# Patient Record
Sex: MALE | Race: WHITE | Employment: UNEMPLOYED | URBAN - METROPOLITAN AREA
[De-identification: names, ages, dates, MRNs, and addresses within clinical notes are randomized per-mention and may not be internally consistent; named-entity substitution may affect disease eponyms.]

---

## 2019-12-24 ENCOUNTER — TELEPHONE (OUTPATIENT)
Dept: FAMILY MEDICINE | Facility: CLINIC | Age: 64
End: 2019-12-24

## 2019-12-24 ENCOUNTER — OFFICE VISIT (OUTPATIENT)
Dept: FAMILY MEDICINE | Facility: CLINIC | Age: 64
End: 2019-12-24
Payer: MEDICAID

## 2019-12-24 VITALS
OXYGEN SATURATION: 94 % | TEMPERATURE: 98.4 F | HEART RATE: 90 BPM | DIASTOLIC BLOOD PRESSURE: 90 MMHG | WEIGHT: 179 LBS | SYSTOLIC BLOOD PRESSURE: 141 MMHG | HEIGHT: 65 IN | BODY MASS INDEX: 29.82 KG/M2

## 2019-12-24 DIAGNOSIS — F60.2 ANTISOCIAL PERSONALITY DISORDER (H): ICD-10-CM

## 2019-12-24 DIAGNOSIS — G89.29 CHRONIC BILATERAL LOW BACK PAIN WITHOUT SCIATICA: ICD-10-CM

## 2019-12-24 DIAGNOSIS — I10 HYPERTENSION GOAL BP (BLOOD PRESSURE) < 140/90: ICD-10-CM

## 2019-12-24 DIAGNOSIS — F19.11 HISTORY OF SUBSTANCE ABUSE (H): ICD-10-CM

## 2019-12-24 DIAGNOSIS — M54.2 CHRONIC NECK PAIN: ICD-10-CM

## 2019-12-24 DIAGNOSIS — F41.1 GAD (GENERALIZED ANXIETY DISORDER): ICD-10-CM

## 2019-12-24 DIAGNOSIS — Z86.19 HISTORY OF HEPATITIS C: ICD-10-CM

## 2019-12-24 DIAGNOSIS — G89.29 CHRONIC NECK PAIN: ICD-10-CM

## 2019-12-24 DIAGNOSIS — F60.3 BORDERLINE PERSONALITY DISORDER (H): ICD-10-CM

## 2019-12-24 DIAGNOSIS — N40.1 BENIGN PROSTATIC HYPERPLASIA WITH WEAK URINARY STREAM: Primary | ICD-10-CM

## 2019-12-24 DIAGNOSIS — R39.12 BENIGN PROSTATIC HYPERPLASIA WITH WEAK URINARY STREAM: Primary | ICD-10-CM

## 2019-12-24 DIAGNOSIS — M54.50 CHRONIC BILATERAL LOW BACK PAIN WITHOUT SCIATICA: ICD-10-CM

## 2019-12-24 DIAGNOSIS — F32.1 MODERATE MAJOR DEPRESSION (H): ICD-10-CM

## 2019-12-24 PROCEDURE — 99203 OFFICE O/P NEW LOW 30 MIN: CPT | Performed by: FAMILY MEDICINE

## 2019-12-24 RX ORDER — TAMSULOSIN HYDROCHLORIDE 0.4 MG/1
0.4 CAPSULE ORAL DAILY
Status: ON HOLD | COMMUNITY
End: 2021-12-21

## 2019-12-24 RX ORDER — MIRTAZAPINE 30 MG/1
30 TABLET, FILM COATED ORAL AT BEDTIME
Status: ON HOLD | COMMUNITY
End: 2021-12-21

## 2019-12-24 RX ORDER — LISINOPRIL 40 MG/1
40 TABLET ORAL DAILY
Status: ON HOLD | COMMUNITY
Start: 2019-12-24 | End: 2021-12-21

## 2019-12-24 RX ORDER — LEVETIRACETAM 1000 MG/1
TABLET ORAL
COMMUNITY
Start: 2019-12-24 | End: 2020-01-31

## 2019-12-24 RX ORDER — LEVETIRACETAM 1000 MG/1
1000 TABLET ORAL DAILY
COMMUNITY
End: 2019-12-24

## 2019-12-24 RX ORDER — CLONIDINE HYDROCHLORIDE 0.2 MG/1
0.2 TABLET ORAL 2 TIMES DAILY
COMMUNITY
End: 2020-01-10

## 2019-12-24 RX ORDER — FINASTERIDE 5 MG/1
5 TABLET, FILM COATED ORAL DAILY
Status: ON HOLD | COMMUNITY
Start: 2019-12-24 | End: 2021-11-30

## 2019-12-24 ASSESSMENT — MIFFLIN-ST. JEOR: SCORE: 1526.94

## 2019-12-24 NOTE — PROGRESS NOTES
"Subjective     Gurdeep Dia is a 64 year old male who presents to clinic today for the following health issues:    HPI   New Patient/Transfer of Care  Referral to urology.    He states he was just released from correctional facility about 10 days ago for doing \"dumb shit\".  He readily admits he has a history of alcohol and substance abuse.  He was incarcerated for 5 years.  He is in some type of follow-up after care program now for his mental health and history of substance abuse.  He goes to those meetings in the afternoons.  He tells me he was scheduled to have a TURP done previously for BPH, but never got around to doing that.  He is on maximal doses of tamsulosin and finasteride, but still has significant urinary obstructive symptoms.  He would like me to refer him to a urologist to have a TURP done.    He is on mental health medications as below.  He does have a history of hypertension.  He has chronic neck and low back pain.  He has anxiety and depression.  He has had his spleen and appendix removed.  He states he has been clean and sober for 5 years now since being incarcerated.    He thinks he had a colonoscopy done a couple of years ago or so.  He does have a history of hepatitis C that was treated and cured.    Patient Active Problem List   Diagnosis     Hypertension goal BP (blood pressure) < 140/90     Benign prostatic hyperplasia with weak urinary stream     RAVI (generalized anxiety disorder)     Moderate major depression (H)     History of substance abuse (H)     History of hepatitis C     Chronic neck pain     Chronic bilateral low back pain without sciatica     History reviewed. No pertinent surgical history.    Social History     Tobacco Use     Smoking status: Current Every Day Smoker     Smokeless tobacco: Never Used   Substance Use Topics     Alcohol use: Not Currently     Comment: Clean for 5 years     History reviewed. No pertinent family history.      Current Outpatient Medications " "  Medication Sig Dispense Refill     cloNIDine (CATAPRES) 0.2 MG tablet Take 0.2 mg by mouth 2 times daily       finasteride (PROSCAR) 5 MG tablet Take 1 tablet (5 mg) by mouth daily       levETIRAcetam (KEPPRA) 1000 MG tablet Take 1,000 mg by mouth daily 1000 mg at noon and 2000 mg at bedtime       mirtazapine (REMERON) 15 MG tablet Take 15 mg by mouth At Bedtime       tamsulosin (FLOMAX) 0.4 MG capsule Take 0.8 mg by mouth daily       Allergies   Allergen Reactions     Penicillins Hives         Reviewed and updated as needed this visit by Provider         Review of Systems   ROS COMP: Constitutional, HEENT, cardiovascular, pulmonary, gi and gu systems are negative, except as otherwise noted.      Objective    Ht 1.656 m (5' 5.2\")   Wt 81.2 kg (179 lb)   BMI 29.61 kg/m    Body mass index is 29.61 kg/m .  BP (!) 141/90 (BP Location: Left arm, Patient Position: Sitting, Cuff Size: Adult Regular)   Pulse 90   Temp 98.4  F (36.9  C) (Oral)   Ht 1.656 m (5' 5.2\")   Wt 81.2 kg (179 lb)   SpO2 94%   BMI 29.61 kg/m      Physical Exam   GENERAL: alert, no distress and over weight  HENT: Teeth are in poor repair.  He only has 3 teeth remaining.  ABDOMEN: Numerous abdominal scars.    Diagnostic Test Results:  none         Assessment & Plan       ICD-10-CM    1. Benign prostatic hyperplasia with weak urinary stream N40.1 finasteride (PROSCAR) 5 MG tablet    R39.12 UROLOGY ADULT REFERRAL   2. Moderate major depression (H) F32.1    3. History of substance abuse (H) F19.11    4. Borderline personality disorder (H) F60.3    5. Antisocial personality disorder (H) F60.2    6. RAVI (generalized anxiety disorder) F41.1    7. Hypertension goal BP (blood pressure) < 140/90 I10    8. History of hepatitis C Z86.19    9. Chronic neck pain M54.2     G89.29    10. Chronic bilateral low back pain without sciatica M54.5     G89.29         Tobacco Cessation:   reports that he has been smoking. He has never used smokeless " "tobacco.      BMI:   Estimated body mass index is 29.61 kg/m  as calculated from the following:    Height as of this encounter: 1.656 m (5' 5.2\").    Weight as of this encounter: 81.2 kg (179 lb).     His blood pressure is a bit on the high side today  He is apparently on clonidine for both mental health and blood pressure reasons  We may want to add something specifically for blood pressure or increase his clonidine dose in the future  He will continue his same medications for now  He was referred to urology for consideration of a TURP given his significant persistent symptoms despite maximal medical therapy  We will ask him to obtain old health records for us  Plan a tentative recheck sometime next month, likely for a preoperative history and physical    Return in about 1 month (around 1/24/2020).    Addendum:  After the patient left, he did later come back and bring in a medication list showing that he is also taking lisinopril 30 mg daily.  It also lists his diagnoses of borderline personality disorder and antisocial personality disorder.    Rom Vasquez MD  [facility]      "

## 2019-12-24 NOTE — NURSING NOTE
Patient states that he had a colonoscopy done about 2 years ago at [facility].  Carmen Castro CMA

## 2019-12-26 NOTE — TELEPHONE ENCOUNTER
Message left for patient to clarify if he would like paperwork back. Patient instructed to contact TC line to clarify.

## 2020-01-10 DIAGNOSIS — I10 HYPERTENSION GOAL BP (BLOOD PRESSURE) < 140/90: Primary | ICD-10-CM

## 2020-01-10 DIAGNOSIS — F41.1 GAD (GENERALIZED ANXIETY DISORDER): ICD-10-CM

## 2020-01-10 DIAGNOSIS — F19.11 HISTORY OF SUBSTANCE ABUSE (H): ICD-10-CM

## 2020-01-10 NOTE — TELEPHONE ENCOUNTER
Reason for Call:  Medication or medication refill:    Do you use a [facility]?  Name of the pharmacy and phone number for the current request:  [facility]    Name of the medication requested: cloNIDine (CATAPRES) 0.2 MG tablet    Other request:     Can we leave a detailed message on this number? YES    Phone number patient can be reached at: Home number on file [phone] (home)    Best Time: any    Call taken on 1/10/2020 at 9:18 AM by Noelle Akhtar

## 2020-01-13 RX ORDER — CLONIDINE HYDROCHLORIDE 0.2 MG/1
0.2 TABLET ORAL 2 TIMES DAILY
Qty: 60 TABLET | Refills: 5 | Status: ON HOLD | OUTPATIENT
Start: 2020-01-13 | End: 2021-11-30

## 2020-01-14 ENCOUNTER — OFFICE VISIT (OUTPATIENT)
Dept: UROLOGY | Facility: CLINIC | Age: 65
End: 2020-01-14
Payer: MEDICAID

## 2020-01-14 ENCOUNTER — TELEPHONE (OUTPATIENT)
Dept: UROLOGY | Facility: CLINIC | Age: 65
End: 2020-01-14

## 2020-01-14 VITALS — DIASTOLIC BLOOD PRESSURE: 109 MMHG | OXYGEN SATURATION: 95 % | HEART RATE: 107 BPM | SYSTOLIC BLOOD PRESSURE: 148 MMHG

## 2020-01-14 DIAGNOSIS — N40.1 BENIGN PROSTATIC HYPERPLASIA WITH LOWER URINARY TRACT SYMPTOMS, SYMPTOM DETAILS UNSPECIFIED: Primary | ICD-10-CM

## 2020-01-14 DIAGNOSIS — Z87.448 HISTORY OF URETHRAL STRICTURE: ICD-10-CM

## 2020-01-14 DIAGNOSIS — I10 HYPERTENSION GOAL BP (BLOOD PRESSURE) < 140/90: ICD-10-CM

## 2020-01-14 LAB — PSA SERPL-MCNC: 0.62 UG/L (ref 0–4)

## 2020-01-14 PROCEDURE — 52000 CYSTOURETHROSCOPY: CPT | Performed by: UROLOGY

## 2020-01-14 PROCEDURE — 51741 ELECTRO-UROFLOWMETRY FIRST: CPT | Mod: 51 | Performed by: UROLOGY

## 2020-01-14 PROCEDURE — 99244 OFF/OP CNSLTJ NEW/EST MOD 40: CPT | Mod: 25 | Performed by: UROLOGY

## 2020-01-14 PROCEDURE — 36415 COLL VENOUS BLD VENIPUNCTURE: CPT | Performed by: UROLOGY

## 2020-01-14 PROCEDURE — 51798 US URINE CAPACITY MEASURE: CPT | Performed by: UROLOGY

## 2020-01-14 PROCEDURE — 84153 ASSAY OF PSA TOTAL: CPT | Performed by: PATHOLOGY

## 2020-01-14 NOTE — PROGRESS NOTES
S: Gurdeep Dia is a pleasant  64 year old male who was requested to be seen by  Dr. Rom Vasquez for a consult with regard to patient's urinary complaints.  Patient complains of Hesitancy in starting urinary stream, Sensation of incomplete emptying, Strain to Urinate, Nocturia x 5, Urgency, Frequency, Intermittency and slow urinary stream.  He has no history of elevated PSA.  Symptoms have been on going for   many years(s).  Seems to be worsened over time.  His AUA Symptom Score:  31.  His QOL score:  6.  He is currently on Flomax and finasteride without any improvements.  He has history of urethral stricture in the past status post dilation.  He had a cystoscopy done recently by an outside urologist and was recommended to have a TURP done.    Current Outpatient Medications   Medication Sig Dispense Refill     cloNIDine (CATAPRES) 0.2 MG tablet Take 1 tablet (0.2 mg) by mouth 2 times daily 60 tablet 5     finasteride (PROSCAR) 5 MG tablet Take 1 tablet (5 mg) by mouth daily       levETIRAcetam (KEPPRA) 1000 MG tablet Take 1000 mg at noon and 2000 mg at bedtime.       lisinopril (PRINIVIL/ZESTRIL) 30 MG tablet Take 1 tablet (30 mg) by mouth daily       mirtazapine (REMERON) 15 MG tablet Take 15 mg by mouth At Bedtime       tamsulosin (FLOMAX) 0.4 MG capsule Take 0.8 mg by mouth daily       Allergies   Allergen Reactions     Penicillins Hives     Past Medical History:   Diagnosis Date     Benign prostatic hyperplasia with weak urinary stream      Chronic bilateral low back pain without sciatica      Chronic neck pain      RAVI (generalized anxiety disorder)      History of hepatitis C     treated and cured     History of substance abuse (H)      Hypertension goal BP (blood pressure) < 140/90      Moderate major depression (H)      No past surgical history on file.   No family history on file.  He does not have a family history of prostate cancer.  Social History     Socioeconomic History     Marital status: Single      Spouse name: None     Number of children: None     Years of education: None     Highest education level: None   Occupational History     None   Social Needs     Financial resource strain: None     Food insecurity:     Worry: None     Inability: None     Transportation needs:     Medical: None     Non-medical: None   Tobacco Use     Smoking status: Current Every Day Smoker     Smokeless tobacco: Never Used   Substance and Sexual Activity     Alcohol use: Not Currently     Comment: Clean for 5 years     Drug use: Not Currently     Sexual activity: Yes     Partners: Female   Lifestyle     Physical activity:     Days per week: None     Minutes per session: None     Stress: None   Relationships     Social connections:     Talks on phone: None     Gets together: None     Attends [employer] service: None     Active member of club or organization: None     Attends meetings of clubs or organizations: None     Relationship status: None     Intimate partner violence:     Fear of current or ex partner: None     Emotionally abused: None     Physically abused: None     Forced sexual activity: None   Other Topics Concern     None   Social History Narrative     None        REVIEW OF SYSTEMS  =================  C: NEGATIVE for fever, chills, change in weight  I: NEGATIVE for worrisome rashes, moles or lesions  E/M: NEGATIVE for ear, mouth and throat problems  R: NEGATIVE for significant cough or SHORTNESS OF BREATH  CV:  NEGATIVE for chest pain, palpitations or peripheral edema  GI: NEGATIVE for nausea, abdominal pain, heartburn, or change in bowel habits  NEURO: NEGATIVE numbness/weakness  : see HPI  PSYCH: NEGATIVE depression/anxiety  LYmph: no new enlarged lymph nodes  Ortho: no new trauma/movements           O: Exam:BP (!) 148/109 (BP Location: Right arm, Patient Position: Chair, Cuff Size: Adult Large)   Pulse 107   SpO2 95%    Constitutional: healthy, alert and no distress  Cardiovascular: negative, PMI normal.    Respiratory: negative, no evidence of respiratory distress  Gastrointestinal: Abdomen soft, non-tender. BS normal. No masses, organomegaly  : Penis no discharge.  Testes without any masses but no scrotal skin lesion.  Prostate about 30 g in size smooth no nodule nontender.  Bladder scan so minimal residual urine.  Musculoskeletal: extremities normal- no gross deformities noted, gait normal and normal muscle tone  Skin: no suspicious lesions or rashes  Neurologic: Alert and oriented  Psychiatric: mentation appears normal. and affect normal/bright  Hematologic/Lymphatic/Immunologic: normal ant/post cervical, axillary, supraclavicular and inguinal nodes    Cysto done today    Patient is draped and prepped.  Flexible cystoscopy placed under direct vision.      The anterior urethra is normal   The prostatic urethra showed bilateral lobe enlargement.     The length is 1cm,  the coaptation is 1 cm with high median ridge.     In the bladder there is trabeculation grade 1.    Uroflow: voided vol 260 ml 9.4 ml/sec    Assessment/Plan:  (N40.1) Benign prostatic hyperplasia with lower urinary tract symptoms, symptom details unspecified  (primary encounter diagnosis)  Comment: xps laser turp discussed.  Video reviewed.  Plan: Risks of bleeding, infection, retrograde ejaculation, incontinence, erectile dysfunction, stricture discussed.  I will schedule for this elective procedure near future.  Will get PSA today.    (Z87.448) History of urethral stricture  Comment: Treated  Plan: As needed    (I10) Hypertension goal BP (blood pressure) < 140/90  Comment:    Plan: Patient to follow-up with primary care MD.

## 2020-01-14 NOTE — PATIENT INSTRUCTIONS
Patient Education     Laser Prostatectomy  You have an enlarged prostate gland. This is also called benign prostatic hyperplasia (BPH). BPH is not cancer, but it can cause problems with urination. To relieve your symptoms, your healthcare provider may recommend laser prostatectomy. This procedure uses a laser (concentrated light energy). The laser removes prostate tissue from around the urethra. The urethra is the tube that carries urine from the bladder out of the body. The surgery lets urine flow more freely. The laser destroys the prostate tissue. This means it can t be looked at for signs of cancer.     Types of prostate laser surgery  The type of laser surgery your healthcare provider will do may depend on your health, the size of your prostate, and the type of tools available. The different types of prostate laser surgery are:    Photoselective vaporization of the prostate (PVP). The laser is used to vaporize or melt away the extra prostate tissue.    Holmium laser ablation of the prostate (HoLAP). This type of surgery is similar to PVP, but uses a different kind of laser.    Holmium laser enucleation of the prostate (HoLEP). In this procedure the laser cuts and removes extra tissue. A tool cuts the tissue into small pieces. This makes them easier to remove.   Possible risks and side effects of laser prostatectomy    Bleeding    Infection    Pneumonia    Blood clots    Scarring or narrowing of the urethra. This can cause trouble urinating.    Only some relief of symptoms    Erectile dysfunction (rare)    Loss of bladder control (very rare)    Loss of ejaculation  Getting ready for the procedure  Your healthcare team will give you detailed instructions on how to get ready for the procedure. Be sure to follow them.    Tell your healthcare team about all medicines you take. This includes prescription and over-the-counter medicines, vitamins, herbs, and other supplements. It also includes any blood thinners such  as warfarin, clopidogrel, or daily aspirin. You may need to stop taking some or all of them before surgery.    You will probably be told not to eat or drink anything after midnight on the night before your procedure.    When you arrive for the procedure, you will have an IV (intravenous) line placed. This gives you fluids and medicines during the procedure.    You will be given medicine to keep you from feeling pain (anesthesia) before the procedure. You may have 1 or more of the following:  ? Local anesthesia. Your bladder and urethra are numbed.  ? Regional anesthesia. Your body below the waist is numbed.  ? General anesthesia. You are in a state like deep sleep.  During the procedure  Your healthcare provider can help explain the details of your surgery. These details depend on the type of laser surgery that will be done. In general, you can expect the following:    The procedure itself usually takes less than an hour.    A thin, tube-like telescope (cystoscope) is put through the opening in your penis and into your urethra. This tool lets your provider see your urethra and your prostate, either through the cystoscope or on a video screen.    The laser is put through the cystoscope. The laser is then used to destroy the extra prostate tissue.    You may get a urinary catheter. This helps your bladder drain for a short time after the procedure. It s removed when it s no longer needed.  After the procedure  You may go home the same day after your surgery. Or you may stay a night in the hospital. An adult friend or family member should drive you home. To get the best results from your laser prostatectomy, follow your healthcare provider's instructions. Keep your follow-up appointments.    Your prostate will likely be sore at first. This will get better as you heal. Here are some things you can expect:    You may be sent home with a catheter to drain urine from your bladder. If so, you may wear a leg bag until it's  no longer needed. The catheter will allow the area to heal and help you avoid painful urination.    Your provider may also prescribe antibiotics to prevent infection and pain medicine to ease any discomfort.    In about a week, you ll visit your provider to have your catheter removed. If swelling still makes urination difficult, the catheter may be left in longer. After the catheter is removed, you may need to urinate more often. This is normal and should get better with time.    For the first few weeks after your procedure, you may notice that your urine is cloudy. Or you may have blood or blood clots in your urine. This is normal while your body rids itself of the treated tissue. These symptoms may begin to get better during the first few weeks. But it may take a few months before they go away. Your provider can tell you when you can have sex again and when you can go back to work.    You also may be told to avoid lifting anything over 10 pounds or bending over to lift things from the ground.    You should drink plenty of fluids to help flush out your bladder.  Getting back to sex  You may be glad to know that BPH and its treatments rarely cause problems with sex. You may find that you have retrograde ejaculation. This happens when semen goes into the bladder instead of the urethra during ejaculation. Retrograde ejaculation is common after procedures for BPH. But even if this does occur, orgasm shouldn t feel any different than it did before the procedure. And it should not cause any health problems or affect your sexual function. If you notice any problems with sex, talk with your healthcare provider. Help may be available.  When to call your healthcare provider  Contact your healthcare provider right away if:    You have a fever of 100.4 F (38 C) or higher, or as directed by your provider    You have excessive bleeding    You have pain not relieved by medicines    You notice that no urine is draining from the  catheter or the catheter falls out    You have a frequent or very strong urge to urinate    You re not able to urinate, or notice a decrease in urine flow   Date Last Reviewed: 2/1/2017 2000-2019 [employer]. [address] [place] [address], [place] [address]. All rights reserved. This information is not intended as a substitute for professional medical care. Always follow your healthcare professional's instructions.    Surgery Preparation    You will receive a phone call from the [facility] within 1-2 days. If you do not receive a call within 2 days, contact [phone] to schedule the procedure. You can write the location/date/time of surgery in the space provided below for your records.    Location of Surgery:                                          Date of Surgery:                                                 Time of Arrival:                                                   Time of Surgery:                                                   Please arrange an appointment with a primary care provider for a pre-op physical. This is a mandatory appointment that needs to be completed within the two weeks prior to your surgery date. This gives clearance for you to receive anesthesia during your procedure. If you have had a pre-op physical within 30 days of your surgery date, you may not need another one. Check with your provider.    If you are having a Same Day Surgery, you must have a  to and from the procedure. Someone needs to stay with you post-operatively for 12 hours.     Do not eat or drink any solids, milk products, or pulpy juices after midnight the night before your surgery. You may have clear liquids up to 8 hours prior to the surgery. This would include water, soda, coffee (without cream), tea, broth, and jello.    Please stop all over the counter blood thinners such as Aspirin, Advil, Aleve, Ibuprofen, Motrin, and Excedrin one week prior to surgery. Please discontinue  prescription blood thinners 5-7 days prior to surgery, per your primary physician's orders.     Please check with your insurance company to confirm that your procedure is covered, and that the facility is in-network.     Call the Urology Department @ [phone] if you have questions about your surgery, or if you need to reschedule your procedure.

## 2020-01-15 NOTE — TELEPHONE ENCOUNTER
Type of surgery: XPS laser turp CPT code 41626  Benign prostatic hyperplasia with lower urinary tract symptoms, symptom details unspecified [N40.1]  - Primary   Location of surgery: [facility]  Date and time of surgery: 1-20-20   3:45pm  Surgeon: Dr Garcia  Pre-Op Appt Date: 1-22-20  Post-Op Appt Date: 2-25-20   Packet sent out: Yes  Pre-cert/Authorization completed:  No prior auth per [employer] fee schedule.   Date: 01/15/2020    Sent to  and was received. 01/15/2020    Insurance valid. 01/15/2020

## 2020-01-22 ENCOUNTER — OFFICE VISIT (OUTPATIENT)
Dept: FAMILY MEDICINE | Facility: CLINIC | Age: 65
End: 2020-01-22
Payer: MEDICAID

## 2020-01-22 VITALS
HEIGHT: 65 IN | TEMPERATURE: 97.9 F | HEART RATE: 88 BPM | BODY MASS INDEX: 29.16 KG/M2 | SYSTOLIC BLOOD PRESSURE: 123 MMHG | WEIGHT: 175 LBS | DIASTOLIC BLOOD PRESSURE: 88 MMHG | OXYGEN SATURATION: 95 %

## 2020-01-22 DIAGNOSIS — Z12.11 SCREEN FOR COLON CANCER: ICD-10-CM

## 2020-01-22 DIAGNOSIS — I10 HYPERTENSION GOAL BP (BLOOD PRESSURE) < 140/90: ICD-10-CM

## 2020-01-22 DIAGNOSIS — Z01.818 PREOP GENERAL PHYSICAL EXAM: Primary | ICD-10-CM

## 2020-01-22 DIAGNOSIS — F19.11 HISTORY OF SUBSTANCE ABUSE (H): ICD-10-CM

## 2020-01-22 DIAGNOSIS — N40.1 BENIGN PROSTATIC HYPERPLASIA WITH WEAK URINARY STREAM: ICD-10-CM

## 2020-01-22 DIAGNOSIS — L03.116 CELLULITIS OF LEFT FOOT: ICD-10-CM

## 2020-01-22 DIAGNOSIS — R39.12 BENIGN PROSTATIC HYPERPLASIA WITH WEAK URINARY STREAM: ICD-10-CM

## 2020-01-22 DIAGNOSIS — F32.1 MODERATE MAJOR DEPRESSION (H): ICD-10-CM

## 2020-01-22 DIAGNOSIS — G89.29 CHRONIC NECK PAIN: ICD-10-CM

## 2020-01-22 DIAGNOSIS — M54.2 CHRONIC NECK PAIN: ICD-10-CM

## 2020-01-22 LAB
ANION GAP SERPL CALCULATED.3IONS-SCNC: 4 MMOL/L (ref 3–14)
BUN SERPL-MCNC: 14 MG/DL (ref 7–30)
CALCIUM SERPL-MCNC: 9.5 MG/DL (ref 8.5–10.1)
CHLORIDE SERPL-SCNC: 108 MMOL/L (ref 94–109)
CHOLEST SERPL-MCNC: 116 MG/DL
CO2 SERPL-SCNC: 28 MMOL/L (ref 20–32)
CREAT SERPL-MCNC: 0.94 MG/DL (ref 0.66–1.25)
ERYTHROCYTE [DISTWIDTH] IN BLOOD BY AUTOMATED COUNT: 14.3 % (ref 10–15)
GFR SERPL CREATININE-BSD FRML MDRD: 84 ML/MIN/{1.73_M2}
GLUCOSE SERPL-MCNC: 100 MG/DL (ref 70–99)
HCT VFR BLD AUTO: 44.6 % (ref 40–53)
HDLC SERPL-MCNC: 43 MG/DL
HGB BLD-MCNC: 14.3 G/DL (ref 13.3–17.7)
LDLC SERPL CALC-MCNC: 58 MG/DL
MCH RBC QN AUTO: 29.7 PG (ref 26.5–33)
MCHC RBC AUTO-ENTMCNC: 32.1 G/DL (ref 31.5–36.5)
MCV RBC AUTO: 93 FL (ref 78–100)
NONHDLC SERPL-MCNC: 73 MG/DL
PLATELET # BLD AUTO: 324 10E9/L (ref 150–450)
POTASSIUM SERPL-SCNC: 3.8 MMOL/L (ref 3.4–5.3)
RBC # BLD AUTO: 4.81 10E12/L (ref 4.4–5.9)
SODIUM SERPL-SCNC: 140 MMOL/L (ref 133–144)
TRIGL SERPL-MCNC: 73 MG/DL
WBC # BLD AUTO: 7.1 10E9/L (ref 4–11)

## 2020-01-22 PROCEDURE — 99215 OFFICE O/P EST HI 40 MIN: CPT | Performed by: FAMILY MEDICINE

## 2020-01-22 PROCEDURE — 80048 BASIC METABOLIC PNL TOTAL CA: CPT | Performed by: FAMILY MEDICINE

## 2020-01-22 PROCEDURE — 87389 HIV-1 AG W/HIV-1&-2 AB AG IA: CPT | Performed by: FAMILY MEDICINE

## 2020-01-22 PROCEDURE — 36415 COLL VENOUS BLD VENIPUNCTURE: CPT | Performed by: FAMILY MEDICINE

## 2020-01-22 PROCEDURE — 80061 LIPID PANEL: CPT | Performed by: FAMILY MEDICINE

## 2020-01-22 PROCEDURE — 85027 COMPLETE CBC AUTOMATED: CPT | Performed by: FAMILY MEDICINE

## 2020-01-22 RX ORDER — DOXYCYCLINE HYCLATE 100 MG
100 TABLET ORAL 2 TIMES DAILY
COMMUNITY
Start: 2020-01-20 | End: 2020-01-25

## 2020-01-22 RX ORDER — CEPHALEXIN 500 MG/1
500 CAPSULE ORAL 4 TIMES DAILY
COMMUNITY
Start: 2020-01-20 | End: 2020-01-25

## 2020-01-22 ASSESSMENT — MIFFLIN-ST. JEOR: SCORE: 1508.79

## 2020-01-22 NOTE — PROGRESS NOTES
[address] [address]  [phone]  Dept: [phone]    PRE-OP EVALUATION:  Today's date: 2020    Gurdeep Dia (: 1955) presents for pre-operative evaluation assessment as requested by Dr. Garcia.  He requires evaluation and anesthesia risk assessment prior to undergoing surgery/procedure for treatment of BPH .    Proposed Surgery/ Procedure: XPS laser turp  Date of Surgery/ Procedure: 20  Time of Surgery/ Procedure: 2:30 PM  Hospital/Surgical Facility: [facility]  Fax number for surgical facility: [phone]  Primary Physician: García [facility]  Type of Anesthesia Anticipated: to be determined    Patient has a Health Care Directive or Living Will:  NO    1. NO - Do you have a history of heart attack, stroke, stent, bypass or surgery on an artery in the head, neck, heart or legs?  2. NO - Do you ever have any pain or discomfort in your chest?  3. NO - Do you have a history of  Heart Failure?  4. NO - Are you troubled by shortness of breath when: walking on the level, up a slight hill or at night?  5. NO - Do you currently have a cold, bronchitis or other respiratory infection?  6. NO - Do you have a cough, shortness of breath or wheezing?  7. NO - Do you sometimes get pains in the calves of your legs when you walk?  8. NO - Do you or anyone in your family have previous history of blood clots?  9. NO - Do you or does anyone in your family have a serious bleeding problem such as prolonged bleeding following surgeries or cuts?  10. NO - Have you ever had problems with anemia or been told to take iron pills?  11. NO - Have you had any abnormal blood loss such as black, tarry or bloody stools, or abnormal vaginal bleeding?  12. NO - Have you ever had a blood transfusion?  13. NO - Have you or any of your relatives ever had problems with anesthesia?  14. NO - Do you have sleep apnea, excessive  snoring or daytime drowsiness?  15. NO - Do you have any prosthetic heart valves?  16. NO - Do you have prosthetic joints?  17. NO - Is there any chance that you may be pregnant?      HPI:     HPI related to upcoming procedure: 64-year-old white male with a history of substance abuse, mental illness, and chronic pain has significant urinary obstructive symptoms and is coming in now for laser TURP to treat his BPH.      He was seen at [facility] 2 days ago on January 20 with neck pain.  He has cervical spondylolysis and has been referred to neurosurgery for that.  He was also felt to have left foot cellulitis and was started on cephalexin and doxycycline.  See details on this via care everywhere in his electronic health record.    See problem list for active medical problems.  Problems all longstanding and stable, except as noted/documented.  See ROS for pertinent symptoms related to these conditions.      MEDICAL HISTORY:     Patient Active Problem List    Diagnosis Date Noted     Hypertension goal BP (blood pressure) < 140/90 12/24/2019     Priority: Medium     Benign prostatic hyperplasia with weak urinary stream 12/24/2019     Priority: Medium     RAVI (generalized anxiety disorder) 12/24/2019     Priority: Medium     Moderate major depression (H) 12/24/2019     Priority: Medium     History of substance abuse (H) 12/24/2019     Priority: Medium     History of hepatitis C 12/24/2019     Priority: Medium     Chronic neck pain 12/24/2019     Priority: Medium     Chronic bilateral low back pain without sciatica 12/24/2019     Priority: Medium      Past Medical History:   Diagnosis Date     Benign prostatic hyperplasia with weak urinary stream      Chronic bilateral low back pain without sciatica      Chronic neck pain      RAVI (generalized anxiety disorder)      History of hepatitis C     treated and cured     History of substance abuse (H)      Hypertension goal BP (blood pressure) < 140/90      Moderate major depression  (H)      Past Surgical History:   Procedure Laterality Date     C APPENDECTOMY  ~1996     SPLENECTOMY  ~1995     Current Outpatient Medications   Medication Sig Dispense Refill     cloNIDine (CATAPRES) 0.2 MG tablet Take 1 tablet (0.2 mg) by mouth 2 times daily 60 tablet 5     finasteride (PROSCAR) 5 MG tablet Take 1 tablet (5 mg) by mouth daily       levETIRAcetam (KEPPRA) 1000 MG tablet Take 1000 mg at noon and 2000 mg at bedtime.       lisinopril (PRINIVIL/ZESTRIL) 30 MG tablet Take 1 tablet (30 mg) by mouth daily       mirtazapine (REMERON) 15 MG tablet Take 15 mg by mouth At Bedtime       tamsulosin (FLOMAX) 0.4 MG capsule Take 0.8 mg by mouth daily       OTC products: None, except as noted above    Allergies   Allergen Reactions     Penicillins Hives      Latex Allergy: NO    Social History     Tobacco Use     Smoking status: Current Every Day Smoker     Smokeless tobacco: Never Used   Substance Use Topics     Alcohol use: Not Currently     Comment: Clean for 5 years     History   Drug Use Unknown       REVIEW OF SYSTEMS:   CONSTITUTIONAL: NEGATIVE for fever, chills, change in weight  INTEGUMENTARY/SKIN: Was diagnosed with left foot cellulitis the couple of days ago  EYES: NEGATIVE for vision changes or irritation  ENT/MOUTH: NEGATIVE for ear, mouth and throat problems  RESP: NEGATIVE for significant cough or SOB  BREAST: NEGATIVE for masses, tenderness or discharge  CV: NEGATIVE for chest pain, palpitations or peripheral edema  GI: NEGATIVE for nausea, abdominal pain, heartburn, or change in bowel habits   male : Significant urinary obstructive symptoms  MUSCULOSKELETAL: Neck pain with radiation down the right arm  NEURO: Some numbness and tingling down the right arm  ENDOCRINE: NEGATIVE for temperature intolerance, skin/hair changes  HEME: NEGATIVE for bleeding problems  PSYCHIATRIC: Significant history of mental illness and substance abuse and recent incarceration    EXAM:   /88 (BP Location: Right  "arm, Patient Position: Sitting, Cuff Size: Adult Regular)   Pulse 88   Temp 97.9  F (36.6  C) (Oral)   Ht 1.656 m (5' 5.2\")   Wt 79.4 kg (175 lb)   SpO2 95%   BMI 28.95 kg/m      GENERAL APPEARANCE: alert and no distress     EYES: EOMI,  PERRL     HENT: Grossly normal     NECK: Limited range of motion.  Pain with range of motion     RESP: lungs clear to auscultation - no rales, rhonchi or wheezes     CV: regular rates and rhythm, normal S1 S2, no S3 or S4 and no murmur, click or rub     ABDOMEN:  soft, nontender, no HSM or masses      MS: Neck pain with range of motion     SKIN: He has very mild erythema over the dorsum of his left foot today, but presumably less now than a couple of days ago when he was seen in the ER     NEURO: Some mild weakness and paresthesias of the right upper extremity     PSYCH: mentation appears normal. and affect normal/bright     LYMPHATICS: No cervical adenopathy    DIAGNOSTICS:   He had an EKG and labs done 2 days ago at [facility].  See details in chart.    Repeat lab testing from today is as below:   Office Visit on 01/22/2020   Component Date Value Ref Range Status     HIV Antigen Antibody Combo 01/22/2020 Nonreactive  NR^Nonreactive     Final    HIV-1 p24 Ag & HIV-1/HIV-2 Ab Not Detected     Cholesterol 01/22/2020 116  <200 mg/dL Final     Triglycerides 01/22/2020 73  <150 mg/dL Final     HDL Cholesterol 01/22/2020 43  >39 mg/dL Final     LDL Cholesterol Calculated 01/22/2020 58  <100 mg/dL Final    Desirable:       <100 mg/dl     Non HDL Cholesterol 01/22/2020 73  <130 mg/dL Final     WBC 01/22/2020 7.1  4.0 - 11.0 10e9/L Final     RBC Count 01/22/2020 4.81  4.4 - 5.9 10e12/L Final     Hemoglobin 01/22/2020 14.3  13.3 - 17.7 g/dL Final     Hematocrit 01/22/2020 44.6  40.0 - 53.0 % Final     MCV 01/22/2020 93  78 - 100 fl Final     MCH 01/22/2020 29.7  26.5 - 33.0 pg Final     MCHC 01/22/2020 32.1  31.5 - 36.5 g/dL Final     RDW 01/22/2020 14.3  10.0 - 15.0 % Final     Platelet " Count 01/22/2020 324  150 - 450 10e9/L Final     Sodium 01/22/2020 140  133 - 144 mmol/L Final     Potassium 01/22/2020 3.8  3.4 - 5.3 mmol/L Final     Chloride 01/22/2020 108  94 - 109 mmol/L Final     Carbon Dioxide 01/22/2020 28  20 - 32 mmol/L Final     Anion Gap 01/22/2020 4  3 - 14 mmol/L Final     Glucose 01/22/2020 100* 70 - 99 mg/dL Final     Urea Nitrogen 01/22/2020 14  7 - 30 mg/dL Final     Creatinine 01/22/2020 0.94  0.66 - 1.25 mg/dL Final     GFR Estimate 01/22/2020 84  >60 mL/min/[1.73_m2] Final    Comment: Non  GFR Calc  Starting 12/18/2018, serum creatinine based estimated GFR (eGFR) will be   calculated using the Chronic Kidney Disease Epidemiology Collaboration   (CKD-EPI) equation.       GFR Estimate If Black 01/22/2020 >90  >60 mL/min/[1.73_m2] Final    Comment:  GFR Calc  Starting 12/18/2018, serum creatinine based estimated GFR (eGFR) will be   calculated using the Chronic Kidney Disease Epidemiology Collaboration   (CKD-EPI) equation.       Calcium 01/22/2020 9.5  8.5 - 10.1 mg/dL Final         No results for input(s): HGB, PLT, INR, NA, POTASSIUM, CR, A1C in the last 04891 hours.     IMPRESSION:   Reason for surgery/procedure: BPH  Diagnosis/reason for consult: Preoperative history and physical    The proposed surgical procedure is considered INTERMEDIATE risk.    REVISED CARDIAC RISK INDEX  The patient has the following serious cardiovascular risks for perioperative complications such as (MI, PE, VFib and 3  AV Block):  No serious cardiac risks  INTERPRETATION: 0 risks: Class I (very low risk - 0.4% complication rate)    The patient has the following additional risks for perioperative complications:  History of substance abuse  Mental illness  Hypertension  Chronic neck pain  Recent left foot cellulitis      ICD-10-CM    1. Preop general physical exam Z01.818 HIV Screening     Lipid panel reflex to direct LDL Fasting     CBC with platelets     Basic  metabolic panel   2. Benign prostatic hyperplasia with weak urinary stream N40.1     R39.12    3. History of substance abuse (H) F19.11    4. Moderate major depression (H) F32.1    5. Chronic neck pain M54.2     G89.29    6. Cellulitis of left foot L03.116    7. Hypertension goal BP (blood pressure) < 140/90 I10    8. Screen for colon cancer Z12.11 Fecal colorectal cancer screen FIT - Future (S+30)       RECOMMENDATIONS:     His BPH symptoms and treatment plan seem relatively straightforward, but this is superimposed on a very challenging psychosocial situation.  He was recently on a crack cocaine binge last week and has chronic neck pain with cervical spondylolysis.  He was referred to neurosurgery for that, but has not followed through with that recommendation.  Since his chronic health problems are likely not going to be solved anytime soon, and he has had significant BPH symptoms for years despite maximal medical therapy, it would seem reasonable for him to proceed with the planned surgery next week.  He will plan to finish off the antibiotics for his left foot cellulitis.    APPROVAL GIVEN to proceed with proposed procedure, without further diagnostic evaluation       Signed Electronically by: Rom Vasquez MD    Copy of this evaluation report is provided to requesting physician.    [place] Preop Guidelines    Revised Cardiac Risk Index

## 2020-01-23 LAB — HIV 1+2 AB+HIV1 P24 AG SERPL QL IA: NONREACTIVE

## 2020-01-24 ENCOUNTER — TELEPHONE (OUTPATIENT)
Dept: FAMILY MEDICINE | Facility: CLINIC | Age: 65
End: 2020-01-24

## 2020-01-24 DIAGNOSIS — Z12.11 SCREEN FOR COLON CANCER: ICD-10-CM

## 2020-01-24 PROCEDURE — 82274 ASSAY TEST FOR BLOOD FECAL: CPT | Performed by: FAMILY MEDICINE

## 2020-01-24 NOTE — TELEPHONE ENCOUNTER
Forms received from: [facility]   Phone number listed: NA   Fax listed: [phone]  Date received: 01/24/2020  Form description: Level of Need Assessment Form  Once forms are completed, please return to [facility] via fax.  Is patient requesting to be contacted when forms are completed: NA    Form placed: In provider's basket  Noelle Akhtar

## 2020-01-24 NOTE — LETTER
[facility]  [address] [place]  [address]  [phone]        January 27, 2020    Gurdeep Dia  [address] [facility] [address]        Chema,   Thanks for doing the FIT stool test. Your test result was negative (normal). We would recommend doing this colon cancer screening test again in one year.     Results for orders placed or performed in visit on 01/24/20   Fecal colorectal cancer screen FIT - Future (S+30)     Status: None   Result Value Ref Range    Occult Blood Scn FIT Negative NEG^Negative       If you have any questions please call the clinic at [phone].    Sincerely,    Rom LERMA

## 2020-01-26 LAB — HEMOCCULT STL QL IA: NEGATIVE

## 2020-01-26 NOTE — RESULT ENCOUNTER NOTE
Chema,  Thanks for doing the FIT stool test. Your test result was negative (normal). We would recommend doing this colon cancer screening test again in one year.    Rom Vasquez MD

## 2020-01-27 ENCOUNTER — TELEPHONE (OUTPATIENT)
Dept: FAMILY MEDICINE | Facility: CLINIC | Age: 65
End: 2020-01-27

## 2020-01-27 DIAGNOSIS — G89.29 CHRONIC NECK PAIN: Primary | ICD-10-CM

## 2020-01-27 DIAGNOSIS — M54.2 CHRONIC NECK PAIN: Primary | ICD-10-CM

## 2020-01-27 RX ORDER — LEVETIRACETAM 1000 MG/1
TABLET ORAL
OUTPATIENT
Start: 2020-01-27

## 2020-01-27 RX ORDER — MIRTAZAPINE 15 MG/1
15 TABLET, FILM COATED ORAL AT BEDTIME
OUTPATIENT
Start: 2020-01-27

## 2020-01-27 NOTE — TELEPHONE ENCOUNTER
The patient should be seeing a mental health care provider and/or neurologist for these meds, so I will defer any refills to the original/specialty provider who prescribed them.

## 2020-01-27 NOTE — TELEPHONE ENCOUNTER
Reason for Call:  Medication or medication refill:    Do you use a [facility]?  Name of the pharmacy and phone number for the current request:  [id number] - [facility] [address] AT [address]    Name of the medication requested: mirtazapine (REMERON) 15 MG tablet and levETIRAcetam (KEPPRA) 1000 MG tablet    Other request: none    Can we leave a detailed message on this number? YES    Phone number patient can be reached at: Cell number on file:    Telephone Information:   Mobile [phone]       Best Time: Anytime    Call taken on 1/27/2020 at 12:54 PM by Airam Peter

## 2020-01-27 NOTE — TELEPHONE ENCOUNTER
Routing refill request to provider for review/approval because:  Medication is reported/historical    OV for pre-op 1/22/20    Routed to PCP to review and advise.     Lola Christiansen, RN, BSN, PHN  [facility]: [place]

## 2020-01-28 NOTE — TELEPHONE ENCOUNTER
Patient needs office visit to complete forms. Message left for patient to return call to TC line to communicate message and assist with scheduling.

## 2020-01-30 ENCOUNTER — TELEPHONE (OUTPATIENT)
Dept: UROLOGY | Facility: CLINIC | Age: 65
End: 2020-01-30

## 2020-01-30 NOTE — TELEPHONE ENCOUNTER
Reason for call:  Other     Patient called regarding (reason for call): question     Additional comments: Patient's calling regarding some questions from yesterdays procedure. Please call to advise.     Phone number to reach patient:  Home number on file [phone] (home)    Best Time:  Any     Can we leave a detailed message on this number?  YES

## 2020-01-31 RX ORDER — LEVETIRACETAM 1000 MG/1
TABLET ORAL
Qty: 90 TABLET | Refills: 1 | Status: ON HOLD | OUTPATIENT
Start: 2020-01-31 | End: 2021-11-30

## 2020-01-31 NOTE — TELEPHONE ENCOUNTER
RN spoke with patient. He is agreeable to following up with mental health to address remeron medication.     He states he has an appointment with neurology in March with [facility].      He is asking Dr. Vasquez will reconsider refilling the Keppra medication until he is able to be seen.     He states he has been taking x 3 years for neck pain.     Routed to PCP to review and advise.     Lola Christiansen RN, BSN, PHN  [facility]: [place]

## 2020-01-31 NOTE — TELEPHONE ENCOUNTER
Patient states he has been receiving Keppra for chronic neck pain x 3 years.     Routed to PCP to review and sign.     Lola Christiansen RN, BSN, PHN  [facility]: [place]

## 2020-01-31 NOTE — TELEPHONE ENCOUNTER
Patient would like to clarify why provider will not fill the Keppra. He understands why the mirtazapine was not filled but states that the Keppra is not mental health stuff. He only has 3 tablets left. He would like a call back to discuss. Patient uses [employer].

## 2020-01-31 NOTE — TELEPHONE ENCOUNTER
I would be willing to prescribe it for him until he sees a neurologist in March.  Please clarify what diagnosis he has for which he is taking the Keppra.

## 2020-01-31 NOTE — TELEPHONE ENCOUNTER
TC spoke with patient. He has new insurance that starts tomorrow. We are unsure yet if it is a plan that is accepted here at our clinic. He is going to contact his insurance and call back TC line with more information before we schedule an appointment to complete forms.     Forms have been placed in TC pending files folder.

## 2020-02-03 NOTE — TELEPHONE ENCOUNTER
Patient left a message on TC line voicemail that his insurance has taken care of the transportation issue so we do not need to worry about completing the forms.

## 2021-11-11 ENCOUNTER — TRANSCRIBE ORDERS (OUTPATIENT)
Dept: OTHER | Age: 66
End: 2021-11-11
Payer: COMMERCIAL

## 2021-11-11 DIAGNOSIS — R13.10 DYSPHAGIA, UNSPECIFIED TYPE: Primary | ICD-10-CM

## 2021-11-24 ENCOUNTER — HOSPITAL ENCOUNTER (OUTPATIENT)
Dept: RADIOLOGY | Facility: HOSPITAL | Age: 66
End: 2021-11-24
Attending: FAMILY MEDICINE
Payer: COMMERCIAL

## 2021-11-24 ENCOUNTER — HOSPITAL ENCOUNTER (OUTPATIENT)
Dept: SPEECH THERAPY | Facility: HOSPITAL | Age: 66
Setting detail: THERAPIES SERIES
End: 2021-11-24
Attending: PHYSICIAN ASSISTANT
Payer: COMMERCIAL

## 2021-11-24 DIAGNOSIS — K21.9 LPRD (LARYNGOPHARYNGEAL REFLUX DISEASE): ICD-10-CM

## 2021-11-24 DIAGNOSIS — R13.10 DYSPHAGIA, UNSPECIFIED TYPE: ICD-10-CM

## 2021-11-24 DIAGNOSIS — R13.10 DYSPHAGIA: ICD-10-CM

## 2021-11-24 DIAGNOSIS — R06.00 DYSPNEA: ICD-10-CM

## 2021-11-24 DIAGNOSIS — J31.0 OTHER RHINITIS: ICD-10-CM

## 2021-11-24 PROCEDURE — 74230 X-RAY XM SWLNG FUNCJ C+: CPT

## 2021-11-24 PROCEDURE — 92611 MOTION FLUOROSCOPY/SWALLOW: CPT | Mod: GN

## 2021-11-24 PROCEDURE — 74220 X-RAY XM ESOPHAGUS 1CNTRST: CPT

## 2021-11-24 PROCEDURE — 255N000001 HC RX 255: Performed by: FAMILY MEDICINE

## 2021-11-24 RX ORDER — BARIUM SULFATE 400 MG/ML
SUSPENSION ORAL ONCE
Status: COMPLETED | OUTPATIENT
Start: 2021-11-24 | End: 2021-11-24

## 2021-11-24 RX ADMIN — BARIUM SULFATE: 400 SUSPENSION ORAL at 09:56

## 2021-11-24 RX ADMIN — ANTACID/ANTIFLATULENT 4 G: 380; 550; 10; 10 GRANULE, EFFERVESCENT ORAL at 09:58

## 2021-11-24 NOTE — PROGRESS NOTES
Outpatient Video Fluoroscopic Swallow Study(VFSS) by SLP:     11/24/21 1300   General Information   Type Of Visit Initial   Start Of Care Date 11/24/21   Pertinent History of Current Problem/OT: Additional Occupational Profile Info Globus sensation with solids in throat. No hx choking or pneumonia.   Respiratory Status Room air   VFSS Eval: Radiology   Radiologist Alexandre   VFSS Eval: Thin Liquid Texture Trial   Mode of Presentation, Thin Liquid self-fed;straw   Preparatory Phase WFL   Oral Phase, Thin Liquid WFL   Pharyngeal Phase, Thin Liquid WFL   Rosenbek's Penetration Aspiration Scale: Thin Liquid Trial Results 1 - no aspiration, contrast does not enter airway   VFSS Eval: Regular Texture Trial (Solid)   Mode of Presentation self-fed   Order of Presentation barium coated cracker   Preparatory Phase WFL   Oral Phase WFL   Pharyngeal Phase WFL   Rosenbek's Penetration Aspiration Scale 1 - no aspiration, contrast does not enter airway   Esophageal Phase of Swallow   Esophageal comments see results of esophagram that followed this assessment   Swallow Eval: Clinical Impressions   Diet texture recommendations Thin liquids (level 0);Regular diet   Clinical Impression Comments No penetration or aspiraiton with solid or thin liquids. Recommend regular diet with thin liquids and to refer to esophagram results.   Total Session Time   SLP Eval: VideoFluoroscopic Swallow function Minutes (28247) 12   Total Evaluation Time 12

## 2021-11-30 ENCOUNTER — ANESTHESIA (OUTPATIENT)
Dept: SURGERY | Facility: HOSPITAL | Age: 66
DRG: 329 | End: 2021-11-30
Payer: MEDICARE

## 2021-11-30 ENCOUNTER — APPOINTMENT (OUTPATIENT)
Dept: CT IMAGING | Facility: HOSPITAL | Age: 66
DRG: 329 | End: 2021-11-30
Attending: EMERGENCY MEDICINE
Payer: MEDICARE

## 2021-11-30 ENCOUNTER — ANESTHESIA EVENT (OUTPATIENT)
Dept: SURGERY | Facility: HOSPITAL | Age: 66
DRG: 329 | End: 2021-11-30
Payer: MEDICARE

## 2021-11-30 ENCOUNTER — HOSPITAL ENCOUNTER (INPATIENT)
Facility: HOSPITAL | Age: 66
Setting detail: SURGERY ADMIT
End: 2021-11-30
Attending: SPECIALIST | Admitting: SPECIALIST
Payer: MEDICAID

## 2021-11-30 ENCOUNTER — HOSPITAL ENCOUNTER (INPATIENT)
Facility: HOSPITAL | Age: 66
LOS: 21 days | Discharge: HOME HEALTH | DRG: 329 | End: 2021-12-21
Attending: EMERGENCY MEDICINE | Admitting: FAMILY MEDICINE
Payer: MEDICARE

## 2021-11-30 DIAGNOSIS — F41.1 GAD (GENERALIZED ANXIETY DISORDER): ICD-10-CM

## 2021-11-30 DIAGNOSIS — G89.29 CHRONIC BILATERAL LOW BACK PAIN WITHOUT SCIATICA: ICD-10-CM

## 2021-11-30 DIAGNOSIS — K56.609 SBO (SMALL BOWEL OBSTRUCTION) (H): ICD-10-CM

## 2021-11-30 DIAGNOSIS — K63.1 PERFORATION OF INTESTINE (H): Primary | ICD-10-CM

## 2021-11-30 DIAGNOSIS — F33.1 MODERATE EPISODE OF RECURRENT MAJOR DEPRESSIVE DISORDER (H): ICD-10-CM

## 2021-11-30 DIAGNOSIS — R09.81 CONGESTION OF PARANASAL SINUS: ICD-10-CM

## 2021-11-30 DIAGNOSIS — R39.12 BENIGN PROSTATIC HYPERPLASIA WITH WEAK URINARY STREAM: ICD-10-CM

## 2021-11-30 DIAGNOSIS — K65.1 PELVIC ABSCESS IN MALE (H): ICD-10-CM

## 2021-11-30 DIAGNOSIS — M54.50 CHRONIC BILATERAL LOW BACK PAIN WITHOUT SCIATICA: ICD-10-CM

## 2021-11-30 DIAGNOSIS — E03.9 HYPOTHYROIDISM, UNSPECIFIED TYPE: ICD-10-CM

## 2021-11-30 DIAGNOSIS — K63.2 ENTEROCUTANEOUS FISTULA: ICD-10-CM

## 2021-11-30 DIAGNOSIS — N40.1 BENIGN PROSTATIC HYPERPLASIA WITH WEAK URINARY STREAM: ICD-10-CM

## 2021-11-30 DIAGNOSIS — Z78.9 ON TOTAL PARENTERAL NUTRITION (TPN): ICD-10-CM

## 2021-11-30 PROBLEM — M54.2 CHRONIC NECK PAIN: Status: ACTIVE | Noted: 2019-12-24

## 2021-11-30 PROBLEM — F19.11 HISTORY OF SUBSTANCE ABUSE (H): Status: ACTIVE | Noted: 2019-12-24

## 2021-11-30 PROBLEM — Z86.19 HISTORY OF HEPATITIS C: Status: ACTIVE | Noted: 2019-12-24

## 2021-11-30 LAB
ALBUMIN SERPL-MCNC: 4.5 G/DL (ref 3.5–5)
ALP SERPL-CCNC: 85 U/L (ref 45–120)
ALT SERPL W P-5'-P-CCNC: 24 U/L (ref 0–45)
ANION GAP SERPL CALCULATED.3IONS-SCNC: 10 MMOL/L (ref 5–18)
ANION GAP SERPL CALCULATED.3IONS-SCNC: 9 MMOL/L (ref 5–18)
AST SERPL W P-5'-P-CCNC: 27 U/L (ref 0–40)
BILIRUB SERPL-MCNC: 0.4 MG/DL (ref 0–1)
BUN SERPL-MCNC: 22 MG/DL (ref 8–22)
BUN SERPL-MCNC: 22 MG/DL (ref 8–22)
CALCIUM SERPL-MCNC: 10.6 MG/DL (ref 8.5–10.5)
CALCIUM SERPL-MCNC: 8.5 MG/DL (ref 8.5–10.5)
CHLORIDE BLD-SCNC: 109 MMOL/L (ref 98–107)
CHLORIDE BLD-SCNC: 114 MMOL/L (ref 98–107)
CO2 SERPL-SCNC: 22 MMOL/L (ref 22–31)
CO2 SERPL-SCNC: 25 MMOL/L (ref 22–31)
CREAT SERPL-MCNC: 0.99 MG/DL (ref 0.7–1.3)
CREAT SERPL-MCNC: 1.06 MG/DL (ref 0.7–1.3)
ERYTHROCYTE [DISTWIDTH] IN BLOOD BY AUTOMATED COUNT: 14.1 % (ref 10–15)
GFR SERPL CREATININE-BSD FRML MDRD: 72 ML/MIN/1.73M2
GFR SERPL CREATININE-BSD FRML MDRD: 78 ML/MIN/1.73M2
GLUCOSE BLD-MCNC: 120 MG/DL (ref 70–125)
GLUCOSE BLD-MCNC: 159 MG/DL (ref 70–125)
HCT VFR BLD AUTO: 50.6 % (ref 40–53)
HGB BLD-MCNC: 15.2 G/DL (ref 13.3–17.7)
HGB BLD-MCNC: 16.7 G/DL (ref 13.3–17.7)
HOLD SPECIMEN: NORMAL
LACTATE SERPL-SCNC: 0.7 MMOL/L (ref 0.7–2)
LACTATE SERPL-SCNC: 2.1 MMOL/L (ref 0.7–2)
LIPASE SERPL-CCNC: 21 U/L (ref 0–52)
MCH RBC QN AUTO: 30.3 PG (ref 26.5–33)
MCHC RBC AUTO-ENTMCNC: 33 G/DL (ref 31.5–36.5)
MCV RBC AUTO: 92 FL (ref 78–100)
PLATELET # BLD AUTO: 418 10E3/UL (ref 150–450)
POTASSIUM BLD-SCNC: 4.3 MMOL/L (ref 3.5–5)
POTASSIUM BLD-SCNC: 4.3 MMOL/L (ref 3.5–5)
PROT SERPL-MCNC: 8.3 G/DL (ref 6–8)
RBC # BLD AUTO: 5.51 10E6/UL (ref 4.4–5.9)
SARS-COV-2 RNA RESP QL NAA+PROBE: NEGATIVE
SODIUM SERPL-SCNC: 144 MMOL/L (ref 136–145)
SODIUM SERPL-SCNC: 145 MMOL/L (ref 136–145)
WBC # BLD AUTO: 11.8 10E3/UL (ref 4–11)

## 2021-11-30 PROCEDURE — 85027 COMPLETE CBC AUTOMATED: CPT | Performed by: EMERGENCY MEDICINE

## 2021-11-30 PROCEDURE — 250N000009 HC RX 250: Performed by: EMERGENCY MEDICINE

## 2021-11-30 PROCEDURE — 250N000009 HC RX 250: Performed by: NURSE ANESTHETIST, CERTIFIED REGISTERED

## 2021-11-30 PROCEDURE — 250N000011 HC RX IP 250 OP 636: Performed by: NURSE ANESTHETIST, CERTIFIED REGISTERED

## 2021-11-30 PROCEDURE — 83690 ASSAY OF LIPASE: CPT | Performed by: EMERGENCY MEDICINE

## 2021-11-30 PROCEDURE — 250N000025 HC SEVOFLURANE, PER MIN: Performed by: SPECIALIST

## 2021-11-30 PROCEDURE — 0DT80ZZ RESECTION OF SMALL INTESTINE, OPEN APPROACH: ICD-10-PCS | Performed by: SPECIALIST

## 2021-11-30 PROCEDURE — 258N000003 HC RX IP 258 OP 636: Performed by: EMERGENCY MEDICINE

## 2021-11-30 PROCEDURE — 83605 ASSAY OF LACTIC ACID: CPT | Performed by: EMERGENCY MEDICINE

## 2021-11-30 PROCEDURE — 250N000011 HC RX IP 250 OP 636

## 2021-11-30 PROCEDURE — 250N000009 HC RX 250: Performed by: SPECIALIST

## 2021-11-30 PROCEDURE — 87635 SARS-COV-2 COVID-19 AMP PRB: CPT | Performed by: EMERGENCY MEDICINE

## 2021-11-30 PROCEDURE — 250N000011 HC RX IP 250 OP 636: Performed by: SPECIALIST

## 2021-11-30 PROCEDURE — 250N000011 HC RX IP 250 OP 636: Performed by: ANESTHESIOLOGY

## 2021-11-30 PROCEDURE — C9803 HOPD COVID-19 SPEC COLLECT: HCPCS

## 2021-11-30 PROCEDURE — 250N000011 HC RX IP 250 OP 636: Performed by: EMERGENCY MEDICINE

## 2021-11-30 PROCEDURE — 44120 REMOVAL OF SMALL INTESTINE: CPT | Mod: AS | Performed by: SPECIALIST

## 2021-11-30 PROCEDURE — 258N000003 HC RX IP 258 OP 636: Performed by: SPECIALIST

## 2021-11-30 PROCEDURE — 360N000077 HC SURGERY LEVEL 4, PER MIN: Performed by: SPECIALIST

## 2021-11-30 PROCEDURE — 36415 COLL VENOUS BLD VENIPUNCTURE: CPT | Performed by: EMERGENCY MEDICINE

## 2021-11-30 PROCEDURE — 74177 CT ABD & PELVIS W/CONTRAST: CPT

## 2021-11-30 PROCEDURE — 258N000003 HC RX IP 258 OP 636: Performed by: NURSE ANESTHETIST, CERTIFIED REGISTERED

## 2021-11-30 PROCEDURE — 999N000141 HC STATISTIC PRE-PROCEDURE NURSING ASSESSMENT: Performed by: SPECIALIST

## 2021-11-30 PROCEDURE — 96376 TX/PRO/DX INJ SAME DRUG ADON: CPT

## 2021-11-30 PROCEDURE — 80053 COMPREHEN METABOLIC PANEL: CPT | Performed by: EMERGENCY MEDICINE

## 2021-11-30 PROCEDURE — 258N000003 HC RX IP 258 OP 636: Performed by: INTERNAL MEDICINE

## 2021-11-30 PROCEDURE — 99285 EMERGENCY DEPT VISIT HI MDM: CPT | Mod: 25

## 2021-11-30 PROCEDURE — 710N000010 HC RECOVERY PHASE 1, LEVEL 2, PER MIN: Performed by: SPECIALIST

## 2021-11-30 PROCEDURE — 99221 1ST HOSP IP/OBS SF/LOW 40: CPT | Mod: 57 | Performed by: SPECIALIST

## 2021-11-30 PROCEDURE — 96374 THER/PROPH/DIAG INJ IV PUSH: CPT | Mod: 59

## 2021-11-30 PROCEDURE — 250N000013 HC RX MED GY IP 250 OP 250 PS 637: Performed by: INTERNAL MEDICINE

## 2021-11-30 PROCEDURE — 120N000001 HC R&B MED SURG/OB

## 2021-11-30 PROCEDURE — 36415 COLL VENOUS BLD VENIPUNCTURE: CPT | Performed by: INTERNAL MEDICINE

## 2021-11-30 PROCEDURE — 88307 TISSUE EXAM BY PATHOLOGIST: CPT | Mod: TC | Performed by: SPECIALIST

## 2021-11-30 PROCEDURE — 83605 ASSAY OF LACTIC ACID: CPT | Performed by: INTERNAL MEDICINE

## 2021-11-30 PROCEDURE — 0DN84ZZ RELEASE SMALL INTESTINE, PERCUTANEOUS ENDOSCOPIC APPROACH: ICD-10-PCS | Performed by: SPECIALIST

## 2021-11-30 PROCEDURE — 250N000013 HC RX MED GY IP 250 OP 250 PS 637: Performed by: SPECIALIST

## 2021-11-30 PROCEDURE — 370N000017 HC ANESTHESIA TECHNICAL FEE, PER MIN: Performed by: SPECIALIST

## 2021-11-30 PROCEDURE — 272N000001 HC OR GENERAL SUPPLY STERILE: Performed by: SPECIALIST

## 2021-11-30 PROCEDURE — P9041 ALBUMIN (HUMAN),5%, 50ML: HCPCS | Performed by: NURSE ANESTHETIST, CERTIFIED REGISTERED

## 2021-11-30 PROCEDURE — 85018 HEMOGLOBIN: CPT | Performed by: INTERNAL MEDICINE

## 2021-11-30 PROCEDURE — 82040 ASSAY OF SERUM ALBUMIN: CPT | Performed by: EMERGENCY MEDICINE

## 2021-11-30 RX ORDER — OXYCODONE HYDROCHLORIDE 5 MG/1
5 TABLET ORAL EVERY 4 HOURS PRN
Status: DISCONTINUED | OUTPATIENT
Start: 2021-11-30 | End: 2021-11-30 | Stop reason: HOSPADM

## 2021-11-30 RX ORDER — ACETAMINOPHEN 325 MG/1
975 TABLET ORAL EVERY 8 HOURS
Status: COMPLETED | OUTPATIENT
Start: 2021-11-30 | End: 2021-12-03

## 2021-11-30 RX ORDER — PANTOPRAZOLE SODIUM 20 MG/1
40 TABLET, DELAYED RELEASE ORAL DAILY
Status: DISCONTINUED | OUTPATIENT
Start: 2021-11-30 | End: 2021-12-04

## 2021-11-30 RX ORDER — IOPAMIDOL 755 MG/ML
100 INJECTION, SOLUTION INTRAVASCULAR ONCE
Status: COMPLETED | OUTPATIENT
Start: 2021-11-30 | End: 2021-11-30

## 2021-11-30 RX ORDER — MAGNESIUM HYDROXIDE 1200 MG/15ML
LIQUID ORAL PRN
Status: DISCONTINUED | OUTPATIENT
Start: 2021-11-30 | End: 2021-11-30 | Stop reason: HOSPADM

## 2021-11-30 RX ORDER — FENTANYL CITRATE 50 UG/ML
INJECTION, SOLUTION INTRAMUSCULAR; INTRAVENOUS PRN
Status: DISCONTINUED | OUTPATIENT
Start: 2021-11-30 | End: 2021-11-30

## 2021-11-30 RX ORDER — DEXTROSE MONOHYDRATE, SODIUM CHLORIDE, AND POTASSIUM CHLORIDE 50; 1.49; 4.5 G/1000ML; G/1000ML; G/1000ML
INJECTION, SOLUTION INTRAVENOUS CONTINUOUS
Status: DISCONTINUED | OUTPATIENT
Start: 2021-11-30 | End: 2021-11-30

## 2021-11-30 RX ORDER — SODIUM CHLORIDE 9 MG/ML
INJECTION, SOLUTION INTRAVENOUS CONTINUOUS
Status: DISCONTINUED | OUTPATIENT
Start: 2021-11-30 | End: 2021-12-01

## 2021-11-30 RX ORDER — AMOXICILLIN 250 MG
1 CAPSULE ORAL 2 TIMES DAILY
Status: DISCONTINUED | OUTPATIENT
Start: 2021-11-30 | End: 2021-12-01

## 2021-11-30 RX ORDER — LEVOTHYROXINE SODIUM 25 UG/1
25 TABLET ORAL DAILY
Status: DISCONTINUED | OUTPATIENT
Start: 2021-11-30 | End: 2021-12-05

## 2021-11-30 RX ORDER — LEVOTHYROXINE SODIUM 25 UG/1
25 TABLET ORAL DAILY
Status: ON HOLD | COMMUNITY
End: 2021-12-21

## 2021-11-30 RX ORDER — FAMOTIDINE 20 MG/1
20 TABLET, FILM COATED ORAL 2 TIMES DAILY
Status: DISCONTINUED | OUTPATIENT
Start: 2021-11-30 | End: 2021-12-05

## 2021-11-30 RX ORDER — SODIUM CHLORIDE, SODIUM LACTATE, POTASSIUM CHLORIDE, CALCIUM CHLORIDE 600; 310; 30; 20 MG/100ML; MG/100ML; MG/100ML; MG/100ML
INJECTION, SOLUTION INTRAVENOUS CONTINUOUS
Status: DISCONTINUED | OUTPATIENT
Start: 2021-11-30 | End: 2021-11-30 | Stop reason: HOSPADM

## 2021-11-30 RX ORDER — ALBUMIN, HUMAN INJ 5% 5 %
SOLUTION INTRAVENOUS CONTINUOUS PRN
Status: DISCONTINUED | OUTPATIENT
Start: 2021-11-30 | End: 2021-11-30

## 2021-11-30 RX ORDER — FUROSEMIDE 20 MG
20 TABLET ORAL DAILY
Status: ON HOLD | COMMUNITY
End: 2021-12-21

## 2021-11-30 RX ORDER — OXYCODONE HYDROCHLORIDE 5 MG/1
10 TABLET ORAL EVERY 4 HOURS PRN
Status: DISCONTINUED | OUTPATIENT
Start: 2021-11-30 | End: 2021-12-13

## 2021-11-30 RX ORDER — LIDOCAINE 40 MG/G
CREAM TOPICAL
Status: DISCONTINUED | OUTPATIENT
Start: 2021-11-30 | End: 2021-11-30 | Stop reason: HOSPADM

## 2021-11-30 RX ORDER — NALOXONE HYDROCHLORIDE 1 MG/ML
0.2 INJECTION INTRAMUSCULAR; INTRAVENOUS; SUBCUTANEOUS
Status: DISCONTINUED | OUTPATIENT
Start: 2021-11-30 | End: 2021-12-13

## 2021-11-30 RX ORDER — SODIUM CHLORIDE, SODIUM LACTATE, POTASSIUM CHLORIDE, CALCIUM CHLORIDE 600; 310; 30; 20 MG/100ML; MG/100ML; MG/100ML; MG/100ML
INJECTION, SOLUTION INTRAVENOUS CONTINUOUS PRN
Status: DISCONTINUED | OUTPATIENT
Start: 2021-11-30 | End: 2021-11-30

## 2021-11-30 RX ORDER — VASOPRESSIN 20 U/ML
INJECTION PARENTERAL PRN
Status: DISCONTINUED | OUTPATIENT
Start: 2021-11-30 | End: 2021-11-30

## 2021-11-30 RX ORDER — DIPHENHYDRAMINE HCL 12.5MG/5ML
12.5 LIQUID (ML) ORAL EVERY 6 HOURS PRN
Status: DISCONTINUED | OUTPATIENT
Start: 2021-11-30 | End: 2021-12-01

## 2021-11-30 RX ORDER — LIDOCAINE 40 MG/G
CREAM TOPICAL
Status: DISCONTINUED | OUTPATIENT
Start: 2021-11-30 | End: 2021-12-13

## 2021-11-30 RX ORDER — GABAPENTIN 100 MG/1
100 CAPSULE ORAL AT BEDTIME
Status: DISCONTINUED | OUTPATIENT
Start: 2021-11-30 | End: 2021-11-30

## 2021-11-30 RX ORDER — TAMSULOSIN HYDROCHLORIDE 0.4 MG/1
0.4 CAPSULE ORAL DAILY
Status: DISCONTINUED | OUTPATIENT
Start: 2021-11-30 | End: 2021-12-21 | Stop reason: HOSPADM

## 2021-11-30 RX ORDER — ONDANSETRON 2 MG/ML
4 INJECTION INTRAMUSCULAR; INTRAVENOUS EVERY 6 HOURS PRN
Status: DISCONTINUED | OUTPATIENT
Start: 2021-11-30 | End: 2021-12-01

## 2021-11-30 RX ORDER — DEXAMETHASONE SODIUM PHOSPHATE 10 MG/ML
INJECTION, SOLUTION INTRAMUSCULAR; INTRAVENOUS PRN
Status: DISCONTINUED | OUTPATIENT
Start: 2021-11-30 | End: 2021-11-30

## 2021-11-30 RX ORDER — MIRTAZAPINE 30 MG/1
30 TABLET, FILM COATED ORAL AT BEDTIME
Status: DISCONTINUED | OUTPATIENT
Start: 2021-11-30 | End: 2021-12-21 | Stop reason: HOSPADM

## 2021-11-30 RX ORDER — ONDANSETRON 4 MG/1
4 TABLET, ORALLY DISINTEGRATING ORAL EVERY 6 HOURS PRN
Status: DISCONTINUED | OUTPATIENT
Start: 2021-11-30 | End: 2021-12-01

## 2021-11-30 RX ORDER — BUSPIRONE HYDROCHLORIDE 15 MG/1
15 TABLET ORAL 2 TIMES DAILY
Status: ON HOLD | COMMUNITY
End: 2021-12-21

## 2021-11-30 RX ORDER — GABAPENTIN 300 MG/1
600 CAPSULE ORAL AT BEDTIME
Status: DISCONTINUED | OUTPATIENT
Start: 2021-11-30 | End: 2021-12-08

## 2021-11-30 RX ORDER — MELOXICAM 7.5 MG/1
7.5 TABLET ORAL DAILY PRN
Status: ON HOLD | COMMUNITY
End: 2021-12-21

## 2021-11-30 RX ORDER — HYDROMORPHONE HCL IN WATER/PF 6 MG/30 ML
0.2 PATIENT CONTROLLED ANALGESIA SYRINGE INTRAVENOUS EVERY 5 MIN PRN
Status: DISCONTINUED | OUTPATIENT
Start: 2021-11-30 | End: 2021-11-30 | Stop reason: HOSPADM

## 2021-11-30 RX ORDER — CLINDAMYCIN PHOSPHATE 900 MG/50ML
900 INJECTION, SOLUTION INTRAVENOUS
Status: COMPLETED | OUTPATIENT
Start: 2021-11-30 | End: 2021-11-30

## 2021-11-30 RX ORDER — ONDANSETRON 4 MG/1
4 TABLET, ORALLY DISINTEGRATING ORAL EVERY 30 MIN PRN
Status: DISCONTINUED | OUTPATIENT
Start: 2021-11-30 | End: 2021-11-30 | Stop reason: HOSPADM

## 2021-11-30 RX ORDER — GABAPENTIN 300 MG/1
600 CAPSULE ORAL AT BEDTIME
Status: ON HOLD | COMMUNITY
End: 2021-12-21

## 2021-11-30 RX ORDER — ONDANSETRON 2 MG/ML
INJECTION INTRAMUSCULAR; INTRAVENOUS PRN
Status: DISCONTINUED | OUTPATIENT
Start: 2021-11-30 | End: 2021-11-30

## 2021-11-30 RX ORDER — POLYETHYLENE GLYCOL 3350 17 G/17G
17 POWDER, FOR SOLUTION ORAL DAILY
Status: DISCONTINUED | OUTPATIENT
Start: 2021-12-01 | End: 2021-12-01

## 2021-11-30 RX ORDER — NALOXONE HYDROCHLORIDE 1 MG/ML
0.4 INJECTION INTRAMUSCULAR; INTRAVENOUS; SUBCUTANEOUS
Status: DISCONTINUED | OUTPATIENT
Start: 2021-11-30 | End: 2021-12-13

## 2021-11-30 RX ORDER — ONDANSETRON 2 MG/ML
4 INJECTION INTRAMUSCULAR; INTRAVENOUS EVERY 30 MIN PRN
Status: DISCONTINUED | OUTPATIENT
Start: 2021-11-30 | End: 2021-11-30 | Stop reason: HOSPADM

## 2021-11-30 RX ORDER — DIPHENHYDRAMINE HYDROCHLORIDE 50 MG/ML
INJECTION INTRAMUSCULAR; INTRAVENOUS
Status: COMPLETED
Start: 2021-11-30 | End: 2021-11-30

## 2021-11-30 RX ORDER — LIDOCAINE HYDROCHLORIDE 20 MG/ML
10 JELLY TOPICAL ONCE
Status: COMPLETED | OUTPATIENT
Start: 2021-11-30 | End: 2021-11-30

## 2021-11-30 RX ORDER — HYDROMORPHONE HCL IN WATER/PF 6 MG/30 ML
0.4 PATIENT CONTROLLED ANALGESIA SYRINGE INTRAVENOUS
Status: DISCONTINUED | OUTPATIENT
Start: 2021-11-30 | End: 2021-12-08

## 2021-11-30 RX ORDER — BUPIVACAINE HYDROCHLORIDE 2.5 MG/ML
INJECTION, SOLUTION INFILTRATION; PERINEURAL PRN
Status: DISCONTINUED | OUTPATIENT
Start: 2021-11-30 | End: 2021-11-30 | Stop reason: HOSPADM

## 2021-11-30 RX ORDER — CLINDAMYCIN PHOSPHATE 900 MG/50ML
900 INJECTION, SOLUTION INTRAVENOUS SEE ADMIN INSTRUCTIONS
Status: DISCONTINUED | OUTPATIENT
Start: 2021-11-30 | End: 2021-11-30 | Stop reason: HOSPADM

## 2021-11-30 RX ORDER — LEVOFLOXACIN 5 MG/ML
500 INJECTION, SOLUTION INTRAVENOUS EVERY 24 HOURS
Status: DISCONTINUED | OUTPATIENT
Start: 2021-11-30 | End: 2021-12-04

## 2021-11-30 RX ORDER — VENLAFAXINE HYDROCHLORIDE 37.5 MG/1
37.5 CAPSULE, EXTENDED RELEASE ORAL DAILY
Status: ON HOLD | COMMUNITY
End: 2021-12-21

## 2021-11-30 RX ORDER — GABAPENTIN 300 MG/1
300 CAPSULE ORAL 2 TIMES DAILY WITH MEALS
Status: DISCONTINUED | OUTPATIENT
Start: 2021-12-01 | End: 2021-12-15

## 2021-11-30 RX ORDER — HYDROMORPHONE HCL IN WATER/PF 6 MG/30 ML
0.2 PATIENT CONTROLLED ANALGESIA SYRINGE INTRAVENOUS
Status: DISCONTINUED | OUTPATIENT
Start: 2021-11-30 | End: 2021-12-01

## 2021-11-30 RX ORDER — DIPHENHYDRAMINE HYDROCHLORIDE 50 MG/ML
25 INJECTION INTRAMUSCULAR; INTRAVENOUS EVERY 6 HOURS PRN
Status: DISCONTINUED | OUTPATIENT
Start: 2021-11-30 | End: 2021-11-30 | Stop reason: HOSPADM

## 2021-11-30 RX ORDER — FENTANYL CITRATE 50 UG/ML
50 INJECTION, SOLUTION INTRAMUSCULAR; INTRAVENOUS EVERY 5 MIN PRN
Status: DISCONTINUED | OUTPATIENT
Start: 2021-11-30 | End: 2021-11-30 | Stop reason: HOSPADM

## 2021-11-30 RX ORDER — SODIUM CHLORIDE 9 MG/ML
1000 INJECTION, SOLUTION INTRAVENOUS CONTINUOUS
Status: DISCONTINUED | OUTPATIENT
Start: 2021-11-30 | End: 2021-11-30

## 2021-11-30 RX ORDER — BISACODYL 10 MG
10 SUPPOSITORY, RECTAL RECTAL DAILY PRN
Status: DISCONTINUED | OUTPATIENT
Start: 2021-11-30 | End: 2021-12-16

## 2021-11-30 RX ORDER — PROPOFOL 10 MG/ML
INJECTION, EMULSION INTRAVENOUS PRN
Status: DISCONTINUED | OUTPATIENT
Start: 2021-11-30 | End: 2021-11-30

## 2021-11-30 RX ORDER — OXYCODONE HYDROCHLORIDE 5 MG/1
5 TABLET ORAL EVERY 4 HOURS PRN
Status: DISCONTINUED | OUTPATIENT
Start: 2021-11-30 | End: 2021-12-13

## 2021-11-30 RX ORDER — VENLAFAXINE HYDROCHLORIDE 37.5 MG/1
37.5 CAPSULE, EXTENDED RELEASE ORAL DAILY
Status: DISCONTINUED | OUTPATIENT
Start: 2021-11-30 | End: 2021-12-21 | Stop reason: HOSPADM

## 2021-11-30 RX ORDER — ACETAMINOPHEN 325 MG/1
650 TABLET ORAL EVERY 4 HOURS PRN
Status: DISCONTINUED | OUTPATIENT
Start: 2021-12-03 | End: 2021-12-17

## 2021-11-30 RX ORDER — LIDOCAINE HYDROCHLORIDE 20 MG/ML
INJECTION, SOLUTION INFILTRATION; PERINEURAL PRN
Status: DISCONTINUED | OUTPATIENT
Start: 2021-11-30 | End: 2021-11-30

## 2021-11-30 RX ORDER — BUSPIRONE HYDROCHLORIDE 15 MG/1
15 TABLET ORAL 2 TIMES DAILY
Status: DISCONTINUED | OUTPATIENT
Start: 2021-11-30 | End: 2021-12-21 | Stop reason: HOSPADM

## 2021-11-30 RX ORDER — TRIAMCINOLONE ACETONIDE 55 UG/1
2 SPRAY, METERED NASAL DAILY
Status: ON HOLD | COMMUNITY
End: 2021-12-21

## 2021-11-30 RX ORDER — GABAPENTIN 300 MG/1
300 CAPSULE ORAL 2 TIMES DAILY
Status: ON HOLD | COMMUNITY
End: 2021-12-21

## 2021-11-30 RX ORDER — PROCHLORPERAZINE MALEATE 5 MG
5 TABLET ORAL EVERY 6 HOURS PRN
Status: DISCONTINUED | OUTPATIENT
Start: 2021-11-30 | End: 2021-12-21 | Stop reason: HOSPADM

## 2021-11-30 RX ORDER — HALOPERIDOL 5 MG/ML
1 INJECTION INTRAMUSCULAR
Status: COMPLETED | OUTPATIENT
Start: 2021-11-30 | End: 2021-11-30

## 2021-11-30 RX ORDER — DIPHENHYDRAMINE HYDROCHLORIDE 50 MG/ML
12.5 INJECTION INTRAMUSCULAR; INTRAVENOUS EVERY 6 HOURS PRN
Status: DISCONTINUED | OUTPATIENT
Start: 2021-11-30 | End: 2021-12-01

## 2021-11-30 RX ADMIN — DEXAMETHASONE SODIUM PHOSPHATE 10 MG: 10 INJECTION, SOLUTION INTRAMUSCULAR; INTRAVENOUS at 13:36

## 2021-11-30 RX ADMIN — PROPOFOL 200 MG: 10 INJECTION, EMULSION INTRAVENOUS at 13:15

## 2021-11-30 RX ADMIN — FENTANYL CITRATE 50 MCG: 50 INJECTION INTRAMUSCULAR; INTRAVENOUS at 18:03

## 2021-11-30 RX ADMIN — HYDROMORPHONE HYDROCHLORIDE 0.2 MG: 0.2 INJECTION, SOLUTION INTRAMUSCULAR; INTRAVENOUS; SUBCUTANEOUS at 20:11

## 2021-11-30 RX ADMIN — FAMOTIDINE 20 MG: 20 TABLET, FILM COATED ORAL at 20:10

## 2021-11-30 RX ADMIN — VENLAFAXINE HYDROCHLORIDE 37.5 MG: 37.5 CAPSULE, EXTENDED RELEASE ORAL at 23:30

## 2021-11-30 RX ADMIN — SODIUM CHLORIDE, POTASSIUM CHLORIDE, SODIUM LACTATE AND CALCIUM CHLORIDE: 600; 310; 30; 20 INJECTION, SOLUTION INTRAVENOUS at 14:23

## 2021-11-30 RX ADMIN — PHENYLEPHRINE HYDROCHLORIDE 300 MCG: 10 INJECTION INTRAVENOUS at 13:18

## 2021-11-30 RX ADMIN — HALOPERIDOL LACTATE 1 MG: 5 INJECTION, SOLUTION INTRAMUSCULAR at 18:31

## 2021-11-30 RX ADMIN — PHENYLEPHRINE HYDROCHLORIDE 200 MCG: 10 INJECTION INTRAVENOUS at 13:23

## 2021-11-30 RX ADMIN — HYDROMORPHONE HYDROCHLORIDE 0.5 MG: 1 INJECTION, SOLUTION INTRAMUSCULAR; INTRAVENOUS; SUBCUTANEOUS at 11:51

## 2021-11-30 RX ADMIN — FENTANYL CITRATE 50 MCG: 50 INJECTION INTRAMUSCULAR; INTRAVENOUS at 18:14

## 2021-11-30 RX ADMIN — DIPHENHYDRAMINE HYDROCHLORIDE 25 MG: 50 INJECTION INTRAMUSCULAR; INTRAVENOUS at 18:00

## 2021-11-30 RX ADMIN — FENTANYL CITRATE 50 MCG: 50 INJECTION INTRAMUSCULAR; INTRAVENOUS at 18:31

## 2021-11-30 RX ADMIN — BUSPIRONE HYDROCHLORIDE 15 MG: 15 TABLET ORAL at 23:30

## 2021-11-30 RX ADMIN — ALBUMIN HUMAN: 0.05 INJECTION, SOLUTION INTRAVENOUS at 15:35

## 2021-11-30 RX ADMIN — SODIUM CHLORIDE: 9 INJECTION, SOLUTION INTRAVENOUS at 23:16

## 2021-11-30 RX ADMIN — METRONIDAZOLE 500 MG: 500 INJECTION, SOLUTION INTRAVENOUS at 22:09

## 2021-11-30 RX ADMIN — SODIUM CHLORIDE 1000 ML: 9 INJECTION, SOLUTION INTRAVENOUS at 11:52

## 2021-11-30 RX ADMIN — ROCURONIUM BROMIDE 50 MG: 50 INJECTION, SOLUTION INTRAVENOUS at 14:44

## 2021-11-30 RX ADMIN — SENNOSIDES AND DOCUSATE SODIUM 1 TABLET: 50; 8.6 TABLET ORAL at 20:10

## 2021-11-30 RX ADMIN — HYDROMORPHONE HYDROCHLORIDE 1 MG: 1 INJECTION, SOLUTION INTRAMUSCULAR; INTRAVENOUS; SUBCUTANEOUS at 17:15

## 2021-11-30 RX ADMIN — CLINDAMYCIN PHOSPHATE 900 MG: 900 INJECTION, SOLUTION INTRAVENOUS at 12:47

## 2021-11-30 RX ADMIN — LIDOCAINE HYDROCHLORIDE 10 ML: 20 JELLY TOPICAL at 11:59

## 2021-11-30 RX ADMIN — SUGAMMADEX 200 MG: 100 INJECTION, SOLUTION INTRAVENOUS at 17:06

## 2021-11-30 RX ADMIN — LEVOFLOXACIN 500 MG: 5 INJECTION, SOLUTION INTRAVENOUS at 18:40

## 2021-11-30 RX ADMIN — ROCURONIUM BROMIDE 50 MG: 50 INJECTION, SOLUTION INTRAVENOUS at 13:31

## 2021-11-30 RX ADMIN — LEVOTHYROXINE SODIUM 25 MCG: 0.03 TABLET ORAL at 21:57

## 2021-11-30 RX ADMIN — HYDROMORPHONE HYDROCHLORIDE 0.5 MG: 1 INJECTION, SOLUTION INTRAMUSCULAR; INTRAVENOUS; SUBCUTANEOUS at 09:14

## 2021-11-30 RX ADMIN — POTASSIUM CHLORIDE, DEXTROSE MONOHYDRATE AND SODIUM CHLORIDE: 150; 5; 450 INJECTION, SOLUTION INTRAVENOUS at 20:11

## 2021-11-30 RX ADMIN — HYDROMORPHONE HYDROCHLORIDE 0.2 MG: 0.2 INJECTION, SOLUTION INTRAMUSCULAR; INTRAVENOUS; SUBCUTANEOUS at 21:57

## 2021-11-30 RX ADMIN — VASOPRESSIN 1 UNITS: 20 INJECTION INTRAVENOUS at 15:21

## 2021-11-30 RX ADMIN — PHENYLEPHRINE HYDROCHLORIDE 200 MCG: 10 INJECTION INTRAVENOUS at 13:27

## 2021-11-30 RX ADMIN — MIRTAZAPINE 30 MG: 30 TABLET, FILM COATED ORAL at 23:30

## 2021-11-30 RX ADMIN — FENTANYL CITRATE 100 MCG: 50 INJECTION, SOLUTION INTRAMUSCULAR; INTRAVENOUS at 13:14

## 2021-11-30 RX ADMIN — TAMSULOSIN HYDROCHLORIDE 0.4 MG: 0.4 CAPSULE ORAL at 21:57

## 2021-11-30 RX ADMIN — FENTANYL CITRATE 50 MCG: 50 INJECTION INTRAMUSCULAR; INTRAVENOUS at 18:55

## 2021-11-30 RX ADMIN — GABAPENTIN 100 MG: 100 CAPSULE ORAL at 20:10

## 2021-11-30 RX ADMIN — SODIUM CHLORIDE, POTASSIUM CHLORIDE, SODIUM LACTATE AND CALCIUM CHLORIDE: 600; 310; 30; 20 INJECTION, SOLUTION INTRAVENOUS at 13:19

## 2021-11-30 RX ADMIN — DIPHENHYDRAMINE HYDROCHLORIDE 25 MG: 50 INJECTION, SOLUTION INTRAMUSCULAR; INTRAVENOUS at 18:00

## 2021-11-30 RX ADMIN — SODIUM CHLORIDE, POTASSIUM CHLORIDE, SODIUM LACTATE AND CALCIUM CHLORIDE: 600; 310; 30; 20 INJECTION, SOLUTION INTRAVENOUS at 12:51

## 2021-11-30 RX ADMIN — LIDOCAINE HYDROCHLORIDE 60 MG: 20 INJECTION, SOLUTION INFILTRATION; PERINEURAL at 13:14

## 2021-11-30 RX ADMIN — ONDANSETRON 4 MG: 2 INJECTION INTRAMUSCULAR; INTRAVENOUS at 15:17

## 2021-11-30 RX ADMIN — HYDROMORPHONE HYDROCHLORIDE 1 MG: 1 INJECTION, SOLUTION INTRAMUSCULAR; INTRAVENOUS; SUBCUTANEOUS at 17:32

## 2021-11-30 RX ADMIN — PHENYLEPHRINE HYDROCHLORIDE 100 MCG: 10 INJECTION INTRAVENOUS at 16:08

## 2021-11-30 RX ADMIN — IOPAMIDOL 100 ML: 755 INJECTION, SOLUTION INTRAVENOUS at 10:51

## 2021-11-30 RX ADMIN — VASOPRESSIN 2 UNITS: 20 INJECTION INTRAVENOUS at 13:28

## 2021-11-30 RX ADMIN — SUCCINYLCHOLINE CHLORIDE 120 MG: 20 INJECTION, SOLUTION INTRAMUSCULAR; INTRAVENOUS at 13:15

## 2021-11-30 RX ADMIN — PANTOPRAZOLE SODIUM 40 MG: 20 TABLET, DELAYED RELEASE ORAL at 21:57

## 2021-11-30 RX ADMIN — HYDROMORPHONE HYDROCHLORIDE 1 MG: 1 INJECTION, SOLUTION INTRAMUSCULAR; INTRAVENOUS; SUBCUTANEOUS at 13:51

## 2021-11-30 RX ADMIN — ACETAMINOPHEN 975 MG: 325 TABLET ORAL at 20:10

## 2021-11-30 RX ADMIN — PHENYLEPHRINE HYDROCHLORIDE 0.2 MCG/KG/MIN: 10 INJECTION INTRAVENOUS at 16:00

## 2021-11-30 RX ADMIN — FENTANYL CITRATE 100 MCG: 50 INJECTION, SOLUTION INTRAMUSCULAR; INTRAVENOUS at 17:24

## 2021-11-30 ASSESSMENT — ENCOUNTER SYMPTOMS
VOMITING: 1
ABDOMINAL DISTENTION: 1
ABDOMINAL PAIN: 1
CONSTIPATION: 0

## 2021-11-30 ASSESSMENT — ACTIVITIES OF DAILY LIVING (ADL)
ADLS_ACUITY_SCORE: 9

## 2021-11-30 ASSESSMENT — LIFESTYLE VARIABLES: TOBACCO_USE: 1

## 2021-11-30 ASSESSMENT — MIFFLIN-ST. JEOR: SCORE: 1498.84

## 2021-11-30 NOTE — PHARMACY-ADMISSION MEDICATION HISTORY
Pharmacy Note - Admission Medication History   ______________________________________________________________________    Prior To Admission (PTA) med list completed and updated in EMR.       PTA Med List   Medication Sig Last Dose     busPIRone (BUSPAR) 15 MG tablet Take 15 mg by mouth 2 times daily 11/29/2021 at Unknown time     furosemide (LASIX) 20 MG tablet Take 20 mg by mouth daily 11/29/2021 at Unknown time     gabapentin (NEURONTIN) 300 MG capsule Take 300 mg by mouth 2 times daily Morning, dinnertime 11/29/2021 at Unknown time     gabapentin (NEURONTIN) 300 MG capsule Take 600 mg by mouth At Bedtime 11/29/2021 at Unknown time     levothyroxine (SYNTHROID/LEVOTHROID) 25 MCG tablet Take 25 mcg by mouth daily 11/29/2021 at Unknown time     lisinopril (ZESTRIL) 40 MG tablet Take 40 mg by mouth daily  11/29/2021 at Unknown time     meloxicam (MOBIC) 7.5 MG tablet Take 7.5 mg by mouth daily as needed Unknown at Unknown time     mirtazapine (REMERON) 30 MG tablet Take 30 mg by mouth At Bedtime  11/29/2021 at Unknown time     omeprazole (PRILOSEC) 20 MG DR capsule Take 20 mg by mouth daily 11/29/2021 at Unknown time     tamsulosin (FLOMAX) 0.4 MG capsule Take 0.4 mg by mouth daily  11/29/2021 at Unknown time     triamcinolone (NASACORT) 55 MCG/ACT nasal aerosol Spray 2 sprays into both nostrils daily 11/29/2021 at Unknown time     venlafaxine (EFFEXOR-XR) 37.5 MG 24 hr capsule Take 37.5 mg by mouth daily 11/29/2021 at Unknown time       Information source(s): Patient and med list from DOC    Method of interview communication: in-person    Patient was asked about OTC/herbal products specifically.  PTA med list reflects this.    Based on the pharmacist's assessment, the PTA med list information appears reliable    Allergies were reviewed, assessed, and updated with the patient.      Patient does not use any multi-dose medications prior to admission.     Thank you for the opportunity to participate in the care of this  patient.      Tova Martinez [facility]     11/30/2021     1:05 PM

## 2021-11-30 NOTE — H&P
[facility]    History and Physical - [place] [facility]        Date of Admission:  11/30/2021    Assessment & Plan      Gurdeep Dia is a 66 year old male with history of splenectomy, appendectomy, HTN, and substance abuse who presented with 2 days of right sided abdominal pain, found to have a bowel obstruction with closed loop. He is admitted for surgical repair of obstruction.     Right sided abdominal pain  Mechanical SBO with closed loop s/p exploratory laparotomy, small bowel resection  Pt's presenting symptoms are severe right sided abdominal pain and N/V of 2 days duration. Pt does have history of abdominal surgeries, including appendectomy in 1996, splenectomy in 1995, prior exploratory surgery for obstruction, and per surgery procedure note, right hemicolectomy in the past. CT abdomen/pelvis significant for mechanical small bowel obstruction. Labs significant for WBC count 11.8, otherwise CMP largely unremarkable. Lactic acid 0.7, redraw due to hypotension post-op was 2.1, likely due to recent procedure. Pt vitally stable, and afebrile.  -Surgery consulted, appreciate recs  -NPO, adv diet per surgery  -IV fluids NS 125ml/hr  - IV levaquin, flagyl  -IV dilaudid for pain  -Incentive spirometry  -BMP, CBC in am    Chronic Conditions:  HTN- resume PTA lisinopril, furosemide when tolerating PO  Hypothyroidism- resume PTA levothyroxine when tolerating PO  Depression/anxiety- resume PTA venlafaxine, mirtazepine, Buspar when tolerating PO  Chronic pain- Hold mexolicam 7.5mg daily, resume PTA gabapentin when tolerating PO.  BPH- resume PTA flomax when tolerating PO         Diet: NPO for Medical/Clinical Reasons Except for: Meds, Ice ChipsNPO  DVT Prophylaxis: Pneumatic Compression Devices  Roblero Catheter: PRESENT, indication:    Fluids: NS 125ml/hr  Central Lines: None  Code Status: Full Code    Clinically Significant Risk Factors Present on Admission          # Hypercalcemia:  Ca = 10.6 mg/dL (Ref range: 8.5 - 10.5 mg/dL) and/or iCa = N/A on admission, will monitor as appropriate          Disposition Plan   Expected discharge:  several days pending recovery, recommended to prior living arrangement once adequate pain management/ tolerating PO medications.     The patient's care was discussed with the faculty in morning report.    Dez Sow DO [facility] [facility]  Securely message with the Vocera Web Console (learn more here)  Text page via [employer]Go[employer] Paging/Directory      ______________________________________________________________________    Chief Complaint   Abdominal Pain    History is obtained from the patient and electronic health record    History of Present Illness   Gurdeep Dia is a 66 year old male who has a history of SBO, HTN, depression, hypothyroidism and is admitted for SBO.   Pt drowsy post-procedure. Unable to give clear history.   Per chart review and ED note:    Patient reports that yesterday he developed sharp right-sided abdominal pain while at work.  This pain went away, and he was fine the rest of the day, but then woke up with the pain around midnight last night.  Patient notes he had 1 episode of emesis last night.  At time of ED visit his pain is rated 10 out of 10 and located in the epigastric region and in the right side of his abdomen.  Pain improves with lying on his back, pain does not worsen with eating.  No changes to bowel or bladder habits, concerned with constipation.  He notes he has had problems swallowing in the past and had his esophagus examined few days ago, he follows with GI and ENT.  No fever, chills.    Review of Systems    The 10 point Review of Systems is negative other than noted in the HPI or here.     Past Medical History    I have reviewed this patient's medical history and updated it with pertinent information if needed.   Past Medical History:   Diagnosis Date     Benign prostatic  hyperplasia with weak urinary stream      Chronic bilateral low back pain without sciatica      Chronic neck pain      RAVI (generalized anxiety disorder)      History of hepatitis C     treated and cured     History of substance abuse (H)      Hypertension goal BP (blood pressure) < 140/90      Moderate major depression (H)         Past Surgical History   I have reviewed this patient's surgical history and updated it with pertinent information if needed.  Past Surgical History:   Procedure Laterality Date     C APPENDECTOMY  ~1996     SPLENECTOMY  ~1995    R Hemicolectomy    Social History   I have reviewed this patient's social history and updated it with pertinent information if needed. Gurdeep [place]  reports that he has been smoking. He has never used smokeless tobacco. He reports previous alcohol use. He reports previous drug use.   Currently incarcerated.     Family History     No significant family history, including no history of: bowel obstruction, colorectal cancer.    Prior to Admission Medications   Prior to Admission Medications   Prescriptions Last Dose Informant Patient Reported? Taking?   busPIRone (BUSPAR) 15 MG tablet 11/29/2021 at Unknown time  Yes Yes   Sig: Take 15 mg by mouth 2 times daily   furosemide (LASIX) 20 MG tablet 11/29/2021 at Unknown time  Yes Yes   Sig: Take 20 mg by mouth daily   gabapentin (NEURONTIN) 300 MG capsule 11/29/2021 at Unknown time  Yes Yes   Sig: Take 300 mg by mouth 2 times daily Morning, dinnertime   gabapentin (NEURONTIN) 300 MG capsule 11/29/2021 at Unknown time  Yes Yes   Sig: Take 600 mg by mouth At Bedtime   levothyroxine (SYNTHROID/LEVOTHROID) 25 MCG tablet 11/29/2021 at Unknown time  Yes Yes   Sig: Take 25 mcg by mouth daily   lisinopril (ZESTRIL) 40 MG tablet 11/29/2021 at Unknown time  Yes Yes   Sig: Take 40 mg by mouth daily    meloxicam (MOBIC) 7.5 MG tablet Unknown at Unknown time  Yes Yes   Sig: Take 7.5 mg by mouth daily as needed   mirtazapine  (REMERON) 30 MG tablet 11/29/2021 at Unknown time  Yes Yes   Sig: Take 30 mg by mouth At Bedtime    omeprazole (PRILOSEC) 20 MG DR capsule 11/29/2021 at Unknown time  Yes Yes   Sig: Take 20 mg by mouth daily   tamsulosin (FLOMAX) 0.4 MG capsule 11/29/2021 at Unknown time  Yes Yes   Sig: Take 0.4 mg by mouth daily    triamcinolone (NASACORT) 55 MCG/ACT nasal aerosol 11/29/2021 at Unknown time  Yes Yes   Sig: Spray 2 sprays into both nostrils daily   venlafaxine (EFFEXOR-XR) 37.5 MG 24 hr capsule 11/29/2021 at Unknown time  Yes Yes   Sig: Take 37.5 mg by mouth daily      Facility-Administered Medications: None     Allergies   Allergies   Allergen Reactions     Penicillins Hives       Physical Exam   Vital Signs: Temp: 98.2  F (36.8  C) Temp src: Oral BP: 110/84 Pulse: 101   Resp: 17 SpO2: 96 % O2 Device: Oxymask Oxygen Delivery: 5 LPM  Weight: 175 lbs 0 oz    General Appearance: Drowsy, no acute distress. oxymask in place.   Eyes: PERRLA  HEENT: Moist mucus membranes  Respiratory: Lungs CTAB, no wheezes or crackles.   Cardiovascular: RRR, no murmur, no extra heart sounds, no JVD,  GI: Dressing over midline incision intact and clean. Abdomen diffusely tender. Drain in place, with clear pink fluid draining.   Lymph/Hematologic: No lymphadenopathy.  Genitourinary: giron draining  Skin: No rashes, no bruising, no jaundice  Musculoskeletal: Normal tone, moves all extremities.   Neurologic: Moves all extremities, No focal neurologic deficits.   Psychiatric: Drowsy, wanting to sleep.     Data   Data reviewed today: I reviewed all medications, new labs and imaging results over the last 24 hours. I personally reviewed the chest CT image(s) showing SBO.    Recent Labs   Lab 11/30/21  0906   WBC 11.8*   HGB 16.7   MCV 92         POTASSIUM 4.3   CHLORIDE 109*   CO2 25   BUN 22   CR 0.99   ANIONGAP 10   VIJAYA 10.6*      ALBUMIN 4.5   PROTTOTAL 8.3*   BILITOTAL 0.4   ALKPHOS 85   ALT 24   AST 27   LIPASE 21      Recent Results (from the past 24 hour(s))   CT Abdomen Pelvis w Contrast    Narrative    EXAM: CT ABDOMEN PELVIS W CONTRAST  LOCATION: [facility]  DATE/TIME: 11/30/2021 10:48 AM    INDICATION: Abdominal distension  COMPARISON: None.  TECHNIQUE: CT scan of the abdomen and pelvis was performed following injection of IV contrast. Multiplanar reformats were obtained. Dose reduction techniques were used.  CONTRAST: IsoVue 370 100mL    FINDINGS:   LOWER CHEST: Bibasilar atelectasis.    HEPATOBILIARY: Scattered small water density foci in the liver consistent with cysts. No radiopaque gallstone. No suspicious hepatic lesions.    PANCREAS: Coarse calcification in the pancreatic tail and body may represent sequelae of chronic pancreatitis no acute pancreatitis or abnormal pancreatic mass.    SPLEEN: Not visualized consistent with prior splenectomy.    ADRENAL GLANDS: Normal.    KIDNEYS/BLADDER: No hydronephrosis or masses. No bladder stone or mass.    BOWEL: Anastomotic staples in the colon in the right mid abdomen. There is normal caliber duodenum and proximal jejunum with dilated loops of distal jejunum and ileum with some fecal i-STAT ileal contents and a transition in the right mid to lower   abdomen. Findings are consistent with mechanical small bowel obstruction and this may represent a closed loop obstruction such as internal hernia or multiple adhesions given the lack of dilatation of the proximal small bowel. No pneumatosis intestinalis,   free intraperitoneal air or abscess.    LYMPH NODES: No lymphadenopathy.    VASCULATURE: Mild aortoiliac calcification without aneurysm.    PELVIC ORGANS: Mild prostatic enlargement.    MUSCULOSKELETAL: Disc space narrowing at L4-L5 with disc degeneration and bilateral spondylolysis at L4 with grade 2 spondylolisthesis of L4 on L5. No acute fracture.      Impression    IMPRESSION:   1.  Mechanical small bowel obstruction with dilated distal jejunum and  "ileum with caliber changes in the right lower quadrant and in the left midabdomen this may be secondary to adhesions or internal hernia and has the appearance of a closed loop   obstruction. No pneumatosis intestinalis, free intraperitoneal air or abscess.    2.  Results were called to Dr. Sanford the time of dictation.    NOTE: ABNORMAL REPORT    THE DICTATION ABOVE DESCRIBES AN ABNORMALITY FOR WHICH FOLLOW-UP IS NEEDED.    POC US Guidance Needle Placement    Narrative    Ultrasound was performed as guidance to an anesthesia procedure.  Click   \"PACS images\" hyperlink below to view any stored images.  For specific   procedure details, view procedure note authored by anesthesia.     "

## 2021-11-30 NOTE — CONSULTS
[facility] Surgery Consultation    Gurdeep Dia MRN# [id number]   Age: 66 year old YOB: 1955     Date of Admission:  11/30/2021    Reason for consult: Abdominal pain       Requesting physician: Dr. Sanford       Level of consult: Consult, follow and place orders           Assessment and Plan:   Assessment:   Patient Active Problem List   Diagnosis     Hypertension goal BP (blood pressure) < 140/90     Benign prostatic hyperplasia with weak urinary stream     RAVI (generalized anxiety disorder)     Moderate major depression (H)     History of substance abuse (H)     History of hepatitis C     Chronic neck pain     Chronic bilateral low back pain without sciatica     SBO (small bowel obstruction) (H)             Suspected closed loop obstruction secondary to proximal and distal decompressed bowel with large dilated bowel   Plan:   NPO   IV antibiotics  NG to low intermittent suction  To the OR for exploratory laparotomy with 20 laparoscopy first there is multiple incisions he may need a laparotomy understands this.  Also discussed possible bowel resection.            Chief Complaint:   Abdominal pain     History is obtained from the patient         History of Present Illness:   This patient is a 66 year old  male  who presents with 2 days of abdominal pain and distention to the point now he feels like he says is going to pop no gas no stool he can remember the last 24 to 48 hours he has a significant history of prior bowel resection prior exploration for obstruction and an appendectomy.  His abdomen with a large midline incision and then also right lower transverse incision.  He is incarcerated presently in longterm and says he is otherwise normal state of health up until couple days ago.             Past Medical History:   I have reviewed this patient's past medical history  Past Medical History:   Diagnosis Date     Benign prostatic hyperplasia with weak urinary stream      Chronic  bilateral low back pain without sciatica      Chronic neck pain      RAVI (generalized anxiety disorder)      History of hepatitis C     treated and cured     History of substance abuse (H)      Hypertension goal BP (blood pressure) < 140/90      Moderate major depression (H)              Past Surgical History:     Past Surgical History:   Procedure Laterality Date     C APPENDECTOMY  ~1996     SPLENECTOMY  ~1995             Social History:     Social History     Tobacco Use     Smoking status: Current Every Day Smoker     Smokeless tobacco: Never Used   Substance Use Topics     Alcohol use: Not Currently     Comment: Clean for 5 years             Family History:   No family history on file.  Family history reviewed and updated in [facility]          Immunizations:   Immunization status is unknown          Allergies:     All allergies reviewed and addressed  Allergies   Allergen Reactions     Penicillins Hives             Medications:     Current Facility-Administered Medications   Medication     0.9% sodium chloride BOLUS     0.9% sodium chloride BOLUS     clindamycin (CLEOCIN) infusion 900 mg     clindamycin (CLEOCIN) infusion 900 mg     sodium chloride 0.9% infusion     Current Outpatient Medications   Medication Sig     cloNIDine (CATAPRES) 0.2 MG tablet Take 1 tablet (0.2 mg) by mouth 2 times daily     finasteride (PROSCAR) 5 MG tablet Take 1 tablet (5 mg) by mouth daily     levETIRAcetam (KEPPRA) 1000 MG tablet Take 1000 mg at noon and 2000 mg at bedtime.     lisinopril (PRINIVIL/ZESTRIL) 30 MG tablet Take 1 tablet (30 mg) by mouth daily     mirtazapine (REMERON) 15 MG tablet Take 15 mg by mouth At Bedtime     tamsulosin (FLOMAX) 0.4 MG capsule Take 0.8 mg by mouth daily             Review of Systems:   The Review of Systems is negative other than noted in the HPI          Physical Exam:     Vitals were reviewed  Temp: 98.9  F (37.2  C) Temp src: Oral BP: (!) 141/107 Pulse: 88   Resp: 20 SpO2: 93 % O2 Device: None  (Room air)    Wt Readings from Last 4 Encounters:   01/22/20 79.4 kg (175 lb)   12/24/19 81.2 kg (179 lb)     No intake/output data recorded.  Constitutional:   awake, alert, cooperative, no apparent distress, and appears stated age     Eyes:   Lids and lashes normal, pupils equal, round and reactive to light, extra ocular muscles intact, sclera clear, conjunctiva normal     ENT:   Normocephalic, without obvious abnormality, atraumatic, sinuses nontender on palpation, external ears without lesions, oral pharynx with moist mucous membranes, tonsils without erythema or exudates, gums normal and good dentition.     Neck:   Supple, symmetrical, trachea midline, no adenopathy, thyroid symmetric, not enlarged and no tenderness, skin normal     Hematologic / Lymphatic:   no cervical lymphadenopathy     Back:   Symmetric, no curvature, spinous processes are non-tender on palpation, paraspinous muscles are non-tender on palpation, no costal vertebral tenderness     Lungs:   No increased work of breathing, good air exchange, clear to auscultation bilaterally, no crackles or wheezing     Cardiovascular:   Normal apical impulse, regular rate and rhythm, normal S1 and S2, no S3 or S4, and no murmur noted     Abdomen:   Soft distended pretty tense has some pain but no real guarding is a midline incision and a low transverse on the right.     Musculoskeletal:   There is no redness, warmth, or swelling of the joints.  Full range of motion noted.  Motor strength is 5 out of 5 all extremities bilaterally.  Tone is normal.     Neurologic:   Awake, alert, oriented to name, place and time.  Cranial nerves II-XII are grossly intact.  Motor is 5 out of 5 bilaterally.  Cerebellar finger to nose, heel to shin intact.  Sensory is intact.  Babinski down going, Romberg negative, and gait is normal.     Skin:   no bruising or bleeding             Data:     Results for orders placed or performed during the hospital encounter of 11/30/21 (from  the past 24 hour(s))   [place] [facility]    Narrative    The following orders were created for panel order [place] [facility].  Procedure                               Abnormality         Status                     ---------                               -----------         ------                     Extra Red Top Tube[[id number]]                               Final result               Extra Green Top (Lithium...[[id number]]                      Final result               Extra Purple Top Tube[[id number]]                            Final result                 Please view results for these tests on the individual orders.   Extra Red Top Tube   Result Value Ref Range    Hold Specimen JIC    Extra Green Top (Lithium Heparin) Tube   Result Value Ref Range    Hold Specimen JIC    Extra Purple Top Tube   Result Value Ref Range    Hold Specimen JIC    [place] [facility]    Narrative    The following orders were created for panel order [place] [facility].  Procedure                               Abnormality         Status                     ---------                               -----------         ------                     Extra Blue Top Tube[[id number]]                              Final result                 Please view results for these tests on the individual orders.   Extra Blue Top Tube   Result Value Ref Range    Hold Specimen JIC    CBC (+ platelets, no diff)   Result Value Ref Range    WBC Count 11.8 (H) 4.0 - 11.0 10e3/uL    RBC Count 5.51 4.40 - 5.90 10e6/uL    Hemoglobin 16.7 13.3 - 17.7 g/dL    Hematocrit 50.6 40.0 - 53.0 %    MCV 92 78 - 100 fL    MCH 30.3 26.5 - 33.0 pg    MCHC 33.0 31.5 - 36.5 g/dL    RDW 14.1 10.0 - 15.0 %    Platelet Count 418 150 - 450 10e3/uL   Comprehensive metabolic panel   Result Value Ref Range    Sodium 144 136 - 145 mmol/L    Potassium 4.3 3.5 - 5.0 mmol/L    Chloride 109 (H) 98 - 107 mmol/L    Carbon Dioxide (CO2) 25 22 - 31 mmol/L    Anion Gap 10 5 - 18 mmol/L    Urea Nitrogen 22 8 - 22 mg/dL     Creatinine 0.99 0.70 - 1.30 mg/dL    Calcium 10.6 (H) 8.5 - 10.5 mg/dL    Glucose 120 70 - 125 mg/dL    Alkaline Phosphatase 85 45 - 120 U/L    AST 27 0 - 40 U/L    ALT 24 0 - 45 U/L    Protein Total 8.3 (H) 6.0 - 8.0 g/dL    Albumin 4.5 3.5 - 5.0 g/dL    Bilirubin Total 0.4 0.0 - 1.0 mg/dL    GFR Estimate 78 >60 mL/min/1.73m2   Lipase   Result Value Ref Range    Lipase 21 0 - 52 U/L   CT Abdomen Pelvis w Contrast    Narrative    EXAM: CT ABDOMEN PELVIS W CONTRAST  LOCATION: [facility]  DATE/TIME: 11/30/2021 10:48 AM    INDICATION: Abdominal distension  COMPARISON: None.  TECHNIQUE: CT scan of the abdomen and pelvis was performed following injection of IV contrast. Multiplanar reformats were obtained. Dose reduction techniques were used.  CONTRAST: IsoVue 370 100mL    FINDINGS:   LOWER CHEST: Bibasilar atelectasis.    HEPATOBILIARY: Scattered small water density foci in the liver consistent with cysts. No radiopaque gallstone. No suspicious hepatic lesions.    PANCREAS: Coarse calcification in the pancreatic tail and body may represent sequelae of chronic pancreatitis no acute pancreatitis or abnormal pancreatic mass.    SPLEEN: Not visualized consistent with prior splenectomy.    ADRENAL GLANDS: Normal.    KIDNEYS/BLADDER: No hydronephrosis or masses. No bladder stone or mass.    BOWEL: Anastomotic staples in the colon in the right mid abdomen. There is normal caliber duodenum and proximal jejunum with dilated loops of distal jejunum and ileum with some fecal i-STAT ileal contents and a transition in the right mid to lower   abdomen. Findings are consistent with mechanical small bowel obstruction and this may represent a closed loop obstruction such as internal hernia or multiple adhesions given the lack of dilatation of the proximal small bowel. No pneumatosis intestinalis,   free intraperitoneal air or abscess.    LYMPH NODES: No lymphadenopathy.    VASCULATURE: Mild aortoiliac  calcification without aneurysm.    PELVIC ORGANS: Mild prostatic enlargement.    MUSCULOSKELETAL: Disc space narrowing at L4-L5 with disc degeneration and bilateral spondylolysis at L4 with grade 2 spondylolisthesis of L4 on L5. No acute fracture.      Impression    IMPRESSION:   1.  Mechanical small bowel obstruction with dilated distal jejunum and ileum with caliber changes in the right lower quadrant and in the left midabdomen this may be secondary to adhesions or internal hernia and has the appearance of a closed loop   obstruction. No pneumatosis intestinalis, free intraperitoneal air or abscess.    2.  Results were called to Dr. Sanford the time of dictation.    NOTE: ABNORMAL REPORT    THE DICTATION ABOVE DESCRIBES AN ABNORMALITY FOR WHICH FOLLOW-UP IS NEEDED.      All imaging studies reviewed by me.     Attestation:  I have reviewed today's vital signs, notes, medications, labs and imaging.    Mekhi Richardson MD

## 2021-11-30 NOTE — ANESTHESIA CARE TRANSFER NOTE
Patient: Gurdeep Dia    Procedure: Procedure(s):  LAPAROSCOPY COVERTED TO OPEN, EXPLORATORY LAPAROTOMY, SMALL BOWEL RESECTION, REPAIR ENTEROTOMY, EXTENSIVE LYSIS OF ADHESION       Diagnosis: SBO (small bowel obstruction) (H) [K56.609]  Diagnosis Additional Information: No value filed.    Anesthesia Type:   General     Note:    Oropharynx: oropharynx clear of all foreign objects, nasal gatric tube in place and spontaneously breathing  Level of Consciousness: awake  Oxygen Supplementation: face mask  Level of Supplemental Oxygen (L/min / FiO2): 6  Independent Airway: airway patency satisfactory and stable  Dentition: dentition unchanged  Vital Signs Stable: post-procedure vital signs reviewed and stable  Report to RN Given: handoff report given  Patient transferred to: PACU    Handoff Report: Identifed the Patient, Identified the Reponsible Provider, Reviewed the pertinent medical history, Discussed the surgical course, Reviewed Intra-OP anesthesia mangement and issues during anesthesia, Set expectations for post-procedure period and Allowed opportunity for questions and acknowledgement of understanding      Vitals:  Vitals Value Taken Time   /81 11/30/21 1730   Temp     Pulse 96 11/30/21 1734   Resp 20 11/30/21 1734   SpO2 97 % 11/30/21 1734   Vitals shown include unvalidated device data.    Electronically Signed By: THOMAS Domingo CRNA  November 30, 2021  5:35 PM

## 2021-11-30 NOTE — ED TRIAGE NOTES
Patient here for acute abdominal pain over the last 3 hours, history of splenectomy and appendectomy. Tender to right upper and lower abdominal quadrants. Vomited x 1. Patient denies nausea or shortness of breath at this time

## 2021-11-30 NOTE — ANESTHESIA PREPROCEDURE EVALUATION
Anesthesia Pre-Procedure Evaluation    Patient: Gurdeep Dia   MRN: [id number] : 1955        Preoperative Diagnosis: SBO (small bowel obstruction) (H) [K56.609]    Procedure : Procedure(s):  LAPAROSCOPY          Past Medical History:   Diagnosis Date     Benign prostatic hyperplasia with weak urinary stream      Chronic bilateral low back pain without sciatica      Chronic neck pain      RAVI (generalized anxiety disorder)      History of hepatitis C     treated and cured     History of substance abuse (H)      Hypertension goal BP (blood pressure) < 140/90      Moderate major depression (H)       Past Surgical History:   Procedure Laterality Date     C APPENDECTOMY  ~     SPLENECTOMY  ~      Allergies   Allergen Reactions     Penicillins Hives      Social History     Tobacco Use     Smoking status: Current Every Day Smoker     Smokeless tobacco: Never Used   Substance Use Topics     Alcohol use: Not Currently     Comment: Clean for 5 years      Wt Readings from Last 1 Encounters:   20 79.4 kg (175 lb)        Anesthesia Evaluation   Pt has had prior anesthetic.         ROS/MED HX  ENT/Pulmonary:     (+) tobacco use, Current use,     Neurologic:  - neg neurologic ROS     Cardiovascular:     (+) hypertension----- (-) CAD   METS/Exercise Tolerance:     Hematologic:       Musculoskeletal: Comment: Hx of ACDF      GI/Hepatic: Comment: Acute SBO s/f laparoscopy    (+) hepatitis type C, liver disease,     Renal/Genitourinary:  - neg Renal ROS     Endo:       Psychiatric/Substance Use: Comment: Currently incarcerated    (+) alcohol abuse : denies recent use due to incarceration.    Infectious Disease:       Malignancy:  - neg malignancy ROS     Other:      (+) , H/O Chronic Pain,        Physical Exam    Airway        Mallampati: III   TM distance: > 3 FB   Neck ROM: limited   Mouth opening: > 3 cm    Respiratory Devices and Support         Dental       (+) upper dentures and lower  dentures      Cardiovascular   cardiovascular exam normal          Pulmonary   pulmonary exam normal                OUTSIDE LABS:  CBC:   Lab Results   Component Value Date    WBC 11.8 (H) 11/30/2021    WBC 7.1 01/22/2020    HGB 16.7 11/30/2021    HGB 14.3 01/22/2020    HCT 50.6 11/30/2021    HCT 44.6 01/22/2020     11/30/2021     01/22/2020     BMP:   Lab Results   Component Value Date     11/30/2021     01/22/2020    POTASSIUM 4.3 11/30/2021    POTASSIUM 3.8 01/22/2020    CHLORIDE 109 (H) 11/30/2021    CHLORIDE 108 01/22/2020    CO2 25 11/30/2021    CO2 28 01/22/2020    BUN 22 11/30/2021    BUN 14 01/22/2020    CR 0.99 11/30/2021    CR 0.94 01/22/2020     11/30/2021     (H) 01/22/2020     COAGS: No results found for: PTT, INR, FIBR  POC: No results found for: BGM, HCG, HCGS  HEPATIC:   Lab Results   Component Value Date    ALBUMIN 4.5 11/30/2021    PROTTOTAL 8.3 (H) 11/30/2021    ALT 24 11/30/2021    AST 27 11/30/2021    ALKPHOS 85 11/30/2021    BILITOTAL 0.4 11/30/2021     OTHER:   Lab Results   Component Value Date    VIJAYA 10.6 (H) 11/30/2021    LIPASE 21 11/30/2021       Anesthesia Plan    ASA Status:  3, emergent    NPO Status:  ELEVATED Aspiration Risk/Unknown    Anesthesia Type: General.     - Airway: ETT   Induction: Intravenous, RSI, Propofol.   Maintenance: Inhalation.   Techniques and Equipment:     - Airway: Video-Laryngoscope         Consents    Anesthesia Plan(s) and associated risks, benefits, and realistic alternatives discussed. Questions answered and patient/representative(s) expressed understanding.    - Discussed:     - Discussed with:  Patient         Postoperative Care    Pain management: IV analgesics, Multi-modal analgesia.   PONV prophylaxis: Ondansetron (or other 5HT-3), Dexamethasone or Solumedrol     Comments:    Other Comments: GETA - RSI w/ succinylcholine, cricoid pressure, NGT to suction + backup suction. Glidescope given ACDF. Pt advised on  increased aspiration risk 2/2 his underlying pathology.   Decadron 10, Zofran 4  Ketamine 50mg post-induction            Lyric Casas MD

## 2021-11-30 NOTE — OP NOTE
[facility]  Operative Note    Pre-operative diagnosis: SBO (small bowel obstruction) (H) [K56.609]   Post-operative diagnosis multiple adhesions throughour bowel, enterotomies,    Procedure: Procedure(s):  LAPAROSCOPY COVERTED TO OPEN, EXPLORATORY LAPAROTOMY, SMALL BOWEL RESECTION, REPAIR ENTEROTOMY, EXTENSIVE LYSIS OF ADHESION   Surgeon: Mekhi Richardson MD   Assistants(s): KINJAL Kay   Anesthesia: General    Estimated blood loss: 200 ml    Total IV fluids: (See anesthesia record)   Blood transfusion: No transfusion was given during surgery   Total urine output: (See anesthesia record)   Drains: Parvez-Dubois   Specimens: Small bowel with perforations   Implants: None   Findings: complete cemented in abdomen, adhesions taken down. 4 enterotomies in process this sement of bowel was resected. .   Complications: None.   Condition: Stable       Description of procedure:  Consent obtained  Taken to the operating placed in supine position general endotracheal anesthesia was administered by anesthesia staff.  A Roblero was placed and the abdomen is prepped and draped in sterile manner.  A briefing and timeout was performed anesthesia and or staff.  I made an incision to the left of the abdomen 5 mm scope was placed under direct visualization a 5 mm scope through a 5 mm Visiport.  Once again the abdominal cavity insufflated but reallyis a cemented in abdomen I immediately went to an open procedure.  Went to the prior old midline incision was basically from stem to paul went to the same incision took electrocautery through subcutaneous tissue down to the fascia we then opened this sharply down to the internal fascia and open the entire wound.  Into the abdominal cavity and just found small bowel and omentum just scarred completely to each other I spent the next 2 hours just taking down adhesions and these adhesions were just from bowel the bowel followed abdominal wall bowel to the omentum and  did this continuously secondary to the large amount of adhesions there were areas of count of almost like a groundglass appearance of in the knees areas remain in a pretty tension enterotomy created there were 4-5 enterotomies created on The mid abdomen oversew these with silk sutures when I encountered them are over the happened.  Eventually was able to free up the small bowel from the ligament of Treitz all the way to the colonic anastomosis which was in the stuck anterior and posterior and inferior to the duodenum.  He had a prior right hemicolectomy at some time point.  Once this is all freed up I then eventually looked at the whole small bowel and it again the areas and enterotomies were at the distal dusky area so we decided just resect this area which is about 2 feet of bowel I came across the bowel proximal to all enterotomy issues with the SUZAN stapler connect this distal in similar fashion a SUZAN stapler 55 or 55 was placed across and stapled and then I took the mild out mesenteric base with a Jericho device.  The bowel was removed.  We made sure to good hemostasis we put a posterior line of suture of the bowel medially [id number] enterotomies were made next to the staple anastomosis used a TA 90 stapler to staple across the defect and oversewed all the staple lines with 3-0 silk sutures both distally and anteriorly.  Then closed the mesenteric defect with a open 3-0 Vicryl suture and then put the bowel back in his position was happy with this repair the anastomosis and we at this time point irrigated the abdomen with 3 L normal saline to remove and irrigate and space out.  Upon completion of this I closed the abdominal wall with a loop Maxon suture but I did this after placing a 19 Kyrgyz drain into the abdominal cavity through the right side and went through down to the pelvis and up into the left gutter.  This is sutured the skin with silk suture the abdominal fascia was closed in oh loop Maxon suture  from top and below.  We irrigate out the incision and then closed the incision with staples over a blue vessel loop for drainage.  Secondary to bowel spillage with succus with a enterotomies.  Making is a high risk wound we injected 50 Marcaine in the incision patient was extubated transferred to PACU in stable condition.  All sponge needle counts are correct    Tableonora Vogel was present throughout procedure assisting with retraction exposure closing and interval to the procedure.          Mekhi Richardson MD  General Surgery [phone]  Vascular Surgery [phone]

## 2021-11-30 NOTE — ED PROVIDER NOTES
EMERGENCY DEPARTMENT ENCOUNTER      NAME: Gurdeep Dia  AGE: 66 year old male  YOB: 1955  MRN: [id number]  EVALUATION DATE & TIME: 11/30/2021  8:49 AM    PCP: Kirsten Mariano [place]        Chief Complaint   Patient presents with     Abdominal Pain         FINAL IMPRESSION:  1. SBO (small bowel obstruction) (H)          ED COURSE & MEDICAL DECISION MAKING:    Pertinent Labs & Imaging studies reviewed. (See chart for details)    9:04 AM Met with patient for initial interview and exam. Plan for care in the ED was discussed. PPE: N95, face shield, and gloves.   11:18 AM Spoke to radiology.   11:26 AM Spoke to Dr. Richardson with surgery regarding the patient's case.   11:36 AM Spoke to [facility] resident service who agrees to admit the patient.     66 year old male presents to the Emergency Department for evaluation of right-sided abdominal pain and vomiting    ED Course as of 11/30/21 1545   Tue Nov 30, 2021   0913 66-year-old male presents with right-sided abdominal pain.  History of splenectomy and appendectomy.  Currently incarcerated.  Differential includes bowel obstruction, biliary colic, hernia, constipation, diverticulitis, AAA, renal colic   0914 Plan for IV Dilaudid, labs, CT imaging   1120 WBC(!): 11.8   1120 Calcium(!): 10.6   1121 CT shows bowel obstruction with closed-loop obstruction   1122 IV fluids ordered.  Surgery paged.  Covid swab obtained     CT shows wall obstruction with closed-loop.  Discussed with general surgery.  General surgery evaluated patient and transfer patient to the OR. Resident team aware.    At the conclusion of the encounter I discussed the results of all of the tests and the disposition. The questions were answered. The patient or family acknowledged understanding and was agreeable with the care plan.         MEDICATIONS GIVEN IN THE EMERGENCY:  Medications   0.9% sodium chloride BOLUS ( Intravenous Auto Hold 11/30/21 1231)   sodium chloride 0.9%  "infusion (has no administration in time range)   clindamycin (CLEOCIN) infusion 900 mg (has no administration in time range)   oxyCODONE (ROXICODONE) tablet 5 mg (has no administration in time range)   naloxone (NARCAN) injection 0.2 mg (has no administration in time range)     Or   naloxone (NARCAN) injection 0.4 mg (has no administration in time range)     Or   naloxone (NARCAN) injection 0.2 mg (has no administration in time range)     Or   naloxone (NARCAN) injection 0.4 mg (has no administration in time range)   vasopressin 1 unit/mL 1 unit/mL syringe (has no administration in time range)   bupivacaine liposome (EXPAREL) 1.3 % LA inj susp 20 mL (has no administration in time range)   bupivacaine liposome (EXPAREL) LA inj was given in the infiltration site to produce post-op analgesia. Duration of action is up to 72 hours. Other \"caden\" meds should not be given for 96 hours except for lidocaine 4% patch. This is for INFORMATION ONLY. (has no administration in time range)   bupivacaine liposome (EXPAREL) 1.3 % LA inj susp (has no administration in time range)   HYDROmorphone (DILAUDID) injection 0.5 mg (0.5 mg Intravenous Given 11/30/21 0914)   iopamidol (ISOVUE-370) solution 100 mL (100 mLs Intravenous Given 11/30/21 1051)   0.9% sodium chloride BOLUS (1,000 mLs Intravenous New Bag 11/30/21 1152)   lidocaine (XYLOCAINE) 2 % external gel 10 mL (10 mLs Urethral Given 11/30/21 1159)   HYDROmorphone (DILAUDID) injection 0.5 mg (0.5 mg Intravenous Given 11/30/21 1151)   clindamycin (CLEOCIN) infusion 900 mg (900 mg Intravenous New Bag 11/30/21 1247)       NEW PRESCRIPTIONS STARTED AT TODAY'S ER VISIT  Current Discharge Medication List               =================================================================    HPI    Patient information was obtained from: Patient    Use of Intrepreter: N/A         Gurdeep Dia is a 66 year old male with a pertinent history of splenectomy, appendectomy, HTN, and substance " abuse who presents to this ED via private vehicle for evaluation of abdominal pain.     Patient reports that yesterday he developed a sharp right sided abdominal pain while at work. He states that he was fine the rest of the day, then was woken up by the pain around midnight last night. Patient states he had one episode of emesis last night. At present his pain is rated a 10/10 and is located in the epigastric region and right side of his abdomen. Pain is improved when he is laying on his back. No change in pain with eating. Additionally, patient notes some abdominal distention.    He denies any changes to bowel or bladder habits and does not believe he is constipated. Patient notes that he had he esophagus examined a couple of days ago as he has had problems swallowing in the past and has been following with gastroenterology and ENT. Patient also notes an allergy to penicillin resulting in hives.        REVIEW OF SYSTEMS   Review of Systems   Gastrointestinal: Positive for abdominal distention, abdominal pain (right sided and epigastric region) and vomiting (x1). Negative for constipation.          PAST MEDICAL HISTORY:  Past Medical History:   Diagnosis Date     Benign prostatic hyperplasia with weak urinary stream      Chronic bilateral low back pain without sciatica      Chronic neck pain      RAVI (generalized anxiety disorder)      History of hepatitis C     treated and cured     History of substance abuse (H)      Hypertension goal BP (blood pressure) < 140/90      Moderate major depression (H)        PAST SURGICAL HISTORY:  Past Surgical History:   Procedure Laterality Date     C APPENDECTOMY  ~1996     SPLENECTOMY  ~1995           CURRENT MEDICATIONS:    Current Discharge Medication List      CONTINUE these medications which have NOT CHANGED    Details   busPIRone (BUSPAR) 15 MG tablet Take 15 mg by mouth 2 times daily      furosemide (LASIX) 20 MG tablet Take 20 mg by mouth daily      !! gabapentin  (NEURONTIN) 300 MG capsule Take 300 mg by mouth 2 times daily Morning, dinnertime      !! gabapentin (NEURONTIN) 300 MG capsule Take 600 mg by mouth At Bedtime      levothyroxine (SYNTHROID/LEVOTHROID) 25 MCG tablet Take 25 mcg by mouth daily      lisinopril (ZESTRIL) 40 MG tablet Take 40 mg by mouth daily       meloxicam (MOBIC) 7.5 MG tablet Take 7.5 mg by mouth daily as needed      mirtazapine (REMERON) 30 MG tablet Take 30 mg by mouth At Bedtime       omeprazole (PRILOSEC) 20 MG DR capsule Take 20 mg by mouth daily      tamsulosin (FLOMAX) 0.4 MG capsule Take 0.4 mg by mouth daily       triamcinolone (NASACORT) 55 MCG/ACT nasal aerosol Spray 2 sprays into both nostrils daily      venlafaxine (EFFEXOR-XR) 37.5 MG 24 hr capsule Take 37.5 mg by mouth daily       !! - Potential duplicate medications found. Please discuss with provider.          ALLERGIES:  Allergies   Allergen Reactions     Penicillins Hives       FAMILY HISTORY:  History reviewed. No pertinent family history.    SOCIAL HISTORY:   Social History     Socioeconomic History     Marital status: Single     Spouse name: Not on file     Number of children: Not on file     Years of education: Not on file     Highest education level: Not on file   Occupational History     Not on file   Tobacco Use     Smoking status: Current Every Day Smoker     Smokeless tobacco: Never Used   Substance and Sexual Activity     Alcohol use: Not Currently     Comment: Clean for 5 years     Drug use: Not Currently     Sexual activity: Yes     Partners: Female   Other Topics Concern     Not on file   Social History Narrative     Not on file     Social Determinants of Health     Financial Resource Strain: Not on file   Food Insecurity: Not on file   Transportation Needs: Not on file   Physical Activity: Not on file   Stress: Not on file   Social Connections: Not on file   Intimate Partner Violence: Not on file   Housing Stability: Not on file       VITALS:  Patient Vitals for the  "past 24 hrs:   BP Temp Temp src Pulse Resp SpO2 Height Weight   11/30/21 1304 -- -- -- -- -- -- 1.656 m (5' 5.2\") 79.4 kg (175 lb)   11/30/21 1250 126/76 98.8  F (37.1  C) Oral 95 18 93 % -- --   11/30/21 1030 (!) 141/107 -- -- 88 -- 93 % -- --   11/30/21 0945 (!) 125/92 -- -- 91 -- 93 % -- --   11/30/21 0915 (!) 149/107 -- -- 84 -- 94 % -- --   11/30/21 0849 (!) 136/98 98.9  F (37.2  C) Oral 89 20 95 % -- --       PHYSICAL EXAM      Vitals: /76   Pulse 95   Temp 98.8  F (37.1  C) (Oral)   Resp 18   Ht 1.656 m (5' 5.2\")   Wt 79.4 kg (175 lb)   SpO2 93%   BMI 28.94 kg/m    General: Appears in no acute distress, awake, alert, interactive.  Eyes: Conjunctivae non-injected. Sclera anicteric.  HENT: Atraumatic.  Neck: Supple.  Respiratory/Chest: Respiration unlabored.  Heart: RRR  Abdomen: Bowel sounds present. Previous laparotomy incision. Tenderness to right side of abdomen. Right side of abdomen is a little more prominent and distended.   Musculoskeletal: Normal extremities. No edema or erythema.  Skin: Normal color. No rash or diaphoresis.  Neurologic: Face symmetric, moves all extremities spontaneously. Speech clear.  Psychiatric: Oriented to person, place, and time. Affect appropriate.    LAB:  All pertinent labs reviewed and interpreted.  Results for orders placed or performed during the hospital encounter of 11/30/21   CT Abdomen Pelvis w Contrast    Impression    IMPRESSION:   1.  Mechanical small bowel obstruction with dilated distal jejunum and ileum with caliber changes in the right lower quadrant and in the left midabdomen this may be secondary to adhesions or internal hernia and has the appearance of a closed loop   obstruction. No pneumatosis intestinalis, free intraperitoneal air or abscess.    2.  Results were called to Dr. Sanford the time of dictation.    NOTE: ABNORMAL REPORT    THE DICTATION ABOVE DESCRIBES AN ABNORMALITY FOR WHICH FOLLOW-UP IS NEEDED.    Extra Red Top Tube   Result Value " Ref Range    Hold Specimen JIC    Extra Green Top (Lithium Heparin) Tube   Result Value Ref Range    Hold Specimen JIC    Extra Purple Top Tube   Result Value Ref Range    Hold Specimen JIC    Extra Blue Top Tube   Result Value Ref Range    Hold Specimen JIC    CBC (+ platelets, no diff)   Result Value Ref Range    WBC Count 11.8 (H) 4.0 - 11.0 10e3/uL    RBC Count 5.51 4.40 - 5.90 10e6/uL    Hemoglobin 16.7 13.3 - 17.7 g/dL    Hematocrit 50.6 40.0 - 53.0 %    MCV 92 78 - 100 fL    MCH 30.3 26.5 - 33.0 pg    MCHC 33.0 31.5 - 36.5 g/dL    RDW 14.1 10.0 - 15.0 %    Platelet Count 418 150 - 450 10e3/uL   Comprehensive metabolic panel   Result Value Ref Range    Sodium 144 136 - 145 mmol/L    Potassium 4.3 3.5 - 5.0 mmol/L    Chloride 109 (H) 98 - 107 mmol/L    Carbon Dioxide (CO2) 25 22 - 31 mmol/L    Anion Gap 10 5 - 18 mmol/L    Urea Nitrogen 22 8 - 22 mg/dL    Creatinine 0.99 0.70 - 1.30 mg/dL    Calcium 10.6 (H) 8.5 - 10.5 mg/dL    Glucose 120 70 - 125 mg/dL    Alkaline Phosphatase 85 45 - 120 U/L    AST 27 0 - 40 U/L    ALT 24 0 - 45 U/L    Protein Total 8.3 (H) 6.0 - 8.0 g/dL    Albumin 4.5 3.5 - 5.0 g/dL    Bilirubin Total 0.4 0.0 - 1.0 mg/dL    GFR Estimate 78 >60 mL/min/1.73m2   Result Value Ref Range    Lipase 21 0 - 52 U/L   Asymptomatic COVID-19 Virus (Coronavirus) by PCR Nasopharyngeal    Specimen: Nasopharyngeal; Swab   Result Value Ref Range    SARS CoV2 PCR Negative Negative   Lactic acid whole blood   Result Value Ref Range    Lactic Acid 0.7 0.7 - 2.0 mmol/L       RADIOLOGY:  Reviewed all pertinent imaging. Please see official radiology report.  CT Abdomen Pelvis w Contrast   Final Result   IMPRESSION:    1.  Mechanical small bowel obstruction with dilated distal jejunum and ileum with caliber changes in the right lower quadrant and in the left midabdomen this may be secondary to adhesions or internal hernia and has the appearance of a closed loop    obstruction. No pneumatosis intestinalis, free  intraperitoneal air or abscess.      2.  Results were called to Dr. Sanford the time of dictation.      NOTE: ABNORMAL REPORT      THE DICTATION ABOVE DESCRIBES AN ABNORMALITY FOR WHICH FOLLOW-UP IS NEEDED.       US Guided Needle Placement    (Results Pending)       EKG:    None    PROCEDURES:   none      I, Kristina Edward, am serving as a scribe to document services personally performed by Dr. Sanford based on my observation and the provider's statements to me. I, Quinn Sanford MD attest that Kristina Edward is acting in a scribe capacity, has observed my performance of the services and has documented them in accordance with my direction.    Quinn Sanford M.D.  Emergency Medicine  [facility] EMERGENCY DEPARTMENT  [address] [place] [id number] [phone]  Dept: [phone]       Landry Sanford MD  11/30/21 3739

## 2021-11-30 NOTE — ANESTHESIA PROCEDURE NOTES
Airway       Patient location during procedure: OR       Procedure Start/Stop Times: 11/30/2021 1:16 PM  Staff -        CRNA: Jose Galan APRN CRNA       Performed By: CRNA  Consent for Airway        Urgency: elective  Indications and Patient Condition       Indications for airway management: arlin-procedural       Induction type:RSI       Mask difficulty assessment: 0 - not attempted    Final Airway Details       Final airway type: endotracheal airway       Successful airway: ETT - single  Endotracheal Airway Details        ETT size (mm): 7.5       Cuffed: yes       Successful intubation technique: video laryngoscopy       VL Blade Size: Glidescope 4       Grade View of Cords: 1       Adjucts: stylet       Position: Right       Measured from: lips       Secured at (cm): 21       Bite block used: None    Post intubation assessment        Placement verified by: capnometry, equal breath sounds and chest rise        Number of attempts at approach: 1       Number of other approaches attempted: 0       Secured with: silk tape       Ease of procedure: easy       Dentition: Intact and Unchanged

## 2021-12-01 ENCOUNTER — APPOINTMENT (OUTPATIENT)
Dept: PHYSICAL THERAPY | Facility: HOSPITAL | Age: 66
DRG: 329 | End: 2021-12-01
Attending: SPECIALIST
Payer: MEDICARE

## 2021-12-01 ENCOUNTER — APPOINTMENT (OUTPATIENT)
Dept: OCCUPATIONAL THERAPY | Facility: HOSPITAL | Age: 66
DRG: 329 | End: 2021-12-01
Attending: SPECIALIST
Payer: MEDICARE

## 2021-12-01 LAB
ALBUMIN SERPL-MCNC: 2.8 G/DL (ref 3.5–5)
ALBUMIN SERPL-MCNC: 3.3 G/DL (ref 3.5–5)
ALP SERPL-CCNC: 43 U/L (ref 45–120)
ALP SERPL-CCNC: 43 U/L (ref 45–120)
ALT SERPL W P-5'-P-CCNC: 19 U/L (ref 0–45)
ALT SERPL W P-5'-P-CCNC: 25 U/L (ref 0–45)
ANION GAP SERPL CALCULATED.3IONS-SCNC: 5 MMOL/L (ref 5–18)
ANION GAP SERPL CALCULATED.3IONS-SCNC: 9 MMOL/L (ref 5–18)
AST SERPL W P-5'-P-CCNC: 26 U/L (ref 0–40)
AST SERPL W P-5'-P-CCNC: 27 U/L (ref 0–40)
BASOPHILS # BLD AUTO: 0.1 10E3/UL (ref 0–0.2)
BASOPHILS NFR BLD AUTO: 1 %
BILIRUB SERPL-MCNC: 0.5 MG/DL (ref 0–1)
BILIRUB SERPL-MCNC: 0.5 MG/DL (ref 0–1)
BUN SERPL-MCNC: 20 MG/DL (ref 8–22)
BUN SERPL-MCNC: 26 MG/DL (ref 8–22)
CALCIUM SERPL-MCNC: 8 MG/DL (ref 8.5–10.5)
CALCIUM SERPL-MCNC: 8.3 MG/DL (ref 8.5–10.5)
CHLORIDE BLD-SCNC: 113 MMOL/L (ref 98–107)
CHLORIDE BLD-SCNC: 115 MMOL/L (ref 98–107)
CO2 SERPL-SCNC: 21 MMOL/L (ref 22–31)
CO2 SERPL-SCNC: 25 MMOL/L (ref 22–31)
CREAT SERPL-MCNC: 0.87 MG/DL (ref 0.7–1.3)
CREAT SERPL-MCNC: 1.09 MG/DL (ref 0.7–1.3)
EOSINOPHIL # BLD AUTO: 0.1 10E3/UL (ref 0–0.7)
EOSINOPHIL NFR BLD AUTO: 1 %
ERYTHROCYTE [DISTWIDTH] IN BLOOD BY AUTOMATED COUNT: 14.6 % (ref 10–15)
ERYTHROCYTE [DISTWIDTH] IN BLOOD BY AUTOMATED COUNT: 15 % (ref 10–15)
GFR SERPL CREATININE-BSD FRML MDRD: 70 ML/MIN/1.73M2
GFR SERPL CREATININE-BSD FRML MDRD: 89 ML/MIN/1.73M2
GLUCOSE BLD-MCNC: 107 MG/DL (ref 70–125)
GLUCOSE BLD-MCNC: 149 MG/DL (ref 70–125)
HCT VFR BLD AUTO: 40.4 % (ref 40–53)
HCT VFR BLD AUTO: 46.8 % (ref 40–53)
HGB BLD-MCNC: 12.8 G/DL (ref 13.3–17.7)
HGB BLD-MCNC: 14.9 G/DL (ref 13.3–17.7)
IMM GRANULOCYTES # BLD: 0.1 10E3/UL
IMM GRANULOCYTES NFR BLD: 1 %
LACTATE SERPL-SCNC: 1.5 MMOL/L (ref 0.7–2)
LACTATE SERPL-SCNC: 2.4 MMOL/L (ref 0.7–2)
LACTATE SERPL-SCNC: 2.6 MMOL/L (ref 0.7–2)
LYMPHOCYTES # BLD AUTO: 0.9 10E3/UL (ref 0.8–5.3)
LYMPHOCYTES NFR BLD AUTO: 9 %
MCH RBC QN AUTO: 29.8 PG (ref 26.5–33)
MCH RBC QN AUTO: 30 PG (ref 26.5–33)
MCHC RBC AUTO-ENTMCNC: 31.7 G/DL (ref 31.5–36.5)
MCHC RBC AUTO-ENTMCNC: 31.8 G/DL (ref 31.5–36.5)
MCV RBC AUTO: 94 FL (ref 78–100)
MCV RBC AUTO: 94 FL (ref 78–100)
MONOCYTES # BLD AUTO: 0.9 10E3/UL (ref 0–1.3)
MONOCYTES NFR BLD AUTO: 9 %
NEUTROPHILS # BLD AUTO: 7.8 10E3/UL (ref 1.6–8.3)
NEUTROPHILS NFR BLD AUTO: 79 %
NRBC # BLD AUTO: 0 10E3/UL
NRBC BLD AUTO-RTO: 0 /100
PLATELET # BLD AUTO: 275 10E3/UL (ref 150–450)
PLATELET # BLD AUTO: 323 10E3/UL (ref 150–450)
POTASSIUM BLD-SCNC: 4.3 MMOL/L (ref 3.5–5)
POTASSIUM BLD-SCNC: 4.6 MMOL/L (ref 3.5–5)
PROT SERPL-MCNC: 5.5 G/DL (ref 6–8)
PROT SERPL-MCNC: 6 G/DL (ref 6–8)
RBC # BLD AUTO: 4.29 10E6/UL (ref 4.4–5.9)
RBC # BLD AUTO: 4.97 10E6/UL (ref 4.4–5.9)
SODIUM SERPL-SCNC: 143 MMOL/L (ref 136–145)
SODIUM SERPL-SCNC: 145 MMOL/L (ref 136–145)
WBC # BLD AUTO: 9.1 10E3/UL (ref 4–11)
WBC # BLD AUTO: 9.8 10E3/UL (ref 4–11)

## 2021-12-01 PROCEDURE — 99024 POSTOP FOLLOW-UP VISIT: CPT | Performed by: SPECIALIST

## 2021-12-01 PROCEDURE — 84155 ASSAY OF PROTEIN SERUM: CPT | Performed by: SPECIALIST

## 2021-12-01 PROCEDURE — 250N000013 HC RX MED GY IP 250 OP 250 PS 637: Performed by: SPECIALIST

## 2021-12-01 PROCEDURE — 85027 COMPLETE CBC AUTOMATED: CPT | Performed by: SPECIALIST

## 2021-12-01 PROCEDURE — 97165 OT EVAL LOW COMPLEX 30 MIN: CPT | Mod: GO

## 2021-12-01 PROCEDURE — 97530 THERAPEUTIC ACTIVITIES: CPT | Mod: GP

## 2021-12-01 PROCEDURE — 99223 1ST HOSP IP/OBS HIGH 75: CPT | Mod: AI | Performed by: STUDENT IN AN ORGANIZED HEALTH CARE EDUCATION/TRAINING PROGRAM

## 2021-12-01 PROCEDURE — 83605 ASSAY OF LACTIC ACID: CPT | Performed by: INTERNAL MEDICINE

## 2021-12-01 PROCEDURE — 258N000003 HC RX IP 258 OP 636: Performed by: SPECIALIST

## 2021-12-01 PROCEDURE — 120N000001 HC R&B MED SURG/OB

## 2021-12-01 PROCEDURE — 97162 PT EVAL MOD COMPLEX 30 MIN: CPT | Mod: GP

## 2021-12-01 PROCEDURE — 258N000001 HC RX 258: Performed by: SPECIALIST

## 2021-12-01 PROCEDURE — 258N000003 HC RX IP 258 OP 636: Performed by: FAMILY MEDICINE

## 2021-12-01 PROCEDURE — 258N000003 HC RX IP 258 OP 636: Performed by: INTERNAL MEDICINE

## 2021-12-01 PROCEDURE — 36415 COLL VENOUS BLD VENIPUNCTURE: CPT | Performed by: SPECIALIST

## 2021-12-01 PROCEDURE — 85014 HEMATOCRIT: CPT | Performed by: SPECIALIST

## 2021-12-01 PROCEDURE — 250N000011 HC RX IP 250 OP 636: Performed by: SPECIALIST

## 2021-12-01 PROCEDURE — 97535 SELF CARE MNGMENT TRAINING: CPT | Mod: GO

## 2021-12-01 PROCEDURE — 83605 ASSAY OF LACTIC ACID: CPT | Performed by: SPECIALIST

## 2021-12-01 PROCEDURE — 250N000013 HC RX MED GY IP 250 OP 250 PS 637: Performed by: INTERNAL MEDICINE

## 2021-12-01 RX ORDER — AMOXICILLIN 250 MG
2 CAPSULE ORAL 2 TIMES DAILY
Status: DISCONTINUED | OUTPATIENT
Start: 2021-12-01 | End: 2021-12-08

## 2021-12-01 RX ORDER — ONDANSETRON 4 MG/1
4 TABLET, ORALLY DISINTEGRATING ORAL EVERY 6 HOURS PRN
Status: DISCONTINUED | OUTPATIENT
Start: 2021-12-01 | End: 2021-12-21 | Stop reason: HOSPADM

## 2021-12-01 RX ORDER — DIPHENHYDRAMINE HCL 12.5MG/5ML
12.5 LIQUID (ML) ORAL EVERY 6 HOURS PRN
Status: DISCONTINUED | OUTPATIENT
Start: 2021-12-01 | End: 2021-12-03

## 2021-12-01 RX ORDER — AMOXICILLIN 250 MG
1 CAPSULE ORAL 2 TIMES DAILY
Status: DISCONTINUED | OUTPATIENT
Start: 2021-12-01 | End: 2021-12-08

## 2021-12-01 RX ORDER — ONDANSETRON 2 MG/ML
4 INJECTION INTRAMUSCULAR; INTRAVENOUS EVERY 6 HOURS PRN
Status: DISCONTINUED | OUTPATIENT
Start: 2021-12-01 | End: 2021-12-21 | Stop reason: HOSPADM

## 2021-12-01 RX ORDER — DIPHENHYDRAMINE HYDROCHLORIDE 50 MG/ML
12.5 INJECTION INTRAMUSCULAR; INTRAVENOUS EVERY 6 HOURS PRN
Status: DISCONTINUED | OUTPATIENT
Start: 2021-12-01 | End: 2021-12-03

## 2021-12-01 RX ORDER — POLYETHYLENE GLYCOL 3350 17 G/17G
17 POWDER, FOR SOLUTION ORAL DAILY
Status: DISCONTINUED | OUTPATIENT
Start: 2021-12-01 | End: 2021-12-08

## 2021-12-01 RX ADMIN — SODIUM CHLORIDE, POTASSIUM CHLORIDE, SODIUM LACTATE AND CALCIUM CHLORIDE 1000 ML: 600; 310; 30; 20 INJECTION, SOLUTION INTRAVENOUS at 14:22

## 2021-12-01 RX ADMIN — SODIUM CHLORIDE: 9 INJECTION, SOLUTION INTRAVENOUS at 08:44

## 2021-12-01 RX ADMIN — ACETAMINOPHEN 975 MG: 325 TABLET ORAL at 20:18

## 2021-12-01 RX ADMIN — HYDROMORPHONE HYDROCHLORIDE 0.4 MG: 0.2 INJECTION, SOLUTION INTRAMUSCULAR; INTRAVENOUS; SUBCUTANEOUS at 00:06

## 2021-12-01 RX ADMIN — HYDROMORPHONE HYDROCHLORIDE 0.4 MG: 0.2 INJECTION, SOLUTION INTRAMUSCULAR; INTRAVENOUS; SUBCUTANEOUS at 05:01

## 2021-12-01 RX ADMIN — PANTOPRAZOLE SODIUM 40 MG: 20 TABLET, DELAYED RELEASE ORAL at 10:20

## 2021-12-01 RX ADMIN — GABAPENTIN 300 MG: 300 CAPSULE ORAL at 16:50

## 2021-12-01 RX ADMIN — HYDROMORPHONE HYDROCHLORIDE 0.4 MG: 0.2 INJECTION, SOLUTION INTRAMUSCULAR; INTRAVENOUS; SUBCUTANEOUS at 02:53

## 2021-12-01 RX ADMIN — VENLAFAXINE HYDROCHLORIDE 37.5 MG: 37.5 CAPSULE, EXTENDED RELEASE ORAL at 11:26

## 2021-12-01 RX ADMIN — MIRTAZAPINE 30 MG: 30 TABLET, FILM COATED ORAL at 20:19

## 2021-12-01 RX ADMIN — Medication: at 08:47

## 2021-12-01 RX ADMIN — ACETAMINOPHEN 975 MG: 325 TABLET ORAL at 05:14

## 2021-12-01 RX ADMIN — FAMOTIDINE 20 MG: 20 TABLET, FILM COATED ORAL at 20:19

## 2021-12-01 RX ADMIN — BUSPIRONE HYDROCHLORIDE 15 MG: 15 TABLET ORAL at 20:19

## 2021-12-01 RX ADMIN — LEVOTHYROXINE SODIUM 25 MCG: 0.03 TABLET ORAL at 10:21

## 2021-12-01 RX ADMIN — DOCUSATE SODIUM 50 MG AND SENNOSIDES 8.6 MG 2 TABLET: 8.6; 5 TABLET, FILM COATED ORAL at 20:18

## 2021-12-01 RX ADMIN — FAMOTIDINE 20 MG: 20 TABLET, FILM COATED ORAL at 10:20

## 2021-12-01 RX ADMIN — METRONIDAZOLE 500 MG: 500 INJECTION, SOLUTION INTRAVENOUS at 17:02

## 2021-12-01 RX ADMIN — GABAPENTIN 600 MG: 300 CAPSULE ORAL at 20:18

## 2021-12-01 RX ADMIN — HYDROMORPHONE HYDROCHLORIDE 0.4 MG: 0.2 INJECTION, SOLUTION INTRAMUSCULAR; INTRAVENOUS; SUBCUTANEOUS at 07:11

## 2021-12-01 RX ADMIN — GABAPENTIN 300 MG: 300 CAPSULE ORAL at 10:19

## 2021-12-01 RX ADMIN — TAMSULOSIN HYDROCHLORIDE 0.4 MG: 0.4 CAPSULE ORAL at 10:21

## 2021-12-01 RX ADMIN — ACETAMINOPHEN 975 MG: 325 TABLET ORAL at 11:32

## 2021-12-01 RX ADMIN — DOCUSATE SODIUM 50 MG AND SENNOSIDES 8.6 MG 1 TABLET: 8.6; 5 TABLET, FILM COATED ORAL at 10:21

## 2021-12-01 RX ADMIN — BUSPIRONE HYDROCHLORIDE 15 MG: 15 TABLET ORAL at 11:26

## 2021-12-01 RX ADMIN — POLYETHYLENE GLYCOL 3350 17 G: 17 POWDER, FOR SOLUTION ORAL at 10:20

## 2021-12-01 RX ADMIN — POTASSIUM CHLORIDE: 2 INJECTION, SOLUTION, CONCENTRATE INTRAVENOUS at 20:17

## 2021-12-01 RX ADMIN — SODIUM CHLORIDE, POTASSIUM CHLORIDE, SODIUM LACTATE AND CALCIUM CHLORIDE 1000 ML: 600; 310; 30; 20 INJECTION, SOLUTION INTRAVENOUS at 10:11

## 2021-12-01 RX ADMIN — LEVOFLOXACIN 500 MG: 5 INJECTION, SOLUTION INTRAVENOUS at 18:36

## 2021-12-01 RX ADMIN — METRONIDAZOLE 500 MG: 500 INJECTION, SOLUTION INTRAVENOUS at 05:22

## 2021-12-01 ASSESSMENT — ACTIVITIES OF DAILY LIVING (ADL)
ADLS_ACUITY_SCORE: 11
ADLS_ACUITY_SCORE: 14
ADLS_ACUITY_SCORE: 11
ADLS_ACUITY_SCORE: 11
ADLS_ACUITY_SCORE: 14
ADLS_ACUITY_SCORE: 11
ADLS_ACUITY_SCORE: 9
ADLS_ACUITY_SCORE: 11
ADLS_ACUITY_SCORE: 14
ADLS_ACUITY_SCORE: 11
ADLS_ACUITY_SCORE: 14
ADLS_ACUITY_SCORE: 14
ADLS_ACUITY_SCORE: 11
ADLS_ACUITY_SCORE: 14
ADLS_ACUITY_SCORE: 10
ADLS_ACUITY_SCORE: 14
ADLS_ACUITY_SCORE: 14
PREVIOUS_RESPONSIBILITIES: MEAL PREP;HOUSEKEEPING;LAUNDRY;SHOPPING;MEDICATION MANAGEMENT;FINANCES;DRIVING
ADLS_ACUITY_SCORE: 14
ADLS_ACUITY_SCORE: 11

## 2021-12-01 NOTE — PROGRESS NOTES
ASSESSMENT:  1. SBO (small bowel obstruction) (H)    Extensive lysis of adhesions with eventual actually resection of 2 feet of bowel.  This is secondary to multiple enterotomies with a cemented in abdomen.  Patient has pain control issues but also is on chronic gabapentin for pain issues outpatient.  We will switch him over      PLAN:  1. 1 L of lactated Ringer's secondary to his lactate being up he still little bit tacky but not terrible hypotension is or other can probably problem but at this time point time he is doing okay he is not hypotensive for sure.    2.  Follow-up and check a lactate at noon and see where he is at    3.  To PCA hopefully this will get him better pain control    4.0 increase activity  5.  Pulmonary toilet    SUBJECTIVE:   He is postop day 1 explore lap resection of about 2 feet of small bowel extensive lysis of adhesions secondary to socked and cemented in abdomen.  He had multiple prior surgeries before and has had similar dissection or expiration before.      Patient Vitals for the past 24 hrs:   BP Temp Temp src Pulse Resp SpO2 Height Weight   12/01/21 0720 (!) 125/90 98  F (36.7  C) Oral 99 18 95 % -- --   12/01/21 0530 124/86 99  F (37.2  C) Axillary 104 20 92 % -- --   12/01/21 0130 (!) 148/107 -- -- 103 18 93 % -- --   11/30/21 2353 (!) 140/100 98.6  F (37  C) Axillary 97 20 94 % -- --   11/30/21 2227 (!) 127/95 98.7  F (37.1  C) Oral 98 19 96 % -- --   11/30/21 2045 123/82 98  F (36.7  C) Oral 98 16 -- -- --   11/30/21 1946 110/84 98.2  F (36.8  C) Oral 101 17 -- -- --   11/30/21 1855 -- -- -- -- -- 96 % -- --   11/30/21 1840 112/88 -- -- 101 15 95 % -- --   11/30/21 1830 129/89 -- -- 107 28 96 % -- --   11/30/21 1820 118/84 -- -- 106 25 95 % -- --   11/30/21 1810 114/82 -- -- 104 14 93 % -- --   11/30/21 1800 (!) 132/90 -- -- 103 28 93 % -- --   11/30/21 1745 107/80 -- -- 100 21 96 % -- --   11/30/21 1730 118/81 -- -- 93 10 97 % -- --   11/30/21 1727 -- -- -- 88 14 97 % -- --  "  11/30/21 1304 -- -- -- -- -- -- 1.656 m (5' 5.2\") 79.4 kg (175 lb)   11/30/21 1250 126/76 98.8  F (37.1  C) Oral 95 18 93 % -- --         PHYSICAL EXAM:  GEN: No acute distress, comfortable  LUNGS: CTA bilaterally  CV:RRR  ABD: [place] some drainage from the midline incision MARISA has some bloody drainage but minimal  Drains: Bloody drainage  Output by Drain (mL) 11/29/21 0700 - 11/29/21 1459 11/29/21 1500 - 11/29/21 2259 11/29/21 2300 - 11/30/21 0659 11/30/21 0700 - 11/30/21 1459 11/30/21 1500 - 11/30/21 2259 11/30/21 2300 - 12/01/21 0659 12/01/21 0700 - 12/01/21 1035   Closed/Suction Drain 1 Right Abdomen Bulb 19 [place]     120 20       EXT:No cyanosis, edema or obvious abnormalities    11/30 0700 - 12/01 0659  In: 3150 [I.V.:2900]  Out: 2290 [Urine:1050; Drains:140]    Admission on 11/30/2021   Component Date Value     Hold Specimen 11/30/2021 JIC      Hold Specimen 11/30/2021 JIC      Hold Specimen 11/30/2021 J[facility]      Hold Specimen 11/30/2021 JIC      WBC Count 11/30/2021 11.8*     RBC Count 11/30/2021 5.51      Hemoglobin 11/30/2021 16.7      Hematocrit 11/30/2021 50.6      MCV 11/30/2021 92      MCH 11/30/2021 30.3      MCHC 11/30/2021 33.0      RDW 11/30/2021 14.1      Platelet Count 11/30/2021 418      Sodium 11/30/2021 144      Potassium 11/30/2021 4.3      Chloride 11/30/2021 109*     Carbon Dioxide (CO2) 11/30/2021 25      Anion Gap 11/30/2021 10      Urea Nitrogen 11/30/2021 22      Creatinine 11/30/2021 0.99      Calcium 11/30/2021 10.6*     Glucose 11/30/2021 120      Alkaline Phosphatase 11/30/2021 85      AST 11/30/2021 27      ALT 11/30/2021 24      Protein Total 11/30/2021 8.3*     Albumin 11/30/2021 4.5      Bilirubin Total 11/30/2021 0.4      GFR Estimate 11/30/2021 78      Lipase 11/30/2021 21      SARS CoV2 PCR 11/30/2021 Negative      Lactic Acid 11/30/2021 0.7      Lactic Acid 11/30/2021 2.1*     Sodium 11/30/2021 145      Potassium 11/30/2021 4.3      Chloride 11/30/2021 114*     Carbon " Dioxide (CO2) 11/30/2021 22      Anion Gap 11/30/2021 9      Urea Nitrogen 11/30/2021 22      Creatinine 11/30/2021 1.06      Calcium 11/30/2021 8.5      Glucose 11/30/2021 159*     GFR Estimate 11/30/2021 72      Hemoglobin 11/30/2021 15.2      Sodium 12/01/2021 143      Potassium 12/01/2021 4.3      Chloride 12/01/2021 113*     Carbon Dioxide (CO2) 12/01/2021 21*     Anion Gap 12/01/2021 9      Urea Nitrogen 12/01/2021 20      Creatinine 12/01/2021 0.87      Calcium 12/01/2021 8.3*     Glucose 12/01/2021 149*     Alkaline Phosphatase 12/01/2021 43*     AST 12/01/2021 27      ALT 12/01/2021 25      Protein Total 12/01/2021 6.0      Albumin 12/01/2021 3.3*     Bilirubin Total 12/01/2021 0.5      GFR Estimate 12/01/2021 89      Lactic Acid 12/01/2021 2.4*     WBC Count 12/01/2021 9.8      RBC Count 12/01/2021 4.97      Hemoglobin 12/01/2021 14.9      Hematocrit 12/01/2021 46.8      MCV 12/01/2021 94      MCH 12/01/2021 30.0      MCHC 12/01/2021 31.8      RDW 12/01/2021 14.6      Platelet Count 12/01/2021 323      % Neutrophils 12/01/2021 79      % Lymphocytes 12/01/2021 9      % Monocytes 12/01/2021 9      % Eosinophils 12/01/2021 1      % Basophils 12/01/2021 1      % Immature Granulocytes 12/01/2021 1      NRBCs per 100 WBC 12/01/2021 0      Absolute Neutrophils 12/01/2021 7.8      Absolute Lymphocytes 12/01/2021 0.9      Absolute Monocytes 12/01/2021 0.9      Absolute Eosinophils 12/01/2021 0.1      Absolute Basophils 12/01/2021 0.1      Absolute Immature Granul* 12/01/2021 0.1      Absolute NRBCs 12/01/2021 0.0           Mekhi Richardson MD

## 2021-12-01 NOTE — OR NURSING
Dr. Lara  Called about pt's resp status/rate, pt easily arousable. Appears comfortable for  The first time.

## 2021-12-01 NOTE — PROGRESS NOTES
12/01/21 1130   Quick Adds   Type of Visit Initial PT Evaluation   Living Environment   People in home other (see comments)  (DOC,pt is suppose to be released in 13 days)   Current Living Arrangements other (see comments)  (DOC )   Transportation Anticipated health plan transportation   Self-Care   Usual Activity Tolerance good   Current Activity Tolerance poor   Equipment Currently Used at Home none   Disability/Function   Walking or Climbing Stairs Difficulty no   Dressing/Bathing Difficulty no   Toileting issues no   Doing Errands Independently Difficulty (such as shopping) other (see comments)  (DOC)   Fall history within last six months no   General Information   Onset of Illness/Injury or Date of Surgery 12/30/21   Referring Physician Mekhi Richardson   Patient/Family Therapy Goals Statement (PT) not sure   Pertinent History of Current Problem (include personal factors and/or comorbidities that impact the POC) SBO resection   Existing Precautions/Restrictions abdominal;NPO;other (see comments)  (drains,PCA,Roblero)   Weight-Bearing Status - LLE full weight-bearing   Weight-Bearing Status - RLE full weight-bearing   Cognition   Orientation Status (Cognition) oriented x 4   Pain Assessment   Patient Currently in Pain Yes, see Vital Sign flowsheet   Range of Motion (ROM)   ROM Quick Adds ROM WFL   Strength   Manual Muscle Testing Quick Adds Strength WFL   Bed Mobility   Rolling Left [place] (Bed Mobility) verbal cues;moderate assist (50% patient effort)   Supine-Sit [place] (Bed Mobility) verbal cues;moderate assist (50% patient effort)   Bed Mobility Limitations other (see comments)  (pain)   Impairments Contributing to Impaired Bed Mobility pain   Assistive Device (Bed Mobility) bed rails   Comment (Bed Mobility) cues for log roll   Bed-Chair Transfer   Bed-Chair [place] (Transfers) moderate assist (50% patient effort)   Assistive Device (Bed-Chair Transfers) other (see comments)  (none)    Bed/Chair Transfer Comments flexed forward posture   Sit-Stand Transfer   Sit-Stand [place] (Transfers) minimum assist (75% patient effort)   Assistive Device (Sit-Stand Transfers) other (see comments)  (none)   Clinical Impression   Criteria for Skilled Therapeutic Intervention yes, treatment indicated   PT Diagnosis (PT) impaired functional mobility   Influenced by the following impairments pain ,surgery   Functional limitations due to impairments bed mobility,transfers   Clinical Presentation Stable/Uncomplicated   Clinical Presentation Rationale pt presents as med diagnosed   Clinical Decision Making (Complexity) moderate complexity   Therapy Frequency (PT) Daily   Predicted Duration of Therapy Intervention (days/wks) 7 days   Planned Therapy Interventions (PT) bed mobility training;gait training;home exercise program;patient/family education;strengthening;transfer training   Anticipated Equipment Needs at Discharge (PT) walker, rolling   Risk & Benefits of therapy have been explained evaluation/treatment results reviewed;patient   PT Discharge Planning    PT Discharge Recommendation (DC Rec) Transitional Care Facility   PT Rationale for DC Rec pt limited by pain,needs mod assist for mobility   Total Evaluation Time   Total Evaluation Time (Minutes) 10

## 2021-12-01 NOTE — PROGRESS NOTES
12/01/21 1340   Quick Adds   Type of Visit Initial Occupational Therapy Evaluation   Living Environment   Living Environment Comments see PT note.  Pt plans to likely return to his brothers after incarseration in 13 days.  only 3 steps to enter with rail.  doesn't need to use basement steps.   Self-Care   Usual Activity Tolerance good   Current Activity Tolerance poor   Equipment Currently Used at Home none   Activity/Exercise/Self-Care Comment was active prior   Instrumental Activities of Daily Living (IADL)   Previous Responsibilities meal prep;housekeeping;laundry;shopping;medication management;finances;driving   General Information   Onset of Illness/Injury or Date of Surgery 11/30/21   Referring Physician holley   Patient/Family Therapy Goal Statement (OT) pt agrees to go to tcu at [place]    Additional Occupational Profile Info/Pertinent History of Current Problem was independent living with his brother prior to incarseration   Performance Patterns (Routines, Roles, Habits) independent   Existing Precautions/Restrictions abdominal   Bed Mobility   Bed Mobility scooting/bridging;sit-supine   Scooting/Bridging [place] (Bed Mobility) modified independence;verbal cues   Sit-Supine [place] (Bed Mobility) minimum assist (75% patient effort)   Assistive Device (Bed Mobility) bed rails   Transfers   Transfers bed-chair transfer   Transfer Skill: Bed to Chair/Chair to Bed   Bed-Chair [place] (Transfers) minimum assist (75% patient effort)   Assistive Device (Bed-Chair Transfers) rolling walker   Transfer Comments having abdominal discomfort   Clinical Impression   Criteria for Skilled Therapeutic Interventions Met (OT) yes;meets criteria;skilled treatment is necessary   OT Diagnosis SBO repair   OT Problem List-Impairments impacting ADL problems related to;activity tolerance impaired;fear & anxiety;flexibility;mobility;pain;post-surgical precautions   Assessment of Occupational Performance 1-3  Performance Deficits   Identified Performance Deficits reduced ADL independence due to pain and incision   Planned Therapy Interventions (OT) ADL retraining;bed mobility training   Clinical Decision Making Complexity (OT) low complexity   Therapy Frequency (OT) Daily   Predicted Duration of Therapy 4 days   Risk & Benefits of therapy have been explained evaluation/treatment results reviewed;care plan/treatment goals reviewed;risks/benefits reviewed;current/potential barriers reviewed;participants voiced agreement with care plan;participants included;patient   OT Discharge Planning    OT Discharge Recommendation (DC Rec) Transitional Care Facility   OT Rationale for DC Rec at [facility] facility   Total Evaluation Time (Minutes)   Total Evaluation Time (Minutes) 12

## 2021-12-01 NOTE — ANESTHESIA POSTPROCEDURE EVALUATION
Patient: Gurdeep Dia    Procedure: Procedure(s):  LAPAROSCOPY COVERTED TO OPEN, EXPLORATORY LAPAROTOMY, SMALL BOWEL RESECTION, REPAIR ENTEROTOMY, EXTENSIVE LYSIS OF ADHESION       Diagnosis:SBO (small bowel obstruction) (H) [K56.609]  Diagnosis Additional Information: No value filed.    Anesthesia Type:  General    Note:  Disposition: Inpatient   Postop Pain Control: Uneventful            Sign Out: Well controlled pain   PONV: No   Neuro/Psych: Uneventful            Sign Out: Acceptable/Baseline neuro status   Airway/Respiratory: Uneventful            Sign Out: Acceptable/Baseline resp. status   CV/Hemodynamics: Uneventful            Sign Out: Acceptable CV status; No obvious hypovolemia; No obvious fluid overload   Other NRE: NONE   DID A NON-ROUTINE EVENT OCCUR? No           Last vitals:  Vitals Value Taken Time   /80 11/30/21 1920   Temp     Pulse 109 11/30/21 1920   Resp 7 11/30/21 1918   SpO2 96 % 11/30/21 1920   Vitals shown include unvalidated device data.    Electronically Signed By: Landry Lara MD  November 30, 2021  8:49 PM

## 2021-12-01 NOTE — PLAN OF CARE
Pt reports abdominal pain 10/10 this shift. He states the lowest his pain has gotten is 7/10. Pt was started on a PCA pump. He was encouraged to use this to help manage his pain. Pt also utilizing an ice pack.  PT/OT following. Pt was able to sit up in chair for 2hr. Pt was painful when attempting to transfer back into bed. His O2 sats dropped at this time to 81% because pt was not taking in deep breaths. Pt was instructed to do this and his O2 level rebounded to 91%.  Lactic acid levels slightly elevated with highest being 2.6. Pt received a 1000ml LR bolus this morning and he is currently receiving a second LR bolus of 1000ml. Lactic acid level will be rechecked at 1800 this evening.   NG has minimal output.

## 2021-12-01 NOTE — PLAN OF CARE
Problem: Pain (Surgery Nonspecified)  Goal: Acceptable Pain Control  Outcome: Improving  Intervention: Prevent or Manage Pain  Recent Flowsheet Documentation  Taken 11/30/2021 0253 by Ayana Brandt, RN  Pain Management Interventions: medication (see MAR)    Patient rating surgical pain 10/10 while awake. Prn iv dilaudid 0.4 mg q 2 h aprox, patient able to sleep after pain medication.     Problem: Hypertension Comorbidity  Goal: Blood Pressure in Desired Range  Outcome: Improving  Intervention: Maintain Blood Pressure Management  Recent Flowsheet Documentation  Taken 12/1/2021 0332 by Ayana Brandt, RN  Medication Review/Management: medications reviewed    BP elevated 140/100 prior pain meds (when patient rating pain 10/10).   Bp148/107 after pain meds (when patient sleeping).   Dr. Baig notified. No new orders. Continue to monitor and call again if SBP>185 and DBP>100 again.   Asymptomatic.   BP later on shift 124/86.    Problem: Bleeding (Surgery Nonspecified)  Goal: Absence of Bleeding  Outcome: Improving    Midline incision covered, old dried drainage marked, unchanged overnight.   MARISA leaking at site bloody output at beginning of shift. Dressing changed and reinforce. No new drainage noted. Blood output.     Problem: Bowel Motility Impaired (Surgery Nonspecified)  Goal: Effective Bowel Elimination  Outcome: Improving     NG to LIS with tan output. Denies nausea. Tolerating ok NG clamped with meds.   NPO except for meds and ice chips.  Hypoactive bowel sounds. No gas yet.     SCDs on.   Incentive spirometer explained. Patient refused to do it overnight.

## 2021-12-01 NOTE — PLAN OF CARE
Problem: Pain Acute  Goal: Acceptable Pain Control and Functional Ability  Intervention: Develop Pain Management Plan  Recent Flowsheet Documentation  Taken 11/30/2021 2157 by Kanwal Butts, RN  Pain Management Interventions: medication (see MAR)  Taken 11/30/2021 2011 by Kanwal Butts, RN  Pain Management Interventions: medication (see MAR)   Patient c/o surgical abdominal pain 10/10, had dilaudid plus scheduled pain meds, was med helpful, had another 0.2mg, was still not helpful, patient wanted a PCA, pump, was restless agitated, seemed comfortable sleeping on and off but still complaining of pain.

## 2021-12-01 NOTE — PROGRESS NOTES
"Surgery    Pain through the day  Improved now with pcs but last blood pressure a little lower  States he feels better   Lactate was still 2.6 at noon  Exam still distended abdomen. lorraine minimal output  BP 91/68 (BP Location: Right arm)   Pulse 92   Temp 98.5  F (36.9  C) (Oral)   Resp 20   Ht 1.656 m (5' 5.2\")   Wt 79.4 kg (175 lb)   SpO2 91%   BMI 28.94 kg/m      Labs pending    Will check labs panel, cbc and lactate  May need a second look surgery but at his age and medical sate may need ICU bed after that but none are available.         Mekhi Richardson MD  General Surgery [phone]  Vascular Surgery [phone]          "

## 2021-12-01 NOTE — PROVIDER NOTIFICATION
BP elevated: 140/100 prior pain meds (rating surgical pain 10/10) and 148/107 after pain medications (patient sleeping).  Dr. Baig updated, no new orders. Continue to monitor and call again if SBP>185 or DBP>100 again.

## 2021-12-02 ENCOUNTER — APPOINTMENT (OUTPATIENT)
Dept: PHYSICAL THERAPY | Facility: HOSPITAL | Age: 66
DRG: 329 | End: 2021-12-02
Payer: MEDICARE

## 2021-12-02 ENCOUNTER — APPOINTMENT (OUTPATIENT)
Dept: OCCUPATIONAL THERAPY | Facility: HOSPITAL | Age: 66
DRG: 329 | End: 2021-12-02
Payer: MEDICARE

## 2021-12-02 LAB
ALBUMIN SERPL-MCNC: 2.6 G/DL (ref 3.5–5)
ALBUMIN SERPL-MCNC: 2.7 G/DL (ref 3.5–5)
ALP SERPL-CCNC: 43 U/L (ref 45–120)
ALP SERPL-CCNC: 53 U/L (ref 45–120)
ALT SERPL W P-5'-P-CCNC: 21 U/L (ref 0–45)
ALT SERPL W P-5'-P-CCNC: 22 U/L (ref 0–45)
AMMONIA PLAS-SCNC: 27 UMOL/L (ref 11–35)
ANION GAP SERPL CALCULATED.3IONS-SCNC: 8 MMOL/L (ref 5–18)
ANION GAP SERPL CALCULATED.3IONS-SCNC: 8 MMOL/L (ref 5–18)
AST SERPL W P-5'-P-CCNC: 27 U/L (ref 0–40)
AST SERPL W P-5'-P-CCNC: 33 U/L (ref 0–40)
ATRIAL RATE - MUSE: 137 BPM
BILIRUB SERPL-MCNC: 0.4 MG/DL (ref 0–1)
BILIRUB SERPL-MCNC: 0.4 MG/DL (ref 0–1)
BUN SERPL-MCNC: 25 MG/DL (ref 8–22)
BUN SERPL-MCNC: 27 MG/DL (ref 8–22)
CALCIUM SERPL-MCNC: 8.1 MG/DL (ref 8.5–10.5)
CALCIUM SERPL-MCNC: 8.2 MG/DL (ref 8.5–10.5)
CHLORIDE BLD-SCNC: 112 MMOL/L (ref 98–107)
CHLORIDE BLD-SCNC: 113 MMOL/L (ref 98–107)
CO2 SERPL-SCNC: 20 MMOL/L (ref 22–31)
CO2 SERPL-SCNC: 25 MMOL/L (ref 22–31)
CREAT SERPL-MCNC: 1.04 MG/DL (ref 0.7–1.3)
CREAT SERPL-MCNC: 1.08 MG/DL (ref 0.7–1.3)
DIASTOLIC BLOOD PRESSURE - MUSE: NORMAL MMHG
ERYTHROCYTE [DISTWIDTH] IN BLOOD BY AUTOMATED COUNT: 14.8 % (ref 10–15)
GFR SERPL CREATININE-BSD FRML MDRD: 71 ML/MIN/1.73M2
GFR SERPL CREATININE-BSD FRML MDRD: 74 ML/MIN/1.73M2
GLUCOSE BLD-MCNC: 116 MG/DL (ref 70–125)
GLUCOSE BLD-MCNC: 119 MG/DL (ref 70–125)
HCT VFR BLD AUTO: 39.3 % (ref 40–53)
HGB BLD-MCNC: 12.4 G/DL (ref 13.3–17.7)
INTERPRETATION ECG - MUSE: NORMAL
LACTATE SERPL-SCNC: 1.8 MMOL/L (ref 0.7–2)
MCH RBC QN AUTO: 29.9 PG (ref 26.5–33)
MCHC RBC AUTO-ENTMCNC: 31.6 G/DL (ref 31.5–36.5)
MCV RBC AUTO: 95 FL (ref 78–100)
P AXIS - MUSE: -14 DEGREES
PATH REPORT.COMMENTS IMP SPEC: NORMAL
PATH REPORT.COMMENTS IMP SPEC: NORMAL
PATH REPORT.FINAL DX SPEC: NORMAL
PATH REPORT.GROSS SPEC: NORMAL
PATH REPORT.MICROSCOPIC SPEC OTHER STN: NORMAL
PATH REPORT.RELEVANT HX SPEC: NORMAL
PHOTO IMAGE: NORMAL
PLATELET # BLD AUTO: 273 10E3/UL (ref 150–450)
POTASSIUM BLD-SCNC: 4.2 MMOL/L (ref 3.5–5)
POTASSIUM BLD-SCNC: 4.6 MMOL/L (ref 3.5–5)
PR INTERVAL - MUSE: 176 MS
PROT SERPL-MCNC: 5.5 G/DL (ref 6–8)
PROT SERPL-MCNC: 6.2 G/DL (ref 6–8)
QRS DURATION - MUSE: 84 MS
QT - MUSE: 266 MS
QTC - MUSE: 401 MS
R AXIS - MUSE: -26 DEGREES
RBC # BLD AUTO: 4.15 10E6/UL (ref 4.4–5.9)
SODIUM SERPL-SCNC: 141 MMOL/L (ref 136–145)
SODIUM SERPL-SCNC: 145 MMOL/L (ref 136–145)
SYSTOLIC BLOOD PRESSURE - MUSE: NORMAL MMHG
T AXIS - MUSE: -37 DEGREES
VENTRICULAR RATE- MUSE: 137 BPM
WBC # BLD AUTO: 5.2 10E3/UL (ref 4–11)

## 2021-12-02 PROCEDURE — 250N000013 HC RX MED GY IP 250 OP 250 PS 637: Performed by: SPECIALIST

## 2021-12-02 PROCEDURE — 97535 SELF CARE MNGMENT TRAINING: CPT | Mod: GO

## 2021-12-02 PROCEDURE — 36415 COLL VENOUS BLD VENIPUNCTURE: CPT | Performed by: SPECIALIST

## 2021-12-02 PROCEDURE — 250N000013 HC RX MED GY IP 250 OP 250 PS 637: Performed by: INTERNAL MEDICINE

## 2021-12-02 PROCEDURE — 36415 COLL VENOUS BLD VENIPUNCTURE: CPT | Performed by: STUDENT IN AN ORGANIZED HEALTH CARE EDUCATION/TRAINING PROGRAM

## 2021-12-02 PROCEDURE — 99024 POSTOP FOLLOW-UP VISIT: CPT | Performed by: SPECIALIST

## 2021-12-02 PROCEDURE — 250N000013 HC RX MED GY IP 250 OP 250 PS 637: Performed by: STUDENT IN AN ORGANIZED HEALTH CARE EDUCATION/TRAINING PROGRAM

## 2021-12-02 PROCEDURE — 80053 COMPREHEN METABOLIC PANEL: CPT | Performed by: STUDENT IN AN ORGANIZED HEALTH CARE EDUCATION/TRAINING PROGRAM

## 2021-12-02 PROCEDURE — 97110 THERAPEUTIC EXERCISES: CPT | Mod: GO

## 2021-12-02 PROCEDURE — 97116 GAIT TRAINING THERAPY: CPT | Mod: GP

## 2021-12-02 PROCEDURE — 93005 ELECTROCARDIOGRAM TRACING: CPT

## 2021-12-02 PROCEDURE — 93010 ELECTROCARDIOGRAM REPORT: CPT | Performed by: INTERNAL MEDICINE

## 2021-12-02 PROCEDURE — 250N000011 HC RX IP 250 OP 636: Performed by: STUDENT IN AN ORGANIZED HEALTH CARE EDUCATION/TRAINING PROGRAM

## 2021-12-02 PROCEDURE — 83605 ASSAY OF LACTIC ACID: CPT | Performed by: SPECIALIST

## 2021-12-02 PROCEDURE — 88307 TISSUE EXAM BY PATHOLOGIST: CPT | Mod: 26 | Performed by: PATHOLOGY

## 2021-12-02 PROCEDURE — 84155 ASSAY OF PROTEIN SERUM: CPT | Performed by: SPECIALIST

## 2021-12-02 PROCEDURE — 120N000001 HC R&B MED SURG/OB

## 2021-12-02 PROCEDURE — 85027 COMPLETE CBC AUTOMATED: CPT | Performed by: STUDENT IN AN ORGANIZED HEALTH CARE EDUCATION/TRAINING PROGRAM

## 2021-12-02 PROCEDURE — 250N000011 HC RX IP 250 OP 636: Performed by: SPECIALIST

## 2021-12-02 PROCEDURE — 82140 ASSAY OF AMMONIA: CPT | Performed by: STUDENT IN AN ORGANIZED HEALTH CARE EDUCATION/TRAINING PROGRAM

## 2021-12-02 PROCEDURE — 97110 THERAPEUTIC EXERCISES: CPT | Mod: GP

## 2021-12-02 PROCEDURE — 258N000001 HC RX 258: Performed by: SPECIALIST

## 2021-12-02 PROCEDURE — 99232 SBSQ HOSP IP/OBS MODERATE 35: CPT | Mod: GC | Performed by: STUDENT IN AN ORGANIZED HEALTH CARE EDUCATION/TRAINING PROGRAM

## 2021-12-02 RX ORDER — METRONIDAZOLE 500 MG/1
500 TABLET ORAL 2 TIMES DAILY
Status: DISCONTINUED | OUTPATIENT
Start: 2021-12-02 | End: 2021-12-04

## 2021-12-02 RX ADMIN — ACETAMINOPHEN 975 MG: 325 TABLET ORAL at 14:16

## 2021-12-02 RX ADMIN — METRONIDAZOLE 500 MG: 500 TABLET ORAL at 21:32

## 2021-12-02 RX ADMIN — FAMOTIDINE 20 MG: 20 TABLET, FILM COATED ORAL at 08:32

## 2021-12-02 RX ADMIN — METRONIDAZOLE 500 MG: 500 INJECTION, SOLUTION INTRAVENOUS at 05:29

## 2021-12-02 RX ADMIN — DOCUSATE SODIUM 50 MG AND SENNOSIDES 8.6 MG 2 TABLET: 8.6; 5 TABLET, FILM COATED ORAL at 08:32

## 2021-12-02 RX ADMIN — FAMOTIDINE 20 MG: 20 TABLET, FILM COATED ORAL at 21:31

## 2021-12-02 RX ADMIN — ACETAMINOPHEN 975 MG: 325 TABLET ORAL at 05:25

## 2021-12-02 RX ADMIN — ACETAMINOPHEN 975 MG: 325 TABLET ORAL at 21:32

## 2021-12-02 RX ADMIN — GABAPENTIN 300 MG: 300 CAPSULE ORAL at 08:32

## 2021-12-02 RX ADMIN — GABAPENTIN 300 MG: 300 CAPSULE ORAL at 17:58

## 2021-12-02 RX ADMIN — DOCUSATE SODIUM 50 MG AND SENNOSIDES 8.6 MG 2 TABLET: 8.6; 5 TABLET, FILM COATED ORAL at 21:31

## 2021-12-02 RX ADMIN — Medication: at 14:20

## 2021-12-02 RX ADMIN — TAMSULOSIN HYDROCHLORIDE 0.4 MG: 0.4 CAPSULE ORAL at 08:32

## 2021-12-02 RX ADMIN — VENLAFAXINE HYDROCHLORIDE 37.5 MG: 37.5 CAPSULE, EXTENDED RELEASE ORAL at 08:32

## 2021-12-02 RX ADMIN — BUSPIRONE HYDROCHLORIDE 15 MG: 15 TABLET ORAL at 21:32

## 2021-12-02 RX ADMIN — LEVOFLOXACIN 500 MG: 5 INJECTION, SOLUTION INTRAVENOUS at 19:45

## 2021-12-02 RX ADMIN — GABAPENTIN 600 MG: 300 CAPSULE ORAL at 21:31

## 2021-12-02 RX ADMIN — MIRTAZAPINE 30 MG: 30 TABLET, FILM COATED ORAL at 21:31

## 2021-12-02 RX ADMIN — LEVOTHYROXINE SODIUM 25 MCG: 0.03 TABLET ORAL at 08:31

## 2021-12-02 RX ADMIN — POTASSIUM CHLORIDE: 2 INJECTION, SOLUTION, CONCENTRATE INTRAVENOUS at 05:14

## 2021-12-02 RX ADMIN — POTASSIUM CHLORIDE: 2 INJECTION, SOLUTION, CONCENTRATE INTRAVENOUS at 21:34

## 2021-12-02 RX ADMIN — POTASSIUM CHLORIDE: 2 INJECTION, SOLUTION, CONCENTRATE INTRAVENOUS at 13:42

## 2021-12-02 RX ADMIN — PANTOPRAZOLE SODIUM 40 MG: 20 TABLET, DELAYED RELEASE ORAL at 08:31

## 2021-12-02 RX ADMIN — BUSPIRONE HYDROCHLORIDE 15 MG: 15 TABLET ORAL at 08:33

## 2021-12-02 ASSESSMENT — ACTIVITIES OF DAILY LIVING (ADL)
ADLS_ACUITY_SCORE: 14

## 2021-12-02 NOTE — PLAN OF CARE
Afebrile. Vital signs stable and wdl.  O2 SATs wdl with Oxymask 4L.   Using IS up to 750.  On continuous pulse oximeter.     Problem: Bowel Motility Impaired (Surgery Nonspecified)  Goal: Effective Bowel Elimination  Outcome: Improving     Not audible bowel sounds overnight. Unable to pass gas yet.   NG to LIS with tan output. Patient has been taking ice chips, otherwise NPO.   MARISA leaking at the site, dressing changed. MARISA with 35 ml of bloody output.   Midline incision with old drainage, marked, unchanged.    Abdominal binder in place for comfort.     Problem: Pain (Surgery Nonspecified)  Goal: Acceptable Pain Control  Outcome: Improving  Intervention: Prevent or Manage Pain  Recent Flowsheet Documentation  Taken 12/2/2021 0525 by Ayana Brandt, RN  Pain Management Interventions:    medication (see MAR)    pain pump in use  Taken 12/1/2021 2340 by Ayana Brandt, RN  Pain Management Interventions: (PCA Dilaudid) pain pump in use    Pain better controlled with PCA Dilaudid on demand tonight.   Rating pain 6-8/10 overnight. Pain in midline incision.   Woke up with increased pain after sleeping for a few hours, encouraged to keep pressing PCA.     Roblero patent and draining good amount of urine output.     SCDs on.

## 2021-12-02 NOTE — PROGRESS NOTES
[facility] Progress Note    Assessment/Plan  Principal Problem:    SBO (small bowel obstruction) (H)  Active Problems:    Hypertension goal BP (blood pressure) < 140/90    Benign prostatic hyperplasia with weak urinary stream    RAVI (generalized anxiety disorder)    Moderate major depression (H)    History of substance abuse (H)    History of hepatitis C    Chronic neck pain    Chronic bilateral low back pain without sciatica    Gurdeep Dia is a 66 year old male with history of splenectomy, appendectomy, HTN, and substance abuse who presented with 2 days of right sided abdominal pain, found to have a bowel obstruction with closed loop. He is admitted for surgical repair of obstruction.      Right sided abdominal pain  Mechanical SBO with closed loop s/p exploratory laparotomy, small bowel resection, repair of enterotomy, and extensive lysis of adhesions POD 2 (11/30/21)  CT abdomen/pelvis on admission 11/30 significant for closed loop mechanical small bowel obstruction. Does have history of multiple abdominal surgeries previously.  Labs significant for WBC count 11.8, otherwise CMP largely unremarkable. Lactic acid 0.7, redraw due to hypotension post-op was 2.1, likely due to recent procedure. Pt vitally stable, and afebrile.  -Surgery consulted performed exploratory laparotomy started as laparoscopic but transitioned to open due to extensive adhesions)  -Surgery guiding diet and pain management  -NPO, adv diet per surgery  -IV fluids NS 125ml/hr  -IV levaquin, flagyl (day 2, started 11/30)  -Dilaudid PCA for pain - changed to no basal rate, increased bolus rate to 0.2mg per opioid stewardship team recs  -Incentive spirometry to help prevent atelectasis   -Daily CBC and CMP  -Changed metronidazole to oral per pharmacy recs given severe shortage of IV form. Continues on IV levaquin    HTN  PTA lisinopril, furosemide being held in the setting of acute surgical care.  Will monitor, even with  pain BP not significantly elevated.  Will resume when hemodynamically stable.  Kidney function is stable at this time.      Irregular heart rate  Noticed on exam. No hx of arrhythmia per chart, though does have documented left anterior fasicular block.  -Screening EKG    Tremor  New on exam 12/2. Bilateral. Doesn't fit well with either essential tremor or parkinsonian. Almost resembles asterixis. LFTs and kidney function normal though. Will get ammonia level in case.  -Ammonia  -Monitor      Chronic Conditions:  Hypothyroidism- PTA levothyroxine  Depression/anxiety- PTA venlafaxine, mirtazepine, Buspar   Chronic pain- Hold mexolicam 7.5mg daily, resume PTA gabapentin when tolerating PO.  BPH- PTA flomax      Diet: NPO for Medical/Clinical Reasons Except for: Meds, Ice ChipsNPO  DVT Prophylaxis: Pneumatic Compression Devices  Roblero Catheter: PRESENT, indication: post surgial  Fluids: NS 125ml/hr  Central Lines: None  Code Status: Full Code    Subjective  Feeling better this morning though still in a significant amount of pain. Not bothered by NG.    Objective    Vital signs in last 24 hours Temp:  [97.7  F (36.5  C)-99.3  F (37.4  C)] 98.4  F (36.9  C)  Pulse:  [] 66  Resp:  [18-20] 18  BP: ()/(67-86) 106/67  SpO2:  [81 %-93 %] 93 %       Intake/Output last 3 shift I/O last 3 completed shifts:  In: 4242.42 [P.O.:120; I.V.:2122.42; IV Piggyback:2000]  Out: 1605 [Urine:1100; Emesis/NG output:450; Drains:55]    Intake/Output this shift:I/O this shift:  In: 67 [P.O.:60; I.V.:7]  Out: 325 [Urine:325]    Physical Exam  General appearance: uncomfortable appearing male, laying in bed, two officers at bedside   Head: Normocephalic, without obvious abnormality, atraumatic.  NG tube in place.    Throat: Slightly ry mucous membranes.  Lungs: clear to auscultation bilaterally  Heart: regular rate and rhythm, S1, S2 normal, no murmur, click, rub or gallop  Abdomen: Abdominal binding in place. Bandage over surgical site  C//D/I, there was a little blood on the pad but it has not progressed beyond marked area, wound drains with appropriate output.  Bowel sounds very quiet. Moderate TTP throughout.  Extremities: extremities normal, atraumatic, no cyanosis or edema  Skin: Slightly pale, dry skin.  Neurologic: Alert and oriented X 3, normal strength and tone  Psychiatric: Mildly anxious regarding status.      Pertinent Labs and Pertinent Radiology   Lab Results: personally reviewed.     Radiology Results: Personally reviewed image/s and impression/s    Precepted patient with Dr. Rosenstein Sam Renier, MD - PGY2  [facility]  P: [phone]

## 2021-12-02 NOTE — PROGRESS NOTES
ASSESSMENT:  1. SBO (small bowel obstruction) (H)    S/p resection small and extensive lysis of adhesions     Gurdeep Dia is a 66 year old male who is s/p explore lap and small bowel resection on POD 2    PLAN:  Better today  No flatus yet.   Increase activity and pulm toliet. Pt and ot ordered      SUBJECTIVE:   He is doing ok. Better       Patient Vitals for the past 24 hrs:   BP Temp Temp src Pulse Resp SpO2   12/02/21 1112 106/67 98.4  F (36.9  C) Oral 66 18 93 %   12/02/21 0729 110/69 98.1  F (36.7  C) Oral (!) 128 20 (!) 89 %   12/02/21 0306 130/86 97.7  F (36.5  C) Oral 104 20 92 %   12/01/21 2347 130/74 98.1  F (36.7  C) Oral 103 20 91 %   12/01/21 1925 109/69 99.3  F (37.4  C) Oral 97 20 (!) 88 %   12/01/21 1537 91/68 98.5  F (36.9  C) Oral 92 20 91 %   12/01/21 1400 -- -- -- -- -- 91 %   12/01/21 1355 -- -- -- -- -- (!) 81 %         PHYSICAL EXAM:  GEN: No acute distress, comfortable  LUNGS: CTA bilaterally  CV:RRR  ABD:distended, lorraine serous bloody, incision ok    Drains:   Output by Drain (mL) 11/30/21 0700 - 11/30/21 1459 11/30/21 1500 - 11/30/21 2259 11/30/21 2300 - 12/01/21 0659 12/01/21 0700 - 12/01/21 1459 12/01/21 1500 - 12/01/21 2259 12/01/21 2300 - 12/02/21 0659 12/02/21 0700 - 12/02/21 1224   Closed/Suction Drain 1 Right Abdomen Bulb 19 Kinyarwanda  120 20 20  35       EXT:No cyanosis, edema or obvious abnormalities    12/01 0700 - 12/02 0659  In: 4242.42 [P.O.:120; I.V.:2122.42]  Out: 1605 [Urine:1100; Drains:55]    Admission on 11/30/2021   Component Date Value     Hold Specimen 11/30/2021 JIC      Hold Specimen 11/30/2021 JIC      Hold Specimen 11/30/2021 JIC      Hold Specimen 11/30/2021 JIC      WBC Count 11/30/2021 11.8*     RBC Count 11/30/2021 5.51      Hemoglobin 11/30/2021 16.7      Hematocrit 11/30/2021 50.6      MCV 11/30/2021 92      MCH 11/30/2021 30.3      MCHC 11/30/2021 33.0      RDW 11/30/2021 14.1      Platelet Count 11/30/2021 418      Sodium 11/30/2021 144      Potassium  11/30/2021 4.3      Chloride 11/30/2021 109*     Carbon Dioxide (CO2) 11/30/2021 25      Anion Gap 11/30/2021 10      Urea Nitrogen 11/30/2021 22      Creatinine 11/30/2021 0.99      Calcium 11/30/2021 10.6*     Glucose 11/30/2021 120      Alkaline Phosphatase 11/30/2021 85      AST 11/30/2021 27      ALT 11/30/2021 24      Protein Total 11/30/2021 8.3*     Albumin 11/30/2021 4.5      Bilirubin Total 11/30/2021 0.4      GFR Estimate 11/30/2021 78      Lipase 11/30/2021 21      SARS CoV2 PCR 11/30/2021 Negative      Lactic Acid 11/30/2021 0.7      Lactic Acid 11/30/2021 2.1*     Sodium 11/30/2021 145      Potassium 11/30/2021 4.3      Chloride 11/30/2021 114*     Carbon Dioxide (CO2) 11/30/2021 22      Anion Gap 11/30/2021 9      Urea Nitrogen 11/30/2021 22      Creatinine 11/30/2021 1.06      Calcium 11/30/2021 8.5      Glucose 11/30/2021 159*     GFR Estimate 11/30/2021 72      Hemoglobin 11/30/2021 15.2      Sodium 12/01/2021 143      Potassium 12/01/2021 4.3      Chloride 12/01/2021 113*     Carbon Dioxide (CO2) 12/01/2021 21*     Anion Gap 12/01/2021 9      Urea Nitrogen 12/01/2021 20      Creatinine 12/01/2021 0.87      Calcium 12/01/2021 8.3*     Glucose 12/01/2021 149*     Alkaline Phosphatase 12/01/2021 43*     AST 12/01/2021 27      ALT 12/01/2021 25      Protein Total 12/01/2021 6.0      Albumin 12/01/2021 3.3*     Bilirubin Total 12/01/2021 0.5      GFR Estimate 12/01/2021 89      Lactic Acid 12/01/2021 2.4*     WBC Count 12/01/2021 9.8      RBC Count 12/01/2021 4.97      Hemoglobin 12/01/2021 14.9      Hematocrit 12/01/2021 46.8      MCV 12/01/2021 94      MCH 12/01/2021 30.0      MCHC 12/01/2021 31.8      RDW 12/01/2021 14.6      Platelet Count 12/01/2021 323      % Neutrophils 12/01/2021 79      % Lymphocytes 12/01/2021 9      % Monocytes 12/01/2021 9      % Eosinophils 12/01/2021 1      % Basophils 12/01/2021 1      % Immature Granulocytes 12/01/2021 1      NRBCs per 100 WBC 12/01/2021 0      Absolute  Neutrophils 12/01/2021 7.8      Absolute Lymphocytes 12/01/2021 0.9      Absolute Monocytes 12/01/2021 0.9      Absolute Eosinophils 12/01/2021 0.1      Absolute Basophils 12/01/2021 0.1      Absolute Immature Granul* 12/01/2021 0.1      Absolute NRBCs 12/01/2021 0.0      Lactic Acid 12/01/2021 2.6*     WBC Count 12/01/2021 9.1      RBC Count 12/01/2021 4.29*     Hemoglobin 12/01/2021 12.8*     Hematocrit 12/01/2021 40.4      MCV 12/01/2021 94      MCH 12/01/2021 29.8      MCHC 12/01/2021 31.7      RDW 12/01/2021 15.0      Platelet Count 12/01/2021 275      Sodium 12/01/2021 145      Potassium 12/01/2021 4.6      Chloride 12/01/2021 115*     Carbon Dioxide (CO2) 12/01/2021 25      Anion Gap 12/01/2021 5      Urea Nitrogen 12/01/2021 26*     Creatinine 12/01/2021 1.09      Calcium 12/01/2021 8.0*     Glucose 12/01/2021 107      Alkaline Phosphatase 12/01/2021 43*     AST 12/01/2021 26      ALT 12/01/2021 19      Protein Total 12/01/2021 5.5*     Albumin 12/01/2021 2.8*     Bilirubin Total 12/01/2021 0.5      GFR Estimate 12/01/2021 70      Lactic Acid 12/01/2021 1.5      Sodium 12/02/2021 145      Potassium 12/02/2021 4.2      Chloride 12/02/2021 112*     Carbon Dioxide (CO2) 12/02/2021 25      Anion Gap 12/02/2021 8      Urea Nitrogen 12/02/2021 27*     Creatinine 12/02/2021 1.04      Calcium 12/02/2021 8.2*     Glucose 12/02/2021 119      Alkaline Phosphatase 12/02/2021 43*     AST 12/02/2021 27      ALT 12/02/2021 21      Protein Total 12/02/2021 5.5*     Albumin 12/02/2021 2.6*     Bilirubin Total 12/02/2021 0.4      GFR Estimate 12/02/2021 74      WBC Count 12/02/2021 5.2      RBC Count 12/02/2021 4.15*     Hemoglobin 12/02/2021 12.4*     Hematocrit 12/02/2021 39.3*     MCV 12/02/2021 95      MCH 12/02/2021 29.9      MCHC 12/02/2021 31.6      RDW 12/02/2021 14.8      Platelet Count 12/02/2021 273      Sodium 12/02/2021 141      Potassium 12/02/2021 4.6      Chloride 12/02/2021 113*     Carbon Dioxide (CO2)  12/02/2021 20*     Anion Gap 12/02/2021 8      Urea Nitrogen 12/02/2021 25*     Creatinine 12/02/2021 1.08      Calcium 12/02/2021 8.1*     Glucose 12/02/2021 116      Alkaline Phosphatase 12/02/2021 53      AST 12/02/2021 33      ALT 12/02/2021 22      Protein Total 12/02/2021 6.2      Albumin 12/02/2021 2.7*     Bilirubin Total 12/02/2021 0.4      GFR Estimate 12/02/2021 71      Lactic Acid 12/02/2021 1.8           Mekhi Richardson MD

## 2021-12-02 NOTE — PLAN OF CARE
"Pt's bowel sounds not audible. He is not passing gas either. Activity strongly encouraged. Pt was able to sit up in the chair this morning and will be working with PT this afternoon.  PCA continuous dose was discontinued and the bolus dose was increased this afternoon. Pt states he feels like his pain has overall improved today compared to yesterday.  Pt having increased pain with movement/activity and when this occurs he does not take in deep breaths. Pt's O2 sats drop into the low 80's at this time. After taking in deep breaths, pt's O2 sats rebound above 90%. Pt also having intermittent tachycardia. Dr. Mcmahon updated on this as well as the intermittent low O2 sats. An EKG was ordered for this afternoon. Preliminary results sow \"sinus tachycardia with premature ventricular complexes,\" and non-specific T wave abnormality.  IS use encouraged.  Lactic acid level was 1.8.  Roblero catheter removed at noon. No void as of yet.        "

## 2021-12-02 NOTE — PLAN OF CARE
Problem: Pain Acute  Goal: Acceptable Pain Control and Functional Ability  Outcome: Improving  Intervention: Develop Pain Management Plan  Recent Flowsheet Documentation  Taken 12/1/2021 1700 by Kanwal Butts RN  Pain Management Interventions: pain pump in use     Problem: Hypertension Comorbidity  Goal: Blood Pressure in Desired Range  Outcome: Improving   Patient rated pain 7/10, had scheduled pain meds, was also encouraged to use the PCA pump, states pain is better, remains on 02 @4l, sats 92-97%, sats dips down to 88% when not taking deep breaths, patient encouraged to do so, MARISA drain, NG tube and giron intact and patent, loop drain dressing has no new drainage, lactic acid 1.5, Bp 109/69, other vitals stable, alert and oriented.

## 2021-12-03 ENCOUNTER — ANESTHESIA EVENT (OUTPATIENT)
Dept: SURGERY | Facility: HOSPITAL | Age: 66
End: 2021-12-03

## 2021-12-03 ENCOUNTER — APPOINTMENT (OUTPATIENT)
Dept: RADIOLOGY | Facility: HOSPITAL | Age: 66
DRG: 329 | End: 2021-12-03
Attending: SPECIALIST
Payer: MEDICARE

## 2021-12-03 ENCOUNTER — ANESTHESIA (OUTPATIENT)
Dept: SURGERY | Facility: HOSPITAL | Age: 66
End: 2021-12-03
Payer: COMMERCIAL

## 2021-12-03 ENCOUNTER — APPOINTMENT (OUTPATIENT)
Dept: PHYSICAL THERAPY | Facility: HOSPITAL | Age: 66
DRG: 329 | End: 2021-12-03
Payer: MEDICARE

## 2021-12-03 LAB
ALBUMIN SERPL-MCNC: 2.4 G/DL (ref 3.5–5)
ALP SERPL-CCNC: 46 U/L (ref 45–120)
ALT SERPL W P-5'-P-CCNC: 21 U/L (ref 0–45)
ANION GAP SERPL CALCULATED.3IONS-SCNC: 7 MMOL/L (ref 5–18)
ANION GAP SERPL CALCULATED.3IONS-SCNC: 8 MMOL/L (ref 5–18)
AST SERPL W P-5'-P-CCNC: 23 U/L (ref 0–40)
BILIRUB SERPL-MCNC: 0.5 MG/DL (ref 0–1)
BUN SERPL-MCNC: 14 MG/DL (ref 8–22)
BUN SERPL-MCNC: 17 MG/DL (ref 8–22)
CALCIUM SERPL-MCNC: 8.3 MG/DL (ref 8.5–10.5)
CALCIUM SERPL-MCNC: 8.6 MG/DL (ref 8.5–10.5)
CALCIUM, IONIZED MEASURED: 1.11 MMOL/L (ref 1.11–1.3)
CHLORIDE BLD-SCNC: 108 MMOL/L (ref 98–107)
CHLORIDE BLD-SCNC: 110 MMOL/L (ref 98–107)
CO2 SERPL-SCNC: 24 MMOL/L (ref 22–31)
CO2 SERPL-SCNC: 26 MMOL/L (ref 22–31)
CREAT SERPL-MCNC: 0.79 MG/DL (ref 0.7–1.3)
CREAT SERPL-MCNC: 0.88 MG/DL (ref 0.7–1.3)
ERYTHROCYTE [DISTWIDTH] IN BLOOD BY AUTOMATED COUNT: 14.6 % (ref 10–15)
ERYTHROCYTE [DISTWIDTH] IN BLOOD BY AUTOMATED COUNT: 14.7 % (ref 10–15)
FIBRINOGEN PPP-MCNC: 209 MG/DL (ref 170–490)
GFR SERPL CREATININE-BSD FRML MDRD: 89 ML/MIN/1.73M2
GFR SERPL CREATININE-BSD FRML MDRD: >90 ML/MIN/1.73M2
GLUCOSE BLD-MCNC: 113 MG/DL (ref 70–125)
GLUCOSE BLD-MCNC: 124 MG/DL (ref 70–125)
HCT VFR BLD AUTO: 33.9 % (ref 40–53)
HCT VFR BLD AUTO: 37.7 % (ref 40–53)
HGB BLD-MCNC: 10.7 G/DL (ref 13.3–17.7)
HGB BLD-MCNC: 12.2 G/DL (ref 13.3–17.7)
ION CA PH 7.4: 1.06 MMOL/L (ref 1.11–1.3)
MAGNESIUM SERPL-MCNC: 1.8 MG/DL (ref 1.8–2.6)
MAGNESIUM SERPL-MCNC: 1.9 MG/DL (ref 1.8–2.6)
MCH RBC QN AUTO: 29.4 PG (ref 26.5–33)
MCH RBC QN AUTO: 30.5 PG (ref 26.5–33)
MCHC RBC AUTO-ENTMCNC: 31.6 G/DL (ref 31.5–36.5)
MCHC RBC AUTO-ENTMCNC: 32.4 G/DL (ref 31.5–36.5)
MCV RBC AUTO: 93 FL (ref 78–100)
MCV RBC AUTO: 94 FL (ref 78–100)
PH: 7.32 (ref 7.35–7.45)
PHOSPHATE SERPL-MCNC: 1.3 MG/DL (ref 2.5–4.5)
PLATELET # BLD AUTO: 251 10E3/UL (ref 150–450)
PLATELET # BLD AUTO: 266 10E3/UL (ref 150–450)
POTASSIUM BLD-SCNC: 3.7 MMOL/L (ref 3.5–5)
POTASSIUM BLD-SCNC: 3.7 MMOL/L (ref 3.5–5)
POTASSIUM BLD-SCNC: 3.9 MMOL/L (ref 3.5–5)
PROT SERPL-MCNC: 5.7 G/DL (ref 6–8)
RBC # BLD AUTO: 3.64 10E6/UL (ref 4.4–5.9)
RBC # BLD AUTO: 4 10E6/UL (ref 4.4–5.9)
SODIUM SERPL-SCNC: 141 MMOL/L (ref 136–145)
SODIUM SERPL-SCNC: 142 MMOL/L (ref 136–145)
WBC # BLD AUTO: 4.7 10E3/UL (ref 4–11)
WBC # BLD AUTO: 5.7 10E3/UL (ref 4–11)

## 2021-12-03 PROCEDURE — 250N000011 HC RX IP 250 OP 636: Performed by: SPECIALIST

## 2021-12-03 PROCEDURE — 258N000003 HC RX IP 258 OP 636: Performed by: INTERNAL MEDICINE

## 2021-12-03 PROCEDURE — 360N000076 HC SURGERY LEVEL 3, PER MIN: Performed by: SPECIALIST

## 2021-12-03 PROCEDURE — 250N000009 HC RX 250: Performed by: NURSE ANESTHETIST, CERTIFIED REGISTERED

## 2021-12-03 PROCEDURE — 36415 COLL VENOUS BLD VENIPUNCTURE: CPT | Performed by: STUDENT IN AN ORGANIZED HEALTH CARE EDUCATION/TRAINING PROGRAM

## 2021-12-03 PROCEDURE — 99232 SBSQ HOSP IP/OBS MODERATE 35: CPT | Mod: GC | Performed by: STUDENT IN AN ORGANIZED HEALTH CARE EDUCATION/TRAINING PROGRAM

## 2021-12-03 PROCEDURE — 250N000011 HC RX IP 250 OP 636: Performed by: STUDENT IN AN ORGANIZED HEALTH CARE EDUCATION/TRAINING PROGRAM

## 2021-12-03 PROCEDURE — 250N000011 HC RX IP 250 OP 636: Performed by: NURSE ANESTHETIST, CERTIFIED REGISTERED

## 2021-12-03 PROCEDURE — 83735 ASSAY OF MAGNESIUM: CPT | Performed by: INTERNAL MEDICINE

## 2021-12-03 PROCEDURE — 250N000013 HC RX MED GY IP 250 OP 250 PS 637: Performed by: INTERNAL MEDICINE

## 2021-12-03 PROCEDURE — 36569 INSJ PICC 5 YR+ W/O IMAGING: CPT

## 2021-12-03 PROCEDURE — 84100 ASSAY OF PHOSPHORUS: CPT | Performed by: STUDENT IN AN ORGANIZED HEALTH CARE EDUCATION/TRAINING PROGRAM

## 2021-12-03 PROCEDURE — 0DQ80ZZ REPAIR SMALL INTESTINE, OPEN APPROACH: ICD-10-PCS | Performed by: SPECIALIST

## 2021-12-03 PROCEDURE — 93005 ELECTROCARDIOGRAM TRACING: CPT

## 2021-12-03 PROCEDURE — 272N000451 HC KIT SHRLOCK 5FR POWER PICC DOUBLE LUMEN

## 2021-12-03 PROCEDURE — 258N000001 HC RX 258: Performed by: INTERNAL MEDICINE

## 2021-12-03 PROCEDURE — P9047 ALBUMIN (HUMAN), 25%, 50ML: HCPCS | Performed by: INTERNAL MEDICINE

## 2021-12-03 PROCEDURE — 258N000001 HC RX 258: Performed by: SPECIALIST

## 2021-12-03 PROCEDURE — 999N000141 HC STATISTIC PRE-PROCEDURE NURSING ASSESSMENT: Performed by: SPECIALIST

## 2021-12-03 PROCEDURE — 250N000011 HC RX IP 250 OP 636: Performed by: INTERNAL MEDICINE

## 2021-12-03 PROCEDURE — P9041 ALBUMIN (HUMAN),5%, 50ML: HCPCS | Performed by: NURSE ANESTHETIST, CERTIFIED REGISTERED

## 2021-12-03 PROCEDURE — 82310 ASSAY OF CALCIUM: CPT | Performed by: INTERNAL MEDICINE

## 2021-12-03 PROCEDURE — 250N000013 HC RX MED GY IP 250 OP 250 PS 637: Performed by: SPECIALIST

## 2021-12-03 PROCEDURE — 85027 COMPLETE CBC AUTOMATED: CPT | Performed by: STUDENT IN AN ORGANIZED HEALTH CARE EDUCATION/TRAINING PROGRAM

## 2021-12-03 PROCEDURE — 83735 ASSAY OF MAGNESIUM: CPT | Performed by: STUDENT IN AN ORGANIZED HEALTH CARE EDUCATION/TRAINING PROGRAM

## 2021-12-03 PROCEDURE — 97110 THERAPEUTIC EXERCISES: CPT | Mod: GP

## 2021-12-03 PROCEDURE — 258N000003 HC RX IP 258 OP 636: Performed by: NURSE ANESTHETIST, CERTIFIED REGISTERED

## 2021-12-03 PROCEDURE — 85027 COMPLETE CBC AUTOMATED: CPT | Performed by: INTERNAL MEDICINE

## 2021-12-03 PROCEDURE — 250N000009 HC RX 250: Performed by: ANESTHESIOLOGY

## 2021-12-03 PROCEDURE — 999N000065 XR CHEST PORT 1 VIEW

## 2021-12-03 PROCEDURE — 85384 FIBRINOGEN ACTIVITY: CPT | Performed by: INTERNAL MEDICINE

## 2021-12-03 PROCEDURE — 36415 COLL VENOUS BLD VENIPUNCTURE: CPT | Performed by: INTERNAL MEDICINE

## 2021-12-03 PROCEDURE — 710N000009 HC RECOVERY PHASE 1, LEVEL 1, PER MIN: Performed by: SPECIALIST

## 2021-12-03 PROCEDURE — 250N000013 HC RX MED GY IP 250 OP 250 PS 637: Performed by: STUDENT IN AN ORGANIZED HEALTH CARE EDUCATION/TRAINING PROGRAM

## 2021-12-03 PROCEDURE — 97530 THERAPEUTIC ACTIVITIES: CPT | Mod: GP

## 2021-12-03 PROCEDURE — 250N000025 HC SEVOFLURANE, PER MIN: Performed by: SPECIALIST

## 2021-12-03 PROCEDURE — P9041 ALBUMIN (HUMAN),5%, 50ML: HCPCS

## 2021-12-03 PROCEDURE — 82330 ASSAY OF CALCIUM: CPT | Performed by: INTERNAL MEDICINE

## 2021-12-03 PROCEDURE — 99291 CRITICAL CARE FIRST HOUR: CPT | Performed by: INTERNAL MEDICINE

## 2021-12-03 PROCEDURE — 250N000009 HC RX 250: Performed by: INTERNAL MEDICINE

## 2021-12-03 PROCEDURE — 250N000011 HC RX IP 250 OP 636: Performed by: ANESTHESIOLOGY

## 2021-12-03 PROCEDURE — 370N000017 HC ANESTHESIA TECHNICAL FEE, PER MIN: Performed by: SPECIALIST

## 2021-12-03 PROCEDURE — 250N000011 HC RX IP 250 OP 636

## 2021-12-03 PROCEDURE — 44602 SUTURE SMALL INTESTINE: CPT | Mod: 58 | Performed by: SPECIALIST

## 2021-12-03 PROCEDURE — 84132 ASSAY OF SERUM POTASSIUM: CPT | Performed by: INTERNAL MEDICINE

## 2021-12-03 PROCEDURE — 272N000001 HC OR GENERAL SUPPLY STERILE: Performed by: SPECIALIST

## 2021-12-03 PROCEDURE — 250N000009 HC RX 250: Performed by: SPECIALIST

## 2021-12-03 PROCEDURE — 80053 COMPREHEN METABOLIC PANEL: CPT | Performed by: STUDENT IN AN ORGANIZED HEALTH CARE EDUCATION/TRAINING PROGRAM

## 2021-12-03 PROCEDURE — 93010 ELECTROCARDIOGRAM REPORT: CPT | Performed by: INTERNAL MEDICINE

## 2021-12-03 PROCEDURE — 200N000001 HC R&B ICU

## 2021-12-03 PROCEDURE — 258N000003 HC RX IP 258 OP 636: Performed by: ANESTHESIOLOGY

## 2021-12-03 RX ORDER — OXYCODONE HYDROCHLORIDE 5 MG/1
5 TABLET ORAL EVERY 4 HOURS PRN
Status: DISCONTINUED | OUTPATIENT
Start: 2021-12-03 | End: 2021-12-03 | Stop reason: HOSPADM

## 2021-12-03 RX ORDER — FENTANYL CITRATE 50 UG/ML
50 INJECTION, SOLUTION INTRAMUSCULAR; INTRAVENOUS
Status: DISCONTINUED | OUTPATIENT
Start: 2021-12-03 | End: 2021-12-03

## 2021-12-03 RX ORDER — LIDOCAINE HYDROCHLORIDE 20 MG/ML
INJECTION, SOLUTION INFILTRATION; PERINEURAL PRN
Status: DISCONTINUED | OUTPATIENT
Start: 2021-12-03 | End: 2021-12-03

## 2021-12-03 RX ORDER — MEROPENEM 1 G/1
1 INJECTION, POWDER, FOR SOLUTION INTRAVENOUS EVERY 8 HOURS
Status: DISCONTINUED | OUTPATIENT
Start: 2021-12-03 | End: 2021-12-12

## 2021-12-03 RX ORDER — KETAMINE HYDROCHLORIDE 50 MG/ML
INJECTION, SOLUTION INTRAMUSCULAR; INTRAVENOUS PRN
Status: DISCONTINUED | OUTPATIENT
Start: 2021-12-03 | End: 2021-12-03

## 2021-12-03 RX ORDER — CEFAZOLIN SODIUM 2 G/100ML
2 INJECTION, SOLUTION INTRAVENOUS
Status: DISCONTINUED | OUTPATIENT
Start: 2021-12-03 | End: 2021-12-03 | Stop reason: HOSPADM

## 2021-12-03 RX ORDER — CEFAZOLIN SODIUM 2 G/100ML
2 INJECTION, SOLUTION INTRAVENOUS SEE ADMIN INSTRUCTIONS
Status: DISCONTINUED | OUTPATIENT
Start: 2021-12-03 | End: 2021-12-03 | Stop reason: HOSPADM

## 2021-12-03 RX ORDER — PROPOFOL 10 MG/ML
INJECTION, EMULSION INTRAVENOUS PRN
Status: DISCONTINUED | OUTPATIENT
Start: 2021-12-03 | End: 2021-12-03

## 2021-12-03 RX ORDER — SODIUM CHLORIDE, SODIUM LACTATE, POTASSIUM CHLORIDE, CALCIUM CHLORIDE 600; 310; 30; 20 MG/100ML; MG/100ML; MG/100ML; MG/100ML
INJECTION, SOLUTION INTRAVENOUS CONTINUOUS
Status: DISCONTINUED | OUTPATIENT
Start: 2021-12-03 | End: 2021-12-03 | Stop reason: HOSPADM

## 2021-12-03 RX ORDER — METOPROLOL TARTRATE 1 MG/ML
5 INJECTION, SOLUTION INTRAVENOUS ONCE
Status: COMPLETED | OUTPATIENT
Start: 2021-12-03 | End: 2021-12-03

## 2021-12-03 RX ORDER — OLANZAPINE 5 MG/1
5 TABLET, ORALLY DISINTEGRATING ORAL 4 TIMES DAILY PRN
Status: DISCONTINUED | OUTPATIENT
Start: 2021-12-03 | End: 2021-12-21 | Stop reason: HOSPADM

## 2021-12-03 RX ORDER — FENTANYL CITRATE 50 UG/ML
25 INJECTION, SOLUTION INTRAMUSCULAR; INTRAVENOUS EVERY 5 MIN PRN
Status: DISCONTINUED | OUTPATIENT
Start: 2021-12-03 | End: 2021-12-03 | Stop reason: HOSPADM

## 2021-12-03 RX ORDER — METOPROLOL TARTRATE 1 MG/ML
2.5 INJECTION, SOLUTION INTRAVENOUS EVERY 5 MIN PRN
Status: COMPLETED | OUTPATIENT
Start: 2021-12-03 | End: 2021-12-03

## 2021-12-03 RX ORDER — FENTANYL CITRATE 50 UG/ML
INJECTION, SOLUTION INTRAMUSCULAR; INTRAVENOUS PRN
Status: DISCONTINUED | OUTPATIENT
Start: 2021-12-03 | End: 2021-12-03

## 2021-12-03 RX ORDER — CALCIUM CHLORIDE 100 MG/ML
1 INJECTION INTRAVENOUS; INTRAVENTRICULAR ONCE
Status: COMPLETED | OUTPATIENT
Start: 2021-12-03 | End: 2021-12-03

## 2021-12-03 RX ORDER — POTASSIUM CHLORIDE 29.8 MG/ML
20 INJECTION INTRAVENOUS ONCE
Status: COMPLETED | OUTPATIENT
Start: 2021-12-03 | End: 2021-12-03

## 2021-12-03 RX ORDER — HYDROMORPHONE HCL IN WATER/PF 6 MG/30 ML
0.2 PATIENT CONTROLLED ANALGESIA SYRINGE INTRAVENOUS EVERY 5 MIN PRN
Status: DISCONTINUED | OUTPATIENT
Start: 2021-12-03 | End: 2021-12-03 | Stop reason: HOSPADM

## 2021-12-03 RX ORDER — ONDANSETRON 4 MG/1
4 TABLET, ORALLY DISINTEGRATING ORAL EVERY 30 MIN PRN
Status: DISCONTINUED | OUTPATIENT
Start: 2021-12-03 | End: 2021-12-03 | Stop reason: HOSPADM

## 2021-12-03 RX ORDER — DEXMEDETOMIDINE HYDROCHLORIDE 4 UG/ML
.2-1.5 INJECTION, SOLUTION INTRAVENOUS CONTINUOUS
Status: DISCONTINUED | OUTPATIENT
Start: 2021-12-03 | End: 2021-12-07

## 2021-12-03 RX ORDER — ALBUMIN, HUMAN INJ 5% 5 %
SOLUTION INTRAVENOUS CONTINUOUS PRN
Status: DISCONTINUED | OUTPATIENT
Start: 2021-12-03 | End: 2021-12-03

## 2021-12-03 RX ORDER — MAGNESIUM HYDROXIDE 1200 MG/15ML
LIQUID ORAL PRN
Status: DISCONTINUED | OUTPATIENT
Start: 2021-12-03 | End: 2021-12-03 | Stop reason: HOSPADM

## 2021-12-03 RX ORDER — POTASSIUM CHLORIDE 7.45 MG/ML
10 INJECTION INTRAVENOUS ONCE
Status: COMPLETED | OUTPATIENT
Start: 2021-12-03 | End: 2021-12-04

## 2021-12-03 RX ORDER — ALBUMIN (HUMAN) 12.5 G/50ML
25 SOLUTION INTRAVENOUS
Status: COMPLETED | OUTPATIENT
Start: 2021-12-03 | End: 2021-12-03

## 2021-12-03 RX ORDER — HALOPERIDOL 5 MG/ML
1 INJECTION INTRAMUSCULAR
Status: DISCONTINUED | OUTPATIENT
Start: 2021-12-03 | End: 2021-12-03 | Stop reason: HOSPADM

## 2021-12-03 RX ORDER — MAGNESIUM SULFATE HEPTAHYDRATE 40 MG/ML
2 INJECTION, SOLUTION INTRAVENOUS ONCE
Status: COMPLETED | OUTPATIENT
Start: 2021-12-03 | End: 2021-12-03

## 2021-12-03 RX ORDER — FENTANYL CITRATE 50 UG/ML
INJECTION, SOLUTION INTRAMUSCULAR; INTRAVENOUS
Status: COMPLETED
Start: 2021-12-03 | End: 2021-12-03

## 2021-12-03 RX ORDER — SODIUM CHLORIDE, SODIUM LACTATE, POTASSIUM CHLORIDE, CALCIUM CHLORIDE 600; 310; 30; 20 MG/100ML; MG/100ML; MG/100ML; MG/100ML
INJECTION, SOLUTION INTRAVENOUS CONTINUOUS PRN
Status: DISCONTINUED | OUTPATIENT
Start: 2021-12-03 | End: 2021-12-03

## 2021-12-03 RX ORDER — ESMOLOL HYDROCHLORIDE 10 MG/ML
INJECTION INTRAVENOUS PRN
Status: DISCONTINUED | OUTPATIENT
Start: 2021-12-03 | End: 2021-12-03

## 2021-12-03 RX ORDER — OLANZAPINE 5 MG/1
5 TABLET, ORALLY DISINTEGRATING ORAL AT BEDTIME
Status: DISCONTINUED | OUTPATIENT
Start: 2021-12-03 | End: 2021-12-05

## 2021-12-03 RX ORDER — ALBUMIN, HUMAN INJ 5% 5 %
SOLUTION INTRAVENOUS
Status: DISCONTINUED
Start: 2021-12-03 | End: 2021-12-03 | Stop reason: HOSPADM

## 2021-12-03 RX ORDER — ONDANSETRON 2 MG/ML
4 INJECTION INTRAMUSCULAR; INTRAVENOUS EVERY 30 MIN PRN
Status: DISCONTINUED | OUTPATIENT
Start: 2021-12-03 | End: 2021-12-03 | Stop reason: HOSPADM

## 2021-12-03 RX ORDER — DEXTROSE MONOHYDRATE 100 MG/ML
INJECTION, SOLUTION INTRAVENOUS CONTINUOUS
Status: DISCONTINUED | OUTPATIENT
Start: 2021-12-03 | End: 2021-12-07

## 2021-12-03 RX ADMIN — VENLAFAXINE HYDROCHLORIDE 37.5 MG: 37.5 CAPSULE, EXTENDED RELEASE ORAL at 08:22

## 2021-12-03 RX ADMIN — POTASSIUM CHLORIDE 20 MEQ: 29.8 INJECTION, SOLUTION INTRAVENOUS at 22:57

## 2021-12-03 RX ADMIN — PHENYLEPHRINE HYDROCHLORIDE 100 MCG: 10 INJECTION INTRAVENOUS at 17:22

## 2021-12-03 RX ADMIN — PHENYLEPHRINE HYDROCHLORIDE 100 MCG: 10 INJECTION INTRAVENOUS at 17:23

## 2021-12-03 RX ADMIN — HYDROMORPHONE HYDROCHLORIDE 0.2 MG: 0.2 INJECTION, SOLUTION INTRAMUSCULAR; INTRAVENOUS; SUBCUTANEOUS at 20:58

## 2021-12-03 RX ADMIN — ONDANSETRON 4 MG: 2 INJECTION INTRAMUSCULAR; INTRAVENOUS at 18:03

## 2021-12-03 RX ADMIN — PANTOPRAZOLE SODIUM 40 MG: 20 TABLET, DELAYED RELEASE ORAL at 08:22

## 2021-12-03 RX ADMIN — ACETAMINOPHEN 975 MG: 325 TABLET ORAL at 04:33

## 2021-12-03 RX ADMIN — AMIODARONE HYDROCHLORIDE 1 MG/MIN: 50 INJECTION, SOLUTION INTRAVENOUS at 22:04

## 2021-12-03 RX ADMIN — MEROPENEM 1 G: 1 INJECTION, POWDER, FOR SOLUTION INTRAVENOUS at 22:42

## 2021-12-03 RX ADMIN — HYDROMORPHONE HYDROCHLORIDE 0.2 MG: 0.2 INJECTION, SOLUTION INTRAMUSCULAR; INTRAVENOUS; SUBCUTANEOUS at 20:06

## 2021-12-03 RX ADMIN — SUGAMMADEX 200 MG: 100 INJECTION, SOLUTION INTRAVENOUS at 18:51

## 2021-12-03 RX ADMIN — DOCUSATE SODIUM 50 MG AND SENNOSIDES 8.6 MG 2 TABLET: 8.6; 5 TABLET, FILM COATED ORAL at 08:22

## 2021-12-03 RX ADMIN — Medication 100 MG: at 17:13

## 2021-12-03 RX ADMIN — GABAPENTIN 300 MG: 300 CAPSULE ORAL at 08:22

## 2021-12-03 RX ADMIN — LIDOCAINE HYDROCHLORIDE 1 ML: 10 INJECTION, SOLUTION INFILTRATION; PERINEURAL at 20:10

## 2021-12-03 RX ADMIN — HYDROMORPHONE HYDROCHLORIDE 0.5 MG: 1 INJECTION, SOLUTION INTRAMUSCULAR; INTRAVENOUS; SUBCUTANEOUS at 17:42

## 2021-12-03 RX ADMIN — LIDOCAINE HYDROCHLORIDE 60 MG: 20 INJECTION, SOLUTION INFILTRATION; PERINEURAL at 17:13

## 2021-12-03 RX ADMIN — FENTANYL CITRATE 100 MCG: 50 INJECTION, SOLUTION INTRAMUSCULAR; INTRAVENOUS at 17:13

## 2021-12-03 RX ADMIN — OLANZAPINE 5 MG: 5 TABLET, ORALLY DISINTEGRATING ORAL at 21:42

## 2021-12-03 RX ADMIN — LEVOTHYROXINE SODIUM 25 MCG: 0.03 TABLET ORAL at 08:22

## 2021-12-03 RX ADMIN — POTASSIUM CHLORIDE 10 MEQ: 7.46 INJECTION, SOLUTION INTRAVENOUS at 23:47

## 2021-12-03 RX ADMIN — POTASSIUM CHLORIDE: 2 INJECTION, SOLUTION, CONCENTRATE INTRAVENOUS at 05:47

## 2021-12-03 RX ADMIN — KETAMINE HYDROCHLORIDE 25 MG: 50 INJECTION, SOLUTION INTRAMUSCULAR; INTRAVENOUS at 17:30

## 2021-12-03 RX ADMIN — ALBUMIN HUMAN: 0.05 INJECTION, SOLUTION INTRAVENOUS at 17:31

## 2021-12-03 RX ADMIN — AMIODARONE HYDROCHLORIDE 150 MG: 1.5 INJECTION, SOLUTION INTRAVENOUS at 21:47

## 2021-12-03 RX ADMIN — METOPROLOL TARTRATE 2.5 MG: 5 INJECTION INTRAVENOUS at 19:20

## 2021-12-03 RX ADMIN — METRONIDAZOLE 500 MG: 500 TABLET ORAL at 08:22

## 2021-12-03 RX ADMIN — TAMSULOSIN HYDROCHLORIDE 0.4 MG: 0.4 CAPSULE ORAL at 08:22

## 2021-12-03 RX ADMIN — FENTANYL CITRATE 50 MCG: 50 INJECTION, SOLUTION INTRAMUSCULAR; INTRAVENOUS at 19:35

## 2021-12-03 RX ADMIN — METOPROLOL TARTRATE 5 MG: 5 INJECTION INTRAVENOUS at 20:33

## 2021-12-03 RX ADMIN — METOPROLOL TARTRATE 2.5 MG: 5 INJECTION INTRAVENOUS at 19:30

## 2021-12-03 RX ADMIN — FENTANYL CITRATE 50 MCG: 50 INJECTION INTRAMUSCULAR; INTRAVENOUS at 19:30

## 2021-12-03 RX ADMIN — MIDAZOLAM HYDROCHLORIDE 2 MG: 1 INJECTION, SOLUTION INTRAMUSCULAR; INTRAVENOUS at 17:06

## 2021-12-03 RX ADMIN — DILTIAZEM HYDROCHLORIDE 5 MG/HR: 5 INJECTION INTRAVENOUS at 20:31

## 2021-12-03 RX ADMIN — Medication 1 UNITS: at 17:31

## 2021-12-03 RX ADMIN — KETAMINE HYDROCHLORIDE 25 MG: 50 INJECTION, SOLUTION INTRAMUSCULAR; INTRAVENOUS at 17:45

## 2021-12-03 RX ADMIN — ESMOLOL HYDROCHLORIDE 100 MG: 100 INJECTION, SOLUTION INTRAVENOUS at 19:22

## 2021-12-03 RX ADMIN — ROCURONIUM BROMIDE 50 MG: 50 INJECTION, SOLUTION INTRAVENOUS at 17:25

## 2021-12-03 RX ADMIN — FAMOTIDINE 20 MG: 20 TABLET, FILM COATED ORAL at 08:22

## 2021-12-03 RX ADMIN — PHENYLEPHRINE HYDROCHLORIDE 200 MCG: 10 INJECTION INTRAVENOUS at 17:26

## 2021-12-03 RX ADMIN — FAMOTIDINE 20 MG: 10 INJECTION, SOLUTION INTRAVENOUS at 22:14

## 2021-12-03 RX ADMIN — MAGNESIUM SULFATE HEPTAHYDRATE 2 G: 40 INJECTION, SOLUTION INTRAVENOUS at 20:48

## 2021-12-03 RX ADMIN — Medication 1 UNITS: at 17:30

## 2021-12-03 RX ADMIN — BUSPIRONE HYDROCHLORIDE 15 MG: 15 TABLET ORAL at 08:22

## 2021-12-03 RX ADMIN — HYDROMORPHONE HYDROCHLORIDE 0.5 MG: 1 INJECTION, SOLUTION INTRAMUSCULAR; INTRAVENOUS; SUBCUTANEOUS at 18:20

## 2021-12-03 RX ADMIN — Medication: at 13:42

## 2021-12-03 RX ADMIN — HYDROMORPHONE HYDROCHLORIDE 0.2 MG: 0.2 INJECTION, SOLUTION INTRAMUSCULAR; INTRAVENOUS; SUBCUTANEOUS at 20:05

## 2021-12-03 RX ADMIN — SODIUM CHLORIDE, POTASSIUM CHLORIDE, SODIUM LACTATE AND CALCIUM CHLORIDE: 600; 310; 30; 20 INJECTION, SOLUTION INTRAVENOUS at 17:06

## 2021-12-03 RX ADMIN — CALCIUM CHLORIDE 1 G: 100 INJECTION INTRAVENOUS; INTRAVENTRICULAR at 22:14

## 2021-12-03 RX ADMIN — PROPOFOL 120 MG: 10 INJECTION, EMULSION INTRAVENOUS at 17:13

## 2021-12-03 RX ADMIN — ACETAMINOPHEN 975 MG: 325 TABLET ORAL at 14:08

## 2021-12-03 RX ADMIN — ALBUMIN HUMAN 25 G: 0.25 SOLUTION INTRAVENOUS at 20:36

## 2021-12-03 RX ADMIN — DEXTROSE MONOHYDRATE: 100 INJECTION, SOLUTION INTRAVENOUS at 22:57

## 2021-12-03 ASSESSMENT — ACTIVITIES OF DAILY LIVING (ADL)
ADLS_ACUITY_SCORE: 18
ADLS_ACUITY_SCORE: 20
ADLS_ACUITY_SCORE: 14
ADLS_ACUITY_SCORE: 20
ADLS_ACUITY_SCORE: 14
ADLS_ACUITY_SCORE: 20
ADLS_ACUITY_SCORE: 20
ADLS_ACUITY_SCORE: 14
ADLS_ACUITY_SCORE: 20
ADLS_ACUITY_SCORE: 14
ADLS_ACUITY_SCORE: 14
ADLS_ACUITY_SCORE: 20
ADLS_ACUITY_SCORE: 20
ADLS_ACUITY_SCORE: 18
ADLS_ACUITY_SCORE: 14
ADLS_ACUITY_SCORE: 14
ADLS_ACUITY_SCORE: 20
ADLS_ACUITY_SCORE: 14
ADLS_ACUITY_SCORE: 18
ADLS_ACUITY_SCORE: 14
ADLS_ACUITY_SCORE: 14
ADLS_ACUITY_SCORE: 20

## 2021-12-03 ASSESSMENT — LIFESTYLE VARIABLES: TOBACCO_USE: 1

## 2021-12-03 NOTE — PLAN OF CARE
Patient continues to report severe levels of pain; keeps PCA remote close to himself; scheduled tylenol given. Patient did not sleep very much during the night. NG to int low suction; bowel sounds not audible. Patient continues to ask for soda despite education about diet and progression of bowels. Requires 6L oxygen via oxymask. Inconsistently using a urinal mixed with urine incontinence post giron removal 12/2.     Problem: Adult Inpatient Plan of Care  Goal: Optimal Comfort and Wellbeing  Outcome: No Change     Problem: Pain Acute  Goal: Acceptable Pain Control and Functional Ability  Outcome: No Change  Intervention: Develop Pain Management Plan  Recent Flowsheet Documentation  Taken 12/3/2021 0433 by Aye Carey RN  Pain Management Interventions: (scheduled tylenol; PCA remote within reach)   medication (see MAR)   distraction  Taken 12/3/2021 0000 by Aye Carey RN  Pain Management Interventions: (PCA pump)   distraction   emotional support   rest  Intervention: Prevent or Manage Pain  Recent Flowsheet Documentation  Taken 12/3/2021 0000 by Aye Carey RN  Medication Review/Management: medications reviewed

## 2021-12-03 NOTE — ANESTHESIA PROCEDURE NOTES
Airway       Patient location during procedure: OR       Procedure Start/Stop Times: 12/3/2021 5:14 PM  Staff -        CRNA: Terrie Marshall APRN CRNA       Performed By: CRNAIndications and Patient Condition       Indications for airway management: arlin-procedural        Final Airway Details       Final airway type: endotracheal airway       Successful airway: ETT - single  Endotracheal Airway Details        ETT size (mm): 7.5       Cuffed: yes       Successful intubation technique: video laryngoscopy       VL Blade Size: Glidescope 4       Grade View of Cords: 1       Adjucts: stylet       Position: Right       Measured from: gums/teeth       Secured at (cm): 22       Bite block used: Soft    Post intubation assessment        Placement verified by: capnometry, equal breath sounds, chest rise and x-ray        Number of attempts at approach: 1       Secured with: silk tape       Ease of procedure: easy       Dentition: Intact

## 2021-12-03 NOTE — PROGRESS NOTES
ASSESSMENT:  1. SBO (small bowel obstruction) (H)    explore lap resection of at least 2 feet of small bowel  awaiting bowel function return    Gurdeep Dia is a 66 year old male who is s/p explore lap lysis of adhesions on 11/30/21.      PLAN:  Continue cares  When stooling ng out and start clears  If not opened up by tomorrow would start tpn    SUBJECTIVE:   He is improving daily . Maybe some gas, no stool, still distended.      Patient Vitals for the past 24 hrs:   BP Temp Temp src Pulse Resp SpO2   12/03/21 1122 134/87 99.2  F (37.3  C) Oral 91 16 94 %   12/03/21 0729 122/82 98  F (36.7  C) Oral 97 16 92 %   12/03/21 0433 121/72 98.1  F (36.7  C) Oral 106 17 92 %   12/02/21 2301 128/61 98  F (36.7  C) Oral 115 16 92 %   12/02/21 2035 115/76 97  F (36.1  C) Oral 107 16 91 %   12/02/21 1623 101/70 97.3  F (36.3  C) Oral 108 16 92 %         PHYSICAL EXAM:  GEN: No acute distress, comfortable  LUNGS: CTA bilaterally  CV:RRR  ABD:soft, some erythema lower incision. Blue vessel loop in place. Drain, old blood lorraine  Drains:    Output by Drain (mL) 12/01/21 0700 - 12/01/21 1459 12/01/21 1500 - 12/01/21 2259 12/01/21 2300 - 12/02/21 0659 12/02/21 0700 - 12/02/21 1459 12/02/21 1500 - 12/02/21 2259 12/02/21 2300 - 12/03/21 0659 12/03/21 0700 - 12/03/21 1226   Closed/Suction Drain 1 Right Abdomen Bulb 19 Romanian 20  35   25       EXT:No cyanosis, edema or obvious abnormalities    12/02 0700 - 12/03 0659  In: 3638.08 [P.O.:300; I.V.:3338.08]  Out: 1525 [Urine:775; Drains:25]    Admission on 11/30/2021   Component Date Value     Hold Specimen 11/30/2021 JIC      Hold Specimen 11/30/2021 JIC      Hold Specimen 11/30/2021 JIC      Hold Specimen 11/30/2021 JIC      WBC Count 11/30/2021 11.8*     RBC Count 11/30/2021 5.51      Hemoglobin 11/30/2021 16.7      Hematocrit 11/30/2021 50.6      MCV 11/30/2021 92      MCH 11/30/2021 30.3      MCHC 11/30/2021 33.0      RDW 11/30/2021 14.1      Platelet Count 11/30/2021 418      Sodium  11/30/2021 144      Potassium 11/30/2021 4.3      Chloride 11/30/2021 109*     Carbon Dioxide (CO2) 11/30/2021 25      Anion Gap 11/30/2021 10      Urea Nitrogen 11/30/2021 22      Creatinine 11/30/2021 0.99      Calcium 11/30/2021 10.6*     Glucose 11/30/2021 120      Alkaline Phosphatase 11/30/2021 85      AST 11/30/2021 27      ALT 11/30/2021 24      Protein Total 11/30/2021 8.3*     Albumin 11/30/2021 4.5      Bilirubin Total 11/30/2021 0.4      GFR Estimate 11/30/2021 78      Lipase 11/30/2021 21      SARS CoV2 PCR 11/30/2021 Negative      Lactic Acid 11/30/2021 0.7      Case Report 11/30/2021                      Value:Surgical Pathology Report                         Case: [id number]                                  Authorizing Provider:  Mekhi Richardson MD      Collected:           11/30/2021 04:16 PM          Ordering Location:     [facility]      Received:            12/01/2021 07:57 AM                                 [facility]                                                               Pathologist:           Lucio Levine MD                                                      Specimen:    Small Intestine, SMALL BOWEL                                                                Final Diagnosis 11/30/2021                      Value:This result contains rich text formatting which cannot be displayed here.     Clinical Information 11/30/2021                      Value:This result contains rich text formatting which cannot be displayed here.     Gross Description 11/30/2021                      Value:This result contains rich text formatting which cannot be displayed here.     Microscopic Description 11/30/2021                      Value:This result contains rich text formatting which cannot be displayed here.     Performing Labs 11/30/2021                      Value:This result contains rich text formatting which cannot be displayed here.     Lactic Acid 11/30/2021 2.1*      Sodium 11/30/2021 145      Potassium 11/30/2021 4.3      Chloride 11/30/2021 114*     Carbon Dioxide (CO2) 11/30/2021 22      Anion Gap 11/30/2021 9      Urea Nitrogen 11/30/2021 22      Creatinine 11/30/2021 1.06      Calcium 11/30/2021 8.5      Glucose 11/30/2021 159*     GFR Estimate 11/30/2021 72      Hemoglobin 11/30/2021 15.2      Sodium 12/01/2021 143      Potassium 12/01/2021 4.3      Chloride 12/01/2021 113*     Carbon Dioxide (CO2) 12/01/2021 21*     Anion Gap 12/01/2021 9      Urea Nitrogen 12/01/2021 20      Creatinine 12/01/2021 0.87      Calcium 12/01/2021 8.3*     Glucose 12/01/2021 149*     Alkaline Phosphatase 12/01/2021 43*     AST 12/01/2021 27      ALT 12/01/2021 25      Protein Total 12/01/2021 6.0      Albumin 12/01/2021 3.3*     Bilirubin Total 12/01/2021 0.5      GFR Estimate 12/01/2021 89      Lactic Acid 12/01/2021 2.4*     WBC Count 12/01/2021 9.8      RBC Count 12/01/2021 4.97      Hemoglobin 12/01/2021 14.9      Hematocrit 12/01/2021 46.8      MCV 12/01/2021 94      MCH 12/01/2021 30.0      MCHC 12/01/2021 31.8      RDW 12/01/2021 14.6      Platelet Count 12/01/2021 323      % Neutrophils 12/01/2021 79      % Lymphocytes 12/01/2021 9      % Monocytes 12/01/2021 9      % Eosinophils 12/01/2021 1      % Basophils 12/01/2021 1      % Immature Granulocytes 12/01/2021 1      NRBCs per 100 WBC 12/01/2021 0      Absolute Neutrophils 12/01/2021 7.8      Absolute Lymphocytes 12/01/2021 0.9      Absolute Monocytes 12/01/2021 0.9      Absolute Eosinophils 12/01/2021 0.1      Absolute Basophils 12/01/2021 0.1      Absolute Immature Granul* 12/01/2021 0.1      Absolute NRBCs 12/01/2021 0.0      Lactic Acid 12/01/2021 2.6*     WBC Count 12/01/2021 9.1      RBC Count 12/01/2021 4.29*     Hemoglobin 12/01/2021 12.8*     Hematocrit 12/01/2021 40.4      MCV 12/01/2021 94      MCH 12/01/2021 29.8      MCHC 12/01/2021 31.7      RDW 12/01/2021 15.0      Platelet Count 12/01/2021 275      Sodium 12/01/2021  145      Potassium 12/01/2021 4.6      Chloride 12/01/2021 115*     Carbon Dioxide (CO2) 12/01/2021 25      Anion Gap 12/01/2021 5      Urea Nitrogen 12/01/2021 26*     Creatinine 12/01/2021 1.09      Calcium 12/01/2021 8.0*     Glucose 12/01/2021 107      Alkaline Phosphatase 12/01/2021 43*     AST 12/01/2021 26      ALT 12/01/2021 19      Protein Total 12/01/2021 5.5*     Albumin 12/01/2021 2.8*     Bilirubin Total 12/01/2021 0.5      GFR Estimate 12/01/2021 70      Lactic Acid 12/01/2021 1.5      Sodium 12/02/2021 145      Potassium 12/02/2021 4.2      Chloride 12/02/2021 112*     Carbon Dioxide (CO2) 12/02/2021 25      Anion Gap 12/02/2021 8      Urea Nitrogen 12/02/2021 27*     Creatinine 12/02/2021 1.04      Calcium 12/02/2021 8.2*     Glucose 12/02/2021 119      Alkaline Phosphatase 12/02/2021 43*     AST 12/02/2021 27      ALT 12/02/2021 21      Protein Total 12/02/2021 5.5*     Albumin 12/02/2021 2.6*     Bilirubin Total 12/02/2021 0.4      GFR Estimate 12/02/2021 74      WBC Count 12/02/2021 5.2      RBC Count 12/02/2021 4.15*     Hemoglobin 12/02/2021 12.4*     Hematocrit 12/02/2021 39.3*     MCV 12/02/2021 95      MCH 12/02/2021 29.9      MCHC 12/02/2021 31.6      RDW 12/02/2021 14.8      Platelet Count 12/02/2021 273      Sodium 12/02/2021 141      Potassium 12/02/2021 4.6      Chloride 12/02/2021 113*     Carbon Dioxide (CO2) 12/02/2021 20*     Anion Gap 12/02/2021 8      Urea Nitrogen 12/02/2021 25*     Creatinine 12/02/2021 1.08      Calcium 12/02/2021 8.1*     Glucose 12/02/2021 116      Alkaline Phosphatase 12/02/2021 53      AST 12/02/2021 33      ALT 12/02/2021 22      Protein Total 12/02/2021 6.2      Albumin 12/02/2021 2.7*     Bilirubin Total 12/02/2021 0.4      GFR Estimate 12/02/2021 71      Lactic Acid 12/02/2021 1.8      Ventricular Rate 12/02/2021 137      Atrial Rate 12/02/2021 137      TX Interval 12/02/2021 176      QRS Duration 12/02/2021 84      QT 12/02/2021 266      QTc 12/02/2021  401      P Axis 12/02/2021 -14      R AXIS 12/02/2021 -26      T Axis 12/02/2021 -37      Interpretation ECG 12/02/2021                      Value:Sinus tachycardia with Premature supraventricular complexes  Nonspecific T wave abnormality  Abnormal ECG  No previous ECGs available  Confirmed by DANDY YOST MD LOC: ([id number]) on 12/2/2021 2:55:51 PM       Ammonia 12/02/2021 27      Sodium 12/03/2021 142      Potassium 12/03/2021 3.9      Chloride 12/03/2021 110*     Carbon Dioxide (CO2) 12/03/2021 24      Anion Gap 12/03/2021 8      Urea Nitrogen 12/03/2021 17      Creatinine 12/03/2021 0.88      Calcium 12/03/2021 8.6      Glucose 12/03/2021 124      Alkaline Phosphatase 12/03/2021 46      AST 12/03/2021 23      ALT 12/03/2021 21      Protein Total 12/03/2021 5.7*     Albumin 12/03/2021 2.4*     Bilirubin Total 12/03/2021 0.5      GFR Estimate 12/03/2021 89      WBC Count 12/03/2021 4.7      RBC Count 12/03/2021 4.00*     Hemoglobin 12/03/2021 12.2*     Hematocrit 12/03/2021 37.7*     MCV 12/03/2021 94      MCH 12/03/2021 30.5      MCHC 12/03/2021 32.4      RDW 12/03/2021 14.6      Platelet Count 12/03/2021 266      Magnesium 12/03/2021 1.8      Phosphorus 12/03/2021 1.3*          Mekhi Richardson MD

## 2021-12-03 NOTE — PLAN OF CARE
O2 sat=92% on 6 L Oxymask. But, Patient's O2 sat drop to 86% on room air. Patient uses IS to 750 ml, walked in halls this shift and sat in chair.    BO=528. Patient denies chest pain, SOB and lightheadedness. HR increases when O2 sats drop. HR decreases quickly when O2 sats are wnl. H.O. notified and said to let him know if HR is sustained above 130.    Patient wants to drink pop or milk. He says he passed gas. But, BS are absent. I told him the MD in am will decide if they want to advance him to clear liquids.

## 2021-12-03 NOTE — PROGRESS NOTES
[facility] Progress Note    Assessment/Plan  Principal Problem:    SBO (small bowel obstruction) (H)  Active Problems:    Hypertension goal BP (blood pressure) < 140/90    Benign prostatic hyperplasia with weak urinary stream    RAVI (generalized anxiety disorder)    Moderate major depression (H)    History of substance abuse (H)    History of hepatitis C    Chronic neck pain    Chronic bilateral low back pain without sciatica    Gurdeep Dia is a 66 year old male with history of splenectomy, appendectomy, HTN, and substance abuse who presented with 2 days of right sided abdominal pain, found to have a bowel obstruction with closed loop. He is admitted for surgical repair of obstruction.      Right sided abdominal pain  Mechanical SBO with closed loop s/p exploratory laparotomy, small bowel resection, repair of enterotomy, and extensive lysis of adhesions POD 2 (11/30/21)  CT abdomen/pelvis on admission 11/30 significant for closed loop mechanical small bowel obstruction. Does have history of multiple abdominal surgeries previously.  Labs significant for WBC count 11.8, otherwise CMP largely unremarkable. Lactic acid 0.7, redraw due to hypotension post-op was 2.1, likely due to recent procedure. Pt vitally stable, and afebrile.  -Surgery consulted performed exploratory laparotomy started as laparoscopic but transitioned to open due to extensive adhesions)  -Surgery guiding diet and pain management  -NPO, adv diet per surgery  -IV fluids NS 125ml/hr  -IV levaquin, flagyl (day 2, started 11/30)  -Dilaudid PCA for pain - changed to no basal rate, increased bolus rate to 0.2mg per opioid stewardship team recs  -Incentive spirometry to help prevent atelectasis   -Daily CBC and CMP  -Changed metronidazole to oral per pharmacy recs given severe shortage of IV form. Continues on IV levaquin    HTN  PTA lisinopril, furosemide being held in the setting of acute surgical care.  Will monitor, even with  pain BP not significantly elevated.  Will resume when hemodynamically stable.  Kidney function is stable at this time.      Irregular heart rate  Noticed on exam. No hx of arrhythmia per chart, though does have documented left anterior fasicular block. EKG showing sinus tach with frequent PVCs. Electrolytes normal.  -Monitor  -Add Mag/Phos    Tremor - improved  New on exam 12/2. Bilateral. Doesn't fit well with either essential tremor or parkinsonian. Almost resembles asterixis. LFTs and kidney function normal though. Ammonia normal. Improved 12/3.  -Monitor      Chronic Conditions:  Hypothyroidism- PTA levothyroxine  Depression/anxiety- PTA venlafaxine, mirtazepine, Buspar   Chronic pain- Hold mexolicam 7.5mg daily, resume PTA gabapentin when tolerating PO.  BPH- PTA flomax      Diet: NPO for Medical/Clinical Reasons Except for: Meds, Ice ChipsNPO  DVT Prophylaxis: Pneumatic Compression Devices  Roblero Catheter: removed  Fluids: NS 125ml/hr  Central Lines: None  Code Status: Full Code    Subjective  Feelign better this morning. Still in pain though states it's improved. Wondering when he can drink water. Stated he should ask surgery.    Objective    Vital signs in last 24 hours Temp:  [97  F (36.1  C)-98.4  F (36.9  C)] 98  F (36.7  C)  Pulse:  [] 97  Resp:  [16-18] 16  BP: (101-128)/(61-82) 122/82  SpO2:  [91 %-93 %] 92 %       Intake/Output last 3 shift I/O last 3 completed shifts:  In: 3638.08 [P.O.:300; I.V.:3338.08]  Out: 1525 [Urine:775; Emesis/NG output:725; Drains:25]    Intake/Output this shift:No intake/output data recorded.    Physical Exam  General appearance: uncomfortable appearing male, laying in bed, two officers at bedside   Head: Normocephalic, without obvious abnormality, atraumatic.  NG tube in place.    Throat: Slightly ry mucous membranes.  Lungs: clear to auscultation bilaterally  Heart: regular rate and rhythm, S1, S2 normal, no murmur, click, rub or gallop  Abdomen: Abdominal binding  in place. Bandage over surgical site C//D/I, there was a little blood on the pad but it has not progressed beyond marked area, wound drains with appropriate output.  Bowel sounds very quiet. Moderate TTP throughout.  Extremities: extremities normal, atraumatic, no cyanosis or edema  Skin: Slightly pale, dry skin.  Neurologic: Alert and oriented X 3, normal strength and tone  Psychiatric: Mildly anxious regarding status.      Pertinent Labs and Pertinent Radiology   Lab Results: personally reviewed.     Radiology Results: Personally reviewed image/s and impression/s    Precepted patient with Dr. Piero Mcmahon MD - PGY2  [facility]  P: [phone]

## 2021-12-03 NOTE — ANESTHESIA PREPROCEDURE EVALUATION
Anesthesia Pre-Procedure Evaluation    Patient: Gurdeep Dia   MRN: [id number] : 1955        Preoperative Diagnosis: SBO (small bowel obstruction) (H) [K56.609]    Procedure : Procedure(s):  LAPAROSCOPY          Past Medical History:   Diagnosis Date     Benign prostatic hyperplasia with weak urinary stream      Chronic bilateral low back pain without sciatica      Chronic neck pain      RAVI (generalized anxiety disorder)      History of hepatitis C     treated and cured     History of substance abuse (H)      Hypertension goal BP (blood pressure) < 140/90      Moderate major depression (H)       Past Surgical History:   Procedure Laterality Date     C APPENDECTOMY  ~     LAPAROSCOPY DIAGNOSTIC (GYN) N/A 2021    Procedure: LAPAROSCOPY;  Surgeon: Mekhi Richardson MD;  Location: [facility] OR     LAPAROTOMY, LYSIS ADHESIONS, COMBINED N/A 2021    Procedure: REPAIR ENTEROTOMY, EXTENSIVE LYSIS OF ADHESION;  Surgeon: Mekhi Richardson MD;  Location: [facility] OR     RESECT SMALL BOWEL WITHOUT OSTOMY N/A 2021    Procedure: COVERTED TO OPEN, EXPLORATORY LAPAROTOMY, SMALL BOWEL RESECTION,;  Surgeon: Mekhi Richardson MD;  Location: [facility] OR     SPLENECTOMY  ~      Allergies   Allergen Reactions     Penicillins Hives      Social History     Tobacco Use     Smoking status: Current Every Day Smoker     Smokeless tobacco: Never Used   Substance Use Topics     Alcohol use: Not Currently     Comment: Clean for 5 years      Wt Readings from Last 1 Encounters:   21 79.4 kg (175 lb)        Anesthesia Evaluation   Pt has had prior anesthetic. Type: General.    No history of anesthetic complications       ROS/MED HX  ENT/Pulmonary:     (+) tobacco use, Current use,     Neurologic:  - neg neurologic ROS     Cardiovascular:     (+) hypertension----- (-) CAD   METS/Exercise Tolerance:     Hematologic:       Musculoskeletal: Comment: Hx of ACDF      GI/Hepatic: Comment: Acute  SBO s/p ex lap 11/30, now with peritonitis    (+) hepatitis type C, liver disease,     Renal/Genitourinary:  - neg Renal ROS     Endo:       Psychiatric/Substance Use: Comment: Currently incarcerated    (+) alcohol abuse : denies recent use due to incarceration.    Infectious Disease:       Malignancy:  - neg malignancy ROS     Other:      (+) , H/O Chronic Pain,        Physical Exam    Airway        Mallampati: III   TM distance: > 3 FB   Neck ROM: limited   Mouth opening: > 3 cm    Respiratory Devices and Support         Dental       (+) upper dentures and lower dentures      Cardiovascular   cardiovascular exam normal          Pulmonary   pulmonary exam normal                OUTSIDE LABS:  CBC:   Lab Results   Component Value Date    WBC 4.7 12/03/2021    WBC 5.2 12/02/2021    HGB 12.2 (L) 12/03/2021    HGB 12.4 (L) 12/02/2021    HCT 37.7 (L) 12/03/2021    HCT 39.3 (L) 12/02/2021     12/03/2021     12/02/2021     BMP:   Lab Results   Component Value Date     12/03/2021     12/02/2021    POTASSIUM 3.9 12/03/2021    POTASSIUM 4.6 12/02/2021    CHLORIDE 110 (H) 12/03/2021    CHLORIDE 113 (H) 12/02/2021    CO2 24 12/03/2021    CO2 20 (L) 12/02/2021    BUN 17 12/03/2021    BUN 25 (H) 12/02/2021    CR 0.88 12/03/2021    CR 1.08 12/02/2021     12/03/2021     12/02/2021     COAGS: No results found for: PTT, INR, FIBR  POC: No results found for: BGM, HCG, HCGS  HEPATIC:   Lab Results   Component Value Date    ALBUMIN 2.4 (L) 12/03/2021    PROTTOTAL 5.7 (L) 12/03/2021    ALT 21 12/03/2021    AST 23 12/03/2021    ALKPHOS 46 12/03/2021    BILITOTAL 0.5 12/03/2021    ANDRES 27 12/02/2021     OTHER:   Lab Results   Component Value Date    LACT 1.8 12/02/2021    VIJAYA 8.6 12/03/2021    PHOS 1.3 (L) 12/03/2021    MAG 1.8 12/03/2021    LIPASE 21 11/30/2021       Anesthesia Plan    ASA Status:  4, emergent    NPO Status:  ELEVATED Aspiration Risk/Unknown    Anesthesia Type: General.     -  Airway: ETT   Induction: Intravenous, RSI, Propofol.   Maintenance: Inhalation.   Techniques and Equipment:     - Airway: Video-Laryngoscope         Consents    Anesthesia Plan(s) and associated risks, benefits, and realistic alternatives discussed. Questions answered and patient/representative(s) expressed understanding.    - Discussed:     - Discussed with:  Patient         Postoperative Care    Pain management: IV analgesics, Multi-modal analgesia.   PONV prophylaxis: Ondansetron (or other 5HT-3), Dexamethasone or Solumedrol     Comments:    Other Comments: GETA - RSI w/ succinylcholine, cricoid pressure, NGT to suction + backup suction. Glidescope given ACDF. Pt advised on increased aspiration risk 2/2 his underlying pathology.   Decadron 10, Zofran 4  Ketamine 50mg post-induction                Mo Alcala MD

## 2021-12-04 ENCOUNTER — APPOINTMENT (OUTPATIENT)
Dept: PHYSICAL THERAPY | Facility: HOSPITAL | Age: 66
DRG: 329 | End: 2021-12-04
Payer: MEDICARE

## 2021-12-04 LAB
ANION GAP SERPL CALCULATED.3IONS-SCNC: 6 MMOL/L (ref 5–18)
BUN SERPL-MCNC: 13 MG/DL (ref 8–22)
CALCIUM SERPL-MCNC: 9 MG/DL (ref 8.5–10.5)
CALCIUM, IONIZED MEASURED: 1.13 MMOL/L (ref 1.11–1.3)
CALCIUM, IONIZED MEASURED: 1.17 MMOL/L (ref 1.11–1.3)
CHLORIDE BLD-SCNC: 109 MMOL/L (ref 98–107)
CO2 SERPL-SCNC: 28 MMOL/L (ref 22–31)
CREAT SERPL-MCNC: 0.85 MG/DL (ref 0.7–1.3)
ERYTHROCYTE [DISTWIDTH] IN BLOOD BY AUTOMATED COUNT: 14.9 % (ref 10–15)
GFR SERPL CREATININE-BSD FRML MDRD: 90 ML/MIN/1.73M2
GLUCOSE BLD-MCNC: 110 MG/DL (ref 70–125)
GLUCOSE BLDC GLUCOMTR-MCNC: 103 MG/DL (ref 70–99)
GLUCOSE BLDC GLUCOMTR-MCNC: 106 MG/DL (ref 70–99)
GLUCOSE BLDC GLUCOMTR-MCNC: 109 MG/DL (ref 70–99)
GLUCOSE BLDC GLUCOMTR-MCNC: 113 MG/DL (ref 70–99)
GLUCOSE BLDC GLUCOMTR-MCNC: 119 MG/DL (ref 70–99)
GLUCOSE BLDC GLUCOMTR-MCNC: 95 MG/DL (ref 70–99)
GLUCOSE BLDC GLUCOMTR-MCNC: 95 MG/DL (ref 70–99)
HCT VFR BLD AUTO: 34 % (ref 40–53)
HGB BLD-MCNC: 11 G/DL (ref 13.3–17.7)
ION CA PH 7.4: 1.12 MMOL/L (ref 1.11–1.3)
ION CA PH 7.4: 1.15 MMOL/L (ref 1.11–1.3)
MAGNESIUM SERPL-MCNC: 2 MG/DL (ref 1.8–2.6)
MCH RBC QN AUTO: 29.7 PG (ref 26.5–33)
MCHC RBC AUTO-ENTMCNC: 32.4 G/DL (ref 31.5–36.5)
MCV RBC AUTO: 92 FL (ref 78–100)
PH: 7.36 (ref 7.35–7.45)
PH: 7.37 (ref 7.35–7.45)
PHOSPHATE SERPL-MCNC: 1.4 MG/DL (ref 2.5–4.5)
PLATELET # BLD AUTO: 246 10E3/UL (ref 150–450)
POTASSIUM BLD-SCNC: 3.7 MMOL/L (ref 3.5–5)
RBC # BLD AUTO: 3.7 10E6/UL (ref 4.4–5.9)
SODIUM SERPL-SCNC: 143 MMOL/L (ref 136–145)
WBC # BLD AUTO: 5.6 10E3/UL (ref 4–11)

## 2021-12-04 PROCEDURE — 250N000011 HC RX IP 250 OP 636: Performed by: INTERNAL MEDICINE

## 2021-12-04 PROCEDURE — 99291 CRITICAL CARE FIRST HOUR: CPT | Performed by: INTERNAL MEDICINE

## 2021-12-04 PROCEDURE — 99231 SBSQ HOSP IP/OBS SF/LOW 25: CPT | Mod: GC | Performed by: STUDENT IN AN ORGANIZED HEALTH CARE EDUCATION/TRAINING PROGRAM

## 2021-12-04 PROCEDURE — C9113 INJ PANTOPRAZOLE SODIUM, VIA: HCPCS | Performed by: INTERNAL MEDICINE

## 2021-12-04 PROCEDURE — 83735 ASSAY OF MAGNESIUM: CPT | Performed by: INTERNAL MEDICINE

## 2021-12-04 PROCEDURE — 84100 ASSAY OF PHOSPHORUS: CPT | Performed by: INTERNAL MEDICINE

## 2021-12-04 PROCEDURE — 250N000011 HC RX IP 250 OP 636: Performed by: SPECIALIST

## 2021-12-04 PROCEDURE — 82330 ASSAY OF CALCIUM: CPT | Performed by: INTERNAL MEDICINE

## 2021-12-04 PROCEDURE — 85027 COMPLETE CBC AUTOMATED: CPT | Performed by: STUDENT IN AN ORGANIZED HEALTH CARE EDUCATION/TRAINING PROGRAM

## 2021-12-04 PROCEDURE — 200N000001 HC R&B ICU

## 2021-12-04 PROCEDURE — 250N000009 HC RX 250: Performed by: INTERNAL MEDICINE

## 2021-12-04 PROCEDURE — 99024 POSTOP FOLLOW-UP VISIT: CPT | Performed by: NURSE PRACTITIONER

## 2021-12-04 PROCEDURE — 97530 THERAPEUTIC ACTIVITIES: CPT | Mod: GP

## 2021-12-04 PROCEDURE — 999N000215 HC STATISTIC HFNC ADULT NON-CPAP

## 2021-12-04 PROCEDURE — 999N000157 HC STATISTIC RCP TIME EA 10 MIN

## 2021-12-04 PROCEDURE — 82310 ASSAY OF CALCIUM: CPT | Performed by: INTERNAL MEDICINE

## 2021-12-04 PROCEDURE — 250N000013 HC RX MED GY IP 250 OP 250 PS 637: Performed by: INTERNAL MEDICINE

## 2021-12-04 RX ORDER — FUROSEMIDE 10 MG/ML
40 INJECTION INTRAMUSCULAR; INTRAVENOUS EVERY 12 HOURS
Status: DISCONTINUED | OUTPATIENT
Start: 2021-12-04 | End: 2021-12-07

## 2021-12-04 RX ORDER — FUROSEMIDE 10 MG/ML
20 INJECTION INTRAMUSCULAR; INTRAVENOUS ONCE
Status: COMPLETED | OUTPATIENT
Start: 2021-12-04 | End: 2021-12-04

## 2021-12-04 RX ORDER — DILTIAZEM HYDROCHLORIDE 5 MG/ML
15 INJECTION INTRAVENOUS ONCE
Status: COMPLETED | OUTPATIENT
Start: 2021-12-04 | End: 2021-12-04

## 2021-12-04 RX ORDER — POTASSIUM CHLORIDE 29.8 MG/ML
20 INJECTION INTRAVENOUS ONCE
Status: COMPLETED | OUTPATIENT
Start: 2021-12-04 | End: 2021-12-04

## 2021-12-04 RX ORDER — MAGNESIUM SULFATE HEPTAHYDRATE 40 MG/ML
2 INJECTION, SOLUTION INTRAVENOUS ONCE
Status: COMPLETED | OUTPATIENT
Start: 2021-12-04 | End: 2021-12-04

## 2021-12-04 RX ORDER — POTASSIUM CHLORIDE 7.45 MG/ML
10 INJECTION INTRAVENOUS
Status: DISCONTINUED | OUTPATIENT
Start: 2021-12-04 | End: 2021-12-04

## 2021-12-04 RX ORDER — HALOPERIDOL 5 MG/ML
2 INJECTION INTRAMUSCULAR EVERY 6 HOURS PRN
Status: DISCONTINUED | OUTPATIENT
Start: 2021-12-04 | End: 2021-12-21 | Stop reason: HOSPADM

## 2021-12-04 RX ADMIN — DEXMEDETOMIDINE HYDROCHLORIDE 0.8 MCG/KG/HR: 400 INJECTION INTRAVENOUS at 19:49

## 2021-12-04 RX ADMIN — OLANZAPINE 5 MG: 5 TABLET, ORALLY DISINTEGRATING ORAL at 03:23

## 2021-12-04 RX ADMIN — MAGNESIUM SULFATE HEPTAHYDRATE 2 G: 40 INJECTION, SOLUTION INTRAVENOUS at 05:16

## 2021-12-04 RX ADMIN — PANTOPRAZOLE SODIUM 40 MG: 40 INJECTION, POWDER, FOR SOLUTION INTRAVENOUS at 10:29

## 2021-12-04 RX ADMIN — ENOXAPARIN SODIUM 40 MG: 40 INJECTION SUBCUTANEOUS at 10:30

## 2021-12-04 RX ADMIN — DEXMEDETOMIDINE HYDROCHLORIDE 0.2 MCG/KG/HR: 400 INJECTION INTRAVENOUS at 04:11

## 2021-12-04 RX ADMIN — MEROPENEM 1 G: 1 INJECTION, POWDER, FOR SOLUTION INTRAVENOUS at 21:06

## 2021-12-04 RX ADMIN — OLANZAPINE 5 MG: 5 TABLET, ORALLY DISINTEGRATING ORAL at 19:55

## 2021-12-04 RX ADMIN — DEXMEDETOMIDINE HYDROCHLORIDE 0.5 MCG/KG/HR: 400 INJECTION INTRAVENOUS at 12:44

## 2021-12-04 RX ADMIN — FUROSEMIDE 20 MG: 10 INJECTION, SOLUTION INTRAMUSCULAR; INTRAVENOUS at 00:36

## 2021-12-04 RX ADMIN — FUROSEMIDE 40 MG: 10 INJECTION, SOLUTION INTRAVENOUS at 10:29

## 2021-12-04 RX ADMIN — MEROPENEM 1 G: 1 INJECTION, POWDER, FOR SOLUTION INTRAVENOUS at 14:48

## 2021-12-04 RX ADMIN — POTASSIUM CHLORIDE 20 MEQ: 29.8 INJECTION, SOLUTION INTRAVENOUS at 06:20

## 2021-12-04 RX ADMIN — FUROSEMIDE 40 MG: 10 INJECTION, SOLUTION INTRAVENOUS at 21:08

## 2021-12-04 RX ADMIN — DILTIAZEM HYDROCHLORIDE 15 MG: 5 INJECTION INTRAVENOUS at 02:06

## 2021-12-04 RX ADMIN — HALOPERIDOL LACTATE 2 MG: 5 INJECTION, SOLUTION INTRAMUSCULAR at 10:29

## 2021-12-04 RX ADMIN — MEROPENEM 1 G: 1 INJECTION, POWDER, FOR SOLUTION INTRAVENOUS at 05:15

## 2021-12-04 RX ADMIN — FAMOTIDINE 20 MG: 10 INJECTION, SOLUTION INTRAVENOUS at 09:10

## 2021-12-04 RX ADMIN — OLANZAPINE 5 MG: 5 TABLET, ORALLY DISINTEGRATING ORAL at 22:27

## 2021-12-04 ASSESSMENT — ACTIVITIES OF DAILY LIVING (ADL)
ADLS_ACUITY_SCORE: 16

## 2021-12-04 NOTE — PROGRESS NOTES
CRITICAL CARE PROGRESS NOTE:    Assessment/Plan:  66 year old smoker, fully COVID vaccinated, with history of multiple prior abdominal surgeries, HTN, BPH, polysubstance abuse, chronic pain, admitted on 11/30/2021 w/ SBO, for which he underwent an ex lap with small bowel resection on 11/30.  He developed a small bowel perforation 12/02 and underwent an emergent ex lap with abdominal washout on 12/03.    RESP:  Acute post-op respiratory failure with hypoxemia. Possible aspiration, atelectasis from distended abdomen post-op. Volume overloaded net+ 6L so likely some pulmonary edema as well.    Cont HFNC and titrate FiO2 as tolerated for goal SpO2 94-96%, avoid hyperoxia    High risk for intubation if continues to be agitated and can't protect airway    Encourage OOB, PT/OT, push IS when able    CV:  Afib/RVR post-op, resolved after amio. No echo on file. +6L likely volume overloaded    Cont amio drip    MAP >65, not requiring pressors    Lasix 40mg IV bid to keep net negative 1-2L daily    NEURO:  Encephalopathy, delirium likely toxic/metabolic, due to acute illness.     Cont precedex    Haldol IV prn (QTc OK)    Tylenol prn pain    Dilaudid pump per surgery    Cautious use of narcotics    Avoid benzo's if able    GI:  SBO, bowel perf s/p ex-lap and washout with general surgery on 12/3    Strict NPO until cleared by surgery    Bowel regimen prn    Cont IV PPI (home med)    RENAL:  No issues, normal renal function    Maintain giron    Avoid nephrotoxins    ID:  possible intra-abdominal infection w/ sepsis    Stop levaquin + flagyl    Cont IV meropenem    Follow up culture data    HEMATOLOGIC:  Stable anemia    hgb >7    Follow counts    ENDOCRINE:  No issues    FSBG checks, insulin sliding scale/drip per ICU protocol    ICU PROPHYLAXIS:    Add LMWH daily today    PPI (home med)    CODE STATUS, DISPOSITION, FAMILY COMMUNICATION: full code  No emergency contacts listed    Lines/Drains/Tubes:  PIV x2  PCC 12/3  NG  "tube  Several Abdominal drains per surgery  giron    Restraints  Progress Note  Restraint Application    I recognize that restraints are physical and/or chemical interventions intended to restrict a person's movements. Restraints are currently needed to ensure the safety of this patient and/or others. My clinical rationale appears below.    Category/Type of Restraint     Non Violent:  Soft limb restraint x2  --  Behavior  Pulling at tubes/lines  --  Root Cause of the Behavior  Sedation/intubation  --  Less-Restrictive Measures that Failed  Non Violent Measures:  Close Observation  --  Response to the Restraint  Patient unable to pull at tubes/lines  --  Criteria for Release from the Restraint  Patient calm and off sedation    Terell (Kwame) MD Oz  [facility]  Pager [phone]  Clinic [phone]  Fax [phone]     Overnight events:  See admission H&P/initial consult note    Subjective:  Agitated this morning, reports abdominal pain.  Required precedex, restrains.  HFNC 0.55, 40Lpm SpO2 low to mid 90s      Objective:  Physical Exam:  Vent settings for last 24 hours:  FiO2 (%): 55 %  Resp: (!) 46      /87 (BP Location: Left arm)   Pulse 91   Temp 99.5  F (37.5  C) (Oral)   Resp (!) 46   Ht 1.656 m (5' 5.2\")   Wt 79.4 kg (175 lb)   SpO2 91%   BMI 28.94 kg/m      Intake/Output last 3 shifts:  I/O last 3 completed shifts:  In: 3940.94 [I.V.:3690.94]  Out: 3335 [Urine:2775; Emesis/NG output:450; Drains:110]  Intake/Output this shift:  I/O this shift:  In: 57.3 [I.V.:57.3]  Out: -     Physical Exam  Gen: awake, agitated, delirious, oriented, no distress  HEENT: no OP lesions, no CLAY  CV: RRR, no m/g/r  Resp: diminished ant no w/r/r   Abd: soft, nontender, BS+  Neuro: PERRL, nonfocal  Ext: no edema    LAB:  Recent Labs   Lab 12/04/21  0345   WBC 5.6   HGB 11.0*   HCT 34.0*        Recent Labs   Lab 12/04/21  0345 12/03/21 2021 12/03/21  0727 " 12/02/21  1047 12/02/21  0750    141 142 141 145   CO2 28 26 24 20* 25   BUN 13 14 17 25* 27*   ALKPHOS  --   --  46 53 43*   ALT  --   --  21 22 21   AST  --   --  23 33 27       No current outpatient medications on file.       Critical care attestation: 45 minutes spent managing the following issues: acute respiratory failure requiring HHFNC, delirium, toxic/metabolic encephalopathy requiring continuous precedex. High risk for organ deterioration and death requiring ICU level care.

## 2021-12-04 NOTE — PROCEDURES
"PICC Line Insertion Procedure Note    Pt. Name: Gurdeep Dia  MRN:        [id number]    Procedure: Insertion of a  DUAL Lumen  5 fr  Bard SOLO (valved) Power PICC, Lot number [id number]    Indications: Parenteral Nutrition; Medication Drips    Contraindications : None    Procedure Details   Patient identified with 2 identifiers and \"Time Out\" conducted.  .     Central line insertion bundle followed: hand hygiene performed prior to procedure, site cleansed with cholraprep, hat, mask, sterile gloves,sterile gown worn, patient draped with maximum barrier head to toe drape, sterile field maintained.    The vein was assessed and found to be compressible and of adequate size. 1 ml 1% Lidocaine administered sq to the insertion site. A 5 Fr PICC was inserted into the BASILIC vein of the right arm with ultrasound guidance. ONE attempt(s) required to access vein.   Catheter threaded without difficulty. Good blood return noted.    Modified Seldinger Technique used for insertion.    The 8 sharps that are included in the PICC insertion kit were accounted for and disposed of in the sharps container prior to breakdown of the sterile field.    Catheter secured with Statlock, biopatch and Tegaderm dressing applied.    Findings:  Total catheter length  41 cm, with 0 cm exposed. Mid upper arm circumference is 31 cm. Catheter was flushed with 30 cc NS. Patient  tolerated procedure well.    Tip placement verified by xray. Xray read by Dr. Villalba. Tip placement: \"Right PICC line present with its tip likely just into the right atrium. Suggest withdrawing the PICC line 2 cm to place the tip in the low SVC.    CLABSI prevention brochure left at bedside.    Patient's primary RN notified PICC is ready for use.    Comments:      Pulled out 2 cm per Radiologist recommendation.       Nolberto Lewis, RN, MSN, VA-BC  Vascular Access - [facility]        "

## 2021-12-04 NOTE — PROGRESS NOTES
0838 Remains on HFNC 40 lpm/ 50 %. BS diminished in bases. Clear, RR 18, SpO2 94 %. I/O up 7.0 L per MD, plan is to diurese patient. RT to monitor, titrate O2 as tolerated.

## 2021-12-04 NOTE — PROGRESS NOTES
[facility] Progress Note    Assessment/Plan  Principal Problem:    SBO (small bowel obstruction) (H)  Active Problems:    Hypertension goal BP (blood pressure) < 140/90    Benign prostatic hyperplasia with weak urinary stream    RAVI (generalized anxiety disorder)    Moderate major depression (H)    History of substance abuse (H)    History of hepatitis C    Chronic neck pain    Chronic bilateral low back pain without sciatica    Gurdeep Dia is a 66 year old male with history of splenectomy, appendectomy, HTN, and substance abuse who presented with 2 days of right sided abdominal pain, found to have a bowel obstruction with closed loop. He is admitted for surgical repair of obstruction.      Requires ongoing hospitalization for post-operative monitoring and care   Requires ICU-level care currently, followed by [facility] while in unit.     Mechanical SBO s/p exploratory laparotomy / small bowel resection / repair of enterotomy / extensive lysis of adhesions (11/30/21)  Subsequent small bowel perforation requiring repeat ex lap for washout 12/3    CT abdomen/pelvis on admission 11/30 significant for closed loop mechanical small bowel obstruction. Does have history of multiple abdominal surgeries previously.  Labs significant for WBC count 11.8, otherwise CMP largely unremarkable. Lactic acid 0.7, redraw due to hypotension post-op was 2.1, likely due to recent procedure. Acute change on 12/2-12/3 and found to have intraabdominal drainage from 12/3/2021; underwent repeat exploratory laparotomy with washout and repair of small bowel perforation.    -Surgery consulted and following   -ICU primary currently   -NPO for now until cleared by gen surg   -Pain control as per surgery/ICU (PCA, tyl) -- need to balance with encephalopathy below   -IV meropenem -- follow cultures   -IV Protonix     Acute encephalopathy, likely multifactorial - toxic/metabolic/hospital-induced   Acutely agitated post-op 12/3 to  12/4. Pulling at lines and threatening to leave.  Nurse feels unsafe without restraints.  - Soft limb restraint x 2 - adjust as needed  - Precedex gtt throughout today per ICU, revisit tomorrow   - PRN Haldol   - Zyprexa HS   - Conservative measures as able   - Avoid opioid/benzo if able     Acute hypoxic respiratory failure, post-op   Requiring high-flow oxygen in ICU following procedure.  Question post-op atelectasis vs aspiration versus volume overload.  High risk for intubation per ICU given his agitation and potential to not protect airway.  Currently full code.  Does have a degree of respiratory distress when he gets agitated.  -Continue high flow nasal cannula per ICU  -Intubate if indicated    Afib, RVR post-op, currently rate controlled   Volume up  Noted.  Rates had been in the 120 to 150 range.  Rate controlled with amiodarone drip. Both CHADS2-Vasc and HAS-BLED of at least 2.  Would likely warrant anticoagulation on ongoing basis.  Of note, is 6 pounds up -question of volume overload.  Does not overtly seem to have JVD or other signs of acute volume overload.  -Amiodarone drip per ICU.  -Lovenox for now per ICU   -Consider ongoing anticoagulation once encephalopathy is improved  -Lasix per ICU.  Consider whether ongoing need once out of the unit.  -Likely needs echocardiogram.  -TSH added to morning labs.    HTN  PTA lisinopril, furosemide being held in the setting of acute surgical care.  Will monitor, even with pain BP not significantly elevated.  Will resume when hemodynamically stable.  Kidney function is stable at this time.  Blood pressure does climb when he is agitated.  -Monitor     Tremor   New on exam 12/2. Bilateral. Doesn't fit well with either essential tremor or parkinsonian. Almost resembles asterixis. LFTs and kidney function normal though. Ammonia normal. Improved 12/3.  -Monitor      Chronic Conditions:  Hypothyroidism- PTA levothyroxine  Depression/anxiety- PTA venlafaxine,  "mirtazepine, Buspar   Chronic pain- Hold mexolicam 7.5mg daily, resume PTA gabapentin when tolerating PO.  BPH- PTA flomax      Diet: NPO for Medical/Clinical Reasons Except for: Meds, Ice ChipsNPO   DVT Prophylaxis: Lovenox q day    Roblero Catheter: placed   Fluids:  None  Central Lines: None  Code Status: Full Code    Subjective  Patient and procedure overnight.  Transferred to unit.  See intensivist and surgery notes for further details.  Was intermittently hypoxic overnight -placed on high flow with subsequent improvement in saturations to 90s.   This morning, patient is quite agitated on my interview.   States he \" just needs to go outside and stand up to pee\".  Is very frustrated that we are keeping him in his ICU bed.  Tugging at lines.  RN feels it is not safe without restraints.  Has pain in his abdomen.  Again feels need to void, does have Roblero in.    Does not answer remainder of review of systems.    Objective    Vital signs in last 24 hours Temp:  [98.1  F (36.7  C)-100.3  F (37.9  C)] 98.4  F (36.9  C)  Pulse:  [] 78  Resp:  [12-50] 15  BP: ()/() 95/59  FiO2 (%):  [55 %-80 %] 55 %  SpO2:  [83 %-98 %] 94 %       Intake/Output last 3 shift I/O last 3 completed shifts:  In: 3940.94 [I.V.:3690.94]  Out: 3335 [Urine:2775; Emesis/NG output:450; Drains:110]    Intake/Output this shift:No intake/output data recorded.    Physical Exam  General appearance: uncomfortable appearing male, laying in bed, two officers at bedside.  Acutely agitated.  Head: Normocephalic, without obvious abnormality, atraumatic.  NG tube in place.    Throat: moist mucous membranes.  Lungs: Tachypneic.  High flow oxygen in place.  Coarse lung sounds in the bases.  Heart: regular rate and rhythm, S1, S2 normal, no murmur, click, rub or gallop  Abdomen: Abdominal binding in place.  Multiple MARISA drains in place with bloody drainage. bowel sounds very quiet. Moderate TTP throughout.   Extremities: extremities normal, " atraumatic, no cyanosis or edema  Skin: Slightly pale, dry skin.  Neurologic: Alert and oriented X 2.  Able to move extremities.  Sensation intact.   Psychiatric: Exceedingly anxious and agitated.     Pertinent Labs and Pertinent Radiology   Lab Results: personally reviewed.     Radiology Results: Personally reviewed image/s and impression/s    Precepted patient with Dr. Piero Cornejo DO   [facility] Resident   Pager#[phone]

## 2021-12-04 NOTE — CONSULTS
Critical Care Medicine Consult  12/3/2021     Name: Gurdeep Dia  : 1955  MRN: [id number]  PCP: Hardy Hardwick         ASSESSMENT/PLAN:  66 year old smoker, fully COVID vaccinated, with history of multiple prior abdominal surgeries, HTN, BPH, polysubstance abuse, chronic pain, admitted on 2021 w/ SBO, for which he underwent an ex lap with small bowel resection on .  He developed a small bowel perforation  and underwent an emergent ex lap with abdominal washout on .    Neuro/Psych:   #. Acute toxic metabolic encephalopathy, likely multifactorial in setting of severe illness, new A. fib with RVR, hypoxia, pain, and side effect of general anesthesia.  High risk, of IP delirium. Continue to monitor.    Zyprexa ODT 5 mg QID PRN    Zyprexa ODT 5 mg qHS  #. Chronic pain, on Dilaudid PCA during hospitalization, continued.  PTA gabapentin on hold while NPO.  #. RAVI, PTA buspirone, mirtazapine, venlafaxine on hold while NPO.    Pulmonary:   #. Acute hypoxic respiratory failure that has been progressively worsening over the course of his hospitalization.  No chest imaging over the course of his hospitalization.  Suspect the primary etiology to be splinting from his acute intra-abdominal process, significant volume overload, now with new A. fib with RVR.    Goal SpO2 >/= 90%, avoid hyperoxia    Avoid CPAP or BiPAP in setting of recent intra-abdominal surgery    Minimize obligate hypotonic isotonic intake to reduce third spacing    CXR to evaluate for focal versus diffuse process    Cardiovascular:   #. Atrial fibrillation with RVR, new onset phenomenon.  Most likely secondary to acute intra-abdominal process, pain, progressively worsening hypoxia.    Check electrolytes, aggressively replace as needed    Attempt rate control with diltiazem drip in acute setting    If patient becomes more hemodynamically unstable, plan to switch to amiodarone drip    Cardiology consult to evaluate for future  rate/rhythm control and anticoagulation  #. HTN, no current issues.  Patient is borderline hypotensive in setting of acute onset atrial fibrillation with RVR. Holding PTA furosemide, lisinopril at this time.    Infectious:  #. Secondary abdominal peritonitis in setting of SBO, small bowel perforation, and ex lap x2.  High risk of intestinal translocation with GNR sepsis.    Empiric levofloxacin started 11/30, ongoing on the day of his transfer    Empiric metronidazole started 11/30, ongoing on the day of his transfer    Transition to meropenem while in the ICU 12/03, further adjustments per surgery and primary team    Check blood cultures, sputum culture, procalcitonin    Renal: baseline Cr ~0.9.   #. BPH, holding PTA tamsulosin, giron catheter in place.     GI:   #. SBO in setting of extensive intra-abdominal adhesions, complicated by small bowel perforation.  Ex lap with small bowel resection 11/30, ex lap with abdominal washout 12/03.    Diet, anticoagulation, further procedures per Surgery    Heme:   #. Mild anemia, monitor.  No acute indications for transfusion.    Endo:   #. Glucose monitoring with POCT BG q4H in acute setting. D10 gtt to maintain BG in 140-180 range.  #. Hypothyroidism, PTA levothyroxine on hold while NPO.    Access/Lines/Tubes: required and necessary for continued patient cares  PICC line (12/03)  PIV x2  NGT  Close suction abdominal drain x2  Giron catheter    ICU prophylaxis:   #. Stress ulcer: H2B IV  #. Diet: Strict NPO  #. VTE: SCD at this time, future anticoagulation per Surgery  #. Restraints: not currently required and necessary for continued patient cares    CODE: FULL    Billing: This patient is critically ill: Yes --hypertension and hemodynamically unstable atrial fibrillation with RVR with acute onset hypoxic respiratory failure with high likelihood of precipitous decompensation. Total critical care time today 73 min, excluding procedures.     Rylie Schulte MD  Pulmonary and  Critical Care          HISTORY OF PRESENTING ILLNESS:  Gurdeep Dia is a 66 year old smoker with history of splenectomy, appendectomy, HTN, polysubstance abuse, BPH, & chronic pain, admitted on 11/30/2021 with acute onset abdominal pain in setting of an SBO. He underwent an open exploratory laparotomy with small bowel resection, repair of enterotomy, and extensive lysis of adhesions on 11/30.  He worsened clinically on postop day 2, and subsequently underwent a repeat exploratory laparotomy with a washout and repair of small bowel perforation on 12/03.  His postop course was notable for new onset atrial fibrillation with RVR and hypoxia.  He was transferred to the ICU for close observation in setting of his tenuous clinical status.    REVIEW OF SYSTEMS: unobtainable from patient due to mental status.     MEDICAL HISTORY:  has a past medical history of Benign prostatic hyperplasia with weak urinary stream, Chronic bilateral low back pain without sciatica, Chronic neck pain, RAVI (generalized anxiety disorder), History of hepatitis C, History of substance abuse (H), Hypertension goal BP (blood pressure) < 140/90, and Moderate major depression (H).  SURGICAL HISTORY:  has a past surgical history that includes Splenectomy (~1995); APPENDECTOMY (~1996); Laparoscopy diagnostic (gyn) (N/A, 11/30/2021); Resect small bowel without ostomy (N/A, 11/30/2021); and Laparotomy, lysis adhesions, combined (N/A, 11/30/2021).  SOCIAL HISTORY:  reports that he has been smoking. He has never used smokeless tobacco. He reports previous alcohol use. He reports previous drug use.  FAMILY HISTORY: family history is not on file.  MEDICATIONS: personally reviewed, including EMR/Care Everywhere. Pertinent information noted & updated.     ALLERGIES:  Allergies   Allergen Reactions     Penicillins Hives       PHYSICAL EXAM:  Temp:  [97  F (36.1  C)-99.4  F (37.4  C)] 99.4  F (37.4  C)  Pulse:  [] 135  Resp:  [12-30] 30  BP:  (110-178)/() 114/95  SpO2:  [83 %-95 %] 90 %  Resp: 30    Physical Exam:   General: elderly  man, lying in bed, NAD when napping, becomes anxious & flails when he wakes up  HEENT: EOMI, MMM  CV: tachy, irregularly/irregular, no M/R/G; extremities adequately perfused  Pulm: equal b/l coarse breath sounds, no wheezing, no rhonchi, coarse bibasilar inspiratory crackles, no cough  Abd: abd binder in place w/ a couple of MARISA bulbs filled w/ small amount of BRB  Msk: warm to touch, no peripheral edema, moves w/o gross deficit  Derm: no acute lesions/rashes on limited exam  Neuro: awake, alert once orients to surroundings; moves extremities w/o focal deficit  Psych: mildly anxious when first awakens, calms down OK    LABS: I personally reviewed all the pertinent laboratory studies w/in the EMR.    IMAGING/STUDIES: I personally reviewed all available imaging studies.    This report was prepared using speech recognition software.  Any typographical errors are unintentional.  Please, contact me directly for any clarifications of my report.

## 2021-12-04 NOTE — OP NOTE
[facility]  Operative Note    Pre-operative diagnosis: SBO (small bowel obstruction) (H) [K56.609]   Post-operative diagnosis small bowel perforation   Procedure: Procedure(s):  EXPLORATORY LAPAROTOMY, WITH WASHOUT, REPAIR OF SMALL BOWEL PERFORATION.   Surgeon: Mekhi Richardson MD   Assistants(s): none   Anesthesia: General    Estimated blood loss: 15 ml    Total IV fluids: (See anesthesia record)   Blood transfusion: No transfusion was given during surgery   Total urine output: (See anesthesia record)   Drains: Prior 19 fr johan in lower abdomen  New 19 fr johan across incision below closure  15 fr drain in incision   Specimens: None   Implants: None   Findings:  There was a succus coming from the wound  Patient had a small bowel leak no prior surgery in this area but a perforation was found which was draining succus and reasons into the wound.  This was repaired primarily after washout as was an area which was distal to this which had some thinned out serosal appearance     Complications: None.   Condition: Stable       Description of procedure:    Consent was obtained.  Taken the operative placed in supine position general endotracheal anesthesia was administered by anesthesia staff.  Roblero was placed and the abdomen prepped and draped in sterile manner.  Began by just checking out the wound removing all the staples and the drain.  We then cut out the sutures which is the closure from before.  This had torn out partially in the lower area.  At this time point the leak was easily seen is right there and below the incision was small bowel looked normal and viable just had a ulcer a perforation right at that spot.  I irrigated this area out, freed up from the surrounding tissue resection of the bowel both proximal distal and then closed this primarily with the 3-0 silk sutures in a Lembert fashion.  Also explore the area did want take down all adhesions because a count had adhesed itself to  localize and keep this localized just to the incision found 1 area of bowel which had, thinned out cirrhosis similarly and this was oversewn with Lembert sutures intermittent fashion with 3 oh silks.  Irrigated with 3 L of sterile normal saline and I placed a drain which is a 19 Larry drain from the from the left upper abdomen down into across the incision.  I then closed the incision with a oh loop Maxon suture in a continuous fashion from both sides bring this together.  I closed the lower area also with figure-of-eight 0 Vicryls.  A drain was placed across the incision after irrigating out again this is a 15 which is brought to the lower incision and this is sutured the skin with silk sutures I closed the skin with staples.  Patient was extubated transferred to PACU in stable condition.  All sponge needle counts are correct.          Mekhi Richardson MD  General Surgery [phone]  Vascular Surgery [phone]

## 2021-12-04 NOTE — PROGRESS NOTES
"ASSESSMENT:  1. SBO (small bowel obstruction) (H)        Gurdeep Dia is a 66 year old male who is s/p exploratory laparotomy with small bowel resection, enterotomy repair, and extensive lysis of adhesions on 11/30/2021. Unfortunately he developed succus drainage from his abdominal wound on 12/3/2021 and underwent repeat exploratory laparotomy with washout and repair of small bowel perforation. He was subsequently to the ICU with new A. fib with RVR and acute hypoxic respiratory failure requiring high flow nasal cannula. Vital signs stable currently, no leukocytosis and hemoglobin stable.     PLAN:  - N.p.o. with NG to LIS; will try to avoid CPAP/BiPAP if possible given recent abdominal surgery x2 to avoid increased abdominal pressure; with NG in place he will be at high risk for aspiration as well  - Await bowel function  - Appreciate ICU management     SUBJECTIVE:   He is doing okay.  He reports being overwhelmed with everything that is going on.  His pain is currently under control.  He is not passing flatus but \"burping a lot\".       Patient Vitals for the past 24 hrs:   BP Temp Temp src Pulse Resp SpO2   12/04/21 0615 95/59 -- -- 78 15 94 %   12/04/21 0600 98/63 -- -- 80 16 94 %   12/04/21 0545 127/80 -- -- 95 (!) 33 92 %   12/04/21 0530 111/77 -- -- 89 29 96 %   12/04/21 0515 111/73 -- -- 92 26 94 %   12/04/21 0500 110/68 -- -- 89 15 98 %   12/04/21 0445 (!) 135/98 -- -- 99 (!) 40 (!) 85 %   12/04/21 0430 (!) 146/110 -- -- 101 (!) 34 94 %   12/04/21 0415 124/73 -- -- 100 (!) 47 93 %   12/04/21 0400 (!) 139/90 98.4  F (36.9  C) Oral 101 29 (!) 84 %   12/04/21 0345 (!) 144/92 -- -- 96 26 95 %   12/04/21 0330 (!) 138/98 -- -- 98 25 92 %   12/04/21 0315 (!) 122/92 -- -- (!) 141 (!) 45 (!) 84 %   12/04/21 0314 -- -- -- (!) 121 20 92 %   12/04/21 0300 (!) 122/90 -- -- (!) 125 19 92 %   12/04/21 0255 -- -- -- 118 16 92 %   12/04/21 0254 -- -- -- (!) 133 19 93 %   12/04/21 0253 -- -- -- (!) 146 17 94 %   12/04/21 " 0245 (!) 147/108 -- -- (!) 146 (!) 31 90 %   12/04/21 0230 (!) 122/91 -- -- (!) 149 17 91 %   12/04/21 0215 (!) 138/102 -- -- (!) 145 24 91 %   12/04/21 0210 -- -- -- (!) 147 20 95 %   12/04/21 0205 -- -- -- (!) 149 15 95 %   12/04/21 0200 (!) 128/105 100.3  F (37.9  C) Oral (!) 146 23 90 %   12/04/21 0155 (!) 147/104 -- -- (!) 148 29 92 %   12/04/21 0153 -- -- -- (!) 150 15 91 %   12/04/21 0152 -- -- -- 105 19 (!) 88 %   12/04/21 0151 -- -- -- 94 20 95 %   12/04/21 0150 -- -- -- 93 16 95 %   12/04/21 0145 -- -- -- 96 20 93 %   12/04/21 0141 -- -- -- 97 23 94 %   12/04/21 0140 -- -- -- 100 18 (!) 89 %   12/04/21 0139 -- -- -- (!) 135 24 (!) 89 %   12/04/21 0138 -- -- -- (!) 141 (!) 31 91 %   12/04/21 0135 -- -- -- (!) 146 (!) 33 (!) 86 %   12/04/21 0133 -- -- -- (!) 136 (!) 35 (!) 88 %   12/04/21 0130 121/86 -- -- (!) 135 30 90 %   12/04/21 0115 (!) 147/90 -- -- (!) 136 24 93 %   12/04/21 0100 (!) 133/90 -- -- (!) 146 22 94 %   12/04/21 0045 (!) 135/91 -- -- (!) 141 18 92 %   12/04/21 0034 -- -- -- (!) 139 16 91 %   12/04/21 0032 -- -- -- (!) 142 26 (!) 85 %   12/04/21 0030 -- -- -- (!) 137 (!) 41 (!) 87 %   12/04/21 0015 (!) 150/83 -- -- (!) 132 14 96 %   12/04/21 0003 (!) 170/100 -- -- (!) 138 16 93 %   12/04/21 0000 (!) 174/110 98.9  F (37.2  C) Oral (!) 134 23 94 %   12/03/21 2345 115/79 -- -- (!) 123 13 95 %   12/03/21 2330 112/76 -- -- (!) 129 13 94 %   12/03/21 2315 105/76 -- -- (!) 127 13 95 %   12/03/21 2300 99/77 -- -- (!) 130 13 95 %   12/03/21 2245 109/74 -- -- (!) 124 17 95 %   12/03/21 2230 112/81 -- -- (!) 131 18 95 %   12/03/21 2215 (!) 125/91 -- -- 113 27 (!) 83 %   12/03/21 2200 (!) 118/90 98.7  F (37.1  C) Oral (!) 122 (!) 50 (!) 83 %   12/03/21 2145 112/81 -- -- (!) 128 27 92 %   12/03/21 2130 111/86 -- -- (!) 131 (!) 32 91 %   12/03/21 2100 103/73 99.8  F (37.7  C) Temporal -- 28 (!) 88 %   12/03/21 2030 111/77 -- -- (!) 141 21 94 %   12/03/21 2015 111/75 -- -- (!) 149 19 94 %   12/03/21 2000  106/78 -- -- (!) 139 (!) 34 (!) 88 %   12/03/21 1950 (!) 114/95 -- -- (!) 135 30 90 %   12/03/21 1940 112/89 -- -- (!) 129 12 90 %   12/03/21 1930 (!) 118/91 -- -- (!) 131 17 (!) 87 %   12/03/21 1915 (!) 178/113 -- -- (!) 158 20 (!) 87 %   12/03/21 1910 (!) 159/95 99.4  F (37.4  C) Temporal 103 29 (!) 83 %   12/03/21 1630 110/73 -- -- 93 -- 94 %   12/03/21 1604 110/75 -- -- 85 -- 93 %   12/03/21 1548 119/79 98.1  F (36.7  C) Oral 92 16 95 %   12/03/21 1535 110/76 98.3  F (36.8  C) Oral 99 18 94 %   12/03/21 1122 134/87 99.2  F (37.3  C) Oral 91 16 94 %         PHYSICAL EXAM:  GEN: No acute distress, comfortable  LUNGS: Regular respiratory effort, high flow nasal cannula  CV: RRR   ABD: Incision covered not visualized this morning but dressing is CDI; expected TTP without guarding or rebound  Drains: MARISA x3 with serosanguineous fluid  Output by Drain (mL) 12/02/21 0700 - 12/02/21 1459 12/02/21 1500 - 12/02/21 2259 12/02/21 2300 - 12/03/21 0659 12/03/21 0700 - 12/03/21 1459 12/03/21 1500 - 12/03/21 2259 12/03/21 2300 - 12/04/21 0659 12/04/21 0700 - 12/04/21 0905   Closed/Suction Drain 1 Right Abdomen Bulb 19 [place]   25  20 40    Closed/Suction Drain 1 Left LLQ Bulb 19 [place]     0 10    Closed/Suction Drain 2 Inferior;Midline Abdomen Bulb 15 [place]     20 20       EXT:No cyanosis, edema or obvious abnormalities    12/03 0700 - 12/04 0659  In: 3940.94 [I.V.:3690.94]  Out: 3335 [Urine:2775; Drains:110]    No results displayed because visit has over 200 results.             THOMAS Givens CNP

## 2021-12-04 NOTE — ANESTHESIA POSTPROCEDURE EVALUATION
Patient: Gurdeep Dia    Procedure: Procedure(s):  EXPLORATORY LAPAROTOMY, WITH WASHOUT, REPAIR OF SMALL BOWEL PERFORATION.       Diagnosis:SBO (small bowel obstruction) (H) [K56.609]  Diagnosis Additional Information: No value filed.    Anesthesia Type:  General    Note:  Disposition: ICU            ICU Sign Out: Anesthesiologist/ICU physician sign out WAS performed (Spoke with Dr. Schulte via telephone)   Postop Pain Control:            Sign Out: Pain at Preoperative BASELINE   PONV: No   Neuro/Psych:             Sign Out: Acceptable/Baseline neuro status   Airway/Respiratory:             Sign Out: ABNORMAL respiratory status; O2 supplementation               Respiratory Exam: Tachypnea; Decreased BS               Oxygen: Other (high flow)   CV/Hemodynamics:             Events: Arrhythmia   Other NRE: NONE   DID A NON-ROUTINE EVENT OCCUR? YES    Event details/Postop Comments:  Critically ill man s/p ex lap for perforated bowel. Extubated at end of procedure, requiring significant oxygen via FM, attempted CPAP/BiPAP but didn't tolerate, now in HFNC. Atrial fibrillation in PACU. HR up to 170s. Treated with 100 mg esmolol, 5mg metoprolol, which improved HR to 120s. Diltiazem infusion started.   Spoke with surgeon and intensivist, will send patient to ICU given his high risk of decompensation.   PICC line placed. Labs drawn.              Last vitals:  Vitals Value Taken Time   /77 12/03/21 2025   Temp 37.4  C (99.4  F) 12/03/21 1910   Pulse 141 12/03/21 2030   Resp 21 12/03/21 2030   SpO2 94 % 12/03/21 2030   Vitals shown include unvalidated device data.    Electronically Signed By: Mo Alcala MD  December 3, 2021  8:31 PM

## 2021-12-04 NOTE — PLAN OF CARE
Problem: Pain Acute  Goal: Acceptable Pain Control and Functional Ability  Outcome: No Change  Intervention: Develop Pain Management Plan  Recent Flowsheet Documentation  Taken 12/4/2021 0400 by Lobo Armas RN  Pain Management Interventions: (PCA dilaudid) medication (see MAR)  Taken 12/4/2021 0000 by Lobo Armas RN  Pain Management Interventions: (PCA dilaudid) medication (see MAR)  Taken 12/3/2021 2200 by Lobo Armas RN  Pain Management Interventions: (PCA dilaudid) medication (see MAR)  Intervention: Prevent or Manage Pain  Recent Flowsheet Documentation  Taken 12/4/2021 0400 by Lobo Armas RN  Medication Review/Management: medications reviewed  Taken 12/4/2021 0000 by Lobo Armas RN  Medication Review/Management: medications reviewed  Taken 12/3/2021 2200 by Lobo Armas RN  Medication Review/Management: medications reviewed  Intervention: Optimize Psychosocial Wellbeing  Recent Flowsheet Documentation  Taken 12/4/2021 0400 by Lobo Armas RN  Supportive Measures:   active listening utilized   positive reinforcement provided   relaxation techniques promoted   verbalization of feelings encouraged  Taken 12/4/2021 0000 by Lobo Armas RN  Supportive Measures:   active listening utilized   positive reinforcement provided   relaxation techniques promoted   verbalization of feelings encouraged  Taken 12/3/2021 2200 by Lobo Armas RN  Supportive Measures:   active listening utilized   positive reinforcement provided   relaxation techniques promoted   verbalization of feelings encouraged    Severe abdominal and back pain, yells out occasionally.  PCA dilaudid available, patient needs to be reminded to use.  Seems to settle down after getting 2-3 bumps.      Problem: Violence Risk or Actual  Goal: Anger and Impulse Control  Outcome: No Change  Intervention: Promote Self-Control  Recent Flowsheet Documentation  Taken 12/4/2021 0400 by Lobo Armas RN  Supportive  Measures:   active listening utilized   positive reinforcement provided   relaxation techniques promoted   verbalization of feelings encouraged  Taken 12/4/2021 0000 by Lobo Armas, RN  Supportive Measures:   active listening utilized   positive reinforcement provided   relaxation techniques promoted   verbalization of feelings encouraged  Taken 12/3/2021 2200 by Lobo Armas, RN  Supportive Measures:   active listening utilized   positive reinforcement provided   relaxation techniques promoted   verbalization of feelings encouraged    Increased agitation after arriving from PACU.  Zyprexa given and PCA dilaudid continued.  Seems to settle down after getting 3 bumps from the dilaudid.  Provider ordered Precedex around 2200.  Precedex was not started due to patient was in a calmer state at 2215.  Patient was in a relatively calm mood until 0400.  At that time patient was having increased agitation and non-compliance with cares.  Patient asking to leave.  Requesting staff to kill him, patient states he felt as if he is dying.  Needed lot of encouragement, limits set.  Continues to be agitated and non-complainant after Zyprexa and dilaudid bumps given.  Precedex started at 0405 and dose increased once.  After increasing dose patient became calmer and more compliant with cares.     Lobo Armas, RN

## 2021-12-04 NOTE — PLAN OF CARE
Problem: Adult Inpatient Plan of Care  Goal: Plan of Care Review  Outcome: Improving  Flowsheets (Taken 12/4/2021 7063)  Plan of Care Reviewed With: patient  Outcome Summary: Continues on high flow NC 50L and 50% FIO2. Sats 94%. RR 20. Patient often removes nasal cannula needs frequent reminders and readjustments. HR controlled on diltiazam drip NSR HR 70. Abdominal incison dressing CDI, 3 MARISA's to bulb suction. MARISA dressings changed per NP. Precedex drip for behaviors, became very agitated and agressive when stopped this morning.  Progress: improving

## 2021-12-04 NOTE — ANESTHESIA CARE TRANSFER NOTE
Patient: Gurdeep Dia    Procedure: Procedure(s):  EXPLORATORY LAPAROTOMY, WITH WASHOUT, REPAIR OF SMALL BOWEL PERFORATION.       Diagnosis: SBO (small bowel obstruction) (H) [K56.609]  Diagnosis Additional Information: No value filed.    Anesthesia Type:   General     Note:    Oropharynx: spontaneously breathing  Level of Consciousness: drowsy  Oxygen Supplementation: face mask  Level of Supplemental Oxygen (L/min / FiO2): 10  Independent Airway: airway patency satisfactory and stable  Dentition: dentition unchanged    Report to RN Given: handoff report given  Patient transferred to: PACU  Comments: Pt. breathing spontaneously.  Vitals stable but sat ready in the 80's - was in the 80's also in the OR -  Difficult to get an accurate Spo2.  Report given to oncoming nurse.  Dr. Alcala called to evaluate.  Transfer of care occurred.   Handoff Report: Identifed the Patient, Identified the Reponsible Provider, Reviewed the pertinent medical history, Discussed the surgical course, Reviewed Intra-OP anesthesia mangement and issues during anesthesia, Set expectations for post-procedure period and Allowed opportunity for questions and acknowledgement of understanding      Vitals:  Vitals Value Taken Time   /95 12/03/21 1902   Temp     Pulse 103 12/03/21 1910   Resp 24 12/03/21 1910   SpO2 85 % 12/03/21 1910   Vitals shown include unvalidated device data.    Electronically Signed By: THOMAS Rincon CRNA  December 3, 2021  7:11 PM

## 2021-12-04 NOTE — SIGNIFICANT EVENT
"At approximately 1545, writer was in pt's room. Pt coughed and said \"I think something popped\". Large amount of drainage then leaked from abdominal incision staple line, green/brown in color, foul. Gauze applied, surgical team paged stat. Spoke with Dr. Cazares at 1600. Dr. Cazares saw pt, orders for surgical exploratory laparotomy with washout and repair. Vitals stable. Surgical team picked up pt at 1630.  "

## 2021-12-04 NOTE — PLAN OF CARE
[id number]    Problem: Adult Inpatient Plan of Care  Goal: Optimal Comfort and Wellbeing  Outcome: Improving     Problem: Pain Acute  Goal: Acceptable Pain Control and Functional Ability  Outcome: Improving  Intervention: Develop Pain Management Plan    Pt forgetful at times, pleasant and cooperative with cares. Up in chair for 1 hour. Up walking in saleem once with assist of 2, FWW, and gait belt. Pt reports pain to abdomen, 4-7/10. Remains on PCA pump, pt reports helpful. On 6 liters O2 via oxy-mask. NG tube to low-intermittent suction, 300 mL green output. Pt remains NPO, sips with meds. Bowel sounds absent, but reports passing gas. Small amount of serosanguinous drainage from inferior portion of abdominal incision, dry gauze/abd pad changed. Staples and MARISA drain intact. Incontinent of urine x 1.

## 2021-12-05 ENCOUNTER — APPOINTMENT (OUTPATIENT)
Dept: PHYSICAL THERAPY | Facility: HOSPITAL | Age: 66
DRG: 329 | End: 2021-12-05
Payer: MEDICARE

## 2021-12-05 LAB
ALBUMIN SERPL-MCNC: 2.3 G/DL (ref 3.5–5)
ALBUMIN UR-MCNC: 50 MG/DL
ALP SERPL-CCNC: 46 U/L (ref 45–120)
ANION GAP SERPL CALCULATED.3IONS-SCNC: 10 MMOL/L (ref 5–18)
APPEARANCE UR: CLEAR
AST SERPL W P-5'-P-CCNC: 20 U/L (ref 0–40)
BILIRUB SERPL-MCNC: 0.5 MG/DL (ref 0–1)
BILIRUB UR QL STRIP: NEGATIVE
BUN SERPL-MCNC: 16 MG/DL (ref 8–22)
BUN SERPL-MCNC: 17 MG/DL (ref 8–22)
CALCIUM SERPL-MCNC: 7.6 MG/DL (ref 8.5–10.5)
CALCIUM SERPL-MCNC: 8.3 MG/DL (ref 8.5–10.5)
CHLORIDE BLD-SCNC: 106 MMOL/L (ref 98–107)
CHLORIDE BLD-SCNC: 108 MMOL/L (ref 98–107)
CO2 SERPL-SCNC: 28 MMOL/L (ref 22–31)
CO2 SERPL-SCNC: 29 MMOL/L (ref 22–31)
COLOR UR AUTO: YELLOW
CREAT SERPL-MCNC: 0.83 MG/DL (ref 0.7–1.3)
CREAT SERPL-MCNC: 0.85 MG/DL (ref 0.7–1.3)
ERYTHROCYTE [DISTWIDTH] IN BLOOD BY AUTOMATED COUNT: 14.5 % (ref 10–15)
GFR SERPL CREATININE-BSD FRML MDRD: 90 ML/MIN/1.73M2
GFR SERPL CREATININE-BSD FRML MDRD: >90 ML/MIN/1.73M2
GLUCOSE BLD-MCNC: 105 MG/DL (ref 70–125)
GLUCOSE BLD-MCNC: 117 MG/DL (ref 70–125)
GLUCOSE BLDC GLUCOMTR-MCNC: 116 MG/DL (ref 70–99)
GLUCOSE BLDC GLUCOMTR-MCNC: 116 MG/DL (ref 70–99)
GLUCOSE BLDC GLUCOMTR-MCNC: 119 MG/DL (ref 70–99)
GLUCOSE BLDC GLUCOMTR-MCNC: 124 MG/DL (ref 70–99)
GLUCOSE UR STRIP-MCNC: NEGATIVE MG/DL
HBA1C MFR BLD: 5.8 %
HCT VFR BLD AUTO: 31.3 % (ref 40–53)
HGB BLD-MCNC: 10.4 G/DL (ref 13.3–17.7)
HGB UR QL STRIP: ABNORMAL
KETONES UR STRIP-MCNC: 10 MG/DL
LEUKOCYTE ESTERASE UR QL STRIP: NEGATIVE
MAGNESIUM SERPL-MCNC: 2 MG/DL (ref 1.8–2.6)
MAGNESIUM SERPL-MCNC: 2.3 MG/DL (ref 1.8–2.6)
MCH RBC QN AUTO: 29.9 PG (ref 26.5–33)
MCHC RBC AUTO-ENTMCNC: 33.2 G/DL (ref 31.5–36.5)
MCV RBC AUTO: 90 FL (ref 78–100)
NITRATE UR QL: NEGATIVE
PH UR STRIP: 6 [PH] (ref 5–7)
PHOSPHATE SERPL-MCNC: 0.9 MG/DL (ref 2.5–4.5)
PHOSPHATE SERPL-MCNC: 1.1 MG/DL (ref 2.5–4.5)
PLATELET # BLD AUTO: 258 10E3/UL (ref 150–450)
POTASSIUM BLD-SCNC: 3 MMOL/L (ref 3.5–5)
POTASSIUM BLD-SCNC: 4.1 MMOL/L (ref 3.5–5)
PREALB SERPL IA-MCNC: 5 MG/DL (ref 19–38)
PROT SERPL-MCNC: 4.8 G/DL (ref 6–8)
RBC # BLD AUTO: 3.48 10E6/UL (ref 4.4–5.9)
SODIUM SERPL-SCNC: 145 MMOL/L (ref 136–145)
SODIUM SERPL-SCNC: 145 MMOL/L (ref 136–145)
SP GR UR STRIP: 1.02 (ref 1–1.03)
TRIGL SERPL-MCNC: 160 MG/DL
TSH SERPL DL<=0.005 MIU/L-ACNC: 2.76 UIU/ML (ref 0.3–5)
UROBILINOGEN UR STRIP-MCNC: <2 MG/DL
WBC # BLD AUTO: 9 10E3/UL (ref 4–11)

## 2021-12-05 PROCEDURE — 87040 BLOOD CULTURE FOR BACTERIA: CPT | Performed by: INTERNAL MEDICINE

## 2021-12-05 PROCEDURE — 250N000013 HC RX MED GY IP 250 OP 250 PS 637: Performed by: INTERNAL MEDICINE

## 2021-12-05 PROCEDURE — 84100 ASSAY OF PHOSPHORUS: CPT | Performed by: INTERNAL MEDICINE

## 2021-12-05 PROCEDURE — 87086 URINE CULTURE/COLONY COUNT: CPT | Performed by: INTERNAL MEDICINE

## 2021-12-05 PROCEDURE — 84443 ASSAY THYROID STIM HORMONE: CPT | Performed by: STUDENT IN AN ORGANIZED HEALTH CARE EDUCATION/TRAINING PROGRAM

## 2021-12-05 PROCEDURE — 36415 COLL VENOUS BLD VENIPUNCTURE: CPT | Performed by: INTERNAL MEDICINE

## 2021-12-05 PROCEDURE — 258N000003 HC RX IP 258 OP 636: Performed by: INTERNAL MEDICINE

## 2021-12-05 PROCEDURE — 97530 THERAPEUTIC ACTIVITIES: CPT | Mod: GP

## 2021-12-05 PROCEDURE — 200N000001 HC R&B ICU

## 2021-12-05 PROCEDURE — 99231 SBSQ HOSP IP/OBS SF/LOW 25: CPT | Mod: GC | Performed by: STUDENT IN AN ORGANIZED HEALTH CARE EDUCATION/TRAINING PROGRAM

## 2021-12-05 PROCEDURE — 97110 THERAPEUTIC EXERCISES: CPT | Mod: GP

## 2021-12-05 PROCEDURE — 85027 COMPLETE CBC AUTOMATED: CPT | Performed by: STUDENT IN AN ORGANIZED HEALTH CARE EDUCATION/TRAINING PROGRAM

## 2021-12-05 PROCEDURE — 999N000215 HC STATISTIC HFNC ADULT NON-CPAP

## 2021-12-05 PROCEDURE — 250N000011 HC RX IP 250 OP 636: Performed by: INTERNAL MEDICINE

## 2021-12-05 PROCEDURE — 82310 ASSAY OF CALCIUM: CPT | Performed by: INTERNAL MEDICINE

## 2021-12-05 PROCEDURE — 36592 COLLECT BLOOD FROM PICC: CPT | Performed by: STUDENT IN AN ORGANIZED HEALTH CARE EDUCATION/TRAINING PROGRAM

## 2021-12-05 PROCEDURE — 84134 ASSAY OF PREALBUMIN: CPT | Performed by: INTERNAL MEDICINE

## 2021-12-05 PROCEDURE — 99024 POSTOP FOLLOW-UP VISIT: CPT | Performed by: SURGERY

## 2021-12-05 PROCEDURE — 3E0436Z INTRODUCTION OF NUTRITIONAL SUBSTANCE INTO CENTRAL VEIN, PERCUTANEOUS APPROACH: ICD-10-PCS | Performed by: SURGERY

## 2021-12-05 PROCEDURE — 83735 ASSAY OF MAGNESIUM: CPT | Performed by: INTERNAL MEDICINE

## 2021-12-05 PROCEDURE — 250N000009 HC RX 250: Performed by: INTERNAL MEDICINE

## 2021-12-05 PROCEDURE — 84155 ASSAY OF PROTEIN SERUM: CPT | Performed by: INTERNAL MEDICINE

## 2021-12-05 PROCEDURE — 81003 URINALYSIS AUTO W/O SCOPE: CPT | Performed by: INTERNAL MEDICINE

## 2021-12-05 PROCEDURE — 258N000001 HC RX 258: Performed by: INTERNAL MEDICINE

## 2021-12-05 PROCEDURE — 999N000157 HC STATISTIC RCP TIME EA 10 MIN

## 2021-12-05 PROCEDURE — 83036 HEMOGLOBIN GLYCOSYLATED A1C: CPT | Performed by: INTERNAL MEDICINE

## 2021-12-05 PROCEDURE — C9113 INJ PANTOPRAZOLE SODIUM, VIA: HCPCS | Performed by: INTERNAL MEDICINE

## 2021-12-05 PROCEDURE — 99291 CRITICAL CARE FIRST HOUR: CPT | Performed by: INTERNAL MEDICINE

## 2021-12-05 RX ORDER — OLANZAPINE 5 MG/1
5 TABLET, ORALLY DISINTEGRATING ORAL EVERY 6 HOURS
Status: DISCONTINUED | OUTPATIENT
Start: 2021-12-05 | End: 2021-12-05

## 2021-12-05 RX ORDER — MAGNESIUM SULFATE HEPTAHYDRATE 40 MG/ML
2 INJECTION, SOLUTION INTRAVENOUS ONCE
Status: COMPLETED | OUTPATIENT
Start: 2021-12-05 | End: 2021-12-05

## 2021-12-05 RX ORDER — LEVOTHYROXINE SODIUM ANHYDROUS 100 UG/5ML
19 INJECTION, POWDER, LYOPHILIZED, FOR SOLUTION INTRAVENOUS DAILY
Status: DISCONTINUED | OUTPATIENT
Start: 2021-12-05 | End: 2021-12-08

## 2021-12-05 RX ORDER — OLANZAPINE 10 MG/2ML
5 INJECTION, POWDER, FOR SOLUTION INTRAMUSCULAR EVERY 6 HOURS
Status: DISCONTINUED | OUTPATIENT
Start: 2021-12-05 | End: 2021-12-06

## 2021-12-05 RX ORDER — POTASSIUM CHLORIDE 29.8 MG/ML
20 INJECTION INTRAVENOUS
Status: COMPLETED | OUTPATIENT
Start: 2021-12-05 | End: 2021-12-05

## 2021-12-05 RX ORDER — MAGNESIUM SULFATE HEPTAHYDRATE 40 MG/ML
2 INJECTION, SOLUTION INTRAVENOUS ONCE
Status: DISCONTINUED | OUTPATIENT
Start: 2021-12-05 | End: 2021-12-05

## 2021-12-05 RX ORDER — DEXTROSE MONOHYDRATE 100 MG/ML
INJECTION, SOLUTION INTRAVENOUS CONTINUOUS PRN
Status: DISCONTINUED | OUTPATIENT
Start: 2021-12-05 | End: 2021-12-21 | Stop reason: HOSPADM

## 2021-12-05 RX ORDER — DEXTROSE MONOHYDRATE 25 G/50ML
25-50 INJECTION, SOLUTION INTRAVENOUS
Status: DISCONTINUED | OUTPATIENT
Start: 2021-12-05 | End: 2021-12-21 | Stop reason: HOSPADM

## 2021-12-05 RX ORDER — NICOTINE POLACRILEX 4 MG
15-30 LOZENGE BUCCAL
Status: DISCONTINUED | OUTPATIENT
Start: 2021-12-05 | End: 2021-12-21 | Stop reason: HOSPADM

## 2021-12-05 RX ORDER — ACETAMINOPHEN 650 MG/1
650 SUPPOSITORY RECTAL EVERY 6 HOURS PRN
Status: DISCONTINUED | OUTPATIENT
Start: 2021-12-05 | End: 2021-12-14

## 2021-12-05 RX ADMIN — POTASSIUM CHLORIDE 20 MEQ: 29.8 INJECTION, SOLUTION INTRAVENOUS at 10:53

## 2021-12-05 RX ADMIN — POTASSIUM CHLORIDE 20 MEQ: 29.8 INJECTION, SOLUTION INTRAVENOUS at 09:20

## 2021-12-05 RX ADMIN — PANTOPRAZOLE SODIUM 40 MG: 40 INJECTION, POWDER, FOR SOLUTION INTRAVENOUS at 08:07

## 2021-12-05 RX ADMIN — MEROPENEM 1 G: 1 INJECTION, POWDER, FOR SOLUTION INTRAVENOUS at 05:13

## 2021-12-05 RX ADMIN — DEXMEDETOMIDINE HYDROCHLORIDE 0.6 MCG/KG/HR: 400 INJECTION INTRAVENOUS at 14:58

## 2021-12-05 RX ADMIN — HALOPERIDOL LACTATE 2 MG: 5 INJECTION, SOLUTION INTRAMUSCULAR at 16:50

## 2021-12-05 RX ADMIN — FUROSEMIDE 40 MG: 10 INJECTION, SOLUTION INTRAVENOUS at 21:19

## 2021-12-05 RX ADMIN — ACETAMINOPHEN 650 MG: 650 SUPPOSITORY RECTAL at 16:32

## 2021-12-05 RX ADMIN — DEXMEDETOMIDINE HYDROCHLORIDE 1.4 MCG/KG/HR: 400 INJECTION INTRAVENOUS at 00:37

## 2021-12-05 RX ADMIN — POTASSIUM CHLORIDE 20 MEQ: 29.8 INJECTION, SOLUTION INTRAVENOUS at 08:13

## 2021-12-05 RX ADMIN — LEVOTHYROXINE SODIUM ANHYDROUS 19 MCG: 100 INJECTION, POWDER, LYOPHILIZED, FOR SOLUTION INTRAVENOUS at 10:59

## 2021-12-05 RX ADMIN — ASCORBIC ACID, VITAMIN A PALMITATE, CHOLECALCIFEROL, THIAMINE HYDROCHLORIDE, RIBOFLAVIN-5 PHOSPHATE SODIUM, PYRIDOXINE HYDROCHLORIDE, NIACINAMIDE, DEXPANTHENOL, ALPHA-TOCOPHEROL ACETATE, VITAMIN K1, FOLIC ACID, BIOTIN, CYANOCOBALAMIN: 200; 3300; 200; 6; 3.6; 6; 40; 15; 10; 150; 600; 60; 5 INJECTION, SOLUTION INTRAVENOUS at 18:15

## 2021-12-05 RX ADMIN — DEXMEDETOMIDINE HYDROCHLORIDE 1.2 MCG/KG/HR: 400 INJECTION INTRAVENOUS at 04:26

## 2021-12-05 RX ADMIN — OLANZAPINE 5 MG: 10 INJECTION, POWDER, FOR SOLUTION INTRAMUSCULAR at 16:50

## 2021-12-05 RX ADMIN — OLANZAPINE 5 MG: 10 INJECTION, POWDER, FOR SOLUTION INTRAMUSCULAR at 22:20

## 2021-12-05 RX ADMIN — FUROSEMIDE 40 MG: 10 INJECTION, SOLUTION INTRAVENOUS at 10:53

## 2021-12-05 RX ADMIN — MEROPENEM 1 G: 1 INJECTION, POWDER, FOR SOLUTION INTRAVENOUS at 14:56

## 2021-12-05 RX ADMIN — MEROPENEM 1 G: 1 INJECTION, POWDER, FOR SOLUTION INTRAVENOUS at 21:18

## 2021-12-05 RX ADMIN — DEXMEDETOMIDINE HYDROCHLORIDE 1.2 MCG/KG/HR: 400 INJECTION INTRAVENOUS at 08:35

## 2021-12-05 RX ADMIN — POTASSIUM PHOSPHATE, MONOBASIC AND POTASSIUM PHOSPHATE, DIBASIC 30 MMOL: 224; 236 INJECTION, SOLUTION, CONCENTRATE INTRAVENOUS at 10:53

## 2021-12-05 RX ADMIN — OLANZAPINE 5 MG: 10 INJECTION, POWDER, FOR SOLUTION INTRAMUSCULAR at 11:00

## 2021-12-05 RX ADMIN — ENOXAPARIN SODIUM 40 MG: 40 INJECTION SUBCUTANEOUS at 10:53

## 2021-12-05 RX ADMIN — MAGNESIUM SULFATE HEPTAHYDRATE 2 G: 40 INJECTION, SOLUTION INTRAVENOUS at 07:09

## 2021-12-05 RX ADMIN — DEXTROSE MONOHYDRATE 25 ML/HR: 100 INJECTION, SOLUTION INTRAVENOUS at 17:02

## 2021-12-05 RX ADMIN — I.V. FAT EMULSION 250 ML: 20 EMULSION INTRAVENOUS at 20:03

## 2021-12-05 RX ADMIN — HALOPERIDOL LACTATE 2 MG: 5 INJECTION, SOLUTION INTRAMUSCULAR at 23:34

## 2021-12-05 ASSESSMENT — ACTIVITIES OF DAILY LIVING (ADL)
ADLS_ACUITY_SCORE: 17
ADLS_ACUITY_SCORE: 16
ADLS_ACUITY_SCORE: 17
ADLS_ACUITY_SCORE: 16
ADLS_ACUITY_SCORE: 17
ADLS_ACUITY_SCORE: 16
ADLS_ACUITY_SCORE: 17
ADLS_ACUITY_SCORE: 16
ADLS_ACUITY_SCORE: 16
ADLS_ACUITY_SCORE: 17
ADLS_ACUITY_SCORE: 16

## 2021-12-05 NOTE — PROGRESS NOTES
Gurdeep continues on the HFNC 40 LPM and 50% FIO2. Tolerating well with oxygen saturation 93%, HR 72-74, and RR 20. BS coarse and diminished. Continue to monitor and wean from oxygen as able.    Svetlana Fuentes, RRT

## 2021-12-05 NOTE — PROGRESS NOTES
1502 remains on HFNC 40/48 %, SpO2 94 %, decreased FiO2 to 45 %. RT to monitor, titrate FiO2 as tolerated.

## 2021-12-05 NOTE — PHARMACY-CONSULT NOTE
"Pharmacy Note: Parenteral Nutrition (PN) Management    Pharmacist consulted to dose PN for Gurdeep Dia, a 66 year old male by Dr. Clinton (on behalf of Dr. Cazares).    Subjective:    The patient is a new PN start.    The patient was started on PN in the hospital on 12/5/21.    Indication for PN therapy: bowel resection    Inadequate nutrition anticipated for > 7 days.     Enteral nutrition contraindicated due to: bowel integrity is tenuous.    Pertinent diseases and other special considerations respiratory failure    Social History     Tobacco Use     Smoking status: Current Every Day Smoker     Smokeless tobacco: Never Used   Substance Use Topics     Alcohol use: Not Currently     Comment: Clean for 5 years     Drug use: Not Currently     Objective:    Ht Readings from Last 1 Encounters:   11/30/21 1.656 m (5' 5.2\")     Wt Readings from Last 1 Encounters:   11/30/21 79.4 kg (175 lb)       Body mass index is 28.94 kg/m .    No data found.    Labs:  Last 3 days:  Recent Labs     12/03/21  0727 12/03/21  2021 12/03/21  2216 12/04/21  0201 12/04/21  0345 12/04/21  0514 12/05/21  0517 12/05/21  1048    141  --   --  143  --  145 145   POTASSIUM 3.9 3.7 3.7  --  3.7  --  3.0* 4.1   CHLORIDE 110* 108*  --   --  109*  --  106 108*   CO2 24 26  --   --  28  --  29 28   BUN 17 14  --   --  13  --  16 17   CR 0.88 0.79  --   --  0.85  --  0.85 0.83   VIJAYA 8.6 8.3*  --   --  9.0  --  8.3* 7.6*   ORMLFCV80  --  1.06*  --  1.15  --  1.12  --   --    MAG 1.8 1.9  --   --  2.0  --  2.0 2.3   PHOS 1.3*  --   --   --  1.4*  --   --  1.1*  0.9*   PROTTOTAL 5.7*  --   --   --   --   --   --  4.8*   ALBUMIN 2.4*  --   --   --   --   --   --  2.3*   PREALB  --   --   --   --   --   --   --  5*   TRIG  --   --   --   --   --   --   --  160*   HGB 12.2* 10.7*  --   --  11.0*  --  10.4*  --    HCT 37.7* 33.9*  --   --  34.0*  --  31.3*  --     251  --   --  246  --  258  --    BILITOTAL 0.5  --   --   --   --   --   --  0.5 "   AST 23  --   --   --   --   --   --  20   ALT 21  --   --   --   --   --   --   --    ALKPHOS 46  --   --   --   --   --   --  46       Glucose (past 48 hours):   Recent Labs     12/04/21  0447 12/04/21  0803 12/04/21  1414 12/04/21  1811 12/04/21  1937 12/04/21  2349 12/05/21  0414 12/05/21  0517 12/05/21  1048 12/05/21  1220   * 95 106* 95 113* 119* 119* 117 105 116*       Intake/Output (last 24 hours): I/O last 3 completed shifts:  In: 1527.95 [I.V.:1527.95]  Out: 2836 [Urine:2475; Emesis/NG output:236; Drains:125]    Estimated CrCl: Estimated Creatinine Clearance: 84.3 mL/min (based on SCr of 0.83 mg/dL).    Assessment:    Initiate patient on PN therapy as a continuous central therapy.     Given the patient's current condition/oral intake, PN is still indicated.    Lab results reviewed:   Phosphorus level has been low x3 days, and pt was not on replacement protocol. Ordered 30 mmol bolus of KPhos and protocol for future dosing per Dr. Clinton.     Patient also has Potassium and Magnesium replacement protocols.     Blood glucoses have not been elevated (pt also NPO). Will check q4h with initiation of TPN.    Plan:  1. Rate of PN: 35 mL/hr   2. Formula: ClinimixE 5/15    Amino Acids 42 grams/24h    Dextrose 126 grams/24h    Sodium 29.4 mEq/24h    Potassium 25.2 mEq/24h    Calcium 3.78 mEq/24h    Magnesium 4.2 mEq/24h    Phosphorus 12.6 mMol/24h    Chloride: Acetate Ratio 1:2    Standard Multivitamins w/Vitamin K  3. Fat Emulsion: 20%, 250 mL IV Daily  4. Check TPN panel labs tomorrow.  5. Pharmacist will continue to follow the patient's lab results, clinical status and blood glucose results and make adjustments as appropriate.    Thank you for the consult.  Katrin Resendiz, [facility]  12/5/2021 2:06 PM

## 2021-12-05 NOTE — PROGRESS NOTES
"CLINICAL NUTRITION SERVICES - ASSESSMENT NOTE     Nutrition Prescription    RECOMMENDATIONS FOR MDs/PROVIDERS TO ORDER:  Pt at risk for refeeding syndrome, NPO x 5 days.  Continue to monitor electrolytes and replace if low.    Malnutrition Status:    Moderate, 12/5    Recommendations already ordered by Registered Dietitian (RD):  After phos replacement in place plan to start Clinimix today:  5/15 (with MVI added) at 35 ml/hr continuous with 250 ml 20% lipid daily.  TPN for first 24 hrs to provide     Future/Additional Recommendations:  Plan to increase TPN in next few days.     REASON FOR ASSESSMENT  Gurdeep Dia is a/an 66 year old male assessed by the dietitian for Provider Order - pharmacy/Nutrition to start and manage TPN.  Pt has a PICC line for central access.    TPN indicated s/p explore laparotomy with small bowel resection, enterotomy repair and lysis of adhesions 11/30.  Repair of small bowel perforation 12/3.    NUTRITION HISTORY  Pt has been NPO x 5 days.   Pt reports his appetite is normal, does not remember when he got ill.  Pt requesting water, is NPO.       CURRENT NUTRITION ORDERS  Diet: NPO day 5  IVs-D10 infusing at 25 ml/hr.  Intake/Tolerance: IV provides 60 g carb, 204 calories.    LABS  Na 145  K 3.0-replacing  Creat 0.85  Mg 2  Hgb 10.4  hgb A1c 5.8  12/4 Phos 1.4  Labs reviewed    MEDICATIONS  Lasix, levothyroxine, Medications reviewed    ANTHROPOMETRICS  Height: 165.6 cm (5' 5.2\")  Most Recent Weight: 79.4 kg (175 lb)    IBW: 137 lb +-10%  BMI: Overweight BMI 25-29.9  Weight History:   Wt Readings from Last 6 Encounters:   11/30/21 79.4 kg (175 lb)   01/22/20 79.4 kg (175 lb)   12/24/19 81.2 kg (179 lb)     Dosing Weight: 66.25 kg, adjusted wt    ASSESSED NUTRITION NEEDS  Estimated Energy Needs: 7802-8072 kcals/day (25 - 30 kcals/kg)  Justification: Post-op  Estimated Protein Needs: 80-99 grams protein/day (1.2 - 1.5 grams of pro/kg)  Justification: Post-op  Estimated Fluid Needs: 1985 " mL/day (30 ml/kg)   Justification: Maintenance    PHYSICAL FINDINGS  See malnutrition section below.  LBM 11/29  Surgical incisions.    out/24 hrs  MARISA drains      MALNUTRITION:  % Weight Loss:  None noted  % Intake:  </= 50% for >/= 5 days (severe malnutrition)  Subcutaneous Fat Loss:  Orbital region moderate depletion (observed)  Muscle Loss:  Not assessed in DOC pt. Pt agitated  Fluid Retention:  None noted per chart    Malnutrition Diagnosis: Moderate malnutrition  In Context of:  Acute illness or injury    NUTRITION DIAGNOSIS  Malnutrition related to acute illness as evidenced by NPO x 5 days, moderate fat loss.      INTERVENTIONS  Implementation  Nutrition Education: Unable to complete due to pt agitated.   Parenteral Nutrition/IV Fluids - Initiate     Goals  Tolerate parenteral nutrition  Advance to po diet when appropriate  BG      Monitoring/Evaluation  Progress toward goals will be monitored and evaluated per protocol.

## 2021-12-05 NOTE — PLAN OF CARE
Problem: Adult Inpatient Plan of Care  Goal: Plan of Care Review  Outcome: No Change    Some agitation as start of shift (1930).  Precedex increased, delivered dilaudid via PCA, and  HS Zyprexa was given.  By 2230 patient became combative, yelling out.  Administered PRN Zyprexa and starting increasing Precedex aggressively.  By 0015 Precedex was at 1.4 and patient was becoming much calmer.  I have been giving dilaudid bumps about once an hour.  Since 0100 I have been slowly decreasing Precedex.  Will try PRN Haldol with continues with agitation.    Lobo Armas RN

## 2021-12-05 NOTE — PROGRESS NOTES
Gurdeep Dia is doing relatively well, off pressors, remains somewhat confused but denies any significant abdominal pain.        Vitals:    12/05/21 0500 12/05/21 0600 12/05/21 0700 12/05/21 0800   BP: 109/73 107/73 117/78 108/70   Pulse: 72 72 72 70   Resp: 25 20 21 23   Temp:    (!) 100.9  F (38.3  C)   TempSrc:    Axillary   SpO2: 92% 91%  91%   Weight:       Height:           PHYSICAL EXAM:  GEN: No acute distress, comfortable  ABD: Dressings intact, MARISA drains with serosanguineous output no evidence of succus, mild distention  EXT:No cyanosis, edema or obvious abnormalities        Recent Labs   Lab 12/05/21  0517   WBC 9.0   HGB 10.4*   HCT 31.3*          Recent Labs   Lab 12/05/21  0517 12/03/21 2021 12/03/21  0727      < > 142   CO2 29   < > 24   BUN 16   < > 17   ALBUMIN  --   --  2.4*   ALKPHOS  --   --  46   ALT  --   --  21   AST  --   --  23    < > = values in this interval not displayed.         A/P:  Gurdeep Dia is s/p exploratory laparotomy with takeback for washout and closure of bowel perforation  -At this point would continue with n.p.o.  Recommend TPN if not already started given the fact that his bowel integrity is tenuous  -Continue IV antibiotics  -MARISA drains remain in place for now  -Appreciate ICU care    Akbar Cazares DO  FACS  [phone]  [facility] Department of Surgery

## 2021-12-05 NOTE — PROGRESS NOTES
[facility] Progress Note    Assessment/Plan  Principal Problem:    SBO (small bowel obstruction) (H)  Active Problems:    Hypertension goal BP (blood pressure) < 140/90    Benign prostatic hyperplasia with weak urinary stream    RAVI (generalized anxiety disorder)    Moderate major depression (H)    History of substance abuse (H)    History of hepatitis C    Chronic neck pain    Chronic bilateral low back pain without sciatica    Gurdeep Dia is a 66 year old male with history of splenectomy, appendectomy, HTN, and substance abuse who presented with 2 days of right sided abdominal pain, found to have a bowel obstruction with closed loop. He is admitted for surgical repair of obstruction.      Requires ongoing hospitalization for post-operative monitoring and care   Requires ICU-level care currently, followed by [facility] while in unit.     Mechanical SBO s/p exploratory laparotomy / small bowel resection / repair of enterotomy / extensive lysis of adhesions (11/30/21)  Subsequent small bowel perforation requiring repeat ex lap for washout 12/3    CT abdomen/pelvis on admission 11/30 significant for closed loop mechanical small bowel obstruction. Does have history of multiple abdominal surgeries previously.  Labs significant for WBC count 11.8, otherwise CMP largely unremarkable. Lactic acid 0.7, redraw due to hypotension post-op was 2.1, likely due to recent procedure. Acute change on 12/2-12/3 and found to have intraabdominal drainage from 12/3/2021; underwent repeat exploratory laparotomy with washout and repair of small bowel perforation.    -Surgery consulted and following   -ICU primary currently   -NPO for now until cleared by gen surg   -Pain control as per surgery/ICU (PCA, tyl) -- need to balance with encephalopathy below   -IV meropenem -- follow cultures   -IV Protonix     Acute encephalopathy, likely multifactorial - toxic/metabolic/hospital-induced   Acutely agitated post-op 12/3 to  12/4. Pulling at lines and threatening to leave.  Nurse feels unsafe without restraints.  - Soft limb restraint x 2 - adjust as needed  - Precedex gtt throughout today per ICU, revisit tomorrow   - PRN Haldol   - Zyprexa HS   - Conservative measures as able   - Avoid opioid/benzo if able     Acute hypoxic respiratory failure, post-op   Requiring high-flow oxygen in ICU following procedure.  Question post-op atelectasis vs aspiration versus volume overload.  High risk for intubation per ICU given his agitation and potential to not protect airway.  Currently full code.  Does have a degree of respiratory distress when he gets agitated.  -Continue high flow nasal cannula per ICU  -Intubate if indicated    Afib, RVR post-op, currently rate controlled   Volume up  Noted.  Rates had been in the 120 to 150 range.  Rate controlled with amiodarone drip. Both CHADS2-Vasc and HAS-BLED of at least 2.  Would likely warrant anticoagulation on ongoing basis.  Of note, is 6 pounds up -question of volume overload.  Does not overtly seem to have JVD or other signs of acute volume overload.  -Amiodarone drip per ICU.  -Lovenox for now per ICU   -Consider ongoing anticoagulation once encephalopathy is improved  -Lasix per ICU.  Consider whether ongoing need once out of the unit.  -Likely needs echocardiogram.  -TSH added to morning labs.    HTN  PTA lisinopril, furosemide being held in the setting of acute surgical care.  Will monitor, even with pain BP not significantly elevated.  Will resume when hemodynamically stable.  Kidney function is stable at this time.  Blood pressure does climb when he is agitated.  -Monitor     Tremor   New on exam 12/2. Bilateral. Doesn't fit well with either essential tremor or parkinsonian. Almost resembles asterixis. LFTs and kidney function normal though. Ammonia normal. Improved 12/3.  -Monitor      Chronic Conditions:  Hypothyroidism- PTA levothyroxine  Depression/anxiety- PTA venlafaxine,  mirtazepine, Buspar   Chronic pain- Hold mexolicam 7.5mg daily, resume PTA gabapentin when tolerating PO.  BPH- PTA flomax      Diet: NPO for Medical/Clinical Reasons Except for: Meds, Ice ChipsNPO   DVT Prophylaxis: Lovenox q day    Roblero Catheter: placed   Fluids:  None  Central Lines: None  Code Status: Full Code    Subjective    Patient complaining of NPO status, would like a drink of H2O.      Objective    Vital signs in last 24 hours Temp:  [98.4  F (36.9  C)-100.9  F (38.3  C)] 100.9  F (38.3  C)  Pulse:  [69-81] 69  Resp:  [19-53] 22  BP: ()/(61-78) 108/75  FiO2 (%):  [50 %] 50 %  SpO2:  [79 %-97 %] 94 %       Intake/Output last 3 shift I/O last 3 completed shifts:  In: 1527.95 [I.V.:1527.95]  Out: 2836 [Urine:2475; Emesis/NG output:236; Drains:125]    Intake/Output this shift:I/O this shift:  In: 2 [I.V.:2]  Out: 350 [Urine:350]    Physical Exam  General appearance: uncomfortable appearing male, laying in bed, two officers at bedside.  Acutely agitated.  Head: Normocephalic, without obvious abnormality, atraumatic.  NG tube in place.    Throat: moist mucous membranes.  Lungs: Tachypneic.  High flow oxygen in place.  Coarse lung sounds in the bases.  Heart: regular rate and rhythm, S1, S2 normal, no murmur, click, rub or gallop  Abdomen: Abdominal binding in place.  Multiple MARISA drains in place with bloody drainage. bowel sounds very quiet. Moderate TTP throughout.   Extremities: extremities normal, atraumatic, no cyanosis or edema  Skin: Slightly pale, dry skin.  Neurologic: Alert and oriented X 2.  Able to move extremities.  Sensation intact.   Psychiatric: Exceedingly anxious and agitated.     Pertinent Labs and Pertinent Radiology   Lab Results: personally reviewed.     Radiology Results: Personally reviewed image/s and impression/s    Precepted patient with Dr. Piero Zelaya MD  [facility] Resident   Pager#[phone]

## 2021-12-05 NOTE — PROGRESS NOTES
CRITICAL CARE PROGRESS NOTE:    Assessment/Plan:  66 year old smoker, fully COVID vaccinated, with history of multiple prior abdominal surgeries, HTN, BPH, polysubstance abuse, chronic pain, admitted on 11/30/2021 w/ SBO, for which he underwent an ex lap with small bowel resection on 11/30.  He developed a small bowel perforation 12/02 and underwent an emergent ex lap with abdominal washout on 12/03.    RESP:  Acute post-op respiratory failure with hypoxemia. Possible aspiration, atelectasis from distended abdomen post-op. Volume overloaded net+ 6L so likely some pulmonary edema as well.. some improvement with diuresis overnight.     Cont HFNC and titrate FiO2 as tolerated for goal SpO2 94-96%, avoid hyperoxia    High risk for intubation if continues to be agitated and can't protect airway    Encourage OOB, PT/OT, push IS when able    Lasix per CV section below    CV:  Afib/RVR post-op, resolved after amio. No echo on file. +6L likely volume overloaded, improved vol status with Lasix.     Cont amio drip    MAP >65, not requiring pressors    Cont Lasix 40mg IV bid to keep net negative 1-2L daily    Hold ACEi due to NPO status    NEURO:  Encephalopathy, delirium likely toxic/metabolic, due to acute illness. Not getting several psych meds due to NPO status which may be contributing    Cont precedex    Haldol IV prn (QTc OK)    Start Zyprexa 5mg IV q6h    Tylenol prn pain    Cautious use of narcotics    Avoid benzo's if able    Holding home buspar, gabapentin, remeron, effexor due to NPO status    GI:  SBO, bowel perf s/p ex-lap and washout with general surgery on 12/3    Strict NPO until cleared by surgery    Start TPN today per surgery    Bowel regimen prn    Cont IV PPI (home med)    RENAL:  No issues, normal renal function    Maintain giron    Avoid nephrotoxins    Lasix as above    Holding flomax due to NPO status; giron in place    ID:  possible intra-abdominal infection w/ sepsis    Cont IV meropenem, plan for 10  "days empiric course    Follow up culture data    HEMATOLOGIC:  Stable mild anemia    hgb >7    Follow counts    ENDOCRINE:  No issues    FSBG checks, insulin sliding scale/drip per ICU protocol    ICU PROPHYLAXIS:    Daily LMWH    PPI (home med)    CODE STATUS, DISPOSITION, FAMILY COMMUNICATION: full code  No emergency contacts listed    Lines/Drains/Tubes:  PIV x2  PCC 12/3  NG tube  Several Abdominal drains per surgery  giron    Restraints  Progress Note  Restraint Application    I recognize that restraints are physical and/or chemical interventions intended to restrict a person's movements. Restraints are currently needed to ensure the safety of this patient and/or others. My clinical rationale appears below.    Category/Type of Restraint     Non Violent:  Soft limb restraint x2  --  Behavior  Pulling at tubes/lines  --  Root Cause of the Behavior  Sedation/intubation  --  Less-Restrictive Measures that Failed  Non Violent Measures:  Close Observation  --  Response to the Restraint  Patient unable to pull at tubes/lines  --  Criteria for Release from the Restraint  Patient calm and off sedation    Terell (Kwame) MD Oz  [facility]monary [facility]  Pager [phone]  Clinic [phone]  Fax [phone]     Overnight events:  Agitated overnight, got some haldol, unclear if helpful.  Remains NPO  Oxygenation stable/improving on HFNC  Still requiring precedex, restraints    Subjective:  Denies pain. Wants to drink something, thirsty.       Objective:  Physical Exam:  Vent settings for last 24 hours:  FiO2 (%): 50 %  Resp: 23      /70   Pulse 70   Temp (!) 100.9  F (38.3  C) (Axillary)   Resp 23   Ht 1.656 m (5' 5.2\")   Wt 79.4 kg (175 lb)   SpO2 91%   BMI 28.94 kg/m      Intake/Output last 3 shifts:  I/O last 3 completed shifts:  In: 1527.95 [I.V.:1527.95]  Out: 2836 [Urine:2475; Emesis/NG output:236; Drains:125]  Intake/Output this shift:  I/O this shift:  In: 2 " [I.V.:2]  Out: -     Physical Exam  Gen: awake, agitated, delirious, oriented, no distress  HEENT: no OP lesions, no CLAY  CV: RRR, no m/g/r  Resp: diminished ant no w/r/r   Abd: distended, multiple dressings/drains in place  Neuro: PERRL, nonfocal  Ext: no edema    LAB:  Recent Labs   Lab 12/05/21  0517   WBC 9.0   HGB 10.4*   HCT 31.3*        Recent Labs   Lab 12/05/21  0517 12/04/21  0345 12/03/21  2021 12/03/21  0727 12/02/21  1047 12/02/21  0750    143 141 142 141 145   CO2 29 28 26 24 20* 25   BUN 16 13 14 17 25* 27*   ALKPHOS  --   --   --  46 53 43*   ALT  --   --   --  21 22 21   AST  --   --   --  23 33 27       No current outpatient medications on file.       Critical care attestation: 45 minutes spent managing the following issues: acute respiratory failure requiring HHFNC, delirium, toxic/metabolic encephalopathy requiring continuous precedex. High risk for organ deterioration and death requiring ICU level care.

## 2021-12-06 ENCOUNTER — APPOINTMENT (OUTPATIENT)
Dept: PHYSICAL THERAPY | Facility: HOSPITAL | Age: 66
DRG: 329 | End: 2021-12-06
Payer: MEDICARE

## 2021-12-06 ENCOUNTER — APPOINTMENT (OUTPATIENT)
Dept: RADIOLOGY | Facility: HOSPITAL | Age: 66
DRG: 329 | End: 2021-12-06
Attending: INTERNAL MEDICINE
Payer: MEDICARE

## 2021-12-06 LAB
ALBUMIN SERPL-MCNC: 2.3 G/DL (ref 3.5–5)
ALP SERPL-CCNC: 44 U/L (ref 45–120)
ALT SERPL W P-5'-P-CCNC: 20 U/L (ref 0–45)
ANION GAP SERPL CALCULATED.3IONS-SCNC: 9 MMOL/L (ref 5–18)
AST SERPL W P-5'-P-CCNC: 25 U/L (ref 0–40)
ATRIAL RATE - MUSE: 227 BPM
BACTERIA UR CULT: NO GROWTH
BILIRUB SERPL-MCNC: 0.4 MG/DL (ref 0–1)
BUN SERPL-MCNC: 17 MG/DL (ref 8–22)
CALCIUM SERPL-MCNC: 8.4 MG/DL (ref 8.5–10.5)
CALCIUM, IONIZED MEASURED: 1.09 MMOL/L (ref 1.11–1.3)
CHLORIDE BLD-SCNC: 106 MMOL/L (ref 98–107)
CO2 SERPL-SCNC: 31 MMOL/L (ref 22–31)
CREAT SERPL-MCNC: 0.82 MG/DL (ref 0.7–1.3)
DIASTOLIC BLOOD PRESSURE - MUSE: NORMAL MMHG
ERYTHROCYTE [DISTWIDTH] IN BLOOD BY AUTOMATED COUNT: 14.7 % (ref 10–15)
GFR SERPL CREATININE-BSD FRML MDRD: >90 ML/MIN/1.73M2
GLUCOSE BLD-MCNC: 112 MG/DL (ref 70–125)
GLUCOSE BLDC GLUCOMTR-MCNC: 114 MG/DL (ref 70–99)
GLUCOSE BLDC GLUCOMTR-MCNC: 121 MG/DL (ref 70–99)
GLUCOSE BLDC GLUCOMTR-MCNC: 122 MG/DL (ref 70–99)
GLUCOSE BLDC GLUCOMTR-MCNC: 132 MG/DL (ref 70–99)
HCT VFR BLD AUTO: 34.2 % (ref 40–53)
HGB BLD-MCNC: 11.2 G/DL (ref 13.3–17.7)
INR PPP: 1.11 (ref 0.85–1.15)
INTERPRETATION ECG - MUSE: NORMAL
ION CA PH 7.4: 1.12 MMOL/L (ref 1.11–1.3)
MAGNESIUM SERPL-MCNC: 2.1 MG/DL (ref 1.8–2.6)
MCH RBC QN AUTO: 29.6 PG (ref 26.5–33)
MCHC RBC AUTO-ENTMCNC: 32.7 G/DL (ref 31.5–36.5)
MCV RBC AUTO: 90 FL (ref 78–100)
P AXIS - MUSE: NORMAL DEGREES
PH: 7.45 (ref 7.35–7.45)
PHOSPHATE SERPL-MCNC: 2.9 MG/DL (ref 2.5–4.5)
PLATELET # BLD AUTO: 318 10E3/UL (ref 150–450)
POTASSIUM BLD-SCNC: 3 MMOL/L (ref 3.5–5)
POTASSIUM BLD-SCNC: 3.6 MMOL/L (ref 3.5–5)
POTASSIUM BLD-SCNC: 5.8 MMOL/L (ref 3.5–5)
PR INTERVAL - MUSE: NORMAL MS
PREALB SERPL IA-MCNC: 6 MG/DL (ref 19–38)
PROT SERPL-MCNC: 5.9 G/DL (ref 6–8)
QRS DURATION - MUSE: 78 MS
QT - MUSE: 304 MS
QTC - MUSE: 427 MS
R AXIS - MUSE: -27 DEGREES
RBC # BLD AUTO: 3.79 10E6/UL (ref 4.4–5.9)
SODIUM SERPL-SCNC: 146 MMOL/L (ref 136–145)
SYSTOLIC BLOOD PRESSURE - MUSE: NORMAL MMHG
T AXIS - MUSE: -27 DEGREES
TRIGL SERPL-MCNC: 217 MG/DL
VENTRICULAR RATE- MUSE: 119 BPM
WBC # BLD AUTO: 14.8 10E3/UL (ref 4–11)

## 2021-12-06 PROCEDURE — 250N000011 HC RX IP 250 OP 636: Performed by: SPECIALIST

## 2021-12-06 PROCEDURE — 83735 ASSAY OF MAGNESIUM: CPT | Performed by: INTERNAL MEDICINE

## 2021-12-06 PROCEDURE — 250N000011 HC RX IP 250 OP 636: Performed by: STUDENT IN AN ORGANIZED HEALTH CARE EDUCATION/TRAINING PROGRAM

## 2021-12-06 PROCEDURE — 84100 ASSAY OF PHOSPHORUS: CPT | Performed by: INTERNAL MEDICINE

## 2021-12-06 PROCEDURE — 250N000009 HC RX 250: Performed by: INTERNAL MEDICINE

## 2021-12-06 PROCEDURE — 84478 ASSAY OF TRIGLYCERIDES: CPT | Performed by: INTERNAL MEDICINE

## 2021-12-06 PROCEDURE — 84132 ASSAY OF SERUM POTASSIUM: CPT | Performed by: INTERNAL MEDICINE

## 2021-12-06 PROCEDURE — 99231 SBSQ HOSP IP/OBS SF/LOW 25: CPT | Mod: GC | Performed by: STUDENT IN AN ORGANIZED HEALTH CARE EDUCATION/TRAINING PROGRAM

## 2021-12-06 PROCEDURE — 250N000011 HC RX IP 250 OP 636: Performed by: INTERNAL MEDICINE

## 2021-12-06 PROCEDURE — 97530 THERAPEUTIC ACTIVITIES: CPT | Mod: GP

## 2021-12-06 PROCEDURE — 85027 COMPLETE CBC AUTOMATED: CPT | Performed by: NURSE PRACTITIONER

## 2021-12-06 PROCEDURE — 85610 PROTHROMBIN TIME: CPT | Performed by: INTERNAL MEDICINE

## 2021-12-06 PROCEDURE — 200N000001 HC R&B ICU

## 2021-12-06 PROCEDURE — 258N000003 HC RX IP 258 OP 636: Performed by: INTERNAL MEDICINE

## 2021-12-06 PROCEDURE — 999N000157 HC STATISTIC RCP TIME EA 10 MIN

## 2021-12-06 PROCEDURE — 97110 THERAPEUTIC EXERCISES: CPT | Mod: GP

## 2021-12-06 PROCEDURE — C9113 INJ PANTOPRAZOLE SODIUM, VIA: HCPCS | Performed by: INTERNAL MEDICINE

## 2021-12-06 PROCEDURE — 84134 ASSAY OF PREALBUMIN: CPT | Performed by: INTERNAL MEDICINE

## 2021-12-06 PROCEDURE — 80053 COMPREHEN METABOLIC PANEL: CPT | Performed by: INTERNAL MEDICINE

## 2021-12-06 PROCEDURE — 99291 CRITICAL CARE FIRST HOUR: CPT | Performed by: INTERNAL MEDICINE

## 2021-12-06 PROCEDURE — 87040 BLOOD CULTURE FOR BACTERIA: CPT | Performed by: INTERNAL MEDICINE

## 2021-12-06 PROCEDURE — 250N000013 HC RX MED GY IP 250 OP 250 PS 637: Performed by: INTERNAL MEDICINE

## 2021-12-06 PROCEDURE — 71045 X-RAY EXAM CHEST 1 VIEW: CPT

## 2021-12-06 PROCEDURE — 82330 ASSAY OF CALCIUM: CPT | Performed by: INTERNAL MEDICINE

## 2021-12-06 PROCEDURE — 999N000215 HC STATISTIC HFNC ADULT NON-CPAP

## 2021-12-06 RX ORDER — POTASSIUM CHLORIDE 29.8 MG/ML
20 INJECTION INTRAVENOUS
Status: COMPLETED | OUTPATIENT
Start: 2021-12-06 | End: 2021-12-06

## 2021-12-06 RX ORDER — POTASSIUM CHLORIDE 29.8 MG/ML
20 INJECTION INTRAVENOUS ONCE
Status: COMPLETED | OUTPATIENT
Start: 2021-12-06 | End: 2021-12-06

## 2021-12-06 RX ORDER — OLANZAPINE 10 MG/2ML
5 INJECTION, POWDER, FOR SOLUTION INTRAMUSCULAR EVERY 8 HOURS
Status: DISCONTINUED | OUTPATIENT
Start: 2021-12-06 | End: 2021-12-07

## 2021-12-06 RX ADMIN — ENOXAPARIN SODIUM 40 MG: 40 INJECTION SUBCUTANEOUS at 09:38

## 2021-12-06 RX ADMIN — MEROPENEM 1 G: 1 INJECTION, POWDER, FOR SOLUTION INTRAVENOUS at 20:33

## 2021-12-06 RX ADMIN — FUROSEMIDE 40 MG: 10 INJECTION, SOLUTION INTRAVENOUS at 20:32

## 2021-12-06 RX ADMIN — OLANZAPINE 5 MG: 10 INJECTION, POWDER, FOR SOLUTION INTRAMUSCULAR at 05:18

## 2021-12-06 RX ADMIN — POTASSIUM CHLORIDE 20 MEQ: 29.8 INJECTION, SOLUTION INTRAVENOUS at 16:57

## 2021-12-06 RX ADMIN — PANTOPRAZOLE SODIUM 40 MG: 40 INJECTION, POWDER, FOR SOLUTION INTRAVENOUS at 09:00

## 2021-12-06 RX ADMIN — Medication: at 22:53

## 2021-12-06 RX ADMIN — DEXMEDETOMIDINE HYDROCHLORIDE 0.3 MCG/KG/HR: 400 INJECTION INTRAVENOUS at 05:24

## 2021-12-06 RX ADMIN — VANCOMYCIN HYDROCHLORIDE 1500 MG: 5 INJECTION, POWDER, LYOPHILIZED, FOR SOLUTION INTRAVENOUS at 21:42

## 2021-12-06 RX ADMIN — OLANZAPINE 5 MG: 10 INJECTION, POWDER, FOR SOLUTION INTRAMUSCULAR at 21:47

## 2021-12-06 RX ADMIN — HYDROMORPHONE HYDROCHLORIDE 0.4 MG: 0.2 INJECTION, SOLUTION INTRAMUSCULAR; INTRAVENOUS; SUBCUTANEOUS at 20:15

## 2021-12-06 RX ADMIN — MEROPENEM 1 G: 1 INJECTION, POWDER, FOR SOLUTION INTRAVENOUS at 13:59

## 2021-12-06 RX ADMIN — LEVOTHYROXINE SODIUM ANHYDROUS 19 MCG: 100 INJECTION, POWDER, LYOPHILIZED, FOR SOLUTION INTRAVENOUS at 09:01

## 2021-12-06 RX ADMIN — POTASSIUM CHLORIDE 20 MEQ: 29.8 INJECTION, SOLUTION INTRAVENOUS at 11:37

## 2021-12-06 RX ADMIN — POTASSIUM CHLORIDE: 2 INJECTION, SOLUTION, CONCENTRATE INTRAVENOUS at 19:33

## 2021-12-06 RX ADMIN — POTASSIUM CHLORIDE 20 MEQ: 29.8 INJECTION, SOLUTION INTRAVENOUS at 09:02

## 2021-12-06 RX ADMIN — ACETAMINOPHEN 650 MG: 650 SUPPOSITORY RECTAL at 20:41

## 2021-12-06 RX ADMIN — HYDROMORPHONE HYDROCHLORIDE 0.4 MG: 0.2 INJECTION, SOLUTION INTRAMUSCULAR; INTRAVENOUS; SUBCUTANEOUS at 23:52

## 2021-12-06 RX ADMIN — MEROPENEM 1 G: 1 INJECTION, POWDER, FOR SOLUTION INTRAVENOUS at 05:19

## 2021-12-06 RX ADMIN — FUROSEMIDE 40 MG: 10 INJECTION, SOLUTION INTRAVENOUS at 09:00

## 2021-12-06 RX ADMIN — POTASSIUM CHLORIDE 20 MEQ: 29.8 INJECTION, SOLUTION INTRAVENOUS at 10:29

## 2021-12-06 RX ADMIN — HYDROMORPHONE HYDROCHLORIDE 0.4 MG: 0.2 INJECTION, SOLUTION INTRAMUSCULAR; INTRAVENOUS; SUBCUTANEOUS at 15:43

## 2021-12-06 RX ADMIN — OLANZAPINE 5 MG: 10 INJECTION, POWDER, FOR SOLUTION INTRAMUSCULAR at 13:59

## 2021-12-06 ASSESSMENT — ACTIVITIES OF DAILY LIVING (ADL)
ADLS_ACUITY_SCORE: 16
ADLS_ACUITY_SCORE: 14
ADLS_ACUITY_SCORE: 16
ADLS_ACUITY_SCORE: 14
ADLS_ACUITY_SCORE: 16
ADLS_ACUITY_SCORE: 14
ADLS_ACUITY_SCORE: 16
ADLS_ACUITY_SCORE: 14
ADLS_ACUITY_SCORE: 16

## 2021-12-06 ASSESSMENT — MIFFLIN-ST. JEOR: SCORE: 1408.13

## 2021-12-06 NOTE — PROGRESS NOTES
ASSESSMENT:  1. SBO (small bowel obstruction) (H)    bowel perforation  resp failure compromise  RVR afib    Pod 3 take back and repair of perforation  Pod 6 explore lap lysis of cemented in abdomen and closed loop obstruction    PLAN:  NPO  Drains  IV antibiotics  TPN  RESP per icu/medicine  CV controlled   Appreciate icu care  Too floor when they feel appropriate      SUBJECTIVE:   He is a little confused still but improved      Patient Vitals for the past 24 hrs:   BP Temp Temp src Pulse Resp SpO2 Weight   12/06/21 0600 121/83 -- -- 86 (!) 45 90 % --   12/06/21 0500 138/75 -- -- 82 18 94 % --   12/06/21 0400 -- 100  F (37.8  C) Oral 83 18 94 % 70.3 kg (155 lb)   12/06/21 0300 131/81 -- -- 86 28 93 % --   12/06/21 0200 118/84 -- -- 90 20 93 % --   12/06/21 0129 -- -- -- 87 20 94 % --   12/06/21 0100 125/80 -- -- 91 24 93 % --   12/06/21 0000 117/78 98.9  F (37.2  C) Oral 85 19 91 % --   12/05/21 2347 -- -- -- 87 23 (!) 78 % --   12/05/21 2345 125/82 -- -- 86 26 (!) 89 % --   12/05/21 2330 121/83 -- -- 89 19 (!) 89 % --   12/05/21 2315 125/82 -- -- 86 26 92 % --   12/05/21 2300 127/88 -- -- 86 (!) 32 92 % --   12/05/21 2255 -- -- -- 82 17 90 % --   12/05/21 2245 117/72 -- -- 83 22 (!) 87 % --   12/05/21 2230 125/76 -- -- 84 29 92 % --   12/05/21 2215 110/78 -- -- 80 26 92 % --   12/05/21 2209 -- -- -- 80 20 90 % --   12/05/21 2200 108/75 -- -- 77 19 92 % --   12/05/21 2145 108/77 -- -- 77 22 91 % --   12/05/21 2130 108/76 -- -- 76 (!) 38 92 % --   12/05/21 2115 104/74 -- -- 75 30 90 % --   12/05/21 2100 105/72 -- -- 74 19 91 % --   12/05/21 2045 98/66 -- -- 73 25 92 % --   12/05/21 2030 102/71 -- -- 77 19 91 % --   12/05/21 2015 97/68 -- -- 73 19 92 % --   12/05/21 2000 98/65 (!) 100.9  F (38.3  C) Oral 72 21 92 % --   12/05/21 1945 101/70 -- -- 76 27 90 % --   12/05/21 1930 107/68 -- -- 72 18 92 % --   12/05/21 1900 91/63 -- -- 71 19 91 % --   12/05/21 1845 92/63 -- -- 70 21 91 % --   12/05/21 1830 95/66 -- -- 74  26 (!) 85 % --   12/05/21 1800 92/60 (!) 101.5  F (38.6  C) Oral 69 18 93 % --   12/05/21 1700 103/67 -- -- 72 25 92 % --   12/05/21 1658 -- -- -- 72 30 93 % --   12/05/21 1600 94/66 (!) 101.2  F (38.4  C) Oral 73 20 93 % --   12/05/21 1500 99/62 -- -- 72 21 94 % --   12/05/21 1445 101/64 -- -- 73 22 94 % --   12/05/21 1430 97/62 -- -- 73 23 93 % --   12/05/21 1415 106/68 -- -- 72 25 95 % --   12/05/21 1400 103/71 -- -- 72 24 94 % --   12/05/21 1345 93/70 -- -- 71 (!) 39 93 % --   12/05/21 1230 95/65 99.1  F (37.3  C) Oral 70 24 94 % --   12/05/21 1200 (!) 87/58 -- -- 69 22 94 % --   12/05/21 1100 108/75 -- -- 69 22 94 % --   12/05/21 1000 111/75 -- -- 71 21 94 % --   12/05/21 0900 112/77 -- -- 71 22 93 % --   12/05/21 0850 -- -- -- 69 20 93 % --         PHYSICAL EXAM:  GEN: No acute distress, comfortable  LUNGS: CTA bilaterally  CV:RRR  ABD:some distension  Drains: all serous output   Output by Drain (mL) 12/04/21 0700 - 12/04/21 1459 12/04/21 1500 - 12/04/21 2259 12/04/21 2300 - 12/05/21 0659 12/05/21 0700 - 12/05/21 1459 12/05/21 1500 - 12/05/21 2259 12/05/21 2300 - 12/06/21 0659 12/06/21 0700 - 12/06/21 0822   Closed/Suction Drain 1 Right Abdomen Bulb 19 Bulgarian  25 7 10 10 0    Closed/Suction Drain 1 Left LLQ Bulb 19 Bulgarian  40 8 10 20 5    Closed/Suction Drain 2 Inferior;Midline Abdomen Bulb 15 Bulgarian  25 20 10 10 20       EXT:No cyanosis, edema or obvious abnormalities    12/05 0700 - 12/06 0659  In: 1819.93 [I.V.:1201.72]  Out: 3920 [Urine:3575; Drains:95]    No results displayed because visit has over 200 results.             Mekhi Ricahrdson MD

## 2021-12-06 NOTE — PHARMACY-CONSULT NOTE
"Pharmacy Note: Parenteral Nutrition (PN) Management    Pharmacist consulted to dose PN for Gurdeep Dia, a 66 year old male by Dr. Clinton.    Subjective:    The patient is a new PN start.     The patient was started on PN in the hospital on 12/5/21.     Indication for PN therapy: bowel resection     Inadequate nutrition anticipated for > 7 days.      Enteral nutrition contraindicated due to: bowel integrity is tenuous.     Pertinent diseases and other special considerations respiratory failure    Social History     Tobacco Use     Smoking status: Current Every Day Smoker     Smokeless tobacco: Never Used   Substance Use Topics     Alcohol use: Not Currently     Comment: Clean for 5 years     Drug use: Not Currently     Objective:    Ht Readings from Last 1 Encounters:   11/30/21 1.656 m (5' 5.2\")     Wt Readings from Last 1 Encounters:   12/06/21 70.3 kg (155 lb)       Body mass index is 25.64 kg/m .    Patient Vitals for the past 96 hrs:   Weight   12/06/21 0400 70.3 kg (155 lb)       Labs:  Last 3 days:  Recent Labs     12/03/21  2021 12/03/21  2216 12/04/21  0201 12/04/21  0345 12/04/21  0514 12/05/21  0517 12/05/21  1048 12/05/21  1048 12/06/21  0619 12/06/21  0620 12/06/21  0947     --   --  143  --  145 145  --   --  146*  --    POTASSIUM 3.7 3.7  --  3.7  --  3.0* 4.1  --   --  3.0*  --    CHLORIDE 108*  --   --  109*  --  106 108*  --   --  106  --    CO2 26  --   --  28  --  29 28  --   --  31  --    BUN 14  --   --  13  --  16 17  --   --  17  --    CR 0.79  --   --  0.85  --  0.85 0.83  --   --  0.82  --    VIJAYA 8.3*  --   --  9.0  --  8.3* 7.6*  --   --  8.4*  --    LDAROUI56 1.06*  --  1.15  --  1.12  --   --   --   --  1.12  --    MAG 1.9  --   --  2.0  --  2.0 2.3  --   --  2.1  --    PHOS  --   --   --  1.4*  --   --  1.1*  0.9*  --   --  2.9  --    PROTTOTAL  --   --   --   --   --   --  4.8*  --   --  5.9*  --    ALBUMIN  --   --   --   --   --   --  2.3*  --   --  2.3*  --    PREALB  --   " --   --   --   --   --  5*  --   --  6*  --    TRIG  --   --   --   --   --   --  160*  --   --  217*  --    HGB 10.7*  --   --  11.0*  --  10.4*  --   --   --   --  11.2*   HCT 33.9*  --   --  34.0*  --  31.3*  --   --   --   --  34.2*     --   --  246  --  258  --   --   --   --  318   BILITOTAL  --   --   --   --   --   --  0.5  --   --  0.4  --    AST  --   --   --   --   --   --  20  --   --  25  --    ALT  --   --   --   --   --   --   --   --   --  20  --    ALKPHOS  --   --   --   --   --   --  46   < >  --  44*  --    INR  --   --   --   --   --   --   --   --  1.11  --   --     < > = values in this interval not displayed.       Glucose (past 48 hours):   Recent Labs     12/05/21  0414 12/05/21  0517 12/05/21  1048 12/05/21  1220 12/05/21  1855 12/05/21  2338 12/06/21  0342 12/06/21  0620 12/06/21  0653 12/06/21  0757   * 117 105 116* 116* 124* 132* 112 114* 121*       Intake/Output (last 24 hours): I/O last 3 completed shifts:  In: 1819.93 [I.V.:1201.72]  Out: 3920 [Urine:3575; Emesis/NG output:250; Drains:95]    Estimated CrCl: Estimated Creatinine Clearance: 86.9 mL/min (based on SCr of 0.82 mg/dL).    Assessment:    Initiate patient on PN therapy as a continuous central therapy.     Given the patient's current condition/oral intake, PN is still indicated.    Lab results reviewed:     Changing to a custom formulation TPN for easier electrolyte management.    Plan:  1. Rate of PN: 70 mL/hr   2. Formula:     Amino Acids 90 grams    Dextrose 210 grams    Sodium 80 mEq/day    Potassium 60 mEq/day    Calcium 8 mEq/day    Magnesium 14 mEq/day    Phosphorus 28 mMol/day    Chloride: Acetate Ratio 2:1    Standard Multivitamins w/Vitamin K    Trace Elements  3. Fat Emulsion: 20%, 250 mL IV three times weekly on Tues, Thur, and Sat  4. Check CMP tomorrow.  5. Pharmacist will continue to follow the patient's lab results, clinical status and blood glucose results and make adjustments as  appropriate.    Thank you for the consult.  Gerard Nash, PharmD  12/6/2021 12:21 PM

## 2021-12-06 NOTE — PLAN OF CARE
Patient remains on HFNC 40l, 60% Sats 92-94%. No resp distress noted. I.S patient obtained 1200 ml. Will continue to wean O2 and encourage patient to use I.S as tolerated

## 2021-12-06 NOTE — PLAN OF CARE
Problem: Adult Inpatient Plan of Care  Goal: Plan of Care Review  Outcome: No Change    High flow NC was at 40L/45% at the start of my shift.  Patient is calmer and compliant with direction, started IS, deep breathing, and coughing.  Weak dry cough present.  O2 needs increased during shift.  At this time patient is at 40L/60%.    Patient much calmer then previous night shift.  Attempted to wean off Precedex but patient became more fidgety.  Precedex turn up to 0.3 and administered prn Haldol.  Patient has been compliant with cares.    Patient restraints removed at 0400.  Even with restraints patient has been able to move self round in bed but has not been pulling at tubing.  Even with restraint, HFNC has been getting displaced.      Lobo Armas RN

## 2021-12-06 NOTE — PROGRESS NOTES
Nutrition Therapy  Parenteral Nutrition follow-up       Dietitian to Pharmacy    Plan to adjust to custom formula for start this evening.  Rate of TPN: 70  Grams Dextrose: 210  Grams Protein:90  Spoke with pharmacist about starting custom formula today.    Lipids: 250 ml 20% 3x/week.  This to provide:  1680 ml formula, avg 1288 calories, avg 21 g lipid/day.  GIR 2mg cho/kg/min.    Calorie needs not yet met, plan to adjust TPN formula tomorrow to meet needs.         Current Nutrition Intake:  Diet: NPO  Nutrition Support: central line TPN, Clinimix formula 5/15 at 35 ml/hr with daily lipid      Provides:  840 mls, 1096 calories, 42 g protein, 126 g carb, 50 g fat.    Weight Trends  Admission wt: 175 lb 11/30/21  Current wt: 155 lb 12/6/21  Wt Readings from Last 3 Encounters:   12/06/21 70.3 kg (155 lb)   01/22/20 79.4 kg (175 lb)   12/24/19 81.2 kg (179 lb)     Suspect admit wt was stated wt.  No wts between 11/30 and 12/6.  Doubt 20 lb wt loss in a few days.  I/Os +2.8L    GI:   ml  MARISA drains  LBM 11/29    Labs:   Na 146  K 3-replacing  Phos 2.9  Mg 2.1  prealb 6    Bili total 0.4  Labs reviewed by dietitian    Medications:   Lasix, levothyroxine  Reviewed by dietitian     ASSESSED NUTRITION NEEDS  Estimated Energy Needs: 8731-3690 kcals/day (25 - 30 kcals/kg)  Justification: Post-op  Estimated Protein Needs: 80-99 grams protein/day (1.2 - 1.5 grams of pro/kg)  Justification: Post-op  Estimated Fluid Needs: 1985 mL/day (30 ml/kg)   Justification: Maintenance    MALNUTRITION  % Weight Loss: 20 lb loss noted unsure time frame, may be greater than 1 yr.  % Intake:  </= 50% for >/= 5 days (severe malnutrition)  Subcutaneous Fat Loss:  Orbital region moderate depletion (observed)  Muscle Loss:  Not assessed in DOC pt. Pt agitated  Fluid Retention:  None noted per chart     Malnutrition Diagnosis: Moderate malnutrition  In Context of:  Acute illness or injury    Nutrition Diagnosis  Malnutrition related to  acute illness as evidenced by NPO x 5 days, moderate fat loss.   Evaluation: No change    Goals   Tolerate parenteral nutrition-progressing  Advance to po diet when appropriate-not met  BG -met      INTERVENTIONS  Implementation  Parenteral Nutrition/IV Fluids - Modify composition and rate  Gradual increase in dextrose as electrolytes needing replacement.  Decrease lipid d/t elevated TG.  Per surgeon, pt remains NPO.      Monitoring/Evaluation  Progress toward goals will be monitored and evaluated per protocol.

## 2021-12-06 NOTE — PROGRESS NOTES
Care Management Follow Up    Length of Stay (days): 6    Expected Discharge Date: 12/10/2021     Concerns to be Addressed:  Requires ICU level of care due to Precedex drip. IV Dilaudid PCA. High flow oxygen needs. Surgical clearance. Strict NPO at this time, getting TPN.   Patient plan of care discussed at interdisciplinary rounds: Yes    Additional Information:  Patient from DOC. Care manager to follow as needed.       Aye Schmitz RN

## 2021-12-06 NOTE — PROGRESS NOTES
Patient continues on HFNC 40LPM and 60% FIO2 which was increased from after patient with one episode of decreased saturation. BS clear and diminished, HR 87, RR 17, and oxygen saturation  90%. Continue to monitor and wean oxygen as able.    Svetlana Fuentes, RRT    No

## 2021-12-06 NOTE — PROGRESS NOTES
[facility]    Assessment/Plan  Principal Problem:    SBO (small bowel obstruction) (H)  Active Problems:    Hypertension goal BP (blood pressure) < 140/90    Benign prostatic hyperplasia with weak urinary stream    RAVI (generalized anxiety disorder)    Moderate major depression (H)    History of substance abuse (H)    History of hepatitis C    Chronic neck pain    Chronic bilateral low back pain without sciatica    Gurdeep Dia is a 66 year old male with history of splenectomy, appendectomy, HTN, and substance abuse who presented with 2 days of right sided abdominal pain, found to have a bowel obstruction with closed loop. He is admitted for surgical repair of obstruction.      Requires ongoing hospitalization for post-operative monitoring and care     Requires ICU-level care currently, followed by [facility] while in unit. Potentially step down 12/6.    Mechanical SBO s/p exploratory laparotomy / small bowel resection / repair of enterotomy / extensive lysis of adhesions (11/30/21)  Subsequent small bowel perforation requiring repeat ex lap for washout 12/3    CT abdomen/pelvis on admission 11/30 significant for closed loop mechanical small bowel obstruction. Does have history of multiple abdominal surgeries previously. First procedure 11/30 for small bowel resection. Acute change on 12/2-12/3 and found to have intraabdominal drainage from 12/3/2021; underwent repeat exploratory laparotomy with washout and repair of small bowel perforation.    -Surgery consulted and following   -ICU primary currently   -NPO for now until cleared by gen surg   -Pain control as per surgery/ICU (PCA, tyl) -- need to balance with encephalopathy below   -IV meropenem -- follow cultures   -IV Protonix     Acute encephalopathy, likely multifactorial - toxic/metabolic/hospital-induced   Acutely agitated post-op 12/3 to 12/4. Pulling at lines and threatening to leave.  Nurse feels unsafe without  restraints.  - Soft limb restraint x 2 - adjust as needed  - Precedex gtt throughout today per ICU, revisit tomorrow (calm this morning, 12/6)  - PRN Haldol   - Zyprexa HS   - Conservative measures as able   - Avoid opioid/benzo if able     Acute hypoxic respiratory failure, post-op   Requiring high-flow oxygen in ICU following procedure.  Question post-op atelectasis vs aspiration versus volume overload.  High risk for intubation per ICU given his agitation and potential to not protect airway.  Currently full code.  Does have a degree of respiratory distress when he gets agitated.  -Continue high flow nasal cannula per ICU  -Intubate if indicated    Afib, RVR post-op, currently rate controlled   Volume up  Noted.  Rates had been in the 120 to 150 range.  Rate controlled with amiodarone drip. Both CHADS2-Vasc and HAS-BLED of at least 2.  Would likely warrant anticoagulation on ongoing basis.  Of note, is 6 pounds up -question of volume overload.  Does not overtly seem to have JVD or other signs of acute volume overload. TSH normal.  -Amiodarone drip per ICU.  -Lovenox for now per ICU   -Consider ongoing anticoagulation once encephalopathy is improved  -Lasix per ICU.  Consider whether ongoing need once out of the unit.  -Likely needs echocardiogram.    HTN  PTA lisinopril, furosemide being held in the setting of acute surgical care.  Will monitor, even with pain BP not significantly elevated.  Will resume when hemodynamically stable.  Kidney function is stable at this time.  Blood pressure does climb when he is agitated.  -Monitor     Tremor - resolved  New on exam 12/2. Bilateral. Doesn't fit well with either essential tremor or parkinsonian. Almost resembles asterixis. LFTs and kidney function normal though. Ammonia normal. Improved 12/3.  -Monitor      Chronic Conditions:  Hypothyroidism- PTA levothyroxine  Depression/anxiety- PTA venlafaxine, mirtazepine, Buspar   Chronic pain- Hold mexolicam 7.5mg daily, resume  PTA gabapentin when tolerating PO.  BPH- PTA flomax      Diet: NPO for Medical/Clinical Reasons Except for: Meds, Ice ChipsNPO   DVT Prophylaxis: Lovenox q day    Roblero Catheter: placed   Fluids:  None  Central Lines: None  Code Status: Full Code    Subjective    States he feels better today - pain is a bit better. Wanting some coke. States he doesn't remember being agitated and needing restraints overnight. States he didn't mean it, whatever happened.      Objective    Vital signs in last 24 hours Temp:  [98.9  F (37.2  C)-101.5  F (38.6  C)] 100  F (37.8  C)  Pulse:  [69-91] 90  Resp:  [17-45] 20  BP: ()/(58-97) 114/97  FiO2 (%):  [40 %-60 %] 60 %  SpO2:  [78 %-95 %] 91 %       Intake/Output last 3 shift I/O last 3 completed shifts:  In: 1819.93 [I.V.:1201.72]  Out: 3920 [Urine:3575; Emesis/NG output:250; Drains:95]    Intake/Output this shift:I/O this shift:  In: 7 [I.V.:7]  Out: -     Physical Exam  General appearance: uncomfortable appearing male, laying in bed, two officers at bedside.  Acutely agitated.  Head: Normocephalic, without obvious abnormality, atraumatic.  NG tube in place.    Throat: moist mucous membranes.  Lungs: Tachypneic.  High flow oxygen in place.  Coarse lung sounds in the bases.  Heart: regular rate and rhythm, S1, S2 normal, no murmur, click, rub or gallop  Abdomen: Abdominal binding in place.  Multiple MARISA drains in place with bloody drainage. bowel sounds very quiet. Moderate TTP throughout.   Extremities: extremities normal, atraumatic, no cyanosis or edema  Skin: Slightly pale, dry skin.  Neurologic: Alert and oriented X 2.  Able to move extremities.  Sensation intact.   Psychiatric: Exceedingly anxious and agitated.     Pertinent Labs and Pertinent Radiology   Lab Results: personally reviewed.     Radiology Results: Personally reviewed image/s and impression/s    Precepted patient with Dr. Willy Mcmahon MD - PGY2  [facility]  P:  [phone]

## 2021-12-06 NOTE — PROGRESS NOTES
[facility]:  CRITICAL CARE PROGRESS NOTE     Date / Time of Admission:  11/30/2021  8:49 AM    ID: Gurdeep Dia is a 66 year old male, incarcerated, vaccinated against COVID19, with tobacco use disorder (active smoker), essential hypertension, BPH, chronic pain with underlying polysubstance abuse, and history of multiple prior abdominal surgeries who was admitted to [facility] on 11/30/2021 with a small bowel obstruction status post exploratory laparotomy and SBO resection on 11/30/2021.  Course complicated by small bowel perforation 12/3 for which he underwent an emergent ex lap with abdominal washout on 12/3, admitted to the ICU post-operatively; post operative care complicated by acute respiratory failure with hypoxia, atrial fibrillation with RVR, agitation/delirium on Precedex gtt.         Changes for today: 1.   Check CXR given rising FiO2.  2. Encourage IS use for atelectasis.  3. Goal net negative I/Os.  4. Keep amiodarone gtt for today - once FiO2 improve, can work on transition off.        Assessment/Plan:   Neurologic: Encephalopathy with agitation/delirium.  Likely toxic/metabolic, due to acute illness.  Also not getting multiple psych meds due to NPO status.  Required Precedex gtt post-operatively.  Still with some agitation, initiate Zyprexa IM 12/5.  Chronic pain with underlying polysubstance abuse.    Precedex gtt as needed for agitation, RASS goal 0.     Adjust Zyprexa from 5 mg IM Q6H to 5 mg IV Q8H, hold parameters placed    Holding gabapentin, buspirone, venlafaxine while NPO    Dilaudid PCA management per surgical team    Pulmonary: Acute post-op respiratory failure with hypoxemia. Possible aspiration, atelectasis from distended abdomen post-op.  Concern also for volume overload contributing; diuresis started 12/4.  Possible aspiration pneumonia.      Wean supplemental O2 as tolerated; goal O2 sat > 92%.  HOB > 30 degrees to limit aspiration risk.      HFNC, 20 -  60 L/min, titrate as tolerates    Encourage OOB to chair, PT/OT, incentive spirometry     Worked with patient on IS at bedside today    Diuresis and Abx as below.     Cardiovascular: Paroxysmal atrial fibrillation with RVR, improved.  Went into Afib with RVR on 12/3 post-operatively, controlled with diuresis + amiodarone gtt.      Cardiac monitoring.  SBP >= 90 mmHg, MAP >= 65 mmHg.  EKG PRN.    Continue amiodarone 0.5 mg/hr continuous infusion (start 12/4).     Consider holding amio gtt once O2 needs improve.      Diuresis as below.    Hold ACEi due to NPO status     GI/: Small bowel obstruction s/p ex lap/resection 11/30 with complications of perforation 12/2 s/p ex-lap/abd washout 12/3.  Came to ED on 11/30, SBO dx, went to OR with Dr. Richardson, small bowel with perforations and multiple adhesions throughout the bowel noted,  mL.  On 12/3, large amt of drainage leaked from abd incision staple line, went back to OR for emergent ex-lap and washout/repair.      Surgical site/drain management per General Surgery team.    Defer to Surgery team as to when patient can start taking in oral ice chips, etc.    Monitor for flatus daily.  Recurrent opiate use will decrease intestinal motility.    Nutrition: NPO.  TPN started 12/5.     Last BM:  None.  Meds: None.    GI prophylaxis: PPI daily.     Renal: Hypokalemia.  In setting of diuresis.  Hypernatremia, mild.    Monitor I/O's.  Electrolyte repletion PRN.  Avoid/limit nephrotoxic agents.    IVFs: Saline lock.   On TPN.    Lasix 40 mg IV Q12H (start 12/4).  Goal I/O net negative.    Holding tamsulosin while NPO     Heme/Onc: Leukocytosis, unspecified.  Normocytic anemia.     Followup CBC daily with rising WC.    DVT prophylaxis: SCDs, Lovenox 40 mg q24h.     Endocrine: Hyperglycemia.  Likely related to stress, TPN.  No history of diabetes, HgbA1c 5.8% on 12/5.      FSBG PRN, insulin not indicated.  Hypoglycemia protocol.    Levothyroxine 19 mcg IV daily      Infectious diseases: Abdominal peritonitis, perforated SBO.  Possible aspiration pneumonia.    Follow-up blood cultures.  Reculture if spikes temp, escalate Abx.    Check CBC, CRP, PCT, with morning labs.    Continue empiric Abx with meropenem (start 12/3).  Plan for 10 day empiric course.      Rheum/Musculoskeletal:     Activity:  Bedrest - once able to get off Precedex gtt safely, can work on PT/OT with OOB to chair    Lines/Drains/Tubes:  RUE PICC central line, placed 12/3  Right NG 16 Fr enteral tube, placed 12/2  19 Fr R abd bulb, placed 11/30  19 Fr LLL abd bulb, placed 12/3  15 Fr mid-abd bulb, placed 12/3  Roblero catheter, placed 12/3     Code Status:  Full Code     The patient and/or the family was educated about the above plan of care and indicated understanding.       This patient is considered critically ill and requires ICU level of care due to acute respiratory failure with hypoxia requiring HFNC, escalating FiO2 needs.     Total Critical Care time, not including separate billable procedure time: 50 minutes.    Yumiko Browne MD, MPH  Pulmonary/Critical Care Medicine  12/06/2021   12:54 PM         ICU DAILY CHECKLIST:   ICU DAILY CHECKLIST           Can patient transfer out of MICU? no    FAST HUG:    Feeding:  no.  Patient is receiving NPO    Roblero: yes  Analgesia/Sedation: yes; Dexmedetomidine   Thromboembolic prophylaxis: yes; Mode:  SCDs  HOB>30:  yes  Stress Ulcer Protocol Active: yes; Mode: PPI  Glycemic Control: Any glucose > 180 no; Mode of Insulin Therapy: Sliding Scale Insulin    INTUBATED:  Can patient have daily waking:  N/A  Can patient have spontaneous breathing trial:  N/A    Restraints? no    PHYSICAL THERAPY AND MOBILITY:  Can patient have PT and mobility trial: no  Activity: Bedrest    RISK FACTORS PRESENT ON ADMISSION:  Clinically Significant Risk Factors Present on Admission                      Subjective/Interval History:   12/3:  Perforated SBO - went back to OR for washout and  ex-lap.  Post-op with Afib with RVR, worsening FiO2 needs.  Placed on HFNC, Amiodarone gtt, started Meropenem.  12/4:  Started Precedex gtt early AM due to agitation.  Lasix 2x/day.  12/5:  Started Zyprexa 5 mg IM Q6H    Overnight, FiO2 needs increased.  WBC slightly up today.   Patient trying to joke, more clear today but still a little confused.   Denies any current cough or wheezing.  Notes that abd hurts with deep breathing or when laughing.    Review of Systems:  The Review of Systems is negative other than noted in the HPI        Allergies/Medications:   Allergies:     Allergies   Allergen Reactions     Penicillins Hives       Continuous Infusions:    amiodarone Stopped (12/04/21 2246)     dexmedetomidine 0.3 mcg/kg/hr (12/06/21 0524)     dextrose Stopped (12/05/21 1824)     dextrose 10% 25 mL/hr at 12/04/21 0800     HYDROmorphone       parenteral nutrition - ADULT compounded formula       parenteral nutrition - Clinimix E 35 mL/hr at 12/05/21 1815     Scheduled Medications:    [Held by provider] busPIRone  15 mg Oral BID     enoxaparin ANTICOAGULANT  40 mg Subcutaneous Q24H     furosemide  40 mg Intravenous Q12H     [Held by provider] gabapentin  300 mg Oral BID w/meals     [Held by provider] gabapentin  600 mg Oral At Bedtime     levothyroxine  19 mcg Intravenous Daily     [START ON 12/7/2021] lipids  250 mL Intravenous Once per day on Tue Thu Sat     meropenem  1 g Intravenous Q8H     [Held by provider] mirtazapine  30 mg Oral At Bedtime     OLANZapine  5 mg Intramuscular Q8H     pantoprazole (PROTONIX) IV  40 mg Intravenous Daily with breakfast     [Held by provider] polyethylene glycol  17 g Oral Daily     [Held by provider] senna-docusate  1 tablet Oral BID    Or     [Held by provider] senna-docusate  2 tablet Oral BID     sodium chloride (PF)  10-40 mL Intracatheter Q7 Days     sodium chloride (PF)  3 mL Intracatheter Q8H     [Held by provider] tamsulosin  0.4 mg Oral Daily     [Held by provider]  "venlafaxine  37.5 mg Oral Daily           Objective:   Vitals:  /84   Pulse 98   Temp 99.8  F (37.7  C)   Resp 17   Ht 1.656 m (5' 5.2\")   Wt 70.3 kg (155 lb)   SpO2 94%   BMI 25.64 kg/m    Vent: FiO2 (%): 60 %  Resp: 17    GEN: Awake, slightly confused, interactive.    HEENT: Normocephalic, atraumatic.  Extraoccular eye movements intact, anicteric sclera. Slightly dry mucous membranes.  NG tube.  NECK: Supple.    PULM: Non-labored breathing.  No use of accessory muscles.  Diminished breath sounds at bases.  No wheezing.  CVS: Regular rate and rhythm.  Normal S1, S2.  No rubs, murmurs, or gallops.    ABDOMEN: Hypoactive bowel sounds.  Abdominal binder in place.  3 abdominal drains in place, serosanguineous fluid.  EXTREMITES:  No clubbing, cyanosis, or edema.    NEURO:  Awake.  Oriented to person, place.  Cranial nerves 2-12 grossly intact.  Moving all extremities.      Intake/Output: I/O last 3 completed shifts:  In: 1819.93 [I.V.:1201.72]  Out: 3920 [Urine:3575; Emesis/NG output:250; Drains:95]        Pertinent Studies:   All laboratory data reviewed  Serum Glucose range:   Recent Labs   Lab 12/06/21  0757 12/06/21  0653 12/06/21  0620 12/06/21  0342   * 114* 112 132*     ABG:   Recent Labs   Lab 12/06/21  0620 12/04/21  0514 12/04/21  0201   PH 7.45 7.37 7.36     CBC:   Recent Labs   Lab 12/06/21  0947 12/05/21  0517 12/04/21  0345 12/01/21  1756 12/01/21  0709   WBC 14.8* 9.0 5.6   < > 9.8   HGB 11.2* 10.4* 11.0*   < > 14.9   HCT 34.2* 31.3* 34.0*   < > 46.8   MCV 90 90 92   < > 94    258 246   < > 323   NEUTROPHIL  --   --   --   --  79   LYMPH  --   --   --   --  9   MONOCYTE  --   --   --   --  9   EOSINOPHIL  --   --   --   --  1    < > = values in this interval not displayed.     Chemistry:   Recent Labs   Lab 12/06/21  0620 12/05/21  1048 12/05/21  0517 12/03/21  2021 12/03/21  0727 12/02/21  1305 12/02/21  1047   * 145 145   < > 142  --  141   POTASSIUM 3.0* 4.1 3.0*   < > " 3.9  --  4.6   CHLORIDE 106 108* 106   < > 110*  --  113*   CO2 31 28 29   < > 24  --  20*   BUN 17 17 16   < > 17  --  25*   CR 0.82 0.83 0.85   < > 0.88  --  1.08   GFRESTIMATED >90 >90 90   < > 89  --  71   VIJAYA 8.4* 7.6* 8.3*   < > 8.6  --  8.1*   MAG 2.1 2.3 2.0   < > 1.8  --   --    PROTTOTAL 5.9* 4.8*  --   --  5.7*  --  6.2   ALBUMIN 2.3* 2.3*  --   --  2.4*  --  2.7*   AST 25 20  --   --  23  --  33   ALT 20  --   --   --  21  --  22   ALKPHOS 44* 46  --   --  46  --  53   BILITOTAL 0.4 0.5  --   --  0.5  --  0.4   ANDRES  --   --   --   --   --  27  --     < > = values in this interval not displayed.     Coags:  Recent Labs   Lab 12/06/21  0619   INR 1.11     Cardiac Markers:  No results for input(s): CKTOTAL, TROPONINI in the last 168 hours.     Microbiology:  Blood cultures x2, 12/5: No growth to date.  Urine culture, 12/5: No growth        Cardiology/Radiology:   Cardiac: All cardiac studies reviewed by me.    EKG: Reviewed.    TTE,: None.    Radiology:  All imaging studies reviewed by me.    Chest X-Ray, 12/3:   Right PICC line present with its tip likely just into the right atrium. Suggest withdrawing the PICC line 2 cm to place the tip in the low SVC.  NG tube in good position in the stomach.   Mild cardiomegaly. Low lung volumes with mild patchy mixed interstitial and airspace infiltrates    CT abd/pelvis with contrast, 11/30:   FINDINGS:  LOWER CHEST: Bibasilar atelectasis.  HEPATOBILIARY: Scattered small water density foci in the liver consistent with cysts. No radiopaque gallstone. No suspicious hepatic lesions.  PANCREAS: Coarse calcification in the pancreatic tail and body may represent sequelae of chronic pancreatitis no acute pancreatitis or abnormal pancreatic mass.  SPLEEN: Not visualized consistent with prior splenectomy.  ADRENAL GLANDS: Normal.  KIDNEYS/BLADDER: No hydronephrosis or masses. No bladder stone or mass.  BOWEL: Anastomotic staples in the colon in the right mid abdomen. There  is normal caliber duodenum and proximal jejunum with dilated loops of distal jejunum and ileum with some fecal i-STAT ileal contents and a transition in the right mid to lower  abdomen. Findings are consistent with mechanical small bowel obstruction and this may represent a closed loop obstruction such as internal hernia or multiple adhesions given the lack of dilatation of the proximal small bowel. No pneumatosis intestinalis,  free intraperitoneal air or abscess. LYMPH NODES: No lymphadenopathy.  VASCULATURE: Mild aortoiliac calcification without aneurysm.  PELVIC ORGANS: Mild prostatic enlargement.  MUSCULOSKELETAL: Disc space narrowing at L4-L5 with disc degeneration and bilateral spondylolysis at L4 with grade 2 spondylolisthesis of L4 on L5. No acute fracture.  IMPRESSION: 1.  Mechanical small bowel obstruction with dilated distal jejunum and ileum with caliber changes in the right lower quadrant and in the left midabdomen this may be secondary to  adhesions or internal hernia and has the appearance of a closed loop  obstruction. No pneumatosis intestinalis, free intraperitoneal air or abscess.

## 2021-12-07 ENCOUNTER — APPOINTMENT (OUTPATIENT)
Dept: PHYSICAL THERAPY | Facility: HOSPITAL | Age: 66
DRG: 329 | End: 2021-12-07
Payer: MEDICARE

## 2021-12-07 ENCOUNTER — APPOINTMENT (OUTPATIENT)
Dept: OCCUPATIONAL THERAPY | Facility: HOSPITAL | Age: 66
DRG: 329 | End: 2021-12-07
Payer: MEDICARE

## 2021-12-07 LAB
ALBUMIN SERPL-MCNC: 2.3 G/DL (ref 3.5–5)
ALP SERPL-CCNC: 54 U/L (ref 45–120)
ALT SERPL W P-5'-P-CCNC: 34 U/L (ref 0–45)
ANION GAP SERPL CALCULATED.3IONS-SCNC: 11 MMOL/L (ref 5–18)
AST SERPL W P-5'-P-CCNC: 42 U/L (ref 0–40)
BILIRUB SERPL-MCNC: 0.6 MG/DL (ref 0–1)
BUN SERPL-MCNC: 20 MG/DL (ref 8–22)
C REACTIVE PROTEIN LHE: 26.6 MG/DL (ref 0–0.8)
CALCIUM SERPL-MCNC: 8.4 MG/DL (ref 8.5–10.5)
CHLORIDE BLD-SCNC: 106 MMOL/L (ref 98–107)
CO2 SERPL-SCNC: 31 MMOL/L (ref 22–31)
CREAT SERPL-MCNC: 0.79 MG/DL (ref 0.7–1.3)
ERYTHROCYTE [DISTWIDTH] IN BLOOD BY AUTOMATED COUNT: 14.7 % (ref 10–15)
GFR SERPL CREATININE-BSD FRML MDRD: >90 ML/MIN/1.73M2
GLUCOSE BLD-MCNC: 134 MG/DL (ref 70–125)
GLUCOSE BLDC GLUCOMTR-MCNC: 121 MG/DL (ref 70–99)
GLUCOSE BLDC GLUCOMTR-MCNC: 133 MG/DL (ref 70–99)
GLUCOSE BLDC GLUCOMTR-MCNC: 143 MG/DL (ref 70–99)
HCT VFR BLD AUTO: 33.8 % (ref 40–53)
HGB BLD-MCNC: 10.9 G/DL (ref 13.3–17.7)
MAGNESIUM SERPL-MCNC: 2.2 MG/DL (ref 1.8–2.6)
MCH RBC QN AUTO: 29.2 PG (ref 26.5–33)
MCHC RBC AUTO-ENTMCNC: 32.2 G/DL (ref 31.5–36.5)
MCV RBC AUTO: 91 FL (ref 78–100)
PHOSPHATE SERPL-MCNC: 3.2 MG/DL (ref 2.5–4.5)
PLATELET # BLD AUTO: 332 10E3/UL (ref 150–450)
POTASSIUM BLD-SCNC: 3.2 MMOL/L (ref 3.5–5)
POTASSIUM BLD-SCNC: 3.8 MMOL/L (ref 3.5–5)
POTASSIUM BLD-SCNC: 5.7 MMOL/L (ref 3.5–5)
PROCALCITONIN SERPL-MCNC: 0.78 NG/ML (ref 0–0.49)
PROT SERPL-MCNC: 5.9 G/DL (ref 6–8)
RBC # BLD AUTO: 3.73 10E6/UL (ref 4.4–5.9)
SODIUM SERPL-SCNC: 148 MMOL/L (ref 136–145)
WBC # BLD AUTO: 13.9 10E3/UL (ref 4–11)

## 2021-12-07 PROCEDURE — 99223 1ST HOSP IP/OBS HIGH 75: CPT | Performed by: CLINICAL NURSE SPECIALIST

## 2021-12-07 PROCEDURE — 99024 POSTOP FOLLOW-UP VISIT: CPT | Performed by: SPECIALIST

## 2021-12-07 PROCEDURE — 83735 ASSAY OF MAGNESIUM: CPT | Performed by: INTERNAL MEDICINE

## 2021-12-07 PROCEDURE — 250N000011 HC RX IP 250 OP 636: Performed by: INTERNAL MEDICINE

## 2021-12-07 PROCEDURE — 85027 COMPLETE CBC AUTOMATED: CPT | Performed by: INTERNAL MEDICINE

## 2021-12-07 PROCEDURE — 80053 COMPREHEN METABOLIC PANEL: CPT | Performed by: INTERNAL MEDICINE

## 2021-12-07 PROCEDURE — 97530 THERAPEUTIC ACTIVITIES: CPT | Mod: GP

## 2021-12-07 PROCEDURE — 84100 ASSAY OF PHOSPHORUS: CPT | Performed by: INTERNAL MEDICINE

## 2021-12-07 PROCEDURE — 84132 ASSAY OF SERUM POTASSIUM: CPT | Performed by: INTERNAL MEDICINE

## 2021-12-07 PROCEDURE — 97535 SELF CARE MNGMENT TRAINING: CPT | Mod: GO

## 2021-12-07 PROCEDURE — 99233 SBSQ HOSP IP/OBS HIGH 50: CPT | Performed by: INTERNAL MEDICINE

## 2021-12-07 PROCEDURE — 250N000013 HC RX MED GY IP 250 OP 250 PS 637: Performed by: INTERNAL MEDICINE

## 2021-12-07 PROCEDURE — 999N000215 HC STATISTIC HFNC ADULT NON-CPAP

## 2021-12-07 PROCEDURE — 250N000011 HC RX IP 250 OP 636: Performed by: SPECIALIST

## 2021-12-07 PROCEDURE — 86141 C-REACTIVE PROTEIN HS: CPT | Performed by: INTERNAL MEDICINE

## 2021-12-07 PROCEDURE — 258N000003 HC RX IP 258 OP 636: Performed by: INTERNAL MEDICINE

## 2021-12-07 PROCEDURE — 83605 ASSAY OF LACTIC ACID: CPT | Performed by: FAMILY MEDICINE

## 2021-12-07 PROCEDURE — C9113 INJ PANTOPRAZOLE SODIUM, VIA: HCPCS | Performed by: INTERNAL MEDICINE

## 2021-12-07 PROCEDURE — 84145 PROCALCITONIN (PCT): CPT | Performed by: INTERNAL MEDICINE

## 2021-12-07 PROCEDURE — 250N000009 HC RX 250: Performed by: INTERNAL MEDICINE

## 2021-12-07 PROCEDURE — 99207 PR CDG-CUT & PASTE-POTENTIAL IMPACT ON LEVEL: CPT | Performed by: CLINICAL NURSE SPECIALIST

## 2021-12-07 PROCEDURE — 999N000157 HC STATISTIC RCP TIME EA 10 MIN

## 2021-12-07 PROCEDURE — 99231 SBSQ HOSP IP/OBS SF/LOW 25: CPT | Mod: GC | Performed by: STUDENT IN AN ORGANIZED HEALTH CARE EDUCATION/TRAINING PROGRAM

## 2021-12-07 PROCEDURE — 120N000001 HC R&B MED SURG/OB

## 2021-12-07 RX ORDER — FUROSEMIDE 10 MG/ML
40 INJECTION INTRAMUSCULAR; INTRAVENOUS DAILY
Status: DISCONTINUED | OUTPATIENT
Start: 2021-12-08 | End: 2021-12-21 | Stop reason: HOSPADM

## 2021-12-07 RX ORDER — POTASSIUM CHLORIDE 29.8 MG/ML
20 INJECTION INTRAVENOUS ONCE
Status: COMPLETED | OUTPATIENT
Start: 2021-12-07 | End: 2021-12-07

## 2021-12-07 RX ORDER — POTASSIUM CHLORIDE 29.8 MG/ML
20 INJECTION INTRAVENOUS
Status: COMPLETED | OUTPATIENT
Start: 2021-12-07 | End: 2021-12-07

## 2021-12-07 RX ORDER — DEXMEDETOMIDINE HYDROCHLORIDE 4 UG/ML
.2-.4 INJECTION, SOLUTION INTRAVENOUS CONTINUOUS
Status: DISCONTINUED | OUTPATIENT
Start: 2021-12-07 | End: 2021-12-07

## 2021-12-07 RX ADMIN — OLANZAPINE 5 MG: 10 INJECTION, POWDER, FOR SOLUTION INTRAMUSCULAR at 05:41

## 2021-12-07 RX ADMIN — POTASSIUM CHLORIDE 20 MEQ: 29.8 INJECTION, SOLUTION INTRAVENOUS at 20:38

## 2021-12-07 RX ADMIN — I.V. FAT EMULSION 250 ML: 20 EMULSION INTRAVENOUS at 20:12

## 2021-12-07 RX ADMIN — HYDROMORPHONE HYDROCHLORIDE 0.4 MG: 0.2 INJECTION, SOLUTION INTRAMUSCULAR; INTRAVENOUS; SUBCUTANEOUS at 04:07

## 2021-12-07 RX ADMIN — POTASSIUM CHLORIDE 20 MEQ: 29.8 INJECTION, SOLUTION INTRAVENOUS at 09:49

## 2021-12-07 RX ADMIN — HYDROMORPHONE HYDROCHLORIDE 0.4 MG: 0.2 INJECTION, SOLUTION INTRAMUSCULAR; INTRAVENOUS; SUBCUTANEOUS at 08:48

## 2021-12-07 RX ADMIN — FUROSEMIDE 40 MG: 10 INJECTION, SOLUTION INTRAVENOUS at 08:45

## 2021-12-07 RX ADMIN — VANCOMYCIN HYDROCHLORIDE 750 MG: 1 INJECTION, POWDER, LYOPHILIZED, FOR SOLUTION INTRAVENOUS at 20:29

## 2021-12-07 RX ADMIN — HYDROMORPHONE HYDROCHLORIDE 0.4 MG: 0.2 INJECTION, SOLUTION INTRAMUSCULAR; INTRAVENOUS; SUBCUTANEOUS at 01:57

## 2021-12-07 RX ADMIN — VANCOMYCIN HYDROCHLORIDE 750 MG: 1 INJECTION, POWDER, LYOPHILIZED, FOR SOLUTION INTRAVENOUS at 09:49

## 2021-12-07 RX ADMIN — MEROPENEM 1 G: 1 INJECTION, POWDER, FOR SOLUTION INTRAVENOUS at 12:58

## 2021-12-07 RX ADMIN — PANTOPRAZOLE SODIUM 40 MG: 40 INJECTION, POWDER, FOR SOLUTION INTRAVENOUS at 08:43

## 2021-12-07 RX ADMIN — POTASSIUM CHLORIDE: 2 INJECTION, SOLUTION, CONCENTRATE INTRAVENOUS at 20:07

## 2021-12-07 RX ADMIN — ACETAMINOPHEN 650 MG: 650 SUPPOSITORY RECTAL at 04:06

## 2021-12-07 RX ADMIN — HALOPERIDOL LACTATE 2 MG: 5 INJECTION, SOLUTION INTRAMUSCULAR at 08:43

## 2021-12-07 RX ADMIN — LEVOTHYROXINE SODIUM ANHYDROUS 19 MCG: 100 INJECTION, POWDER, LYOPHILIZED, FOR SOLUTION INTRAVENOUS at 08:43

## 2021-12-07 RX ADMIN — ENOXAPARIN SODIUM 40 MG: 40 INJECTION SUBCUTANEOUS at 09:48

## 2021-12-07 RX ADMIN — POTASSIUM CHLORIDE 20 MEQ: 29.8 INJECTION, SOLUTION INTRAVENOUS at 08:42

## 2021-12-07 RX ADMIN — MEROPENEM 1 G: 1 INJECTION, POWDER, FOR SOLUTION INTRAVENOUS at 05:41

## 2021-12-07 ASSESSMENT — ACTIVITIES OF DAILY LIVING (ADL)
ADLS_ACUITY_SCORE: 14
ADLS_ACUITY_SCORE: 14
ADLS_ACUITY_SCORE: 12
ADLS_ACUITY_SCORE: 14
ADLS_ACUITY_SCORE: 12
ADLS_ACUITY_SCORE: 14
ADLS_ACUITY_SCORE: 12
ADLS_ACUITY_SCORE: 14
ADLS_ACUITY_SCORE: 12
ADLS_ACUITY_SCORE: 14
ADLS_ACUITY_SCORE: 12
ADLS_ACUITY_SCORE: 14
ADLS_ACUITY_SCORE: 12
ADLS_ACUITY_SCORE: 12
ADLS_ACUITY_SCORE: 14
ADLS_ACUITY_SCORE: 12

## 2021-12-07 NOTE — PROGRESS NOTES
Nutrition Therapy  Parenteral Nutrition follow-up       Dietitian to Pharmacy    For this evenings bag:  Rate of TPN: 85    Grams Dextrose: 310    Grams Protein:100  Spoke with pharmacist about discontinuing D10 (was for hypoglycemia) with increase in dextrose     Lipids: 250 ml 20% 3x/week.  This to provide: 2040 ml formula, avg 1665 calories, avg 21 g lipid/day.  GIR 3.06 mg cho/kg/min.    Calorie and protein needs met.  Will adjust formula as needed.         Current Nutrition Intake:  Diet: NPO  Nutrition Support:   Custom TPN: 90 gm AA, 210 gm Dextrose at 70 mL/hr, lipid 50 gm 3 x weekly.    D10 at 25 mL/hr, 600 mL, 60 gm dextrose = 204 kcal    Provides:  1680 mls,  1284 calories, 90 g protein, 210 g carb, 21 g fat on average daily    Weight Trends  Admission wt: 175 lb 11/30/21  Current wt: 155 lb 12/6/21  Wt Readings from Last 3 Encounters:   12/06/21 70.3 kg (155 lb)   01/22/20 79.4 kg (175 lb)   12/24/19 81.2 kg (179 lb)     Suspect admit wt was stated wt.  No wts between 11/30 and 12/6.  Doubt 20 lb wt loss in a few days.  I/Os + 1.55 L    GI:  Faint BS, hypoactive, abdominal discomfort/pain   mL (last 24 hrs)  MARISA drains 94 mL (last 24 hrs)  LBM 11/29    Labs:   Na 148 H  K 3.2 -replacing  Phos 3.2 wdl  Mg 2.2 wdl    12/06/21:      Labs reviewed.    Medications:   Lasix, levothyroxine, merrem, pantoprazole, kcl, vancomycin, bowel meds- held  Reviewed.     ASSESSED NUTRITION NEEDS  Estimated Energy Needs: 2545-2880 kcals/day (25 - 30 kcals/kg)  Justification: Post-op  Estimated Protein Needs: 80-99 grams protein/day (1.2 - 1.5 grams of pro/kg)  Justification: Post-op  Estimated Fluid Needs: 1985 mL/day (30 ml/kg)   Justification: Maintenance    MALNUTRITION (12/6/21)  % Weight Loss: 20 lb loss noted unsure time frame, may be greater than 1 yr.  % Intake:  </= 50% for >/= 5 days (severe malnutrition)  Subcutaneous Fat Loss:  Orbital region moderate depletion (observed)  Muscle Loss:  Not assessed  in DOC pt. Pt agitated  Fluid Retention:  None noted per chart     Malnutrition Diagnosis: Moderate malnutrition  In Context of:  Acute illness or injury    Nutrition Diagnosis  Malnutrition related to acute illness as evidenced by NPO x 5 days, moderate fat loss.   Evaluation: No change    Goals   Tolerate parenteral nutrition-progressing  Advance to po diet when appropriate-not met  BG -met      INTERVENTIONS  Implementation  Parenteral Nutrition/IV Fluids - Modify composition and rate  Increase in rate, dextrose amino acid to meet nutrition goals  Suggest discontinue D10.  Per surgeon, pt remains NPO. Waiting on return of bowel function. No gas or stool yet.      Monitoring/Evaluation  Progress toward goals will be monitored and evaluated per protocol.

## 2021-12-07 NOTE — PROGRESS NOTES
[facility]:  CRITICAL CARE PROGRESS NOTE     Date / Time of Admission:  11/30/2021  8:49 AM    ID: Gurdeep Dia is a 66 year old male, incarcerated, vaccinated against COVID19, with tobacco use disorder (active smoker), essential hypertension, BPH, chronic pain with underlying polysubstance abuse, and history of multiple prior abdominal surgeries who was admitted to [facility] on 11/30/2021 with a small bowel obstruction status post exploratory laparotomy and SBO resection on 11/30/2021.  Course complicated by small bowel perforation 12/3 for which he underwent an emergent ex lap with abdominal washout on 12/3, admitted to the ICU post-operatively; post operative care complicated by acute respiratory failure with hypoxia, atrial fibrillation with RVR, agitation/delirium on Precedex gtt.         Changes for today: 1.   Stopped Precedex gtt this morning.   2. Patient appropriate - we held the Zyprexa IV today.  Have zydis SL oral PRN available, can take even if NPO as is a sublingual tablet.  3. Pain consultation placed overnight - appreciated assistance.  4. Reduce Lasix 40 mg IV from q12h to daily.  Goal I/O even to slightly negative today  5. Repeat potassium ordered this afternoon.   6. Encourage IS use for atelectasis.  7. PT/OT evaluation  8. Remove giron catheter today        Assessment/Plan:   Neurologic: Encephalopathy with agitation/delirium.  Likely toxic/metabolic, due to acute illness.  Also not getting multiple psych meds due to NPO status.  Required Precedex gtt post-operatively.  Still with some agitation, initiate Zyprexa IM 12/5.  Chronic pain with underlying polysubstance abuse.    Discontinued Precedex gtt this morning.     Stop Zyprexa IV Q8H scheduled - discontinued.      Zyprexa SL PRN still available as needed.  This is okay to get even if patient NPO as it is a sublingual absorption drug.    Holding gabapentin, buspirone, venlafaxine while NPO    Dilaudid PCA  management per surgical team    Pain consultation placed - appreciated assistance.    Pulmonary: Acute post-op respiratory failure with hypoxemia. Possible aspiration, atelectasis from distended abdomen post-op.  Concern also for volume overload contributing; diuresis started 12/4.  Possible aspiration pneumonia.  CXR with some improvement on 12/6.    Wean supplemental O2 as tolerated; goal O2 sat > 92%.  HOB > 30 degrees to limit aspiration risk.      Transitioned from HFNC to nasal cannula today.    Encourage OOB to chair, PT/OT, incentive spirometry     Diuresis and Abx as below.     Cardiovascular: Paroxysmal atrial fibrillation with RVR, improved.  Went into Afib with RVR on 12/3 post-operatively, controlled with diuresis + amiodarone gtt.   Stopped amio gtt on 12/5.  Remains NSR since then.      Cardiac monitoring.  SBP >= 90 mmHg, MAP >= 65 mmHg.  EKG PRN.    Diuresis as below.    Hold ACEi due to NPO status     GI/: Small bowel obstruction s/p ex lap/resection 11/30 with complications of perforation 12/2 s/p ex-lap/abd washout 12/3.  Came to ED on 11/30, SBO dx, went to OR with Dr. Richardson, small bowel with perforations and multiple adhesions throughout the bowel noted,  mL.  On 12/3, large amt of drainage leaked from abd incision staple line, went back to OR for emergent ex-lap and washout/repair.    Moderate protein-calorie malnutrition.  Limited intake due to SBO for many days.  Started TPN on 12/5.    Surgical site/drain management per General Surgery team.    Defer to Surgery team as to when patient can start taking in oral ice chips, etc.    Monitor for flatus daily.  Recurrent opiate use will decrease intestinal motility.    Nutrition: NPO.  TPN started 12/5.     Last BM:  None.  Meds: None.    GI prophylaxis: PPI daily.     Renal: Hyperkalemia - after IV replacement given.  In setting of diuresis.   Lasix IV Q12H started 12/4.  Hypernatremia.    Monitor I/O's.  Electrolyte repletion PRN.   Avoid/limit nephrotoxic agents.    IVFs: Saline lock.   On TPN.    Reduce Lasix 40 mg IV from Q12H to QDAY.      Repeat K check again @ 3 pm.    Goal I/O net even to slightly negative today.    Holding tamsulosin while NPO     Heme/Onc: Leukocytosis, unspecified.  Normocytic anemia.     Followup CBC daily with rising WC.    DVT prophylaxis: SCDs, Lovenox 40 mg q24h.     Endocrine: Hyperglycemia.  Likely related to stress, TPN.  No history of diabetes, HgbA1c 5.8% on 12/5.  Hypothyroidism, acquired.  TSH 2.76 on 12/5.    FSBG PRN, insulin not indicated.  Hypoglycemia protocol.    Levothyroxine 19 mcg IV daily     Infectious diseases: Abdominal sepsis, perforated SBO.  Possible aspiration pneumonia.    Follow-up blood cultures.  Reculture if    Continue empiric Abx with meropenem, vancomycin (start 12/3).      Recommend at least 10 days of empiric Abx therapy for abdominal sepsis with underlying surgical intervention.    Rheum/Musculoskeletal: Physical deconditioning.    Activity: OOB to chair,    PT/OT evaluation requested    Lines/Drains/Tubes:  RUE PICC central line, placed 12/3  Right NG 16 Fr enteral tube, placed 12/2  19 Fr R abd bulb, placed 11/30  19 Fr LLL abd bulb, placed 12/3  15 Fr mid-abd bulb, placed 12/3  Giron catheter, placed 12/3 - remove giron catheter     Code Status:  Full Code     The patient and/or the family was educated about the above plan of care and indicated understanding.       This patient is no longer considered critically ill and may step out of the ICU today.  Downgrade orders placed to Med/Surg Telemetry status.  If stable in 24 hours, maybe can d/c the Telemetry component.  [place] team paged for continued care.  Critical care team will sign off.  Downgrade orders placed.     Total Critical Care time, not including separate billable procedure time: 35 minutes.    Yumiko Browne MD, MPH  Pulmonary/Critical Care Medicine  12/07/2021   1:50 PM         ICU DAILY CHECKLIST:   ICU  DAILY CHECKLIST           Can patient transfer out of MICU? yes    FAST HUG:    Feeding:  no.  Patient is receiving NPO    [place]o: yes  Analgesia/Sedation: yes; dilaudid PCA   Thromboembolic prophylaxis: yes; Mode:  SCDs  HOB>30:  yes  Stress Ulcer Protocol Active: yes; Mode: PPI  Glycemic Control: Any glucose > 180 no; Mode of Insulin Therapy: Sliding Scale Insulin    INTUBATED:  Can patient have daily waking:  N/A  Can patient have spontaneous breathing trial:  N/A    Restraints? no    PHYSICAL THERAPY AND MOBILITY:  Can patient have PT and mobility trial: yes  Activity: OOB to Chair and PT/OT    RISK FACTORS PRESENT ON ADMISSION:  Clinically Significant Risk Factors Present on Admission                      Subjective/Interval History:   12/3:  Perforated SBO - went back to OR for washout and ex-lap.  Post-op with Afib with RVR, worsening FiO2 needs.  Placed on HFNC, Amiodarone gtt, started Meropenem.  12/4:  Started Precedex gtt early AM due to agitation.  Lasix 2x/day.  Stopped Amiodarone gtt.  12/5:  Started Zyprexa 5 mg IM Q6H  12/6:  HFNC worse, encouraged IS.  Reduced Zyprexa to 5 mg IV Q8H.    Stopped Precedex gtt this morning.  Off HFNC, switched to nasal cannula.  Appropriate this afternoon.  D/c'ed the Zyprexa IV scheduled.     Patient concerned about lack or oral intake still.   Denies any current cough or wheezing.  Abdomen still hurts with deep breathing or when laughing.    Review of Systems:  The Review of Systems is negative other than noted in the HPI        Allergies/Medications:   Allergies:     Allergies   Allergen Reactions     Penicillins Hives       Continuous Infusions:    amiodarone Stopped (12/04/21 2246)     dextrose Stopped (12/05/21 1824)     HYDROmorphone       parenteral nutrition - ADULT compounded formula       parenteral nutrition - ADULT compounded formula 70 mL/hr at 12/06/21 1933     Scheduled Medications:    [Held by provider] busPIRone  15 mg Oral BID     enoxaparin  "ANTICOAGULANT  40 mg Subcutaneous Q24H     furosemide  40 mg Intravenous Q12H     [Held by provider] gabapentin  300 mg Oral BID w/meals     [Held by provider] gabapentin  600 mg Oral At Bedtime     levothyroxine  19 mcg Intravenous Daily     lipids  250 mL Intravenous Once per day on Tue Thu Sat     meropenem  1 g Intravenous Q8H     [Held by provider] mirtazapine  30 mg Oral At Bedtime     pantoprazole (PROTONIX) IV  40 mg Intravenous Daily with breakfast     [Held by provider] polyethylene glycol  17 g Oral Daily     [Held by provider] senna-docusate  1 tablet Oral BID    Or     [Held by provider] senna-docusate  2 tablet Oral BID     sodium chloride (PF)  10-40 mL Intracatheter Q7 Days     sodium chloride (PF)  3 mL Intracatheter Q8H     [Held by provider] tamsulosin  0.4 mg Oral Daily     vancomycin  750 mg Intravenous Q12H     [Held by provider] venlafaxine  37.5 mg Oral Daily           Objective:   Vitals:  /84   Pulse 98   Temp 99.7  F (37.6  C) (Oral)   Resp 18   Ht 1.656 m (5' 5.2\")   Wt 70.3 kg (155 lb)   SpO2 91%   BMI 25.64 kg/m    Vent: FiO2 (%): 40 %  Resp: 18    GEN: Awake, interactive, talkative, lying in the bed.  HEENT: Normocephalic, atraumatic.  Extraoccular eye movements intact, anicteric sclera. Slightly dry mucous membranes.  NG tube.  NECK: Supple.    PULM: Non-labored breathing.  No use of accessory muscles.  Diminished breath sounds at bases.  No wheezing.  CVS: Regular rate and rhythm.  Normal S1, S2.  No rubs, murmurs, or gallops.    ABDOMEN: Hypoactive bowel sounds.  Abdominal binder in place.  3 abdominal drains in place, serosanguineous fluid.  EXTREMITES:  No clubbing, cyanosis, or edema.    NEURO:  Awake.  Oriented to person, place.  Cranial nerves 2-12 grossly intact.  Moving all extremities.      Intake/Output: I/O last 3 completed shifts:  In: 1387.09 [I.V.:183]  Out: 2644 [Urine:2050; Emesis/NG output:500; Drains:94]        Pertinent Studies:   All laboratory data " reviewed  Serum Glucose range:   Recent Labs   Lab 12/07/21  1220 12/07/21  0816 12/07/21  0358 12/07/21  0157   * 143* 134* 133*     ABG:   Recent Labs   Lab 12/06/21  0620 12/04/21  0514 12/04/21  0201   PH 7.45 7.37 7.36     CBC:   Recent Labs   Lab 12/07/21  0358 12/06/21  0947 12/05/21  0517 12/01/21  1756 12/01/21  0709   WBC 13.9* 14.8* 9.0   < > 9.8   HGB 10.9* 11.2* 10.4*   < > 14.9   HCT 33.8* 34.2* 31.3*   < > 46.8   MCV 91 90 90   < > 94    318 258   < > 323   NEUTROPHIL  --   --   --   --  79   LYMPH  --   --   --   --  9   MONOCYTE  --   --   --   --  9   EOSINOPHIL  --   --   --   --  1    < > = values in this interval not displayed.     Chemistry:   Recent Labs   Lab 12/07/21  1128 12/07/21  0358 12/06/21  1921 12/06/21  1428 12/06/21  0620 12/05/21  1048 12/03/21 2021 12/03/21  0727 12/02/21  1305   NA  --  148*  --   --  146* 145   < > 142  --    POTASSIUM 5.7* 3.2* 5.8*   < > 3.0* 4.1   < > 3.9  --    CHLORIDE  --  106  --   --  106 108*   < > 110*  --    CO2  --  31  --   --  31 28   < > 24  --    BUN  --  20  --   --  17 17   < > 17  --    CR  --  0.79  --   --  0.82 0.83   < > 0.88  --    GFRESTIMATED  --  >90  --   --  >90 >90   < > 89  --    VIJAYA  --  8.4*  --   --  8.4* 7.6*   < > 8.6  --    MAG  --  2.2  --   --  2.1 2.3   < > 1.8  --    PROTTOTAL  --  5.9*  --   --  5.9* 4.8*  --  5.7*  --    ALBUMIN  --  2.3*  --   --  2.3* 2.3*  --  2.4*  --    AST  --  42*  --   --  25 20  --  23  --    ALT  --  34  --   --  20  --   --  21  --    ALKPHOS  --  54  --   --  44* 46  --  46  --    BILITOTAL  --  0.6  --   --  0.4 0.5  --  0.5  --    ANDRES  --   --   --   --   --   --   --   --  27    < > = values in this interval not displayed.     Coags:  Recent Labs   Lab 12/06/21  0619   INR 1.11     Cardiac Markers:  No results for input(s): CKTOTAL, TROPONINI in the last 168 hours.     Microbiology:  Blood cultures x2, 12/5: No growth to date.  Urine culture, 12/5: No growth         Cardiology/Radiology:   Cardiac: All cardiac studies reviewed by me.    EKG: Reviewed.    TTE,: None.    Radiology:  All imaging studies reviewed by me.    Chest X-Ray, 12/6:  Enteric suction tube tip and side-port within the upper gastric lumen. Satisfactory right PICC line position with the tip near the cavoatrial junction. Persistent low lung volumes. No definite pneumothorax. Mildly improved bibasilar pulmonary opacities favored to reflect atelectasis. Persistent interstitial coarsening. Suspected trace right-sided pleural fluid. Stable heart size and central vascular prominence    CT abd/pelvis with contrast, 11/30:   FINDINGS:  LOWER CHEST: Bibasilar atelectasis.  HEPATOBILIARY: Scattered small water density foci in the liver consistent with cysts. No radiopaque gallstone. No suspicious hepatic lesions.  PANCREAS: Coarse calcification in the pancreatic tail and body may represent sequelae of chronic pancreatitis no acute pancreatitis or abnormal pancreatic mass.  SPLEEN: Not visualized consistent with prior splenectomy.  ADRENAL GLANDS: Normal.  KIDNEYS/BLADDER: No hydronephrosis or masses. No bladder stone or mass.  BOWEL: Anastomotic staples in the colon in the right mid abdomen. There is normal caliber duodenum and proximal jejunum with dilated loops of distal jejunum and ileum with some fecal i-STAT ileal contents and a transition in the right mid to lower  abdomen. Findings are consistent with mechanical small bowel obstruction and this may represent a closed loop obstruction such as internal hernia or multiple adhesions given the lack of dilatation of the proximal small bowel. No pneumatosis intestinalis,  free intraperitoneal air or abscess. LYMPH NODES: No lymphadenopathy.  VASCULATURE: Mild aortoiliac calcification without aneurysm.  PELVIC ORGANS: Mild prostatic enlargement.  MUSCULOSKELETAL: Disc space narrowing at L4-L5 with disc degeneration and bilateral spondylolysis at L4 with grade 2  spondylolisthesis of L4 on L5. No acute fracture.  IMPRESSION: 1.  Mechanical small bowel obstruction with dilated distal jejunum and ileum with caliber changes in the right lower quadrant and in the left midabdomen this may be secondary to  adhesions or internal hernia and has the appearance of a closed loop  obstruction. No pneumatosis intestinalis, free intraperitoneal air or abscess.

## 2021-12-07 NOTE — PHARMACY-CONSULT NOTE
"Pharmacy Note: Parenteral Nutrition (PN) Management    Pharmacist consulted to dose PN for Gurdeep Dia, a 66 year old male by Dr. Clinton.    Subjective:    The patient is a new PN start.     The patient was started on PN in the hospital on 12/5/21.     Indication for PN therapy: bowel resection     Inadequate nutrition anticipated for > 7 days.      Enteral nutrition contraindicated due to: bowel integrity is tenuous.     Pertinent diseases and other special considerations respiratory failure    Social History     Tobacco Use     Smoking status: Current Every Day Smoker     Smokeless tobacco: Never Used   Substance Use Topics     Alcohol use: Not Currently     Comment: Clean for 5 years     Drug use: Not Currently     Objective:    Ht Readings from Last 1 Encounters:   11/30/21 1.656 m (5' 5.2\")     Wt Readings from Last 1 Encounters:   12/06/21 70.3 kg (155 lb)       Body mass index is 25.64 kg/m .    Patient Vitals for the past 96 hrs:   Weight   12/06/21 0400 70.3 kg (155 lb)       Labs:  Last 3 days:  Recent Labs     12/05/21  0517 12/05/21  1048 12/05/21  1048 12/06/21  0619 12/06/21  0620 12/06/21  0947 12/06/21  1428 12/06/21  1921 12/07/21  0358    145  --   --  146*  --   --   --  148*   POTASSIUM 3.0* 4.1  --   --  3.0*  --  3.6 5.8* 3.2*   CHLORIDE 106 108*  --   --  106  --   --   --  106   CO2 29 28  --   --  31  --   --   --  31   BUN 16 17  --   --  17  --   --   --  20   CR 0.85 0.83  --   --  0.82  --   --   --  0.79   VIJAYA 8.3* 7.6*  --   --  8.4*  --   --   --  8.4*   [id number]  --   --   --   --  1.12  --   --   --   --    MAG 2.0 2.3  --   --  2.1  --   --   --  2.2   PHOS  --  1.1*  0.9*  --   --  2.9  --   --   --  3.2   PROTTOTAL  --  4.8*  --   --  5.9*  --   --   --  5.9*   ALBUMIN  --  2.3*  --   --  2.3*  --   --   --  2.3*   PREALB  --  5*  --   --  6*  --   --   --   --    TRIG  --  160*  --   --  217*  --   --   --   --    HGB 10.4*  --   --   --   --  11.2*  --   --  10.9* "   HCT 31.3*  --   --   --   --  34.2*  --   --  33.8*     --   --   --   --  318  --   --  332   BILITOTAL  --  0.5  --   --  0.4  --   --   --  0.6   AST  --  20  --   --  25  --   --   --  42*   ALT  --   --   --   --  20  --   --   --  34   ALKPHOS  --  46   < >  --  44*  --   --   --  54   INR  --   --   --  1.11  --   --   --   --   --     < > = values in this interval not displayed.       Glucose (past 48 hours):   Recent Labs     12/05/21  1855 12/05/21  2338 12/06/21  0342 12/06/21  0620 12/06/21  0653 12/06/21  0757 12/06/21  1951 12/07/21  0157 12/07/21  0358 12/07/21  0816   * 124* 132* 112 114* 121* 122* 133* 134* 143*       Intake/Output (last 24 hours): I/O last 3 completed shifts:  In: 1387.09 [I.V.:183]  Out: 2644 [Urine:2050; Emesis/NG output:500; Drains:94]    Estimated CrCl: Estimated Creatinine Clearance: 90.2 mL/min (based on SCr of 0.79 mg/dL).    Assessment:    Continue patient on PN therapy as a continuous central therapy.     Given the patient's current condition/oral intake, PN is still indicated.    Lab results reviewed:     Per RD recommendations, macronutrients and rate will increase with next bag change.  Moreover, increase K and decrease Na with next bag change.    Plan:  1. Rate of PN: 85 mL/hr.  2. Formula:     Amino Acids 100 grams    Dextrose 310 grams    Sodium 60 mEq/day    Potassium 90 mEq/day    Calcium 8 mEq/day    Magnesium 14 mEq/day    Phosphorus 28 mMol/day    Chloride: Acetate Ratio 2:1    Standard Multivitamins w/Vitamin K    Trace Elements  3. Fat Emulsion: 20%, 250 mL IV three times weekly.  4. Check BMP tomorrow.  5. Pharmacist will continue to follow the patient's lab results, clinical status and blood glucose results and make adjustments as appropriate.    Thank you for the consult.  Gerard Nash [facility]  12/7/2021 12:09 PM

## 2021-12-07 NOTE — CONSULTS
[facility] ACUTE PAIN SERVICE    ([facility], [facility], [facility])   Consult Note    Date of Admission:  11/30/2021  Date of Consult: 12/07/21    Physician requesting consult: Rylie Schulte MD  Reason for consult: chronic pain & polysubstance abuse, currently on a PCA pump, issues w/ pain control     Assessment/Plan:     Gurdeep Dia is a 66 year old male who was admitted on 11/30/2021.  7 Days Post-Op status post exploratory lap/small bowel resection/lysis of adhesions for mechanical small bowel obstruction, 4 days post operative repair of bowel perforation.   Pain Service is asked to see the patient for chronic pain & polysubstance abuse, currently on a PCA pump, issues w/ pain control.   Admitted for abdominal pain, found to have small bowel obstruction.   Medical history significant for BPH, chronic bilateral lowe back pain, neck pain, generalized anxiety disorder, hepatitis C (undergone treatment, now without disease), Hypertension, Moderate major depression, history of substance abuse, multiple previous abdominal surgeries.    Post surgical course has been complicated with acute respiratory failure, atrial fibrillation, agitation, delirium.  Patient currently in ICU on Precedex drip, hypoxic requiring high flow oxygen.    Surgery is following:  TPN started, PCA for pain control, awaiting return of bowel function, MARISA drains in place.    Over the past 24 hours patient has received 6(0.4mg IV hydromorphone) 48 MME and PCA started around 1400 yesterday (Hydromorphone 0.2 mg continuous and 0.2 mg every 10 min) 8.43 mg over the past 24 hours about 216 MME total       The patient does smoke and has chemical dependency history.     PLAN:   1) Pain is consistent with acute post operative pain with recent bowel obstruction/bowel perforation, status post repair.  Patient with multiple previous bowel surgeries with scar tissue.  Patient currently NPO, has NG tube in place.    Agree with managing  "pain with PCA while NPO.  Agree with current dosing.    Anticipate transfer out of ICU today to general medical care.  We will follow along.   and   2)Multimodal Medication Therapy  Topical: consider  NSAID'S: avoid  Adjuvants: gabapentin 300 mg bid and 600 mg every hs on hold NPO  Antidepressants/anxiolytics:Remeron on hold, IM zyprexa prn   Opioids: none  IV Pain medication:  Hydromorphone PCA 0.2 mg every hour with additional 0.2 mg every 10 min prn   3)Non-medication interventions  Acupuncture consult- as available Mon and Thursday    Integrative consult - consider  4)Constipation Prophylaxis   per surgery  5) Follow up   -Opioid prescriber has been none  -Discharge Recommendations - We recommend prescribing the following at the time of discharge: TBD          History of Present Illness (HPI):       Gurdeep Dia is a 66 year old old male with acute abdominal pain associated with bowel obstruction, bowel surgery complicated by bowel perforation.  The pain is reported to be acute pain entire abdomen.  States doesn't get below an \"8\"    Patient focused on dry mouth and abdominal pain.  Exam and history limited.      Will continue to follow along and assist with pain management and planning for pain control as hospital course progresses.        MN  pulled from system on 12/07/21. No opioid prescriptions over the past year, he has been receiving monthly prescriptions for gabapentin.  Last prescription filled 10/21/21 gabapentin 60 capsules 300 mg for 30 days.      Medical History  Patient Active Problem List    Diagnosis Date Noted     SBO (small bowel obstruction) (H) 11/30/2021     Priority: Medium     Hypertension goal BP (blood pressure) < 140/90 12/24/2019     Priority: Medium     Benign prostatic hyperplasia with weak urinary stream 12/24/2019     Priority: Medium     RAVI (generalized anxiety disorder) 12/24/2019     Priority: Medium     Moderate major depression (H) 12/24/2019     Priority: Medium     " History of substance abuse (H) 12/24/2019     Priority: Medium     History of hepatitis C 12/24/2019     Priority: Medium     Chronic neck pain 12/24/2019     Priority: Medium     Chronic bilateral low back pain without sciatica 12/24/2019     Priority: Medium        Surgical History  He  has a past surgical history that includes Splenectomy (~1995); APPENDECTOMY (~1996); Laparoscopy diagnostic (gyn) (N/A, 11/30/2021); Resect small bowel without ostomy (N/A, 11/30/2021); Laparotomy, lysis adhesions, combined (N/A, 11/30/2021); PICC/Midline Placement (12/3/2021); and Laparotomy exploratory (N/A, 12/3/2021).     Past Surgical History:   Procedure Laterality Date     C APPENDECTOMY  ~1996     LAPAROSCOPY DIAGNOSTIC (GYN) N/A 11/30/2021    Procedure: LAPAROSCOPY;  Surgeon: Mekhi Richardson MD;  [place] [facility] [place]     LAPAROTOMY EXPLORATORY N/A 12/3/2021    Procedure: EXPLORATORY LAPAROTOMY, WITH WASHOUT, REPAIR OF SMALL BOWEL PERFORATION.;  Surgeon: Mekhi Richardson MD;  [place] [facility] [place]     LAPAROTOMY, LYSIS ADHESIONS, COMBINED N/A 11/30/2021    Procedure: REPAIR ENTEROTOMY, EXTENSIVE LYSIS OF ADHESION;  Surgeon: Mekhi Richardson MD;  [place] [facility] [place]     PICC DOUBLE LUMEN PLACEMENT  12/3/2021          RESECT SMALL BOWEL WITHOUT OSTOMY N/A 11/30/2021    Procedure: COVERTED TO OPEN, EXPLORATORY LAPAROTOMY, SMALL BOWEL RESECTION,;  Surgeon: Mekhi Richardson MD;  [place] [facility] [place]     SPLENECTOMY  ~1995       Allergies  Allergies   Allergen Reactions     Penicillins Hives       Prior to Admission Medications   Medications Prior to Admission   Medication Sig Dispense Refill Last Dose     busPIRone (BUSPAR) 15 MG tablet Take 15 mg by mouth 2 times daily   11/29/2021 at Unknown time     furosemide (LASIX) 20 MG tablet Take 20 mg by mouth daily   11/29/2021 at Unknown time     gabapentin (NEURONTIN) 300 MG capsule Take 300 mg by mouth 2 times daily Morning, dinnertime   11/29/2021  "at Unknown time     gabapentin (NEURONTIN) 300 MG capsule Take 600 mg by mouth At Bedtime   11/29/2021 at Unknown time     levothyroxine (SYNTHROID/LEVOTHROID) 25 MCG tablet Take 25 mcg by mouth daily   11/29/2021 at Unknown time     lisinopril (ZESTRIL) 40 MG tablet Take 40 mg by mouth daily    11/29/2021 at Unknown time     meloxicam (MOBIC) 7.5 MG tablet Take 7.5 mg by mouth daily as needed   Unknown at Unknown time     mirtazapine (REMERON) 30 MG tablet Take 30 mg by mouth At Bedtime    11/29/2021 at Unknown time     omeprazole (PRILOSEC) 20 MG DR capsule Take 20 mg by mouth daily   11/29/2021 at Unknown time     tamsulosin (FLOMAX) 0.4 MG capsule Take 0.4 mg by mouth daily    11/29/2021 at Unknown time     triamcinolone (NASACORT) 55 MCG/ACT nasal aerosol Spray 2 sprays into both nostrils daily   11/29/2021 at Unknown time     venlafaxine (EFFEXOR-XR) 37.5 MG 24 hr capsule Take 37.5 mg by mouth daily   11/29/2021 at Unknown time       Social History  Reviewed, and he  reports that he has been smoking. He has never used smokeless tobacco. He reports previous alcohol use. He reports previous drug use.  Social History     Tobacco Use     Smoking status: Current Every Day Smoker     Smokeless tobacco: Never Used   Substance Use Topics     Alcohol use: Not Currently     Comment: Clean for 5 years       Family History  Reviewed, and family history is not on file.    Review of Systems  Complete ROS reviewed, unless noted, all other systems reviewed and found to be negative.        Objective:     Physical Exam:  /73   Pulse 87   Temp 98.4  F (36.9  C) (Axillary)   Resp 14   Ht 1.656 m (5' 5.2\")   Wt 70.3 kg (155 lb)   SpO2 94%   BMI 25.64 kg/m    Weight:   Weight change:   Body mass index is 25.64 kg/m .      General Appearance:  Alert, cooperative, complaint of dry mouth   Head:  Normocephalic, without obvious abnormality, atraumatic   Eyes:  PERRL, conjunctiva/corneas clear, EOM's intact   ENT/Throat: NG " tube in place Lips, mucosa, and tongue dry, white phlegm on tongue   Lymph/Neck: Supple, symmetrical, trachea midline,   Lungs:   High flow oxygen per NC, respirations unlabored   Chest Wall:  No tenderness or deformity   Cardiovascular/Heart:  Regular rate and rhythm, on tele Edema: none   Abdomen:   Abdomen with dressing covering, distended appearance, hypoactive bowel sounds    Musculoskeletal: Spine and extremities normal, atraumatic, range of motion,    Skin: Skin color, texture, turgor normal, no rashes or lesions   Neurologic: Reflexes intact, cooridanated movement  strength   Alert and oriented X 3, pain focused       Psych: Affect is limited range            Imaging Reviewed   XR Chest Port 1 View    Result Date: 12/6/2021  EXAM: XR CHEST PORT 1 VIEW LOCATION: [facility] DATE/TIME: 12/6/2021 1:39 PM INDICATION: Worsening hypoxia, evaluate for pneumonia, pulm edema COMPARISON: Chest x-ray 12/03/2021     IMPRESSION: Enteric suction tube tip and side-port within the upper gastric lumen. Satisfactory right PICC line position with the tip near the cavoatrial junction. Persistent low lung volumes. No definite pneumothorax. Mildly improved bibasilar pulmonary  opacities favored to reflect atelectasis. Persistent interstitial coarsening. Suspected trace right-sided pleural fluid. Stable heart size and central vascular prominence.    XR Chest Port 1 View    Result Date: 12/3/2021  EXAM: XR CHEST PORT 1 VIEW LOCATION: [facility] DATE/TIME: 12/3/2021 8:31 PM INDICATION: RN placed PICC - verify tip placement COMPARISON: None.     IMPRESSION: Right PICC line present with its tip likely just into the right atrium. Suggest withdrawing the PICC line 2 cm to place the tip in the low SVC. NG tube in good position in the stomach. Mild cardiomegaly. Low lung volumes with mild patchy mixed interstitial and airspace infiltrates    CT Abdomen Pelvis w Contrast    Result Date:  11/30/2021  EXAM: CT ABDOMEN PELVIS W CONTRAST LOCATION: [facility] DATE/TIME: 11/30/2021 10:48 AM INDICATION: Abdominal distension COMPARISON: None. TECHNIQUE: CT scan of the abdomen and pelvis was performed following injection of IV contrast. Multiplanar reformats were obtained. Dose reduction techniques were used. CONTRAST: IsoVue 370 100mL FINDINGS: LOWER CHEST: Bibasilar atelectasis. HEPATOBILIARY: Scattered small water density foci in the liver consistent with cysts. No radiopaque gallstone. No suspicious hepatic lesions. PANCREAS: Coarse calcification in the pancreatic tail and body may represent sequelae of chronic pancreatitis no acute pancreatitis or abnormal pancreatic mass. SPLEEN: Not visualized consistent with prior splenectomy. ADRENAL GLANDS: Normal. KIDNEYS/BLADDER: No hydronephrosis or masses. No bladder stone or mass. BOWEL: Anastomotic staples in the colon in the right mid abdomen. There is normal caliber duodenum and proximal jejunum with dilated loops of distal jejunum and ileum with some fecal i-STAT ileal contents and a transition in the right mid to lower abdomen. Findings are consistent with mechanical small bowel obstruction and this may represent a closed loop obstruction such as internal hernia or multiple adhesions given the lack of dilatation of the proximal small bowel. No pneumatosis intestinalis,  free intraperitoneal air or abscess. LYMPH NODES: No lymphadenopathy. VASCULATURE: Mild aortoiliac calcification without aneurysm. PELVIC ORGANS: Mild prostatic enlargement. MUSCULOSKELETAL: Disc space narrowing at L4-L5 with disc degeneration and bilateral spondylolysis at L4 with grade 2 spondylolisthesis of L4 on L5. No acute fracture.     IMPRESSION: 1.  Mechanical small bowel obstruction with dilated distal jejunum and ileum with caliber changes in the right lower quadrant and in the left midabdomen this may be secondary to adhesions or internal hernia and has  "the appearance of a closed loop obstruction. No pneumatosis intestinalis, free intraperitoneal air or abscess. 2.  Results were called to Dr. Sanford the time of dictation. NOTE: ABNORMAL REPORT THE DICTATION ABOVE DESCRIBES AN ABNORMALITY FOR WHICH FOLLOW-UP IS NEEDED.     POC US Guidance Needle Placement    Result Date: 11/30/2021  Ultrasound was performed as guidance to an anesthesia procedure.  Click \"PACS images\" hyperlink below to view any stored images.  For specific procedure details, view procedure note authored by anesthesia.    XR Video Swallow with SLP or OT    Result Date: 11/24/2021  EXAM: XR VIDEO SWALLOW WITH SLP OR OT LOCATION: [facility] DATE/TIME: 11/24/2021 9:18 AM INDICATION: Difficulty swallowing. COMPARISON: Same day esophagram.; CT for lung cancer screening 03/09/2020 TECHNIQUE: Routine swallow study with speech pathology using multiple barium thicknesses. FINDINGS: FLUOROSCOPIC TIME: 0.2 minutes NUMBER OF IMAGES: 2 videofluoroscopic imaging series Swallow study with Speech Pathology using multiple barium thicknesses. Patient is edentulous. Trials of thin consistency and barium coated cracker were performed. No delay in initiation of the oral phase. Normal epiglottic inversion. No penetration or aspiration observed. Moderate to severe cervical spondylosis with anterior wedging, disc space narrowing and osteophytosis of C4, C5, and C6. Straightening and reversal of normal thoracic lordosis.     IMPRESSION: No penetration or aspiration. Please see separately dictated report from speech pathology for additional description, analysis, and management recommendations.     XR Esophagram    Result Date: 11/24/2021  EXAM: XR ESOPHAGRAM LOCATION: [facility] DATE/TIME: 11/24/2021 9:18 AM INDICATION: 67 yo M with dysphagia/dyspnea - cough. Feels food stuck in throat with swallow. Occ. chocking. Needs to swallow lot of liquids. Constant runny nose & nasal " "congestion. COMPARISON: Same day swallow study; CT of the chest without contrast 03/09/2020 TECHNIQUE: Routine. FINDINGS: FLUOROSCOPIC TIME: 1.2 minutes NUMBER OF IMAGES: 4 videofluoroscopic imaging series and additional 41 images. ESOPHAGUS: Patulous esophagus with diffusely abnormal peristalsis. Large number of tertiary contractions present with delayed esophageal emptying. No esophageal stricture, diverticula or mass. No hiatal hernia. There is a large amount of retained contrast within the esophagus after consumption of contrast in the RPO position in transitioning to the supine position. This retained contrast moves to and fro within the esophagus. During the transition from RPO to supine position the patient felt a \"tickle\" in the back of his throat prompting coughing, while not observed likely relates to reflux of contrast from the upper esophagus into the pharynx. No gastroesophageal reflux elicited in the recumbent position with Valsalva maneuver.     IMPRESSION: Moderately severe esophageal dysmotility (presbyesophagus).      Labs Reviewed Personally By Myself         Total time spent 81 minutes with greater than 50% in consultation, education and coordination of care.     Also discussed with RN    Thank you for this consultation.      Melanie GUZMAN, CNS-BC, DNP  Acute Care Pain Management Program  [facility] ([place], [place], [place])   With questions call [phone]  Preference if for [facility] Florian Porter  Click HERE to page Denia              "

## 2021-12-07 NOTE — PHARMACY-VANCOMYCIN DOSING SERVICE
"Pharmacy Vancomycin Initial Note  Date of Service 2021  Patient's  1955  66 year old, male    Indication: Intra-abdominal infection and Sepsis    Current estimated CrCl = Estimated Creatinine Clearance: 86.9 mL/min (based on SCr of 0.82 mg/dL).    Creatinine for last 3 days  2021:  3:45 AM Creatinine 0.85 mg/dL  2021:  5:17 AM Creatinine 0.85 mg/dL; 10:48 AM Creatinine 0.83 mg/dL  2021:  6:20 AM Creatinine 0.82 mg/dL    Recent Vancomycin Level(s) for last 3 days  No results found for requested labs within last 72 hours.      Vancomycin IV Administrations (past 72 hours)      No vancomycin orders with administrations in past 72 hours.                Nephrotoxins and other renal medications (From now, onward)    Start     Dose/Rate Route Frequency Ordered Stop    21 0900  vancomycin (VANCOCIN) 750 mg in sodium chloride 0.9 % 250 mL intermittent infusion        \"Followed by\" Linked Group Details    750 mg  over 60-90 Minutes Intravenous EVERY 12 HOURS 21 2130  vancomycin (VANCOCIN) 1,500 mg in sodium chloride 0.9 % 250 mL intermittent infusion        \"Followed by\" Linked Group Details    1,500 mg  over 90 Minutes Intravenous ONCE 21 0930  furosemide (LASIX) injection 40 mg         40 mg  over 1-3 Minutes Intravenous EVERY 12 HOURS 21 0920            Contrast Orders - past 72 hours (72h ago, onward)            None          [employer] Prediction of Planned Initial Vancomycin Regimen  Loading dose: 1500 mg at 21:30 2021.  Regimen: 750 mg IV every 12 hours.  Start time: 21:16 on 2021  Exposure target: AUC24 (range)400-600 mg/L.hr   AUC24,ss: 428 mg/L.hr  Probability of AUC24 > 400: 57 %  Ctrough,ss: 13.6 mg/L  Probability of Ctrough,ss > 20: 19 %  Probability of nephrotoxicity (Lodise ALAN ): 9 %          Plan:  1. Start vancomycin  750 mg IV q12h after one time load of 1500 mg IV  2. Vancomycin monitoring " method: AUC  3. Vancomycin therapeutic monitoring goal: 400-600 mg*h/L  4. Pharmacy will check vancomycin levels as appropriate in 1-3 Days.    5. Serum creatinine levels will be ordered daily for the first week of therapy and at least twice weekly for subsequent weeks.      Andree Jansen, PharmD, BCPS   12/06/21 9:19 PM

## 2021-12-07 NOTE — PROGRESS NOTES
Patient remains on high flow 40 lpm/40% FIO2 and sats high 90s. O2 titrated. RT following    Elmer Sanches RT

## 2021-12-07 NOTE — PROGRESS NOTES
[facility] General Surgery Post-Op / Progress Note         Assessment and Plan:    Assessment:   POD 4 and 7  Laparotomy extensive lysis of adhesions and small bowel resection  Laparotomy oversew of perforation and washout  RVR rate control and pulmonary issues in the unit for these  Hypoxia      Plan:   TPN  PCA for pain control  Awaiting bowel function return no gas or stool  MARISA drains of been clear    Quite tenuous recovery so far   Appreciate intensive care help and assistance with his care           Interval History:   Still hypoxic rate is controlled but still on a drip pain is been an issue abdomen is been softer no gas or stool yet          Significant Problems:      Patient Active Problem List   Diagnosis     Hypertension goal BP (blood pressure) < 140/90     Benign prostatic hyperplasia with weak urinary stream     RAVI (generalized anxiety disorder)     Moderate major depression (H)     History of substance abuse (H)     History of hepatitis C     Chronic neck pain     Chronic bilateral low back pain without sciatica     SBO (small bowel obstruction) (H)             Review of Systems:    The patient denies any chest pain, shortness of breath, excessive pain, fever, chills, purulent drainage from the wound, nausea or vomiting.          Medications:     All medications related to the patient's surgery have been reviewed  Current Facility-Administered Medications   Medication     acetaminophen (TYLENOL) Suppository 650 mg     acetaminophen (TYLENOL) tablet 650 mg     amiodarone (NEXTERONE) 900 mg in sodium chloride 0.9 % 500 mL infusion     benzocaine-menthol (CEPACOL) 15-3.6 MG lozenge 1 lozenge     bisacodyl (DULCOLAX) Suppository 10 mg     [Held by provider] busPIRone (BUSPAR) tablet 15 mg     [Held by provider] dexmedetomidine (PRECEDEX) 400 mcg in 0.9% sodium chloride 100 mL     dextrose 10% infusion     dextrose 10% infusion     glucose gel 15-30 g    Or     dextrose 50 % injection 25-50  mL    Or     glucagon injection 1 mg     enoxaparin ANTICOAGULANT (LOVENOX) injection 40 mg     furosemide (LASIX) injection 40 mg     [Held by provider] gabapentin (NEURONTIN) capsule 300 mg     [Held by provider] gabapentin (NEURONTIN) capsule 600 mg     haloperidol lactate (HALDOL) injection 2 mg     HYDROmorphone (DILAUDID) injection 0.4 mg     HYDROmorphone (DILAUDID) PCA 0.2 mg/mL OPIOID TOLERANT     levothyroxine (SYNTHROID) injection 19 mcg     lidocaine (LMX4) cream     lidocaine 1 % 0.1-1 mL     lipids (INTRALIPID) 20 % infusion 250 mL     magnesium hydroxide (MILK OF MAGNESIA) suspension 30 mL     meropenem (MERREM) 1 g vial to attach to  mL bag     [Held by provider] mirtazapine (REMERON) tablet 30 mg     naloxone (NARCAN) injection 0.2 mg    Or     naloxone (NARCAN) injection 0.4 mg    Or     naloxone (NARCAN) injection 0.2 mg    Or     naloxone (NARCAN) injection 0.4 mg     OLANZapine (zyPREXA) injection 5 mg     OLANZapine zydis (zyPREXA) ODT tab 5 mg     ondansetron (ZOFRAN-ODT) ODT tab 4 mg    Or     ondansetron (ZOFRAN) injection 4 mg     oxyCODONE (ROXICODONE) tablet 5 mg    Or     oxyCODONE (ROXICODONE) tablet 10 mg     pantoprazole (PROTONIX) IV push injection 40 mg     parenteral nutrition - ADULT compounded formula     [Held by provider] polyethylene glycol (MIRALAX) Packet 17 g     potassium chloride 20 mEq in 50 mL intermittent infusion     prochlorperazine (COMPAZINE) injection 5 mg    Or     prochlorperazine (COMPAZINE) tablet 5 mg     [Held by provider] senna-docusate (SENOKOT-S/PERICOLACE) 8.6-50 MG per tablet 1 tablet    Or     [Held by provider] senna-docusate (SENOKOT-S/PERICOLACE) 8.6-50 MG per tablet 2 tablet     sodium chloride (PF) 0.9% PF flush 10-20 mL     sodium chloride (PF) 0.9% PF flush 10-40 mL     sodium chloride (PF) 0.9% PF flush 10-40 mL     sodium chloride (PF) 0.9% PF flush 3 mL     sodium chloride (PF) 0.9% PF flush 3 mL     [Held by provider] tamsulosin  (FLOMAX) capsule 0.4 mg     vancomycin (VANCOCIN) 750 mg in sodium chloride 0.9 % 250 mL intermittent infusion     [Held by provider] venlafaxine (EFFEXOR-XR) 24 hr capsule 37.5 mg             Physical Exam:     Vitals were reviewed  Patient Vitals for the past 8 hrs:   BP Temp Temp src Pulse Resp SpO2   12/07/21 0736 -- -- -- -- -- 94 %   12/07/21 0600 115/73 -- -- 87 14 92 %   12/07/21 0557 -- 98.4  F (36.9  C) Axillary -- -- --   12/07/21 0500 -- -- -- 91 (!) 31 (!) 89 %   12/07/21 0400 106/73 99  F (37.2  C) Axillary 85 17 93 %   12/07/21 0300 105/72 98.5  F (36.9  C) Axillary 86 17 93 %   12/07/21 0200 105/70 99.5  F (37.5  C) Axillary 86 15 92 %   12/07/21 0100 112/74 -- -- 89 17 93 %     Wt Readings from Last 4 Encounters:   12/06/21 70.3 kg (155 lb)   01/22/20 79.4 kg (175 lb)   12/24/19 81.2 kg (179 lb)     I/O last 3 completed shifts:  In: 1387.09 [I.V.:183]  Out: 2644 [Urine:2050; Emesis/NG output:500; Drains:94]  Wound clean and dry with minimal or no drainage.  Surrounding skin with minimal erythema.  Dressing dry and intact.  Abdomen is soft stirrer is drains are just serous output          Data:   All laboratory data related to this surgery reviewed  Results for orders placed or performed during the hospital encounter of 11/30/21 (from the past 24 hour(s))   CBC with platelets   Result Value Ref Range    WBC Count 14.8 (H) 4.0 - 11.0 10e3/uL    RBC Count 3.79 (L) 4.40 - 5.90 10e6/uL    Hemoglobin 11.2 (L) 13.3 - 17.7 g/dL    Hematocrit 34.2 (L) 40.0 - 53.0 %    MCV 90 78 - 100 fL    MCH 29.6 26.5 - 33.0 pg    MCHC 32.7 31.5 - 36.5 g/dL    RDW 14.7 10.0 - 15.0 %    Platelet Count 318 150 - 450 10e3/uL   XR Chest Port 1 View    Narrative    EXAM: XR CHEST PORT 1 VIEW  LOCATION: [facility]  DATE/TIME: 12/6/2021 1:39 PM    INDICATION: Worsening hypoxia, evaluate for pneumonia, pulm edema  COMPARISON: Chest x-ray 12/03/2021      Impression    IMPRESSION: Enteric suction tube tip  and side-port within the upper gastric lumen. Satisfactory right PICC line position with the tip near the cavoatrial junction. Persistent low lung volumes. No definite pneumothorax. Mildly improved bibasilar pulmonary   opacities favored to reflect atelectasis. Persistent interstitial coarsening. Suspected trace right-sided pleural fluid. Stable heart size and central vascular prominence.   Potassium   Result Value Ref Range    Potassium 3.6 3.5 - 5.0 mmol/L   Potassium   Result Value Ref Range    Potassium 5.8 (H) 3.5 - 5.0 mmol/L   Glucose by meter   Result Value Ref Range    GLUCOSE BY METER POCT 122 (H) 70 - 99 mg/dL   Glucose by meter   Result Value Ref Range    GLUCOSE BY METER POCT 133 (H) 70 - 99 mg/dL   Magnesium   Result Value Ref Range    Magnesium 2.2 1.8 - 2.6 mg/dL   Phosphorus   Result Value Ref Range    Phosphorus 3.2 2.5 - 4.5 mg/dL   Comprehensive metabolic panel   Result Value Ref Range    Sodium 148 (H) 136 - 145 mmol/L    Potassium 3.2 (L) 3.5 - 5.0 mmol/L    Chloride 106 98 - 107 mmol/L    Carbon Dioxide (CO2) 31 22 - 31 mmol/L    Anion Gap 11 5 - 18 mmol/L    Urea Nitrogen 20 8 - 22 mg/dL    Creatinine 0.79 0.70 - 1.30 mg/dL    Calcium 8.4 (L) 8.5 - 10.5 mg/dL    Glucose 134 (H) 70 - 125 mg/dL    Alkaline Phosphatase 54 45 - 120 U/L    AST 42 (H) 0 - 40 U/L    ALT 34 0 - 45 U/L    Protein Total 5.9 (L) 6.0 - 8.0 g/dL    Albumin 2.3 (L) 3.5 - 5.0 g/dL    Bilirubin Total 0.6 0.0 - 1.0 mg/dL    GFR Estimate >90 >60 mL/min/1.73m2   Procalcitonin   Result Value Ref Range    Procalcitonin 0.78 (H) 0.00 - 0.49 ng/mL   CRP inflammation   Result Value Ref Range    CRP 26.6 (H) 0.0-<0.8 mg/dL   CBC with platelets   Result Value Ref Range    WBC Count 13.9 (H) 4.0 - 11.0 10e3/uL    RBC Count 3.73 (L) 4.40 - 5.90 10e6/uL    Hemoglobin 10.9 (L) 13.3 - 17.7 g/dL    Hematocrit 33.8 (L) 40.0 - 53.0 %    MCV 91 78 - 100 fL    MCH 29.2 26.5 - 33.0 pg    MCHC 32.2 31.5 - 36.5 g/dL    RDW 14.7 10.0 - 15.0 %     Platelet Count 332 150 - 450 10e3/uL   Glucose by meter   Result Value Ref Range    GLUCOSE BY METER POCT 143 (H) 70 - 99 mg/dL     All imaging studies related to this surgery reviewed    Mekhi Richardson MD

## 2021-12-07 NOTE — PROGRESS NOTES
Care Management Follow Up    Length of Stay (days): 7    Expected Discharge Date: 12/10/2021     Concerns to be Addressed: pulm and GI status, mentation    Patient plan of care discussed at interdisciplinary rounds: Yes    Anticipated Discharge Disposition:  TCU     Anticipated Discharge Services:  TCU  Anticipated Discharge DME:         Additional Information:  Patient weaned down to 6L oxygen. Continued monitoring of GI status, waiting for return of bowel function, able to tolerate diet.     Recommendation for discharge is TCU.     Assessment History:  Patient is from DOC. Release date from DOC 12/14/21. Patient has mental health release planner. Plan prior to hospitalization, patient was to release to [facility]. Patient would need to be medically stable and independent.  DOC is Juno. She will be CM main contact.    Juno WELLS  [phone]      Final discharge plan pending when patient is medically ready for discharge. Spoke with Juno today 12/7 regarding discharge plan. She is working on obtaining MA. Needed signatures obtained from patient and emailed back to Juno.  If discharge before 12/14, he can discharge to [facility]. Contact there is Maira Villanueva -[phone]. If after 12/14 and needing further care, plan is not determined.        Shahnaz Page RN

## 2021-12-07 NOTE — PROGRESS NOTES
[facility] Family Medicine Progress Note    Assessment/Plan  Principal Problem:    SBO (small bowel obstruction) (H)  Active Problems:    Hypertension goal BP (blood pressure) < 140/90    Benign prostatic hyperplasia with weak urinary stream    RAVI (generalized anxiety disorder)    Moderate major depression (H)    History of substance abuse (H)    History of hepatitis C    Chronic neck pain    Chronic bilateral low back pain without sciatica    Gurdeep Dia is a 66 year old male with history of splenectomy, appendectomy, HTN, and substance abuse who presented with 2 days of right sided abdominal pain, found to have a bowel obstruction with closed loop. He is admitted for surgical repair of obstruction.      Requires ongoing hospitalization for post-operative monitoring and care     Requires ICU-level care currently, followed by [facility] while in unit.     Mechanical SBO s/p exploratory laparotomy / small bowel resection / repair of enterotomy / extensive lysis of adhesions (11/30/21)  Subsequent small bowel perforation requiring repeat ex lap for washout 12/3    CT abdomen/pelvis on admission 11/30 significant for closed loop mechanical small bowel obstruction. Does have history of multiple abdominal surgeries previously. First procedure 11/30 for small bowel resection. Acute change on 12/2-12/3 and found to have intraabdominal drainage from 12/3/2021; underwent repeat exploratory laparotomy with washout and repair of small bowel perforation.    -Surgery consulted and following   -ICU primary currently   -NPO for now until cleared by gen surg   -Pain control as per surgery/ICU (PCA, tyl) -- need to balance with encephalopathy below   -IV meropenem + vanc -- follow cultures   -IV Protonix     Acute encephalopathy, likely multifactorial - toxic/metabolic/hospital-induced   Acutely agitated post-op 12/3 to 12/4. Pulling at lines and threatening to leave.  Nurse felt unsafe without restraints. Precedex drip  started, now off.  - Soft limb restraint x 2 - adjust as needed  - Precedex gtt stop 12/7 morning  - PRN Haldol   - Zyprexa HS   - Conservative measures as able   - Avoid opioid/benzo if able     Acute hypoxic respiratory failure, post-op   Initially requiring high-flow oxygen in ICU following procedure.  Question post-op atelectasis vs aspiration versus volume overload.  High risk for intubation per ICU given his agitation and potential to not protect airway.  Currently full code.  Does have a degree of respiratory distress when he gets agitated. Breathing improved 12/7 after some diuresis and maybe ongoing resolution of his atelectasis.  -Continue nasal cannula per ICU  -Intubate if indicated    Afib, RVR post-op, currently rate controlled   Volume up  Noted.  Rates had been in the 120 to 150 range.  Rate controlled with amiodarone drip. Both CHADS2-Vasc and HAS-BLED of at least 2.  Would likely warrant anticoagulation on ongoing basis.  Of note, is 6 pounds up -question of volume overload.  Does not overtly seem to have JVD or other signs of acute volume overload. TSH normal.  -Amiodarone drip per ICU.  -Lovenox for now per ICU   -Consider ongoing anticoagulation once encephalopathy is improved  -Lasix per ICU.  Consider whether ongoing need once out of the unit.  -Likely needs echocardiogram.    HTN  PTA lisinopril, furosemide being held in the setting of acute surgical care.  Will monitor, even with pain BP not significantly elevated.  Will resume when hemodynamically stable.  Kidney function is stable at this time.  Blood pressure does climb when he is agitated.  -Monitor     Tremor - resolved  New on exam 12/2. Bilateral. Doesn't fit well with either essential tremor or parkinsonian. Almost resembles asterixis. LFTs and kidney function normal though. Ammonia normal. Improved 12/3.  -Monitor      Chronic Conditions:  Hypothyroidism- PTA levothyroxine  Depression/anxiety- PTA venlafaxine, mirtazepine, Buspar    Chronic pain- Hold mexolicam 7.5mg daily, resume PTA gabapentin when tolerating PO.  BPH- PTA flomax      Diet: NPO for Medical/Clinical Reasons Except for: Meds, Ice ChipsNPO   DVT Prophylaxis: Lovenox q day    Roblero Catheter: placed   Fluids:  None  Central Lines: None  Code Status: Full Code    Subjective    Frustrated today with inability to eat or drink anything. Also with ongoing pain. States we don't care about him, as evidenced by us never asking about his family. States he has passed some gas.     Objective    Vital signs in last 24 hours Temp:  [98.4  F (36.9  C)-102.8  F (39.3  C)] 98.6  F (37  C)  Pulse:  [] 93  Resp:  [14-43] 38  BP: (105-143)/(64-93) 109/76  FiO2 (%):  [40 %-60 %] 40 %  SpO2:  [89 %-96 %] 93 %       Intake/Output last 3 shift I/O last 3 completed shifts:  In: 1387.09 [I.V.:183]  Out: 2644 [Urine:2050; Emesis/NG output:500; Drains:94]    Intake/Output this shift:I/O this shift:  In: 6 [I.V.:6]  Out: 500 [Urine:450; Emesis/NG output:50]    Physical Exam  General appearance: uncomfortable appearing male, laying in bed, two officers at bedside.  Acutely agitated.  Head: Normocephalic, without obvious abnormality, atraumatic.  NG tube in place.    Throat: moist mucous membranes.  Lungs: Tachypneic.  High flow oxygen in place.  Coarse lung sounds in the bases.  Heart: regular rate and rhythm, S1, S2 normal, no murmur, click, rub or gallop  Abdomen: Abdominal binding in place.  Multiple MARISA drains in place with bloody drainage. bowel sounds very present. Moderate TTP throughout.   Extremities: extremities normal, atraumatic, no cyanosis or edema  Skin: Slightly pale, dry skin.  Neurologic: Alert and oriented X 2.  Able to move extremities.  Sensation intact.   Psychiatric: Exceedingly anxious and agitated.     Pertinent Labs and Pertinent Radiology   Lab Results: personally reviewed.     Radiology Results: Personally reviewed image/s and impression/s    Precepted patient with   Willy Mcmahon MD - PGY2  [facility]  P: [phone]

## 2021-12-07 NOTE — PLAN OF CARE
Problem: Pain Acute  Goal: Acceptable Pain Control and Functional Ability  Intervention: Develop Pain Management Plan  Recent Flowsheet Documentation  Taken 12/7/2021 1200 by Carolyn Short RN  Pain Management Interventions: medication (see MAR)  Taken 12/7/2021 0800 by Carolyn Short RN  Pain Management Interventions: medication (see MAR)  Intervention: Prevent or Manage Pain  Recent Flowsheet Documentation  Taken 12/7/2021 1200 by Carolyn Short RN  Sleep/Rest Enhancement: noise level reduced  Taken 12/7/2021 0800 by Carolyn Short RN  Sleep/Rest Enhancement: noise level reduced  Intervention: Optimize Psychosocial Wellbeing  Recent Flowsheet Documentation  Taken 12/7/2021 1200 by Carolyn Short RN  Supportive Measures: active listening utilized  Taken 12/7/2021 0800 by Carolyn Short RN  Supportive Measures:   active listening utilized   counseling provided   decision-making supported   positive reinforcement provided   problem-solving facilitated     Problem: Pain Acute  Goal: Acceptable Pain Control and Functional Ability  Intervention: Develop Pain Management Plan  Recent Flowsheet Documentation  Taken 12/7/2021 1200 by Carolyn Short RN  Pain Management Interventions: medication (see MAR)  Taken 12/7/2021 0800 by Carolyn Short RN  Pain Management Interventions: medication (see MAR)     Problem: Pain Acute  Goal: Acceptable Pain Control and Functional Ability  Intervention: Optimize Psychosocial Wellbeing  Recent Flowsheet Documentation  Taken 12/7/2021 1200 by Carolyn Short RN  Supportive Measures: active listening utilized  Taken 12/7/2021 0800 by Carolyn Short RN  Supportive Measures:   active listening utilized   counseling provided   decision-making supported   positive reinforcement provided   problem-solving facilitated

## 2021-12-07 NOTE — PLAN OF CARE
Problem: Adult Inpatient Plan of Care  Goal: Plan of Care Review  Outcome: No Change   T 102.8 during the shift, Dr Schulte notified, blood culture and Vancomycin ordered. PRN Tylenol suppository given. Pt has been complained of severe abdomen pain,  PRN Dilaudid given twice beside PCA pain control. All daily needs met. Varinder Feliz RN

## 2021-12-08 ENCOUNTER — APPOINTMENT (OUTPATIENT)
Dept: OCCUPATIONAL THERAPY | Facility: HOSPITAL | Age: 66
DRG: 329 | End: 2021-12-08
Payer: MEDICARE

## 2021-12-08 ENCOUNTER — APPOINTMENT (OUTPATIENT)
Dept: PHYSICAL THERAPY | Facility: HOSPITAL | Age: 66
DRG: 329 | End: 2021-12-08
Payer: MEDICARE

## 2021-12-08 ENCOUNTER — APPOINTMENT (OUTPATIENT)
Dept: CT IMAGING | Facility: HOSPITAL | Age: 66
DRG: 329 | End: 2021-12-08
Attending: SPECIALIST
Payer: MEDICARE

## 2021-12-08 LAB
ANION GAP SERPL CALCULATED.3IONS-SCNC: 7 MMOL/L (ref 5–18)
BUN SERPL-MCNC: 22 MG/DL (ref 8–22)
CALCIUM SERPL-MCNC: 8.3 MG/DL (ref 8.5–10.5)
CHLORIDE BLD-SCNC: 113 MMOL/L (ref 98–107)
CO2 SERPL-SCNC: 29 MMOL/L (ref 22–31)
CREAT SERPL-MCNC: 0.75 MG/DL (ref 0.7–1.3)
ERYTHROCYTE [DISTWIDTH] IN BLOOD BY AUTOMATED COUNT: 15.2 % (ref 10–15)
GFR SERPL CREATININE-BSD FRML MDRD: >90 ML/MIN/1.73M2
GLUCOSE BLD-MCNC: 121 MG/DL (ref 70–125)
GLUCOSE BLDC GLUCOMTR-MCNC: 116 MG/DL (ref 70–99)
GLUCOSE BLDC GLUCOMTR-MCNC: 129 MG/DL (ref 70–99)
GLUCOSE BLDC GLUCOMTR-MCNC: 141 MG/DL (ref 70–99)
HCT VFR BLD AUTO: 32.8 % (ref 40–53)
HGB BLD-MCNC: 10.6 G/DL (ref 13.3–17.7)
LACTATE SERPL-SCNC: 0.7 MMOL/L (ref 0.7–2)
MAGNESIUM SERPL-MCNC: 2.1 MG/DL (ref 1.8–2.6)
MCH RBC QN AUTO: 29.8 PG (ref 26.5–33)
MCHC RBC AUTO-ENTMCNC: 32.3 G/DL (ref 31.5–36.5)
MCV RBC AUTO: 92 FL (ref 78–100)
PHOSPHATE SERPL-MCNC: 2.7 MG/DL (ref 2.5–4.5)
PLATELET # BLD AUTO: 406 10E3/UL (ref 150–450)
POTASSIUM BLD-SCNC: 3.7 MMOL/L (ref 3.5–5)
RADIOLOGIST FLAGS: ABNORMAL
RBC # BLD AUTO: 3.56 10E6/UL (ref 4.4–5.9)
SARS-COV-2 RNA RESP QL NAA+PROBE: NEGATIVE
SODIUM SERPL-SCNC: 149 MMOL/L (ref 136–145)
VANCOMYCIN SERPL-MCNC: 8.4 MG/L
WBC # BLD AUTO: 16.2 10E3/UL (ref 4–11)

## 2021-12-08 PROCEDURE — 99233 SBSQ HOSP IP/OBS HIGH 50: CPT | Mod: GC | Performed by: STUDENT IN AN ORGANIZED HEALTH CARE EDUCATION/TRAINING PROGRAM

## 2021-12-08 PROCEDURE — 250N000011 HC RX IP 250 OP 636: Performed by: SPECIALIST

## 2021-12-08 PROCEDURE — 250N000011 HC RX IP 250 OP 636: Performed by: INTERNAL MEDICINE

## 2021-12-08 PROCEDURE — 99024 POSTOP FOLLOW-UP VISIT: CPT | Performed by: SPECIALIST

## 2021-12-08 PROCEDURE — C9113 INJ PANTOPRAZOLE SODIUM, VIA: HCPCS | Performed by: INTERNAL MEDICINE

## 2021-12-08 PROCEDURE — 84100 ASSAY OF PHOSPHORUS: CPT | Performed by: INTERNAL MEDICINE

## 2021-12-08 PROCEDURE — 97530 THERAPEUTIC ACTIVITIES: CPT | Mod: GP

## 2021-12-08 PROCEDURE — 99232 SBSQ HOSP IP/OBS MODERATE 35: CPT | Performed by: CLINICAL NURSE SPECIALIST

## 2021-12-08 PROCEDURE — 250N000013 HC RX MED GY IP 250 OP 250 PS 637: Performed by: INTERNAL MEDICINE

## 2021-12-08 PROCEDURE — 87635 SARS-COV-2 COVID-19 AMP PRB: CPT | Performed by: STUDENT IN AN ORGANIZED HEALTH CARE EDUCATION/TRAINING PROGRAM

## 2021-12-08 PROCEDURE — 250N000013 HC RX MED GY IP 250 OP 250 PS 637: Performed by: SPECIALIST

## 2021-12-08 PROCEDURE — 258N000003 HC RX IP 258 OP 636: Performed by: FAMILY MEDICINE

## 2021-12-08 PROCEDURE — 120N000001 HC R&B MED SURG/OB

## 2021-12-08 PROCEDURE — 250N000009 HC RX 250: Performed by: FAMILY MEDICINE

## 2021-12-08 PROCEDURE — 74177 CT ABD & PELVIS W/CONTRAST: CPT

## 2021-12-08 PROCEDURE — 80202 ASSAY OF VANCOMYCIN: CPT | Performed by: INTERNAL MEDICINE

## 2021-12-08 PROCEDURE — 97535 SELF CARE MNGMENT TRAINING: CPT | Mod: GO

## 2021-12-08 PROCEDURE — 250N000011 HC RX IP 250 OP 636: Performed by: FAMILY MEDICINE

## 2021-12-08 PROCEDURE — 250N000009 HC RX 250: Performed by: INTERNAL MEDICINE

## 2021-12-08 PROCEDURE — 85027 COMPLETE CBC AUTOMATED: CPT | Performed by: INTERNAL MEDICINE

## 2021-12-08 PROCEDURE — 80048 BASIC METABOLIC PNL TOTAL CA: CPT | Performed by: INTERNAL MEDICINE

## 2021-12-08 PROCEDURE — 83735 ASSAY OF MAGNESIUM: CPT | Performed by: INTERNAL MEDICINE

## 2021-12-08 PROCEDURE — 258N000001 HC RX 258: Performed by: STUDENT IN AN ORGANIZED HEALTH CARE EDUCATION/TRAINING PROGRAM

## 2021-12-08 RX ORDER — CEFAZOLIN SODIUM 1 G/50ML
1250 SOLUTION INTRAVENOUS EVERY 12 HOURS
Status: DISCONTINUED | OUTPATIENT
Start: 2021-12-08 | End: 2021-12-12

## 2021-12-08 RX ORDER — LEVOTHYROXINE SODIUM ANHYDROUS 100 UG/5ML
100 INJECTION, POWDER, LYOPHILIZED, FOR SOLUTION INTRAVENOUS WEEKLY
Status: DISCONTINUED | OUTPATIENT
Start: 2021-12-09 | End: 2021-12-13

## 2021-12-08 RX ORDER — ACETAMINOPHEN 325 MG/1
650 TABLET ORAL EVERY 4 HOURS PRN
Status: DISCONTINUED | OUTPATIENT
Start: 2021-12-08 | End: 2021-12-08

## 2021-12-08 RX ORDER — IOPAMIDOL 755 MG/ML
100 INJECTION, SOLUTION INTRAVASCULAR ONCE
Status: COMPLETED | OUTPATIENT
Start: 2021-12-08 | End: 2021-12-08

## 2021-12-08 RX ORDER — AMOXICILLIN 250 MG
1 CAPSULE ORAL 2 TIMES DAILY
Status: DISCONTINUED | OUTPATIENT
Start: 2021-12-08 | End: 2021-12-21 | Stop reason: HOSPADM

## 2021-12-08 RX ORDER — LIDOCAINE 40 MG/G
CREAM TOPICAL
Status: DISCONTINUED | OUTPATIENT
Start: 2021-12-08 | End: 2021-12-21 | Stop reason: HOSPADM

## 2021-12-08 RX ORDER — PROCHLORPERAZINE MALEATE 5 MG
5 TABLET ORAL EVERY 6 HOURS PRN
Status: DISCONTINUED | OUTPATIENT
Start: 2021-12-08 | End: 2021-12-08

## 2021-12-08 RX ORDER — ACETAMINOPHEN 325 MG/1
975 TABLET ORAL EVERY 8 HOURS
Status: DISCONTINUED | OUTPATIENT
Start: 2021-12-08 | End: 2021-12-08

## 2021-12-08 RX ORDER — POTASSIUM CHLORIDE 7.45 MG/ML
10 INJECTION INTRAVENOUS
Status: COMPLETED | OUTPATIENT
Start: 2021-12-08 | End: 2021-12-08

## 2021-12-08 RX ORDER — ONDANSETRON 2 MG/ML
4 INJECTION INTRAMUSCULAR; INTRAVENOUS EVERY 6 HOURS PRN
Status: DISCONTINUED | OUTPATIENT
Start: 2021-12-08 | End: 2021-12-08

## 2021-12-08 RX ORDER — ONDANSETRON 4 MG/1
4 TABLET, ORALLY DISINTEGRATING ORAL EVERY 6 HOURS PRN
Status: DISCONTINUED | OUTPATIENT
Start: 2021-12-08 | End: 2021-12-08

## 2021-12-08 RX ORDER — OXYCODONE HYDROCHLORIDE 5 MG/1
10 TABLET ORAL EVERY 4 HOURS PRN
Status: DISCONTINUED | OUTPATIENT
Start: 2021-12-08 | End: 2021-12-08

## 2021-12-08 RX ORDER — BISACODYL 10 MG
10 SUPPOSITORY, RECTAL RECTAL DAILY PRN
Status: DISCONTINUED | OUTPATIENT
Start: 2021-12-08 | End: 2021-12-08

## 2021-12-08 RX ORDER — LIDOCAINE 40 MG/G
CREAM TOPICAL
Status: ACTIVE | OUTPATIENT
Start: 2021-12-08 | End: 2021-12-11

## 2021-12-08 RX ORDER — POLYETHYLENE GLYCOL 3350 17 G/17G
17 POWDER, FOR SOLUTION ORAL DAILY
Status: DISCONTINUED | OUTPATIENT
Start: 2021-12-08 | End: 2021-12-08

## 2021-12-08 RX ADMIN — POTASSIUM CHLORIDE 10 MEQ: 10 INJECTION, SOLUTION INTRAVENOUS at 09:11

## 2021-12-08 RX ADMIN — ENOXAPARIN SODIUM 40 MG: 40 INJECTION SUBCUTANEOUS at 13:13

## 2021-12-08 RX ADMIN — OLANZAPINE 5 MG: 5 TABLET, ORALLY DISINTEGRATING ORAL at 19:26

## 2021-12-08 RX ADMIN — OLANZAPINE 5 MG: 5 TABLET, ORALLY DISINTEGRATING ORAL at 19:16

## 2021-12-08 RX ADMIN — OLANZAPINE 5 MG: 5 TABLET, ORALLY DISINTEGRATING ORAL at 05:51

## 2021-12-08 RX ADMIN — VANCOMYCIN HYDROCHLORIDE 1250 MG: 5 INJECTION, POWDER, LYOPHILIZED, FOR SOLUTION INTRAVENOUS at 11:02

## 2021-12-08 RX ADMIN — VANCOMYCIN HYDROCHLORIDE 1250 MG: 5 INJECTION, POWDER, LYOPHILIZED, FOR SOLUTION INTRAVENOUS at 20:16

## 2021-12-08 RX ADMIN — MEROPENEM 1 G: 1 INJECTION, POWDER, FOR SOLUTION INTRAVENOUS at 08:12

## 2021-12-08 RX ADMIN — POTASSIUM CHLORIDE: 2 INJECTION, SOLUTION, CONCENTRATE INTRAVENOUS at 20:18

## 2021-12-08 RX ADMIN — Medication: at 10:03

## 2021-12-08 RX ADMIN — MEROPENEM 1 G: 1 INJECTION, POWDER, FOR SOLUTION INTRAVENOUS at 00:31

## 2021-12-08 RX ADMIN — PANTOPRAZOLE SODIUM 40 MG: 40 INJECTION, POWDER, FOR SOLUTION INTRAVENOUS at 08:11

## 2021-12-08 RX ADMIN — SENNOSIDES AND DOCUSATE SODIUM 1 TABLET: 50; 8.6 TABLET ORAL at 20:18

## 2021-12-08 RX ADMIN — OLANZAPINE 5 MG: 5 TABLET, ORALLY DISINTEGRATING ORAL at 13:13

## 2021-12-08 RX ADMIN — LEVOTHYROXINE SODIUM ANHYDROUS 19 MCG: 100 INJECTION, POWDER, LYOPHILIZED, FOR SOLUTION INTRAVENOUS at 09:08

## 2021-12-08 RX ADMIN — ACETAMINOPHEN 650 MG: 325 TABLET ORAL at 19:26

## 2021-12-08 RX ADMIN — HALOPERIDOL LACTATE 2 MG: 5 INJECTION, SOLUTION INTRAMUSCULAR at 16:59

## 2021-12-08 RX ADMIN — SODIUM CHLORIDE 1000 ML: 4.5 INJECTION, SOLUTION INTRAVENOUS at 15:01

## 2021-12-08 RX ADMIN — MEROPENEM 1 G: 1 INJECTION, POWDER, FOR SOLUTION INTRAVENOUS at 16:59

## 2021-12-08 RX ADMIN — POTASSIUM CHLORIDE 10 MEQ: 10 INJECTION, SOLUTION INTRAVENOUS at 16:59

## 2021-12-08 RX ADMIN — IOPAMIDOL 100 ML: 755 INJECTION, SOLUTION INTRAVENOUS at 14:00

## 2021-12-08 RX ADMIN — ACETAMINOPHEN 650 MG: 650 SUPPOSITORY RECTAL at 00:25

## 2021-12-08 ASSESSMENT — ACTIVITIES OF DAILY LIVING (ADL)
ADLS_ACUITY_SCORE: 14

## 2021-12-08 NOTE — PHARMACY-VANCOMYCIN DOSING SERVICE
"Pharmacy Vancomycin Note  Date of Service 2021  Patient's  1955   66 year old, male    Indication: Intra-abdominal infection and Sepsis  Day of Therapy: 3  Current vancomycin regimen:  750 mg IV q12h  Current vancomycin monitoring method: AUC  Current vancomycin therapeutic monitoring goal: 400-600 mg*h/L    [employer] Prediction of Current Vancomycin Regimen    Regimen: 750 mg IV every 12 hours.  Start time: 09:00 on 2021  Exposure target: AUC24 (range)400-600 mg/L.hr   AUC24,ss: 322 mg/L.hr  Probability of AUC24 > 400: 16 %  Ctrough,ss: 9.1 mg/L  Probability of Ctrough,ss > 20: 1 %  Probability of nephrotoxicity (Lodise ALAN ): 5 %    Current estimated CrCl = Estimated Creatinine Clearance: 95 mL/min (based on SCr of 0.75 mg/dL).    Creatinine for last 3 days  2021: 10:48 AM Creatinine 0.83 mg/dL  2021:  6:20 AM Creatinine 0.82 mg/dL  2021:  3:58 AM Creatinine 0.79 mg/dL  2021:  5:36 AM Creatinine 0.75 mg/dL    Recent Vancomycin Levels (past 3 days)  2021:  5:36 AM Vancomycin 8.4 mg/L    Vancomycin IV Administrations (past 72 hours)                   vancomycin (VANCOCIN) 750 mg in sodium chloride 0.9 % 250 mL intermittent infusion (mg) 750 mg New Bag 21     750 mg New Bag  0949    vancomycin (VANCOCIN) 1,500 mg in sodium chloride 0.9 % 250 mL intermittent infusion (mg) 1,500 mg New Bag 21 214                Nephrotoxins and other renal medications (From now, onward)    Start     Dose/Rate Route Frequency Ordered Stop    21 0900  furosemide (LASIX) injection 40 mg         40 mg  over 1-3 Minutes Intravenous DAILY 21 1354      21 0900  vancomycin (VANCOCIN) 1,250 mg in sodium chloride 0.9 % 250 mL intermittent infusion        \"Followed by\" Linked Group Details    1,250 mg  over 90 Minutes Intravenous EVERY 12 HOURS 21 0729               Contrast Orders - past 72 hours (72h ago, onward)            None    "       Interpretation of levels and current regimen:  Vancomycin level is reflective of AUC less than 400    Has serum creatinine changed greater than 50% in last 72 hours: No    Renal Function: Stable    [employer] Prediction of Planned New Vancomycin Regimen  Regimen: 1250 mg IV every 12 hours.  Start time: 09:00 on 12/08/2021  Exposure target: AUC24 (range)400-600 mg/L.hr   AUC24,ss: 533 mg/L.hr  Probability of AUC24 > 400: 92 %  Ctrough,ss: 15.3 mg/L  Probability of Ctrough,ss > 20: 21 %  Probability of nephrotoxicity (Lodise ALAN 2009): 11 %      Plan:  1. Increase Dose to 1250 mg q12h  2. Vancomycin monitoring method: AUC  3. Vancomycin therapeutic monitoring goal: 400-600 mg*h/L  4. Pharmacy will check vancomycin levels as appropriate in 1-3 Days.  5. Serum creatinine levels will be ordered a minimum of twice weekly.    Aye Domínguez [facility]

## 2021-12-08 NOTE — PROGRESS NOTES
[facility] ACUTE PAIN SERVICE    ([facility], [facility], [facility])   Daily PAIN Progress Note    Assessment/Plan:  Gurdeep Dia is a 66 year old male who was admitted on 11/30/2021.  5 Days Post-Op repair of bowel perforation washout POD #8 Laparotomy extensive lysis of adhesions and small bowel resection.  Patient is NPO, has NG tube in place, TPN, PCA for pain control.    Complicated medical history significant for BPH, chronic bilateral lowe back pain, neck pain, generalized anxiety disorder, hepatitis C (undergone treatment, now without disease), Hypertension, Moderate major depressionchronic pain multiple abdominal surgeries, polysubstance abuse, currently incarcerated with release date 12/14/21 with plan to dismiss to TCU once medically stable.    Patient transferred to general medical floor yesterday.  He has had difficulty with some post operative encephalopathy, low grade fevers.      Patient continues with hydromorphone per PCA over past 24 hours he has received 9.3 mg about 186 oral morphine equivalents (MME)    Abdominal pain managed with PCA.  Feeling anxious, wants to be able to sleep without being disturbed.  Discussed trying zyprexa patient agreeable.    1) Pain is consistent with acute post operative pain with recent bowel obstruction/bowel perforation, status post repair.  Patient with multiple previous bowel surgeries with scar tissue.  Patient currently NPO, has NG tube in place.    Agree with managing pain with PCA while NPO.  Agree with current dosing.    Anticipate transfer out of ICU today to general medical care.  We will follow along.   and   2)Multimodal Medication Therapy  Topical: consider  NSAID'S: avoid  Adjuvants: gabapentin 300 mg bid and 600 mg every hs on hold NPO  Antidepressants/anxiolytics:Remeron on hold, zyprexa prn   Opioids: none  IV Pain medication:  Hydromorphone PCA 0.2 mg every hour with additional 0.2 mg every 10 min prn   3)Non-medication  interventions  Acupuncture consult- as available Mon and Thursday    Integrative consult - consider  4)Constipation Prophylaxis   per surgery  5) Follow up   -Opioid prescriber has been none  -Discharge Recommendations - We recommend prescribing the following at the time of discharge: TBD         Principal Problem:    SBO (small bowel obstruction) (H)  Active Problems:    Hypertension goal BP (blood pressure) < 140/90    Benign prostatic hyperplasia with weak urinary stream    RAVI (generalized anxiety disorder)    Moderate major depression (H)    History of substance abuse (H)    History of hepatitis C    Chronic neck pain    Chronic bilateral low back pain without sciatica     LOS: 8 days       Subjective:  Patient reports pain is mid abdominal pain around MARISA drains.      [Held by provider] busPIRone  15 mg Oral BID     enoxaparin ANTICOAGULANT  40 mg Subcutaneous Q24H     [Held by provider] furosemide  40 mg Intravenous Daily     [Held by provider] gabapentin  300 mg Oral BID w/meals     [Held by provider] gabapentin  600 mg Oral At Bedtime     levothyroxine  19 mcg Intravenous Daily     lipids  250 mL Intravenous Once per day on Tue Thu Sat     meropenem  1 g Intravenous Q8H     [Held by provider] mirtazapine  30 mg Oral At Bedtime     pantoprazole (PROTONIX) IV  40 mg Intravenous Daily with breakfast     [Held by provider] polyethylene glycol  17 g Oral Daily     [Held by provider] senna-docusate  1 tablet Oral BID    Or     [Held by provider] senna-docusate  2 tablet Oral BID     sodium chloride (PF)  10-40 mL Intracatheter Q7 Days     sodium chloride (PF)  3 mL Intracatheter Q8H     sodium chloride 0.45%  1,000 mL Intravenous Once     [Held by provider] tamsulosin  0.4 mg Oral Daily     vancomycin  1,250 mg Intravenous Q12H     [Held by provider] venlafaxine  37.5 mg Oral Daily       Objective:  Vital signs in last 24 hours:  Temp:  [98.7  F (37.1  C)-101.7  F (38.7  C)] 100.2  F (37.9  C)  Pulse:  []  99  Resp:  [18-46] 18  BP: (109-152)/(69-90) 109/80  SpO2:  [89 %-94 %] 90 %  Weight:   Weight change:   Body mass index is 25.64 kg/m .    Intake/Output last 3 shifts:  I/O last 3 completed shifts:  In: 2054 [I.V.:158]  Out: 2542 [Urine:2100; Emesis/NG output:295; Drains:147]  Intake/Output this shift:  I/O this shift:  In: 11 [I.V.:11]  Out: 300 [Urine:250; Drains:50]    Review of Systems:   As per subjective, all others negative.    Physical Exam:    General Appearance:  Alert, anxious, impatient,mildly confused   Head:  Normocephalic, without obvious abnormality, atraumatic   Eyes:  PERRL, conjunctiva/corneas clear, EOM's intact   Nose: NG tube in place, Nares normal, septum midline, mucosa normal, no drainage   Throat: Lips, mucosa, dry, tongue with white coating, normal; teeth and gums normal   Neck: Supple, symmetrical, trachea midline   Back:   Symmetric, no curvature, ROM normal, no CVA tenderness   Lungs:   respirations unlabored,oxygen per nasal canula   Chest Wall:  No tenderness or deformity   Heart:  Regular rate and rhythm   Abdomen:   tender, abdominal binder in place, 3 MARISA drains intact   Extremities: Extremities normal, atraumatic, no cyanosis or edema   Skin: Skin color, texture, turgor normal, no rashes or lesions   Neurologic: Alert and oriented X 3, Moves all 4 extremities          Imaging:  Personally Reviewed.  XR Chest Port 1 View    Result Date: 12/6/2021  EXAM: XR CHEST PORT 1 VIEW LOCATION: [facility] DATE/TIME: 12/6/2021 1:39 PM INDICATION: Worsening hypoxia, evaluate for pneumonia, pulm edema COMPARISON: Chest x-ray 12/03/2021     IMPRESSION: Enteric suction tube tip and side-port within the upper gastric lumen. Satisfactory right PICC line position with the tip near the cavoatrial junction. Persistent low lung volumes. No definite pneumothorax. Mildly improved bibasilar pulmonary  opacities favored to reflect atelectasis. Persistent interstitial coarsening.  "Suspected trace right-sided pleural fluid. Stable heart size and central vascular prominence.    XR Chest Port 1 View    Result Date: 12/3/2021  EXAM: XR CHEST PORT 1 VIEW LOCATION: [facility] DATE/TIME: 12/3/2021 8:31 PM INDICATION: RN placed PICC - verify tip placement COMPARISON: None.     IMPRESSION: Right PICC line present with its tip likely just into the right atrium. Suggest withdrawing the PICC line 2 cm to place the tip in the low SVC. NG tube in good position in the stomach. Mild cardiomegaly. Low lung volumes with mild patchy mixed interstitial and airspace infiltrates    CT Abdomen Pelvis w Contrast    Result Date: 11/30/2021  EXAM: CT ABDOMEN PELVIS W CONTRAST LOCATION: [facility] DATE/TIME: 11/30/2021 10:48 AM INDICATION: Abdominal distension COMPARISON: None. TECHNIQUE: CT scan of the abdomen and pelvis was performed following injection of IV contrast. Multiplanar reformats were obtained. Dose reduction techniques were used. CONTRAST: IsoVue 370 100mL    IMPRESSION: 1.  Mechanical small bowel obstruction with dilated distal jejunum and ileum with caliber changes in the right lower quadrant and in the left midabdomen this may be secondary to adhesions or internal hernia and has the appearance of a closed loop obstruction. No pneumatosis intestinalis, free intraperitoneal air or abscess. 2.  Results were called to Dr. Sanford the time of dictation. NOTE: ABNORMAL REPORT THE DICTATION ABOVE DESCRIBES AN ABNORMALITY FOR WHICH FOLLOW-UP IS NEEDED.     POC US Guidance Needle Placement    Result Date: 11/30/2021  Ultrasound was performed as guidance to an anesthesia procedure.  Click \"PACS images\" hyperlink below to view any stored images.  For specific procedure details, view procedure note authored by anesthesia.    XR Video Swallow with SLP or OT    Result Date: 11/24/2021  EXAM: XR VIDEO SWALLOW WITH SLP OR OT LOCATION: [facility] " DATE/TIME: 11/24/2021 9:18 AM INDICATION: Difficulty swallowing. COMPARISON: Same day esophagram.; CT for lung cancer screening 03/09/2020 TECHNIQUE: Routine swallow study with speech pathology using multiple barium thicknesses. FINDINGS: FLUOROSCOPIC TIME: 0.2 minutes NUMBER OF IMAGES: 2 videofluoroscopic imaging series Swallow study with Speech Pathology using multiple barium thicknesses. Patient is edentulous. Trials of thin consistency and barium coated cracker were performed. No delay in initiation of the oral phase. Normal epiglottic inversion. No penetration or aspiration observed. Moderate to severe cervical spondylosis with anterior wedging, disc space narrowing and osteophytosis of C4, C5, and C6. Straightening and reversal of normal thoracic lordosis.     IMPRESSION: No penetration or aspiration. Please see separately dictated report from speech pathology for additional description, analysis, and management recommendations.     XR Esophagram    Result Date: 11/24/2021  EXAM: XR ESOPHAGRAM LOCATION: [facility] DATE/TIME: 11/24/2021 9:18 AM INDICATION: 67 yo M with dysphagia/dyspnea - cough. Feels food stuck in throat with swallow. Occ. chocking. Needs to swallow lot of liquids. Constant runny nose & nasal congestion. COMPARISON: Same day swallow study; CT of the chest without contrast 03/09/2020 TECHNIQUE: Routine.     IMPRESSION: Moderately severe esophageal dysmotility (presbyesophagus).      Lab Results:  Personally Reviewed.   Recent Labs   Lab 12/08/21  0535 12/07/21  0358 12/06/21  0947   WBC 16.2* 13.9* 14.8*   HGB 10.6* 10.9* 11.2*   HCT 32.8* 33.8* 34.2*    332 318     Recent Labs   Lab 12/08/21  0536 12/07/21  0358 12/06/21  0620 12/05/21  1048 12/03/21 2021 12/03/21  0727   * 148* 146* 145   < > 142   CO2 29 31 31 28   < > 24   BUN 22 20 17 17   < > 17   ALBUMIN  --  2.3* 2.3* 2.3*  --  2.4*   ALKPHOS  --  54 44* 46  --  46   ALT  --  34 20  --   --  21    AST  --  42* 25 20  --  23    < > = values in this interval not displayed.     Recent Labs   Lab 12/06/21  0619   INR 1.11       Total time spent 25 minutes with greater than 50% in consultation, education and coordination of care.     Also discussed with RN      Melanie GUZMAN, CNS-BC, DNP  Acute Care Pain Management Program  [facility] ([place], [place], [place])   With questions call [phone]  Preference if for Angel Porter  Click HERE to page [facility]

## 2021-12-08 NOTE — PROGRESS NOTES
[facility] General Surgery Post-Op / Progress Note         Assessment and Plan:    Assessment:   Post-operative day #5 and 8    Laparotomy extensive lysis of adhesions and small bowel resection  Laparotomy oversew of perforation and washout  RVR rate control and pulmonary issues in the unit for these  Hypoxia  Bowel not opened up yet  Sub incisional drain not sucus but changed  Wbc up   Low grade temp         Plan:   TPN  PCA for pain control  Awaiting bowel function return no gas or stool  White count is up a little bit today and is inferior to incision drain looks a little bit different than yesterday no gross succus or anything but just does not look totally normal and me it may be just the skin immobilizing some is fibrinous material which is there from before but I also worried because his white count is up and he is got a low-grade temp.    Therefore I am to get a CT scan of his abdomen to check to make sure that everything is okay if he were to develop another leak again I think I would try just leave it controlled with the drain and try not to reoperate on him.               Interval History:   Stable.  Doing well.  Improving slowly.  Pain is reasonably controlled.  Low-grade fever and is still worrisome MARISA output          Significant Problems:      Patient Active Problem List   Diagnosis     Hypertension goal BP (blood pressure) < 140/90     Benign prostatic hyperplasia with weak urinary stream     RAVI (generalized anxiety disorder)     Moderate major depression (H)     History of substance abuse (H)     History of hepatitis C     Chronic neck pain     Chronic bilateral low back pain without sciatica     SBO (small bowel obstruction) (H)             Review of Systems:    The Review of Systems is negative other than noted in the HPI          Medications:     All medications related to the patient's surgery have been reviewed  Current Facility-Administered Medications   Medication      acetaminophen (TYLENOL) Suppository 650 mg     acetaminophen (TYLENOL) tablet 650 mg     benzocaine-menthol (CEPACOL) 15-3.6 MG lozenge 1 lozenge     bisacodyl (DULCOLAX) Suppository 10 mg     [Held by provider] busPIRone (BUSPAR) tablet 15 mg     dextrose 10% infusion     glucose gel 15-30 g    Or     dextrose 50 % injection 25-50 mL    Or     glucagon injection 1 mg     enoxaparin ANTICOAGULANT (LOVENOX) injection 40 mg     [Held by provider] furosemide (LASIX) injection 40 mg     [Held by provider] gabapentin (NEURONTIN) capsule 300 mg     [Held by provider] gabapentin (NEURONTIN) capsule 600 mg     haloperidol lactate (HALDOL) injection 2 mg     HYDROmorphone (DILAUDID) injection 0.4 mg     HYDROmorphone (DILAUDID) PCA 0.2 mg/mL OPIOID TOLERANT     levothyroxine (SYNTHROID) injection 19 mcg     lidocaine (LMX4) cream     lidocaine 1 % 0.1-1 mL     lipids (INTRALIPID) 20 % infusion 250 mL     magnesium hydroxide (MILK OF MAGNESIA) suspension 30 mL     meropenem (MERREM) 1 g vial to attach to  mL bag     [Held by provider] mirtazapine (REMERON) tablet 30 mg     naloxone (NARCAN) injection 0.2 mg    Or     naloxone (NARCAN) injection 0.4 mg    Or     naloxone (NARCAN) injection 0.2 mg    Or     naloxone (NARCAN) injection 0.4 mg     OLANZapine zydis (zyPREXA) ODT tab 5 mg     ondansetron (ZOFRAN-ODT) ODT tab 4 mg    Or     ondansetron (ZOFRAN) injection 4 mg     oxyCODONE (ROXICODONE) tablet 5 mg    Or     oxyCODONE (ROXICODONE) tablet 10 mg     pantoprazole (PROTONIX) IV push injection 40 mg     parenteral nutrition - ADULT compounded formula     [Held by provider] polyethylene glycol (MIRALAX) Packet 17 g     potassium chloride 10 mEq in 100 mL sterile water intermittent infusion (premix)     prochlorperazine (COMPAZINE) injection 5 mg    Or     prochlorperazine (COMPAZINE) tablet 5 mg     [Held by provider] senna-docusate (SENOKOT-S/PERICOLACE) 8.6-50 MG per tablet 1 tablet    Or     [Held by provider]  senna-docusate (SENOKOT-S/PERICOLACE) 8.6-50 MG per tablet 2 tablet     sodium chloride (PF) 0.9% PF flush 10-20 mL     sodium chloride (PF) 0.9% PF flush 10-40 mL     sodium chloride (PF) 0.9% PF flush 10-40 mL     sodium chloride (PF) 0.9% PF flush 3 mL     sodium chloride (PF) 0.9% PF flush 3 mL     [Held by provider] tamsulosin (FLOMAX) capsule 0.4 mg     vancomycin (VANCOCIN) 1,250 mg in sodium chloride 0.9 % 250 mL intermittent infusion     [Held by provider] venlafaxine (EFFEXOR-XR) 24 hr capsule 37.5 mg             Physical Exam:     All vitals stable  Patient Vitals for the past 8 hrs:   BP Temp Temp src Pulse Resp SpO2   12/08/21 0804 136/83 100.2  F (37.9  C) Oral 100 22 90 %   12/08/21 0405 121/84 98.7  F (37.1  C) Oral 98 22 92 %   12/08/21 0220 -- 99.6  F (37.6  C) Oral -- -- --     Wt Readings from Last 4 Encounters:   12/06/21 70.3 kg (155 lb)   01/22/20 79.4 kg (175 lb)   12/24/19 81.2 kg (179 lb)     I/O last 3 completed shifts:  In: 2054 [I.V.:158]  Out: 2542 [Urine:2100; Emesis/NG output:295; Drains:147]  Wound clean and dry with minimal or no drainage.  Surrounding skin with minimal erythema.  Dressing dry and intact.  lorraine anterior in incision, minimal   lorraine deep minimal   lorraine subincision bloody slightly thicker, not succus            Data:   All laboratory data related to this surgery reviewed  Results for orders placed or performed during the hospital encounter of 11/30/21 (from the past 24 hour(s))   Potassium   Result Value Ref Range    Potassium 5.7 (H) 3.5 - 5.0 mmol/L   Glucose by meter   Result Value Ref Range    GLUCOSE BY METER POCT 121 (H) 70 - 99 mg/dL   Potassium   Result Value Ref Range    Potassium 3.8 3.5 - 5.0 mmol/L   Lactic Acid STAT   Result Value Ref Range    Lactic Acid 0.7 0.7 - 2.0 mmol/L   Glucose by meter   Result Value Ref Range    GLUCOSE BY METER POCT 141 (H) 70 - 99 mg/dL   CBC with platelets   Result Value Ref Range    WBC Count 16.2 (H) 4.0 - 11.0 10e3/uL    RBC  Count 3.56 (L) 4.40 - 5.90 10e6/uL    Hemoglobin 10.6 (L) 13.3 - 17.7 g/dL    Hematocrit 32.8 (L) 40.0 - 53.0 %    MCV 92 78 - 100 fL    MCH 29.8 26.5 - 33.0 pg    MCHC 32.3 31.5 - 36.5 g/dL    RDW 15.2 (H) 10.0 - 15.0 %    Platelet Count 406 150 - 450 10e3/uL   Basic metabolic panel   Result Value Ref Range    Sodium 149 (H) 136 - 145 mmol/L    Potassium 3.7 3.5 - 5.0 mmol/L    Chloride 113 (H) 98 - 107 mmol/L    Carbon Dioxide (CO2) 29 22 - 31 mmol/L    Anion Gap 7 5 - 18 mmol/L    Urea Nitrogen 22 8 - 22 mg/dL    Creatinine 0.75 0.70 - 1.30 mg/dL    Calcium 8.3 (L) 8.5 - 10.5 mg/dL    Glucose 121 70 - 125 mg/dL    GFR Estimate >90 >60 mL/min/1.73m2   Magnesium   Result Value Ref Range    Magnesium 2.1 1.8 - 2.6 mg/dL   Phosphorus   Result Value Ref Range    Phosphorus 2.7 2.5 - 4.5 mg/dL   Vancomycin level   Result Value Ref Range    Vancomycin 8.4   mg/L     All imaging studies related to this surgery reviewed    Mekhi Richardson MD

## 2021-12-08 NOTE — PROGRESS NOTES
[facility] Progress Note    Assessment/Plan  Principal Problem:    SBO (small bowel obstruction) (H)  Active Problems:    Hypertension goal BP (blood pressure) < 140/90    Benign prostatic hyperplasia with weak urinary stream    RVAI (generalized anxiety disorder)    Moderate major depression (H)    History of substance abuse (H)    History of hepatitis C    Chronic neck pain    Chronic bilateral low back pain without sciatica    Gurdeep Dia is a 66 year old male with history of splenectomy, appendectomy, HTN, and substance abuse who presented with 2 days of right sided abdominal pain, found to have a bowel obstruction with closed loop. He is admitted for surgical repair of obstruction.      Requires ongoing hospitalization for post-operative monitoring and care       Mechanical SBO s/p exploratory laparotomy / small bowel resection / repair of enterotomy / extensive lysis of adhesions (11/30/21)  Subsequent small bowel perforation requiring repeat ex lap for washout 12/3    CT abdomen/pelvis on admission 11/30 significant for closed loop mechanical small bowel obstruction. Does have history of multiple abdominal surgeries previously. First procedure 11/30 for small bowel resection. Acute change on 12/2-12/3 and found to have intraabdominal drainage from 12/3/2021; underwent repeat exploratory laparotomy with washout and repair of small bowel perforation. 12/8 still without stool charted, still spiking low grade fevers, having atypical output from MARISA drain. Dr. Richardson ordering CT abdomen to eval further.  -Surgery consulted and following   -NPO for now until cleared by gen surg   -Pain control as per surgery/ICU (PCA, tyl) -- need to balance with encephalopathy below   -IV meropenem + vanc -- follow cultures   -IV Protonix   -CT abdomen 12/8    Acute encephalopathy, likely multifactorial - toxic/metabolic/hospital-induced   Acutely agitated post-op 12/3 to 12/4. Pulling at lines and  threatening to leave.  Nurse felt unsafe without restraints. Precedex drip started, now off. Transferred out of the unit.  - Soft limb restraint x 2 - adjust as needed  - Precedex gtt stop 12/7 morning  - PRN Haldol injection  - Zyprexa ODT 4x daily prn  - Conservative measures as able   - Avoid opioid/benzo if able     Acute hypoxic respiratory failure, post-op   Atelectasis vs aspiration  Initially requiring high-flow oxygen in ICU following procedure.  Question post-op atelectasis vs aspiration versus volume overload.  Does have a degree of respiratory distress when he gets agitated. CXR 12/6 showing bibasilar opacitied favored to reflect atelectasis, though still with some interstitial coarsening. Breathing improved 12/7 after some antibiotics and maybe ongoing resolution of his atelectasis. No signs of fluid overload and sodium climbing so IV lasix discontinued 12/8.  -Continue nasal cannula  -Wean as able  -Continue abx as above, don't think we need to broaden from a lung perspective  -Aggressive IS use  -PT/OT to get moving  -Discontinue IV lasix; hold home dose for now given sodium    Hypernatremia  In setting of excessive diuresis. Asymptomatic. Will give some fluid back.  -1L 1/2 NS 12/8 AM  -Discontinue IV lasix; hold home dose for now given sodium    Afib, RVR post-op, currently rate controlled   Noted.  Rates had been in the 120 to 150 range.  Converted to NSR with amiodarone drip. No longer on any rate control. Both CHADS2-Vasc and HAS-BLED of at least 2.  Would likely warrant anticoagulation on ongoing basis. TSH normal.  -Lovenox for now   -Consider ongoing anticoagulation once encephalopathy is improved  -Likely needs echocardiogram at some point    HTN  PTA lisinopril, furosemide being held in the setting of acute surgical care.  Will monitor, even with pain BP not significantly elevated.  Will resume when hemodynamically stable.  Kidney function is stable at this time.  Blood pressure does climb  when he is agitated.  -Monitor     Moderate malnutrition  Per RD eval.  -TPN until cleared to [facility] per surgery    Tremor - resolved  New on exam 12/2. Bilateral. Doesn't fit well with either essential tremor or parkinsonian. Almost resembles asterixis. LFTs and kidney function normal though. Ammonia normal. Improved 12/3.  -Monitor      Chronic Conditions:  Hypothyroidism- PTA levothyroxine  Depression/anxiety- PTA venlafaxine, mirtazepine, Buspar   Chronic pain- Hold mexolicam 7.5mg daily, resume PTA gabapentin when tolerating PO.  BPH- PTA flomax      Diet: NPO for Medical/Clinical Reasons Except for: Meds, Ice ChipsNPO   DVT Prophylaxis: Lovenox q day    Roblero Catheter: placed   Fluids:  None  Central Lines: None  Code Status: Full Code    Subjective    Still frustrated this morning with not being able to eat. States he would be willing to die for some jello or even some bland challah bread. Reports he had some small stool and gas this AM though nothing charted.     Objective    Vital signs in last 24 hours Temp:  [98.7  F (37.1  C)-101.7  F (38.7  C)] 100.2  F (37.9  C)  Pulse:  [] 100  Resp:  [18-46] 22  BP: (117-152)/(69-90) 136/83  SpO2:  [89 %-94 %] 90 %       Intake/Output last 3 shift I/O last 3 completed shifts:  In: 2054 [I.V.:158]  Out: 2542 [Urine:2100; Emesis/NG output:295; Drains:147]    Intake/Output this shift:I/O this shift:  In: 11 [I.V.:11]  Out: 50 [Drains:50]    Physical Exam  General appearance: uncomfortable appearing male, laying in bed, two officers at bedside.  Acutely agitated.  Head: Normocephalic, without obvious abnormality, atraumatic.  NG tube in place.    Throat: moist mucous membranes.  Lungs: Tachypneic.  High flow oxygen in place.  Coarse lung sounds in the bases.  Heart: regular rate and rhythm, S1, S2 normal, no murmur, click, rub or gallop  Abdomen: Abdominal binding in place.  Multiple MARISA drains in place with bloody drainage. bowel sounds very present. Moderate TTP  throughout.   Extremities: extremities normal, atraumatic, no cyanosis or edema  Skin: Slightly pale, dry skin.  Neurologic: Alert and oriented X 2.  Able to move extremities.  Sensation intact.   Psychiatric: Exceedingly anxious and agitated.     Pertinent Labs and Pertinent Radiology   Lab Results: personally reviewed.     Radiology Results: Personally reviewed image/s and impression/s    Precepted patient with Dr. Willy Mcmahon MD - PGY2  [facility] [place] [place] [facility]  P: [phone]

## 2021-12-08 NOTE — PHARMACY-CONSULT NOTE
"Pharmacy Note: Parenteral Nutrition (PN) Management    Pharmacist consulted to dose PN for Gurdeep Dia, a 66 year old male by Dr. Clinton.    Subjective:    The patient is a new PN start.     The patient was started on PN in the hospital on 12/5/21.     Indication for PN therapy: bowel resection     Inadequate nutrition anticipated for > 7 days.      Enteral nutrition contraindicated due to: bowel integrity is tenuous.     Pertinent diseases and other special considerations respiratory failure    Social History     Tobacco Use     Smoking status: Current Every Day Smoker     Smokeless tobacco: Never Used   Substance Use Topics     Alcohol use: Not Currently     Comment: Clean for 5 years     Drug use: Not Currently     Objective:    Ht Readings from Last 1 Encounters:   11/30/21 1.656 m (5' 5.2\")     Wt Readings from Last 1 Encounters:   12/06/21 70.3 kg (155 lb)       Body mass index is 25.64 kg/m .    Patient Vitals for the past 96 hrs:   Weight   12/06/21 0400 70.3 kg (155 lb)       Labs:  Last 3 days:  Recent Labs     12/06/21  0619 12/06/21  0620 12/06/21  0620 12/06/21  0947 12/06/21  1428 12/06/21  1921 12/07/21  0358 12/07/21  1128 12/07/21  1443 12/08/21  0535 12/08/21  0536   NA  --  146*  --   --   --   --  148*  --   --   --  149*   POTASSIUM  --  3.0*  --   --  3.6 5.8* 3.2* 5.7* 3.8  --  3.7   CHLORIDE  --  106  --   --   --   --  106  --   --   --  113*   CO2  --  31  --   --   --   --  31  --   --   --  29   BUN  --  17  --   --   --   --  20  --   --   --  22   CR  --  0.82  --   --   --   --  0.79  --   --   --  0.75   VIJAYA  --  8.4*  --   --   --   --  8.4*  --   --   --  8.3*   TKEWEYE67  --  1.12  --   --   --   --   --   --   --   --   --    MAG  --  2.1  --   --   --   --  2.2  --   --   --  2.1   PHOS  --  2.9  --   --   --   --  3.2  --   --   --  2.7   PROTTOTAL  --  5.9*  --   --   --   --  5.9*  --   --   --   --    ALBUMIN  --  2.3*  --   --   --   --  2.3*  --   --   --   --    PREALB "  --  6*  --   --   --   --   --   --   --   --   --    TRIG  --  217*  --   --   --   --   --   --   --   --   --    HGB  --   --   --  11.2*  --   --  10.9*  --   --  10.6*  --    HCT  --   --   --  34.2*  --   --  33.8*  --   --  32.8*  --    PLT  --   --   --  318  --   --  332  --   --  406  --    BILITOTAL  --  0.4  --   --   --   --  0.6  --   --   --   --    AST  --  25  --   --   --   --  42*  --   --   --   --    ALT  --  20  --   --   --   --  34  --   --   --   --    ALKPHOS  --  44*   < >  --   --   --  54  --   --   --   --    INR 1.11  --   --   --   --   --   --   --   --   --   --     < > = values in this interval not displayed.       Glucose (past 48 hours):   Recent Labs     12/06/21  1951 12/07/21  0157 12/07/21  0358 12/07/21  0816 12/07/21  1220 12/08/21  0216 12/08/21  0536   * 133* 134* 143* 121* 141* 121       Intake/Output (last 24 hours): I/O last 3 completed shifts:  In: 2054 [I.V.:158]  Out: 2542 [Urine:2100; Emesis/NG output:295; Drains:147]    Estimated CrCl: Estimated Creatinine Clearance: 95 mL/min (based on SCr of 0.75 mg/dL).    Assessment:    Continue patient on PN therapy as a continuous central therapy.     Given the patient's current condition/oral intake, PN is still indicated.    Lab results reviewed:   12/8 Sodium continues to rise, decrease TPN content    Plan:  1. Rate of PN: 85 mL/hr.  2. Formula:     Amino Acids 100 grams    Dextrose 310 grams    Sodium 40 mEq/day    Potassium 90 mEq/day    Calcium 8 mEq/day    Magnesium 14 mEq/day    Phosphorus 28 mMol/day    Chloride: Acetate Ratio 2:1    Standard Multivitamins w/Vitamin K    Trace Elements  3. Fat Emulsion: 20%, 250 mL IV three times weekly.  4. Check ionized calcium tomorrow.  5. Pharmacist will continue to follow the patient's lab results, clinical status and blood glucose results and make adjustments as appropriate.    Thank you for the consult.  Gerard Nash RPH  12/7/2021 10:52 AM

## 2021-12-08 NOTE — PROGRESS NOTES
[facility] Progress Note    Assessment/Plan  Principal Problem:    SBO (small bowel obstruction) (H)  Active Problems:    Hypertension goal BP (blood pressure) < 140/90    Benign prostatic hyperplasia with weak urinary stream    RAVI (generalized anxiety disorder)    Moderate major depression (H)    History of substance abuse (H)    History of hepatitis C    Chronic neck pain    Chronic bilateral low back pain without sciatica    Gurdeep Dia is a 66 year old male with history of splenectomy, appendectomy, HTN, and substance abuse who presented with 2 days of right sided abdominal pain, found to have a bowel obstruction with closed loop. He is admitted for surgical repair of obstruction.      Requires ongoing hospitalization for post-operative monitoring and care       Mechanical SBO s/p exploratory laparotomy / small bowel resection / repair of enterotomy / extensive lysis of adhesions (11/30/21)  Subsequent small bowel perforation requiring repeat ex lap for washout 12/3    CT abdomen/pelvis on admission 11/30 significant for closed loop mechanical small bowel obstruction. Does have history of multiple abdominal surgeries previously. First procedure 11/30 for small bowel resection. Acute change on 12/2-12/3 and found to have intraabdominal drainage from 12/3/2021; underwent repeat exploratory laparotomy with washout and repair of small bowel perforation. 12/8 still without stool charted, still spiking low grade fevers, having atypical output from MARISA drain. Dr. Richardson ordering CT abdomen to eval further.  -Surgery consulted and following   -NPO for now until cleared by gen surg   -Pain control as per surgery/ICU (PCA, tyl) -- need to balance with encephalopathy below   -Pain team consult placed by ICU 12/7  -IV meropenem + vanc -- follow cultures   -IV Protonix   -CT abdomen 12/8    Acute encephalopathy, likely multifactorial - toxic/metabolic/hospital-induced   Acutely agitated post-op  12/3 to 12/4. Pulling at lines and threatening to leave.  Nurse felt unsafe without restraints. Precedex drip started, now off. Transferred out of the unit.  - Soft limb restraint x 2 - adjust as needed  - Precedex gtt stop 12/7 morning  - PRN Haldol injection  - Zyprexa ODT 4x daily prn  - Conservative measures as able   - Avoid opioid/benzo if able     Acute hypoxic respiratory failure, post-op   Atelectasis vs aspiration  Initially requiring high-flow oxygen in ICU following procedure.  Question post-op atelectasis vs aspiration versus volume overload.  Does have a degree of respiratory distress when he gets agitated. CXR 12/6 showing bibasilar opacitied favored to reflect atelectasis, though still with some interstitial coarsening. Breathing improved 12/7 after some antibiotics and maybe ongoing resolution of his atelectasis. No signs of fluid overload and sodium climbing so IV lasix discontinued 12/8.  -Continue nasal cannula  -Wean as able  -Continue abx as above, don't think we need to broaden from a lung perspective  -Aggressive IS use  -PT/OT to get moving  -Discontinue IV lasix; hold home dose for now given sodium    Hypernatremia  In setting of excessive diuresis. Asymptomatic. Will give some fluid back.  -1L 1/2 NS 12/8 AM  -Discontinue IV lasix; hold home dose for now given sodium    Afib, RVR post-op, currently rate controlled   Noted.  Rates had been in the 120 to 150 range.  Converted to NSR with amiodarone drip. No longer on any rate control. Both CHADS2-Vasc and HAS-BLED of at least 2.  Would likely warrant anticoagulation on ongoing basis. TSH normal.  -Lovenox for now   -Consider ongoing anticoagulation once encephalopathy is improved  -Likely needs echocardiogram at some point    HTN  PTA lisinopril, furosemide being held in the setting of acute surgical care.  Will monitor, even with pain BP not significantly elevated.  Will resume when hemodynamically stable.  Kidney function is stable at this  time.  Blood pressure does climb when he is agitated.  -Monitor     Moderate malnutrition  Per RD eval.  -TPN until cleared to [facility] per surgery    Tremor - resolved  New on exam 12/2. Bilateral. Doesn't fit well with either essential tremor or parkinsonian. Almost resembles asterixis. LFTs and kidney function normal though. Ammonia normal. Improved 12/3.  -Monitor      Chronic Conditions:  Hypothyroidism- PTA levothyroxine  Depression/anxiety- PTA venlafaxine, mirtazepine, Buspar   Chronic pain- Hold mexolicam 7.5mg daily, resume PTA gabapentin when tolerating PO.  BPH- PTA flomax      Diet: NPO for Medical/Clinical Reasons Except for: Meds, Ice ChipsNPO   DVT Prophylaxis: Lovenox q day    Roblero Catheter: placed   Fluids:  None  Central Lines: None  Code Status: Full Code    Subjective    Still frustrated this morning with not being able to eat. States he would be willing to die for some jello or even some bland challah bread. Reports he had some small stool and gas this AM though nothing charted.     Objective    Vital signs in last 24 hours Temp:  [98.7  F (37.1  C)-101.7  F (38.7  C)] 100.2  F (37.9  C)  Pulse:  [] 99  Resp:  [18-46] 18  BP: (109-152)/(69-90) 109/80  SpO2:  [89 %-94 %] 90 %       Intake/Output last 3 shift I/O last 3 completed shifts:  In: 2054 [I.V.:158]  Out: 2542 [Urine:2100; Emesis/NG output:295; Drains:147]    Intake/Output this shift:I/O this shift:  In: 11 [I.V.:11]  Out: 300 [Urine:250; Drains:50]    Physical Exam  General appearance: uncomfortable appearing male, laying in bed, two officers at bedside.  Acutely agitated.  Head: Normocephalic, without obvious abnormality, atraumatic.  NG tube in place.    Throat: moist mucous membranes.  Lungs: Tachypneic.  High flow oxygen in place.  Coarse lung sounds in the bases.  Heart: regular rate and rhythm, S1, S2 normal, no murmur, click, rub or gallop  Abdomen: Abdominal binding in place.  Multiple MARISA drains in place with bloody drainage.  bowel sounds very present. Moderate TTP throughout.   Extremities: extremities normal, atraumatic, no cyanosis or edema  Skin: Slightly pale, dry skin.  Neurologic: Alert and oriented X 2.  Able to move extremities.  Sensation intact.   Psychiatric: Exceedingly anxious and agitated.     Pertinent Labs and Pertinent Radiology   Lab Results: personally reviewed.     Radiology Results: Personally reviewed image/s and impression/s    Precepted patient with Dr. Willy Mcmahon MD - PGY2  [facility]  P: [phone]

## 2021-12-08 NOTE — PROGRESS NOTES
Patient transferred to [facility], report given to the receiving RN.

## 2021-12-08 NOTE — PLAN OF CARE
Problem: Pain Acute  Goal: Acceptable Pain Control and Functional Ability  Outcome: Improving     Problem: Bowel Motility Impaired (Surgery Nonspecified)  Goal: Effective Bowel Elimination  Outcome: No Change     Problem: Infection (Surgery Nonspecified)  Goal: Absence of Infection Signs and Symptoms  Outcome: Improving   Surgical pain managed with PCA pump. More pain with movement. Abdomen is distended with fait bowel sounds. NGT to LIS to decompress. Oral swabs at bedside. TPN/Lipids. Able to change position in bed independently. On 6L nasal canula. Pt does not keep nasal canula in place and pulse oximeter keeps beeping and getting frustrated. Frequent room visits to ensure in place Prn zyprexa Sublingual given for restlessness. Pt keeps asking for various kinds of food, reminded pt that he is NPO. Voiding in urinal. Incontinent x1, full bed change done.

## 2021-12-08 NOTE — PLAN OF CARE
Problem: Pain Acute  Goal: Acceptable Pain Control and Functional Ability  Outcome: No Change   Patient reporting pain 5-8/10, utilizing PCA pump with some relief. Abdominal binder in place, incisions covered with old drainage observed on dressings.       Problem: Adult Inpatient Plan of Care  Goal: Optimal Comfort and Wellbeing  Outcome: No Change   Patient alert and oriented x3, has some fluctuating confusion. Continues on telemetry monitoring, sinus tachy this shift, provider updated. Patient is NPO, ice chips ok. NG tube patent and draining to LIS. TPN running per orders. Mag, K+ and phos protocol.   Patient went for abdominal CT this shift. Uses urinal and commode at bedside. Reports passing gas, no BM this shift. Covid swab collected and sent to lab.

## 2021-12-08 NOTE — PROGRESS NOTES
"CLINICAL NUTRITION SERVICES - REASSESSMENT NOTE     Nutrition Prescription    RECOMMENDATIONS FOR MDs/PROVIDERS TO ORDER:  Continue same TPN/lipid regimen    Malnutrition Status:    Moderate    Recommendations already ordered by Registered Dietitian (RD):  D 310  @ 85 ml/hr plus 250 ml of 20% lipids 3 times per week   Over 12 hrs    Future/Additional Recommendations:  Follow for diet advance and TPN needs     EVALUATION OF THE PROGRESS TOWARD GOALS   Diet: NPO  Nutrition Support: D 310  @ 85 ml/hr plus 250 ml of 20% lipids 3 times per week over 12 hrs: providing 1665 Calories, 310 g CHO, 100 g protein, avg 21 g lipid/day and 2040 ml formula (CHO load=3.06 mgCHO/Kg/min)     NEW FINDINGS   Abdomen distended with faint BS  NGT to LIS to decompress  Pt keeps asking for various types of food per nurse    ANTHROPOMETRICS  Height: 165.6 cm (5' 5.2\")  Most Recent Weight: 70.3 kg (155 lb)    Admission weight: 175 lb (11/302021)  Weight History:   Wt Readings from Last 10 Encounters:   12/06/21 70.3 kg (155 lb)   01/22/20 79.4 kg (175 lb)   12/24/19 81.2 kg (179 lb)     PHYSICAL FINDINGS  See malnutrition section below.    GI CONCERNS  Abdomen distended  Abdominal discomfort  Unable to pass flatus  Last BM: 11/29/2021  Nasogastric tube to suction    LABS  Reviewed: Na 149, K 3.7, Mg 2.1, phos 2.7, Glu 121    Estimated needs:  Assessment weight is 66.2 Kg    Estimated energy needs: 9684-8934 Matt/day (25-30 Matt/Kg)  Justification: post op  Estimated protein needs: 80-99 g/day (1.2-1.5 g/Kg)  Justification: post op  Estimated fluid needs 1985 ml/day (30 ml/Kg)  Justification: maintenance    MEDICATIONS  Reviewed: lasix, levothyroxine, merrem, pantoprazole, potassium chloride, vancocin    MALNUTRITION:  % Weight Loss: 20 lb loss noted-unsure of time frame-may be greater that 1 year  % Intake:  </= 50% for >/= 5 days (severe malnutrition)  Subcutaneous Fat Loss:  Orbital region moderate depletion  Muscle Loss:  Not " assessed as pt agitated  Fluid Retention:  None noted    Malnutrition Diagnosis: Moderate malnutrition  In Context of:  Acute illness or injury    CURRENT NUTRITION DIAGNOSIS  Malnutrition related to acute illness as evidenced by NPO x 5+ days, moderate fat loss      INTERVENTIONS  Implementation  Continue current TPN     Goals  Tolerate TPN-progressing (Na elevated, however)  Advance to po diet when appropriate-not met  BG -met    Monitoring/Evaluation  Progress toward goals will be monitored and evaluated per protocol.

## 2021-12-08 NOTE — PLAN OF CARE
Patient's giron was removed at 1900. Patient pee 200 for the first time since removal. Continue to monitor.Elena Martinez RN

## 2021-12-09 ENCOUNTER — APPOINTMENT (OUTPATIENT)
Dept: CT IMAGING | Facility: HOSPITAL | Age: 66
End: 2021-12-09
Attending: SPECIALIST
Payer: COMMERCIAL

## 2021-12-09 ENCOUNTER — APPOINTMENT (OUTPATIENT)
Dept: PHYSICAL THERAPY | Facility: HOSPITAL | Age: 66
DRG: 329 | End: 2021-12-09
Payer: MEDICARE

## 2021-12-09 LAB
ANION GAP SERPL CALCULATED.3IONS-SCNC: 8 MMOL/L (ref 5–18)
BASOPHILS # BLD AUTO: 0.1 10E3/UL (ref 0–0.2)
BASOPHILS NFR BLD AUTO: 1 %
BUN SERPL-MCNC: 19 MG/DL (ref 8–22)
CALCIUM SERPL-MCNC: 8.4 MG/DL (ref 8.5–10.5)
CALCIUM, IONIZED MEASURED: 1.13 MMOL/L (ref 1.11–1.3)
CHLORIDE BLD-SCNC: 109 MMOL/L (ref 98–107)
CO2 SERPL-SCNC: 25 MMOL/L (ref 22–31)
CREAT SERPL-MCNC: 0.7 MG/DL (ref 0.7–1.3)
EOSINOPHIL # BLD AUTO: 0.5 10E3/UL (ref 0–0.7)
EOSINOPHIL NFR BLD AUTO: 3 %
ERYTHROCYTE [DISTWIDTH] IN BLOOD BY AUTOMATED COUNT: 15.3 % (ref 10–15)
GFR SERPL CREATININE-BSD FRML MDRD: >90 ML/MIN/1.73M2
GLUCOSE BLD-MCNC: 98 MG/DL (ref 70–125)
GLUCOSE BLDC GLUCOMTR-MCNC: 112 MG/DL (ref 70–99)
GLUCOSE BLDC GLUCOMTR-MCNC: 113 MG/DL (ref 70–99)
GLUCOSE BLDC GLUCOMTR-MCNC: 115 MG/DL (ref 70–99)
GLUCOSE BLDC GLUCOMTR-MCNC: 120 MG/DL (ref 70–99)
GLUCOSE BLDC GLUCOMTR-MCNC: 125 MG/DL (ref 70–99)
GLUCOSE BLDC GLUCOMTR-MCNC: 86 MG/DL (ref 70–99)
HCT VFR BLD AUTO: 32.3 % (ref 40–53)
HGB BLD-MCNC: 10.4 G/DL (ref 13.3–17.7)
IMM GRANULOCYTES # BLD: 0.6 10E3/UL
IMM GRANULOCYTES NFR BLD: 4 %
INR PPP: 1.1 (ref 0.9–1.15)
ION CA PH 7.4: 1.14 MMOL/L (ref 1.11–1.3)
LYMPHOCYTES # BLD AUTO: 1.9 10E3/UL (ref 0.8–5.3)
LYMPHOCYTES NFR BLD AUTO: 12 %
MAGNESIUM SERPL-MCNC: 2.1 MG/DL (ref 1.8–2.6)
MCH RBC QN AUTO: 29.8 PG (ref 26.5–33)
MCHC RBC AUTO-ENTMCNC: 32.2 G/DL (ref 31.5–36.5)
MCV RBC AUTO: 93 FL (ref 78–100)
MONOCYTES # BLD AUTO: 1.2 10E3/UL (ref 0–1.3)
MONOCYTES NFR BLD AUTO: 7 %
NEUTROPHILS # BLD AUTO: 12.6 10E3/UL (ref 1.6–8.3)
NEUTROPHILS NFR BLD AUTO: 73 %
NRBC # BLD AUTO: 0 10E3/UL
NRBC BLD AUTO-RTO: 0 /100
PH: 7.42 (ref 7.35–7.45)
PHOSPHATE SERPL-MCNC: 2.7 MG/DL (ref 2.5–4.5)
PLATELET # BLD AUTO: 464 10E3/UL (ref 150–450)
POTASSIUM BLD-SCNC: 4.3 MMOL/L (ref 3.5–5)
RBC # BLD AUTO: 3.49 10E6/UL (ref 4.4–5.9)
SODIUM SERPL-SCNC: 142 MMOL/L (ref 136–145)
WBC # BLD AUTO: 16.3 10E3/UL (ref 4–11)

## 2021-12-09 PROCEDURE — 250N000011 HC RX IP 250 OP 636: Performed by: INTERNAL MEDICINE

## 2021-12-09 PROCEDURE — 250N000009 HC RX 250: Performed by: STUDENT IN AN ORGANIZED HEALTH CARE EDUCATION/TRAINING PROGRAM

## 2021-12-09 PROCEDURE — 250N000011 HC RX IP 250 OP 636: Performed by: RADIOLOGY

## 2021-12-09 PROCEDURE — 83735 ASSAY OF MAGNESIUM: CPT | Performed by: INTERNAL MEDICINE

## 2021-12-09 PROCEDURE — 99024 POSTOP FOLLOW-UP VISIT: CPT | Performed by: PHYSICIAN ASSISTANT

## 2021-12-09 PROCEDURE — 97110 THERAPEUTIC EXERCISES: CPT | Mod: GP

## 2021-12-09 PROCEDURE — 258N000003 HC RX IP 258 OP 636: Performed by: FAMILY MEDICINE

## 2021-12-09 PROCEDURE — 99233 SBSQ HOSP IP/OBS HIGH 50: CPT | Mod: GC | Performed by: STUDENT IN AN ORGANIZED HEALTH CARE EDUCATION/TRAINING PROGRAM

## 2021-12-09 PROCEDURE — 0W9J30Z DRAINAGE OF PELVIC CAVITY WITH DRAINAGE DEVICE, PERCUTANEOUS APPROACH: ICD-10-PCS | Performed by: RADIOLOGY

## 2021-12-09 PROCEDURE — 80048 BASIC METABOLIC PNL TOTAL CA: CPT | Performed by: INTERNAL MEDICINE

## 2021-12-09 PROCEDURE — 250N000009 HC RX 250: Performed by: INTERNAL MEDICINE

## 2021-12-09 PROCEDURE — 82330 ASSAY OF CALCIUM: CPT | Performed by: FAMILY MEDICINE

## 2021-12-09 PROCEDURE — 87070 CULTURE OTHR SPECIMN AEROBIC: CPT | Performed by: RADIOLOGY

## 2021-12-09 PROCEDURE — 49406 IMAGE CATH FLUID PERI/RETRO: CPT

## 2021-12-09 PROCEDURE — 85025 COMPLETE CBC W/AUTO DIFF WBC: CPT | Performed by: SPECIALIST

## 2021-12-09 PROCEDURE — 85610 PROTHROMBIN TIME: CPT | Performed by: STUDENT IN AN ORGANIZED HEALTH CARE EDUCATION/TRAINING PROGRAM

## 2021-12-09 PROCEDURE — 87075 CULTR BACTERIA EXCEPT BLOOD: CPT | Performed by: RADIOLOGY

## 2021-12-09 PROCEDURE — 250N000009 HC RX 250: Performed by: SPECIALIST

## 2021-12-09 PROCEDURE — C9113 INJ PANTOPRAZOLE SODIUM, VIA: HCPCS | Performed by: INTERNAL MEDICINE

## 2021-12-09 PROCEDURE — 99232 SBSQ HOSP IP/OBS MODERATE 35: CPT | Performed by: CLINICAL NURSE SPECIALIST

## 2021-12-09 PROCEDURE — 250N000013 HC RX MED GY IP 250 OP 250 PS 637: Performed by: SPECIALIST

## 2021-12-09 PROCEDURE — 84100 ASSAY OF PHOSPHORUS: CPT | Performed by: INTERNAL MEDICINE

## 2021-12-09 PROCEDURE — 120N000001 HC R&B MED SURG/OB

## 2021-12-09 PROCEDURE — 250N000011 HC RX IP 250 OP 636: Performed by: FAMILY MEDICINE

## 2021-12-09 RX ORDER — FENTANYL CITRATE 50 UG/ML
INJECTION, SOLUTION INTRAMUSCULAR; INTRAVENOUS PRN
Status: COMPLETED | OUTPATIENT
Start: 2021-12-09 | End: 2021-12-09

## 2021-12-09 RX ORDER — FENTANYL CITRATE 50 UG/ML
25-50 INJECTION, SOLUTION INTRAMUSCULAR; INTRAVENOUS EVERY 5 MIN PRN
Status: DISCONTINUED | OUTPATIENT
Start: 2021-12-09 | End: 2021-12-14

## 2021-12-09 RX ORDER — NALOXONE HYDROCHLORIDE 0.4 MG/ML
0.4 INJECTION, SOLUTION INTRAMUSCULAR; INTRAVENOUS; SUBCUTANEOUS
Status: DISCONTINUED | OUTPATIENT
Start: 2021-12-09 | End: 2021-12-21 | Stop reason: HOSPADM

## 2021-12-09 RX ORDER — NALOXONE HYDROCHLORIDE 0.4 MG/ML
0.2 INJECTION, SOLUTION INTRAMUSCULAR; INTRAVENOUS; SUBCUTANEOUS
Status: DISCONTINUED | OUTPATIENT
Start: 2021-12-09 | End: 2021-12-21 | Stop reason: HOSPADM

## 2021-12-09 RX ORDER — FLUMAZENIL 0.1 MG/ML
0.2 INJECTION, SOLUTION INTRAVENOUS
Status: DISCONTINUED | OUTPATIENT
Start: 2021-12-09 | End: 2021-12-21 | Stop reason: HOSPADM

## 2021-12-09 RX ADMIN — FENTANYL CITRATE 50 MCG: 50 INJECTION, SOLUTION INTRAMUSCULAR; INTRAVENOUS at 10:10

## 2021-12-09 RX ADMIN — LEVOTHYROXINE SODIUM ANHYDROUS 100 MCG: 100 INJECTION, POWDER, LYOPHILIZED, FOR SOLUTION INTRAVENOUS at 17:28

## 2021-12-09 RX ADMIN — I.V. FAT EMULSION 250 ML: 20 EMULSION INTRAVENOUS at 19:08

## 2021-12-09 RX ADMIN — ENOXAPARIN SODIUM 40 MG: 40 INJECTION SUBCUTANEOUS at 12:35

## 2021-12-09 RX ADMIN — MIDAZOLAM HYDROCHLORIDE 1 MG: 1 INJECTION, SOLUTION INTRAMUSCULAR; INTRAVENOUS at 09:58

## 2021-12-09 RX ADMIN — PANTOPRAZOLE SODIUM 40 MG: 40 INJECTION, POWDER, FOR SOLUTION INTRAVENOUS at 08:47

## 2021-12-09 RX ADMIN — FENTANYL CITRATE 50 MCG: 50 INJECTION, SOLUTION INTRAMUSCULAR; INTRAVENOUS at 10:00

## 2021-12-09 RX ADMIN — HALOPERIDOL LACTATE 2 MG: 5 INJECTION, SOLUTION INTRAMUSCULAR at 16:58

## 2021-12-09 RX ADMIN — VANCOMYCIN HYDROCHLORIDE 1250 MG: 5 INJECTION, POWDER, LYOPHILIZED, FOR SOLUTION INTRAVENOUS at 10:48

## 2021-12-09 RX ADMIN — MEROPENEM 1 G: 1 INJECTION, POWDER, FOR SOLUTION INTRAVENOUS at 00:02

## 2021-12-09 RX ADMIN — VANCOMYCIN HYDROCHLORIDE 1250 MG: 5 INJECTION, POWDER, LYOPHILIZED, FOR SOLUTION INTRAVENOUS at 22:06

## 2021-12-09 RX ADMIN — ACETAMINOPHEN 650 MG: 325 TABLET ORAL at 18:56

## 2021-12-09 RX ADMIN — MEROPENEM 1 G: 1 INJECTION, POWDER, FOR SOLUTION INTRAVENOUS at 16:58

## 2021-12-09 RX ADMIN — MIDAZOLAM HYDROCHLORIDE 1 MG: 1 INJECTION, SOLUTION INTRAMUSCULAR; INTRAVENOUS at 10:07

## 2021-12-09 RX ADMIN — POTASSIUM CHLORIDE: 2 INJECTION, SOLUTION, CONCENTRATE INTRAVENOUS at 19:08

## 2021-12-09 RX ADMIN — MEROPENEM 1 G: 1 INJECTION, POWDER, FOR SOLUTION INTRAVENOUS at 08:47

## 2021-12-09 ASSESSMENT — ACTIVITIES OF DAILY LIVING (ADL)
ADLS_ACUITY_SCORE: 14

## 2021-12-09 NOTE — PROGRESS NOTES
[facility] Progress Note    Assessment/Plan  Principal Problem:    SBO (small bowel obstruction) (H)  Active Problems:    Hypertension goal BP (blood pressure) < 140/90    Benign prostatic hyperplasia with weak urinary stream    RAVI (generalized anxiety disorder)    Moderate major depression (H)    History of substance abuse (H)    History of hepatitis C    Chronic neck pain    Chronic bilateral low back pain without sciatica    Gurdeep Dia is a 66 year old male with history of splenectomy, appendectomy, HTN, and substance abuse who presented with 2 days of right sided abdominal pain, found to have a bowel obstruction with closed loop. He is admitted for surgical repair of obstruction.      Requires ongoing hospitalization for post-operative monitoring and care       Mechanical SBO s/p exploratory laparotomy / small bowel resection / repair of enterotomy / extensive lysis of adhesions (11/30/21)  Subsequent small bowel perforation requiring repeat ex lap for washout 12/3    CT abdomen/pelvis on admission 11/30 significant for closed loop mechanical small bowel obstruction. Does have history of multiple abdominal surgeries previously. First procedure 11/30 for small bowel resection. Acute change on 12/2-12/3 and found to have intraabdominal drainage from 12/3/2021; underwent repeat exploratory laparotomy with washout and repair of small bowel perforation. 12/8 still without stool charted, still spiking low grade fevers, having atypical output from MARISA drain, white count climbing. CT 12/8 showing ring enhancing lesion in abdomen likely representing abscess. Fourth drain placed 12/9 AM by IR for abscess drainage.  -Surgery consulted and following   -NPO for now until cleared by gen surg   -Pain control as per surgery/ICU (PCA, tyl) -- need to balance with encephalopathy below   -IV meropenem + vanc -- follow cultures   -IV Protonix     Acute encephalopathy, likely multifactorial -  toxic/metabolic/hospital-induced   Acutely agitated post-op 12/3 to 12/4. Pulling at lines and threatening to leave.  Nurse felt unsafe without restraints. Precedex drip started, now off. Transferred out of the unit.  - Soft limb restraint x 2 - adjust as needed  - Precedex gtt stop 12/7 morning  - PRN Haldol injection  - Zyprexa ODT 4x daily prn  - Conservative measures as able   - Avoid opioid/benzo if able     Acute hypoxic respiratory failure, post-op   Atelectasis vs aspiration  Initially requiring high-flow oxygen in ICU following procedure.  Question post-op atelectasis vs aspiration versus volume overload.  Does have a degree of respiratory distress when he gets agitated. CXR 12/6 showing bibasilar opacitied favored to reflect atelectasis, though still with some interstitial coarsening. Breathing improved 12/7 after some antibiotics and maybe ongoing resolution of his atelectasis. No signs of fluid overload and sodium climbing so IV lasix discontinued 12/8.  -Continue nasal cannula  -Wean as able  -Continue abx as above, don't think we need to broaden from a lung perspective  -Aggressive IS use  -PT/OT to get moving  -Discontinue IV lasix; hold home dose for now given sodium    Hypernatremia - resolved  In setting of excessive diuresis. Asymptomatic. Gave some fluid back 12/8.  -Discontinue IV lasix; hold home dose for now    Afib, RVR post-op, currently rate controlled   NSVT episode 12/8   Noted.  Rates had been in the 120 to 150 range.  Converted to NSR with amiodarone drip. No longer on any rate control. Both CHADS2-Vasc and HAS-BLED of at least 2.  Would likely warrant anticoagulation on ongoing basis. TSH normal. Lytes normal.  -Lovenox for now   -Consider ongoing anticoagulation once encephalopathy is improved  -Will order echo now    HTN  PTA lisinopril, furosemide being held in the setting of acute surgical care.  Will monitor, even with pain BP not significantly elevated.  Will resume when  hemodynamically stable.  Kidney function is stable at this time.  Blood pressure does climb when he is agitated.  -Monitor     Moderate malnutrition  Per RD eval.  -TPN until cleared to [facility] per surgery    Tremor - resolved  New on exam 12/2. Bilateral. Doesn't fit well with either essential tremor or parkinsonian. Almost resembles asterixis. LFTs and kidney function normal though. Ammonia normal. Improved 12/3.  -Monitor      Chronic Conditions:  Hypothyroidism- PTA levothyroxine  Depression/anxiety- PTA venlafaxine, mirtazepine, Buspar   Chronic pain- Hold mexolicam 7.5mg daily, resume PTA gabapentin when tolerating PO.  BPH- PTA flomax      Diet: NPO for Medical/Clinical Reasons Except for: Meds, Ice ChipsNPO   DVT Prophylaxis: Lovenox q day    Roblero Catheter: placed   Fluids:  None  Central Lines: None  Code Status: Full Code    Subjective  Wants to eat today of course. Had drain placed with IR this morning for abdominal abscess.     Objective    Vital signs in last 24 hours Temp:  [97.1  F (36.2  C)-100.1  F (37.8  C)] 97.1  F (36.2  C)  Pulse:  [] 84  Resp:  [15-22] 16  BP: ()/(54-88) 110/77  SpO2:  [92 %-95 %] 93 %       Intake/Output last 3 shift I/O last 3 completed shifts:  In: 2172 [I.V.:1351]  Out: 1715 [Urine:1150; Emesis/NG output:300; Drains:265]    Intake/Output this shift:I/O this shift:  In: -   Out: 430 [Urine:400; Drains:30]    Physical Exam  General appearance: uncomfortable appearing male, laying in bed, two officers at bedside.  Acutely agitated.  Head: Normocephalic, without obvious abnormality, atraumatic.  NG tube in place.    Throat: moist mucous membranes.  Lungs: Tachypneic.  High flow oxygen in place.  Coarse lung sounds in the bases.  Heart: regular rate and rhythm, S1, S2 normal, no murmur, click, rub or gallop  Abdomen: Abdominal binding in place.  Multiple MARISA drains in place with bloody drainage. bowel sounds very present. Moderate TTP throughout.   Extremities:  extremities normal, atraumatic, no cyanosis or edema  Skin: Slightly pale, dry skin.  Neurologic: Alert and oriented X 2.  Able to move extremities.  Sensation intact.   Psychiatric: Exceedingly anxious and agitated.     Pertinent Labs and Pertinent Radiology   Lab Results: personally reviewed.     Radiology Results: Personally reviewed image/s and impression/s    Precepted patient with Dr. Willy Mcmahon MD - PGY2  [facility]  P: [phone]

## 2021-12-09 NOTE — PLAN OF CARE
Problem: Pain Acute  Goal: Acceptable Pain Control and Functional Ability  Outcome: No Change  Intervention: Develop Pain Management Plan  Recent Flowsheet Documentation  Pain Management Interventions: (cpa pump) medication (see MAR)  Intervention: Prevent or Manage Pain  Recent Flowsheet Documentation  Sleep/Rest Enhancement:   noise level reduced   regular sleep/rest pattern promoted   relaxation techniques promoted   room darkened  Medication Review/Management:   medications reviewed

## 2021-12-09 NOTE — PLAN OF CARE
Problem: Pain (Surgery Nonspecified)  Goal: Acceptable Pain Control  Outcome: No Change   Patient abdominal pain fluctuates from 5/10 to 9/10 utilizes PCA pump as needed.  Patient is A&Ox2, increased drowsiness post-procedure.       Problem: Postoperative Urinary Retention (Surgery Nonspecified)  Goal: Effective Urinary Elimination  Outcome: No Change   Patient uses urinal at bedside has adequate output      Problem: Adult Inpatient Plan of Care  Goal: Plan of Care Review  Outcome: No Change   Patient went down for fourth drain placement this morning, procedure went well. Dressing on drain 2 had bile drainage, provider aware. Dressings changed on 3 drains. Abdominal binder in place.       Problem: Risk for Delirium  Goal: Optimal Coping  Outcome: No Change     Patient continues NPO diet, ok for ice chips. Multiple requests for food and ice cream, able to redirect. TPN running at 85 ml/hr. IV antibiotics given per orders. NG in right nostril, patent and draining, LIS.

## 2021-12-09 NOTE — PROGRESS NOTES
[facility] General Surgery Post-Op / Progress Note         Assessment and Plan:    Assessment:   Post-operative day #6 and 9    Laparotomy extensive lysis of adhesions and small bowel resection  Laparotomy oversew of perforation and washout  Most recent CT with pelvic abscess, getting IR drain placed in this today         Plan:   -IR drain placed today  -Continue other MARISA drains, 1 drain looks more like succus today  -NPO  -TPN  -Hemodynamically stable, will continue with drain management, bowel rest, and TPN. Would likely not do well with further surgery at this point.                  Interval History:   Stable, IR drain placed this AM, pain about the same, wants to eat and drink          Significant Problems:      Patient Active Problem List   Diagnosis     Hypertension goal BP (blood pressure) < 140/90     Benign prostatic hyperplasia with weak urinary stream     RAVI (generalized anxiety disorder)     Moderate major depression (H)     History of substance abuse (H)     History of hepatitis C     Chronic neck pain     Chronic bilateral low back pain without sciatica     SBO (small bowel obstruction) (H)             Review of Systems:    The Review of Systems is negative other than noted in the HPI          Medications:     All medications related to the patient's surgery have been reviewed  Current Facility-Administered Medications   Medication     acetaminophen (TYLENOL) Suppository 650 mg     acetaminophen (TYLENOL) tablet 650 mg     benzocaine-menthol (CEPACOL) 15-3.6 MG lozenge 1 lozenge     bisacodyl (DULCOLAX) Suppository 10 mg     [Held by provider] busPIRone (BUSPAR) tablet 15 mg     dextrose 10% infusion     glucose gel 15-30 g    Or     dextrose 50 % injection 25-50 mL    Or     glucagon injection 1 mg     enoxaparin ANTICOAGULANT (LOVENOX) injection 40 mg     fentaNYL (PF) (SUBLIMAZE) injection 25-50 mcg     flumazenil (ROMAZICON) injection 0.2 mg     [Held by provider] furosemide  (LASIX) injection 40 mg     [Held by provider] gabapentin (NEURONTIN) capsule 300 mg     haloperidol lactate (HALDOL) injection 2 mg     HYDROmorphone (DILAUDID) PCA 0.2 mg/mL OPIOID TOLERANT     levothyroxine (SYNTHROID) injection 100 mcg     lidocaine (LMX4) cream     lidocaine (LMX4) cream     lidocaine (LMX4) cream     lidocaine 1 % 0.1-1 mL     lidocaine 1 % 0.1-1 mL     lidocaine 1 % 0.1-5 mL     lidocaine 1 % 1-30 mL     lipids (INTRALIPID) 20 % infusion 250 mL     magnesium hydroxide (MILK OF MAGNESIA) suspension 30 mL     meropenem (MERREM) 1 g vial to attach to  mL bag     midazolam (VERSED) injection 0.5-2 mg     [Held by provider] mirtazapine (REMERON) tablet 30 mg     naloxone (NARCAN) injection 0.2 mg    Or     naloxone (NARCAN) injection 0.4 mg    Or     naloxone (NARCAN) injection 0.2 mg    Or     naloxone (NARCAN) injection 0.4 mg     naloxone (NARCAN) injection 0.2 mg    Or     naloxone (NARCAN) injection 0.4 mg    Or     naloxone (NARCAN) injection 0.2 mg    Or     naloxone (NARCAN) injection 0.4 mg     OLANZapine zydis (zyPREXA) ODT tab 5 mg     ondansetron (ZOFRAN-ODT) ODT tab 4 mg    Or     ondansetron (ZOFRAN) injection 4 mg     oxyCODONE (ROXICODONE) tablet 5 mg    Or     oxyCODONE (ROXICODONE) tablet 10 mg     pantoprazole (PROTONIX) IV push injection 40 mg     parenteral nutrition - ADULT compounded formula     parenteral nutrition - ADULT compounded formula     prochlorperazine (COMPAZINE) injection 5 mg    Or     prochlorperazine (COMPAZINE) tablet 5 mg     senna-docusate (SENOKOT-S/PERICOLACE) 8.6-50 MG per tablet 1 tablet     sodium chloride (PF) 0.9% PF flush 10-20 mL     sodium chloride (PF) 0.9% PF flush 10-40 mL     sodium chloride (PF) 0.9% PF flush 10-40 mL     sodium chloride (PF) 0.9% PF flush 10-40 mL     sodium chloride (PF) 0.9% PF flush 3 mL     sodium chloride (PF) 0.9% PF flush 3 mL     sodium chloride (PF) 0.9% PF flush 3 mL     sodium chloride (PF) 0.9% PF flush 3  mL     sodium chloride 0.9% 1000 mL TABLE SOLN     [Held by provider] tamsulosin (FLOMAX) capsule 0.4 mg     vancomycin (VANCOCIN) 1,250 mg in sodium chloride 0.9 % 250 mL intermittent infusion     [Held by provider] venlafaxine (EFFEXOR-XR) 24 hr capsule 37.5 mg             Physical Exam:     All vitals stable  Patient Vitals for the past 8 hrs:   BP Temp Temp src Pulse Resp SpO2   12/09/21 1120 126/78 -- Oral 83 16 91 %   12/09/21 1105 110/77 97.1  F (36.2  C) Temporal 84 16 93 %   12/09/21 1050 109/80 98.1  F (36.7  C) Oral 86 16 92 %   12/09/21 1035 100/69 98.5  F (36.9  C) Oral 85 16 92 %   12/09/21 1015 96/60 -- -- 84 15 95 %   12/09/21 1010 95/54 -- -- 82 17 93 %   12/09/21 1005 94/57 -- -- 85 17 94 %   12/09/21 1000 102/64 -- -- 85 21 92 %   12/09/21 0959 -- -- -- 85 18 93 %   12/09/21 0958 -- -- -- 86 21 93 %   12/09/21 0957 -- -- -- 86 20 93 %   12/09/21 0956 -- -- -- 86 19 94 %   12/09/21 0955 98/63 -- -- 86 20 94 %   12/09/21 0954 -- -- -- 86 20 93 %   12/09/21 0953 -- -- -- 85 21 94 %   12/09/21 0952 -- -- -- 86 21 95 %   12/09/21 0951 94/62 -- -- 85 20 95 %   12/09/21 0950 -- -- -- 85 18 94 %   12/09/21 0949 -- -- -- 85 19 94 %   12/09/21 0948 -- -- -- 85 20 95 %   12/09/21 0947 -- -- -- 85 22 95 %   12/09/21 0752 131/80 98  F (36.7  C) Oral 93 20 92 %     Wt Readings from Last 4 Encounters:   12/06/21 70.3 kg (155 lb)   01/22/20 79.4 kg (175 lb)   12/24/19 81.2 kg (179 lb)     I/O last 3 completed shifts:  In: 2172 [I.V.:1351]  Out: 1715 [Urine:1150; Emesis/NG output:300; Drains:265]  Wound clean and dry with minimal or no drainage.  Surrounding skin with minimal erythema.  Dressing dry and intact.  Drains: Left - superior with minimal serosanguinous output, inferior with brown/succus appearing output with succus appearing drainage on drain dressing,   Right - serosanguinous with some brownish tinge            Data:   All laboratory data related to this surgery reviewed  Results for orders placed or  performed during the hospital encounter of 11/30/21 (from the past 24 hour(s))   Glucose by meter   Result Value Ref Range    GLUCOSE BY METER POCT 129 (H) 70 - 99 mg/dL   CT Abdomen Pelvis w Contrast   Result Value Ref Range    Radiologist flags (AA)      Pulmonary fluid collection and extraluminal loculated air-fluid within the mid abdomen as detailed above.    Narrative    EXAM: CT ABDOMEN PELVIS W CONTRAST  LOCATION: [facility]  DATE/TIME: 12/8/2021 1:40 PM    INDICATION: Abdominal abscess/infection suspected, status post laparotomy with repair of small bowel perforation, recent small bowel resection  COMPARISON: CT 11/30/2021  TECHNIQUE: CT scan of the abdomen and pelvis was performed following injection of IV contrast. Multiplanar reformats were obtained. Dose reduction techniques were used.  CONTRAST: 100 mL Isovue-370    FINDINGS:   LOWER CHEST: Mild bilateral lower lobar dependent consolidation and trace right-sided pleural effusion.    HEPATOBILIARY: Prominent gallbladder likely reflecting recent fasting. Stable mild biliary ductal dilatation. No obstructing mass lesion or radiodense stone. Stable small low-density hepatic lesions, likely cysts.    PANCREAS: Normal.    SPLEEN: Surgically absent.    ADRENAL GLANDS: Normal.    KIDNEYS/BLADDER: Stable nonobstructing lower left renal stones with a dominant stone measuring 4 mm. No hydronephrosis. Unremarkable urinary bladder.    BOWEL: Enteric suction tube with the tip located within the mid gastric lumen. Mild to moderately distended segments of mid small bowel with associated air-fluid levels. No discrete transition is identified. Right lower quadrant anastomotic changes.   Small amount of nonloculated fluid and extraluminal air within the mid abdomen for example image 50 series 400.2 and image 97 series 3. Questionable adjacent small bowel defect image 50 series 102. Small amount of nonloculated fluid is also noted within   the  right lower quadrant. Newly visualized rim-enhancing pelvic fluid collection measuring 4.4 x 3.6 x 4.4 cm. Trace ascites. Midline laparotomy incision with a small amount of fluid and air within the wound. Surgical drain tip is located at the upper   aspect of this wound. Additional surgical drain with the tip located within the left lower quadrant. Third surgical drain with the tip located in the lower anterior abdomen. Diffuse mesenteric edema.    LYMPH NODES: Normal.    VASCULATURE: Mild atherosclerosis.    PELVIC ORGANS: Normal.    MUSCULOSKELETAL: Bilateral L4 pars defects with grade 1 anterolisthesis at L4-L5. Associated severe discogenic degenerative changes.      Impression    IMPRESSION:   1.  Newly visualized 4.6 cm rim-enhancing fluid collection within the pelvis.  2.  Small ill-defined irregular pocket of fluid and extraluminal air within the mid abdomen as described above. It is uncertain whether this is related to sequelae of recent surgery or bowel leakage. Recommend short interval follow-up CT with positive   enteric contrast.  3.  Mild to moderate mid small bowel distention. This is favored to reflect an ileus rather than obstruction.   4.  Trace ascites and diffuse mesenteric edema.  5.  Mild bilateral lower lobar consolidation and trace right-sided pleural effusion.        [Critical Result: Pulmonary fluid collection and extraluminal loculated air-fluid within the mid abdomen as detailed above.]    Finding was identified on 12/8/2021 2:10 PM.     Dr. Richardson was contacted by me on 12/8/2021 2:50 PM and verbalized understanding of the critical result.    Asymptomatic COVID-19 Virus (Coronavirus) by PCR Nasopharyngeal    Specimen: Nasopharyngeal; Swab   Result Value Ref Range    SARS CoV2 PCR Negative Negative    Narrative    Testing was performed using the aaron  SARS-CoV-2 & Influenza A/B Assay on the aaron  Melinda  System.  This test should be ordered for the detection of SARS-COV-2 in individuals  who meet SARS-CoV-2 clinical and/or epidemiological criteria. Test performance is unknown in asymptomatic patients.  This test is for in vitro diagnostic use under the FDA EUA for laboratories certified under CLIA to perform moderate and/or high complexity testing. This test has not been FDA cleared or approved.  A negative test does not rule out the presence of PCR inhibitors in the specimen or target RNA in concentration below the limit of detection for the assay. The possibility of a false negative should be considered if the patient's recent exposure or clinical presentation suggests COVID-19.  [facility] are certified under the Clinical Laboratory Improvement Amendments of 1988 (CLIA-88) as qualified to perform moderate and/or high complexity laboratory testing.   Glucose by meter   Result Value Ref Range    GLUCOSE BY METER POCT 116 (H) 70 - 99 mg/dL   Glucose by meter   Result Value Ref Range    GLUCOSE BY METER POCT 115 (H) 70 - 99 mg/dL   Glucose by meter   Result Value Ref Range    GLUCOSE BY METER POCT 86 70 - 99 mg/dL   CBC with platelets differential    Narrative    The following orders were created for panel order CBC with platelets differential.  Procedure                               Abnormality         Status                     ---------                               -----------         ------                     CBC with platelets and d...[[id number]]  Abnormal            Final result                 Please view results for these tests on the individual orders.   Basic metabolic panel   Result Value Ref Range    Sodium 142 136 - 145 mmol/L    Potassium 4.3 3.5 - 5.0 mmol/L    Chloride 109 (H) 98 - 107 mmol/L    Carbon Dioxide (CO2) 25 22 - 31 mmol/L    Anion Gap 8 5 - 18 mmol/L    Urea Nitrogen 19 8 - 22 mg/dL    Creatinine 0.70 0.70 - 1.30 mg/dL    Calcium 8.4 (L) 8.5 - 10.5 mg/dL    Glucose 98 70 - 125 mg/dL    GFR Estimate >90 >60 mL/min/1.73m2   Magnesium   Result Value Ref  Range    Magnesium 2.1 1.8 - 2.6 mg/dL   Phosphorus   Result Value Ref Range    Phosphorus 2.7 2.5 - 4.5 mg/dL   Ionized Calcium   Result Value Ref Range    Calcium, Ionized pH 7.4 1.14 1.11 - 1.30 mmol/L    pH 7.42 7.35 - 7.45    Calcium, Ionized Measured 1.13 1.11 - 1.30 mmol/L   CBC with platelets and differential   Result Value Ref Range    WBC Count 16.3 (H) 4.0 - 11.0 10e3/uL    RBC Count 3.49 (L) 4.40 - 5.90 10e6/uL    Hemoglobin 10.4 (L) 13.3 - 17.7 g/dL    Hematocrit 32.3 (L) 40.0 - 53.0 %    MCV 93 78 - 100 fL    MCH 29.8 26.5 - 33.0 pg    MCHC 32.2 31.5 - 36.5 g/dL    RDW 15.3 (H) 10.0 - 15.0 %    Platelet Count 464 (H) 150 - 450 10e3/uL    % Neutrophils 73 %    % Lymphocytes 12 %    % Monocytes 7 %    % Eosinophils 3 %    % Basophils 1 %    % Immature Granulocytes 4 %    NRBCs per 100 WBC 0 <1 /100    Absolute Neutrophils 12.6 (H) 1.6 - 8.3 10e3/uL    Absolute Lymphocytes 1.9 0.8 - 5.3 10e3/uL    Absolute Monocytes 1.2 0.0 - 1.3 10e3/uL    Absolute Eosinophils 0.5 0.0 - 0.7 10e3/uL    Absolute Basophils 0.1 0.0 - 0.2 10e3/uL    Absolute Immature Granulocytes 0.6 (H) <=0.4 10e3/uL    Absolute NRBCs 0.0 10e3/uL   Glucose by meter   Result Value Ref Range    GLUCOSE BY METER POCT 113 (H) 70 - 99 mg/dL   INR   Result Value Ref Range    INR 1.10 0.90 - 1.15   Imaging Procedure Note Pelvic abscess drain    Narrative    Giovanni Evans MD     12/9/2021 10:20 AM  [facility]    Procedure: Imaging Procedure Note Pelvic abscess drain    Date/Time: 12/9/2021 10:19 AM  Performed by: Giovanni Evans MD  Authorized by: Giovanni Evans MD       UNIVERSAL PROTOCOL   Site Marked: Yes  Prior Images Obtained and Reviewed:  Yes  Required items: Required blood products, implants, devices and special   equipment available    Patient identity confirmed:  Verbally with patient  Patient was reevaluated immediately before administering moderate or deep   sedation or anesthesia  Confirmation Checklist:   Patient's identity using two indicators, relevant   allergies, procedure was appropriate and matched the consent or emergent   situation and correct equipment/implants were available  Time out: Immediately prior to the procedure a time out was called    Universal Protocol: the Joint Commission Universal Protocol was followed    Preparation: Patient was prepped and draped in usual sterile fashion      SEDATION  Patient Sedated: Yes    Vital signs: Vital signs monitored during sedation    See dictated procedure note for full details.    PROCEDURE  Describe Procedure: 10F drain into pelvic abscess.  30cc brownish thin   fluid removed.  Patient Tolerance:  Patient tolerated the procedure well with no immediate   complications  Length of time physician/provider present for 1:1 monitoring during   sedation: 30   CT Abd Peritoneum Abscess Drain w Cath Place    Narrative    EXAM:  1. PERCUTANEOUS DRAIN PLACEMENT PELVIC ABSCESS  2. CT GUIDANCE  3. CONSCIOUS SEDATION  LOCATION: [facility]  DATE/TIME: 12/9/2021 9:50 AM    INDICATION: s/p two surgeries with elevating wbc, ct with pelvic abscess.  TECHNIQUE: Dose reduction techniques were used.    PROCEDURE: Informed consent obtained. Site marked. Prior images reviewed. Required items made available. Patient identity confirmed verbally and with arm band. Patient reevaluated immediately before administering sedation. Universal protocol was   followed. Time out performed. The site was prepped and draped in sterile fashion. 10 mL of 1% lidocaine was infused into the local soft tissues. Using standard technique and under direct CT guidance, a 10 Icelandic drainage catheter was inserted into the   fluid collection.      SPECIMEN: 30 mL of brownish thin fluid was aspirated and sent to lab for cultures and Gram stain.    BLOOD LOSS: Minimal.    The patient tolerated the procedure well. No immediate complications.    SEDATION: Versed 2.0 mg. Fentanyl 100 mcg. The  procedure was performed with administration intravenous conscious sedation with appropriate preoperative, intraoperative, and postoperative evaluation.    30 minutes of supervised face to face conscious sedation time was provided by a radiology nurse under my direct supervision.      Impression    IMPRESSION:  1.  Successful CT-guided drain placement into pelvic abscess. 30 mL brownish thin fluid removed and sent for cultures.    Reference CPT Codes: 50913, 38361, 21399   Glucose by meter   Result Value Ref Range    GLUCOSE BY METER POCT 125 (H) 70 - 99 mg/dL     All imaging studies related to this surgery reviewed    Shaggy Cao PA-C

## 2021-12-09 NOTE — PROCEDURES
[facility]    Procedure: Imaging Procedure Note Pelvic abscess drain    Date/Time: 12/9/2021 10:19 AM  Performed by: Giovanni Evans MD  Authorized by: Giovanni Evans MD       UNIVERSAL PROTOCOL   Site Marked: Yes  Prior Images Obtained and Reviewed:  Yes  Required items: Required blood products, implants, devices and special equipment available    Patient identity confirmed:  Verbally with patient  Patient was reevaluated immediately before administering moderate or deep sedation or anesthesia  Confirmation Checklist:  Patient's identity using two indicators, relevant allergies, procedure was appropriate and matched the consent or emergent situation and correct equipment/implants were available  Time out: Immediately prior to the procedure a time out was called    Universal Protocol: the Joint Commission Universal Protocol was followed    Preparation: Patient was prepped and draped in usual sterile fashion      SEDATION  Patient Sedated: Yes    Vital signs: Vital signs monitored during sedation    See dictated procedure note for full details.    PROCEDURE  Describe Procedure: 10F drain into pelvic abscess.  30cc brownish thin fluid removed.  Patient Tolerance:  Patient tolerated the procedure well with no immediate complications  Length of time physician/provider present for 1:1 monitoring during sedation: 30

## 2021-12-09 NOTE — PROGRESS NOTES
Spiritual Health Note    Spiritual Assessment:     visited patient due to consult. Chema shared about medical condition and history.    Chema shared about family/life history. He shared about being in [facility] past 1.5 years and that he is being released in the coming days. His brother in law will help support him when he is released. He shared about his previous work at [employer].     Chema comes from [place] background and derives meaning, purpose, and comfort from jesenia. [facility]. He appreciated a prayer and expressed appreciation for the visit.     Care Provided:    Introduced self and role of Spiritual Care     Empathic listening and presence     Helped patient in processing of emotions    Facilitated storytelling and life review     Offered prayer      Plan of Care: A  will continue to visit as able or per request by patient/family/staff.        Chaplain David Lopez MDiv, [facility]

## 2021-12-09 NOTE — PHARMACY-CONSULT NOTE
"Pharmacy Note: Parenteral Nutrition (PN) Management    Pharmacist consulted to dose PN for Gurdeep Dia, a 66 year old male by Dr. Clinton.    Subjective:    The patient is a new PN start.     The patient was started on PN in the hospital on 12/5/21.     Indication for PN therapy: bowel resection     Inadequate nutrition anticipated for > 7 days.      Enteral nutrition contraindicated due to: bowel integrity is tenuous.     Pertinent diseases and other special considerations respiratory failure    Social History     Tobacco Use     Smoking status: Current Every Day Smoker     Smokeless tobacco: Never Used   Substance Use Topics     Alcohol use: Not Currently     Comment: Clean for 5 years     Drug use: Not Currently     Objective:    Ht Readings from Last 1 Encounters:   11/30/21 1.656 m (5' 5.2\")     Wt Readings from Last 1 Encounters:   12/06/21 70.3 kg (155 lb)       Body mass index is 25.64 kg/m .    Patient Vitals for the past 96 hrs:   Weight   12/06/21 0400 70.3 kg (155 lb)       Labs:  Last 3 days:  Recent Labs     12/06/21  0619 12/06/21  0620 12/06/21  0620 12/06/21  0947 12/06/21  1428 12/06/21  1921 12/07/21  0358 12/07/21  1128 12/07/21  1443 12/08/21  0535 12/08/21  0536   NA  --  146*  --   --   --   --  148*  --   --   --  149*   POTASSIUM  --  3.0*  --   --  3.6 5.8* 3.2* 5.7* 3.8  --  3.7   CHLORIDE  --  106  --   --   --   --  106  --   --   --  113*   CO2  --  31  --   --   --   --  31  --   --   --  29   BUN  --  17  --   --   --   --  20  --   --   --  22   CR  --  0.82  --   --   --   --  0.79  --   --   --  0.75   VIJAYA  --  8.4*  --   --   --   --  8.4*  --   --   --  8.3*   [id number]  --  1.12  --   --   --   --   --   --   --   --   --    MAG  --  2.1  --   --   --   --  2.2  --   --   --  2.1   PHOS  --  2.9  --   --   --   --  3.2  --   --   --  2.7   PROTTOTAL  --  5.9*  --   --   --   --  5.9*  --   --   --   --    ALBUMIN  --  2.3*  --   --   --   --  2.3*  --   --   --   --    PREALB "  --  6*  --   --   --   --   --   --   --   --   --    TRIG  --  217*  --   --   --   --   --   --   --   --   --    HGB  --   --   --  11.2*  --   --  10.9*  --   --  10.6*  --    HCT  --   --   --  34.2*  --   --  33.8*  --   --  32.8*  --    PLT  --   --   --  318  --   --  332  --   --  406  --    BILITOTAL  --  0.4  --   --   --   --  0.6  --   --   --   --    AST  --  25  --   --   --   --  42*  --   --   --   --    ALT  --  20  --   --   --   --  34  --   --   --   --    ALKPHOS  --  44*   < >  --   --   --  54  --   --   --   --    INR 1.11  --   --   --   --   --   --   --   --   --   --     < > = values in this interval not displayed.       Glucose (past 48 hours):   Recent Labs     12/06/21  1951 12/07/21  0157 12/07/21  0358 12/07/21  0816 12/07/21  1220 12/08/21  0216 12/08/21  0536   * 133* 134* 143* 121* 141* 121       Intake/Output (last 24 hours): I/O last 3 completed shifts:  In: 2172 [I.V.:1351]  Out: 1715 [Urine:1150; Emesis/NG output:300; Drains:265]    Estimated CrCl: Estimated Creatinine Clearance: 101.8 mL/min (based on SCr of 0.7 mg/dL).    Assessment:    Continue patient on PN therapy as a continuous central therapy.     Given the patient's current condition/oral intake, PN is still indicated.    Lab results reviewed:   12/9: all electrolytes in goal range, adjust cl:ace from max chloride to 1:1    Plan:  1. Rate of PN: 85 mL/hr.  2. Formula:     Amino Acids 100 grams    Dextrose 310 grams    Sodium 40 mEq/day    Potassium 90 mEq/day    Calcium 8 mEq/day    Magnesium 14 mEq/day    Phosphorus 28 mMol/day    Chloride: Acetate Ratio 1:1    Standard Multivitamins w/Vitamin K    Trace Elements  3. Fat Emulsion: 20%, 250 mL IV three times weekly.  4. Check BMP, Magnesium, Phosphorus tomorrow.  5. Pharmacist will continue to follow the patient's lab results, clinical status and blood glucose results and make adjustments as appropriate.    Thank you for the consult.  Taryn Pinzon, PharmD    12/9/2021 10:51 AM

## 2021-12-09 NOTE — PLAN OF CARE
"./81 (BP Location: Left arm)   Pulse 92   Temp 100.1  F (37.8  C) (Oral)   Resp 20   Ht 1.656 m (5' 5.2\")   Wt 70.3 kg (155 lb)   SpO2 92%   BMI 25.64 kg/m    Patient very anxious took of his pulse oxymetry times 2, he is also was very restless, RN gave Haldol 2 MG, results mildly effective. Patient sleep about an 2 hours in intervals of 15  Minutes. Patient received Tylenol for Low grade fever. Temp after 99.0.  Patient also received olanzapine. Results helpful. Continue to monitor.Elena Martinez RN    "

## 2021-12-09 NOTE — PROGRESS NOTES
Pt transported back to room 233 post drain placement.  Report given to receiving RN without questions or concerns.

## 2021-12-09 NOTE — PROGRESS NOTES
"CLINICAL NUTRITION SERVICES - REASSESSMENT NOTE     Nutrition Prescription    RECOMMENDATIONS FOR MDs/PROVIDERS TO ORDER:  Continue same TPN regimen    Malnutrition Status:    moderate    Recommendations already ordered by Registered Dietitian (RD):  D 310  @ 85 ml/hr plus 250 ml of 20% lipids 3 times per week    Future/Additional Recommendations:  Follow for diet advance and TPN needs     EVALUATION OF THE PROGRESS TOWARD GOALS   Diet: NPO  Nutrition Support: TPN: D 310  @ 85 ml/hr plus 250 ml of 20% lipids 3 times per week to provide: 1665 Calories, 100 g protein, 310 g CHO, avg 21 g fat per day and 2040 ml fluid (CHO load=3.06 mg CHO/Kg/min)     Pt with mechanical SBO s/p exploratory laparotomy/ small bowel resection/ repair of enterotomy, extensive lysis of adhesions (11/20/21)    ANTHROPOMETRICS  Height: 165.6 cm (5' 5.2\")  Most Recent Weight: 70.3 kg (155 lb)    Admission weight: 175 lb (11/30/21)  Weight History:   Wt Readings from Last 10 Encounters:   12/06/21 70.3 kg (155 lb)   01/22/20 79.4 kg (175 lb)   12/24/19 81.2 kg (179 lb)     PHYSICAL FINDINGS  See malnutrition section below.    GI CONCERNS  Abdomen distended/round   Last BM 11/29/2021 per nurse  NG draining to LIS  I/O: 1073/2790 (12/7)  1624/1305 (12/8)    LABS  Reviewed: Na 142, K 4.3, Mg 2.1, phos 2.7, Glu 98    Estimated nutrition needs:  Assessment weight is 66.2 Kg    Estimated energy needs: 3900-9263 Matt/day (25-30 Matt/Kg)  Justification: Post op  Estimated protein needs: 80-99 g/day (1.2-1.5 g/Kg)  Justification: post op  Estimated fluid needs: 1985 ml/day (30 ml/Kg)  Justification: maintenance    MEDICATIONS  Reviewed: levothyroxine, merrem, pantoprazole, senna-docusate, vancocin    MALNUTRITION:  % Weight Loss:  20 lb weight loss noted-unsure of time frame-may be greater than 1 year  % Intake:  </= 50% for >/= 5 days (severe malnutrition)  Subcutaneous Fat Loss:  Orbital region moderate depletion  Muscle Loss:  Not assessed " yet (pt agitated)  Fluid Retention:  None noted    Malnutrition Diagnosis: Moderate malnutrition  In Context of:  Acute illness or injury    CURRENT NUTRITION DIAGNOSIS  Malnutrition related to acute illness as evidenced by NPO x 5 days and moderate fat loss      INTERVENTIONS  Implementation  Continue same TPN today    Goals  Tolerate TPN-met  Advance to po diet when appropriate-not met  BG -met    Monitoring/Evaluation  Progress toward goals will be monitored and evaluated per protocol.

## 2021-12-09 NOTE — PROGRESS NOTES
Care Management Follow Up    Length of Stay (days): 9    Expected Discharge Date: 12/10/2021     Concerns to be Addressed:  Medical mgmt; Placement  Patient plan of care discussed at interdisciplinary rounds: Yes    Additional Information:  Patient from [facility] until 12/14. Will go to [facility] through [facility] if discharged before 12/14. DOC to arrange transportation. Otherwise plan is TBD.     Care manager to follow as needed.     CINDA Morales

## 2021-12-09 NOTE — PROGRESS NOTES
[facility] ACUTE PAIN SERVICE    ([facility], [facility], [facility])   Daily PAIN Progress Note    Assessment/Plan:    Gurdeep Dia is a 66 year old male who was admitted on 11/30/2021.  6 Days Post-Op exploratory lap, washout from bowel perforation, previous 11/30/21 exploratory lap small halima resection due to SBO.   Pain Service is asked to see patient for post operative pain management, currently on Hydromorphone PCA.    Patient with history of BPH, hypertension, moderate major depression, chronic pain multiple previous abdominal surgeries including splenectomy, chronic neck and back pain, polysubstance abuse. Patient is currently incarcerated with release date 12/14/21.  Plan for dismissal from hospital will be to TCU.      Patient continues with hydromorphone PCA over the past 24 hours he has received 204 mg MME.       PLAN:   1) Pain is consistent with acute post operative pain with recent bowel obstruction/bowel perforation, status post repair.  Patient with multiple previous bowel surgeries with scar tissue.  Patient currently NPO, has NG tube in place.    Agree with managing pain with PCA while NPO.  Agree with current dosing.    Anticipate transfer out of ICU today to general medical care.  We will follow along.   and   2)Multimodal Medication Therapy  Topical: consider  NSAID'S: avoid  Adjuvants: gabapentin 300 mg bid and 600 mg every hs on hold NPO  Antidepressants/anxiolytics:Remeron on hold, zyprexa prn   Opioids: none  IV Pain medication:  Hydromorphone PCA 0.2 mg every hour with additional 0.2 mg every 10 min prn   3)Non-medication interventions  Acupuncture consult- as available Mon and Thursday    Integrative consult - consider  4)Constipation Prophylaxis   per surgery  5) Follow up   -Opioid prescriber has been none  -Discharge Recommendations - We recommend prescribing the following at the time of discharge: TBD          Principal Problem:    SBO (small bowel obstruction)  (H)  Active Problems:    Hypertension goal BP (blood pressure) < 140/90    Benign prostatic hyperplasia with weak urinary stream    RAVI (generalized anxiety disorder)    Moderate major depression (H)    History of substance abuse (H)    History of hepatitis C    Chronic neck pain    Chronic bilateral low back pain without sciatica     LOS: 9 days       Subjective:  Patient reports pain is in abdomen.      [Held by provider] busPIRone  15 mg Oral BID     enoxaparin ANTICOAGULANT  40 mg Subcutaneous Q24H     [Held by provider] furosemide  40 mg Intravenous Daily     [Held by provider] gabapentin  300 mg Oral BID w/meals     levothyroxine  100 mcg Intravenous Weekly     lipids  250 mL Intravenous Once per day on Tue Thu Sat     meropenem  1 g Intravenous Q8H     [Held by provider] mirtazapine  30 mg Oral At Bedtime     pantoprazole (PROTONIX) IV  40 mg Intravenous Daily with breakfast     senna-docusate  1 tablet Oral BID     sodium chloride (PF)  10-40 mL Intracatheter Q7 Days     sodium chloride (PF)  3 mL Intracatheter Q8H     sodium chloride (PF)  3 mL Intracatheter Q8H     [Held by provider] tamsulosin  0.4 mg Oral Daily     vancomycin  1,250 mg Intravenous Q12H     [Held by provider] venlafaxine  37.5 mg Oral Daily       Objective:  Vital signs in last 24 hours:  Temp:  [97.1  F (36.2  C)-100.2  F (37.9  C)] 97.1  F (36.2  C)  Pulse:  [] 96  Resp:  [18-22] 20  BP: (109-136)/(78-88) 122/88  SpO2:  [90 %-95 %] 95 %  Weight:   Weight change:   Body mass index is 25.64 kg/m .    Intake/Output last 3 shifts:  I/O last 3 completed shifts:  In: 2172 [I.V.:1351]  Out: 1715 [Urine:1150; Emesis/NG output:300; Drains:265]  Intake/Output this shift:  No intake/output data recorded.    Review of Systems:   As per subjective, all others negative.    Physical Exam:    General Appearance:  Alert, resting comfortably, tangential difficult to follow conversation   Head:  Normocephalic, without obvious abnormality, atraumatic    Eyes:  PERRL, conjunctiva/corneas clear, EOM's intact   Nose: NG tube in place, no drainage   Throat: Lips, mucosa, dry    Neck: Supple, symmetrical, trachea midline   Back:   Symmetric, no curvature, ROM normal, no CVA tenderness   Lungs:   Oxygen per nasal canula respirations unlabored   Chest Wall:  No tenderness or deformity   Heart:  Regular rate and rhythm   Abdomen:   Soft, tender,dressing covering abdomen/binder in place, drains intact    Extremities: Extremities normal, atraumatic, no cyanosis or edema   Skin: Skin color, texture, turgor normal, no rashes or lesions   Neurologic: Alert and oriented X 3, Moves all 4 extremities          Imaging:  Personally Reviewed.  XR Chest Port 1 View    Result Date: 12/6/2021  EXAM: XR CHEST PORT 1 VIEW LOCATION: [facility] DATE/TIME: 12/6/2021 1:39 PM INDICATION: Worsening hypoxia, evaluate for pneumonia, pulm edema COMPARISON: Chest x-ray 12/03/2021     IMPRESSION: Enteric suction tube tip and side-port within the upper gastric lumen. Satisfactory right PICC line position with the tip near the cavoatrial junction. Persistent low lung volumes. No definite pneumothorax. Mildly improved bibasilar pulmonary  opacities favored to reflect atelectasis. Persistent interstitial coarsening. Suspected trace right-sided pleural fluid. Stable heart size and central vascular prominence.    XR Chest Port 1 View    Result Date: 12/3/2021  EXAM: XR CHEST PORT 1 VIEW LOCATION: [facility] DATE/TIME: 12/3/2021 8:31 PM INDICATION: RN placed PICC - verify tip placement COMPARISON: None.     IMPRESSION: Right PICC line present with its tip likely just into the right atrium. Suggest withdrawing the PICC line 2 cm to place the tip in the low SVC. NG tube in good position in the stomach. Mild cardiomegaly. Low lung volumes with mild patchy mixed interstitial and airspace infiltrates    CT Abdomen Pelvis w Contrast    Result Date:  12/8/2021  EXAM: CT ABDOMEN PELVIS W CONTRAST LOCATION: [facility] DATE/TIME: 12/8/2021 1:40 PM INDICATION: Abdominal abscess/infection suspected, status post laparotomy with repair of small bowel perforation, recent small bowel resection COMPARISON: CT 11/30/2021 TECHNIQUE: CT scan of the abdomen and pelvis was performed following injection of IV contrast. Multiplanar reformats were obtained. Dose reduction techniques were used. CONTRAST: 100 mL Isovue-370     IMPRESSION: 1.  Newly visualized 4.6 cm rim-enhancing fluid collection within the pelvis. 2.  Small ill-defined irregular pocket of fluid and extraluminal air within the mid abdomen as described above. It is uncertain whether this is related to sequelae of recent surgery or bowel leakage. Recommend short interval follow-up CT with positive enteric contrast. 3.  Mild to moderate mid small bowel distention. This is favored to reflect an ileus rather than obstruction. 4.  Trace ascites and diffuse mesenteric edema. 5.  Mild bilateral lower lobar consolidation and trace right-sided pleural effusion. [Critical Result: Pulmonary fluid collection and extraluminal loculated air-fluid within the mid abdomen as detailed above.] Finding was identified on 12/8/2021 2:10 PM. Dr. Richardson was contacted by me on 12/8/2021 2:50 PM and verbalized understanding of the critical result.     CT Abdomen Pelvis w Contrast    Result Date: 11/30/2021  EXAM: CT ABDOMEN PELVIS W CONTRAST LOCATION: [facility] DATE/TIME: 11/30/2021 10:48 AM INDICATION: Abdominal distension COMPARISON: None. TECHNIQUE: CT scan of the abdomen and pelvis was performed following injection of IV contrast. Multiplanar reformats were obtained. Dose reduction techniques were used. CONTRAST: IsoVue 370 100mL     IMPRESSION: 1.  Mechanical small bowel obstruction with dilated distal jejunum and ileum with caliber changes in the right lower quadrant and in the left  "midabdomen this may be secondary to adhesions or internal hernia and has the appearance of a closed loop obstruction. No pneumatosis intestinalis, free intraperitoneal air or abscess. 2.  Results were called to Dr. Sanford the time of dictation. NOTE: ABNORMAL REPORT THE DICTATION ABOVE DESCRIBES AN ABNORMALITY FOR WHICH FOLLOW-UP IS NEEDED.     POC US Guidance Needle Placement    Result Date: 11/30/2021  Ultrasound was performed as guidance to an anesthesia procedure.  Click \"PACS images\" hyperlink below to view any stored images.  For specific procedure details, view procedure note authored by anesthesia.    XR Video Swallow with SLP or OT    Result Date: 11/24/2021  EXAM: XR VIDEO SWALLOW WITH SLP OR OT LOCATION: [facility] DATE/TIME: 11/24/2021 9:18 AM INDICATION: Difficulty swallowing. COMPARISON: Same day esophagram.; CT for lung cancer screening 03/09/2020 TECHNIQUE: Routine swallow study with speech pathology using multiple barium thicknesses. FINDINGS: FLUOROSCOPIC TIME: 0.2 minutes NUMBER OF IMAGES: 2 videofluoroscopic imaging series Swallow study with Speech Pathology using multiple barium thicknesses. Patient is edentulous. Trials of thin consistency and barium coated cracker were performed. No delay in initiation of the oral phase. Normal epiglottic inversion. No penetration or aspiration observed. Moderate to severe cervical spondylosis with anterior wedging, disc space narrowing and osteophytosis of C4, C5, and C6. Straightening and reversal of normal thoracic lordosis.     IMPRESSION: No penetration or aspiration. Please see separately dictated report from speech pathology for additional description, analysis, and management recommendations.     XR Esophagram    Result Date: 11/24/2021  EXAM: XR ESOPHAGRAM LOCATION: [facility] DATE/TIME: 11/24/2021 9:18 AM INDICATION: 65 yo M with dysphagia/dyspnea - cough. Feels food stuck in throat with swallow. Occ. " "chocking. Needs to swallow lot of liquids. Constant runny nose & nasal congestion. COMPARISON: Same day swallow study; CT of the chest without contrast 03/09/2020 TECHNIQUE: Routine. FINDINGS: FLUOROSCOPIC TIME: 1.2 minutes NUMBER OF IMAGES: 4 videofluoroscopic imaging series and additional 41 images. ESOPHAGUS: Patulous esophagus with diffusely abnormal peristalsis. Large number of tertiary contractions present with delayed esophageal emptying. No esophageal stricture, diverticula or mass. No hiatal hernia. There is a large amount of retained contrast within the esophagus after consumption of contrast in the RPO position in transitioning to the supine position. This retained contrast moves to and fro within the esophagus. During the transition from RPO to supine position the patient felt a \"tickle\" in the back of his throat prompting coughing, while not observed likely relates to reflux of contrast from the upper esophagus into the pharynx. No gastroesophageal reflux elicited in the recumbent position with Valsalva maneuver.     IMPRESSION: Moderately severe esophageal dysmotility (presbyesophagus).      Lab Results:  Personally Reviewed.   Recent Labs   Lab 12/09/21  0654 12/08/21  0535 12/07/21  0358   WBC 16.3* 16.2* 13.9*   HGB 10.4* 10.6* 10.9*   HCT 32.3* 32.8* 33.8*   * 406 332     Recent Labs   Lab 12/09/21  0654 12/08/21  0536 12/07/21  0358 12/06/21  0620 12/05/21  1048 12/03/21  2021 12/03/21  0727    149* 148* 146* 145   < > 142   CO2 25 29 31 31 28   < > 24   BUN 19 22 20 17 17   < > 17   ALBUMIN  --   --  2.3* 2.3* 2.3*  --  2.4*   ALKPHOS  --   --  54 44* 46  --  46   ALT  --   --  34 20  --   --  21   AST  --   --  42* 25 20  --  23    < > = values in this interval not displayed.     Recent Labs   Lab 12/06/21  0619   INR 1.11         Total time spent 25 minutes with greater than 50% in consultation, education and coordination of care.     Also discussed with BALDOMERO GUZMAN, " CNS-BC, DNP  Acute Care Pain Management Program  [facility] (RUB[place], Matt, Christina)   With questions call [phone]  Preference if for [facility] Porter  Click HERE to page Denia

## 2021-12-10 ENCOUNTER — APPOINTMENT (OUTPATIENT)
Dept: CARDIOLOGY | Facility: HOSPITAL | Age: 66
DRG: 329 | End: 2021-12-10
Payer: MEDICARE

## 2021-12-10 ENCOUNTER — APPOINTMENT (OUTPATIENT)
Dept: OCCUPATIONAL THERAPY | Facility: HOSPITAL | Age: 66
DRG: 329 | End: 2021-12-10
Payer: MEDICARE

## 2021-12-10 LAB
ANION GAP SERPL CALCULATED.3IONS-SCNC: 7 MMOL/L (ref 5–18)
BACTERIA BLD CULT: NO GROWTH
BACTERIA BLD CULT: NO GROWTH
BUN SERPL-MCNC: 18 MG/DL (ref 8–22)
CALCIUM SERPL-MCNC: 8.1 MG/DL (ref 8.5–10.5)
CHLORIDE BLD-SCNC: 109 MMOL/L (ref 98–107)
CO2 SERPL-SCNC: 25 MMOL/L (ref 22–31)
CREAT SERPL-MCNC: 0.7 MG/DL (ref 0.7–1.3)
ERYTHROCYTE [DISTWIDTH] IN BLOOD BY AUTOMATED COUNT: 15.1 % (ref 10–15)
GFR SERPL CREATININE-BSD FRML MDRD: >90 ML/MIN/1.73M2
GLUCOSE BLD-MCNC: 108 MG/DL (ref 70–125)
GLUCOSE BLDC GLUCOMTR-MCNC: 104 MG/DL (ref 70–99)
GLUCOSE BLDC GLUCOMTR-MCNC: 106 MG/DL (ref 70–99)
HCT VFR BLD AUTO: 30 % (ref 40–53)
HGB BLD-MCNC: 9.5 G/DL (ref 13.3–17.7)
LVEF ECHO: NORMAL
MAGNESIUM SERPL-MCNC: 2.2 MG/DL (ref 1.8–2.6)
MCH RBC QN AUTO: 29.3 PG (ref 26.5–33)
MCHC RBC AUTO-ENTMCNC: 31.7 G/DL (ref 31.5–36.5)
MCV RBC AUTO: 93 FL (ref 78–100)
PHOSPHATE SERPL-MCNC: 2.9 MG/DL (ref 2.5–4.5)
PLATELET # BLD AUTO: 492 10E3/UL (ref 150–450)
POTASSIUM BLD-SCNC: 4.4 MMOL/L (ref 3.5–5)
RBC # BLD AUTO: 3.24 10E6/UL (ref 4.4–5.9)
SODIUM SERPL-SCNC: 141 MMOL/L (ref 136–145)
WBC # BLD AUTO: 13.1 10E3/UL (ref 4–11)

## 2021-12-10 PROCEDURE — 83735 ASSAY OF MAGNESIUM: CPT | Performed by: INTERNAL MEDICINE

## 2021-12-10 PROCEDURE — 97535 SELF CARE MNGMENT TRAINING: CPT | Mod: GO

## 2021-12-10 PROCEDURE — 99232 SBSQ HOSP IP/OBS MODERATE 35: CPT | Performed by: CLINICAL NURSE SPECIALIST

## 2021-12-10 PROCEDURE — 85027 COMPLETE CBC AUTOMATED: CPT | Performed by: INTERNAL MEDICINE

## 2021-12-10 PROCEDURE — 250N000009 HC RX 250: Performed by: SPECIALIST

## 2021-12-10 PROCEDURE — 250N000011 HC RX IP 250 OP 636: Performed by: FAMILY MEDICINE

## 2021-12-10 PROCEDURE — 82310 ASSAY OF CALCIUM: CPT | Performed by: INTERNAL MEDICINE

## 2021-12-10 PROCEDURE — 999N000208 ECHOCARDIOGRAM COMPLETE

## 2021-12-10 PROCEDURE — 250N000011 HC RX IP 250 OP 636: Performed by: INTERNAL MEDICINE

## 2021-12-10 PROCEDURE — G0463 HOSPITAL OUTPT CLINIC VISIT: HCPCS

## 2021-12-10 PROCEDURE — 258N000003 HC RX IP 258 OP 636: Performed by: FAMILY MEDICINE

## 2021-12-10 PROCEDURE — 255N000002 HC RX 255 OP 636: Performed by: SPECIALIST

## 2021-12-10 PROCEDURE — 99024 POSTOP FOLLOW-UP VISIT: CPT | Performed by: PHYSICIAN ASSISTANT

## 2021-12-10 PROCEDURE — 93306 TTE W/DOPPLER COMPLETE: CPT | Mod: 26 | Performed by: INTERNAL MEDICINE

## 2021-12-10 PROCEDURE — 99233 SBSQ HOSP IP/OBS HIGH 50: CPT | Mod: GC | Performed by: FAMILY MEDICINE

## 2021-12-10 PROCEDURE — C9113 INJ PANTOPRAZOLE SODIUM, VIA: HCPCS | Performed by: INTERNAL MEDICINE

## 2021-12-10 PROCEDURE — 84100 ASSAY OF PHOSPHORUS: CPT | Performed by: INTERNAL MEDICINE

## 2021-12-10 PROCEDURE — 120N000001 HC R&B MED SURG/OB

## 2021-12-10 RX ORDER — FLUORIDE TOOTHPASTE
30 TOOTHPASTE DENTAL 4 TIMES DAILY PRN
Status: DISCONTINUED | OUTPATIENT
Start: 2021-12-10 | End: 2021-12-21 | Stop reason: HOSPADM

## 2021-12-10 RX ADMIN — Medication: at 06:18

## 2021-12-10 RX ADMIN — VANCOMYCIN HYDROCHLORIDE 1250 MG: 5 INJECTION, POWDER, LYOPHILIZED, FOR SOLUTION INTRAVENOUS at 10:07

## 2021-12-10 RX ADMIN — PANTOPRAZOLE SODIUM 40 MG: 40 INJECTION, POWDER, FOR SOLUTION INTRAVENOUS at 08:03

## 2021-12-10 RX ADMIN — MEROPENEM 1 G: 1 INJECTION, POWDER, FOR SOLUTION INTRAVENOUS at 17:23

## 2021-12-10 RX ADMIN — MEROPENEM 1 G: 1 INJECTION, POWDER, FOR SOLUTION INTRAVENOUS at 00:28

## 2021-12-10 RX ADMIN — VANCOMYCIN HYDROCHLORIDE 1250 MG: 5 INJECTION, POWDER, LYOPHILIZED, FOR SOLUTION INTRAVENOUS at 21:49

## 2021-12-10 RX ADMIN — POTASSIUM CHLORIDE: 2 INJECTION, SOLUTION, CONCENTRATE INTRAVENOUS at 19:51

## 2021-12-10 RX ADMIN — HALOPERIDOL LACTATE 2 MG: 5 INJECTION, SOLUTION INTRAMUSCULAR at 04:56

## 2021-12-10 RX ADMIN — PERFLUTREN 3 ML: 6.52 INJECTION, SUSPENSION INTRAVENOUS at 13:35

## 2021-12-10 RX ADMIN — ENOXAPARIN SODIUM 40 MG: 40 INJECTION SUBCUTANEOUS at 10:07

## 2021-12-10 RX ADMIN — MEROPENEM 1 G: 1 INJECTION, POWDER, FOR SOLUTION INTRAVENOUS at 07:55

## 2021-12-10 ASSESSMENT — ACTIVITIES OF DAILY LIVING (ADL)
ADLS_ACUITY_SCORE: 16
ADLS_ACUITY_SCORE: 16
ADLS_ACUITY_SCORE: 14
ADLS_ACUITY_SCORE: 14
ADLS_ACUITY_SCORE: 16
ADLS_ACUITY_SCORE: 14
ADLS_ACUITY_SCORE: 16
ADLS_ACUITY_SCORE: 16

## 2021-12-10 NOTE — PROGRESS NOTES
[facility] Progress Note    Assessment/Plan  Principal Problem:    SBO (small bowel obstruction) (H)  Active Problems:    Hypertension goal BP (blood pressure) < 140/90    Benign prostatic hyperplasia with weak urinary stream    RAVI (generalized anxiety disorder)    Moderate major depression (H)    History of substance abuse (H)    History of hepatitis C    Chronic neck pain    Chronic bilateral low back pain without sciatica    Gurdeep Dia is a 66 year old male with history of splenectomy, appendectomy, HTN, and substance abuse who presented with 2 days of right sided abdominal pain, found to have a bowel obstruction with closed loop. He is admitted for surgical repair of obstruction.      Requires ongoing hospitalization for post-operative monitoring and care       Mechanical SBO s/p exploratory laparotomy / small bowel resection / repair of enterotomy / extensive lysis of adhesions (11/30/21)  Subsequent small bowel perforation requiring repeat ex lap for washout 12/3    CT abdomen/pelvis on admission 11/30 significant for closed loop mechanical small bowel obstruction. Does have history of multiple abdominal surgeries previously. First procedure 11/30 for small bowel resection. Acute change on 12/2-12/3 and found to have intraabdominal drainage from 12/3/2021; underwent repeat exploratory laparotomy with washout and repair of small bowel perforation. 12/8 still without stool charted, still spiking low grade fevers, having atypical output from MARISA drain, white count climbing. CT 12/8 showing ring enhancing lesion in abdomen likely representing abscess. Fourth drain placed 12/9 AM by IR for abscess drainage.  -Surgery consulted and following   -NPO for now until cleared by gen surg   -Pain control as per surgery/Pain team (PCA, tyl) -- need to balance with encephalopathy below   -IV meropenem + vanc -- follow cultures   -IV Protonix     Acute encephalopathy, likely multifactorial -  toxic/metabolic/hospital-induced   Acutely agitated post-op 12/3 to 12/4. Pulling at lines and threatening to leave.  Nurse felt unsafe without restraints. Precedex drip started, now off. Transferred out of the unit.  - Soft limb restraint x 2 - adjust as needed  - Precedex gtt stop 12/7 morning  - PRN Haldol injection  - Zyprexa ODT 4x daily prn  - Conservative measures as able   - Avoid opioid/benzo if able     Acute hypoxic respiratory failure, post-op   Atelectasis vs aspiration  Initially requiring high-flow oxygen in ICU following procedure.  Question post-op atelectasis vs aspiration versus volume overload.  Does have a degree of respiratory distress when he gets agitated. CXR 12/6 showing bibasilar opacitied favored to reflect atelectasis, though still with some interstitial coarsening. Breathing improved 12/7 after some antibiotics and maybe ongoing resolution of his atelectasis. No signs of fluid overload and sodium climbing so IV lasix discontinued 12/8.  -Continue nasal cannula  -Wean as able  -Continue abx as above, don't think we need to broaden from a lung perspective  -Aggressive IS use  -PT/OT to get moving  -Discontinue IV lasix; hold home dose for now given sodium    Hypernatremia - resolved  In setting of excessive diuresis. Asymptomatic. Gave some fluid back 12/8.  -Discontinue IV lasix; hold home dose for now    Afib, RVR post-op, currently rate controlled   NSVT episode 12/8   Noted.  Rates had been in the 120 to 150 range.  Converted to NSR with amiodarone drip. No longer on any rate control. Both CHADS2-Vasc and HAS-BLED of at least 2.  Would likely warrant anticoagulation on ongoing basis. TSH normal. Lytes normal.  -Lovenox for now   -Consider ongoing anticoagulation once encephalopathy is improved  -Will order echo now    HTN  PTA lisinopril, furosemide being held in the setting of acute surgical care.  Will monitor, even with pain BP not significantly elevated.  Will resume when  hemodynamically stable.  Kidney function is stable at this time.  Blood pressure does climb when he is agitated.  -Monitor     Moderate malnutrition  Per RD eval.  -TPN until cleared to ADAT per surgery    Tremor - resolved  New on exam 12/2. Bilateral. Doesn't fit well with either essential tremor or parkinsonian. Almost resembles asterixis. LFTs and kidney function normal though. Ammonia normal. Improved 12/3.  -Monitor      Chronic Conditions:  Hypothyroidism- PTA levothyroxine  Depression/anxiety- PTA venlafaxine, mirtazepine, Buspar   Chronic pain- Hold mexolicam 7.5mg daily, resume PTA gabapentin when tolerating PO.  BPH- PTA flomax      Diet: NPO for Medical/Clinical Reasons Except for: Meds, Ice ChipsNPO   DVT Prophylaxis: Lovenox q day    Roblero Catheter: placed   Fluids:  None  Central Lines: None  Code Status: Full Code    Subjective  Wants to eat today of course. Tolerating drain from IR yesterday. CLAUDE overnight     Objective    Vital signs in last 24 hours Temp:  [97.7  F (36.5  C)-99.9  F (37.7  C)] 98.7  F (37.1  C)  Pulse:  [84-91] 85  Resp:  [16-20] 18  BP: (100-121)/(63-84) 100/63  SpO2:  [91 %-95 %] 93 %       Intake/Output last 3 shift I/O last 3 completed shifts:  In: 2241.32 [P.O.:60; I.V.:105]  Out: 2532 [Urine:1800; Emesis/NG output:400; Drains:332]    Intake/Output this shift:I/O this shift:  In: -   Out: 300 [Urine:300]    Physical Exam  General appearance: uncomfortable appearing male, laying in bed, two officers at bedside.  Acutely agitated.  Head: Normocephalic, without obvious abnormality, atraumatic.  NG tube in place.    Throat: moist mucous membranes.  Lungs: Tachypneic.  High flow oxygen in place.  Coarse lung sounds in the bases.  Heart: regular rate and rhythm, S1, S2 normal, no murmur, click, rub or gallop  Abdomen: Abdominal binding in place.  Multiple MARISA drains in place with bloody drainage. bowel sounds very present. Moderate TTP throughout.   Extremities: extremities normal,  atraumatic, no cyanosis or edema  Skin: Slightly pale, dry skin.  Neurologic: Alert and oriented X 2.  Able to move extremities.  Sensation intact.   Psychiatric: Exceedingly anxious and agitated.     Pertinent Labs and Pertinent Radiology   Lab Results: personally reviewed.     Radiology Results: Personally reviewed image/s and impression/s    Precepted patient with Dr. Gem Mcmahon MD - PGY2  [facility]  P: [phone]

## 2021-12-10 NOTE — PROGRESS NOTES
"  Interventional Radiology - Progress Note  Inpatient - [facility]: Interventional Radiology   [phone]  12/10/2021    S:  Remains hospitalized.  Now S/P 10F drain placement into pelvic abscess.    Having pain.  Did not sleep well.      Culture:  In process  Antibiotic: Meropenem 1g IV Q8  Flushing: None per order    O:  /65 (BP Location: Left arm, Patient Position: Supine)   Pulse 87   Temp 98.8  F (37.1  C) (Oral)   Resp 20   Ht 1.656 m (5' 5.2\")   Wt 70.3 kg (155 lb)   SpO2 91%   BMI 25.64 kg/m    General:  Stable.  In no acute distress.    Neuro:  A&O x 3. Answers questions appropriately  Resp:  Breathing easily on RA, unlabored  Cardio:  WWP     Drain in place.  Insertion site c/d/i.  Stay suture in place.  Dressing c/d/i.  Drainage bag/bulb in place, draining moderate amount of sanguinous drainage.  Flushed by this writer for 10mL NS, easily, no pain or leaking.    IMAGING:    EXAM:  1. PERCUTANEOUS DRAIN PLACEMENT PELVIC ABSCESS  2. CT GUIDANCE  3. CONSCIOUS SEDATION  LOCATION: [facility]  DATE/TIME: 12/9/2021 9:50 AM     INDICATION: s/p two surgeries with elevating wbc, ct with pelvic abscess.  TECHNIQUE: Dose reduction techniques were used.     PROCEDURE: Informed consent obtained. Site marked. Prior images reviewed. Required items made available. Patient identity confirmed verbally and with arm band. Patient reevaluated immediately before administering sedation. Universal protocol was   followed. Time out performed. The site was prepped and draped in sterile fashion. 10 mL of 1% lidocaine was infused into the local soft tissues. Using standard technique and under direct CT guidance, a 10 Telugu drainage catheter was inserted into the   fluid collection.       SPECIMEN: 30 mL of brownish thin fluid was aspirated and sent to lab for cultures and Gram stain.     BLOOD LOSS: Minimal.     The patient tolerated the procedure well. No immediate " complications.     SEDATION: Versed 2.0 mg. Fentanyl 100 mcg. The procedure was performed with administration intravenous conscious sedation with appropriate preoperative, intraoperative, and postoperative evaluation.     30 minutes of supervised face to face conscious sedation time was provided by a radiology nurse under my direct supervision.                                                                      IMPRESSION:  1.  Successful CT-guided drain placement into pelvic abscess. 30 mL brownish thin fluid removed and sent for cultures.       LABS:  CBC RESULTS: Recent Labs   Lab Test 12/10/21  0456   WBC 13.1*   RBC 3.24*   HGB 9.5*   HCT 30.0*   MCV 93   MCH 29.3   MCHC 31.7   RDW 15.1*   *     Drain Outputs (in mL):        A:  65 yo M    -S/P exploratory laparotomy, small bowel resection, lysis of adhesions and repair of enterotomy with significant inflamed small bowel with areas of thinned mucosa on 11/30/2021 2/2 obstruction  - S/p exploratory laparotomy 12/3/2021 2/2 small bowel perforation  - CT 12/8 showing abscess  - 4th drain placed 12/9/2021- CT guided, per above  - Cultures, abx per above    P:    - Continue present drain cares. Continue drain to bulb drainage.  - Start flushing with 10mL NS Q8 hours   - Drain care education provided to patient. All questions answered.   - Drain discharge instructions entered into D/C navigator.  Done  - Patient to flush drain with 10mL NS daily after discharge. NS flushes ordered in D/C navigator.  Ordered  - Recommend follow up CT and abscessogram approximately 7-10 days from drain placement date.  This can be done inpt or outpt.  Perhaps sooner if there are changes in drain function or in patient's clinical course. Discussed plan for follow up with patient who expressed their understanding.   - IR will continue to follow loosely. Please contact IR with drain related questions or concerns.    Total time spent on the date of the encounter is 25 minutes,  including time spent counseling the patient, performing a medically appropriate evaluation, reviewing prior medical history, ordering medications and tests, documenting clinical information in the medical record, and communication of results.    YOBANY WOODARD NP  Interventional Radiology  [phone]    E/M codes for reference only:  22596

## 2021-12-10 NOTE — PHARMACY-CONSULT NOTE
"Pharmacy Note: Parenteral Nutrition (PN) Management    Pharmacist consulted to dose PN for Gurdeep Dia, a 66 year old male by Dr. Clinton.    Subjective:    The patient is a new PN start.     The patient was started on PN in the hospital on 12/5/21.     Indication for PN therapy: bowel resection     Inadequate nutrition anticipated for > 7 days.      Enteral nutrition contraindicated due to: bowel integrity is tenuous.     Pertinent diseases and other special considerations respiratory failure    Social History     Tobacco Use     Smoking status: Current Every Day Smoker     Smokeless tobacco: Never Used   Substance Use Topics     Alcohol use: Not Currently     Comment: Clean for 5 years     Drug use: Not Currently     Objective:    Ht Readings from Last 1 Encounters:   11/30/21 1.656 m (5' 5.2\")     Wt Readings from Last 1 Encounters:   12/06/21 70.3 kg (155 lb)       Body mass index is 25.64 kg/m .    No data found.    Labs:  Last 3 days:  Recent Labs     12/06/21  0619 12/06/21  0620 12/06/21  0620 12/06/21  0947 12/06/21  1428 12/06/21  1921 12/07/21  0358 12/07/21  1128 12/07/21  1443 12/08/21  0535 12/08/21  0536   NA  --  146*  --   --   --   --  148*  --   --   --  149*   POTASSIUM  --  3.0*  --   --  3.6 5.8* 3.2* 5.7* 3.8  --  3.7   CHLORIDE  --  106  --   --   --   --  106  --   --   --  113*   CO2  --  31  --   --   --   --  31  --   --   --  29   BUN  --  17  --   --   --   --  20  --   --   --  22   CR  --  0.82  --   --   --   --  0.79  --   --   --  0.75   VIJAYA  --  8.4*  --   --   --   --  8.4*  --   --   --  8.3*   [id number]  --  1.12  --   --   --   --   --   --   --   --   --    MAG  --  2.1  --   --   --   --  2.2  --   --   --  2.1   PHOS  --  2.9  --   --   --   --  3.2  --   --   --  2.7   PROTTOTAL  --  5.9*  --   --   --   --  5.9*  --   --   --   --    ALBUMIN  --  2.3*  --   --   --   --  2.3*  --   --   --   --    PREALB  --  6*  --   --   --   --   --   --   --   --   --    TRIG  --  " 217*  --   --   --   --   --   --   --   --   --    HGB  --   --   --  11.2*  --   --  10.9*  --   --  10.6*  --    HCT  --   --   --  34.2*  --   --  33.8*  --   --  32.8*  --    PLT  --   --   --  318  --   --  332  --   --  406  --    BILITOTAL  --  0.4  --   --   --   --  0.6  --   --   --   --    AST  --  25  --   --   --   --  42*  --   --   --   --    ALT  --  20  --   --   --   --  34  --   --   --   --    ALKPHOS  --  44*   < >  --   --   --  54  --   --   --   --    INR 1.11  --   --   --   --   --   --   --   --   --   --     < > = values in this interval not displayed.       Glucose (past 48 hours):   Recent Labs     12/06/21  1951 12/07/21  0157 12/07/21  0358 12/07/21  0816 12/07/21  1220 12/08/21  0216 12/08/21  0536   * 133* 134* 143* 121* 141* 121       Intake/Output (last 24 hours): I/O last 3 completed shifts:  In: 2241.32 [P.O.:60; I.V.:105]  Out: 2532 [Urine:1800; Emesis/NG output:400; Drains:332]    Estimated CrCl: Estimated Creatinine Clearance: 101.8 mL/min (based on SCr of 0.7 mg/dL).    Assessment:    Continue patient on PN therapy as a continuous central therapy.     Given the patient's current condition/oral intake, PN is still indicated.    Lab results reviewed:   12/9: all electrolytes in goal range, no changes today    Plan:  1. Rate of PN: 85 mL/hr.  2. Formula:     Amino Acids 100 grams    Dextrose 310 grams    Sodium 40 mEq/day    Potassium 90 mEq/day    Calcium 8 mEq/day    Magnesium 14 mEq/day    Phosphorus 28 mMol/day    Chloride: Acetate Ratio 1:1    Standard Multivitamins w/Vitamin K    Trace Elements  3. Fat Emulsion: 20%, 250 mL IV three times weekly.  4. Check BMP tomorrow  5. Pharmacist will continue to follow the patient's lab results, clinical status and blood glucose results and make adjustments as appropriate.    Thank you for the consult.  Taryn Pinzon, PharmD   12/9/2021 10:51 AM

## 2021-12-10 NOTE — PLAN OF CARE
"./69 (BP Location: Left arm, Patient Position: Semi-Beavers's)   Pulse 90   Temp 99.9  F (37.7  C) (Oral)   Resp 18   Ht 1.656 m (5' 5.2\")   Wt 70.3 kg (155 lb)   SpO2 91%   BMI 25.64 kg/m    Patient complained of pain he was reminded to use the Pump to self medicated for the pain. Results effective, Patient was sleeping after PCA dose received. Patient was very restless this evening patient disconnected the gastric tubes and stomach content was all over his back. Patient received a bed bath and all Linens were changed, also received Haldol 2 MG for restlessness and it was helpful. Patient voiding well, uses the urinal.   Problem: Pain (Surgery Nonspecified)  Goal: Acceptable Pain Control  Outcome: Improving     Problem: Postoperative Urinary Retention (Surgery Nonspecified)  Goal: Effective Urinary Elimination  Outcome: Improving     Problem: Adult Inpatient Plan of Care  Goal: Absence of Hospital-Acquired Illness or Injury  Intervention: Identify and Manage Fall Risk  Recent Flowsheet Documentation  Taken 12/9/2021 2100 by Elena Thomason RN  Safety Promotion/Fall Prevention:    activity supervised    assistive device/personal items within reach  Taken 12/9/2021 1550 by Elena Thomason RN  Safety Promotion/Fall Prevention:    activity supervised    assistive device/personal items within reach  Intervention: Prevent Skin Injury  Recent Flowsheet Documentation  Taken 12/9/2021 2100 by Elena Thomason RN  Body Position: position changed independently  Taken 12/9/2021 1550 by Elena Thomason RN  Body Position: position changed independently  Taken 12/9/2021 1500 by Elena Thomason RN  Body Position: position changed independently  Intervention: Prevent Infection  Recent Flowsheet Documentation  Taken 12/9/2021 2100 by Elena Thomason RN  Infection Prevention: hand hygiene promoted  Taken 12/9/2021 1550 by Rich Martinez" Elena BROWN RN  Infection Prevention: hand hygiene promoted     Problem: Ongoing Anesthesia Effects (Surgery Nonspecified)  Goal: Anesthesia/Sedation Recovery  Intervention: Optimize Anesthesia Recovery  Recent Flowsheet Documentation  Taken 12/9/2021 2100 by Elena Thomason, RN  Safety Promotion/Fall Prevention:    activity supervised    assistive device/personal items within reach  Taken 12/9/2021 1550 by Elena Thomason, RN  Safety Promotion/Fall Prevention:    activity supervised    assistive device/personal items within reach

## 2021-12-10 NOTE — CONSULTS
Wound Ostomy  WOC Assessment       Allergies:  Penicillins    Diagnosis:   Patient Active Problem List    Diagnosis Date Noted     SBO (small bowel obstruction) (H) 11/30/2021     Priority: Medium     Hypertension goal BP (blood pressure) < 140/90 12/24/2019     Priority: Medium     Benign prostatic hyperplasia with weak urinary stream 12/24/2019     Priority: Medium     RAVI (generalized anxiety disorder) 12/24/2019     Priority: Medium     Moderate major depression (H) 12/24/2019     Priority: Medium     History of substance abuse (H) 12/24/2019     Priority: Medium     History of hepatitis C 12/24/2019     Priority: Medium     Chronic neck pain 12/24/2019     Priority: Medium     Chronic bilateral low back pain without sciatica 12/24/2019     Priority: Medium       Height:  [unfilled]    Weight:  [unfilled]    Labs:  Recent Labs   Lab Test 12/10/21  0456 12/08/21  0535 12/07/21  0358   CRP  --   --  26.6*   HGB 9.5*   < > 10.9*   ALBUMIN  --   --  2.3*    < > = values in this interval not displayed.       Mars:  Mars Score: 17    Specialty Bed:       Wound culture obtained: No    Edema:  No    Anatomic Site/Laterality: midline incision    Reason for ongoing care:   Plan of care    Encounter Type:  Initial Patient is s/p EXPLORATORY LAPAROTOMY, WITH WASHOUT, REPAIR OF SMALL BOWEL PERFORATION on 12/3/21.     Patient has midline incision intact with staples. There is large amounts of enteric drainage from the incision. Was asked to pouch the incision.     Removed old dressings that were soaked with enteric drainage. No specific open area within the incision and there was absolutely no output from the wound throughout the assessment. Skin was prepped with Cavilon no-sting skin barrier, barrier ring applied around majority of distal end of incision and pouch 8531 was applied.     Discussed with RN - template in the room, however the pouch may not even be in the correct location along the incision as there was no  identifiable fistula location. RNs can change the pouch over the weekend if needed for leaking and can re-position at that time if needed.          Nursing care provided was coordinated care with RN.    Discussed plan of care with nurse and patient.    Outcomes and treatment recommendations are to promote skin integrity and contain exudate.    Actions taken by WOC RN: 5 minutes of education and orders entered.    Planned Follow Up: Early next week.    Plan for next visit: Reassess wound(s) and Reassess skin integrity

## 2021-12-10 NOTE — PROGRESS NOTES
ASSESSMENT:  1. SBO (small bowel obstruction) (H)    2. Pelvic abscess in male (H)        Gurdeep Dia is a 66 year old male who is s/p exploratory laparotomy with small bowel resection, lysis of adhesions and repair of enterotomy with significant inflamed small bowel with areas of thinned mucosa on 11/30/2021  S/p exploratory laparotomy 12/3/2021 for small bowel perforation from ulcer  S/p IR placement of drain into pelvic abscess 12/9/2021  Leukocytosis improving  Enterocutaneous fistula     PLAN:  -We will have wound nurse see patient to see if we can collect succus output coming from the inferior mid incision.  Keep staples.  -Continue IV antibiotics.  -Continue n.p.o. and okay ice chips/meds only  -PT and OT  -Encourage incentive spirometry  -Drains to remain  -Appreciate IR placement of pelvic abscess drain  -Continue TPN    SUBJECTIVE:   He is having pain and not sleeping well due to the pain.  No significant events overnight.  Is afebrile.      Patient Vitals for the past 24 hrs:   BP Temp Temp src Pulse Resp SpO2   12/10/21 0503 110/65 98.8  F (37.1  C) Oral 87 20 --   12/10/21 0333 110/70 98.3  F (36.8  C) Oral 84 18 91 %   12/10/21 0106 115/76 99.1  F (37.3  C) Oral 87 18 94 %   12/09/21 2018 112/69 99.9  F (37.7  C) Oral 90 18 91 %   12/09/21 1548 120/76 98.1  F (36.7  C) Oral 91 16 94 %   12/09/21 1500 -- -- -- -- -- 95 %   12/09/21 1327 118/76 97.7  F (36.5  C) Oral 87 16 92 %   12/09/21 1221 121/84 97.8  F (36.6  C) Oral 84 18 91 %   12/09/21 1120 126/78 -- Oral 83 16 91 %   12/09/21 1105 110/77 97.1  F (36.2  C) Temporal 84 16 93 %   12/09/21 1050 109/80 98.1  F (36.7  C) Oral 86 16 92 %   12/09/21 1035 100/69 98.5  F (36.9  C) Oral 85 16 92 %   12/09/21 1015 96/60 -- -- 84 15 95 %   12/09/21 1010 95/54 -- -- 82 17 93 %   12/09/21 1005 94/57 -- -- 85 17 94 %   12/09/21 1000 102/64 -- -- 85 21 92 %   12/09/21 0959 -- -- -- 85 18 93 %   12/09/21 0958 -- -- -- 86 21 93 %   12/09/21 0957 -- -- -- 86  20 93 %   12/09/21 0956 -- -- -- 86 19 94 %   12/09/21 0955 98/63 -- -- 86 20 94 %   12/09/21 0954 -- -- -- 86 20 93 %   12/09/21 0953 -- -- -- 85 21 94 %   12/09/21 0952 -- -- -- 86 21 95 %   12/09/21 0951 94/62 -- -- 85 20 95 %   12/09/21 0950 -- -- -- 85 18 94 %   12/09/21 0949 -- -- -- 85 19 94 %   12/09/21 0948 -- -- -- 85 20 95 %   12/09/21 0947 -- -- -- 85 22 95 %         PHYSICAL EXAM:  GEN: No acute distress, comfortable    ABD: Distended and wound checked as most of his dressings and down into his groin and onto his binder and diapers are soiled with green succus.  Obvious drainage is coming from inferior aspect of the wound about midline between the most inferior spot in the umbilicus.  I cleaned skin is best as I CAD and replaced dressings with ABDs  Drains: IR drain serosanguineous at the left hip  Left superior drain serosanguineous scant output  Left inferior drain with 255 mL / 24-hour green succus  Left buttock/hip from IR serosanguineous  Right lower MARISA light tan  Output-minimal output    Output by Drain (mL) 12/08/21 0700 - 12/08/21 1459 12/08/21 1500 - 12/08/21 2259 12/08/21 2300 - 12/09/21 0659 12/09/21 0700 - 12/09/21 1459 12/09/21 1500 - 12/09/21 2259 12/09/21 2300 - 12/10/21 0659 12/10/21 0700 - 12/10/21 0857   Closed/Suction Drain 1 Right Abdomen Bulb 19 Mauritian 0 20 10   10    Closed/Suction Drain 1 Left LLQ Bulb 19 Mauritian 30 10 5 10  10    Closed/Suction Drain 2 Inferior;Midline Abdomen Bulb 15 Mauritian 110  80 115 110 30    Closed/Suction Drain 4 Left;Midline Buttock Bulb 10 Mauritian    15 10 7    Open Drain Medial;Superior Abdomen vessel loop drain    15 0        EXT:No cyanosis, edema or obvious abnormalities    12/09 0700 - 12/10 0659  In: 2241.32 [P.O.:60; I.V.:105]  Out: 2532 [Urine:1800; Drains:332]    No results displayed because visit has over 200 results.   Recent Results (from the past 24 hour(s))   Glucose by meter    Collection Time: 12/09/21 11:52 AM   Result Value Ref Range     GLUCOSE BY METER POCT 125 (H) 70 - 99 mg/dL   Glucose by meter    Collection Time: 12/09/21  7:25 PM   Result Value Ref Range    GLUCOSE BY METER POCT 112 (H) 70 - 99 mg/dL   Glucose by meter    Collection Time: 12/09/21  9:32 PM   Result Value Ref Range    GLUCOSE BY METER POCT 120 (H) 70 - 99 mg/dL   Glucose by meter    Collection Time: 12/10/21 12:33 AM   Result Value Ref Range    GLUCOSE BY METER POCT 104 (H) 70 - 99 mg/dL   Glucose by meter    Collection Time: 12/10/21  4:54 AM   Result Value Ref Range    GLUCOSE BY METER POCT 106 (H) 70 - 99 mg/dL   Basic metabolic panel    Collection Time: 12/10/21  4:56 AM   Result Value Ref Range    Sodium 141 136 - 145 mmol/L    Potassium 4.4 3.5 - 5.0 mmol/L    Chloride 109 (H) 98 - 107 mmol/L    Carbon Dioxide (CO2) 25 22 - 31 mmol/L    Anion Gap 7 5 - 18 mmol/L    Urea Nitrogen 18 8 - 22 mg/dL    Creatinine 0.70 0.70 - 1.30 mg/dL    Calcium 8.1 (L) 8.5 - 10.5 mg/dL    Glucose 108 70 - 125 mg/dL    GFR Estimate >90 >60 mL/min/1.73m2   Magnesium    Collection Time: 12/10/21  4:56 AM   Result Value Ref Range    Magnesium 2.2 1.8 - 2.6 mg/dL   Phosphorus    Collection Time: 12/10/21  4:56 AM   Result Value Ref Range    Phosphorus 2.9 2.5 - 4.5 mg/dL   CBC with platelets    Collection Time: 12/10/21  4:56 AM   Result Value Ref Range    WBC Count 13.1 (H) 4.0 - 11.0 10e3/uL    RBC Count 3.24 (L) 4.40 - 5.90 10e6/uL    Hemoglobin 9.5 (L) 13.3 - 17.7 g/dL    Hematocrit 30.0 (L) 40.0 - 53.0 %    MCV 93 78 - 100 fL    MCH 29.3 26.5 - 33.0 pg    MCHC 31.7 31.5 - 36.5 g/dL    RDW 15.1 (H) 10.0 - 15.0 %    Platelet Count 492 (H) 150 - 450 10e3/uL               Demetrius Vogel PA-C

## 2021-12-10 NOTE — PLAN OF CARE
Problem: Pain Acute  Goal: Acceptable Pain Control and Functional Ability  Outcome: Improving  Intervention: Develop Pain Management Plan  Recent Flowsheet Documentation  Taken 12/10/2021 1002 by Augusto Hines RN  Pain Management Interventions: (PCA) medication (see MAR)  Intervention: Prevent or Manage Pain  Recent Flowsheet Documentation  Taken 12/10/2021 1002 by Augusto Hines RN  Medication Review/Management: medications reviewed     Problem: Bowel Motility Impaired (Surgery Nonspecified)  Goal: Effective Bowel Elimination  Outcome: Improving     Problem: Pain (Surgery Nonspecified)  Goal: Acceptable Pain Control  Outcome: Improving  Intervention: Prevent or Manage Pain  Recent Flowsheet Documentation  Taken 12/10/2021 1002 by Augusto Hines RN  Pain Management Interventions: (PCA) medication (see MAR)     Problem: Postoperative Urinary Retention (Surgery Nonspecified)  Goal: Effective Urinary Elimination  Outcome: Improving   Pain is managed with PCA dilaudid and tolerated well. Ng tube in place on intermittent low suction, J/P in place and intact. Patient is voiding freely via urinal with adequate output. Mentation better this morning. Seen today by WOC, pouch placed to abdominal incision site and draining. Abdominal incision intact with staples and abdominal binder. Will continue to monitor.    Augusto Hines RN

## 2021-12-10 NOTE — PROGRESS NOTES
Spiritual Health Note    Spiritual Assessment:    Follow up visit due to patient request. He stated that he just wanted to say hi and was open to more of a social visit.     He shared more about his family/life history. His mother was single mother with six children. His dad  from cirrhosis at age 39.     He was raised Nondenominational and has various Rastafarian connections throughout his life.     Patient expressed appreciation for the visit.       Care Provided:    Empathic listening and presence     Helped patient in processing of emotions     Plan of Care: A  will continue to visit as able or per request by patient/family/staff.        zulema Lopez MDiv, [facility]

## 2021-12-10 NOTE — PROGRESS NOTES
[facility] ACUTE PAIN SERVICE    ([facility], [facility], [facility])   Daily PAIN Progress Note    Assessment/Plan:  Gurdeep Dia is a 66 year old male who was admitted on 11/30/2021.  7 Days Post-Op exploratory lap, washout from bowel perforation, previous 11/30/21 exploratory lap small halima resection due to SBO.   Pain Service is asked to see patient for post operative pain management, currently on Hydromorphone PCA.    Patient with history of BPH, hypertension, moderate major depression, chronic pain multiple previous abdominal surgeries including splenectomy, chronic neck and back pain, polysubstance abuse. Patient is currently incarcerated with release date 12/14/21.  Plan for dismissal from hospital will be to TCU.       Patient continues with hydromorphone PCA over the past 24 hours due to bowel rest.  Patient is NPO only ice chips and on TPN.    Over past 24 hours he has received 16.8 mg IV hydromorphone for MME of around 336 mg.        CT demonstrating pelvic abscess had additional drain placed yesterday.      He has ongoing complaint of abdominal pain, he continues with mild encephalopathy and becomes agitated at times.    He is much more comfortable at time of visit, calm continues with complaint of dry mouth.                PLAN:   1) Pain is consistent with acute post operative pain with recent bowel obstruction/bowel perforation, status post repair.  Patient with multiple previous bowel surgeries with scar tissue.  Patient currently NPO, has NG tube in place.    Agree with managing pain with PCA while NPO.  Agree with current dosing.    Anticipate transfer out of ICU today to general medical care.  We will follow along.   and   2)Multimodal Medication Therapy  Topical: consider  NSAID'S: avoid  Adjuvants: gabapentin 300 mg bid and 600 mg every hs on hold NPO  Antidepressants/anxiolytics:Remeron on hold, zyprexa prn   Opioids: none  IV Pain medication:  Hydromorphone PCA 0.2 mg  every hour with additional 0.2 mg every 10 min prn   3)Non-medication interventions  Acupuncture consult- as available Mon and Thursday    Integrative consult - consider  4)Constipation Prophylaxis   per surgery  5) Follow up   -Opioid prescriber has been none  -Discharge Recommendations - We recommend prescribing the following at the time of discharge: TBD            Principal Problem:    SBO (small bowel obstruction) (H)  Active Problems:    Hypertension goal BP (blood pressure) < 140/90    Benign prostatic hyperplasia with weak urinary stream    RAVI (generalized anxiety disorder)    Moderate major depression (H)    History of substance abuse (H)    History of hepatitis C    Chronic neck pain    Chronic bilateral low back pain without sciatica     LOS: 10 days       Subjective:  Patient reports pain is minimal pain complaints at time of visit.  Ongoing PCA use continuous and demand.      [Held by provider] busPIRone  15 mg Oral BID     enoxaparin ANTICOAGULANT  40 mg Subcutaneous Q24H     [Held by provider] furosemide  40 mg Intravenous Daily     [Held by provider] gabapentin  300 mg Oral BID w/meals     levothyroxine  100 mcg Intravenous Weekly     lipids  250 mL Intravenous Once per day on Tue Thu Sat     meropenem  1 g Intravenous Q8H     [Held by provider] mirtazapine  30 mg Oral At Bedtime     pantoprazole (PROTONIX) IV  40 mg Intravenous Daily with breakfast     senna-docusate  1 tablet Oral BID     sodium chloride (PF)  10-40 mL Intracatheter Q7 Days     sodium chloride (PF)  3 mL Intracatheter Q8H     sodium chloride (PF)  3 mL Intracatheter Q8H     [Held by provider] tamsulosin  0.4 mg Oral Daily     vancomycin  1,250 mg Intravenous Q12H     [Held by provider] venlafaxine  37.5 mg Oral Daily       Objective:  Vital signs in last 24 hours:  Temp:  [97.1  F (36.2  C)-99.9  F (37.7  C)] 98.8  F (37.1  C)  Pulse:  [82-93] 87  Resp:  [15-22] 20  BP: ()/(54-84) 110/65  SpO2:  [91 %-95 %] 91 %  Weight:    Weight change:   Body mass index is 25.64 kg/m .    Intake/Output last 3 shifts:  I/O last 3 completed shifts:  In: 2241.32 [P.O.:60; I.V.:105]  Out: 2532 [Urine:1800; Emesis/NG output:400; Drains:332]  Intake/Output this shift:  No intake/output data recorded.    Review of Systems:   As per subjective, all others negative.    Physical Exam:    General Appearance:  Alert, cooperative, rests in bed, OT and Nursing assistance assisting with ADLs   Head:  Normocephalic, without obvious abnormality, atraumatic   Eyes:  PERRL, conjunctiva/corneas clear, EOM's intact   Nose: Nares normal, septum midline, mucosa normal, no drainage   Throat: Lips, mucosa, and tongue normal; teeth and gums normal   Neck: Supple, symmetrical, trachea midline   Back:   Symmetric, no curvature, ROM normal, no CVA tenderness   Lungs:   respirations unlabored   Chest Wall:  No tenderness or deformity   Heart:  Regular rate and rhythm   Abdomen:   Soft, tender, passing gas, abdominal binder and dressings in place with MARISA drains intact   Extremities: Extremities normal, atraumatic, no cyanosis or edema   Skin: Skin color, texture, turgor normal, no rashes or lesions   Neurologic: Alert and oriented X 3, Moves all 4 extremities          Imaging:  Reviewed        Lab Results:  Personally Reviewed.   Recent Labs   Lab 12/10/21  0456 12/09/21  0654 12/08/21  0535   WBC 13.1* 16.3* 16.2*   HGB 9.5* 10.4* 10.6*   HCT 30.0* 32.3* 32.8*   * 464* 406     Recent Labs   Lab 12/10/21  0456 12/09/21  0654 12/08/21  0536 12/07/21  0358 12/06/21  0620 12/05/21  1048 12/03/21  2021 12/03/21  0727    142 149* 148* 146* 145   < > 142   CO2 25 25 29 31 31 28   < > 24   BUN 18 19 22 20 17 17   < > 17   ALBUMIN  --   --   --  2.3* 2.3* 2.3*  --  2.4*   ALKPHOS  --   --   --  54 44* 46  --  46   ALT  --   --   --  34 20  --   --  21   AST  --   --   --  42* 25 20  --  23    < > = values in this interval not displayed.     Recent Labs   Lab 12/09/21  7737  12/06/21  0619   INR 1.10 1.11         Total time spent 25 minutes with greater than 50% in consultation, education and coordination of care.     Also discussed with RN    Melanie GUZMAN, CNS-BC, DNP  Acute Care Pain Management Program  [facility] ([place] C[place], [place])   With questions call [phone]  Preference if for [facility]  Click HERE to page Denia

## 2021-12-10 NOTE — PLAN OF CARE
Problem: Pain Acute  Goal: Acceptable Pain Control and Functional Ability  Intervention: Develop Pain Management Plan  Recent Flowsheet Documentation  Taken 12/10/2021 0455 by Alea Moss, RN  Pain Management Interventions: (used PCA) --  Taken 12/10/2021 0035 by Alea Moss, RN  Pain Management Interventions: (PCA)   medication (see MAR)   cold applied   emotional support   repositioned   PCA encouraged when pain is reported.    Pt reports that discomfort is in is left abdominal.  Abdominal binder in place.  Ice pack applied and pt reported helpful.    At 0400 pt became irritable and confused stating that he wants to get ice cream and he needs food, redirected to surgery and TPN nutrition.  Pt upset about not walking with PT.  PRN haldol given as pt only redirectable for short period of time.    Alea Moss, RN

## 2021-12-10 NOTE — PROGRESS NOTES
"CLINICAL NUTRITION SERVICES - REASSESSMENT NOTE     Nutrition Prescription    RECOMMENDATIONS FOR MDs/PROVIDERS TO ORDER:  Continue same TPN/lipids    Malnutrition Status:    Moderate    Recommendations already ordered by Registered Dietitian (RD):  D 310  @ 85 ml/hr plus 250 ml of 20% lipids 3 times per week    Future/Additional Recommendations:  Follow for diet advance and TPN needs     EVALUATION OF THE PROGRESS TOWARD GOALS   Diet: NPO  Nutrition Support: TPN: D 310  @ 85 ml/hr plus 250 ml of 20% lipids 3 times per week over 12 hrs providin Calories, 100 g protein, 310 g CHO, average 21 g fat and 2040 ml fluid (CHO load=3.06 mg CHO/Kg/min)     ANTHROPOMETRICS  Height: 165.6 cm (5' 5.2\")  Most Recent Weight: 70.3 kg (155 lb)    Admission weight: 175 lb (21)  Weight History:   Wt Readings from Last 10 Encounters:   21 70.3 kg (155 lb)   20 79.4 kg (175 lb)   19 81.2 kg (179 lb)     PHYSICAL FINDINGS  See malnutrition section below.     GI CONCERNS  Abdomen rounded/distended  Faint, hypoactive BS  Abdominal discomfort  Last BM 2021  NG to LIS    LABS  Reviewed: Na 141, K 4.4, Mg 2.2, phos 2.9, Glu 108    Estimated nutrition needs:  Assessment weight is 66.2 Kg    Estimated energy needs: 8704-0577 Matt/day (25-30 Matt/Kg)  Justification: post op  Estimated protein needs: 80-99 g/day (1.2-1.5 g/Kg)  Justification: post op  Estimated fluid needs 1985 ml/day (30 ml/Kg)  Justification: maintenance    MEDICATIONS  Reviewed: levothyroxine, merrem, pantoprazole, senna-docusate, vancocin    MALNUTRITION:  % Weight Loss:  20 lb weight loss noted-unsure of time frame-may be greater than 1 year  % Intake:  </= 50% for >/= 5 days (severe malnutrition)  Subcutaneous Fat Loss:  Orbital region moderate depletion  Fluid Retention:  None noted    Malnutrition Diagnosis: Moderate malnutrition  In Context of:  Acute illness or injury    CURRENT NUTRITION DIAGNOSIS  Malnutrition related to " acute illness as evidenced by NPO x 5 days and moderate fat loss      INTERVENTIONS  Implementation    Continue same TPN/lipids today    Goals  Tolerate TPN-met  Advance to po diet when appropriate-not met  BG -met    Monitoring/Evaluation  Progress toward goals will be monitored and evaluated per protocol.

## 2021-12-11 ENCOUNTER — APPOINTMENT (OUTPATIENT)
Dept: PHYSICAL THERAPY | Facility: HOSPITAL | Age: 66
DRG: 329 | End: 2021-12-11
Payer: MEDICARE

## 2021-12-11 LAB
ANION GAP SERPL CALCULATED.3IONS-SCNC: 6 MMOL/L (ref 5–18)
BUN SERPL-MCNC: 18 MG/DL (ref 8–22)
CALCIUM SERPL-MCNC: 8.5 MG/DL (ref 8.5–10.5)
CHLORIDE BLD-SCNC: 107 MMOL/L (ref 98–107)
CO2 SERPL-SCNC: 27 MMOL/L (ref 22–31)
CREAT SERPL-MCNC: 0.7 MG/DL (ref 0.7–1.3)
ERYTHROCYTE [DISTWIDTH] IN BLOOD BY AUTOMATED COUNT: 14.8 % (ref 10–15)
GFR SERPL CREATININE-BSD FRML MDRD: >90 ML/MIN/1.73M2
GLUCOSE BLD-MCNC: 106 MG/DL (ref 70–125)
GLUCOSE BLDC GLUCOMTR-MCNC: 118 MG/DL (ref 70–99)
HCT VFR BLD AUTO: 30.9 % (ref 40–53)
HGB BLD-MCNC: 9.8 G/DL (ref 13.3–17.7)
MAGNESIUM SERPL-MCNC: 2.3 MG/DL (ref 1.8–2.6)
MCH RBC QN AUTO: 29.3 PG (ref 26.5–33)
MCHC RBC AUTO-ENTMCNC: 31.7 G/DL (ref 31.5–36.5)
MCV RBC AUTO: 92 FL (ref 78–100)
PHOSPHATE SERPL-MCNC: 3 MG/DL (ref 2.5–4.5)
PLATELET # BLD AUTO: 605 10E3/UL (ref 150–450)
POTASSIUM BLD-SCNC: 4.6 MMOL/L (ref 3.5–5)
RBC # BLD AUTO: 3.35 10E6/UL (ref 4.4–5.9)
SODIUM SERPL-SCNC: 140 MMOL/L (ref 136–145)
WBC # BLD AUTO: 12.5 10E3/UL (ref 4–11)

## 2021-12-11 PROCEDURE — 250N000013 HC RX MED GY IP 250 OP 250 PS 637: Performed by: INTERNAL MEDICINE

## 2021-12-11 PROCEDURE — 250N000011 HC RX IP 250 OP 636: Performed by: INTERNAL MEDICINE

## 2021-12-11 PROCEDURE — 258N000003 HC RX IP 258 OP 636: Performed by: FAMILY MEDICINE

## 2021-12-11 PROCEDURE — 85014 HEMATOCRIT: CPT | Performed by: INTERNAL MEDICINE

## 2021-12-11 PROCEDURE — 99024 POSTOP FOLLOW-UP VISIT: CPT | Performed by: SURGERY

## 2021-12-11 PROCEDURE — 83735 ASSAY OF MAGNESIUM: CPT | Performed by: SPECIALIST

## 2021-12-11 PROCEDURE — 97110 THERAPEUTIC EXERCISES: CPT | Mod: GP

## 2021-12-11 PROCEDURE — 97530 THERAPEUTIC ACTIVITIES: CPT | Mod: GP

## 2021-12-11 PROCEDURE — 250N000009 HC RX 250: Performed by: SPECIALIST

## 2021-12-11 PROCEDURE — 250N000009 HC RX 250: Performed by: INTERNAL MEDICINE

## 2021-12-11 PROCEDURE — 250N000011 HC RX IP 250 OP 636: Performed by: FAMILY MEDICINE

## 2021-12-11 PROCEDURE — 82310 ASSAY OF CALCIUM: CPT | Performed by: INTERNAL MEDICINE

## 2021-12-11 PROCEDURE — C9113 INJ PANTOPRAZOLE SODIUM, VIA: HCPCS | Performed by: INTERNAL MEDICINE

## 2021-12-11 PROCEDURE — 84100 ASSAY OF PHOSPHORUS: CPT | Performed by: SPECIALIST

## 2021-12-11 PROCEDURE — 120N000001 HC R&B MED SURG/OB

## 2021-12-11 PROCEDURE — 99232 SBSQ HOSP IP/OBS MODERATE 35: CPT | Mod: GC | Performed by: STUDENT IN AN ORGANIZED HEALTH CARE EDUCATION/TRAINING PROGRAM

## 2021-12-11 RX ADMIN — MEROPENEM 1 G: 1 INJECTION, POWDER, FOR SOLUTION INTRAVENOUS at 00:36

## 2021-12-11 RX ADMIN — I.V. FAT EMULSION 250 ML: 20 EMULSION INTRAVENOUS at 19:35

## 2021-12-11 RX ADMIN — PANTOPRAZOLE SODIUM 40 MG: 40 INJECTION, POWDER, FOR SOLUTION INTRAVENOUS at 08:06

## 2021-12-11 RX ADMIN — VANCOMYCIN HYDROCHLORIDE 1250 MG: 5 INJECTION, POWDER, LYOPHILIZED, FOR SOLUTION INTRAVENOUS at 22:16

## 2021-12-11 RX ADMIN — POTASSIUM CHLORIDE: 2 INJECTION, SOLUTION, CONCENTRATE INTRAVENOUS at 19:35

## 2021-12-11 RX ADMIN — OLANZAPINE 5 MG: 5 TABLET, ORALLY DISINTEGRATING ORAL at 18:47

## 2021-12-11 RX ADMIN — MEROPENEM 1 G: 1 INJECTION, POWDER, FOR SOLUTION INTRAVENOUS at 16:29

## 2021-12-11 RX ADMIN — MEROPENEM 1 G: 1 INJECTION, POWDER, FOR SOLUTION INTRAVENOUS at 08:06

## 2021-12-11 RX ADMIN — Medication: at 16:07

## 2021-12-11 RX ADMIN — ENOXAPARIN SODIUM 40 MG: 40 INJECTION SUBCUTANEOUS at 09:48

## 2021-12-11 RX ADMIN — VANCOMYCIN HYDROCHLORIDE 1250 MG: 5 INJECTION, POWDER, LYOPHILIZED, FOR SOLUTION INTRAVENOUS at 09:48

## 2021-12-11 ASSESSMENT — ACTIVITIES OF DAILY LIVING (ADL)
ADLS_ACUITY_SCORE: 16

## 2021-12-11 NOTE — PHARMACY-CONSULT NOTE
"Pharmacy Note: Parenteral Nutrition (PN) Management    Pharmacist consulted to dose PN for Gurdeep Dia, a 66 year old male by Dr. Clinton.    Subjective:    The patient is a new PN start.     The patient was started on PN in the hospital on 12/5/21.     Indication for PN therapy: bowel resection     Inadequate nutrition anticipated for > 7 days.      Enteral nutrition contraindicated due to: bowel integrity is tenuous.     Pertinent diseases and other special considerations respiratory failure    Social History     Tobacco Use     Smoking status: Current Every Day Smoker     Smokeless tobacco: Never Used   Substance Use Topics     Alcohol use: Not Currently     Comment: Clean for 5 years     Drug use: Not Currently     Objective:    Ht Readings from Last 1 Encounters:   11/30/21 1.656 m (5' 5.2\")     Wt Readings from Last 1 Encounters:   12/06/21 70.3 kg (155 lb)       Body mass index is 25.64 kg/m .    No data found.    Labs:  Last 3 days:  Recent Labs     12/06/21  0619 12/06/21  0620 12/06/21  0620 12/06/21  0947 12/06/21  1428 12/06/21  1921 12/07/21  0358 12/07/21  1128 12/07/21  1443 12/08/21  0535 12/08/21  0536   NA  --  146*  --   --   --   --  148*  --   --   --  149*   POTASSIUM  --  3.0*  --   --  3.6 5.8* 3.2* 5.7* 3.8  --  3.7   CHLORIDE  --  106  --   --   --   --  106  --   --   --  113*   CO2  --  31  --   --   --   --  31  --   --   --  29   BUN  --  17  --   --   --   --  20  --   --   --  22   CR  --  0.82  --   --   --   --  0.79  --   --   --  0.75   VIJAYA  --  8.4*  --   --   --   --  8.4*  --   --   --  8.3*   [id number]  --  1.12  --   --   --   --   --   --   --   --   --    MAG  --  2.1  --   --   --   --  2.2  --   --   --  2.1   PHOS  --  2.9  --   --   --   --  3.2  --   --   --  2.7   PROTTOTAL  --  5.9*  --   --   --   --  5.9*  --   --   --   --    ALBUMIN  --  2.3*  --   --   --   --  2.3*  --   --   --   --    PREALB  --  6*  --   --   --   --   --   --   --   --   --    TRIG  --  " 2 RN Skin Check    Red, bleeding blister to right neck circa trach suture. Suture dc'd per MD Megan's order. Light red bruising to RUE and massive bruising to LUE. Right posterior forearm excoriated. Mepilex continued. Right hand very swollen. Elevated on pillows. Inflamed, but not bleeding hemorrhoid to perianal. Open wound with yellow-red wound bed. Dressing continued. Right ankle discolored.   217*  --   --   --   --   --   --   --   --   --    HGB  --   --   --  11.2*  --   --  10.9*  --   --  10.6*  --    HCT  --   --   --  34.2*  --   --  33.8*  --   --  32.8*  --    PLT  --   --   --  318  --   --  332  --   --  406  --    BILITOTAL  --  0.4  --   --   --   --  0.6  --   --   --   --    AST  --  25  --   --   --   --  42*  --   --   --   --    ALT  --  20  --   --   --   --  34  --   --   --   --    ALKPHOS  --  44*   < >  --   --   --  54  --   --   --   --    INR 1.11  --   --   --   --   --   --   --   --   --   --     < > = values in this interval not displayed.       Glucose (past 48 hours):   Recent Labs     12/06/21  1951 12/07/21  0157 12/07/21  0358 12/07/21  0816 12/07/21  1220 12/08/21  0216 12/08/21  0536   * 133* 134* 143* 121* 141* 121       Intake/Output (last 24 hours): I/O last 3 completed shifts:  In: 46 [I.V.:46]  Out: 2825 [Urine:2125; Emesis/NG output:325; Drains:375]    Estimated CrCl: Estimated Creatinine Clearance: 101.8 mL/min (based on SCr of 0.7 mg/dL).    Assessment:    Continue patient on PN therapy as a continuous central therapy.     Given the patient's current condition/oral intake, PN is still indicated.    Lab results reviewed:   12/9: all electrolytes in goal range, no changes today  12/10: all electrolytes in goal range, no changes today  12/11: all electrolytes in goal range, no changes today    Plan:  1. Rate of PN: 85 mL/hr.  2. Formula:     Amino Acids 100 grams    Dextrose 310 grams    Sodium 40 mEq/day    Potassium 90 mEq/day    Calcium 8 mEq/day    Magnesium 14 mEq/day    Phosphorus 28 mMol/day    Chloride: Acetate Ratio 1:1    Standard Multivitamins w/Vitamin K    Trace Elements  3. Fat Emulsion: 20%, 250 mL IV three times weekly.  4. Check BMP tomorrow  5. Pharmacist will continue to follow the patient's lab results, clinical status and blood glucose results and make adjustments as appropriate.    Thank you for the consult.  Taryn Pinzon, PharmD    12/9/2021 10:51 AM

## 2021-12-11 NOTE — PROGRESS NOTES
"CLINICAL NUTRITION SERVICES - REASSESSMENT NOTE     Nutrition Prescription    RECOMMENDATIONS FOR MDs/PROVIDERS TO ORDER:  Continue same TPN/lipids    Malnutrition Status:    Moderate    Recommendations already ordered by Registered Dietitian (RD):  D 310  @ 85 ml/hr plus 250 ml of 20% lipids 3 times per week    Future/Additional Recommendations:  Follow for diet advance and TPN needs     EVALUATION OF THE PROGRESS TOWARD GOALS   Diet: NPO  Nutrition Support: TPN: D 310  @ 85 ml/hr plus 250 ml of 20% lipids 3 times per week over 12 hrs providin Calories, 100 g protein, 310 g CHO, average 21 g fat and 2040 ml fluid (CHO load=3.06 mg CHO/Kg/min)     ANTHROPOMETRICS  Height: 165.6 cm (5' 5.2\")  Most Recent Weight: 70.3 kg (155 lb)   no new wt - pt has daily weights ordered several times  Admission weight: 175 lb (21)  Weight History:   Wt Readings from Last 10 Encounters:   21 70.3 kg (155 lb)   20 79.4 kg (175 lb)   19 81.2 kg (179 lb)     PHYSICAL FINDINGS  See malnutrition section below.     GI CONCERNS  Abdomen rounded/distended  Faint, hypoactive BS  Abdominal discomfort  Last BM 2021  NG to  mL out last 24 hours  Drains 375 mL out last 24 hours    LABS  Reviewed: Na 140, K 4.6, Mg 2.3, phos 3.0, Glu 108 - all wdl  FSBG 118    Estimated nutrition needs:  Assessment weight is 66.2 Kg    Estimated energy needs: 2845-0998 Matt/day (25-30 Matt/Kg)  Justification: post op  Estimated protein needs: 80-99 g/day (1.2-1.5 g/Kg)  Justification: post op  Estimated fluid needs 1985 ml/day (30 ml/Kg)  Justification: maintenance    MEDICATIONS  Reviewed: levothyroxine, merrem, pantoprazole, senna-docusate, vancocin    MALNUTRITION:  % Weight Loss:  20 lb weight loss noted-unsure of time frame-may be greater than 1 year  % Intake:  </= 50% for >/= 5 days (severe malnutrition)  Subcutaneous Fat Loss:  Orbital region moderate depletion  Fluid Retention:  None " noted    Malnutrition Diagnosis: Moderate malnutrition  In Context of:  Acute illness or injury    CURRENT NUTRITION DIAGNOSIS  Malnutrition related to acute illness as evidenced by NPO x 5 days and moderate fat loss      INTERVENTIONS  Implementation    Continue same TPN/lipids today    Will leave sticky note reminder to nursing to please weight pt    Goals  Tolerate TPN-met  Advance to po diet when appropriate-not met  BG -met    Monitoring/Evaluation  Progress toward goals will be monitored and evaluated per protocol.

## 2021-12-11 NOTE — PLAN OF CARE
Problem: Pain (Surgery Nonspecified)  Goal: Acceptable Pain Control  Outcome: No Change  Intervention: Prevent or Manage Pain  Recent Flowsheet Documentation  Taken 12/10/2021 1732 by Cindy Brizuela, RN  Pain Management Interventions: (PCA pump) medication (see MAR)     Problem: Bleeding (Surgery Nonspecified)  Goal: Absence of Bleeding  Outcome: No Change     A/Ox4. VSS on 4L O2 NC. C/o pain, has PCA pump. NPO ex ice chips. PICC infusing TPN and int abx. PICC dressing changed this shift. JPx4. Midline incision with drainage, WOC placed pouch, CDI with green output. Abdominal binder on. Flushed IR drain per IR recommendations. Using urinal at bedside. Nursing to continue to monitor.

## 2021-12-11 NOTE — PLAN OF CARE
Problem: Adult Inpatient Plan of Care  Goal: Optimal Comfort and Wellbeing  Outcome: Improving     Problem: Pain Acute  Goal: Acceptable Pain Control and Functional Ability  Outcome: Improving   Pain managed by PCA pump. Abdo binder on, midline incision intact with little drainage. NG tube and NPO with ice chips. Urinal at bedside, call light within reach.

## 2021-12-11 NOTE — PROGRESS NOTES
[facility] Progress Note    Assessment/Plan  Principal Problem:    SBO (small bowel obstruction) (H)  Active Problems:    Hypertension goal BP (blood pressure) < 140/90    Benign prostatic hyperplasia with weak urinary stream    RAVI (generalized anxiety disorder)    Moderate major depression (H)    History of substance abuse (H)    History of hepatitis C    Chronic neck pain    Chronic bilateral low back pain without sciatica    Gurdeep Dia is a 66 year old male with history of splenectomy, appendectomy, HTN, and substance abuse who presented with 2 days of right sided abdominal pain, found to have a bowel obstruction with closed loop. He is admitted for surgical repair of obstruction.      Requires ongoing hospitalization for post-operative monitoring and care       Mechanical SBO s/p exploratory laparotomy / small bowel resection / repair of enterotomy / extensive lysis of adhesions (11/30/21)  Subsequent small bowel perforation requiring repeat ex lap for washout 12/3    CT abdomen/pelvis on admission 11/30 significant for closed loop mechanical small bowel obstruction. Does have history of multiple abdominal surgeries previously. First procedure 11/30 for small bowel resection. Acute change on 12/2-12/3 and found to have intraabdominal drainage from 12/3/2021; underwent repeat exploratory laparotomy with washout and repair of small bowel perforation. 12/8 still without stool charted, still spiking low grade fevers, having atypical output from MARISA drain, white count climbing. CT 12/8 showing ring enhancing lesion in abdomen likely representing abscess. Fourth drain placed 12/9 AM by IR for abscess drainage.  -Surgery consulted and following   -NPO for now until cleared by gen surg   -Pain control as per surgery/Pain team (PCA, tyl) -- need to balance with encephalopathy below   -IV meropenem + vanc -- follow cultures   -IV Protonix     Acute encephalopathy, likely multifactorial -  toxic/metabolic/hospital-induced   Acutely agitated post-op 12/3 to 12/4. Pulling at lines and threatening to leave.  Nurse felt unsafe without restraints. Precedex drip started, now off. Transferred out of the unit. Has been calm since back on the floor.  - Soft limb restraint x 2 - adjust as needed  - Precedex gtt stop 12/7 morning  - PRN Haldol injection  - Zyprexa ODT 4x daily prn  - Conservative measures as able   - Avoid opioid/benzo if able     Acute hypoxic respiratory failure, post-op   Atelectasis vs aspiration  Initially requiring high-flow oxygen in ICU following procedure.  Question post-op atelectasis vs aspiration versus volume overload.  Does have a degree of respiratory distress when he gets agitated. CXR 12/6 showing bibasilar opacitied favored to reflect atelectasis, though still with some interstitial coarsening. Breathing improved 12/7 after some antibiotics and maybe ongoing resolution of his atelectasis. No signs of fluid overload and sodium climbing so IV lasix discontinued 12/8.  -Continue nasal cannula  -Wean as able  -Continue abx as above, don't think we need to broaden from a lung perspective  -Aggressive IS use  -PT/OT to get moving  -Discontinue IV lasix; hold home dose for now given sodium    Hypernatremia - resolved  In setting of excessive diuresis. Asymptomatic. Gave some fluid back 12/8.  -Discontinue IV lasix; hold home dose for now    Afib, RVR post-op, currently rate controlled   NSVT episode 12/8   Noted.  Rates had been in the 120 to 150 range.  Converted to NSR with amiodarone drip. No longer on any rate control. Both CHADS2-Vasc and HAS-BLED of at least 2.  Would likely warrant anticoagulation on ongoing basis. TSH normal. Lytes normal. Echo 12/10 showing slight concentric thickening in LV and EF 60-65%.  -Lovenox for now   -Consider ongoing anticoagulation once encephalopathy is improved    HTN  PTA lisinopril, furosemide being held in the setting of acute surgical care.   Will monitor, even with pain BP not significantly elevated.  Will resume when hemodynamically stable.  Kidney function is stable at this time.  Blood pressure does climb when he is agitated.  -Monitor     Moderate malnutrition  Per RD eval.  -TPN until cleared to [facility] per surgery    Tremor - resolved  New on exam 12/2. Bilateral. Doesn't fit well with either essential tremor or parkinsonian. Almost resembles asterixis. LFTs and kidney function normal though. Ammonia normal. Improved 12/3.  -Monitor      Chronic Conditions:  Hypothyroidism- PTA levothyroxine  Depression/anxiety- PTA venlafaxine, mirtazepine, Buspar   Chronic pain- Hold mexolicam 7.5mg daily, resume PTA gabapentin when tolerating PO.  BPH- PTA flomax      Diet: NPO for Medical/Clinical Reasons Except for: Meds, Ice ChipsNPO   DVT Prophylaxis: Lovenox q day    Roblero Catheter: placed   Fluids:  None  Central Lines: None  Code Status: Full Code    Subjective  Wants to eat today of course. Just wants cyndy aid. Pain much improved. States he's passing gas and could poop if he wanted to but just doesn't have anything in his stomach.     Objective    Vital signs in last 24 hours Temp:  [98  F (36.7  C)-98.8  F (37.1  C)] 98  F (36.7  C)  Pulse:  [82-88] 83  Resp:  [16-20] 18  BP: (111-126)/(68-82) 126/82  SpO2:  [90 %-95 %] 91 %       Intake/Output last 3 shift I/O last 3 completed shifts:  In: 46 [I.V.:46]  Out: 2825 [Urine:2125; Emesis/NG output:325; Drains:375]    Intake/Output this shift:No intake/output data recorded.    Physical Exam  General appearance: uncomfortable appearing male, laying in bed, two officers at bedside.  Acutely agitated.  Head: Normocephalic, without obvious abnormality, atraumatic.  NG tube in place.    Throat: moist mucous membranes.  Lungs: Tachypneic.  High flow oxygen in place.  Coarse lung sounds in the bases.  Heart: regular rate and rhythm, S1, S2 normal, no murmur, click, rub or gallop  Abdomen: Abdominal binding in place.   Multiple MARISA drains in place with bloody drainage. bowel sounds very present. Moderate TTP throughout.   Extremities: extremities normal, atraumatic, no cyanosis or edema  Skin: Slightly pale, dry skin.  Neurologic: Alert and oriented X 2.  Able to move extremities.  Sensation intact.   Psychiatric: Exceedingly anxious and agitated.     Pertinent Labs and Pertinent Radiology   Lab Results: personally reviewed.     Radiology Results: Personally reviewed image/s and impression/s    Precepted patient with Dr. Case Mcmahon MD - PGY2  [facility] [place] [facility]  P: [phone]

## 2021-12-11 NOTE — PROGRESS NOTES
[facility] ACUTE PAIN SERVICE    ([facility], [facility], [facility])   Daily PAIN Progress Note    Assessment/Plan:  Gurdeep Dia is a 66 year old male who was admitted on 11/30/2021.  7 Days Post-Op exploratory lap, washout from bowel perforation, previous 11/30/21 exploratory lap small halima resection due to SBO.   Pain Service is asked to see patient for post operative pain management, currently on Hydromorphone PCA.    Patient with history of BPH, hypertension, moderate major depression, chronic pain multiple previous abdominal surgeries including splenectomy, chronic neck and back pain, polysubstance abuse. Patient is currently incarcerated with release date 12/14/21.  Plan for dismissal from hospital will be to TCU.      From 6am - 10pm, patient utilized 4.4+4.8mg of IV Dilaudid = 184MME in 16 hours which would be about 276MME / 24 hours. This is reduced from previous 24 hour MME yesterday = 336 mg.  (no documentation since 10pm last elliott).    Reviewed with RN.  Patient with considerable pain with dressing changes especially.  Getting good pain control with the IV PCA as currently available.    Will continue for now and continue to assess when we could add oral pain medication and then perhaps stopping the continuous infusion with PCA dose adjustments.          PLAN:   1) Pain is consistent with acute post operative pain with recent bowel obstruction/bowel perforation, status post repair.  Patient with multiple previous bowel surgeries with scar tissue.  Patient currently NPO, has NG tube in place.    Agree with managing pain with PCA while NPO.  Agree with current dosing.    Anticipate transfer out of ICU today to general medical care.  We will follow along.   and   2)Multimodal Medication Therapy  Topical: consider  NSAID'S: avoid  Adjuvants: gabapentin 300 mg bid and 600 mg every hs on hold NPO  Antidepressants/anxiolytics:Remeron on hold, zyprexa prn   Opioids: none  IV Pain  medication:  Hydromorphone PCA 0.2 mg every hour with additional 0.2 mg every 10 min prn   3)Non-medication interventions  Acupuncture consult- as available Mon and Thursday    Integrative consult - consider  4)Constipation Prophylaxis   per surgery  5) Follow up   -Opioid prescriber has been none  -Discharge Recommendations - We recommend prescribing the following at the time of discharge: TBD      Principal Problem:    SBO (small bowel obstruction) (H)  Active Problems:    Hypertension goal BP (blood pressure) < 140/90    Benign prostatic hyperplasia with weak urinary stream    RAVI (generalized anxiety disorder)    Moderate major depression (H)    History of substance abuse (H)    History of hepatitis C    Chronic neck pain    Chronic bilateral low back pain without sciatica     LOS: 11 days             [Held by provider] busPIRone  15 mg Oral BID     enoxaparin ANTICOAGULANT  40 mg Subcutaneous Q24H     [Held by provider] furosemide  40 mg Intravenous Daily     [Held by provider] gabapentin  300 mg Oral BID w/meals     levothyroxine  100 mcg Intravenous Weekly     lipids  250 mL Intravenous Once per day on Tue Thu Sat     meropenem  1 g Intravenous Q8H     [Held by provider] mirtazapine  30 mg Oral At Bedtime     pantoprazole (PROTONIX) IV  40 mg Intravenous Daily with breakfast     senna-docusate  1 tablet Oral BID     sodium chloride (PF)  10-40 mL Intracatheter Q7 Days     sodium chloride (PF)  3 mL Intracatheter Q8H     sodium chloride (PF)  3 mL Intracatheter Q8H     [Held by provider] tamsulosin  0.4 mg Oral Daily     vancomycin  1,250 mg Intravenous Q12H     [Held by provider] venlafaxine  37.5 mg Oral Daily       Objective:  Vital signs in last 24 hours:  Temp:  [98.2  F (36.8  C)-98.8  F (37.1  C)] 98.2  F (36.8  C)  Pulse:  [82-88] 82  Resp:  [16-20] 20  BP: (100-126)/(63-82) 117/68  SpO2:  [90 %-95 %] 95 %  Weight:   Weight change:   Body mass index is 25.64 kg/m .    Intake/Output last 3 shifts:  I/O  last 3 completed shifts:  In: 46 [I.V.:46]  Out: 2825 [Urine:2125; Emesis/NG output:325; Drains:375]  Intake/Output this shift:  No intake/output data recorded.    Review of Systems:   As per subjective, all others negative.    Physical Exam:    General Appearance:  Rests in bed, appears comfortable, limited exam           Imaging:  Reviewed      Lab Results:  Personally Reviewed.   Recent Labs   Lab 12/11/21  0610 12/10/21  0456 12/09/21  0654   WBC 12.5* 13.1* 16.3*   HGB 9.8* 9.5* 10.4*   HCT 30.9* 30.0* 32.3*   * 492* 464*     Recent Labs   Lab 12/11/21  0610 12/10/21  0456 12/09/21  0654 12/08/21  0536 12/07/21  0358 12/06/21  0620 12/05/21  1048    141 142   < > 148* 146* 145   CO2 27 25 25   < > 31 31 28   BUN 18 18 19   < > 20 17 17   ALBUMIN  --   --   --   --  2.3* 2.3* 2.3*   ALKPHOS  --   --   --   --  54 44* 46   ALT  --   --   --   --  34 20  --    AST  --   --   --   --  42* 25 20    < > = values in this interval not displayed.     Recent Labs   Lab 12/09/21  0841 12/06/21  0619   INR 1.10 1.11         Pain APRN review of chart and Prolonged evaluation and management service before and/or after direct patient care    Medical necessity due to complex pain history, chart review, and pain treatment planning with RN.   Content of the non-face-to-face  time spent included: chart review, discussion with Acute Inpatient Pain Pharmacist, ,medication and lab review  Face to face on 12/12/21    Total time: 17 minutes spent in review of patient chart.       Melanie Porter APRN, CNS-BC, DNP  Acute Care Pain Management Program  [facility] ([place], [place], [place])   With questions call [phone]  Preference if for [employer] Paging - [place]  Click HERE to page [place]

## 2021-12-11 NOTE — PLAN OF CARE
Problem: Bowel Motility Impaired (Surgery Nonspecified)  Goal: Effective Bowel Elimination  Outcome: Improving     Problem: Pain (Surgery Nonspecified)  Goal: Acceptable Pain Control  Outcome: Improving     Problem: Postoperative Urinary Retention (Surgery Nonspecified)  Goal: Effective Urinary Elimination  Outcome: Improving   Pain is well managed with PCA dilaudid, NG tube in place and tolerated, turns and repositions self in bed, makes needs known appropriately, abdominal incision intact with staples and use of abdominal binder. Vital signs stable, voiding freely using urinal with adequate out put. Will continue to monitor.    Augusto Hines RN

## 2021-12-11 NOTE — PROGRESS NOTES
ASSESSMENT:  1. SBO (small bowel obstruction) (H)    2. Pelvic abscess in male (H)        Gurdeep Dia is a 66 year old male who is s/p exploratory laparotomy with small bowel resection, lysis of adhesions and repair of enterotomy with significant inflamed small bowel with areas of thinned mucosa on 11/30/2021  S/p exploratory laparotomy 12/3/2021 for small bowel perforation from ulcer  S/p IR placement of drain into pelvic abscess 12/9/2021  Enterocutaneous fistula   No big changes anticipated, this will take some time to seal down      PLAN:  -Continue IV antibiotics.  -Continue n.p.o. and okay ice chips/meds only  -PT and OT  -Encourage incentive spirometry  -Drains to remain for quite some time  -Appreciate IR placement of pelvic abscess drain  -Continue TPN    SUBJECTIVE:   No significant changes overnight.  Wound manager working better to control and contain the midline succus drainage.       Patient Vitals for the past 24 hrs:   BP Temp Temp src Pulse Resp SpO2   12/11/21 0909 126/82 98  F (36.7  C) Oral 83 18 91 %   12/11/21 0500 117/68 -- -- 82 20 95 %   12/11/21 0027 122/69 98.2  F (36.8  C) Oral 88 16 94 %   12/10/21 1732 -- -- -- -- 16 90 %   12/10/21 1538 111/68 98.8  F (37.1  C) Oral 85 16 93 %   12/10/21 1401 126/82 98.6  F (37  C) Oral 82 16 93 %         PHYSICAL EXAM:  GEN: No acute distress, comfortable    ABD: Mildly distended, proximal midline incision is intact and clean appearing with the colostomy bag containing the drainage from the inferior portion quite well.  Primarily serosanguineous output from his drains.     Output by Drain (mL) 12/09/21 0700 - 12/09/21 1459 12/09/21 1500 - 12/09/21 2259 12/09/21 2300 - 12/10/21 0659 12/10/21 0700 - 12/10/21 1459 12/10/21 1500 - 12/10/21 2259 12/10/21 2300 - 12/11/21 0659 12/11/21 0700 - 12/11/21 1011   Closed/Suction Drain 1 Right Abdomen Bulb 19 [place]   10 0 0 0    Closed/Suction Drain 1 Left LLQ Bulb 19 [place] 10  10 10 0 5    Closed/Suction  Drain 2 Inferior;Midline Abdomen Bulb 15 [place] 115 110 30 50 65 80    Closed/Suction Drain 4 Left;Midline Buttock Bulb 10 [place] 15 10 7 20 0 5    Open Drain Medial;Superior Abdomen vessel loop drain 15 0  100 40        EXT:No cyanosis, edema or obvious abnormalities    12/10 0700 - 12/11 0659  In: 46 [I.V.:46]  Out: 2825 [Urine:2125; Drains:375]    No results displayed because visit has over 200 results.   Recent Results (from the past 24 hour(s))   Echocardiogram Complete    Collection Time: 12/10/21  2:05 PM   Result Value Ref Range    LVEF  60-65%    Glucose by meter    Collection Time: 12/11/21  1:59 AM   Result Value Ref Range    GLUCOSE BY METER POCT 118 (H) 70 - 99 mg/dL   Basic metabolic panel    Collection Time: 12/11/21  6:10 AM   Result Value Ref Range    Sodium 140 136 - 145 mmol/L    Potassium 4.6 3.5 - 5.0 mmol/L    Chloride 107 98 - 107 mmol/L    Carbon Dioxide (CO2) 27 22 - 31 mmol/L    Anion Gap 6 5 - 18 mmol/L    Urea Nitrogen 18 8 - 22 mg/dL    Creatinine 0.70 0.70 - 1.30 mg/dL    Calcium 8.5 8.5 - 10.5 mg/dL    Glucose 106 70 - 125 mg/dL    GFR Estimate >90 >60 mL/min/1.73m2   CBC with platelets    Collection Time: 12/11/21  6:10 AM   Result Value Ref Range    WBC Count 12.5 (H) 4.0 - 11.0 10e3/uL    RBC Count 3.35 (L) 4.40 - 5.90 10e6/uL    Hemoglobin 9.8 (L) 13.3 - 17.7 g/dL    Hematocrit 30.9 (L) 40.0 - 53.0 %    MCV 92 78 - 100 fL    MCH 29.3 26.5 - 33.0 pg    MCHC 31.7 31.5 - 36.5 g/dL    RDW 14.8 10.0 - 15.0 %    Platelet Count 605 (H) 150 - 450 10e3/uL   Magnesium    Collection Time: 12/11/21  6:10 AM   Result Value Ref Range    Magnesium 2.3 1.8 - 2.6 mg/dL   Phosphorus    Collection Time: 12/11/21  6:10 AM   Result Value Ref Range    Phosphorus 3.0 2.5 - 4.5 mg/dL               Barron Marie MD

## 2021-12-12 ENCOUNTER — APPOINTMENT (OUTPATIENT)
Dept: PHYSICAL THERAPY | Facility: HOSPITAL | Age: 66
DRG: 329 | End: 2021-12-12
Payer: MEDICARE

## 2021-12-12 LAB
ANION GAP SERPL CALCULATED.3IONS-SCNC: 5 MMOL/L (ref 5–18)
BACTERIA BLD CULT: NO GROWTH
BACTERIA FLD CULT: ABNORMAL
BUN SERPL-MCNC: 16 MG/DL (ref 8–22)
CALCIUM SERPL-MCNC: 8.2 MG/DL (ref 8.5–10.5)
CHLORIDE BLD-SCNC: 106 MMOL/L (ref 98–107)
CO2 SERPL-SCNC: 26 MMOL/L (ref 22–31)
CREAT SERPL-MCNC: 0.69 MG/DL (ref 0.7–1.3)
ERYTHROCYTE [DISTWIDTH] IN BLOOD BY AUTOMATED COUNT: 14.6 % (ref 10–15)
GFR SERPL CREATININE-BSD FRML MDRD: >90 ML/MIN/1.73M2
GLUCOSE BLD-MCNC: 123 MG/DL (ref 70–125)
GLUCOSE BLDC GLUCOMTR-MCNC: 108 MG/DL (ref 70–99)
GLUCOSE BLDC GLUCOMTR-MCNC: 115 MG/DL (ref 70–99)
GLUCOSE BLDC GLUCOMTR-MCNC: 119 MG/DL (ref 70–99)
GLUCOSE BLDC GLUCOMTR-MCNC: 122 MG/DL (ref 70–99)
HCT VFR BLD AUTO: 29.8 % (ref 40–53)
HGB BLD-MCNC: 9.6 G/DL (ref 13.3–17.7)
MAGNESIUM SERPL-MCNC: 2.2 MG/DL (ref 1.8–2.6)
MCH RBC QN AUTO: 29.6 PG (ref 26.5–33)
MCHC RBC AUTO-ENTMCNC: 32.2 G/DL (ref 31.5–36.5)
MCV RBC AUTO: 92 FL (ref 78–100)
PHOSPHATE SERPL-MCNC: 2.8 MG/DL (ref 2.5–4.5)
PLATELET # BLD AUTO: 659 10E3/UL (ref 150–450)
POTASSIUM BLD-SCNC: 4.4 MMOL/L (ref 3.5–5)
RBC # BLD AUTO: 3.24 10E6/UL (ref 4.4–5.9)
SODIUM SERPL-SCNC: 137 MMOL/L (ref 136–145)
WBC # BLD AUTO: 10.4 10E3/UL (ref 4–11)

## 2021-12-12 PROCEDURE — 83735 ASSAY OF MAGNESIUM: CPT | Performed by: FAMILY MEDICINE

## 2021-12-12 PROCEDURE — 97110 THERAPEUTIC EXERCISES: CPT | Mod: GP

## 2021-12-12 PROCEDURE — 250N000011 HC RX IP 250 OP 636: Performed by: FAMILY MEDICINE

## 2021-12-12 PROCEDURE — 99232 SBSQ HOSP IP/OBS MODERATE 35: CPT | Mod: GC | Performed by: STUDENT IN AN ORGANIZED HEALTH CARE EDUCATION/TRAINING PROGRAM

## 2021-12-12 PROCEDURE — 250N000013 HC RX MED GY IP 250 OP 250 PS 637: Performed by: STUDENT IN AN ORGANIZED HEALTH CARE EDUCATION/TRAINING PROGRAM

## 2021-12-12 PROCEDURE — 99232 SBSQ HOSP IP/OBS MODERATE 35: CPT | Performed by: CLINICAL NURSE SPECIALIST

## 2021-12-12 PROCEDURE — 99024 POSTOP FOLLOW-UP VISIT: CPT | Performed by: SURGERY

## 2021-12-12 PROCEDURE — 120N000001 HC R&B MED SURG/OB

## 2021-12-12 PROCEDURE — 258N000003 HC RX IP 258 OP 636: Performed by: FAMILY MEDICINE

## 2021-12-12 PROCEDURE — 80048 BASIC METABOLIC PNL TOTAL CA: CPT | Performed by: INTERNAL MEDICINE

## 2021-12-12 PROCEDURE — 84100 ASSAY OF PHOSPHORUS: CPT | Performed by: FAMILY MEDICINE

## 2021-12-12 PROCEDURE — C9113 INJ PANTOPRAZOLE SODIUM, VIA: HCPCS | Performed by: INTERNAL MEDICINE

## 2021-12-12 PROCEDURE — 250N000011 HC RX IP 250 OP 636: Performed by: INTERNAL MEDICINE

## 2021-12-12 PROCEDURE — 97530 THERAPEUTIC ACTIVITIES: CPT | Mod: GP

## 2021-12-12 PROCEDURE — 250N000009 HC RX 250: Performed by: SPECIALIST

## 2021-12-12 PROCEDURE — 85014 HEMATOCRIT: CPT | Performed by: INTERNAL MEDICINE

## 2021-12-12 PROCEDURE — 250N000013 HC RX MED GY IP 250 OP 250 PS 637: Performed by: SPECIALIST

## 2021-12-12 PROCEDURE — 97116 GAIT TRAINING THERAPY: CPT | Mod: GP

## 2021-12-12 PROCEDURE — 250N000011 HC RX IP 250 OP 636: Performed by: STUDENT IN AN ORGANIZED HEALTH CARE EDUCATION/TRAINING PROGRAM

## 2021-12-12 RX ORDER — METRONIDAZOLE 500 MG/1
500 TABLET ORAL 3 TIMES DAILY
Status: DISCONTINUED | OUTPATIENT
Start: 2021-12-12 | End: 2021-12-20

## 2021-12-12 RX ADMIN — METRONIDAZOLE 500 MG: 500 TABLET ORAL at 13:59

## 2021-12-12 RX ADMIN — CEFEPIME HYDROCHLORIDE 2 G: 2 INJECTION, POWDER, FOR SOLUTION INTRAVENOUS at 12:10

## 2021-12-12 RX ADMIN — MEROPENEM 1 G: 1 INJECTION, POWDER, FOR SOLUTION INTRAVENOUS at 01:01

## 2021-12-12 RX ADMIN — CEFEPIME HYDROCHLORIDE 2 G: 2 INJECTION, POWDER, FOR SOLUTION INTRAVENOUS at 21:42

## 2021-12-12 RX ADMIN — PANTOPRAZOLE SODIUM 40 MG: 40 INJECTION, POWDER, FOR SOLUTION INTRAVENOUS at 07:54

## 2021-12-12 RX ADMIN — VANCOMYCIN HYDROCHLORIDE 1250 MG: 5 INJECTION, POWDER, LYOPHILIZED, FOR SOLUTION INTRAVENOUS at 09:00

## 2021-12-12 RX ADMIN — POTASSIUM CHLORIDE: 2 INJECTION, SOLUTION, CONCENTRATE INTRAVENOUS at 19:29

## 2021-12-12 RX ADMIN — SENNOSIDES AND DOCUSATE SODIUM 1 TABLET: 50; 8.6 TABLET ORAL at 08:05

## 2021-12-12 RX ADMIN — METRONIDAZOLE 500 MG: 500 TABLET ORAL at 21:39

## 2021-12-12 RX ADMIN — MEROPENEM 1 G: 1 INJECTION, POWDER, FOR SOLUTION INTRAVENOUS at 07:53

## 2021-12-12 RX ADMIN — ENOXAPARIN SODIUM 40 MG: 40 INJECTION SUBCUTANEOUS at 09:36

## 2021-12-12 ASSESSMENT — ACTIVITIES OF DAILY LIVING (ADL)
ADLS_ACUITY_SCORE: 16

## 2021-12-12 NOTE — PROGRESS NOTES
[facility] ACUTE PAIN SERVICE    ([facility], [facility], [facility])   Daily PAIN Progress Note    Assessment/Plan:  Gurdeep Dia is a 66 year old male who was admitted on 11/30/2021.  7 Days Post-Op exploratory lap, washout from bowel perforation, previous 11/30/21 exploratory lap small halima resection due to SBO.   Pain Service is asked to see patient for post operative pain management, currently on Hydromorphone PCA.    Patient with history of BPH, hypertension, moderate major depression, chronic pain multiple previous abdominal surgeries including splenectomy, chronic neck and back pain, polysubstance abuse. Patient is currently incarcerated with release date 12/14/21.  Plan for dismissal from hospital will be to TCU.       In 8 hours overnight, patient has utilized 4.7mg of IV Dilaudid for an approximate 24 hour MME of 282.  Per discussion with RN, review of chart, patient seems to have quite a bit of pain with dressing changes, maneuvering of drains and continues to be NPO will leave the IV hydromorphone PCA as currently available.       PLAN:   1) Pain is consistent with acute post operative pain with recent bowel obstruction/bowel perforation, status post repair, intraabdominal abscess.   Patient with multiple previous bowel surgeries with scar tissue.  Patient currently NPO, has NG tube in place.    Agree with managing pain with PCA while NPO.  Agree with current dosing.    Anticipate transfer out of ICU today to general medical care.  We will follow along.   and   2)Multimodal Medication Therapy  Topical: consider  NSAID'S: avoid  Adjuvants: gabapentin 300 mg bid and 600 mg every hs on hold NPO  Antidepressants/anxiolytics:Remeron on hold, zyprexa prn   Opioids: none  IV Pain medication:  Hydromorphone PCA 0.2 mg every hour with additional 0.2 mg every 10 min prn   3)Non-medication interventions  Acupuncture consult- as available Mon and Thursday    Integrative consult -  "consider  4)Constipation Prophylaxis   per surgery  5) Follow up   -Opioid prescriber has been none  -Discharge Recommendations - We recommend prescribing the following at the time of discharge: TBD     Principal Problem:    SBO (small bowel obstruction) (H)  Active Problems:    Hypertension goal BP (blood pressure) < 140/90    Benign prostatic hyperplasia with weak urinary stream    RAVI (generalized anxiety disorder)    Moderate major depression (H)    History of substance abuse (H)    History of hepatitis C    Chronic neck pain    Chronic bilateral low back pain without sciatica     LOS: 12 days       Subjective:  Patient reports pain is around drains, seems more anxious than painful, talking about wanting to go to a different hospital once he has his release date which will be \"midnight tomorrow\".  Seems to benefit from talking things out.  He is able to recognize that he worries about things needlessly.        [Held by provider] busPIRone  15 mg Oral BID     enoxaparin ANTICOAGULANT  40 mg Subcutaneous Q24H     [Held by provider] furosemide  40 mg Intravenous Daily     [Held by provider] gabapentin  300 mg Oral BID w/meals     levothyroxine  100 mcg Intravenous Weekly     lipids  250 mL Intravenous Once per day on Tue Thu Sat     meropenem  1 g Intravenous Q8H     [Held by provider] mirtazapine  30 mg Oral At Bedtime     pantoprazole (PROTONIX) IV  40 mg Intravenous Daily with breakfast     senna-docusate  1 tablet Oral BID     sodium chloride (PF)  10-40 mL Intracatheter Q7 Days     sodium chloride (PF)  3 mL Intracatheter Q8H     sodium chloride (PF)  3 mL Intracatheter Q8H     [Held by provider] tamsulosin  0.4 mg Oral Daily     vancomycin  1,250 mg Intravenous Q12H     [Held by provider] venlafaxine  37.5 mg Oral Daily       Objective:  Vital signs in last 24 hours:  Temp:  [97.8  F (36.6  C)-98.4  F (36.9  C)] 98.3  F (36.8  C)  Pulse:  [83-91] 87  Resp:  [16-18] 18  BP: (126-141)/(78-84) 131/78  SpO2:  [91 " %-95 %] 94 %  Weight:   Weight change:   Body mass index is 25.64 kg/m .    Intake/Output last 3 shifts:  I/O last 3 completed shifts:  In: 1501 [I.V.:413]  Out: 2528 [Urine:1425; Emesis/NG output:850; Drains:253]  Intake/Output this shift:  No intake/output data recorded.    Review of Systems:   As per subjective, all others negative.    Physical Exam:    General Appearance:  Alert, cooperative, talkative, difficult to follow   Head:  Normocephalic, without obvious abnormality, atraumatic   Eyes:  PERRL, conjunctiva/corneas clear, EOM's intact   Nose: Nares normal, septum midline, mucosa normal, no drainage   Throat: Lips, mucosa, and tongue normal; teeth and gums normal   Neck: Supple, symmetrical, trachea midline   Back:   Symmetric, no curvature, ROM normal, no CVA tenderness   Lungs:   respirations unlabored   Chest Wall:  No tenderness or deformity   Heart:  Regular rate and rhythm   Abdomen:   Soft, tender,drains in place, binder intact unable to view incision   Extremities: Extremities normal, atraumatic, no cyanosis or edema   Skin: Skin color, texture, turgor normal, no rashes or lesions   Neurologic: Alert and oriented X 3, Moves all 4 extremities          Imaging:  Personally Reviewed.  CT Abd Peritoneum Abscess Drain w Cath Place    Result Date: 12/9/2021  EXAM: 1. PERCUTANEOUS DRAIN PLACEMENT PELVIC ABSCESS 2. CT GUIDANCE 3. CONSCIOUS SEDATION LOCATION: [facility] DATE/TIME: 12/9/2021 9:50 AM INDICATION: s/p two surgeries with elevating wbc, ct with pelvic abscess. TECHNIQUE: Dose reduction techniques were used. PROCEDURE: Informed consent obtained. Site marked. Prior images reviewed. Required items made available. Patient identity confirmed verbally and with arm band. Patient reevaluated immediately before administering sedation. Universal protocol was followed. Time out performed. The site was prepped and draped in sterile fashion. 10 mL of 1% lidocaine was infused into the  local soft tissues. Using standard technique and under direct CT guidance, a 10 Turkish drainage catheter was inserted into the fluid collection.  SPECIMEN: 30 mL of brownish thin fluid was aspirated and sent to lab for cultures and Gram stain. BLOOD LOSS: Minimal. The patient tolerated the procedure well. No immediate complications. SEDATION: Versed 2.0 mg. Fentanyl 100 mcg. The procedure was performed with administration intravenous conscious sedation with appropriate preoperative, intraoperative, and postoperative evaluation. 30 minutes of supervised face to face conscious sedation time was provided by a radiology nurse under my direct supervision.     IMPRESSION: 1.  Successful CT-guided drain placement into pelvic abscess. 30 mL brownish thin fluid removed and sent for cultures. Reference CPT Codes: 71184, 66920, 56705    Echocardiogram Complete    Result Date: 12/10/2021  [id number]^CHRISTOPHER^FLOWER^[place] [address]  Name: ISABELA DUNN MRN: [id number] : 1955 Study Date: 12/10/2021 01:14 PM Age: 66 yrs Gender: Male Patient Location: [facility] Reason For Study: [place] Ordering Physician: FLOWER WHITE Performed By: ALEX  BSA: 1.8 m2 Height: 65 in Weight: 155 lb HR: 84 ______________________________________________________________________________ Procedure Complete Echo Adult. Adequate quality two-dimensional was performed and interpreted. ______________________________________________________________________________ Interpretation Summary  The left ventricle is normal in size with borderline concentric left ventricular hypertrophy. Left ventricular function is normal.The ejection fraction is 60-65%. Normal right ventricle size and systolic function. No significant valve disease is identified.  There is no comparison study available. ______________________________________________________________________________ I      WMSI = 1.00     %  Normal = 100  X - Cannot   0 -                      (2) - Mildly 2 -          Segments  Size Interpret    Hyperkinetic 1 - Normal  Hypokinetic  Hypokinetic  1-2     small                                                    7 -          3-5    moderate 3 - Akinetic 4 -          5 -         6 - Akinetic Dyskinetic   6-14    large              Dyskinetic   Aneurysmal  w/scar       w/scar       15-16   diffuse  Left Ventricle The left ventricle is normal in size. Left ventricular function is normal.The ejection fraction is 60-65%. There is borderline concentric left ventricular hypertrophy. Left ventricular diastolic function is normal. No regional wall motion abnormalities noted.  Right Ventricle Normal right ventricle size and systolic function.  Atria Normal left atrial size. Right atrial size is normal. There is no color Doppler evidence of an atrial shunt.  Mitral Valve Mitral valve leaflets appear normal. There is no evidence of mitral stenosis or clinically significant mitral regurgitation.  Tricuspid Valve Tricuspid valve leaflets appear normal. There is no evidence of tricuspid stenosis or clinically significant tricuspid regurgitation. Right ventricle systolic pressure estimate normal. The right ventricular systolic pressure is approximated at 18.7 mmHg plus the right atrial pressure.  Aortic Valve The aortic valve is trileaflet. Aortic valve leaflets appear normal. There is no evidence of aortic stenosis or clinically significant aortic regurgitation. There is trace aortic regurgitation.  Pulmonic Valve The pulmonic valve is not well seen, but is grossly normal. This degree of valvular regurgitation is within normal limits. There is trace pulmonic valvular regurgitation.  Vessels The aorta root is normal. The ascending aorta is Mildly dilated. IVC diameter <2.1 cm collapsing >50% with sniff suggests a normal RA pressure of 3 mmHg.  Pericardium There is no pericardial effusion.  Rhythm Sinus rhythm was noted.   ______________________________________________________________________________ MMode/2D Measurements & Calculations IVSd: 1.3 cm LVIDd: 5.0 cm LVIDs: 3.2 cm LVPWd: 0.86 cm  FS: 36.6 % LV mass(C)d: 201.1 grams LV mass(C)dI: 113.3 grams/m2 Ao root diam: 3.9 cm LA dimension: 4.1 cm asc Aorta Diam: 3.8 cm LA/Ao: 1.0 LVOT diam: 2.4 cm LVOT area: 4.6 cm2 LA Volume Indexed (AL/bp): 29.6 ml/m2 RWT: 0.34  Doppler Measurements & Calculations MV E max gary: 80.4 cm/sec MV A max gary: 62.9 cm/sec MV E/A: 1.3  MV dec slope: 292.8 cm/sec2 MV dec time: 0.27 sec Ao V2 max: 178.1 cm/sec Ao max P.0 mmHg Ao V2 mean: 130.5 cm/sec Ao mean P.9 mmHg Ao V2 VTI: 29.4 cm SUMAN(I,D): 3.2 cm2 SUMAN(V,D): 3.1 cm2 LV V1 max P.5 mmHg LV V1 max: 117.6 cm/sec LV V1 VTI: 20.2 cm SV(LVOT): 93.4 ml SI(LVOT): 52.6 ml/m2 PA acc time: 0.12 sec TR max gary: 216.1 cm/sec TR max P.7 mmHg AV Gary Ratio (DI): 0.66 SUMAN Index (cm2/m2): 1.8 E/E' av.3 Lateral E/e': 7.9 Medial E/e': 16.6  ______________________________________________________________________________ Report approved by: Hany Currie 12/10/2021 02:23 PM       XR Chest Port 1 View    Result Date: 2021  EXAM: XR CHEST PORT 1 VIEW LOCATION: [facility] DATE/TIME: 2021 1:39 PM INDICATION: Worsening hypoxia, evaluate for pneumonia, pulm edema COMPARISON: Chest x-ray 2021     IMPRESSION: Enteric suction tube tip and side-port within the upper gastric lumen. Satisfactory right PICC line position with the tip near the cavoatrial junction. Persistent low lung volumes. No definite pneumothorax. Mildly improved bibasilar pulmonary  opacities favored to reflect atelectasis. Persistent interstitial coarsening. Suspected trace right-sided pleural fluid. Stable heart size and central vascular prominence.    XR Chest Port 1 View    Result Date: 12/3/2021  EXAM: XR CHEST PORT 1 VIEW LOCATION: [facility] DATE/TIME: 12/3/2021 8:31  PM INDICATION: RN placed PICC - verify tip placement COMPARISON: None.     IMPRESSION: Right PICC line present with its tip likely just into the right atrium. Suggest withdrawing the PICC line 2 cm to place the tip in the low SVC. NG tube in good position in the stomach. Mild cardiomegaly. Low lung volumes with mild patchy mixed interstitial and airspace infiltrates    CT Abdomen Pelvis w Contrast    Result Date: 12/8/2021  EXAM: CT ABDOMEN PELVIS W CONTRAST LOCATION: [facility] DATE/TIME: 12/8/2021 1:40 PM INDICATION: Abdominal abscess/infection suspected, status post laparotomy with repair of small bowel perforation, recent small bowel resection COMPARISON: CT 11/30/2021 TECHNIQUE: CT scan of the abdomen and pelvis was performed following injection of IV contrast. Multiplanar reformats were obtained. Dose reduction techniques were used. CONTRAST: 100 mL Isovue-370 FINDINGS: LOWER CHEST: Mild bilateral lower lobar dependent consolidation and trace right-sided pleural effusion. HEPATOBILIARY: Prominent gallbladder likely reflecting recent fasting. Stable mild biliary ductal dilatation. No obstructing mass lesion or radiodense stone. Stable small low-density hepatic lesions, likely cysts. PANCREAS: Normal. SPLEEN: Surgically absent. ADRENAL GLANDS: Normal. KIDNEYS/BLADDER: Stable nonobstructing lower left renal stones with a dominant stone measuring 4 mm. No hydronephrosis. Unremarkable urinary bladder. BOWEL: Enteric suction tube with the tip located within the mid gastric lumen. Mild to moderately distended segments of mid small bowel with associated air-fluid levels. No discrete transition is identified. Right lower quadrant anastomotic changes. Small amount of nonloculated fluid and extraluminal air within the mid abdomen for example image 50 series 400.2 and image 97 series 3. Questionable adjacent small bowel defect image 50 series 102. Small amount of nonloculated fluid is also noted  within the right lower quadrant. Newly visualized rim-enhancing pelvic fluid collection measuring 4.4 x 3.6 x 4.4 cm. Trace ascites. Midline laparotomy incision with a small amount of fluid and air within the wound. Surgical drain tip is located at the upper aspect of this wound. Additional surgical drain with the tip located within the left lower quadrant. Third surgical drain with the tip located in the lower anterior abdomen. Diffuse mesenteric edema. LYMPH NODES: Normal. VASCULATURE: Mild atherosclerosis. PELVIC ORGANS: Normal. MUSCULOSKELETAL: Bilateral L4 pars defects with grade 1 anterolisthesis at L4-L5. Associated severe discogenic degenerative changes.     IMPRESSION: 1.  Newly visualized 4.6 cm rim-enhancing fluid collection within the pelvis. 2.  Small ill-defined irregular pocket of fluid and extraluminal air within the mid abdomen as described above. It is uncertain whether this is related to sequelae of recent surgery or bowel leakage. Recommend short interval follow-up CT with positive enteric contrast. 3.  Mild to moderate mid small bowel distention. This is favored to reflect an ileus rather than obstruction. 4.  Trace ascites and diffuse mesenteric edema. 5.  Mild bilateral lower lobar consolidation and trace right-sided pleural effusion. [Critical Result: Pulmonary fluid collection and extraluminal loculated air-fluid within the mid abdomen as detailed above.] Finding was identified on 12/8/2021 2:10 PM. Dr. Richardson was contacted by me on 12/8/2021 2:50 PM and verbalized understanding of the critical result.     CT Abdomen Pelvis w Contrast    Result Date: 11/30/2021  EXAM: CT ABDOMEN PELVIS W CONTRAST LOCATION: [facility] DATE/TIME: 11/30/2021 10:48 AM INDICATION: Abdominal distension COMPARISON: None. TECHNIQUE: CT scan of the abdomen and pelvis was performed following injection of IV contrast. Multiplanar reformats were obtained. Dose reduction techniques were used.  CONTRAST: IsoVue 370 100mL FINDINGS: LOWER CHEST: Bibasilar atelectasis. HEPATOBILIARY: Scattered small water density foci in the liver consistent with cysts. No radiopaque gallstone. No suspicious hepatic lesions. PANCREAS: Coarse calcification in the pancreatic tail and body may represent sequelae of chronic pancreatitis no acute pancreatitis or abnormal pancreatic mass. SPLEEN: Not visualized consistent with prior splenectomy. ADRENAL GLANDS: Normal. KIDNEYS/BLADDER: No hydronephrosis or masses. No bladder stone or mass. BOWEL: Anastomotic staples in the colon in the right mid abdomen. There is normal caliber duodenum and proximal jejunum with dilated loops of distal jejunum and ileum with some fecal i-STAT ileal contents and a transition in the right mid to lower abdomen. Findings are consistent with mechanical small bowel obstruction and this may represent a closed loop obstruction such as internal hernia or multiple adhesions given the lack of dilatation of the proximal small bowel. No pneumatosis intestinalis,  free intraperitoneal air or abscess. LYMPH NODES: No lymphadenopathy. VASCULATURE: Mild aortoiliac calcification without aneurysm. PELVIC ORGANS: Mild prostatic enlargement. MUSCULOSKELETAL: Disc space narrowing at L4-L5 with disc degeneration and bilateral spondylolysis at L4 with grade 2 spondylolisthesis of L4 on L5. No acute fracture.     IMPRESSION: 1.  Mechanical small bowel obstruction with dilated distal jejunum and ileum with caliber changes in the right lower quadrant and in the left midabdomen this may be secondary to adhesions or internal hernia and has the appearance of a closed loop obstruction. No pneumatosis intestinalis, free intraperitoneal air or abscess. 2.  Results were called to Dr. Sanford the time of dictation. NOTE: ABNORMAL REPORT THE DICTATION ABOVE DESCRIBES AN ABNORMALITY FOR WHICH FOLLOW-UP IS NEEDED.     POC US Guidance Needle Placement    Result Date: 11/30/2021  Ultrasound  "was performed as guidance to an anesthesia procedure.  Click \"PACS images\" hyperlink below to view any stored images.  For specific procedure details, view procedure note authored by anesthesia.    XR Video Swallow with SLP or OT    Result Date: 11/24/2021  EXAM: XR VIDEO SWALLOW WITH SLP OR OT LOCATION: [facility] DATE/TIME: 11/24/2021 9:18 AM INDICATION: Difficulty swallowing. COMPARISON: Same day esophagram.; CT for lung cancer screening 03/09/2020 TECHNIQUE: Routine swallow study with speech pathology using multiple barium thicknesses. FINDINGS: FLUOROSCOPIC TIME: 0.2 minutes NUMBER OF IMAGES: 2 videofluoroscopic imaging series Swallow study with Speech Pathology using multiple barium thicknesses. Patient is edentulous. Trials of thin consistency and barium coated cracker were performed. No delay in initiation of the oral phase. Normal epiglottic inversion. No penetration or aspiration observed. Moderate to severe cervical spondylosis with anterior wedging, disc space narrowing and osteophytosis of C4, C5, and C6. Straightening and reversal of normal thoracic lordosis.     IMPRESSION: No penetration or aspiration. Please see separately dictated report from speech pathology for additional description, analysis, and management recommendations.     XR Esophagram    Result Date: 11/24/2021  EXAM: XR ESOPHAGRAM LOCATION: [facility] DATE/TIME: 11/24/2021 9:18 AM INDICATION: 67 yo M with dysphagia/dyspnea - cough. Feels food stuck in throat with swallow. Occ. chocking. Needs to swallow lot of liquids. Constant runny nose & nasal congestion. COMPARISON: Same day swallow study; CT of the chest without contrast 03/09/2020 TECHNIQUE: Routine. FINDINGS: FLUOROSCOPIC TIME: 1.2 minutes NUMBER OF IMAGES: 4 videofluoroscopic imaging series and additional 41 images. ESOPHAGUS: Patulous esophagus with diffusely abnormal peristalsis. Large number of tertiary contractions present " "with delayed esophageal emptying. No esophageal stricture, diverticula or mass. No hiatal hernia. There is a large amount of retained contrast within the esophagus after consumption of contrast in the RPO position in transitioning to the supine position. This retained contrast moves to and fro within the esophagus. During the transition from RPO to supine position the patient felt a \"tickle\" in the back of his throat prompting coughing, while not observed likely relates to reflux of contrast from the upper esophagus into the pharynx. No gastroesophageal reflux elicited in the recumbent position with Valsalva maneuver.     IMPRESSION: Moderately severe esophageal dysmotility (presbyesophagus).      Lab Results:  Personally Reviewed.   Recent Labs   Lab 12/12/21 0623 12/11/21  0610 12/10/21  0456   WBC 10.4 12.5* 13.1*   HGB 9.6* 9.8* 9.5*   HCT 29.8* 30.9* 30.0*   * 605* 492*     Recent Labs   Lab 12/12/21 0623 12/11/21  0610 12/10/21  0456 12/08/21  0536 12/07/21  0358 12/06/21  0620 12/05/21  1048    140 141   < > 148* 146* 145   CO2 26 27 25   < > 31 31 28   BUN 16 18 18   < > 20 17 17   ALBUMIN  --   --   --   --  2.3* 2.3* 2.3*   ALKPHOS  --   --   --   --  54 44* 46   ALT  --   --   --   --  34 20  --    AST  --   --   --   --  42* 25 20    < > = values in this interval not displayed.     Recent Labs   Lab 12/09/21  0841 12/06/21  0619   INR 1.10 1.11         Total time spent 25 minutes with greater than 50% in consultation, education and coordination of care.     Also discussed with RN and  time spent in discussion with Acute Inpatient Pain Pharmacist.       Melanie GUZMAN, CNS-BC, DNP  Acute Care Pain Management Program  [facility] ([place], [place], [place])   With questions call [phone]  Preference if for [employer] Paging - [place]  Click HERE to page [place]                 "

## 2021-12-12 NOTE — PLAN OF CARE
Problem: Adult Inpatient Plan of Care  Goal: Optimal Comfort and Wellbeing  Outcome: Improving   Pt. DOC pt. 2 guards in room and pt. Restraint to bed managed by guards, remains on PCA pump for pain, pt. Pleasant but very talkative and uses call light multiple times for miscellaneous requests, remains NPO but ok for ice chips, one MARISA drain discontinue today per MD, remains on TPN/Lipids, BG this noon 119, mg,phos,K+ protocols for recheck in am, will cont to monitor.

## 2021-12-12 NOTE — PROGRESS NOTES
"  Interventional Radiology - Progress Note  Inpatient - [facility]: Interventional Radiology   [phone]  12/12/2021    S:  Remains hospitalized.  Now S/P 10F drain placement into pelvic abscess. WBC is trending down 16.3>10.4. Security guards are present in the room. One of three surgical drains fell out. Minimal OP to IR drain.     Culture: E coli  Antibiotic:  Meropenem + vancomycin.   Flushing:  10mL NS Q8 hours     O:  /77 (BP Location: Left arm)   Pulse 87   Temp 98.1  F (36.7  C) (Oral)   Resp 16   Ht 1.656 m (5' 5.2\")   Wt 70.3 kg (155 lb)   SpO2 94%   BMI 25.64 kg/m    General:  Stable.  In no acute distress.    Neuro:  A&O x 3. Answers questions appropriately  Resp:  Breathing easily on RA, unlabored  Cardio:  WWP     Drain in place.  Insertion site c/d/i.  Stay suture in place.  Dressing c/d/i.  Drainage bag/bulb in place, draining minimal amount of sanguinous drainage.      IMAGING:    EXAM:  1. PERCUTANEOUS DRAIN PLACEMENT PELVIC ABSCESS  2. CT GUIDANCE  3. CONSCIOUS SEDATION  LOCATION: [facility]  DATE/TIME: 12/9/2021 9:50 AM     INDICATION: s/p two surgeries with elevating wbc, ct with pelvic abscess.  TECHNIQUE: Dose reduction techniques were used.     PROCEDURE: Informed consent obtained. Site marked. Prior images reviewed. Required items made available. Patient identity confirmed verbally and with arm band. Patient reevaluated immediately before administering sedation. Universal protocol was   followed. Time out performed. The site was prepped and draped in sterile fashion. 10 mL of 1% lidocaine was infused into the local soft tissues. Using standard technique and under direct CT guidance, a 10 Lithuanian drainage catheter was inserted into the   fluid collection.       SPECIMEN: 30 mL of brownish thin fluid was aspirated and sent to lab for cultures and Gram stain.     BLOOD LOSS: Minimal.     The patient tolerated the procedure well. No immediate " complications.     SEDATION: Versed 2.0 mg. Fentanyl 100 mcg. The procedure was performed with administration intravenous conscious sedation with appropriate preoperative, intraoperative, and postoperative evaluation.     30 minutes of supervised face to face conscious sedation time was provided by a radiology nurse under my direct supervision.                                                                      IMPRESSION:  1.  Successful CT-guided drain placement into pelvic abscess. 30 mL brownish thin fluid removed and sent for cultures.       LABS:  CBC RESULTS: Recent Labs   Lab Test 12/12/21  0623   WBC 10.4   RBC 3.24*   HGB 9.6*   HCT 29.8*   MCV 92   MCH 29.6   MCHC 32.2   RDW 14.6   *       Drain Outputs (in mL):        A:  67 yo M    -S/P exploratory laparotomy, small bowel resection, lysis of adhesions and repair of enterotomy with significant inflamed small bowel with areas of thinned mucosa on 11/30/2021 2/2 obstruction  - S/p exploratory laparotomy 12/3/2021 2/2 small bowel perforation  - CT 12/8 showing abscess  - 4th drain placed 12/9/2021- CT guided, per above  - Cultures, abx per above  - Surgical drain #3 fell out.   - Appreciate recommendations from surgery.   - Minimal OP from IR drain. Consider imaging to evaluate drain reposition/removal.       P:    - Continue present drain cares. Continue drain to bulb drainage.  - Start flushing with 10mL NS Q8 hours   - Drain care education provided to patient. All questions answered.   - Drain discharge instructions entered into D/C navigator.  Done  - Patient to flush drain with 10mL NS daily after discharge. NS flushes ordered in D/C navigator.  Ordered  - Recommend follow up CT and abscessogram approximately 7-10 days from drain placement date.  This can be done inpt or outpt.  Perhaps sooner if there are changes in drain function or in patient's clinical course. Discussed plan for follow up with patient who expressed their understanding.   -  IR will continue to follow loosely. Please contact IR with drain related questions or concerns.  Total time spent on the date of the encounter is 15 minutes, including time spent counseling the patient, performing a medically appropriate evaluation, reviewing prior medical history, ordering medications and tests, documenting clinical information in the medical record, and communication of results.    Aba Freeman  Interventional Radiology  [phone]    E/M codes for reference only:  09840

## 2021-12-12 NOTE — PROGRESS NOTES
"CLINICAL NUTRITION SERVICES - REASSESSMENT NOTE     Nutrition Prescription    RECOMMENDATIONS FOR MDs/PROVIDERS TO ORDER:  Continue same TPN/lipids    Malnutrition Status:    Moderate    Recommendations already ordered by Registered Dietitian (RD):  D 310  @ 85 ml/hr plus 250 ml of 20% lipids 3 times per week    Future/Additional Recommendations:  Follow for diet advance and TPN needs     EVALUATION OF THE PROGRESS TOWARD GOALS   Diet: NPO  Nutrition Support: TPN: D 310  @ 85 ml/hr plus 250 ml of 20% lipids 3 times per week over 12 hrs providin Calories, 100 g protein, 310 g CHO, average 21 g fat and 2040 ml fluid (CHO load=3.06 mg CHO/Kg/min)     ANTHROPOMETRICS  Height: 165.6 cm (5' 5.2\")  Most Recent Weight: 70.3 kg (155 lb)   no new wt - pt has daily weights ordered several times  Admission weight: 175 lb (21)  Weight History:   Wt Readings from Last 10 Encounters:   21 70.3 kg (155 lb)   20 79.4 kg (175 lb)   19 81.2 kg (179 lb)     GI CONCERNS  Faint, hypoactive BS  Abdominal discomfort  Last BM 2021  NG to  mL out last 24 hours  Drains 253 mL out last 24 hours    LABS  Reviewed:  FSBG 122, 118    Estimated nutrition needs:  Assessment weight is 66.2 Kg    Estimated energy needs: 6563-4233 Matt/day (25-30 Matt/Kg)  Justification: post op  Estimated protein needs: 80-99 g/day (1.2-1.5 g/Kg)  Justification: post op  Estimated fluid needs 1985 ml/day (30 ml/Kg)  Justification: maintenance    MEDICATIONS  Reviewed: levothyroxine, merrem, pantoprazole, senna-docusate, vancocin    MALNUTRITION:  % Weight Loss:  20 lb weight loss noted-unsure of time frame-may be greater than 1 year  % Intake:  </= 50% for >/= 5 days (severe malnutrition)  Subcutaneous Fat Loss:  Orbital region moderate depletion  Fluid Retention:  None noted    Malnutrition Diagnosis: Moderate malnutrition  In Context of:  Acute illness or injury    CURRENT NUTRITION DIAGNOSIS  Malnutrition " related to acute illness as evidenced by NPO x 5 days and moderate fat loss      INTERVENTIONS  Implementation    Continue same TPN/lipids today    Goals  Tolerate TPN-met  Advance to po diet when appropriate-not met  BG -met    Monitoring/Evaluation  Progress toward goals will be monitored and evaluated per protocol.

## 2021-12-12 NOTE — PROGRESS NOTES
ASSESSMENT:  1. SBO (small bowel obstruction) (H)    2. Pelvic abscess in male (H)        Gurdeep Dia is a 66 year old male who is s/p exploratory laparotomy with small bowel resection, lysis of adhesions and repair of enterotomy with significant inflamed small bowel with areas of thinned mucosa on 11/30/2021  S/p exploratory laparotomy 12/3/2021 for small bowel perforation from ulcer  S/p IR placement of drain into pelvic abscess 12/9/2021  Enterocutaneous fistula   No big changes anticipated, this will take some time to seal down      PLAN:  -Continue IV antibiotics.  -Continue n.p.o. and okay ice chips/meds only  -PT and OT  -Encourage incentive spirometry  -Drains to remain for quite some time  -Appreciate IR placement of pelvic abscess drain  -Continue TPN    SUBJECTIVE:   Subcutaneous drain fell out overnight.  Wound manager working well to control and contain the midline succus drainage.       Patient Vitals for the past 24 hrs:   BP Temp Temp src Pulse Resp SpO2   12/12/21 0848 133/77 98.1  F (36.7  C) Oral 87 16 94 %   12/12/21 0400 131/78 98.3  F (36.8  C) Oral 87 18 94 %   12/11/21 2300 (!) 141/84 98.4  F (36.9  C) Oral 91 16 94 %   12/11/21 2236 -- -- -- -- -- 92 %   12/11/21 2113 -- 97.8  F (36.6  C) Oral -- 18 --   12/11/21 1910 127/81 98.2  F (36.8  C) Oral 88 16 95 %   12/11/21 1632 126/84 97.9  F (36.6  C) Oral 91 18 92 %   12/11/21 1629 -- -- -- -- 18 --         PHYSICAL EXAM:  GEN: No acute distress, comfortable    ABD: Mildly distended, proximal midline incision is intact and clean appearing with the colostomy bag containing the drainage from the inferior portion quite well.  Subcutaneous drain site with scant output and drainage.  Remaining drains are primarily serosanguineous character.      Output by Drain (mL) 12/10/21 0700 - 12/10/21 1459 12/10/21 1500 - 12/10/21 2259 12/10/21 2300 - 12/11/21 0659 12/11/21 0700 - 12/11/21 1459 12/11/21 1500 - 12/11/21 2259 12/11/21 2300 - 12/12/21 0659  12/12/21 0700 - 12/12/21 0952   Closed/Suction Drain 1 Right Abdomen Bulb 19 Latvian 0 0 0 3 0 0    Closed/Suction Drain 1 Left LLQ Bulb 19 Latvian 10 0 5 6 10 0    Closed/Suction Drain 2 Inferior;Midline Abdomen Bulb 15 Latvian 50 65 80 105 30 60    Closed/Suction Drain 4 Left;Midline Buttock Bulb 10 Latvian 20 0 5 3 0 1    Open Drain Medial;Superior Abdomen vessel loop drain 100 40  10 25        EXT:No cyanosis, edema or obvious abnormalities    12/11 0700 - 12/12 0659  In: 1501 [I.V.:413]  Out: 2528 [Urine:1425; Drains:253]    No results displayed because visit has over 200 results.   Recent Results (from the past 24 hour(s))   Glucose by meter    Collection Time: 12/12/21  2:13 AM   Result Value Ref Range    GLUCOSE BY METER POCT 122 (H) 70 - 99 mg/dL   Glucose by meter    Collection Time: 12/12/21  6:14 AM   Result Value Ref Range    GLUCOSE BY METER POCT 115 (H) 70 - 99 mg/dL   Basic metabolic panel    Collection Time: 12/12/21  6:23 AM   Result Value Ref Range    Sodium 137 136 - 145 mmol/L    Potassium 4.4 3.5 - 5.0 mmol/L    Chloride 106 98 - 107 mmol/L    Carbon Dioxide (CO2) 26 22 - 31 mmol/L    Anion Gap 5 5 - 18 mmol/L    Urea Nitrogen 16 8 - 22 mg/dL    Creatinine 0.69 (L) 0.70 - 1.30 mg/dL    Calcium 8.2 (L) 8.5 - 10.5 mg/dL    Glucose 123 70 - 125 mg/dL    GFR Estimate >90 >60 mL/min/1.73m2   CBC with platelets    Collection Time: 12/12/21  6:23 AM   Result Value Ref Range    WBC Count 10.4 4.0 - 11.0 10e3/uL    RBC Count 3.24 (L) 4.40 - 5.90 10e6/uL    Hemoglobin 9.6 (L) 13.3 - 17.7 g/dL    Hematocrit 29.8 (L) 40.0 - 53.0 %    MCV 92 78 - 100 fL    MCH 29.6 26.5 - 33.0 pg    MCHC 32.2 31.5 - 36.5 g/dL    RDW 14.6 10.0 - 15.0 %    Platelet Count 659 (H) 150 - 450 10e3/uL   Magnesium    Collection Time: 12/12/21  6:23 AM   Result Value Ref Range    Magnesium 2.2 1.8 - 2.6 mg/dL   Phosphorus    Collection Time: 12/12/21  6:23 AM   Result Value Ref Range    Phosphorus 2.8 2.5 - 4.5 mg/dL                Barron Marie MD

## 2021-12-12 NOTE — PROGRESS NOTES
[facility] Progress Note    Assessment/Plan  Principal Problem:    SBO (small bowel obstruction) (H)  Active Problems:    Hypertension goal BP (blood pressure) < 140/90    Benign prostatic hyperplasia with weak urinary stream    RAVI (generalized anxiety disorder)    Moderate major depression (H)    History of substance abuse (H)    History of hepatitis C    Chronic neck pain    Chronic bilateral low back pain without sciatica    Gurdeep Dia is a 66 year old male with history of splenectomy, appendectomy, HTN, and substance abuse who presented with 2 days of right sided abdominal pain, found to have a bowel obstruction with closed loop. He is admitted for surgical repair of obstruction.      Requires ongoing hospitalization for post-operative monitoring and care       Mechanical SBO s/p exploratory laparotomy / small bowel resection / repair of enterotomy / extensive lysis of adhesions (11/30/21)  Subsequent small bowel perforation requiring repeat ex lap for washout 12/3    CT abdomen/pelvis on admission 11/30 significant for closed loop mechanical small bowel obstruction. Does have history of multiple abdominal surgeries previously. First procedure 11/30 for small bowel resection. Acute change on 12/2-12/3 and found to have intraabdominal drainage from 12/3/2021; underwent repeat exploratory laparotomy with washout and repair of small bowel perforation. 12/8 still without stool charted, still spiking low grade fevers, having atypical output from MARISA drain, white count climbing. CT 12/8 showing ring enhancing lesion in abdomen likely representing abscess. Fourth drain placed 12/9 AM by IR for abscess drainage.  -Surgery consulted and following   -NPO for now until cleared by gen surg   -Pain control as per surgery/Pain team (PCA, tyl) -- need to balance with encephalopathy below   -Abscess culture growing e. Coli, see sensitivities. Will discontinue vancomycin and meropenem in favor of IV  cefepime + crushed oral flagyl (shortage of IV form)  -IV Protonix     Acute encephalopathy, likely multifactorial - toxic/metabolic/hospital-induced   Acutely agitated post-op 12/3 to 12/4. Pulling at lines and threatening to leave.  Nurse felt unsafe without restraints. Precedex drip started, now off. Transferred out of the unit. Has been calm since back on the floor.  - Soft limb restraint x 2 - adjust as needed  - Precedex gtt stop 12/7 morning  - PRN Haldol injection  - Zyprexa ODT 4x daily prn  - Conservative measures as able   - Avoid opioid/benzo if able     Acute hypoxic respiratory failure, post-op   Atelectasis vs aspiration  Initially requiring high-flow oxygen in ICU following procedure.  Question post-op atelectasis vs aspiration versus volume overload.  Does have a degree of respiratory distress when he gets agitated. CXR 12/6 showing bibasilar opacitied favored to reflect atelectasis, though still with some interstitial coarsening. Breathing improved 12/7 after some antibiotics and maybe ongoing resolution of his atelectasis. No signs of fluid overload and sodium climbing so IV lasix discontinued 12/8.  -Continue nasal cannula  -Wean as able  -Continue abx as above, don't think we need to broaden from a lung perspective  -Aggressive IS use  -PT/OT to get moving  -Discontinue IV lasix; hold home dose for now given sodium    Hypernatremia - resolved  In setting of excessive diuresis. Asymptomatic. Gave some fluid back 12/8.  -Discontinue IV lasix; hold home dose for now    Afib, RVR post-op, currently rate controlled   NSVT episode 12/8   Noted.  Rates had been in the 120 to 150 range.  Converted to NSR with amiodarone drip. No longer on any rate control. Both CHADS2-Vasc and HAS-BLED of at least 2.  Would likely warrant anticoagulation on ongoing basis. TSH normal. Lytes normal. Echo 12/10 showing slight concentric thickening in LV and EF 60-65%.  -Lovenox for now   -Consider ongoing anticoagulation  once encephalopathy is improved    HTN  PTA lisinopril, furosemide being held in the setting of acute surgical care.  Will monitor, even with pain BP not significantly elevated.  Will resume when hemodynamically stable.  Kidney function is stable at this time.  Blood pressure does climb when he is agitated.  -Monitor     Moderate malnutrition  Per RD eval.  -TPN until cleared to [facility] per surgery    Tremor - resolved  New on exam 12/2. Bilateral. Doesn't fit well with either essential tremor or parkinsonian. Almost resembles asterixis. LFTs and kidney function normal though. Ammonia normal. Improved 12/3.  -Monitor      Chronic Conditions:  Hypothyroidism- PTA levothyroxine  Depression/anxiety- PTA venlafaxine, mirtazepine, Buspar   Chronic pain- Hold mexolicam 7.5mg daily, resume PTA gabapentin when tolerating PO.  BPH- PTA flomax      Diet: NPO for Medical/Clinical Reasons Except for: Meds, Ice ChipsNPO   DVT Prophylaxis: Lovenox q day    Roblero Catheter: placed   Fluids:  None  Central Lines: None  Code Status: Full Code    Subjective  Discussing length of stay with surgeon.  Understands that he has stool spewing into his abdomen causing an infection there.  Subcutaneous drain fell out last night.     Objective    Vital signs in last 24 hours Temp:  [97.8  F (36.6  C)-98.4  F (36.9  C)] 98.1  F (36.7  C)  Pulse:  [87-91] 87  Resp:  [16-18] 16  BP: (126-141)/(77-84) 133/77  SpO2:  [92 %-95 %] 94 %       Intake/Output last 3 shift I/O last 3 completed shifts:  In: 1501 [I.V.:413]  Out: 2528 [Urine:1425; Emesis/NG output:850; Drains:253]    Intake/Output this shift:I/O this shift:  In: -   Out: 280 [Urine:280]    Physical Exam  General appearance: uncomfortable appearing male, laying in bed, two officers at bedside.  Acutely agitated.  Head: Normocephalic, without obvious abnormality, atraumatic.  NG tube in place.    Throat: moist mucous membranes.  Lungs: Tachypneic.  High flow oxygen in place.  Coarse lung sounds  in the bases.  Heart: regular rate and rhythm, S1, S2 normal, no murmur, click, rub or gallop  Abdomen: Abdominal binding in place.  Multiple MARISA drains in place with bloody drainage. bowel sounds very present. Moderate TTP throughout.   Extremities: extremities normal, atraumatic, no cyanosis or edema  Skin: Slightly pale, dry skin.  Neurologic: Alert and oriented X 2.  Able to move extremities.  Sensation intact.   Psychiatric: Exceedingly anxious and agitated.     Pertinent Labs and Pertinent Radiology   Lab Results: personally reviewed.     Radiology Results: Personally reviewed image/s and impression/s    Precepted patient with Dr. Case Mcmahon MD - PGY2  [facility]  P: [phone]

## 2021-12-12 NOTE — PLAN OF CARE
Problem: Pain Acute  Goal: Acceptable Pain Control and Functional Ability  Outcome: No Change  Intervention: Develop Pain Management Plan  Recent Flowsheet Documentation  Taken 12/12/2021 0400 by Irene Gerard RN  Pain Management Interventions: (PCA pump)    medication (see MAR)    distraction    emotional support    rest  Taken 12/12/2021 0100 by Irene Gerard RN  Pain Management Interventions: (PCA pump in use)    medication (see MAR)    distraction    emotional support    repositioned    rest     Problem: Pain (Surgery Nonspecified)  Goal: Acceptable Pain Control  Outcome: No Change  Intervention: Prevent or Manage Pain  Recent Flowsheet Documentation  Taken 12/12/2021 0400 by Irene Gerard RN  Pain Management Interventions: (PCA pump)    medication (see MAR)    distraction    emotional support    rest  Taken 12/12/2021 0100 by Irene Gerard RN  Pain Management Interventions: (PCA pump in use)    medication (see MAR)    distraction    emotional support    repositioned    rest     Problem: Risk for Delirium  Goal: Improved Sleep  Outcome: No Change     Problem: Sleep Disturbance (Anxiety Signs/Symptoms)  Goal: Improved Sleep (Anxiety Signs/Symptoms)  Outcome: No Change     Problem: Hypertension Comorbidity  Goal: Blood Pressure in Desired Range  Outcome: Improving     Pt has been awake this overnight shift. He is veery talkative, has many questions, rambles on while staff is doing cares/assessments. Bowel sounds are hypoactive and faint. NG tube and 4 MARISA drains are intact, 1 to be checked by Dr. Marie this AM because it was coming out last evening. Pt is NPO except for ice chips.

## 2021-12-12 NOTE — PROVIDER NOTIFICATION
MD Notification    Person notified:Dr. Marie    Person Name:    Date/Time:12/11/21@10:06PM    Interaction:phone call    Purpose of Notification:Midline abdomen MARISA drain stitch no longer stitch on skin.     Orders Received:    Comments:Spoke to Dr. Cynthia MD recommended to try to push tube in to where is should be and then tape down, try to obtain suction again but if not able to keep suction then is ok to leave as is. Surgery to see him in the morning and address this.

## 2021-12-12 NOTE — PLAN OF CARE
Problem: Pain Acute  Goal: Acceptable Pain Control and Functional Ability  Outcome: No Change  Intervention: Develop Pain Management Plan  Recent Flowsheet Documentation  Taken 12/11/2021 2113 by Cindy Brizuela, RN  Pain Management Interventions: (PCA pump) medication (see MAR)  Taken 12/11/2021 1629 by Cindy Brizuela, RN  Pain Management Interventions: (PCA pump) medication (see MAR)  Intervention: Prevent or Manage Pain  Recent Flowsheet Documentation  Taken 12/11/2021 1629 by Cindy Brizuela RN  Medication Review/Management: medications reviewed     Problem: Infection (Surgery Nonspecified)  Goal: Absence of Infection Signs and Symptoms  Outcome: No Change     A/Ox3, disoriented to place. Patient rambles and is very conversational about everything so is hard to assess orientation at times. VSS on 3L O2 NC. Tele NSR. C/o abdominal pain, PCA pump in use. Zyprexa given x1 for agitation and anxiety, not very effective, still restless after. Assist x1, repositions ind in bed. NPO ex ice chips. NG LIS. MARISA drains x4. Midline abdominal drain stitch is not attached to skin anymore, MD-surgery Dr Marie notified, was instructed to push back in and reinforced with tape and try to obtain suction again, surgery will address in morning. Midline incision with staples and colostomy bag at inferior portion to collect drainage, CDI. R PICC infusing TPN and int abx. Nursing to continue to monitor.

## 2021-12-12 NOTE — PHARMACY-CONSULT NOTE
"Pharmacy Note: Parenteral Nutrition (PN) Management    Pharmacist consulted to dose PN for Gurdeep Dia, a 66 year old male by Dr. Clinton.    Subjective:    The patient is a new PN start.     The patient was started on PN in the hospital on 12/5/21.     Indication for PN therapy: bowel resection     Inadequate nutrition anticipated for > 7 days.      Enteral nutrition contraindicated due to: bowel integrity is tenuous.     Pertinent diseases and other special considerations respiratory failure    Social History     Tobacco Use     Smoking status: Current Every Day Smoker     Smokeless tobacco: Never Used   Substance Use Topics     Alcohol use: Not Currently     Comment: Clean for 5 years     Drug use: Not Currently     Objective:    Ht Readings from Last 1 Encounters:   11/30/21 1.656 m (5' 5.2\")     Wt Readings from Last 1 Encounters:   12/06/21 70.3 kg (155 lb)       Body mass index is 25.64 kg/m .    No data found.    Labs:  Last 3 days:  Recent Labs     12/06/21  0619 12/06/21  0620 12/06/21  0620 12/06/21  0947 12/06/21  1428 12/06/21  1921 12/07/21  0358 12/07/21  1128 12/07/21  1443 12/08/21  0535 12/08/21  0536   NA  --  146*  --   --   --   --  148*  --   --   --  149*   POTASSIUM  --  3.0*  --   --  3.6 5.8* 3.2* 5.7* 3.8  --  3.7   CHLORIDE  --  106  --   --   --   --  106  --   --   --  113*   CO2  --  31  --   --   --   --  31  --   --   --  29   BUN  --  17  --   --   --   --  20  --   --   --  22   CR  --  0.82  --   --   --   --  0.79  --   --   --  0.75   VIJAYA  --  8.4*  --   --   --   --  8.4*  --   --   --  8.3*   [id number]  --  1.12  --   --   --   --   --   --   --   --   --    MAG  --  2.1  --   --   --   --  2.2  --   --   --  2.1   PHOS  --  2.9  --   --   --   --  3.2  --   --   --  2.7   PROTTOTAL  --  5.9*  --   --   --   --  5.9*  --   --   --   --    ALBUMIN  --  2.3*  --   --   --   --  2.3*  --   --   --   --    PREALB  --  6*  --   --   --   --   --   --   --   --   --    TRIG  --  " 217*  --   --   --   --   --   --   --   --   --    HGB  --   --   --  11.2*  --   --  10.9*  --   --  10.6*  --    HCT  --   --   --  34.2*  --   --  33.8*  --   --  32.8*  --    PLT  --   --   --  318  --   --  332  --   --  406  --    BILITOTAL  --  0.4  --   --   --   --  0.6  --   --   --   --    AST  --  25  --   --   --   --  42*  --   --   --   --    ALT  --  20  --   --   --   --  34  --   --   --   --    ALKPHOS  --  44*   < >  --   --   --  54  --   --   --   --    INR 1.11  --   --   --   --   --   --   --   --   --   --     < > = values in this interval not displayed.       Glucose (past 48 hours):   Recent Labs     12/06/21  1951 12/07/21  0157 12/07/21  0358 12/07/21  0816 12/07/21  1220 12/08/21  0216 12/08/21  0536   * 133* 134* 143* 121* 141* 121       Intake/Output (last 24 hours): I/O last 3 completed shifts:  In: 1501 [I.V.:413]  Out: 2528 [Urine:1425; Emesis/NG output:850; Drains:253]    Estimated CrCl: Estimated Creatinine Clearance: 103.3 mL/min (A) (based on SCr of 0.69 mg/dL (L)).    Assessment:    Continue patient on PN therapy as a continuous central therapy.     Given the patient's current condition/oral intake, PN is still indicated.    Lab results reviewed:   12/9: all electrolytes in goal range, no changes today  12/10: all electrolytes in goal range, no changes today  12/11: all electrolytes in goal range, no changes today  12/12: all electrolytes in goal range, no changes today      Plan:  1. Rate of PN: 85 mL/hr.  2. Formula:     Amino Acids 100 grams    Dextrose 310 grams    Sodium 40 mEq/day    Potassium 90 mEq/day    Calcium 8 mEq/day    Magnesium 14 mEq/day    Phosphorus 28 mMol/day    Chloride: Acetate Ratio 1:1    Standard Multivitamins w/Vitamin K    Trace Elements  3. Fat Emulsion: 20%, 250 mL IV three times weekly.  4. Check BMP tomorrow  5. Pharmacist will continue to follow the patient's lab results, clinical status and blood glucose results and make adjustments  as appropriate.    Thank you for the consult.  Taryn Pinzon, PharmD   12/9/2021 10:51 AM

## 2021-12-13 ENCOUNTER — APPOINTMENT (OUTPATIENT)
Dept: OCCUPATIONAL THERAPY | Facility: HOSPITAL | Age: 66
DRG: 329 | End: 2021-12-13
Payer: MEDICARE

## 2021-12-13 LAB
ALBUMIN SERPL-MCNC: 2.1 G/DL (ref 3.5–5)
ALP SERPL-CCNC: 90 U/L (ref 45–120)
ALT SERPL W P-5'-P-CCNC: 50 U/L (ref 0–45)
ANION GAP SERPL CALCULATED.3IONS-SCNC: 5 MMOL/L (ref 5–18)
AST SERPL W P-5'-P-CCNC: 33 U/L (ref 0–40)
BILIRUB SERPL-MCNC: 1.4 MG/DL (ref 0–1)
BUN SERPL-MCNC: 17 MG/DL (ref 8–22)
CALCIUM SERPL-MCNC: 8.3 MG/DL (ref 8.5–10.5)
CHLORIDE BLD-SCNC: 103 MMOL/L (ref 98–107)
CO2 SERPL-SCNC: 28 MMOL/L (ref 22–31)
CREAT SERPL-MCNC: 0.71 MG/DL (ref 0.7–1.3)
ERYTHROCYTE [DISTWIDTH] IN BLOOD BY AUTOMATED COUNT: 14.5 % (ref 10–15)
GFR SERPL CREATININE-BSD FRML MDRD: >90 ML/MIN/1.73M2
GLUCOSE BLD-MCNC: 118 MG/DL (ref 70–125)
GLUCOSE BLDC GLUCOMTR-MCNC: 120 MG/DL (ref 70–99)
GLUCOSE BLDC GLUCOMTR-MCNC: 126 MG/DL (ref 70–99)
GLUCOSE BLDC GLUCOMTR-MCNC: 129 MG/DL (ref 70–99)
GLUCOSE BLDC GLUCOMTR-MCNC: 131 MG/DL (ref 70–99)
GLUCOSE BLDC GLUCOMTR-MCNC: 140 MG/DL (ref 70–99)
HCT VFR BLD AUTO: 29.3 % (ref 40–53)
HGB BLD-MCNC: 9.5 G/DL (ref 13.3–17.7)
INR PPP: 1.07 (ref 0.9–1.15)
MAGNESIUM SERPL-MCNC: 2.1 MG/DL (ref 1.8–2.6)
MCH RBC QN AUTO: 29.4 PG (ref 26.5–33)
MCHC RBC AUTO-ENTMCNC: 32.4 G/DL (ref 31.5–36.5)
MCV RBC AUTO: 91 FL (ref 78–100)
PHOSPHATE SERPL-MCNC: 2.4 MG/DL (ref 2.5–4.5)
PLATELET # BLD AUTO: 740 10E3/UL (ref 150–450)
POTASSIUM BLD-SCNC: 4.4 MMOL/L (ref 3.5–5)
PREALB SERPL IA-MCNC: 10 MG/DL (ref 19–38)
PROT SERPL-MCNC: 6.3 G/DL (ref 6–8)
RBC # BLD AUTO: 3.23 10E6/UL (ref 4.4–5.9)
SODIUM SERPL-SCNC: 136 MMOL/L (ref 136–145)
TRIGL SERPL-MCNC: 125 MG/DL
WBC # BLD AUTO: 10.2 10E3/UL (ref 4–11)

## 2021-12-13 PROCEDURE — 85610 PROTHROMBIN TIME: CPT | Performed by: INTERNAL MEDICINE

## 2021-12-13 PROCEDURE — 250N000011 HC RX IP 250 OP 636: Performed by: INTERNAL MEDICINE

## 2021-12-13 PROCEDURE — 250N000013 HC RX MED GY IP 250 OP 250 PS 637: Performed by: SPECIALIST

## 2021-12-13 PROCEDURE — 84478 ASSAY OF TRIGLYCERIDES: CPT | Performed by: INTERNAL MEDICINE

## 2021-12-13 PROCEDURE — 250N000013 HC RX MED GY IP 250 OP 250 PS 637: Performed by: STUDENT IN AN ORGANIZED HEALTH CARE EDUCATION/TRAINING PROGRAM

## 2021-12-13 PROCEDURE — 84134 ASSAY OF PREALBUMIN: CPT | Performed by: INTERNAL MEDICINE

## 2021-12-13 PROCEDURE — 85027 COMPLETE CBC AUTOMATED: CPT | Performed by: INTERNAL MEDICINE

## 2021-12-13 PROCEDURE — 97535 SELF CARE MNGMENT TRAINING: CPT | Mod: GO

## 2021-12-13 PROCEDURE — 258N000003 HC RX IP 258 OP 636: Performed by: STUDENT IN AN ORGANIZED HEALTH CARE EDUCATION/TRAINING PROGRAM

## 2021-12-13 PROCEDURE — 84100 ASSAY OF PHOSPHORUS: CPT | Performed by: INTERNAL MEDICINE

## 2021-12-13 PROCEDURE — 250N000011 HC RX IP 250 OP 636: Performed by: SPECIALIST

## 2021-12-13 PROCEDURE — 99024 POSTOP FOLLOW-UP VISIT: CPT | Performed by: SPECIALIST

## 2021-12-13 PROCEDURE — 250N000011 HC RX IP 250 OP 636: Performed by: STUDENT IN AN ORGANIZED HEALTH CARE EDUCATION/TRAINING PROGRAM

## 2021-12-13 PROCEDURE — 250N000009 HC RX 250: Performed by: STUDENT IN AN ORGANIZED HEALTH CARE EDUCATION/TRAINING PROGRAM

## 2021-12-13 PROCEDURE — 99232 SBSQ HOSP IP/OBS MODERATE 35: CPT | Performed by: CLINICAL NURSE SPECIALIST

## 2021-12-13 PROCEDURE — 250N000013 HC RX MED GY IP 250 OP 250 PS 637: Performed by: CLINICAL NURSE SPECIALIST

## 2021-12-13 PROCEDURE — 250N000013 HC RX MED GY IP 250 OP 250 PS 637: Performed by: INTERNAL MEDICINE

## 2021-12-13 PROCEDURE — 120N000001 HC R&B MED SURG/OB

## 2021-12-13 PROCEDURE — C9113 INJ PANTOPRAZOLE SODIUM, VIA: HCPCS | Performed by: INTERNAL MEDICINE

## 2021-12-13 PROCEDURE — 250N000009 HC RX 250: Performed by: SPECIALIST

## 2021-12-13 PROCEDURE — 99232 SBSQ HOSP IP/OBS MODERATE 35: CPT | Mod: GC | Performed by: STUDENT IN AN ORGANIZED HEALTH CARE EDUCATION/TRAINING PROGRAM

## 2021-12-13 PROCEDURE — 80053 COMPREHEN METABOLIC PANEL: CPT | Performed by: INTERNAL MEDICINE

## 2021-12-13 PROCEDURE — 83735 ASSAY OF MAGNESIUM: CPT | Performed by: INTERNAL MEDICINE

## 2021-12-13 PROCEDURE — 999N000197 HC STATISTIC WOC PT EDUCATION, 0-15 MIN

## 2021-12-13 RX ORDER — OXYCODONE HYDROCHLORIDE 5 MG/1
5 TABLET ORAL
Status: DISCONTINUED | OUTPATIENT
Start: 2021-12-13 | End: 2021-12-21 | Stop reason: HOSPADM

## 2021-12-13 RX ORDER — HYDROMORPHONE HCL IN WATER/PF 6 MG/30 ML
0.2 PATIENT CONTROLLED ANALGESIA SYRINGE INTRAVENOUS
Status: DISCONTINUED | OUTPATIENT
Start: 2021-12-13 | End: 2021-12-15

## 2021-12-13 RX ORDER — OXYCODONE HYDROCHLORIDE 5 MG/1
5 TABLET ORAL EVERY 6 HOURS
Status: DISCONTINUED | OUTPATIENT
Start: 2021-12-13 | End: 2021-12-21 | Stop reason: HOSPADM

## 2021-12-13 RX ORDER — BISACODYL 10 MG
10 SUPPOSITORY, RECTAL RECTAL DAILY PRN
Status: DISCONTINUED | OUTPATIENT
Start: 2021-12-13 | End: 2021-12-21 | Stop reason: HOSPADM

## 2021-12-13 RX ORDER — LEVOTHYROXINE SODIUM 25 UG/1
25 TABLET ORAL DAILY
Status: DISCONTINUED | OUTPATIENT
Start: 2021-12-14 | End: 2021-12-21 | Stop reason: HOSPADM

## 2021-12-13 RX ADMIN — HALOPERIDOL LACTATE 2 MG: 5 INJECTION, SOLUTION INTRAMUSCULAR at 18:46

## 2021-12-13 RX ADMIN — CEFEPIME HYDROCHLORIDE 2 G: 2 INJECTION, POWDER, FOR SOLUTION INTRAVENOUS at 05:28

## 2021-12-13 RX ADMIN — CEFEPIME HYDROCHLORIDE 2 G: 2 INJECTION, POWDER, FOR SOLUTION INTRAVENOUS at 13:41

## 2021-12-13 RX ADMIN — GABAPENTIN 300 MG: 300 CAPSULE ORAL at 18:00

## 2021-12-13 RX ADMIN — MIRTAZAPINE 30 MG: 30 TABLET, FILM COATED ORAL at 21:55

## 2021-12-13 RX ADMIN — OXYCODONE HYDROCHLORIDE 5 MG: 5 TABLET ORAL at 15:59

## 2021-12-13 RX ADMIN — BUSPIRONE HYDROCHLORIDE 15 MG: 15 TABLET ORAL at 21:56

## 2021-12-13 RX ADMIN — SENNOSIDES AND DOCUSATE SODIUM 1 TABLET: 50; 8.6 TABLET ORAL at 09:16

## 2021-12-13 RX ADMIN — BISACODYL 10 MG: 10 SUPPOSITORY RECTAL at 13:30

## 2021-12-13 RX ADMIN — METRONIDAZOLE 500 MG: 500 TABLET ORAL at 21:55

## 2021-12-13 RX ADMIN — Medication: at 02:41

## 2021-12-13 RX ADMIN — HALOPERIDOL LACTATE 2 MG: 5 INJECTION, SOLUTION INTRAMUSCULAR at 01:10

## 2021-12-13 RX ADMIN — BUSPIRONE HYDROCHLORIDE 15 MG: 15 TABLET ORAL at 10:52

## 2021-12-13 RX ADMIN — ENOXAPARIN SODIUM 40 MG: 40 INJECTION SUBCUTANEOUS at 10:53

## 2021-12-13 RX ADMIN — METRONIDAZOLE 500 MG: 500 TABLET ORAL at 15:59

## 2021-12-13 RX ADMIN — OLANZAPINE 5 MG: 5 TABLET, ORALLY DISINTEGRATING ORAL at 05:28

## 2021-12-13 RX ADMIN — PANTOPRAZOLE SODIUM 40 MG: 40 INJECTION, POWDER, FOR SOLUTION INTRAVENOUS at 09:15

## 2021-12-13 RX ADMIN — SODIUM PHOSPHATE, MONOBASIC, MONOHYDRATE 15 MMOL: 276; 142 INJECTION, SOLUTION INTRAVENOUS at 13:30

## 2021-12-13 RX ADMIN — POTASSIUM CHLORIDE: 2 INJECTION, SOLUTION, CONCENTRATE INTRAVENOUS at 20:23

## 2021-12-13 RX ADMIN — GABAPENTIN 300 MG: 300 CAPSULE ORAL at 09:16

## 2021-12-13 RX ADMIN — METRONIDAZOLE 500 MG: 500 TABLET ORAL at 09:16

## 2021-12-13 RX ADMIN — OXYCODONE HYDROCHLORIDE 5 MG: 5 TABLET ORAL at 21:52

## 2021-12-13 RX ADMIN — HYDROMORPHONE HYDROCHLORIDE 0.2 MG: 0.2 INJECTION, SOLUTION INTRAMUSCULAR; INTRAVENOUS; SUBCUTANEOUS at 13:31

## 2021-12-13 RX ADMIN — TAMSULOSIN HYDROCHLORIDE 0.4 MG: 0.4 CAPSULE ORAL at 09:16

## 2021-12-13 RX ADMIN — OLANZAPINE 5 MG: 5 TABLET, ORALLY DISINTEGRATING ORAL at 18:00

## 2021-12-13 RX ADMIN — CEFEPIME HYDROCHLORIDE 2 G: 2 INJECTION, POWDER, FOR SOLUTION INTRAVENOUS at 20:40

## 2021-12-13 RX ADMIN — VENLAFAXINE HYDROCHLORIDE 37.5 MG: 37.5 CAPSULE, EXTENDED RELEASE ORAL at 10:51

## 2021-12-13 ASSESSMENT — ACTIVITIES OF DAILY LIVING (ADL)
ADLS_ACUITY_SCORE: 16
ADLS_ACUITY_SCORE: 14
ADLS_ACUITY_SCORE: 16
ADLS_ACUITY_SCORE: 14
ADLS_ACUITY_SCORE: 14
ADLS_ACUITY_SCORE: 16
ADLS_ACUITY_SCORE: 14
ADLS_ACUITY_SCORE: 16
ADLS_ACUITY_SCORE: 14

## 2021-12-13 ASSESSMENT — MIFFLIN-ST. JEOR: SCORE: 1550.56

## 2021-12-13 NOTE — PROGRESS NOTES
[facility] General Surgery Post-Op / Progress Note         Assessment and Plan:    Assessment:   SBO  Cemented in abdomen  Closed loop obstruction    Lysis of adhesions  Bowel perforation pod 5  Re operation  Leak again pod   Sepsis  respiratory compromise.  RVR  afib  Ileus  Pelvic abscess        Plan:   Continue TPN  Continue drains  Wound drainage controlled  Ng decreased output but no stool  suppository today, when stooling will get ng out  IV abx, wbc normalized           Interval History:   Doing well.  Continues to improve.  Pain is well-controlled.  No fevers.            Significant Problems:      Patient Active Problem List   Diagnosis     Hypertension goal BP (blood pressure) < 140/90     Benign prostatic hyperplasia with weak urinary stream     RAVI (generalized anxiety disorder)     Moderate major depression (H)     History of substance abuse (H)     History of hepatitis C     Chronic neck pain     Chronic bilateral low back pain without sciatica     SBO (small bowel obstruction) (H)             Review of Systems:    CONSTITUTIONAL: NEGATIVE for fever, chills, change in weight  ENT/MOUTH: NEGATIVE for ear, mouth and throat problems  RESP: NEGATIVE for significant cough or SOB  CV: NEGATIVE for chest pain, palpitations or peripheral edema          Medications:     All medications related to the patient's surgery have been reviewed  Current Facility-Administered Medications   Medication     acetaminophen (TYLENOL) Suppository 650 mg     acetaminophen (TYLENOL) tablet 650 mg     artificial saliva (BIOTENE DRY MOUTHWASH) liquid 30 mL     benzocaine-menthol (CEPACOL) 15-3.6 MG lozenge 1 lozenge     bisacodyl (DULCOLAX) Suppository 10 mg     busPIRone (BUSPAR) tablet 15 mg     ceFEPIme (MAXIPIME) 2 g vial to attach to  ml bag for ADULTS or 50 ml bag for PEDS     dextrose 10% infusion     glucose gel 15-30 g    Or     dextrose 50 % injection 25-50 mL    Or     glucagon injection 1 mg      enoxaparin ANTICOAGULANT (LOVENOX) injection 40 mg     fentaNYL (PF) (SUBLIMAZE) injection 25-50 mcg     flumazenil (ROMAZICON) injection 0.2 mg     [Held by provider] furosemide (LASIX) injection 40 mg     gabapentin (NEURONTIN) capsule 300 mg     haloperidol lactate (HALDOL) injection 2 mg     HYDROmorphone (DILAUDID) injection 0.2 mg     levothyroxine (SYNTHROID) injection 100 mcg     lidocaine (LMX4) cream     lidocaine 1 % 0.1-1 mL     lidocaine 1 % 1-30 mL     lipids (INTRALIPID) 20 % infusion 250 mL     magnesium hydroxide (MILK OF MAGNESIA) suspension 30 mL     metroNIDAZOLE (FLAGYL) tablet 500 mg     midazolam (VERSED) injection 0.5-2 mg     mirtazapine (REMERON) tablet 30 mg     naloxone (NARCAN) injection 0.2 mg    Or     naloxone (NARCAN) injection 0.4 mg    Or     naloxone (NARCAN) injection 0.2 mg    Or     naloxone (NARCAN) injection 0.4 mg     OLANZapine zydis (zyPREXA) ODT tab 5 mg     ondansetron (ZOFRAN-ODT) ODT tab 4 mg    Or     ondansetron (ZOFRAN) injection 4 mg     oxyCODONE (ROXICODONE) tablet 5 mg    Or     oxyCODONE (ROXICODONE) tablet 10 mg     pantoprazole (PROTONIX) IV push injection 40 mg     parenteral nutrition - ADULT compounded formula     prochlorperazine (COMPAZINE) injection 5 mg    Or     prochlorperazine (COMPAZINE) tablet 5 mg     senna-docusate (SENOKOT-S/PERICOLACE) 8.6-50 MG per tablet 1 tablet     sodium chloride (PF) 0.9% PF flush 10-20 mL     sodium chloride (PF) 0.9% PF flush 10-40 mL     sodium chloride (PF) 0.9% PF flush 10-40 mL     sodium chloride (PF) 0.9% PF flush 3 mL     sodium chloride (PF) 0.9% PF flush 3 mL     sodium chloride 0.9% 1000 mL TABLE SOLN     tamsulosin (FLOMAX) capsule 0.4 mg     venlafaxine (EFFEXOR-XR) 24 hr capsule 37.5 mg             Physical Exam:     All vitals stable  Patient Vitals for the past 8 hrs:   Pulse Resp SpO2   12/13/21 0116 70 14 94 %     Wt Readings from Last 4 Encounters:   12/06/21 70.3 kg (155 lb)   01/22/20 79.4 kg (175  lb)   12/24/19 81.2 kg (179 lb)     I/O last 3 completed shifts:  In: 1371 [P.O.:30; I.V.:469]  Out: 1170 [Urine:605; Emesis/NG output:300; Drains:265]  Drains minimal output  Wound drainage controlled.  Abdomen distension, not tender  Ext warm          Data:   All laboratory data related to this surgery reviewed  Results for orders placed or performed during the hospital encounter of 11/30/21 (from the past 24 hour(s))   Glucose by meter   Result Value Ref Range    GLUCOSE BY METER POCT 119 (H) 70 - 99 mg/dL   Glucose by meter   Result Value Ref Range    GLUCOSE BY METER POCT 108 (H) 70 - 99 mg/dL   Glucose by meter   Result Value Ref Range    GLUCOSE BY METER POCT 131 (H) 70 - 99 mg/dL   Glucose by meter   Result Value Ref Range    GLUCOSE BY METER POCT 120 (H) 70 - 99 mg/dL     All imaging studies related to this surgery reviewed    Mekhi Richardson MD

## 2021-12-13 NOTE — PLAN OF CARE
[id number]    Dilaudid PCA discontinued.  Seen by pain team  New orders ordered .    Problem: Bowel Motility Impaired (Surgery Nonspecified)  Goal: Effective Bowel Elimination  Outcome: Improving     Problem: Hypertension Comorbidity  Goal: Blood Pressure in Desired Range  Outcome: Improving  Intervention: Maintain Blood Pressure Management  Recent Flowsheet Documentation  Taken 12/13/2021 0917 by Lissette Gomez RN  Medication Review/Management: high-risk medications identified     Problem: Bowel Motility Impaired (Surgery Nonspecified)  Goal: Effective Bowel Elimination  Outcome: Improving  Suppository given.    Patient had 4 bowel movements loose brown .  NG to low intermittent.  Cont pulse oximetry  Telemetry Normal sinus rhythm.  1800: Zyprexa given agitation. No relief.  1846: Haldol 2mg IV given for increased agitation.Helpful Patient resting comfortable.  Blood glucose within the normal limits.

## 2021-12-13 NOTE — PROGRESS NOTES
"CLINICAL NUTRITION SERVICES - REASSESSMENT NOTE     Nutrition Prescription    RECOMMENDATIONS FOR MDs/PROVIDERS TO ORDER:  Continue same TPN/lipids    Malnutrition Status:    Moderate    Recommendations already ordered by Registered Dietitian (RD):  D 310  @ 85 ml/hr plus 250 ml of 20% lipids 3 times per week    Future/Additional Recommendations:  Follow for diet advance and TPN needs     EVALUATION OF THE PROGRESS TOWARD GOALS   Diet: NPO  Nutrition Support: TPN: D 310  @ 85 ml/hr plus 250 ml of 20% lipids 3 times per week providin Calories, 200 g protein, 310 g CHO, average of 21 g fat and 2040 ml fluid (CHO load=3.06 mg CHO/Kg/min)     ANTHROPOMETRICS  Height: 165.6 cm (5' 5.2\")  Most Recent Weight: 70.3 kg (155 lb)    Weight History:   Wt Readings from Last 10 Encounters:   21 70.3 kg (155 lb)   20 79.4 kg (175 lb)   19 81.2 kg (179 lb)     GI CONCERNS  Abdomen rounded  Hypoactive BS  Abdominal discomfort  Last BM 2021 per nurse  I/O: 1760/1881 ()  NG to LIS (output 300 ml)  Drains output 265 ml    LABS  Reviewed: Na 137, K 4.4, Cr 0.69, Mg 2.2, Phos 2.8, Glu 123    MEDICATIONS  Reviewed: maxipime, levothyroxine, flagyl, pantoprazole, senna-docusate    MALNUTRITION:  % Weight Loss:  20 lb weight loss noted-unsure of time frame-may be greater than 1 year  % Intake:  </= 50% for >/= 5 days (severe malnutrition)  Subcutaneous Fat Loss:  Orbital region moderate depletion  Fluid Retention:  None noted    Malnutrition Diagnosis: Moderate malnutrition  In Context of:  Acute illness or injury    CURRENT NUTRITION DIAGNOSIS  Malnutrition related to acute illness as evidenced by NPO x 5 days and moderate fat loss      INTERVENTIONS  Implementation  Continue same TPN regimen today    Goals  Tolerate TPN-met  Advance to po diet when appropriate-not met  BG -met    Monitoring/Evaluation  Progress toward goals will be monitored and evaluated per protocol.  "

## 2021-12-13 NOTE — PLAN OF CARE
Problem: Adult Inpatient Plan of Care  Goal: Plan of Care Review  Outcome: Improving   Patient on PCA continuous dilaudid. TPN running at 85 ml/ hr. Temp 100.3 house officer notified.  MARISA and abdominal bag patent. NPO. NG to low intermittent suction.

## 2021-12-13 NOTE — PHARMACY-CONSULT NOTE
"Pharmacy Note: Parenteral Nutrition (PN) Management    Pharmacist consulted to dose PN for Gurdeep Dia, a 66 year old male by Dr. Clinton.    Subjective:    The patient is a new PN start.     The patient was started on PN in the hospital on 12/5/21.     Indication for PN therapy: bowel resection     Inadequate nutrition anticipated for > 7 days.      Enteral nutrition contraindicated due to: bowel integrity is tenuous.     Pertinent diseases and other special considerations respiratory failure    Social History     Tobacco Use     Smoking status: Current Every Day Smoker     Smokeless tobacco: Never Used   Substance Use Topics     Alcohol use: Not Currently     Comment: Clean for 5 years     Drug use: Not Currently     Objective:    Ht Readings from Last 1 Encounters:   11/30/21 1.656 m (5' 5.2\")     Wt Readings from Last 1 Encounters:   12/06/21 70.3 kg (155 lb)       Body mass index is 25.64 kg/m .    No data found.    Labs:  Last 3 days:  Recent Labs     12/06/21  0619 12/06/21  0620 12/06/21  0620 12/06/21  0947 12/06/21  1428 12/06/21  1921 12/07/21  0358 12/07/21  1128 12/07/21  1443 12/08/21  0535 12/08/21  0536   NA  --  146*  --   --   --   --  148*  --   --   --  149*   POTASSIUM  --  3.0*  --   --  3.6 5.8* 3.2* 5.7* 3.8  --  3.7   CHLORIDE  --  106  --   --   --   --  106  --   --   --  113*   CO2  --  31  --   --   --   --  31  --   --   --  29   BUN  --  17  --   --   --   --  20  --   --   --  22   CR  --  0.82  --   --   --   --  0.79  --   --   --  0.75   VIJAYA  --  8.4*  --   --   --   --  8.4*  --   --   --  8.3*   [id number]  --  1.12  --   --   --   --   --   --   --   --   --    MAG  --  2.1  --   --   --   --  2.2  --   --   --  2.1   PHOS  --  2.9  --   --   --   --  3.2  --   --   --  2.7   PROTTOTAL  --  5.9*  --   --   --   --  5.9*  --   --   --   --    ALBUMIN  --  2.3*  --   --   --   --  2.3*  --   --   --   --    PREALB  --  6*  --   --   --   --   --   --   --   --   --    TRIG  --  " 217*  --   --   --   --   --   --   --   --   --    HGB  --   --   --  11.2*  --   --  10.9*  --   --  10.6*  --    HCT  --   --   --  34.2*  --   --  33.8*  --   --  32.8*  --    PLT  --   --   --  318  --   --  332  --   --  406  --    BILITOTAL  --  0.4  --   --   --   --  0.6  --   --   --   --    AST  --  25  --   --   --   --  42*  --   --   --   --    ALT  --  20  --   --   --   --  34  --   --   --   --    ALKPHOS  --  44*   < >  --   --   --  54  --   --   --   --    INR 1.11  --   --   --   --   --   --   --   --   --   --     < > = values in this interval not displayed.       Glucose (past 48 hours):   Recent Labs     12/06/21  1951 12/07/21  0157 12/07/21  0358 12/07/21  0816 12/07/21  1220 12/08/21  0216 12/08/21  0536   * 133* 134* 143* 121* 141* 121       Intake/Output (last 24 hours): I/O last 3 completed shifts:  In: 1371 [P.O.:30; I.V.:469]  Out: 1170 [Urine:605; Emesis/NG output:300; Drains:265]    Estimated CrCl: Estimated Creatinine Clearance: 103.3 mL/min (A) (based on SCr of 0.69 mg/dL (L)).    Assessment:    Continue patient on PN therapy as a continuous central therapy.     Given the patient's current condition/oral intake, PN is still indicated.    Lab results reviewed:   12/9: all electrolytes in goal range, no changes today  12/10: all electrolytes in goal range, no changes today  12/11: all electrolytes in goal range, no changes today  12/12: all electrolytes in goal range, no changes today  12/13: Na trending down, increase slightly in TPN today due to recent hypernatremia. Phos 2.4, monitor to see if need to increase in TPN tomorrow. All other labs wnl      Plan:  1. Rate of PN: 85 mL/hr.  2. Formula:     Amino Acids 100 grams    Dextrose 310 grams    Sodium 50 mEq/day    Potassium 90 mEq/day    Calcium 8 mEq/day    Magnesium 14 mEq/day    Phosphorus 28 mMol/day    Chloride: Acetate Ratio 1:1    Standard Multivitamins w/Vitamin K    Trace Elements  3. Fat Emulsion: 20%, 250 mL  IV three times weekly.  4. Check BMP tomorrow  5. Pharmacist will continue to follow the patient's lab results, clinical status and blood glucose results and make adjustments as appropriate.    Thank you for the consult.  Nolvia Kumar, PharmD   12/13/2021 08:00 AM

## 2021-12-13 NOTE — PROGRESS NOTES
"[facility] ACUTE PAIN SERVICE    ([facility], [facility], [facility])   Daily PAIN Progress Note    Assessment/Plan:  Gurdeep Dia is a 66 year old male who was admitted on 11/30/2021.   Patient with history of BPH, hypertension, moderate major depression, chronic pain multiple previous abdominal surgeries including splenectomy, chronic neck and back pain, polysubstance abuse. Patient is currently incarcerated with release date 12/14/21. Patient with previous surgery for small bowell resection, repair of enterotomy on 11/30/21, subsequent small bowel perforation requiring repeat ex lap for washout 12/3, 4th drain place 12/9 by IR for abscess drainage.    Per surgery, patient currently on TPN,  continue drains, try supp today, can remove NG when stooling. Dilaudid PCA discontinued, trying orals.   Patient had been receiving about  280 oral morphine equivalents with the hydromorphone PCA.    Would consider scheduling the oxycodone if pain persists     Per discussion with RN, review of chart, patient seems to have quite a bit of pain with dressing changes, maneuvering of drains..      Patient states pain is worse than day \"1\", he is sedated and lethargic appearing.  He had 0.2 mg IV hydromorphone about one hour previously.  Discussed with RN when patient more awake could provide the 5 mg oxycodone dose.       PLAN:   1) Pain is consistent with acute post operative pain with recent bowel obstruction/bowel perforation, status post repair, intraabdominal abscess.   Patient with multiple previous bowel surgeries with scar tissue.  Patient currently NPO, has NG tube in place.    Agree with trial of oral pain medication.  2)Multimodal Medication Therapy  Topical: consider  NSAID'S: avoid  Adjuvants: Tylenol 650 mg po q 4 h prn,  gabapentin 300 mg bid.  Antidepressants/anxiolytics: Buspar 15 mg po bid, Remeron 30 mg po q hs, venlafaxine XR 37.5 mg daily, olanzapine, Haldol PRN ordered for " agitation.   Opioids: oxycodone 5 every 6 hours with additional 5 mg every three hours prn Hold for RASS<-2  Hold for SBP <90  Hold for sedation  Hold for RR<12  Hold for SpO2<90%      IV Pain medication:  Dilaudid PCA discontinued, Dilaudid IV 0.2 mg q 2 h prn    3)Non-medication interventions  Acupuncture consult- as available Mon and Thursday    Integrative consult - consider  4)Constipation Prophylaxis-senna/docusate 1 po bid   per surgery  5) Follow up   -Opioid prescriber has been none  -Discharge Recommendations - We recommend prescribing the following at the time of discharge-TBD    Principal Problem:    SBO (small bowel obstruction) (H)  Active Problems:    Hypertension goal BP (blood pressure) < 140/90    Benign prostatic hyperplasia with weak urinary stream    RAVI (generalized anxiety disorder)    Moderate major depression (H)    History of substance abuse (H)    History of hepatitis C    Chronic neck pain    Chronic bilateral low back pain without sciatica     LOS: 13 days       Subjective:  Patient reports pain is worse since being in hospital.  Doses off easily.      busPIRone  15 mg Oral BID     ceFEPIme (MAXIPIME) IV  2 g Intravenous Q8H     enoxaparin ANTICOAGULANT  40 mg Subcutaneous Q24H     [Held by provider] furosemide  40 mg Intravenous Daily     gabapentin  300 mg Oral BID w/meals     levothyroxine  100 mcg Intravenous Weekly     lipids  250 mL Intravenous Once per day on Tue Thu Sat     metroNIDAZOLE  500 mg Oral TID     mirtazapine  30 mg Oral At Bedtime     pantoprazole (PROTONIX) IV  40 mg Intravenous Daily with breakfast     senna-docusate  1 tablet Oral BID     sodium chloride (PF)  10-40 mL Intracatheter Q7 Days     sodium chloride (PF)  3 mL Intracatheter Q8H     tamsulosin  0.4 mg Oral Daily     venlafaxine  37.5 mg Oral Daily       Objective:  Vital signs in last 24 hours:  Temp:  [98.1  F (36.7  C)-101.3  F (38.5  C)] 99.1  F (37.3  C)  Pulse:  [70-99] 81  Resp:  [14-18] 16  BP:  (112-123)/(70-84) 115/70  SpO2:  [92 %-96 %] 96 %  Weight:   Weight change:   Body mass index is 28.21 kg/m .    Intake/Output last 3 shifts:  I/O last 3 completed shifts:  In: 1371 [P.O.:30; I.V.:469]  Out: 1170 [Urine:605; Emesis/NG output:300; Drains:265]  Intake/Output this shift:  I/O this shift:  In: 85 [I.V.:85]  Out: 50 [Drains:50]    Review of Systems:   As per subjective, all others negative.    Physical Exam:    General Appearance:  Dosing, complaint of pain, and being cold, shivering   Head:  Normocephalic, without obvious abnormality, atraumatic   Eyes:  PERRL, conjunctiva/corneas clear, EOM's intact   Nose: Nares normal, septum midline, mucosa normal, no drainage   Throat: Lips, mucosa, and tongue normal; teeth and gums normal   Neck: Supple, symmetrical, trachea midline   Back:   Symmetric, no curvature, ROM normal, no CVA tenderness   Lungs:   respirations unlabored   Chest Wall:  No tenderness or deformity   Heart:  Regular rate and rhythm   Abdomen:   Soft, abdominal binder in place, bowel sounds   Extremities: Extremities normal, atraumatic, no cyanosis or edema   Skin: Skin color, texture, turgor normal, no rashes or lesions   Neurologic: Alert and oriented X 3, Moves all 4 extremities          Imaging:  Reviewed      Lab Results:  Reviewed.   Recent Labs   Lab 12/13/21  0734 12/12/21  0623 12/11/21  0610   WBC 10.2 10.4 12.5*   HGB 9.5* 9.6* 9.8*   HCT 29.3* 29.8* 30.9*   * 659* 605*     Recent Labs   Lab 12/13/21  0734 12/12/21  0623 12/11/21  0610 12/08/21  0536 12/07/21  0358    137 140   < > 148*   CO2 28 26 27   < > 31   BUN 17 16 18   < > 20   ALBUMIN 2.1*  --   --   --  2.3*   ALKPHOS 90  --   --   --  54   ALT 50*  --   --   --  34   AST 33  --   --   --  42*    < > = values in this interval not displayed.     Recent Labs   Lab 12/09/21  0841   INR 1.10       Total time spent 25 minutes with greater than 50% in consultation, education and coordination of care.     Also  discussed with RN, MD and  time spent in discussion with Acute Inpatient Pain Pharmacist.       Melanie GUZMAN, CNS-BC, DNP  Acute Care Pain Management Program  [facility] ([place], [place], [place])   With questions call [phone]  Preference if for Angel Porter  Click HERE to page [facility]

## 2021-12-13 NOTE — PROGRESS NOTES
AZALIA sent referral to financial  today as pt will need insurance after tomorrow. Additionally received name of worker at [facility]: [phone]. Giovanni is currently out of the office. Awaiting call back from [facility] with discharge plan. Cm to follow.    12:56 PM  [place] with [place] [facility] [place] states that [facility] will be released from [facility]'s care at some point after midnight and that security staff will update charge nurse when this will happen sometime after midnight. Security staff will remove his restraints and then head out. Writer asked about insurance information for Pt, Lieutenant Mathur does not have any information but transferred Writer to Mary [facility] in health services. Voicemail left for Mary.    1:30 PM  Received phone call back from [place] - Pt has community MA coverage that is active with  MA. [facility] of financial responsibility is [place].       SHELBY Timmons  11:11 AM

## 2021-12-13 NOTE — PROGRESS NOTES
[facility] Progress Note    Assessment/Plan  Principal Problem:    SBO (small bowel obstruction) (H)  Active Problems:    Hypertension goal BP (blood pressure) < 140/90    Benign prostatic hyperplasia with weak urinary stream    RAVI (generalized anxiety disorder)    Moderate major depression (H)    History of substance abuse (H)    History of hepatitis C    Chronic neck pain    Chronic bilateral low back pain without sciatica    Gurdeep Dia is a 66 year old male with history of splenectomy, appendectomy, HTN, and substance abuse who presented with 2 days of right sided abdominal pain, found to have a bowel obstruction with closed loop. He is admitted for surgical repair of obstruction.      Requires ongoing hospitalization for post-operative monitoring and care       Mechanical SBO s/p exploratory laparotomy / small bowel resection / repair of enterotomy / extensive lysis of adhesions (11/30/21)  Subsequent small bowel perforation requiring repeat ex lap for washout 12/3    CT abdomen/pelvis on admission 11/30 significant for closed loop mechanical small bowel obstruction. Does have history of multiple abdominal surgeries previously. First procedure 11/30 for small bowel resection. Acute change on 12/2-12/3 and found to have intraabdominal drainage from 12/3/2021; underwent repeat exploratory laparotomy with washout and repair of small bowel perforation. 12/8 still without stool charted, still spiking low grade fevers, having atypical output from MARISA drain, white count climbing. CT 12/8 showing ring enhancing lesion in abdomen likely representing abscess. Fourth drain placed 12/9 AM by IR for abscess drainage.  -Surgery consulted and following   -NPO for now until cleared by gen surg   -Pain control as per surgery/Pain team (PCA, tyl) -- need to balance with encephalopathy below   -Abscess culture growing e. Coli, see sensitivities. Discontinued vancomycin and meropenem 12/12 in favor of IV  cefepime + crushed oral flagyl (shortage of IV form)  -IV Protonix     Acute encephalopathy, likely multifactorial - toxic/metabolic/hospital-induced   Acutely agitated post-op 12/3 to 12/4. Pulling at lines and threatening to leave.  Nurse felt unsafe without restraints. Precedex drip started, now off. Transferred out of the unit. Has been calm since back on the floor.  - Soft limb restraint x 2 - adjust as needed  - Precedex gtt stop 12/7 morning  - PRN Haldol injection  - Zyprexa ODT 4x daily prn  - Conservative measures as able   - Avoid opioid/benzo if able     Acute hypoxic respiratory failure, post-op   Atelectasis vs aspiration  Initially requiring high-flow oxygen in ICU following procedure.  Question post-op atelectasis vs aspiration versus volume overload.  Does have a degree of respiratory distress when he gets agitated. CXR 12/6 showing bibasilar opacitied favored to reflect atelectasis, though still with some interstitial coarsening. Breathing improved 12/7 after some antibiotics and maybe ongoing resolution of his atelectasis. No signs of fluid overload and sodium climbing so IV lasix discontinued 12/8.  -Continue nasal cannula  -Wean as able  -Continue abx as above, don't think we need to broaden from a lung perspective  -Aggressive IS use  -PT/OT to get moving  -Discontinued IV lasix    Afib, RVR post-op, currently rate controlled   NSVT episode 12/8   Noted.  Rates had been in the 120 to 150 range.  Converted to NSR with amiodarone drip. No longer on any rate control. Both CHADS2-Vasc and HAS-BLED of at least 2.  Would likely warrant anticoagulation on ongoing basis. TSH normal. Lytes normal. Echo 12/10 showing slight concentric thickening in LV and EF 60-65%.  -Lovenox for now   -Consider ongoing anticoagulation once encephalopathy is improved    HTN  PTA lisinopril, furosemide being held in the setting of acute surgical care.  Will monitor, even with pain BP not significantly elevated.  Will  resume when hemodynamically stable.  Kidney function is stable at this time.  Blood pressure does climb when he is agitated.  -Monitor     Moderate malnutrition  Per RD eval.  -TPN until cleared to ADAT per surgery        Chronic Conditions:  Hypothyroidism- PTA levothyroxine  Depression/anxiety- PTA venlafaxine, mirtazepine, Buspar   Chronic pain- Hold mexolicam 7.5mg daily, resume PTA gabapentin when tolerating PO.  BPH- PTA flomax      Diet: NPO for Medical/Clinical Reasons Except for: Meds, Ice ChipsNPO   DVT Prophylaxis: Lovenox q day    Roblero Catheter: placed   Fluids:  None  Central Lines: None  Code Status: Full Code    Subjective  Will be released from long-term tomorrow. Wants us to reach out to his  there, Giovanni Lea with any questions. Touched base with social work who will get to work on getting him new insurance not thru [employer].     Objective    Vital signs in last 24 hours Temp:  [98.1  F (36.7  C)-101.3  F (38.5  C)] 99.1  F (37.3  C)  Pulse:  [70-99] 81  Resp:  [14-18] 16  BP: (112-123)/(70-84) 115/70  SpO2:  [92 %-96 %] 96 %       Intake/Output last 3 shift I/O last 3 completed shifts:  In: 1371 [P.O.:30; I.V.:469]  Out: 1170 [Urine:605; Emesis/NG output:300; Drains:265]    Intake/Output this shift:I/O this shift:  In: 95 [I.V.:95]  Out: 50 [Drains:50]    Physical Exam  General appearance: uncomfortable appearing male, laying in bed, two officers at bedside.  Acutely agitated.  Head: Normocephalic, without obvious abnormality, atraumatic.  NG tube in place.    Throat: moist mucous membranes.  Lungs: Tachypneic.  High flow oxygen in place.  Coarse lung sounds in the bases.  Heart: regular rate and rhythm, S1, S2 normal, no murmur, click, rub or gallop  Abdomen: Abdominal binding in place.  Multiple MARISA drains in place with bloody drainage. bowel sounds very present. Moderate TTP throughout.   Extremities: extremities normal, atraumatic, no cyanosis or edema  Skin: Slightly pale, dry  skin.  Neurologic: Alert and oriented X 2.  Able to move extremities.  Sensation intact.   Psychiatric: Exceedingly anxious and agitated.     Pertinent Labs and Pertinent Radiology   Lab Results: personally reviewed.     Radiology Results: Personally reviewed image/s and impression/s    Precepted patient with Dr. Willy Mcmahon MD - PGY2  [facility]  P: [phone]

## 2021-12-13 NOTE — PROGRESS NOTES
Wound Ostomy  WOC Assessment       Allergies:  Penicillins    Diagnosis:   Patient Active Problem List    Diagnosis Date Noted     SBO (small bowel obstruction) (H) 11/30/2021     Priority: Medium     Hypertension goal BP (blood pressure) < 140/90 12/24/2019     Priority: Medium     Benign prostatic hyperplasia with weak urinary stream 12/24/2019     Priority: Medium     RAVI (generalized anxiety disorder) 12/24/2019     Priority: Medium     Moderate major depression (H) 12/24/2019     Priority: Medium     History of substance abuse (H) 12/24/2019     Priority: Medium     History of hepatitis C 12/24/2019     Priority: Medium     Chronic neck pain 12/24/2019     Priority: Medium     Chronic bilateral low back pain without sciatica 12/24/2019     Priority: Medium       Height:  [unfilled]    Weight:  [unfilled]    Labs:  Recent Labs   Lab Test 12/10/21  0456 12/08/21  0535 12/07/21  0358   CRP  --   --  26.6*   HGB 9.5*   < > 10.9*   ALBUMIN  --   --  2.3*    < > = values in this interval not displayed.       Mars:  Mars Score: 17    Specialty Bed:       Wound culture obtained: No    Edema:  No     Pouch held all weekend, no signs of leaking today. Continues with green output from midline. Will leave current pouch in place plan to see Friday for change. Discharge orders entered. See below for previous assessment.    Anatomic Site/Laterality: midline incision    Reason for ongoing care:   Plan of care    Encounter Type:  Subsequent Encounter Patient is s/p EXPLORATORY LAPAROTOMY, WITH WASHOUT, REPAIR OF SMALL BOWEL PERFORATION on 12/3/21.     Patient has midline incision intact with staples. There is large amounts of enteric drainage from the incision. Was asked to pouch the incision.     Removed old dressings that were soaked with enteric drainage. No specific open area within the incision and there was absolutely no output from the wound throughout the assessment. Skin was prepped with Cavilon no-sting skin  barrier, barrier ring applied around majority of distal end of incision and pouch [id number] was applied.     Discussed with RN - template in the room, however the pouch may not even be in the correct location along the incision as there was no identifiable fistula location. RNs can change the pouch over the weekend if needed for leaking and can re-position at that time if needed.          Nursing care provided was coordinated care with RN.    Discussed plan of care with nurse and patient.    Outcomes and treatment recommendations are to promote skin integrity and contain exudate.    Actions taken by WOC RN: 1 minutes of education and WOC Discharge recommendations entered.    Planned Follow Up: Later this week.    Plan for next visit: Reassess wound(s) and Reassess skin integrity

## 2021-12-14 ENCOUNTER — APPOINTMENT (OUTPATIENT)
Dept: PHYSICAL THERAPY | Facility: HOSPITAL | Age: 66
DRG: 329 | End: 2021-12-14
Payer: MEDICARE

## 2021-12-14 LAB
ANION GAP SERPL CALCULATED.3IONS-SCNC: 8 MMOL/L (ref 5–18)
BUN SERPL-MCNC: 18 MG/DL (ref 8–22)
CALCIUM SERPL-MCNC: 8.5 MG/DL (ref 8.5–10.5)
CHLORIDE BLD-SCNC: 102 MMOL/L (ref 98–107)
CO2 SERPL-SCNC: 24 MMOL/L (ref 22–31)
CREAT SERPL-MCNC: 0.75 MG/DL (ref 0.7–1.3)
ERYTHROCYTE [DISTWIDTH] IN BLOOD BY AUTOMATED COUNT: 14.2 % (ref 10–15)
GFR SERPL CREATININE-BSD FRML MDRD: >90 ML/MIN/1.73M2
GLUCOSE BLD-MCNC: 123 MG/DL (ref 70–125)
GLUCOSE BLDC GLUCOMTR-MCNC: 129 MG/DL (ref 70–99)
GLUCOSE BLDC GLUCOMTR-MCNC: 137 MG/DL (ref 70–99)
GLUCOSE BLDC GLUCOMTR-MCNC: 138 MG/DL (ref 70–99)
HCT VFR BLD AUTO: 31.3 % (ref 40–53)
HGB BLD-MCNC: 10.2 G/DL (ref 13.3–17.7)
MAGNESIUM SERPL-MCNC: 2.3 MG/DL (ref 1.8–2.6)
MCH RBC QN AUTO: 29.3 PG (ref 26.5–33)
MCHC RBC AUTO-ENTMCNC: 32.6 G/DL (ref 31.5–36.5)
MCV RBC AUTO: 90 FL (ref 78–100)
PHOSPHATE SERPL-MCNC: 2.7 MG/DL (ref 2.5–4.5)
PLATELET # BLD AUTO: 814 10E3/UL (ref 150–450)
POTASSIUM BLD-SCNC: 4.3 MMOL/L (ref 3.5–5)
RBC # BLD AUTO: 3.48 10E6/UL (ref 4.4–5.9)
SODIUM SERPL-SCNC: 134 MMOL/L (ref 136–145)
WBC # BLD AUTO: 12.5 10E3/UL (ref 4–11)

## 2021-12-14 PROCEDURE — 99024 POSTOP FOLLOW-UP VISIT: CPT | Performed by: NURSE PRACTITIONER

## 2021-12-14 PROCEDURE — 99232 SBSQ HOSP IP/OBS MODERATE 35: CPT | Mod: GC | Performed by: STUDENT IN AN ORGANIZED HEALTH CARE EDUCATION/TRAINING PROGRAM

## 2021-12-14 PROCEDURE — 250N000013 HC RX MED GY IP 250 OP 250 PS 637: Performed by: CLINICAL NURSE SPECIALIST

## 2021-12-14 PROCEDURE — 99232 SBSQ HOSP IP/OBS MODERATE 35: CPT | Performed by: PAIN MEDICINE

## 2021-12-14 PROCEDURE — 120N000001 HC R&B MED SURG/OB

## 2021-12-14 PROCEDURE — 82310 ASSAY OF CALCIUM: CPT | Performed by: INTERNAL MEDICINE

## 2021-12-14 PROCEDURE — 250N000013 HC RX MED GY IP 250 OP 250 PS 637: Performed by: STUDENT IN AN ORGANIZED HEALTH CARE EDUCATION/TRAINING PROGRAM

## 2021-12-14 PROCEDURE — 85027 COMPLETE CBC AUTOMATED: CPT | Performed by: INTERNAL MEDICINE

## 2021-12-14 PROCEDURE — 83735 ASSAY OF MAGNESIUM: CPT | Performed by: STUDENT IN AN ORGANIZED HEALTH CARE EDUCATION/TRAINING PROGRAM

## 2021-12-14 PROCEDURE — 250N000009 HC RX 250: Performed by: SPECIALIST

## 2021-12-14 PROCEDURE — 250N000009 HC RX 250: Performed by: INTERNAL MEDICINE

## 2021-12-14 PROCEDURE — 250N000013 HC RX MED GY IP 250 OP 250 PS 637: Performed by: SPECIALIST

## 2021-12-14 PROCEDURE — C9113 INJ PANTOPRAZOLE SODIUM, VIA: HCPCS | Performed by: INTERNAL MEDICINE

## 2021-12-14 PROCEDURE — 84100 ASSAY OF PHOSPHORUS: CPT | Performed by: STUDENT IN AN ORGANIZED HEALTH CARE EDUCATION/TRAINING PROGRAM

## 2021-12-14 PROCEDURE — 250N000011 HC RX IP 250 OP 636: Performed by: INTERNAL MEDICINE

## 2021-12-14 PROCEDURE — 250N000011 HC RX IP 250 OP 636: Performed by: STUDENT IN AN ORGANIZED HEALTH CARE EDUCATION/TRAINING PROGRAM

## 2021-12-14 RX ADMIN — LEVOTHYROXINE SODIUM 25 MCG: 0.03 TABLET ORAL at 06:00

## 2021-12-14 RX ADMIN — CEFEPIME HYDROCHLORIDE 2 G: 2 INJECTION, POWDER, FOR SOLUTION INTRAVENOUS at 05:59

## 2021-12-14 RX ADMIN — OXYCODONE HYDROCHLORIDE 5 MG: 5 TABLET ORAL at 09:05

## 2021-12-14 RX ADMIN — OXYCODONE HYDROCHLORIDE 5 MG: 5 TABLET ORAL at 02:12

## 2021-12-14 RX ADMIN — GABAPENTIN 300 MG: 300 CAPSULE ORAL at 16:09

## 2021-12-14 RX ADMIN — METRONIDAZOLE 500 MG: 500 TABLET ORAL at 09:05

## 2021-12-14 RX ADMIN — OXYCODONE HYDROCHLORIDE 5 MG: 5 TABLET ORAL at 19:08

## 2021-12-14 RX ADMIN — OXYCODONE HYDROCHLORIDE 5 MG: 5 TABLET ORAL at 14:51

## 2021-12-14 RX ADMIN — PANTOPRAZOLE SODIUM 40 MG: 40 INJECTION, POWDER, FOR SOLUTION INTRAVENOUS at 09:06

## 2021-12-14 RX ADMIN — BUSPIRONE HYDROCHLORIDE 15 MG: 15 TABLET ORAL at 21:58

## 2021-12-14 RX ADMIN — VENLAFAXINE HYDROCHLORIDE 37.5 MG: 37.5 CAPSULE, EXTENDED RELEASE ORAL at 09:06

## 2021-12-14 RX ADMIN — OXYCODONE HYDROCHLORIDE 5 MG: 5 TABLET ORAL at 16:09

## 2021-12-14 RX ADMIN — CEFEPIME HYDROCHLORIDE 2 G: 2 INJECTION, POWDER, FOR SOLUTION INTRAVENOUS at 21:57

## 2021-12-14 RX ADMIN — MIRTAZAPINE 30 MG: 30 TABLET, FILM COATED ORAL at 21:58

## 2021-12-14 RX ADMIN — BUSPIRONE HYDROCHLORIDE 15 MG: 15 TABLET ORAL at 09:06

## 2021-12-14 RX ADMIN — GABAPENTIN 300 MG: 300 CAPSULE ORAL at 09:05

## 2021-12-14 RX ADMIN — METRONIDAZOLE 500 MG: 500 TABLET ORAL at 21:58

## 2021-12-14 RX ADMIN — OXYCODONE HYDROCHLORIDE 5 MG: 5 TABLET ORAL at 11:54

## 2021-12-14 RX ADMIN — TAMSULOSIN HYDROCHLORIDE 0.4 MG: 0.4 CAPSULE ORAL at 09:05

## 2021-12-14 RX ADMIN — METRONIDAZOLE 500 MG: 500 TABLET ORAL at 14:51

## 2021-12-14 RX ADMIN — ENOXAPARIN SODIUM 40 MG: 40 INJECTION SUBCUTANEOUS at 11:55

## 2021-12-14 RX ADMIN — CEFEPIME HYDROCHLORIDE 2 G: 2 INJECTION, POWDER, FOR SOLUTION INTRAVENOUS at 12:40

## 2021-12-14 RX ADMIN — I.V. FAT EMULSION 250 ML: 20 EMULSION INTRAVENOUS at 19:11

## 2021-12-14 RX ADMIN — OXYCODONE HYDROCHLORIDE 5 MG: 5 TABLET ORAL at 22:03

## 2021-12-14 RX ADMIN — POTASSIUM CHLORIDE: 2 INJECTION, SOLUTION, CONCENTRATE INTRAVENOUS at 19:12

## 2021-12-14 ASSESSMENT — ACTIVITIES OF DAILY LIVING (ADL)
ADLS_ACUITY_SCORE: 14

## 2021-12-14 NOTE — PLAN OF CARE
Problem: Pain Acute  Goal: Acceptable Pain Control and Functional Ability  Outcome: Improving  Intervention: Develop Pain Management Plan  Recent Flowsheet Documentation  Taken 12/14/2021 0212 by Ayana Brandt, RN  Pain Management Interventions: medication (see MAR)  Taken 12/13/2021 2044 by Ayana Brandt, RN  Pain Management Interventions: medication (see MAR)    Patient rating his surgical pain 10/10 every time he is asked. Scheduled oxycodone given, patient sleeping after and in between cares.     Problem: Risk for Delirium  Goal: Improved Behavioral Control  Outcome: Improving    Oriented x4. Fluctuates. Rambles at times and sometimes gets more agitated but was easily redirectable overnight.  No agitation overnight. No need for prn meds at this time. He had zyprexa and haldol on evening.   Guards from [employer] left as patient was released from the facility overnight.   Patient placed on 1:1 after when guards left.     Problem: Bowel Motility Impaired (Surgery Nonspecified)  Goal: Effective Bowel Elimination  Outcome: Improving    Had two watery BM overnight. Senna held.   Brief in place.     NG to LIS with 150 ml output overnight.   MARISA x 3 with minimal output.   Midline incision with pouch in lower incision, good amount of bole/green output in pouch.     PICC line with good blood return.   TPN running at 85 ml/h.   Acu checks q 6 h wdl.     Mg, K and Ph protocol to recheck in the morning.     Telemetry with NSR.

## 2021-12-14 NOTE — PLAN OF CARE
"  Problem: Postoperative Urinary Retention (Surgery Nonspecified)  Goal: Effective Urinary Elimination  Outcome: Improving   Voiding without problems    Problem: Violence Risk or Actual  Goal: Anger and Impulse Control  Outcome: Improving   Pt no longer requiring a sitter in his room. Pt is in exceptionally good spirits. Especially after removal of NG tube. No behavioral concerns.     Problem: Pain (Surgery Nonspecified)  Goal: Acceptable Pain Control  Outcome: Improving   Pt states \"my pain is always a 10\" even after narcotics.     Problem: Adult Inpatient Plan of Care  Goal: Readiness for Transition of Care  Outcome: No Change   No discharge yet. Pt remains on TPN and just started PO clear liquids.  "

## 2021-12-14 NOTE — PROGRESS NOTES
Writer was unable to remove the white cap on the abscess drain to irrigate said drain. Spoke to RN in interventional radiology and informed her. Oncoming RN will attempt to remove with a Jane clamp. If unable she will contact  or [facility] whose phone number is [phone]

## 2021-12-14 NOTE — PROGRESS NOTES
[facility] ACUTE PAIN SERVICE    ([facility], [facility], [facility])  Daily PAIN Progress Note    Assessment/Plan:  Gurdeep Dia is a 66 year old male who was admitted on 11/30/2021.  I was asked to see the patient for postoperative pain in the setting of SBO. Admitted for abdominal pain and found to have bowel obstruction, admitted for surgical repair. History of substance abuse (cocaine/etoh), hypertension, hypothyroid, depression/anxiety, chronic pain and takes meloxicam and gabapentin at baseline.  Early on in his hospitalization he was under incarceration although no police/gaurds at the bedside today.  She was transitioned off of PCA yesterday.  Seems to be tolerating oxycodone.    PLAN:   1) Postoperative pain in the setting of bowel obstruction/perforation, POD#6 continues to be n.p.o. and NG in place.  A trial of oral pain medic patient was started yesterday.  Although he continues to rate his pain 10/10 he does appear comfortable and does tell me that the oxycodone is helpful.Agree with trial of oral pain medication.  2)Multimodal Medication Therapy  Topical: add lidocaine   NSAID'S: avoid  Adjuvants: Tylenol 650 mg po q 4 h prn,  gabapentin 300 mg bid.  Antidepressants/anxiolytics: Buspar 15 mg po bid, Remeron 30 mg po q hs, venlafaxine XR 37.5 mg daily, olanzapine, Haldol PRN ordered for agitation.   Opioids: oxycodone 5 every 6 hours with additional 5 mg every three hours prn    IV PCA was stopped yesterday   3)Non-medication interventions- acupuncture on Thursday   4)Constipation Prophylaxis- per surgery  5) Follow up   -Opioid prescriber has been none  -Discharge Recommendations - We recommend prescribing the following at the time of discharge-small supply of oxycodone              Subjective:  Patient tells me his pain is 10/10.  He does feel that the oxycodone is helpful.  He is asking to go back on his PCA.  He does acknowledge lack of sleep is contributing to his overall  "discomfort.  He is also very thirsty and hungry..  He states he has had several bowel movements.     [place]  prescription for gabapentin 360 tablets.       SBO (small bowel obstruction) (H)   Patient Active Problem List   Diagnosis     Hypertension goal BP (blood pressure) < 140/90     Benign prostatic hyperplasia with weak urinary stream     RAVI (generalized anxiety disorder)     Moderate major depression (H)     History of substance abuse (H)     History of hepatitis C     Chronic neck pain     Chronic bilateral low back pain without sciatica     SBO (small bowel obstruction) (H)        History   Drug Use Unknown         Tobacco Use      Smoking status: Current Every Day Smoker      Smokeless tobacco: Never Used          busPIRone  15 mg Oral BID     ceFEPIme (MAXIPIME) IV  2 g Intravenous Q8H     enoxaparin ANTICOAGULANT  40 mg Subcutaneous Q24H     [Held by provider] furosemide  40 mg Intravenous Daily     gabapentin  300 mg Oral BID w/meals     levothyroxine  25 mcg Oral Daily     lipids  250 mL Intravenous Once per day on Tue Thu Sat     metroNIDAZOLE  500 mg Oral TID     mirtazapine  30 mg Oral At Bedtime     oxyCODONE  5 mg Oral Q6H     pantoprazole (PROTONIX) IV  40 mg Intravenous Daily with breakfast     senna-docusate  1 tablet Oral BID     sodium chloride (PF)  10-40 mL Intracatheter Q7 Days     sodium chloride (PF)  3 mL Intracatheter Q8H     tamsulosin  0.4 mg Oral Daily     venlafaxine  37.5 mg Oral Daily       Objective:  Vital signs in last 24 hours:  B/P: 116/66, T: 97, P: 85, R: 16   Blood pressure 116/66, pulse 85, temperature 97  F (36.1  C), temperature source Temporal, resp. rate 16, height 1.702 m (5' 7\"), weight 81.7 kg (180 lb 1.6 oz), SpO2 92 %.      Weight:   Wt Readings from Last 2 Encounters:   12/13/21 81.7 kg (180 lb 1.6 oz)   01/22/20 79.4 kg (175 lb)           Intake/Output:    Intake/Output Summary (Last 24 hours) at 12/14/2021 0913  Last data filed at 12/14/2021 0700  Gross per 24 " hour   Intake 1376 ml   Output 1695 ml   Net -319 ml        Review of Systems:   As per subjective, all others negative.    Physical Exam:  Constitutional: alert and uncooperative  Head: Normocephalic. No masses, lesions, tenderness or abnormalities  Neck: Neck supple. No adenopathy. Thyroid symmetric, normal size,  ENT: ENT exam normal, no neck nodes or sinus tenderness  Cardiovascular: No edema or JVD., negative findings: S1 normal, S2 normal  Respiratory: negative, negative findings: no chest deformities noted, diminished   Gastrointestinal: positive findings: distended, hyperactive bowel sounds, incision, ostomy, MARISA drains and binder on  : Deferred  Musculoskeletal: extremities normal- no gross deformities noted and normal muscle tone  Skin: no suspicious lesions or rashes  Neurologic: Gait normal. Reflexes normal and symmetric. Sensation grossly WNL.  Psychiatric: anxious  Hematologic/Lymphatic/Immunologic: Negative       Lab Results:  Personally Reviewed.   Last Comprehensive Metabolic Panel:  Sodium   Date Value Ref Range Status   12/14/2021 134 (L) 136 - 145 mmol/L Final   01/22/2020 140 133 - 144 mmol/L Final     Potassium   Date Value Ref Range Status   12/14/2021 4.3 3.5 - 5.0 mmol/L Final   01/22/2020 3.8 3.4 - 5.3 mmol/L Final     Chloride   Date Value Ref Range Status   12/14/2021 102 98 - 107 mmol/L Final   01/22/2020 108 94 - 109 mmol/L Final     Carbon Dioxide   Date Value Ref Range Status   01/22/2020 28 20 - 32 mmol/L Final     Carbon Dioxide (CO2)   Date Value Ref Range Status   12/14/2021 24 22 - 31 mmol/L Final     Anion Gap   Date Value Ref Range Status   12/14/2021 8 5 - 18 mmol/L Final   01/22/2020 4 3 - 14 mmol/L Final     Glucose   Date Value Ref Range Status   12/14/2021 123 70 - 125 mg/dL Final   01/22/2020 100 (H) 70 - 99 mg/dL Final     GLUCOSE BY METER POCT   Date Value Ref Range Status   12/14/2021 137 (H) 70 - 99 mg/dL Final     Urea Nitrogen   Date Value Ref Range Status    12/14/2021 18 8 - 22 mg/dL Final   01/22/2020 14 7 - 30 mg/dL Final     Creatinine   Date Value Ref Range Status   12/14/2021 0.75 0.70 - 1.30 mg/dL Final   01/22/2020 0.94 0.66 - 1.25 mg/dL Final     GFR Estimate   Date Value Ref Range Status   12/14/2021 >90 >60 mL/min/1.73m2 Final     Comment:     As of July 11, 2021, eGFR is calculated by the CKD-EPI creatinine equation, without race adjustment. eGFR can be influenced by muscle mass, exercise, and diet. The reported eGFR is an estimation only and is only applicable if the renal function is stable.   01/22/2020 84 >60 mL/min/[1.73_m2] Final     Comment:     Non  GFR Calc  Starting 12/18/2018, serum creatinine based estimated GFR (eGFR) will be   calculated using the Chronic Kidney Disease Epidemiology Collaboration   (CKD-EPI) equation.       Calcium   Date Value Ref Range Status   12/14/2021 8.5 8.5 - 10.5 mg/dL Final   01/22/2020 9.5 8.5 - 10.1 mg/dL Final        UA: No results found for: UAMP, UBARB, BENZODIAZEUR, UCANN, UCOC, OPIT, UPCP          Total unit/floor time 25  minutes, time consisted of the following, examination of patient, reviewing the record and lab results, and completing documentation.        Diya Solano APRN, CNS-BC, CNP, ACHPN  Acute Care Pain Management Program Hour 7a-1700  [facility] ([place], [place], [place])   Page via [employer]- Click HERE to page Diya or call [phone]

## 2021-12-14 NOTE — PROGRESS NOTES
"CLINICAL NUTRITION SERVICES - REASSESSMENT NOTE     Nutrition Prescription    RECOMMENDATIONS FOR MDs/PROVIDERS TO ORDER:  Continue same TPN/lipid regimen    Malnutrition Status:    Moderate    Recommendations already ordered by Registered Dietitian (RD):  D 310  @ 85 ml/hr plus 250 ml of 20% lipids 3 x per week    Future/Additional Recommendations:  Follow for diet advance and TPN needs     EVALUATION OF THE PROGRESS TOWARD GOALS   Diet: NPO  Nutrition Support: TPN: D 310  @ 85 ml/hr plus 250 ml of 20% lipids 3 times per week over 12 hrs providin Calories, 100 g protein, 310 g CHO, average of 21 g fat, 2040 ml fluid (CHO load=3.06 mg CHO/Kg/min)     ANTHROPOMETRICS  Height: 170.2 cm (5' 7\")  Most Recent Weight: 81.7 kg (180 lb 1.6 oz)    Weight History:   Wt Readings from Last 10 Encounters:   21 81.7 kg (180 lb 1.6 oz)   20 79.4 kg (175 lb)   19 81.2 kg (179 lb)     GI CONCERNS  Midline and MARISA x 3  Hypoactive BS all quadrants  Abdominal discomfort  Last BM 2021 per nurse  NG tube to suction  NG output 200 ml  Drains output: 1,195 ml    LABS  Reviewed: prealbumin 10 (up from 5 on 2021), K 4.3, Mg 2.3, Phos 2.7, Glu 123    MEDICATIONS  Reviewed: maxipime, levothyroxine, flagyl, remeron, pantoprazole, senna-docusate    MALNUTRITION:  % Weight Loss:  20 lb weight loss noted-unsure of time frame-may be greater than 1 year  % Intake:  </= 50% for >/= 5 days (severe malnutrition)  Subcutaneous Fat Loss:  Orbital region moderate depletion  Muscle Loss:  unable  Fluid Retention:  None noted    Malnutrition Diagnosis: Moderate malnutrition  In Context of:  Acute illness or injury    CURRENT NUTRITION DIAGNOSIS  Malnutrition related to acute illness as evidenced by NPO x 5 days and moderate fat loss      INTERVENTIONS  Implementation  Continue same TPN/lipid regimen today    Goals  Tolerate TPN-met  Advance to po diet when appropriate-not met  BG " -met    Monitoring/Evaluation  Progress toward goals will be monitored and evaluated per protocol.

## 2021-12-14 NOTE — PROGRESS NOTES
Attempted to see Pt a few times today. Writer would like to discuss discharge plans and where he will go now that he does not have to discharge back to care home. He has either had other treatment team members in his room or has been asleep and does not wake up to knocking or sound of his name. Will attempt again tomorrow. Cm to follow.    SHELBY Timmons  1:47 PM

## 2021-12-14 NOTE — PROGRESS NOTES
ASSESSMENT:  1. SBO (small bowel obstruction) (H)    2. Pelvic abscess in male (H)        Gurdeep Dia is a 66 year old male who is s/p exploratory laparotomy with small bowel resection, lysis of adhesions and repair of enterotomy with significant inflamed small bowel with areas of thinned mucosa on 11/30/2021  S/p exploratory laparotomy 12/3/2021 for small bowel perforation from ulcer  S/p IR placement of drain into pelvic abscess 12/9/2021. Enterocutaneous fistula formation. Bowel function returning. Noted leukocytosis this morning but patient clinically looks good.    PLAN:  Pulled NG tube  Start clear liquid diet; go slow    SUBJECTIVE:   He is doing well today. He has been tolerating ice chips all night with a clamped NG. No pain today and he is having bowel movements.      Patient Vitals for the past 24 hrs:   BP Temp Temp src Pulse Resp SpO2   12/14/21 1137 119/72 97.8  F (36.6  C) Temporal 85 16 93 %   12/14/21 0718 116/66 97  F (36.1  C) Temporal 85 16 92 %   12/14/21 0527 121/72 97.6  F (36.4  C) Oral 85 16 91 %   12/14/21 0214 117/74 -- -- 85 18 95 %   12/13/21 2328 121/68 98.6  F (37  C) Oral 88 16 93 %   12/13/21 2152 130/78 -- -- -- -- --   12/13/21 1559 124/74 -- -- 105 18 94 %   12/13/21 1515 123/75 98.2  F (36.8  C) Oral 105 18 92 %         PHYSICAL EXAM:  GEN: No acute distress, comfortable  LUNGS: CTA bilaterally  CV:RRR  ABD: soft, not distended and nontender; drains with scant output; midline wound/EC fistula with noted gas and succus in the ostomy bag and 1150 ml/24 hours output  Drains: scant serosanguineous drainage  Output by Drain (mL) 12/12/21 0700 - 12/12/21 1459 12/12/21 1500 - 12/12/21 2259 12/12/21 2300 - 12/13/21 0659 12/13/21 0700 - 12/13/21 1459 12/13/21 1500 - 12/13/21 2259 12/13/21 2300 - 12/14/21 0659 12/14/21 0700 - 12/14/21 1233   Closed/Suction Drain 1 Right Abdomen Bulb 19 Cypriot 5 0   10 5    Closed/Suction Drain 1 Left LLQ Bulb 19 Cypriot 10 5  5 10 0    Closed/Suction  Drain 4 Left;Midline Buttock Bulb 10 Ecuadorean 5 5  5 5 5    Open Drain Medial;Superior Abdomen vessel loop drain 85 150  50 550 550 200      EXT:No cyanosis, edema or obvious abnormalities    12/13 0700 - 12/14 0659  In: 1376 [I.V.:220]  Out: 1595 [Urine:200; Drains:1195]    No results displayed because visit has over 200 results.             THOMAS Givens CNP

## 2021-12-14 NOTE — PROGRESS NOTES
[facility] Progress Note    Assessment/Plan  Principal Problem:    SBO (small bowel obstruction) (H)  Active Problems:    Hypertension goal BP (blood pressure) < 140/90    Benign prostatic hyperplasia with weak urinary stream    RAVI (generalized anxiety disorder)    Moderate major depression (H)    History of substance abuse (H)    History of hepatitis C    Chronic neck pain    Chronic bilateral low back pain without sciatica    Gurdeep Dia is a 66 year old male with history of splenectomy, appendectomy, HTN, and substance abuse who presented with 2 days of right sided abdominal pain, found to have a bowel obstruction with closed loop. He is admitted for surgical repair of obstruction.      Requires ongoing hospitalization for post-operative monitoring and care       Mechanical SBO s/p exploratory laparotomy / small bowel resection / repair of enterotomy / extensive lysis of adhesions (11/30/21)  Subsequent small bowel perforation requiring repeat ex lap for washout 12/3    CT abdomen/pelvis on admission 11/30 significant for closed loop mechanical small bowel obstruction. Does have history of multiple abdominal surgeries previously. First procedure 11/30 for small bowel resection. Acute change on 12/2-12/3 and found to have intraabdominal drainage from 12/3/2021; underwent repeat exploratory laparotomy with washout and repair of small bowel perforation. 12/8 still without stool charted, still spiking low grade fevers, having atypical output from MARISA drain, white count climbing. CT 12/8 showing ring enhancing lesion in abdomen likely representing abscess. Fourth drain placed 12/9 AM by IR for abscess drainage.  -Surgery consulted and following   -NPO for now until cleared by gen surg   -NG out 12/14 now that he's stooling and w/o nausea/vomiting  -Pain control as per surgery/Pain team - PCA stopped 12/13, transitioning to orals with decent pain control  -Abscess culture growing e. Coli, see  sensitivities. Discontinued vancomycin and meropenem 12/12 in favor of IV cefepime + crushed oral flagyl (shortage of IV form)  -IV Protonix     Acute encephalopathy, likely multifactorial - toxic/metabolic/hospital-induced   Acutely agitated post-op 12/3 to 12/4. Pulling at lines and threatening to leave.  Nurse felt unsafe without restraints. Precedex drip started, now off. Transferred out of the unit. Has been calm since back on the floor.  - Soft limb restraint x 2 - adjust as needed  - Precedex gtt stop 12/7 morning  - PRN Haldol injection  - Zyprexa ODT 4x daily prn  - Conservative measures as able   - Avoid opioid/benzo if able     Acute hypoxic respiratory failure, post-op   Atelectasis vs aspiration  Initially requiring high-flow oxygen in ICU following procedure.  Question post-op atelectasis vs aspiration versus volume overload.  Does have a degree of respiratory distress when he gets agitated. CXR 12/6 showing bibasilar opacitied favored to reflect atelectasis, though still with some interstitial coarsening. Breathing improved 12/7 after some antibiotics and maybe ongoing resolution of his atelectasis. No signs of fluid overload and sodium climbing so IV lasix discontinued 12/8.  -Continue nasal cannula  -Wean as able  -Continue abx as above, don't think we need to broaden from a lung perspective  -Aggressive IS use  -PT/OT to get moving  -Discontinued IV lasix    Afib, RVR post-op, currently rate controlled   NSVT episode 12/8   Noted.  Rates had been in the 120 to 150 range.  Converted to NSR with amiodarone drip. No longer on any rate control. Both CHADS2-Vasc and HAS-BLED of at least 2.  Would likely warrant anticoagulation on ongoing basis. TSH normal. Lytes normal. Echo 12/10 showing slight concentric thickening in LV and EF 60-65%.  -Lovenox for now   -Consider ongoing anticoagulation once encephalopathy is improved    HTN  PTA lisinopril, furosemide being held in the setting of acute surgical  care.  Will monitor, even with pain BP not significantly elevated.  Will resume when hemodynamically stable.  Kidney function is stable at this time.  Blood pressure does climb when he is agitated.  -Monitor     Moderate malnutrition  Per RD eval.  -TPN until cleared to ADAT per surgery        Chronic Conditions:  Hypothyroidism- PTA levothyroxine  Depression/anxiety- PTA venlafaxine, mirtazepine, Buspar   Chronic pain- Hold mexolicam 7.5mg daily, resume PTA gabapentin when tolerating PO.  BPH- PTA flomax      Diet: NPO for Medical/Clinical Reasons Except for: Meds, Ice ChipsNPO   DVT Prophylaxis: Lovenox q day    Roblero Catheter: placed   Fluids:  None  Central Lines: None  Code Status: Full Code    Subjective  No guards in the room for the first time today. Had some agitation overnight and was given haldol which helped, per nursing notes. Had four stools overnight. No nausea or vomiting.     Objective    Vital signs in last 24 hours Temp:  [97  F (36.1  C)-98.6  F (37  C)] 97  F (36.1  C)  Pulse:  [] 85  Resp:  [16-18] 16  BP: (116-130)/(66-78) 116/66  SpO2:  [91 %-96 %] 92 %       Intake/Output last 3 shift I/O last 3 completed shifts:  In: 1376 [I.V.:220]  Out: 1595 [Urine:200; Emesis/NG output:200; Drains:1195]    Intake/Output this shift:I/O this shift:  In: -   Out: 350 [Emesis/NG output:150; Drains:200]    Physical Exam  General appearance: uncomfortable appearing male, laying in bed, two officers at bedside.  Acutely agitated.  Head: Normocephalic, without obvious abnormality, atraumatic.  NG tube in place.    Throat: moist mucous membranes.  Lungs: Tachypneic.  High flow oxygen in place.  Coarse lung sounds in the bases.  Heart: regular rate and rhythm, S1, S2 normal, no murmur, click, rub or gallop  Abdomen: Abdominal binding in place.  Multiple MARISA drains in place with bloody drainage. bowel sounds very present. Moderate TTP throughout.   Extremities: extremities normal, atraumatic, no cyanosis or  edema  Skin: Slightly pale, dry skin.  Neurologic: Alert and oriented X 2.  Able to move extremities.  Sensation intact.   Psychiatric: Exceedingly anxious and agitated.     Pertinent Labs and Pertinent Radiology   Lab Results: personally reviewed.     Radiology Results: Personally reviewed image/s and impression/s    Precepted patient with Dr. Willy Mcmahon MD - PGY2  [facility]  P: [phone]

## 2021-12-15 LAB
ANION GAP SERPL CALCULATED.3IONS-SCNC: 7 MMOL/L (ref 5–18)
BUN SERPL-MCNC: 19 MG/DL (ref 8–22)
CALCIUM SERPL-MCNC: 8.3 MG/DL (ref 8.5–10.5)
CHLORIDE BLD-SCNC: 103 MMOL/L (ref 98–107)
CO2 SERPL-SCNC: 24 MMOL/L (ref 22–31)
CREAT SERPL-MCNC: 0.79 MG/DL (ref 0.7–1.3)
ERYTHROCYTE [DISTWIDTH] IN BLOOD BY AUTOMATED COUNT: 14.4 % (ref 10–15)
GFR SERPL CREATININE-BSD FRML MDRD: >90 ML/MIN/1.73M2
GLUCOSE BLD-MCNC: 121 MG/DL (ref 70–125)
GLUCOSE BLDC GLUCOMTR-MCNC: 119 MG/DL (ref 70–99)
GLUCOSE BLDC GLUCOMTR-MCNC: 122 MG/DL (ref 70–99)
GLUCOSE BLDC GLUCOMTR-MCNC: 130 MG/DL (ref 70–99)
GLUCOSE BLDC GLUCOMTR-MCNC: 132 MG/DL (ref 70–99)
HCT VFR BLD AUTO: 30.3 % (ref 40–53)
HGB BLD-MCNC: 10 G/DL (ref 13.3–17.7)
MAGNESIUM SERPL-MCNC: 2.2 MG/DL (ref 1.8–2.6)
MCH RBC QN AUTO: 29.9 PG (ref 26.5–33)
MCHC RBC AUTO-ENTMCNC: 33 G/DL (ref 31.5–36.5)
MCV RBC AUTO: 90 FL (ref 78–100)
PHOSPHATE SERPL-MCNC: 2.6 MG/DL (ref 2.5–4.5)
PLATELET # BLD AUTO: 787 10E3/UL (ref 150–450)
POTASSIUM BLD-SCNC: 4.6 MMOL/L (ref 3.5–5)
RBC # BLD AUTO: 3.35 10E6/UL (ref 4.4–5.9)
SODIUM SERPL-SCNC: 134 MMOL/L (ref 136–145)
WBC # BLD AUTO: 11.7 10E3/UL (ref 4–11)

## 2021-12-15 PROCEDURE — C9113 INJ PANTOPRAZOLE SODIUM, VIA: HCPCS | Performed by: INTERNAL MEDICINE

## 2021-12-15 PROCEDURE — 84100 ASSAY OF PHOSPHORUS: CPT | Performed by: INTERNAL MEDICINE

## 2021-12-15 PROCEDURE — 120N000001 HC R&B MED SURG/OB

## 2021-12-15 PROCEDURE — 99232 SBSQ HOSP IP/OBS MODERATE 35: CPT | Performed by: PAIN MEDICINE

## 2021-12-15 PROCEDURE — 83735 ASSAY OF MAGNESIUM: CPT | Performed by: INTERNAL MEDICINE

## 2021-12-15 PROCEDURE — 250N000013 HC RX MED GY IP 250 OP 250 PS 637: Performed by: SPECIALIST

## 2021-12-15 PROCEDURE — 99024 POSTOP FOLLOW-UP VISIT: CPT | Performed by: PHYSICIAN ASSISTANT

## 2021-12-15 PROCEDURE — 250N000013 HC RX MED GY IP 250 OP 250 PS 637: Performed by: PAIN MEDICINE

## 2021-12-15 PROCEDURE — 250N000009 HC RX 250: Performed by: SPECIALIST

## 2021-12-15 PROCEDURE — 99232 SBSQ HOSP IP/OBS MODERATE 35: CPT | Mod: GC | Performed by: STUDENT IN AN ORGANIZED HEALTH CARE EDUCATION/TRAINING PROGRAM

## 2021-12-15 PROCEDURE — 250N000011 HC RX IP 250 OP 636: Performed by: INTERNAL MEDICINE

## 2021-12-15 PROCEDURE — 80048 BASIC METABOLIC PNL TOTAL CA: CPT | Performed by: INTERNAL MEDICINE

## 2021-12-15 PROCEDURE — 250N000013 HC RX MED GY IP 250 OP 250 PS 637: Performed by: STUDENT IN AN ORGANIZED HEALTH CARE EDUCATION/TRAINING PROGRAM

## 2021-12-15 PROCEDURE — 250N000011 HC RX IP 250 OP 636: Performed by: SPECIALIST

## 2021-12-15 PROCEDURE — 250N000011 HC RX IP 250 OP 636: Performed by: STUDENT IN AN ORGANIZED HEALTH CARE EDUCATION/TRAINING PROGRAM

## 2021-12-15 PROCEDURE — 999N000033 HC STATISTIC CHRONIC PULMONARY DISEASE SPECIALIST

## 2021-12-15 PROCEDURE — 250N000013 HC RX MED GY IP 250 OP 250 PS 637: Performed by: CLINICAL NURSE SPECIALIST

## 2021-12-15 PROCEDURE — 85027 COMPLETE CBC AUTOMATED: CPT | Performed by: INTERNAL MEDICINE

## 2021-12-15 RX ORDER — CEFDINIR 300 MG/1
300 CAPSULE ORAL EVERY 12 HOURS SCHEDULED
Status: DISCONTINUED | OUTPATIENT
Start: 2021-12-15 | End: 2021-12-20

## 2021-12-15 RX ORDER — HYDROMORPHONE HCL IN WATER/PF 6 MG/30 ML
0.2 PATIENT CONTROLLED ANALGESIA SYRINGE INTRAVENOUS EVERY 6 HOURS PRN
Status: DISCONTINUED | OUTPATIENT
Start: 2021-12-15 | End: 2021-12-17

## 2021-12-15 RX ORDER — GABAPENTIN 300 MG/1
300 CAPSULE ORAL 2 TIMES DAILY WITH MEALS
Status: DISCONTINUED | OUTPATIENT
Start: 2021-12-15 | End: 2021-12-17

## 2021-12-15 RX ORDER — GABAPENTIN 300 MG/1
900 CAPSULE ORAL AT BEDTIME
Status: DISCONTINUED | OUTPATIENT
Start: 2021-12-15 | End: 2021-12-17

## 2021-12-15 RX ORDER — CALCIUM CARBONATE 500 MG/1
500 TABLET, CHEWABLE ORAL 3 TIMES DAILY PRN
Status: DISCONTINUED | OUTPATIENT
Start: 2021-12-15 | End: 2021-12-21 | Stop reason: HOSPADM

## 2021-12-15 RX ADMIN — MIRTAZAPINE 30 MG: 30 TABLET, FILM COATED ORAL at 21:18

## 2021-12-15 RX ADMIN — HYDROMORPHONE HYDROCHLORIDE 0.2 MG: 0.2 INJECTION, SOLUTION INTRAMUSCULAR; INTRAVENOUS; SUBCUTANEOUS at 06:38

## 2021-12-15 RX ADMIN — GABAPENTIN 900 MG: 300 CAPSULE ORAL at 21:18

## 2021-12-15 RX ADMIN — GABAPENTIN 300 MG: 300 CAPSULE ORAL at 14:26

## 2021-12-15 RX ADMIN — ACETAMINOPHEN 650 MG: 325 TABLET ORAL at 09:17

## 2021-12-15 RX ADMIN — ENOXAPARIN SODIUM 40 MG: 40 INJECTION SUBCUTANEOUS at 11:49

## 2021-12-15 RX ADMIN — CEFDINIR 300 MG: 300 CAPSULE ORAL at 21:18

## 2021-12-15 RX ADMIN — OXYCODONE HYDROCHLORIDE 5 MG: 5 TABLET ORAL at 02:45

## 2021-12-15 RX ADMIN — METRONIDAZOLE 500 MG: 500 TABLET ORAL at 21:18

## 2021-12-15 RX ADMIN — LEVOTHYROXINE SODIUM 25 MCG: 0.03 TABLET ORAL at 06:32

## 2021-12-15 RX ADMIN — METRONIDAZOLE 500 MG: 500 TABLET ORAL at 14:26

## 2021-12-15 RX ADMIN — OXYCODONE HYDROCHLORIDE 5 MG: 5 TABLET ORAL at 18:03

## 2021-12-15 RX ADMIN — METRONIDAZOLE 500 MG: 500 TABLET ORAL at 09:02

## 2021-12-15 RX ADMIN — VENLAFAXINE HYDROCHLORIDE 37.5 MG: 37.5 CAPSULE, EXTENDED RELEASE ORAL at 09:03

## 2021-12-15 RX ADMIN — POTASSIUM CHLORIDE: 2 INJECTION, SOLUTION, CONCENTRATE INTRAVENOUS at 21:32

## 2021-12-15 RX ADMIN — HYDROMORPHONE HYDROCHLORIDE 0.2 MG: 0.2 INJECTION, SOLUTION INTRAMUSCULAR; INTRAVENOUS; SUBCUTANEOUS at 14:14

## 2021-12-15 RX ADMIN — OXYCODONE HYDROCHLORIDE 5 MG: 5 TABLET ORAL at 04:45

## 2021-12-15 RX ADMIN — GABAPENTIN 300 MG: 300 CAPSULE ORAL at 09:01

## 2021-12-15 RX ADMIN — PANTOPRAZOLE SODIUM 40 MG: 40 INJECTION, POWDER, FOR SOLUTION INTRAVENOUS at 09:02

## 2021-12-15 RX ADMIN — OXYCODONE HYDROCHLORIDE 5 MG: 5 TABLET ORAL at 21:17

## 2021-12-15 RX ADMIN — BUSPIRONE HYDROCHLORIDE 15 MG: 15 TABLET ORAL at 09:01

## 2021-12-15 RX ADMIN — OXYCODONE HYDROCHLORIDE 5 MG: 5 TABLET ORAL at 09:02

## 2021-12-15 RX ADMIN — CEFEPIME HYDROCHLORIDE 2 G: 2 INJECTION, POWDER, FOR SOLUTION INTRAVENOUS at 13:59

## 2021-12-15 RX ADMIN — BUSPIRONE HYDROCHLORIDE 15 MG: 15 TABLET ORAL at 21:18

## 2021-12-15 RX ADMIN — OXYCODONE HYDROCHLORIDE 5 MG: 5 TABLET ORAL at 00:06

## 2021-12-15 RX ADMIN — OXYCODONE HYDROCHLORIDE 5 MG: 5 TABLET ORAL at 11:50

## 2021-12-15 RX ADMIN — OXYCODONE HYDROCHLORIDE 5 MG: 5 TABLET ORAL at 15:33

## 2021-12-15 RX ADMIN — TAMSULOSIN HYDROCHLORIDE 0.4 MG: 0.4 CAPSULE ORAL at 09:03

## 2021-12-15 RX ADMIN — CEFEPIME HYDROCHLORIDE 2 G: 2 INJECTION, POWDER, FOR SOLUTION INTRAVENOUS at 04:41

## 2021-12-15 ASSESSMENT — ACTIVITIES OF DAILY LIVING (ADL)
ADLS_ACUITY_SCORE: 14

## 2021-12-15 NOTE — PROGRESS NOTES
"Care Management Follow Up    Length of Stay (days): 15    Expected Discharge Date: 12/18/2021       Concerns to be Addressed:   Postoperative care and recovery (laparotomy with small bowel resection, lysis of adhesions and repair of enterotomy with significant inflamed small bowel and areas of thinned mucosa on 11/30/2021 and exploratory laparotomy 12/3/2021 with small bowel perforation from ulcer): Starting on TPN and lipids. Surgery following. Currently receiving IV Cefepime, IV Lasix.   Patient plan of care discussed at interdisciplinary rounds: Yes  Follow up from rounds/notes:   Reviewed with MD who anticipates discharge once patient is tolerating an oral diet and off IV support (possibly 3-5 days).     Anticipated Discharge Disposition:  Discharge goal is pending response to treatment, medical needs and mobility closer to discharge. TCU is recommended, patient hoping to go to [facility].      Anticipated Discharge Services:  To be determined by destination, patient/family preferences, medical needs and mobility status closer to the time of discharge.  Anticipated Discharge DME:  Per therapy (if indicated).    Patient/family educated on Medicare website which has current facility and service quality ratings:  Yes  Education Provided on the Discharge Plan:  Per team  Patient/Family in Agreement with the Plan:  Yes    Referrals Placed by CM/AZALIA:  None  Private pay costs discussed: Not applicable at this time.    Additional Information:  Per previous CM notes, [facility] yesterday, 12/14/2021. Currently, transitional care is recommended but the insurance listed on the chart states \"Inpatient (hospital) care only\". Referral made to [facility]). Prior to hospitalization, patient was to release to [facility] in [place]. Patient would need to be medically stable and independent. AZALIA TOWNSEND attempted to meet with patient on 12/14/21 but he was " "sleeping.  1:38 PM:  Writer met with patient to discuss goals of care and assess need for possible services at discharge. Patient alert, oriented and engaged in the conversation. Discussed barrier of current insurance coverage to which patient stated, \"I know. I'm going to get that straightened out.\" Writer provided a list of local transitional care units (which includes the medicare.gov website) for patient to review. However, he states that he plans to \"move in to that rehab place in [place] (or maybe it's in [place])\".  Spoke with staff at [facility] ([phone]) who notes that they do have people who move in to their facility and then train people for work. She transferred writer to their intake person. Writer left a message to confirm the plan (call back number [phone]).  Care management will follow.   2:08 PM:  [facility] has been added to patient's record.     Celine Presley RN      "

## 2021-12-15 NOTE — PROGRESS NOTES
[facility] Progress Note    Assessment/Plan  Principal Problem:    SBO (small bowel obstruction) (H)  Active Problems:    Hypertension goal BP (blood pressure) < 140/90    Benign prostatic hyperplasia with weak urinary stream    RAVI (generalized anxiety disorder)    Moderate major depression (H)    History of substance abuse (H)    History of hepatitis C    Chronic neck pain    Chronic bilateral low back pain without sciatica    Gurdeep Dia is a 66 year old male with history of splenectomy, appendectomy, HTN, and substance abuse who presented with 2 days of right sided abdominal pain, found to have a bowel obstruction with closed loop. He is admitted for surgical repair of obstruction.      Requires ongoing hospitalization for post-operative monitoring and care       Mechanical SBO s/p exploratory laparotomy / small bowel resection / repair of enterotomy / extensive lysis of adhesions (11/30/21)  Subsequent small bowel perforation requiring repeat ex lap for washout 12/3    CT abdomen/pelvis on admission 11/30 significant for closed loop mechanical small bowel obstruction. Does have history of multiple abdominal surgeries previously. First procedure 11/30 for small bowel resection. Acute change on 12/2-12/3 and found to have intraabdominal drainage from 12/3/2021; underwent repeat exploratory laparotomy with washout and repair of small bowel perforation. 12/8 still without stool charted, still spiking low grade fevers, having atypical output from MARISA drain, white count climbing. CT 12/8 showing ring enhancing lesion in abdomen likely representing abscess. Fourth drain placed 12/9 AM by IR for abscess drainage. Is now stooling and advancing diet.  -Surgery consulted and following   -NG out 12/14 now that he's stooling and w/o nausea/vomiting  -ADAT per surgery - currently on clears  -Pain control as per surgery/Pain team - PCA stopped 12/13, transitioning to orals with decent pain  control  -Abscess culture growing e. Coli, see sensitivities. Abscess drain placed 12/9. Discontinued vancomycin and meropenem 12/12 in favor of IV cefepime + crushed oral flagyl (shortage of IV form). Curbsided ID 12/15 to discuss abx moving forward - Dr. Hanna recommended transitioning to oral cefdinir and flagyl. We will re-address duration of therapy after abscessogram done by IR (recommended sometime between 12/16-19 by IR)  -IV Protonix     Acute encephalopathy, likely multifactorial - toxic/metabolic/hospital-induced   Acutely agitated post-op 12/3 to 12/4. Pulling at lines and threatening to leave.  Nurse felt unsafe without restraints. Precedex drip started, now off. Transferred out of the unit. Has been calm since back on the floor.  - Soft limb restraint x 2 - adjust as needed  - Precedex gtt stop 12/7 morning  - PRN Haldol injection  - Zyprexa ODT 4x daily prn  - Conservative measures as able   - Avoid opioid/benzo if able     Acute hypoxic respiratory failure, post-op   Atelectasis vs aspiration  Initially requiring high-flow oxygen in ICU following procedure.  Question post-op atelectasis vs aspiration versus volume overload.  Does have a degree of respiratory distress when he gets agitated. CXR 12/6 showing bibasilar opacitied favored to reflect atelectasis, though still with some interstitial coarsening. Breathing improved 12/7 after some antibiotics and maybe ongoing resolution of his atelectasis. No signs of fluid overload and sodium climbing so IV lasix discontinued 12/8.  -Continue nasal cannula  -Wean as able  -Continue abx as above, don't think we need to broaden from a lung perspective  -Aggressive IS use  -PT/OT to get moving  -Discontinued IV lasix    Afib, RVR post-op, currently rate controlled   NSVT episode 12/8 and 12/15  Noted.  Rates had been in the 120 to 150 range.  Converted to NSR with amiodarone drip. No longer on any rate control. No repeat episodes. Both CHADS2-Vasc and HAS-BLED  of at least 2.  TSH normal. Lytes normal. Echo 12/10 showing slight concentric thickening in LV and EF 60-65%. Patient wants to avoid anticoagulation given bleeding risks and his single episode likely being related to his operation, sedation, and infection.  -Lovenox for now   -No AC after discharge after shared decision making discussion    HTN  PTA lisinopril, furosemide being held in the setting of acute surgical care.  Will monitor, even with pain BP not significantly elevated.  Will resume when hemodynamically stable.  Kidney function is stable at this time.  Blood pressure does climb when he is agitated.  -Monitor     Moderate malnutrition  Per RD eval.  -TPN until cleared to ADAT per surgery        Chronic Conditions:  Hypothyroidism- PTA levothyroxine  Depression/anxiety- PTA venlafaxine, mirtazepine, Buspar   Chronic pain- Hold mexolicam 7.5mg daily, resume PTA gabapentin when tolerating PO.  BPH- PTA flomax      Diet: NPO for Medical/Clinical Reasons Except for: Meds, Ice ChipsNPO   DVT Prophylaxis: Lovenox q day    Roblero Catheter: placed   Fluids:  None  Central Lines: None  Code Status: Full Code    Subjective  States he was in quite a bit of pain this morning but improved with oral oxycodone. Still stooling. Currently on clears per surgery.     Objective    Vital signs in last 24 hours Temp:  [97.7  F (36.5  C)-98.5  F (36.9  C)] 98.5  F (36.9  C)  Pulse:  [84-95] 84  Resp:  [16-20] 20  BP: (109-127)/(62-81) 121/77  SpO2:  [91 %-94 %] 94 %       Intake/Output last 3 shift I/O last 3 completed shifts:  In: 3613.92 [P.O.:360; I.V.:118]  Out: 1950 [Urine:900; Emesis/NG output:150; Drains:900]    Intake/Output this shift:I/O this shift:  In: 22   Out: 375 [Urine:375]    Physical Exam  General appearance: uncomfortable appearing male, laying in bed, two officers at bedside.  Acutely agitated.  Head: Normocephalic, without obvious abnormality, atraumatic.  NG tube in place.    Throat: moist mucous  membranes.  Lungs: Tachypneic.  High flow oxygen in place.  Coarse lung sounds in the bases.  Heart: regular rate and rhythm, S1, S2 normal, no murmur, click, rub or gallop  Abdomen: Abdominal binding in place.  Multiple MARISA drains in place with bloody drainage. bowel sounds very present. Moderate TTP throughout.   Extremities: extremities normal, atraumatic, no cyanosis or edema  Skin: Slightly pale, dry skin.  Neurologic: Alert and oriented X 2.  Able to move extremities.  Sensation intact.   Psychiatric: Exceedingly anxious and agitated.     Pertinent Labs and Pertinent Radiology   Lab Results: personally reviewed.     Radiology Results: Personally reviewed image/s and impression/s    Precepted patient with Dr. Willy Mcmahon MD - PGY2  [facility]  P: [phone]

## 2021-12-15 NOTE — PHARMACY-CONSULT NOTE
"Pharmacy Note: Parenteral Nutrition (PN) Management    Pharmacist consulted to dose PN for Gurdeep Dia, a 66 year old male by Dr. Clinton.    Subjective:    The patient is a new PN start.     The patient was started on PN in the hospital on 12/5/21.     Indication for PN therapy: bowel resection     Inadequate nutrition anticipated for > 7 days.      Enteral nutrition contraindicated due to: bowel integrity is tenuous.     Pertinent diseases and other special considerations respiratory failure    Social History     Tobacco Use     Smoking status: Current Every Day Smoker     Smokeless tobacco: Never Used   Substance Use Topics     Alcohol use: Not Currently     Comment: Clean for 5 years     Drug use: Not Currently     Objective:    Ht Readings from Last 1 Encounters:   12/13/21 1.702 m (5' 7\")     Wt Readings from Last 1 Encounters:   12/13/21 81.7 kg (180 lb 1.6 oz)       Body mass index is 28.21 kg/m .    Patient Vitals for the past 96 hrs:   Weight   12/13/21 1006 81.7 kg (180 lb 1.6 oz)       Labs:  Last 3 days:  Recent Labs     12/06/21  0619 12/06/21  0620 12/06/21  0620 12/06/21  0947 12/06/21  1428 12/06/21  1921 12/07/21  0358 12/07/21  1128 12/07/21  1443 12/08/21  0535 12/08/21  0536   NA  --  146*  --   --   --   --  148*  --   --   --  149*   POTASSIUM  --  3.0*  --   --  3.6 5.8* 3.2* 5.7* 3.8  --  3.7   CHLORIDE  --  106  --   --   --   --  106  --   --   --  113*   CO2  --  31  --   --   --   --  31  --   --   --  29   BUN  --  17  --   --   --   --  20  --   --   --  22   CR  --  0.82  --   --   --   --  0.79  --   --   --  0.75   VIJAYA  --  8.4*  --   --   --   --  8.4*  --   --   --  8.3*   [id number]  --  1.12  --   --   --   --   --   --   --   --   --    MAG  --  2.1  --   --   --   --  2.2  --   --   --  2.1   PHOS  --  2.9  --   --   --   --  3.2  --   --   --  2.7   PROTTOTAL  --  5.9*  --   --   --   --  5.9*  --   --   --   --    ALBUMIN  --  2.3*  --   --   --   --  2.3*  --   --   --   " --    PREALB  --  6*  --   --   --   --   --   --   --   --   --    TRIG  --  217*  --   --   --   --   --   --   --   --   --    HGB  --   --   --  11.2*  --   --  10.9*  --   --  10.6*  --    HCT  --   --   --  34.2*  --   --  33.8*  --   --  32.8*  --    PLT  --   --   --  318  --   --  332  --   --  406  --    BILITOTAL  --  0.4  --   --   --   --  0.6  --   --   --   --    AST  --  25  --   --   --   --  42*  --   --   --   --    ALT  --  20  --   --   --   --  34  --   --   --   --    ALKPHOS  --  44*   < >  --   --   --  54  --   --   --   --    INR 1.11  --   --   --   --   --   --   --   --   --   --     < > = values in this interval not displayed.       Glucose (past 48 hours):   Recent Labs     12/06/21  1951 12/07/21  0157 12/07/21  0358 12/07/21  0816 12/07/21  1220 12/08/21  0216 12/08/21  0536   * 133* 134* 143* 121* 141* 121       Intake/Output (last 24 hours): I/O last 3 completed shifts:  In: 3613.92 [P.O.:360; I.V.:118]  Out: 1950 [Urine:900; Emesis/NG output:150; Drains:900]    Estimated CrCl: Estimated Creatinine Clearance: 92.8 mL/min (based on SCr of 0.79 mg/dL).    Assessment:    Continue patient on PN therapy as a continuous central therapy.     Given the patient's current condition/oral intake, PN is still indicated.    Lab results reviewed:   12/9: all electrolytes in goal range, no changes today  12/10: all electrolytes in goal range, no changes today  12/11: all electrolytes in goal range, no changes today  12/12: all electrolytes in goal range, no changes today  12/13: Na trending down, increase slightly in TPN today due to recent hypernatremia. Phos 2.4, monitor to see if need to increase in TPN tomorrow. All other labs wnl  12/14: Na 134, increased in TPN yesterday, may need to increase again tomorrow. Phos 2.7 - replaced outside of TPN yesterday  12/15: Na still low at 134, increase in TPN today. Phos on low end, will increase in TPN today.      Plan:  1. Rate of PN: 85  mL/hr.  2. Formula:     Amino Acids 100 grams    Dextrose 310 grams    Sodium 60 mEq/day    Potassium 90 mEq/day    Calcium 8 mEq/day    Magnesium 14 mEq/day    Phosphorus 32 mMol/day    Chloride: Acetate Ratio 1:1    Standard Multivitamins w/Vitamin K    Trace Elements  3. Fat Emulsion: 20%, 250 mL IV three times weekly.  4. Check BMP tomorrow  5. Pharmacist will continue to follow the patient's lab results, clinical status and blood glucose results and make adjustments as appropriate.    Thank you for the consult.  Nolvia Kumar, PharmD   12/15/2021 10:12 AM

## 2021-12-15 NOTE — PROGRESS NOTES
"[facility] ACUTE PAIN SERVICE    ([facility], [facility], [facility])  Daily PAIN Progress Note    Assessment/Plan:  Gurdeep Dia is a 66 year old male who was admitted on 11/30/2021.  I was asked to see the patient for postoperative pain in the setting of SBO, now S/P exploratory laparotomy, small bowel resection, lysis of adhesions and repair of enterotomy with significant inflamed small bowel with areas of thinned mucosa on 11/30/2021 2/2 obstruction- S/p exploratory laparotomy 12/3/2021 2/2 small bowel perforation.  History of substance abuse (cocaine/etoh), hypertension, hypothyroid, depression/anxiety, chronic pain and takes meloxicam and gabapentin at baseline. Pain ratings up to 10/10, appears comfortable/talkative/smiling.     PLAN:   1) Postoperative pain in the setting of bowel obstruction/perforation, POD#7 now on clears and tolerating oxycodone. Can add HS dose of gabapentin.    2)Multimodal Medication Therapy  Topical: add lidocaine   NSAID'S: avoid  Adjuvants: Tylenol 650 mg po q 4 h prn,  gabapentin 300 mg bid and add 900mg at HS  Antidepressants/anxiolytics: Buspar 15 mg po bid, Remeron 30 mg po q hs, venlafaxine XR 37.5 mg daily, olanzapine, Haldol PRN ordered for agitation.   Opioids: oxycodone 5 every 6 hours with additional 5 mg every three hours prn    3)Non-medication interventions- acupuncture on Thursday   4)Constipation Prophylaxis- per surgery  5) Follow up   -Opioid prescriber has been none  -Discharge Recommendations - We recommend prescribing the following at the time of discharge-small supply of oxycodone              Subjective:    The patient tells me he still having some trouble sleeping.  He tells me that in the past he has taken 900 mg of gabapentin at bedtime and he wonders why he is not receiving this.  He wonders how long he will stay in the hospital.  He states, \"while I stay here for 5 months\".  He is pleased with how he tolerated clears.  He asks, \"can " "I have eggs?\"  I encouraged him to discuss this directly with his surgeon.  Tells me that he is continuing to have several bowel movements.    SBO (small bowel obstruction) (H)   Patient Active Problem List   Diagnosis     Hypertension goal BP (blood pressure) < 140/90     Benign prostatic hyperplasia with weak urinary stream     RAVI (generalized anxiety disorder)     Moderate major depression (H)     History of substance abuse (H)     History of hepatitis C     Chronic neck pain     Chronic bilateral low back pain without sciatica     SBO (small bowel obstruction) (H)        History   Drug Use Unknown         Tobacco Use      Smoking status: Current Every Day Smoker      Smokeless tobacco: Never Used          busPIRone  15 mg Oral BID     ceFEPIme (MAXIPIME) IV  2 g Intravenous Q8H     enoxaparin ANTICOAGULANT  40 mg Subcutaneous Q24H     [Held by provider] furosemide  40 mg Intravenous Daily     gabapentin  300 mg Oral BID w/meals     gabapentin  900 mg Oral At Bedtime     levothyroxine  25 mcg Oral Daily     lipids  250 mL Intravenous Once per day on Tue Thu Sat     metroNIDAZOLE  500 mg Oral TID     mirtazapine  30 mg Oral At Bedtime     oxyCODONE  5 mg Oral Q6H     pantoprazole (PROTONIX) IV  40 mg Intravenous Daily with breakfast     senna-docusate  1 tablet Oral BID     sodium chloride (PF)  10-40 mL Intracatheter Q7 Days     sodium chloride (PF)  3 mL Intracatheter Q8H     tamsulosin  0.4 mg Oral Daily     venlafaxine  37.5 mg Oral Daily       Objective:  Vital signs in last 24 hours:  B/P: 116/66, T: 97, P: 85, R: 16   Blood pressure 121/77, pulse 84, temperature 98.5  F (36.9  C), temperature source Oral, resp. rate 20, height 1.702 m (5' 7\"), weight 81.7 kg (180 lb 1.6 oz), SpO2 94 %.      Weight:   Wt Readings from Last 2 Encounters:   12/13/21 81.7 kg (180 lb 1.6 oz)   01/22/20 79.4 kg (175 lb)           Intake/Output:    Intake/Output Summary (Last 24 hours) at 12/14/2021 0913  Last data filed at " 12/14/2021 0700  Gross per 24 hour   Intake 1376 ml   Output 1695 ml   Net -319 ml        Review of Systems:   As per subjective, all others negative.    Physical Exam:  Constitutional: alert and uncooperative  Head: Normocephalic. No masses, lesions, tenderness or abnormalities  Neck: Neck supple. No adenopathy. Thyroid symmetric, normal size,  ENT: ENT exam normal, no neck nodes or sinus tenderness  Cardiovascular: No edema or JVD., negative findings: S1 normal, S2 normal  Respiratory: negative, negative findings: no chest deformities noted, diminished   Gastrointestinal: positive findings: distended, hyperactive bowel sounds, incision, ostomy, MARISA drains and binder on  : Deferred  Musculoskeletal: extremities normal- no gross deformities noted and normal muscle tone  Skin: no suspicious lesions or rashes  Neurologic: Gait normal. Reflexes normal and symmetric. Sensation grossly WNL.  Psychiatric: anxious  Hematologic/Lymphatic/Immunologic: Negative       Lab Results:  Personally Reviewed.   Last Comprehensive Metabolic Panel:  Sodium   Date Value Ref Range Status   12/15/2021 134 (L) 136 - 145 mmol/L Final   01/22/2020 140 133 - 144 mmol/L Final     Potassium   Date Value Ref Range Status   12/15/2021 4.6 3.5 - 5.0 mmol/L Final   01/22/2020 3.8 3.4 - 5.3 mmol/L Final     Chloride   Date Value Ref Range Status   12/15/2021 103 98 - 107 mmol/L Final   01/22/2020 108 94 - 109 mmol/L Final     Carbon Dioxide   Date Value Ref Range Status   01/22/2020 28 20 - 32 mmol/L Final     Carbon Dioxide (CO2)   Date Value Ref Range Status   12/15/2021 24 22 - 31 mmol/L Final     Anion Gap   Date Value Ref Range Status   12/15/2021 7 5 - 18 mmol/L Final   01/22/2020 4 3 - 14 mmol/L Final     Glucose   Date Value Ref Range Status   12/15/2021 121 70 - 125 mg/dL Final   01/22/2020 100 (H) 70 - 99 mg/dL Final     GLUCOSE BY METER POCT   Date Value Ref Range Status   12/15/2021 132 (H) 70 - 99 mg/dL Final     Urea Nitrogen   Date  Value Ref Range Status   12/15/2021 19 8 - 22 mg/dL Final   01/22/2020 14 7 - 30 mg/dL Final     Creatinine   Date Value Ref Range Status   12/15/2021 0.79 0.70 - 1.30 mg/dL Final   01/22/2020 0.94 0.66 - 1.25 mg/dL Final     GFR Estimate   Date Value Ref Range Status   12/15/2021 >90 >60 mL/min/1.73m2 Final     Comment:     As of July 11, 2021, eGFR is calculated by the CKD-EPI creatinine equation, without race adjustment. eGFR can be influenced by muscle mass, exercise, and diet. The reported eGFR is an estimation only and is only applicable if the renal function is stable.   01/22/2020 84 >60 mL/min/[1.73_m2] Final     Comment:     Non  GFR Calc  Starting 12/18/2018, serum creatinine based estimated GFR (eGFR) will be   calculated using the Chronic Kidney Disease Epidemiology Collaboration   (CKD-EPI) equation.       Calcium   Date Value Ref Range Status   12/15/2021 8.3 (L) 8.5 - 10.5 mg/dL Final   01/22/2020 9.5 8.5 - 10.1 mg/dL Final        UA: No results found for: UAMP, UBARB, BENZODIAZEUR, UCANN, UCOC, OPIT, UPCP          Total unit/floor time 25  minutes, time consisted of the following, examination of patient, reviewing the record and lab results, and completing documentation.        Diya Solano APRN, CNS-BC, CNP, ACHPN  Acute Care Pain Management Program Hour 7a-1700  [facility] ([place], [place])   Page via [facility]- Click HERE to page Diya or call [phone]

## 2021-12-15 NOTE — PLAN OF CARE
Problem: Pain Acute  Goal: Acceptable Pain Control and Functional Ability  Outcome: Improving  Intervention: Develop Pain Management Plan  Recent Flowsheet Documentation  Taken 12/15/2021 1150 by Nicanor Shepard, RN  Pain Management Interventions:   medication (see MAR)   care clustered   emotional support   splinting   rest   repositioned   quiet environment facilitated  Taken 12/15/2021 0902 by Nicanor Shepard, RN  Pain Management Interventions:   medication (see MAR)   care clustered   emotional support   premedicated for activity   rest   quiet environment facilitated     Pain initially 10/10, improved with scheduled and PRN oxycodone. Accepted activities out of bed: tolerated 10 min up in the chair to shave and wash hair in sink, ambulating with 1 extensive assist. Premedicated prior to activities. PRN IV Dilaudid used for breakthrough pain >90min after PRN oxycodone administered. Repositioning accepted.

## 2021-12-15 NOTE — PLAN OF CARE
Problem: Pain Acute  Goal: Acceptable Pain Control and Functional Ability  Outcome: No Change  Intervention: Develop Pain Management Plan  Recent Flowsheet Documentation  Taken 12/15/2021 0006 by Brenda Terry RN  Pain Management Interventions: medication (see MAR)  Intervention: Prevent or Manage Pain  Recent Flowsheet Documentation  Taken 12/15/2021 0006 by Brenda Terry RN  Medication Review/Management: high-risk medications identified     Problem: Hypertension Comorbidity  Goal: Blood Pressure in Desired Range  Intervention: Maintain Blood Pressure Management  Recent Flowsheet Documentation  Taken 12/15/2021 0006 by Brenda Terry RN  Medication Review/Management: high-risk medications identified     Problem: Infection (Surgery Nonspecified)  Goal: Absence of Infection Signs and Symptoms  Outcome: Improving    Pt rates pain constantly at 10/10. Prn oxycodone dose given x2 and scheduled dose. Vital signs stable. IR drain flushed. Pt unable to tolerate full 10cc flush due to pain. Pt using urinal at bedside. Tolerating clears. Continues with TPN/Lipids.

## 2021-12-15 NOTE — PROGRESS NOTES
ASSESSMENT:  1. SBO (small bowel obstruction) (H)    2. Pelvic abscess in male (H)        Gurdeep Dia is a 66 year old male who is s/p oratory laparotomy with small bowel resection, lysis of adhesions and repair of enterotomy with significant inflamed small bowel and areas of thinned mucosa on 11/30/2021  SP exploratory laparotomy 12/3/2021 with small bowel perforation from ulcer  SP IR placement of drain and pelvic abscess 5/9/2021 with new enterocutaneous fistula formation and bowel function returning.  Patient is progressing well  Leukocytosis continues to trend down  Malnutrition  PLAN:  -Needs to stay with clear liquid diet until fistula closes.  -Need to continue TPN until diet is able to be advanced  -Okay to start looking at placement of TCU and discharge when medically ready and TCU bed found.    SUBJECTIVE:   He is sore all over.  He states that he is cramping his upper arms and having pain in his right knee and bilateral elbows.  Denies any significant abdominal pain but he states that he is tolerating the drains okay.  Has been afebrile.  Bowel movement yesterday.  Tolerating clear liquids without any any issues.      Patient Vitals for the past 24 hrs:   BP Temp Temp src Pulse Resp SpO2   12/15/21 0753 121/77 98.5  F (36.9  C) Oral 84 20 94 %   12/15/21 0419 127/81 98.1  F (36.7  C) Oral 91 18 92 %   12/15/21 0045 120/72 98.4  F (36.9  C) Oral 95 16 92 %   12/14/21 1906 113/76 98  F (36.7  C) Oral 88 16 92 %   12/14/21 1623 109/62 97.7  F (36.5  C) Oral 89 18 91 %   12/14/21 1137 119/72 97.8  F (36.6  C) Temporal 85 16 93 %         PHYSICAL EXAM:  GEN: No acute distress, comfortable  LUNGS: CTA bilaterally  CV:RRR  ABD soft and wound is clean and dry with some irritation from the staples.  Ostomy bag over wound where fistula is and there is bilious output into the ostomy bag.  Currently all 4 drains are having scant output.  All 4 drains appear to be serosanguineous currently    Output by Drain  (mL) 12/13/21 0700 - 12/13/21 1459 12/13/21 1500 - 12/13/21 2259 12/13/21 2300 - 12/14/21 0659 12/14/21 0700 - 12/14/21 1459 12/14/21 1500 - 12/14/21 2259 12/14/21 2300 - 12/15/21 0659 12/15/21 0700 - 12/15/21 1108   Closed/Suction Drain 1 Right Abdomen Bulb 19 Panamanian  10 5       Closed/Suction Drain 1 Left LLQ Bulb 19 Panamanian 5 10 0       Closed/Suction Drain 4 Left;Midline Buttock Bulb 10 Panamanian 5 5 5       Open Drain Medial;Superior Abdomen vessel loop drain 50 550 550 200 500 200       EXT:No cyanosis, edema or obvious abnormalities    12/14 0700 - 12/15 0659  In: 3613.92 [P.O.:360; I.V.:118]  Out: 1950 [Urine:900; Drains:900]    No results displayed because visit has over 200 results.   Recent Results (from the past 24 hour(s))   Glucose by meter    Collection Time: 12/14/21 12:01 PM   Result Value Ref Range    GLUCOSE BY METER POCT 138 (H) 70 - 99 mg/dL   Glucose by meter    Collection Time: 12/14/21  4:43 PM   Result Value Ref Range    GLUCOSE BY METER POCT 129 (H) 70 - 99 mg/dL   Glucose by meter    Collection Time: 12/15/21  2:29 AM   Result Value Ref Range    GLUCOSE BY METER POCT 132 (H) 70 - 99 mg/dL   Basic metabolic panel    Collection Time: 12/15/21  6:37 AM   Result Value Ref Range    Sodium 134 (L) 136 - 145 mmol/L    Potassium 4.6 3.5 - 5.0 mmol/L    Chloride 103 98 - 107 mmol/L    Carbon Dioxide (CO2) 24 22 - 31 mmol/L    Anion Gap 7 5 - 18 mmol/L    Urea Nitrogen 19 8 - 22 mg/dL    Creatinine 0.79 0.70 - 1.30 mg/dL    Calcium 8.3 (L) 8.5 - 10.5 mg/dL    Glucose 121 70 - 125 mg/dL    GFR Estimate >90 >60 mL/min/1.73m2   Magnesium    Collection Time: 12/15/21  6:37 AM   Result Value Ref Range    Magnesium 2.2 1.8 - 2.6 mg/dL   Phosphorus    Collection Time: 12/15/21  6:37 AM   Result Value Ref Range    Phosphorus 2.6 2.5 - 4.5 mg/dL   CBC with platelets    Collection Time: 12/15/21  6:37 AM   Result Value Ref Range    WBC Count 11.7 (H) 4.0 - 11.0 10e3/uL    RBC Count 3.35 (L) 4.40 - 5.90 10e6/uL     Hemoglobin 10.0 (L) 13.3 - 17.7 g/dL    Hematocrit 30.3 (L) 40.0 - 53.0 %    MCV 90 78 - 100 fL    MCH 29.9 26.5 - 33.0 pg    MCHC 33.0 31.5 - 36.5 g/dL    RDW 14.4 10.0 - 15.0 %    Platelet Count 787 (H) 150 - 450 10e3/uL   Glucose by meter    Collection Time: 12/15/21  8:05 AM   Result Value Ref Range    GLUCOSE BY METER POCT 119 (H) 70 - 99 mg/dL               Demetrius Vogel PA-C

## 2021-12-15 NOTE — PROGRESS NOTES
"CLINICAL NUTRITION SERVICES - REASSESSMENT NOTE     Nutrition Prescription    RECOMMENDATIONS FOR MDs/PROVIDERS TO ORDER:  Continue same TPN regimen     Malnutrition Status:    moderate    Recommendations already ordered by Registered Dietitian (RD):  Continue D 310  @ 85 ml/hr plus 250 ml of 20% lipids  3 times per week over 12 hrs    Future/Additional Recommendations:  Follow for diet advance and ability to wean TPN     EVALUATION OF THE PROGRESS TOWARD GOALS   Diet: Clear Liquid  Nutrition Support: TPN: D 310  @ 85 ml/hr plus 250 ml of 20% lipids 3 times per week over 12 hrs providin Calories, 100 g protein, 310 g CHO, average of 21 g fat, 2040 ml fluid (CHO load: 3.06 mg CHO/Kg/min)  Intake: < 10% clear liquids       NEW FINDINGS   NG out    ANTHROPOMETRICS  Height: 170.2 cm (5' 7\")  Most Recent Weight: 81.7 kg (180 lb 1.6 oz)    Weight History:   Wt Readings from Last 10 Encounters:   21 81.7 kg (180 lb 1.6 oz)   20 79.4 kg (175 lb)   19 81.2 kg (179 lb)     PHYSICAL FINDINGS  See malnutrition section below.    GI CONCERNS  Abdominal discomfort  Hypoactive BS  Fistula  Last BM: 2021 per nurse    LABS  Reviewed: Glu 132, K 4.3, Na 134, Mg 2.3, phos 2,7    MEDICATIONS  Reviewed: maxipime, levothyroxine, flagyl, remeron, pantoprazole, senna-docusate    MALNUTRITION:  % Weight Loss: 20 lb weight loss noted-unsure of time frame-may be greater than 1 year  % Intake:  </= 50% for >/= 5 days (severe malnutrition)  Subcutaneous Fat Loss:  Orbital region moderate depletion  Muscle Loss:  unable  Fluid Retention:  None noted    Malnutrition Diagnosis: Moderate malnutrition  In Context of:  Acute illness or injury    CURRENT NUTRITION DIAGNOSIS  Malnutrition related to acute illness as evidenced by NPO x 5 days and moderate fat loss      INTERVENTIONS  Implementation  Continue same TPN/lipid regimen today-follow for diet advance and ability to wean TPN    Goals  Tolerate " TPN-met  Advance to po diet when appropriate-progressing (clear liquids)  BG -met    Monitoring/Evaluation  Progress toward goals will be monitored and evaluated per protocol.

## 2021-12-16 ENCOUNTER — APPOINTMENT (OUTPATIENT)
Dept: OCCUPATIONAL THERAPY | Facility: HOSPITAL | Age: 66
DRG: 329 | End: 2021-12-16
Payer: MEDICARE

## 2021-12-16 ENCOUNTER — APPOINTMENT (OUTPATIENT)
Dept: CT IMAGING | Facility: HOSPITAL | Age: 66
DRG: 329 | End: 2021-12-16
Attending: NURSE PRACTITIONER
Payer: MEDICARE

## 2021-12-16 ENCOUNTER — APPOINTMENT (OUTPATIENT)
Dept: INTERVENTIONAL RADIOLOGY/VASCULAR | Facility: HOSPITAL | Age: 66
DRG: 329 | End: 2021-12-16
Attending: NURSE PRACTITIONER
Payer: MEDICARE

## 2021-12-16 LAB
ANION GAP SERPL CALCULATED.3IONS-SCNC: 7 MMOL/L (ref 5–18)
BACTERIA FLD CULT: NORMAL
BUN SERPL-MCNC: 18 MG/DL (ref 8–22)
CALCIUM SERPL-MCNC: 8.4 MG/DL (ref 8.5–10.5)
CHLORIDE BLD-SCNC: 102 MMOL/L (ref 98–107)
CO2 SERPL-SCNC: 25 MMOL/L (ref 22–31)
CREAT SERPL-MCNC: 0.83 MG/DL (ref 0.7–1.3)
ERYTHROCYTE [DISTWIDTH] IN BLOOD BY AUTOMATED COUNT: 14.4 % (ref 10–15)
GFR SERPL CREATININE-BSD FRML MDRD: >90 ML/MIN/1.73M2
GLUCOSE BLD-MCNC: 125 MG/DL (ref 70–125)
GLUCOSE BLDC GLUCOMTR-MCNC: 110 MG/DL (ref 70–99)
GLUCOSE BLDC GLUCOMTR-MCNC: 125 MG/DL (ref 70–99)
GLUCOSE BLDC GLUCOMTR-MCNC: 140 MG/DL (ref 70–99)
HCT VFR BLD AUTO: 31.2 % (ref 40–53)
HGB BLD-MCNC: 10.1 G/DL (ref 13.3–17.7)
MAGNESIUM SERPL-MCNC: 2.2 MG/DL (ref 1.8–2.6)
MCH RBC QN AUTO: 29.2 PG (ref 26.5–33)
MCHC RBC AUTO-ENTMCNC: 32.4 G/DL (ref 31.5–36.5)
MCV RBC AUTO: 90 FL (ref 78–100)
PHOSPHATE SERPL-MCNC: 3.2 MG/DL (ref 2.5–4.5)
PLATELET # BLD AUTO: 875 10E3/UL (ref 150–450)
POTASSIUM BLD-SCNC: 4.4 MMOL/L (ref 3.5–5)
RBC # BLD AUTO: 3.46 10E6/UL (ref 4.4–5.9)
SODIUM SERPL-SCNC: 134 MMOL/L (ref 136–145)
WBC # BLD AUTO: 12.2 10E3/UL (ref 4–11)

## 2021-12-16 PROCEDURE — 99232 SBSQ HOSP IP/OBS MODERATE 35: CPT | Mod: GC | Performed by: STUDENT IN AN ORGANIZED HEALTH CARE EDUCATION/TRAINING PROGRAM

## 2021-12-16 PROCEDURE — 250N000011 HC RX IP 250 OP 636: Performed by: SPECIALIST

## 2021-12-16 PROCEDURE — 99232 SBSQ HOSP IP/OBS MODERATE 35: CPT | Performed by: PAIN MEDICINE

## 2021-12-16 PROCEDURE — 250N000013 HC RX MED GY IP 250 OP 250 PS 637: Performed by: STUDENT IN AN ORGANIZED HEALTH CARE EDUCATION/TRAINING PROGRAM

## 2021-12-16 PROCEDURE — 84100 ASSAY OF PHOSPHORUS: CPT | Performed by: STUDENT IN AN ORGANIZED HEALTH CARE EDUCATION/TRAINING PROGRAM

## 2021-12-16 PROCEDURE — 120N000001 HC R&B MED SURG/OB

## 2021-12-16 PROCEDURE — 255N000002 HC RX 255 OP 636: Performed by: SPECIALIST

## 2021-12-16 PROCEDURE — 250N000013 HC RX MED GY IP 250 OP 250 PS 637: Performed by: SPECIALIST

## 2021-12-16 PROCEDURE — 97110 THERAPEUTIC EXERCISES: CPT | Mod: GO

## 2021-12-16 PROCEDURE — 250N000009 HC RX 250: Performed by: SPECIALIST

## 2021-12-16 PROCEDURE — 250N000011 HC RX IP 250 OP 636: Performed by: INTERNAL MEDICINE

## 2021-12-16 PROCEDURE — 74177 CT ABD & PELVIS W/CONTRAST: CPT

## 2021-12-16 PROCEDURE — 250N000013 HC RX MED GY IP 250 OP 250 PS 637: Performed by: CLINICAL NURSE SPECIALIST

## 2021-12-16 PROCEDURE — 250N000009 HC RX 250: Performed by: INTERNAL MEDICINE

## 2021-12-16 PROCEDURE — 83735 ASSAY OF MAGNESIUM: CPT | Performed by: STUDENT IN AN ORGANIZED HEALTH CARE EDUCATION/TRAINING PROGRAM

## 2021-12-16 PROCEDURE — 250N000011 HC RX IP 250 OP 636: Performed by: PAIN MEDICINE

## 2021-12-16 PROCEDURE — 49424 ASSESS CYST CONTRAST INJECT: CPT

## 2021-12-16 PROCEDURE — 85027 COMPLETE CBC AUTOMATED: CPT | Performed by: INTERNAL MEDICINE

## 2021-12-16 PROCEDURE — 80048 BASIC METABOLIC PNL TOTAL CA: CPT | Performed by: INTERNAL MEDICINE

## 2021-12-16 PROCEDURE — C9113 INJ PANTOPRAZOLE SODIUM, VIA: HCPCS | Performed by: INTERNAL MEDICINE

## 2021-12-16 PROCEDURE — 250N000013 HC RX MED GY IP 250 OP 250 PS 637: Performed by: PAIN MEDICINE

## 2021-12-16 RX ORDER — QUETIAPINE FUMARATE 25 MG/1
50 TABLET, FILM COATED ORAL
Status: DISCONTINUED | OUTPATIENT
Start: 2021-12-16 | End: 2021-12-17

## 2021-12-16 RX ORDER — IOPAMIDOL 755 MG/ML
100 INJECTION, SOLUTION INTRAVASCULAR ONCE
Status: COMPLETED | OUTPATIENT
Start: 2021-12-16 | End: 2021-12-16

## 2021-12-16 RX ADMIN — METRONIDAZOLE 500 MG: 500 TABLET ORAL at 14:13

## 2021-12-16 RX ADMIN — I.V. FAT EMULSION 250 ML: 20 EMULSION INTRAVENOUS at 20:36

## 2021-12-16 RX ADMIN — LEVOTHYROXINE SODIUM 25 MCG: 0.03 TABLET ORAL at 05:24

## 2021-12-16 RX ADMIN — OXYCODONE HYDROCHLORIDE 5 MG: 5 TABLET ORAL at 14:13

## 2021-12-16 RX ADMIN — OXYCODONE HYDROCHLORIDE 5 MG: 5 TABLET ORAL at 22:41

## 2021-12-16 RX ADMIN — BUSPIRONE HYDROCHLORIDE 15 MG: 15 TABLET ORAL at 08:33

## 2021-12-16 RX ADMIN — MIRTAZAPINE 30 MG: 30 TABLET, FILM COATED ORAL at 22:41

## 2021-12-16 RX ADMIN — CEFDINIR 300 MG: 300 CAPSULE ORAL at 08:32

## 2021-12-16 RX ADMIN — GABAPENTIN 900 MG: 300 CAPSULE ORAL at 22:41

## 2021-12-16 RX ADMIN — ENOXAPARIN SODIUM 40 MG: 40 INJECTION SUBCUTANEOUS at 10:51

## 2021-12-16 RX ADMIN — METRONIDAZOLE 500 MG: 500 TABLET ORAL at 20:44

## 2021-12-16 RX ADMIN — ACETAMINOPHEN 650 MG: 325 TABLET ORAL at 08:40

## 2021-12-16 RX ADMIN — QUETIAPINE 50 MG: 25 TABLET ORAL at 22:41

## 2021-12-16 RX ADMIN — OXYCODONE HYDROCHLORIDE 5 MG: 5 TABLET ORAL at 11:10

## 2021-12-16 RX ADMIN — PANTOPRAZOLE SODIUM 40 MG: 40 INJECTION, POWDER, FOR SOLUTION INTRAVENOUS at 08:34

## 2021-12-16 RX ADMIN — POTASSIUM CHLORIDE: 2 INJECTION, SOLUTION, CONCENTRATE INTRAVENOUS at 20:31

## 2021-12-16 RX ADMIN — ONDANSETRON 4 MG: 2 INJECTION INTRAMUSCULAR; INTRAVENOUS at 10:49

## 2021-12-16 RX ADMIN — OXYCODONE HYDROCHLORIDE 5 MG: 5 TABLET ORAL at 16:46

## 2021-12-16 RX ADMIN — CEFDINIR 300 MG: 300 CAPSULE ORAL at 20:44

## 2021-12-16 RX ADMIN — ACETAMINOPHEN 650 MG: 325 TABLET ORAL at 04:22

## 2021-12-16 RX ADMIN — IOHEXOL 2 ML: 350 INJECTION, SOLUTION INTRAVENOUS at 12:38

## 2021-12-16 RX ADMIN — OXYCODONE HYDROCHLORIDE 5 MG: 5 TABLET ORAL at 02:22

## 2021-12-16 RX ADMIN — HYDROMORPHONE HYDROCHLORIDE 0.2 MG: 0.2 INJECTION, SOLUTION INTRAMUSCULAR; INTRAVENOUS; SUBCUTANEOUS at 04:22

## 2021-12-16 RX ADMIN — GABAPENTIN 300 MG: 300 CAPSULE ORAL at 08:33

## 2021-12-16 RX ADMIN — METRONIDAZOLE 500 MG: 500 TABLET ORAL at 08:32

## 2021-12-16 RX ADMIN — IOPAMIDOL 100 ML: 755 INJECTION, SOLUTION INTRAVENOUS at 10:07

## 2021-12-16 RX ADMIN — BUSPIRONE HYDROCHLORIDE 15 MG: 15 TABLET ORAL at 20:44

## 2021-12-16 RX ADMIN — TAMSULOSIN HYDROCHLORIDE 0.4 MG: 0.4 CAPSULE ORAL at 08:31

## 2021-12-16 RX ADMIN — OXYCODONE HYDROCHLORIDE 5 MG: 5 TABLET ORAL at 08:31

## 2021-12-16 RX ADMIN — VENLAFAXINE HYDROCHLORIDE 37.5 MG: 37.5 CAPSULE, EXTENDED RELEASE ORAL at 08:30

## 2021-12-16 RX ADMIN — GABAPENTIN 300 MG: 300 CAPSULE ORAL at 14:13

## 2021-12-16 ASSESSMENT — ACTIVITIES OF DAILY LIVING (ADL)
ADLS_ACUITY_SCORE: 14
ADLS_ACUITY_SCORE: 12
ADLS_ACUITY_SCORE: 14

## 2021-12-16 NOTE — PLAN OF CARE
Problem: Adult Inpatient Plan of Care  Goal: Absence of Hospital-Acquired Illness or Injury  Intervention: Identify and Manage Fall Risk  Recent Flowsheet Documentation  Taken 12/16/2021 0100 by Shorty Lovelace RN  Safety Promotion/Fall Prevention:   assistive device/personal items within reach   bed alarm on   fall prevention program maintained   clutter free environment maintained   lighting adjusted   mobility aid in reach   nonskid shoes/slippers when out of bed   patient and family education     Problem: Adult Inpatient Plan of Care  Goal: Absence of Hospital-Acquired Illness or Injury  Intervention: Prevent Skin Injury  Recent Flowsheet Documentation  Taken 12/16/2021 0100 by Shorty Lovelace RN  Body Position: position changed independently     Problem: Adult Inpatient Plan of Care  Goal: Absence of Hospital-Acquired Illness or Injury  Intervention: Prevent and Manage VTE (Venous Thromboembolism) Risk  Recent Flowsheet Documentation  Taken 12/16/2021 0100 by Shorty Lovelace RN  VTE Prevention/Management: anticoagulant therapy maintained     Problem: Pain Acute  Goal: Acceptable Pain Control and Functional Ability  Outcome: Improving  Intervention: Develop Pain Management Plan  Recent Flowsheet Documentation  Taken 12/16/2021 0100 by Shorty Lovelace RN  Pain Management Interventions:   repositioned   care clustered  Intervention: Prevent or Manage Pain  Recent Flowsheet Documentation  Taken 12/16/2021 0100 by Shorty Lovelace RN  Medication Review/Management: medications reviewed     Pt continues on scheduled Oxycodone for pain which is moderately effective. Pt asleep/ resting comfortably upon recheck. All MARISA drains w/ minimal to no output. Midline fistula w/ watery green drainage, approximately 100cc output. Dressing to MARISA drains remained CDI. BS: active x4Q. Continues on TPN at ordered rate. PICC double lumen in R basilic remained patent. Continues on clear liquids. Midline incision staples are CDI,  left LUIS.     Pt used bedside urinal this shift w/noted makenna urine which was absent of foul smell or sediment.

## 2021-12-16 NOTE — PROGRESS NOTES
Writer received call from [employer] for Pt. She will be emailing writer a release of information to sign before she will speak with care management. Will get it signed and back to her. CM to follow.    SHELBY Timmons  1:12 PM

## 2021-12-16 NOTE — PROGRESS NOTES
"[facility] ACUTE PAIN SERVICE    ([facility], [facility], [facility])  Daily PAIN Progress Note    Assessment/Plan:  Gurdeep Dia is a 66 year old male who was admitted on 11/30/2021.  I was asked to see the patient for postoperative pain in the setting of SBO, now S/P exploratory laparotomy, small bowel resection, lysis of adhesions and repair of enterotomy with significant inflamed small bowel with areas of thinned mucosa on 11/30/2021 2/2 obstruction- S/p exploratory laparotomy 12/3/2021 2/2 small bowel perforation.  History of substance abuse (cocaine/etoh), hypertension, hypothyroid, depression/anxiety, chronic pain and takes meloxicam and gabapentin at baseline. Pain ratings remain high at times, nausea present.  In 24 hours he has utilized milligrams of oxycodone as well as 0.4 mg of IV Dilaudid.    PLAN:   1) Postoperative pain in the setting of bowel obstruction/perforation, POD#8 on clears and tolerating oxycodone. Continue gabapentin.  Attempting to minimize opioids in order to expedite recovery.   2)Multimodal Medication Therapy  Topical: add lidocaine   NSAID'S: avoid  Adjuvants: Tylenol 650 mg po q 4 h prn,  gabapentin 300 mg bid and continue 900mg at HS  Antidepressants/anxiolytics: Buspar 15 mg po bid, Remeron 30 mg po q hs, venlafaxine XR 37.5 mg daily, olanzapine, Haldol PRN ordered for agitation.   Opioids: oxycodone 5 every 6 hours with additional 5 mg every three hours prn   Minimize IV dilaudid    3)Non-medication interventions- acupuncture on Thursday   4)Constipation Prophylaxis- per surgery  5) Follow up   -Opioid prescriber has been none  -Discharge Recommendations - We recommend prescribing the following at the time of discharge-small supply of oxycodone              Subjective:    Gurdeep feels that the pain has improved since starting the at bedtime dose of gabapentin.  He does feel that the pain is getting him down and he states, \"I need to have surgery again in 6 " "months\".  He is processing this information and it seems to impact how he perceives his pain.  He does feel some nausea and he is also reporting some diaphoresis.  He tells me about his chronic neck pain for which she sometimes wears a brace at night.    Discussed with nursing staff.  They state he has been very weak and yesterday he was only able to shower and that was the extent of his activity.  He was reporting some knee pain to nursing yesterday although denied this for me today.    SBO (small bowel obstruction) (H)   Patient Active Problem List   Diagnosis     Hypertension goal BP (blood pressure) < 140/90     Benign prostatic hyperplasia with weak urinary stream     RAVI (generalized anxiety disorder)     Moderate major depression (H)     History of substance abuse (H)     History of hepatitis C     Chronic neck pain     Chronic bilateral low back pain without sciatica     SBO (small bowel obstruction) (H)        History   Drug Use Unknown         Tobacco Use      Smoking status: Current Every Day Smoker      Smokeless tobacco: Never Used          busPIRone  15 mg Oral BID     cefdinir  300 mg Oral Q12H KATEY     enoxaparin ANTICOAGULANT  40 mg Subcutaneous Q24H     [Held by provider] furosemide  40 mg Intravenous Daily     gabapentin  300 mg Oral BID w/meals     gabapentin  900 mg Oral At Bedtime     levothyroxine  25 mcg Oral Daily     lipids  250 mL Intravenous Once per day on Tue Thu Sat     metroNIDAZOLE  500 mg Oral TID     mirtazapine  30 mg Oral At Bedtime     oxyCODONE  5 mg Oral Q6H     pantoprazole (PROTONIX) IV  40 mg Intravenous Daily with breakfast     senna-docusate  1 tablet Oral BID     sodium chloride (PF)  10-40 mL Intracatheter Q7 Days     sodium chloride (PF)  3 mL Intracatheter Q8H     tamsulosin  0.4 mg Oral Daily     venlafaxine  37.5 mg Oral Daily       Objective:  Vital signs in last 24 hours:  B/P: 116/66, T: 97, P: 85, R: 16   Blood pressure 122/81, pulse 90, temperature 98.5  F (36.9 " " C), temperature source Oral, resp. rate 18, height 1.702 m (5' 7\"), weight 81.7 kg (180 lb 1.6 oz), SpO2 92 %.      Weight:   Wt Readings from Last 2 Encounters:   12/13/21 81.7 kg (180 lb 1.6 oz)   01/22/20 79.4 kg (175 lb)           Intake/Output:    Intake/Output Summary (Last 24 hours) at 12/14/2021 0913  Last data filed at 12/14/2021 0700  Gross per 24 hour   Intake 1376 ml   Output 1695 ml   Net -319 ml        Review of Systems:   As per subjective, all others negative.    Physical Exam:  Constitutional: alert and cooperative   Head: Normocephalic. No masses, lesions, tenderness or abnormalities  Neck: Neck supple. No adenopathy. Thyroid symmetric, normal size,  ENT: ENT exam normal, no neck nodes or sinus tenderness  Cardiovascular: No edema or JVD., negative findings: S1 normal, S2 normal  Respiratory: negative, negative findings: no chest deformities noted, diminished   Gastrointestinal: positive findings: mildly distended, hyperactive bowel sounds, incision, ostomy, MARISA drains and binder on  : Deferred  Musculoskeletal: extremities normal- no gross deformities noted and normal muscle tone  Skin: no suspicious lesions or rashes  Neurologic:    Sensation grossly WNL.  Psychiatric: anxious  Hematologic/Lymphatic/Immunologic: Negative       Lab Results:  Personally Reviewed.   Last Comprehensive Metabolic Panel:  Sodium   Date Value Ref Range Status   12/16/2021 134 (L) 136 - 145 mmol/L Final   01/22/2020 140 133 - 144 mmol/L Final     Potassium   Date Value Ref Range Status   12/16/2021 4.4 3.5 - 5.0 mmol/L Final   01/22/2020 3.8 3.4 - 5.3 mmol/L Final     Chloride   Date Value Ref Range Status   12/16/2021 102 98 - 107 mmol/L Final   01/22/2020 108 94 - 109 mmol/L Final     Carbon Dioxide   Date Value Ref Range Status   01/22/2020 28 20 - 32 mmol/L Final     Carbon Dioxide (CO2)   Date Value Ref Range Status   12/16/2021 25 22 - 31 mmol/L Final     Anion Gap   Date Value Ref Range Status   12/16/2021 7 5 - " 18 mmol/L Final   01/22/2020 4 3 - 14 mmol/L Final     Glucose   Date Value Ref Range Status   12/16/2021 125 70 - 125 mg/dL Final   01/22/2020 100 (H) 70 - 99 mg/dL Final     GLUCOSE BY METER POCT   Date Value Ref Range Status   12/16/2021 125 (H) 70 - 99 mg/dL Final     Urea Nitrogen   Date Value Ref Range Status   12/16/2021 18 8 - 22 mg/dL Final   01/22/2020 14 7 - 30 mg/dL Final     Creatinine   Date Value Ref Range Status   12/16/2021 0.83 0.70 - 1.30 mg/dL Final   01/22/2020 0.94 0.66 - 1.25 mg/dL Final     GFR Estimate   Date Value Ref Range Status   12/16/2021 >90 >60 mL/min/1.73m2 Final     Comment:     As of July 11, 2021, eGFR is calculated by the CKD-EPI creatinine equation, without race adjustment. eGFR can be influenced by muscle mass, exercise, and diet. The reported eGFR is an estimation only and is only applicable if the renal function is stable.   01/22/2020 84 >60 mL/min/[1.73_m2] Final     Comment:     Non  GFR Calc  Starting 12/18/2018, serum creatinine based estimated GFR (eGFR) will be   calculated using the Chronic Kidney Disease Epidemiology Collaboration   (CKD-EPI) equation.       Calcium   Date Value Ref Range Status   12/16/2021 8.4 (L) 8.5 - 10.5 mg/dL Final   01/22/2020 9.5 8.5 - 10.1 mg/dL Final        UA: No results found for: UAMP, UBARB, BENZODIAZEUR, UCANN, UCOC, OPIT, UPCP          Total unit/floor time 25  minutes, time consisted of the following, examination of patient, reviewing the record and lab results, and completing documentation.  Discussed with day and night nursing team.       Diya Solano APRN, CNS-BC, CNP, ACHPN  Acute Care Pain Management Program Hour 7a-1700  [facility] ([place], [place], [place])   Page via [employer]- Click HERE to page Diya or call [phone]

## 2021-12-16 NOTE — PROGRESS NOTES
"ASSESSMENT:  1. SBO (small bowel obstruction) (H)    2. Pelvic abscess in male (H)        Gurdeep Dia is a 66 year old male who is s/p exploratory laparotomy with small bowel resection, lysis of adhesions and repair of enterotomy with significant inflamed small bowel with areas of thinned mucosa on 11/30/2021  S/p exploratory laparotomy 12/3/2021 for small bowel perforation from ulcer  S/p IR placement of drain into pelvic abscess 12/9/2021. Enterocutaneous fistula formation.  He has unfortunately had another area of his incision start to have leakage of enteric contents.  Most likely this is just an extension of his fistula up along the underside of his incision.  We will touch base with Dr. Richardson, but this is likely just need to be contained and should shift on its own as well    PLAN:  Ostomy bags over open areas of the incision to collect succus  Continue TPN  Will back off to ice chips and just sips of water until seen by Dr. Richardson    SUBJECTIVE:   He is feeling okay, but has had an eruption of fluid from the proximal portion of his incision which is concerning him.  He continues to tolerate clear liquids denies nausea or vomiting pain is controlled but he states that his incision is a little more painful since it started \"leaking\".      Patient Vitals for the past 24 hrs:   BP Temp Temp src Pulse Resp SpO2   12/16/21 1539 112/77 97.3  F (36.3  C) Oral 88 18 90 %   12/16/21 0758 112/78 98.2  F (36.8  C) Oral 82 18 93 %   12/16/21 0403 122/81 98.5  F (36.9  C) Oral 90 18 92 %   12/16/21 0100 -- 98.9  F (37.2  C) -- -- -- 93 %   12/15/21 2314 110/72 99.5  F (37.5  C) Axillary 95 18 91 %   12/15/21 2133 121/85 99.7  F (37.6  C) Oral 104 20 91 %   12/15/21 1703 121/89 98.4  F (36.9  C) Oral 93 16 94 %         PHYSICAL EXAM:  GEN: No acute distress, comfortable  LUNGS: CTA bilaterally  CV:RRR  ABD: soft, not distended and nontender; drains with scant output; midline wound/EC fistula with noted gas and " succus in the ostomy bag; there is succus noted from the proximal third of the incision when palpated air bubbles escape between staples  Drains: scant serosanguineous drainage  Output by Drain (mL) 12/14/21 0700 - 12/14/21 1459 12/14/21 1500 - 12/14/21 2259 12/14/21 2300 - 12/15/21 0659 12/15/21 0700 - 12/15/21 1459 12/15/21 1500 - 12/15/21 2259 12/15/21 2300 - 12/16/21 0659 12/16/21 0700 - 12/16/21 1459 12/16/21 1500 - 12/16/21 1627   Closed/Suction Drain 1 Right Abdomen Bulb 19 Taj[place]    0 0      Closed/Suction Drain 1 Left LLQ Bulb 19 Taj[place]    0 0      Open Drain Medial;Superior Abdomen vessel loop drain 200 500 200 400 450 225        EXT:No cyanosis, edema or obvious abnormalities    12/15 1500 - 12/16 1459  In: 1924.42 [P.O.:240]  Out: 1650 [Urine:975; Drains:675]    No results displayed because visit has over 200 results.             Jane Agosto, THOMAS CNP

## 2021-12-16 NOTE — PROGRESS NOTES
[facility] Progress Note    Assessment/Plan  Principal Problem:    SBO (small bowel obstruction) (H)  Active Problems:    Hypertension goal BP (blood pressure) < 140/90    Benign prostatic hyperplasia with weak urinary stream    RAVI (generalized anxiety disorder)    Moderate major depression (H)    History of substance abuse (H)    History of hepatitis C    Chronic neck pain    Chronic bilateral low back pain without sciatica    Gurdeep Dia is a 66 year old male with history of splenectomy, appendectomy, HTN, and substance abuse who presented with 2 days of right sided abdominal pain, found to have a bowel obstruction with closed loop. He is admitted for surgical repair of obstruction.      Requires ongoing hospitalization for post-operative monitoring and care       Mechanical SBO s/p exploratory laparotomy / small bowel resection / repair of enterotomy / extensive lysis of adhesions (11/30/21)  Subsequent small bowel perforation requiring repeat ex lap for washout 12/3    CT abdomen/pelvis on admission 11/30 significant for closed loop mechanical small bowel obstruction. Does have history of multiple abdominal surgeries previously. First procedure 11/30 for small bowel resection. Acute change on 12/2-12/3 and found to have intraabdominal drainage from 12/3/2021; underwent repeat exploratory laparotomy with washout and repair of small bowel perforation. 12/8 still without stool charted, still spiking low grade fevers, having atypical output from MARISA drain, white count climbing. CT 12/8 showing ring enhancing lesion in abdomen likely representing abscess. Fourth drain placed 12/9 AM by IR for abscess drainage. Is now stooling and advancing diet.  -Surgery consulted and following   -NG out 12/14 now that he's stooling and w/o nausea/vomiting  -ADAT per surgery - currently on clears  -Pain control as per surgery/Pain team - PCA stopped 12/13, transitioning to orals with decent pain  control  -Abscess culture growing e. Coli, see sensitivities. Abscess drain placed 12/9. Discontinued vancomycin and meropenem 12/12 in favor of IV cefepime + crushed oral flagyl (shortage of IV form). Curbsided ID 12/15 to discuss abx moving forward - Dr. Hanna recommended transitioning to oral cefdinir and flagyl. We will re-address duration of therapy after abscessogram done by IR   -CT abscessogram planned today  -IV Protonix     Acute encephalopathy, likely multifactorial - toxic/metabolic/hospital-induced   Acutely agitated post-op 12/3 to 12/4. Pulling at lines and threatening to leave.  Nurse felt unsafe without restraints. Precedex drip started, now off. Transferred out of the unit. Has been calm since back on the floor.  Requested Seroquel for insomnia.  States he historically has taken 200 mg before bed.  Will start small dose as needed.  - Soft limb restraint x 2 - adjust as needed  - Precedex gtt stop 12/7 morning  - PRN Haldol injection  - Zyprexa ODT 4x daily prn  - Conservative measures as able   - Avoid opioid/benzo if able   - Seroquel 50 mg at bedtime as needed    Acute hypoxic respiratory failure, post-op   Atelectasis vs aspiration  Initially requiring high-flow oxygen in ICU following procedure.  Question post-op atelectasis vs aspiration versus volume overload.  Does have a degree of respiratory distress when he gets agitated. CXR 12/6 showing bibasilar opacitied favored to reflect atelectasis, though still with some interstitial coarsening. Breathing improved 12/7 after some antibiotics and maybe ongoing resolution of his atelectasis. No signs of fluid overload and sodium climbing so IV lasix discontinued 12/8.  -Continue nasal cannula  -Wean as able  -Continue abx as above, don't think we need to broaden from a lung perspective  -Aggressive IS use  -PT/OT to get moving  -Discontinued IV lasix    Afib, RVR post-op, currently rate controlled   NSVT episode 12/8 and 12/15  Noted.  Rates had  been in the 120 to 150 range.  Converted to NSR with amiodarone drip. No longer on any rate control. No repeat episodes. Both CHADS2-Vasc and HAS-BLED of at least 2.  TSH normal. Lytes normal. Echo 12/10 showing slight concentric thickening in LV and EF 60-65%. Patient wants to avoid anticoagulation given bleeding risks and his single episode likely being related to his operation, sedation, and infection.  -Lovenox for now   -No AC after discharge after shared decision making discussion    HTN  PTA lisinopril, furosemide being held in the setting of acute surgical care.  Will monitor, even with pain BP not significantly elevated.  Will resume when hemodynamically stable.  Kidney function is stable at this time.  Blood pressure does climb when he is agitated.  -Monitor     Moderate malnutrition  Per RD eval.  -TPN until cleared to ADAT per surgery        Chronic Conditions:  Hypothyroidism- PTA levothyroxine  Depression/anxiety- PTA venlafaxine, mirtazepine, Buspar   Chronic pain- Hold mexolicam 7.5mg daily, resume PTA gabapentin when tolerating PO.  BPH- PTA flomax      Diet: NPO for Medical/Clinical Reasons Except for: Meds, Ice ChipsNPO   DVT Prophylaxis: Lovenox q day    Roblero Catheter: placed   Fluids:  None  Central Lines: None  Code Status: Full Code    Subjective  States he had a rough night last night.  Only slept about 2 hours and was in quite a bit of pain.  Pain improved now.  Is requesting some Seroquel or something to help him sleep.     Objective    Vital signs in last 24 hours Temp:  [98.2  F (36.8  C)-99.7  F (37.6  C)] (P) 98.2  F (36.8  C)  Pulse:  [] (P) 82  Resp:  [16-20] (P) 18  BP: (110-122)/(72-89) (P) 112/78  SpO2:  [91 %-95 %] (P) 93 %       Intake/Output last 3 shift I/O last 3 completed shifts:  In: 2490.42 [P.O.:780; I.V.:4]  Out: 2700 [Urine:1625; Drains:1075]    Intake/Output this shift:No intake/output data recorded.    Physical Exam  General appearance: uncomfortable appearing  male, laying in bed, two officers at bedside.  Acutely agitated.  Head: Normocephalic, without obvious abnormality, atraumatic.  NG tube in place.    Throat: moist mucous membranes.  Lungs: Tachypneic.  High flow oxygen in place.  Coarse lung sounds in the bases.  Heart: regular rate and rhythm, S1, S2 normal, no murmur, click, rub or gallop  Abdomen: Abdominal binding in place.  Multiple MARISA drains in place with bloody drainage. bowel sounds present. Moderate TTP throughout.   Extremities: extremities normal, atraumatic, no cyanosis or edema  Skin: Slightly pale, dry skin.  Neurologic: Alert and oriented X 2.  Able to move extremities.  Sensation intact.   Psychiatric: Exceedingly anxious and agitated.     Pertinent Labs and Pertinent Radiology   Lab Results: personally reviewed.     Radiology Results: Personally reviewed image/s and impression/s    Precepted patient with Dr. Willy Mcmahon MD - PGY2  [facility] [place], [place] Res[facility]  P: [phone]

## 2021-12-16 NOTE — PROGRESS NOTES
Interventional Radiology - Pre-Procedure Note:  12/16/2021    Procedure Requested: Abscessogram  Requested by: ISAÍAS Alonso CNP    Brief HPI: Gurdeep Dia is a 66 year old male with history of splenectomy, appendectomy, HTN, and substance abuse who presented with 2 days of right sided abdominal pain, found to have a bowel obstruction with closed loop s/p exploratory laparotomy, small bowel resection, lysis of adhesions and repair of enterotomy with significant inflamed small bowel and areas of thinned mucosa on 11/30/2021; subsequent small bowel perforation s/p exp lap and washout 12/3/21 with pelvic abscess s/p CT guided 10 Fr drain placement 12/9/21. Patient with surgical drains x3 and IR drain x1.  Due for follow up CT/abscessogram. Drain outputs minimal.     Drain cultures: + E Coli  Drain flushing: 10 mL NS every shift   Drain outputs:       IMAGING:  EXAM:  1. PERCUTANEOUS DRAIN PLACEMENT PELVIC ABSCESS  2. CT GUIDANCE  3. CONSCIOUS SEDATION  LOCATION: [facility]  DATE/TIME: 12/9/2021 9:50 AM     INDICATION: s/p two surgeries with elevating wbc, ct with pelvic abscess.  TECHNIQUE: Dose reduction techniques were used.     PROCEDURE: Informed consent obtained. Site marked. Prior images reviewed. Required items made available. Patient identity confirmed verbally and with arm band. Patient reevaluated immediately before administering sedation. Universal protocol was   followed. Time out performed. The site was prepped and draped in sterile fashion. 10 mL of 1% lidocaine was infused into the local soft tissues. Using standard technique and under direct CT guidance, a 10 Mongolian drainage catheter was inserted into the   fluid collection.       SPECIMEN: 30 mL of brownish thin fluid was aspirated and sent to lab for cultures and Gram stain.     BLOOD LOSS: Minimal.     The patient tolerated the procedure well. No immediate complications.     SEDATION: Versed 2.0 mg. Fentanyl 100 mcg. The  "procedure was performed with administration intravenous conscious sedation with appropriate preoperative, intraoperative, and postoperative evaluation.     30 minutes of supervised face to face conscious sedation time was provided by a radiology nurse under my direct supervision.                                                                      IMPRESSION:  1.  Successful CT-guided drain placement into pelvic abscess. 30 mL brownish thin fluid removed and sent for cultures.    NPO: 0800 clear liquids   ANTICOAGULANTS: Lovenox daily   ANTIBIOTICS: Omnicef, Flagyl    ALLERGIES  Allergies   Allergen Reactions     Penicillins Hives         LABS:  INR   Date Value Ref Range Status   12/13/2021 1.07 0.90 - 1.15 Final      Hemoglobin   Date Value Ref Range Status   12/16/2021 10.1 (L) 13.3 - 17.7 g/dL Final   01/22/2020 14.3 13.3 - 17.7 g/dL Final     Platelet Count   Date Value Ref Range Status   12/16/2021 875 (H) 150 - 450 10e3/uL Final   01/22/2020 324 150 - 450 10e9/L Final     Creatinine   Date Value Ref Range Status   12/16/2021 0.83 0.70 - 1.30 mg/dL Final   01/22/2020 0.94 0.66 - 1.25 mg/dL Final     Potassium   Date Value Ref Range Status   12/16/2021 4.4 3.5 - 5.0 mmol/L Final   01/22/2020 3.8 3.4 - 5.3 mmol/L Final       EXAM:  BP (P) 112/78 (BP Location: Left arm)   Pulse (P) 82   Temp (P) 98.2  F (36.8  C) (Oral)   Resp (P) 18   Ht 1.702 m (5' 7\")   Wt 81.7 kg (180 lb 1.6 oz)   SpO2 (P) 93%   BMI 28.21 kg/m    General:  Stable.  In no acute distress.    Neuro:  A&O x 3.  Resp:  Breathing unlabored  Abdomen:  ML incision with staples, actively oozing bile upon assessment    ASSESSMENT/PLAN:   66 year old male with pelvic abscess s/p CT guided 10 Fr drain placement 12/9/21. Minimal outputs. Due for routine drain follow up. CT planned for 1000 today. However upon assessment, found to be  Oozing large amount of bile from midline incision. Alerted surgery, Dr. Marie who happened to be on the floor at the " time. RN to discuss with surgery as well. Will plan follow up CT/abscessogram pending surgery next course of action. IR will continue to follow along.      Crystal Alonso CNP  Interventional Radiology        ADDENDUM 12/16/21  CT reviewed with Dr. Porter, surgery aware. Pelvic abscess much improved. Plan for abscessogram with likely drain removal.   Crystal Alonso CNP on 12/16/2021 at 11:41 AM  Interventional Radiology

## 2021-12-16 NOTE — PLAN OF CARE
IR was rounding on patient and noted midline incision was leaking high on the midline incision  staple line.  Surgery rounded within the hour and removed a few staples at the distal area of the midline incision and writer applied a new pouch applied to collect fistula fluid. WOC RN updated that the fistula fluid was tunnelling under the midline incision.  IR removed drain in left buttock this afternoon. Dressing on left buttock is C/D/I.  Pt tolerated staple removal and drain procedure well.  Pt is tired and is willing to do PT tomorrow.  Patient only tolerated 10 minutes of activity up in the chair this am when completing a bed change.Laurel Cloud RN

## 2021-12-16 NOTE — PHARMACY-CONSULT NOTE
"Pharmacy Note: Parenteral Nutrition (PN) Management    Pharmacist consulted to dose PN for Gurdeep Dia, a 66 year old male by Dr. Clinton.    Subjective:    The patient is a new PN start.     The patient was started on PN in the hospital on 12/5/21.     Indication for PN therapy: bowel resection     Inadequate nutrition anticipated for > 7 days.      Enteral nutrition contraindicated due to: bowel integrity is tenuous.     Pertinent diseases and other special considerations respiratory failure    Social History     Tobacco Use     Smoking status: Current Every Day Smoker     Smokeless tobacco: Never Used   Substance Use Topics     Alcohol use: Not Currently     Comment: Clean for 5 years     Drug use: Not Currently     Objective:    Ht Readings from Last 1 Encounters:   12/13/21 1.702 m (5' 7\")     Wt Readings from Last 1 Encounters:   12/13/21 81.7 kg (180 lb 1.6 oz)       Body mass index is 28.21 kg/m .    Patient Vitals for the past 96 hrs:   Weight   12/13/21 1006 81.7 kg (180 lb 1.6 oz)       Labs:  Last 3 days:  Recent Labs     12/14/21  0622 12/15/21  0637 12/16/21  0525   * 134* 134*   POTASSIUM 4.3 4.6 4.4   CHLORIDE 102 103 102   CO2 24 24 25   BUN 18 19 18   CR 0.75 0.79 0.83   VIJAYA 8.5 8.3* 8.4*   MAG 2.3 2.2 2.2   PHOS 2.7 2.6 3.2   HGB 10.2* 10.0* 10.1*   HCT 31.3* 30.3* 31.2*   * 787* 875*       Glucose (past 48 hours):   Recent Labs     12/14/21  1201 12/14/21  1643 12/15/21  0229 12/15/21  0637 12/15/21  0805 12/15/21  1147 12/15/21  2220 12/16/21  0233 12/16/21  0525 12/16/21  0827   * 129* 132* 121 119* 130* 122* 125* 125 110*       Intake/Output (last 24 hours): I/O last 3 completed shifts:  In: 2490.42 [P.O.:780; I.V.:4]  Out: 2700 [Urine:1625; Drains:1075]    Estimated CrCl: Estimated Creatinine Clearance: 88.3 mL/min (based on SCr of 0.83 mg/dL).    Assessment:    Continue patient on PN therapy as a continuous central therapy.     Given the patient's current " condition/oral intake, PN is still indicated.    Lab results reviewed:   12/13: Na trending down, increase slightly in TPN today due to recent hypernatremia.  12/14: Na 134, increased in TPN yesterday. Phos 2.7 - replaced outside of TPN yesterday  12/15: Na still low at 134, increase in TPN today. Phos on low end, will increase in TPN today.    Plan:  1. Rate of PN: 85 mL/hr.  2. Formula:     Amino Acids 100 grams    Dextrose 310 grams    Sodium 60 mEq/day    Potassium 90 mEq/day    Calcium 8 mEq/day    Magnesium 14 mEq/day    Phosphorus 32 mMol/day    Chloride: Acetate Ratio 1:1    Standard Multivitamins w/Vitamin K    Trace Elements  3. Fat Emulsion: 20%, 250 mL IV three times weekly.  4. Check BMP tomorrow  5. Pharmacist will continue to follow the patient's lab results, clinical status and blood glucose results and make adjustments as appropriate.

## 2021-12-16 NOTE — PROGRESS NOTES
Surgery    Doing well  Opened lower wound three staples to let it drain more easily.     Reviewed CT    Study Result    Narrative & Impression   EXAM: CT ABDOMEN PELVIS W CONTRAST  LOCATION: [facility]  DATE/TIME: 12/16/2021 3:00 AM     INDICATION: Intrabdominal abscess  COMPARISON: CT 12/8/2021 and 12/9/2021  TECHNIQUE: CT scan of the abdomen and pelvis was performed following injection of IV contrast. Multiplanar reformats were obtained. Dose reduction techniques were used.  CONTRAST: IsoVue 370 100mL     FINDINGS:   LOWER CHEST: Normal.     HEPATOBILIARY: Normal.     PANCREAS: Normal.     SPLEEN: Splenules are present.      ADRENAL GLANDS: A 10 mm area of fullness in the left adrenal gland has not changed.      KIDNEYS/BLADDER: Again seen are nonobstructing left renal calculi. There are simple cysts in the right kidney which do not require follow-up. Smaller renal lesions are too small to characterize.      BOWEL: There is surgical change in the right lower quadrant. There is distal small bowel wall thickening in this region. The small bowel wall is not fully visualized, and there is a small amount of extraluminal fluid and air in this region (series 3   images 93 and 96). There is new free air in the right lower quadrant mesentery (series 3 image 105).      LYMPH NODES: Normal.     VASCULATURE: Atherosclerotic disease.      PELVIC ORGANS: Normal.     OTHER: A fluid collection adjacent to the rectum is 1.9 x 1.5 x 1.5 cm (approximately 2.2 mL). Previously the collection was 4.4 x 3.6 x 4.4 cm. A pigtail catheter terminates within this collection.     MUSCULOSKELETAL: Surgical change along the ventral abdominal and pelvic wall. There is a 20.8 x 3.2 x 1.2 cm fluid and air collection in the midline ventral abdominal and pelvic wall just deep to the staple line. Right lower quadrant and left midabdomen   approach Larry drains are present.                                                                        IMPRESSION:   1.  The pelvic fluid collection is significantly smaller. A pigtail catheter is in place.  2.  Increased free air in the mesentery in the right lower quadrant, suggesting suture line dehiscence. There is also a suspected distal small bowel defect with adjacent free air and fluid.  3.  New fluid collection in the subcutaneous tissues of the ventral abdominal and pelvic wall, just deep to the staple line. Recommend drainage.      Fistula to skin.  Dressing replaces      A/P:   Small bowel perforation and leak  tpn  Clear liquids          Mekhi Richardson MD  General Surgery [phone]  Vascular Surgery [phone]

## 2021-12-16 NOTE — PROGRESS NOTES
NUTRITION BRIEF NOTE + CONSULT:    Pharmacy/Nutrition to start and manage TPN  See RD note 21 for full reassessment       RECOMMENDATIONS FOR MDs/PROVIDERS TO ORDER:    Recommendations already ordered by Registered Dietitian (RD):      TPN orders sent to pharmacy    Continue TPN regimen as ordered     New findings in last 24 hours:    Diet order: DIET EFFECTIVE NOW, Starting on Thu 21 at 0845, Until Specified  Except for: Meds  DISCONTINUE any Snacks and Supplements ordered for this patient. They will not be automatically discontinued.  CENTRAL LINE IV, at 85 mL/hr, Administer over 24 Hours, TPN CONTINUOUS, Starting on Wed 12/15/21 at 2000, For 24 hours  Infuse using a 0.22 micron filter. Nurse Instructions: When TPN is discontinued, decrease rate to   of current rate for 1 hour. May continue at   rate until bag runs out or for 24h   @ 85 ml/hr: D 310  . Lipids: 250 ml of 20% 3 times per week over 12 hrs providin Calories, 100 g protein, 310 g CHO(CHO load: 3.06 mg CHO/Kg/min), average of 21 g fat, 2040 ml fluid (~25 ml/kg actual weight)  TPN Meeting estimated needs     Factors affecting oral intake: SBO    Last BM per nursin21 abdominal distention per nursing    I/O last 3 completed shifts:    In: 2490.42 [P.O.:780; I.V.:4]    Out: 2700 [Urine:1625; Drains:1075]    Labs: reviewed including:    Electrolytes  Potassium (mmol/L)   Date Value   2021 4.4   12/15/2021 4.6   2021 4.3   2020 3.8     Phosphorus (mg/dL)   Date Value   2021 3.2   12/15/2021 2.6   2021 2.7   2021 2.4 (L)   2021 2.8        Blood Glucose  Glucose (mg/dL)   Date Value   2021 125   12/15/2021 121   2021 123   2021 118   2021 123   2020 100 (H)     GLUCOSE BY METER POCT (mg/dL)   Date Value   2021 110 (H)   2021 125 (H)   12/15/2021 122 (H)   12/15/2021 130 (H)   12/15/2021 119 (H)     Hemoglobin A1C (%)   Date Value   2021 5.8  "(H)        Inflammatory Markers  CRP (mg/dL)   Date Value   12/07/2021 26.6 (H)     WBC (10e9/L)   Date Value   01/22/2020 7.1     WBC Count (10e3/uL)   Date Value   12/16/2021 12.2 (H)   12/15/2021 11.7 (H)   12/14/2021 12.5 (H)     Albumin (g/dL)   Date Value   12/13/2021 2.1 (L)   12/07/2021 2.3 (L)   12/06/2021 2.3 (L)        Magnesium (mg/dL)   Date Value   12/16/2021 2.2   12/15/2021 2.2   12/14/2021 2.3     Sodium (mmol/L)   Date Value   12/16/2021 134 (L)   12/15/2021 134 (L)   12/14/2021 134 (L)   01/22/2020 140        Renal  Urea Nitrogen (mg/dL)   Date Value   12/16/2021 18   12/15/2021 19   12/14/2021 18   01/22/2020 14     Creatinine (mg/dL)   Date Value   12/16/2021 0.83   12/15/2021 0.79   12/14/2021 0.75   01/22/2020 0.94         Additional  Triglycerides (mg/dL)   Date Value   12/13/2021 125   12/06/2021 217 (H)   12/05/2021 160 (H)   01/22/2020 73     Ketones Urine (mg/dL)   Date Value   12/05/2021 10  (A)            busPIRone  15 mg Oral BID     cefdinir  300 mg Oral Q12H KATEY     enoxaparin ANTICOAGULANT  40 mg Subcutaneous Q24H     [Held by provider] furosemide  40 mg Intravenous Daily     gabapentin  300 mg Oral BID w/meals     gabapentin  900 mg Oral At Bedtime     levothyroxine  25 mcg Oral Daily     lipids  250 mL Intravenous Once per day on Tue Thu Sat     metroNIDAZOLE  500 mg Oral TID     mirtazapine  30 mg Oral At Bedtime     oxyCODONE  5 mg Oral Q6H     pantoprazole (PROTONIX) IV  40 mg Intravenous Daily with breakfast     senna-docusate  1 tablet Oral BID     sodium chloride (PF)  10-40 mL Intracatheter Q7 Days     sodium chloride (PF)  3 mL Intracatheter Q8H     tamsulosin  0.4 mg Oral Daily     venlafaxine  37.5 mg Oral Daily          dextrose Stopped (12/05/21 1824)     parenteral nutrition - ADULT compounded formula 85 mL/hr at 12/15/21 2132     sodium chloride 0.9%              ANTHROPOMETRICS  Height: 5' 7\"  Weight: 81 kg   Body mass index is 28.21 kg/m .  Weight Status:  " Overweight BMI 25-29.9    Weight History:   Wt Readings from Last 10 Encounters:   12/13/21 81.7 kg (180 lb 1.6 oz)   01/22/20 79.4 kg (175 lb)   12/24/19 81.2 kg (179 lb)          ASSESSED NUTRITION NEEDS: 12/12/21  Dosing Weight: 66.2 kg  Estimated energy needs: 3072-4861 Matt/day (25-30 Matt/Kg)  Justification: post op  Estimated protein needs: 80-99 g/day (1.2-1.5 g/Kg)  Justification: post op  Estimated fluid needs 1985 ml/day (30 ml/Kg)  Justification: maintenance      Interventions:    Continue TPN orders for regimen outlined above    Collaboration and Referral of care: TPN orders sent to pharmacy

## 2021-12-17 ENCOUNTER — APPOINTMENT (OUTPATIENT)
Dept: OCCUPATIONAL THERAPY | Facility: HOSPITAL | Age: 66
DRG: 329 | End: 2021-12-17
Payer: MEDICARE

## 2021-12-17 ENCOUNTER — APPOINTMENT (OUTPATIENT)
Dept: PHYSICAL THERAPY | Facility: HOSPITAL | Age: 66
DRG: 329 | End: 2021-12-17
Payer: MEDICARE

## 2021-12-17 LAB
ANION GAP SERPL CALCULATED.3IONS-SCNC: 9 MMOL/L (ref 5–18)
BUN SERPL-MCNC: 19 MG/DL (ref 8–22)
CALCIUM SERPL-MCNC: 8.8 MG/DL (ref 8.5–10.5)
CHLORIDE BLD-SCNC: 101 MMOL/L (ref 98–107)
CO2 SERPL-SCNC: 24 MMOL/L (ref 22–31)
CREAT SERPL-MCNC: 0.84 MG/DL (ref 0.7–1.3)
ERYTHROCYTE [DISTWIDTH] IN BLOOD BY AUTOMATED COUNT: 14.3 % (ref 10–15)
GFR SERPL CREATININE-BSD FRML MDRD: >90 ML/MIN/1.73M2
GLUCOSE BLD-MCNC: 119 MG/DL (ref 70–125)
GLUCOSE BLDC GLUCOMTR-MCNC: 122 MG/DL (ref 70–99)
HCT VFR BLD AUTO: 32.7 % (ref 40–53)
HGB BLD-MCNC: 10.7 G/DL (ref 13.3–17.7)
MAGNESIUM SERPL-MCNC: 2.1 MG/DL (ref 1.8–2.6)
MCH RBC QN AUTO: 29.7 PG (ref 26.5–33)
MCHC RBC AUTO-ENTMCNC: 32.7 G/DL (ref 31.5–36.5)
MCV RBC AUTO: 91 FL (ref 78–100)
PHOSPHATE SERPL-MCNC: 3.6 MG/DL (ref 2.5–4.5)
PLATELET # BLD AUTO: 848 10E3/UL (ref 150–450)
POTASSIUM BLD-SCNC: 4.9 MMOL/L (ref 3.5–5)
RBC # BLD AUTO: 3.6 10E6/UL (ref 4.4–5.9)
SARS-COV-2 RNA RESP QL NAA+PROBE: NEGATIVE
SODIUM SERPL-SCNC: 134 MMOL/L (ref 136–145)
WBC # BLD AUTO: 14.3 10E3/UL (ref 4–11)

## 2021-12-17 PROCEDURE — 250N000013 HC RX MED GY IP 250 OP 250 PS 637: Performed by: STUDENT IN AN ORGANIZED HEALTH CARE EDUCATION/TRAINING PROGRAM

## 2021-12-17 PROCEDURE — 99232 SBSQ HOSP IP/OBS MODERATE 35: CPT | Performed by: PAIN MEDICINE

## 2021-12-17 PROCEDURE — 87635 SARS-COV-2 COVID-19 AMP PRB: CPT | Performed by: SPECIALIST

## 2021-12-17 PROCEDURE — 83735 ASSAY OF MAGNESIUM: CPT | Performed by: STUDENT IN AN ORGANIZED HEALTH CARE EDUCATION/TRAINING PROGRAM

## 2021-12-17 PROCEDURE — 97535 SELF CARE MNGMENT TRAINING: CPT | Mod: GO

## 2021-12-17 PROCEDURE — 250N000013 HC RX MED GY IP 250 OP 250 PS 637: Performed by: SPECIALIST

## 2021-12-17 PROCEDURE — 250N000009 HC RX 250: Performed by: PAIN MEDICINE

## 2021-12-17 PROCEDURE — G0463 HOSPITAL OUTPT CLINIC VISIT: HCPCS

## 2021-12-17 PROCEDURE — 120N000001 HC R&B MED SURG/OB

## 2021-12-17 PROCEDURE — 250N000011 HC RX IP 250 OP 636: Performed by: INTERNAL MEDICINE

## 2021-12-17 PROCEDURE — 97530 THERAPEUTIC ACTIVITIES: CPT | Mod: GP

## 2021-12-17 PROCEDURE — 84100 ASSAY OF PHOSPHORUS: CPT | Performed by: STUDENT IN AN ORGANIZED HEALTH CARE EDUCATION/TRAINING PROGRAM

## 2021-12-17 PROCEDURE — 250N000013 HC RX MED GY IP 250 OP 250 PS 637: Performed by: CLINICAL NURSE SPECIALIST

## 2021-12-17 PROCEDURE — 250N000009 HC RX 250: Performed by: SPECIALIST

## 2021-12-17 PROCEDURE — C9113 INJ PANTOPRAZOLE SODIUM, VIA: HCPCS | Performed by: INTERNAL MEDICINE

## 2021-12-17 PROCEDURE — 85027 COMPLETE CBC AUTOMATED: CPT | Performed by: INTERNAL MEDICINE

## 2021-12-17 PROCEDURE — 97110 THERAPEUTIC EXERCISES: CPT | Mod: GP

## 2021-12-17 PROCEDURE — 36592 COLLECT BLOOD FROM PICC: CPT | Performed by: INTERNAL MEDICINE

## 2021-12-17 PROCEDURE — 97110 THERAPEUTIC EXERCISES: CPT | Mod: GO

## 2021-12-17 PROCEDURE — 80048 BASIC METABOLIC PNL TOTAL CA: CPT | Performed by: INTERNAL MEDICINE

## 2021-12-17 PROCEDURE — 250N000013 HC RX MED GY IP 250 OP 250 PS 637: Performed by: PAIN MEDICINE

## 2021-12-17 PROCEDURE — 99232 SBSQ HOSP IP/OBS MODERATE 35: CPT | Mod: GC | Performed by: STUDENT IN AN ORGANIZED HEALTH CARE EDUCATION/TRAINING PROGRAM

## 2021-12-17 PROCEDURE — 99024 POSTOP FOLLOW-UP VISIT: CPT | Performed by: NURSE PRACTITIONER

## 2021-12-17 RX ORDER — ACETAMINOPHEN 325 MG/1
650 TABLET ORAL 3 TIMES DAILY
Status: DISCONTINUED | OUTPATIENT
Start: 2021-12-17 | End: 2021-12-21 | Stop reason: HOSPADM

## 2021-12-17 RX ORDER — LIDOCAINE 50 MG/G
OINTMENT TOPICAL 2 TIMES DAILY
Status: DISCONTINUED | OUTPATIENT
Start: 2021-12-17 | End: 2021-12-21 | Stop reason: HOSPADM

## 2021-12-17 RX ORDER — ACETAMINOPHEN 325 MG/1
650 TABLET ORAL DAILY PRN
Status: DISCONTINUED | OUTPATIENT
Start: 2021-12-17 | End: 2021-12-21 | Stop reason: HOSPADM

## 2021-12-17 RX ORDER — GABAPENTIN 300 MG/1
300 CAPSULE ORAL 3 TIMES DAILY
Status: DISCONTINUED | OUTPATIENT
Start: 2021-12-17 | End: 2021-12-21 | Stop reason: HOSPADM

## 2021-12-17 RX ORDER — QUETIAPINE FUMARATE 25 MG/1
100 TABLET, FILM COATED ORAL
Status: DISCONTINUED | OUTPATIENT
Start: 2021-12-17 | End: 2021-12-21 | Stop reason: HOSPADM

## 2021-12-17 RX ADMIN — LIDOCAINE: 50 OINTMENT TOPICAL at 11:57

## 2021-12-17 RX ADMIN — CALCIUM CARBONATE (ANTACID) CHEW TAB 500 MG 500 MG: 500 CHEW TAB at 04:38

## 2021-12-17 RX ADMIN — OXYCODONE HYDROCHLORIDE 5 MG: 5 TABLET ORAL at 22:12

## 2021-12-17 RX ADMIN — ACETAMINOPHEN 650 MG: 325 TABLET ORAL at 13:55

## 2021-12-17 RX ADMIN — CEFDINIR 300 MG: 300 CAPSULE ORAL at 09:00

## 2021-12-17 RX ADMIN — GABAPENTIN 300 MG: 300 CAPSULE ORAL at 22:13

## 2021-12-17 RX ADMIN — TAMSULOSIN HYDROCHLORIDE 0.4 MG: 0.4 CAPSULE ORAL at 09:01

## 2021-12-17 RX ADMIN — OXYCODONE HYDROCHLORIDE 5 MG: 5 TABLET ORAL at 03:47

## 2021-12-17 RX ADMIN — MIRTAZAPINE 30 MG: 30 TABLET, FILM COATED ORAL at 22:13

## 2021-12-17 RX ADMIN — BUSPIRONE HYDROCHLORIDE 15 MG: 15 TABLET ORAL at 22:12

## 2021-12-17 RX ADMIN — ENOXAPARIN SODIUM 40 MG: 40 INJECTION SUBCUTANEOUS at 10:18

## 2021-12-17 RX ADMIN — BUSPIRONE HYDROCHLORIDE 15 MG: 15 TABLET ORAL at 09:02

## 2021-12-17 RX ADMIN — OXYCODONE HYDROCHLORIDE 5 MG: 5 TABLET ORAL at 12:22

## 2021-12-17 RX ADMIN — GABAPENTIN 300 MG: 300 CAPSULE ORAL at 09:02

## 2021-12-17 RX ADMIN — OXYCODONE HYDROCHLORIDE 5 MG: 5 TABLET ORAL at 08:59

## 2021-12-17 RX ADMIN — ACETAMINOPHEN 650 MG: 325 TABLET ORAL at 03:54

## 2021-12-17 RX ADMIN — LIDOCAINE: 50 OINTMENT TOPICAL at 22:18

## 2021-12-17 RX ADMIN — ACETAMINOPHEN 650 MG: 325 TABLET ORAL at 22:10

## 2021-12-17 RX ADMIN — METRONIDAZOLE 500 MG: 500 TABLET ORAL at 09:03

## 2021-12-17 RX ADMIN — VENLAFAXINE HYDROCHLORIDE 37.5 MG: 37.5 CAPSULE, EXTENDED RELEASE ORAL at 09:03

## 2021-12-17 RX ADMIN — OXYCODONE HYDROCHLORIDE 5 MG: 5 TABLET ORAL at 14:52

## 2021-12-17 RX ADMIN — OXYCODONE HYDROCHLORIDE 5 MG: 5 TABLET ORAL at 16:26

## 2021-12-17 RX ADMIN — CEFDINIR 300 MG: 300 CAPSULE ORAL at 19:56

## 2021-12-17 RX ADMIN — OXYCODONE HYDROCHLORIDE 5 MG: 5 TABLET ORAL at 19:55

## 2021-12-17 RX ADMIN — GABAPENTIN 300 MG: 300 CAPSULE ORAL at 13:55

## 2021-12-17 RX ADMIN — QUETIAPINE 100 MG: 25 TABLET ORAL at 22:11

## 2021-12-17 RX ADMIN — METRONIDAZOLE 500 MG: 500 TABLET ORAL at 22:12

## 2021-12-17 RX ADMIN — LEVOTHYROXINE SODIUM 25 MCG: 0.03 TABLET ORAL at 06:13

## 2021-12-17 RX ADMIN — POTASSIUM CHLORIDE: 2 INJECTION, SOLUTION, CONCENTRATE INTRAVENOUS at 19:59

## 2021-12-17 RX ADMIN — METRONIDAZOLE 500 MG: 500 TABLET ORAL at 13:54

## 2021-12-17 RX ADMIN — PANTOPRAZOLE SODIUM 40 MG: 40 INJECTION, POWDER, FOR SOLUTION INTRAVENOUS at 09:04

## 2021-12-17 ASSESSMENT — ACTIVITIES OF DAILY LIVING (ADL)
ADLS_ACUITY_SCORE: 12
ADLS_ACUITY_SCORE: 14
ADLS_ACUITY_SCORE: 12
ADLS_ACUITY_SCORE: 14
ADLS_ACUITY_SCORE: 12
ADLS_ACUITY_SCORE: 12
ADLS_ACUITY_SCORE: 14
ADLS_ACUITY_SCORE: 12
ADLS_ACUITY_SCORE: 14
ADLS_ACUITY_SCORE: 14

## 2021-12-17 ASSESSMENT — MIFFLIN-ST. JEOR: SCORE: 1502.02

## 2021-12-17 NOTE — PROGRESS NOTES
Wound Ostomy  WOC Assessment with photograph      Allergies:  Penicillins    Diagnosis:   Patient Active Problem List    Diagnosis Date Noted     SBO (small bowel obstruction) (H) 11/30/2021     Priority: Medium     Hypertension goal BP (blood pressure) < 140/90 12/24/2019     Priority: Medium     Benign prostatic hyperplasia with weak urinary stream 12/24/2019     Priority: Medium     RAVI (generalized anxiety disorder) 12/24/2019     Priority: Medium     Moderate major depression (H) 12/24/2019     Priority: Medium     History of substance abuse (H) 12/24/2019     Priority: Medium     History of hepatitis C 12/24/2019     Priority: Medium     Chronic neck pain 12/24/2019     Priority: Medium     Chronic bilateral low back pain without sciatica 12/24/2019     Priority: Medium       Height:  [unfilled]    Weight:  [unfilled]    Labs:  Recent Labs   Lab Test 12/10/21  0456 12/08/21  0535 12/07/21  0358   CRP  --   --  26.6*   HGB 9.5*   < > 10.9*   ALBUMIN  --   --  2.3*    < > = values in this interval not displayed.       Amrs:  Mars Score: 17    Specialty Bed:       Wound culture obtained: No    Edema:  No       Anatomic Site/Laterality: midline incision    Reason for ongoing care:   Plan of care    Encounter Type:  Subsequent Encounter Patient is s/p EXPLORATORY LAPAROTOMY, WITH WASHOUT, REPAIR OF SMALL BOWEL PERFORATION on 12/3/21.     Patient has midline incision intact with staples. Staples from distal end were removed by general surgery yesterday.     Open wound/fistula area measures 1.8x1cm    Enteric contents draining into pouch.    Minor erythema from staple removal. Enteric staining around upper staples. tissue along mid part of incision is pulled/stretched with staples.    Continue with Cavilon no-sting skin barrier, barrier ring, pouch 8560                     Nursing care provided was coordinated care with RN.    Discussed plan of care with nurse and patient.    Outcomes and treatment  recommendations are to promote skin integrity and contain exudate.    Actions taken by WOC RN: 2 minutes of education.    Planned Follow Up: Weekly.    Plan for next visit: Reassess wound(s) and Reassess skin integrity

## 2021-12-17 NOTE — PLAN OF CARE
Problem: Infection (Surgery Nonspecified)  Goal: Absence of Infection Signs and Symptoms  Outcome: No Change   Patient on oral antibiotics. CHG bath performed.     Problem: Pain Acute  Goal: Acceptable Pain Control and Functional Ability  Outcome: Improving  Intervention: Develop Pain Management Plan  Recent Flowsheet Documentation  Taken 12/16/2021 1646 by Nicanor Shepard, RN  Pain Management Interventions:   medication (see MAR)   emotional support   care clustered   rest   therapeutic presence  Taken 12/16/2021 0831 by Nicanor Shepard, RN  Pain Management Interventions:   medication (see MAR)   care clustered   emotional support   rest   Patient requested pain medication and tolerated pain management with scheduled meds, PRN Tylenol, and PRN oxycodone 5mg. Pain relieved enough for patient to take a nap this evening.    Problem: Risk for Delirium  Goal: Optimal Coping  Outcome: Improving  Patient alert and oriented x4. No confusion noted. Coping well with cares provided throughout the day. Therapeutic presence and conversations help maintain positive mindset and calm mood.

## 2021-12-17 NOTE — PROGRESS NOTES
[facility] Progress Note    Assessment/Plan  Principal Problem:    SBO (small bowel obstruction) (H)  Active Problems:    Hypertension goal BP (blood pressure) < 140/90    Benign prostatic hyperplasia with weak urinary stream    RAVI (generalized anxiety disorder)    Moderate major depression (H)    History of substance abuse (H)    History of hepatitis C    Chronic neck pain    Chronic bilateral low back pain without sciatica    Gurdeep Dia is a 66 year old male with history of splenectomy, appendectomy, HTN, and substance abuse who presented with 2 days of right sided abdominal pain, found to have a bowel obstruction with closed loop. He is admitted for surgical repair of obstruction.      Requires ongoing hospitalization for post-operative monitoring and care       Mechanical SBO s/p exploratory laparotomy / small bowel resection / repair of enterotomy / extensive lysis of adhesions (11/30/21)  Subsequent small bowel perforation requiring repeat ex lap for washout 12/3    CT abdomen/pelvis on admission 11/30 significant for closed loop mechanical small bowel obstruction. Does have history of multiple abdominal surgeries previously. First procedure 11/30 for small bowel resection. Acute change on 12/2-12/3 and found to have intraabdominal drainage from 12/3/2021; underwent repeat exploratory laparotomy with washout and repair of small bowel perforation. 12/8 still without stool charted, still spiking low grade fevers, having atypical output from MARISA drain, white count climbing. CT 12/8 showing ring enhancing lesion in abdomen likely representing abscess. Fourth drain placed 12/9 AM by IR for abscess drainage. Is now stooling and advancing diet.  -Surgery consulted and following - cleared for discharge to TCU, though not medically ready at this time given tenuous abdomen, difficult social situation, and still on TPN  -NG out 12/14 now that he's stooling and w/o nausea/vomiting  -ADAT per  surgery - currently on clears  -Pain control as per surgery/Pain team - PCA stopped 12/13, transitioning to orals with decent pain control  -Abscess culture growing e. Coli, see sensitivities. Abscess drain placed 12/9. Removed 12/16 after abscessogram demonstrated resolution of pelvic fluid collection. Discontinued vancomycin and meropenem 12/12 in favor of IV cefepime + crushed oral flagyl (shortage of IV form). Curbsided ID 12/15 to discuss abx moving forward - Dr. Hanna recommended transitioning to oral cefdinir and flagyl. WBC climbing slightly today (12/17), so will continue abx for now. If WBC normalizes tomorrow, can discontinue all abx. If continuing to rise, would have low threshold to consult ID  -IV Protonix     Acute encephalopathy, likely multifactorial - toxic/metabolic/hospital-induced   Acutely agitated post-op 12/3 to 12/4. Pulling at lines and threatening to leave.  Nurse felt unsafe without restraints. Precedex drip started, now off. Transferred out of the unit. Has been calm since back on the floor.  Requested Seroquel for insomnia.  States he historically has taken 200 mg before bed.  Will start small dose as needed.  - Soft limb restraint x 2 - adjust as needed  - Precedex gtt stop 12/7 morning  - PRN Haldol injection  - Zyprexa ODT 4x daily prn  - Conservative measures as able   - Avoid opioid/benzo if able   - Seroquel 100 mg at bedtime as needed    Acute hypoxic respiratory failure, post-op   Atelectasis vs aspiration  Initially requiring high-flow oxygen in ICU following procedure.  Question post-op atelectasis vs aspiration versus volume overload.  Does have a degree of respiratory distress when he gets agitated. CXR 12/6 showing bibasilar opacitied favored to reflect atelectasis, though still with some interstitial coarsening. Breathing improved 12/7 after some antibiotics and maybe ongoing resolution of his atelectasis. No signs of fluid overload and sodium climbing so IV lasix  discontinued 12/8.  -Continue nasal cannula  -Wean as able  -Continue abx as above, don't think we need to broaden from a lung perspective  -Aggressive IS use  -PT/OT to get moving  -Discontinued IV lasix    Afib, RVR post-op, currently rate controlled   NSVT episode 12/8 and 12/15  Noted.  Rates had been in the 120 to 150 range.  Converted to NSR with amiodarone drip. No longer on any rate control. No repeat episodes. Both CHADS2-Vasc and HAS-BLED of at least 2.  TSH normal. Lytes normal. Echo 12/10 showing slight concentric thickening in LV and EF 60-65%. Patient wants to avoid anticoagulation given bleeding risks and his single episode likely being related to his operation, sedation, and infection.  -Lovenox for now   -No AC after discharge after shared decision making discussion    HTN  PTA lisinopril, furosemide being held in the setting of acute surgical care.  Will monitor, even with pain BP not significantly elevated.  Will resume when hemodynamically stable.  Kidney function is stable at this time.  Blood pressure does climb when he is agitated.  -Monitor     Moderate malnutrition  Per RD eval.  -TPN until cleared to ADAT per surgery        Chronic Conditions:  Hypothyroidism- PTA levothyroxine  Depression/anxiety- PTA venlafaxine, mirtazepine, Buspar   Chronic pain- Hold mexolicam 7.5mg daily, resume PTA gabapentin when tolerating PO.  BPH- PTA flomax      Diet: NPO for Medical/Clinical Reasons Except for: Meds, Ice ChipsNPO   DVT Prophylaxis: Lovenox q day    Roblero Catheter: placed   Fluids:  None  Central Lines: None  Code Status: Full Code    Subjective  States he is in some pain but manageable. Slept a little more last night with seroquel on board. Wants it increased to 100mg. States he was on dose as high as 400mg outpatient.     Objective    Vital signs in last 24 hours Temp:  [97.3  F (36.3  C)-99.3  F (37.4  C)] 98.4  F (36.9  C)  Pulse:  [88-95] 90  Resp:  [16-18] 16  BP: (110-129)/(72-86)  110/72  SpO2:  [90 %-97 %] 93 %       Intake/Output last 3 shift I/O last 3 completed shifts:  In: 2338.6 [P.O.:360]  Out: 925 [Urine:600; Drains:325]    Intake/Output this shift:I/O this shift:  In: -   Out: 200 [Urine:200]    Physical Exam  General appearance: uncomfortable appearing male, laying in bed, two officers at bedside.  Acutely agitated.  Head: Normocephalic, without obvious abnormality, atraumatic.  NG tube in place.    Throat: moist mucous membranes.  Lungs: Tachypneic.  High flow oxygen in place.  Coarse lung sounds in the bases.  Heart: regular rate and rhythm, S1, S2 normal, no murmur, click, rub or gallop  Abdomen: Abdominal binding in place.  Two MARISA drains in place with bloody drainage. bowel sounds present. Moderate TTP throughout.   Extremities: extremities normal, atraumatic, no cyanosis or edema  Skin: Slightly pale, dry skin.  Neurologic: Alert and oriented X 3.  Able to move extremities.  Sensation intact.   Psychiatric: Calm, cooperative    Pertinent Labs and Pertinent Radiology   Lab Results: personally reviewed.     Radiology Results: Personally reviewed image/s and impression/s    Precepted patient with Dr. Gem Mcmahon MD - PGY2  [facility]  P: [phone]

## 2021-12-17 NOTE — PHARMACY-CONSULT NOTE
"Pharmacy Note: Parenteral Nutrition (PN) Management    Pharmacist consulted to dose PN for Gurdeep Dia, a 66 year old male by Dr. Clinton.    Subjective:    The patient is a new PN start.     The patient was started on PN in the hospital on 12/5/21.     Indication for PN therapy: bowel resection     Inadequate nutrition anticipated for > 7 days.      Enteral nutrition contraindicated due to: bowel integrity is tenuous.     Pertinent diseases and other special considerations respiratory failure    Social History     Tobacco Use     Smoking status: Current Every Day Smoker     Smokeless tobacco: Never Used   Substance Use Topics     Alcohol use: Not Currently     Comment: Clean for 5 years     Drug use: Not Currently     Objective:    Ht Readings from Last 1 Encounters:   12/13/21 1.702 m (5' 7\")     Wt Readings from Last 1 Encounters:   12/13/21 81.7 kg (180 lb 1.6 oz)       Body mass index is 28.21 kg/m .    Patient Vitals for the past 96 hrs:   Weight   12/13/21 1006 81.7 kg (180 lb 1.6 oz)       Labs:  Last 3 days:  Recent Labs     12/15/21  0637 12/16/21  0525 12/17/21  0615   * 134* 134*   POTASSIUM 4.6 4.4 4.9   CHLORIDE 103 102 101   CO2 24 25 24   BUN 19 18 19   CR 0.79 0.83 0.84   VIJAYA 8.3* 8.4* 8.8   MAG 2.2 2.2  --    PHOS 2.6 3.2 3.6   HGB 10.0* 10.1* 10.7*   HCT 30.3* 31.2* 32.7*   * 875* 848*       Glucose (past 48 hours):   Recent Labs     12/15/21  1147 12/15/21  2220 12/16/21  0233 12/16/21  0525 12/16/21  0827 12/16/21  1149 12/17/21  0615   * 122* 125* 125 110* 140* 119       Intake/Output (last 24 hours): I/O last 3 completed shifts:  In: 2338.6 [P.O.:360]  Out: 925 [Urine:600; Drains:325]    Estimated CrCl: Estimated Creatinine Clearance: 87.3 mL/min (based on SCr of 0.84 mg/dL).    Assessment:    Continue patient on PN therapy as a continuous central therapy.     Given the patient's current condition/oral intake, PN is still indicated.    Lab results reviewed:   12/13: Na " trending down, increase slightly in TPN today due to recent hypernatremia.  12/14: Na 134, increased in TPN yesterday. Phos 2.7 - replaced outside of TPN yesterday  12/15: Na still low at 134, increase in TPN today. Phos on low end, will increase in TPN today.  12/17: no change in sodium, will increase sodium in TPN tonight.  Potassium is trending up, will decrease in TPN tonight.    Plan:  1. Rate of PN: 85 mL/hr.  2. Formula:     Amino Acids 100 grams    Dextrose 310 grams    Sodium 70 mEq/day    Potassium 80 mEq/day    Calcium 8 mEq/day    Magnesium 14 mEq/day    Phosphorus 32 mMol/day    Chloride: Acetate Ratio 1:1    Standard Multivitamins w/Vitamin K    Trace Elements  3. Fat Emulsion: 20%, 250 mL IV three times weekly.  4. Check BMP tomorrow  5. Pharmacist will continue to follow the patient's lab results, clinical status and blood glucose results and make adjustments as appropriate.

## 2021-12-17 NOTE — PLAN OF CARE
"  Problem: Pain Acute  Goal: Acceptable Pain Control and Functional Ability  Outcome: Improving     Patient was appropriate, A/O 4x. Pain reported 10/10 last night. Oxycodone and Tylenol given, reduced pain to 5/10. No bowel movement this shift, though he is adamant he will have one \"when he wants to\". Stomach drain is clean and draining.   Neither MARISA had any output last night.       "

## 2021-12-17 NOTE — PROGRESS NOTES
[facility] ACUTE PAIN SERVICE    ([facility], [facility], [facility])  Daily PAIN Progress Note    Assessment/Plan:  Gurdeep Dia is a 66 year old male who was admitted on 11/30/2021.  I was asked to see the patient for postoperative pain in the setting of SBO, now S/P exploratory laparotomy, small bowel resection, lysis of adhesions and repair of enterotomy with significant inflamed small bowel with areas of thinned mucosa on 11/30/2021 2/2 obstruction- S/p exploratory laparotomy 12/3/2021 2/2 small bowel perforation.  History of substance abuse (cocaine/etoh), hypertension, hypothyroid, depression/anxiety, chronic pain and takes meloxicam and gabapentin at baseline. Pain severe at times, disoriented to time, location today.  In 24 hours he has utilized 30 mg of oxycodone.    PLAN:   1) Postoperative pain in the setting of bowel obstruction/perforation, POD#9 on clears and tolerating oxycodone. Continue gabapentin.  Attempting to minimize opioids in order to expedite recovery.   2)Multimodal Medication Therapy  Topical: BID lidocaine   NSAID'S: avoid  Adjuvants: Tylenol TID,  gabapentin 300 mg- decrease bedtime dose to 300mg  Antidepressants/anxiolytics: Buspar 15 mg po bid, Remeron 30 mg po q hs, venlafaxine XR 37.5 mg daily, olanzapine, Haldol PRN ordered for agitation.   Opioids: oxycodone 5 every 6 hours with additional 5 mg every three hours prn   Minimize discontinue iv   3)Non-medication interventions- acupuncture on Monday/Thursday   4)Constipation Prophylaxis- per surgery  5) Follow up   -Opioid prescriber has been none  -Discharge Recommendations - We recommend prescribing the following at the time of discharge-small supply of oxycodone              Subjective:    Gurdeep reports that he is pleased with the amount of sleep that he is getting.  He states he slept for 3 hours straight.  He slurred during conversation and appears to be somewhat sleepy.  I asked him where he is and he  "stated, \"[place]\".  When I asked him the date he did not know although he did say, \"[place]\".  Discussed with nursing.'s informs me that the patient has described the abdominal pain as burning in its characteristics.    SBO (small bowel obstruction) (H)   Patient Active Problem List   Diagnosis     Hypertension goal BP (blood pressure) < 140/90     Benign prostatic hyperplasia with weak urinary stream     RAVI (generalized anxiety disorder)     Moderate major depression (H)     History of substance abuse (H)     History of hepatitis C     Chronic neck pain     Chronic bilateral low back pain without sciatica     SBO (small bowel obstruction) (H)        History   Drug Use Unknown         Tobacco Use      Smoking status: Current Every Day Smoker      Smokeless tobacco: Never Used          busPIRone  15 mg Oral BID     cefdinir  300 mg Oral Q12H KATEY     enoxaparin ANTICOAGULANT  40 mg Subcutaneous Q24H     [Held by provider] furosemide  40 mg Intravenous Daily     gabapentin  300 mg Oral BID w/meals     gabapentin  900 mg Oral At Bedtime     levothyroxine  25 mcg Oral Daily     lipids  250 mL Intravenous Once per day on Tue Thu Sat     metroNIDAZOLE  500 mg Oral TID     mirtazapine  30 mg Oral At Bedtime     oxyCODONE  5 mg Oral Q6H     pantoprazole (PROTONIX) IV  40 mg Intravenous Daily with breakfast     senna-docusate  1 tablet Oral BID     sodium chloride (PF)  10-40 mL Intracatheter Q7 Days     sodium chloride (PF)  3 mL Intracatheter Q8H     tamsulosin  0.4 mg Oral Daily     venlafaxine  37.5 mg Oral Daily       Objective:  Vital signs in last 24 hours:  B/P: 116/66, T: 97, P: 85, R: 16   Blood pressure 123/86, pulse 91, temperature 98.6  F (37  C), temperature source Oral, resp. rate 16, height 1.702 m (5' 7\"), weight 81.7 kg (180 lb 1.6 oz), SpO2 94 %.      Weight:   Wt Readings from Last 2 Encounters:   12/13/21 81.7 kg (180 lb 1.6 oz)   01/22/20 79.4 kg (175 lb)           Intake/Output:    Intake/Output " Summary (Last 24 hours) at 12/14/2021 0913  Last data filed at 12/14/2021 0700  Gross per 24 hour   Intake 1376 ml   Output 1695 ml   Net -319 ml        Review of Systems:   As per subjective, all others negative.    Physical Exam:  Constitutional: sleepy and cooperative   Head: Normocephalic. No masses, lesions, tenderness or abnormalities  Neck: Neck supple. No adenopathy. Thyroid symmetric, normal size,  ENT: ENT exam normal, no neck nodes or sinus tenderness  Cardiovascular: No edema or JVD., negative findings: S1 normal, S2 normal  Respiratory: negative, negative findings: no chest deformities noted, diminished   Gastrointestinal: positive findings: mildly distended, hyperactive bowel sounds, incision, ostomy, MARISA drains and binder on  : Deferred  Musculoskeletal: extremities normal- no gross deformities noted and normal muscle tone  Skin: no suspicious lesions or rashes  Neurologic:    Sensation grossly WNL.  Psychiatric: slurs and confused   Hematologic/Lymphatic/Immunologic: Negative       Lab Results:  Personally Reviewed.   Last Comprehensive Metabolic Panel:  Sodium   Date Value Ref Range Status   12/17/2021 134 (L) 136 - 145 mmol/L Final   01/22/2020 140 133 - 144 mmol/L Final     Potassium   Date Value Ref Range Status   12/17/2021 4.9 3.5 - 5.0 mmol/L Final   01/22/2020 3.8 3.4 - 5.3 mmol/L Final     Chloride   Date Value Ref Range Status   12/17/2021 101 98 - 107 mmol/L Final   01/22/2020 108 94 - 109 mmol/L Final     Carbon Dioxide   Date Value Ref Range Status   01/22/2020 28 20 - 32 mmol/L Final     Carbon Dioxide (CO2)   Date Value Ref Range Status   12/17/2021 24 22 - 31 mmol/L Final     Anion Gap   Date Value Ref Range Status   12/17/2021 9 5 - 18 mmol/L Final   01/22/2020 4 3 - 14 mmol/L Final     Glucose   Date Value Ref Range Status   12/17/2021 119 70 - 125 mg/dL Final   01/22/2020 100 (H) 70 - 99 mg/dL Final     Urea Nitrogen   Date Value Ref Range Status   12/17/2021 19 8 - 22 mg/dL Final    01/22/2020 14 7 - 30 mg/dL Final     Creatinine   Date Value Ref Range Status   12/17/2021 0.84 0.70 - 1.30 mg/dL Final   01/22/2020 0.94 0.66 - 1.25 mg/dL Final     GFR Estimate   Date Value Ref Range Status   12/17/2021 >90 >60 mL/min/1.73m2 Final     Comment:     As of July 11, 2021, eGFR is calculated by the CKD-EPI creatinine equation, without race adjustment. eGFR can be influenced by muscle mass, exercise, and diet. The reported eGFR is an estimation only and is only applicable if the renal function is stable.   01/22/2020 84 >60 mL/min/[1.73_m2] Final     Comment:     Non  GFR Calc  Starting 12/18/2018, serum creatinine based estimated GFR (eGFR) will be   calculated using the Chronic Kidney Disease Epidemiology Collaboration   (CKD-EPI) equation.       Calcium   Date Value Ref Range Status   12/17/2021 8.8 8.5 - 10.5 mg/dL Final   01/22/2020 9.5 8.5 - 10.1 mg/dL Final        UA: No results found for: UAMP, UBARB, BENZODIAZEUR, UCANN, UCOC, OPIT, UPCP          Total unit/floor time 25  minutes, time consisted of the following, examination of patient, reviewing the record and lab results, and completing documentation.  Discussed with day nurse.       Diya GUZMAN, CNS-BC, CNP, ACHPN  Acute Care Pain Management Program Hour 7a-1700  [facility] ([place], [place], [place])   Page via [facility]- Click HERE to vy [place] or call [phone]

## 2021-12-17 NOTE — PROGRESS NOTES
"CLINICAL NUTRITION SERVICES - REASSESSMENT NOTE     Nutrition Prescription    RECOMMENDATIONS FOR MDs/PROVIDERS TO ORDER:  Continue same TPN regimen    Malnutrition Status:    Moderate    Recommendations already ordered by Registered Dietitian (RD):  Continue D 310  @ 85 ml/hr plus 250 ml of 20% lipids  3 times per week over 12 hrs    Future/Additional Recommendations:  Follow for diet advance and ability to wean TPN     EVALUATION OF THE PROGRESS TOWARD GOALS   Diet: NPO  Nutrition Support: D 310  @ 85 ml/hr plus 250 ml of 20% lipids 3 times per week over 12 hrs providin Calories, 100 g Protein, 310 g CHO, average of 21 g fat and 2040 ml fluid     NEW FINDINGS   Has midline abdominal fistula pouch    ANTHROPOMETRICS  Height: 170.2 cm (5' 7\")  Most Recent Weight: 81.7 kg (180 lb 1.6 oz)    Weight History:   Wt Readings from Last 10 Encounters:   21 81.7 kg (180 lb 1.6 oz)   20 79.4 kg (175 lb)   19 81.2 kg (179 lb)     PHYSICAL FINDINGS  See malnutrition section below.    GI CONCERNS  Abdominal discomfort  Hypoactive BS all quadrants  Last BM 2021 per nurse    LABS  Reviewed: Glu 110, 140 (), Na 134, K 4.9, phos 3.6, Glu 119    MEDICATIONS  Reviewed: omnicef, levothyroxine,flagyl, remeron, pantoprazole,senna-docusate    MALNUTRITION:  % Weight Loss:  20 lb weight loss noted-unsure of time frame-may be greater than 1 year  % Intake:  </= 50% for >/= 5 days (severe malnutrition)  Subcutaneous Fat Loss:  Orbital region moderate depletion  Fluid Retention:  None noted    Malnutrition Diagnosis: Moderate malnutrition  In Context of:  Acute illness or injury    CURRENT NUTRITION DIAGNOSIS  Malnutrition related to acute illness as evidenced by NPO x 5 days and moderate fat loss      INTERVENTIONS  Implementation  Continue same TPN regimen at this time    Goals  Tolerate TPN-met  Advance to po diet when appropriate-not met  BG -met    Monitoring/Evaluation  Progress " toward goals will be monitored and evaluated per protocol.

## 2021-12-17 NOTE — PROGRESS NOTES
ASSESSMENT:  1. SBO (small bowel obstruction) (H)    2. Pelvic abscess in male (H)        Gurdeep Dia is a 66 year old male who is s/p exploratory laparotomy with small bowel resection, lysis of adhesions and repair of enterotomy with significant inflamed small bowel with areas of thinned mucosa on 11/30/2021  S/p exploratory laparotomy 12/3/2021 for small bowel perforation from ulcer  S/p IR placement of drain into pelvic abscess 12/9/2021. Enterocutaneous fistula formation. Increased leukocytosis today. Nothing out of drains    PLAN:  Ostomy bag over EC fistula  OK to go to TCU from a surgical standpoint; appreciate this being coordinated by medicine and care management  Remove drains as they are not putting anything out  Monitor WBC    SUBJECTIVE:   He is tired and cold this morning. Pain is intermittent in his abdomen but primarily controlled. Looking forward to going to a TCU with a piano. Has not noted any leakage from upper portion of the incision since more staples were removed from the lower portion yesterday. Increased leukocytosis today. Nothing out of drains      Patient Vitals for the past 24 hrs:   BP Temp Temp src Pulse Resp SpO2   12/17/21 0739 110/72 98.4  F (36.9  C) Oral 90 16 93 %   12/17/21 0345 123/86 98.6  F (37  C) Oral 91 16 94 %   12/16/21 2305 129/78 98.1  F (36.7  C) Oral 94 16 97 %   12/16/21 2214 119/72 99.3  F (37.4  C) Oral 95 16 92 %   12/16/21 1539 112/77 97.3  F (36.3  C) Oral 88 18 90 %         PHYSICAL EXAM:  GEN: No acute distress, comfortable  LUNGS: CTA bilaterally  CV:RRR  ABD: soft, not distended with tenderness near midline incision; midline wound/EC fistula with noted gas and succus in the ostomy bag;   Drains: serosanguineous with scant output; holding suction  Output by Drain (mL) 12/15/21 0700 - 12/15/21 1459 12/15/21 1500 - 12/15/21 2259 12/15/21 2300 - 12/16/21 0659 12/16/21 0700 - 12/16/21 1459 12/16/21 1500 - 12/16/21 2259 12/16/21 2300 - 12/17/21 0659 12/17/21  0700 - 12/17/21 0916   Closed/Suction Drain 1 Right Abdomen Bulb 19 [place] 0 0    0 0   Closed/Suction Drain 1 Left LLQ Bulb 19 [place] 0 0    0 0   Open Drain Medial;Superior Abdomen vessel loop drain 400 450 225  325        EXT:No cyanosis, edema or obvious abnormalities    12/16 0700 - 12/17 0659  In: 2338.6 [P.O.:360]  Out: 925 [Urine:600; Drains:325]    No results displayed because visit has over 200 results.             THOMAS Givens CNP

## 2021-12-17 NOTE — PROGRESS NOTES
Writer spoke with pt today regarding TCU. He states that he has called and spoke with Saritha at [facility] in [place]. He states that Saritha told him that they would have an opening next week. He states this facility is close to his brother's home and that is where he would like to be, close to family. Referral sent.    He has declined to sign the [employer]. He states his  is working with them.     SHELBY Timmons  2:08 PM

## 2021-12-17 NOTE — PLAN OF CARE
Problem: Adult Inpatient Plan of Care  Goal: Plan of Care Review  Outcome: Improving  Flowsheets (Taken 12/17/2021 1511)  Plan of Care Reviewed With: patient  Outcome Summary: remaining 2 JPs removed, inc was pouched for fistula drainage. tolerating small amounts clear liquids. pain team following for managment  Progress: improving  Goal: Patient-Specific Goal (Individualized)  Outcome: Improving  Flowsheets (Taken 11/30/2021 1830 by Yamilet Lofton, RN)  Anxieties, Fears or Concerns: none  Goal: Absence of Hospital-Acquired Illness or Injury  Outcome: Improving  Intervention: Identify and Manage Fall Risk  Recent Flowsheet Documentation  Taken 12/17/2021 0800 by Nicanor Shepard, RN  Safety Promotion/Fall Prevention:   activity supervised   assistive device/personal items within reach   nonskid shoes/slippers when out of bed   safety round/check completed   room near nurse's station  Intervention: Prevent Skin Injury  Flowsheets (Taken 12/17/2021 0800)  Body Position:   position changed independently   supine, head elevated  Intervention: Prevent Infection  Flowsheets (Taken 12/17/2021 1511)  Infection Prevention:   rest/sleep promoted   hand hygiene promoted  Goal: Optimal Comfort and Wellbeing  Outcome: Improving     Problem: Pain Acute  Goal: Acceptable Pain Control and Functional Ability  Outcome: Improving  Intervention: Develop Pain Management Plan  Flowsheets  Taken 12/17/2021 1511  Pain Management Interventions:   emotional support   distraction   ambulation/increased activity   medication (see MAR)  Taken 12/17/2021 0800  Pain Management Interventions:   emotional support   quiet environment facilitated   rest  Intervention: Optimize Psychosocial Wellbeing  Flowsheets (Taken 12/17/2021 1511)  Supportive Measures:   active listening utilized   relaxation techniques promoted   self-care encouraged   self-responsibility promoted   verbalization of feelings encouraged  Diversional Activities:  television     Problem: Hypertension Comorbidity  Goal: Blood Pressure in Desired Range  Outcome: Improving     Problem: Bleeding (Surgery Nonspecified)  Goal: Absence of Bleeding  Outcome: Improving     Problem: Bowel Motility Impaired (Surgery Nonspecified)  Goal: Effective Bowel Elimination  Outcome: Improving  Intervention: Enhance Bowel Motility and Elimination  Flowsheets (Taken 12/17/2021 1511)  Bowel Motility Enhancement:   ambulation promoted   fluid intake encouraged  Bowel Elimination Management:   toileting offered   sitting position facilitated     Problem: Infection (Surgery Nonspecified)  Goal: Absence of Infection Signs and Symptoms  Outcome: Improving     Problem: Pain (Surgery Nonspecified)  Goal: Acceptable Pain Control  Outcome: Improving  Intervention: Prevent or Manage Pain  Recent Flowsheet Documentation  Taken 12/17/2021 1511 by Nicanor Shepard, RN  Pain Management Interventions:   emotional support   distraction   ambulation/increased activity   medication (see MAR)  Diversional Activities: television  Taken 12/17/2021 0800 by Nicanor Shepard, RN  Pain Management Interventions:   emotional support   quiet environment facilitated   rest     Problem: Postoperative Urinary Retention (Surgery Nonspecified)  Goal: Effective Urinary Elimination  Outcome: Improving

## 2021-12-17 NOTE — PLAN OF CARE
Problem: Adult Inpatient Plan of Care  Goal: Optimal Comfort and Wellbeing  Outcome: Improving     Problem: Pain Acute  Goal: Acceptable Pain Control and Functional Ability  Outcome: Improving  Intervention: Develop Pain Management Plan  Recent Flowsheet Documentation  Taken 12/16/2021 2240 by Melanie Root RN  Pain Management Interventions:   medication (see MAR)   distraction   emotional support  Pt pleasant and cooperative with cares. TPN and lipids running per orders. Midline abdominal fistula pouch intact with green/brown drainage. Small amount of green drainage coming from incision, covered with dry gauze. Pt reports 9/10 abdominal pain at bedtime, scheduled oxycodone given, pending re-check. On telemetry, normal sinus rhythm.

## 2021-12-18 ENCOUNTER — APPOINTMENT (OUTPATIENT)
Dept: OCCUPATIONAL THERAPY | Facility: HOSPITAL | Age: 66
DRG: 329 | End: 2021-12-18
Payer: MEDICARE

## 2021-12-18 ENCOUNTER — APPOINTMENT (OUTPATIENT)
Dept: PHYSICAL THERAPY | Facility: HOSPITAL | Age: 66
DRG: 329 | End: 2021-12-18
Payer: MEDICARE

## 2021-12-18 LAB
ANION GAP SERPL CALCULATED.3IONS-SCNC: 8 MMOL/L (ref 5–18)
BUN SERPL-MCNC: 18 MG/DL (ref 8–22)
CALCIUM SERPL-MCNC: 9.2 MG/DL (ref 8.5–10.5)
CHLORIDE BLD-SCNC: 99 MMOL/L (ref 98–107)
CO2 SERPL-SCNC: 27 MMOL/L (ref 22–31)
CREAT SERPL-MCNC: 0.89 MG/DL (ref 0.7–1.3)
ERYTHROCYTE [DISTWIDTH] IN BLOOD BY AUTOMATED COUNT: 14.6 % (ref 10–15)
GFR SERPL CREATININE-BSD FRML MDRD: 88 ML/MIN/1.73M2
GLUCOSE BLD-MCNC: 122 MG/DL (ref 70–125)
GLUCOSE BLDC GLUCOMTR-MCNC: 130 MG/DL (ref 70–99)
GLUCOSE BLDC GLUCOMTR-MCNC: 132 MG/DL (ref 70–99)
HCT VFR BLD AUTO: 36.6 % (ref 40–53)
HGB BLD-MCNC: 11.6 G/DL (ref 13.3–17.7)
MAGNESIUM SERPL-MCNC: 2.2 MG/DL (ref 1.8–2.6)
MCH RBC QN AUTO: 29.1 PG (ref 26.5–33)
MCHC RBC AUTO-ENTMCNC: 31.7 G/DL (ref 31.5–36.5)
MCV RBC AUTO: 92 FL (ref 78–100)
PHOSPHATE SERPL-MCNC: 3.6 MG/DL (ref 2.5–4.5)
PLATELET # BLD AUTO: 876 10E3/UL (ref 150–450)
POTASSIUM BLD-SCNC: 4.6 MMOL/L (ref 3.5–5)
RBC # BLD AUTO: 3.98 10E6/UL (ref 4.4–5.9)
SODIUM SERPL-SCNC: 134 MMOL/L (ref 136–145)
WBC # BLD AUTO: 14.2 10E3/UL (ref 4–11)

## 2021-12-18 PROCEDURE — 97530 THERAPEUTIC ACTIVITIES: CPT | Mod: GP

## 2021-12-18 PROCEDURE — 250N000013 HC RX MED GY IP 250 OP 250 PS 637: Performed by: STUDENT IN AN ORGANIZED HEALTH CARE EDUCATION/TRAINING PROGRAM

## 2021-12-18 PROCEDURE — 250N000013 HC RX MED GY IP 250 OP 250 PS 637: Performed by: CLINICAL NURSE SPECIALIST

## 2021-12-18 PROCEDURE — C9113 INJ PANTOPRAZOLE SODIUM, VIA: HCPCS | Performed by: INTERNAL MEDICINE

## 2021-12-18 PROCEDURE — 250N000013 HC RX MED GY IP 250 OP 250 PS 637: Performed by: SPECIALIST

## 2021-12-18 PROCEDURE — 99231 SBSQ HOSP IP/OBS SF/LOW 25: CPT | Performed by: PAIN MEDICINE

## 2021-12-18 PROCEDURE — 250N000009 HC RX 250: Performed by: SPECIALIST

## 2021-12-18 PROCEDURE — 97110 THERAPEUTIC EXERCISES: CPT | Mod: GO

## 2021-12-18 PROCEDURE — 97116 GAIT TRAINING THERAPY: CPT | Mod: GP

## 2021-12-18 PROCEDURE — 250N000009 HC RX 250: Performed by: INTERNAL MEDICINE

## 2021-12-18 PROCEDURE — 82310 ASSAY OF CALCIUM: CPT | Performed by: INTERNAL MEDICINE

## 2021-12-18 PROCEDURE — 99232 SBSQ HOSP IP/OBS MODERATE 35: CPT | Mod: GC | Performed by: STUDENT IN AN ORGANIZED HEALTH CARE EDUCATION/TRAINING PROGRAM

## 2021-12-18 PROCEDURE — 120N000001 HC R&B MED SURG/OB

## 2021-12-18 PROCEDURE — 83735 ASSAY OF MAGNESIUM: CPT | Performed by: STUDENT IN AN ORGANIZED HEALTH CARE EDUCATION/TRAINING PROGRAM

## 2021-12-18 PROCEDURE — 84100 ASSAY OF PHOSPHORUS: CPT | Performed by: STUDENT IN AN ORGANIZED HEALTH CARE EDUCATION/TRAINING PROGRAM

## 2021-12-18 PROCEDURE — 99024 POSTOP FOLLOW-UP VISIT: CPT | Performed by: NURSE PRACTITIONER

## 2021-12-18 PROCEDURE — 250N000011 HC RX IP 250 OP 636: Performed by: INTERNAL MEDICINE

## 2021-12-18 PROCEDURE — 85014 HEMATOCRIT: CPT | Performed by: INTERNAL MEDICINE

## 2021-12-18 PROCEDURE — 250N000013 HC RX MED GY IP 250 OP 250 PS 637: Performed by: PAIN MEDICINE

## 2021-12-18 RX ADMIN — PANTOPRAZOLE SODIUM 40 MG: 40 INJECTION, POWDER, FOR SOLUTION INTRAVENOUS at 08:36

## 2021-12-18 RX ADMIN — VENLAFAXINE HYDROCHLORIDE 37.5 MG: 37.5 CAPSULE, EXTENDED RELEASE ORAL at 08:37

## 2021-12-18 RX ADMIN — GABAPENTIN 300 MG: 300 CAPSULE ORAL at 14:23

## 2021-12-18 RX ADMIN — BUSPIRONE HYDROCHLORIDE 15 MG: 15 TABLET ORAL at 08:37

## 2021-12-18 RX ADMIN — ACETAMINOPHEN 650 MG: 325 TABLET ORAL at 22:05

## 2021-12-18 RX ADMIN — GABAPENTIN 300 MG: 300 CAPSULE ORAL at 22:04

## 2021-12-18 RX ADMIN — ACETAMINOPHEN 650 MG: 325 TABLET ORAL at 05:02

## 2021-12-18 RX ADMIN — OXYCODONE HYDROCHLORIDE 5 MG: 5 TABLET ORAL at 19:14

## 2021-12-18 RX ADMIN — OXYCODONE HYDROCHLORIDE 5 MG: 5 TABLET ORAL at 22:04

## 2021-12-18 RX ADMIN — LEVOTHYROXINE SODIUM 25 MCG: 0.03 TABLET ORAL at 06:29

## 2021-12-18 RX ADMIN — CALCIUM CARBONATE (ANTACID) CHEW TAB 500 MG 500 MG: 500 CHEW TAB at 17:18

## 2021-12-18 RX ADMIN — MIRTAZAPINE 30 MG: 30 TABLET, FILM COATED ORAL at 22:04

## 2021-12-18 RX ADMIN — BUSPIRONE HYDROCHLORIDE 15 MG: 15 TABLET ORAL at 22:04

## 2021-12-18 RX ADMIN — ACETAMINOPHEN 650 MG: 325 TABLET ORAL at 08:39

## 2021-12-18 RX ADMIN — OXYCODONE HYDROCHLORIDE 5 MG: 5 TABLET ORAL at 08:38

## 2021-12-18 RX ADMIN — OXYCODONE HYDROCHLORIDE 5 MG: 5 TABLET ORAL at 03:11

## 2021-12-18 RX ADMIN — POTASSIUM CHLORIDE: 2 INJECTION, SOLUTION, CONCENTRATE INTRAVENOUS at 20:03

## 2021-12-18 RX ADMIN — I.V. FAT EMULSION 250 ML: 20 EMULSION INTRAVENOUS at 20:03

## 2021-12-18 RX ADMIN — TAMSULOSIN HYDROCHLORIDE 0.4 MG: 0.4 CAPSULE ORAL at 08:40

## 2021-12-18 RX ADMIN — METRONIDAZOLE 500 MG: 500 TABLET ORAL at 14:23

## 2021-12-18 RX ADMIN — GABAPENTIN 300 MG: 300 CAPSULE ORAL at 08:40

## 2021-12-18 RX ADMIN — OXYCODONE HYDROCHLORIDE 5 MG: 5 TABLET ORAL at 14:23

## 2021-12-18 RX ADMIN — CEFDINIR 300 MG: 300 CAPSULE ORAL at 20:02

## 2021-12-18 RX ADMIN — SENNOSIDES AND DOCUSATE SODIUM 1 TABLET: 50; 8.6 TABLET ORAL at 22:04

## 2021-12-18 RX ADMIN — METRONIDAZOLE 500 MG: 500 TABLET ORAL at 22:04

## 2021-12-18 RX ADMIN — METRONIDAZOLE 500 MG: 500 TABLET ORAL at 08:38

## 2021-12-18 RX ADMIN — QUETIAPINE 100 MG: 25 TABLET ORAL at 22:04

## 2021-12-18 RX ADMIN — ACETAMINOPHEN 650 MG: 325 TABLET ORAL at 14:23

## 2021-12-18 RX ADMIN — OXYCODONE HYDROCHLORIDE 5 MG: 5 TABLET ORAL at 06:29

## 2021-12-18 RX ADMIN — ENOXAPARIN SODIUM 40 MG: 40 INJECTION SUBCUTANEOUS at 11:23

## 2021-12-18 RX ADMIN — SENNOSIDES AND DOCUSATE SODIUM 1 TABLET: 50; 8.6 TABLET ORAL at 08:38

## 2021-12-18 RX ADMIN — LIDOCAINE: 50 OINTMENT TOPICAL at 20:47

## 2021-12-18 RX ADMIN — CEFDINIR 300 MG: 300 CAPSULE ORAL at 08:36

## 2021-12-18 ASSESSMENT — ACTIVITIES OF DAILY LIVING (ADL)
ADLS_ACUITY_SCORE: 14
ADLS_ACUITY_SCORE: 20
ADLS_ACUITY_SCORE: 14
ADLS_ACUITY_SCORE: 20
ADLS_ACUITY_SCORE: 14
ADLS_ACUITY_SCORE: 12
ADLS_ACUITY_SCORE: 20
ADLS_ACUITY_SCORE: 20
ADLS_ACUITY_SCORE: 14
ADLS_ACUITY_SCORE: 20
ADLS_ACUITY_SCORE: 14
ADLS_ACUITY_SCORE: 20
ADLS_ACUITY_SCORE: 20
ADLS_ACUITY_SCORE: 14
ADLS_ACUITY_SCORE: 14
ADLS_ACUITY_SCORE: 16
ADLS_ACUITY_SCORE: 14
ADLS_ACUITY_SCORE: 20
ADLS_ACUITY_SCORE: 14
ADLS_ACUITY_SCORE: 14

## 2021-12-18 NOTE — PLAN OF CARE
Problem: Pain Acute  Goal: Acceptable Pain Control and Functional Ability  Outcome: Improving  Intervention: Develop Pain Management Plan  Recent Flowsheet Documentation  Taken 12/17/2021 7500 by Dania Canales, RN  Pain Management Interventions:   repositioned   declines   Changed ostomy appliance over fistula at 3am due to leaking. Copious output

## 2021-12-18 NOTE — PROGRESS NOTES
CLINICAL NUTRITION SERVICES - BRIEF NOTE      RECOMMENDATIONS FOR MD/PROVIDER TO ORDER:   None     Recommendations Ordered by Registered Dietitian (RD):   Continue TPN as ordered     Future/Additional Recommendations:   When patient able to tolerate increased intake, recommend starting supplements and decreasing TPN     Malnutrition:   Moderate malnutrition in the context of acute illness (please carry forward if malnutrition diagnosed)         EVALUATION OF PROGRESS TOWARD GOALS   Diet:  Clear Liquid    Nutrition Support:  TPN D310/ @ 85 ml/hr plus 250 ml of 20% lipids  3 times per week over 12 hrs    Intake/Tolerance:  Patient is eating/driniking 100% of meals per nursing records and received 636kcals and 1g protein yeterday. Surgery is waiting to advance diet until fistula output drastically decreases.     TPN providing 1668kcals, 100g protein, 310g CHO, 21g fat, and 2040ml fluid daily.       ASSESSED NUTRITION NEEDS:  Dosing Weight:  76.8 kg (169 lb 6.4 oz)    Estimated energy needs: 7210-0793 Matt/day ([place] St. Jeor w/ 1.4-2.0 stress factor)  Justification: post op  Estimated protein needs:  g/day (1.2-1.5 g/Kg)  Justification: post op  Estimated fluid needs 2304 ml/day (30 ml/Kg)  Justification: maintenance      NEW FINDINGS:   12/17/21 2128 76.8 kg (169 lb 6.4 oz)   12/13/21 1006 81.7 kg (180 lb 1.6 oz)   12/06/21 0400 70.3 kg (155 lb)     11/30/21 79.4 kg (175 lb)   01/22/20 79.4 kg (175 lb)   12/24/19 81.2 kg (179 lb)      Net fluid down 5.3L (12lbs) since 12/4/21 per I/Os     Labs: Na 134, hgb 11.6, hematocrit 36.6, rbc 3.98    Meds: omnicef, synthroid/levothroid, flagyl, remeron, protonix, senokot-s/pericolace     GI: abdominal discomfort

## 2021-12-18 NOTE — PLAN OF CARE
Problem: Adult Inpatient Plan of Care  Goal: Optimal Comfort and Wellbeing  Outcome: Improving  Goal: Readiness for Transition of Care  Outcome: Improving     Pt in bed. TPN running. Has had pain given PRN oxy. Staples in place. Ostomy bag collecting green output. Picc line dressing changed. Using urinal at bedside.

## 2021-12-18 NOTE — PLAN OF CARE
Problem: Adult Inpatient Plan of Care  Goal: Plan of Care Review  Outcome: Improving  Flowsheets (Taken 12/18/2021 0012)  Plan of Care Reviewed With: patient  Scheduled and PRN oxycodone administered for abdominal pain this shift; in addition to lidocaine cream.   Seroquel given at HS per request.  Copious drainage from fistula. Ostomy bag at site was changed due to leaking at bottom of pouch.   Passing flatus and reports having a BM 12/17. Refused Senna this evening.     Reviewed plan of care.   Nithya Hahn RN  [phone]

## 2021-12-18 NOTE — PHARMACY-CONSULT NOTE
"Pharmacy Note: Parenteral Nutrition (PN) Management    Pharmacist consulted to dose PN for Gurdeep Dia, a 66 year old male by Dr. Clinton.    Subjective:    The patient is a new PN start.     The patient was started on PN in the hospital on 12/5/21.     Indication for PN therapy: bowel resection     Inadequate nutrition anticipated for > 7 days.      Enteral nutrition contraindicated due to: bowel integrity is tenuous.     Pertinent diseases and other special considerations respiratory failure    Social History     Tobacco Use     Smoking status: Current Every Day Smoker     Smokeless tobacco: Never Used   Substance Use Topics     Alcohol use: Not Currently     Comment: Clean for 5 years     Drug use: Not Currently     Objective:    Ht Readings from Last 1 Encounters:   12/13/21 1.702 m (5' 7\")     Wt Readings from Last 1 Encounters:   12/17/21 76.8 kg (169 lb 6.4 oz)       Body mass index is 26.53 kg/m .    Patient Vitals for the past 96 hrs:   Weight   12/17/21 2128 76.8 kg (169 lb 6.4 oz)       Labs:  Last 3 days:  Recent Labs     12/15/21  0637 12/16/21  0525 12/17/21  0615   * 134* 134*   POTASSIUM 4.6 4.4 4.9   CHLORIDE 103 102 101   CO2 24 25 24   BUN 19 18 19   CR 0.79 0.83 0.84   VIJAYA 8.3* 8.4* 8.8   MAG 2.2 2.2  --    PHOS 2.6 3.2 3.6   HGB 10.0* 10.1* 10.7*   HCT 30.3* 31.2* 32.7*   * 875* 848*       Glucose (past 48 hours):   Recent Labs     12/15/21  1147 12/15/21  2220 12/16/21  0233 12/16/21  0525 12/16/21  0827 12/16/21  1149 12/17/21  0615   * 122* 125* 125 110* 140* 119       Intake/Output (last 24 hours): I/O last 3 completed shifts:  In: 3640 [P.O.:960]  Out: 2955 [Urine:800; Drains:2155]    Estimated CrCl: Estimated Creatinine Clearance: 87.5 mL/min (based on SCr of 0.89 mg/dL).    Assessment:    Continue patient on PN therapy as a continuous central therapy.     Given the patient's current condition/oral intake, PN is still indicated.    Lab results reviewed:   12/13: Na " trending down, increase slightly in TPN today due to recent hypernatremia.  12/14: Na 134, increased in TPN yesterday. Phos 2.7 - replaced outside of TPN yesterday  12/15: Na still low at 134, increase in TPN today. Phos on low end, will increase in TPN today.  12/17: no change in sodium, will increase sodium in TPN tonight.  Potassium is trending up, will decrease in TPN tonight.  12/18:  Labs look good, continue same formula today    Plan:  1. Rate of PN: 85 mL/hr.  2. Formula:     Amino Acids 100 grams    Dextrose 310 grams    Sodium 70 mEq/day    Potassium 80 mEq/day    Calcium 8 mEq/day    Magnesium 14 mEq/day    Phosphorus 32 mMol/day    Chloride: Acetate Ratio 1:1    Standard Multivitamins w/Vitamin K    Trace Elements  3. Fat Emulsion: 20%, 250 mL IV three times weekly.  4. Check BMP tomorrow  5. Pharmacist will continue to follow the patient's lab results, clinical status and blood glucose results and make adjustments as appropriate.

## 2021-12-18 NOTE — PROGRESS NOTES
ASSESSMENT:  1. SBO (small bowel obstruction) (H)    2. Pelvic abscess in male (H)        Gurdeep Dia is a 66 year old male who is s/p exploratory laparotomy with small bowel resection, lysis of adhesions and repair of enterotomy with significant inflamed small bowel with areas of thinned mucosa on 11/30/2021  S/p exploratory laparotomy 12/3/2021 for small bowel perforation from ulcer  S/p IR placement of drain into pelvic abscess 12/9/2021. Enterocutaneous fistula formation. WBC remains elevated which is concerning for a possible new fluid collection, but patient remains afebrile and feeling very well.     PLAN:  Clear liquids; will not being able to advance diet until EC fistula has drastically decreased output.  Repeat WBC tomorrow  Monitor fistula  Ok to shower with ostomy bag in place    SUBJECTIVE:   He is feeling good. Out of bed, in chair and doing squatting exercises. He reports no pain and his only complaint is that he wants to eat. Had bowel movement yesterday      Patient Vitals for the past 24 hrs:   BP Temp Temp src Pulse Resp SpO2 Weight   12/18/21 0725 120/68 98.6  F (37  C) Oral 84 18 94 % --   12/18/21 0302 119/79 -- -- 89 20 93 % --   12/18/21 0002 109/78 99  F (37.2  C) Axillary 100 20 93 % --   12/17/21 2358 -- 99.3  F (37.4  C) Oral -- -- -- --   12/17/21 2207 113/78 -- -- -- 20 93 % --   12/17/21 2128 -- -- -- -- -- -- 76.8 kg (169 lb 6.4 oz)   12/17/21 1957 -- -- -- -- 20 92 % --   12/17/21 1922 111/76 98.6  F (37  C) Oral 92 16 91 % --   12/17/21 1529 131/76 97.7  F (36.5  C) Axillary 93 16 93 % --         PHYSICAL EXAM:  GEN: No acute distress, comfortable and in good spirits  ABD: soft, not distended, nontender; Staples intact and erythema around upper portion of incision has resolved; midline wound/EC fistula with noted gas and feculent material in the ostomy bag;   EXT:No cyanosis, edema or obvious abnormalities    12/17 0700 - 12/18 0659  In: 3640 [P.O.:960]  Out: 2955 [Urine:800;  Drains:2155]    No results displayed because visit has over 200 results.             THOMAS Givens CNP

## 2021-12-18 NOTE — PROGRESS NOTES
[facility] Progress Note    Assessment/Plan  Principal Problem:    SBO (small bowel obstruction) (H)  Active Problems:    Hypertension goal BP (blood pressure) < 140/90    Benign prostatic hyperplasia with weak urinary stream    RAVI (generalized anxiety disorder)    Moderate major depression (H)    History of substance abuse (H)    History of hepatitis C    Chronic neck pain    Chronic bilateral low back pain without sciatica    Gurdeep Dia is a 66 year old male with history of splenectomy, appendectomy, HTN, and substance abuse who presented with 2 days of right sided abdominal pain, found to have a bowel obstruction with closed loop. He is admitted for surgical repair of obstruction.      Requires ongoing hospitalization for post-operative monitoring and care       Mechanical SBO s/p exploratory laparotomy / small bowel resection / repair of enterotomy / extensive lysis of adhesions (11/30/21)  Subsequent small bowel perforation requiring repeat ex lap for washout 12/3    CT abdomen/pelvis on admission 11/30 significant for closed loop mechanical small bowel obstruction. Does have history of multiple abdominal surgeries previously. First procedure 11/30 for small bowel resection. Acute change on 12/2-12/3 and found to have intraabdominal drainage from 12/3/2021; underwent repeat exploratory laparotomy with washout and repair of small bowel perforation. 12/8 still without stool charted, still spiking low grade fevers, having atypical output from MARISA drain, white count climbing. CT 12/8 showing ring enhancing lesion in abdomen likely representing abscess. Fourth drain placed 12/9 AM by IR for abscess drainage. Is now stooling and advancing diet.  -Surgery consulted and following - cleared for discharge to TCU, though not medically ready at this time given tenuous abdomen, difficult social situation, and still on TPN  -ADAT per surgery - currently on clears  -Pain control as per surgery/Pain  team - PCA stopped 12/13, transitioning to orals with decent pain control  -Abscess culture growing e. Coli, see sensitivities. Abscess drain placed 12/9. Removed 12/16 after abscessogram demonstrated resolution of pelvic fluid collection. Discontinued vancomycin and meropenem 12/12 in favor of IV cefepime + crushed oral flagyl (shortage of IV form). Curbsided ID 12/15 to discuss abx moving forward - Dr. Hanna recommended transitioning to oral cefdinir and flagyl. WBC stable. Continue abx. If continuing to rise, would have low threshold to consult ID  -IV Protonix     Acute encephalopathy, likely multifactorial - toxic/metabolic/hospital-induced   Acutely agitated post-op 12/3 to 12/4. Pulling at lines and threatening to leave.  Nurse felt unsafe without restraints. Precedex drip started, now off. Transferred out of the unit. Has been calm since back on the floor.   - PRN Haldol injection  - Zyprexa ODT 4x daily prn  - Conservative measures as able   - Avoid opioid/benzo if able   - Seroquel 100 mg at bedtime as needed    Acute hypoxic respiratory failure, post-op, improved  Atelectasis vs aspiration  Initially requiring high-flow oxygen in ICU following procedure.  Question post-op atelectasis vs aspiration versus volume overload.  Does have a degree of respiratory distress when he gets agitated. CXR 12/6 showing bibasilar opacitied favored to reflect atelectasis, though still with some interstitial coarsening. Breathing improved 12/7 after some antibiotics and maybe ongoing resolution of his atelectasis. No signs of fluid overload and sodium climbing so IV lasix discontinued 12/8. Off supp. O2  -Continue abx as above, don't think we need to broaden from a lung perspective  -Aggressive IS use  -PT/OT to get moving       Afib, RVR post-op, currently rate controlled   NSVT episode 12/8 and 12/15  Noted.  Rates had been in the 120 to 150 range.  Converted to NSR with amiodarone drip. No longer on any rate control. No  repeat episodes. Both CHADS2-Vasc and HAS-BLED of at least 2.  TSH normal. Lytes normal. Echo 12/10 showing slight concentric thickening in LV and EF 60-65%. Patient wants to avoid anticoagulation given bleeding risks and his single episode likely being related to his operation, sedation, and infection.  -Lovenox for now   -No AC after discharge after shared decision making discussion    HTN  PTA lisinopril, furosemide being held in the setting of acute surgical care.  Will monitor, even with pain BP not significantly elevated.  Will resume when hemodynamically stable.  Kidney function is stable at this time.  Blood pressure does climb when he is agitated.  -Monitor     Moderate malnutrition  Per RD eval.  -TPN until cleared to ADAT per surgery        Chronic Conditions:  Hypothyroidism- PTA levothyroxine  Depression/anxiety- PTA venlafaxine, mirtazepine, Buspar   Chronic pain- Hold mexolicam 7.5mg daily, resume PTA gabapentin when tolerating PO.  BPH- PTA flomax      Diet: NPO for Medical/Clinical Reasons Except for: Meds, Ice ChipsNPO   DVT Prophylaxis: Lovenox q day    Fluids:  None  Central Lines: None  Code Status: Full Code    Subjective  No pain today  Eager to ambulate 3x today. Already walked in hallway once.  Tolerating CLD without N/V     Objective    Vital signs in last 24 hours Temp:  [97.7  F (36.5  C)-99.3  F (37.4  C)] 98.6  F (37  C)  Pulse:  [] 84  Resp:  [16-20] 18  BP: (109-131)/(68-79) 120/68  SpO2:  [91 %-94 %] 94 %       Intake/Output last 3 shift I/O last 3 completed shifts:  In: 3640 [P.O.:960]  Out: 2955 [Urine:800; Drains:2155]    Intake/Output this shift:I/O this shift:  In: -   Out: 200 [Drains:200]    Physical Exam  General appearance: alert, no acute distress  Head: Normocephalic, without obvious abnormality, atraumatic.   Throat: moist mucous membranes.  Lungs: CTABL  Heart: regular rate and rhythm, S1, S2 normal, no murmur, click, rub or gallop  Abdomen: soft, ND, NT. Staples  "intact. Ostomy bag with dark brown fluid  Extremities: extremities normal, atraumatic, no cyanosis or edema  Skin: Slightly pale, dry skin.  Neurologic: Alert and oriented X 3.  Able to move extremities.  Sensation intact.   Psychiatric: Calm, cooperative    Pertinent Labs and Pertinent Radiology   Lab Results: personally reviewed.     Dhara \"Pratibha\" MD Rom  [facility] Resident  P: [phone]                "

## 2021-12-18 NOTE — PROGRESS NOTES
[facility] ACUTE PAIN SERVICE    ([facility], [facility], [facility])  Daily PAIN Progress Note    Assessment/Plan:  Gurdeep Dia is a 66 year old male who was admitted on 11/30/2021.  I was asked to see the patient for postoperative pain in the setting of SBO, now S/P exploratory laparotomy, small bowel resection, lysis of adhesions and repair of enterotomy with significant inflamed small bowel with areas of thinned mucosa on 11/30/2021 2/2 obstruction- S/p exploratory laparotomy 12/3/2021 2/2 small bowel perforation.  History of substance abuse (cocaine/etoh), hypertension, hypothyroid, depression/anxiety, chronic pain and takes meloxicam and gabapentin at baseline. Nurse reports that pain has been managed. Very sleepy this am. He was requesting increase in HS gabapentin.     PLAN:   1) Postoperative pain in the setting of bowel obstruction/perforation, POD#10 on clears and tolerating oxycodone. Continue gabapentin. Given confusion, gabapentin was decreased at HS from 900mg to 300mg. If confusion improves could increase gabapentin again.   2)Multimodal Medication Therapy  Topical: BID lidocaine   NSAID'S: avoid  Adjuvants: Tylenol TID,  gabapentin 300 mg- TID  Antidepressants/anxiolytics: Buspar 15 mg po bid, Remeron 30 mg po q hs, venlafaxine XR 37.5 mg daily, olanzapine, Haldol PRN ordered for agitation.   Opioids: oxycodone 5 every 6 hours with additional 5 mg every three hours prn   Minimize discontinue iv   3)Non-medication interventions- acupuncture on Monday/Thursday   4)Constipation Prophylaxis- per surgery  5) Follow up   -Opioid prescriber has been none  -Discharge Recommendations - We recommend prescribing the following at the time of discharge-small supply of oxycodone              Subjective:    Pain is not reported. Very sleepy this am.   Discussed with nursing. Pt was asking for higher dose of HS gabapentin.   Reviewed that if confusion and sedation improve today can go back up  "on gabapentin at HS.   He was confused to time and location yesterday.           SBO (small bowel obstruction) (H)   Patient Active Problem List   Diagnosis     Hypertension goal BP (blood pressure) < 140/90     Benign prostatic hyperplasia with weak urinary stream     RAVI (generalized anxiety disorder)     Moderate major depression (H)     History of substance abuse (H)     History of hepatitis C     Chronic neck pain     Chronic bilateral low back pain without sciatica     SBO (small bowel obstruction) (H)        History   Drug Use Unknown         Tobacco Use      Smoking status: Current Every Day Smoker      Smokeless tobacco: Never Used          acetaminophen  650 mg Oral TID     busPIRone  15 mg Oral BID     cefdinir  300 mg Oral Q12H KATEY     enoxaparin ANTICOAGULANT  40 mg Subcutaneous Q24H     [Held by provider] furosemide  40 mg Intravenous Daily     gabapentin  300 mg Oral TID     levothyroxine  25 mcg Oral Daily     lidocaine   Topical BID     lipids  250 mL Intravenous Once per day on Tue Thu Sat     metroNIDAZOLE  500 mg Oral TID     mirtazapine  30 mg Oral At Bedtime     oxyCODONE  5 mg Oral Q6H     pantoprazole (PROTONIX) IV  40 mg Intravenous Daily with breakfast     senna-docusate  1 tablet Oral BID     sodium chloride (PF)  10-40 mL Intracatheter Q7 Days     sodium chloride (PF)  3 mL Intracatheter Q8H     tamsulosin  0.4 mg Oral Daily     venlafaxine  37.5 mg Oral Daily       Objective:  Vital signs in last 24 hours:  B/P: 116/66, T: 97, P: 85, R: 16   Blood pressure 119/79, pulse 89, temperature 99  F (37.2  C), temperature source Axillary, resp. rate 20, height 1.702 m (5' 7\"), weight 76.8 kg (169 lb 6.4 oz), SpO2 93 %.      Weight:   Wt Readings from Last 2 Encounters:   12/17/21 76.8 kg (169 lb 6.4 oz)   01/22/20 79.4 kg (175 lb)           Intake/Output:    Intake/Output Summary (Last 24 hours) at 12/14/2021 0913  Last data filed at 12/14/2021 0700  Gross per 24 hour   Intake 1376 ml   Output " 1695 ml   Net -319 ml        Review of Systems:   As per subjective, all others negative.    Physical Exam:  Constitutional: sleepy and cooperative   Head: Normocephalic. No masses, lesions, tenderness or abnormalities  Neck: Neck supple. No adenopathy. Thyroid symmetric, normal size,  ENT: ENT exam normal, no neck nodes or sinus tenderness  Cardiovascular:   S1 normal, S2 normal  Respiratory: negative, negative findings: no chest deformities noted, diminished   Gastrointestinal: positive findings: mildly distended, hyperactive bowel sounds, incision, ostomy, MARISA drains, fistula with collection back   : Deferred  Musculoskeletal: extremities normal- no gross deformities noted and normal muscle tone  Skin: no suspicious lesions or rashes  Neurologic:    sleepy  Psychiatric: slurs and confused   Hematologic/Lymphatic/Immunologic: Negative       Lab Results:  Personally Reviewed.   Last Comprehensive Metabolic Panel:  Sodium   Date Value Ref Range Status   12/17/2021 134 (L) 136 - 145 mmol/L Final   01/22/2020 140 133 - 144 mmol/L Final     Potassium   Date Value Ref Range Status   12/17/2021 4.9 3.5 - 5.0 mmol/L Final   01/22/2020 3.8 3.4 - 5.3 mmol/L Final     Chloride   Date Value Ref Range Status   12/17/2021 101 98 - 107 mmol/L Final   01/22/2020 108 94 - 109 mmol/L Final     Carbon Dioxide   Date Value Ref Range Status   01/22/2020 28 20 - 32 mmol/L Final     Carbon Dioxide (CO2)   Date Value Ref Range Status   12/17/2021 24 22 - 31 mmol/L Final     Anion Gap   Date Value Ref Range Status   12/17/2021 9 5 - 18 mmol/L Final   01/22/2020 4 3 - 14 mmol/L Final     Glucose   Date Value Ref Range Status   12/17/2021 119 70 - 125 mg/dL Final   01/22/2020 100 (H) 70 - 99 mg/dL Final     GLUCOSE BY METER POCT   Date Value Ref Range Status   12/17/2021 122 (H) 70 - 99 mg/dL Final     Urea Nitrogen   Date Value Ref Range Status   12/17/2021 19 8 - 22 mg/dL Final   01/22/2020 14 7 - 30 mg/dL Final     Creatinine   Date  Value Ref Range Status   12/17/2021 0.84 0.70 - 1.30 mg/dL Final   01/22/2020 0.94 0.66 - 1.25 mg/dL Final     GFR Estimate   Date Value Ref Range Status   12/17/2021 >90 >60 mL/min/1.73m2 Final     Comment:     As of July 11, 2021, eGFR is calculated by the CKD-EPI creatinine equation, without race adjustment. eGFR can be influenced by muscle mass, exercise, and diet. The reported eGFR is an estimation only and is only applicable if the renal function is stable.   01/22/2020 84 >60 mL/min/[1.73_m2] Final     Comment:     Non  GFR Calc  Starting 12/18/2018, serum creatinine based estimated GFR (eGFR) will be   calculated using the Chronic Kidney Disease Epidemiology Collaboration   (CKD-EPI) equation.       Calcium   Date Value Ref Range Status   12/17/2021 8.8 8.5 - 10.5 mg/dL Final   01/22/2020 9.5 8.5 - 10.1 mg/dL Final        UA: No results found for: UAMP, UBARB, BENZODIAZEUR, UCANN, UCOC, OPIT, UPCP          Total unit/floor time 15  minutes, time consisted of the following, examination of patient, reviewing the record and lab results, and completing documentation.  Discussed with day nurse.       Diya GUZMAN, CNS-BC, CNP, ACHPN  Acute Care Pain Management Program Hour 7a-1700  [facility] ([place], [place], [place])   Page via [facility]- Click HERE to page Diya or call [phone]

## 2021-12-19 ENCOUNTER — APPOINTMENT (OUTPATIENT)
Dept: PHYSICAL THERAPY | Facility: HOSPITAL | Age: 66
DRG: 329 | End: 2021-12-19
Payer: MEDICARE

## 2021-12-19 LAB
ANION GAP SERPL CALCULATED.3IONS-SCNC: 6 MMOL/L (ref 5–18)
BUN SERPL-MCNC: 18 MG/DL (ref 8–22)
CALCIUM SERPL-MCNC: 8.8 MG/DL (ref 8.5–10.5)
CHLORIDE BLD-SCNC: 102 MMOL/L (ref 98–107)
CO2 SERPL-SCNC: 23 MMOL/L (ref 22–31)
CREAT SERPL-MCNC: 0.8 MG/DL (ref 0.7–1.3)
ERYTHROCYTE [DISTWIDTH] IN BLOOD BY AUTOMATED COUNT: 14.6 % (ref 10–15)
GFR SERPL CREATININE-BSD FRML MDRD: >90 ML/MIN/1.73M2
GLUCOSE BLD-MCNC: 119 MG/DL (ref 70–125)
GLUCOSE BLDC GLUCOMTR-MCNC: 149 MG/DL (ref 70–99)
HCT VFR BLD AUTO: 35.1 % (ref 40–53)
HGB BLD-MCNC: 11.5 G/DL (ref 13.3–17.7)
MAGNESIUM SERPL-MCNC: 2.1 MG/DL (ref 1.8–2.6)
MCH RBC QN AUTO: 29.4 PG (ref 26.5–33)
MCHC RBC AUTO-ENTMCNC: 32.8 G/DL (ref 31.5–36.5)
MCV RBC AUTO: 90 FL (ref 78–100)
PHOSPHATE SERPL-MCNC: 3.2 MG/DL (ref 2.5–4.5)
PLATELET # BLD AUTO: 817 10E3/UL (ref 150–450)
POTASSIUM BLD-SCNC: 4.4 MMOL/L (ref 3.5–5)
RBC # BLD AUTO: 3.91 10E6/UL (ref 4.4–5.9)
SODIUM SERPL-SCNC: 131 MMOL/L (ref 136–145)
WBC # BLD AUTO: 12 10E3/UL (ref 4–11)

## 2021-12-19 PROCEDURE — 83735 ASSAY OF MAGNESIUM: CPT | Performed by: SPECIALIST

## 2021-12-19 PROCEDURE — 97116 GAIT TRAINING THERAPY: CPT | Mod: GP

## 2021-12-19 PROCEDURE — 250N000009 HC RX 250: Performed by: SPECIALIST

## 2021-12-19 PROCEDURE — 84100 ASSAY OF PHOSPHORUS: CPT | Performed by: STUDENT IN AN ORGANIZED HEALTH CARE EDUCATION/TRAINING PROGRAM

## 2021-12-19 PROCEDURE — 250N000013 HC RX MED GY IP 250 OP 250 PS 637: Performed by: SPECIALIST

## 2021-12-19 PROCEDURE — 120N000001 HC R&B MED SURG/OB

## 2021-12-19 PROCEDURE — C9113 INJ PANTOPRAZOLE SODIUM, VIA: HCPCS | Performed by: INTERNAL MEDICINE

## 2021-12-19 PROCEDURE — 250N000013 HC RX MED GY IP 250 OP 250 PS 637: Performed by: CLINICAL NURSE SPECIALIST

## 2021-12-19 PROCEDURE — 250N000013 HC RX MED GY IP 250 OP 250 PS 637: Performed by: PAIN MEDICINE

## 2021-12-19 PROCEDURE — 80048 BASIC METABOLIC PNL TOTAL CA: CPT | Performed by: INTERNAL MEDICINE

## 2021-12-19 PROCEDURE — 99232 SBSQ HOSP IP/OBS MODERATE 35: CPT | Mod: GC | Performed by: STUDENT IN AN ORGANIZED HEALTH CARE EDUCATION/TRAINING PROGRAM

## 2021-12-19 PROCEDURE — 250N000011 HC RX IP 250 OP 636: Performed by: INTERNAL MEDICINE

## 2021-12-19 PROCEDURE — 36415 COLL VENOUS BLD VENIPUNCTURE: CPT | Performed by: INTERNAL MEDICINE

## 2021-12-19 PROCEDURE — 85027 COMPLETE CBC AUTOMATED: CPT | Performed by: INTERNAL MEDICINE

## 2021-12-19 PROCEDURE — 250N000013 HC RX MED GY IP 250 OP 250 PS 637: Performed by: STUDENT IN AN ORGANIZED HEALTH CARE EDUCATION/TRAINING PROGRAM

## 2021-12-19 PROCEDURE — 99231 SBSQ HOSP IP/OBS SF/LOW 25: CPT | Performed by: PAIN MEDICINE

## 2021-12-19 PROCEDURE — 99024 POSTOP FOLLOW-UP VISIT: CPT | Performed by: NURSE PRACTITIONER

## 2021-12-19 RX ADMIN — OXYCODONE HYDROCHLORIDE 5 MG: 5 TABLET ORAL at 23:00

## 2021-12-19 RX ADMIN — QUETIAPINE 100 MG: 25 TABLET ORAL at 23:01

## 2021-12-19 RX ADMIN — ACETAMINOPHEN 650 MG: 325 TABLET ORAL at 23:00

## 2021-12-19 RX ADMIN — OXYCODONE HYDROCHLORIDE 5 MG: 5 TABLET ORAL at 19:23

## 2021-12-19 RX ADMIN — ACETAMINOPHEN 650 MG: 325 TABLET ORAL at 08:38

## 2021-12-19 RX ADMIN — METRONIDAZOLE 500 MG: 500 TABLET ORAL at 23:00

## 2021-12-19 RX ADMIN — BUSPIRONE HYDROCHLORIDE 15 MG: 15 TABLET ORAL at 23:01

## 2021-12-19 RX ADMIN — POTASSIUM CHLORIDE: 2 INJECTION, SOLUTION, CONCENTRATE INTRAVENOUS at 20:12

## 2021-12-19 RX ADMIN — OXYCODONE HYDROCHLORIDE 5 MG: 5 TABLET ORAL at 03:00

## 2021-12-19 RX ADMIN — GABAPENTIN 300 MG: 300 CAPSULE ORAL at 08:39

## 2021-12-19 RX ADMIN — METRONIDAZOLE 500 MG: 500 TABLET ORAL at 08:38

## 2021-12-19 RX ADMIN — OXYCODONE HYDROCHLORIDE 5 MG: 5 TABLET ORAL at 06:21

## 2021-12-19 RX ADMIN — TAMSULOSIN HYDROCHLORIDE 0.4 MG: 0.4 CAPSULE ORAL at 08:38

## 2021-12-19 RX ADMIN — OXYCODONE HYDROCHLORIDE 5 MG: 5 TABLET ORAL at 08:38

## 2021-12-19 RX ADMIN — CEFDINIR 300 MG: 300 CAPSULE ORAL at 20:22

## 2021-12-19 RX ADMIN — I.V. FAT EMULSION 250 ML: 20 EMULSION INTRAVENOUS at 20:15

## 2021-12-19 RX ADMIN — PANTOPRAZOLE SODIUM 40 MG: 40 INJECTION, POWDER, FOR SOLUTION INTRAVENOUS at 08:38

## 2021-12-19 RX ADMIN — LIDOCAINE: 50 OINTMENT TOPICAL at 20:22

## 2021-12-19 RX ADMIN — GABAPENTIN 300 MG: 300 CAPSULE ORAL at 13:56

## 2021-12-19 RX ADMIN — OXYCODONE HYDROCHLORIDE 5 MG: 5 TABLET ORAL at 12:14

## 2021-12-19 RX ADMIN — LEVOTHYROXINE SODIUM 25 MCG: 0.03 TABLET ORAL at 06:21

## 2021-12-19 RX ADMIN — LIDOCAINE: 50 OINTMENT TOPICAL at 08:38

## 2021-12-19 RX ADMIN — GABAPENTIN 300 MG: 300 CAPSULE ORAL at 23:01

## 2021-12-19 RX ADMIN — BUSPIRONE HYDROCHLORIDE 15 MG: 15 TABLET ORAL at 08:39

## 2021-12-19 RX ADMIN — METRONIDAZOLE 500 MG: 500 TABLET ORAL at 13:56

## 2021-12-19 RX ADMIN — VENLAFAXINE HYDROCHLORIDE 37.5 MG: 37.5 CAPSULE, EXTENDED RELEASE ORAL at 08:38

## 2021-12-19 RX ADMIN — ENOXAPARIN SODIUM 40 MG: 40 INJECTION SUBCUTANEOUS at 10:22

## 2021-12-19 RX ADMIN — MIRTAZAPINE 30 MG: 30 TABLET, FILM COATED ORAL at 23:01

## 2021-12-19 RX ADMIN — SENNOSIDES AND DOCUSATE SODIUM 1 TABLET: 50; 8.6 TABLET ORAL at 08:38

## 2021-12-19 RX ADMIN — ACETAMINOPHEN 650 MG: 325 TABLET ORAL at 13:56

## 2021-12-19 RX ADMIN — CEFDINIR 300 MG: 300 CAPSULE ORAL at 08:38

## 2021-12-19 RX ADMIN — OXYCODONE HYDROCHLORIDE 5 MG: 5 TABLET ORAL at 13:57

## 2021-12-19 ASSESSMENT — ACTIVITIES OF DAILY LIVING (ADL)
ADLS_ACUITY_SCORE: 12
ADLS_ACUITY_SCORE: 16
ADLS_ACUITY_SCORE: 16
ADLS_ACUITY_SCORE: 14
ADLS_ACUITY_SCORE: 16
ADLS_ACUITY_SCORE: 12
ADLS_ACUITY_SCORE: 14
ADLS_ACUITY_SCORE: 16
ADLS_ACUITY_SCORE: 14
ADLS_ACUITY_SCORE: 14
ADLS_ACUITY_SCORE: 16
ADLS_ACUITY_SCORE: 14
ADLS_ACUITY_SCORE: 12
ADLS_ACUITY_SCORE: 16
ADLS_ACUITY_SCORE: 14
ADLS_ACUITY_SCORE: 14
ADLS_ACUITY_SCORE: 16
ADLS_ACUITY_SCORE: 16
ADLS_ACUITY_SCORE: 14
ADLS_ACUITY_SCORE: 12
ADLS_ACUITY_SCORE: 14

## 2021-12-19 NOTE — PROGRESS NOTES
[facility] ACUTE PAIN SERVICE    ([facility], [facility], [facility])  Daily PAIN Progress Note    Assessment/Plan:  Gurdeep Dia is a 66 year old male who was admitted on 11/30/2021.  I was asked to see the patient for postoperative pain in the setting of SBO, now S/P exploratory laparotomy, small bowel resection, lysis of adhesions and repair of enterotomy with significant inflamed small bowel with areas of thinned mucosa on 11/30/2021 2/2 obstruction- S/p exploratory laparotomy 12/3/2021 2/2 small bowel perforation.  History of substance abuse (cocaine/etoh), hypertension, hypothyroid, depression/anxiety, chronic pain and takes meloxicam and gabapentin at baseline.  Pain controlled with oxycodone and gabapentin.  Pain team will sign off.    PLAN:   1) Postoperative pain in the setting of bowel obstruction/perforation, POD#11.  Was to be tolerating oxycodone and gabapentin.  Less confusion.  2)Multimodal Medication Therapy  Topical: BID lidocaine   NSAID'S: avoid  Adjuvants: Tylenol TID,  gabapentin 300 mg- TID  Antidepressants/anxiolytics: Buspar 15 mg po bid, Remeron 30 mg po q hs, venlafaxine XR 37.5 mg daily, olanzapine, Haldol PRN ordered for agitation.   Opioids: oxycodone 5 every 6 hours with additional 5 mg every three hours prn   3)Non-medication interventions- acupuncture on Monday/Thursday   4)Constipation Prophylaxis- per surgery  5) Follow up   -Opioid prescriber has been none  -Discharge Recommendations - We recommend prescribing the following at the time of discharge-small supply of oxycodone              Subjective:      Gurdeep feels that his pain is managed.  He states he is very eager to have an egg.  He states he also like to go home.  He is very grateful for the excellent nursing care here.      SBO (small bowel obstruction) (H)   Patient Active Problem List   Diagnosis     Hypertension goal BP (blood pressure) < 140/90     Benign prostatic hyperplasia with weak urinary  "stream     RAVI (generalized anxiety disorder)     Moderate major depression (H)     History of substance abuse (H)     History of hepatitis C     Chronic neck pain     Chronic bilateral low back pain without sciatica     SBO (small bowel obstruction) (H)        History   Drug Use Unknown         Tobacco Use      Smoking status: Current Every Day Smoker      Smokeless tobacco: Never Used          acetaminophen  650 mg Oral TID     busPIRone  15 mg Oral BID     cefdinir  300 mg Oral Q12H KATEY     enoxaparin ANTICOAGULANT  40 mg Subcutaneous Q24H     [Held by provider] furosemide  40 mg Intravenous Daily     gabapentin  300 mg Oral TID     levothyroxine  25 mcg Oral Daily     lidocaine   Topical BID     lipids  250 mL Intravenous Once per day on Tue Thu Sat     metroNIDAZOLE  500 mg Oral TID     mirtazapine  30 mg Oral At Bedtime     oxyCODONE  5 mg Oral Q6H     pantoprazole (PROTONIX) IV  40 mg Intravenous Daily with breakfast     senna-docusate  1 tablet Oral BID     sodium chloride (PF)  10-40 mL Intracatheter Q7 Days     sodium chloride (PF)  3 mL Intracatheter Q8H     tamsulosin  0.4 mg Oral Daily     venlafaxine  37.5 mg Oral Daily       Objective:  Vital signs in last 24 hours:  B/P: 116/66, T: 97, P: 85, R: 16   Blood pressure 104/69, pulse 95, temperature 98.5  F (36.9  C), temperature source Oral, resp. rate 18, height 1.702 m (5' 7\"), weight 76.8 kg (169 lb 6.4 oz), SpO2 94 %.      Weight:   Wt Readings from Last 2 Encounters:   12/17/21 76.8 kg (169 lb 6.4 oz)   01/22/20 79.4 kg (175 lb)           Intake/Output:    Intake/Output Summary (Last 24 hours) at 12/14/2021 0913  Last data filed at 12/14/2021 0700  Gross per 24 hour   Intake 1376 ml   Output 1695 ml   Net -319 ml        Review of Systems:   As per subjective, all others negative.    Physical Exam:  Constitutional: cooperative   Head: Normocephalic. No masses, lesions, tenderness or abnormalities  Neck: Neck supple. No adenopathy. Thyroid symmetric, " normal size,  ENT: ENT exam normal, no neck nodes or sinus tenderness  Cardiovascular:   No sob   Respiratory: negative, negative findings: no chest deformities noted, diminished   Gastrointestinal: positive findings: mildly distended, hyperactive bowel sounds, incision, ostomy, MARISA drains, fistula with collection device   : Deferred  Musculoskeletal: extremities normal- no gross deformities noted and normal muscle tone  Skin: no suspicious lesions or rashes  Neurologic:    sleepy  Psychiatric: alert and calm  Hematologic/Lymphatic/Immunologic: Negative       Lab Results:  Personally Reviewed.   Last Comprehensive Metabolic Panel:  Sodium   Date Value Ref Range Status   12/19/2021 131 (L) 136 - 145 mmol/L Final   01/22/2020 140 133 - 144 mmol/L Final     Potassium   Date Value Ref Range Status   12/19/2021 4.4 3.5 - 5.0 mmol/L Final   01/22/2020 3.8 3.4 - 5.3 mmol/L Final     Chloride   Date Value Ref Range Status   12/19/2021 102 98 - 107 mmol/L Final   01/22/2020 108 94 - 109 mmol/L Final     Carbon Dioxide   Date Value Ref Range Status   01/22/2020 28 20 - 32 mmol/L Final     Carbon Dioxide (CO2)   Date Value Ref Range Status   12/19/2021 23 22 - 31 mmol/L Final     Anion Gap   Date Value Ref Range Status   12/19/2021 6 5 - 18 mmol/L Final   01/22/2020 4 3 - 14 mmol/L Final     Glucose   Date Value Ref Range Status   12/19/2021 119 70 - 125 mg/dL Final   01/22/2020 100 (H) 70 - 99 mg/dL Final     GLUCOSE BY METER POCT   Date Value Ref Range Status   12/19/2021 149 (H) 70 - 99 mg/dL Final     Urea Nitrogen   Date Value Ref Range Status   12/19/2021 18 8 - 22 mg/dL Final   01/22/2020 14 7 - 30 mg/dL Final     Creatinine   Date Value Ref Range Status   12/19/2021 0.80 0.70 - 1.30 mg/dL Final   01/22/2020 0.94 0.66 - 1.25 mg/dL Final     GFR Estimate   Date Value Ref Range Status   12/19/2021 >90 >60 mL/min/1.73m2 Final     Comment:     As of July 11, 2021, eGFR is calculated by the CKD-EPI creatinine equation,  without race adjustment. eGFR can be influenced by muscle mass, exercise, and diet. The reported eGFR is an estimation only and is only applicable if the renal function is stable.   01/22/2020 84 >60 mL/min/[1.73_m2] Final     Comment:     Non  GFR Calc  Starting 12/18/2018, serum creatinine based estimated GFR (eGFR) will be   calculated using the Chronic Kidney Disease Epidemiology Collaboration   (CKD-EPI) equation.       Calcium   Date Value Ref Range Status   12/19/2021 8.8 8.5 - 10.5 mg/dL Final   01/22/2020 9.5 8.5 - 10.1 mg/dL Final        UA: No results found for: UAMP, UBARB, BENZODIAZEUR, UCANN, UCOC, OPIT, UPCP          Total unit/floor time 15  minutes, time consisted of the following, examination of patient, reviewing the record and lab results, and completing documentation.          Diya Solano APRN, CNS-BC, CNP, ACHPN  Acute Care Pain Management Program Hour 7a-1700  [facility] ([facility], [place], [place])   Page via [facility]- Click HERE to page Diya or call [phone]

## 2021-12-19 NOTE — PROGRESS NOTES
ASSESSMENT:  1. SBO (small bowel obstruction) (H)    2. Pelvic abscess in male (H)        Gurdeep Dia is a 66 year old male who is s/p exploratory laparotomy with small bowel resection, lysis of adhesions and repair of enterotomy with significant inflamed small bowel with areas of thinned mucosa on 11/30/2021  S/p exploratory laparotomy 12/3/2021 for small bowel perforation from ulcer  S/p IR placement of drain into pelvic abscess 12/9/2021. Enterocutaneous fistula formation. WBC is trending downward again.    PLAN:  Clear liquids but would decrease intake as tolerated and stay away from high calorie liquids; will not be able to advance diet until EC fistula has drastically decreased output.  Monitor fistula  Ok to shower with ostomy bag in place  Ok to go to a TCU from a surgical perspective     SUBJECTIVE:   He is doing well. He reports that he slept good last night and has no pain currently. He denies nausea, vomiting, fever and chills.      Patient Vitals for the past 24 hrs:   BP Temp Temp src Pulse Resp SpO2   12/19/21 0709 104/69 98.5  F (36.9  C) Oral 95 18 94 %   12/18/21 2334 108/71 98.4  F (36.9  C) Oral 93 18 93 %   12/18/21 1525 107/60 98.3  F (36.8  C) Oral 82 16 97 %         PHYSICAL EXAM:  GEN: No acute distress, comfortable and in good spirits  ABD: soft, not distended, nontender; Staples intact and erythema around upper portion of incision has resolved; midline wound/EC fistula with noted gas and feculent material in the ostomy bag;   EXT:No cyanosis, edema or obvious abnormalities    12/18 0700 - 12/19 0659  In: 2353 [P.O.:600]  Out: 2050 [Urine:1350; Drains:700]    No results displayed because visit has over 200 results.             Jane Agosto, THOMAS CNP

## 2021-12-19 NOTE — PROGRESS NOTES
[facility] Progress Note    Assessment/Plan  Principal Problem:    SBO (small bowel obstruction) (H)  Active Problems:    Hypertension goal BP (blood pressure) < 140/90    Benign prostatic hyperplasia with weak urinary stream    RAVI (generalized anxiety disorder)    Moderate major depression (H)    History of substance abuse (H)    History of hepatitis C    Chronic neck pain    Chronic bilateral low back pain without sciatica    Gurdeep Dia is a 66 year old male with history of splenectomy, appendectomy, HTN, and substance abuse who presented with 2 days of right sided abdominal pain, found to have a bowel obstruction with closed loop. He is admitted for surgical repair of obstruction.      Requires ongoing hospitalization for post-operative monitoring and care       Mechanical SBO s/p exploratory laparotomy / small bowel resection / repair of enterotomy / extensive lysis of adhesions (11/30/21)  Subsequent small bowel perforation requiring repeat ex lap for washout 12/3    CT abdomen/pelvis 11/30 significant for closed loop mechanical small bowel obstruction. Does have history of multiple abdominal surgeries previously. First procedure 11/30 for small bowel resection. Acute change on 12/2-12/3 and found to have intraabdominal drainage from 12/3/2021; underwent repeat exploratory laparotomy with washout and repair of small bowel perforation. 12/8 still without stool charted, still spiking low grade fevers, having atypical output from MARISA drain, white count climbing. CT 12/8 showing ring enhancing lesion in abdomen likely representing abscess. Fourth drain placed 12/9 AM by IR for abscess drainage. Is now stooling and advancing diet.  -Surgery consulted and following - cleared for discharge to TCU, though not medically ready at this time given tenuous abdomen, difficult social situation, and still on TPN  -At this time will remain on low calorie clears until drainage has started to decrease  and is more manageable.  Will advance diet further once drain has been removed.  -Pain control as per surgery/Pain team - PCA stopped 12/13, transitioning to orals with decent pain control  -Abscess culture growing e. Coli, see sensitivities. Abscess drain placed 12/9. Removed 12/16 after abscessogram demonstrated resolution of pelvic fluid collection. Discontinued vancomycin and meropenem 12/12 in favor of IV cefepime + crushed oral flagyl (shortage of IV form). Curbsided ID 12/15 to discuss abx moving forward - Dr. Hanna recommended transitioning to oral cefdinir and flagyl. WBC stable. Continue abx. If continuing to rise, would have low threshold to consult ID  -IV Protonix      Acute encephalopathy, likely multifactorial - toxic/metabolic/hospital-induced   Acutely agitated post-op 12/3 to 12/4. Pulling at lines and threatening to leave.  Nurse felt unsafe without restraints. Precedex drip started, now off. Transferred out of the unit. Has been calm since back on the floor.   - PRN Haldol injection  - Zyprexa ODT 4x daily prn  - Conservative measures as able   - Avoid opioid/benzo if able   - Seroquel 100 mg at bedtime as needed     Acute hypoxic respiratory failure, post-op, improved  Atelectasis vs aspiration  Initially requiring high-flow oxygen in ICU following procedure.  Question post-op atelectasis vs aspiration versus volume overload.  Does have a degree of respiratory distress when he gets agitated. CXR 12/6 showing bibasilar opacitied favored to reflect atelectasis, though still with some interstitial coarsening. Breathing improved 12/7 after some antibiotics and maybe ongoing resolution of his atelectasis. No signs of fluid overload and sodium climbing so IV lasix discontinued 12/8. Off supp. O2  -Continue abx as above, don't think we need to broaden from a lung perspective  -Aggressive IS use  -PT/OT to get moving  -Patient is more mobile and is getting around quite well     Afib, RVR post-op,  currently rate controlled   NSVT episode 12/8 and 12/15  Noted.  Rates had been in the 120 to 150 range.  Converted to NSR with amiodarone drip. No longer on any rate control. No repeat episodes. Both CHADS2-Vasc and HAS-BLED of at least 2.  TSH normal. Lytes normal. Echo 12/10 showing slight concentric thickening in LV and EF 60-65%. Patient wants to avoid anticoagulation given bleeding risks and his single episode likely being related to his operation, sedation, and infection.  -Lovenox for now   -No AC after discharge after shared decision making discussion     HTN  PTA lisinopril, furosemide being held in the setting of acute surgical care.  Will monitor, even with pain BP not significantly elevated.  Will resume when hemodynamically stable.  Kidney function is stable at this time.  Blood pressure does climb when he is agitated.  -Monitor      Moderate malnutrition  Per RD eval.  -TPN until cleared to [facility] per surgery     Chronic Conditions:  Hypothyroidism- PTA levothyroxine  Depression/anxiety- PTA venlafaxine, mirtazepine, Buspar   Chronic pain- Hold mexolicam 7.5mg daily, resume PTA gabapentin when tolerating PO.  BPH- PTA flomax      Diet: NPO for Medical/Clinical Reasons Except for: Meds, Ice ChipsNPO   DVT Prophylaxis: Lovenox q day    Fluids:  None  Central Lines: None  Code Status: Full Code    Subjective  Patient pleasant but understandably irritated by his predicament and continued admission to the hospital.  Would love to have decreased output so he could work on getting home.  Denies any acute pain, states he otherwise feels great and is able to move around suprisingly well all things considered.      Objective    Vital signs in last 24 hours Temp:  [98.3  F (36.8  C)-98.5  F (36.9  C)] 98.5  F (36.9  C)  Pulse:  [82-95] 95  Resp:  [16-18] 18  BP: (104-108)/(60-71) 104/69  SpO2:  [93 %-97 %] 94 %       Intake/Output last 3 shift I/O last 3 completed shifts:  In: 2353 [P.O.:600]  Out: 2050  [Urine:1350; Drains:700]    Intake/Output this shift:I/O this shift:  In: 480 [P.O.:480]  Out: 525 [Urine:175; Drains:350]    Physical Exam  General appearance: alert, no acute distress  Head: Normocephalic, without obvious abnormality, atraumatic.   Throat: moist mucous membranes.  Lungs: CTABL  Heart: regular rate and rhythm, S1, S2 normal, no murmur, click, rub or gallop  Abdomen: soft, ND, NT. Staples intact. Ostomy bag with dark brown fluid  Extremities: extremities normal, atraumatic, no cyanosis or edema  Skin: Slightly pale, dry skin.  Neurologic: Alert and oriented X 3.  Able to move extremities.  Sensation intact.   Psychiatric: Calm, cooperative    Pertinent Labs and Pertinent Radiology   Lab Results: personally reviewed.     Radiology Results: Personally reviewed image/s and impression/s    Precepted patient with Dr. Dania Nickerson.    Roge Moses MD  [facility], PGY-3  Pager # [phone]

## 2021-12-19 NOTE — PLAN OF CARE
Problem: Pain Acute  Goal: Acceptable Pain Control and Functional Ability  Outcome: No Change  Intervention: Prevent or Manage Pain  Recent Flowsheet Documentation  Medication Review/Management: medications reviewed  -Sched pain meds given    Problem: Infection (Surgery Nonspecified)  Goal: Absence of Infection Signs and Symptoms  Outcome: Improving  - hand hygiene promoted

## 2021-12-19 NOTE — PROGRESS NOTES
CLINICAL NUTRITION SERVICES - REASSESSMENT NOTE      RECOMMENDATIONS FOR MD/PROVIDER TO ORDER:   None     Recommendations Ordered by Registered Dietitian (RD):   Decrease TPN volume and increase Dextrose, AA, and lipids to 70ml/hr w/ 336D/115AA and 250ml 20% lipids 5x/week    TPN to provide 1959kcals, 115g protein, 336g CHO, 36g fat, 1680ml fluid daily.     Future/Additional Recommendations:        Malnutrition:   Moderate malnutrition in the context of acute illness (please carry forward if malnutrition diagnosed)         EVALUATION OF PROGRESS TOWARD GOALS   Diet:  Clear Liquid    Nutrition Support:  TPN D310/ @ 85 ml/hr plus 250 ml of 20% lipids  3 times per week over 12 hrs    Intake/Tolerance:  Patient is eating/driniking 25% of meals per nursing records. Patient stated he is going to go back to ice chips and sips of water. Everything he his eating and drinking is coming right out abdominal drain.    TPN providing 1668kcals, 100g protein, 310g CHO, 21g fat, and 2040ml fluid daily.       ASSESSED NUTRITION NEEDS:  Dosing Weight:  76.8 kg (169 lb 6.4 oz)    Estimated energy needs: 1380-4937 Matt/day ([place] St. Jeor w/ 1.4-2.0 stress factor)  Justification: post op  Estimated protein needs:  g/day (1.2-1.5 g/Kg)  Justification: post op  Estimated fluid needs 2304 ml/day (30 ml/Kg)  Justification: maintenance      NEW FINDINGS:   12/17/21 2128 76.8 kg (169 lb 6.4 oz)   12/13/21 1006 81.7 kg (180 lb 1.6 oz)   12/06/21 0400 70.3 kg (155 lb)     11/30/21 79.4 kg (175 lb)                   3.3% wt loss in 1 month   01/22/20 79.4 kg (175 lb)   12/24/19 81.2 kg (179 lb)      Net fluid down 3.8L (8.4lbs) since 12/5/21 per I/Os     Labs: Na 131, hgb 11.5, hematocrit 35.1, rbc 3.91    Meds: omnicef, synthroid/levothroid, flagyl, remeron, protonix, senokot-s/pericolace, tums     GI: abdominal pain, SBO, LBM 12/18/21    Previous Goals:   Tolerate TPN-met  Advance to po diet when appropriate-not met  BG  -met  Evaluation: Met    Previous Nutrition Diagnosis:   Malnutrition related to acute illness as evidenced by NPO x 5 days and moderate fat loss    Evaluation: Improving      MALNUTRITION  % Weight Loss:  Weight loss does not meet criteria for malnutrition dt fluid losses, may meet criteria w/ new weight  % Intake:  <75% for > 7 days (moderate malnutrition)  Subcutaneous Fat Loss:  Orbital region severe depletion and Upper arm region mild to moderate depletion  Muscle Loss:  Temporal region modearate depletion, Clavicle bone region severe depletion, Dorsal hand region mild to moderate depletion and Patellar region moderate depletion  Fluid Retention:  None noted    Malnutrition Diagnosis: Moderate malnutrition  In Context of:  Acute illness or injury    CURRENT NUTRITION DIAGNOSIS  Malnutrition related to SBO as evidenced by prolonged poor intake of < 75% estimated energy needs for 19 days, moderate to severe subcutaneous fat loss and moderate to severe muscle loss    INTERVENTIONS  Recommendations / Nutrition Prescription  Decreased TPN volume and increased protein/carb/fat to meet minimum estimated nutrition needs    Implementation  PN Composition and Collaboration and Referral of Nutrition care    Goals  Meet estimated nutrition needs  Maintain weight    MONITORING AND EVALUATION:  Progress towards goals will be monitored and evaluated per protocol and Practice Guidelines, Fluid/beverage intake, Parenteral Nutrition intake, Weight, Biochemical data and Nutrition-focused physical findings

## 2021-12-19 NOTE — PHARMACY-CONSULT NOTE
"Pharmacy Note: Parenteral Nutrition (PN) Management    Pharmacist consulted to dose PN for Gurdeep Dia, a 66 year old male by Dr. Clinton.    Subjective:    The patient is a new PN start.     The patient was started on PN in the hospital on 12/5/21.     Indication for PN therapy: bowel resection     Inadequate nutrition anticipated for > 7 days.      Enteral nutrition contraindicated due to: bowel integrity is tenuous.     Pertinent diseases and other special considerations respiratory failure    Social History     Tobacco Use     Smoking status: Current Every Day Smoker     Smokeless tobacco: Never Used   Substance Use Topics     Alcohol use: Not Currently     Comment: Clean for 5 years     Drug use: Not Currently     Objective:    Ht Readings from Last 1 Encounters:   12/13/21 1.702 m (5' 7\")     Wt Readings from Last 1 Encounters:   12/17/21 76.8 kg (169 lb 6.4 oz)       Body mass index is 26.53 kg/m .    Patient Vitals for the past 96 hrs:   Weight   12/17/21 2128 76.8 kg (169 lb 6.4 oz)       Labs:  Last 3 days:  Recent Labs     12/15/21  0637 12/16/21  0525 12/17/21  0615   * 134* 134*   POTASSIUM 4.6 4.4 4.9   CHLORIDE 103 102 101   CO2 24 25 24   BUN 19 18 19   CR 0.79 0.83 0.84   VIJAYA 8.3* 8.4* 8.8   MAG 2.2 2.2  --    PHOS 2.6 3.2 3.6   HGB 10.0* 10.1* 10.7*   HCT 30.3* 31.2* 32.7*   * 875* 848*       Glucose (past 48 hours):   Recent Labs     12/15/21  1147 12/15/21  2220 12/16/21  0233 12/16/21  0525 12/16/21  0827 12/16/21  1149 12/17/21  0615   * 122* 125* 125 110* 140* 119       Intake/Output (last 24 hours): I/O last 3 completed shifts:  In: 2353 [P.O.:600]  Out: 2050 [Urine:1350; Drains:700]    Estimated CrCl: Estimated Creatinine Clearance: 97.3 mL/min (based on SCr of 0.8 mg/dL).    Assessment:    Continue patient on PN therapy as a continuous central therapy.     Given the patient's current condition/oral intake, PN is still indicated.    Lab results reviewed:   12/13: Na " trending down, increase slightly in TPN today due to recent hypernatremia.  12/14: Na 134, increased in TPN yesterday. Phos 2.7 - replaced outside of TPN yesterday  12/15: Na still low at 134, increase in TPN today. Phos on low end, will increase in TPN today.  12/17: no change in sodium, will increase sodium in TPN tonight.  Potassium is trending up, will decrease in TPN tonight.  12/18:  Labs look good, continue same formula today  12/19:  Decrease rate and increase oral intake, will leave electrolytes same with rate decrease but increase Na due to still low.  Pt is still having whatever he eats or drinks come out his drain.  Monitor electrolytes 12/20    Plan:  1. Rate of PN: 70 mL/hr.  2. Formula:     Amino Acids 115 grams    Dextrose 336 grams    Sodium 90 mEq/day    Potassium 80 mEq/day    Calcium 8 mEq/day    Magnesium 14 mEq/day    Phosphorus 32 mMol/day    Chloride: Acetate Ratio 1:1    Standard Multivitamins w/Vitamin K    Trace Elements  3. Fat Emulsion: 20%, 250 mL IV five times weekly.  4. Check BMP tomorrow  5. Pharmacist will continue to follow the patient's lab results, clinical status and blood glucose results and make adjustments as appropriate.

## 2021-12-19 NOTE — PROGRESS NOTES
Fistula pouch (ostomy pouch) leaking at 12 o'clock. Pt was able to contain drainage until all supplies obtained. Leaking pouch was placed at an almost 90 degree angle.    - New pouch applied using ostomy ring, stoma powder, and no-sting skin prep. Ostomy ring pieces applied to right side crease and upper portion of fistula and along left side as well. Utilized stoma powder and skin prep to crust arlin-fistula skin d/t redness and weeping.  Warm hand was used to hold pouch in place for about 3-5 minutes. Placed at a slight angle to promote emptying and to hopefully prevent backflow up the incision.   - Supplies were ordered and placed in patient room d/t struggling to get supplies when needed.   Sara White RN-BC

## 2021-12-20 ENCOUNTER — APPOINTMENT (OUTPATIENT)
Dept: OCCUPATIONAL THERAPY | Facility: HOSPITAL | Age: 66
DRG: 329 | End: 2021-12-20
Payer: MEDICARE

## 2021-12-20 LAB
ALBUMIN SERPL-MCNC: 2.4 G/DL (ref 3.5–5)
ALP SERPL-CCNC: 124 U/L (ref 45–120)
ALT SERPL W P-5'-P-CCNC: 19 U/L (ref 0–45)
ANION GAP SERPL CALCULATED.3IONS-SCNC: 6 MMOL/L (ref 5–18)
AST SERPL W P-5'-P-CCNC: 18 U/L (ref 0–40)
BILIRUB SERPL-MCNC: 0.6 MG/DL (ref 0–1)
BUN SERPL-MCNC: 19 MG/DL (ref 8–22)
CALCIUM SERPL-MCNC: 9.1 MG/DL (ref 8.5–10.5)
CHLORIDE BLD-SCNC: 103 MMOL/L (ref 98–107)
CO2 SERPL-SCNC: 23 MMOL/L (ref 22–31)
CREAT SERPL-MCNC: 0.86 MG/DL (ref 0.7–1.3)
ERYTHROCYTE [DISTWIDTH] IN BLOOD BY AUTOMATED COUNT: 14.7 % (ref 10–15)
GFR SERPL CREATININE-BSD FRML MDRD: 90 ML/MIN/1.73M2
GLUCOSE BLD-MCNC: 137 MG/DL (ref 70–125)
GLUCOSE BLDC GLUCOMTR-MCNC: 132 MG/DL (ref 70–99)
HCT VFR BLD AUTO: 37.1 % (ref 40–53)
HGB BLD-MCNC: 12 G/DL (ref 13.3–17.7)
INR PPP: 1.11 (ref 0.85–1.15)
MAGNESIUM SERPL-MCNC: 2.1 MG/DL (ref 1.8–2.6)
MCH RBC QN AUTO: 29.3 PG (ref 26.5–33)
MCHC RBC AUTO-ENTMCNC: 32.3 G/DL (ref 31.5–36.5)
MCV RBC AUTO: 91 FL (ref 78–100)
PHOSPHATE SERPL-MCNC: 3.5 MG/DL (ref 2.5–4.5)
PLATELET # BLD AUTO: 741 10E3/UL (ref 150–450)
POTASSIUM BLD-SCNC: 4.5 MMOL/L (ref 3.5–5)
PREALB SERPL IA-MCNC: 27 MG/DL (ref 19–38)
PROT SERPL-MCNC: 7.8 G/DL (ref 6–8)
RBC # BLD AUTO: 4.1 10E6/UL (ref 4.4–5.9)
SODIUM SERPL-SCNC: 132 MMOL/L (ref 136–145)
TRIGL SERPL-MCNC: 243 MG/DL
WBC # BLD AUTO: 11.5 10E3/UL (ref 4–11)

## 2021-12-20 PROCEDURE — 250N000013 HC RX MED GY IP 250 OP 250 PS 637: Performed by: SPECIALIST

## 2021-12-20 PROCEDURE — 84100 ASSAY OF PHOSPHORUS: CPT | Performed by: STUDENT IN AN ORGANIZED HEALTH CARE EDUCATION/TRAINING PROGRAM

## 2021-12-20 PROCEDURE — 85610 PROTHROMBIN TIME: CPT | Performed by: INTERNAL MEDICINE

## 2021-12-20 PROCEDURE — 85027 COMPLETE CBC AUTOMATED: CPT | Performed by: INTERNAL MEDICINE

## 2021-12-20 PROCEDURE — 250N000013 HC RX MED GY IP 250 OP 250 PS 637: Performed by: CLINICAL NURSE SPECIALIST

## 2021-12-20 PROCEDURE — C9113 INJ PANTOPRAZOLE SODIUM, VIA: HCPCS | Performed by: INTERNAL MEDICINE

## 2021-12-20 PROCEDURE — 83735 ASSAY OF MAGNESIUM: CPT | Performed by: STUDENT IN AN ORGANIZED HEALTH CARE EDUCATION/TRAINING PROGRAM

## 2021-12-20 PROCEDURE — 250N000013 HC RX MED GY IP 250 OP 250 PS 637: Performed by: STUDENT IN AN ORGANIZED HEALTH CARE EDUCATION/TRAINING PROGRAM

## 2021-12-20 PROCEDURE — 97110 THERAPEUTIC EXERCISES: CPT | Mod: GO

## 2021-12-20 PROCEDURE — 250N000009 HC RX 250: Performed by: SPECIALIST

## 2021-12-20 PROCEDURE — 97535 SELF CARE MNGMENT TRAINING: CPT | Mod: GO

## 2021-12-20 PROCEDURE — 80053 COMPREHEN METABOLIC PANEL: CPT | Performed by: INTERNAL MEDICINE

## 2021-12-20 PROCEDURE — 84478 ASSAY OF TRIGLYCERIDES: CPT | Performed by: INTERNAL MEDICINE

## 2021-12-20 PROCEDURE — 36415 COLL VENOUS BLD VENIPUNCTURE: CPT | Performed by: INTERNAL MEDICINE

## 2021-12-20 PROCEDURE — 99232 SBSQ HOSP IP/OBS MODERATE 35: CPT | Mod: GC | Performed by: STUDENT IN AN ORGANIZED HEALTH CARE EDUCATION/TRAINING PROGRAM

## 2021-12-20 PROCEDURE — 250N000011 HC RX IP 250 OP 636: Performed by: INTERNAL MEDICINE

## 2021-12-20 PROCEDURE — 250N000013 HC RX MED GY IP 250 OP 250 PS 637: Performed by: PAIN MEDICINE

## 2021-12-20 PROCEDURE — 120N000001 HC R&B MED SURG/OB

## 2021-12-20 PROCEDURE — 99024 POSTOP FOLLOW-UP VISIT: CPT | Performed by: PHYSICIAN ASSISTANT

## 2021-12-20 PROCEDURE — 84134 ASSAY OF PREALBUMIN: CPT | Performed by: INTERNAL MEDICINE

## 2021-12-20 RX ADMIN — MIRTAZAPINE 30 MG: 30 TABLET, FILM COATED ORAL at 20:07

## 2021-12-20 RX ADMIN — LIDOCAINE: 50 OINTMENT TOPICAL at 20:08

## 2021-12-20 RX ADMIN — BUSPIRONE HYDROCHLORIDE 15 MG: 15 TABLET ORAL at 20:07

## 2021-12-20 RX ADMIN — METRONIDAZOLE 500 MG: 500 TABLET ORAL at 08:36

## 2021-12-20 RX ADMIN — ACETAMINOPHEN 650 MG: 325 TABLET ORAL at 13:37

## 2021-12-20 RX ADMIN — OXYCODONE HYDROCHLORIDE 5 MG: 5 TABLET ORAL at 06:34

## 2021-12-20 RX ADMIN — SENNOSIDES AND DOCUSATE SODIUM 1 TABLET: 50; 8.6 TABLET ORAL at 20:07

## 2021-12-20 RX ADMIN — VENLAFAXINE HYDROCHLORIDE 37.5 MG: 37.5 CAPSULE, EXTENDED RELEASE ORAL at 08:36

## 2021-12-20 RX ADMIN — OXYCODONE HYDROCHLORIDE 5 MG: 5 TABLET ORAL at 03:04

## 2021-12-20 RX ADMIN — LEVOTHYROXINE SODIUM 25 MCG: 0.03 TABLET ORAL at 06:10

## 2021-12-20 RX ADMIN — BUSPIRONE HYDROCHLORIDE 15 MG: 15 TABLET ORAL at 08:36

## 2021-12-20 RX ADMIN — CEFDINIR 300 MG: 300 CAPSULE ORAL at 08:36

## 2021-12-20 RX ADMIN — GABAPENTIN 300 MG: 300 CAPSULE ORAL at 08:37

## 2021-12-20 RX ADMIN — ACETAMINOPHEN 650 MG: 325 TABLET ORAL at 20:07

## 2021-12-20 RX ADMIN — GABAPENTIN 300 MG: 300 CAPSULE ORAL at 20:07

## 2021-12-20 RX ADMIN — OXYCODONE HYDROCHLORIDE 5 MG: 5 TABLET ORAL at 22:15

## 2021-12-20 RX ADMIN — ENOXAPARIN SODIUM 40 MG: 40 INJECTION SUBCUTANEOUS at 11:28

## 2021-12-20 RX ADMIN — OXYCODONE HYDROCHLORIDE 5 MG: 5 TABLET ORAL at 13:38

## 2021-12-20 RX ADMIN — PANTOPRAZOLE SODIUM 40 MG: 40 INJECTION, POWDER, FOR SOLUTION INTRAVENOUS at 08:36

## 2021-12-20 RX ADMIN — SENNOSIDES AND DOCUSATE SODIUM 1 TABLET: 50; 8.6 TABLET ORAL at 08:37

## 2021-12-20 RX ADMIN — TAMSULOSIN HYDROCHLORIDE 0.4 MG: 0.4 CAPSULE ORAL at 08:37

## 2021-12-20 RX ADMIN — ACETAMINOPHEN 650 MG: 325 TABLET ORAL at 08:36

## 2021-12-20 RX ADMIN — OXYCODONE HYDROCHLORIDE 5 MG: 5 TABLET ORAL at 18:16

## 2021-12-20 RX ADMIN — POTASSIUM CHLORIDE: 2 INJECTION, SOLUTION, CONCENTRATE INTRAVENOUS at 20:08

## 2021-12-20 RX ADMIN — GABAPENTIN 300 MG: 300 CAPSULE ORAL at 13:38

## 2021-12-20 RX ADMIN — OXYCODONE HYDROCHLORIDE 5 MG: 5 TABLET ORAL at 08:36

## 2021-12-20 ASSESSMENT — ACTIVITIES OF DAILY LIVING (ADL)
ADLS_ACUITY_SCORE: 12

## 2021-12-20 NOTE — PROGRESS NOTES
CLINICAL NUTRITION SERVICES - BRIEF PN NOTE      RECOMMENDATIONS FOR MD/PROVIDER TO ORDER:   None     Recommendations Ordered by Registered Dietitian (RD):      Future/Additional Recommendations:   May need to decrease dextrose or lipid if triglycerides continue to elevate  May need to increase volume in TPN if not adequate not that pt is limiting his fluids to help with output   Malnutrition:   Moderate malnutrition in the context of acute illness (please carry forward if malnutrition diagnosed)       EVALUATION OF PROGRESS TOWARD GOALS   Diet:  Clear Liquid    Nutrition Support:  TPN D336/ @ 70 ml/hr plus 250 ml of 20% lipids  5 times per week over 12 hrs    1959 kcal, 115 gm AA, 336 gm cho, 36 gm lipid, 1680 mL fluid (+ average of 178.5 mL daily lipid volume)    Intake/Tolerance:      Today 120 mL recorded.   Pt ordered hot tea with one sugar for breakfast    Yesterday - Patient is eating/driniking 25% of meals per nursing records. Patient stated he is going to go back to ice chips and sips of water. Everything he his eating and drinking is coming right out abdominal drain.    ASSESSED NUTRITION NEEDS:  Dosing Weight:  76.8 kg (169 lb 6.4 oz)    Estimated energy needs: 0606-6985 Matt/day ([place] St. Jeor w/ 1.4-2.0 stress factor)  Justification: post op  Estimated protein needs:  g/day (1.2-1.5 g/Kg)  Justification: post op  Estimated fluid needs 2304 ml/day (30 ml/Kg)  Justification: maintenance      NEW FINDINGS:   12/17/21 2128 76.8 kg (169 lb 6.4 oz)   12/13/21 1006 81.7 kg (180 lb 1.6 oz)   12/06/21 0400 70.3 kg (155 lb)     11/30/21 79.4 kg (175 lb)                   3.3% wt loss in 1 month   01/22/20 79.4 kg (175 lb)   12/24/19 81.2 kg (179 lb)      Net fluid down 3.35 since 12/5/21 per I/Os     Labs: Na 132 L, slightly improved, Alk phos 137 sl elevated, triglycerides 243 H, glucose 137    Meds: omnicef, levothyroxine, flagyl, remeron, oxycodone, protonix, senokot-s/pericolace     GI: abdominal  pain/discomfort LBM 12/20/21 - large liquid  Enterocutaneous fistula    I/O 2.9/1.5 L last 24 hrs  - (stool not included)  Urine: 625 mL  Drains: 825 ml    Per chart notes pt is understanding to slow down on intake.  Fistula output is less.  (diet will not advance until fistula output minimized    Previous Goals:   Tolerate TPN-met  Advance to po diet when appropriate-not met  BG -met  Evaluation: Met    Previous Nutrition Diagnosis:   Malnutrition related to acute illness as evidenced by NPO x 5 days and moderate fat loss    Evaluation: Improving      MALNUTRITION  % Weight Loss:  Weight loss does not meet criteria for malnutrition dt fluid losses, may meet criteria w/ new weight  % Intake:  <75% for > 7 days (moderate malnutrition)  Subcutaneous Fat Loss:  Orbital region severe depletion and Upper arm region mild to moderate depletion  Muscle Loss:  Temporal region modearate depletion, Clavicle bone region severe depletion, Dorsal hand region mild to moderate depletion and Patellar region moderate depletion  Fluid Retention:  None noted    Malnutrition Diagnosis: Moderate malnutrition  In Context of:  Acute illness or injury    CURRENT NUTRITION DIAGNOSIS  Malnutrition related to SBO as evidenced by prolonged poor intake of < 75% estimated energy needs for 19 days, moderate to severe subcutaneous fat loss and moderate to severe muscle loss    INTERVENTIONS  Recommendations / Nutrition Prescription  Continue TPN    Implementation  PN Composition and Collaboration and Referral of Nutrition care    Goals  Meet estimated nutrition needs  Maintain weight    MONITORING AND EVALUATION:  Progress towards goals will be monitored and evaluated per protocol and Practice Guidelines, Fluid/beverage intake, Parenteral Nutrition intake, Weight, Biochemical data and Nutrition-focused physical findings

## 2021-12-20 NOTE — PROGRESS NOTES
[facility] Progress Note    Assessment/Plan  Principal Problem:    SBO (small bowel obstruction) (H)  Active Problems:    Hypertension goal BP (blood pressure) < 140/90    Benign prostatic hyperplasia with weak urinary stream    RAVI (generalized anxiety disorder)    Moderate major depression (H)    History of substance abuse (H)    History of hepatitis C    Chronic neck pain    Chronic bilateral low back pain without sciatica    Gurdeep Dia is a 66 year old male with history of splenectomy, appendectomy, HTN, and substance abuse who presented with 2 days of right sided abdominal pain, found to have a bowel obstruction with closed loop. He is admitted for surgical repair of obstruction.      Requires ongoing hospitalization for post-operative monitoring and care       Mechanical SBO s/p exploratory laparotomy / small bowel resection / repair of enterotomy / extensive lysis of adhesions (11/30/21)  Subsequent small bowel perforation requiring repeat ex lap for washout 12/3    Enterocutaneous fistula  CT abdomen/pelvis 11/30 significant for closed loop mechanical small bowel obstruction. Does have history of multiple abdominal surgeries previously. First procedure 11/30 for small bowel resection. Acute change on 12/2-12/3 and found to have intraabdominal drainage from 12/3/2021; underwent repeat exploratory laparotomy with washout and repair of small bowel perforation. 12/8 still without stool charted, still spiking low grade fevers, having atypical output from MARISA drain, white count climbing. CT 12/8 showing ring enhancing lesion in abdomen likely representing abscess. Fourth drain placed 12/9 AM by IR for abscess drainage. Is now stooling and advancing diet.  -Surgery consulted and following - cleared for discharge to TCU, though not medically ready at this time given tenuous abdomen, difficult social situation, and still on TPN  -At this time will remain on low calorie clears until drainage  has started to decrease and is more manageable  -Pain control as per surgery/Pain team - PCA stopped 12/13, transitioning to orals with decent pain control  -Abscess culture growing e. Coli, see sensitivities. Abscess drain placed 12/9. Removed 12/16 after abscessogram demonstrated resolution of pelvic fluid collection. Discontinued vancomycin and meropenem 12/12 in favor of IV cefepime + crushed oral flagyl (shortage of IV form). Transitioned to oral cefdinir and flagyl.   -Stop abx 12/20 given normalizing of WBC   -IV Protonix   -Per surgery, will need TPN until fistula scars down. Could be several months. Will look for dispo options - LTAC? Home with home cares?     Acute encephalopathy, likely multifactorial - toxic/metabolic/hospital-induced   Acutely agitated post-op 12/3 to 12/4. Pulling at lines and threatening to leave.  Nurse felt unsafe without restraints. Precedex drip started, now off. Transferred out of the unit. Has been calm since back on the floor.   - PRN Haldol injection  - Zyprexa ODT 4x daily prn  - Conservative measures as able   - Avoid opioid/benzo if able   - Seroquel 100 mg at bedtime as needed     Acute hypoxic respiratory failure, post-op, improved  Atelectasis vs aspiration  Initially requiring high-flow oxygen in ICU following procedure.  Question post-op atelectasis vs aspiration versus volume overload.  Does have a degree of respiratory distress when he gets agitated. CXR 12/6 showing bibasilar opacitied favored to reflect atelectasis, though still with some interstitial coarsening. Breathing improved 12/7 after some antibiotics and maybe ongoing resolution of his atelectasis. No signs of fluid overload and sodium climbing so IV lasix discontinued 12/8. Off supp. O2  -Continue abx as above, don't think we need to broaden from a lung perspective  -Aggressive IS use  -PT/OT to get moving  -Patient is more mobile and is getting around quite well     Afib, RVR post-op, currently rate  controlled   NSVT episode 12/8 and 12/15  Noted.  Rates had been in the 120 to 150 range.  Converted to NSR with amiodarone drip. No longer on any rate control. No repeat episodes. Both CHADS2-Vasc and HAS-BLED of at least 2.  TSH normal. Lytes normal. Echo 12/10 showing slight concentric thickening in LV and EF 60-65%. Patient wants to avoid anticoagulation given bleeding risks and his single episode likely being related to his operation, sedation, and infection.  -Lovenox for now   -No AC after discharge after shared decision making discussion     HTN  PTA lisinopril, furosemide being held in the setting of acute surgical care.  Will monitor, even with pain BP not significantly elevated.  Will resume when hemodynamically stable.  Kidney function is stable at this time.  Blood pressure does climb when he is agitated.  -Monitor     Depression  Hx of this. Reports symptoms for past few days - low motivation, little interest in doing things, poor sleep, no appetite. Is on buspar, mirtazapine, and effexor, though doesn't really think medicines are as helpful as talk therapy is. Wants to speak to social work and . Will consider psych consult.  -Consult spiritual care  -No change in meds for now  -Consider psych consult     Moderate malnutrition  Per RD eval.  -TPN until cleared to [facility] per surgery     Chronic Conditions:  Hypothyroidism- PTA levothyroxine  Depression/anxiety- PTA venlafaxine, mirtazepine, Buspar   Chronic pain- Hold mexolicam 7.5mg daily, resume PTA gabapentin when tolerating PO.  BPH- PTA flomax      Diet: NPO for Medical/Clinical Reasons Except for: Meds, Ice ChipsNPO   DVT Prophylaxis: Lovenox q day    Fluids:  None  Central Lines: None  Code Status: Full Code    Subjective  Patient pleasant but understandably irritated by his predicament and continued admission to the hospital.  Would love to have decreased output so he could work on getting home.  Denies any acute pain, states he otherwise  feels great and is able to move around suprisingly well all things considered.      Objective    Vital signs in last 24 hours Temp:  [97.9  F (36.6  C)-99  F (37.2  C)] 97.9  F (36.6  C)  Pulse:  [] 97  Resp:  [16-18] 16  BP: (104-108)/(78-85) 108/85  SpO2:  [93 %-95 %] 94 %       Intake/Output last 3 shift I/O last 3 completed shifts:  In: 2993.3 [P.O.:960; I.V.:20]  Out: 1450 [Urine:625; Drains:825]    Intake/Output this shift:I/O this shift:  In: 240 [P.O.:240]  Out: 700 [Urine:450; Drains:250]    Physical Exam  General appearance: alert, no acute distress  Head: Normocephalic, without obvious abnormality, atraumatic.   Throat: moist mucous membranes.  Lungs: CTABL  Heart: regular rate and rhythm, S1, S2 normal, no murmur, click, rub or gallop  Abdomen: soft, ND, NT. Staples intact. Ostomy bag with dark brown fluid  Extremities: extremities normal, atraumatic, no cyanosis or edema  Skin: Slightly pale, dry skin.  Neurologic: Alert and oriented X 3.  Able to move extremities.  Sensation intact.   Psychiatric: Calm, cooperative    Pertinent Labs and Pertinent Radiology   Lab Results: personally reviewed.     Radiology Results: Personally reviewed image/s and impression/s    Precepted patient with Dr. Gem Mcmahon MD - PGY2  [facility]  P: [phone]

## 2021-12-20 NOTE — PROGRESS NOTES
"Care Management Follow Up    Length of Stay (days): 20    Expected Discharge Date: 12/20/2021     Concerns to be Addressed: discharge planning, TPN   Patient plan of care discussed at interdisciplinary rounds: Yes    Anticipated Discharge Disposition:  TCU     Anticipated Discharge Services: NA  Anticipated Discharge DME:  NA    Patient/family educated on Medicare website which has current facility and service quality ratings: Yes    Education Provided on the Discharge Plan:  Yes  Patient/Family in Agreement with the Plan:  Yes    Referrals Placed by CM/SW:  TCU referrals   Private pay costs discussed: Not applicable    Additional Information:  SW met with Pt today to discuss TCU placements. He has called several over the weekend per his nurse with instruction to call back on Monday. Writer discussed with him about another TCU placement and he would like a referral sent to [facility] as he has been there in the past. Pt reports that he has been feeling more depressed, lacks concentration and feels \"blah\". Writer asked Dr. Mcmahon if a psych consult could be put in. Honest discussion had with pt regarding TPN and placement because they are hard to find. He states understanding and asks if maybe going home to his brother's with home care would be an option. He would need someone to be teachable with home infusion. Will attempt TCU first. CM to follow.     Referral sent to [place]. Writer will continue to look for additional placements that take TPN.     10:25 AM  Writer called irma Hernández at [facility], they are not currently accepting any TPN patients. Writer called Michelle at [facility], she states that beds are limited and is a case by case basis but states that [facility] and [facility] can take TPN. Referrals sent to both.    2:01 PM  Writer spoke with Pt to obtain his brother in law's name, phone number and consent to speak with him. All given. Pt is not happy with Writer and " "thinks that Writer is trying to \"pull a fast one\" about placement for him. Writer explained what she knew and stated that she wasn't trying lying to him.     Writer called and spoke with Al ([phone]) who states that Pt can come to his home as a last resort. He is familiar with having to assist with tube feedings as his wife who had cancer had them in the past. He is agreeable to being the teachable party. Writer updated Dr. Mcmahon.       Alea Presley, BSW        "

## 2021-12-20 NOTE — PLAN OF CARE
Problem: Adult Inpatient Plan of Care  Goal: Readiness for Transition of Care  Outcome: No Change  Flowsheets (Taken 12/20/2021 1345)  Anticipated Changes Related to Illness: inability to care for self  Concerns to be Addressed: discharge planning  Barriers to Discharge: TPN, fistula. placement issues  Intervention: Mutually Develop Transition Plan  Recent Flowsheet Documentation  Taken 12/20/2021 1345 by Celine Desir RN  Anticipated Changes Related to Illness: inability to care for self  Concerns to be Addressed: discharge planning     Problem: Adult Inpatient Plan of Care  Goal: Plan of Care Review  Outcome: Improving  Flowsheets (Taken 12/20/2021 1345)  Plan of Care Reviewed With: patient  Outcome Summary: fistula maintainence, TPN  Progress: improving

## 2021-12-20 NOTE — PLAN OF CARE
Problem: Pain Acute  Goal: Acceptable Pain Control and Functional Ability  Outcome: No Change. Pt has been taking both prn and sched Oxy for Abd pain  Intervention: Prevent or Manage Pain  Recent Flowsheet Documentation  Sensory Stimulation Regulation:   care clustered   music on   television on  Sleep/Rest Enhancement:   awakenings minimized   music provided   noise level reduced   regular sleep/rest pattern promoted   room darkened   relaxation techniques promoted  Medication Review/Management: medications reviewed  Intervention: Optimize Psychosocial Wellbeing  Recent Flowsheet Documentation  Supportive Measures:   active listening utilized   positive reinforcement provided   relaxation techniques promoted  Diversional Activities:   television   music   movies     Problem: Infection (Surgery Nonspecified)  Goal: Absence of Infection Signs and Symptoms  Outcome: No Change, abd incision with drainage pouch collecting gastric drainage

## 2021-12-20 NOTE — PLAN OF CARE
"  Problem: Pain Acute  Goal: Acceptable Pain Control and Functional Ability  Outcome: Improving   Pt managing pain with scheduled and PRN oxycodone. Pt saying pain is \"ramping up\" but when asked if he wanted pain meds, he declined saying, \"Naw, I'm ok for now.\"   - PRN oxycodone was requested and given at 19:23 after he walked and settled back in bed.    Problem: Bowel Motility Impaired (Surgery Nonspecified)  Goal: Effective Bowel Elimination  Outcome: Improving   Pt with liquid rectal bowel movement (large).     Problem: Mood Impairment (Anxiety Signs/Symptoms)  Goal: Improved Mood Symptoms (Anxiety Signs/Symptoms)  Outcome: Declining  Pt is more frequently expressing his frustration and irritation about his extended hospital stay. \"The days are long.\" Pt has some PRN medications for anxiety, but states \"those meds make me worse.\" Pt also talking about going out for a cigarette. Nicotine patch and gum were offered, but patient again declined.   - Ambulated in saleem  - provided supplies so he could shave  - Encouraged shower (since he's been asking for a few days), but pt declined. \"I'll do it tomorrow.\"  - Emotional support, therapeutic presence provided throughout the evening.     Problem: Infection (Surgery Nonspecified)  Goal: Absence of Infection Signs and Symptoms  Outcome: Improving  WBC improving. Today was 12.0 from 14.2 yesterday.  Pt afebrile. VSS. There is a sticky note from pharmacy to physician to eval and determine ABX stop date.    EC Fistula Status:    -  EC fistula at distal end of midline abdominal incision.   - Pouch has remained intact since last changed yesterday 12/18 at 20:30.  - Notable decrease in fistula output. Pt is beginning to understand \"what goes in, comes right out.\" Pt self limiting his oral fluid intake to minimize fluid in pouch.   - Continues on clear liquids. Pt does try to push a little to try and get solid foods. Pt re-educated (reminded) why he is on clear liquids.    - Of " note - There is a bit more drainage from incision at superior end. Incision dressed with dry gauze. Continue to monitor. Sticky note left for surgical team.

## 2021-12-20 NOTE — PROGRESS NOTES
ASSESSMENT:  1. SBO (small bowel obstruction) (H)    2. Pelvic abscess in male (H)      Gurdeep Dia is a 66 year old male who is s/p exploratory laparotomy with small bowel resection, lysis of adhesions and repair of enterotomy with significant inflamed small bowel with areas of thinned mucosa on 11/30/2021  S/p exploratory laparotomy 12/3/2021 for small bowel perforation from ulcer  S/p IR placement of drain into pelvic abscess 12/9/2021. Enterocutaneous fistula formation  Leukocytosis improving  PLAN:  -Continue sips of clears and keep amount low.  -Continue TPN  -Try to give encouragement and is very common to be depressed as this has been a very prolonged stay and complicated stay for the patient.  -TCU when medically ready and a bed available.  -ABD over superior portion of wound instead of gauze recommended.    SUBJECTIVE:   He is feeling depressed.  Patient states that he cannot tolerate laying around anymore.  Feeling frustrated as well.  No increase in pain and has remained afebrile.  Has noticed that anytime he drinks that there is no increase output in his ostomy bag and is only taking in sips of clears at this time.      Patient Vitals for the past 24 hrs:   BP Temp Temp src Pulse Resp SpO2   12/20/21 0747 108/85 97.9  F (36.6  C) Oral 97 16 94 %   12/19/21 2353 108/78 99  F (37.2  C) Oral 100 16 93 %   12/19/21 1529 104/82 98.5  F (36.9  C) Oral 93 18 95 %         PHYSICAL EXAM:  GEN: No acute distress, comfortable    ABD: Soft no specific tenderness.  Wound started to get irritated most superior portion of the wound where there is some mild leakage and gauze is covered this area.  Ostomy bag on the inferior portion of the wound and collecting bilious output.     Output by Drain (mL) 12/18/21 0700 - 12/18/21 1459 12/18/21 1500 - 12/18/21 2259 12/18/21 2300 - 12/19/21 0659 12/19/21 0700 - 12/19/21 1459 12/19/21 1500 - 12/19/21 2259 12/19/21 2300 - 12/20/21 0659 12/20/21 0700 - 12/20/21 0900   Open  Drain Medial;Superior Abdomen vessel loop drain 200 300 200 450 225 150       EXT:No cyanosis, edema or obvious abnormalities    12/19 0700 - 12/20 0659  In: 2993.3 [P.O.:960; I.V.:20]  Out: 1450 [Urine:625; Drains:825]    Recent Results (from the past 24 hour(s))   Glucose by meter    Collection Time: 12/20/21  6:29 AM   Result Value Ref Range    GLUCOSE BY METER POCT 132 (H) 70 - 99 mg/dL   Triglycerides    Collection Time: 12/20/21  6:44 AM   Result Value Ref Range    Triglycerides 243 (H) <=149 mg/dL   Comprehensive metabolic panel    Collection Time: 12/20/21  6:44 AM   Result Value Ref Range    Sodium 132 (L) 136 - 145 mmol/L    Potassium 4.5 3.5 - 5.0 mmol/L    Chloride 103 98 - 107 mmol/L    Carbon Dioxide (CO2) 23 22 - 31 mmol/L    Anion Gap 6 5 - 18 mmol/L    Urea Nitrogen 19 8 - 22 mg/dL    Creatinine 0.86 0.70 - 1.30 mg/dL    Calcium 9.1 8.5 - 10.5 mg/dL    Glucose 137 (H) 70 - 125 mg/dL    Alkaline Phosphatase 124 (H) 45 - 120 U/L    AST 18 0 - 40 U/L    ALT 19 0 - 45 U/L    Protein Total 7.8 6.0 - 8.0 g/dL    Albumin 2.4 (L) 3.5 - 5.0 g/dL    Bilirubin Total 0.6 0.0 - 1.0 mg/dL    GFR Estimate 90 >60 mL/min/1.73m2   INR    Collection Time: 12/20/21  6:44 AM   Result Value Ref Range    INR 1.11 0.85 - 1.15   Prealbumin    Collection Time: 12/20/21  6:44 AM   Result Value Ref Range    Prealbumin 27 19 - 38 mg/dL   CBC with platelets    Collection Time: 12/20/21  6:44 AM   Result Value Ref Range    WBC Count 11.5 (H) 4.0 - 11.0 10e3/uL    RBC Count 4.10 (L) 4.40 - 5.90 10e6/uL    Hemoglobin 12.0 (L) 13.3 - 17.7 g/dL    Hematocrit 37.1 (L) 40.0 - 53.0 %    MCV 91 78 - 100 fL    MCH 29.3 26.5 - 33.0 pg    MCHC 32.3 31.5 - 36.5 g/dL    RDW 14.7 10.0 - 15.0 %    Platelet Count 741 (H) 150 - 450 10e3/uL   Magnesium    Collection Time: 12/20/21  6:44 AM   Result Value Ref Range    Magnesium 2.1 1.8 - 2.6 mg/dL   Phosphorus    Collection Time: 12/20/21  6:44 AM   Result Value Ref Range    Phosphorus 3.5 2.5 -  4.5 mg/dL                  Demetrius Vogel PA-C

## 2021-12-20 NOTE — PHARMACY-CONSULT NOTE
"Pharmacy Note: Parenteral Nutrition (PN) Management    Pharmacist consulted to dose PN for Gurdeep Dia, a 66 year old male by Dr. Clinton.    Subjective:    The patient is a new PN start.     The patient was started on PN in the hospital on 12/5/21.     Indication for PN therapy: bowel resection     Inadequate nutrition anticipated for > 7 days.      Enteral nutrition contraindicated due to: bowel integrity is tenuous.     Pertinent diseases and other special considerations respiratory failure    Social History     Tobacco Use     Smoking status: Current Every Day Smoker     Smokeless tobacco: Never Used   Substance Use Topics     Alcohol use: Not Currently     Comment: Clean for 5 years     Drug use: Not Currently     Objective:    Ht Readings from Last 1 Encounters:   12/13/21 1.702 m (5' 7\")     Wt Readings from Last 1 Encounters:   12/17/21 76.8 kg (169 lb 6.4 oz)       Body mass index is 26.53 kg/m .    Patient Vitals for the past 96 hrs:   Weight   12/17/21 2128 76.8 kg (169 lb 6.4 oz)       Labs:  Last 3 days:  Recent Labs     12/15/21  0637 12/16/21  0525 12/17/21  0615   * 134* 134*   POTASSIUM 4.6 4.4 4.9   CHLORIDE 103 102 101   CO2 24 25 24   BUN 19 18 19   CR 0.79 0.83 0.84   VIJAYA 8.3* 8.4* 8.8   MAG 2.2 2.2  --    PHOS 2.6 3.2 3.6   HGB 10.0* 10.1* 10.7*   HCT 30.3* 31.2* 32.7*   * 875* 848*       Glucose (past 48 hours):   Recent Labs     12/15/21  1147 12/15/21  2220 12/16/21  0233 12/16/21  0525 12/16/21  0827 12/16/21  1149 12/17/21  0615   * 122* 125* 125 110* 140* 119       Intake/Output (last 24 hours): I/O last 3 completed shifts:  In: 2993.3 [P.O.:960; I.V.:20]  Out: 1450 [Urine:625; Drains:825]    Estimated CrCl: Estimated Creatinine Clearance: 90.5 mL/min (based on SCr of 0.86 mg/dL).    Assessment:    Continue patient on PN therapy as a continuous central therapy.     Given the patient's current condition/oral intake, PN is still indicated.    Lab results reviewed: "   12/13: Na trending down, increase slightly in TPN today due to recent hypernatremia.  12/14: Na 134, increased in TPN yesterday. Phos 2.7 - replaced outside of TPN yesterday  12/15: Na still low at 134, increase in TPN today. Phos on low end, will increase in TPN today.  12/17: no change in sodium, will increase sodium in TPN tonight.  Potassium is trending up, will decrease in TPN tonight.  12/18:  Labs look good, continue same formula today  12/19:  Decrease rate and increase oral intake, will leave electrolytes same with rate decrease but increase Na due to still low.  Pt is still having whatever he eats or drinks come out his drain.  Monitor electrolytes 12/20 12/20: Na still low, increased in TPN yesterday. All other labs wnl    Plan:  1. Rate of PN: 70 mL/hr.  2. Formula:     Amino Acids 115 grams    Dextrose 336 grams    Sodium 90 mEq/day    Potassium 80 mEq/day    Calcium 8 mEq/day    Magnesium 14 mEq/day    Phosphorus 32 mMol/day    Chloride: Acetate Ratio 1:1    Standard Multivitamins w/Vitamin K    Trace Elements  3. Fat Emulsion: 20%, 250 mL IV five times weekly.  4. Check BMP tomorrow  5. Pharmacist will continue to follow the patient's lab results, clinical status and blood glucose results and make adjustments as appropriate.    Nolvia Kumar, PharmD  12/20/21

## 2021-12-20 NOTE — CONSULTS
"ACUPUNCTURIST TREATMENT NOTE    Name: Gurdeep Dia  :  1955  MRN:  [id number]    Acupuncture Treatment  Patient Type: Surgical  Intervention Reason: Pain,Other,Insomnia (depression)  Pain Location: abdomen  Pre-session Pain Ratin  Pre-session Other rating: 10  Patient complaint:: Patient complains of abdominal pain, depression and insomnia, stating he has hardly slept while admitted.  Initial insertions: Baihui, Du24, Yintang, St36, Sp6, Liv3, Shenmen  Number of needles inserted: 11  Number of needles removed: 11  Treatment Observations: Patient stated he fell asleep during treatment, and while he was asleep he moved his legs. This caused him to wake up due to some aching from the acupuncture needles after crossing legs in his sleep. Acupuncture needles were removed early due to this. Patient did not provide follow up scores, but did manage to fall asleep during treatment.         \"Risks and benefits of acupuncture were discussed with patient. Consent for treatment was given. We thank you for the referral.\"     Helen Ely L.Ac.    Date:  2021  Time:  1:06 PM    "

## 2021-12-21 ENCOUNTER — HOSPITAL (OUTPATIENT)
Dept: PHARMACY | Facility: CLINIC | Age: 66
End: 2021-12-21

## 2021-12-21 ENCOUNTER — HOME INFUSION (PRE-WILLOW HOME INFUSION) (OUTPATIENT)
Dept: PHARMACY | Facility: CLINIC | Age: 66
End: 2021-12-21

## 2021-12-21 ENCOUNTER — HOSPITAL ENCOUNTER (INPATIENT)
Facility: HOSPITAL | Age: 66
LOS: 54 days | Discharge: HOME HEALTH | DRG: 393 | End: 2022-02-14
Attending: EMERGENCY MEDICINE | Admitting: FAMILY MEDICINE
Payer: MEDICARE

## 2021-12-21 ENCOUNTER — HOME INFUSION (PRE-WILLOW HOME INFUSION) (OUTPATIENT)
Dept: PHARMACY | Facility: CLINIC | Age: 66
End: 2021-12-21
Payer: MEDICAID

## 2021-12-21 ENCOUNTER — APPOINTMENT (OUTPATIENT)
Dept: PHYSICAL THERAPY | Facility: HOSPITAL | Age: 66
DRG: 329 | End: 2021-12-21
Payer: MEDICARE

## 2021-12-21 VITALS
HEIGHT: 67 IN | HEART RATE: 95 BPM | BODY MASS INDEX: 26.59 KG/M2 | SYSTOLIC BLOOD PRESSURE: 122 MMHG | WEIGHT: 169.4 LBS | DIASTOLIC BLOOD PRESSURE: 78 MMHG | RESPIRATION RATE: 16 BRPM | TEMPERATURE: 98.1 F | OXYGEN SATURATION: 92 %

## 2021-12-21 DIAGNOSIS — E03.9 HYPOTHYROIDISM, UNSPECIFIED TYPE: Primary | ICD-10-CM

## 2021-12-21 DIAGNOSIS — N32.2 VESICOCUTANEOUS FISTULA: ICD-10-CM

## 2021-12-21 DIAGNOSIS — K63.1 PERFORATION OF INTESTINE (H): ICD-10-CM

## 2021-12-21 DIAGNOSIS — F33.1 MODERATE EPISODE OF RECURRENT MAJOR DEPRESSIVE DISORDER (H): ICD-10-CM

## 2021-12-21 DIAGNOSIS — F41.1 GAD (GENERALIZED ANXIETY DISORDER): ICD-10-CM

## 2021-12-21 DIAGNOSIS — R10.84 ABDOMINAL PAIN, GENERALIZED: ICD-10-CM

## 2021-12-21 DIAGNOSIS — G89.29 CHRONIC BILATERAL LOW BACK PAIN WITHOUT SCIATICA: ICD-10-CM

## 2021-12-21 DIAGNOSIS — I26.99 PULMONARY EMBOLISM WITHOUT ACUTE COR PULMONALE, UNSPECIFIED CHRONICITY, UNSPECIFIED PULMONARY EMBOLISM TYPE (H): ICD-10-CM

## 2021-12-21 DIAGNOSIS — M54.50 CHRONIC BILATERAL LOW BACK PAIN WITHOUT SCIATICA: ICD-10-CM

## 2021-12-21 LAB
ANION GAP SERPL CALCULATED.3IONS-SCNC: 7 MMOL/L (ref 5–18)
BUN SERPL-MCNC: 21 MG/DL (ref 8–22)
CALCIUM SERPL-MCNC: 8.7 MG/DL (ref 8.5–10.5)
CHLORIDE BLD-SCNC: 101 MMOL/L (ref 98–107)
CO2 SERPL-SCNC: 22 MMOL/L (ref 22–31)
CREAT SERPL-MCNC: 0.83 MG/DL (ref 0.7–1.3)
ERYTHROCYTE [DISTWIDTH] IN BLOOD BY AUTOMATED COUNT: 14.9 % (ref 10–15)
GFR SERPL CREATININE-BSD FRML MDRD: >90 ML/MIN/1.73M2
GLUCOSE BLD-MCNC: 129 MG/DL (ref 70–125)
GLUCOSE BLDC GLUCOMTR-MCNC: 119 MG/DL (ref 70–99)
GLUCOSE BLDC GLUCOMTR-MCNC: 121 MG/DL (ref 70–99)
GLUCOSE BLDC GLUCOMTR-MCNC: 136 MG/DL (ref 70–99)
HCT VFR BLD AUTO: 35.1 % (ref 40–53)
HGB BLD-MCNC: 11.5 G/DL (ref 13.3–17.7)
MAGNESIUM SERPL-MCNC: 2 MG/DL (ref 1.8–2.6)
MCH RBC QN AUTO: 29.4 PG (ref 26.5–33)
MCHC RBC AUTO-ENTMCNC: 32.8 G/DL (ref 31.5–36.5)
MCV RBC AUTO: 90 FL (ref 78–100)
PHOSPHATE SERPL-MCNC: 3.2 MG/DL (ref 2.5–4.5)
PLATELET # BLD AUTO: 640 10E3/UL (ref 150–450)
POTASSIUM BLD-SCNC: 4.6 MMOL/L (ref 3.5–5)
RBC # BLD AUTO: 3.91 10E6/UL (ref 4.4–5.9)
SODIUM SERPL-SCNC: 130 MMOL/L (ref 136–145)
WBC # BLD AUTO: 11.7 10E3/UL (ref 4–11)

## 2021-12-21 PROCEDURE — 99238 HOSP IP/OBS DSCHRG MGMT 30/<: CPT | Mod: GC | Performed by: STUDENT IN AN ORGANIZED HEALTH CARE EDUCATION/TRAINING PROGRAM

## 2021-12-21 PROCEDURE — 85027 COMPLETE CBC AUTOMATED: CPT | Performed by: INTERNAL MEDICINE

## 2021-12-21 PROCEDURE — 250N000013 HC RX MED GY IP 250 OP 250 PS 637: Performed by: SPECIALIST

## 2021-12-21 PROCEDURE — 99024 POSTOP FOLLOW-UP VISIT: CPT | Performed by: SPECIALIST

## 2021-12-21 PROCEDURE — C9113 INJ PANTOPRAZOLE SODIUM, VIA: HCPCS | Performed by: INTERNAL MEDICINE

## 2021-12-21 PROCEDURE — 83735 ASSAY OF MAGNESIUM: CPT | Performed by: STUDENT IN AN ORGANIZED HEALTH CARE EDUCATION/TRAINING PROGRAM

## 2021-12-21 PROCEDURE — 99285 EMERGENCY DEPT VISIT HI MDM: CPT | Mod: 25

## 2021-12-21 PROCEDURE — 250N000013 HC RX MED GY IP 250 OP 250 PS 637: Performed by: CLINICAL NURSE SPECIALIST

## 2021-12-21 PROCEDURE — 80048 BASIC METABOLIC PNL TOTAL CA: CPT | Performed by: INTERNAL MEDICINE

## 2021-12-21 PROCEDURE — 250N000013 HC RX MED GY IP 250 OP 250 PS 637: Performed by: STUDENT IN AN ORGANIZED HEALTH CARE EDUCATION/TRAINING PROGRAM

## 2021-12-21 PROCEDURE — 250N000013 HC RX MED GY IP 250 OP 250 PS 637: Performed by: PAIN MEDICINE

## 2021-12-21 PROCEDURE — 250N000011 HC RX IP 250 OP 636: Performed by: INTERNAL MEDICINE

## 2021-12-21 PROCEDURE — 3E0436Z INTRODUCTION OF NUTRITIONAL SUBSTANCE INTO CENTRAL VEIN, PERCUTANEOUS APPROACH: ICD-10-PCS | Performed by: FAMILY MEDICINE

## 2021-12-21 PROCEDURE — 36415 COLL VENOUS BLD VENIPUNCTURE: CPT | Performed by: INTERNAL MEDICINE

## 2021-12-21 PROCEDURE — C9803 HOPD COVID-19 SPEC COLLECT: HCPCS

## 2021-12-21 PROCEDURE — 84100 ASSAY OF PHOSPHORUS: CPT | Performed by: STUDENT IN AN ORGANIZED HEALTH CARE EDUCATION/TRAINING PROGRAM

## 2021-12-21 PROCEDURE — 97116 GAIT TRAINING THERAPY: CPT | Mod: GP

## 2021-12-21 RX ORDER — MAGNESIUM OXIDE 400 MG/1
400 TABLET ORAL 2 TIMES DAILY
Status: DISCONTINUED | OUTPATIENT
Start: 2021-12-21 | End: 2021-12-21 | Stop reason: HOSPADM

## 2021-12-21 RX ORDER — OXYCODONE AND ACETAMINOPHEN 5; 325 MG/1; MG/1
1 TABLET ORAL EVERY 6 HOURS PRN
Qty: 12 TABLET | Refills: 0 | Status: ON HOLD | OUTPATIENT
Start: 2021-12-21 | End: 2022-02-14

## 2021-12-21 RX ORDER — MELOXICAM 7.5 MG/1
7.5 TABLET ORAL DAILY PRN
Qty: 7.5 TABLET | Refills: 3 | Status: ON HOLD | OUTPATIENT
Start: 2021-12-21 | End: 2022-01-11

## 2021-12-21 RX ORDER — GABAPENTIN 300 MG/1
300 CAPSULE ORAL 2 TIMES DAILY
Qty: 60 CAPSULE | Refills: 0 | Status: ON HOLD | OUTPATIENT
Start: 2021-12-21 | End: 2022-02-14

## 2021-12-21 RX ORDER — TAMSULOSIN HYDROCHLORIDE 0.4 MG/1
0.4 CAPSULE ORAL DAILY
Qty: 30 CAPSULE | Refills: 0 | Status: ON HOLD | OUTPATIENT
Start: 2021-12-21 | End: 2022-02-14

## 2021-12-21 RX ORDER — BUSPIRONE HYDROCHLORIDE 15 MG/1
15 TABLET ORAL 2 TIMES DAILY
Qty: 60 TABLET | Refills: 0 | Status: ON HOLD | OUTPATIENT
Start: 2021-12-21 | End: 2022-02-14

## 2021-12-21 RX ORDER — OXYCODONE AND ACETAMINOPHEN 5; 325 MG/1; MG/1
1 TABLET ORAL ONCE
Status: COMPLETED | OUTPATIENT
Start: 2021-12-22 | End: 2021-12-22

## 2021-12-21 RX ORDER — LIDOCAINE 40 MG/G
CREAM TOPICAL
Qty: 45 G | Refills: 0 | Status: ON HOLD | OUTPATIENT
Start: 2021-12-21 | End: 2022-02-14

## 2021-12-21 RX ORDER — TRIAMCINOLONE ACETONIDE 55 UG/1
2 SPRAY, METERED NASAL DAILY
Qty: 16.9 ML | Refills: 0 | Status: ON HOLD | OUTPATIENT
Start: 2021-12-21 | End: 2022-02-14

## 2021-12-21 RX ORDER — MIRTAZAPINE 30 MG/1
30 TABLET, FILM COATED ORAL AT BEDTIME
Qty: 30 TABLET | Refills: 0 | Status: ON HOLD | OUTPATIENT
Start: 2021-12-21 | End: 2022-02-14

## 2021-12-21 RX ORDER — GABAPENTIN 300 MG/1
600 CAPSULE ORAL AT BEDTIME
Qty: 60 CAPSULE | Refills: 0 | Status: ON HOLD | OUTPATIENT
Start: 2021-12-21 | End: 2022-02-14

## 2021-12-21 RX ORDER — VENLAFAXINE HYDROCHLORIDE 37.5 MG/1
37.5 CAPSULE, EXTENDED RELEASE ORAL DAILY
Qty: 30 CAPSULE | Refills: 0 | Status: ON HOLD | OUTPATIENT
Start: 2021-12-21 | End: 2022-02-14

## 2021-12-21 RX ORDER — LEVOTHYROXINE SODIUM 25 UG/1
25 TABLET ORAL DAILY
Qty: 30 TABLET | Refills: 0 | Status: ON HOLD | OUTPATIENT
Start: 2021-12-21 | End: 2022-02-14

## 2021-12-21 RX ADMIN — BUSPIRONE HYDROCHLORIDE 15 MG: 15 TABLET ORAL at 09:11

## 2021-12-21 RX ADMIN — GABAPENTIN 300 MG: 300 CAPSULE ORAL at 14:01

## 2021-12-21 RX ADMIN — ENOXAPARIN SODIUM 40 MG: 40 INJECTION SUBCUTANEOUS at 09:12

## 2021-12-21 RX ADMIN — SENNOSIDES AND DOCUSATE SODIUM 1 TABLET: 50; 8.6 TABLET ORAL at 09:11

## 2021-12-21 RX ADMIN — VENLAFAXINE HYDROCHLORIDE 37.5 MG: 37.5 CAPSULE, EXTENDED RELEASE ORAL at 09:11

## 2021-12-21 RX ADMIN — LIDOCAINE: 50 OINTMENT TOPICAL at 09:45

## 2021-12-21 RX ADMIN — OXYCODONE HYDROCHLORIDE 5 MG: 5 TABLET ORAL at 06:10

## 2021-12-21 RX ADMIN — CALCIUM CARBONATE (ANTACID) CHEW TAB 500 MG 500 MG: 500 CHEW TAB at 17:12

## 2021-12-21 RX ADMIN — LEVOTHYROXINE SODIUM 25 MCG: 0.03 TABLET ORAL at 06:10

## 2021-12-21 RX ADMIN — GABAPENTIN 300 MG: 300 CAPSULE ORAL at 09:10

## 2021-12-21 RX ADMIN — TAMSULOSIN HYDROCHLORIDE 0.4 MG: 0.4 CAPSULE ORAL at 09:12

## 2021-12-21 RX ADMIN — OXYCODONE HYDROCHLORIDE 5 MG: 5 TABLET ORAL at 02:41

## 2021-12-21 RX ADMIN — MAGNESIUM OXIDE TAB 400 MG (241.3 MG ELEMENTAL MG) 400 MG: 400 (241.3 MG) TAB at 09:11

## 2021-12-21 RX ADMIN — OXYCODONE HYDROCHLORIDE 5 MG: 5 TABLET ORAL at 00:16

## 2021-12-21 RX ADMIN — ACETAMINOPHEN 650 MG: 325 TABLET ORAL at 09:11

## 2021-12-21 RX ADMIN — ACETAMINOPHEN 650 MG: 325 TABLET ORAL at 14:01

## 2021-12-21 RX ADMIN — OXYCODONE HYDROCHLORIDE 5 MG: 5 TABLET ORAL at 17:11

## 2021-12-21 RX ADMIN — CALCIUM CARBONATE (ANTACID) CHEW TAB 500 MG 500 MG: 500 CHEW TAB at 14:01

## 2021-12-21 RX ADMIN — OXYCODONE HYDROCHLORIDE 5 MG: 5 TABLET ORAL at 09:11

## 2021-12-21 RX ADMIN — PANTOPRAZOLE SODIUM 40 MG: 40 INJECTION, POWDER, FOR SOLUTION INTRAVENOUS at 09:10

## 2021-12-21 RX ADMIN — OXYCODONE HYDROCHLORIDE 5 MG: 5 TABLET ORAL at 14:01

## 2021-12-21 RX ADMIN — ACETAMINOPHEN 650 MG: 325 TABLET ORAL at 00:16

## 2021-12-21 ASSESSMENT — ACTIVITIES OF DAILY LIVING (ADL)
ADLS_ACUITY_SCORE: 14
ADLS_ACUITY_SCORE: 12
ADLS_ACUITY_SCORE: 14
ADLS_ACUITY_SCORE: 12
ADLS_ACUITY_SCORE: 14
ADLS_ACUITY_SCORE: 14
ADLS_ACUITY_SCORE: 12
ADLS_ACUITY_SCORE: 14
ADLS_ACUITY_SCORE: 12
ADLS_ACUITY_SCORE: 14

## 2021-12-21 NOTE — PROGRESS NOTES
Care Management Discharge Note    Discharge Date: 12/22/2021       Discharge Disposition: Home    Discharge Services:  (home infusion)    Discharge DME:  (home infusion)    Discharge Transportation: agency    Private pay costs discussed: Transportation-potentially pending insurance coverage       Education Provided on the Discharge Plan:  yes  Persons Notified of Discharge Plans: yes      Patient/Family in Agreement with the Plan: yes    Handoff Referral Completed: yes    Additional Information:  Discharge plan has been confirmed with patient. He will discharge to his brother in law's house in [place]. [facility] has been working with patient for home TPN. Patient will have coverage for TPN and will have nurse for fistula wound monitoring. Patient states ability to manage wound and TPN. He declines home PT/OT. Follow up with new primary appointments have been made. Instructed patient he will need to follow up and establish with primary MD. He states understanding. Patient has MA rides through his health insurance. He states he has taken these rides before and knows how to arrange. Phone number provided.  Medication will be filled here before he discharges.  Instructed him to continue to work with [facility] [employer] on a more structured and permanent home. He states understanding. Patient gave verbal permission for CM to call his PO Shelia Tsai [phone]. CM updated Shelia on patient discharging to brother in law. PO states agreement and will continue to follow.  [facility] scheduled for 1700.     3:55 PM Requested nurse to send patient home with extra wound supplies which will allow home care some time to order supplies.           Shahnaz Page RN

## 2021-12-21 NOTE — CONSULTS
"Spiritual Assessment:     Feeling restless and overwhelmed    Anxious to leave the hospital    Concerned about long-term recovery    Denies any meaningful spiritual beliefs/practices    Identifies his brother-in-law as a source of support    Care Provided:   Empathic listening and presence  Normalized/validated his feelings of frustration, irritation, and concern  Explored/identified sources of support    Full Spiritual Care Note:  Saw Chema due to Spiritual Care Consult. He states that he isn't sure talking to me will help, but says that he has been in the hospital for several weeks and doesn't think he can stay much longer. Chema was recently released from longterm and explains that finding a TCU bed is difficult because of his felony history. In addition to his, Chema also has an ostomy bag and will be receiving TPN for many more weeks. He asks if I have any connections that could help him find a bed. Chema's only other option is going to stay with his brother-in-law Al, which he is willing to do, but states that he doesn't think he can live there for his whole recovery period. Chema shares that he is depressed and that he will leave AMA by Friday if he does not get out of here before then as he cannot stand being here anymore. He found the beeping of the IV monitor to be overwhelming, saying \"This is torture!\" Chema is not able to identify anything that has been helpful in dealing with his depression in the past, but says that he doesn't like medications. When asked about any spiritual beliefs, Chema says \"I try, but I can't stick with it\" and talked about tragic deaths of children and innocent people and how that has turned him off to Islam. He asked me to contact the [facility] to get ahold of someone there. I left when Chema took a phone call. He welcomed my offer of lavender oil and DVDs to help him pass the time.     Visit Length: 30 minutes    Plan of Care: Will remain available for further support as " patient/family needs/desires.    Yoon Engel M.Div.  Staff   [phone]

## 2021-12-21 NOTE — PLAN OF CARE
Problem: Adult Inpatient Plan of Care  Goal: Optimal Comfort and Wellbeing  Outcome: Improving     Patient up ambulating in hallway. Started new IV PTN bag. Frequent abdominal gauze dressing changes due to drainage. Frequent colostomy draining, due to patient request and continues gas buildup in bag. Calm, pleasant, able to make needs known.

## 2021-12-21 NOTE — PLAN OF CARE
Occupational Therapy Discharge Summary    Reason for therapy discharge:    Discharged to home.    Progress towards therapy goal(s). See goals on Care Plan in [facility] electronic health record for goal details.  Goals partially met.  Barriers to achieving goals:   discharge from facility.    Therapy recommendation(s):    Home with homecare

## 2021-12-21 NOTE — PROGRESS NOTES
[facility]    Met with patient at bedside for initial evaluation of home TPN.  Pt appears appropriate and willing to learn the home TPN.  Visit was difficult today due to constant flow of traffic through the room and pt had difficulty focusing.  Plan is for pt to discharge today to his brother-in-law's home. Pt will receive initial home TPN visit tonight at his home and then daily for TPN teaching.  Pt will be transitioning tonight to cyclical TPN, per [facility] pharmacy.     Delivery of med and supplies will be delivered to patient's home between 6-7pm.  Pt's address and contact numbers were confirmed.  Requested extra supplies be sent with pt as it will take several days before supplies can be ordered/delivered for fistula.  Pt has a draining fistula which has an ostomy appliance/bag attached to for drainage.    Provided 24/7 phone number and answered all questions.  Patient verbalized understanding of discharge plans.    Thank you for the opportunity to provide infusion services to this patient. Updates throughout the day to and from Camelia Page CM.    Luz Maria Steiner RN, BSN  [facility]  [phone] (Monday through Friday, 8am-5pm)  [phone] (office)

## 2021-12-21 NOTE — PLAN OF CARE
Problem: Pain Acute  Goal: Acceptable Pain Control and Functional Ability  Outcome: Adequate for Discharge  PRN oxycodone given for ABD incision pain;   PRN tums given per pt's request.     Discharge instructions given to pt. Pt verbalized understanding. Pt sent with discharge medications. Pt had no questions.  Colostomy bag emptied; output 150 ml watery/brown.   ABD dressing changed. Small amount of serosanguinous drainage.  No further complaint during this shift.    Pt discharged at 1718 with EMS and assist with wheelchair.

## 2021-12-21 NOTE — PROGRESS NOTES
Therapy: TPN  Insurance[place] [employer] [place]    Patient has coverage at 100% between [employer] and [employer] [place] plans however will have a $3.50 monthly deductible.    [facility] in reference to admission date 11/30/2021 to check TPN coverage.      Please contact Intake with any questions, [phone] or In Basket pool, [facility] ([phone]).

## 2021-12-21 NOTE — PROGRESS NOTES
ASSESSMENT:  1. SBO (small bowel obstruction) (H)    2. 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   Output by Drain (mL) 12/19/21 0700 - 12/19/21 1459 12/19/21 1500 - 12/19/21 2259 12/19/21 2300 - 12/20/21 0659 12/20/21 0700 - 12/20/21 1459 12/20/21 1500 - 12/20/21 2259 12/20/21 2300 - 12/21/21 0659 12/21/21 0700 - 12/21/21 0729   Open Drain Medial;Superior Abdomen vessel loop drain 450 225 150 350 625 325       EXT:No cyanosis, edema or obvious abnormalities    12/20 0700 - 12/21 0659  In: 1992 [P.O.:480; I.V.:20]  Out: 2700 [Urine:1400; Drains:1300]    No results displayed because visit has over 200 results.             Mekhi Richardson MD

## 2021-12-21 NOTE — DISCHARGE INSTRUCTIONS
For midline fistula:  Cleanse with tap water, pat dry  Cut pouch [place] #[id number] to fit, apply barrier ring [place] #[id number] around fistula, apply new pouch  Change weekly and as needed for leaking  Empty pouch throughout the day as it fills    If you do not attend your appointment on 12/22 another appt has been scheduled with:  Dr Morse   [facility] on 12/27/21 at 1:30pm.  [address] [address]

## 2021-12-21 NOTE — DISCHARGE SUMMARY
[facility]  Discharge Summary - Medicine & Pediatrics       Date of Admission:  11/30/2021  Date of Discharge:  12/21/2021  Discharging Provider: Dr. Mcmahon  Discharge Service: Residents    Discharge Diagnoses   SBO  Bowel perforation  Enterocutaneous fistula  Depression  Moderate malnutrition on TPN    Follow-ups Needed After Discharge   Follow-up Appointments     Follow-up and recommended labs and tests       Follow up with primary care provider, Hardy Hardwick, within 3-5 days,   for hospital follow- up. The following labs/tests are recommended: CMP,   CBC, triglycerides..               Discharge Disposition   Discharged to home  Condition at discharge: Stable      Hospital Course   Gurdeep Dia is a 66 year old male with history of splenectomy, appendectomy, HTN, and substance abuse who presented with 2 days of right sided abdominal pain, found to have a bowel obstruction with closed loop. He was admitted for surgical repair of obstruction. Subsequently perforated his intestine and developed an abdominal abscess and enterocutaneous fistula.      Mechanical SBO s/p exploratory laparotomy / small bowel resection / repair of enterotomy / extensive lysis of adhesions (11/30/21)  Subsequent small bowel perforation requiring repeat ex lap for washout 12/3  Abdominal abscess - resolved    Enterocutaneous fistula  CT abdomen/pelvis 11/30 significant for closed loop mechanical small bowel obstruction. Does have history of multiple abdominal surgeries previously. First procedure 11/30 for small bowel resection. Acute change on 12/2-12/3 and found to have intraabdominal drainage from 12/3/2021; underwent repeat exploratory laparotomy with washout and repair of small bowel perforation. Started on vanc and meropenem at that time. 12/8 still without stool charted, still spiking low grade fevers, having atypical output from MARISA drain, white count climbing. CT 12/8 showing ring enhancing lesion in  abdomen likely representing abscess. Fourth drain placed 12/9 AM by IR for abscess drainage. Removed 12/16 after abscessogram demonstrated resolution of pelvic fluid collection. Abscess culture growing e. Coli, see sensitivities.  Discontinued vancomycin and meropenem 12/12 in favor of IV cefepime + crushed oral flagyl (shortage of IV form). Transitioned to oral cefdinir and flagyl on 12/15. Abx stopped 12/20 given normalizing of WBC.   Developed an enterocutaneous fistula in the midst of this process, which remains draining into an ostomy bag on discharge.  -Follow up with surgery arranged by them  -At this time will remain on low calorie clears until drainage has started to decrease and is more manageable  -Sent with small amount of percocet per surgery (12 tabs, 5-325mg)  -Omeprazole 40mg daily  -Per surgery, will need TPN until fistula scars down. Could be several months. Is discharging to brother-in-law's house, he has agreed to help with his care and his TPN  -Home infusion referral for TPN supplies and teaching; TPN basics and troubleshooting videos provided  -Home care nurse to continuously assess abdomen exam across time and provide skin/ostomy care   -Weekly labs including CMP, CBC, triglycerides  -ABD over superior portion of wound instead of gauze recommended by surgery    Depression  Hx of this. Reports symptoms for past few days - low motivation, little interest in doing things, poor sleep, no appetite. Is on buspar, mirtazapine, and effexor, though doesn't really think medicines are as helpful as talk therapy is. Wanted to speak to social work and . SW requested psych consult 12/21 AM though patient declined. States no plan to harm himself. Reports he just needs to leave the hospital.  -No change in meds for now  -F/u with PCP for talk therapy referral and med management     Moderate malnutrition  Per RD eval.  -TPN until cleared to ADAT per surgery  -Increase in sodium in TPN given  hyponatremia 12/21     Elevated alk phos  Elevated triglycerides  Alk phos 124 on 12/20. Was high normal at 90 on 12/13. Triglycerides 243 on 12/20. Both likely in the setting of TPN. No RUQ pain to suggest gallbladder pathology from this. Instructed home nurse to continue to monitor physical exam.   -Weekly labs  -Monitor belly exam for signs of RUQ pathology     Afib, RVR post-op, currently rate controlled   NSVT episodes 12/8 and 12/15  Noted.  Rates had been in the 120 to 150 range.  Converted to NSR with amiodarone drip. No longer on any rate control. No repeat episodes. Both CHADS2-Vasc and HAS-BLED of at least 2.  TSH normal. Lytes normal. Echo 12/10 showing slight concentric thickening in LV and EF 60-65%. Patient wants to avoid anticoagulation given bleeding risks and his single A fib episode likely being related to his operation, sedation, and infection.   Had two short (6-8 beat) runs of Vtach. Asymptomatic. Electrolytes normal. Suspect d/t infection given no longer occurring.  -No AC after discharge after shared decision making discussion     HTN  PTA lisinopril, furosemide were held in the setting of acute surgical care.  Even with pain BP not significantly elevated.  Kidney function normal and stable.     -Discontinued lisinopril given normotensive all hospital stay without it  -Discontinued lasix given no signs of fluid overload and normal echo as above  -PCP may consider restarting if hypertensive at follow up       Chronic Conditions:  Hypothyroidism- PTA levothyroxine  Depression/anxiety- PTA venlafaxine, mirtazepine, Buspar   Chronic pain- PTA mexolicam 7.5mg daily, gabapentin  BPH- PTA flomax          Consultations This Hospital Stay   OCCUPATIONAL THERAPY ADULT IP CONSULT  PHYSICAL THERAPY ADULT IP CONSULT  HOSPITALIST IP CONSULT  VASCULAR ACCESS ADULT IP CONSULT  NUTRITION SERVICES ADULT IP CONSULT  PHARMACY/NUTRITION TO START AND MANAGE TPN  PHARMACY IP CONSULT  PHARMACY IP CONSULT  PHARMACY  TO DOSE VANCO  PAIN MANAGEMENT ADULT IP CONSULT  PHARMACY IP CONSULT  PHYSICAL THERAPY ADULT IP CONSULT  OCCUPATIONAL THERAPY ADULT IP CONSULT  PHARMACY IP CONSULT  SPIRITUAL HEALTH SERVICES IP CONSULT  PHARMACY/NUTRITION TO START AND MANAGE TPN  PHARMACY IP CONSULT  PHARMACY IP CONSULT  WOUND OSTOMY CONTINENCE NURSE  IP CONSULT  PHARMACY IP CONSULT  PHARMACY IP CONSULT  PHARMACY IP CONSULT  PHARMACY IP CONSULT  PHARMACY IP CONSULT  ACUPUNCTURE IP CONSULT  PHARMACY IP CONSULT  PHARMACY IP CONSULT  PHARMACY IP CONSULT  PHARMACY IP CONSULT  SOCIAL WORK IP CONSULT  PHARMACY IP CONSULT  SPIRITUAL HEALTH SERVICES IP CONSULT  PHARMACY IP CONSULT  PSYCHOLOGY ADULT IP CONSULT    Code Status   Full Code       The patient was discussed with Dr. Gem Mcmahon MD  Resident Service  [address] [address]  Phone: [phone]  Fax: [phone]  ______________________________________________________________________    Physical Exam   Vital Signs: Temp: 98.2  F (36.8  C) Temp src: Oral BP: 110/76 Pulse: 96   Resp: 16 SpO2: 93 % O2 Device: None (Room air)    Weight: 169 lbs 6.4 oz  General appearance: alert, no acute distress  Head: Normocephalic, without obvious abnormality, atraumatic.   Throat: moist mucous membranes.  Lungs: CTABL  Heart: regular rate and rhythm, S1, S2 normal, no murmur, click, rub or gallop  Abdomen: soft, ND, mildly tender diffusely. Staples intact. Ostomy bag with dark green fluid  Extremities: extremities normal, atraumatic, no cyanosis or edema  Skin: Slightly pale, dry skin.  Neurologic: Alert and oriented X 3.  Able to move extremities.  Sensation intact.   Psychiatric: Calm, cooperative      Primary Care Physician   Hardy Hardwick    Discharge Orders      Abdominal dressing rodrigues/binder     WOUND CARE SUPPLIES    Guaze dressing to midline wound change as needed  Ostomy bag to lower abdominal wound     Home infusion referral       Home care nursing referral      Home Care Social Service Referral for Hospital Discharge      PICC Line Video Bundle for GWN - Basic Care and Troubleshooting     Reason for your hospital stay    Bowel obstruction with subsequent perforation of your bowel, causing a connection between the bowel and skin called an enterocutaneous fistula. That continues to drain and will until it scars down. This may take several months. While it is scarring down it is imperative that you don't stress the bowel with food or drink, as that will make it take longer to heal. Thus, you should continue with TPN until cleared by surgery to advance your diet.     Follow-up and recommended labs and tests     Follow up with primary care provider, Hardy Hardwick, within 3-5 days, for hospital follow- up. The following labs/tests are recommended: CMP, CBC, triglycerides..     Activity    Your activity upon discharge: no lifting, driving, or strenuous exercise for weeks     Miscellaneous Order for DME - ONLY FOR DME    I, the undersigned, certify that the above prescribed supplies are medically necessary for this patient and is both reasonable and necessary in reference to accepted standards of medical and necessary in reference to accepted standards of medical practice in the treatment of this patient's condition and is not prescribed as a convenience.      Diet    Follow this diet upon discharge: Nothing by mouth except sips.       Significant Results and Procedures   Results for orders placed or performed during the hospital encounter of 11/30/21   CT Abdomen Pelvis w Contrast    Narrative    EXAM: CT ABDOMEN PELVIS W CONTRAST  LOCATION: [facility]  DATE/TIME: 11/30/2021 10:48 AM    INDICATION: Abdominal distension  COMPARISON: None.  TECHNIQUE: CT scan of the abdomen and pelvis was performed following injection of IV contrast. Multiplanar reformats were obtained. Dose reduction techniques were used.  CONTRAST:  IsoVue 370 100mL    FINDINGS:   LOWER CHEST: Bibasilar atelectasis.    HEPATOBILIARY: Scattered small water density foci in the liver consistent with cysts. No radiopaque gallstone. No suspicious hepatic lesions.    PANCREAS: Coarse calcification in the pancreatic tail and body may represent sequelae of chronic pancreatitis no acute pancreatitis or abnormal pancreatic mass.    SPLEEN: Not visualized consistent with prior splenectomy.    ADRENAL GLANDS: Normal.    KIDNEYS/BLADDER: No hydronephrosis or masses. No bladder stone or mass.    BOWEL: Anastomotic staples in the colon in the right mid abdomen. There is normal caliber duodenum and proximal jejunum with dilated loops of distal jejunum and ileum with some fecal i-STAT ileal contents and a transition in the right mid to lower   abdomen. Findings are consistent with mechanical small bowel obstruction and this may represent a closed loop obstruction such as internal hernia or multiple adhesions given the lack of dilatation of the proximal small bowel. No pneumatosis intestinalis,   free intraperitoneal air or abscess.    LYMPH NODES: No lymphadenopathy.    VASCULATURE: Mild aortoiliac calcification without aneurysm.    PELVIC ORGANS: Mild prostatic enlargement.    MUSCULOSKELETAL: Disc space narrowing at L4-L5 with disc degeneration and bilateral spondylolysis at L4 with grade 2 spondylolisthesis of L4 on L5. No acute fracture.      Impression    IMPRESSION:   1.  Mechanical small bowel obstruction with dilated distal jejunum and ileum with caliber changes in the right lower quadrant and in the left midabdomen this may be secondary to adhesions or internal hernia and has the appearance of a closed loop   obstruction. No pneumatosis intestinalis, free intraperitoneal air or abscess.    2.  Results were called to Dr. Sanford the time of dictation.    NOTE: ABNORMAL REPORT    THE DICTATION ABOVE DESCRIBES AN ABNORMALITY FOR WHICH FOLLOW-UP IS NEEDED.    POC US Guidance  "Needle Placement    Narrative    Ultrasound was performed as guidance to an anesthesia procedure.  Click   \"PACS images\" hyperlink below to view any stored images.  For specific   procedure details, view procedure note authored by anesthesia.   XR Chest Port 1 View    Narrative    EXAM: XR CHEST PORT 1 VIEW  LOCATION: [facility]  DATE/TIME: 12/3/2021 8:31 PM    INDICATION: RN placed PICC - verify tip placement  COMPARISON: None.      Impression    IMPRESSION: Right PICC line present with its tip likely just into the right atrium. Suggest withdrawing the PICC line 2 cm to place the tip in the low SVC.    NG tube in good position in the stomach.    Mild cardiomegaly. Low lung volumes with mild patchy mixed interstitial and airspace infiltrates   XR Chest Port 1 View    Narrative    EXAM: XR CHEST PORT 1 VIEW  LOCATION: [facility]  DATE/TIME: 12/6/2021 1:39 PM    INDICATION: Worsening hypoxia, evaluate for pneumonia, pulm edema  COMPARISON: Chest x-ray 12/03/2021      Impression    IMPRESSION: Enteric suction tube tip and side-port within the upper gastric lumen. Satisfactory right PICC line position with the tip near the cavoatrial junction. Persistent low lung volumes. No definite pneumothorax. Mildly improved bibasilar pulmonary   opacities favored to reflect atelectasis. Persistent interstitial coarsening. Suspected trace right-sided pleural fluid. Stable heart size and central vascular prominence.   CT Abdomen Pelvis w Contrast     Value    Radiologist flags (AA)     Pulmonary fluid collection and extraluminal loculated air-fluid within the mid abdomen as detailed above.    Narrative    EXAM: CT ABDOMEN PELVIS W CONTRAST  LOCATION: [facility]  DATE/TIME: 12/8/2021 1:40 PM    INDICATION: Abdominal abscess/infection suspected, status post laparotomy with repair of small bowel perforation, recent small bowel resection  COMPARISON: CT " 11/30/2021  TECHNIQUE: CT scan of the abdomen and pelvis was performed following injection of IV contrast. Multiplanar reformats were obtained. Dose reduction techniques were used.  CONTRAST: 100 mL Isovue-370    FINDINGS:   LOWER CHEST: Mild bilateral lower lobar dependent consolidation and trace right-sided pleural effusion.    HEPATOBILIARY: Prominent gallbladder likely reflecting recent fasting. Stable mild biliary ductal dilatation. No obstructing mass lesion or radiodense stone. Stable small low-density hepatic lesions, likely cysts.    PANCREAS: Normal.    SPLEEN: Surgically absent.    ADRENAL GLANDS: Normal.    KIDNEYS/BLADDER: Stable nonobstructing lower left renal stones with a dominant stone measuring 4 mm. No hydronephrosis. Unremarkable urinary bladder.    BOWEL: Enteric suction tube with the tip located within the mid gastric lumen. Mild to moderately distended segments of mid small bowel with associated air-fluid levels. No discrete transition is identified. Right lower quadrant anastomotic changes.   Small amount of nonloculated fluid and extraluminal air within the mid abdomen for example image 50 series 400.2 and image 97 series 3. Questionable adjacent small bowel defect image 50 series 102. Small amount of nonloculated fluid is also noted within   the right lower quadrant. Newly visualized rim-enhancing pelvic fluid collection measuring 4.4 x 3.6 x 4.4 cm. Trace ascites. Midline laparotomy incision with a small amount of fluid and air within the wound. Surgical drain tip is located at the upper   aspect of this wound. Additional surgical drain with the tip located within the left lower quadrant. Third surgical drain with the tip located in the lower anterior abdomen. Diffuse mesenteric edema.    LYMPH NODES: Normal.    VASCULATURE: Mild atherosclerosis.    PELVIC ORGANS: Normal.    MUSCULOSKELETAL: Bilateral L4 pars defects with grade 1 anterolisthesis at L4-L5. Associated severe discogenic  degenerative changes.      Impression    IMPRESSION:   1.  Newly visualized 4.6 cm rim-enhancing fluid collection within the pelvis.  2.  Small ill-defined irregular pocket of fluid and extraluminal air within the mid abdomen as described above. It is uncertain whether this is related to sequelae of recent surgery or bowel leakage. Recommend short interval follow-up CT with positive   enteric contrast.  3.  Mild to moderate mid small bowel distention. This is favored to reflect an ileus rather than obstruction.   4.  Trace ascites and diffuse mesenteric edema.  5.  Mild bilateral lower lobar consolidation and trace right-sided pleural effusion.        [Critical Result: Pulmonary fluid collection and extraluminal loculated air-fluid within the mid abdomen as detailed above.]    Finding was identified on 12/8/2021 2:10 PM.     Dr. Richardson was contacted by me on 12/8/2021 2:50 PM and verbalized understanding of the critical result.    CT Abd Peritoneum Abscess Drain w Cath Place    Narrative    EXAM:  1. PERCUTANEOUS DRAIN PLACEMENT PELVIC ABSCESS  2. CT GUIDANCE  3. CONSCIOUS SEDATION  LOCATION: [facility]  DATE/TIME: 12/9/2021 9:50 AM    INDICATION: s/p two surgeries with elevating wbc, ct with pelvic abscess.  TECHNIQUE: Dose reduction techniques were used.    PROCEDURE: Informed consent obtained. Site marked. Prior images reviewed. Required items made available. Patient identity confirmed verbally and with arm band. Patient reevaluated immediately before administering sedation. Universal protocol was   followed. Time out performed. The site was prepped and draped in sterile fashion. 10 mL of 1% lidocaine was infused into the local soft tissues. Using standard technique and under direct CT guidance, a 10 Malay drainage catheter was inserted into the   fluid collection.      SPECIMEN: 30 mL of brownish thin fluid was aspirated and sent to lab for cultures and Gram stain.    BLOOD LOSS:  Minimal.    The patient tolerated the procedure well. No immediate complications.    SEDATION: Versed 2.0 mg. Fentanyl 100 mcg. The procedure was performed with administration intravenous conscious sedation with appropriate preoperative, intraoperative, and postoperative evaluation.    30 minutes of supervised face to face conscious sedation time was provided by a radiology nurse under my direct supervision.      Impression    IMPRESSION:  1.  Successful CT-guided drain placement into pelvic abscess. 30 mL brownish thin fluid removed and sent for cultures.    Reference CPT Codes: 93271, 48526, 59976   CT Abdomen Pelvis w Contrast    Addendum: 12/16/2021    Discussed with Dr. Richardson by Dr. Fahrner over telephone at 11:41 AM.      Narrative    EXAM: CT ABDOMEN PELVIS W CONTRAST  LOCATION: [facility]  DATE/TIME: 12/16/2021 3:00 AM    INDICATION: Intrabdominal abscess  COMPARISON: CT 12/8/2021 and 12/9/2021  TECHNIQUE: CT scan of the abdomen and pelvis was performed following injection of IV contrast. Multiplanar reformats were obtained. Dose reduction techniques were used.  CONTRAST: IsoVue 370 100mL    FINDINGS:   LOWER CHEST: Normal.    HEPATOBILIARY: Normal.    PANCREAS: Normal.    SPLEEN: Splenules are present.     ADRENAL GLANDS: A 10 mm area of fullness in the left adrenal gland has not changed.     KIDNEYS/BLADDER: Again seen are nonobstructing left renal calculi. There are simple cysts in the right kidney which do not require follow-up. Smaller renal lesions are too small to characterize.     BOWEL: There is surgical change in the right lower quadrant. There is distal small bowel wall thickening in this region. The small bowel wall is not fully visualized, and there is a small amount of extraluminal fluid and air in this region (series 3   images 93 and 96). There is new free air in the right lower quadrant mesentery (series 3 image 105).     LYMPH NODES: Normal.    VASCULATURE:  Atherosclerotic disease.     PELVIC ORGANS: Normal.    OTHER: A fluid collection adjacent to the rectum is 1.9 x 1.5 x 1.5 cm (approximately 2.2 mL). Previously the collection was 4.4 x 3.6 x 4.4 cm. A pigtail catheter terminates within this collection.    MUSCULOSKELETAL: Surgical change along the ventral abdominal and pelvic wall. There is a 20.8 x 3.2 x 1.2 cm fluid and air collection in the midline ventral abdominal and pelvic wall just deep to the staple line. Right lower quadrant and left midabdomen   approach Larry drains are present.       Impression    IMPRESSION:   1.  The pelvic fluid collection is significantly smaller. A pigtail catheter is in place.  2.  Increased free air in the mesentery in the right lower quadrant, suggesting suture line dehiscence. There is also a suspected distal small bowel defect with adjacent free air and fluid.  3.  New fluid collection in the subcutaneous tissues of the ventral abdominal and pelvic wall, just deep to the staple line. Recommend drainage.    IR Abscess Tube Check    Narrative    LOCATION: [facility]  DATE: 12/16/2021    PROCEDURE: ABSCESS TUBE CHECK    INTERVENTIONAL RADIOLOGIST: Demario Porter MD    INDICATION: Pelvic abscess. Status post jugular drain placement on 12/9/2021. Markedly decreased outputs. CT from today demonstrating near complete resolution of the abscess cavity..    CONSENT: The procedure, risks and benefits of an abscessogram were discussed with the patient  in detail. All questions were answered. Informed consent was given to proceed with the procedure.    MODERATE SEDATION: None.    CONTRAST: Omnipaque 350: 2 mL.  ANTIBIOTICS: None.  ADDITIONAL MEDICATIONS: None.    FLUOROSCOPIC TIME: 0.1 minutes  RADIATION DOSE: Air Kerma: 26 mGy    COMPLICATIONS: No immediate complications.    PROCEDURE:  images were obtained. The existing drainage catheter was injected with contrast, and an image obtained. After reviewing the  images, the catheter was removed without difficulty.    FINDINGS: Contracted cavity. Almost immediate pericatheter leakage.      Impression    IMPRESSION:  1.  Resolved pelvic fluid collection. Transgluteal drain removed.       Echocardiogram Complete     Value    LVEF  60-65%    [facility]    [id number]  [id number]  [id number]  [id number]^CHRISTOPHER^FLOWER^[place] [address]     Name: ISABELA DUNN  MRN: [id number]  : 1955  Study Date: 12/10/2021 01:14 PM  Age: 66 yrs  Gender: Male  Patient Location: [facility]  Reason For Study: VT  Ordering Physician: FLOWER WHITE  Performed By: ALEX     BSA: 1.8 m2  Height: 65 in  Weight: 155 lb  HR: 84  ______________________________________________________________________________  Procedure  Complete Echo Adult. Adequate quality two-dimensional was performed and  interpreted.  ______________________________________________________________________________  Interpretation Summary     The left ventricle is normal in size with borderline concentric left  ventricular hypertrophy.  Left ventricular function is normal.The ejection fraction is 60-65%.  Normal right ventricle size and systolic function.  No significant valve disease is identified.     There is no comparison study available.  ______________________________________________________________________________  I      WMSI = 1.00     % Normal = 100     X - Cannot   0 -                      (2) - Mildly 2 -          Segments  Size  Interpret    Hyperkinetic 1 - Normal  Hypokinetic  Hypokinetic  1-2     small                                                     7 -          3-5      moderate  3 - Akinetic 4 -          5 -         6 - Akinetic Dyskinetic   6-14    large               Dyskinetic   Aneurysmal  w/scar       w/scar       15-16   diffuse     Left Ventricle  The left ventricle is normal in size. Left ventricular function is normal.The  ejection fraction is 60-65%. There is  borderline concentric left ventricular  hypertrophy. Left ventricular diastolic function is normal. No regional wall  motion abnormalities noted.     Right Ventricle  Normal right ventricle size and systolic function.     Atria  Normal left atrial size. Right atrial size is normal. There is no color  Doppler evidence of an atrial shunt.     Mitral Valve  Mitral valve leaflets appear normal. There is no evidence of mitral stenosis  or clinically significant mitral regurgitation.     Tricuspid Valve  Tricuspid valve leaflets appear normal. There is no evidence of tricuspid  stenosis or clinically significant tricuspid regurgitation. Right ventricle  systolic pressure estimate normal. The right ventricular systolic pressure is  approximated at 18.7 mmHg plus the right atrial pressure.     Aortic Valve  The aortic valve is trileaflet. Aortic valve leaflets appear normal. There is  no evidence of aortic stenosis or clinically significant aortic regurgitation.  There is trace aortic regurgitation.     Pulmonic Valve  The pulmonic valve is not well seen, but is grossly normal. This degree of  valvular regurgitation is within normal limits. There is trace pulmonic  valvular regurgitation.     Vessels  The aorta root is normal. The ascending aorta is Mildly dilated. IVC diameter  <2.1 cm collapsing >50% with sniff suggests a normal RA pressure of 3 mmHg.     Pericardium  There is no pericardial effusion.     Rhythm  Sinus rhythm was noted.     ______________________________________________________________________________  MMode/2D Measurements & Calculations  IVSd: 1.3 cm  LVIDd: 5.0 cm  LVIDs: 3.2 cm  LVPWd: 0.86 cm     FS: 36.6 %  LV mass(C)d: 201.1 grams  LV mass(C)dI: 113.3 grams/m2  Ao root diam: 3.9 cm  LA dimension: 4.1 cm  asc Aorta Diam: 3.8 cm  LA/Ao: 1.0  LVOT diam: 2.4 cm  LVOT area: 4.6 cm2  LA Volume Indexed (AL/bp): 29.6 ml/m2  RWT: 0.34     Doppler Measurements & Calculations  MV E max ambrosio: 80.4 cm/sec  MV A  max gary: 62.9 cm/sec  MV E/A: 1.3     MV dec slope: 292.8 cm/sec2  MV dec time: 0.27 sec  Ao V2 max: 178.1 cm/sec  Ao max P.0 mmHg  Ao V2 mean: 130.5 cm/sec  Ao mean P.9 mmHg  Ao V2 VTI: 29.4 cm  SUMAN(I,D): 3.2 cm2  SUMAN(V,D): 3.1 cm2  LV V1 max P.5 mmHg  LV V1 max: 117.6 cm/sec  LV V1 VTI: 20.2 cm  SV(LVOT): 93.4 ml  SI(LVOT): 52.6 ml/m2  PA acc time: 0.12 sec  TR max gary: 216.1 cm/sec  TR max P.7 mmHg  AV Gary Ratio (DI): 0.66  SUMAN Index (cm2/m2): 1.8  E/E' av.3  Lateral E/e': 7.9  Medial E/e': 16.6     ______________________________________________________________________________  Report approved by: Hany Currie 12/10/2021 02:23 PM               Discharge Medications   Current Discharge Medication List      START taking these medications    Details   lidocaine (LMX4) 4 % external cream Apply topically every hour as needed for pain (with VAD insertion)  Qty: 45 g, Refills: 0    Associated Diagnoses: Enterocutaneous fistula      lipids (INTRALIPID) 20 % infusion Inject 250 mLs into the vein every 24 hours  Qty: 250 mL, Refills: 11    Associated Diagnoses: On total parenteral nutrition (TPN)      oxyCODONE-acetaminophen (PERCOCET) 5-325 MG tablet Take 1 tablet by mouth every 6 hours as needed for pain  Qty: 12 tablet, Refills: 0    Associated Diagnoses: Perforation of intestine (H)      parenteral nutrition - PTA/DISCHARGE ORDER The TPN formula will print on the After Visit Summary Report.  Qty: 1 each, Refills: 0    Associated Diagnoses: On total parenteral nutrition (TPN)      sodium chloride, PF, 0.9% PF flush Irrigate with 10 mLs as directed daily  Qty: 100 mL, Refills: 3    Associated Diagnoses: Pelvic abscess in male (H)         CONTINUE these medications which have CHANGED    Details   busPIRone (BUSPAR) 15 MG tablet Take 1 tablet (15 mg) by mouth 2 times daily  Qty: 60 tablet, Refills: 0    Associated Diagnoses: RAVI (generalized anxiety disorder)      !! gabapentin (NEURONTIN)  300 MG capsule Take 1 capsule (300 mg) by mouth 2 times daily Morning, dinnertime  Qty: 60 capsule, Refills: 0    Associated Diagnoses: Chronic bilateral low back pain without sciatica      !! gabapentin (NEURONTIN) 300 MG capsule Take 2 capsules (600 mg) by mouth At Bedtime  Qty: 60 capsule, Refills: 0    Associated Diagnoses: Chronic bilateral low back pain without sciatica      levothyroxine (SYNTHROID/LEVOTHROID) 25 MCG tablet Take 1 tablet (25 mcg) by mouth daily  Qty: 30 tablet, Refills: 0    Associated Diagnoses: Hypothyroidism, unspecified type      meloxicam (MOBIC) 7.5 MG tablet Take 1 tablet (7.5 mg) by mouth daily as needed for pain  Qty: 7.5 tablet, Refills: 3    Associated Diagnoses: Chronic bilateral low back pain without sciatica      mirtazapine (REMERON) 30 MG tablet Take 1 tablet (30 mg) by mouth At Bedtime  Qty: 30 tablet, Refills: 0    Associated Diagnoses: Moderate episode of recurrent major depressive disorder (H)      omeprazole (PRILOSEC) 20 MG DR capsule Take 2 capsules (40 mg) by mouth daily  Qty: 60 capsule, Refills: 0    Associated Diagnoses: On total parenteral nutrition (TPN)      tamsulosin (FLOMAX) 0.4 MG capsule Take 1 capsule (0.4 mg) by mouth daily  Qty: 30 capsule, Refills: 0    Associated Diagnoses: Benign prostatic hyperplasia with weak urinary stream      triamcinolone (NASACORT) 55 MCG/ACT nasal aerosol Spray 2 sprays into both nostrils daily  Qty: 16.9 mL, Refills: 0    Associated Diagnoses: Congestion of paranasal sinus      venlafaxine (EFFEXOR-XR) 37.5 MG 24 hr capsule Take 1 capsule (37.5 mg) by mouth daily  Qty: 30 capsule, Refills: 0    Associated Diagnoses: RAVI (generalized anxiety disorder); Moderate episode of recurrent major depressive disorder (H)       !! - Potential duplicate medications found. Please discuss with provider.      STOP taking these medications       furosemide (LASIX) 20 MG tablet Comments:   Reason for Stopping:         lisinopril (ZESTRIL) 40 MG  tablet Comments:   Reason for Stopping:             Allergies   Allergies   Allergen Reactions     Penicillins Hives

## 2021-12-21 NOTE — PLAN OF CARE
Problem: Pain Acute  Goal: Acceptable Pain Control and Functional Ability  Outcome: Improving  Intervention: Develop Pain Management Plan  Recent Flowsheet Documentation  Taken 12/20/2021 5211 by Brenda Terry RN  Pain Management Interventions: medication (see MAR)   Abdominal pain managed with oxycodone. Awake most of the shift unable to sleep. Voids in urinal. Vital signs stable.

## 2021-12-21 NOTE — PHARMACY-CONSULT NOTE
"Pharmacy Note: Parenteral Nutrition (PN) Management    Pharmacist consulted to dose PN for Gurdeep Dia, a 66 year old male by Dr. Clinton.    Subjective:    The patient is a new PN start.     The patient was started on PN in the hospital on 12/5/21.     Indication for PN therapy: bowel resection     Inadequate nutrition anticipated for > 7 days.      Enteral nutrition contraindicated due to: bowel integrity is tenuous.     Pertinent diseases and other special considerations respiratory failure    Social History     Tobacco Use     Smoking status: Current Every Day Smoker     Smokeless tobacco: Never Used   Substance Use Topics     Alcohol use: Not Currently     Comment: Clean for 5 years     Drug use: Not Currently     Objective:    Ht Readings from Last 1 Encounters:   12/13/21 1.702 m (5' 7\")     Wt Readings from Last 1 Encounters:   12/17/21 76.8 kg (169 lb 6.4 oz)       Body mass index is 26.53 kg/m .    Patient Vitals for the past 96 hrs:   Weight   12/17/21 2128 76.8 kg (169 lb 6.4 oz)       Labs:  Last 3 days:  Recent Labs     12/15/21  0637 12/16/21  0525 12/17/21  0615   * 134* 134*   POTASSIUM 4.6 4.4 4.9   CHLORIDE 103 102 101   CO2 24 25 24   BUN 19 18 19   CR 0.79 0.83 0.84   VIJAYA 8.3* 8.4* 8.8   MAG 2.2 2.2  --    PHOS 2.6 3.2 3.6   HGB 10.0* 10.1* 10.7*   HCT 30.3* 31.2* 32.7*   * 875* 848*       Glucose (past 48 hours):   Recent Labs     12/15/21  1147 12/15/21  2220 12/16/21  0233 12/16/21  0525 12/16/21  0827 12/16/21  1149 12/17/21  0615   * 122* 125* 125 110* 140* 119       Intake/Output (last 24 hours): I/O last 3 completed shifts:  In: 1992 [P.O.:480; I.V.:20]  Out: 2700 [Urine:1400; Drains:1300]    Estimated CrCl: Estimated Creatinine Clearance: 93.8 mL/min (based on SCr of 0.83 mg/dL).    Assessment:    Continue patient on PN therapy as a continuous central therapy.     Given the patient's current condition/oral intake, PN is still indicated.    Lab results reviewed: "   12/13: Na trending down, increase slightly in TPN today due to recent hypernatremia.  12/14: Na 134, increased in TPN yesterday. Phos 2.7 - replaced outside of TPN yesterday  12/15: Na still low at 134, increase in TPN today. Phos on low end, will increase in TPN today.  12/17: no change in sodium, will increase sodium in TPN tonight.  Potassium is trending up, will decrease in TPN tonight.  12/18:  Labs look good, continue same formula today  12/19:  Decrease rate and increase oral intake, will leave electrolytes same with rate decrease but increase Na due to still low.  Pt is still having whatever he eats or drinks come out his drain.  Monitor electrolytes 12/20 12/20: Na still low, increased in TPN yesterday. All other labs wnl  12/21: Na dropped to 130, likely due to increased OP, increase in TPN today    Plan:  1. Rate of PN: 70 mL/hr.  2. Formula:     Amino Acids 115 grams    Dextrose 336 grams    Sodium 110 mEq/day    Potassium 80 mEq/day    Calcium 8 mEq/day    Magnesium 14 mEq/day    Phosphorus 32 mMol/day    Chloride: Acetate Ratio 1:1    Standard Multivitamins w/Vitamin K    Trace Elements  3. Fat Emulsion: 20%, 250 mL IV five times weekly.  4. Check BMP tomorrow  5. Pharmacist will continue to follow the patient's lab results, clinical status and blood glucose results and make adjustments as appropriate.    Nolvia Kumar, PharmD  12/21/21

## 2021-12-21 NOTE — PROGRESS NOTES
[facility] Progress Note    Assessment/Plan  Principal Problem:    SBO (small bowel obstruction) (H)  Active Problems:    Hypertension goal BP (blood pressure) < 140/90    Benign prostatic hyperplasia with weak urinary stream    RAVI (generalized anxiety disorder)    Moderate major depression (H)    History of substance abuse (H)    History of hepatitis C    Chronic neck pain    Chronic bilateral low back pain without sciatica    Gurdeep Dia is a 66 year old male with history of splenectomy, appendectomy, HTN, and substance abuse who presented with 2 days of right sided abdominal pain, found to have a bowel obstruction with closed loop. He is admitted for surgical repair of obstruction.      Requires ongoing hospitalization for post-operative monitoring and care       Mechanical SBO s/p exploratory laparotomy / small bowel resection / repair of enterotomy / extensive lysis of adhesions (11/30/21)  Subsequent small bowel perforation requiring repeat ex lap for washout 12/3    Enterocutaneous fistula  CT abdomen/pelvis 11/30 significant for closed loop mechanical small bowel obstruction. Does have history of multiple abdominal surgeries previously. First procedure 11/30 for small bowel resection. Acute change on 12/2-12/3 and found to have intraabdominal drainage from 12/3/2021; underwent repeat exploratory laparotomy with washout and repair of small bowel perforation. 12/8 still without stool charted, still spiking low grade fevers, having atypical output from MARISA drain, white count climbing. CT 12/8 showing ring enhancing lesion in abdomen likely representing abscess. Fourth drain placed 12/9 AM by IR for abscess drainage. Is now stooling and advancing diet.  -Surgery consulted and following - cleared for discharge to TCU  -At this time will remain on low calorie clears until drainage has started to decrease and is more manageable  -Pain control as per surgery/Pain team - PCA stopped 12/13,  transitioning to orals with decent pain control  -Abscess culture growing e. Coli, see sensitivities. Abscess drain placed 12/9. Removed 12/16 after abscessogram demonstrated resolution of pelvic fluid collection. Discontinued vancomycin and meropenem 12/12 in favor of IV cefepime + crushed oral flagyl (shortage of IV form). Transitioned to oral cefdinir and flagyl.   -Stopped abx 12/20 given normalizing of WBC   -IV Protonix   -Per surgery, will need TPN until fistula scars down. Could be several months. Will look for dispo options - No LTACHs or TCUs will accept him. Exploring option of his brother in law taking care of him     Acute hypoxic respiratory failure, post-op, improved  Atelectasis vs aspiration  Initially requiring high-flow oxygen in ICU following procedure.  Question post-op atelectasis vs aspiration versus volume overload.  Does have a degree of respiratory distress when he gets agitated. CXR 12/6 showing bibasilar opacitied favored to reflect atelectasis, though still with some interstitial coarsening. Breathing improved 12/7 after some antibiotics and maybe ongoing resolution of his atelectasis. No signs of fluid overload and sodium climbing so IV lasix discontinued 12/8. Off supp. O2  -Continue abx as above, don't think we need to broaden from a lung perspective  -Aggressive IS use  -PT/OT to get moving  -Patient is more mobile and is getting around quite well     Afib, RVR post-op, currently rate controlled   NSVT episode 12/8 and 12/15  Noted.  Rates had been in the 120 to 150 range.  Converted to NSR with amiodarone drip. No longer on any rate control. No repeat episodes. Both CHADS2-Vasc and HAS-BLED of at least 2.  TSH normal. Lytes normal. Echo 12/10 showing slight concentric thickening in LV and EF 60-65%. Patient wants to avoid anticoagulation given bleeding risks and his single episode likely being related to his operation, sedation, and infection.  -Lovenox for now   -No AC after  discharge after shared decision making discussion     HTN  PTA lisinopril, furosemide being held in the setting of acute surgical care.  Will monitor, even with pain BP not significantly elevated.  Will resume when hemodynamically stable.  Kidney function is stable at this time.  Blood pressure does climb when he is agitated.  -Monitor     Depression  Hx of this. Reports symptoms for past few days - low motivation, little interest in doing things, poor sleep, no appetite. Is on buspar, mirtazapine, and effexor, though doesn't really think medicines are as helpful as talk therapy is. Wants to speak to social work and . SW requested psych consult 12/21 AM though patient declined. States no plan to harm himself.  -Consult spiritual care  -No change in meds for now     Moderate malnutrition  Per RD eval.  -TPN until cleared to ADAT per surgery  -Increase in sodium in TPN given hyponatremia 12/21    Elevated alk phos  Elevated triglycerides  Alk phos 124 on 12/20. Was high normal at 90 on 12/13. Triglycerides 243 on 12/20. Both likely in the setting of TPN. No RUQ pain to suggest gallbladder pathology from this. Will continue to monitor.       Chronic Conditions:  Hypothyroidism- PTA levothyroxine  Depression/anxiety- PTA venlafaxine, mirtazepine, Buspar   Chronic pain- Hold mexolicam 7.5mg daily, resume PTA gabapentin when tolerating PO.  BPH- PTA flomax      Diet: NPO for Medical/Clinical Reasons Except for: Meds, Ice ChipsNPO   DVT Prophylaxis: Lovenox q day    Fluids:  None  Central Lines: None  Code Status: Full Code    Subjective  Patient quite depressed this morning, especially after hearing news that he may need TPN for several months. Wants me to call his brother in law Al to see if he's okay taking care of Chema for that long.    Objective    Vital signs in last 24 hours Temp:  [98.2  F (36.8  C)-98.4  F (36.9  C)] 98.2  F (36.8  C)  Pulse:  [90-96] 96  Resp:  [16-18] 16  BP: (104-110)/(76-86)  110/76  SpO2:  [93 %-95 %] 93 %       Intake/Output last 3 shift I/O last 3 completed shifts:  In: 1992 [P.O.:480; I.V.:20]  Out: 2700 [Urine:1400; Drains:1300]    Intake/Output this shift:I/O this shift:  In: 264   Out: 200 [Drains:200]    Physical Exam  General appearance: alert, no acute distress  Head: Normocephalic, without obvious abnormality, atraumatic.   Throat: moist mucous membranes.  Lungs: CTABL  Heart: regular rate and rhythm, S1, S2 normal, no murmur, click, rub or gallop  Abdomen: soft, ND, NT. Staples intact. Ostomy bag with dark brown fluid  Extremities: extremities normal, atraumatic, no cyanosis or edema  Skin: Slightly pale, dry skin.  Neurologic: Alert and oriented X 3.  Able to move extremities.  Sensation intact.   Psychiatric: Calm, cooperative    Pertinent Labs and Pertinent Radiology   Lab Results: personally reviewed.     Radiology Results: Personally reviewed image/s and impression/s    Precepted patient with Dr. Gem Mcmahon MD - PGY2  [facility]  P: [phone]

## 2021-12-21 NOTE — PLAN OF CARE
Problem: Adult Inpatient Plan of Care  Goal: Plan of Care Review  Outcome: Improving   Assist of 1 with mobility and ambulation. Tums given for indigestion at 1401. Helpful. Pt will discharge today at 1700 via [facility] w/c. Medications will be delivered from [facility]. Dressings changed to right abd and superior midline. Pouch intact to middle of incision.

## 2021-12-21 NOTE — PROGRESS NOTES
CLINICAL NUTRITION SERVICES - BRIEF PN NOTE      RECOMMENDATIONS FOR MD/PROVIDER TO ORDER:   Increasing Na in TPN     Recommendations Ordered by Registered Dietitian (RD):   No new     Future/Additional Recommendations:   May need to decrease dextrose or lipid if triglycerides continue to elevate  May need to increase volume in TPN if not adequate not that pt is limiting his fluids to help with output   Malnutrition:   Moderate malnutrition in the context of acute illness (please carry forward if malnutrition diagnosed)       EVALUATION OF PROGRESS TOWARD GOALS   Diet:  Clear Liquid    Nutrition Support:  TPN D336/ @ 70 ml/hr plus 250 ml of 20% lipids  5 times per week over 12 hrs    1959 kcal, 115 gm AA, 336 gm cho, 36 gm lipid, 1680 mL fluid (+ average of 178.5 mL daily lipid volume)    Intake/Tolerance:      480 mL yesterday- pt is actively limiting his liquid p.o intake to reduce OP    ASSESSED NUTRITION NEEDS:  Dosing Weight:  76.8 kg (169 lb 6.4 oz)    Estimated energy needs: 9744-0045 Matt/day ([place] [address] Jeor w/ 1.4-2.0 stress factor)  Justification: post op  Estimated protein needs:  g/day (1.2-1.5 g/Kg)  Justification: post op  Estimated fluid needs 2304 ml/day (30 ml/Kg)  Justification: maintenance      NEW FINDINGS:   TPN until Fistula scars-could be several months  Plan for LTACH vs home with home care    Labs:   Na 130 L, decreased-likely d/t increased OP yesterday, despite decreased p.o intake.     GI:   abdominal pain/discomfort LBM 12/20/21 - large liquid  Enterocutaneous fistula    I/O 1.9/2.7 L last 24 hrs    Urine: 1400 mL  Drains: 1.3L  Increased urine and drain OP yesterday    Per chart notes pt is understanding to slow down on intake.  Fistula output is less.  (diet will not advance until fistula output minimized    CURRENT NUTRITION DIAGNOSIS  Malnutrition related to SBO as evidenced by prolonged poor intake of < 75% estimated energy needs for 19 days, moderate to severe  subcutaneous fat loss and moderate to severe muscle loss    INTERVENTIONS  Recommendations / Nutrition Prescription  Continue TPN    Implementation  PN Composition and Collaboration and Referral of Nutrition care    Goals  Meet estimated nutrition needs  Maintain weight    MONITORING AND EVALUATION:  Progress towards goals will be monitored and evaluated per protocol and Practice Guidelines, Fluid/beverage intake, Parenteral Nutrition intake, Weight, Biochemical data and Nutrition-focused physical findings

## 2021-12-22 ENCOUNTER — APPOINTMENT (OUTPATIENT)
Dept: PHYSICAL THERAPY | Facility: HOSPITAL | Age: 66
DRG: 393 | End: 2021-12-22
Payer: MEDICARE

## 2021-12-22 ENCOUNTER — APPOINTMENT (OUTPATIENT)
Dept: CT IMAGING | Facility: HOSPITAL | Age: 66
DRG: 393 | End: 2021-12-22
Attending: STUDENT IN AN ORGANIZED HEALTH CARE EDUCATION/TRAINING PROGRAM
Payer: MEDICARE

## 2021-12-22 ENCOUNTER — APPOINTMENT (OUTPATIENT)
Dept: OCCUPATIONAL THERAPY | Facility: HOSPITAL | Age: 66
DRG: 393 | End: 2021-12-22
Payer: MEDICARE

## 2021-12-22 LAB
ALBUMIN SERPL-MCNC: 2.4 G/DL (ref 3.5–5)
ALP SERPL-CCNC: 132 U/L (ref 45–120)
ALT SERPL W P-5'-P-CCNC: 18 U/L (ref 0–45)
ANION GAP SERPL CALCULATED.3IONS-SCNC: 8 MMOL/L (ref 5–18)
AST SERPL W P-5'-P-CCNC: 31 U/L (ref 0–40)
BASO STIPL BLD QL SMEAR: PRESENT
BASOPHILS # BLD MANUAL: 0.1 10E3/UL (ref 0–0.2)
BASOPHILS NFR BLD MANUAL: 1 %
BILIRUB SERPL-MCNC: 0.9 MG/DL (ref 0–1)
BUN SERPL-MCNC: 22 MG/DL (ref 8–22)
CALCIUM SERPL-MCNC: 8.8 MG/DL (ref 8.5–10.5)
CHLORIDE BLD-SCNC: 101 MMOL/L (ref 98–107)
CO2 SERPL-SCNC: 21 MMOL/L (ref 22–31)
CREAT SERPL-MCNC: 0.89 MG/DL (ref 0.7–1.3)
EOSINOPHIL # BLD MANUAL: 0.3 10E3/UL (ref 0–0.7)
EOSINOPHIL NFR BLD MANUAL: 2 %
ERYTHROCYTE [DISTWIDTH] IN BLOOD BY AUTOMATED COUNT: 15.1 % (ref 10–15)
GFR SERPL CREATININE-BSD FRML MDRD: >90 ML/MIN/1.73M2
GLUCOSE BLD-MCNC: 128 MG/DL (ref 70–125)
GLUCOSE BLDC GLUCOMTR-MCNC: 93 MG/DL (ref 70–99)
HCT VFR BLD AUTO: 36.1 % (ref 40–53)
HGB BLD-MCNC: 12 G/DL (ref 13.3–17.7)
LACTATE SERPL-SCNC: 0.8 MMOL/L (ref 0.7–2)
LACTATE SERPL-SCNC: 1.2 MMOL/L (ref 0.7–2)
LIPASE SERPL-CCNC: 64 U/L (ref 0–52)
LYMPHOCYTES # BLD MANUAL: 1.8 10E3/UL (ref 0.8–5.3)
LYMPHOCYTES NFR BLD MANUAL: 13 %
MAGNESIUM SERPL-MCNC: 2 MG/DL (ref 1.8–2.6)
MCH RBC QN AUTO: 29.8 PG (ref 26.5–33)
MCHC RBC AUTO-ENTMCNC: 33.2 G/DL (ref 31.5–36.5)
MCV RBC AUTO: 90 FL (ref 78–100)
METAMYELOCYTES # BLD MANUAL: 0.3 10E3/UL
METAMYELOCYTES NFR BLD MANUAL: 2 %
MONOCYTES # BLD MANUAL: 1.5 10E3/UL (ref 0–1.3)
MONOCYTES NFR BLD MANUAL: 11 %
NEUTROPHILS # BLD MANUAL: 9.8 10E3/UL (ref 1.6–8.3)
NEUTROPHILS NFR BLD MANUAL: 71 %
PLAT MORPH BLD: ABNORMAL
PLATELET # BLD AUTO: 564 10E3/UL (ref 150–450)
POLYCHROMASIA BLD QL SMEAR: SLIGHT
POTASSIUM BLD-SCNC: 4.9 MMOL/L (ref 3.5–5)
PROT SERPL-MCNC: 7.6 G/DL (ref 6–8)
RBC # BLD AUTO: 4.03 10E6/UL (ref 4.4–5.9)
RBC MORPH BLD: ABNORMAL
SARS-COV-2 RNA RESP QL NAA+PROBE: NEGATIVE
SODIUM SERPL-SCNC: 130 MMOL/L (ref 136–145)
TARGETS BLD QL SMEAR: SLIGHT
VARIANT LYMPHS BLD QL SMEAR: PRESENT
WBC # BLD AUTO: 13.8 10E3/UL (ref 4–11)

## 2021-12-22 PROCEDURE — 83605 ASSAY OF LACTIC ACID: CPT | Performed by: FAMILY MEDICINE

## 2021-12-22 PROCEDURE — 85027 COMPLETE CBC AUTOMATED: CPT | Performed by: EMERGENCY MEDICINE

## 2021-12-22 PROCEDURE — 96374 THER/PROPH/DIAG INJ IV PUSH: CPT

## 2021-12-22 PROCEDURE — 97162 PT EVAL MOD COMPLEX 30 MIN: CPT | Mod: GP

## 2021-12-22 PROCEDURE — G0378 HOSPITAL OBSERVATION PER HR: HCPCS

## 2021-12-22 PROCEDURE — 36415 COLL VENOUS BLD VENIPUNCTURE: CPT | Performed by: EMERGENCY MEDICINE

## 2021-12-22 PROCEDURE — 83690 ASSAY OF LIPASE: CPT | Performed by: EMERGENCY MEDICINE

## 2021-12-22 PROCEDURE — 97110 THERAPEUTIC EXERCISES: CPT | Mod: GP

## 2021-12-22 PROCEDURE — 97535 SELF CARE MNGMENT TRAINING: CPT | Mod: GO

## 2021-12-22 PROCEDURE — 250N000013 HC RX MED GY IP 250 OP 250 PS 637: Performed by: EMERGENCY MEDICINE

## 2021-12-22 PROCEDURE — 96375 TX/PRO/DX INJ NEW DRUG ADDON: CPT

## 2021-12-22 PROCEDURE — 80053 COMPREHEN METABOLIC PANEL: CPT | Performed by: EMERGENCY MEDICINE

## 2021-12-22 PROCEDURE — 99024 POSTOP FOLLOW-UP VISIT: CPT | Performed by: SURGERY

## 2021-12-22 PROCEDURE — 250N000011 HC RX IP 250 OP 636: Performed by: STUDENT IN AN ORGANIZED HEALTH CARE EDUCATION/TRAINING PROGRAM

## 2021-12-22 PROCEDURE — 250N000011 HC RX IP 250 OP 636: Performed by: FAMILY MEDICINE

## 2021-12-22 PROCEDURE — 36415 COLL VENOUS BLD VENIPUNCTURE: CPT | Performed by: FAMILY MEDICINE

## 2021-12-22 PROCEDURE — 250N000013 HC RX MED GY IP 250 OP 250 PS 637: Performed by: STUDENT IN AN ORGANIZED HEALTH CARE EDUCATION/TRAINING PROGRAM

## 2021-12-22 PROCEDURE — 83735 ASSAY OF MAGNESIUM: CPT | Performed by: EMERGENCY MEDICINE

## 2021-12-22 PROCEDURE — 87635 SARS-COV-2 COVID-19 AMP PRB: CPT | Performed by: EMERGENCY MEDICINE

## 2021-12-22 PROCEDURE — 36415 COLL VENOUS BLD VENIPUNCTURE: CPT | Performed by: STUDENT IN AN ORGANIZED HEALTH CARE EDUCATION/TRAINING PROGRAM

## 2021-12-22 PROCEDURE — 258N000003 HC RX IP 258 OP 636: Performed by: STUDENT IN AN ORGANIZED HEALTH CARE EDUCATION/TRAINING PROGRAM

## 2021-12-22 PROCEDURE — 120N000001 HC R&B MED SURG/OB

## 2021-12-22 PROCEDURE — 96372 THER/PROPH/DIAG INJ SC/IM: CPT | Performed by: STUDENT IN AN ORGANIZED HEALTH CARE EDUCATION/TRAINING PROGRAM

## 2021-12-22 PROCEDURE — 83605 ASSAY OF LACTIC ACID: CPT | Performed by: EMERGENCY MEDICINE

## 2021-12-22 PROCEDURE — 74177 CT ABD & PELVIS W/CONTRAST: CPT

## 2021-12-22 PROCEDURE — 87106 FUNGI IDENTIFICATION YEAST: CPT | Performed by: STUDENT IN AN ORGANIZED HEALTH CARE EDUCATION/TRAINING PROGRAM

## 2021-12-22 PROCEDURE — 99223 1ST HOSP IP/OBS HIGH 75: CPT | Performed by: STUDENT IN AN ORGANIZED HEALTH CARE EDUCATION/TRAINING PROGRAM

## 2021-12-22 PROCEDURE — 87040 BLOOD CULTURE FOR BACTERIA: CPT | Performed by: STUDENT IN AN ORGANIZED HEALTH CARE EDUCATION/TRAINING PROGRAM

## 2021-12-22 PROCEDURE — 97116 GAIT TRAINING THERAPY: CPT | Mod: GP

## 2021-12-22 PROCEDURE — 999N000197 HC STATISTIC WOC PT EDUCATION, 0-15 MIN

## 2021-12-22 PROCEDURE — 250N000009 HC RX 250: Performed by: STUDENT IN AN ORGANIZED HEALTH CARE EDUCATION/TRAINING PROGRAM

## 2021-12-22 PROCEDURE — 97165 OT EVAL LOW COMPLEX 30 MIN: CPT | Mod: GO

## 2021-12-22 RX ORDER — GABAPENTIN 300 MG/1
600 CAPSULE ORAL AT BEDTIME
Status: DISCONTINUED | OUTPATIENT
Start: 2021-12-22 | End: 2021-12-23

## 2021-12-22 RX ORDER — OXYCODONE AND ACETAMINOPHEN 5; 325 MG/1; MG/1
1-2 TABLET ORAL EVERY 6 HOURS PRN
Status: DISCONTINUED | OUTPATIENT
Start: 2021-12-22 | End: 2022-01-19

## 2021-12-22 RX ORDER — MIRTAZAPINE 30 MG/1
30 TABLET, FILM COATED ORAL AT BEDTIME
Status: DISCONTINUED | OUTPATIENT
Start: 2021-12-22 | End: 2021-12-23

## 2021-12-22 RX ORDER — ONDANSETRON 4 MG/1
4 TABLET, ORALLY DISINTEGRATING ORAL EVERY 6 HOURS PRN
Status: DISCONTINUED | OUTPATIENT
Start: 2021-12-22 | End: 2022-02-14 | Stop reason: HOSPADM

## 2021-12-22 RX ORDER — VANCOMYCIN HYDROCHLORIDE 1 G/200ML
1000 INJECTION, SOLUTION INTRAVENOUS EVERY 12 HOURS
Status: DISCONTINUED | OUTPATIENT
Start: 2021-12-23 | End: 2021-12-25

## 2021-12-22 RX ORDER — QUETIAPINE FUMARATE 100 MG/1
100 TABLET, FILM COATED ORAL
Status: ON HOLD | COMMUNITY
End: 2022-02-14

## 2021-12-22 RX ORDER — ONDANSETRON 2 MG/ML
4 INJECTION INTRAMUSCULAR; INTRAVENOUS EVERY 6 HOURS PRN
Status: DISCONTINUED | OUTPATIENT
Start: 2021-12-22 | End: 2022-02-14 | Stop reason: HOSPADM

## 2021-12-22 RX ORDER — DEXTROSE MONOHYDRATE 100 MG/ML
INJECTION, SOLUTION INTRAVENOUS CONTINUOUS PRN
Status: DISCONTINUED | OUTPATIENT
Start: 2021-12-22 | End: 2021-12-31

## 2021-12-22 RX ORDER — LIDOCAINE 40 MG/G
CREAM TOPICAL
Status: DISCONTINUED | OUTPATIENT
Start: 2021-12-22 | End: 2022-02-14 | Stop reason: HOSPADM

## 2021-12-22 RX ORDER — IOPAMIDOL 755 MG/ML
100 INJECTION, SOLUTION INTRAVASCULAR ONCE
Status: COMPLETED | OUTPATIENT
Start: 2021-12-22 | End: 2021-12-22

## 2021-12-22 RX ORDER — OXYCODONE AND ACETAMINOPHEN 5; 325 MG/1; MG/1
1 TABLET ORAL EVERY 6 HOURS PRN
Status: DISCONTINUED | OUTPATIENT
Start: 2021-12-22 | End: 2021-12-22

## 2021-12-22 RX ORDER — CEFAZOLIN SODIUM 1 G/50ML
1250 SOLUTION INTRAVENOUS EVERY 12 HOURS
Status: DISCONTINUED | OUTPATIENT
Start: 2021-12-22 | End: 2021-12-22

## 2021-12-22 RX ADMIN — OXYCODONE HYDROCHLORIDE AND ACETAMINOPHEN 1 TABLET: 5; 325 TABLET ORAL at 00:02

## 2021-12-22 RX ADMIN — POTASSIUM CHLORIDE: 2 INJECTION, SOLUTION, CONCENTRATE INTRAVENOUS at 20:27

## 2021-12-22 RX ADMIN — MIRTAZAPINE 30 MG: 30 TABLET ORAL at 02:55

## 2021-12-22 RX ADMIN — VANCOMYCIN HYDROCHLORIDE 1500 MG: 5 INJECTION, POWDER, LYOPHILIZED, FOR SOLUTION INTRAVENOUS at 17:20

## 2021-12-22 RX ADMIN — OXYCODONE HYDROCHLORIDE AND ACETAMINOPHEN 2 TABLET: 5; 325 TABLET ORAL at 22:25

## 2021-12-22 RX ADMIN — OXYCODONE HYDROCHLORIDE AND ACETAMINOPHEN 2 TABLET: 5; 325 TABLET ORAL at 16:25

## 2021-12-22 RX ADMIN — GABAPENTIN 600 MG: 300 CAPSULE ORAL at 02:54

## 2021-12-22 RX ADMIN — ENOXAPARIN SODIUM 40 MG: 40 INJECTION SUBCUTANEOUS at 07:44

## 2021-12-22 RX ADMIN — CEFEPIME HYDROCHLORIDE 2 G: 2 INJECTION, POWDER, FOR SOLUTION INTRAVENOUS at 17:21

## 2021-12-22 RX ADMIN — I.V. FAT EMULSION 250 ML: 20 EMULSION INTRAVENOUS at 20:28

## 2021-12-22 RX ADMIN — IOPAMIDOL 100 ML: 755 INJECTION, SOLUTION INTRAVENOUS at 15:00

## 2021-12-22 RX ADMIN — GABAPENTIN 600 MG: 300 CAPSULE ORAL at 22:25

## 2021-12-22 RX ADMIN — OXYCODONE HYDROCHLORIDE AND ACETAMINOPHEN 2 TABLET: 5; 325 TABLET ORAL at 07:43

## 2021-12-22 RX ADMIN — MIRTAZAPINE 30 MG: 30 TABLET ORAL at 22:25

## 2021-12-22 ASSESSMENT — ACTIVITIES OF DAILY LIVING (ADL)
ADLS_ACUITY_SCORE: 7
DEPENDENT_IADLS:: TRANSPORTATION;MEDICATION MANAGEMENT
PREVIOUS_RESPONSIBILITIES: FINANCES;MEDICATION MANAGEMENT
ADLS_ACUITY_SCORE: 7

## 2021-12-22 ASSESSMENT — ENCOUNTER SYMPTOMS
ABDOMINAL PAIN: 1
FATIGUE: 1

## 2021-12-22 ASSESSMENT — MIFFLIN-ST. JEOR: SCORE: 1488.42

## 2021-12-22 NOTE — PROGRESS NOTES
[facility]      OUTPATIENT PHYSICAL THERAPY EVALUATION  PLAN OF TREATMENT FOR OUTPATIENT REHABILITATION  (COMPLETE FOR INITIAL CLAIMS ONLY)  Patient's Last Name, First Name, M.I.  YOB: 1955  South[place]urdeep vasquez                        Provider's Name  [facility] Medical Record No.  [id number]                               Onset Date:  12/21/21   Start of Care Date:  12/22/21      Type:     _X_PT   ___OT   ___SLP Medical Diagnosis:  abdominal pain, SBO/recent surgery                        PT Diagnosis:  decreased activity tolerance   Visits from SOC:  1   _________________________________________________________________________________  Plan of Treatment/Functional Goals    Planned Interventions: bed mobility training,transfer training,gait training,stair training,strengthening     Goals: See Physical Therapy Goals on Care Plan in [facility] electronic health record.    Therapy Frequency:    Predicted Duration of Therapy Intervention: 5  _________________________________________________________________________________    I CERTIFY THE NEED FOR THESE SERVICES FURNISHED UNDER        THIS PLAN OF TREATMENT AND WHILE UNDER MY CARE     (Physician co-signature of this document indicates review and certification of the therapy plan).                Certification date from: 12/22/21, Certification date to: 12/29/21    Referring Physician: KAREY Araujo            Initial Assessment        See Physical Therapy evaluation dated 12/22/21 in Epic electronic health record.

## 2021-12-22 NOTE — ED NOTES
Prior to dressing change 2 tabs percocet given which was effective for pain control and wound care.

## 2021-12-22 NOTE — CONSULTS
Care Management Initial Consult    General Information  Assessment completed with: Gurdeep Bobby  Type of CM/SW Visit: Initial Assessment    Primary Care Provider verified and updated as needed:     Readmission within the last 30 days:        Reason for Consult: discharge planning  Advance Care Planning: Advance Care Planning Reviewed: other (comment)  gave HCD copy       Communication Assessment  Patient's communication style: spoken language (English or Bilingual)    Hearing Difficulty or Deaf: no   Wear Glasses or Blind: yes    Cognitive  Cognitive/Neuro/Behavioral: WDL                      Living Environment:   People in home: other (see comments) (brother-in-law)     Current living Arrangements: house      Able to return to prior arrangements: other (see comments)  Living Arrangement Comments: pt verbalize he does not want to return to current home    Family/Social Support:  Care provided by: self  Provides care for: no one  Marital Status: Single  None          Description of Support System:           Current Resources:   Patient receiving home care services:       Community Resources: [facility]  Equipment currently used at home: other (see comments) (infusion pump)  Supplies currently used at home:      Employment/Financial:  Employment Status: disabled        Financial Concerns:             Lifestyle & Psychosocial Needs:  Social Determinants of Health     Tobacco Use: High Risk     Smoking Tobacco Use: Current Every Day Smoker     Smokeless Tobacco Use: Never Used   Alcohol Use: Not on file   Financial Resource Strain: Not on file   Food Insecurity: Not on file   Transportation Needs: Not on file   Physical Activity: Not on file   Stress: Not on file   Social Connections: Not on file   Intimate Partner Violence: Not on file   Depression: Not on file   Housing Stability: Not on file       Functional Status:  Prior to admission patient needed assistance:   Dependent ADLs:: Independent  Dependent  IADLs:: Transportation,Medication Management  Assesssment of Functional Status: At functional baseline    Mental Health Status:  Mental Health Status: No Current Concerns       Chemical Dependency Status:                Values/Beliefs:  Spiritual, Cultural Beliefs, Jainism Practices, Values that affect care:                 Additional Information:  ELE assessed.  Pt lives in home with brother-in-law, however does not want to return to this home.  Pt is independent, receives TPN from [facility].  Gave HCD paperwork.  May need to re-assess pt's home situation.  Pt will need transport at time of discharge.    Conchis Shakih RN

## 2021-12-22 NOTE — ED TRIAGE NOTES
Patient brought in by [facility] from home after leaving AMA from [place] around 1800 today. Patient reports increasing abdominal pain and weakness. He was in the hospital for a perforated bowel for 4 weeks prior to leaving [place]. He is NPO and currently has TPN running through a PICC line.

## 2021-12-22 NOTE — ED NOTES
[facility] ED Handoff Report    ED Chief Complaint: fistula    ED Diagnosis:  (R10.84) Abdominal pain, generalized  Comment:   Plan:        PMH:    Past Medical History:   Diagnosis Date     Benign prostatic hyperplasia with weak urinary stream      Chronic bilateral low back pain without sciatica      Chronic neck pain      RAVI (generalized anxiety disorder)      History of hepatitis C     treated and cured     History of substance abuse (H)      Hypertension goal BP (blood pressure) < 140/90      Moderate major depression (H)         Code Status:  Full Code     Falls Risk: No Band: Not applicable    Current Living Situation/Residence: [facility] unable to care for     Elimination Status: Continent: No     Activity Level: SBA    Patients Preferred Language:  English     Needed: No    Vital Signs:  /82   Pulse 90   Temp 98.5  F (36.9  C) (Oral)   Resp 22   Wt 78.5 kg (173 lb)   SpO2 93%   BMI 27.10 kg/m       Cardiac Rhythm: NSR    Pain Score: 4/10    Is the Patient Confused:  No    Last Food or Drink: 12/20/21 at NPO    Focused Assessment:    65 y/o M with past medical history of splenectomy, appendectomy, HTN, and substance abuse, with recent admission to hospital 11/30-12/21/2021 for SBO with perforation s/p surgical repair. Discharged home but presents as he could not manage at home and is now requesting to be placed in TCU vs acute rehab facility, has leaking fistula       Events: New admission to floor @ 1710     Neuro: Oriented x4  IV/drain: PIV right arm    VSS: VSS on RA  Resp: Clear    Cardio: WDL.   GI/: Voiding adequately , will spill urinal, NPO, has abd fistula with ostomy bag over it, leaking green bile  Pain/Discomfort: abd pain, managed with meds  Activity: Up with assist  Nutrition: NPO on TPN  Skin: CDI .        General: alert, comfortable, sitting in bed        Tests Performed: Done: Labs and Imaging    Treatments Provided:      Family Dynamics/Concerns:  No    Family Updated On Visitor Policy: Yes    Plan of Care Communicated to Family: Yes    Who Was Updated about Plan of Care: Patient    Belongings Checklist Done and Signed by Patient: Yes    Covid: asymptomatic , negative    Additional Information:     RN: Roni Davila   12/22/2021 12:13 PM

## 2021-12-22 NOTE — PHARMACY-CONSULT NOTE
"Pharmacy Note: Parenteral Nutrition (PN) Management    Pharmacist consulted to dose PN for Gurdeep Dia, a 66 year old male by Dr. Mcmahon.    Subjective:    Patient discharged from [facility] 12/21/21 and returned the same day due to care concerns, TPN on hold since discharge.    The patient was started on PN in the hospital on 12/5/21.    Indication for PN therapy: bowel resection -- plan to continue TPN until fistula heals    Social History     Tobacco Use     Smoking status: Current Every Day Smoker     Smokeless tobacco: Never Used   Substance Use Topics     Alcohol use: Not Currently     Comment: Clean for 5 years     Drug use: Not Currently     Objective:    Ht Readings from Last 1 Encounters:   12/22/21 1.702 m (5' 7\")     Wt Readings from Last 1 Encounters:   12/22/21 75.5 kg (166 lb 6.4 oz)       Body mass index is 26.06 kg/m .    Patient Vitals for the past 96 hrs:   Weight   12/22/21 1242 75.5 kg (166 lb 6.4 oz)   12/21/21 2223 78.5 kg (173 lb)       Labs:  Last 3 days:  Recent Labs     12/20/21  0644 12/21/21  0644 12/22/21  0003   * 130* 130*   POTASSIUM 4.5 4.6 4.9   CHLORIDE 103 101 101   CO2 23 22 21*   BUN 19 21 22   CR 0.86 0.83 0.89   VIJAYA 9.1 8.7 8.8   MAG 2.1 2.0 2.0   PHOS 3.5 3.2  --    PROTTOTAL 7.8  --  7.6   ALBUMIN 2.4*  --  2.4*   PREALB 27  --   --    TRIG 243*  --   --    HGB 12.0* 11.5* 12.0*   HCT 37.1* 35.1* 36.1*   * 640* 564*   BILITOTAL 0.6  --  0.9   AST 18  --  31   ALT 19  --  18   ALKPHOS 124*  --  132*   INR 1.11  --   --        Glucose (past 48 hours):   Recent Labs     12/20/21  1725 12/20/21  2113 12/21/21  0240 12/21/21  0644 12/22/21  0003   * 119* 136* 129* 128*       Intake/Output (last 24 hours): No intake/output data recorded.    Estimated CrCl: Estimated Creatinine Clearance: 86 mL/min (based on SCr of 0.89 mg/dL).    Assessment:    Continue patient on PN therapy as a continuous central therapy.     Given the patient's current condition/oral " intake, PN is still indicated.    Lab results reviewed:   12/22 Labs similar to yesterday prior to discharge -- continue formula planned for discharge.    Plan:  1. Rate of PN: 70 mL/hr  2. Formula:     Amino Acids 115 grams    Dextrose 336 grams    Sodium 110 mEq/day    Potassium 80 mEq/day    Calcium 8 mEq/day    Magnesium 14 mEq/day    Phosphorus 32 mMol/day    Chloride: Acetate Ratio 1:1    Standard Multivitamins w/Vitamin K    Trace Elements  3. Fat Emulsion: 20%, 250 mL IV five times weekly.  4. Check hyperal lytes labs tomorrow.  5. Pharmacist will continue to follow the patient's lab results, clinical status and blood glucose results and make adjustments as appropriate.    Thank you for the consult.  Aye Domínguez [facility]  12/22/2021 12:54 PM

## 2021-12-22 NOTE — ED PROVIDER NOTES
EMERGENCY DEPARTMENT ENCOUNTER      NAME: Gurdeep Dia  AGE: 66 year old male  YOB: 1955  MRN: [id number]  EVALUATION DATE & TIME: 12/21/2021 11:34 PM    PCP: Hardy Hardwick    ED PROVIDER: ANDERSON Ayala      Chief Complaint   Patient presents with     Abdominal Pain     Generalized Weakness     TPN     FINAL IMPRESSION:  1. Abdominal pain, generalized        ED COURSE & MEDICAL DECISION MAKING:    Pertinent Labs & Imaging studies reviewed. (See chart for details)  66 year old male presents to the Emergency Department for evaluation of abdominal pain and inability to manage his medications and constellation of symptoms related to his multiple medical issues.  Patient just was admitted to the hospital with a prolonged hospital stay secondary to bowel obstruction followed by bowel perforation followed by multiple surgeries complicated by abscess and infection currently on TPN n.p.o. status on chronic pain medication.  Patient reports that he left the hospital AGAINST MEDICAL ADVICE at 6:00 PM because he felt that he could manage at home.  In reviewing the charting it appears that the patient was discharged at 6 PM with plan to continue his care as an outpatient basis.  He reports that inpatient services desired placement in a rehab facility but given patient's Felon status he was unable to secure a spot.  After several hours at home he did not feel that he could safely manage and returns to the hospital requesting admission to the hospital.  On examination patient was noted to have dressings mid superior abdomen and right lower quadrant that were saturated with purulent material.  His ostomy bag was draining loose stool.  There was some erythema along the suture lines of his superior incision.  Abdominal examination with tenderness to palpation but patient feeling like his abdominal pain is similar to what he was experiencing in the hospita he is being maintained on oral Percocet for management  of his pain.  No other concerning exam findings.  Going to repeat some laboratory testing given his current complaints to assess for stability from his hospital stay.  All then we discussed patient's case with the admitting services with considerations for readmission and placement in a long-term care facility given patient failure of outpatient approach to care just a few hours after discharge.    1:06 AM  Patient has received his oral dose of scheduled pain medication.  Labs appearing stable compared to discharge.  As above we are proceeding with admission for considerations for placement given patient's struggles in such a short period of time of discharge from the hospital.  Case was discussed with the resident admitting services.  He is on observation status for placement and will be boarding in the emergency department given the lack of bed availability in the hospital.    11:40 PM I met with the patient, obtained history, performed an initial exam, and discussed options and plan for diagnostics and treatment here in the ED.   1:04 AM I spoke with Dr. Araujo with the hospitalist service who agrees to admit the patient.      Diagnosis discussed with and explained to the patient and/or associated parties to their satisfaction.  All questions were answered at this time and they are in agreement with this diagnosis, treatment, and plan. No further emergent ED workup warranted at this time and patient did accept admission to the hospital.      MEDICATIONS GIVEN IN THE EMERGENCY:  New Prescriptions    No medications on file       NEW PRESCRIPTIONS STARTED AT TODAY'S ER VISIT  Patient's Medications   New Prescriptions    No medications on file   Previous Medications    BUSPIRONE (BUSPAR) 15 MG TABLET    Take 1 tablet (15 mg) by mouth 2 times daily    GABAPENTIN (NEURONTIN) 300 MG CAPSULE    Take 1 capsule (300 mg) by mouth 2 times daily Morning, dinnertime    GABAPENTIN (NEURONTIN) 300 MG CAPSULE    Take 2 capsules  (600 mg) by mouth At Bedtime    LEVOTHYROXINE (SYNTHROID/LEVOTHROID) 25 MCG TABLET    Take 1 tablet (25 mcg) by mouth daily    LIDOCAINE (LMX4) 4 % EXTERNAL CREAM    Apply topically every hour as needed for pain (with VAD insertion)    LIPIDS (INTRALIPID) 20 % INFUSION    Inject 250 mLs into the vein every 24 hours    MELOXICAM (MOBIC) 7.5 MG TABLET    Take 1 tablet (7.5 mg) by mouth daily as needed for pain    MIRTAZAPINE (REMERON) 30 MG TABLET    Take 1 tablet (30 mg) by mouth At Bedtime    OMEPRAZOLE (PRILOSEC) 20 MG DR CAPSULE    Take 2 capsules (40 mg) by mouth daily    OXYCODONE-ACETAMINOPHEN (PERCOCET) 5-325 MG TABLET    Take 1 tablet by mouth every 6 hours as needed for pain    PARENTERAL NUTRITION - PTA/DISCHARGE ORDER    The TPN formula will print on the After Visit Summary Report.    SODIUM CHLORIDE, PF, 0.9% PF FLUSH    Irrigate with 10 mLs as directed daily    TAMSULOSIN (FLOMAX) 0.4 MG CAPSULE    Take 1 capsule (0.4 mg) by mouth daily    TRIAMCINOLONE (NASACORT) 55 MCG/ACT NASAL AEROSOL    Spray 2 sprays into both nostrils daily    VENLAFAXINE (EFFEXOR-XR) 37.5 MG 24 HR CAPSULE    Take 1 capsule (37.5 mg) by mouth daily   Modified Medications    No medications on file   Discontinued Medications    No medications on file          =================================================================    HPI    Patient information was obtained from: Patient    Use of Intrepreter: N/A       Gurdeep Dia is a 66 year old male with a history of splenectomy, appendectomy, s/p ostomy, HTN, SBO, substance abuse, who presents, via EMS,  with abdominal pain.    The patient reports continuing abdominal pain after being discharged home about five hours ago.  He states that he thinks he needs a rehab facility as he was having trouble at home this evening.  He notes that he wanted to be discharged earlier today but now he realizes that he needs to be in a rehab facility.  The patient notes that his abdominal incision  starting draining today as he reports that it was previously looking okay.  He does note that he has not taken any of his nighttime medication including Seroquel and Percocet.  He denies any other acute complaints at this time other than feeling tired.       Per chart review, the patient was admitted at this hospital from 11/30-12/21 for an SBO with perforation.  He was admitted for surgical repair of right-sided SBO and then intestine was perforated and he developed abdominal abscess and enterocutaneous fistula.  He underwent multiple drain placements and multiple rounds of both IV and oral antibiotics while admitted.  he underwent a washout on 12/3/21.  The enterocutaneous fistula was draining into ostomy bag on discharge.  On discharge he was needing TPN until fistula scared down.      REVIEW OF SYSTEMS   Review of Systems   Constitutional: Positive for fatigue.   Gastrointestinal: Positive for abdominal pain (chronic ).   Musculoskeletal:        Positive for purulent drainage from abdominal incision site   All other systems reviewed and are negative.     PAST MEDICAL HISTORY:  Past Medical History:   Diagnosis Date     Benign prostatic hyperplasia with weak urinary stream      Chronic bilateral low back pain without sciatica      Chronic neck pain      RAVI (generalized anxiety disorder)      History of hepatitis C     treated and cured     History of substance abuse (H)      Hypertension goal BP (blood pressure) < 140/90      Moderate major depression (H)        PAST SURGICAL HISTORY:  Past Surgical History:   Procedure Laterality Date     C APPENDECTOMY  ~1996     LAPAROSCOPY DIAGNOSTIC (GYN) N/A 11/30/2021    Procedure: LAPAROSCOPY;  Surgeon: Mekhi Richardson MD;  Location: [facility] OR     LAPAROTOMY EXPLORATORY N/A 12/3/2021    Procedure: EXPLORATORY LAPAROTOMY, WITH WASHOUT, REPAIR OF SMALL BOWEL PERFORATION.;  Surgeon: Mekhi Richardson MD;  Location: [facility] OR     LAPAROTOMY, LYSIS  ADHESIONS, COMBINED N/A 11/30/2021    Procedure: REPAIR ENTEROTOMY, EXTENSIVE LYSIS OF ADHESION;  Surgeon: Mekhi Richardson MD;  Location: [facility] OR     PICC DOUBLE LUMEN PLACEMENT  12/3/2021          RESECT SMALL BOWEL WITHOUT OSTOMY N/A 11/30/2021    Procedure: COVERTED TO OPEN, EXPLORATORY LAPAROTOMY, SMALL BOWEL RESECTION,;  Surgeon: Mekhi Richardson MD;  Location: [facility] OR     SPLENECTOMY  ~1995       ALLERGIES:  Allergies   Allergen Reactions     Penicillins Hives       FAMILY HISTORY:  No family history on file.    SOCIAL HISTORY:   Social History     Socioeconomic History     Marital status: Single     Spouse name: Not on file     Number of children: Not on file     Years of education: Not on file     Highest education level: Not on file   Occupational History     Not on file   Tobacco Use     Smoking status: Current Every Day Smoker     Smokeless tobacco: Never Used   Substance and Sexual Activity     Alcohol use: Not Currently     Comment: Clean for 5 years     Drug use: Not Currently     Sexual activity: Not Currently     Partners: Female   Other Topics Concern     Parent/sibling w/ CABG, MI or angioplasty before 65F 55M? Not Asked   Social History Narrative     Not on file     Social Determinants of Health     Financial Resource Strain: Not on file   Food Insecurity: Not on file   Transportation Needs: Not on file   Physical Activity: Not on file   Stress: Not on file   Social Connections: Not on file   Intimate Partner Violence: Not on file   Housing Stability: Not on file       VITALS:  Patient Vitals for the past 24 hrs:   BP Temp Temp src Pulse Resp SpO2 Weight   12/21/21 2223 124/75 100  F (37.8  C) Temporal 116 22 94 % 78.5 kg (173 lb)       PHYSICAL EXAM    Constitutional:   Elderly male, appears older than stated age.  HENT:  Normocephalic, Atraumatic, Bilateral external ears normal, Oropharynx moist Nose normal.   Neck: Normal range of motion   Eyes: Conjunctiva normal, No  discharge.   Respiratory:  Normal breath sounds, No respiratory distress, No wheezing.  No cough.  Cardiovascular:  Normal heart rate, Normal rhythm. No murmur appreciated.  Chest wall non-tender.  GI:   Diffuse abdominal tenderness no guarding or peritoneal signs no appreciable distention or palpable masses there is a midline vertical abdominal incision with erythema at the superior aspect of the incision site staples are present there is purulent drainage from the superior aspect of the incision and then also from what appears to be a previous drain insertion site in the right lower quadrant.  Tenderness increased to palpation in those areas.  Ostomy bag draining brown liquid stool  : No CVA tenderness  Musculoskeletal: Full ROM.  No edema appreciated.  No cyanosis. Good ROM of major joints.  Integument:  Warm, Dry, No erythema, No rash.    Neurologic:  Alert & oriented.  No focal deficits appreciated.  Ambulatory.  Psychiatric:  Affect normal, Judgment normal, Mood normal.     LAB:  All pertinent labs reviewed and interpreted.  Results for orders placed or performed during the hospital encounter of 12/21/21   Comprehensive metabolic panel   Result Value Ref Range    Sodium 130 (L) 136 - 145 mmol/L    Potassium 4.9 3.5 - 5.0 mmol/L    Chloride 101 98 - 107 mmol/L    Carbon Dioxide (CO2) 21 (L) 22 - 31 mmol/L    Anion Gap 8 5 - 18 mmol/L    Urea Nitrogen 22 8 - 22 mg/dL    Creatinine 0.89 0.70 - 1.30 mg/dL    Calcium 8.8 8.5 - 10.5 mg/dL    Glucose 128 (H) 70 - 125 mg/dL    Alkaline Phosphatase 132 (H) 45 - 120 U/L    AST 31 0 - 40 U/L    ALT 18 0 - 45 U/L    Protein Total 7.6 6.0 - 8.0 g/dL    Albumin 2.4 (L) 3.5 - 5.0 g/dL    Bilirubin Total 0.9 0.0 - 1.0 mg/dL    GFR Estimate >90 >60 mL/min/1.73m2   Result Value Ref Range    Lipase 64 (H) 0 - 52 U/L   Lactic acid whole blood   Result Value Ref Range    Lactic Acid 1.2 0.7 - 2.0 mmol/L   Result Value Ref Range    Magnesium 2.0 1.8 - 2.6 mg/dL   Asymptomatic  COVID-19 Virus (Coronavirus) by PCR Nasopharyngeal    Specimen: Nasopharyngeal; Swab   Result Value Ref Range    SARS CoV2 PCR Negative Negative   CBC with platelets and differential   Result Value Ref Range    WBC Count 13.8 (H) 4.0 - 11.0 10e3/uL    RBC Count 4.03 (L) 4.40 - 5.90 10e6/uL    Hemoglobin 12.0 (L) 13.3 - 17.7 g/dL    Hematocrit 36.1 (L) 40.0 - 53.0 %    MCV 90 78 - 100 fL    MCH 29.8 26.5 - 33.0 pg    MCHC 33.2 31.5 - 36.5 g/dL    RDW 15.1 (H) 10.0 - 15.0 %    Platelet Count 564 (H) 150 - 450 10e3/uL       I, Shortychalino Hernandez, am serving as a scribe to document services personally performed by Dr. Ayala based on my observation and the provider's statements to me. I, Roni Ayala MD attest that Shorty Hernandez is acting in a scribe capacity, has observed my performance of the services and has documented them in accordance with my direction.    Roni Ayala M.D.  Emergency Medicine  [facility] EMERGENCY DEPARTMENT  [facility] [facility] [id number] [phone]  Dept: [phone]      Roni Ayala MD  12/22/21 0107

## 2021-12-22 NOTE — PHARMACY-ADMISSION MEDICATION HISTORY
Pharmacy Note - Admission Medication History    Pertinent Provider Information:  Patient discharged 12/21 @ 1721, and returned to ED 12/21 @ 2334.  He only took one pain med (Percocet) while at home. All other last doses were while admitted. ______________________________________________________________________    Prior To Admission (PTA) med list completed and updated in EMR.       PTA Med List   Medication Sig Note Last Dose     busPIRone (BUSPAR) 15 MG tablet Take 1 tablet (15 mg) by mouth 2 times daily  12/21/2021 at am     gabapentin (NEURONTIN) 300 MG capsule Take 1 capsule (300 mg) by mouth 2 times daily Morning, dinnertime  12/21/2021 at 1400     gabapentin (NEURONTIN) 300 MG capsule Take 2 capsules (600 mg) by mouth At Bedtime  12/22/2021 at 0300 in ED     levothyroxine (SYNTHROID/LEVOTHROID) 25 MCG tablet Take 1 tablet (25 mcg) by mouth daily  12/21/2021 at 0600     lipids (INTRALIPID) 20 % infusion Inject 250 mLs into the vein every 24 hours  12/20/2021 at pm     mirtazapine (REMERON) 30 MG tablet Take 1 tablet (30 mg) by mouth At Bedtime  12/22/2021 at 0300 in ED     oxyCODONE-acetaminophen (PERCOCET) 5-325 MG tablet Take 1 tablet by mouth every 6 hours as needed for pain 12/22/2021: + 2 tabs at 0745 12/22 12/22/2021 at Midnight in ED     parenteral nutrition - PTA/DISCHARGE ORDER The TPN formula will print on the After Visit Summary Report.  12/20/2021 at pm     QUEtiapine (SEROQUEL) 100 MG tablet Take 100 mg by mouth nightly as needed 12/22/2021: Pt inquring about this medication to help him sleep. It was ordered during his recent admission, but not upon discharge. 12/19/2021 at hs     tamsulosin (FLOMAX) 0.4 MG capsule Take 1 capsule (0.4 mg) by mouth daily  12/21/2021 at am     venlafaxine (EFFEXOR-XR) 37.5 MG 24 hr capsule Take 1 capsule (37.5 mg) by mouth daily  12/21/2021 at am       Information source(s): Patient and Hospital records  Method of interview communication: in-person    Summary of  Changes to PTA Med List  New: quetiapine  Discontinued: none  Changed: none    Patient was asked about OTC/herbal products specifically.  PTA med list reflects this.    In the past week, patient estimated taking medication this percent of the time:  greater than 90%.    Allergies were reviewed, assessed, and updated with the patient.      Patient does not anticipate needing any multi-use medications during admission.    The information provided in this note is only as accurate as the sources available at the time of the update(s).    Thank you,  Katrin Resendiz, [facility]  12/22/2021 8:26 AM

## 2021-12-22 NOTE — CONSULTS
General Surgery Consultation  Gurdeep Dia MRN# [id number]   Age/Sex: 66 year old male YOB: 1955     Reason for consult: 1. Abdominal pain, generalized            Requesting physician: Edgar Mcmahon MD                   Assessment and Plan:   Assessment:  1.  Abdominal pain  2.  Cellulitis along the midline incision  3.  Enterocutaneous fistula  4.  Electrolyte imbalance    Plan:  -Acute surgical intervention at this time.  Believe that the patient's abdominal pain and leukocytosis are multifactorial from cellulitis along his midline incision as well as the enterocutaneous fistula.  Continue with conservative management at this time for the enteric cutaneous fistula.  Patient already has a right upper extremity PICC line with TPN.  We will continue with TPN.  In regards to the cellulitis of the midline incision, cultures were already obtained by the ID team.  We will await finalization of the cultures.  Continue with antibiotics coverage for now.           -ID recommendations were reviewed.           -Further recommendations to follow once the CT scan of the abdomen and pelvis are completed.  -Continue with local wound care to the iIncision and placement of the ostomy over the EC fistula.  -We will need to optimize patient's electrolytes.  -Continue with medical management per primary team.  -General surgery team will continue to follow with you.         Chief Complaint:     Chief Complaint   Patient presents with     Abdominal Pain     Generalized Weakness     TPN        History is obtained from the patient    HPI:   Gurdeep Dia is a 66 year old male who presents to the hospital today with complaints of abdominal pain.  The patient was admitted for other evaluation of and the findings of the cellulitis along his midline incision and abdominal pain with unclear etiology.  The general surgery team was consulted to evaluate this patient.    Patient is a well-known patient to the general surgery  team.  Patient has a significant surgical history in the past with exploratory laparotomies as well as small bowel obstructions and development of an EC fistula.      On evaluation today, the patient states that abdominal pain has since improved since his admission.  Patient currently has no fevers no chills.  She states that his abdominal pain is along the midline incision approxi-5 out of 10.  Has no nausea no vomiting.  Patient continues to have output from the EC fistula.  States that he has been doing well on the TPN.  Denies any chest pain shortness of breath.  Patient has no further complaints at this time.          Past Medical History:     Past Medical History:   Diagnosis Date     Benign prostatic hyperplasia with weak urinary stream      Chronic bilateral low back pain without sciatica      Chronic neck pain      RAVI (generalized anxiety disorder)      History of hepatitis C     treated and cured     History of substance abuse (H)      Hypertension goal BP (blood pressure) < 140/90      Moderate major depression (H)               Past Surgical History:     Past Surgical History:   Procedure Laterality Date     C APPENDECTOMY  ~1996     LAPAROSCOPY DIAGNOSTIC (GYN) N/A 11/30/2021    Procedure: LAPAROSCOPY;  Surgeon: Mekhi Richardson MD;  Location: [facility] OR     LAPAROTOMY EXPLORATORY N/A 12/3/2021    Procedure: EXPLORATORY LAPAROTOMY, WITH WASHOUT, REPAIR OF SMALL BOWEL PERFORATION.;  Surgeon: Mekhi Richardson MD;  Location: [facility] OR     LAPAROTOMY, LYSIS ADHESIONS, COMBINED N/A 11/30/2021    Procedure: REPAIR ENTEROTOMY, EXTENSIVE LYSIS OF ADHESION;  Surgeon: Mekhi Richardson MD;  Location: [facility] OR     PICC DOUBLE LUMEN PLACEMENT  12/3/2021          RESECT SMALL BOWEL WITHOUT OSTOMY N/A 11/30/2021    Procedure: COVERTED TO OPEN, EXPLORATORY LAPAROTOMY, SMALL BOWEL RESECTION,;  Surgeon: Mekhi Richardson MD;  Location: [facility] OR     SPLENECTOMY  ~1995              Social History:    reports that he has been smoking. He has never used smokeless tobacco. He reports previous alcohol use. He reports previous drug use.           Family History:   No family history on file.           Allergies:     Allergies   Allergen Reactions     Penicillins Hives              Medications:     Prior to Admission medications    Medication Sig Start Date End Date Taking? Authorizing Provider   busPIRone (BUSPAR) 15 MG tablet Take 1 tablet (15 mg) by mouth 2 times daily 12/21/21  Yes Yemi Mcmahon MD   gabapentin (NEURONTIN) 300 MG capsule Take 1 capsule (300 mg) by mouth 2 times daily Morning, dinnertime 12/21/21  Yes Yemi Mcmahon MD   gabapentin (NEURONTIN) 300 MG capsule Take 2 capsules (600 mg) by mouth At Bedtime 12/21/21  Yes Yemi Mcmahon MD   levothyroxine (SYNTHROID/LEVOTHROID) 25 MCG tablet Take 1 tablet (25 mcg) by mouth daily 12/21/21  Yes Yemi Mcmahon MD   lipids (INTRALIPID) 20 % infusion Inject 250 mLs into the vein every 24 hours 12/21/21  Yes Yemi Mcmahon MD   mirtazapine (REMERON) 30 MG tablet Take 1 tablet (30 mg) by mouth At Bedtime 12/21/21  Yes Yemi Mcmahon MD   oxyCODONE-acetaminophen (PERCOCET) 5-325 MG tablet Take 1 tablet by mouth every 6 hours as needed for pain 12/21/21 12/24/21 Yes Mekhi Richardson MD   parenteral nutrition - PTA/DISCHARGE ORDER The TPN formula will print on the After Visit Summary Report. 12/21/21  Yes Yemi Mcmahon MD   QUEtiapine (SEROQUEL) 100 MG tablet Take 100 mg by mouth nightly as needed   Yes Unknown, Entered By History   tamsulosin (FLOMAX) 0.4 MG capsule Take 1 capsule (0.4 mg) by mouth daily 12/21/21  Yes Yemi Mcmahon MD   venlafaxine (EFFEXOR-XR) 37.5 MG 24 hr capsule Take 1 capsule (37.5 mg) by mouth daily 12/21/21  Yes Yemi Mcmahon MD   lidocaine (LMX4) 4 % external cream Apply topically every hour as needed for pain (with VAD insertion) 12/21/21   Yemi Mcmahon,  "MD   meloxicam (MOBIC) 7.5 MG tablet Take 1 tablet (7.5 mg) by mouth daily as needed for pain 12/21/21   Yemi Mcmahon MD   omeprazole (PRILOSEC) 20 MG DR capsule Take 2 capsules (40 mg) by mouth daily 12/21/21   Yemi Mcmahon MD   sodium chloride, PF, 0.9% PF flush Irrigate with 10 mLs as directed daily 12/9/21   Ayana Bauman, NP   triamcinolone (NASACORT) 55 MCG/ACT nasal aerosol Spray 2 sprays into both nostrils daily 12/21/21   Yemi Mcmahon MD              Review of Systems:   The Review of Systems is negative other than noted in the HPI            Physical Exam:     Patient Vitals for the past 24 hrs:   BP Temp Temp src Pulse Resp SpO2 Height Weight   12/22/21 1242 120/88 98  F (36.7  C) Oral 94 18 94 % 1.702 m (5' 7\") 75.5 kg (166 lb 6.4 oz)   12/22/21 1031 -- 98.5  F (36.9  C) Oral -- -- -- -- --   12/22/21 0720 133/82 -- -- -- -- -- -- --   12/22/21 0630 129/75 -- -- 90 -- -- -- --   12/22/21 0600 133/88 -- -- 91 -- -- -- --   12/22/21 0530 106/68 -- -- 90 -- -- -- --   12/22/21 0500 103/58 -- -- 89 -- 93 % -- --   12/22/21 0430 97/60 -- -- 89 -- 94 % -- --   12/22/21 0400 106/62 -- -- 88 -- 93 % -- --   12/22/21 0330 102/57 -- -- 89 -- 92 % -- --   12/22/21 0300 117/76 -- -- 91 -- 93 % -- --   12/22/21 0230 -- -- -- 91 -- 92 % -- --   12/22/21 0200 -- -- -- 95 -- 94 % -- --   12/22/21 0100 -- -- -- 96 -- 93 % -- --   12/22/21 0000 136/78 -- -- 104 -- 92 % -- --   12/21/21 2223 124/75 100  F (37.8  C) Temporal 116 22 94 % -- 78.5 kg (173 lb)          Intake/Output Summary (Last 24 hours) at 12/22/2021 1447  Last data filed at 12/22/2021 1400  Gross per 24 hour   Intake 0 ml   Output 0 ml   Net 0 ml      Constitutional:   awake, alert, cooperative, no apparent distress, and appears stated age       Eyes:   PERRL, conjunctiva/corneas clear, EOM's intact; no scleral edema or icterus noted        ENT:   Normocephalic, without obvious abnormality, atraumatic, Lips, mucosa, and tongue " normal        Hematologic / Lymphatic:   No lymphadenopathy       Lungs:   Normal respiratory effort, no accessory muscle use.  Breath sounds bilaterally on auscultation.       Cardiovascular:   Regular rate and rhythm.        Abdomen:   Patient has a midline incision with staples intact.  The superior portion of the midline incision has a halo of erythema around the incision.  There is some purulent drainage from this area as well.  Below this area the incision is clean dry and intact.  The very inferior border of the midline incision there is an ostomy bag draining with abdominal contents.    Patient has no crepitus over the area of erythema.  It is tender to palpation.  The rest of the patient's abdomen is soft and nondistended.       Musculoskeletal:   No obvious swelling, bruising or deformity       Skin:   Skin color and texture normal for patient, no rashes or lesions              Data:         All imaging studies reviewed by me.    Results for orders placed or performed during the hospital encounter of 12/21/21 (from the past 24 hour(s))   CBC with platelets differential    Narrative    The following orders were created for panel order CBC with platelets differential.  Procedure                               Abnormality         Status                     ---------                               -----------         ------                     CBC with platelets and d...[[id number]]  Abnormal            Final result               Manual Differential[[id number]]          Abnormal            Final result                 Please view results for these tests on the individual orders.   Comprehensive metabolic panel   Result Value Ref Range    Sodium 130 (L) 136 - 145 mmol/L    Potassium 4.9 3.5 - 5.0 mmol/L    Chloride 101 98 - 107 mmol/L    Carbon Dioxide (CO2) 21 (L) 22 - 31 mmol/L    Anion Gap 8 5 - 18 mmol/L    Urea Nitrogen 22 8 - 22 mg/dL    Creatinine 0.89 0.70 - 1.30 mg/dL    Calcium 8.8 8.5 - 10.5 mg/dL     Glucose 128 (H) 70 - 125 mg/dL    Alkaline Phosphatase 132 (H) 45 - 120 U/L    AST 31 0 - 40 U/L    ALT 18 0 - 45 U/L    Protein Total 7.6 6.0 - 8.0 g/dL    Albumin 2.4 (L) 3.5 - 5.0 g/dL    Bilirubin Total 0.9 0.0 - 1.0 mg/dL    GFR Estimate >90 >60 mL/min/1.73m2   Lipase   Result Value Ref Range    Lipase 64 (H) 0 - 52 U/L   Lactic acid whole blood   Result Value Ref Range    Lactic Acid 1.2 0.7 - 2.0 mmol/L   Magnesium   Result Value Ref Range    Magnesium 2.0 1.8 - 2.6 mg/dL   CBC with platelets and differential   Result Value Ref Range    WBC Count 13.8 (H) 4.0 - 11.0 10e3/uL    RBC Count 4.03 (L) 4.40 - 5.90 10e6/uL    Hemoglobin 12.0 (L) 13.3 - 17.7 g/dL    Hematocrit 36.1 (L) 40.0 - 53.0 %    MCV 90 78 - 100 fL    MCH 29.8 26.5 - 33.0 pg    MCHC 33.2 31.5 - 36.5 g/dL    RDW 15.1 (H) 10.0 - 15.0 %    Platelet Count 564 (H) 150 - 450 10e3/uL   Manual Differential   Result Value Ref Range    % Neutrophils 71 %    % Lymphocytes 13 %    % Monocytes 11 %    % Eosinophils 2 %    % Basophils 1 %    % Metamyelocytes 2 %    Absolute Neutrophils 9.8 (H) 1.6 - 8.3 10e3/uL    Absolute Lymphocytes 1.8 0.8 - 5.3 10e3/uL    Absolute Monocytes 1.5 (H) 0.0 - 1.3 10e3/uL    Absolute Eosinophils 0.3 0.0 - 0.7 10e3/uL    Absolute Basophils 0.1 0.0 - 0.2 10e3/uL    Absolute Metamyelocytes 0.3 (H) <=0.0 10e3/uL    RBC Morphology Confirmed RBC Indices     Platelet Assessment Platelets Clumped (A) Automated Count Confirmed. Platelet morphology is normal.    Basophilic Stippling Present (A) None Seen    Polychromasia Slight (A) None Seen    Reactive Lymphocytes Present (A) None Seen    Target Cells Slight (A) None Seen   Asymptomatic COVID-19 Virus (Coronavirus) by PCR Nasopharyngeal    Specimen: Nasopharyngeal; Swab   Result Value Ref Range    SARS CoV2 PCR Negative Negative    Narrative    Testing was performed using the aaron  SARS-CoV-2 & Influenza A/B Assay on the aaron  Melinda  System.  This test should be ordered for the  detection of SARS-COV-2 in individuals who meet SARS-CoV-2 clinical and/or epidemiological criteria. Test performance is unknown in asymptomatic patients.  This test is for in vitro diagnostic use under the FDA EUA for laboratories certified under CLIA to perform moderate and/or high complexity testing. This test has not been FDA cleared or approved.  A negative test does not rule out the presence of PCR inhibitors in the specimen or target RNA in concentration below the limit of detection for the assay. The possibility of a false negative should be considered if the patient's recent exposure or clinical presentation suggests COVID-19.  [facility] are certified under the Clinical Laboratory Improvement Amendments of 1988 (CLIA-88) as qualified to perform moderate and/or high complexity laboratory testing.   Lactic acid whole blood STAT   Result Value Ref Range    Lactic Acid 0.8 0.7 - 2.0 mmol/L   Infectious Diseases IP Consult: Patient to be seen: Routine - within 24 hours; Perforated small bowel with subsequent abdominal abscess and now enterocutaneous fistula. Had been on abx -. WBC now climbing.; Consultant may enter orders: Dottie Cummings MD     2021  1:43 PM  Consultation - INFECTIOUS DISEASE CONSULTATION  Gurdeep Dia,  1955, MRN [id number]      Abdominal pain, generalized [R10.84]    PCP: Hardy Hardwick, [phone]   Code status:  Full Code               Chief Complaint:  Leukocytosis     Assessment:  Gurdeep Dia is a 66 year old old male with   1.  History of splenectomy and substance abuse.  2.  Recent admission with SBO status post exploratory   laparotomy/small bowel resection/repair of enterotomy, extensive   lysis of adhesions on 2021.  3.  Postoperative small bowel perforation status post exploratory   laparotomy on 12/3/2021.    Intra-abdominal abscess status post drain placement on 2021.    Cultures grew  E. coli.  Treated with IV vancomycin and meropenem   and transition to p.o. cefdinir plus Flagyl on 12/15.    Abscessogram on 12/16 with resolved abscess.  Drain removed on   12/16.  Antibiotics discontinued on 12/20.  Discharge from the   hospital on 12/21.  4.  Leukocytosis.  Suspect from abdominal wall incisional   cellulitis.  Need to rule out recurrence of the intra-abdominal   abscess.  Drainage from incision is swabbed for cultures.  IV   vancomycin and cefepime started.  5.  On TPN with right upper extremity PICC line.    Recommendations:   Blood culture x2 sets.  Swab incisional drainage for cultures.  Done.  CT scan of the abdomen and pelvis.  Start IV vancomycin and cefepime.  Pharmacy to dose.  Low threshold to add antifungal given TPN    Discussed with the patient, nursing staff.    Thank you for letting us be part of the patient care team. We   will follow.    Dottie Molina MD,MD  [facility].   [facility]  Office Telephone [phone].  Fax [phone]  [facility] paging    HPI:    Gurdeep Dia is a 66 year old old male. History is provided   by the patient, chart review.    ID is asked to see this patient for leukocytosis.  The patient has a history of splenectomy and substance abuse.  He   had a prolonged hospitalization recently.  He was initially   admitted on 11/30/2021 with small bowel obstruction status post   exploratory laparotomy with lysis of adhesion.  His postop.  Was   complicated with small bowel perforation status post resection.    He had developed intra-abdominal abscess on 12/8/2021.  Drain   placed on 12/9/2021.  Cultures with E. coli.  Was treated with IV   vancomycin and meropenem later changed to cefdinir plus Flagyl.    The drain was removed on 12/16/2021 and he went off antibiotic on   12/20/2021.  He was discharged yesterday but came back about 6   hours later because he could not handle home care.  He was noted    to have mild leukocytosis reason for this consult.  He denies   fever, chills, night sweats.  His abdominal pain is unchanged.    He does report some drainage from his upper abdominal incision.        ===========================================    Medical History  Past Medical History:   Diagnosis Date     Benign prostatic hyperplasia with weak urinary stream      Chronic bilateral low back pain without sciatica      Chronic neck pain      RAVI (generalized anxiety disorder)      History of hepatitis C     treated and cured     History of substance abuse (H)      Hypertension goal BP (blood pressure) < 140/90      Moderate major depression (H)         Surgical History  Past Surgical History:   Procedure Laterality Date     C APPENDECTOMY  ~1996     LAPAROSCOPY DIAGNOSTIC (GYN) N/A 11/30/2021    Procedure: LAPAROSCOPY;  Surgeon: Mekhi Richardson MD;    Location: [facility] OR     LAPAROTOMY EXPLORATORY N/A 12/3/2021    Procedure: EXPLORATORY LAPAROTOMY, WITH WASHOUT, REPAIR OF SMALL   BOWEL PERFORATION.;  Surgeon: Mekhi Richardson MD;  Location:   [facility] OR     LAPAROTOMY, LYSIS ADHESIONS, COMBINED N/A 11/30/2021    Procedure: REPAIR ENTEROTOMY, EXTENSIVE LYSIS OF ADHESION;    Surgeon: Mekhi Richardson MD;  Location: [facility] OR     PICC DOUBLE LUMEN PLACEMENT  12/3/2021          RESECT SMALL BOWEL WITHOUT OSTOMY N/A 11/30/2021    Procedure: COVERTED TO OPEN, EXPLORATORY LAPAROTOMY, SMALL BOWEL   RESECTION,;  Surgeon: Mekhi Richardson MD;  Location: [facility] OR     SPLENECTOMY  ~1995            Social History  Reviewed, and he  reports that he has been smoking. He has never   used smokeless tobacco. He reports previous alcohol use. He   reports previous drug use.        Family History  No family history of recurrent infection.   Psychosocial Needs  Social History     Social History Narrative     Not on file     Additional psychosocial needs reviewed per nursing assessment.        Allergies   Allergen Reactions     Penicillins Hives        (Not in a hospital admission)       Review of Systems:  Negative except for findings in the HPI Physical Exam:  Temp:  [98.1  F (36.7  C)-100  F (37.8  C)] 98.5  F (36.9  C)  Pulse:  [] 90  Resp:  [16-22] 22  BP: ()/(57-88) 133/82  SpO2:  [92 %-94 %] 93 %      Gen: Pleasant in no acute distress.  HEENT: NCAT. EOMI. PERRL.  Neck: No bruit, JVD or thyromegaly.  Lungs: Clear to ascultation bilat with no crackles or wheezes.  Card: RRR. NSR. No RMG. Peripheral pulses present and symmetric.   No edema.  Abd: Midline upper abdomen incision with erythema and some   purulent drainage.  Swabbed for cultures.  Colostomy bag in   place.  Skin: No rash.  Extr: Right upper extremity PICC line in place.  Neuro: Alert and oriented to place time and person. Cranial   nerves II to XII intact. Motor and sensory intact. Normal gait.           ============================    Pertinent Labs  personally reviewed.   Recent Labs   Lab 12/22/21  0003 12/21/21  0644 12/20/21  0644   WBC 13.8* 11.7* 11.5*   HGB 12.0* 11.5* 12.0*   HCT 36.1* 35.1* 37.1*   * 640* 741*       Recent Labs   Lab 12/22/21  0003 12/21/21  0644 12/20/21  0644   * 130* 132*   CO2 21* 22 23   BUN 22 21 19   ALBUMIN 2.4*  --  2.4*   ALKPHOS 132*  --  124*   ALT 18  --  19   AST 31  --  18       MICROBIOLOGY DATA:  Personally reviewed  Body fluid, unsp Aerobic Bacterial Culture Routine  Order: [id number]   Collected 12/9/2021 12:04 PM     Status: Final result     Visible to patient: No (inaccessible in MyChart)    Specimen Information: Pelvis; Body fluid, unsp         0 Result Notes    Culture 2+ Escherichia coli Abnormal             Resulting Agency: [facility]       Susceptibility     Escherichia coli     [facility]     Ampicillin >=32.0 ug/mL Resistant     Ampicillin/ Sulbactam 16.0 ug/mL Intermediate     Cefepime <=1.0 ug/mL Susceptible     Ceftazidime <=1.0 ug/mL Susceptible     Ceftriaxone  <=1.0 ug/mL Susceptible     Ciprofloxacin >=4.0 ug/mL Resistant     Gentamicin <=1.0 ug/mL Susceptible     Levofloxacin >=8.0 ug/mL Resistant     Meropenem <=0.25 ug/mL Susceptible     Piperacillin/Tazobactam <=4.0 ug/mL Susceptible     Tobramycin <=1.0 ug/mL Susceptible     Trimethoprim/Sulfamethoxazole >16/304 ug/mL Resistant                  Specimen Collected: 12/09/21 12:04 PM Last Resulted: 12/12/21    8:31 AM                  Pertinent Radiology  personally reviewed.    Study Result    Narrative & Impression   EXAM: CT ABDOMEN PELVIS W CONTRAST  LOCATION: [facility]  DATE/TIME: 12/8/2021 1:40 PM     INDICATION: Abdominal abscess/infection suspected, status post   laparotomy with repair of small bowel perforation, recent small   bowel resection  COMPARISON: CT 11/30/2021  TECHNIQUE: CT scan of the abdomen and pelvis was performed   following injection of IV contrast. Multiplanar reformats were   obtained. Dose reduction techniques were used.  CONTRAST: 100 mL Isovue-370     FINDINGS:   LOWER CHEST: Mild bilateral lower lobar dependent consolidation   and trace right-sided pleural effusion.     HEPATOBILIARY: Prominent gallbladder likely reflecting recent   fasting. Stable mild biliary ductal dilatation. No obstructing   mass lesion or radiodense stone. Stable small low-density hepatic   lesions, likely cysts.     PANCREAS: Normal.     SPLEEN: Surgically absent.     ADRENAL GLANDS: Normal.     KIDNEYS/BLADDER: Stable nonobstructing lower left renal stones   with a dominant stone measuring 4 mm. No hydronephrosis.   Unremarkable urinary bladder.     BOWEL: Enteric suction tube with the tip located within the mid   gastric lumen. Mild to moderately distended segments of mid small   bowel with associated air-fluid levels. No discrete transition is   identified. Right lower quadrant anastomotic changes.   Small amount of nonloculated fluid and extraluminal air within   the mid abdomen for  example image 50 series 400.2 and image 97   series 3. Questionable adjacent small bowel defect image 50   series 102. Small amount of nonloculated fluid is also noted   within   the right lower quadrant. Newly visualized rim-enhancing pelvic   fluid collection measuring 4.4 x 3.6 x 4.4 cm. Trace ascites.   Midline laparotomy incision with a small amount of fluid and air   within the wound. Surgical drain tip is located at the upper   aspect of this wound. Additional surgical drain with the tip   located within the left lower quadrant. Third surgical drain with   the tip located in the lower anterior abdomen. Diffuse mesenteric   edema.     LYMPH NODES: Normal.     VASCULATURE: Mild atherosclerosis.     PELVIC ORGANS: Normal.     MUSCULOSKELETAL: Bilateral L4 pars defects with grade 1   anterolisthesis at L4-L5. Associated severe discogenic   degenerative changes.                                                                      IMPRESSION:   1.  Newly visualized 4.6 cm rim-enhancing fluid collection within   the pelvis.  2.  Small ill-defined irregular pocket of fluid and extraluminal   air within the mid abdomen as described above. It is uncertain   whether this is related to sequelae of recent surgery or bowel   leakage. Recommend short interval follow-up CT with positive   enteric contrast.  3.  Mild to moderate mid small bowel distention. This is favored   to reflect an ileus rather than obstruction.   4.  Trace ascites and diffuse mesenteric edema.  5.  Mild bilateral lower lobar consolidation and trace   right-sided pleural effusion.     Study Result    Narrative & Impression   EXAM: CT ABDOMEN PELVIS W CONTRAST  LOCATION: [facility]  DATE/TIME: 12/16/2021 3:00 AM     INDICATION: Intrabdominal abscess  COMPARISON: CT 12/8/2021 and 12/9/2021  TECHNIQUE: CT scan of the abdomen and pelvis was performed   following injection of IV contrast. Multiplanar reformats were   obtained. Dose  reduction techniques were used.  CONTRAST: IsoVue 370 100mL     FINDINGS:   LOWER CHEST: Normal.     HEPATOBILIARY: Normal.     PANCREAS: Normal.     SPLEEN: Splenules are present.      ADRENAL GLANDS: A 10 mm area of fullness in the left adrenal   gland has not changed.      KIDNEYS/BLADDER: Again seen are nonobstructing left renal   calculi. There are simple cysts in the right kidney which do not   require follow-up. Smaller renal lesions are too small to   characterize.      BOWEL: There is surgical change in the right lower quadrant.   There is distal small bowel wall thickening in this region. The   small bowel wall is not fully visualized, and there is a small   amount of extraluminal fluid and air in this region (series 3   images 93 and 96). There is new free air in the right lower   quadrant mesentery (series 3 image 105).      LYMPH NODES: Normal.     VASCULATURE: Atherosclerotic disease.      PELVIC ORGANS: Normal.     OTHER: A fluid collection adjacent to the rectum is 1.9 x 1.5 x   1.5 cm (approximately 2.2 mL). Previously the collection was 4.4   x 3.6 x 4.4 cm. A pigtail catheter terminates within this   collection.     MUSCULOSKELETAL: Surgical change along the ventral abdominal and   pelvic wall. There is a 20.8 x 3.2 x 1.2 cm fluid and air   collection in the midline ventral abdominal and pelvic wall just   deep to the staple line. Right lower quadrant and left midabdomen     approach Larry drains are present.                                                                       IMPRESSION:   1.  The pelvic fluid collection is significantly smaller. A   pigtail catheter is in place.  2.  Increased free air in the mesentery in the right lower   quadrant, suggesting suture line dehiscence. There is also a   suspected distal small bowel defect with adjacent free air and   fluid.  3.  New fluid collection in the subcutaneous tissues of the   ventral abdominal and pelvic wall, just deep to the staple  line.   Recommend drainage.      Study Result    Narrative & Impression   LOCATION: [facility]  DATE: 12/16/2021     PROCEDURE: ABSCESS TUBE CHECK     INTERVENTIONAL RADIOLOGIST: Demario Porter MD     INDICATION: Pelvic abscess. Status post jugular drain placement   on 12/9/2021. Markedly decreased outputs. CT from today   demonstrating near complete resolution of the abscess cavity..     CONSENT: The procedure, risks and benefits of an abscessogram   were discussed with the patient  in detail. All questions were   answered. Informed consent was given to proceed with the   procedure.     MODERATE SEDATION: None.     CONTRAST: Omnipaque 350: 2 mL.  ANTIBIOTICS: None.  ADDITIONAL MEDICATIONS: None.     FLUOROSCOPIC TIME: 0.1 minutes  RADIATION DOSE: Air Kerma: 26 mGy     COMPLICATIONS: No immediate complications.     PROCEDURE:  images were obtained. The existing drainage   catheter was injected with contrast, and an image obtained. After   reviewing the images, the catheter was removed without   difficulty.     FINDINGS: Contracted cavity. Almost immediate pericatheter   leakage.                                                                      IMPRESSION:  1.  Resolved pelvic fluid collection. Transgluteal drain removed.          Social Work/ Care Management IP Consult    Narrative    Conchis Shaikh RN     12/22/2021 12:49 PM  Care Management Initial Consult    General Information  Assessment completed with: Diamante Gurdeep  Type of CM/SW Visit: Initial Assessment    Primary Care Provider verified and updated as needed:     Readmission within the last 30 days:        Reason for Consult: discharge planning  Advance Care Planning: Advance Care Planning Reviewed: other   (comment)  gave HCD copy       Communication Assessment  Patient's communication style: spoken language (English or   Bilingual)    Hearing Difficulty or Deaf: no   Wear Glasses or Blind:  yes    Cognitive  Cognitive/Neuro/Behavioral: WDL                      Living Environment:   People in home: other (see comments) (brother-in-law)     Current living Arrangements: house      Able to return to prior arrangements: other (see comments)  Living Arrangement Comments: pt verbalize he does not want to   return to current home    Family/Social Support:  Care provided by: self  Provides care for: no one  Marital Status: Single  None          Description of Support System:           Current Resources:   Patient receiving home care services:       Community Resources: [facility]  Equipment currently used at home: other (see comments) (infusion   pump)  Supplies currently used at home:      Employment/Financial:  Employment Status: disabled        Financial Concerns:             Lifestyle & Psychosocial Needs:  Social Determinants of Health     Tobacco Use: High Risk     Smoking Tobacco Use: Current Every Day Smoker     Smokeless Tobacco Use: Never Used   Alcohol Use: Not on file   Financial Resource Strain: Not on file   Food Insecurity: Not on file   Transportation Needs: Not on file   Physical Activity: Not on file   Stress: Not on file   Social Connections: Not on file   Intimate Partner Violence: Not on file   Depression: Not on file   Housing Stability: Not on file       Functional Status:  Prior to admission patient needed assistance:   Dependent ADLs:: Independent  Dependent IADLs:: Transportation,Medication Management  Assesssment of Functional Status: At functional baseline    Mental Health Status:  Mental Health Status: No Current Concerns       Chemical Dependency Status:                Values/Beliefs:  Spiritual, Cultural Beliefs, Denominational Practices, Values that   affect care:                 Additional Information:  ELE assessed.  Pt lives in home with brother-in-law, however does   not want to return to this home.  Pt is independent, receives TPN   [facility].  Gave HCD  paperwork.  May need to   re-assess pt's home situation.  Pt will need transport at time of   discharge.    BALDOMERO Damon, DO Dany Young, DO  General Surgery     Dany Young,   General Surgery  Pager: [phone]

## 2021-12-22 NOTE — H&P
Admission History and Physical   Gurdeep Dia,  1955, MRN [id number]    [facility]  Small bowel obstruction with perforation s/p surgical intervention  Failure to thrive    PCP: Hardy Hardwick, [unfilled], [phone]   Code status:  Prior       Extended Emergency Contact Information  Primary Emergency Contact: Félix Almonte  Mobile Phone: [phone]  Relation: Brother-in-Law       Assessment and Plan   Chema Dia is a 67 y/o M with past medical history of splenectomy, appendectomy, HTN, and substance abuse, with recent admission to hospital -2021 for SBO with perforation s/p surgical repair. Discharged home but presents as he could not manage at home and is now requesting to be placed in TCU vs acute rehab facility.    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 Required subsequent washout on 12/3 with aggressive IV antibiotic regimen and MARISA drain placement. On  found to have continued enhancing abscess with drain placed  and removed on . Antibiotics stopped on . Plan for close follow up with surgery and TPN at discharge last evening, with plans to have help from his brother in law. He returns as he feels he was not able to care for himself nor could his brother in law adequately take care of him and he would like TCU placement.   -PT/OT ordered, appreciate recs on placement  -Plan to begin search for TCU placement in AM with care management  -Pain management: Percocet 1-2 tablets q6h PRN, gabapentin and mirtazapine ordered as prescribed at discharge for tonight for sleep.    Hyponatremia  Moderate Malnutrition  Stable hyponatremia of 130.   -Planning to treat  with TPN    Leukocytosis  13.8 on admission, up from yesterday 11.7. With relatively reassuring abdominal exam, plan to monitor in AM prior to restarting antibiotics as patient has been off of antibiotic therapy for 2 days. Patient remains afebrile, vitally normal.  -CBC in AM    Normocytic anemia  Stable at 12.0, MCV of 90.    Alk phos elevation  132, mildly increased from 124 on 12/20. Plan to monitor.    Chronic Conditions:  HTN- resume PTA lisinopril, furosemide pending med rec  Hypothyroidism- resume PTA levothyroxine pending med rec  Depression/anxiety- resume PTA venlafaxine, Buspar pending med rec; giving mirtazapine 30 mg tonight for sleep per request  Chronic pain- Hold mexolicam 7.5mg daily, resume PTA gabapentin tonight for sleep, reassess in AM pending med rec  BPH- resume PTA flomax pending med rec    Incidental findings: None.     DVT ppx: Lovenox subcutaneous qday  Diet: NPO, TPN  Code: Full code  Med rec completed: Not Completed    Dispo: Admitting to observation for failure to thrive, placement in TCU. Anticipate discharge to TCU or acute rehab facility.   Barriers to discharge:   Difficult disposition.    Anticipated discharge date:   unknown at this time      Melanie Araujo MD  Pager # [phone]  [facility]    Precepted patient with  faculty at morning report.     Chief Complaint: Abdominal pain, failure to thrive after discharge from hospital     HPI:    Chema Dia is a 65 y/o M with past medical history of splenectomy, appendectomy, HTN, and substance abuse, with recent admission to hospital 11/30-12/21/2021 for SBO with perforation s/p surgical repair. Discharged home but presents as he could not manage at home and is now requesting to be placed in TCU vs acute rehab facility.    Chema states that when he got home to his brother-in-law's house he was able to lie in bed, but was unable to get out of bed.  His pain then got worse, and made him nervous that he was not  going to be able to take care of himself outside of the hospital.  His brother-in-law agreed that this is going to be challenging to take care of outside of the hospital.  She then presented to the emergency department with worsening abdominal pain and concern that he cannot care for himself after his recent surgeries.  He was discharged at 6 PM on 12/21, and is now readmitted on 12/22.  States his pain is now better controlled after Percocet, he was sent home with Percocet but being unable to get up he was not adequately treating his pain.  He is somewhat concerned about insomnia in the hospital, though states he feels safer in the hospital at this time.  His abdomen remains soft, and he is having output in his ostomy.  No chest pain or shortness of breath.  States he overall feels anxious about how ill he is.  Denies any other significant concerns at this time.     Medical History  Past Medical History:   Diagnosis Date     Benign prostatic hyperplasia with weak urinary stream      Chronic bilateral low back pain without sciatica      Chronic neck pain      RAVI (generalized anxiety disorder)      History of hepatitis C     treated and cured     History of substance abuse (H)      Hypertension goal BP (blood pressure) < 140/90      Moderate major depression (H)          Past Medical History has been reviewed and updated.  Surgical History  He  has a past surgical history that includes Splenectomy (~1995); APPENDECTOMY (~1996); Laparoscopy diagnostic (gyn) (N/A, 11/30/2021); Resect small bowel without ostomy (N/A, 11/30/2021); Laparotomy, lysis adhesions, combined (N/A, 11/30/2021); PICC/Midline Placement (12/3/2021); and Laparotomy exploratory (N/A, 12/3/2021).      Surgical History has been reviewed and updated. Social History  Reviewed, and he  reports that he has been smoking. He has never used smokeless tobacco. He reports previous alcohol use. He reports previous drug use.    Was planning to live with brother  in law, but now requesting rehab facility.      Social History     Social History Narrative     Not on file     Social History has been reviewed and updated.   Allergies  Allergies   Allergen Reactions     Penicillins Hives       Allergies list has been reviewed and updated. Family History  Reviewed, and is not contributory.    Family History has been reviewed and updated.        Prior to Admission Medications   (Not in a hospital admission)    Prior to admission medications have been reviewed.      Review of Systems:  A 12 point comprehensive review of systems was negative except as noted in HPI.     Physical Exam:    Temp:  [98.1  F (36.7  C)-100  F (37.8  C)] 100  F (37.8  C)  Pulse:  [] 116  Resp:  [16-22] 22  BP: (110-124)/(75-78) 124/75  SpO2:  [92 %-94 %] 94 %    GENERAL APPEARANCE: Alert, not in distress, cooperative  HEENT: Pupils equal and round.  Sclera clear. mucous membranes dry.  Poor oral dentition  LUNGS: No labored breathing, clear breath sounds bilaterally on auscultation  HEART: RRR, S1 and S2 normal, no murmurs, no gallops  ABDOMEN: Bowel sounds present, non-distended, soft, moderate tenderness diffusely, surgical wounds are draining serosanguineous fluid, staples remain intact, no signs of significant erythema and surrounding skin.  EXTREMITIES: Pulses 2+, no edema  SKIN: No rashes   PSYCH: Normal mood  NEURO: Oriented in person, place, and time. CNII-XII grossly intact . Moves all extremities.  No focal deficits.     Pertinent Labs  Lab Results: Personally reviewed all relevant labs.   Recent Results (from the past 24 hour(s))   Glucose by meter    Collection Time: 12/21/21  2:40 AM   Result Value Ref Range    GLUCOSE BY METER POCT 136 (H) 70 - 99 mg/dL   Basic metabolic panel    Collection Time: 12/21/21  6:44 AM   Result Value Ref Range    Sodium 130 (L) 136 - 145 mmol/L    Potassium 4.6 3.5 - 5.0 mmol/L    Chloride 101 98 - 107 mmol/L    Carbon Dioxide (CO2) 22 22 - 31 mmol/L    Anion  Gap 7 5 - 18 mmol/L    Urea Nitrogen 21 8 - 22 mg/dL    Creatinine 0.83 0.70 - 1.30 mg/dL    Calcium 8.7 8.5 - 10.5 mg/dL    Glucose 129 (H) 70 - 125 mg/dL    GFR Estimate >90 >60 mL/min/1.73m2   CBC with platelets    Collection Time: 12/21/21  6:44 AM   Result Value Ref Range    WBC Count 11.7 (H) 4.0 - 11.0 10e3/uL    RBC Count 3.91 (L) 4.40 - 5.90 10e6/uL    Hemoglobin 11.5 (L) 13.3 - 17.7 g/dL    Hematocrit 35.1 (L) 40.0 - 53.0 %    MCV 90 78 - 100 fL    MCH 29.4 26.5 - 33.0 pg    MCHC 32.8 31.5 - 36.5 g/dL    RDW 14.9 10.0 - 15.0 %    Platelet Count 640 (H) 150 - 450 10e3/uL   Magnesium    Collection Time: 12/21/21  6:44 AM   Result Value Ref Range    Magnesium 2.0 1.8 - 2.6 mg/dL   Phosphorus    Collection Time: 12/21/21  6:44 AM   Result Value Ref Range    Phosphorus 3.2 2.5 - 4.5 mg/dL   Comprehensive metabolic panel    Collection Time: 12/22/21 12:03 AM   Result Value Ref Range    Sodium 130 (L) 136 - 145 mmol/L    Potassium 4.9 3.5 - 5.0 mmol/L    Chloride 101 98 - 107 mmol/L    Carbon Dioxide (CO2) 21 (L) 22 - 31 mmol/L    Anion Gap 8 5 - 18 mmol/L    Urea Nitrogen 22 8 - 22 mg/dL    Creatinine 0.89 0.70 - 1.30 mg/dL    Calcium 8.8 8.5 - 10.5 mg/dL    Glucose 128 (H) 70 - 125 mg/dL    Alkaline Phosphatase 132 (H) 45 - 120 U/L    AST 31 0 - 40 U/L    ALT 18 0 - 45 U/L    Protein Total 7.6 6.0 - 8.0 g/dL    Albumin 2.4 (L) 3.5 - 5.0 g/dL    Bilirubin Total 0.9 0.0 - 1.0 mg/dL    GFR Estimate >90 >60 mL/min/1.73m2   Lipase    Collection Time: 12/22/21 12:03 AM   Result Value Ref Range    Lipase 64 (H) 0 - 52 U/L   Lactic acid whole blood    Collection Time: 12/22/21 12:03 AM   Result Value Ref Range    Lactic Acid 1.2 0.7 - 2.0 mmol/L   Magnesium    Collection Time: 12/22/21 12:03 AM   Result Value Ref Range    Magnesium 2.0 1.8 - 2.6 mg/dL   CBC with platelets and differential    Collection Time: 12/22/21 12:03 AM   Result Value Ref Range    WBC Count 13.8 (H) 4.0 - 11.0 10e3/uL    RBC Count 4.03 (L) 4.40 -  5.90 10e6/uL    Hemoglobin 12.0 (L) 13.3 - 17.7 g/dL    Hematocrit 36.1 (L) 40.0 - 53.0 %    MCV 90 78 - 100 fL    MCH 29.8 26.5 - 33.0 pg    MCHC 33.2 31.5 - 36.5 g/dL    RDW 15.1 (H) 10.0 - 15.0 %    Platelet Count 564 (H) 150 - 450 10e3/uL   Asymptomatic COVID-19 Virus (Coronavirus) by PCR Nasopharyngeal    Collection Time: 12/22/21 12:18 AM    Specimen: Nasopharyngeal; Swab   Result Value Ref Range    SARS CoV2 PCR Negative Negative         Pertinent Radiology  Radiology Results: None.    EKG Results: None.

## 2021-12-22 NOTE — CONSULTS
Consultation - INFECTIOUS DISEASE CONSULTATION  Gurdeep Dia,  1955, MRN [id number]      Abdominal pain, generalized [R10.84]    PCP: Hardy Hardwick, [phone]   Code status:  Full Code               Chief Complaint:  Leukocytosis     Assessment:  Gurdeep Dia is a 66 year old old male with   1.  History of splenectomy and substance abuse.  2.  Recent admission with SBO status post exploratory laparotomy/small bowel resection/repair of enterotomy, extensive lysis of adhesions on 2021.  3.  Postoperative small bowel perforation status post exploratory laparotomy on 12/3/2021.    Intra-abdominal abscess status post drain placement on 2021.  Cultures grew E. coli.  Treated with IV vancomycin and meropenem and transition to p.o. cefdinir plus Flagyl on 12/15.  Abscessogram on  with resolved abscess.  Drain removed on .  Antibiotics discontinued on .  Discharge from the hospital on .  4.  Leukocytosis.  Suspect from abdominal wall incisional cellulitis.  Need to rule out recurrence of the intra-abdominal abscess.  Drainage from incision is swabbed for cultures.  IV vancomycin and cefepime started.  5.  On TPN with right upper extremity PICC line.    Recommendations:   Blood culture x2 sets.  Swab incisional drainage for cultures.  Done.  CT scan of the abdomen and pelvis.  Start IV vancomycin and cefepime.  Pharmacy to dose.  Low threshold to add antifungal given TPN    Discussed with the patient, nursing staff.    Thank you for letting us be part of the patient care team. We will follow.    Dottie Molina MD,MD  [facility].   [facility]  Office Telephone [phone].  Fax [phone]  [facility] paging    HPI:    Gurdeep Dia is a 66 year old old male. History is provided by the patient, chart review.    ID is asked to see this patient for leukocytosis.  The patient has a history of splenectomy and  substance abuse.  He had a prolonged hospitalization recently.  He was initially admitted on 11/30/2021 with small bowel obstruction status post exploratory laparotomy with lysis of adhesion.  His postop.  Was complicated with small bowel perforation status post resection.  He had developed intra-abdominal abscess on 12/8/2021.  Drain placed on 12/9/2021.  Cultures with E. coli.  Was treated with IV vancomycin and meropenem later changed to cefdinir plus Flagyl.  The drain was removed on 12/16/2021 and he went off antibiotic on 12/20/2021.  He was discharged yesterday but came back about 6 hours later because he could not handle home care.  He was noted to have mild leukocytosis reason for this consult.  He denies fever, chills, night sweats.  His abdominal pain is unchanged.  He does report some drainage from his upper abdominal incision.        ===========================================    Medical History  Past Medical History:   Diagnosis Date     Benign prostatic hyperplasia with weak urinary stream      Chronic bilateral low back pain without sciatica      Chronic neck pain      RAVI (generalized anxiety disorder)      History of hepatitis C     treated and cured     History of substance abuse (H)      Hypertension goal BP (blood pressure) < 140/90      Moderate major depression (H)         Surgical History  Past Surgical History:   Procedure Laterality Date     C APPENDECTOMY  ~1996     LAPAROSCOPY DIAGNOSTIC (GYN) N/A 11/30/2021    Procedure: LAPAROSCOPY;  Surgeon: Mekhi Richardson MD;  Location: [facility] OR     LAPAROTOMY EXPLORATORY N/A 12/3/2021    Procedure: EXPLORATORY LAPAROTOMY, WITH WASHOUT, REPAIR OF SMALL BOWEL PERFORATION.;  Surgeon: Mekhi Richardson MD;  Location: [facility] OR     LAPAROTOMY, LYSIS ADHESIONS, COMBINED N/A 11/30/2021    Procedure: REPAIR ENTEROTOMY, EXTENSIVE LYSIS OF ADHESION;  Surgeon: Mekhi Richardson MD;  Location: [facility] OR     PICC DOUBLE LUMEN PLACEMENT   12/3/2021          RESECT SMALL BOWEL WITHOUT OSTOMY N/A 11/30/2021    Procedure: COVERTED TO OPEN, EXPLORATORY LAPAROTOMY, SMALL BOWEL RESECTION,;  Surgeon: Mekhi Richardson MD;  Location: [facility] OR     SPLENECTOMY  ~1995            Social History  Reviewed, and he  reports that he has been smoking. He has never used smokeless tobacco. He reports previous alcohol use. He reports previous drug use.        Family History  No family history of recurrent infection.   Psychosocial Needs  Social History     Social History Narrative     Not on file     Additional psychosocial needs reviewed per nursing assessment.       Allergies   Allergen Reactions     Penicillins Hives        (Not in a hospital admission)       Review of Systems:  Negative except for findings in the HPI Physical Exam:  Temp:  [98.1  F (36.7  C)-100  F (37.8  C)] 98.5  F (36.9  C)  Pulse:  [] 90  Resp:  [16-22] 22  BP: ()/(57-88) 133/82  SpO2:  [92 %-94 %] 93 %      Gen: Pleasant in no acute distress.  HEENT: NCAT. EOMI. PERRL.  Neck: No bruit, JVD or thyromegaly.  Lungs: Clear to ascultation bilat with no crackles or wheezes.  Card: RRR. NSR. No RMG. Peripheral pulses present and symmetric. No edema.  Abd: Midline upper abdomen incision with erythema and some purulent drainage.  Swabbed for cultures.  Colostomy bag in place.  Skin: No rash.  Extr: Right upper extremity PICC line in place.  Neuro: Alert and oriented to place time and person. Cranial nerves II to XII intact. Motor and sensory intact. Normal gait.           ============================    Pertinent Labs  personally reviewed.   Recent Labs   Lab 12/22/21  0003 12/21/21  0644 12/20/21  0644   WBC 13.8* 11.7* 11.5*   HGB 12.0* 11.5* 12.0*   HCT 36.1* 35.1* 37.1*   * 640* 741*       Recent Labs   Lab 12/22/21  0003 12/21/21  0644 12/20/21  0644   * 130* 132*   CO2 21* 22 23   BUN 22 21 19   ALBUMIN 2.4*  --  2.4*   ALKPHOS 132*  --  124*   ALT 18  --  19    AST 31  --  18       MICROBIOLOGY DATA:  Personally reviewed  Body fluid, unsp Aerobic Bacterial Culture Routine  Order: [id number]   Collected 12/9/2021 12:04 PM     Status: Final result     Visible to patient: No (inaccessible in MyChart)    Specimen Information: Pelvis; Body fluid, unsp         0 Result Notes    Culture 2+ Escherichia coli Abnormal             Resulting Agency: [facility]       Susceptibility     Escherichia coli     [facility]     Ampicillin >=32.0 ug/mL Resistant     Ampicillin/ Sulbactam 16.0 ug/mL Intermediate     Cefepime <=1.0 ug/mL Susceptible     Ceftazidime <=1.0 ug/mL Susceptible     Ceftriaxone <=1.0 ug/mL Susceptible     Ciprofloxacin >=4.0 ug/mL Resistant     Gentamicin <=1.0 ug/mL Susceptible     Levofloxacin >=8.0 ug/mL Resistant     Meropenem <=0.25 ug/mL Susceptible     Piperacillin/Tazobactam <=4.0 ug/mL Susceptible     Tobramycin <=1.0 ug/mL Susceptible     Trimethoprim/Sulfamethoxazole >16/304 ug/mL Resistant                  Specimen Collected: 12/09/21 12:04 PM Last Resulted: 12/12/21  8:31 AM                  Pertinent Radiology  personally reviewed.    Study Result    Narrative & Impression   EXAM: CT ABDOMEN PELVIS W CONTRAST  LOCATION: [facility]  DATE/TIME: 12/8/2021 1:40 PM     INDICATION: Abdominal abscess/infection suspected, status post laparotomy with repair of small bowel perforation, recent small bowel resection  COMPARISON: CT 11/30/2021  TECHNIQUE: CT scan of the abdomen and pelvis was performed following injection of IV contrast. Multiplanar reformats were obtained. Dose reduction techniques were used.  CONTRAST: 100 mL Isovue-370     FINDINGS:   LOWER CHEST: Mild bilateral lower lobar dependent consolidation and trace right-sided pleural effusion.     HEPATOBILIARY: Prominent gallbladder likely reflecting recent fasting. Stable mild biliary ductal dilatation. No obstructing mass lesion or radiodense stone. Stable small low-density hepatic  lesions, likely cysts.     PANCREAS: Normal.     SPLEEN: Surgically absent.     ADRENAL GLANDS: Normal.     KIDNEYS/BLADDER: Stable nonobstructing lower left renal stones with a dominant stone measuring 4 mm. No hydronephrosis. Unremarkable urinary bladder.     BOWEL: Enteric suction tube with the tip located within the mid gastric lumen. Mild to moderately distended segments of mid small bowel with associated air-fluid levels. No discrete transition is identified. Right lower quadrant anastomotic changes.   Small amount of nonloculated fluid and extraluminal air within the mid abdomen for example image 50 series 400.2 and image 97 series 3. Questionable adjacent small bowel defect image 50 series 102. Small amount of nonloculated fluid is also noted within   the right lower quadrant. Newly visualized rim-enhancing pelvic fluid collection measuring 4.4 x 3.6 x 4.4 cm. Trace ascites. Midline laparotomy incision with a small amount of fluid and air within the wound. Surgical drain tip is located at the upper   aspect of this wound. Additional surgical drain with the tip located within the left lower quadrant. Third surgical drain with the tip located in the lower anterior abdomen. Diffuse mesenteric edema.     LYMPH NODES: Normal.     VASCULATURE: Mild atherosclerosis.     PELVIC ORGANS: Normal.     MUSCULOSKELETAL: Bilateral L4 pars defects with grade 1 anterolisthesis at L4-L5. Associated severe discogenic degenerative changes.                                                                      IMPRESSION:   1.  Newly visualized 4.6 cm rim-enhancing fluid collection within the pelvis.  2.  Small ill-defined irregular pocket of fluid and extraluminal air within the mid abdomen as described above. It is uncertain whether this is related to sequelae of recent surgery or bowel leakage. Recommend short interval follow-up CT with positive   enteric contrast.  3.  Mild to moderate mid small bowel distention. This is  favored to reflect an ileus rather than obstruction.   4.  Trace ascites and diffuse mesenteric edema.  5.  Mild bilateral lower lobar consolidation and trace right-sided pleural effusion.     Study Result    Narrative & Impression   EXAM: CT ABDOMEN PELVIS W CONTRAST  LOCATION: [facility]  DATE/TIME: 12/16/2021 3:00 AM     INDICATION: Intrabdominal abscess  COMPARISON: CT 12/8/2021 and 12/9/2021  TECHNIQUE: CT scan of the abdomen and pelvis was performed following injection of IV contrast. Multiplanar reformats were obtained. Dose reduction techniques were used.  CONTRAST: IsoVue 370 100mL     FINDINGS:   LOWER CHEST: Normal.     HEPATOBILIARY: Normal.     PANCREAS: Normal.     SPLEEN: Splenules are present.      ADRENAL GLANDS: A 10 mm area of fullness in the left adrenal gland has not changed.      KIDNEYS/BLADDER: Again seen are nonobstructing left renal calculi. There are simple cysts in the right kidney which do not require follow-up. Smaller renal lesions are too small to characterize.      BOWEL: There is surgical change in the right lower quadrant. There is distal small bowel wall thickening in this region. The small bowel wall is not fully visualized, and there is a small amount of extraluminal fluid and air in this region (series 3   images 93 and 96). There is new free air in the right lower quadrant mesentery (series 3 image 105).      LYMPH NODES: Normal.     VASCULATURE: Atherosclerotic disease.      PELVIC ORGANS: Normal.     OTHER: A fluid collection adjacent to the rectum is 1.9 x 1.5 x 1.5 cm (approximately 2.2 mL). Previously the collection was 4.4 x 3.6 x 4.4 cm. A pigtail catheter terminates within this collection.     MUSCULOSKELETAL: Surgical change along the ventral abdominal and pelvic wall. There is a 20.8 x 3.2 x 1.2 cm fluid and air collection in the midline ventral abdominal and pelvic wall just deep to the staple line. Right lower quadrant and left midabdomen    approach Larry drains are present.                                                                       IMPRESSION:   1.  The pelvic fluid collection is significantly smaller. A pigtail catheter is in place.  2.  Increased free air in the mesentery in the right lower quadrant, suggesting suture line dehiscence. There is also a suspected distal small bowel defect with adjacent free air and fluid.  3.  New fluid collection in the subcutaneous tissues of the ventral abdominal and pelvic wall, just deep to the staple line. Recommend drainage.      Study Result    Narrative & Impression   LOCATION: [facility]  DATE: 12/16/2021     PROCEDURE: ABSCESS TUBE CHECK     INTERVENTIONAL RADIOLOGIST: Demario Porter MD     INDICATION: Pelvic abscess. Status post jugular drain placement on 12/9/2021. Markedly decreased outputs. CT from today demonstrating near complete resolution of the abscess cavity..     CONSENT: The procedure, risks and benefits of an abscessogram were discussed with the patient  in detail. All questions were answered. Informed consent was given to proceed with the procedure.     MODERATE SEDATION: None.     CONTRAST: Omnipaque 350: 2 mL.  ANTIBIOTICS: None.  ADDITIONAL MEDICATIONS: None.     FLUOROSCOPIC TIME: 0.1 minutes  RADIATION DOSE: Air Kerma: 26 mGy     COMPLICATIONS: No immediate complications.     PROCEDURE:  images were obtained. The existing drainage catheter was injected with contrast, and an image obtained. After reviewing the images, the catheter was removed without difficulty.     FINDINGS: Contracted cavity. Almost immediate pericatheter leakage.                                                                      IMPRESSION:  1.  Resolved pelvic fluid collection. Transgluteal drain removed.

## 2021-12-22 NOTE — PROGRESS NOTES
[facility]      OUTPATIENT OCCUPATIONAL THERAPY  EVALUATION  PLAN OF TREATMENT FOR OUTPATIENT REHABILITATION  (COMPLETE FOR INITIAL CLAIMS ONLY)  Patient's Last Name, First Name, M.I.  YOB: 1955  Gurdeep Dia                          Provider's Name  [facility] Medical Record No.  [id number]                               Onset Date:  12/21/21   Start of Care Date:  12/21/21     Type:     ___PT   _X_OT   ___SLP Medical Diagnosis:  increased pain following SBO                        OT Diagnosis:  weakness/decreased strength & endurance, increased abdominal pain/discomfort.   Visits from SOC:  1   _________________________________________________________________________________  Plan of Treatment/Functional Goals    Planned Interventions: ADL retraining,bed mobility training,IADL retraining,balance training,strengthening   Goals: See Occupational Therapy Goals on Care Plan in [facility] electronic health record.    Therapy Frequency: Daily  Predicted Duration of Therapy Intervention: 5  _________________________________________________________________________________    I CERTIFY THE NEED FOR THESE SERVICES FURNISHED UNDER        THIS PLAN OF TREATMENT AND WHILE UNDER MY CARE .             Physician Signature               Date    X_____________________________________________________                      Certification date from: 12/21/21, Certification date to: 12/28/21    Referring Physician: Hardy Hardwick MD             Initial Assessment        See Occupational Therapy evaluation dated 12/21/21 in Epic electronic health record.

## 2021-12-22 NOTE — PLAN OF CARE
Problem: Adult Inpatient Plan of Care  Goal: Plan of Care Review  Outcome: No Change  Flowsheets (Taken 12/22/2021 1452)  Plan of Care Reviewed With: patient  Goal: Patient-Specific Goal (Individualized)  Outcome: No Change  Flowsheets (Taken 12/22/2021 1321)  Individualized Care Needs: wants TCU  Goal: Absence of Hospital-Acquired Illness or Injury  Outcome: No Change  Intervention: Identify and Manage Fall Risk  Recent Flowsheet Documentation  Taken 12/22/2021 1300 by Alea Valdes RN  Safety Promotion/Fall Prevention:   activity supervised   bed alarm on   chair alarm on   nonskid shoes/slippers when out of bed   room organization consistent   safety round/check completed   Patient is alert and oriented. Able to make needs known. Abd. Incision red and swollen. ID and surgery saw. Wound cx sent. Blood cx have not been drawn yet. Patient is steady on feet. Does not want bed alarm or chair alarm on. Very small open area noted on L buttock. Does not appear to be pressure sore. Was about to apply mepilex when patient picked up for CT. Will notify next shift of plan.

## 2021-12-22 NOTE — PROGRESS NOTES
"   12/22/21 1300   Quick Adds   Quick Adds Certification   Type of Visit Initial PT Evaluation   Living Environment   People in home other (see comments)  (brother in law)   Current Living Arrangements house   Home Accessibility stairs to enter home   Number of Stairs, Main Entrance 3   Stair Railings, Main Entrance railings safe and in good condition   Living Environment Comments pt states he is not able to return to this location as BENJI is not able to handle his cares and he was not able to do them on his own   Self-Care   Usual Activity Tolerance good   Current Activity Tolerance fair   Equipment Currently Used at Home none   Activity/Exercise/Self-Care Comment states did not have a walker at home   General Information   Onset of Illness/Injury or Date of Surgery 12/21/21   Referring Physician KAREY Araujo   Patient/Family Therapy Goals Statement (PT) go to TCU, \"I wasnt able to do it on my own\"   Pertinent History of Current Problem (include personal factors and/or comorbidities that impact the POC) readmit after being home less than 1 day - hx abdominal surgery with complications   Existing Precautions/Restrictions   (feeding tube)   General Observations needed a little encouragement to participate   Cognition   Orientation Status (Cognition) oriented x 4   Affect/Mental Status (Cognition) WFL   Follows Commands (Cognition) WFL   Pain Assessment   Patient Currently in Pain Yes, see Vital Sign flowsheet   Range of Motion (ROM)   ROM Comment LE ROM WFL   Strength   Strength Comments LE strength grossly WFL but fatigues easily   Bed Mobility   Bed Mobility rolling left;supine-sit   Rolling Left [place] (Bed Mobility) modified independence;verbal cues   Supine-Sit [place] (Bed Mobility) modified independence;verbal cues   Impairments Contributing to Impaired Bed Mobility pain   Assistive Device (Bed Mobility) bed rails   Comment (Bed Mobility) instructed to do as much as possible on his own, initially wanted " assisst   Transfers   Transfers sit-stand transfer   Sit-Stand Transfer   Sit-Stand [place] (Transfers) contact guard;verbal cues   Assistive Device (Sit-Stand Transfers) walker, front-wheeled   Sit/Stand Transfer Comments cuse for hand placement   Gait/Stairs (Locomotion)   [place] Level (Gait) contact guard   Assistive Device (Gait) walker, front-wheeled   Distance in Feet (Required for LE Total Joints) 150'   Pattern (Gait) step-through   Comment (Gait/Stairs) slow pace, 1 short standing rest along the way, fatigues easily   Clinical Impression   Criteria for Skilled Therapeutic Intervention yes, treatment indicated   PT Diagnosis (PT) decreased activity tolerance   Influenced by the following impairments pain, fatigue   Functional limitations due to impairments decreased activity tolerance   Clinical Presentation Stable/Uncomplicated   Clinical Presentation Rationale presents as diagnosed   Clinical Decision Making (Complexity) moderate complexity   Predicted Duration of Therapy Intervention (days/wks) 5   Planned Therapy Interventions (PT) bed mobility training;transfer training;gait training;stair training;strengthening   Anticipated Equipment Needs at Discharge (PT) walker, rolling   Risk & Benefits of therapy have been explained care plan/treatment goals reviewed;patient   PT Discharge Planning    PT Discharge Recommendation (DC Rec) Transitional Care Facility;home with assist;home with home care physical therapy   PT Rationale for DC Rec needs assist of 1 for safety with OOB mobility   PT Brief overview of current status  overall tolerated PT well   Therapy Certification   Start of care date 12/22/21   Certification date from 12/22/21   Certification date to 12/29/21   Medical Diagnosis abdominal pain, SBO/recent surgery   Total Evaluation Time   Total Evaluation Time (Minutes) 10   Skin WDL   Skin WDL X   Skin Color redness blanchable   Skin Integrity/Characteristics abrasion        12/22/21 1300  "  Quick Adds   Quick Adds Certification   Type of Visit Initial PT Evaluation   Living Environment   People in home other (see comments)  (brother in law)   Current Living Arrangements house   Home Accessibility stairs to enter home   Number of Stairs, Main Entrance 3   Stair Railings, Main Entrance railings safe and in good condition   Living Environment Comments pt states he is not able to return to this location as BENJI is not able to handle his cares and he was not able to do them on his own   Self-Care   Usual Activity Tolerance good   Current Activity Tolerance fair   Equipment Currently Used at Home none   Activity/Exercise/Self-Care Comment states did not have a walker at home   General Information   Onset of Illness/Injury or Date of Surgery 12/21/21   Referring Physician KAREY Araujo   Patient/Family Therapy Goals Statement (PT) go to TCU, \"I wasnt able to do it on my own\"   Pertinent History of Current Problem (include personal factors and/or comorbidities that impact the POC) readmit after being home less than 1 day - hx abdominal surgery with complications   Existing Precautions/Restrictions   (feeding tube)   General Observations needed a little encouragement to participate   Cognition   Orientation Status (Cognition) oriented x 4   Affect/Mental Status (Cognition) WFL   Follows Commands (Cognition) WFL   Pain Assessment   Patient Currently in Pain Yes, see Vital Sign flowsheet   Range of Motion (ROM)   ROM Comment LE ROM WFL   Strength   Strength Comments LE strength grossly WFL but fatigues easily   Bed Mobility   Bed Mobility rolling left;supine-sit   Rolling Left [place] (Bed Mobility) modified independence;verbal cues   Supine-Sit [place] (Bed Mobility) modified independence;verbal cues   Impairments Contributing to Impaired Bed Mobility pain   Assistive Device (Bed Mobility) bed rails   Comment (Bed Mobility) instructed to do as much as possible on his own, initially wanted assisst "   Transfers   Transfers sit-stand transfer   Sit-Stand Transfer   Sit-Stand [place] (Transfers) contact guard;verbal cues   Assistive Device (Sit-Stand Transfers) walker, front-wheeled   Sit/Stand Transfer Comments cuse for hand placement   Gait/Stairs (Locomotion)   [place] Level (Gait) contact guard   Assistive Device (Gait) walker, front-wheeled   Distance in Feet (Required for LE Total Joints) 150'   Pattern (Gait) step-through   Comment (Gait/Stairs) slow pace, 1 short standing rest along the way, fatigues easily   Clinical Impression   Criteria for Skilled Therapeutic Intervention yes, treatment indicated   PT Diagnosis (PT) decreased activity tolerance   Influenced by the following impairments pain, fatigue   Functional limitations due to impairments decreased activity tolerance   Clinical Presentation Stable/Uncomplicated   Clinical Presentation Rationale presents as diagnosed   Clinical Decision Making (Complexity) moderate complexity   Predicted Duration of Therapy Intervention (days/wks) 5   Planned Therapy Interventions (PT) bed mobility training;transfer training;gait training;stair training;strengthening   Anticipated Equipment Needs at Discharge (PT) walker, rolling   Risk & Benefits of therapy have been explained care plan/treatment goals reviewed;patient   PT Discharge Planning    PT Discharge Recommendation (DC Rec) Transitional Care Facility;home with assist;home with home care physical therapy   PT Rationale for DC Rec needs assist of 1 for safety with OOB mobility   PT Brief overview of current status  overall tolerated PT well   Therapy Certification   Start of care date 12/22/21   Certification date from 12/22/21   Certification date to 12/29/21   Medical Diagnosis abdominal pain, SBO/recent surgery   Total Evaluation Time   Total Evaluation Time (Minutes) 10   Skin WDL   Skin WDL X   Skin Color redness blanchable   Skin Integrity/Characteristics abrasion

## 2021-12-22 NOTE — PROGRESS NOTES
[facility] stoma assessment EC Fistula    Allergies:  Penicillins    Height:       Weight:   Weight: 78.5 kg (173 lb)    Past Medical History:  Past Medical History:   Diagnosis Date     Benign prostatic hyperplasia with weak urinary stream      Chronic bilateral low back pain without sciatica      Chronic neck pain      RAVI (generalized anxiety disorder)      History of hepatitis C     treated and cured     History of substance abuse (H)      Hypertension goal BP (blood pressure) < 140/90      Moderate major depression (H)        Labs:  Recent Labs   Lab Test 12/22/21  0003 12/10/21  0456 12/09/21  1204 12/08/21  0535 12/07/21  0358   CRP  --   --   --   --  26.6*   HGB 12.0*   < >  --    < > 10.9*   ALBUMIN 2.4*   < >  --   --  2.3*   CULTURE  --   --  2+ Escherichia coli*   < >  --     < > = values in this interval not displayed.       INTAKE - patient known to [facility] from previous admission.  Patient has EC fistula at distal end of midline incision  Using Brielle pouch #[id number] to contain drainage.     Patient sound asleep at time of visit and would not wake up to his name. Discussed with RN - dressings on abdomen and pouch were changed this morning.     Orders entered for pouch management. [facility] will see tomorrow 12/22 for full assessment.

## 2021-12-22 NOTE — ED NOTES
All dressing saturated with bile looking substance. Incision not approximated, staples elevated and red around incision. More redness noted in medial incision. Ostomy area reddened and non blanchable in areas. Cleansed entire incision and fistula with saline and gauze. Applied dry gauze with tegaderm at distal end of incision above fistula then applied pouch. Medial incision dressed moist to dry with 4x4's and ABD. Patient very appreciative, no drainage noted after dressing change.

## 2021-12-22 NOTE — PROGRESS NOTES
Progress Note    Subjective  Quite angry this morning with not getting seroquel last night for sleep - only slept an hour. Is frustrated that no facilities will take him.    Objective    Vital signs in last 24 hours Temp:  [98.1  F (36.7  C)-100  F (37.8  C)] 98.5  F (36.9  C)  Pulse:  [] 90  Resp:  [16-22] 22  BP: ()/(57-88) 133/82  SpO2:  [92 %-94 %] 93 %   Weight: [unfilled]    Intake/Output last 3 shift No intake/output data recorded.    Intake/Output this shift:No intake/output data recorded.    Physical Exam  General: alert, comfortable, sitting in bed  Chest: clear to auscultation  Cor: RRR, no murmurs, rubs, or gallops  Abd: Bowel sounds present, non-distended, soft, moderate tenderness diffusely, lap wounds c/d/i, midline staples remain intact - some surrounding erythema and irritation though doesn't appear infected. Fistula at distal end of midline incision draining dark green succus into ostomy bag.  Ext: no edema    Pertinent Labs and Pertinent Radiology   Lab Results: personally reviewed.   Na 134  WBC 8.8    Recent Labs   Lab 12/22/21  0003   WBC 13.8*   HGB 12.0*   HCT 36.1*   *       Recent Labs   Lab 12/22/21  0003   *   CO2 21*   BUN 22   ALBUMIN 2.4*   ALKPHOS 132*   ALT 18   AST 31         Assessment/Plan  Chema Dia is a 67 y/o M with past medical history of splenectomy, appendectomy, HTN, and substance abuse, with recent admission to hospital 11/30-12/21/2021 for SBO with perforation s/p surgical repair. Discharged home but presents as he could not manage at home and is now requesting to be placed in TCU vs LTACH.        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 Required subsequent washout on 12/3 with aggressive IV  antibiotic regimen and MARISA drain placement. On 12/8 found to have continued enhancing abscess with drain placed 12/9 and removed on 12/16. Antibiotics stopped on 12/20. Plan for close follow up with surgery and TPN at discharge last evening, with plans to have help from his brother in law. He returns as he feels he was not able to care for himself nor could his brother in law adequately take care of him and he would like TCU placement. White count increased to 13.8.  -PT/OT ordered, appreciate recs on placement  -Plan to begin search for TCU/LTACH placement with care management  -Pain management: Percocet 1-2 tablets q6h PRN, gabapentin   -Consult surgery to follow while here  -Consult ID   -Now on vanc/cefepime  -Consult pharmacy and RD for TPN management  -WOC    Depression  Insomnia  Hx of this. Reports symptoms for past few days - low motivation, little interest in doing things, poor sleep, no appetite. Is on buspar, mirtazapine, and effexor, though doesn't really think medicines are as helpful as talk therapy is. Wanted to speak to social work and . States no plan to harm himself. Reports he just needs to leave the hospital.  -No change in meds for now  -Consult psych for med management and plan for talk therapy while hospitalized  -Consider seroquel for sleep pending psych plan     Hyponatremia - improved  Moderate Malnutrition  Stable hyponatremia of 134.   -Planning to treat with increased Na in TPN per pharmacy     Normocytic anemia  Stable at 12.0, MCV of 90.     Alk phos elevation  132, mildly increased from 124 on 12/20. Plan to monitor.     Chronic Conditions:  HTN- resume PTA lisinopril, furosemide pending med rec  Hypothyroidism- resume PTA levothyroxine pending med rec  Depression/anxiety- resume PTA venlafaxine, Buspar pending med rec; giving mirtazapine 30 mg tonight for sleep per request  Chronic pain- Hold mexolicam 7.5mg daily, resume PTA gabapentin tonight for sleep, reassess in AM  pending med rec  BPH- resume PTA flomax pending med rec    Precepted patient with Dr. Gem Mcmahon MD - PGY2  [facility]  P: [phone]

## 2021-12-22 NOTE — PROGRESS NOTES
Occupational Therapy      12/22/21 0409   Quick Adds   Type of Visit Initial Occupational Therapy Evaluation   Living Environment   People in home other (see comments)  (Brother in law )   Current Living Arrangements house   Home Accessibility stairs to enter home   Number of Stairs, Main Entrance 3   Stair Railings, Main Entrance railings safe and in good condition   Transportation Anticipated family or friend will provide   Living Environment Comments Pt stated he is currently living w/ his brother in law, pt will be finding his own place soon. Pt has Tub/shower unit w/ GB no SC, pt has standard toilet no GB. Bed/bath/kitchen on one level no concerns.   Self-Care   Usual Activity Tolerance good   Current Activity Tolerance poor   Regular Exercise No   Equipment Currently Used at Home none   Activity/Exercise/Self-Care Comment Pt stated prior to his surgeries he was Ind. Pt stated he d/c on 12/21 got home and was unable to care for himself resulting in returning to hosptial ED d/t increased abdominal pain.   Instrumental Activities of Daily Living (IADL)   Previous Responsibilities finances;medication management   IADL Comments Pt brother in law will be cooking/cleaning, driving. Pt currently NPO   Disability/Function   Hearing Difficulty or Deaf no   Wear Glasses or Blind yes   Vision Management glasses for reading   Concentrating, Remembering or Making Decisions Difficulty no   Difficulty Communicating no   Difficulty Eating/Swallowing no   Walking or Climbing Stairs Difficulty no   Dressing/Bathing Difficulty no   Toileting issues no   Doing Errands Independently Difficulty (such as shopping) yes   Errands Management brother in law to assist    Fall history within last six months no   General Information   Onset of Illness/Injury or Date of Surgery 12/21/21   Referring Physician Hardy Hardwick MD    Patient/Family Therapy Goal Statement (OT) goal is to go to TCU to get stronger   Additional Occupational  Profile Info/Pertinent History of Current Problem Pt PMHx splendectomy, appendectomy, HTN, substance abuse, hosptialization from 11/30-12/21 for SBO w/ perforation s/p surgical repair, pt d/c home AMA could not manage at home pt requesting TCU   Existing Precautions/Restrictions abdominal   Cognitive Status Examination   Orientation Status orientation to person, place and time   Range of Motion Comprehensive   General Range of Motion no range of motion deficits identified   Bed Mobility   Bed Mobility sit-supine;supine-sit   Scooting/Bridging [place] (Bed Mobility) supervision;verbal cues   Supine-Sit [place] (Bed Mobility) supervision;verbal cues   Sit-Supine [place] (Bed Mobility) supervision;verbal cues   Assistive Device (Bed Mobility) bed rails   Transfers   Transfers sit-stand transfer   Sit-Stand Transfer   Sit-Stand [place] (Transfers) contact guard;verbal cues;supervision   Balance   Balance Assessment standing balance: static;sitting balance: static   Sitting Balance: Static WFL   Standing Balance: Static normal balance   Activities of Daily Living   BADL Assessment lower body dressing;grooming   Lower Body Dressing Assessment   [place] Level (Lower Body Dressing) minimum assist (75% patient effort)   Position (Lower Body Dressing) sitting up in bed   Grooming Assessment   [place] Level (Grooming) supervision;set up   Position (Grooming) edge of bed sitting   Clinical Impression   Criteria for Skilled Therapeutic Interventions Met (OT) yes;meets criteria;skilled treatment is necessary   OT Diagnosis weakness/decreased strength & endurance, increased abdominal pain/discomfort.   OT Problem List-Impairments impacting ADL problems related to;activity tolerance impaired;balance;mobility;strength   Assessment of Occupational Performance 1-3 Performance Deficits   Identified Performance Deficits Abdominal pain/incision integrity impacting ADL independence/safety/endurance   Planned  Therapy Interventions (OT) ADL retraining;bed mobility training;IADL retraining;balance training;strengthening   Clinical Decision Making Complexity (OT) low complexity   Therapy Frequency (OT) Daily   Predicted Duration of Therapy 5   Anticipated Equipment Needs Upon Discharge (OT) shower chair   Risk & Benefits of therapy have been explained evaluation/treatment results reviewed;patient   OT Discharge Planning    OT Discharge Recommendation (DC Rec) Transitional Care Facility;home with home care occupational therapy;Home with assist   OT Rationale for DC Rec CGA STS d/t pain, pt currently having increasesd abdominal pain/discomfort pt requesting TCU for further skilled therapy to improve ADL ind./safety   Therapy Certification   Start of Care Date 12/21/21   Certification date from 12/21/21   Certification date to 12/28/21   Medical Diagnosis increased pain following SBO   Total Evaluation Time (Minutes)   Total Evaluation Time (Minutes) 6

## 2021-12-22 NOTE — PHARMACY-VANCOMYCIN DOSING SERVICE
Pharmacy Vancomycin Initial Note  Date of Service 2021  Patient's  1955  66 year old, male    Indication: Skin and Soft Tissue Infection    Current estimated CrCl = Estimated Creatinine Clearance: 86 mL/min (based on SCr of 0.89 mg/dL).    Creatinine for last 3 days  2021:  6:44 AM Creatinine 0.86 mg/dL  2021:  6:44 AM Creatinine 0.83 mg/dL  2021: 12:03 AM Creatinine 0.89 mg/dL    Recent Vancomycin Level(s) for last 3 days  No results found for requested labs within last 72 hours.      Vancomycin IV Administrations (past 72 hours)      No vancomycin orders with administrations in past 72 hours.                Nephrotoxins and other renal medications (From now, onward)    Start     Dose/Rate Route Frequency Ordered Stop    21 1330  vancomycin (VANCOCIN) 1,250 mg in sodium chloride 0.9 % 250 mL intermittent infusion         1,250 mg  over 90 Minutes Intravenous EVERY 12 HOURS 21 1308            Contrast Orders - past 72 hours (72h ago, onward)            None          [employer] Prediction of Planned Initial Vancomycin Regimen      Loading dose: 1500 mg at 15:00 2021.  Regimen: 1000 mg IV every 12 hours.  Start time: 14:39 on 2021  Exposure target: AUC24 (range)400-600 mg/L.hr   AUC24,ss: 570 mg/L.hr  Probability of AUC24 > 400: 84 %  Ctrough,ss: 18.4 mg/L  Probability of Ctrough,ss > 20: 43 %  Probability of nephrotoxicity (Lodise ALAN ): 15 %      Plan:  1. Start vancomycin  1000 mg IV q12h.   2. Vancomycin monitoring method: AUC  3. Vancomycin therapeutic monitoring goal: 400-600 mg*h/L  4. Pharmacy will check vancomycin levels as appropriate in 1-3 Days.    5. Serum creatinine levels will be ordered a minimum of twice weekly.      Aye Domínguez [facility]

## 2021-12-23 ENCOUNTER — APPOINTMENT (OUTPATIENT)
Dept: OCCUPATIONAL THERAPY | Facility: HOSPITAL | Age: 66
DRG: 393 | End: 2021-12-23
Payer: MEDICARE

## 2021-12-23 LAB
ANION GAP SERPL CALCULATED.3IONS-SCNC: 6 MMOL/L (ref 5–18)
BUN SERPL-MCNC: 23 MG/DL (ref 8–22)
CALCIUM SERPL-MCNC: 7.9 MG/DL (ref 8.5–10.5)
CHLORIDE BLD-SCNC: 105 MMOL/L (ref 98–107)
CO2 SERPL-SCNC: 23 MMOL/L (ref 22–31)
CREAT SERPL-MCNC: 0.81 MG/DL (ref 0.7–1.3)
ERYTHROCYTE [DISTWIDTH] IN BLOOD BY AUTOMATED COUNT: 15.5 % (ref 10–15)
GFR SERPL CREATININE-BSD FRML MDRD: >90 ML/MIN/1.73M2
GLUCOSE BLD-MCNC: 130 MG/DL (ref 70–125)
GLUCOSE BLDC GLUCOMTR-MCNC: 127 MG/DL (ref 70–99)
GLUCOSE BLDC GLUCOMTR-MCNC: 135 MG/DL (ref 70–99)
GLUCOSE BLDC GLUCOMTR-MCNC: 145 MG/DL (ref 70–99)
HCT VFR BLD AUTO: 33.1 % (ref 40–53)
HGB BLD-MCNC: 10.9 G/DL (ref 13.3–17.7)
MCH RBC QN AUTO: 30.2 PG (ref 26.5–33)
MCHC RBC AUTO-ENTMCNC: 32.9 G/DL (ref 31.5–36.5)
MCV RBC AUTO: 92 FL (ref 78–100)
PLATELET # BLD AUTO: 453 10E3/UL (ref 150–450)
POTASSIUM BLD-SCNC: 4.3 MMOL/L (ref 3.5–5)
RBC # BLD AUTO: 3.61 10E6/UL (ref 4.4–5.9)
SODIUM SERPL-SCNC: 134 MMOL/L (ref 136–145)
WBC # BLD AUTO: 8.8 10E3/UL (ref 4–11)

## 2021-12-23 PROCEDURE — 250N000013 HC RX MED GY IP 250 OP 250 PS 637: Performed by: STUDENT IN AN ORGANIZED HEALTH CARE EDUCATION/TRAINING PROGRAM

## 2021-12-23 PROCEDURE — 250N000009 HC RX 250: Performed by: STUDENT IN AN ORGANIZED HEALTH CARE EDUCATION/TRAINING PROGRAM

## 2021-12-23 PROCEDURE — 99222 1ST HOSP IP/OBS MODERATE 55: CPT | Mod: AI | Performed by: STUDENT IN AN ORGANIZED HEALTH CARE EDUCATION/TRAINING PROGRAM

## 2021-12-23 PROCEDURE — 250N000011 HC RX IP 250 OP 636: Performed by: STUDENT IN AN ORGANIZED HEALTH CARE EDUCATION/TRAINING PROGRAM

## 2021-12-23 PROCEDURE — G0463 HOSPITAL OUTPT CLINIC VISIT: HCPCS

## 2021-12-23 PROCEDURE — 82310 ASSAY OF CALCIUM: CPT | Performed by: STUDENT IN AN ORGANIZED HEALTH CARE EDUCATION/TRAINING PROGRAM

## 2021-12-23 PROCEDURE — C9113 INJ PANTOPRAZOLE SODIUM, VIA: HCPCS | Performed by: STUDENT IN AN ORGANIZED HEALTH CARE EDUCATION/TRAINING PROGRAM

## 2021-12-23 PROCEDURE — 250N000013 HC RX MED GY IP 250 OP 250 PS 637

## 2021-12-23 PROCEDURE — 99024 POSTOP FOLLOW-UP VISIT: CPT | Performed by: SPECIALIST

## 2021-12-23 PROCEDURE — 97110 THERAPEUTIC EXERCISES: CPT | Mod: GO

## 2021-12-23 PROCEDURE — 97535 SELF CARE MNGMENT TRAINING: CPT | Mod: GO

## 2021-12-23 PROCEDURE — 99233 SBSQ HOSP IP/OBS HIGH 50: CPT | Performed by: INTERNAL MEDICINE

## 2021-12-23 PROCEDURE — 250N000013 HC RX MED GY IP 250 OP 250 PS 637: Performed by: NURSE PRACTITIONER

## 2021-12-23 PROCEDURE — 99222 1ST HOSP IP/OBS MODERATE 55: CPT | Mod: 25 | Performed by: NURSE PRACTITIONER

## 2021-12-23 PROCEDURE — 85027 COMPLETE CBC AUTOMATED: CPT | Performed by: STUDENT IN AN ORGANIZED HEALTH CARE EDUCATION/TRAINING PROGRAM

## 2021-12-23 PROCEDURE — 250N000011 HC RX IP 250 OP 636: Performed by: INTERNAL MEDICINE

## 2021-12-23 PROCEDURE — 36415 COLL VENOUS BLD VENIPUNCTURE: CPT | Performed by: STUDENT IN AN ORGANIZED HEALTH CARE EDUCATION/TRAINING PROGRAM

## 2021-12-23 PROCEDURE — 120N000001 HC R&B MED SURG/OB

## 2021-12-23 RX ORDER — VENLAFAXINE HYDROCHLORIDE 75 MG/1
75 CAPSULE, EXTENDED RELEASE ORAL DAILY
Status: DISCONTINUED | OUTPATIENT
Start: 2021-12-24 | End: 2022-02-14 | Stop reason: HOSPADM

## 2021-12-23 RX ORDER — TAMSULOSIN HYDROCHLORIDE 0.4 MG/1
0.4 CAPSULE ORAL DAILY
Status: DISCONTINUED | OUTPATIENT
Start: 2021-12-23 | End: 2021-12-23

## 2021-12-23 RX ORDER — BUSPIRONE HYDROCHLORIDE 15 MG/1
15 TABLET ORAL 2 TIMES DAILY
Status: DISCONTINUED | OUTPATIENT
Start: 2021-12-23 | End: 2021-12-23

## 2021-12-23 RX ORDER — NALOXONE HYDROCHLORIDE 0.4 MG/ML
0.4 INJECTION, SOLUTION INTRAMUSCULAR; INTRAVENOUS; SUBCUTANEOUS
Status: DISCONTINUED | OUTPATIENT
Start: 2021-12-23 | End: 2022-02-14 | Stop reason: HOSPADM

## 2021-12-23 RX ORDER — FLUCONAZOLE 2 MG/ML
400 INJECTION, SOLUTION INTRAVENOUS EVERY 24 HOURS
Status: DISCONTINUED | OUTPATIENT
Start: 2021-12-23 | End: 2021-12-27

## 2021-12-23 RX ORDER — OXYCODONE HYDROCHLORIDE 5 MG/1
5 TABLET ORAL ONCE
Status: COMPLETED | OUTPATIENT
Start: 2021-12-23 | End: 2021-12-23

## 2021-12-23 RX ORDER — QUETIAPINE FUMARATE 100 MG/1
100 TABLET, FILM COATED ORAL
Status: DISCONTINUED | OUTPATIENT
Start: 2021-12-23 | End: 2021-12-25

## 2021-12-23 RX ORDER — QUETIAPINE FUMARATE 100 MG/1
200 TABLET, FILM COATED ORAL 2 TIMES DAILY
Status: DISCONTINUED | OUTPATIENT
Start: 2021-12-23 | End: 2021-12-25

## 2021-12-23 RX ORDER — NALOXONE HYDROCHLORIDE 0.4 MG/ML
0.2 INJECTION, SOLUTION INTRAMUSCULAR; INTRAVENOUS; SUBCUTANEOUS
Status: DISCONTINUED | OUTPATIENT
Start: 2021-12-23 | End: 2022-02-14 | Stop reason: HOSPADM

## 2021-12-23 RX ORDER — MIRTAZAPINE 30 MG/1
30 TABLET, FILM COATED ORAL AT BEDTIME
Status: DISCONTINUED | OUTPATIENT
Start: 2021-12-23 | End: 2021-12-23

## 2021-12-23 RX ORDER — GABAPENTIN 300 MG/1
600 CAPSULE ORAL AT BEDTIME
Status: DISCONTINUED | OUTPATIENT
Start: 2021-12-23 | End: 2021-12-23

## 2021-12-23 RX ORDER — GABAPENTIN 100 MG/1
200 CAPSULE ORAL 2 TIMES DAILY
Status: DISCONTINUED | OUTPATIENT
Start: 2021-12-23 | End: 2022-02-14 | Stop reason: HOSPADM

## 2021-12-23 RX ORDER — VENLAFAXINE HYDROCHLORIDE 37.5 MG/1
37.5 CAPSULE, EXTENDED RELEASE ORAL DAILY
Status: DISCONTINUED | OUTPATIENT
Start: 2021-12-23 | End: 2021-12-23

## 2021-12-23 RX ORDER — LEVOTHYROXINE SODIUM 25 UG/1
25 TABLET ORAL DAILY
Status: DISCONTINUED | OUTPATIENT
Start: 2021-12-23 | End: 2022-02-14 | Stop reason: HOSPADM

## 2021-12-23 RX ORDER — GABAPENTIN 300 MG/1
300 CAPSULE ORAL 2 TIMES DAILY
Status: DISCONTINUED | OUTPATIENT
Start: 2021-12-23 | End: 2021-12-23

## 2021-12-23 RX ORDER — GABAPENTIN 300 MG/1
900 CAPSULE ORAL AT BEDTIME
Status: DISCONTINUED | OUTPATIENT
Start: 2021-12-23 | End: 2022-02-14 | Stop reason: HOSPADM

## 2021-12-23 RX ORDER — QUETIAPINE FUMARATE 100 MG/1
100 TABLET, FILM COATED ORAL
Status: DISCONTINUED | OUTPATIENT
Start: 2021-12-23 | End: 2021-12-23

## 2021-12-23 RX ADMIN — OXYCODONE HYDROCHLORIDE AND ACETAMINOPHEN 2 TABLET: 5; 325 TABLET ORAL at 10:58

## 2021-12-23 RX ADMIN — VANCOMYCIN HYDROCHLORIDE 1000 MG: 1 INJECTION, SOLUTION INTRAVENOUS at 16:23

## 2021-12-23 RX ADMIN — GABAPENTIN 900 MG: 300 CAPSULE ORAL at 23:16

## 2021-12-23 RX ADMIN — QUETIAPINE FUMARATE 200 MG: 100 TABLET, FILM COATED ORAL at 23:16

## 2021-12-23 RX ADMIN — PANTOPRAZOLE SODIUM 40 MG: 40 INJECTION, POWDER, FOR SOLUTION INTRAVENOUS at 14:17

## 2021-12-23 RX ADMIN — POTASSIUM CHLORIDE: 2 INJECTION, SOLUTION, CONCENTRATE INTRAVENOUS at 20:39

## 2021-12-23 RX ADMIN — OXYCODONE HYDROCHLORIDE 5 MG: 5 TABLET ORAL at 20:38

## 2021-12-23 RX ADMIN — CEFEPIME HYDROCHLORIDE 2 G: 2 INJECTION, POWDER, FOR SOLUTION INTRAVENOUS at 17:20

## 2021-12-23 RX ADMIN — BUSPIRONE HYDROCHLORIDE 15 MG: 15 TABLET ORAL at 10:15

## 2021-12-23 RX ADMIN — GABAPENTIN 200 MG: 100 CAPSULE ORAL at 17:21

## 2021-12-23 RX ADMIN — LEVOTHYROXINE SODIUM 25 MCG: 0.03 TABLET ORAL at 09:06

## 2021-12-23 RX ADMIN — VENLAFAXINE HYDROCHLORIDE 37.5 MG: 37.5 CAPSULE, EXTENDED RELEASE ORAL at 10:15

## 2021-12-23 RX ADMIN — CEFEPIME HYDROCHLORIDE 2 G: 2 INJECTION, POWDER, FOR SOLUTION INTRAVENOUS at 00:30

## 2021-12-23 RX ADMIN — CEFEPIME HYDROCHLORIDE 2 G: 2 INJECTION, POWDER, FOR SOLUTION INTRAVENOUS at 09:06

## 2021-12-23 RX ADMIN — FLUCONAZOLE IN SODIUM CHLORIDE 400 MG: 2 INJECTION, SOLUTION INTRAVENOUS at 18:44

## 2021-12-23 RX ADMIN — VANCOMYCIN HYDROCHLORIDE 1000 MG: 1 INJECTION, SOLUTION INTRAVENOUS at 04:36

## 2021-12-23 RX ADMIN — OXYCODONE HYDROCHLORIDE AND ACETAMINOPHEN 2 TABLET: 5; 325 TABLET ORAL at 17:21

## 2021-12-23 RX ADMIN — TAMSULOSIN HYDROCHLORIDE 0.4 MG: 0.4 CAPSULE ORAL at 09:05

## 2021-12-23 RX ADMIN — GABAPENTIN 300 MG: 300 CAPSULE ORAL at 09:05

## 2021-12-23 RX ADMIN — OXYCODONE HYDROCHLORIDE AND ACETAMINOPHEN 2 TABLET: 5; 325 TABLET ORAL at 04:36

## 2021-12-23 RX ADMIN — ENOXAPARIN SODIUM 40 MG: 40 INJECTION SUBCUTANEOUS at 09:06

## 2021-12-23 RX ADMIN — OXYCODONE HYDROCHLORIDE AND ACETAMINOPHEN 2 TABLET: 5; 325 TABLET ORAL at 23:15

## 2021-12-23 ASSESSMENT — ACTIVITIES OF DAILY LIVING (ADL)
ADLS_ACUITY_SCORE: 13
ADLS_ACUITY_SCORE: 11
ADLS_ACUITY_SCORE: 13
ADLS_ACUITY_SCORE: 11
ADLS_ACUITY_SCORE: 13
ADLS_ACUITY_SCORE: 11
ADLS_ACUITY_SCORE: 13
ADLS_ACUITY_SCORE: 11
ADLS_ACUITY_SCORE: 13
ADLS_ACUITY_SCORE: 13
ADLS_ACUITY_SCORE: 11
ADLS_ACUITY_SCORE: 13
ADLS_ACUITY_SCORE: 11
ADLS_ACUITY_SCORE: 9
ADLS_ACUITY_SCORE: 13
ADLS_ACUITY_SCORE: 13
ADLS_ACUITY_SCORE: 11
ADLS_ACUITY_SCORE: 13
ADLS_ACUITY_SCORE: 11
ADLS_ACUITY_SCORE: 13

## 2021-12-23 NOTE — PROGRESS NOTES
"Care Management Follow Up    Length of Stay (days): 1    Expected Discharge Date: 12/24/2021     Concerns to be Addressed:       Patient plan of care discussed at interdisciplinary rounds: Yes    Anticipated Discharge Disposition:  LTACH       Referrals Placed by CM/SW:    Private pay costs discussed: Not applicable    Additional Information:  SWCM and hospitalist discussed discharge dispo. Hospitalist stated [facility] or [facility] may be best bet. SW to send referrals to \"difficult to place\" TCU facilities.       CINDA Whitman        "

## 2021-12-23 NOTE — CONSULTS
CLINICAL NUTRITION SERVICES  -  ASSESSMENT NOTE      RECOMMENDATIONS FOR MD/PROVIDER TO ORDER:   Received consult for TF, yet patient does not have a feeding tube. Please place consult for Pharmacy/Nutrition to manage TPN     Recommendations Ordered by Registered Dietitian (RD):   Increase Dextrose in TPN to 400 to help prevent further weight loss.    When receive consult to manage TPN:    Custom TPN D400/ @ 70ml/hr w/ 250ml 20% lipids 5x/week to provide 2177kcals, 115g protein, 400g CHO, 36g fat, 1680ml fluid     Future/Additional Recommendations:   Consider cycling TPN tomorrow if liver enzymes (alk phos) remains elevated.   Decrease lipids if trigs > 400  Consider increasing D further pending continued significant wt loss     Malnutrition:   Moderate in the context of acute illness/injury         REASON FOR ASSESSMENT  Gurdeep PJ Dia is a 66 year old male seen by Registered Dietitian for Provider Order - Registered Dietitian to Assess and Order TF per Medical Nutrition Therapy Protocol    Patient presents with s/p surgical intervention of SBO w/ perforation, moderate malnutrition, leaking enterocutaneous fistula.    NUTRITION HISTORY  - Information obtained from chart as patient left AMA on 12/21/21 and then returned to the ED 5 hours later.  - Patient was previously receiving TPN w/ lipds:    D336/ @ 70 ml/hr plus 250 ml of 20% lipids 5 times per week over 12 hrs providing 1959kcals, 115g protein, 336g CHO, 36g fat, 1680ml fluid daily.      CURRENT NUTRITION ORDERS  Diet Order:     NPO   Parenteral nutrition - ADULT compounded formula    Current Intake/Tolerance:  Custom TPN w/ D336/ @ 70 ml/hr plus 250 ml of 20% lipids 5 times per week over 12 hrs providing 1959kcals, 115g protein, 336g CHO, 36g fat, 1680ml fluid daily.      NUTRITION FOCUSED PHYSICAL ASSESSMENT FOR DIAGNOSING MALNUTRITION)  No:  working remote                Observed:    No nutrition-related physical findings  "observed    Obtained from Chart/Interdisciplinary Team:  Slow-healing wound enterocutaneous fistula/surgical incision    ANTHROPOMETRICS  Height: 5' 7\"  Weight: 166 lbs 6.4 oz  Body mass index is 26.06 kg/m .  Weight Status:  Overweight BMI 25-29.9  IBW: 67.3kg  % IBW: 112%  Weight History:   Wt Readings from Last 30 Encounters:   12/22/21 75.5 kg (166 lb 6.4 oz)   12/17/21 76.8 kg (169 lb 6.4 oz)     1.7% wt loss in 5 days   12/13/21 81.7 kg (180 lb 1.6 oz)   11/30/21 79.4 kg (175 lb)                  4.9% wt loss in 1 month   01/22/20 79.4 kg (175 lb)               4.9% wt loss in 11 months   12/24/19 81.2 kg (179 lb)       LABS  Labs reviewed: Na 134, UN 23, hgb 10.9, hematocrit 33.1, rbc 3.61    MEDICATIONS  Medications reviewed: maxipime, synthroid/levothroid, remeron, vancocin    ASSESSED NUTRITION NEEDS PER APPROVED PRACTICE GUIDELINES:    Dosing Weight 75.5 kg (166 lb 6.4 oz)   Estimated Energy Needs: 5337-0733 kcals (Assumption St Jeor)  Justification: Stress factor 1.4-2.0 for fistula  Estimated Protein Needs: 113 grams protein (1.5 g pro/Kg)  Justification: wound healing  Estimated Fluid Needs: 8502-1129  mL (25-30 mL/kg) or less pending Na levels  Justification: maintenance    MALNUTRITION:  % Weight Loss:  Up to 1-2% in 1 week (moderate malnutrition)  % Intake:  No decreased intake noted  Subcutaneous Fat Loss:  Orbital region severe depletion and Upper arm region mild to moderate depletion  Muscle Loss:  Temporal region modearate depletion, Clavicle bone region severe depletion, Dorsal hand region mild to moderate depletion and Patellar region moderate depletion  Fluid Retention:  None noted    Malnutrition Diagnosis: Moderate malnutrition  In Context of:  Acute illness or injury    NUTRITION DIAGNOSIS:  Malnutrition related to s/p surgical intervention of SBO w/ performation and enterocutaneous fistula as evidenced by 1.7% wt loss in 1 week, moderate subcutaneous fat loss, and moderate muscle " loss.      NUTRITION INTERVENTIONS  Recommendations / Nutrition Prescription  Increase dextrose in TPN to 400g to prevent significant weight loss  .      Implementation  Nutrition education: Per Provider order if indicated   PN Composition, PN Schedule and Collaboration and Referral of Nutrition care  .      Nutrition Goals  Meet estimated nutrition needs  Tolerate TPN  Electrolytes WNL      MONITORING AND EVALUATION:  Progress towards goals will be monitored and evaluated per protocol and Practice Guidelines, Diet Order, Parenteral Nutrition intake, Weight, Biochemical data and Nutrition-focused physical findings

## 2021-12-23 NOTE — PHARMACY-CONSULT NOTE
"Pharmacy Note: Parenteral Nutrition (PN) Management    Pharmacist consulted to dose PN for Gurdeep Dia, a 66 year old male by Dr. Mcmahon.    Subjective:    Patient discharged from [facility] 12/21/21 and returned the same day due to care concerns, TPN on hold since discharge.    The patient was started on PN in the hospital on 12/5/21.    Indication for PN therapy: bowel resection -- plan to continue TPN until fistula heals    Social History     Tobacco Use     Smoking status: Current Every Day Smoker     Smokeless tobacco: Never Used   Substance Use Topics     Alcohol use: Not Currently     Comment: Clean for 5 years     Drug use: Not Currently     Objective:    Ht Readings from Last 1 Encounters:   12/22/21 1.702 m (5' 7\")     Wt Readings from Last 1 Encounters:   12/22/21 75.5 kg (166 lb 6.4 oz)       Body mass index is 26.06 kg/m .    Patient Vitals for the past 96 hrs:   Weight   12/22/21 1242 75.5 kg (166 lb 6.4 oz)   12/21/21 2223 78.5 kg (173 lb)       Labs:  Last 3 days:  Recent Labs     12/21/21  0644 12/22/21  0003 12/23/21  0715   * 130* 134*   POTASSIUM 4.6 4.9 4.3   CHLORIDE 101 101 105   CO2 22 21* 23   BUN 21 22 23*   CR 0.83 0.89 0.81   VIJAYA 8.7 8.8 7.9*   MAG 2.0 2.0  --    PHOS 3.2  --   --    PROTTOTAL  --  7.6  --    ALBUMIN  --  2.4*  --    HGB 11.5* 12.0* 10.9*   HCT 35.1* 36.1* 33.1*   * 564* 453*   BILITOTAL  --  0.9  --    AST  --  31  --    ALT  --  18  --    ALKPHOS  --  132*  --        Glucose (past 48 hours):   Recent Labs     12/22/21  0003 12/22/21  1944 12/23/21  0047 12/23/21  0548 12/23/21  0715   * 93 127* 145* 130*       Intake/Output (last 24 hours): I/O last 3 completed shifts:  In: 839 [P.O.:60; I.V.:138]  Out: 1450 [Urine:1050; Drains:400]    Estimated CrCl: Estimated Creatinine Clearance: 94.5 mL/min (based on SCr of 0.81 mg/dL).    Assessment:    Continue patient on PN therapy as a continuous central therapy.     Given the patient's current " condition/oral intake, PN is still indicated.    Lab results reviewed:   12/22 Labs similar to yesterday prior to discharge -- continue formula planned for discharge.  12/23 Sodium improving, calcium dropped a bit. Increase dextrose in TPN per RD recs -- monitor BG with change.    Plan:  1. Rate of PN: 70 mL/hr  2. Formula:     Amino Acids 115 grams    Dextrose 400 grams    Sodium 110 mEq/day    Potassium 80 mEq/day    Calcium 8 mEq/day    Magnesium 14 mEq/day    Phosphorus 32 mMol/day    Chloride: Acetate Ratio 1:1    Standard Multivitamins w/Vitamin K    Trace Elements  3. Fat Emulsion: 20%, 250 mL IV five times weekly.  4. Check hyperal lytes and ionized calcium labs tomorrow.  5. Pharmacist will continue to follow the patient's lab results, clinical status and blood glucose results and make adjustments as appropriate.    Thank you for the consult.  Aye Domínguez [facility]  12/22/2021 9:53 AM

## 2021-12-23 NOTE — PLAN OF CARE
Problem: Adult Inpatient Plan of Care  Goal: Plan of Care Review  Outcome: Improving   Pt was upset this am about his medication and wanting WO nurse to come and change his bag.  WOC, PSYCH and DR saw pt this am and pt is calm and is pleasant after. WOC changed his bag this morning. Pt has had green bile out of his fistula.  Pt c/o abdominal and back pain-reporting good relief with prn percocet. Pt sat up in the chair this afternoon. BG was checked at noon per orders. Pt abdominal incision is red with staples intact. Abdominal dressing was changed and cleaned with NS-old dressing had green purulent drainage.

## 2021-12-23 NOTE — PROGRESS NOTES
[facility] General Surgery Post-Op / Progress Note         Assessment and Plan:    Assessment:   Extensive lysis of adhesions  Bowel perforation  3 days later  Return to OR and repair  Perforation 5 days later and leak  Drain of pelvic abscess    Open fistula to skin lower incision.      Plan:   TPN   Wound cares  Too high of risk for surgery at this time.   Hope to get fistulas to close down with time.            Interval History:   Stable.  Doing well.  Improving slowly.  Pain is reasonably controlled.  No fevers.          Significant Problems:      Patient Active Problem List   Diagnosis     Hypertension goal BP (blood pressure) < 140/90     Benign prostatic hyperplasia with weak urinary stream     RAVI (generalized anxiety disorder)     Moderate major depression (H)     History of substance abuse (H)     History of hepatitis C     Chronic neck pain     Chronic bilateral low back pain without sciatica     SBO (small bowel obstruction) (H)     Perforation of intestine (H)     Abdominal pain, generalized             Review of Systems:    down emotionally   Tolerating tpn          Medications:     All medications related to the patient's surgery have been reviewed  Current Facility-Administered Medications   Medication     ceFEPIme (MAXIPIME) 2 g vial to attach to  ml bag for ADULTS or 50 ml bag for PEDS     dextrose 10% infusion     enoxaparin ANTICOAGULANT (LOVENOX) injection 40 mg     gabapentin (NEURONTIN) capsule 200 mg     gabapentin (NEURONTIN) capsule 900 mg     levothyroxine (SYNTHROID/LEVOTHROID) tablet 25 mcg     lidocaine (LMX4) cream     lidocaine 1 % 0.1-1 mL     lipids (INTRALIPID) 20 % infusion 250 mL     melatonin tablet 1 mg     naloxone (NARCAN) injection 0.2 mg    Or     naloxone (NARCAN) injection 0.4 mg    Or     naloxone (NARCAN) injection 0.2 mg    Or     naloxone (NARCAN) injection 0.4 mg     ondansetron (ZOFRAN-ODT) ODT tab 4 mg    Or     ondansetron (ZOFRAN) injection  4 mg     oxyCODONE-acetaminophen (PERCOCET) 5-325 MG per tablet 1-2 tablet     pantoprazole (PROTONIX) IV push injection 40 mg     parenteral nutrition - ADULT compounded formula     parenteral nutrition - ADULT compounded formula     QUEtiapine (SEROquel) tablet 100 mg     QUEtiapine (SEROquel) tablet 200 mg     sodium chloride (PF) 0.9% PF flush 3 mL     sodium chloride (PF) 0.9% PF flush 3 mL     vancomycin (VANCOCIN) 1000 mg in dextrose 5% 200 mL PREMIX     [START ON 12/24/2021] venlafaxine (EFFEXOR-XR) 24 hr capsule 75 mg             Physical Exam:     Vitals were reviewed  Patient Vitals for the past 8 hrs:   BP Temp Temp src Pulse Resp SpO2   12/23/21 0720 100/73 98.3  F (36.8  C) Oral 88 18 92 %     Wt Readings from Last 4 Encounters:   12/22/21 75.5 kg (166 lb 6.4 oz)   12/17/21 76.8 kg (169 lb 6.4 oz)   01/22/20 79.4 kg (175 lb)   12/24/19 81.2 kg (179 lb)     I/O last 3 completed shifts:  In: 839 [P.O.:60; I.V.:138]  Out: 1450 [Urine:1050; Drains:400]  Ostomy bags in place. draining a spot from above and below.          Data:   All laboratory data related to this surgery reviewed  Results for orders placed or performed during the hospital encounter of 12/21/21 (from the past 24 hour(s))   Nutrition Services Adult IP Consult    Narrative    Hiren Nguyễn RD     12/23/2021 10:06 AM  CLINICAL NUTRITION SERVICES  -  ASSESSMENT NOTE      RECOMMENDATIONS FOR MD/PROVIDER TO ORDER:   Received consult for TF, yet patient does not have a feeding   tube. Please place consult for Pharmacy/Nutrition to manage TPN     Recommendations Ordered by Registered Dietitian (RD):   Increase Dextrose in TPN to 400 to help prevent further weight   loss.    When receive consult to manage TPN:    Custom TPN D400/ @ 70ml/hr w/ 250ml 20% lipids 5x/week to   provide 2177kcals, 115g protein, 400g CHO, 36g fat, 1680ml fluid     Future/Additional Recommendations:   Consider cycling TPN tomorrow if liver enzymes (alk phos) remains  "  elevated.   Decrease lipids if trigs > 400  Consider increasing D further pending continued significant wt   loss     Malnutrition:   Moderate in the context of acute illness/injury         REASON FOR ASSESSMENT  Gurdeep Dia is a 66 year old male seen by Registered   Dietitian for Provider Order - Registered Dietitian to Assess and   Order TF per Medical Nutrition Therapy Protocol    Patient presents with s/p surgical intervention of SBO w/   perforation, moderate malnutrition, leaking enterocutaneous   fistula.    NUTRITION HISTORY  - Information obtained from chart as patient left AMA on 12/21/21   and then returned to the ED 5 hours later.  - Patient was previously receiving TPN w/ lipds:    D336/ @ 70 ml/hr plus 250 ml of 20% lipids 5 times per week   over 12 hrs providing 1959kcals, 115g protein, 336g CHO, 36g fat,   1680ml fluid daily.      CURRENT NUTRITION ORDERS  Diet Order:     NPO   Parenteral nutrition - ADULT compounded formula    Current Intake/Tolerance:  Custom TPN w/ D336/ @ 70 ml/hr plus 250 ml of 20% lipids 5   times per week over 12 hrs providing 1959kcals, 115g protein,   336g CHO, 36g fat, 1680ml fluid daily.      NUTRITION FOCUSED PHYSICAL ASSESSMENT FOR DIAGNOSING   MALNUTRITION)  No:  working remote                Observed:    No nutrition-related physical findings observed    Obtained from Chart/Interdisciplinary Team:  Slow-healing wound enterocutaneous fistula/surgical incision    ANTHROPOMETRICS  Height: 5' 7\"  Weight: 166 lbs 6.4 oz  Body mass index is 26.06 kg/m .  Weight Status:  Overweight BMI 25-29.9  IBW: 67.3kg  % IBW: 112%  Weight History:   Wt Readings from Last 30 Encounters:   12/22/21 75.5 kg (166 lb 6.4 oz)   12/17/21 76.8 kg (169 lb 6.4 oz)     1.7% wt loss in 5 days   12/13/21 81.7 kg (180 lb 1.6 oz)   11/30/21 79.4 kg (175 lb)                  4.9% wt loss in 1   month   01/22/20 79.4 kg (175 lb)               4.9% wt loss in 11 months     12/24/19 81.2 " kg (179 lb)       LABS  Labs reviewed: Na 134, UN 23, hgb 10.9, hematocrit 33.1, rbc 3.61    MEDICATIONS  Medications reviewed: maxipime, synthroid/levothroid, remeron,   vancocin    ASSESSED NUTRITION NEEDS PER APPROVED PRACTICE GUIDELINES:    Dosing Weight 75.5 kg (166 lb 6.4 oz)   Estimated Energy Needs: 2181-7998 kcals ([place] Jeor)  Justification: Stress factor 1.4-2.0 for fistula  Estimated Protein Needs: 113 grams protein (1.5 g pro/Kg)  Justification: wound healing  Estimated Fluid Needs: 1056-4394  mL (25-30 mL/kg) or less   pending Na levels  Justification: maintenance    MALNUTRITION:  % Weight Loss:  Up to 1-2% in 1 week (moderate malnutrition)  % Intake:  No decreased intake noted  Subcutaneous Fat Loss:  Orbital region severe depletion and Upper   arm region mild to moderate depletion  Muscle Loss:  Temporal region modearate depletion, Clavicle bone   region severe depletion, Dorsal hand region mild to moderate   depletion and Patellar region moderate depletion  Fluid Retention:  None noted    Malnutrition Diagnosis: Moderate malnutrition  In Context of:  Acute illness or injury    NUTRITION DIAGNOSIS:  Malnutrition related to s/p surgical intervention of SBO w/   performation and enterocutaneous fistula as evidenced by 1.7% wt   loss in 1 week, moderate subcutaneous fat loss, and moderate   muscle loss.      NUTRITION INTERVENTIONS  Recommendations / Nutrition Prescription  Increase dextrose in TPN to 400g to prevent significant weight   loss  .      Implementation  Nutrition education: Per Provider order if indicated   PN Composition, PN Schedule and Collaboration and Referral of   Nutrition care  .      Nutrition Goals  Meet estimated nutrition needs  Tolerate TPN  Electrolytes WNL      MONITORING AND EVALUATION:  Progress towards goals will be monitored and evaluated per   protocol and Practice Guidelines, Diet Order, Parenteral   Nutrition intake, Weight, Biochemical data and Nutrition-focused    physical findings                 CT Abdomen Pelvis w Contrast    Narrative    EXAM: CT ABDOMEN PELVIS W CONTRAST  LOCATION: [facility]  DATE/TIME: 12/22/2021 2:58 PM    INDICATION: Exploratory laparotomy with adhesion lysis and repair of small bowel enterotomy 11/30/2021. Abdominal abscess drain placement 12/09/2021.  COMPARISON: CT 12/16/2021, 12/08/2021  TECHNIQUE: CT scan of the abdomen and pelvis was performed following injection of IV contrast. Multiplanar reformats were obtained. Dose reduction techniques were used.  CONTRAST: isovue 370 100ml    FINDINGS:   LOWER CHEST: Right basilar consolidation could represent atelectasis or pneumonia, similar to previous.    HEPATOBILIARY: Stable hepatic cysts. Stable prominent gallbladder without definite wall thickening.    PANCREAS: Normal.    SPLEEN: Splenectomy with several left upper quadrant postsplenectomy implants.    ADRENAL GLANDS: Normal.    KIDNEYS/BLADDER: Several right renal cysts which do not require further imaging. Left renal 9 and 4 mm nonobstructive stones. Several tiny left renal cysts. No further imaging recommended.    BOWEL: A loop of small bowel appears to communicate with the left anterior abdominal wall (series 3, image 105). The abdominal wall contains air. 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    There is a right lateral abdominal staple line suggesting an ilio transverse colostomy. The right lower quadrant mesentery inferior to this contains a small amount of nonlocalized fluid and air. Also, the surgical staple line appears scattered suggesting   anastomotic breakdown.    The prerectal drainage catheter has been removed. Small residual 16 x 12 mm fluid collection (image 143).    LYMPH NODES: Several small mesenteric nodes are likely reactive.    VASCULATURE: Patent mesenteric vessels.    PELVIC ORGANS: Normal.    MUSCULOSKELETAL: Severe degenerative change at the L4-L5 level with grade 2  anterolisthesis.      Impression    IMPRESSION:   1.  Improved prerectal fluid collection.  2.  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  3.  Possible right abdominal anastomotic dehiscence with a small amount free fluid and air in the mesentery.  4.  Report called to floor nurse Susy at 4:25 PM   Blood Culture Peripheral Blood    Specimen: Peripheral Blood   Result Value Ref Range    Culture No growth after 12 hours    Blood Culture Peripheral Blood    Specimen: Peripheral Blood   Result Value Ref Range    Culture No growth after 12 hours    Glucose by meter   Result Value Ref Range    GLUCOSE BY METER POCT 93 70 - 99 mg/dL   Glucose by meter   Result Value Ref Range    GLUCOSE BY METER POCT 127 (H) 70 - 99 mg/dL   Glucose by meter   Result Value Ref Range    GLUCOSE BY METER POCT 145 (H) 70 - 99 mg/dL   Basic metabolic panel   Result Value Ref Range    Sodium 134 (L) 136 - 145 mmol/L    Potassium 4.3 3.5 - 5.0 mmol/L    Chloride 105 98 - 107 mmol/L    Carbon Dioxide (CO2) 23 22 - 31 mmol/L    Anion Gap 6 5 - 18 mmol/L    Urea Nitrogen 23 (H) 8 - 22 mg/dL    Creatinine 0.81 0.70 - 1.30 mg/dL    Calcium 7.9 (L) 8.5 - 10.5 mg/dL    Glucose 130 (H) 70 - 125 mg/dL    GFR Estimate >90 >60 mL/min/1.73m2   CBC with platelets   Result Value Ref Range    WBC Count 8.8 4.0 - 11.0 10e3/uL    RBC Count 3.61 (L) 4.40 - 5.90 10e6/uL    Hemoglobin 10.9 (L) 13.3 - 17.7 g/dL    Hematocrit 33.1 (L) 40.0 - 53.0 %    MCV 92 78 - 100 fL    MCH 30.2 26.5 - 33.0 pg    MCHC 32.9 31.5 - 36.5 g/dL    RDW 15.5 (H) 10.0 - 15.0 %    Platelet Count 453 (H) 150 - 450 10e3/uL   Glucose by meter   Result Value Ref Range    GLUCOSE BY METER POCT 135 (H) 70 - 99 mg/dL     All imaging studies related to this surgery reviewed    Mekhi Richardson MD

## 2021-12-23 NOTE — PLAN OF CARE
Problem: Impaired Wound Healing  Goal: Optimal Wound Healing  Outcome: No Change  Intervention: Promote Wound Healing  Recent Flowsheet Documentation  Taken 12/23/2021 0032 by Brenda Terry RN  Activity Management: activity adjusted per tolerance     Problem: Infection (Surgery Nonspecified)  Goal: Absence of Infection Signs and Symptoms  Outcome: No Change     Problem: Pain (Surgery Nonspecified)  Goal: Acceptable Pain Control  Outcome: Improving  Abdominal incision pain managed with prn percocet.   Receiving iv vancomycin and Cefepime.

## 2021-12-23 NOTE — PROGRESS NOTES
[facility]    Infectious Disease Progress Note    Date of Service (when I saw the patient): 12/23/2021     Assessment & Plan   Gurdeep Dia is a 66 year old male who was admitted on 12/21/2021.  Patient new to me 12/23/2021.  Please see previous ID consult note.    1. History of splenectomy and substance abuse, recent admission with small bowel obstruction  2. Status post exploratory laparotomy, small bowel resection/repair of enterotomy, extensive lysis of adhesion on 11/30/2021  3. Postoperative small bowel perforation, status post exploratory laparotomy on 12/3/2021.  4. Intra-abdominal abscess status post drain placement on 12/9/2021.  E. coli in cultures.  Treated with meropenem, IV vancomycin and transition to p.o. cefdinir plus Flagyl on 12/15/2021  5. Abscessogram on 1216 with resolved abscess, drain removed on 12/16/2021  6. Antibiotics discontinued on 12/20/2021, discharged from the hospital on 12/20/2021 and readmitted due to worsening pain and drainage from incision, cellulitis, EC fistula  7. Leukocytosis improved  8. On TPN    Recommendations:    1. Follow blood cultures x2 sets, in process  2. Gram-negative bacilli from wound culture, follow identification and susceptibility   3. Surgery following, EC fistula draining  4. Continue IV vancomycin and cefepime  5. Low threshold to add antifungal given patient is on TPN, and abdominal infection.  Added fluconazole on 12/23/2021   6. Discussed with patient and questions answered. Patient new to me on 12/23/2021. Please see previous ID note.    Ladi Salazar MD  [facility]  [phone]        Interval History     Doing better, ostomy bag on abdominal wall    Physical Exam   Temp: 98.3  F (36.8  C) Temp src: Oral BP: 124/83 Pulse: 91   Resp: 18 SpO2: 95 % O2 Device: None (Room air)    Vitals:    12/21/21 2223 12/22/21 1242   Weight: 78.5 kg (173 lb) 75.5 kg (166 lb 6.4 oz)     Vital Signs with  Ranges  Temp:  [98.2  F (36.8  C)-100.2  F (37.9  C)] 98.3  F (36.8  C)  Pulse:  [88-97] 91  Resp:  [18-21] 18  BP: (100-131)/(73-83) 124/83  SpO2:  [92 %-95 %] 95 %    Constitutional: Awake, alert, cooperative, no apparent distress  Lungs: normal breathing pattern  Cardiovascular:  No JVD elevation  Abdomen: drainage from EV fistula.   Skin: draining from abdominal wall. Mild tenderness  Other:Neuro AOx3, coherent, deconditioned    Medications     dextrose       parenteral nutrition - ADULT compounded formula       parenteral nutrition - ADULT compounded formula 70 mL/hr at 12/22/21 2027       ceFEPIme (MAXIPIME) IV  2 g Intravenous Q8H     enoxaparin ANTICOAGULANT  40 mg Subcutaneous Daily     gabapentin  200 mg Oral BID     gabapentin  900 mg Oral At Bedtime     levothyroxine  25 mcg Oral Daily     lipids  250 mL Intravenous Once per day on Sun Mon Wed Fri     pantoprazole (PROTONIX) IV  40 mg Intravenous Daily with breakfast     QUEtiapine  200 mg Oral BID     sodium chloride (PF)  3 mL Intracatheter Q8H     vancomycin  1,000 mg Intravenous Q12H     [START ON 12/24/2021] venlafaxine  75 mg Oral Daily       Data   All microbiology laboratory data reviewed.  Recent Labs   Lab Test 12/23/21  0715 12/22/21  0003 12/21/21  0644   WBC 8.8 13.8* 11.7*   HGB 10.9* 12.0* 11.5*   HCT 33.1* 36.1* 35.1*   MCV 92 90 90   * 564* 640*     Recent Labs   Lab Test 12/23/21  0715 12/22/21  0003 12/21/21  0644   CR 0.81 0.89 0.83     MICRO: reviewed    RADIOLOGY:    CT Abd Peritoneum Abscess Drain w Cath Place    Result Date: 12/9/2021  EXAM: 1. PERCUTANEOUS DRAIN PLACEMENT PELVIC ABSCESS 2. CT GUIDANCE 3. CONSCIOUS SEDATION LOCATION: [facility] DATE/TIME: 12/9/2021 9:50 AM INDICATION: s/p two surgeries with elevating wbc, ct with pelvic abscess. TECHNIQUE: Dose reduction techniques were used. PROCEDURE: Informed consent obtained. Site marked. Prior images reviewed. Required items made available.  Patient identity confirmed verbally and with arm band. Patient reevaluated immediately before administering sedation. Universal protocol was followed. Time out performed. The site was prepped and draped in sterile fashion. 10 mL of 1% lidocaine was infused into the local soft tissues. Using standard technique and under direct CT guidance, a 10 [place]e drainage catheter was inserted into the fluid collection.  SPECIMEN: 30 mL of brownish thin fluid was aspirated and sent to lab for cultures and Gram stain. BLOOD LOSS: Minimal. The patient tolerated the procedure well. No immediate complications. SEDATION: Versed 2.0 mg. Fentanyl 100 mcg. The procedure was performed with administration intravenous conscious sedation with appropriate preoperative, intraoperative, and postoperative evaluation. 30 minutes of supervised face to face conscious sedation time was provided by a radiology nurse under my direct supervision.     IMPRESSION: 1.  Successful CT-guided drain placement into pelvic abscess. 30 mL brownish thin fluid removed and sent for cultures. Reference CPT Codes: 95636, 62564, 83601    Echocardiogram Complete    Result Date: 12/10/2021  [id number]^CHRISTOPHER^FLOWER^[place] [address]  Name: ISABELA DUNN MRN: [id number] : 1955 Study Date: 12/10/2021 01:14 PM Age: 66 yrs Gender: Male Patient Location: [facility] Reason For Study: [place] Ordering Physician: FLOWER WHITE Performed By: ALEX  BSA: 1.8 m2 Height: 65 in Weight: 155 lb HR: 84 ______________________________________________________________________________ Procedure Complete Echo Adult. Adequate quality two-dimensional was performed and interpreted. ______________________________________________________________________________ Interpretation Summary  The left ventricle is normal in size with borderline concentric left ventricular hypertrophy. Left ventricular function is normal.The  ejection fraction is 60-65%. Normal right ventricle size and systolic function. No significant valve disease is identified.  There is no comparison study available. ______________________________________________________________________________ I      WMSI = 1.00     % Normal = 100  X - Cannot   0 -                      (2) - Mildly 2 -          Segments  Size Interpret    Hyperkinetic 1 - Normal  Hypokinetic  Hypokinetic  1-2     small                                                    7 -          3-5    moderate 3 - Akinetic 4 -          5 -         6 - Akinetic Dyskinetic   6-14    large              Dyskinetic   Aneurysmal  w/scar       w/scar       15-16   diffuse  Left Ventricle The left ventricle is normal in size. Left ventricular function is normal.The ejection fraction is 60-65%. There is borderline concentric left ventricular hypertrophy. Left ventricular diastolic function is normal. No regional wall motion abnormalities noted.  Right Ventricle Normal right ventricle size and systolic function.  Atria Normal left atrial size. Right atrial size is normal. There is no color Doppler evidence of an atrial shunt.  Mitral Valve Mitral valve leaflets appear normal. There is no evidence of mitral stenosis or clinically significant mitral regurgitation.  Tricuspid Valve Tricuspid valve leaflets appear normal. There is no evidence of tricuspid stenosis or clinically significant tricuspid regurgitation. Right ventricle systolic pressure estimate normal. The right ventricular systolic pressure is approximated at 18.7 mmHg plus the right atrial pressure.  Aortic Valve The aortic valve is trileaflet. Aortic valve leaflets appear normal. There is no evidence of aortic stenosis or clinically significant aortic regurgitation. There is trace aortic regurgitation.  Pulmonic Valve The pulmonic valve is not well seen, but is grossly normal. This degree of valvular regurgitation is within normal limits. There is trace  pulmonic valvular regurgitation.  Vessels The aorta root is normal. The ascending aorta is Mildly dilated. IVC diameter <2.1 cm collapsing >50% with sniff suggests a normal RA pressure of 3 mmHg.  Pericardium There is no pericardial effusion.  Rhythm Sinus rhythm was noted.  ______________________________________________________________________________ MMode/2D Measurements & Calculations IVSd: 1.3 cm LVIDd: 5.0 cm LVIDs: 3.2 cm LVPWd: 0.86 cm  FS: 36.6 % LV mass(C)d: 201.1 grams LV mass(C)dI: 113.3 grams/m2 Ao root diam: 3.9 cm LA dimension: 4.1 cm asc Aorta Diam: 3.8 cm LA/Ao: 1.0 LVOT diam: 2.4 cm LVOT area: 4.6 cm2 LA Volume Indexed (AL/bp): 29.6 ml/m2 RWT: 0.34  Doppler Measurements & Calculations MV E max gary: 80.4 cm/sec MV A max gary: 62.9 cm/sec MV E/A: 1.3  MV dec slope: 292.8 cm/sec2 MV dec time: 0.27 sec Ao V2 max: 178.1 cm/sec Ao max P.0 mmHg Ao V2 mean: 130.5 cm/sec Ao mean P.9 mmHg Ao V2 VTI: 29.4 cm SUMAN(I,D): 3.2 cm2 SUMAN(V,D): 3.1 cm2 LV V1 max P.5 mmHg LV V1 max: 117.6 cm/sec LV V1 VTI: 20.2 cm SV(LVOT): 93.4 ml SI(LVOT): 52.6 ml/m2 PA acc time: 0.12 sec TR max gary: 216.1 cm/sec TR max P.7 mmHg AV Gary Ratio (DI): 0.66 SUMAN Index (cm2/m2): 1.8 E/E' av.3 Lateral E/e': 7.9 Medial E/e': 16.6  ______________________________________________________________________________ Report approved by: Hany Currie 12/10/2021 02:23 PM       XR Chest Port 1 View    Result Date: 2021  EXAM: XR CHEST PORT 1 VIEW LOCATION: [facility] DATE/TIME: 2021 1:39 PM INDICATION: Worsening hypoxia, evaluate for pneumonia, pulm edema COMPARISON: Chest x-ray 2021     IMPRESSION: Enteric suction tube tip and side-port within the upper gastric lumen. Satisfactory right PICC line position with the tip near the cavoatrial junction. Persistent low lung volumes. No definite pneumothorax. Mildly improved bibasilar pulmonary  opacities favored to reflect atelectasis.  Persistent interstitial coarsening. Suspected trace right-sided pleural fluid. Stable heart size and central vascular prominence.    XR Chest Port 1 View    Result Date: 12/3/2021  EXAM: XR CHEST PORT 1 VIEW LOCATION: [facility] DATE/TIME: 12/3/2021 8:31 PM INDICATION: RN placed PICC - verify tip placement COMPARISON: None.     IMPRESSION: Right PICC line present with its tip likely just into the right atrium. Suggest withdrawing the PICC line 2 cm to place the tip in the low SVC. NG tube in good position in the stomach. Mild cardiomegaly. Low lung volumes with mild patchy mixed interstitial and airspace infiltrates    IR Abscess Tube Check    Result Date: 12/16/2021  LOCATION: [facility] DATE: 12/16/2021 PROCEDURE: ABSCESS TUBE CHECK INTERVENTIONAL RADIOLOGIST: Demario Porter MD INDICATION: Pelvic abscess. Status post jugular drain placement on 12/9/2021. Markedly decreased outputs. CT from today demonstrating near complete resolution of the abscess cavity.. CONSENT: The procedure, risks and benefits of an abscessogram were discussed with the patient  in detail. All questions were answered. Informed consent was given to proceed with the procedure. MODERATE SEDATION: None. CONTRAST: Omnipaque 350: 2 mL. ANTIBIOTICS: None. ADDITIONAL MEDICATIONS: None. FLUOROSCOPIC TIME: 0.1 minutes RADIATION DOSE: Air Kerma: 26 mGy COMPLICATIONS: No immediate complications. PROCEDURE:  images were obtained. The existing drainage catheter was injected with contrast, and an image obtained. After reviewing the images, the catheter was removed without difficulty. FINDINGS: Contracted cavity. Almost immediate pericatheter leakage.     IMPRESSION: 1.  Resolved pelvic fluid collection. Transgluteal drain removed.     CT Abdomen Pelvis w Contrast    Result Date: 12/22/2021  EXAM: CT ABDOMEN PELVIS W CONTRAST LOCATION: [facility] DATE/TIME: 12/22/2021 2:58 PM  INDICATION: Exploratory laparotomy with adhesion lysis and repair of small bowel enterotomy 11/30/2021. Abdominal abscess drain placement 12/09/2021. COMPARISON: CT 12/16/2021, 12/08/2021 TECHNIQUE: CT scan of the abdomen and pelvis was performed following injection of IV contrast. Multiplanar reformats were obtained. Dose reduction techniques were used. CONTRAST: isovue 370 100ml FINDINGS: LOWER CHEST: Right basilar consolidation could represent atelectasis or pneumonia, similar to previous. HEPATOBILIARY: Stable hepatic cysts. Stable prominent gallbladder without definite wall thickening. PANCREAS: Normal. SPLEEN: Splenectomy with several left upper quadrant postsplenectomy implants. ADRENAL GLANDS: Normal. KIDNEYS/BLADDER: Several right renal cysts which do not require further imaging. Left renal 9 and 4 mm nonobstructive stones. Several tiny left renal cysts. No further imaging recommended. BOWEL: A loop of small bowel appears to communicate with the left anterior abdominal wall (series 3, image 105). The abdominal wall contains air. 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 There is a right lateral abdominal staple line suggesting an ilio transverse colostomy. The right lower quadrant mesentery inferior to this contains a small amount of nonlocalized fluid and air. Also, the surgical staple line appears scattered suggesting  anastomotic breakdown. The prerectal drainage catheter has been removed. Small residual 16 x 12 mm fluid collection (image 143). LYMPH NODES: Several small mesenteric nodes are likely reactive. VASCULATURE: Patent mesenteric vessels. PELVIC ORGANS: Normal. MUSCULOSKELETAL: Severe degenerative change at the L4-L5 level with grade 2 anterolisthesis.     IMPRESSION: 1.  Improved prerectal fluid collection. 2.  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 3.  Possible right abdominal anastomotic dehiscence with a small amount free fluid and air in the  mesentery. 4.  Report called to floor nurse Susy at 4:25 PM    CT Abdomen Pelvis w Contrast    Addendum Date: 12/16/2021    Discussed with Dr. Richardson by Dr. Fahrner over telephone at 11:41 AM.    Result Date: 12/16/2021  EXAM: CT ABDOMEN PELVIS W CONTRAST LOCATION: [facility] DATE/TIME: 12/16/2021 3:00 AM INDICATION: Intrabdominal abscess COMPARISON: CT 12/8/2021 and 12/9/2021 TECHNIQUE: CT scan of the abdomen and pelvis was performed following injection of IV contrast. Multiplanar reformats were obtained. Dose reduction techniques were used. CONTRAST: IsoVue 370 100mL FINDINGS: LOWER CHEST: Normal. HEPATOBILIARY: Normal. PANCREAS: Normal. SPLEEN: Splenules are present. ADRENAL GLANDS: A 10 mm area of fullness in the left adrenal gland has not changed. KIDNEYS/BLADDER: Again seen are nonobstructing left renal calculi. There are simple cysts in the right kidney which do not require follow-up. Smaller renal lesions are too small to characterize. BOWEL: There is surgical change in the right lower quadrant. There is distal small bowel wall thickening in this region. The small bowel wall is not fully visualized, and there is a small amount of extraluminal fluid and air in this region (series 3 images 93 and 96). There is new free air in the right lower quadrant mesentery (series 3 image 105). LYMPH NODES: Normal. VASCULATURE: Atherosclerotic disease. PELVIC ORGANS: Normal. OTHER: A fluid collection adjacent to the rectum is 1.9 x 1.5 x 1.5 cm (approximately 2.2 mL). Previously the collection was 4.4 x 3.6 x 4.4 cm. A pigtail catheter terminates within this collection. MUSCULOSKELETAL: Surgical change along the ventral abdominal and pelvic wall. There is a 20.8 x 3.2 x 1.2 cm fluid and air collection in the midline ventral abdominal and pelvic wall just deep to the staple line. Right lower quadrant and left midabdomen approach Larry drains are present.     IMPRESSION: 1.  The pelvic fluid  collection is significantly smaller. A pigtail catheter is in place. 2.  Increased free air in the mesentery in the right lower quadrant, suggesting suture line dehiscence. There is also a suspected distal small bowel defect with adjacent free air and fluid. 3.  New fluid collection in the subcutaneous tissues of the ventral abdominal and pelvic wall, just deep to the staple line. Recommend drainage.     CT Abdomen Pelvis w Contrast    Result Date: 12/8/2021  EXAM: CT ABDOMEN PELVIS W CONTRAST LOCATION: [facility] DATE/TIME: 12/8/2021 1:40 PM INDICATION: Abdominal abscess/infection suspected, status post laparotomy with repair of small bowel perforation, recent small bowel resection COMPARISON: CT 11/30/2021 TECHNIQUE: CT scan of the abdomen and pelvis was performed following injection of IV contrast. Multiplanar reformats were obtained. Dose reduction techniques were used. CONTRAST: 100 mL Isovue-370 FINDINGS: LOWER CHEST: Mild bilateral lower lobar dependent consolidation and trace right-sided pleural effusion. HEPATOBILIARY: Prominent gallbladder likely reflecting recent fasting. Stable mild biliary ductal dilatation. No obstructing mass lesion or radiodense stone. Stable small low-density hepatic lesions, likely cysts. PANCREAS: Normal. SPLEEN: Surgically absent. ADRENAL GLANDS: Normal. KIDNEYS/BLADDER: Stable nonobstructing lower left renal stones with a dominant stone measuring 4 mm. No hydronephrosis. Unremarkable urinary bladder. BOWEL: Enteric suction tube with the tip located within the mid gastric lumen. Mild to moderately distended segments of mid small bowel with associated air-fluid levels. No discrete transition is identified. Right lower quadrant anastomotic changes. Small amount of nonloculated fluid and extraluminal air within the mid abdomen for example image 50 series 400.2 and image 97 series 3. Questionable adjacent small bowel defect image 50 series 102. Small amount  of nonloculated fluid is also noted within the right lower quadrant. Newly visualized rim-enhancing pelvic fluid collection measuring 4.4 x 3.6 x 4.4 cm. Trace ascites. Midline laparotomy incision with a small amount of fluid and air within the wound. Surgical drain tip is located at the upper aspect of this wound. Additional surgical drain with the tip located within the left lower quadrant. Third surgical drain with the tip located in the lower anterior abdomen. Diffuse mesenteric edema. LYMPH NODES: Normal. VASCULATURE: Mild atherosclerosis. PELVIC ORGANS: Normal. MUSCULOSKELETAL: Bilateral L4 pars defects with grade 1 anterolisthesis at L4-L5. Associated severe discogenic degenerative changes.     IMPRESSION: 1.  Newly visualized 4.6 cm rim-enhancing fluid collection within the pelvis. 2.  Small ill-defined irregular pocket of fluid and extraluminal air within the mid abdomen as described above. It is uncertain whether this is related to sequelae of recent surgery or bowel leakage. Recommend short interval follow-up CT with positive enteric contrast. 3.  Mild to moderate mid small bowel distention. This is favored to reflect an ileus rather than obstruction. 4.  Trace ascites and diffuse mesenteric edema. 5.  Mild bilateral lower lobar consolidation and trace right-sided pleural effusion. [Critical Result: Pulmonary fluid collection and extraluminal loculated air-fluid within the mid abdomen as detailed above.] Finding was identified on 12/8/2021 2:10 PM. Dr. Richardson was contacted by me on 12/8/2021 2:50 PM and verbalized understanding of the critical result.     CT Abdomen Pelvis w Contrast    Result Date: 11/30/2021  EXAM: CT ABDOMEN PELVIS W CONTRAST LOCATION: [facility] DATE/TIME: 11/30/2021 10:48 AM INDICATION: Abdominal distension COMPARISON: None. TECHNIQUE: CT scan of the abdomen and pelvis was performed following injection of IV contrast. Multiplanar reformats were obtained.  Dose reduction techniques were used. CONTRAST: IsoVue 370 100mL FINDINGS: LOWER CHEST: Bibasilar atelectasis. HEPATOBILIARY: Scattered small water density foci in the liver consistent with cysts. No radiopaque gallstone. No suspicious hepatic lesions. PANCREAS: Coarse calcification in the pancreatic tail and body may represent sequelae of chronic pancreatitis no acute pancreatitis or abnormal pancreatic mass. SPLEEN: Not visualized consistent with prior splenectomy. ADRENAL GLANDS: Normal. KIDNEYS/BLADDER: No hydronephrosis or masses. No bladder stone or mass. BOWEL: Anastomotic staples in the colon in the right mid abdomen. There is normal caliber duodenum and proximal jejunum with dilated loops of distal jejunum and ileum with some fecal i-STAT ileal contents and a transition in the right mid to lower abdomen. Findings are consistent with mechanical small bowel obstruction and this may represent a closed loop obstruction such as internal hernia or multiple adhesions given the lack of dilatation of the proximal small bowel. No pneumatosis intestinalis,  free intraperitoneal air or abscess. LYMPH NODES: No lymphadenopathy. VASCULATURE: Mild aortoiliac calcification without aneurysm. PELVIC ORGANS: Mild prostatic enlargement. MUSCULOSKELETAL: Disc space narrowing at L4-L5 with disc degeneration and bilateral spondylolysis at L4 with grade 2 spondylolisthesis of L4 on L5. No acute fracture.     IMPRESSION: 1.  Mechanical small bowel obstruction with dilated distal jejunum and ileum with caliber changes in the right lower quadrant and in the left midabdomen this may be secondary to adhesions or internal hernia and has the appearance of a closed loop obstruction. No pneumatosis intestinalis, free intraperitoneal air or abscess. 2.  Results were called to Dr. Sanford the time of dictation. NOTE: ABNORMAL REPORT THE DICTATION ABOVE DESCRIBES AN ABNORMALITY FOR WHICH FOLLOW-UP IS NEEDED.     POC US Guidance Needle  "Placement    Result Date: 11/30/2021  Ultrasound was performed as guidance to an anesthesia procedure.  Click \"PACS images\" hyperlink below to view any stored images.  For specific procedure details, view procedure note authored by anesthesia.    XR Video Swallow with SLP or OT    Result Date: 11/24/2021  EXAM: XR VIDEO SWALLOW WITH SLP OR OT LOCATION: [facility] DATE/TIME: 11/24/2021 9:18 AM INDICATION: Difficulty swallowing. COMPARISON: Same day esophagram.; CT for lung cancer screening 03/09/2020 TECHNIQUE: Routine swallow study with speech pathology using multiple barium thicknesses. FINDINGS: FLUOROSCOPIC TIME: 0.2 minutes NUMBER OF IMAGES: 2 videofluoroscopic imaging series Swallow study with Speech Pathology using multiple barium thicknesses. Patient is edentulous. Trials of thin consistency and barium coated cracker were performed. No delay in initiation of the oral phase. Normal epiglottic inversion. No penetration or aspiration observed. Moderate to severe cervical spondylosis with anterior wedging, disc space narrowing and osteophytosis of C4, C5, and C6. Straightening and reversal of normal thoracic lordosis.     IMPRESSION: No penetration or aspiration. Please see separately dictated report from speech pathology for additional description, analysis, and management recommendations.     XR Esophagram    Result Date: 11/24/2021  EXAM: XR ESOPHAGRAM LOCATION: [facility] DATE/TIME: 11/24/2021 9:18 AM INDICATION: 65 yo M with dysphagia/dyspnea - cough. Feels food stuck in throat with swallow. Occ. chocking. Needs to swallow lot of liquids. Constant runny nose & nasal congestion. COMPARISON: Same day swallow study; CT of the chest without contrast 03/09/2020 TECHNIQUE: Routine. FINDINGS: FLUOROSCOPIC TIME: 1.2 minutes NUMBER OF IMAGES: 4 videofluoroscopic imaging series and additional 41 images. ESOPHAGUS: Patulous esophagus with diffusely abnormal peristalsis. " "Large number of tertiary contractions present with delayed esophageal emptying. No esophageal stricture, diverticula or mass. No hiatal hernia. There is a large amount of retained contrast within the esophagus after consumption of contrast in the RPO position in transitioning to the supine position. This retained contrast moves to and fro within the esophagus. During the transition from RPO to supine position the patient felt a \"tickle\" in the back of his throat prompting coughing, while not observed likely relates to reflux of contrast from the upper esophagus into the pharynx. No gastroesophageal reflux elicited in the recumbent position with Valsalva maneuver.     IMPRESSION: Moderately severe esophageal dysmotility (presbyesophagus).    "

## 2021-12-23 NOTE — PROGRESS NOTES
"WO stoma assessment EC fistula with photograph    Allergies:  Penicillins    Height:  Height: 170.2 cm (5' 7\")    Weight:   Weight: 75.5 kg (166 lb 6.4 oz)    Past Medical History:  Past Medical History:   Diagnosis Date     Benign prostatic hyperplasia with weak urinary stream      Chronic bilateral low back pain without sciatica      Chronic neck pain      RAVI (generalized anxiety disorder)      History of hepatitis C     treated and cured     History of substance abuse (H)      Hypertension goal BP (blood pressure) < 140/90      Moderate major depression (H)        Labs:  Recent Labs   Lab Test 12/23/21  0715 12/22/21  1648 12/22/21  0003 12/08/21  0535 12/07/21  0358   CRP  --   --   --   --  26.6*   HGB 10.9*  --  12.0*   < > 10.9*   ALBUMIN  --   --  2.4*   < > 2.3*   CULTURE  --  No growth after 12 hours  No growth after 12 hours  --    < >  --     < > = values in this interval not displayed.       Mars:  Mars Score: 17    INTAKE  EC fistula s/p 2 abdominal surgeries      ASSESSMENT  Abdominal EC fistula    Long abdominal incision that is mostly approximated with staples.     Upper portion of incision possibly has fistulous tract as well given green output from the incision- this area measures 1.5x0.5cm - staples intact here still. Bright erythema surrounding with significant maceration     Distal portion of incision is open wound where staples were removed during previous admission. Wound measures 2x1cm - did not assess depth. Large green/brown output from this wound. periwound skin has bright erythema, superficial denudement, and maceration.     Skin was crusted with stoma powder and Cavilon no-sting skin barrier. Barrier rings placed around each fistula site and each was pouched with Cornish #[id number].              TREATMENT/EQUIPMENT  See above    Supplies: Pouch Jaylin #[id number] -Flat 1 piece cut to fit fecal pouch, Paste Jaylin #[id number] -Adapt Ring and Powder Cornish #[id number] - Adapt    Instructions " Given: WOC Role    Nursing care provided was coordinated care with RN and patient. Assisted patient with re-organizing belongings- patient quite demanding    Discussed plan of care with nurse and patient.    Outcomes and treatment recommendations are to To contain output, To be knowledgable with pouch change/emptying before discharge and To be independant with pouch change/emptying before discharge    Actions taken by WOC RN: 5 minutes of education and WOC Discharge recommendations entered.    Planned Follow Up: Weekly.    Plan for next visit: Reassess stoma/peristomal skin

## 2021-12-23 NOTE — PLAN OF CARE
Patient remains NPO. TPN and lipids infusing per orders. Abdominal incision reddened and swollen, dressing changed twice, purulent drainage. Abdominal drain with watery brown output, emptied twice. Home infusion pump in bag in closet.  Celine Key RN

## 2021-12-23 NOTE — CONSULTS
INITIAL PSYCHIATRIC CONSULTATION  This note was created with the help of Dragon dictation system. Grammatical and typing errors are not intentional.            REASON FOR REQUEST: MDD, med managment      ASSESSMENT/RECOMMENDATIONS/PLAN :  Adjustment disorder with anxiety and depression in the setting of poor functionality, pain, prolonged hospitalization.  Major depression by hx recurrent moderate with anxiety features.  Mood changes due to hospitalization, pain, living situation.  Sleep difficulties, hx of insomnia..    Recommendations:  Discontinue BuSpar 15 mg twice a day. If anxiety does not improve then may consider adding Abilify 2 mg in AM instead of Buspar.  Increase venlafaxine to 75 mg daily. Monitor for mood changes with the increase.  Increase gabapentin to 900 mg at bedtime and decrease daytime dose to 200 mg twice a day.  Discontinue mirtazapine. Not helping with sleep. Liekly due to paradoxical effect.  Seroquel 200 mg at bedtime.  I also told Mr. Dia that should his anxiety worsen early evening then consider taking as needed Seroquel.  I ordered all his medication in the room to make sure he understood the changes and the reasoning.    MENTAL STATUS EXAMINATION:   General Appearance: Not in acute distress, resting comfortably in bed.    Behavior: Good eye contact, no bizarre ideations  Speech: Coherent.  Thought Process: Linear, clear.  Thought content: No evidence of hallucinations, delusions or paranoia.    Thought Formation: Associations are connected  Judgment: Fair  Insight : Intact  Attention : Adequate  Memory: Sufficient  Fund Of Knowledge: Average  Affect: Neutral  Mood: anxious  Alert : Awake  Suicidal ideation: Absent  Homicidal ideation: Absent  Orientation: X 4  Comprehension: Sufficient pertaining to current medical needs  Generative thought content: Adequate.  Spontaneous conversation  Language: Intact  Gait and Ambulation: Gait and ambulation at baseline.    Musculoskeletal:  "No tonal abnormalities      /73 (BP Location: Left arm)   Pulse 88   Temp 98.3  F (36.8  C) (Oral)   Resp 18   Ht 1.702 m (5' 7\")   Wt 75.5 kg (166 lb 6.4 oz)   SpO2 92%   BMI 26.06 kg/m         HISTORY OF PRESENT ILLNESS:   Presenting history to include: Per [facility] [place]/Specialists:   Chema Dia is a 65 y/o M with past medical history of splenectomy, appendectomy, HTN, and substance abuse, with recent admission to hospital 11/30-12/21/2021 for SBO with perforation s/p surgical repair. Discharged home but presents as he could not manage at home and is now requesting to be placed in TCU vs acute rehab facility.Chema states that when he got home to his brother-in-law's house he was able to lie in bed, but was unable to get out of bed.  His pain then got worse, and made him nervous that he was not going to be able to take care of himself outside of the hospital.  His brother-in-law agreed that this is going to be challenging to take care of outside of the hospital.  She then presented to the emergency department with worsening abdominal pain and concern that he cannot care for himself after his recent surgeries. He was discharged at 6 PM on 12/21, and is now readmitted on 12/22.States his pain is now better controlled after Percocet, he was sent home with Percocet but being unable to get up he was not adequately treating his pain.  He is somewhat concerned about insomnia in the hospital, though states he feels safer in the hospital at this time.  His abdomen remains soft, and he is having output in his ostomy.  No chest pain or shortness of breath.  States he overall feels anxious about how ill he is.  Denies any other significant concerns at this time.     Upon assessment, patient was noted to be resting comfortably in bed watching TV.  He greeted me appropriately during my second visit with him and apologized for his behavior during my first encounter.  He was irritable, unwilling to discuss his medications and " psychiatric diagnosis as well as unwilling to have anything suggested to him during my first visit.  On further assessment he endorsed of feeling anxious, restless, fatigued and due to not sleeping, not wanting to take unnecessary medications and due to a prolonged hospitalization.  He was discharged to home however could not care for self thus readmission.  He did not report of any thoughts of self-harm, or suicidality.  He denied any hallucinations, delusions apparently.  He denied any nightmares.  His main concern was that he had not slept in several days.  I discussed each medication with him including but not limited to:  BuSpar 15 mg twice a day  Venlafaxine 37.5 mg  Gabapentin 300 mg twice a day and 600 mg at bedtime  Mirtazapine 30 mg at bedtime.  Of the aforementioned medications patient had no recollection of dosing of BuSpar nor or mirtazapine.  He also did not know how long he had taken venlafaxine.  The only 2 medications that he showed interest and no was gabapentin 900 mg at bedtime and Seroquel 200 mg at bedtime.  He has taken Seroquel for many years and found Seroquel to be helping with sleep regulation.  He expressed his concern for his other medications, repeatedly asking to not take Flomax.  I had to reiterate that Flomax was not a psychiatric medication rather for BPH and redirected him to discuss his Flomax with his medical team.  He apologized to several times during this assessment and clarified that his pain and his discomfort often affected his mood.  I talked to him at length about increasing venlafaxine to 75 mg and discontinuing BuSpar which was very mild anxiolytic likely not doing much for his intense anxiety.  He was amenable to this change.  Seroquel 200 mg is good dose for sleep as well as for mood stabilization along with Neurontin 900 mg.  I further talked to him about mirtazapine the dose and duration which he was not aware of.  He was not sure whether mirtazapine was doing  "anything \"especially not sleep\"..  Review of Systems:As per HPI. Remainders of 12 point review of systems negative.  Psychiatric ROS:  Change of Interest/Anhedonia:  No  Appetite/Weight Changes: No  Concentration Changes:No  Impaired Energy:No  Impaired Sleep:Y  Anxiety/Panic:Y  Tearfulness:No  Depression:No  Psychosis: No  Irritability:No  SI/VI/HI: No,No,No  Virgie: No            PFSH reviewed  and not pertinent to chief complaint/reason for visit  /73 (BP Location: Left arm)   Pulse 88   Temp 98.3  F (36.8  C) (Oral)   Resp 18   Ht 1.702 m (5' 7\")   Wt 75.5 kg (166 lb 6.4 oz)   SpO2 92%   BMI 26.06 kg/m    No results found for: AMPHET, PCP, BARBIT, OXYCODONE, THC, ETOH  @24HOURRESULTS@  Recent Results (from the past 72 hour(s))   Glucose by meter    Collection Time: 12/20/21  5:25 PM   Result Value Ref Range    GLUCOSE BY METER POCT 121 (H) 70 - 99 mg/dL   Glucose by meter    Collection Time: 12/20/21  9:13 PM   Result Value Ref Range    GLUCOSE BY METER POCT 119 (H) 70 - 99 mg/dL   Glucose by meter    Collection Time: 12/21/21  2:40 AM   Result Value Ref Range    GLUCOSE BY METER POCT 136 (H) 70 - 99 mg/dL   Basic metabolic panel    Collection Time: 12/21/21  6:44 AM   Result Value Ref Range    Sodium 130 (L) 136 - 145 mmol/L    Potassium 4.6 3.5 - 5.0 mmol/L    Chloride 101 98 - 107 mmol/L    Carbon Dioxide (CO2) 22 22 - 31 mmol/L    Anion Gap 7 5 - 18 mmol/L    Urea Nitrogen 21 8 - 22 mg/dL    Creatinine 0.83 0.70 - 1.30 mg/dL    Calcium 8.7 8.5 - 10.5 mg/dL    Glucose 129 (H) 70 - 125 mg/dL    GFR Estimate >90 >60 mL/min/1.73m2   CBC with platelets    Collection Time: 12/21/21  6:44 AM   Result Value Ref Range    WBC Count 11.7 (H) 4.0 - 11.0 10e3/uL    RBC Count 3.91 (L) 4.40 - 5.90 10e6/uL    Hemoglobin 11.5 (L) 13.3 - 17.7 g/dL    Hematocrit 35.1 (L) 40.0 - 53.0 %    MCV 90 78 - 100 fL    MCH 29.4 26.5 - 33.0 pg    MCHC 32.8 31.5 - 36.5 g/dL    RDW 14.9 10.0 - 15.0 %    Platelet Count 640 (H) " 150 - 450 10e3/uL   Magnesium    Collection Time: 12/21/21  6:44 AM   Result Value Ref Range    Magnesium 2.0 1.8 - 2.6 mg/dL   Phosphorus    Collection Time: 12/21/21  6:44 AM   Result Value Ref Range    Phosphorus 3.2 2.5 - 4.5 mg/dL   Comprehensive metabolic panel    Collection Time: 12/22/21 12:03 AM   Result Value Ref Range    Sodium 130 (L) 136 - 145 mmol/L    Potassium 4.9 3.5 - 5.0 mmol/L    Chloride 101 98 - 107 mmol/L    Carbon Dioxide (CO2) 21 (L) 22 - 31 mmol/L    Anion Gap 8 5 - 18 mmol/L    Urea Nitrogen 22 8 - 22 mg/dL    Creatinine 0.89 0.70 - 1.30 mg/dL    Calcium 8.8 8.5 - 10.5 mg/dL    Glucose 128 (H) 70 - 125 mg/dL    Alkaline Phosphatase 132 (H) 45 - 120 U/L    AST 31 0 - 40 U/L    ALT 18 0 - 45 U/L    Protein Total 7.6 6.0 - 8.0 g/dL    Albumin 2.4 (L) 3.5 - 5.0 g/dL    Bilirubin Total 0.9 0.0 - 1.0 mg/dL    GFR Estimate >90 >60 mL/min/1.73m2   Lipase    Collection Time: 12/22/21 12:03 AM   Result Value Ref Range    Lipase 64 (H) 0 - 52 U/L   Lactic acid whole blood    Collection Time: 12/22/21 12:03 AM   Result Value Ref Range    Lactic Acid 1.2 0.7 - 2.0 mmol/L   Magnesium    Collection Time: 12/22/21 12:03 AM   Result Value Ref Range    Magnesium 2.0 1.8 - 2.6 mg/dL   CBC with platelets and differential    Collection Time: 12/22/21 12:03 AM   Result Value Ref Range    WBC Count 13.8 (H) 4.0 - 11.0 10e3/uL    RBC Count 4.03 (L) 4.40 - 5.90 10e6/uL    Hemoglobin 12.0 (L) 13.3 - 17.7 g/dL    Hematocrit 36.1 (L) 40.0 - 53.0 %    MCV 90 78 - 100 fL    MCH 29.8 26.5 - 33.0 pg    MCHC 33.2 31.5 - 36.5 g/dL    RDW 15.1 (H) 10.0 - 15.0 %    Platelet Count 564 (H) 150 - 450 10e3/uL   Manual Differential    Collection Time: 12/22/21 12:03 AM   Result Value Ref Range    % Neutrophils 71 %    % Lymphocytes 13 %    % Monocytes 11 %    % Eosinophils 2 %    % Basophils 1 %    % Metamyelocytes 2 %    Absolute Neutrophils 9.8 (H) 1.6 - 8.3 10e3/uL    Absolute Lymphocytes 1.8 0.8 - 5.3 10e3/uL    Absolute  Monocytes 1.5 (H) 0.0 - 1.3 10e3/uL    Absolute Eosinophils 0.3 0.0 - 0.7 10e3/uL    Absolute Basophils 0.1 0.0 - 0.2 10e3/uL    Absolute Metamyelocytes 0.3 (H) <=0.0 10e3/uL    RBC Morphology Confirmed RBC Indices     Platelet Assessment Platelets Clumped (A) Automated Count Confirmed. Platelet morphology is normal.    Basophilic Stippling Present (A) None Seen    Polychromasia Slight (A) None Seen    Reactive Lymphocytes Present (A) None Seen    Target Cells Slight (A) None Seen   Asymptomatic COVID-19 Virus (Coronavirus) by PCR Nasopharyngeal    Collection Time: 12/22/21 12:18 AM    Specimen: Nasopharyngeal; Swab   Result Value Ref Range    SARS CoV2 PCR Negative Negative   Lactic acid whole blood STAT    Collection Time: 12/22/21 10:31 AM   Result Value Ref Range    Lactic Acid 0.8 0.7 - 2.0 mmol/L   Glucose by meter    Collection Time: 12/22/21  7:44 PM   Result Value Ref Range    GLUCOSE BY METER POCT 93 70 - 99 mg/dL   Glucose by meter    Collection Time: 12/23/21 12:47 AM   Result Value Ref Range    GLUCOSE BY METER POCT 127 (H) 70 - 99 mg/dL   Glucose by meter    Collection Time: 12/23/21  5:48 AM   Result Value Ref Range    GLUCOSE BY METER POCT 145 (H) 70 - 99 mg/dL   Basic metabolic panel    Collection Time: 12/23/21  7:15 AM   Result Value Ref Range    Sodium 134 (L) 136 - 145 mmol/L    Potassium 4.3 3.5 - 5.0 mmol/L    Chloride 105 98 - 107 mmol/L    Carbon Dioxide (CO2) 23 22 - 31 mmol/L    Anion Gap 6 5 - 18 mmol/L    Urea Nitrogen 23 (H) 8 - 22 mg/dL    Creatinine 0.81 0.70 - 1.30 mg/dL    Calcium 7.9 (L) 8.5 - 10.5 mg/dL    Glucose 130 (H) 70 - 125 mg/dL    GFR Estimate >90 >60 mL/min/1.73m2   CBC with platelets    Collection Time: 12/23/21  7:15 AM   Result Value Ref Range    WBC Count 8.8 4.0 - 11.0 10e3/uL    RBC Count 3.61 (L) 4.40 - 5.90 10e6/uL    Hemoglobin 10.9 (L) 13.3 - 17.7 g/dL    Hematocrit 33.1 (L) 40.0 - 53.0 %    MCV 92 78 - 100 fL    MCH 30.2 26.5 - 33.0 pg    MCHC 32.9 31.5 - 36.5  g/dL    RDW 15.5 (H) 10.0 - 15.0 %    Platelet Count 453 (H) 150 - 450 10e3/uL       PMH:   Past Medical History:   Diagnosis Date     Benign prostatic hyperplasia with weak urinary stream      Chronic bilateral low back pain without sciatica      Chronic neck pain      RAVI (generalized anxiety disorder)      History of hepatitis C     treated and cured     History of substance abuse (H)      Hypertension goal BP (blood pressure) < 140/90      Moderate major depression (H)            Current Medications:Scheduled Meds:    ceFEPIme (MAXIPIME) IV  2 g Intravenous Q8H     enoxaparin ANTICOAGULANT  40 mg Subcutaneous Daily     gabapentin  600 mg Oral At Bedtime     lipids  250 mL Intravenous Once per day on Sun Mon Wed Fri     mirtazapine  30 mg Oral At Bedtime     sodium chloride (PF)  3 mL Intracatheter Q8H     vancomycin  1,000 mg Intravenous Q12H     Continuous Infusions:    dextrose       parenteral nutrition - ADULT compounded formula 70 mL/hr at 12/22/21 2027     PRN Meds:.dextrose, lidocaine 4%, lidocaine (buffered or not buffered), melatonin, ondansetron **OR** ondansetron, oxyCODONE-acetaminophen, sodium chloride (PF)                Family History: PERSONALLY REVIEWED.No family history on file.  Pertinent Family hx not pertinent to Chief Complaint or reason for visit.     Social History:  PERSONALLY REVIEWED.  Social History     Socioeconomic History     Marital status: Single     Spouse name: Not on file     Number of children: Not on file     Years of education: Not on file     Highest education level: Not on file   Occupational History     Not on file   Tobacco Use     Smoking status: Current Every Day Smoker     Smokeless tobacco: Never Used   Substance and Sexual Activity     Alcohol use: Not Currently     Comment: Clean for 5 years     Drug use: Not Currently     Sexual activity: Not Currently     Partners: Female   Other Topics Concern     Parent/sibling w/ CABG, MI or angioplasty before 65F 55M? Not  Asked   Social History Narrative     Not on file     Social Determinants of Health     Financial Resource Strain: Not on file   Food Insecurity: Not on file   Transportation Needs: Not on file   Physical Activity: Not on file   Stress: Not on file   Social Connections: Not on file   Intimate Partner Violence: Not on file   Housing Stability: Not on file    not pertinent to Chief Complaint or reason for visit.             Allergies as of 06/01/2014 Reviewed     Review of Systems:As per HPI. Remainders of 12 point review of systems negative.    Review of Pertinent Laboratory:      PERSONALLY REVIEWED.    Physical Exam: Temp:  [98  F (36.7  C)-100.2  F (37.9  C)] 98.3  F (36.8  C)  Pulse:  [88-97] 88  Resp:  [18-21] 18  BP: (100-131)/(73-88) 100/73  SpO2:  [92 %-95 %] 92 %   Vitals: reviewed in chart     Physical exam as per medical team: reviewed in chart      diagnoses, risk and benefits of medications discussed with staff. Care coordination with care management team.   Thank you for this consultation.       Lindsay Deleon; NP  [facility]        This note was created with the help of Dragon dictation system. Grammatical and typing errors are not intentional.

## 2021-12-24 ENCOUNTER — APPOINTMENT (OUTPATIENT)
Dept: PHYSICAL THERAPY | Facility: HOSPITAL | Age: 66
DRG: 393 | End: 2021-12-24
Payer: MEDICARE

## 2021-12-24 ENCOUNTER — APPOINTMENT (OUTPATIENT)
Dept: OCCUPATIONAL THERAPY | Facility: HOSPITAL | Age: 66
DRG: 393 | End: 2021-12-24
Payer: MEDICARE

## 2021-12-24 LAB
ALBUMIN SERPL-MCNC: 2 G/DL (ref 3.5–5)
ALP SERPL-CCNC: 113 U/L (ref 45–120)
ALT SERPL W P-5'-P-CCNC: 21 U/L (ref 0–45)
ANION GAP SERPL CALCULATED.3IONS-SCNC: 6 MMOL/L (ref 5–18)
AST SERPL W P-5'-P-CCNC: 21 U/L (ref 0–40)
BILIRUB SERPL-MCNC: 1 MG/DL (ref 0–1)
BUN SERPL-MCNC: 21 MG/DL (ref 8–22)
CALCIUM SERPL-MCNC: 8.4 MG/DL (ref 8.5–10.5)
CALCIUM, IONIZED MEASURED: 1.15 MMOL/L (ref 1.11–1.3)
CHLORIDE BLD-SCNC: 104 MMOL/L (ref 98–107)
CO2 SERPL-SCNC: 24 MMOL/L (ref 22–31)
CREAT SERPL-MCNC: 0.83 MG/DL (ref 0.7–1.3)
ERYTHROCYTE [DISTWIDTH] IN BLOOD BY AUTOMATED COUNT: 15.9 % (ref 10–15)
GFR SERPL CREATININE-BSD FRML MDRD: >90 ML/MIN/1.73M2
GLUCOSE BLD-MCNC: 122 MG/DL (ref 70–125)
GLUCOSE BLDC GLUCOMTR-MCNC: 117 MG/DL (ref 70–99)
GLUCOSE BLDC GLUCOMTR-MCNC: 143 MG/DL (ref 70–99)
HCT VFR BLD AUTO: 33.7 % (ref 40–53)
HGB BLD-MCNC: 10.8 G/DL (ref 13.3–17.7)
ION CA PH 7.4: 1.14 MMOL/L (ref 1.11–1.3)
MAGNESIUM SERPL-MCNC: 2 MG/DL (ref 1.8–2.6)
MCH RBC QN AUTO: 29.7 PG (ref 26.5–33)
MCHC RBC AUTO-ENTMCNC: 32 G/DL (ref 31.5–36.5)
MCV RBC AUTO: 93 FL (ref 78–100)
PH: 7.38 (ref 7.35–7.45)
PHOSPHATE SERPL-MCNC: 3.3 MG/DL (ref 2.5–4.5)
PLATELET # BLD AUTO: 402 10E3/UL (ref 150–450)
POTASSIUM BLD-SCNC: 4.6 MMOL/L (ref 3.5–5)
PROT SERPL-MCNC: 6.6 G/DL (ref 6–8)
RBC # BLD AUTO: 3.64 10E6/UL (ref 4.4–5.9)
SODIUM SERPL-SCNC: 134 MMOL/L (ref 136–145)
VANCOMYCIN SERPL-MCNC: 13.2 MG/L
WBC # BLD AUTO: 8.1 10E3/UL (ref 4–11)

## 2021-12-24 PROCEDURE — 250N000011 HC RX IP 250 OP 636: Performed by: INTERNAL MEDICINE

## 2021-12-24 PROCEDURE — C9113 INJ PANTOPRAZOLE SODIUM, VIA: HCPCS | Performed by: STUDENT IN AN ORGANIZED HEALTH CARE EDUCATION/TRAINING PROGRAM

## 2021-12-24 PROCEDURE — 250N000013 HC RX MED GY IP 250 OP 250 PS 637: Performed by: NURSE PRACTITIONER

## 2021-12-24 PROCEDURE — 250N000013 HC RX MED GY IP 250 OP 250 PS 637: Performed by: STUDENT IN AN ORGANIZED HEALTH CARE EDUCATION/TRAINING PROGRAM

## 2021-12-24 PROCEDURE — 36415 COLL VENOUS BLD VENIPUNCTURE: CPT | Performed by: FAMILY MEDICINE

## 2021-12-24 PROCEDURE — 99232 SBSQ HOSP IP/OBS MODERATE 35: CPT | Performed by: NURSE PRACTITIONER

## 2021-12-24 PROCEDURE — 80202 ASSAY OF VANCOMYCIN: CPT | Performed by: FAMILY MEDICINE

## 2021-12-24 PROCEDURE — 250N000009 HC RX 250: Performed by: FAMILY MEDICINE

## 2021-12-24 PROCEDURE — 250N000009 HC RX 250: Performed by: STUDENT IN AN ORGANIZED HEALTH CARE EDUCATION/TRAINING PROGRAM

## 2021-12-24 PROCEDURE — 97110 THERAPEUTIC EXERCISES: CPT | Mod: GO

## 2021-12-24 PROCEDURE — 97116 GAIT TRAINING THERAPY: CPT | Mod: GP

## 2021-12-24 PROCEDURE — 99231 SBSQ HOSP IP/OBS SF/LOW 25: CPT | Mod: GC | Performed by: STUDENT IN AN ORGANIZED HEALTH CARE EDUCATION/TRAINING PROGRAM

## 2021-12-24 PROCEDURE — 250N000011 HC RX IP 250 OP 636: Performed by: STUDENT IN AN ORGANIZED HEALTH CARE EDUCATION/TRAINING PROGRAM

## 2021-12-24 PROCEDURE — 84100 ASSAY OF PHOSPHORUS: CPT | Performed by: STUDENT IN AN ORGANIZED HEALTH CARE EDUCATION/TRAINING PROGRAM

## 2021-12-24 PROCEDURE — 82040 ASSAY OF SERUM ALBUMIN: CPT | Performed by: STUDENT IN AN ORGANIZED HEALTH CARE EDUCATION/TRAINING PROGRAM

## 2021-12-24 PROCEDURE — 85027 COMPLETE CBC AUTOMATED: CPT | Performed by: STUDENT IN AN ORGANIZED HEALTH CARE EDUCATION/TRAINING PROGRAM

## 2021-12-24 PROCEDURE — 120N000001 HC R&B MED SURG/OB

## 2021-12-24 PROCEDURE — 99231 SBSQ HOSP IP/OBS SF/LOW 25: CPT | Performed by: INTERNAL MEDICINE

## 2021-12-24 PROCEDURE — 99024 POSTOP FOLLOW-UP VISIT: CPT | Performed by: PHYSICIAN ASSISTANT

## 2021-12-24 PROCEDURE — 80053 COMPREHEN METABOLIC PANEL: CPT | Performed by: STUDENT IN AN ORGANIZED HEALTH CARE EDUCATION/TRAINING PROGRAM

## 2021-12-24 PROCEDURE — 82330 ASSAY OF CALCIUM: CPT | Performed by: FAMILY MEDICINE

## 2021-12-24 PROCEDURE — 83735 ASSAY OF MAGNESIUM: CPT | Performed by: STUDENT IN AN ORGANIZED HEALTH CARE EDUCATION/TRAINING PROGRAM

## 2021-12-24 RX ADMIN — POTASSIUM CHLORIDE: 2 INJECTION, SOLUTION, CONCENTRATE INTRAVENOUS at 20:06

## 2021-12-24 RX ADMIN — CEFEPIME HYDROCHLORIDE 2 G: 2 INJECTION, POWDER, FOR SOLUTION INTRAVENOUS at 02:32

## 2021-12-24 RX ADMIN — VANCOMYCIN HYDROCHLORIDE 1000 MG: 1 INJECTION, SOLUTION INTRAVENOUS at 05:01

## 2021-12-24 RX ADMIN — LEVOTHYROXINE SODIUM 25 MCG: 0.03 TABLET ORAL at 05:01

## 2021-12-24 RX ADMIN — GABAPENTIN 200 MG: 100 CAPSULE ORAL at 16:35

## 2021-12-24 RX ADMIN — QUETIAPINE FUMARATE 200 MG: 100 TABLET, FILM COATED ORAL at 22:37

## 2021-12-24 RX ADMIN — VANCOMYCIN HYDROCHLORIDE 1000 MG: 1 INJECTION, SOLUTION INTRAVENOUS at 17:33

## 2021-12-24 RX ADMIN — I.V. FAT EMULSION 250 ML: 20 EMULSION INTRAVENOUS at 20:09

## 2021-12-24 RX ADMIN — OXYCODONE HYDROCHLORIDE AND ACETAMINOPHEN 2 TABLET: 5; 325 TABLET ORAL at 16:44

## 2021-12-24 RX ADMIN — CEFEPIME HYDROCHLORIDE 2 G: 2 INJECTION, POWDER, FOR SOLUTION INTRAVENOUS at 09:50

## 2021-12-24 RX ADMIN — ENOXAPARIN SODIUM 40 MG: 40 INJECTION SUBCUTANEOUS at 09:51

## 2021-12-24 RX ADMIN — CEFEPIME HYDROCHLORIDE 2 G: 2 INJECTION, POWDER, FOR SOLUTION INTRAVENOUS at 16:35

## 2021-12-24 RX ADMIN — GABAPENTIN 200 MG: 100 CAPSULE ORAL at 09:52

## 2021-12-24 RX ADMIN — PANTOPRAZOLE SODIUM 40 MG: 40 INJECTION, POWDER, FOR SOLUTION INTRAVENOUS at 09:50

## 2021-12-24 RX ADMIN — GABAPENTIN 900 MG: 300 CAPSULE ORAL at 22:37

## 2021-12-24 RX ADMIN — OXYCODONE HYDROCHLORIDE AND ACETAMINOPHEN 2 TABLET: 5; 325 TABLET ORAL at 22:38

## 2021-12-24 RX ADMIN — VENLAFAXINE HYDROCHLORIDE 75 MG: 75 CAPSULE, EXTENDED RELEASE ORAL at 09:54

## 2021-12-24 RX ADMIN — FLUCONAZOLE IN SODIUM CHLORIDE 400 MG: 2 INJECTION, SOLUTION INTRAVENOUS at 17:04

## 2021-12-24 ASSESSMENT — ACTIVITIES OF DAILY LIVING (ADL)
ADLS_ACUITY_SCORE: 13
ADLS_ACUITY_SCORE: 14
ADLS_ACUITY_SCORE: 13
ADLS_ACUITY_SCORE: 14
ADLS_ACUITY_SCORE: 13
ADLS_ACUITY_SCORE: 14
ADLS_ACUITY_SCORE: 13

## 2021-12-24 NOTE — PROGRESS NOTES
[facility]    On December 23, 2021 received referral from [facility] Care Manager.  I reviewed the chart of Chema Dia for potential admission to [facility].    He will require prior authorization for LTACH admission from his [place] insurance, which may decline him for coverage due to not needing daily physician.    Currently beds are full but will continue to follow for appropriateness and bed availability.    Aditya Mccarthy RN, BSN, HNB-BC  Utilization , RN -- LT[facility]

## 2021-12-24 NOTE — PLAN OF CARE
Problem: Infection (Surgery Nonspecified)  Goal: Absence of Infection Signs and Symptoms  Outcome: No Change     Problem: Impaired Wound Healing  Goal: Optimal Wound Healing  Outcome: Improving  Intervention: Promote Wound Healing  Recent Flowsheet Documentation  Taken 12/24/2021 1644 by Danny Rubio, RN  Pain Management Interventions:   medication (see MAR)   emotional support     Problem: Pain (Surgery Nonspecified)  Goal: Acceptable Pain Control  Outcome: Improving  Intervention: Prevent or Manage Pain  Recent Flowsheet Documentation  Taken 12/24/2021 1644 by Danny Rubio RN  Pain Management Interventions:   medication (see MAR)   emotional support     Pt alert and oriented times 4. Vitals stable. Pt c/o midline abdomen pain at surgical site. Gave percocet 2 tabs per request. Pain came down 2-3 out of 10 from 7/10.  Fistula covered with pouch. Upper pouch has small amount of greenish color drainage and lower pouch has brown 200 ml out put. Pt received scheduled abx. Pt on continuous TPN running at 70 ml/hr

## 2021-12-24 NOTE — PROGRESS NOTES
Primary Medicine Progress Note    Assessment/Plan  Active Problems:    Abdominal pain, generalized      Chema South is a 67 y/o M with past medical history of splenectomy, appendectomy, HTN, and substance abuse, with recent admission to hospital 11/30-12/21/2021 for SBO with perforation s/p surgical repair. Discharged home but presents as he could not manage at home and is now requesting to be placed in TCU vs LTC. Continues on TPN with surgery and ID following while awaiting LTC placement.       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 Required subsequent washout on 12/3 with aggressive IV antibiotic regimen and MARISA drain placement. On 12/8 found to have continued enhancing abscess with drain placed 12/9 and removed on 12/16. Antibiotics stopped on 12/20. Plan for close follow up with surgery and TPN at discharge, with plans to have help from his brother in law. Then returned on 12/22 given unable to care for himself even with brother in law's assistance; requesting LTC/TCU placement.  -PT/OT ordered, appreciate recs on placement  -Plan to begin search for TCU/LTACH placement with care management  -Pain management: Percocet 1-2 tablets q6h PRN, gabapentin   -Consulted surgery to follow while here  -Consulted ID              -Now on vanc/cefepime/fluconazole  -Consult pharmacy and RD for TPN management  -WOC     Depression  Insomnia  Hx of this. Reports symptoms for past few days= low motivation, little interest in doing things, poor sleep, no appetite. Is on buspar, mirtazapine, and effexor, though doesn't really think medicines are as helpful as talk therapy is. Wanted to speak to social work and . States no plan to harm himself. Reports he just needs to leave the  hospital.  -Consult psych for med management and plan for talk therapy while hospitalized  -Discontinued buspar and mirtazapine per psych  -Increased venlafaxine and gabapentin per psych  -Seroquel for sleep     Hyponatremia, improved  Moderate malnutrition  Stable hyponatremia of 134.   -Planning to treat with increased Na in TPN per pharmacy     Normocytic anemia  Stable at 10.8, MCV of 93.     Alk phos elevation  132, mildly increased from 124 on 12/20. Plan to monitor.     Chronic Conditions:  HTN- resume PTA lisinopril, furosemide pending med rec  Hypothyroidism- resume PTA levothyroxine pending med rec  Depression/anxiety- resume PTA venlafaxine, Buspar pending med rec; giving mirtazapine 30 mg tonight for sleep per request  Chronic pain- Hold mexolicam 7.5mg daily, resume PTA gabapentin tonight for sleep, reassess in AM pending med rec  BPH- resume PTA flomax pending med rec    Subjective:   Patient doing well but wants to get out of hospital and to TCU/LTC. Slept a bit better last night. Surgery following.    Objective    Vital signs in last 24 hours Temp:  [97.8  F (36.6  C)-98.6  F (37  C)] 97.8  F (36.6  C)  Pulse:  [] 107  Resp:  [18-20] 20  BP: ()/(56-76) 111/56  SpO2:  [91 %-93 %] 91 %       Weight:   Vitals:    12/21/21 2223 12/22/21 1242   Weight: 78.5 kg (173 lb) 75.5 kg (166 lb 6.4 oz)       Intake/Output last 3 shift I/O last 3 completed shifts:  In: 857   Out: 1290 [Urine:940; Drains:200; Stool:150]    Intake/Output this shift:I/O this shift:  In: -   Out: 600 [Urine:400; Drains:200]    PHYSICAL EXAM  GENERAL APPEARANCE: Cooperative; appears stated age  LUNGS: No respiratory distress  HEART: Brisk cap refill  ABDOMEN: Ostomy bag draining stool  EXTREMITIES: Grossly normal without deformity, no edema  SKIN: no rashes, skin warm  NEURO: Oriented to time and place    Pertinent Labs and Pertinent Radiology   Lab Results: personally reviewed.     Recent Labs   Lab 12/24/21  0717   WBC 8.1    HGB 10.8*   HCT 33.7*          Recent Labs   Lab 12/24/21  0717   *   CO2 24   BUN 21   ALBUMIN 2.0*   ALKPHOS 113   ALT 21   AST 21       Radiology Results:   Results for orders placed or performed during the hospital encounter of 12/21/21   CT Abdomen Pelvis w Contrast    Impression    IMPRESSION:   1.  Improved prerectal fluid collection.  2.  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  3.  Possible right abdominal anastomotic dehiscence with a small amount free fluid and air in the mesentery.  4.  Report called to floor nurse Susy at 4:25 PM       Precepted patient with Dr Gem Hung MD  [facility] [place] Resident   Pager #: [phone]    Parts of this note were created with the assistance of voice recognition software.  The note was reviewed for accuracy.  However, errors in spelling, etc may have resulted.

## 2021-12-24 NOTE — PROGRESS NOTES
[facility]    Infectious Disease Progress Note    Date of Service (when I saw the patient): 12/24/2021     Assessment & Plan   Gurdeep Dia is a 66 year old male who was admitted on 12/21/2021.  Patient new to me 12/23/2021.  Please see previous ID consult note.    1. History of splenectomy and substance abuse, recent admission with small bowel obstruction  2. Status post exploratory laparotomy, small bowel resection/repair of enterotomy, extensive lysis of adhesion on 11/30/2021  3. Postoperative small bowel perforation, status post exploratory laparotomy on 12/3/2021.  4. Intra-abdominal abscess status post drain placement on 12/9/2021.  E. coli in cultures.  Treated with meropenem, IV vancomycin and transition to p.o. cefdinir plus Flagyl on 12/15/2021  5. Abscessogram on 12/16 with resolved abscess, drain removed on 12/16/2021  6. Antibiotics discontinued on 12/20/2021, discharged from the hospital on 12/20/2021 and readmitted due to worsening pain and drainage from incision, cellulitis, EC fistula  7. Gram-negative bacilli and Candida parapsilosis in wound culture from 12/22/2021  8. Leukocytosis improved  9. On TPN    Recommendations:    1. Follow blood cultures x2 sets, in process  2. Gram-negative bacilli from wound culture, follow identification and susceptibility   3. Surgery following, EC fistula draining  4. Continue IV vancomycin and cefepime  5. Low threshold to add antifungal given patient is on TPN Added fluconazole on 12/23/2021   6. Discussed with patient and questions answered. Patient new to me on 12/23/2021. Please see previous ID note.  7. Appreciate input from nursing assistants staff at bedside.  Will chart check over Christmas weekend.  ID will follow on 12/27/2021.  Please call with questions over the weekend    Ladi Salazar MD  [facility]  [phone]        Interval History     Doing better, ostomy bag on abdominal wall  Pain  improved, nursing assistance at bedside  Patient feels much better.    Physical Exam   Temp: 97.8  F (36.6  C) Temp src: Oral BP: 111/56 Pulse: 107   Resp: 20 SpO2: 91 % O2 Device: None (Room air)    Vitals:    12/21/21 2223 12/22/21 1242   Weight: 78.5 kg (173 lb) 75.5 kg (166 lb 6.4 oz)     Vital Signs with Ranges  Temp:  [97.8  F (36.6  C)-98.6  F (37  C)] 97.8  F (36.6  C)  Pulse:  [] 107  Resp:  [18-20] 20  BP: ()/(56-76) 111/56  SpO2:  [91 %-93 %] 91 %    Constitutional: No apparent distress  Lungs: normal breathing pattern  Cardiovascular:  No JVD elevation  Abdomen: drainage from EV fistula.  Ostomy bags on abdominal wall  Skin: draining from abdominal wall seems to have decreased. Mild tenderness resolving  Other:Neuro AOx3, coherent, deconditioned    Medications     dextrose       parenteral nutrition - ADULT compounded formula       parenteral nutrition - ADULT compounded formula 70 mL/hr at 12/23/21 2039       ceFEPIme (MAXIPIME) IV  2 g Intravenous Q8H     enoxaparin ANTICOAGULANT  40 mg Subcutaneous Daily     fluconazole  400 mg Intravenous Q24H     gabapentin  200 mg Oral BID     gabapentin  900 mg Oral At Bedtime     levothyroxine  25 mcg Oral Daily     lipids  250 mL Intravenous Once per day on Sun Mon Wed Fri     pantoprazole (PROTONIX) IV  40 mg Intravenous Daily with breakfast     QUEtiapine  200 mg Oral BID     sodium chloride (PF)  3 mL Intracatheter Q8H     vancomycin  1,000 mg Intravenous Q12H     venlafaxine  75 mg Oral Daily       Data   All microbiology laboratory data reviewed.  Recent Labs   Lab Test 12/24/21  0717 12/23/21  0715 12/22/21  0003   WBC 8.1 8.8 13.8*   HGB 10.8* 10.9* 12.0*   HCT 33.7* 33.1* 36.1*   MCV 93 92 90    453* 564*     Recent Labs   Lab Test 12/24/21  0717 12/23/21  0715 12/22/21  0003   CR 0.83 0.81 0.89     MICRO: reviewed  12/22/2021 1648 12/23/2021 2017 Blood Culture Peripheral Blood [[id number]]   Peripheral Blood    Preliminary result  Component Value   Culture No growth after 1 day P              12/22/2021 1648 12/23/2021 2017 Blood Culture Peripheral Blood [[id number]]   Peripheral Blood    Preliminary result Component Value   Culture No growth after 1 day P              12/22/2021 1326 12/24/2021 1412 Wound Aerobic Bacterial Culture Routine [[id number]]   (Abnormal)   Wound from Abdomen    Preliminary result Component Value   Culture Culture in progress P    3+ Gram negative bacilli Abnormal  P    3+ Gram negative bacilli Abnormal  P    2+ Candida parapsilosis complex Abnormal  P              12/22/2021 0018 12/22/2021 0048 Asymptomatic COVID-19 Virus (Coronavirus) by PCR Nasopharyngeal [[id number]]    Swab from Nasopharyngeal    Final result Component Value   SARS CoV2 PCR Negative   NEGATIVE: SARS-CoV-2 (COVID-19) RNA not detected, presumed negative.           12/17/2021 1615 12/17/2021 1844 Asymptomatic COVID-19 Virus (Coronavirus) by PCR Nasopharyngeal [[id number]]    Swab from Nasopharyngeal    Final result Component Value   SARS CoV2 PCR Negative   NEGATIVE: SARS-CoV-2 (COVID-19) RNA not detected, presumed negative.           12/09/2021 1204 12/12/2021 0831 Body fluid, unsp Aerobic Bacterial Culture Routine [[id number]]    (Abnormal)   Body fluid, unsp from Pelvis    Final result Component Value   Culture 2+ Escherichia coli Abnormal          Susceptibility     Escherichia coli     DENA     Ampicillin >=32.0 ug/mL Resistant     Ampicillin/ Sulbactam 16.0 ug/mL Intermediate     Cefepime <=1.0 ug/mL Susceptible     Ceftazidime <=1.0 ug/mL Susceptible     Ceftriaxone <=1.0 ug/mL Susceptible     Ciprofloxacin >=4.0 ug/mL Resistant     Gentamicin <=1.0 ug/mL Susceptible     Levofloxacin >=8.0 ug/mL Resistant     Meropenem <=0.25 ug/mL Susceptible     Piperacillin/Tazobactam <=4.0 ug/mL Susceptible     Tobramycin <=1.0 ug/mL Susceptible     Trimethoprim/Sulfamethoxazole >16/304 ug/mL Resistant                   12/09/2021 1203  12/16/2021 0801 Anaerobic culture [[id number]]   Body fluid, unsp from Pelvis    Final result Component Value   Culture No anaerobic organisms isolated              12/08/2021 1507 12/08/2021 1607 Asymptomatic COVID-19 Virus (Coronavirus) by PCR Nasopharyngeal [[id number]]    Swab from Nasopharyngeal    Final result Component Value   SARS CoV2 PCR Negative   NEGATIVE: SARS-CoV-2 (COVID-19) RNA not detected, presumed negative.           12/06/2021 2140 12/12/2021 1031 Blood Culture Line, venous [[id number]]   Blood from Line, venous    Final result Component Value   Culture No Growth              12/05/2021 1645 12/10/2021 2031 Blood Culture Peripheral Blood [[id number]]   Peripheral Blood    Final result Component Value   Culture No Growth              12/05/2021 1645 12/10/2021 2031 Blood Culture Peripheral Blood [[id number]]    Peripheral Blood    Final result Component Value   Culture No Growth              12/05/2021 1638 12/06/2021 2126 Urine Culture [[id number]]   Urine, Roblero Catheter    Final result Component Value   Culture No Growth              11/30/2021 1153 11/30/2021 1241 Asymptomatic COVID-19 Virus (Coronavirus) by PCR Nasopharyngeal [[id number]]    Swab from Nasopharyngeal    Final result Component Value   SARS CoV2 PCR Negative   NEGATIVE: SARS-CoV-2 (COVID-19) RNA not detected, presumed negative.                 RADIOLOGY:    CT Abd Peritoneum Abscess Drain w Cath Place    Result Date: 12/9/2021  EXAM: 1. PERCUTANEOUS DRAIN PLACEMENT PELVIC ABSCESS 2. CT GUIDANCE 3. CONSCIOUS SEDATION LOCATION: [facility] DATE/TIME: 12/9/2021 9:50 AM INDICATION: s/p two surgeries with elevating wbc, ct with pelvic abscess. TECHNIQUE: Dose reduction techniques were used. PROCEDURE: Informed consent obtained. Site marked. Prior images reviewed. Required items made available. Patient identity confirmed verbally and with arm band. Patient reevaluated immediately before  administering sedation. Universal protocol was followed. Time out performed. The site was prepped and draped in sterile fashion. 10 mL of 1% lidocaine was infused into the local soft tissues. Using standard technique and under direct CT guidance, a 10 [place] drainage catheter was inserted into the fluid collection.  SPECIMEN: 30 mL of brownish thin fluid was aspirated and sent to lab for cultures and Gram stain. BLOOD LOSS: Minimal. The patient tolerated the procedure well. No immediate complications. SEDATION: Versed 2.0 mg. Fentanyl 100 mcg. The procedure was performed with administration intravenous conscious sedation with appropriate preoperative, intraoperative, and postoperative evaluation. 30 minutes of supervised face to face conscious sedation time was provided by a radiology nurse under my direct supervision.     IMPRESSION: 1.  Successful CT-guided drain placement into pelvic abscess. 30 mL brownish thin fluid removed and sent for cultures. Reference CPT Codes: 45249, 57995, 57184    Echocardiogram Complete    Result Date: 12/10/2021  [id number]^CHRISTOPHER^FLOWER^[place] [address]  Name: ISABELA DUNN MRN: [id number] : 1955 Study Date: 12/10/2021 01:14 PM Age: 66 yrs Gender: Male Patient Location: [facility] Reason For Study: [place] Ordering Physician: FLOWER WHITE Performed By: ALEX  BSA: 1.8 m2 Height: 65 in Weight: 155 lb HR: 84 ______________________________________________________________________________ Procedure Complete Echo Adult. Adequate quality two-dimensional was performed and interpreted. ______________________________________________________________________________ Interpretation Summary  The left ventricle is normal in size with borderline concentric left ventricular hypertrophy. Left ventricular function is normal.The ejection fraction is 60-65%. Normal right ventricle size and systolic function. No significant  valve disease is identified.  There is no comparison study available. ______________________________________________________________________________ I      WMSI = 1.00     % Normal = 100  X - Cannot   0 -                      (2) - Mildly 2 -          Segments  Size Interpret    Hyperkinetic 1 - Normal  Hypokinetic  Hypokinetic  1-2     small                                                    7 -          3-5    moderate 3 - Akinetic 4 -          5 -         6 - Akinetic Dyskinetic   6-14    large              Dyskinetic   Aneurysmal  w/scar       w/scar       15-16   diffuse  Left Ventricle The left ventricle is normal in size. Left ventricular function is normal.The ejection fraction is 60-65%. There is borderline concentric left ventricular hypertrophy. Left ventricular diastolic function is normal. No regional wall motion abnormalities noted.  Right Ventricle Normal right ventricle size and systolic function.  Atria Normal left atrial size. Right atrial size is normal. There is no color Doppler evidence of an atrial shunt.  Mitral Valve Mitral valve leaflets appear normal. There is no evidence of mitral stenosis or clinically significant mitral regurgitation.  Tricuspid Valve Tricuspid valve leaflets appear normal. There is no evidence of tricuspid stenosis or clinically significant tricuspid regurgitation. Right ventricle systolic pressure estimate normal. The right ventricular systolic pressure is approximated at 18.7 mmHg plus the right atrial pressure.  Aortic Valve The aortic valve is trileaflet. Aortic valve leaflets appear normal. There is no evidence of aortic stenosis or clinically significant aortic regurgitation. There is trace aortic regurgitation.  Pulmonic Valve The pulmonic valve is not well seen, but is grossly normal. This degree of valvular regurgitation is within normal limits. There is trace pulmonic valvular regurgitation.  Vessels The aorta root is normal. The ascending aorta is Mildly  dilated. IVC diameter <2.1 cm collapsing >50% with sniff suggests a normal RA pressure of 3 mmHg.  Pericardium There is no pericardial effusion.  Rhythm Sinus rhythm was noted.  ______________________________________________________________________________ MMode/2D Measurements & Calculations IVSd: 1.3 cm LVIDd: 5.0 cm LVIDs: 3.2 cm LVPWd: 0.86 cm  FS: 36.6 % LV mass(C)d: 201.1 grams LV mass(C)dI: 113.3 grams/m2 Ao root diam: 3.9 cm LA dimension: 4.1 cm asc Aorta Diam: 3.8 cm LA/Ao: 1.0 LVOT diam: 2.4 cm LVOT area: 4.6 cm2 LA Volume Indexed (AL/bp): 29.6 ml/m2 RWT: 0.34  Doppler Measurements & Calculations MV E max gary: 80.4 cm/sec MV A max gary: 62.9 cm/sec MV E/A: 1.3  MV dec slope: 292.8 cm/sec2 MV dec time: 0.27 sec Ao V2 max: 178.1 cm/sec Ao max P.0 mmHg Ao V2 mean: 130.5 cm/sec Ao mean P.9 mmHg Ao V2 VTI: 29.4 cm SUMAN(I,D): 3.2 cm2 SUMAN(V,D): 3.1 cm2 LV V1 max P.5 mmHg LV V1 max: 117.6 cm/sec LV V1 VTI: 20.2 cm SV(LVOT): 93.4 ml SI(LVOT): 52.6 ml/m2 PA acc time: 0.12 sec TR max gary: 216.1 cm/sec TR max P.7 mmHg AV Gary Ratio (DI): 0.66 SUMAN Index (cm2/m2): 1.8 E/E' av.3 Lateral E/e': 7.9 Medial E/e': 16.6  ______________________________________________________________________________ Report approved by: Hany Currie 12/10/2021 02:23 PM       XR Chest Port 1 View    Result Date: 2021  EXAM: XR CHEST PORT 1 VIEW LOCATION: [facility] DATE/TIME: 2021 1:39 PM INDICATION: Worsening hypoxia, evaluate for pneumonia, pulm edema COMPARISON: Chest x-ray 2021     IMPRESSION: Enteric suction tube tip and side-port within the upper gastric lumen. Satisfactory right PICC line position with the tip near the cavoatrial junction. Persistent low lung volumes. No definite pneumothorax. Mildly improved bibasilar pulmonary  opacities favored to reflect atelectasis. Persistent interstitial coarsening. Suspected trace right-sided pleural fluid. Stable heart size and  central vascular prominence.    XR Chest Port 1 View    Result Date: 12/3/2021  EXAM: XR CHEST PORT 1 VIEW LOCATION: [facility] DATE/TIME: 12/3/2021 8:31 PM INDICATION: RN placed PICC - verify tip placement COMPARISON: None.     IMPRESSION: Right PICC line present with its tip likely just into the right atrium. Suggest withdrawing the PICC line 2 cm to place the tip in the low SVC. NG tube in good position in the stomach. Mild cardiomegaly. Low lung volumes with mild patchy mixed interstitial and airspace infiltrates    IR Abscess Tube Check    Result Date: 12/16/2021  LOCATION: [facility] DATE: 12/16/2021 PROCEDURE: ABSCESS TUBE CHECK INTERVENTIONAL RADIOLOGIST: Demario Porter MD INDICATION: Pelvic abscess. Status post jugular drain placement on 12/9/2021. Markedly decreased outputs. CT from today demonstrating near complete resolution of the abscess cavity.. CONSENT: The procedure, risks and benefits of an abscessogram were discussed with the patient  in detail. All questions were answered. Informed consent was given to proceed with the procedure. MODERATE SEDATION: None. CONTRAST: Omnipaque 350: 2 mL. ANTIBIOTICS: None. ADDITIONAL MEDICATIONS: None. FLUOROSCOPIC TIME: 0.1 minutes RADIATION DOSE: Air Kerma: 26 mGy COMPLICATIONS: No immediate complications. PROCEDURE:  images were obtained. The existing drainage catheter was injected with contrast, and an image obtained. After reviewing the images, the catheter was removed without difficulty. FINDINGS: Contracted cavity. Almost immediate pericatheter leakage.     IMPRESSION: 1.  Resolved pelvic fluid collection. Transgluteal drain removed.     CT Abdomen Pelvis w Contrast    Result Date: 12/22/2021  EXAM: CT ABDOMEN PELVIS W CONTRAST LOCATION: [facility] DATE/TIME: 12/22/2021 2:58 PM INDICATION: Exploratory laparotomy with adhesion lysis and repair of small bowel enterotomy 11/30/2021.  Abdominal abscess drain placement 12/09/2021. COMPARISON: CT 12/16/2021, 12/08/2021 TECHNIQUE: CT scan of the abdomen and pelvis was performed following injection of IV contrast. Multiplanar reformats were obtained. Dose reduction techniques were used. CONTRAST: isovue 370 100ml FINDINGS: LOWER CHEST: Right basilar consolidation could represent atelectasis or pneumonia, similar to previous. HEPATOBILIARY: Stable hepatic cysts. Stable prominent gallbladder without definite wall thickening. PANCREAS: Normal. SPLEEN: Splenectomy with several left upper quadrant postsplenectomy implants. ADRENAL GLANDS: Normal. KIDNEYS/BLADDER: Several right renal cysts which do not require further imaging. Left renal 9 and 4 mm nonobstructive stones. Several tiny left renal cysts. No further imaging recommended. BOWEL: A loop of small bowel appears to communicate with the left anterior abdominal wall (series 3, image 105). The abdominal wall contains air. 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 There is a right lateral abdominal staple line suggesting an ilio transverse colostomy. The right lower quadrant mesentery inferior to this contains a small amount of nonlocalized fluid and air. Also, the surgical staple line appears scattered suggesting  anastomotic breakdown. The prerectal drainage catheter has been removed. Small residual 16 x 12 mm fluid collection (image 143). LYMPH NODES: Several small mesenteric nodes are likely reactive. VASCULATURE: Patent mesenteric vessels. PELVIC ORGANS: Normal. MUSCULOSKELETAL: Severe degenerative change at the L4-L5 level with grade 2 anterolisthesis.     IMPRESSION: 1.  Improved prerectal fluid collection. 2.  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 3.  Possible right abdominal anastomotic dehiscence with a small amount free fluid and air in the mesentery. 4.  Report called to floor nurse Susy at 4:25 PM    CT Abdomen Pelvis w Contrast    Addendum Date:  12/16/2021    Discussed with Dr. Richardson by Dr. Fahrner over telephone at 11:41 AM.    Result Date: 12/16/2021  EXAM: CT ABDOMEN PELVIS W CONTRAST LOCATION: [facility] DATE/TIME: 12/16/2021 3:00 AM INDICATION: Intrabdominal abscess COMPARISON: CT 12/8/2021 and 12/9/2021 TECHNIQUE: CT scan of the abdomen and pelvis was performed following injection of IV contrast. Multiplanar reformats were obtained. Dose reduction techniques were used. CONTRAST: IsoVue 370 100mL FINDINGS: LOWER CHEST: Normal. HEPATOBILIARY: Normal. PANCREAS: Normal. SPLEEN: Splenules are present. ADRENAL GLANDS: A 10 mm area of fullness in the left adrenal gland has not changed. KIDNEYS/BLADDER: Again seen are nonobstructing left renal calculi. There are simple cysts in the right kidney which do not require follow-up. Smaller renal lesions are too small to characterize. BOWEL: There is surgical change in the right lower quadrant. There is distal small bowel wall thickening in this region. The small bowel wall is not fully visualized, and there is a small amount of extraluminal fluid and air in this region (series 3 images 93 and 96). There is new free air in the right lower quadrant mesentery (series 3 image 105). LYMPH NODES: Normal. VASCULATURE: Atherosclerotic disease. PELVIC ORGANS: Normal. OTHER: A fluid collection adjacent to the rectum is 1.9 x 1.5 x 1.5 cm (approximately 2.2 mL). Previously the collection was 4.4 x 3.6 x 4.4 cm. A pigtail catheter terminates within this collection. MUSCULOSKELETAL: Surgical change along the ventral abdominal and pelvic wall. There is a 20.8 x 3.2 x 1.2 cm fluid and air collection in the midline ventral abdominal and pelvic wall just deep to the staple line. Right lower quadrant and left midabdomen approach Larry drains are present.     IMPRESSION: 1.  The pelvic fluid collection is significantly smaller. A pigtail catheter is in place. 2.  Increased free air in the mesentery in the  right lower quadrant, suggesting suture line dehiscence. There is also a suspected distal small bowel defect with adjacent free air and fluid. 3.  New fluid collection in the subcutaneous tissues of the ventral abdominal and pelvic wall, just deep to the staple line. Recommend drainage.     CT Abdomen Pelvis w Contrast    Result Date: 12/8/2021  EXAM: CT ABDOMEN PELVIS W CONTRAST LOCATION: [facility] DATE/TIME: 12/8/2021 1:40 PM INDICATION: Abdominal abscess/infection suspected, status post laparotomy with repair of small bowel perforation, recent small bowel resection COMPARISON: CT 11/30/2021 TECHNIQUE: CT scan of the abdomen and pelvis was performed following injection of IV contrast. Multiplanar reformats were obtained. Dose reduction techniques were used. CONTRAST: 100 mL Isovue-370 FINDINGS: LOWER CHEST: Mild bilateral lower lobar dependent consolidation and trace right-sided pleural effusion. HEPATOBILIARY: Prominent gallbladder likely reflecting recent fasting. Stable mild biliary ductal dilatation. No obstructing mass lesion or radiodense stone. Stable small low-density hepatic lesions, likely cysts. PANCREAS: Normal. SPLEEN: Surgically absent. ADRENAL GLANDS: Normal. KIDNEYS/BLADDER: Stable nonobstructing lower left renal stones with a dominant stone measuring 4 mm. No hydronephrosis. Unremarkable urinary bladder. BOWEL: Enteric suction tube with the tip located within the mid gastric lumen. Mild to moderately distended segments of mid small bowel with associated air-fluid levels. No discrete transition is identified. Right lower quadrant anastomotic changes. Small amount of nonloculated fluid and extraluminal air within the mid abdomen for example image 50 series 400.2 and image 97 series 3. Questionable adjacent small bowel defect image 50 series 102. Small amount of nonloculated fluid is also noted within the right lower quadrant. Newly visualized rim-enhancing pelvic fluid  collection measuring 4.4 x 3.6 x 4.4 cm. Trace ascites. Midline laparotomy incision with a small amount of fluid and air within the wound. Surgical drain tip is located at the upper aspect of this wound. Additional surgical drain with the tip located within the left lower quadrant. Third surgical drain with the tip located in the lower anterior abdomen. Diffuse mesenteric edema. LYMPH NODES: Normal. VASCULATURE: Mild atherosclerosis. PELVIC ORGANS: Normal. MUSCULOSKELETAL: Bilateral L4 pars defects with grade 1 anterolisthesis at L4-L5. Associated severe discogenic degenerative changes.     IMPRESSION: 1.  Newly visualized 4.6 cm rim-enhancing fluid collection within the pelvis. 2.  Small ill-defined irregular pocket of fluid and extraluminal air within the mid abdomen as described above. It is uncertain whether this is related to sequelae of recent surgery or bowel leakage. Recommend short interval follow-up CT with positive enteric contrast. 3.  Mild to moderate mid small bowel distention. This is favored to reflect an ileus rather than obstruction. 4.  Trace ascites and diffuse mesenteric edema. 5.  Mild bilateral lower lobar consolidation and trace right-sided pleural effusion. [Critical Result: Pulmonary fluid collection and extraluminal loculated air-fluid within the mid abdomen as detailed above.] Finding was identified on 12/8/2021 2:10 PM. Dr. Richardson was contacted by me on 12/8/2021 2:50 PM and verbalized understanding of the critical result.     CT Abdomen Pelvis w Contrast    Result Date: 11/30/2021  EXAM: CT ABDOMEN PELVIS W CONTRAST LOCATION: [facility] DATE/TIME: 11/30/2021 10:48 AM INDICATION: Abdominal distension COMPARISON: None. TECHNIQUE: CT scan of the abdomen and pelvis was performed following injection of IV contrast. Multiplanar reformats were obtained. Dose reduction techniques were used. CONTRAST: IsoVue 370 100mL FINDINGS: LOWER CHEST: Bibasilar atelectasis.  "HEPATOBILIARY: Scattered small water density foci in the liver consistent with cysts. No radiopaque gallstone. No suspicious hepatic lesions. PANCREAS: Coarse calcification in the pancreatic tail and body may represent sequelae of chronic pancreatitis no acute pancreatitis or abnormal pancreatic mass. SPLEEN: Not visualized consistent with prior splenectomy. ADRENAL GLANDS: Normal. KIDNEYS/BLADDER: No hydronephrosis or masses. No bladder stone or mass. BOWEL: Anastomotic staples in the colon in the right mid abdomen. There is normal caliber duodenum and proximal jejunum with dilated loops of distal jejunum and ileum with some fecal i-STAT ileal contents and a transition in the right mid to lower abdomen. Findings are consistent with mechanical small bowel obstruction and this may represent a closed loop obstruction such as internal hernia or multiple adhesions given the lack of dilatation of the proximal small bowel. No pneumatosis intestinalis,  free intraperitoneal air or abscess. LYMPH NODES: No lymphadenopathy. VASCULATURE: Mild aortoiliac calcification without aneurysm. PELVIC ORGANS: Mild prostatic enlargement. MUSCULOSKELETAL: Disc space narrowing at L4-L5 with disc degeneration and bilateral spondylolysis at L4 with grade 2 spondylolisthesis of L4 on L5. No acute fracture.     IMPRESSION: 1.  Mechanical small bowel obstruction with dilated distal jejunum and ileum with caliber changes in the right lower quadrant and in the left midabdomen this may be secondary to adhesions or internal hernia and has the appearance of a closed loop obstruction. No pneumatosis intestinalis, free intraperitoneal air or abscess. 2.  Results were called to Dr. Sanford the time of dictation. NOTE: ABNORMAL REPORT THE DICTATION ABOVE DESCRIBES AN ABNORMALITY FOR WHICH FOLLOW-UP IS NEEDED.     POC US Guidance Needle Placement    Result Date: 11/30/2021  Ultrasound was performed as guidance to an anesthesia procedure.  Click \"PACS " "images\" hyperlink below to view any stored images.  For specific procedure details, view procedure note authored by anesthesia.    XR Video Swallow with SLP or OT    Result Date: 11/24/2021  EXAM: XR VIDEO SWALLOW WITH SLP OR OT LOCATION: [facility] DATE/TIME: 11/24/2021 9:18 AM INDICATION: Difficulty swallowing. COMPARISON: Same day esophagram.; CT for lung cancer screening 03/09/2020 TECHNIQUE: Routine swallow study with speech pathology using multiple barium thicknesses. FINDINGS: FLUOROSCOPIC TIME: 0.2 minutes NUMBER OF IMAGES: 2 videofluoroscopic imaging series Swallow study with Speech Pathology using multiple barium thicknesses. Patient is edentulous. Trials of thin consistency and barium coated cracker were performed. No delay in initiation of the oral phase. Normal epiglottic inversion. No penetration or aspiration observed. Moderate to severe cervical spondylosis with anterior wedging, disc space narrowing and osteophytosis of C4, C5, and C6. Straightening and reversal of normal thoracic lordosis.     IMPRESSION: No penetration or aspiration. Please see separately dictated report from speech pathology for additional description, analysis, and management recommendations.     XR Esophagram    Result Date: 11/24/2021  EXAM: XR ESOPHAGRAM LOCATION: [facility] DATE/TIME: 11/24/2021 9:18 AM INDICATION: 67 yo M with dysphagia/dyspnea - cough. Feels food stuck in throat with swallow. Occ. chocking. Needs to swallow lot of liquids. Constant runny nose & nasal congestion. COMPARISON: Same day swallow study; CT of the chest without contrast 03/09/2020 TECHNIQUE: Routine. FINDINGS: FLUOROSCOPIC TIME: 1.2 minutes NUMBER OF IMAGES: 4 videofluoroscopic imaging series and additional 41 images. ESOPHAGUS: Patulous esophagus with diffusely abnormal peristalsis. Large number of tertiary contractions present with delayed esophageal emptying. No esophageal stricture, " "diverticula or mass. No hiatal hernia. There is a large amount of retained contrast within the esophagus after consumption of contrast in the RPO position in transitioning to the supine position. This retained contrast moves to and fro within the esophagus. During the transition from RPO to supine position the patient felt a \"tickle\" in the back of his throat prompting coughing, while not observed likely relates to reflux of contrast from the upper esophagus into the pharynx. No gastroesophageal reflux elicited in the recumbent position with Valsalva maneuver.     IMPRESSION: Moderately severe esophageal dysmotility (presbyesophagus).    "

## 2021-12-24 NOTE — PROGRESS NOTES
[facility] General Surgery Post-Op / Progress Note         Assessment and Plan:    Assessment:   Extensive lysis of adhesions  Bowel perforation  3 days later  Return to OR and repair  Perforation 5 days later and leak  Drain of pelvic abscess    Open fistula to skin lower incision.      Plan:   TPN   Wound cares  Too high of risk for surgery at this time.   Hope to get fistulas to close down with time.   Waiting on placement           Interval History:   Slept better last night, pain tolerable          Significant Problems:      Patient Active Problem List   Diagnosis     Hypertension goal BP (blood pressure) < 140/90     Benign prostatic hyperplasia with weak urinary stream     RAVI (generalized anxiety disorder)     Moderate major depression (H)     History of substance abuse (H)     History of hepatitis C     Chronic neck pain     Chronic bilateral low back pain without sciatica     SBO (small bowel obstruction) (H)     Perforation of intestine (H)     Abdominal pain, generalized             Review of Systems:    down emotionally   Tolerating tpn          Medications:     All medications related to the patient's surgery have been reviewed  Current Facility-Administered Medications   Medication     ceFEPIme (MAXIPIME) 2 g vial to attach to  ml bag for ADULTS or 50 ml bag for PEDS     dextrose 10% infusion     enoxaparin ANTICOAGULANT (LOVENOX) injection 40 mg     fluconazole (DIFLUCAN) intermittent infusion 400 mg in NaCl     gabapentin (NEURONTIN) capsule 200 mg     gabapentin (NEURONTIN) capsule 900 mg     levothyroxine (SYNTHROID/LEVOTHROID) tablet 25 mcg     lidocaine (LMX4) cream     lidocaine 1 % 0.1-1 mL     lipids (INTRALIPID) 20 % infusion 250 mL     melatonin tablet 1 mg     naloxone (NARCAN) injection 0.2 mg    Or     naloxone (NARCAN) injection 0.4 mg    Or     naloxone (NARCAN) injection 0.2 mg    Or     naloxone (NARCAN) injection 0.4 mg     ondansetron (ZOFRAN-ODT) ODT tab 4 mg     Or     ondansetron (ZOFRAN) injection 4 mg     oxyCODONE-acetaminophen (PERCOCET) 5-325 MG per tablet 1-2 tablet     pantoprazole (PROTONIX) IV push injection 40 mg     parenteral nutrition - ADULT compounded formula     QUEtiapine (SEROquel) tablet 100 mg     QUEtiapine (SEROquel) tablet 200 mg     sodium chloride (PF) 0.9% PF flush 3 mL     sodium chloride (PF) 0.9% PF flush 3 mL     vancomycin (VANCOCIN) 1000 mg in dextrose 5% 200 mL PREMIX     venlafaxine (EFFEXOR-XR) 24 hr capsule 75 mg             Physical Exam:     Vitals were reviewed  Patient Vitals for the past 8 hrs:   BP Temp Temp src Pulse Resp SpO2   12/24/21 0803 111/56 97.8  F (36.6  C) Oral 107 20 91 %     Wt Readings from Last 4 Encounters:   12/22/21 75.5 kg (166 lb 6.4 oz)   12/17/21 76.8 kg (169 lb 6.4 oz)   01/22/20 79.4 kg (175 lb)   12/24/19 81.2 kg (179 lb)     I/O last 3 completed shifts:  In: 857   Out: 1290 [Urine:940; Drains:200; Stool:150]  Ostomy bags in place superior and inferior, draining appropriately           Data:   All laboratory data related to this surgery reviewed  Results for orders placed or performed during the hospital encounter of 12/21/21 (from the past 24 hour(s))   Glucose by meter   Result Value Ref Range    GLUCOSE BY METER POCT 135 (H) 70 - 99 mg/dL   Magnesium   Result Value Ref Range    Magnesium 2.0 1.8 - 2.6 mg/dL   Phosphorus   Result Value Ref Range    Phosphorus 3.3 2.5 - 4.5 mg/dL   Comprehensive metabolic panel   Result Value Ref Range    Sodium 134 (L) 136 - 145 mmol/L    Potassium 4.6 3.5 - 5.0 mmol/L    Chloride 104 98 - 107 mmol/L    Carbon Dioxide (CO2) 24 22 - 31 mmol/L    Anion Gap 6 5 - 18 mmol/L    Urea Nitrogen 21 8 - 22 mg/dL    Creatinine 0.83 0.70 - 1.30 mg/dL    Calcium 8.4 (L) 8.5 - 10.5 mg/dL    Glucose 122 70 - 125 mg/dL    Alkaline Phosphatase 113 45 - 120 U/L    AST 21 0 - 40 U/L    ALT 21 0 - 45 U/L    Protein Total 6.6 6.0 - 8.0 g/dL    Albumin 2.0 (L) 3.5 - 5.0 g/dL    Bilirubin  Total 1.0 0.0 - 1.0 mg/dL    GFR Estimate >90 >60 mL/min/1.73m2   Ionized Calcium   Result Value Ref Range    Calcium, Ionized pH 7.4 1.14 1.11 - 1.30 mmol/L    pH 7.38 7.35 - 7.45    Calcium, Ionized Measured 1.15 1.11 - 1.30 mmol/L   CBC with platelets   Result Value Ref Range    WBC Count 8.1 4.0 - 11.0 10e3/uL    RBC Count 3.64 (L) 4.40 - 5.90 10e6/uL    Hemoglobin 10.8 (L) 13.3 - 17.7 g/dL    Hematocrit 33.7 (L) 40.0 - 53.0 %    MCV 93 78 - 100 fL    MCH 29.7 26.5 - 33.0 pg    MCHC 32.0 31.5 - 36.5 g/dL    RDW 15.9 (H) 10.0 - 15.0 %    Platelet Count 402 150 - 450 10e3/uL     All imaging studies related to this surgery reviewed    Shaggy Cao PA-C

## 2021-12-24 NOTE — PLAN OF CARE
Problem: Adult Inpatient Plan of Care  Goal: Optimal Comfort and Wellbeing  Outcome: No Change   Patient rating generalized pain #7, declines pain medication.      Problem: Impaired Wound Healing  Goal: Optimal Wound Healing  Outcome: No Change  Intervention: Promote Wound Healing  Recent Flowsheet Documentation  Taken 12/24/2021 1300 by Noemi Rosas RN  Pain Management Interventions:   declines   emotional support  Taken 12/24/2021 0900 by Noemi Rosas RN  Pain Management Interventions:   declines   emotional support  Activity Management: activity adjusted per tolerance   Fistula draining to ostomy bag, upper bag draining small amount of thin green liquid, lower bag draining a moderate amount of thin, brown liquid.      Problem: Adult Inpatient Plan of Care  Goal: Readiness for Transition of Care  Outcome: No Change   Plan is for patient to go to TCU at time of discharge.    Noemi Rosas RN

## 2021-12-24 NOTE — PLAN OF CARE
Problem: Impaired Wound Healing  Goal: Optimal Wound Healing  Outcome: Improving    Problem: Pain (Surgery Nonspecified)  Goal: Acceptable Pain Control  Outcome: Improving    No events overnight.  Pain controlled with PRN percocet.  Did request one time dose of oxycodone for breakthrough pain, effective.  IV abx infused per orders.  Given ordered seroquel, slept for the majority of the shift.

## 2021-12-24 NOTE — PROGRESS NOTES
Psychiatric Progress Note  Adjustment disorder with anxiety and depression in the setting of poor functionality, pain, prolonged hospitalization.  Major depression by hx recurrent moderate with anxiety features.  Mood changes due to hospitalization, pain, living situation.  Sleep difficulties, hx of insomnia..     Recommendations:  Discontinue BuSpar 15 mg twice a day. If anxiety does not improve then may consider adding Abilify 2 mg in AM instead of Buspar.  Increase venlafaxine to 75 mg daily. Monitor for mood changes with the increase.  Increase gabapentin to 900 mg at bedtime and decrease daytime dose to 200 mg twice a day.  Discontinue mirtazapine. Not helping with sleep. Liekly due to paradoxical effect.  Seroquel 200 mg at bedtime.      SUBJECTIVE:  Patient slept better last night. No SI. NO anxiety.     MENTAL STATUS EXAMINATION:   General Appearance: Resting, comfortable  Behavior: Not cantankerous   Speech: Coherent.  Thought Process: Linear, clear.  Thought content: No evidence of hallucinations, delusions or paranoia.    Thought Formation: Associations are connected  Judgment: Fair  Insight : Intact  Attention : Adequate  Memory: Sufficient  Fund Of Knowledge: Average  Affect: Neutral  Mood: anxious  Alert : Awake  Suicidal ideation: Absent  Homicidal ideation: Absent  Orientation: X 4  Comprehension: Sufficient pertaining to current medical needs  Generative thought content: Adequate.  Spontaneous conversation  Language: Intact  Gait and Ambulation: Gait and ambulation at baseline  Vital signs in last 24 hours  Temp:  [97.8  F (36.6  C)-98.6  F (37  C)] 97.8  F (36.6  C)  Pulse:  [] 107  Resp:  [18-20] 20  BP: ()/(56-83) 111/56  SpO2:  [91 %-95 %] 91 %               Discharged

## 2021-12-25 LAB
ALBUMIN SERPL-MCNC: 2 G/DL (ref 3.5–5)
ALP SERPL-CCNC: 136 U/L (ref 45–120)
ALT SERPL W P-5'-P-CCNC: 34 U/L (ref 0–45)
ANION GAP SERPL CALCULATED.3IONS-SCNC: 8 MMOL/L (ref 5–18)
AST SERPL W P-5'-P-CCNC: 30 U/L (ref 0–40)
BACTERIA WND CULT: ABNORMAL
BILIRUB SERPL-MCNC: 1 MG/DL (ref 0–1)
BUN SERPL-MCNC: 19 MG/DL (ref 8–22)
CALCIUM SERPL-MCNC: 8.3 MG/DL (ref 8.5–10.5)
CHLORIDE BLD-SCNC: 106 MMOL/L (ref 98–107)
CO2 SERPL-SCNC: 20 MMOL/L (ref 22–31)
CREAT SERPL-MCNC: 0.8 MG/DL (ref 0.7–1.3)
ERYTHROCYTE [DISTWIDTH] IN BLOOD BY AUTOMATED COUNT: 15.9 % (ref 10–15)
GFR SERPL CREATININE-BSD FRML MDRD: >90 ML/MIN/1.73M2
GLUCOSE BLD-MCNC: 105 MG/DL (ref 70–125)
GLUCOSE BLDC GLUCOMTR-MCNC: 106 MG/DL (ref 70–99)
GLUCOSE BLDC GLUCOMTR-MCNC: 123 MG/DL (ref 70–99)
GLUCOSE BLDC GLUCOMTR-MCNC: 126 MG/DL (ref 70–99)
GLUCOSE BLDC GLUCOMTR-MCNC: 139 MG/DL (ref 70–99)
GLUCOSE BLDC GLUCOMTR-MCNC: 143 MG/DL (ref 70–99)
HCT VFR BLD AUTO: 31.6 % (ref 40–53)
HGB BLD-MCNC: 10.1 G/DL (ref 13.3–17.7)
MCH RBC QN AUTO: 29.5 PG (ref 26.5–33)
MCHC RBC AUTO-ENTMCNC: 32 G/DL (ref 31.5–36.5)
MCV RBC AUTO: 92 FL (ref 78–100)
PLATELET # BLD AUTO: 349 10E3/UL (ref 150–450)
POTASSIUM BLD-SCNC: 4.3 MMOL/L (ref 3.5–5)
PROT SERPL-MCNC: 6.6 G/DL (ref 6–8)
RBC # BLD AUTO: 3.42 10E6/UL (ref 4.4–5.9)
SODIUM SERPL-SCNC: 134 MMOL/L (ref 136–145)
WBC # BLD AUTO: 8.5 10E3/UL (ref 4–11)

## 2021-12-25 PROCEDURE — 250N000011 HC RX IP 250 OP 636: Performed by: INTERNAL MEDICINE

## 2021-12-25 PROCEDURE — 99231 SBSQ HOSP IP/OBS SF/LOW 25: CPT | Performed by: INTERNAL MEDICINE

## 2021-12-25 PROCEDURE — 250N000013 HC RX MED GY IP 250 OP 250 PS 637: Performed by: STUDENT IN AN ORGANIZED HEALTH CARE EDUCATION/TRAINING PROGRAM

## 2021-12-25 PROCEDURE — 250N000011 HC RX IP 250 OP 636: Performed by: STUDENT IN AN ORGANIZED HEALTH CARE EDUCATION/TRAINING PROGRAM

## 2021-12-25 PROCEDURE — C9113 INJ PANTOPRAZOLE SODIUM, VIA: HCPCS | Performed by: STUDENT IN AN ORGANIZED HEALTH CARE EDUCATION/TRAINING PROGRAM

## 2021-12-25 PROCEDURE — 80053 COMPREHEN METABOLIC PANEL: CPT | Performed by: STUDENT IN AN ORGANIZED HEALTH CARE EDUCATION/TRAINING PROGRAM

## 2021-12-25 PROCEDURE — 120N000001 HC R&B MED SURG/OB

## 2021-12-25 PROCEDURE — 250N000013 HC RX MED GY IP 250 OP 250 PS 637: Performed by: NURSE PRACTITIONER

## 2021-12-25 PROCEDURE — 99231 SBSQ HOSP IP/OBS SF/LOW 25: CPT | Mod: GC | Performed by: STUDENT IN AN ORGANIZED HEALTH CARE EDUCATION/TRAINING PROGRAM

## 2021-12-25 PROCEDURE — 85027 COMPLETE CBC AUTOMATED: CPT | Performed by: STUDENT IN AN ORGANIZED HEALTH CARE EDUCATION/TRAINING PROGRAM

## 2021-12-25 PROCEDURE — 99024 POSTOP FOLLOW-UP VISIT: CPT | Performed by: PHYSICIAN ASSISTANT

## 2021-12-25 PROCEDURE — 250N000009 HC RX 250: Performed by: FAMILY MEDICINE

## 2021-12-25 RX ORDER — QUETIAPINE FUMARATE 100 MG/1
200 TABLET, FILM COATED ORAL AT BEDTIME
Status: DISCONTINUED | OUTPATIENT
Start: 2021-12-25 | End: 2022-02-14 | Stop reason: HOSPADM

## 2021-12-25 RX ORDER — QUETIAPINE FUMARATE 25 MG/1
100 TABLET, FILM COATED ORAL 2 TIMES DAILY PRN
Status: DISCONTINUED | OUTPATIENT
Start: 2021-12-25 | End: 2022-02-14 | Stop reason: HOSPADM

## 2021-12-25 RX ORDER — CALCIUM CARBONATE 500 MG/1
1000 TABLET, CHEWABLE ORAL 2 TIMES DAILY PRN
Status: DISCONTINUED | OUTPATIENT
Start: 2021-12-25 | End: 2022-02-14 | Stop reason: HOSPADM

## 2021-12-25 RX ADMIN — CALCIUM CARBONATE (ANTACID) CHEW TAB 500 MG 1000 MG: 500 CHEW TAB at 11:35

## 2021-12-25 RX ADMIN — OXYCODONE HYDROCHLORIDE AND ACETAMINOPHEN 2 TABLET: 5; 325 TABLET ORAL at 22:30

## 2021-12-25 RX ADMIN — CEFEPIME HYDROCHLORIDE 2 G: 2 INJECTION, POWDER, FOR SOLUTION INTRAVENOUS at 08:26

## 2021-12-25 RX ADMIN — GABAPENTIN 200 MG: 100 CAPSULE ORAL at 16:03

## 2021-12-25 RX ADMIN — FLUCONAZOLE IN SODIUM CHLORIDE 400 MG: 2 INJECTION, SOLUTION INTRAVENOUS at 17:06

## 2021-12-25 RX ADMIN — QUETIAPINE FUMARATE 200 MG: 100 TABLET, FILM COATED ORAL at 22:28

## 2021-12-25 RX ADMIN — OXYCODONE HYDROCHLORIDE AND ACETAMINOPHEN 2 TABLET: 5; 325 TABLET ORAL at 15:40

## 2021-12-25 RX ADMIN — LEVOTHYROXINE SODIUM 25 MCG: 0.03 TABLET ORAL at 06:37

## 2021-12-25 RX ADMIN — PANTOPRAZOLE SODIUM 40 MG: 40 INJECTION, POWDER, FOR SOLUTION INTRAVENOUS at 08:34

## 2021-12-25 RX ADMIN — VANCOMYCIN HYDROCHLORIDE 1000 MG: 1 INJECTION, SOLUTION INTRAVENOUS at 05:14

## 2021-12-25 RX ADMIN — OXYCODONE HYDROCHLORIDE AND ACETAMINOPHEN 2 TABLET: 5; 325 TABLET ORAL at 06:38

## 2021-12-25 RX ADMIN — GABAPENTIN 900 MG: 300 CAPSULE ORAL at 22:28

## 2021-12-25 RX ADMIN — ENOXAPARIN SODIUM 40 MG: 40 INJECTION SUBCUTANEOUS at 08:25

## 2021-12-25 RX ADMIN — CEFEPIME HYDROCHLORIDE 2 G: 2 INJECTION, POWDER, FOR SOLUTION INTRAVENOUS at 00:10

## 2021-12-25 RX ADMIN — CEFEPIME HYDROCHLORIDE 2 G: 2 INJECTION, POWDER, FOR SOLUTION INTRAVENOUS at 16:03

## 2021-12-25 RX ADMIN — GABAPENTIN 200 MG: 100 CAPSULE ORAL at 08:37

## 2021-12-25 RX ADMIN — CALCIUM CARBONATE (ANTACID) CHEW TAB 500 MG 1000 MG: 500 CHEW TAB at 17:06

## 2021-12-25 RX ADMIN — VENLAFAXINE HYDROCHLORIDE 75 MG: 75 CAPSULE, EXTENDED RELEASE ORAL at 08:37

## 2021-12-25 RX ADMIN — POTASSIUM CHLORIDE: 2 INJECTION, SOLUTION, CONCENTRATE INTRAVENOUS at 19:57

## 2021-12-25 ASSESSMENT — ACTIVITIES OF DAILY LIVING (ADL)
ADLS_ACUITY_SCORE: 14
ADLS_ACUITY_SCORE: 12
ADLS_ACUITY_SCORE: 14
ADLS_ACUITY_SCORE: 14
ADLS_ACUITY_SCORE: 12
ADLS_ACUITY_SCORE: 14
ADLS_ACUITY_SCORE: 14
ADLS_ACUITY_SCORE: 12
ADLS_ACUITY_SCORE: 14
ADLS_ACUITY_SCORE: 14
ADLS_ACUITY_SCORE: 12
ADLS_ACUITY_SCORE: 12
ADLS_ACUITY_SCORE: 14
ADLS_ACUITY_SCORE: 14
ADLS_ACUITY_SCORE: 12
ADLS_ACUITY_SCORE: 14
ADLS_ACUITY_SCORE: 12
ADLS_ACUITY_SCORE: 14
ADLS_ACUITY_SCORE: 12
ADLS_ACUITY_SCORE: 14
ADLS_ACUITY_SCORE: 12
ADLS_ACUITY_SCORE: 14

## 2021-12-25 NOTE — PROGRESS NOTES
Primary Medicine Progress Note    Assessment/Plan  Active Problems:    Abdominal pain, generalized      Chema South is a 67 y/o M with past medical history of splenectomy, appendectomy, HTN, and substance abuse, with recent admission to hospital 11/30-12/21/2021 for SBO with perforation s/p surgical repair. Discharged home but presents as he could not manage at home and is now requesting to be placed in TCU vs LTC. Continues on TPN with surgery and ID following while awaiting LTC placement.       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 Required subsequent washout on 12/3 with aggressive IV antibiotic regimen and MARISA drain placement. On 12/8 found to have continued enhancing abscess with drain placed 12/9 and removed on 12/16. Antibiotics stopped on 12/20. Plan for close follow up with surgery and TPN at discharge, with plans to have help from his brother in law. Then returned on 12/22 given unable to care for himself even with brother in law's assistance; requesting LTC/TCU placement.  -PT/OT ordered, appreciate recs on placement  -Plan to begin search for TCU/LTACH placement with care management  -Pain management: Percocet 1-2 tablets q6h PRN, gabapentin   -Consulted surgery to follow while here  -Consulted ID              -Now on vanc/cefepime/fluconazole  -Consult pharmacy and RD for TPN management  -WOC     Depression  Insomnia  Hx of this. Reports symptoms for past few days= low motivation, little interest in doing things, poor sleep, no appetite. Is on buspar, mirtazapine, and effexor, though doesn't really think medicines are as helpful as talk therapy is. Wanted to speak to social work and . States no plan to harm himself. Reports he just needs to leave the  hospital.  -Consult psych for med management and plan for talk therapy while hospitalized  -Discontinued buspar and mirtazapine per psych  -Increased venlafaxine and gabapentin per psych  -Seroquel for sleep     Hyponatremia, improved  Moderate malnutrition  Stable hyponatremia of 134.   -Planning to treat with increased Na in TPN per pharmacy     Normocytic anemia  Stable at 10.8, MCV of 93.     Alk phos elevation  132, mildly increased from 124 on 12/20. Plan to monitor.     Chronic Conditions:  HTN- resume PTA lisinopril, furosemide pending med rec  Hypothyroidism- resume PTA levothyroxine pending med rec  Depression/anxiety- resume PTA venlafaxine, Buspar pending med rec; giving mirtazapine 30 mg tonight for sleep per request  Chronic pain- Hold mexolicam 7.5mg daily, resume PTA gabapentin tonight for sleep, reassess in AM pending med rec  BPH- resume PTA flomax pending med rec    Subjective:   Chema is wondering about his Seroquel being changed and wants to make sure that he gets it tonight, as he has been sleeping really well with it the past 2 nights.  Reports getting 5 hours, which is great for him.  Is calm and  this morning.  Discussed that his fistula is draining quite a bit and maybe he should back off the calorie-containing liquids.  He is on board.    Objective    Vital signs in last 24 hours Temp:  [98.4  F (36.9  C)-99  F (37.2  C)] 98.5  F (36.9  C)  Pulse:  [87-98] 97  Resp:  [18] 18  BP: (107-134)/(64-69) 112/64  SpO2:  [91 %-92 %] 91 %       Weight:   Vitals:    12/21/21 2223 12/22/21 1242   Weight: 78.5 kg (173 lb) 75.5 kg (166 lb 6.4 oz)       Intake/Output last 3 shift I/O last 3 completed shifts:  In: 1896 [P.O.:600]  Out: 1900 [Urine:700; Drains:900; Other:300]    Intake/Output this shift:I/O this shift:  In: -   Out: 700 [Urine:450; Drains:250]    PHYSICAL EXAM  GENERAL APPEARANCE: Cooperative; appears stated age  LUNGS: No respiratory distress  HEART: Brisk cap refill  ABDOMEN: Ostomy  bag draining stool  EXTREMITIES: Grossly normal without deformity, no edema  SKIN: no rashes, skin warm  NEURO: Oriented to time and place    Pertinent Labs and Pertinent Radiology   Lab Results: personally reviewed.     Recent Labs   Lab 12/25/21  0642   WBC 8.5   HGB 10.1*   HCT 31.6*          Recent Labs   Lab 12/25/21  0642   *   CO2 20*   BUN 19   ALBUMIN 2.0*   ALKPHOS 136*   ALT 34   AST 30       Radiology Results:   Results for orders placed or performed during the hospital encounter of 12/21/21   CT Abdomen Pelvis w Contrast    Impression    IMPRESSION:   1.  Improved prerectal fluid collection.  2.  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  3.  Possible right abdominal anastomotic dehiscence with a small amount free fluid and air in the mesentery.  4.  Report called to floor nurse Susy at 4:25 PM       Precepted patient with Dr Gem Mcmahon MD - PGY2  [facility]  P: [phone]      Parts of this note were created with the assistance of voice recognition software.  The note was reviewed for accuracy.  However, errors in spelling, etc may have resulted.

## 2021-12-25 NOTE — PROGRESS NOTES
General Surgery Progress Note:    Hospital Day # 3    ASSESSMENT:   1. Abdominal pain, generalized        Gurdeep Dia is a 66 year old male with readmission for abdominal pain after discharge history ofs/p exploratory laparotomy with small bowel resection, lysis of adhesions and repair of enterotomy with significant inflamed small bowel with areas of thinned mucosa on 11/30/2021  S/p exploratory laparotomy 12/3/2021 for small bowel perforation from ulcer  S/p IR placement of drain into pelvic abscess 12/9/2021. Enterocutaneous fistula formation    PLAN:   -Continue containing drainage through both ostomy bags at his wound.  This is working well.  -TPN to continue  -Stay with clear liquid diet  -Waiting for TCU placement      SUBJECTIVE:   Gurdeep Dia is doing well currently.  He states that he is trying to avoid drinking a lot of fluids.  His pain is minimal.  No concerns or questions today.    Patient Vitals for the past 24 hrs:   BP Temp Temp src Pulse Resp SpO2   12/25/21 0433 111/69 99  F (37.2  C) Oral 88 18 92 %   12/25/21 0017 107/69 98.7  F (37.1  C) Oral 87 18 92 %   12/24/21 1557 134/67 98.4  F (36.9  C) Oral 98 18 92 %       Physical Exam:  General: NAD, pleasant  CV:RRR  LUNGS:CTA bilaterally  ABD: Soft nontender wound has 2 ostomy bags over it.  No change in output  EXT:no CCE    Admission on 12/21/2021   Component Date Value     Sodium 12/22/2021 130*     Potassium 12/22/2021 4.9      Chloride 12/22/2021 101      Carbon Dioxide (CO2) 12/22/2021 21*     Anion Gap 12/22/2021 8      Urea Nitrogen 12/22/2021 22      Creatinine 12/22/2021 0.89      Calcium 12/22/2021 8.8      Glucose 12/22/2021 128*     Alkaline Phosphatase 12/22/2021 132*     AST 12/22/2021 31      ALT 12/22/2021 18      Protein Total 12/22/2021 7.6      Albumin 12/22/2021 2.4*     Bilirubin Total 12/22/2021 0.9      GFR Estimate 12/22/2021 >90      Lipase 12/22/2021 64*     Lactic Acid 12/22/2021 1.2      Magnesium 12/22/2021  2.0      SARS CoV2 PCR 12/22/2021 Negative      WBC Count 12/22/2021 13.8*     RBC Count 12/22/2021 4.03*     Hemoglobin 12/22/2021 12.0*     Hematocrit 12/22/2021 36.1*     MCV 12/22/2021 90      MCH 12/22/2021 29.8      MCHC 12/22/2021 33.2      RDW 12/22/2021 15.1*     Platelet Count 12/22/2021 564*     % Neutrophils 12/22/2021 71      % Lymphocytes 12/22/2021 13      % Monocytes 12/22/2021 11      % Eosinophils 12/22/2021 2      % Basophils 12/22/2021 1      % Metamyelocytes 12/22/2021 2      Absolute Neutrophils 12/22/2021 9.8*     Absolute Lymphocytes 12/22/2021 1.8      Absolute Monocytes 12/22/2021 1.5*     Absolute Eosinophils 12/22/2021 0.3      Absolute Basophils 12/22/2021 0.1      Absolute Metamyelocytes 12/22/2021 0.3*     RBC Morphology 12/22/2021 Confirmed RBC Indices      Platelet Assessment 12/22/2021 Platelets Clumped*     Basophilic Stippling 12/22/2021 Present*     Polychromasia 12/22/2021 Slight*     Reactive Lymphocytes 12/22/2021 Present*     Target Cells 12/22/2021 Slight*     Lactic Acid 12/22/2021 0.8      Culture 12/22/2021 Culture in progress      Culture 12/22/2021 3+ Escherichia coli*     Culture 12/22/2021 3+ Escherichia coli*     Culture 12/22/2021 2+ Candida parapsilosis complex*     Culture 12/22/2021 No growth after 2 days      Culture 12/22/2021 No growth after 2 days      GLUCOSE BY METER POCT 12/22/2021 93      Sodium 12/23/2021 134*     Potassium 12/23/2021 4.3      Chloride 12/23/2021 105      Carbon Dioxide (CO2) 12/23/2021 23      Anion Gap 12/23/2021 6      Urea Nitrogen 12/23/2021 23*     Creatinine 12/23/2021 0.81      Calcium 12/23/2021 7.9*     Glucose 12/23/2021 130*     GFR Estimate 12/23/2021 >90      WBC Count 12/23/2021 8.8      RBC Count 12/23/2021 3.61*     Hemoglobin 12/23/2021 10.9*     Hematocrit 12/23/2021 33.1*     MCV 12/23/2021 92      MCH 12/23/2021 30.2      MCHC 12/23/2021 32.9      RDW 12/23/2021 15.5*     Platelet Count 12/23/2021 453*     GLUCOSE BY  METER POCT 12/23/2021 127*     GLUCOSE BY METER POCT 12/23/2021 145*     GLUCOSE BY METER POCT 12/23/2021 135*     Magnesium 12/24/2021 2.0      Phosphorus 12/24/2021 3.3      Sodium 12/24/2021 134*     Potassium 12/24/2021 4.6      Chloride 12/24/2021 104      Carbon Dioxide (CO2) 12/24/2021 24      Anion Gap 12/24/2021 6      Urea Nitrogen 12/24/2021 21      Creatinine 12/24/2021 0.83      Calcium 12/24/2021 8.4*     Glucose 12/24/2021 122      Alkaline Phosphatase 12/24/2021 113      AST 12/24/2021 21      ALT 12/24/2021 21      Protein Total 12/24/2021 6.6      Albumin 12/24/2021 2.0*     Bilirubin Total 12/24/2021 1.0      GFR Estimate 12/24/2021 >90      Calcium, Ionized pH 7.4 12/24/2021 1.14      pH 12/24/2021 7.38      Calcium, Ionized Measured 12/24/2021 1.15      WBC Count 12/24/2021 8.1      RBC Count 12/24/2021 3.64*     Hemoglobin 12/24/2021 10.8*     Hematocrit 12/24/2021 33.7*     MCV 12/24/2021 93      MCH 12/24/2021 29.7      MCHC 12/24/2021 32.0      RDW 12/24/2021 15.9*     Platelet Count 12/24/2021 402      Vancomycin 12/24/2021 13.2      GLUCOSE BY METER POCT 12/24/2021 143*     GLUCOSE BY METER POCT 12/24/2021 117*     Sodium 12/25/2021 134*     Potassium 12/25/2021 4.3      Chloride 12/25/2021 106      Carbon Dioxide (CO2) 12/25/2021 20*     Anion Gap 12/25/2021 8      Urea Nitrogen 12/25/2021 19      Creatinine 12/25/2021 0.80      Calcium 12/25/2021 8.3*     Glucose 12/25/2021 105      Alkaline Phosphatase 12/25/2021 136*     AST 12/25/2021 30      ALT 12/25/2021 34      Protein Total 12/25/2021 6.6      Albumin 12/25/2021 2.0*     Bilirubin Total 12/25/2021 1.0      GFR Estimate 12/25/2021 >90      WBC Count 12/25/2021 8.5      RBC Count 12/25/2021 3.42*     Hemoglobin 12/25/2021 10.1*     Hematocrit 12/25/2021 31.6*     MCV 12/25/2021 92      MCH 12/25/2021 29.5      MCHC 12/25/2021 32.0      RDW 12/25/2021 15.9*     Platelet Count 12/25/2021 349      GLUCOSE BY METER POCT 12/25/2021 139*      GLUCOSE BY METER POCT 12/25/2021 143*        Demetrius Vogel PA-C

## 2021-12-25 NOTE — PLAN OF CARE
Denies nausea. C/o generalized abdominal pain which has been well controlled with PRN medications.     LS clear. BS active.     TPN running at 70mL/hr.     Call light within reach. Up with standby assist. Able to make needs known.

## 2021-12-25 NOTE — PROGRESS NOTES
[facility]    Infectious Disease Progress Note    Date of Service (when I saw the patient): 12/25/2021     Assessment & Plan   Gurdeep Dia is a 66 year old male who was admitted on 12/21/2021.  Patient new to me 12/23/2021.  Please see previous ID consult note.    1. History of splenectomy and substance abuse, recent admission with small bowel obstruction- improving  2. Status post exploratory laparotomy, small bowel resection/repair of enterotomy, extensive lysis of adhesion on 11/30/2021  3. Postoperative small bowel perforation, status post exploratory laparotomy on 12/3/2021.  4. Intra-abdominal abscess status post drain placement on 12/9/2021.  E. coli in cultures.  Treated with meropenem, IV vancomycin and transition to p.o. cefdinir plus Flagyl on 12/15/2021  5. Abscessogram on 12/16 with resolved abscess, drain removed on 12/16/2021  6. Antibiotics discontinued on 12/20/2021, discharged from the hospital on 12/20/2021 and readmitted due to worsening pain and drainage from incision, cellulitis, EC fistula  7.  E coli and Candida parapsilosis in wound culture from 12/22/2021  8. Leukocytosis improved  9. On TPN  10. PCN allergy- hives    Recommendations:    1. Follow blood cultures x2 sets, in process  2. E coli susceptible to Cefepime.   3. Surgery following, EC fistula draining  4. Stop IV vancomycin on 12/25/2021 as no MRSA isolated  5. Low threshold to add antifungal given patient is on TPN-- Added fluconazole on 12/23/2021   6. Discussed with patient and questions answered. Patient new to me on 12/23/2021. Please see previous ID note.  7. Appreciate input from nursing staff at bedside.  Will chart check on 12/26/2021.  ID will follow on 12/27/2021.  Please call with questions over the weekend    Ladi Salazar MD  [facility]  [phone]        Interval History     Tolerating antibiotics. No new complaints  Drainage from ostomy/gravity bags from  EC fistula on anterior abdominal wall.    Previously:   Doing better, ostomy bag on abdominal wall  Pain improved, nursing assistance at bedside  Patient feels much better.    Physical Exam   Temp: 98.5  F (36.9  C) Temp src: Oral BP: 112/64 Pulse: 97   Resp: 18 SpO2: 91 % O2 Device: None (Room air)    Vitals:    12/21/21 2223 12/22/21 1242   Weight: 78.5 kg (173 lb) 75.5 kg (166 lb 6.4 oz)     Vital Signs with Ranges  Temp:  [98.4  F (36.9  C)-99  F (37.2  C)] 98.5  F (36.9  C)  Pulse:  [87-98] 97  Resp:  [18] 18  BP: (107-134)/(64-69) 112/64  SpO2:  [91 %-92 %] 91 %    Exam on 12/25/2021   Constitutional: No apparent distress  Lungs: normal breathing pattern  Cardiovascular:  No JVD elevation  Abdomen: drainage from EV fistula.  Ostomy bags on abdominal wall- bilious drainage  Skin: draining from abdominal wall seems to have decreased. Mild tenderness resolving  Other:Neuro AOx3, coherent, deconditioned    Medications     dextrose       parenteral nutrition - ADULT compounded formula       parenteral nutrition - ADULT compounded formula 70 mL/hr at 12/24/21 2006       ceFEPIme (MAXIPIME) IV  2 g Intravenous Q8H     enoxaparin ANTICOAGULANT  40 mg Subcutaneous Daily     fluconazole  400 mg Intravenous Q24H     gabapentin  200 mg Oral BID     gabapentin  900 mg Oral At Bedtime     levothyroxine  25 mcg Oral Daily     [START ON 12/27/2021] lipids  250 mL Intravenous Once per day on Mon Tue Wed Thu Fri     pantoprazole (PROTONIX) IV  40 mg Intravenous Daily with breakfast     QUEtiapine  200 mg Oral At Bedtime     sodium chloride (PF)  3 mL Intracatheter Q8H     vancomycin  1,000 mg Intravenous Q12H     venlafaxine  75 mg Oral Daily       Data   All microbiology laboratory data reviewed.  Recent Labs   Lab Test 12/25/21  0642 12/24/21  0717 12/23/21  0715   WBC 8.5 8.1 8.8   HGB 10.1* 10.8* 10.9*   HCT 31.6* 33.7* 33.1*   MCV 92 93 92    402 453*     Recent Labs   Lab Test 12/25/21  0642 12/24/21  0717  12/23/21  0715   CR 0.80 0.83 0.81     MICRO: reviewed      12/22/2021 1326 12/25/2021 0703 Wound Aerobic Bacterial Culture Routine [[id number]]    (Abnormal)   Wound from Abdomen    Final result Component Value   Culture Culture in progress    3+ Escherichia coli Abnormal     3+ Escherichia coli Abnormal     2+ Candida parapsilosis complex Abnormal          Susceptibility     Escherichia coli (1) Escherichia coli (2)     DENA DENA     Ampicillin >=32.0 ug/mL Resistant >=32.0 ug/mL Resistant     Ampicillin/ Sulbactam >=32.0 ug/mL Resistant >=32.0 ug/mL Resistant     Cefepime <=1.0 ug/mL Susceptible <=1.0 ug/mL Susceptible     Ceftazidime <=1.0 ug/mL Susceptible 2.0 ug/mL Susceptible     Ceftriaxone <=1.0 ug/mL Susceptible <=1.0 ug/mL Susceptible     Ciprofloxacin >=4.0 ug/mL Resistant >=4.0 ug/mL Resistant     Gentamicin <=1.0 ug/mL Susceptible <=1.0 ug/mL Susceptible     Levofloxacin >=8.0 ug/mL Resistant >=8.0 ug/mL Resistant     Meropenem <=0.25 ug/mL Susceptible <=0.25 ug/mL Susceptible     Piperacillin/Tazobactam >=128.0 ug/mL Resistant >=128.0 ug/mL Resistant     Tobramycin <=1.0 ug/mL Susceptible <=1.0 ug/mL Susceptible     Trimethoprim/Sulfamethoxazole >16/304 ug/mL Resistant >16/304 ug/mL Resistant                           12/22/2021 1648 12/23/2021 2017 Blood Culture Peripheral Blood [[id number]]   Peripheral Blood    Preliminary result Component Value   Culture No growth after 1 day P              12/22/2021 1648 12/23/2021 2017 Blood Culture Peripheral Blood [[id number]]   Peripheral Blood    Preliminary result Component Value   Culture No growth after 1 day P              12/22/2021 1326 12/24/2021 1412 Wound Aerobic Bacterial Culture Routine [[id number]]   (Abnormal)   Wound from Abdomen    Preliminary result Component Value   Culture Culture in progress P    3+ Gram negative bacilli Abnormal  P    3+ Gram negative bacilli Abnormal  P    2+ Candida parapsilosis complex Abnormal  P               12/22/2021 0018 12/22/2021 0048 Asymptomatic COVID-19 Virus (Coronavirus) by PCR Nasopharyngeal [[id number]]    Swab from Nasopharyngeal    Final result Component Value   SARS CoV2 PCR Negative   NEGATIVE: SARS-CoV-2 (COVID-19) RNA not detected, presumed negative.           12/17/2021 1615 12/17/2021 1844 Asymptomatic COVID-19 Virus (Coronavirus) by PCR Nasopharyngeal [[id number]]    Swab from Nasopharyngeal    Final result Component Value   SARS CoV2 PCR Negative   NEGATIVE: SARS-CoV-2 (COVID-19) RNA not detected, presumed negative.           12/09/2021 1204 12/12/2021 0831 Body fluid, unsp Aerobic Bacterial Culture Routine [[id number]]    (Abnormal)   Body fluid, unsp from Pelvis    Final result Component Value   Culture 2+ Escherichia coli Abnormal          Susceptibility     Escherichia coli     DENA     Ampicillin >=32.0 ug/mL Resistant     Ampicillin/ Sulbactam 16.0 ug/mL Intermediate     Cefepime <=1.0 ug/mL Susceptible     Ceftazidime <=1.0 ug/mL Susceptible     Ceftriaxone <=1.0 ug/mL Susceptible     Ciprofloxacin >=4.0 ug/mL Resistant     Gentamicin <=1.0 ug/mL Susceptible     Levofloxacin >=8.0 ug/mL Resistant     Meropenem <=0.25 ug/mL Susceptible     Piperacillin/Tazobactam <=4.0 ug/mL Susceptible     Tobramycin <=1.0 ug/mL Susceptible     Trimethoprim/Sulfamethoxazole >16/304 ug/mL Resistant                   12/09/2021 1203 12/16/2021 0801 Anaerobic culture [[id number]]   Body fluid, unsp from Pelvis    Final result Component Value   Culture No anaerobic organisms isolated              12/08/2021 1507 12/08/2021 1607 Asymptomatic COVID-19 Virus (Coronavirus) by PCR Nasopharyngeal [[id number]]    Swab from Nasopharyngeal    Final result Component Value   SARS CoV2 PCR Negative   NEGATIVE: SARS-CoV-2 (COVID-19) RNA not detected, presumed negative.           12/06/2021 2140 12/12/2021 1031 Blood Culture Line, venous [[id number]]   Blood from Line, venous    Final result Component Value    Culture No Growth              12/05/2021 1645 12/10/2021 2031 Blood Culture Peripheral Blood [[id number]]   Peripheral Blood    Final result Component Value   Culture No Growth              12/05/2021 1645 12/10/2021 2031 Blood Culture Peripheral Blood [[id number]]    Peripheral Blood    Final result Component Value   Culture No Growth              12/05/2021 1638 12/06/2021 2126 Urine Culture [[id number]]   Urine, Roblero Catheter    Final result Component Value   Culture No Growth              11/30/2021 1153 11/30/2021 1241 Asymptomatic COVID-19 Virus (Coronavirus) by PCR Nasopharyngeal [[id number]]    Swab from Nasopharyngeal    Final result Component Value   SARS CoV2 PCR Negative   NEGATIVE: SARS-CoV-2 (COVID-19) RNA not detected, presumed negative.                 RADIOLOGY:    CT Abd Peritoneum Abscess Drain w Cath Place    Result Date: 12/9/2021  EXAM: 1. PERCUTANEOUS DRAIN PLACEMENT PELVIC ABSCESS 2. CT GUIDANCE 3. CONSCIOUS SEDATION LOCATION: [facility] DATE/TIME: 12/9/2021 9:50 AM INDICATION: s/p two surgeries with elevating wbc, ct with pelvic abscess. TECHNIQUE: Dose reduction techniques were used. PROCEDURE: Informed consent obtained. Site marked. Prior images reviewed. Required items made available. Patient identity confirmed verbally and with arm band. Patient reevaluated immediately before administering sedation. Universal protocol was followed. Time out performed. The site was prepped and draped in sterile fashion. 10 mL of 1% lidocaine was infused into the local soft tissues. Using standard technique and under direct CT guidance, a 10 Georgian drainage catheter was inserted into the fluid collection.  SPECIMEN: 30 mL of brownish thin fluid was aspirated and sent to lab for cultures and Gram stain. BLOOD LOSS: Minimal. The patient tolerated the procedure well. No immediate complications. SEDATION: Versed 2.0 mg. Fentanyl 100 mcg. The procedure was performed with  administration intravenous conscious sedation with appropriate preoperative, intraoperative, and postoperative evaluation. 30 minutes of supervised face to face conscious sedation time was provided by a radiology nurse under my direct supervision.     IMPRESSION: 1.  Successful CT-guided drain placement into pelvic abscess. 30 mL brownish thin fluid removed and sent for cultures. Reference CPT Codes: 92231, 98313, 71355    Echocardiogram Complete    Result Date: 12/10/2021  [id number]^CHRISTOPHER^FLOWER^[place] [address]  Name: ISABELA DUNN MRN: [id number] : 1955 Study Date: 12/10/2021 01:14 PM Age: 66 yrs Gender: Male Patient Location: [facility] Reason For Study: [place] Ordering Physician: FLOWER WHITE Performed By: ALEX  BSA: 1.8 m2 Height: 65 in Weight: 155 lb HR: 84 ______________________________________________________________________________ Procedure Complete Echo Adult. Adequate quality two-dimensional was performed and interpreted. ______________________________________________________________________________ Interpretation Summary  The left ventricle is normal in size with borderline concentric left ventricular hypertrophy. Left ventricular function is normal.The ejection fraction is 60-65%. Normal right ventricle size and systolic function. No significant valve disease is identified.  There is no comparison study available. ______________________________________________________________________________ I      WMSI = 1.00     % Normal = 100  X - Cannot   0 -                      (2) - Mildly 2 -          Segments  Size Interpret    Hyperkinetic 1 - Normal  Hypokinetic  Hypokinetic  1-2     small                                                    7 -          3-5    moderate 3 - Akinetic 4 -          5 -         6 - Akinetic Dyskinetic   6-14    large              Dyskinetic   Aneurysmal  w/scar       w/scar       15-16   diffuse   Left Ventricle The left ventricle is normal in size. Left ventricular function is normal.The ejection fraction is 60-65%. There is borderline concentric left ventricular hypertrophy. Left ventricular diastolic function is normal. No regional wall motion abnormalities noted.  Right Ventricle Normal right ventricle size and systolic function.  Atria Normal left atrial size. Right atrial size is normal. There is no color Doppler evidence of an atrial shunt.  Mitral Valve Mitral valve leaflets appear normal. There is no evidence of mitral stenosis or clinically significant mitral regurgitation.  Tricuspid Valve Tricuspid valve leaflets appear normal. There is no evidence of tricuspid stenosis or clinically significant tricuspid regurgitation. Right ventricle systolic pressure estimate normal. The right ventricular systolic pressure is approximated at 18.7 mmHg plus the right atrial pressure.  Aortic Valve The aortic valve is trileaflet. Aortic valve leaflets appear normal. There is no evidence of aortic stenosis or clinically significant aortic regurgitation. There is trace aortic regurgitation.  Pulmonic Valve The pulmonic valve is not well seen, but is grossly normal. This degree of valvular regurgitation is within normal limits. There is trace pulmonic valvular regurgitation.  Vessels The aorta root is normal. The ascending aorta is Mildly dilated. IVC diameter <2.1 cm collapsing >50% with sniff suggests a normal RA pressure of 3 mmHg.  Pericardium There is no pericardial effusion.  Rhythm Sinus rhythm was noted.  ______________________________________________________________________________ MMode/2D Measurements & Calculations IVSd: 1.3 cm LVIDd: 5.0 cm LVIDs: 3.2 cm LVPWd: 0.86 cm  FS: 36.6 % LV mass(C)d: 201.1 grams LV mass(C)dI: 113.3 grams/m2 Ao root diam: 3.9 cm LA dimension: 4.1 cm asc Aorta Diam: 3.8 cm LA/Ao: 1.0 LVOT diam: 2.4 cm LVOT area: 4.6 cm2 LA Volume Indexed (AL/bp): 29.6 ml/m2 RWT: 0.34  Doppler  Measurements & Calculations MV E max gary: 80.4 cm/sec MV A max gary: 62.9 cm/sec MV E/A: 1.3  MV dec slope: 292.8 cm/sec2 MV dec time: 0.27 sec Ao V2 max: 178.1 cm/sec Ao max P.0 mmHg Ao V2 mean: 130.5 cm/sec Ao mean P.9 mmHg Ao V2 VTI: 29.4 cm SUMAN(I,D): 3.2 cm2 SUMAN(V,D): 3.1 cm2 LV V1 max P.5 mmHg LV V1 max: 117.6 cm/sec LV V1 VTI: 20.2 cm SV(LVOT): 93.4 ml SI(LVOT): 52.6 ml/m2 PA acc time: 0.12 sec TR max gary: 216.1 cm/sec TR max P.7 mmHg AV Gary Ratio (DI): 0.66 SUMAN Index (cm2/m2): 1.8 E/E' av.3 Lateral E/e': 7.9 Medial E/e': 16.6  ______________________________________________________________________________ Report approved by: Hany Currie 12/10/2021 02:23 PM       XR Chest Port 1 View    Result Date: 2021  EXAM: XR CHEST PORT 1 VIEW LOCATION: [facility] DATE/TIME: 2021 1:39 PM INDICATION: Worsening hypoxia, evaluate for pneumonia, pulm edema COMPARISON: Chest x-ray 2021     IMPRESSION: Enteric suction tube tip and side-port within the upper gastric lumen. Satisfactory right PICC line position with the tip near the cavoatrial junction. Persistent low lung volumes. No definite pneumothorax. Mildly improved bibasilar pulmonary  opacities favored to reflect atelectasis. Persistent interstitial coarsening. Suspected trace right-sided pleural fluid. Stable heart size and central vascular prominence.    XR Chest Port 1 View    Result Date: 12/3/2021  EXAM: XR CHEST PORT 1 VIEW LOCATION: [facility] DATE/TIME: 12/3/2021 8:31 PM INDICATION: RN placed PICC - verify tip placement COMPARISON: None.     IMPRESSION: Right PICC line present with its tip likely just into the right atrium. Suggest withdrawing the PICC line 2 cm to place the tip in the low SVC. NG tube in good position in the stomach. Mild cardiomegaly. Low lung volumes with mild patchy mixed interstitial and airspace infiltrates    IR Abscess Tube Check    Result  Date: 12/16/2021  LOCATION: [facility] DATE: 12/16/2021 PROCEDURE: ABSCESS TUBE CHECK INTERVENTIONAL RADIOLOGIST: Demario Porter MD INDICATION: Pelvic abscess. Status post jugular drain placement on 12/9/2021. Markedly decreased outputs. CT from today demonstrating near complete resolution of the abscess cavity.. CONSENT: The procedure, risks and benefits of an abscessogram were discussed with the patient  in detail. All questions were answered. Informed consent was given to proceed with the procedure. MODERATE SEDATION: None. CONTRAST: Omnipaque 350: 2 mL. ANTIBIOTICS: None. ADDITIONAL MEDICATIONS: None. FLUOROSCOPIC TIME: 0.1 minutes RADIATION DOSE: Air Kerma: 26 mGy COMPLICATIONS: No immediate complications. PROCEDURE:  images were obtained. The existing drainage catheter was injected with contrast, and an image obtained. After reviewing the images, the catheter was removed without difficulty. FINDINGS: Contracted cavity. Almost immediate pericatheter leakage.     IMPRESSION: 1.  Resolved pelvic fluid collection. Transgluteal drain removed.     CT Abdomen Pelvis w Contrast    Result Date: 12/22/2021  EXAM: CT ABDOMEN PELVIS W CONTRAST LOCATION: [facility] DATE/TIME: 12/22/2021 2:58 PM INDICATION: Exploratory laparotomy with adhesion lysis and repair of small bowel enterotomy 11/30/2021. Abdominal abscess drain placement 12/09/2021. COMPARISON: CT 12/16/2021, 12/08/2021 TECHNIQUE: CT scan of the abdomen and pelvis was performed following injection of IV contrast. Multiplanar reformats were obtained. Dose reduction techniques were used. CONTRAST: isovue 370 100ml FINDINGS: LOWER CHEST: Right basilar consolidation could represent atelectasis or pneumonia, similar to previous. HEPATOBILIARY: Stable hepatic cysts. Stable prominent gallbladder without definite wall thickening. PANCREAS: Normal. SPLEEN: Splenectomy with several left upper quadrant postsplenectomy  implants. ADRENAL GLANDS: Normal. KIDNEYS/BLADDER: Several right renal cysts which do not require further imaging. Left renal 9 and 4 mm nonobstructive stones. Several tiny left renal cysts. No further imaging recommended. BOWEL: A loop of small bowel appears to communicate with the left anterior abdominal wall (series 3, image 105). The abdominal wall contains air. 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 There is a right lateral abdominal staple line suggesting an ilio transverse colostomy. The right lower quadrant mesentery inferior to this contains a small amount of nonlocalized fluid and air. Also, the surgical staple line appears scattered suggesting  anastomotic breakdown. The prerectal drainage catheter has been removed. Small residual 16 x 12 mm fluid collection (image 143). LYMPH NODES: Several small mesenteric nodes are likely reactive. VASCULATURE: Patent mesenteric vessels. PELVIC ORGANS: Normal. MUSCULOSKELETAL: Severe degenerative change at the L4-L5 level with grade 2 anterolisthesis.     IMPRESSION: 1.  Improved prerectal fluid collection. 2.  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 3.  Possible right abdominal anastomotic dehiscence with a small amount free fluid and air in the mesentery. 4.  Report called to floor nurse Susy at 4:25 PM    CT Abdomen Pelvis w Contrast    Addendum Date: 12/16/2021    Discussed with Dr. Richardson by Dr. Fahrner over telephone at 11:41 AM.    Result Date: 12/16/2021  EXAM: CT ABDOMEN PELVIS W CONTRAST LOCATION: [facility] DATE/TIME: 12/16/2021 3:00 AM INDICATION: Intrabdominal abscess COMPARISON: CT 12/8/2021 and 12/9/2021 TECHNIQUE: CT scan of the abdomen and pelvis was performed following injection of IV contrast. Multiplanar reformats were obtained. Dose reduction techniques were used. CONTRAST: IsoVue 370 100mL FINDINGS: LOWER CHEST: Normal. HEPATOBILIARY: Normal. PANCREAS: Normal. SPLEEN:  Splenules are present. ADRENAL GLANDS: A 10 mm area of fullness in the left adrenal gland has not changed. KIDNEYS/BLADDER: Again seen are nonobstructing left renal calculi. There are simple cysts in the right kidney which do not require follow-up. Smaller renal lesions are too small to characterize. BOWEL: There is surgical change in the right lower quadrant. There is distal small bowel wall thickening in this region. The small bowel wall is not fully visualized, and there is a small amount of extraluminal fluid and air in this region (series 3 images 93 and 96). There is new free air in the right lower quadrant mesentery (series 3 image 105). LYMPH NODES: Normal. VASCULATURE: Atherosclerotic disease. PELVIC ORGANS: Normal. OTHER: A fluid collection adjacent to the rectum is 1.9 x 1.5 x 1.5 cm (approximately 2.2 mL). Previously the collection was 4.4 x 3.6 x 4.4 cm. A pigtail catheter terminates within this collection. MUSCULOSKELETAL: Surgical change along the ventral abdominal and pelvic wall. There is a 20.8 x 3.2 x 1.2 cm fluid and air collection in the midline ventral abdominal and pelvic wall just deep to the staple line. Right lower quadrant and left midabdomen approach Larry drains are present.     IMPRESSION: 1.  The pelvic fluid collection is significantly smaller. A pigtail catheter is in place. 2.  Increased free air in the mesentery in the right lower quadrant, suggesting suture line dehiscence. There is also a suspected distal small bowel defect with adjacent free air and fluid. 3.  New fluid collection in the subcutaneous tissues of the ventral abdominal and pelvic wall, just deep to the staple line. Recommend drainage.     CT Abdomen Pelvis w Contrast    Result Date: 12/8/2021  EXAM: CT ABDOMEN PELVIS W CONTRAST LOCATION: [facility] DATE/TIME: 12/8/2021 1:40 PM INDICATION: Abdominal abscess/infection suspected, status post laparotomy with repair of small bowel perforation,  recent small bowel resection COMPARISON: CT 11/30/2021 TECHNIQUE: CT scan of the abdomen and pelvis was performed following injection of IV contrast. Multiplanar reformats were obtained. Dose reduction techniques were used. CONTRAST: 100 mL Isovue-370 FINDINGS: LOWER CHEST: Mild bilateral lower lobar dependent consolidation and trace right-sided pleural effusion. HEPATOBILIARY: Prominent gallbladder likely reflecting recent fasting. Stable mild biliary ductal dilatation. No obstructing mass lesion or radiodense stone. Stable small low-density hepatic lesions, likely cysts. PANCREAS: Normal. SPLEEN: Surgically absent. ADRENAL GLANDS: Normal. KIDNEYS/BLADDER: Stable nonobstructing lower left renal stones with a dominant stone measuring 4 mm. No hydronephrosis. Unremarkable urinary bladder. BOWEL: Enteric suction tube with the tip located within the mid gastric lumen. Mild to moderately distended segments of mid small bowel with associated air-fluid levels. No discrete transition is identified. Right lower quadrant anastomotic changes. Small amount of nonloculated fluid and extraluminal air within the mid abdomen for example image 50 series 400.2 and image 97 series 3. Questionable adjacent small bowel defect image 50 series 102. Small amount of nonloculated fluid is also noted within the right lower quadrant. Newly visualized rim-enhancing pelvic fluid collection measuring 4.4 x 3.6 x 4.4 cm. Trace ascites. Midline laparotomy incision with a small amount of fluid and air within the wound. Surgical drain tip is located at the upper aspect of this wound. Additional surgical drain with the tip located within the left lower quadrant. Third surgical drain with the tip located in the lower anterior abdomen. Diffuse mesenteric edema. LYMPH NODES: Normal. VASCULATURE: Mild atherosclerosis. PELVIC ORGANS: Normal. MUSCULOSKELETAL: Bilateral L4 pars defects with grade 1 anterolisthesis at L4-L5. Associated severe discogenic  degenerative changes.     IMPRESSION: 1.  Newly visualized 4.6 cm rim-enhancing fluid collection within the pelvis. 2.  Small ill-defined irregular pocket of fluid and extraluminal air within the mid abdomen as described above. It is uncertain whether this is related to sequelae of recent surgery or bowel leakage. Recommend short interval follow-up CT with positive enteric contrast. 3.  Mild to moderate mid small bowel distention. This is favored to reflect an ileus rather than obstruction. 4.  Trace ascites and diffuse mesenteric edema. 5.  Mild bilateral lower lobar consolidation and trace right-sided pleural effusion. [Critical Result: Pulmonary fluid collection and extraluminal loculated air-fluid within the mid abdomen as detailed above.] Finding was identified on 12/8/2021 2:10 PM. Dr. Richardson was contacted by me on 12/8/2021 2:50 PM and verbalized understanding of the critical result.     CT Abdomen Pelvis w Contrast    Result Date: 11/30/2021  EXAM: CT ABDOMEN PELVIS W CONTRAST LOCATION: [facility] DATE/TIME: 11/30/2021 10:48 AM INDICATION: Abdominal distension COMPARISON: None. TECHNIQUE: CT scan of the abdomen and pelvis was performed following injection of IV contrast. Multiplanar reformats were obtained. Dose reduction techniques were used. CONTRAST: IsoVue 370 100mL FINDINGS: LOWER CHEST: Bibasilar atelectasis. HEPATOBILIARY: Scattered small water density foci in the liver consistent with cysts. No radiopaque gallstone. No suspicious hepatic lesions. PANCREAS: Coarse calcification in the pancreatic tail and body may represent sequelae of chronic pancreatitis no acute pancreatitis or abnormal pancreatic mass. SPLEEN: Not visualized consistent with prior splenectomy. ADRENAL GLANDS: Normal. KIDNEYS/BLADDER: No hydronephrosis or masses. No bladder stone or mass. BOWEL: Anastomotic staples in the colon in the right mid abdomen. There is normal caliber duodenum and proximal jejunum  "with dilated loops of distal jejunum and ileum with some fecal i-STAT ileal contents and a transition in the right mid to lower abdomen. Findings are consistent with mechanical small bowel obstruction and this may represent a closed loop obstruction such as internal hernia or multiple adhesions given the lack of dilatation of the proximal small bowel. No pneumatosis intestinalis,  free intraperitoneal air or abscess. LYMPH NODES: No lymphadenopathy. VASCULATURE: Mild aortoiliac calcification without aneurysm. PELVIC ORGANS: Mild prostatic enlargement. MUSCULOSKELETAL: Disc space narrowing at L4-L5 with disc degeneration and bilateral spondylolysis at L4 with grade 2 spondylolisthesis of L4 on L5. No acute fracture.     IMPRESSION: 1.  Mechanical small bowel obstruction with dilated distal jejunum and ileum with caliber changes in the right lower quadrant and in the left midabdomen this may be secondary to adhesions or internal hernia and has the appearance of a closed loop obstruction. No pneumatosis intestinalis, free intraperitoneal air or abscess. 2.  Results were called to Dr. Sanford the time of dictation. NOTE: ABNORMAL REPORT THE DICTATION ABOVE DESCRIBES AN ABNORMALITY FOR WHICH FOLLOW-UP IS NEEDED.     POC US Guidance Needle Placement    Result Date: 11/30/2021  Ultrasound was performed as guidance to an anesthesia procedure.  Click \"PACS images\" hyperlink below to view any stored images.  For specific procedure details, view procedure note authored by anesthesia.    XR Video Swallow with SLP or OT    Result Date: 11/24/2021  EXAM: XR VIDEO SWALLOW WITH SLP OR OT LOCATION: [facility] DATE/TIME: 11/24/2021 9:18 AM INDICATION: Difficulty swallowing. COMPARISON: Same day esophagram.; CT for lung cancer screening 03/09/2020 TECHNIQUE: Routine swallow study with speech pathology using multiple barium thicknesses. FINDINGS: FLUOROSCOPIC TIME: 0.2 minutes NUMBER OF IMAGES: 2 videofluoroscopic " "imaging series Swallow study with Speech Pathology using multiple barium thicknesses. Patient is edentulous. Trials of thin consistency and barium coated cracker were performed. No delay in initiation of the oral phase. Normal epiglottic inversion. No penetration or aspiration observed. Moderate to severe cervical spondylosis with anterior wedging, disc space narrowing and osteophytosis of C4, C5, and C6. Straightening and reversal of normal thoracic lordosis.     IMPRESSION: No penetration or aspiration. Please see separately dictated report from speech pathology for additional description, analysis, and management recommendations.     XR Esophagram    Result Date: 11/24/2021  EXAM: XR ESOPHAGRAM LOCATION: [facility] DATE/TIME: 11/24/2021 9:18 AM INDICATION: 67 yo M with dysphagia/dyspnea - cough. Feels food stuck in throat with swallow. Occ. chocking. Needs to swallow lot of liquids. Constant runny nose & nasal congestion. COMPARISON: Same day swallow study; CT of the chest without contrast 03/09/2020 TECHNIQUE: Routine. FINDINGS: FLUOROSCOPIC TIME: 1.2 minutes NUMBER OF IMAGES: 4 videofluoroscopic imaging series and additional 41 images. ESOPHAGUS: Patulous esophagus with diffusely abnormal peristalsis. Large number of tertiary contractions present with delayed esophageal emptying. No esophageal stricture, diverticula or mass. No hiatal hernia. There is a large amount of retained contrast within the esophagus after consumption of contrast in the RPO position in transitioning to the supine position. This retained contrast moves to and fro within the esophagus. During the transition from RPO to supine position the patient felt a \"tickle\" in the back of his throat prompting coughing, while not observed likely relates to reflux of contrast from the upper esophagus into the pharynx. No gastroesophageal reflux elicited in the recumbent position with Valsalva maneuver.     IMPRESSION: Moderately " severe esophageal dysmotility (presbyesophagus).

## 2021-12-25 NOTE — PHARMACY-CONSULT NOTE
"Pharmacy Note: Parenteral Nutrition (PN) Management    Pharmacist consulted to dose PN for Gurdeep Dia, a 66 year old male by Dr. Mcmahon.    Subjective:    The patient was started on PN in the hospital on 12/5/21.    Indication for PN therapy: bowel resection -- plan to continue TPN until fistula heals.    Social History     Tobacco Use     Smoking status: Current Every Day Smoker     Smokeless tobacco: Never Used   Substance Use Topics     Alcohol use: Not Currently     Comment: Clean for 5 years     Drug use: Not Currently     Objective:    Ht Readings from Last 1 Encounters:   12/22/21 1.702 m (5' 7\")     Wt Readings from Last 1 Encounters:   12/22/21 75.5 kg (166 lb 6.4 oz)       Body mass index is 26.06 kg/m .    Patient Vitals for the past 96 hrs:   Weight   12/22/21 1242 75.5 kg (166 lb 6.4 oz)   12/21/21 2223 78.5 kg (173 lb)       Labs:  Last 3 days:  Recent Labs     12/21/21  0644 12/22/21  0003 12/23/21  0715   * 130* 134*   POTASSIUM 4.6 4.9 4.3   CHLORIDE 101 101 105   CO2 22 21* 23   BUN 21 22 23*   CR 0.83 0.89 0.81   VIJAYA 8.7 8.8 7.9*   MAG 2.0 2.0  --    PHOS 3.2  --   --    PROTTOTAL  --  7.6  --    ALBUMIN  --  2.4*  --    HGB 11.5* 12.0* 10.9*   HCT 35.1* 36.1* 33.1*   * 564* 453*   BILITOTAL  --  0.9  --    AST  --  31  --    ALT  --  18  --    ALKPHOS  --  132*  --        Glucose (past 48 hours):   Recent Labs     12/22/21  0003 12/22/21  1944 12/23/21  0047 12/23/21  0548 12/23/21  0715   * 93 127* 145* 130*       Intake/Output (last 24 hours): I/O last 3 completed shifts:  In: 1896 [P.O.:600]  Out: 1900 [Urine:700; Drains:900; Other:300]    Estimated CrCl: Estimated Creatinine Clearance: 95.7 mL/min (based on SCr of 0.8 mg/dL).    Assessment:    Continue patient on PN therapy as a continuous central therapy.     Given the patient's current condition/oral intake, PN is still indicated.    Lab results reviewed: 12/25/21    Plan:  1. Rate of PN: 70 mL/hr  2. Formula: "     Amino Acids 115 grams    Dextrose 400 grams    Sodium 110 mEq/day    Potassium 80 mEq/day    Calcium 8 mEq/day    Magnesium 14 mEq/day    Phosphorus 32 mMol/day    Chloride: Acetate Ratio 1:1    Standard Multivitamins w/Vitamin K    Trace Elements  3. Fat Emulsion: 20%, 250 mL IV five times weekly.  4. Check TPN labs per protocol.  5. Pharmacist will continue to follow the patient's lab results, clinical status and blood glucose results and make adjustments as appropriate.    Thank you for the consult.  Savanna Braun RPH  12/25/2021 8:48 AM

## 2021-12-25 NOTE — PLAN OF CARE
Problem: Pain (Surgery Nonspecified)  Goal: Acceptable Pain Control  Outcome: No Change     Problem: Infection (Surgery Nonspecified)  Goal: Absence of Infection Signs and Symptoms  Outcome: No Change     Pt slept well overnight. VSS. ID and surgery following. Upper fistula ostomy bag with small amount of bile like output. Lower ostomy bag with 300 ml brown, green output. Received prn percocet at hs and has stated pain is minimal overnight. Bloodsugar 137. TPN and lipids infusing. Iv abx infusing. Tolerating clear liquid diet.

## 2021-12-25 NOTE — PROGRESS NOTES
CLINICAL NUTRITION SERVICES  -  NOTE      RECOMMENDATIONS FOR MD/PROVIDER TO ORDER:        Recommendations Ordered by Registered Dietitian (RD):     Continue:  Custom TPN D400/ @ 70ml/hr w/ 250ml 20% lipids 5x/week to provide 2177kcals, 115g protein, 400g CHO, 36g fat, 1680ml fluid     Future/Additional Recommendations:     Continue same formula 12/26/21   Malnutrition:   Moderate in the context of acute illness/injury

## 2021-12-26 LAB
ALBUMIN SERPL-MCNC: 2 G/DL (ref 3.5–5)
ALP SERPL-CCNC: 136 U/L (ref 45–120)
ALT SERPL W P-5'-P-CCNC: 33 U/L (ref 0–45)
ANION GAP SERPL CALCULATED.3IONS-SCNC: 7 MMOL/L (ref 5–18)
AST SERPL W P-5'-P-CCNC: 24 U/L (ref 0–40)
BILIRUB SERPL-MCNC: 1.2 MG/DL (ref 0–1)
BUN SERPL-MCNC: 20 MG/DL (ref 8–22)
CALCIUM SERPL-MCNC: 8.5 MG/DL (ref 8.5–10.5)
CHLORIDE BLD-SCNC: 106 MMOL/L (ref 98–107)
CO2 SERPL-SCNC: 24 MMOL/L (ref 22–31)
CREAT SERPL-MCNC: 0.82 MG/DL (ref 0.7–1.3)
ERYTHROCYTE [DISTWIDTH] IN BLOOD BY AUTOMATED COUNT: 16.2 % (ref 10–15)
GFR SERPL CREATININE-BSD FRML MDRD: >90 ML/MIN/1.73M2
GLUCOSE BLD-MCNC: 88 MG/DL (ref 70–125)
GLUCOSE BLDC GLUCOMTR-MCNC: 123 MG/DL (ref 70–99)
HCT VFR BLD AUTO: 33.8 % (ref 40–53)
HGB BLD-MCNC: 10.5 G/DL (ref 13.3–17.7)
MCH RBC QN AUTO: 29.1 PG (ref 26.5–33)
MCHC RBC AUTO-ENTMCNC: 31.1 G/DL (ref 31.5–36.5)
MCV RBC AUTO: 94 FL (ref 78–100)
PLATELET # BLD AUTO: 352 10E3/UL (ref 150–450)
POTASSIUM BLD-SCNC: 4.8 MMOL/L (ref 3.5–5)
PROT SERPL-MCNC: 6.7 G/DL (ref 6–8)
RBC # BLD AUTO: 3.61 10E6/UL (ref 4.4–5.9)
SODIUM SERPL-SCNC: 137 MMOL/L (ref 136–145)
WBC # BLD AUTO: 7.6 10E3/UL (ref 4–11)

## 2021-12-26 PROCEDURE — 250N000011 HC RX IP 250 OP 636: Performed by: STUDENT IN AN ORGANIZED HEALTH CARE EDUCATION/TRAINING PROGRAM

## 2021-12-26 PROCEDURE — 82040 ASSAY OF SERUM ALBUMIN: CPT | Performed by: STUDENT IN AN ORGANIZED HEALTH CARE EDUCATION/TRAINING PROGRAM

## 2021-12-26 PROCEDURE — 80053 COMPREHEN METABOLIC PANEL: CPT | Performed by: STUDENT IN AN ORGANIZED HEALTH CARE EDUCATION/TRAINING PROGRAM

## 2021-12-26 PROCEDURE — 250N000013 HC RX MED GY IP 250 OP 250 PS 637: Performed by: STUDENT IN AN ORGANIZED HEALTH CARE EDUCATION/TRAINING PROGRAM

## 2021-12-26 PROCEDURE — 250N000009 HC RX 250: Performed by: FAMILY MEDICINE

## 2021-12-26 PROCEDURE — 250N000013 HC RX MED GY IP 250 OP 250 PS 637: Performed by: NURSE PRACTITIONER

## 2021-12-26 PROCEDURE — 250N000011 HC RX IP 250 OP 636: Performed by: INTERNAL MEDICINE

## 2021-12-26 PROCEDURE — 85027 COMPLETE CBC AUTOMATED: CPT | Performed by: STUDENT IN AN ORGANIZED HEALTH CARE EDUCATION/TRAINING PROGRAM

## 2021-12-26 PROCEDURE — 99024 POSTOP FOLLOW-UP VISIT: CPT | Performed by: SURGERY

## 2021-12-26 PROCEDURE — 99231 SBSQ HOSP IP/OBS SF/LOW 25: CPT | Mod: GC | Performed by: STUDENT IN AN ORGANIZED HEALTH CARE EDUCATION/TRAINING PROGRAM

## 2021-12-26 PROCEDURE — C9113 INJ PANTOPRAZOLE SODIUM, VIA: HCPCS | Performed by: STUDENT IN AN ORGANIZED HEALTH CARE EDUCATION/TRAINING PROGRAM

## 2021-12-26 PROCEDURE — 258N000001 HC RX 258: Performed by: STUDENT IN AN ORGANIZED HEALTH CARE EDUCATION/TRAINING PROGRAM

## 2021-12-26 PROCEDURE — 120N000001 HC R&B MED SURG/OB

## 2021-12-26 RX ORDER — ACETAMINOPHEN 325 MG/1
325 TABLET ORAL ONCE
Status: COMPLETED | OUTPATIENT
Start: 2021-12-27 | End: 2021-12-27

## 2021-12-26 RX ADMIN — ALTEPLASE 2 MG: 2.2 INJECTION, POWDER, LYOPHILIZED, FOR SOLUTION INTRAVENOUS at 18:17

## 2021-12-26 RX ADMIN — OXYCODONE HYDROCHLORIDE AND ACETAMINOPHEN 1 TABLET: 5; 325 TABLET ORAL at 20:56

## 2021-12-26 RX ADMIN — CEFEPIME HYDROCHLORIDE 2 G: 2 INJECTION, POWDER, FOR SOLUTION INTRAVENOUS at 00:10

## 2021-12-26 RX ADMIN — VENLAFAXINE HYDROCHLORIDE 75 MG: 75 CAPSULE, EXTENDED RELEASE ORAL at 10:00

## 2021-12-26 RX ADMIN — OXYCODONE HYDROCHLORIDE AND ACETAMINOPHEN 2 TABLET: 5; 325 TABLET ORAL at 14:24

## 2021-12-26 RX ADMIN — POTASSIUM CHLORIDE: 2 INJECTION, SOLUTION, CONCENTRATE INTRAVENOUS at 21:09

## 2021-12-26 RX ADMIN — GABAPENTIN 900 MG: 300 CAPSULE ORAL at 22:57

## 2021-12-26 RX ADMIN — DEXTROSE MONOHYDRATE: 100 INJECTION, SOLUTION INTRAVENOUS at 16:45

## 2021-12-26 RX ADMIN — QUETIAPINE FUMARATE 100 MG: 100 TABLET, FILM COATED ORAL at 22:57

## 2021-12-26 RX ADMIN — GABAPENTIN 200 MG: 100 CAPSULE ORAL at 10:01

## 2021-12-26 RX ADMIN — OXYCODONE HYDROCHLORIDE AND ACETAMINOPHEN 2 TABLET: 5; 325 TABLET ORAL at 06:42

## 2021-12-26 RX ADMIN — GABAPENTIN 200 MG: 100 CAPSULE ORAL at 16:46

## 2021-12-26 RX ADMIN — PANTOPRAZOLE SODIUM 40 MG: 40 INJECTION, POWDER, FOR SOLUTION INTRAVENOUS at 09:55

## 2021-12-26 RX ADMIN — FLUCONAZOLE IN SODIUM CHLORIDE 400 MG: 2 INJECTION, SOLUTION INTRAVENOUS at 19:02

## 2021-12-26 RX ADMIN — ENOXAPARIN SODIUM 40 MG: 40 INJECTION SUBCUTANEOUS at 10:02

## 2021-12-26 RX ADMIN — CEFEPIME HYDROCHLORIDE 2 G: 2 INJECTION, POWDER, FOR SOLUTION INTRAVENOUS at 16:46

## 2021-12-26 RX ADMIN — CEFEPIME HYDROCHLORIDE 2 G: 2 INJECTION, POWDER, FOR SOLUTION INTRAVENOUS at 10:06

## 2021-12-26 RX ADMIN — LEVOTHYROXINE SODIUM 25 MCG: 0.03 TABLET ORAL at 06:42

## 2021-12-26 ASSESSMENT — ACTIVITIES OF DAILY LIVING (ADL)
ADLS_ACUITY_SCORE: 14
ADLS_ACUITY_SCORE: 9
ADLS_ACUITY_SCORE: 9
ADLS_ACUITY_SCORE: 12
ADLS_ACUITY_SCORE: 14
ADLS_ACUITY_SCORE: 9
ADLS_ACUITY_SCORE: 12
ADLS_ACUITY_SCORE: 9
ADLS_ACUITY_SCORE: 12
ADLS_ACUITY_SCORE: 9
ADLS_ACUITY_SCORE: 9
ADLS_ACUITY_SCORE: 12
ADLS_ACUITY_SCORE: 14
ADLS_ACUITY_SCORE: 12
ADLS_ACUITY_SCORE: 12
ADLS_ACUITY_SCORE: 14
ADLS_ACUITY_SCORE: 12
ADLS_ACUITY_SCORE: 14
ADLS_ACUITY_SCORE: 12
ADLS_ACUITY_SCORE: 14
ADLS_ACUITY_SCORE: 12
ADLS_ACUITY_SCORE: 14
ADLS_ACUITY_SCORE: 12
ADLS_ACUITY_SCORE: 9

## 2021-12-26 NOTE — PLAN OF CARE
Problem: Infection (Surgery Nonspecified)  Goal: Absence of Infection Signs and Symptoms  Outcome: Improving   VS have been stable; pt afebrile. Abdominal fistulas are draining bile colored o/p in to the ostomy appliances.  The lower fistula is putting out more than the superior fistula.       Problem: Pain (Surgery Nonspecified)  Goal: Acceptable Pain Control  Outcome: Improving  Intervention: Prevent or Manage Pain  Recent Flowsheet Documentation  Taken 12/26/2021 0012 by Cortney Virgen RN  Pain Management Interventions:   rest   declines   Received percocet for generalized body aches.     PICC line is infusing, but has sluggish blood return.  TPN infusing at 70 hr; pt tolerating a clears diet.

## 2021-12-26 NOTE — PROGRESS NOTES
General Surgery Progress Note  Hospital Day # 4    Subjective:   Pt is feeling much better his abdominal pain is significantly less he is more mobile and he is greatly relieved that he is now able to get a reasonable night sleep.  He has appreciated some increased drainage from his ostomy bags    Vitals:    12/25/21 1518 12/25/21 1919 12/25/21 2348 12/26/21 0813   BP: 106/70 107/75 92/58 109/69   BP Location: Left arm Left arm Left arm Left arm   Pulse: 99 85 94 90   Resp: 16 18 18 18   Temp: 98.3  F (36.8  C) 98.4  F (36.9  C) 98.4  F (36.9  C) 98.4  F (36.9  C)   TempSrc: Oral Oral Oral Oral   SpO2: 92% 94% 90% 94%   Weight:       Height:           Physical Exam:  Lungs:  CTA  CV:       RRR  Ab:       Soft, + BS,     Recent Labs   Lab 12/26/21  0656   WBC 7.6   HGB 10.5*   HCT 33.8*          Recent Labs   Lab 12/26/21  0656      CO2 24   BUN 20   ALBUMIN 2.0*   ALKPHOS 136*   ALT 33   AST 24       Assessment:  Pt is stable at this point with his enterocutaneous fistulas while on TPN.    Plan: Continue treatment    Stanley Jean MD  [facility]  [phone]

## 2021-12-26 NOTE — PROGRESS NOTES
Primary Medicine Progress Note    Assessment/Plan  Active Problems:    Abdominal pain, generalized      Chema South is a 67 y/o M with past medical history of splenectomy, appendectomy, HTN, and substance abuse, with recent admission to hospital 11/30-12/21/2021 for SBO with perforation s/p surgical repair. Discharged home but presents as he could not manage at home and is now requesting to be placed in TCU vs LTC. Continues on TPN with surgery and ID following while awaiting LTC placement.       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 Required subsequent washout on 12/3 with aggressive IV antibiotic regimen and MARISA drain placement. On 12/8 found to have continued enhancing abscess with drain placed 12/9 and removed on 12/16. Antibiotics stopped on 12/20. Plan for close follow up with surgery and TPN at discharge, with plans to have help from his brother in law. Then returned on 12/22 given unable to care for himself even with brother in law's assistance; requesting LTC/TCU placement.  -PT/OT ordered, appreciate recs on placement  -Plan to begin search for TCU/LTACH placement with care management  -Pain management: Percocet 1-2 tablets q6h PRN, gabapentin   -Consulted surgery to follow while here  -Consulted ID              -Now on vanc/cefepime/fluconazole  -Consult pharmacy and RD for TPN management no changes today.  -WOC     Depression  Insomnia  Hx of this. Reports symptoms for past few days= low motivation, little interest in doing things, poor sleep, no appetite. Is on buspar, mirtazapine, and effexor, though doesn't really think medicines are as helpful as talk therapy is. Wanted to speak to social work and . States no plan to harm himself. Reports he just needs to  leave the hospital.  -Consult psych for med management and plan for talk therapy while hospitalized  -Discontinued buspar and mirtazapine per psych  -Increased venlafaxine and gabapentin per psych  -Seroquel for sleep     Moderate malnutrition  Per RD eval  -TPN per RD and pharmacy     Normocytic anemia  Stable at 10.8, MCV of 93.     Alk phos elevation  132, mildly increased from 124 on 12/20. Plan to monitor.     Chronic Conditions:  Hypothyroidism- resume PTA levothyroxine  Chronic pain- Hold mexolicam 7.5mg daily, resume PTA gabapentin  BPH-hold home Flomax given he states it does not help    Subjective:   No complaints this morning.  Slept well last night.  Comfortable with care plan this week.    Objective    Vital signs in last 24 hours Temp:  [97.8  F (36.6  C)-98.4  F (36.9  C)] 98.4  F (36.9  C)  Pulse:  [68-99] 90  Resp:  [16-18] 18  BP: ()/(58-75) 109/69  SpO2:  [90 %-94 %] 94 %       Weight:   Vitals:    12/21/21 2223 12/22/21 1242   Weight: 78.5 kg (173 lb) 75.5 kg (166 lb 6.4 oz)       Intake/Output last 3 shift I/O last 3 completed shifts:  In: 820 [P.O.:820]  Out: 2350 [Urine:1725; Drains:625]    Intake/Output this shift:I/O this shift:  In: -   Out: 50 [Drains:50]    PHYSICAL EXAM  GENERAL APPEARANCE: Cooperative; appears stated age  LUNGS: No respiratory distress  HEART: Brisk cap refill  ABDOMEN: Ostomy bag draining stool  EXTREMITIES: Grossly normal without deformity, no edema  SKIN: no rashes, skin warm  NEURO: Oriented to time and place    Pertinent Labs and Pertinent Radiology   Lab Results: personally reviewed.     Recent Labs   Lab 12/26/21  0656   WBC 7.6   HGB 10.5*   HCT 33.8*          Recent Labs   Lab 12/26/21  0656      CO2 24   BUN 20   ALBUMIN 2.0*   ALKPHOS 136*   ALT 33   AST 24       Radiology Results:   Results for orders placed or performed during the hospital encounter of 12/21/21   CT Abdomen Pelvis w Contrast    Impression    IMPRESSION:   1.  Improved  prerectal fluid collection.  2.  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  3.  Possible right abdominal anastomotic dehiscence with a small amount free fluid and air in the mesentery.  4.  Report called to floor nurse Susy at 4:25 PM       Precepted patient with Dr Gem Mcmahon MD - PGY2  [facility]  P: [phone]      Parts of this note were created with the assistance of voice recognition software.  The note was reviewed for accuracy.  However, errors in spelling, etc may have resulted.

## 2021-12-26 NOTE — PROGRESS NOTES
"CLINICAL NUTRITION SERVICES - REASSESSMENT NOTE     Nutrition Prescription    RECOMMENDATIONS FOR MDs/PROVIDERS TO ORDER:    Malnutrition Status:    Moderate    Recommendations already ordered by Registered Dietitian (RD):  Continue same TPN today: D 400  @ 70 ml/hr plus 250 ml  Of 20% lipids 5 times per week over 12 hrs to provide: 2177 Calories  114 g protein, 400 g CHO, 36 g fat and 1680 ml fluid    Future/Additional Recommendations:  Follow labs/liver enzymes and need for possible cyclic regimen     EVALUATION OF THE PROGRESS TOWARD GOALS   Diet: Clear liquids  Nutrition Support: TPN: formula and provisions as above  Intake: 100% clear liquid tray on 12/24/2021     ANTHROPOMETRICS  Height: 170.2 cm (5' 7\")  Most Recent Weight: 75.5 kg (166 lb 6.4 oz)    IBW: 67.3 kg  BMI: Overweight BMI 25-29.9  Weight History:   Wt Readings from Last 10 Encounters:   12/22/21 75.5 kg (166 lb 6.4 oz)   12/17/21 76.8 kg (169 lb 6.4 oz)   01/22/20 79.4 kg (175 lb)   12/24/19 81.2 kg (179 lb)     PHYSICAL FINDINGS  See malnutrition section below.    GI CONCERNS  Fistulas and old incision sites  Abdomen tender  normoactive BS all quadrants  Abdominal discomfort  Last BM 12/24/2021 per patient    LABS  Reviewed: 12/25 labs Na 134, Alb 2, alk phos 136, Glu 105    MEDICATIONS  Reviewed: cefepime, diflucan, levothyroxine, pantoprazole    MALNUTRITION:  % Weight Loss:  {1-2% in 1 week (moderate malnutrition)  % Intake:  No decreased intake noted  Subcutaneous Fat Loss:  Orbital region severe depletion and Upper arm region mild to moderate depletion  Muscle Loss:  Temporal region moderate depletion, Clavicle bone region severe depletion, Dorsal hand region mild to moderate depletion and Patellar region moderate depletion  Fluid Retention:  None noted    Malnutrition Diagnosis: Moderate malnutrition  In Context of:  Acute illness or injury    CURRENT NUTRITION DIAGNOSIS  Malnutrition related to s/p surgical intervention of SBO with " formation of enterocutaneous fistula as evidenced by 1.7% weight loss in 1 week, moderate subcutaneous fat loss and moderate muscle loss.      INTERVENTIONS  Implementation  Continue same TPN today    Goals  Meet estimated nutrition needs-met  Tolerate TPN-progressing  Electrolytes WNL-met    Monitoring/Evaluation  Progress toward goals will be monitored and evaluated per protocol: diet order, TPN intake, weight, labs, nutrition-focused physical findings

## 2021-12-26 NOTE — PLAN OF CARE
"Problem: Adult Inpatient Plan of Care  Goal: Optimal Comfort and Wellbeing  Outcome: No Change    Pt given Percocet PRN for c/o generalize \"stiffness\"--with good relief. Will continue to monitor.     Ostomy pouches in place around fistula, green/brown output. Pt continues on TPN at 70 ml. PICC line red lumen occluded--message sent to MD. SUDHA, will continue to monitor.          "

## 2021-12-26 NOTE — PHARMACY-CONSULT NOTE
"Pharmacy Note: Parenteral Nutrition (PN) Management    Pharmacist consulted to dose PN for Gurdeep Dia, a 66 year old male by Dr. Mcmahon.    Subjective:    The patient was started on PN in the hospital on 12/5/21.    Indication for PN therapy: bowel resection -- plan to continue TPN until fistula heals.    Social History     Tobacco Use     Smoking status: Current Every Day Smoker     Smokeless tobacco: Never Used   Substance Use Topics     Alcohol use: Not Currently     Comment: Clean for 5 years     Drug use: Not Currently     Objective:    Ht Readings from Last 1 Encounters:   12/22/21 1.702 m (5' 7\")     Wt Readings from Last 1 Encounters:   12/22/21 75.5 kg (166 lb 6.4 oz)       Body mass index is 26.06 kg/m .    No data found.    Labs:  Last 3 days:  Recent Labs     12/21/21  0644 12/22/21  0003 12/23/21  0715   * 130* 134*   POTASSIUM 4.6 4.9 4.3   CHLORIDE 101 101 105   CO2 22 21* 23   BUN 21 22 23*   CR 0.83 0.89 0.81   VIJAYA 8.7 8.8 7.9*   MAG 2.0 2.0  --    PHOS 3.2  --   --    PROTTOTAL  --  7.6  --    ALBUMIN  --  2.4*  --    HGB 11.5* 12.0* 10.9*   HCT 35.1* 36.1* 33.1*   * 564* 453*   BILITOTAL  --  0.9  --    AST  --  31  --    ALT  --  18  --    ALKPHOS  --  132*  --        Glucose (past 48 hours):   Recent Labs     12/22/21  0003 12/22/21  1944 12/23/21  0047 12/23/21  0548 12/23/21  0715   * 93 127* 145* 130*       Intake/Output (last 24 hours): I/O last 3 completed shifts:  In: 820 [P.O.:820]  Out: 2350 [Urine:1725; Drains:625]    Estimated CrCl: Estimated Creatinine Clearance: 93.4 mL/min (based on SCr of 0.82 mg/dL).    Assessment:    Continue patient on PN therapy as a continuous central therapy.     Given the patient's current condition/oral intake, PN is still indicated.    Lab results reviewed: 12/26/21  Sodium trending up, 137 mmol/L today  Potassium has fluctuated since admission, 4.8 mmol/L today. May need to decrease potassium in home TPN formula if increases " further. Continue home TPN formula for today.    Plan:  1. Rate of PN: 70 mL/hr  2. Formula:     Amino Acids 115 grams    Dextrose 400 grams    Sodium 110 mEq/day    Potassium 80 mEq/day    Calcium 8 mEq/day    Magnesium 14 mEq/day    Phosphorus 32 mMol/day    Chloride: Acetate Ratio 1:1    Standard Multivitamins w/Vitamin K    Trace Elements  3. Fat Emulsion: 20%, 250 mL IV five times weekly.  4. Check TPN labs per protocol.  5. Pharmacist will continue to follow the patient's lab results, clinical status and blood glucose results and make adjustments as appropriate.    Thank you for the consult.  Savanna Braun RPH  12/25/2021 2:46 PM

## 2021-12-26 NOTE — PROGRESS NOTES
Care Management Follow Up    Length of Stay (days): 4    Expected Discharge Date:  To be determined.      Concerns to be Addressed:   Alteration in nutritional status requiring TPN. Infection requiring IV Cefepime and Diflucan.   Patient plan of care discussed at interdisciplinary rounds: Yes    Anticipated Discharge Disposition:  Long term acute rehab (LTACH) vs Acute Rehab (ARU) or Transitional Care (TCU).     Anticipated Discharge Services:  Continued therapy and skilled nursing.   Anticipated Discharge DME:  Per therapy (if indicated).    Patient/family educated on Medicare website which has current facility and service quality ratings:  Yes  Education Provided on the Discharge Plan:  Per team  Patient/Family in Agreement with the Plan:  Yes    Referrals Placed by CM/SW:  See below.  Private pay costs discussed: Not applicable at this time.    Additional Information:  Patient lives in home with his brother-in-law, however does not want to return to this home.  He is independent, receives TPN from [facility].    History of illness:  Patient underwent exploratory laparotomy, small bowel resection, repair of enterotomy extensive lysis of adhesions on 11/30 and required subsequent washout on 12/3 with aggressive IV antibiotic regimen and MARISA drain placement. On 12/8, he was found to have continued enhancing abscess with drain placed 12/9 and removed on 12/16. Antibiotics were stopped on 12/20 with a plan for close follow up with surgery and TPN at discharge. He planned to have help from his brotherinlaw. He returned to the hospital on 12/22 stating that he was unable to care for himself even with brother in law's assistance; He is requesting LTC/TCU placement.    Referrals have been made to:    [facility] P[facility] at [facility]    [facility]    [facility]    Celine Presley,  RN

## 2021-12-27 ENCOUNTER — APPOINTMENT (OUTPATIENT)
Dept: ULTRASOUND IMAGING | Facility: HOSPITAL | Age: 66
DRG: 393 | End: 2021-12-27
Payer: MEDICARE

## 2021-12-27 LAB
ALBUMIN SERPL-MCNC: 2.1 G/DL (ref 3.5–5)
ALP SERPL-CCNC: 144 U/L (ref 45–120)
ALT SERPL W P-5'-P-CCNC: 28 U/L (ref 0–45)
ANION GAP SERPL CALCULATED.3IONS-SCNC: 6 MMOL/L (ref 5–18)
AST SERPL W P-5'-P-CCNC: 28 U/L (ref 0–40)
BACTERIA BLD CULT: NO GROWTH
BACTERIA BLD CULT: NO GROWTH
BILIRUB SERPL-MCNC: 1.1 MG/DL (ref 0–1)
BUN SERPL-MCNC: 19 MG/DL (ref 8–22)
CALCIUM SERPL-MCNC: 8.8 MG/DL (ref 8.5–10.5)
CHLORIDE BLD-SCNC: 105 MMOL/L (ref 98–107)
CO2 SERPL-SCNC: 26 MMOL/L (ref 22–31)
CREAT SERPL-MCNC: 0.84 MG/DL (ref 0.7–1.3)
ERYTHROCYTE [DISTWIDTH] IN BLOOD BY AUTOMATED COUNT: 15.9 % (ref 10–15)
GFR SERPL CREATININE-BSD FRML MDRD: >90 ML/MIN/1.73M2
GLUCOSE BLD-MCNC: 115 MG/DL (ref 70–125)
GLUCOSE BLDC GLUCOMTR-MCNC: 114 MG/DL (ref 70–99)
GLUCOSE BLDC GLUCOMTR-MCNC: 142 MG/DL (ref 70–99)
HCT VFR BLD AUTO: 33.6 % (ref 40–53)
HGB BLD-MCNC: 10.6 G/DL (ref 13.3–17.7)
INR PPP: 1.19 (ref 0.85–1.15)
MAGNESIUM SERPL-MCNC: 2.1 MG/DL (ref 1.8–2.6)
MCH RBC QN AUTO: 29.4 PG (ref 26.5–33)
MCHC RBC AUTO-ENTMCNC: 31.5 G/DL (ref 31.5–36.5)
MCV RBC AUTO: 93 FL (ref 78–100)
PHOSPHATE SERPL-MCNC: 3.1 MG/DL (ref 2.5–4.5)
PLATELET # BLD AUTO: 343 10E3/UL (ref 150–450)
POTASSIUM BLD-SCNC: 4.9 MMOL/L (ref 3.5–5)
PREALB SERPL IA-MCNC: 20 MG/DL (ref 19–38)
PROT SERPL-MCNC: 7 G/DL (ref 6–8)
RBC # BLD AUTO: 3.6 10E6/UL (ref 4.4–5.9)
SODIUM SERPL-SCNC: 137 MMOL/L (ref 136–145)
TRIGL SERPL-MCNC: 169 MG/DL
WBC # BLD AUTO: 7.7 10E3/UL (ref 4–11)

## 2021-12-27 PROCEDURE — 76705 ECHO EXAM OF ABDOMEN: CPT

## 2021-12-27 PROCEDURE — 85027 COMPLETE CBC AUTOMATED: CPT | Performed by: STUDENT IN AN ORGANIZED HEALTH CARE EDUCATION/TRAINING PROGRAM

## 2021-12-27 PROCEDURE — 84134 ASSAY OF PREALBUMIN: CPT | Performed by: STUDENT IN AN ORGANIZED HEALTH CARE EDUCATION/TRAINING PROGRAM

## 2021-12-27 PROCEDURE — 250N000013 HC RX MED GY IP 250 OP 250 PS 637

## 2021-12-27 PROCEDURE — 83735 ASSAY OF MAGNESIUM: CPT | Performed by: STUDENT IN AN ORGANIZED HEALTH CARE EDUCATION/TRAINING PROGRAM

## 2021-12-27 PROCEDURE — 120N000001 HC R&B MED SURG/OB

## 2021-12-27 PROCEDURE — 250N000013 HC RX MED GY IP 250 OP 250 PS 637: Performed by: STUDENT IN AN ORGANIZED HEALTH CARE EDUCATION/TRAINING PROGRAM

## 2021-12-27 PROCEDURE — 80053 COMPREHEN METABOLIC PANEL: CPT | Performed by: STUDENT IN AN ORGANIZED HEALTH CARE EDUCATION/TRAINING PROGRAM

## 2021-12-27 PROCEDURE — 99232 SBSQ HOSP IP/OBS MODERATE 35: CPT

## 2021-12-27 PROCEDURE — 250N000011 HC RX IP 250 OP 636

## 2021-12-27 PROCEDURE — C9113 INJ PANTOPRAZOLE SODIUM, VIA: HCPCS | Performed by: STUDENT IN AN ORGANIZED HEALTH CARE EDUCATION/TRAINING PROGRAM

## 2021-12-27 PROCEDURE — 84478 ASSAY OF TRIGLYCERIDES: CPT | Performed by: STUDENT IN AN ORGANIZED HEALTH CARE EDUCATION/TRAINING PROGRAM

## 2021-12-27 PROCEDURE — 85610 PROTHROMBIN TIME: CPT | Performed by: STUDENT IN AN ORGANIZED HEALTH CARE EDUCATION/TRAINING PROGRAM

## 2021-12-27 PROCEDURE — 250N000011 HC RX IP 250 OP 636: Performed by: STUDENT IN AN ORGANIZED HEALTH CARE EDUCATION/TRAINING PROGRAM

## 2021-12-27 PROCEDURE — 99231 SBSQ HOSP IP/OBS SF/LOW 25: CPT | Mod: GC | Performed by: STUDENT IN AN ORGANIZED HEALTH CARE EDUCATION/TRAINING PROGRAM

## 2021-12-27 PROCEDURE — 250N000009 HC RX 250: Performed by: FAMILY MEDICINE

## 2021-12-27 PROCEDURE — 250N000013 HC RX MED GY IP 250 OP 250 PS 637: Performed by: NURSE PRACTITIONER

## 2021-12-27 PROCEDURE — 84100 ASSAY OF PHOSPHORUS: CPT | Performed by: STUDENT IN AN ORGANIZED HEALTH CARE EDUCATION/TRAINING PROGRAM

## 2021-12-27 PROCEDURE — 99024 POSTOP FOLLOW-UP VISIT: CPT | Performed by: SURGERY

## 2021-12-27 RX ORDER — CEFTRIAXONE 2 G/1
2 INJECTION, POWDER, FOR SOLUTION INTRAMUSCULAR; INTRAVENOUS EVERY 24 HOURS
Status: DISCONTINUED | OUTPATIENT
Start: 2021-12-27 | End: 2022-01-01

## 2021-12-27 RX ORDER — FLUCONAZOLE 2 MG/ML
200 INJECTION, SOLUTION INTRAVENOUS EVERY 24 HOURS
Status: DISCONTINUED | OUTPATIENT
Start: 2021-12-27 | End: 2022-01-01

## 2021-12-27 RX ADMIN — VENLAFAXINE HYDROCHLORIDE 75 MG: 75 CAPSULE, EXTENDED RELEASE ORAL at 09:18

## 2021-12-27 RX ADMIN — GABAPENTIN 900 MG: 300 CAPSULE ORAL at 22:56

## 2021-12-27 RX ADMIN — I.V. FAT EMULSION 250 ML: 20 EMULSION INTRAVENOUS at 20:20

## 2021-12-27 RX ADMIN — OXYCODONE HYDROCHLORIDE AND ACETAMINOPHEN 1 TABLET: 5; 325 TABLET ORAL at 11:50

## 2021-12-27 RX ADMIN — PANTOPRAZOLE SODIUM 40 MG: 40 INJECTION, POWDER, FOR SOLUTION INTRAVENOUS at 09:18

## 2021-12-27 RX ADMIN — GABAPENTIN 200 MG: 100 CAPSULE ORAL at 09:18

## 2021-12-27 RX ADMIN — ENOXAPARIN SODIUM 40 MG: 40 INJECTION SUBCUTANEOUS at 09:18

## 2021-12-27 RX ADMIN — ACETAMINOPHEN 325 MG: 325 TABLET ORAL at 00:58

## 2021-12-27 RX ADMIN — GABAPENTIN 200 MG: 100 CAPSULE ORAL at 17:56

## 2021-12-27 RX ADMIN — CEFTRIAXONE SODIUM 2 G: 2 INJECTION, POWDER, FOR SOLUTION INTRAMUSCULAR; INTRAVENOUS at 17:55

## 2021-12-27 RX ADMIN — POTASSIUM CHLORIDE: 2 INJECTION, SOLUTION, CONCENTRATE INTRAVENOUS at 20:20

## 2021-12-27 RX ADMIN — LEVOTHYROXINE SODIUM 25 MCG: 0.03 TABLET ORAL at 06:36

## 2021-12-27 RX ADMIN — FLUCONAZOLE 200 MG: 2 INJECTION, SOLUTION INTRAVENOUS at 18:53

## 2021-12-27 RX ADMIN — OXYCODONE HYDROCHLORIDE AND ACETAMINOPHEN 2 TABLET: 5; 325 TABLET ORAL at 20:28

## 2021-12-27 RX ADMIN — QUETIAPINE FUMARATE 200 MG: 100 TABLET, FILM COATED ORAL at 22:56

## 2021-12-27 RX ADMIN — CEFEPIME HYDROCHLORIDE 2 G: 2 INJECTION, POWDER, FOR SOLUTION INTRAVENOUS at 00:58

## 2021-12-27 RX ADMIN — QUETIAPINE 100 MG: 25 TABLET ORAL at 11:49

## 2021-12-27 RX ADMIN — CEFEPIME HYDROCHLORIDE 2 G: 2 INJECTION, POWDER, FOR SOLUTION INTRAVENOUS at 09:18

## 2021-12-27 ASSESSMENT — ACTIVITIES OF DAILY LIVING (ADL)
ADLS_ACUITY_SCORE: 13
ADLS_ACUITY_SCORE: 13
ADLS_ACUITY_SCORE: 11
ADLS_ACUITY_SCORE: 14
ADLS_ACUITY_SCORE: 13
ADLS_ACUITY_SCORE: 14
ADLS_ACUITY_SCORE: 13
ADLS_ACUITY_SCORE: 14
ADLS_ACUITY_SCORE: 12
ADLS_ACUITY_SCORE: 13
ADLS_ACUITY_SCORE: 13
ADLS_ACUITY_SCORE: 12
ADLS_ACUITY_SCORE: 13
ADLS_ACUITY_SCORE: 13
ADLS_ACUITY_SCORE: 14
ADLS_ACUITY_SCORE: 13

## 2021-12-27 NOTE — PROGRESS NOTES
[facility]    Infectious Disease Progress Note    Date of Service (when I saw the patient): 12/27/2021     Assessment & Plan   Gurdeep Dia is a 66 year old male who was admitted on 12/21/2021.  Patient new to me 12/23/2021.  Please see previous ID consult note.    1. History of splenectomy and substance abuse, recent admission with small bowel obstruction- improving  2. Status post exploratory laparotomy, small bowel resection/repair of enterotomy, extensive lysis of adhesion on 11/30/2021  3. Postoperative small bowel perforation, status post exploratory laparotomy on 12/3/2021.  4. Intra-abdominal abscess status post drain placement on 12/9/2021.  E. coli in cultures.  Treated with meropenem, IV vancomycin and transition to p.o. cefdinir plus Flagyl on 12/15/2021  5. Abscessogram on 12/16 with resolved abscess, drain removed on 12/16/2021  6. Antibiotics discontinued on 12/20/2021, discharged from the hospital on 12/20/2021 and readmitted due to worsening pain and drainage from incision, cellulitis, EC fistula  7.  E coli and Candida parapsilosis in wound culture from 12/22/2021  8. Leukocytosis improved  9. On TPN  10. PCN allergy- hives    Recommendations:    Change antibiotics to ceftriaxone and fluconazole.  Decrease fluconazole to 200 mg.    VU PIERCE MD  [facility]  [phone]        Interval History     Complains of some diaphoresis--had low grade fever.     Physical Exam   Temp: 98  F (36.7  C) Temp src: Oral BP: 106/78 Pulse: 93   Resp: 18 SpO2: 97 % O2 Device: None (Room air)    Vitals:    12/21/21 2223 12/22/21 1242   Weight: 78.5 kg (173 lb) 75.5 kg (166 lb 6.4 oz)     Vital Signs with Ranges  Temp:  [98  F (36.7  C)-100.5  F (38.1  C)] 98  F (36.7  C)  Pulse:  [93] 93  Resp:  [18] 18  BP: (105-106)/(70-78) 106/78  SpO2:  [93 %-97 %] 97 %    Exam on 12/25/2021   Constitutional: No apparent distress  HEENT: EOMI, no icterus  Lungs:  normal breathing pattern  Cardiovascular:  No JVD elevation  Abdomen: drainage from EV fistula.  Ostomy bags on abdominal wall- bilious drainage  Skin: draining from abdominal wall seems to have decreased. Mild tenderness resolving  Other:Neuro AOx3, coherent, deconditioned    Medications     dextrose Stopped (12/26/21 6368)     parenteral nutrition - ADULT compounded formula       parenteral nutrition - ADULT compounded formula 70 mL/hr at 12/27/21 0927       ceFEPIme (MAXIPIME) IV  2 g Intravenous Q8H     enoxaparin ANTICOAGULANT  40 mg Subcutaneous Daily     fluconazole  400 mg Intravenous Q24H     gabapentin  200 mg Oral BID     gabapentin  900 mg Oral At Bedtime     levothyroxine  25 mcg Oral Daily     lipids  250 mL Intravenous Once per day on Mon Tue Wed Thu Fri     pantoprazole (PROTONIX) IV  40 mg Intravenous Daily with breakfast     QUEtiapine  200 mg Oral At Bedtime     sodium chloride (PF)  3 mL Intracatheter Q8H     venlafaxine  75 mg Oral Daily       Data   All microbiology laboratory data reviewed.  Recent Labs   Lab Test 12/27/21  0648 12/26/21  0656 12/25/21  0642   WBC 7.7 7.6 8.5   HGB 10.6* 10.5* 10.1*   HCT 33.6* 33.8* 31.6*   MCV 93 94 92    352 349     Recent Labs   Lab Test 12/27/21  0648 12/26/21  0656 12/25/21  0642   CR 0.84 0.82 0.80     MICRO: reviewed      12/22/2021 1326 12/25/2021 0703 Wound Aerobic Bacterial Culture Routine [[id number]]    (Abnormal)   Wound from Abdomen    Final result Component Value   Culture Culture in progress    3+ Escherichia coli Abnormal     3+ Escherichia coli Abnormal     2+ Candida parapsilosis complex Abnormal          Susceptibility     Escherichia coli (1) Escherichia coli (2)     DENA DENA     Ampicillin >=32.0 ug/mL Resistant >=32.0 ug/mL Resistant     Ampicillin/ Sulbactam >=32.0 ug/mL Resistant >=32.0 ug/mL Resistant     Cefepime <=1.0 ug/mL Susceptible <=1.0 ug/mL Susceptible     Ceftazidime <=1.0 ug/mL Susceptible 2.0 ug/mL Susceptible      Ceftriaxone <=1.0 ug/mL Susceptible <=1.0 ug/mL Susceptible     Ciprofloxacin >=4.0 ug/mL Resistant >=4.0 ug/mL Resistant     Gentamicin <=1.0 ug/mL Susceptible <=1.0 ug/mL Susceptible     Levofloxacin >=8.0 ug/mL Resistant >=8.0 ug/mL Resistant     Meropenem <=0.25 ug/mL Susceptible <=0.25 ug/mL Susceptible     Piperacillin/Tazobactam >=128.0 ug/mL Resistant >=128.0 ug/mL Resistant     Tobramycin <=1.0 ug/mL Susceptible <=1.0 ug/mL Susceptible     Trimethoprim/Sulfamethoxazole >16/304 ug/mL Resistant >16/304 ug/mL Resistant                           12/22/2021 1648 12/23/2021 2017 Blood Culture Peripheral Blood [[id number]]   Peripheral Blood    Preliminary result Component Value   Culture No growth after 1 day P              12/22/2021 1648 12/23/2021 2017 Blood Culture Peripheral Blood [[id number]]   Peripheral Blood    Preliminary result Component Value   Culture No growth after 1 day P              12/22/2021 1326 12/24/2021 1412 Wound Aerobic Bacterial Culture Routine [[id number]]   (Abnormal)   Wound from Abdomen    Preliminary result Component Value   Culture Culture in progress P    3+ Gram negative bacilli Abnormal  P    3+ Gram negative bacilli Abnormal  P    2+ Candida parapsilosis complex Abnormal  P              12/22/2021 0018 12/22/2021 0048 Asymptomatic COVID-19 Virus (Coronavirus) by PCR Nasopharyngeal [[id number]]    Swab from Nasopharyngeal    Final result Component Value   SARS CoV2 PCR Negative   NEGATIVE: SARS-CoV-2 (COVID-19) RNA not detected, presumed negative.           12/17/2021 1615 12/17/2021 1844 Asymptomatic COVID-19 Virus (Coronavirus) by PCR Nasopharyngeal [[id number]]    Swab from Nasopharyngeal    Final result Component Value   SARS CoV2 PCR Negative   NEGATIVE: SARS-CoV-2 (COVID-19) RNA not detected, presumed negative.           12/09/2021 1204 12/12/2021 0831 Body fluid, unsp Aerobic Bacterial Culture Routine [[id number]]    (Abnormal)   Body fluid, unsp from Pelvis     Final result Component Value   Culture 2+ Escherichia coli Abnormal          Susceptibility     Escherichia coli     DENA     Ampicillin >=32.0 ug/mL Resistant     Ampicillin/ Sulbactam 16.0 ug/mL Intermediate     Cefepime <=1.0 ug/mL Susceptible     Ceftazidime <=1.0 ug/mL Susceptible     Ceftriaxone <=1.0 ug/mL Susceptible     Ciprofloxacin >=4.0 ug/mL Resistant     Gentamicin <=1.0 ug/mL Susceptible     Levofloxacin >=8.0 ug/mL Resistant     Meropenem <=0.25 ug/mL Susceptible     Piperacillin/Tazobactam <=4.0 ug/mL Susceptible     Tobramycin <=1.0 ug/mL Susceptible     Trimethoprim/Sulfamethoxazole >16/304 ug/mL Resistant                   12/09/2021 1203 12/16/2021 0801 Anaerobic culture [[id number]]   Body fluid, unsp from Pelvis    Final result Component Value   Culture No anaerobic organisms isolated              12/08/2021 1507 12/08/2021 1607 Asymptomatic COVID-19 Virus (Coronavirus) by PCR Nasopharyngeal [[id number]]    Swab from Nasopharyngeal    Final result Component Value   SARS CoV2 PCR Negative   NEGATIVE: SARS-CoV-2 (COVID-19) RNA not detected, presumed negative.           12/06/2021 2140 12/12/2021 1031 Blood Culture Line, venous [[id number]]   Blood from Line, venous    Final result Component Value   Culture No Growth              12/05/2021 1645 12/10/2021 2031 Blood Culture Peripheral Blood [[id number]]   Peripheral Blood    Final result Component Value   Culture No Growth              12/05/2021 1645 12/10/2021 2031 Blood Culture Peripheral Blood [[id number]]    Peripheral Blood    Final result Component Value   Culture No Growth              12/05/2021 1638 12/06/2021 2126 Urine Culture [[id number]]   Urine, Roblero Catheter    Final result Component Value   Culture No Growth              11/30/2021 1153 11/30/2021 1241 Asymptomatic COVID-19 Virus (Coronavirus) by PCR Nasopharyngeal [[id number]]    Swab from Nasopharyngeal    Final result Component Value   SARS CoV2 PCR Negative    NEGATIVE: SARS-CoV-2 (COVID-19) RNA not detected, presumed negative.                 RADIOLOGY:    CT Abd Peritoneum Abscess Drain w Cath Place    Result Date: 2021  EXAM: 1. PERCUTANEOUS DRAIN PLACEMENT PELVIC ABSCESS 2. CT GUIDANCE 3. CONSCIOUS SEDATION LOCATION: [facility] DATE/TIME: 2021 9:50 AM INDICATION: s/p two surgeries with elevating wbc, ct with pelvic abscess. TECHNIQUE: Dose reduction techniques were used. PROCEDURE: Informed consent obtained. Site marked. Prior images reviewed. Required items made available. Patient identity confirmed verbally and with arm band. Patient reevaluated immediately before administering sedation. Universal protocol was followed. Time out performed. The site was prepped and draped in sterile fashion. 10 mL of 1% lidocaine was infused into the local soft tissues. Using standard technique and under direct CT guidance, a 10 Qatari drainage catheter was inserted into the fluid collection.  SPECIMEN: 30 mL of brownish thin fluid was aspirated and sent to lab for cultures and Gram stain. BLOOD LOSS: Minimal. The patient tolerated the procedure well. No immediate complications. SEDATION: Versed 2.0 mg. Fentanyl 100 mcg. The procedure was performed with administration intravenous conscious sedation with appropriate preoperative, intraoperative, and postoperative evaluation. 30 minutes of supervised face to face conscious sedation time was provided by a radiology nurse under my direct supervision.     IMPRESSION: 1.  Successful CT-guided drain placement into pelvic abscess. 30 mL brownish thin fluid removed and sent for cultures. Reference CPT Codes: 42667, 33259, 19016    Echocardiogram Complete    Result Date: 12/10/2021  [id number]^CHRISTOPHER^[place] [address]  Name: ISABELA DUNN MRN: [id number] : 1955 Study Date: 12/10/2021 01:14 PM Age: 66 yrs Gender: Male  Patient Location: [facility] Reason For Study: [place] Ordering Physician: FLOWER WHITE Performed By: ALEX  BSA: 1.8 m2 Height: 65 in Weight: 155 lb HR: 84 ______________________________________________________________________________ Procedure Complete Echo Adult. Adequate quality two-dimensional was performed and interpreted. ______________________________________________________________________________ Interpretation Summary  The left ventricle is normal in size with borderline concentric left ventricular hypertrophy. Left ventricular function is normal.The ejection fraction is 60-65%. Normal right ventricle size and systolic function. No significant valve disease is identified.  There is no comparison study available. ______________________________________________________________________________ I      WMSI = 1.00     % Normal = 100  X - Cannot   0 -                      (2) - Mildly 2 -          Segments  Size Interpret    Hyperkinetic 1 - Normal  Hypokinetic  Hypokinetic  1-2     small                                                    7 -          3-5    moderate 3 - Akinetic 4 -          5 -         6 - Akinetic Dyskinetic   6-14    large              Dyskinetic   Aneurysmal  w/scar       w/scar       15-16   diffuse  Left Ventricle The left ventricle is normal in size. Left ventricular function is normal.The ejection fraction is 60-65%. There is borderline concentric left ventricular hypertrophy. Left ventricular diastolic function is normal. No regional wall motion abnormalities noted.  Right Ventricle Normal right ventricle size and systolic function.  Atria Normal left atrial size. Right atrial size is normal. There is no color Doppler evidence of an atrial shunt.  Mitral Valve Mitral valve leaflets appear normal. There is no evidence of mitral stenosis or clinically significant mitral regurgitation.  Tricuspid Valve Tricuspid valve leaflets appear normal. There is no evidence of tricuspid stenosis or  clinically significant tricuspid regurgitation. Right ventricle systolic pressure estimate normal. The right ventricular systolic pressure is approximated at 18.7 mmHg plus the right atrial pressure.  Aortic Valve The aortic valve is trileaflet. Aortic valve leaflets appear normal. There is no evidence of aortic stenosis or clinically significant aortic regurgitation. There is trace aortic regurgitation.  Pulmonic Valve The pulmonic valve is not well seen, but is grossly normal. This degree of valvular regurgitation is within normal limits. There is trace pulmonic valvular regurgitation.  Vessels The aorta root is normal. The ascending aorta is Mildly dilated. IVC diameter <2.1 cm collapsing >50% with sniff suggests a normal RA pressure of 3 mmHg.  Pericardium There is no pericardial effusion.  Rhythm Sinus rhythm was noted.  ______________________________________________________________________________ MMode/2D Measurements & Calculations IVSd: 1.3 cm LVIDd: 5.0 cm LVIDs: 3.2 cm LVPWd: 0.86 cm  FS: 36.6 % LV mass(C)d: 201.1 grams LV mass(C)dI: 113.3 grams/m2 Ao root diam: 3.9 cm LA dimension: 4.1 cm asc Aorta Diam: 3.8 cm LA/Ao: 1.0 LVOT diam: 2.4 cm LVOT area: 4.6 cm2 LA Volume Indexed (AL/bp): 29.6 ml/m2 RWT: 0.34  Doppler Measurements & Calculations MV E max gary: 80.4 cm/sec MV A max gary: 62.9 cm/sec MV E/A: 1.3  MV dec slope: 292.8 cm/sec2 MV dec time: 0.27 sec Ao V2 max: 178.1 cm/sec Ao max P.0 mmHg Ao V2 mean: 130.5 cm/sec Ao mean P.9 mmHg Ao V2 VTI: 29.4 cm SUMAN(I,D): 3.2 cm2 SUMAN(V,D): 3.1 cm2 LV V1 max P.5 mmHg LV V1 max: 117.6 cm/sec LV V1 VTI: 20.2 cm SV(LVOT): 93.4 ml SI(LVOT): 52.6 ml/m2 PA acc time: 0.12 sec TR max gary: 216.1 cm/sec TR max P.7 mmHg AV Gary Ratio (DI): 0.66 SUMAN Index (cm2/m2): 1.8 E/E' av.3 Lateral E/e': 7.9 Medial E/e': 16.6  ______________________________________________________________________________ Report approved by: Hany Currie 12/10/2021 02:23 PM        XR Chest Port 1 View    Result Date: 12/6/2021  EXAM: XR CHEST PORT 1 VIEW LOCATION: [facility] DATE/TIME: 12/6/2021 1:39 PM INDICATION: Worsening hypoxia, evaluate for pneumonia, pulm edema COMPARISON: Chest x-ray 12/03/2021     IMPRESSION: Enteric suction tube tip and side-port within the upper gastric lumen. Satisfactory right PICC line position with the tip near the cavoatrial junction. Persistent low lung volumes. No definite pneumothorax. Mildly improved bibasilar pulmonary  opacities favored to reflect atelectasis. Persistent interstitial coarsening. Suspected trace right-sided pleural fluid. Stable heart size and central vascular prominence.    XR Chest Port 1 View    Result Date: 12/3/2021  EXAM: XR CHEST PORT 1 VIEW LOCATION: [facility] DATE/TIME: 12/3/2021 8:31 PM INDICATION: RN placed PICC - verify tip placement COMPARISON: None.     IMPRESSION: Right PICC line present with its tip likely just into the right atrium. Suggest withdrawing the PICC line 2 cm to place the tip in the low SVC. NG tube in good position in the stomach. Mild cardiomegaly. Low lung volumes with mild patchy mixed interstitial and airspace infiltrates    IR Abscess Tube Check    Result Date: 12/16/2021  LOCATION: [facility] DATE: 12/16/2021 PROCEDURE: ABSCESS TUBE CHECK INTERVENTIONAL RADIOLOGIST: Demario Porter MD INDICATION: Pelvic abscess. Status post jugular drain placement on 12/9/2021. Markedly decreased outputs. CT from today demonstrating near complete resolution of the abscess cavity.. CONSENT: The procedure, risks and benefits of an abscessogram were discussed with the patient  in detail. All questions were answered. Informed consent was given to proceed with the procedure. MODERATE SEDATION: None. CONTRAST: Omnipaque 350: 2 mL. ANTIBIOTICS: None. ADDITIONAL MEDICATIONS: None. FLUOROSCOPIC TIME: 0.1 minutes RADIATION DOSE: Air Kerma: 26 mGy  COMPLICATIONS: No immediate complications. PROCEDURE:  images were obtained. The existing drainage catheter was injected with contrast, and an image obtained. After reviewing the images, the catheter was removed without difficulty. FINDINGS: Contracted cavity. Almost immediate pericatheter leakage.     IMPRESSION: 1.  Resolved pelvic fluid collection. Transgluteal drain removed.     CT Abdomen Pelvis w Contrast    Result Date: 12/22/2021  EXAM: CT ABDOMEN PELVIS W CONTRAST LOCATION: [facility] DATE/TIME: 12/22/2021 2:58 PM INDICATION: Exploratory laparotomy with adhesion lysis and repair of small bowel enterotomy 11/30/2021. Abdominal abscess drain placement 12/09/2021. COMPARISON: CT 12/16/2021, 12/08/2021 TECHNIQUE: CT scan of the abdomen and pelvis was performed following injection of IV contrast. Multiplanar reformats were obtained. Dose reduction techniques were used. CONTRAST: isovue 370 100ml FINDINGS: LOWER CHEST: Right basilar consolidation could represent atelectasis or pneumonia, similar to previous. HEPATOBILIARY: Stable hepatic cysts. Stable prominent gallbladder without definite wall thickening. PANCREAS: Normal. SPLEEN: Splenectomy with several left upper quadrant postsplenectomy implants. ADRENAL GLANDS: Normal. KIDNEYS/BLADDER: Several right renal cysts which do not require further imaging. Left renal 9 and 4 mm nonobstructive stones. Several tiny left renal cysts. No further imaging recommended. BOWEL: A loop of small bowel appears to communicate with the left anterior abdominal wall (series 3, image 105). The abdominal wall contains air. 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 There is a right lateral abdominal staple line suggesting an ilio transverse colostomy. The right lower quadrant mesentery inferior to this contains a small amount of nonlocalized fluid and air. Also, the surgical staple line appears scattered suggesting  anastomotic breakdown.  The prerectal drainage catheter has been removed. Small residual 16 x 12 mm fluid collection (image 143). LYMPH NODES: Several small mesenteric nodes are likely reactive. VASCULATURE: Patent mesenteric vessels. PELVIC ORGANS: Normal. MUSCULOSKELETAL: Severe degenerative change at the L4-L5 level with grade 2 anterolisthesis.     IMPRESSION: 1.  Improved prerectal fluid collection. 2.  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 3.  Possible right abdominal anastomotic dehiscence with a small amount free fluid and air in the mesentery. 4.  Report called to floor nurse Susy at 4:25 PM    CT Abdomen Pelvis w Contrast    Addendum Date: 12/16/2021    Discussed with Dr. Richardson by Dr. Fahrner over telephone at 11:41 AM.    Result Date: 12/16/2021  EXAM: CT ABDOMEN PELVIS W CONTRAST LOCATION: [facility] DATE/TIME: 12/16/2021 3:00 AM INDICATION: Intrabdominal abscess COMPARISON: CT 12/8/2021 and 12/9/2021 TECHNIQUE: CT scan of the abdomen and pelvis was performed following injection of IV contrast. Multiplanar reformats were obtained. Dose reduction techniques were used. CONTRAST: IsoVue 370 100mL FINDINGS: LOWER CHEST: Normal. HEPATOBILIARY: Normal. PANCREAS: Normal. SPLEEN: Splenules are present. ADRENAL GLANDS: A 10 mm area of fullness in the left adrenal gland has not changed. KIDNEYS/BLADDER: Again seen are nonobstructing left renal calculi. There are simple cysts in the right kidney which do not require follow-up. Smaller renal lesions are too small to characterize. BOWEL: There is surgical change in the right lower quadrant. There is distal small bowel wall thickening in this region. The small bowel wall is not fully visualized, and there is a small amount of extraluminal fluid and air in this region (series 3 images 93 and 96). There is new free air in the right lower quadrant mesentery (series 3 image 105). LYMPH NODES: Normal. VASCULATURE: Atherosclerotic  disease. PELVIC ORGANS: Normal. OTHER: A fluid collection adjacent to the rectum is 1.9 x 1.5 x 1.5 cm (approximately 2.2 mL). Previously the collection was 4.4 x 3.6 x 4.4 cm. A pigtail catheter terminates within this collection. MUSCULOSKELETAL: Surgical change along the ventral abdominal and pelvic wall. There is a 20.8 x 3.2 x 1.2 cm fluid and air collection in the midline ventral abdominal and pelvic wall just deep to the staple line. Right lower quadrant and left midabdomen approach Larry drains are present.     IMPRESSION: 1.  The pelvic fluid collection is significantly smaller. A pigtail catheter is in place. 2.  Increased free air in the mesentery in the right lower quadrant, suggesting suture line dehiscence. There is also a suspected distal small bowel defect with adjacent free air and fluid. 3.  New fluid collection in the subcutaneous tissues of the ventral abdominal and pelvic wall, just deep to the staple line. Recommend drainage.     CT Abdomen Pelvis w Contrast    Result Date: 12/8/2021  EXAM: CT ABDOMEN PELVIS W CONTRAST LOCATION: [facility] DATE/TIME: 12/8/2021 1:40 PM INDICATION: Abdominal abscess/infection suspected, status post laparotomy with repair of small bowel perforation, recent small bowel resection COMPARISON: CT 11/30/2021 TECHNIQUE: CT scan of the abdomen and pelvis was performed following injection of IV contrast. Multiplanar reformats were obtained. Dose reduction techniques were used. CONTRAST: 100 mL Isovue-370 FINDINGS: LOWER CHEST: Mild bilateral lower lobar dependent consolidation and trace right-sided pleural effusion. HEPATOBILIARY: Prominent gallbladder likely reflecting recent fasting. Stable mild biliary ductal dilatation. No obstructing mass lesion or radiodense stone. Stable small low-density hepatic lesions, likely cysts. PANCREAS: Normal. SPLEEN: Surgically absent. ADRENAL GLANDS: Normal. KIDNEYS/BLADDER: Stable nonobstructing lower left renal  stones with a dominant stone measuring 4 mm. No hydronephrosis. Unremarkable urinary bladder. BOWEL: Enteric suction tube with the tip located within the mid gastric lumen. Mild to moderately distended segments of mid small bowel with associated air-fluid levels. No discrete transition is identified. Right lower quadrant anastomotic changes. Small amount of nonloculated fluid and extraluminal air within the mid abdomen for example image 50 series 400.2 and image 97 series 3. Questionable adjacent small bowel defect image 50 series 102. Small amount of nonloculated fluid is also noted within the right lower quadrant. Newly visualized rim-enhancing pelvic fluid collection measuring 4.4 x 3.6 x 4.4 cm. Trace ascites. Midline laparotomy incision with a small amount of fluid and air within the wound. Surgical drain tip is located at the upper aspect of this wound. Additional surgical drain with the tip located within the left lower quadrant. Third surgical drain with the tip located in the lower anterior abdomen. Diffuse mesenteric edema. LYMPH NODES: Normal. VASCULATURE: Mild atherosclerosis. PELVIC ORGANS: Normal. MUSCULOSKELETAL: Bilateral L4 pars defects with grade 1 anterolisthesis at L4-L5. Associated severe discogenic degenerative changes.     IMPRESSION: 1.  Newly visualized 4.6 cm rim-enhancing fluid collection within the pelvis. 2.  Small ill-defined irregular pocket of fluid and extraluminal air within the mid abdomen as described above. It is uncertain whether this is related to sequelae of recent surgery or bowel leakage. Recommend short interval follow-up CT with positive enteric contrast. 3.  Mild to moderate mid small bowel distention. This is favored to reflect an ileus rather than obstruction. 4.  Trace ascites and diffuse mesenteric edema. 5.  Mild bilateral lower lobar consolidation and trace right-sided pleural effusion. [Critical Result: Pulmonary fluid collection and extraluminal loculated  air-fluid within the mid abdomen as detailed above.] Finding was identified on 12/8/2021 2:10 PM. Dr. Richardson was contacted by me on 12/8/2021 2:50 PM and verbalized understanding of the critical result.     CT Abdomen Pelvis w Contrast    Result Date: 11/30/2021  EXAM: CT ABDOMEN PELVIS W CONTRAST LOCATION: [facility] DATE/TIME: 11/30/2021 10:48 AM INDICATION: Abdominal distension COMPARISON: None. TECHNIQUE: CT scan of the abdomen and pelvis was performed following injection of IV contrast. Multiplanar reformats were obtained. Dose reduction techniques were used. CONTRAST: IsoVue 370 100mL FINDINGS: LOWER CHEST: Bibasilar atelectasis. HEPATOBILIARY: Scattered small water density foci in the liver consistent with cysts. No radiopaque gallstone. No suspicious hepatic lesions. PANCREAS: Coarse calcification in the pancreatic tail and body may represent sequelae of chronic pancreatitis no acute pancreatitis or abnormal pancreatic mass. SPLEEN: Not visualized consistent with prior splenectomy. ADRENAL GLANDS: Normal. KIDNEYS/BLADDER: No hydronephrosis or masses. No bladder stone or mass. BOWEL: Anastomotic staples in the colon in the right mid abdomen. There is normal caliber duodenum and proximal jejunum with dilated loops of distal jejunum and ileum with some fecal i-STAT ileal contents and a transition in the right mid to lower abdomen. Findings are consistent with mechanical small bowel obstruction and this may represent a closed loop obstruction such as internal hernia or multiple adhesions given the lack of dilatation of the proximal small bowel. No pneumatosis intestinalis,  free intraperitoneal air or abscess. LYMPH NODES: No lymphadenopathy. VASCULATURE: Mild aortoiliac calcification without aneurysm. PELVIC ORGANS: Mild prostatic enlargement. MUSCULOSKELETAL: Disc space narrowing at L4-L5 with disc degeneration and bilateral spondylolysis at L4 with grade 2 spondylolisthesis of L4  "on L5. No acute fracture.     IMPRESSION: 1.  Mechanical small bowel obstruction with dilated distal jejunum and ileum with caliber changes in the right lower quadrant and in the left midabdomen this may be secondary to adhesions or internal hernia and has the appearance of a closed loop obstruction. No pneumatosis intestinalis, free intraperitoneal air or abscess. 2.  Results were called to Dr. Sanford the time of dictation. NOTE: ABNORMAL REPORT THE DICTATION ABOVE DESCRIBES AN ABNORMALITY FOR WHICH FOLLOW-UP IS NEEDED.     POC US Guidance Needle Placement    Result Date: 11/30/2021  Ultrasound was performed as guidance to an anesthesia procedure.  Click \"PACS images\" hyperlink below to view any stored images.  For specific procedure details, view procedure note authored by anesthesia.    XR Video Swallow with SLP or OT    Result Date: 11/24/2021  EXAM: XR VIDEO SWALLOW WITH SLP OR OT LOCATION: [facility] DATE/TIME: 11/24/2021 9:18 AM INDICATION: Difficulty swallowing. COMPARISON: Same day esophagram.; CT for lung cancer screening 03/09/2020 TECHNIQUE: Routine swallow study with speech pathology using multiple barium thicknesses. FINDINGS: FLUOROSCOPIC TIME: 0.2 minutes NUMBER OF IMAGES: 2 videofluoroscopic imaging series Swallow study with Speech Pathology using multiple barium thicknesses. Patient is edentulous. Trials of thin consistency and barium coated cracker were performed. No delay in initiation of the oral phase. Normal epiglottic inversion. No penetration or aspiration observed. Moderate to severe cervical spondylosis with anterior wedging, disc space narrowing and osteophytosis of C4, C5, and C6. Straightening and reversal of normal thoracic lordosis.     IMPRESSION: No penetration or aspiration. Please see separately dictated report from speech pathology for additional description, analysis, and management recommendations.     XR Esophagram    Result Date: 11/24/2021  EXAM: XR " "ESOPHAGRAM LOCATION: [facility] DATE/TIME: 11/24/2021 9:18 AM INDICATION: 67 yo M with dysphagia/dyspnea - cough. Feels food stuck in throat with swallow. Occ. chocking. Needs to swallow lot of liquids. Constant runny nose & nasal congestion. COMPARISON: Same day swallow study; CT of the chest without contrast 03/09/2020 TECHNIQUE: Routine. FINDINGS: FLUOROSCOPIC TIME: 1.2 minutes NUMBER OF IMAGES: 4 videofluoroscopic imaging series and additional 41 images. ESOPHAGUS: Patulous esophagus with diffusely abnormal peristalsis. Large number of tertiary contractions present with delayed esophageal emptying. No esophageal stricture, diverticula or mass. No hiatal hernia. There is a large amount of retained contrast within the esophagus after consumption of contrast in the RPO position in transitioning to the supine position. This retained contrast moves to and fro within the esophagus. During the transition from RPO to supine position the patient felt a \"tickle\" in the back of his throat prompting coughing, while not observed likely relates to reflux of contrast from the upper esophagus into the pharynx. No gastroesophageal reflux elicited in the recumbent position with Valsalva maneuver.     IMPRESSION: Moderately severe esophageal dysmotility (presbyesophagus).    "

## 2021-12-27 NOTE — PLAN OF CARE
Problem: Infection (Surgery Nonspecified)  Goal: Absence of Infection Signs and Symptoms  Outcome: Improving   Pt febrile; oral temp of 100.5; denied any sx.  Tylenol given;  Temp upon recheck:    Abdominal fistulas are draining bile colored o/p in to the ostomy appliances.  The lower fistula is putting out more than the superior fistula.       Problem: Pain (Surgery Nonspecified)  Goal: Acceptable Pain Control  Outcome: Improving  Intervention: Prevent or Manage Pain  Recent Flowsheet Documentation  Taken 12/26/2021 0012 by Cortney Virgen RN  Pain Management Interventions:    rest    declines   Received percocet for generalized body aches.     PICC line is infusing,  TPN infusing at 70 hr; pt tolerating a clears diet.  Unable to draw blood from the PICC line.

## 2021-12-27 NOTE — PROGRESS NOTES
"CLINICAL NUTRITION SERVICES - REASSESSMENT NOTE     Nutrition Prescription    RECOMMENDATIONS FOR MDs/PROVIDERS TO ORDER:  None     Malnutrition Status:    Severe    Recommendations already ordered by Registered Dietitian (RD):  Continue same TPN today: D 400  @ 70 ml/hr plus 250 ml  Of 20% lipids 5 times per week over 12 hrs to provide: 2177 Calories  114 g protein, 400 g CHO, 36 g fat and 1680 ml fluid    New weight    Future/Additional Recommendations:  Follow labs/liver enzymes and need for possible cyclic regimen     EVALUATION OF THE PROGRESS TOWARD GOALS   Diet: Clear liquids  Nutrition Support: TPN: formula and provisions as above  Intake: 720 ml po fluid intake yesterday    NEW FINDINGS  Increased strength, decreased pain     ANTHROPOMETRICS  Height: 170.2 cm (5' 7\")  Most Recent Weight: 75.5 kg (166 lb 6.4 oz)  12/22  IBW: 67.3 kg  BMI: Overweight BMI 25-29.9  Weight History:   Wt Readings from Last 10 Encounters:   12/22/21 75.5 kg (166 lb 6.4 oz)   12/17/21 76.8 kg (169 lb 6.4 oz)   01/22/20 79.4 kg (175 lb)   12/24/19 81.2 kg (179 lb)   1.9% weight loss x 5 days  4.9% weight loss x 1 month    PHYSICAL FINDINGS  See malnutrition section below.    GI CONCERNS  Fistulas and old incision sites  Abdomen tender  normoactive BS all quadrants  Abdominal discomfort  Liquid Bm yesterday  Fistula x 2 OP 1175  Ml yesterday, increased    LABS  Reviewed:   Trig 169, elevated but improved  Alk Phos improved    MEDICATIONS  Reviewed: iv abx,  diflucan, levothyroxine, pantoprazole    MALNUTRITION:  % Weight Loss:  1-2% in 1 week (moderate malnutrition)  % Intake:  No decreased intake noted  Subcutaneous Fat Loss:  Orbital region severe depletion and Upper arm region mild to moderate depletion  Muscle Loss:  Temporal region moderate depletion, Clavicle bone region severe depletion, Dorsal hand region mild to moderate depletion and Patellar region moderate depletion  Fluid Retention:  None noted    Malnutrition " Diagnosis: Severe malnutrition  In Context of:  Acute illness or injury    CURRENT NUTRITION DIAGNOSIS  Malnutrition related to s/p surgical intervention of SBO with formation of enterocutaneous fistula as evidenced by 1.7% weight loss in 1 week, severe subcutaneous fat loss and moderate muscle loss.      INTERVENTIONS  Implementation  Continue same TPN today  Ordered biweekly weights    Goals  Tolerate TPN-progressing  Maintain weight -new    Monitoring/Evaluation  Progress toward goals will be monitored and evaluated per protocol: diet order, TPN intake, weight, labs, nutrition-focused physical findings

## 2021-12-27 NOTE — PLAN OF CARE
Intervention: Promote Wound Healing: Pouched on abdomen and intact and draining. Pouch is checked and emptied.   Recent Flowsheet Documentation  Taken 12/27/2021 0911 by Navi Dooley, RN  Activity Management: activity adjusted per tolerance     Problem: Pain (Surgery Nonspecified)  Goal: Acceptable Pain Control  12/27/2021 1518 by Navi Dooley, RN  Outcome: Improving: patient received percocet for pain on RUQ  abdomen. Patient decreases form 7/10 to 4/10. Continue to monitor and update.

## 2021-12-27 NOTE — PROGRESS NOTES
Primary Medicine Progress Note    Assessment/Plan  Active Problems:    Abdominal pain, generalized      Chema Dia is a 65 y/o M with past medical history of splenectomy, appendectomy, HTN, and substance abuse, with recent admission to hospital 11/30-12/21/2021 for SBO with perforation s/p surgical repair. Discharged home but presents as he could not manage at home and is now requesting to be placed in TCU vs LTC. Continues on TPN with surgery and ID following while awaiting LTC placement.       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 Required subsequent washout on 12/3 with aggressive IV antibiotic regimen and MARISA drain placement. On 12/8 found to have continued enhancing abscess with drain placed 12/9 and removed on 12/16. Antibiotics stopped on 12/20. Plan for close follow up with surgery and TPN at discharge, with plans to have help from his brother in law. Then returned on 12/22 given unable to care for himself even with brother in law's assistance; requesting LTC/TCU placement.  -PT/OT ordered, appreciate recs on placement  -Plan to begin search for TCU/LTACH placement with care management  -Pain management: Percocet 1-2 tablets q6h PRN, gabapentin   -Consulted surgery to follow while here  -Consulted ID              -Now on vanc/cefepime/fluconazole  -Consult pharmacy and RD for TPN management no changes today.  -WOC    Alk phos elevation  Continues to climb day by day.  Rest of LFTs okay.  White count normal.  Had been just monitoring, though Chema spiked a fever overnight and looks mildly ill today.  Endorsing RUQ TTP on exam.  Spoke with Dr. Young who agrees we should get an ultrasound.  -RUQ US to rule out acute cholecystitis     Depression  Insomnia  Hx of this. Reports  symptoms for past few days= low motivation, little interest in doing things, poor sleep, no appetite. Is on buspar, mirtazapine, and effexor, though doesn't really think medicines are as helpful as talk therapy is. Wanted to speak to social work and . States no plan to harm himself. Reports he just needs to leave the hospital.  -Consult psych for med management and plan for talk therapy while hospitalized  -Discontinued buspar and mirtazapine per psych  -Increased venlafaxine and gabapentin per psych  -Seroquel for sleep     Moderate malnutrition  Per RD eval  -TPN per RD and pharmacy     Normocytic anemia  Stable at 10.8, MCV of 93.       Chronic Conditions:  Hypothyroidism- resume PTA levothyroxine  Chronic pain- Hold mexolicam 7.5mg daily, resume PTA gabapentin  BPH-hold home Flomax given he states it does not help    Subjective:   States he was very hot last night and sweat through his sheets.  Reporting that he does not feel so well this morning, increased abdominal pain.  States he does have some right upper quadrant pain.    Objective    Vital signs in last 24 hours Temp:  [98.8  F (37.1  C)-100.5  F (38.1  C)] 98.8  F (37.1  C)  Pulse:  [] 93  Resp:  [18] 18  BP: ()/(63-78) 106/78  SpO2:  [93 %-97 %] 97 %       Weight:   Vitals:    12/21/21 2223 12/22/21 1242   Weight: 78.5 kg (173 lb) 75.5 kg (166 lb 6.4 oz)       Intake/Output last 3 shift I/O last 3 completed shifts:  In: 2422.17 [P.O.:720]  Out: 1650 [Urine:475; Drains:1025; Other:150]    Intake/Output this shift:I/O this shift:  In: -   Out: 150 [Drains:150]    PHYSICAL EXAM  GENERAL APPEARANCE: Cooperative; appears stated age  LUNGS: No respiratory distress  HEART: Brisk cap refill  ABDOMEN: Ostomy bag draining stool.  Lap sites C/D/I.  Moderate concentrated RUQ TTP.  No guarding or rebound.  EXTREMITIES: Grossly normal without deformity, no edema  SKIN: no rashes, skin warm  NEURO: Oriented to time and place    Pertinent Labs and  Pertinent Radiology   Lab Results: personally reviewed.     Recent Labs   Lab 12/27/21  0648   WBC 7.7   HGB 10.6*   HCT 33.6*          Recent Labs   Lab 12/27/21  0648      CO2 26   BUN 19   ALBUMIN 2.1*   ALKPHOS 144*   ALT 28   AST 28       Radiology Results:   Results for orders placed or performed during the hospital encounter of 12/21/21   CT Abdomen Pelvis w Contrast    Impression    IMPRESSION:   1.  Improved prerectal fluid collection.  2.  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  3.  Possible right abdominal anastomotic dehiscence with a small amount free fluid and air in the mesentery.  4.  Report called to floor nurse Susy at 4:25 PM       Precepted patient with Dr. Willy Mcmahon MD - PGY2  [facility]  P: [phone]      Parts of this note were created with the assistance of voice recognition software.  The note was reviewed for accuracy.  However, errors in spelling, etc may have resulted.

## 2021-12-27 NOTE — PROGRESS NOTES
General Surgery Progress Note:    Hospital Day # 5    ASSESSMENT:   1. Abdominal pain, generalized    2.  Abdominal cellulitis      -Improving  3.  EC fistula      -Patient is currently on TPN  4.  History of abdominal surgeries secondary to SBO      PLAN:   - Continue conservative management with TPN.  - Continue with antibiotics and antifungal  -Surgery team will continue to follow with you      SUBJECTIVE:   Gurdeep Dia was seen on a.m. rounds.  Patient is doing much better over the weekend.  Patient states that he is stronger than when he first arrived.  Patient has no fevers no chills.  He denies any chest pain shortness of breath.  Patient states that his abdomen is less painful and softer.    Patient Vitals for the past 24 hrs:   BP Temp Temp src Pulse Resp SpO2   12/27/21 0215 -- 99.3  F (37.4  C) Oral -- -- --   12/26/21 2330 105/70 (!) 100.5  F (38.1  C) Oral 93 18 93 %   12/26/21 1517 95/63 99.7  F (37.6  C) Oral 103 18 93 %       Physical Exam:  General: NAD, pleasant  ABD: Soft, nontender to palpation.  Patient has 2 ostomy bags both still draining liquid abdominal content.    EXT:no CCE    Admission on 12/21/2021   Component Date Value     Sodium 12/22/2021 130*     Potassium 12/22/2021 4.9      Chloride 12/22/2021 101      Carbon Dioxide (CO2) 12/22/2021 21*     Anion Gap 12/22/2021 8      Urea Nitrogen 12/22/2021 22      Creatinine 12/22/2021 0.89      Calcium 12/22/2021 8.8      Glucose 12/22/2021 128*     Alkaline Phosphatase 12/22/2021 132*     AST 12/22/2021 31      ALT 12/22/2021 18      Protein Total 12/22/2021 7.6      Albumin 12/22/2021 2.4*     Bilirubin Total 12/22/2021 0.9      GFR Estimate 12/22/2021 >90      Lipase 12/22/2021 64*     Lactic Acid 12/22/2021 1.2      Magnesium 12/22/2021 2.0      SARS CoV2 PCR 12/22/2021 Negative      WBC Count 12/22/2021 13.8*     RBC Count 12/22/2021 4.03*     Hemoglobin 12/22/2021 12.0*     Hematocrit 12/22/2021 36.1*     MCV 12/22/2021 90      MCH  12/22/2021 29.8      MCHC 12/22/2021 33.2      RDW 12/22/2021 15.1*     Platelet Count 12/22/2021 564*     % Neutrophils 12/22/2021 71      % Lymphocytes 12/22/2021 13      % Monocytes 12/22/2021 11      % Eosinophils 12/22/2021 2      % Basophils 12/22/2021 1      % Metamyelocytes 12/22/2021 2      Absolute Neutrophils 12/22/2021 9.8*     Absolute Lymphocytes 12/22/2021 1.8      Absolute Monocytes 12/22/2021 1.5*     Absolute Eosinophils 12/22/2021 0.3      Absolute Basophils 12/22/2021 0.1      Absolute Metamyelocytes 12/22/2021 0.3*     RBC Morphology 12/22/2021 Confirmed RBC Indices      Platelet Assessment 12/22/2021 Platelets Clumped*     Basophilic Stippling 12/22/2021 Present*     Polychromasia 12/22/2021 Slight*     Reactive Lymphocytes 12/22/2021 Present*     Target Cells 12/22/2021 Slight*     Lactic Acid 12/22/2021 0.8      Culture 12/22/2021 Culture in progress      Culture 12/22/2021 3+ Escherichia coli*     Culture 12/22/2021 3+ Escherichia coli*     Culture 12/22/2021 2+ Candida parapsilosis complex*     Culture 12/22/2021 No growth after 4 days      Culture 12/22/2021 No growth after 4 days      GLUCOSE BY METER POCT 12/22/2021 93      Sodium 12/23/2021 134*     Potassium 12/23/2021 4.3      Chloride 12/23/2021 105      Carbon Dioxide (CO2) 12/23/2021 23      Anion Gap 12/23/2021 6      Urea Nitrogen 12/23/2021 23*     Creatinine 12/23/2021 0.81      Calcium 12/23/2021 7.9*     Glucose 12/23/2021 130*     GFR Estimate 12/23/2021 >90      WBC Count 12/23/2021 8.8      RBC Count 12/23/2021 3.61*     Hemoglobin 12/23/2021 10.9*     Hematocrit 12/23/2021 33.1*     MCV 12/23/2021 92      MCH 12/23/2021 30.2      MCHC 12/23/2021 32.9      RDW 12/23/2021 15.5*     Platelet Count 12/23/2021 453*     GLUCOSE BY METER POCT 12/23/2021 127*     GLUCOSE BY METER POCT 12/23/2021 145*     GLUCOSE BY METER POCT 12/23/2021 135*     Magnesium 12/24/2021 2.0      Phosphorus 12/24/2021 3.3      Sodium 12/24/2021 134*      Potassium 12/24/2021 4.6      Chloride 12/24/2021 104      Carbon Dioxide (CO2) 12/24/2021 24      Anion Gap 12/24/2021 6      Urea Nitrogen 12/24/2021 21      Creatinine 12/24/2021 0.83      Calcium 12/24/2021 8.4*     Glucose 12/24/2021 122      Alkaline Phosphatase 12/24/2021 113      AST 12/24/2021 21      ALT 12/24/2021 21      Protein Total 12/24/2021 6.6      Albumin 12/24/2021 2.0*     Bilirubin Total 12/24/2021 1.0      GFR Estimate 12/24/2021 >90      Calcium, Ionized pH 7.4 12/24/2021 1.14      pH 12/24/2021 7.38      Calcium, Ionized Measured 12/24/2021 1.15      WBC Count 12/24/2021 8.1      RBC Count 12/24/2021 3.64*     Hemoglobin 12/24/2021 10.8*     Hematocrit 12/24/2021 33.7*     MCV 12/24/2021 93      MCH 12/24/2021 29.7      MCHC 12/24/2021 32.0      RDW 12/24/2021 15.9*     Platelet Count 12/24/2021 402      Vancomycin 12/24/2021 13.2      GLUCOSE BY METER POCT 12/24/2021 143*     GLUCOSE BY METER POCT 12/24/2021 117*     Sodium 12/25/2021 134*     Potassium 12/25/2021 4.3      Chloride 12/25/2021 106      Carbon Dioxide (CO2) 12/25/2021 20*     Anion Gap 12/25/2021 8      Urea Nitrogen 12/25/2021 19      Creatinine 12/25/2021 0.80      Calcium 12/25/2021 8.3*     Glucose 12/25/2021 105      Alkaline Phosphatase 12/25/2021 136*     AST 12/25/2021 30      ALT 12/25/2021 34      Protein Total 12/25/2021 6.6      Albumin 12/25/2021 2.0*     Bilirubin Total 12/25/2021 1.0      GFR Estimate 12/25/2021 >90      WBC Count 12/25/2021 8.5      RBC Count 12/25/2021 3.42*     Hemoglobin 12/25/2021 10.1*     Hematocrit 12/25/2021 31.6*     MCV 12/25/2021 92      MCH 12/25/2021 29.5      MCHC 12/25/2021 32.0      RDW 12/25/2021 15.9*     Platelet Count 12/25/2021 349      GLUCOSE BY METER POCT 12/25/2021 139*     GLUCOSE BY METER POCT 12/25/2021 143*     GLUCOSE BY METER POCT 12/25/2021 126*     GLUCOSE BY METER POCT 12/25/2021 123*     GLUCOSE BY METER POCT 12/25/2021 106*     Sodium 12/26/2021 137       Potassium 12/26/2021 4.8      Chloride 12/26/2021 106      Carbon Dioxide (CO2) 12/26/2021 24      Anion Gap 12/26/2021 7      Urea Nitrogen 12/26/2021 20      Creatinine 12/26/2021 0.82      Calcium 12/26/2021 8.5      Glucose 12/26/2021 88      Alkaline Phosphatase 12/26/2021 136*     AST 12/26/2021 24      ALT 12/26/2021 33      Protein Total 12/26/2021 6.7      Albumin 12/26/2021 2.0*     Bilirubin Total 12/26/2021 1.2*     GFR Estimate 12/26/2021 >90      WBC Count 12/26/2021 7.6      RBC Count 12/26/2021 3.61*     Hemoglobin 12/26/2021 10.5*     Hematocrit 12/26/2021 33.8*     MCV 12/26/2021 94      MCH 12/26/2021 29.1      MCHC 12/26/2021 31.1*     RDW 12/26/2021 16.2*     Platelet Count 12/26/2021 352      GLUCOSE BY METER POCT 12/26/2021 123*     Magnesium 12/27/2021 2.1      Phosphorus 12/27/2021 3.1      INR 12/27/2021 1.19*     Prealbumin 12/27/2021 20      Triglycerides 12/27/2021 169*     Sodium 12/27/2021 137      Potassium 12/27/2021 4.9      Chloride 12/27/2021 105      Carbon Dioxide (CO2) 12/27/2021 26      Anion Gap 12/27/2021 6      Urea Nitrogen 12/27/2021 19      Creatinine 12/27/2021 0.84      Calcium 12/27/2021 8.8      Glucose 12/27/2021 115      Alkaline Phosphatase 12/27/2021 144*     AST 12/27/2021 28      ALT 12/27/2021 28      Protein Total 12/27/2021 7.0      Albumin 12/27/2021 2.1*     Bilirubin Total 12/27/2021 1.1*     GFR Estimate 12/27/2021 >90      WBC Count 12/27/2021 7.7      RBC Count 12/27/2021 3.60*     Hemoglobin 12/27/2021 10.6*     Hematocrit 12/27/2021 33.6*     MCV 12/27/2021 93      MCH 12/27/2021 29.4      MCHC 12/27/2021 31.5      RDW 12/27/2021 15.9*     Platelet Count 12/27/2021 343      GLUCOSE BY METER POCT 12/27/2021 114*        DO Dany Wise DO  General Surgery

## 2021-12-27 NOTE — PHARMACY-CONSULT NOTE
"Pharmacy Note: Parenteral Nutrition (PN) Management    Pharmacist consulted to dose PN for Gurdeep Dia, a 66 year old male by Dr. Mcmahon.    Subjective:    The patient was started on PN in the hospital on 12/5/21.    Indication for PN therapy: bowel resection - plan to continue PN until fistula heals    Inadequate nutrition anticipated for > 7 days.     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      Social History     Tobacco Use     Smoking status: Current Every Day Smoker     Smokeless tobacco: Never Used   Substance Use Topics     Alcohol use: Not Currently     Comment: Clean for 5 years     Drug use: Not Currently     Objective:    Ht Readings from Last 1 Encounters:   12/22/21 1.702 m (5' 7\")     Wt Readings from Last 1 Encounters:   12/22/21 75.5 kg (166 lb 6.4 oz)       Body mass index is 26.06 kg/m .    No data found.    Labs:  Last 3 days:  Recent Labs     12/25/21  0642 12/26/21  0656 12/27/21  0648   * 137 137   POTASSIUM 4.3 4.8 4.9   CHLORIDE 106 106 105   CO2 20* 24 26   BUN 19 20 19   CR 0.80 0.82 0.84   VIJAYA 8.3* 8.5 8.8   MAG  --   --  2.1   PHOS  --   --  3.1   PROTTOTAL 6.6 6.7 7.0   ALBUMIN 2.0* 2.0* 2.1*   PREALB  --   --  20   TRIG  --   --  169*   HGB 10.1* 10.5* 10.6*   HCT 31.6* 33.8* 33.6*    352 343   BILITOTAL 1.0 1.2* 1.1*   AST 30 24 28   ALT 34 33 28   ALKPHOS 136* 136* 144*   INR  --   --  1.19*       Glucose (past 48 hours):   Recent Labs     12/25/21  1120 12/25/21  1218 12/25/21  1842 12/26/21  0656 12/26/21  1933 12/27/21  0551 12/27/21  0648 12/27/21  0850   * 123* 106* 88 123* 114* 115 142*       Intake/Output (last 24 hours): I/O last 3 completed shifts:  In: 2422.17 [P.O.:720]  Out: 1650 [Urine:475; Drains:1025; Other:150]    Estimated CrCl: Estimated Creatinine Clearance: 91.1 mL/min (based on SCr of 0.84 mg/dL).    Assessment:    Continue patient on PN therapy as a continuous central therapy.     Given the patient's current " condition/oral intake, PN is still indicated.    Lab results reviewed: 12/27/21  Sodium has corrected nicely. Potassium continues to fluctuate and now requires a slight decrease in dose (80 mEq-->75 mEq) in an effort to avoid hyperkalemia.    Plan:  1. Rate of PN: 70 mL/hr.  2. Formula:     Amino Acids 115 grams    Dextrose 400 grams    Sodium 110 mEq/day    Potassium 75 mEq/day    Calcium 8 mEq/day    Magnesium 14 mEq/day    Phosphorus 32 mMol/day    Chloride: Acetate Ratio 1:1    Standard Multivitamins w/Vitamin K    Trace Elements  3. Fat Emulsion: 20%, 250 mL IV five times weekly  4. Check TPN labs per protocol.  5. Pharmacist will continue to follow the patient's lab results, clinical status and blood glucose results and make adjustments as appropriate.    Thank you for the consult.  Hansel Ryan [facility]  12/27/2021 10:35 AM

## 2021-12-28 ENCOUNTER — APPOINTMENT (OUTPATIENT)
Dept: PHYSICAL THERAPY | Facility: HOSPITAL | Age: 66
DRG: 393 | End: 2021-12-28
Payer: MEDICARE

## 2021-12-28 LAB
ALBUMIN SERPL-MCNC: 2.1 G/DL (ref 3.5–5)
ALP SERPL-CCNC: 155 U/L (ref 45–120)
ALT SERPL W P-5'-P-CCNC: 31 U/L (ref 0–45)
ANION GAP SERPL CALCULATED.3IONS-SCNC: 7 MMOL/L (ref 5–18)
AST SERPL W P-5'-P-CCNC: 27 U/L (ref 0–40)
BILIRUB SERPL-MCNC: 0.8 MG/DL (ref 0–1)
BUN SERPL-MCNC: 20 MG/DL (ref 8–22)
CALCIUM SERPL-MCNC: 8.7 MG/DL (ref 8.5–10.5)
CHLORIDE BLD-SCNC: 106 MMOL/L (ref 98–107)
CO2 SERPL-SCNC: 24 MMOL/L (ref 22–31)
CREAT SERPL-MCNC: 0.8 MG/DL (ref 0.7–1.3)
ERYTHROCYTE [DISTWIDTH] IN BLOOD BY AUTOMATED COUNT: 15.9 % (ref 10–15)
GFR SERPL CREATININE-BSD FRML MDRD: >90 ML/MIN/1.73M2
GLUCOSE BLD-MCNC: 105 MG/DL (ref 70–125)
HCT VFR BLD AUTO: 33.9 % (ref 40–53)
HGB BLD-MCNC: 10.8 G/DL (ref 13.3–17.7)
MCH RBC QN AUTO: 29.5 PG (ref 26.5–33)
MCHC RBC AUTO-ENTMCNC: 31.9 G/DL (ref 31.5–36.5)
MCV RBC AUTO: 93 FL (ref 78–100)
PLATELET # BLD AUTO: 348 10E3/UL (ref 150–450)
POTASSIUM BLD-SCNC: 4.4 MMOL/L (ref 3.5–5)
PROT SERPL-MCNC: 7.3 G/DL (ref 6–8)
RBC # BLD AUTO: 3.66 10E6/UL (ref 4.4–5.9)
SODIUM SERPL-SCNC: 137 MMOL/L (ref 136–145)
WBC # BLD AUTO: 8.5 10E3/UL (ref 4–11)

## 2021-12-28 PROCEDURE — 97110 THERAPEUTIC EXERCISES: CPT | Mod: GP

## 2021-12-28 PROCEDURE — 250N000011 HC RX IP 250 OP 636: Performed by: STUDENT IN AN ORGANIZED HEALTH CARE EDUCATION/TRAINING PROGRAM

## 2021-12-28 PROCEDURE — 250N000009 HC RX 250: Performed by: FAMILY MEDICINE

## 2021-12-28 PROCEDURE — 85027 COMPLETE CBC AUTOMATED: CPT | Performed by: STUDENT IN AN ORGANIZED HEALTH CARE EDUCATION/TRAINING PROGRAM

## 2021-12-28 PROCEDURE — 250N000013 HC RX MED GY IP 250 OP 250 PS 637: Performed by: NURSE PRACTITIONER

## 2021-12-28 PROCEDURE — 99024 POSTOP FOLLOW-UP VISIT: CPT | Performed by: SPECIALIST

## 2021-12-28 PROCEDURE — 120N000001 HC R&B MED SURG/OB

## 2021-12-28 PROCEDURE — 99231 SBSQ HOSP IP/OBS SF/LOW 25: CPT | Mod: GC | Performed by: STUDENT IN AN ORGANIZED HEALTH CARE EDUCATION/TRAINING PROGRAM

## 2021-12-28 PROCEDURE — 250N000013 HC RX MED GY IP 250 OP 250 PS 637: Performed by: STUDENT IN AN ORGANIZED HEALTH CARE EDUCATION/TRAINING PROGRAM

## 2021-12-28 PROCEDURE — C9113 INJ PANTOPRAZOLE SODIUM, VIA: HCPCS | Performed by: STUDENT IN AN ORGANIZED HEALTH CARE EDUCATION/TRAINING PROGRAM

## 2021-12-28 PROCEDURE — 80053 COMPREHEN METABOLIC PANEL: CPT | Performed by: STUDENT IN AN ORGANIZED HEALTH CARE EDUCATION/TRAINING PROGRAM

## 2021-12-28 PROCEDURE — 250N000011 HC RX IP 250 OP 636

## 2021-12-28 RX ADMIN — OXYCODONE HYDROCHLORIDE AND ACETAMINOPHEN 2 TABLET: 5; 325 TABLET ORAL at 05:44

## 2021-12-28 RX ADMIN — OXYCODONE HYDROCHLORIDE AND ACETAMINOPHEN 1 TABLET: 5; 325 TABLET ORAL at 17:43

## 2021-12-28 RX ADMIN — GABAPENTIN 200 MG: 100 CAPSULE ORAL at 17:36

## 2021-12-28 RX ADMIN — LEVOTHYROXINE SODIUM 25 MCG: 0.03 TABLET ORAL at 05:44

## 2021-12-28 RX ADMIN — ENOXAPARIN SODIUM 40 MG: 40 INJECTION SUBCUTANEOUS at 09:49

## 2021-12-28 RX ADMIN — GABAPENTIN 200 MG: 100 CAPSULE ORAL at 09:49

## 2021-12-28 RX ADMIN — OXYCODONE HYDROCHLORIDE AND ACETAMINOPHEN 1 TABLET: 5; 325 TABLET ORAL at 22:29

## 2021-12-28 RX ADMIN — QUETIAPINE FUMARATE 200 MG: 100 TABLET, FILM COATED ORAL at 22:27

## 2021-12-28 RX ADMIN — CALCIUM CARBONATE (ANTACID) CHEW TAB 500 MG 1000 MG: 500 CHEW TAB at 17:43

## 2021-12-28 RX ADMIN — CEFTRIAXONE SODIUM 2 G: 2 INJECTION, POWDER, FOR SOLUTION INTRAMUSCULAR; INTRAVENOUS at 17:36

## 2021-12-28 RX ADMIN — PANTOPRAZOLE SODIUM 40 MG: 40 INJECTION, POWDER, FOR SOLUTION INTRAVENOUS at 09:48

## 2021-12-28 RX ADMIN — FLUCONAZOLE 200 MG: 2 INJECTION, SOLUTION INTRAVENOUS at 19:12

## 2021-12-28 RX ADMIN — GABAPENTIN 900 MG: 300 CAPSULE ORAL at 22:28

## 2021-12-28 RX ADMIN — POTASSIUM CHLORIDE: 2 INJECTION, SOLUTION, CONCENTRATE INTRAVENOUS at 19:34

## 2021-12-28 RX ADMIN — QUETIAPINE 100 MG: 25 TABLET ORAL at 05:46

## 2021-12-28 RX ADMIN — VENLAFAXINE HYDROCHLORIDE 75 MG: 75 CAPSULE, EXTENDED RELEASE ORAL at 09:49

## 2021-12-28 RX ADMIN — I.V. FAT EMULSION 250 ML: 20 EMULSION INTRAVENOUS at 19:35

## 2021-12-28 ASSESSMENT — ACTIVITIES OF DAILY LIVING (ADL)
ADLS_ACUITY_SCORE: 9
ADLS_ACUITY_SCORE: 13
ADLS_ACUITY_SCORE: 9
ADLS_ACUITY_SCORE: 13
ADLS_ACUITY_SCORE: 13
ADLS_ACUITY_SCORE: 9
ADLS_ACUITY_SCORE: 13
ADLS_ACUITY_SCORE: 9
ADLS_ACUITY_SCORE: 13
ADLS_ACUITY_SCORE: 9
ADLS_ACUITY_SCORE: 13

## 2021-12-28 ASSESSMENT — MIFFLIN-ST. JEOR: SCORE: 1489.78

## 2021-12-28 NOTE — PROGRESS NOTES
Primary Medicine Progress Note    Assessment/Plan  Active Problems:    Abdominal pain, generalized      Chema Dia is a 67 y/o M with past medical history of splenectomy, appendectomy, HTN, and substance abuse, with recent admission to hospital 11/30-12/21/2021 for SBO with perforation s/p surgical repair. Discharged home but presents as he could not manage at home and is now requesting to be placed in TCU vs LTC. Continues on TPN with surgery and ID following while awaiting LTC placement.       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 Required subsequent washout on 12/3 with aggressive IV antibiotic regimen and MARISA drain placement. On 12/8 found to have continued enhancing abscess with drain placed 12/9 and removed on 12/16. Antibiotics stopped on 12/20. Plan for close follow up with surgery and TPN at discharge, with plans to have help from his brother in law. Then returned on 12/22 given unable to care for himself even with brother in law's assistance; requesting LTC/TCU placement.  -PT/OT ordered, appreciate recs on placement  -Searching for TCU/LTACH placement with care management  -Pain management: Percocet 1-2 tablets q6h PRN, gabapentin   -Consulted surgery to follow while here  -Consulted ID              -Now on vanc/cefepime/fluconazole  -Consult pharmacy and RD for TPN management no changes today.  -WOC    Alk phos elevation  Continues to climb day by day.  Rest of LFTs okay.  White count normal.  Had been just monitoring, though Chema spiked a fever overnight into 12/27 and looked mildly ill that morning.  Endorsing RUQ TTP on exam.  RUQ US showing sludge but no signs of acute kat.  -Continue to monitor     Depression  Insomnia  Hx of this. Reports symptoms for past  few days= low motivation, little interest in doing things, poor sleep, no appetite. Is on buspar, mirtazapine, and effexor, though doesn't really think medicines are as helpful as talk therapy is. Wanted to speak to social work and . States no plan to harm himself. Reports he just needs to leave the hospital.  -Consult psych for med management and plan for talk therapy while hospitalized  -Discontinued buspar and mirtazapine per psych  -Increased venlafaxine and gabapentin per psych  -Seroquel for sleep     Moderate malnutrition  Per RD eval  -TPN per RD and pharmacy     Normocytic anemia  Stable at 10.8, MCV of 93.       Chronic Conditions:  Hypothyroidism- resume PTA levothyroxine  Chronic pain- Hold mexolicam 7.5mg daily, resume PTA gabapentin  BPH-hold home Flomax given he states it does not help    Subjective:   Feeling okay this morning.  Still feels a little ill compared to this weekend.  No fevers overnight.  Wondering if he can have some liquids or if he needs to stay n.p.o.    Objective    Vital signs in last 24 hours Temp:  [97.8  F (36.6  C)-98.8  F (37.1  C)] 98.4  F (36.9  C)  Pulse:  [84-93] 84  Resp:  [18] 18  BP: (103-111)/(66-78) 103/66  SpO2:  [92 %-97 %] 92 %       Weight:   Vitals:    12/21/21 2223 12/22/21 1242   Weight: 78.5 kg (173 lb) 75.5 kg (166 lb 6.4 oz)       Intake/Output last 3 shift I/O last 3 completed shifts:  In: -   Out: 1150 [Urine:500; Drains:650]    Intake/Output this shift:No intake/output data recorded.    PHYSICAL EXAM  GENERAL APPEARANCE: Cooperative; appears stated age  LUNGS: No respiratory distress  HEART: Brisk cap refill  ABDOMEN: Ostomy bag draining stool.  Lap sites C/D/I.  Moderate concentrated RUQ TTP.  No guarding or rebound.  EXTREMITIES: Grossly normal without deformity, no edema  SKIN: no rashes, skin warm  NEURO: Oriented to time and place    Pertinent Labs and Pertinent Radiology   Lab Results: personally reviewed.     Recent Labs   Lab  12/28/21  0546   WBC 8.5   HGB 10.8*   HCT 33.9*          Recent Labs   Lab 12/28/21  0546      CO2 24   BUN 20   ALBUMIN 2.1*   ALKPHOS 155*   ALT 31   AST 27       Radiology Results:   Results for orders placed or performed during the hospital encounter of 12/21/21   CT Abdomen Pelvis w Contrast    Impression    IMPRESSION:   1.  Improved prerectal fluid collection.  2.  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  3.  Possible right abdominal anastomotic dehiscence with a small amount free fluid and air in the mesentery.  4.  Report called to floor nurse Susy at 4:25 PM       Precepted patient with Dr. Willy Mcmahon MD - PGY2  [facility]  P: [phone]      Parts of this note were created with the assistance of voice recognition software.  The note was reviewed for accuracy.  However, errors in spelling, etc may have resulted.

## 2021-12-28 NOTE — PHARMACY-CONSULT NOTE
"Pharmacy Note: Parenteral Nutrition (PN) Management    Pharmacist consulted to dose PN for Gurdeep Dia, a 66 year old male by Dr. Mcmahon.    Subjective:    The patient was started on PN in the hospital on 12/5/2021.    Indication for PN therapy: bowel resection, plan to continue TPN until fistula heals    Inadequate nutrition existing for > 7 days.     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      Social History     Tobacco Use     Smoking status: Current Every Day Smoker     Smokeless tobacco: Never Used   Substance Use Topics     Alcohol use: Not Currently     Comment: Clean for 5 years     Drug use: Not Currently     Objective:    Ht Readings from Last 1 Encounters:   12/22/21 1.702 m (5' 7\")     Wt Readings from Last 1 Encounters:   12/22/21 75.5 kg (166 lb 6.4 oz)       Body mass index is 26.06 kg/m .    No data found.    Labs:  Last 3 days:  Recent Labs     12/26/21  0656 12/27/21  0648 12/28/21  0546    137 137   POTASSIUM 4.8 4.9 4.4   CHLORIDE 106 105 106   CO2 24 26 24   BUN 20 19 20   CR 0.82 0.84 0.80   VIJAYA 8.5 8.8 8.7   MAG  --  2.1  --    PHOS  --  3.1  --    PROTTOTAL 6.7 7.0 7.3   ALBUMIN 2.0* 2.1* 2.1*   PREALB  --  20  --    TRIG  --  169*  --    HGB 10.5* 10.6* 10.8*   HCT 33.8* 33.6* 33.9*    343 348   BILITOTAL 1.2* 1.1* 0.8   AST 24 28 27   ALT 33 28 31   ALKPHOS 136* 144* 155*   INR  --  1.19*  --        Glucose (past 48 hours):   Recent Labs     12/26/21  1933 12/27/21  0551 12/27/21  0648 12/27/21  0850 12/27/21  1253 12/27/21  1740 12/28/21  0546   * 114* 115 142* 142* 142* 105       Intake/Output (last 24 hours): I/O last 3 completed shifts:  In: -   Out: 1150 [Urine:500; Drains:650]    Estimated CrCl: Estimated Creatinine Clearance: 95.7 mL/min (based on SCr of 0.8 mg/dL).    Assessment:    Continue patient on PN therapy as a continuous central therapy.     Given the patient's current condition/oral intake, PN is still indicated.    Lab " results reviewed: 12/28/2021    Plan:  1. Rate of PN: 70 mL/hr. Maintenance IV fluid rate will be adjusted appropriately to meet the total maximum IV fluids rate as ordered by provider.  2. Formula:     Amino Acids 115 grams    Dextrose 400 grams    Sodium 110 mEq/day    Potassium 75 mEq/day    Calcium 8 mEq/day    Magnesium 14 mEq/day    Phosphorus 32 mMol/day    Chloride: Acetate Ratio 1:1    Standard Multivitamins w/Vitamin K    Trace Elements  3. Fat Emulsion: 20%, 250 mL IV 5 times weekly.  4. Check labs per TPN protocol.  5. Pharmacist will continue to follow the patient's lab results, clinical status and blood glucose results and make adjustments as appropriate.    Thank you for the consult.  Savanna Braun RPH  12/28/2021 9:07 AM

## 2021-12-28 NOTE — PLAN OF CARE
Problem: Infection (Surgery Nonspecified)  Goal: Absence of Infection Signs and Symptoms  Outcome: Improving    Problem: Pain (Surgery Nonspecified)  Goal: Acceptable Pain Control  Outcome: No Change     No events overnight.  PRN percocet given at 2130 per request.  VSS.  Ostomy bags emptied x2 overnight.  TPN/Lipids started at 2000, infusing at 70 ml/hr and 20.8 ml/hr.

## 2021-12-28 NOTE — PLAN OF CARE
Problem: Adult Inpatient Plan of Care  Goal: Patient-Specific Goal (Individualized)  Outcome: Improving: Patient was afebrile and pain on RUQ resolved.     Problem: Adult Inpatient Plan of C  Goal: Optimal Comfort and Wellbeing  Outcome: Improving: Patient's pouch intact all shift. Pouch emptied.     Problem: Impaired Wound Healing  Goal: Optimal Wound Healing  Outcome: Improving: patient decreased his oral intake thus decreasing out put and skin around incision healing.        Patient is alert and oriented. Vital signs are stable. Ramón pain.  Patient tolerate TPN.  Continue to monitor and update.

## 2021-12-28 NOTE — PROGRESS NOTES
Received a request to assist with discharge planning. Full initial referrals made to [facility] per patient's request. Left a message for admissions at both facilities requesting a call back to [phone].    Celine Presley RN

## 2021-12-28 NOTE — PROGRESS NOTES
CLINICAL NUTRITION SERVICES -BRIEF TPN NOTE     Nutrition Prescription    RECOMMENDATIONS FOR MDs/PROVIDERS TO ORDER:  None     Malnutrition Status:    Severe    Recommendations already ordered by Registered Dietitian (RD):  Continue same TPN today: D 400  @ 70 ml/hr plus 250 ml  Of 20% lipids 5 times per week over 12 hrs     New weight    Future/Additional Recommendations:  Follow labs/liver enzymes and need for possible cyclic regimen        ANTHROPOMETRICS  Last weight 12/22    GI CONCERNS  Fistulas and old incision sites  Increased abdominal pain yesterday  normoactive BS all quadrants  Abdominal discomfort  1 BM yesterday  Fistula x 2   Ml yesterday, decreased  Intake was documented yesterday    LABS  Reviewed:   Alk Phos increased    CURRENT NUTRITION DIAGNOSIS  Malnutrition related to s/p surgical intervention of SBO with formation of enterocutaneous fistula as evidenced by 1.7% weight loss in 1 week, severe subcutaneous fat loss and moderate muscle loss.      INTERVENTIONS  Implementation  Continue same TPN today  Ordered weight    Goals  Tolerate TPN-met  Maintain weight -new    Monitoring/Evaluation  Progress toward goals will be monitored and evaluated per protocol: diet order, TPN intake, weight, labs, nutrition-focused physical findings

## 2021-12-28 NOTE — PROGRESS NOTES
General Surgery Progress Note:    Hospital Day # 6    ASSESSMENT:   1. Abdominal pain, generalized        Gurdeep Dia is a 66 year old male status post explore lap lysis of adhesions with resultant 3 days later perforation reoperation oversew of defect and then 5 days later perforation again and at this time point he has fistulas above and below to his incision    PLAN:   Continue ostomy bags continue TPN  Ultrasound yesterday does not show any stones does not show any worrisome signs he has an isolated elevated alk phos.  1 can see elevated isolated alk phos related to bowel issues and/or TPN and is on both and has both.      SUBJECTIVE:   Gurdeep Dia is better today normal stools today and yesterday feeling better from a standpoint no fevers or chills    Patient Vitals for the past 24 hrs:   BP Temp Temp src Pulse Resp SpO2   12/28/21 0027 103/66 98.4  F (36.9  C) Oral 84 18 92 %   12/27/21 1610 111/74 97.8  F (36.6  C) Oral 84 18 94 %   12/27/21 1255 -- 98  F (36.7  C) Oral -- -- --   12/27/21 0851 106/78 98.8  F (37.1  C) Oral 93 18 97 %       Physical Exam:  General: NAD, pleasant  CV:RRR  LUNGS:CTA bilaterally  ABD: bags in place with gas and output  EXT:no CCE    No results displayed because visit has over 200 results.         Study Result    Narrative & Impression   EXAM: US ABDOMEN LIMITED  LOCATION: [facility]  DATE/TIME: 12/27/2021 4:28 PM     INDICATION: R/o acute kat  COMPARISON: None.  TECHNIQUE: Limited abdominal ultrasound.     FINDINGS:     GALLBLADDER: Layering biliary sludge but no calcified stones. No wall thickening, or pericholecystic fluid. Negative sonographic Lake's sign.     BILE DUCTS: No biliary dilatation. The common duct measures 5 mm.     LIVER: Normal parenchyma with smooth contour. No focal mass.     RIGHT KIDNEY: No hydronephrosis. Benign, anechoic cyst measuring up to 18 mm, does not require additional workup or follow-up.     PANCREAS: The  visualized portions are normal. Tail obscured by bowel gas.     No ascites.                                                                      IMPRESSION:     Echogenic biliary sludge but no well-formed stones or other sonographic findings to suggest acute gallbladder inflammation.               Mekhi Richardson MD

## 2021-12-29 LAB
ALBUMIN SERPL-MCNC: 2.1 G/DL (ref 3.5–5)
ALP SERPL-CCNC: 178 U/L (ref 45–120)
ALT SERPL W P-5'-P-CCNC: 28 U/L (ref 0–45)
ANION GAP SERPL CALCULATED.3IONS-SCNC: 11 MMOL/L (ref 5–18)
AST SERPL W P-5'-P-CCNC: 29 U/L (ref 0–40)
BILIRUB SERPL-MCNC: 0.8 MG/DL (ref 0–1)
BUN SERPL-MCNC: 18 MG/DL (ref 8–22)
CALCIUM SERPL-MCNC: 8.7 MG/DL (ref 8.5–10.5)
CHLORIDE BLD-SCNC: 103 MMOL/L (ref 98–107)
CO2 SERPL-SCNC: 24 MMOL/L (ref 22–31)
CREAT SERPL-MCNC: 0.77 MG/DL (ref 0.7–1.3)
ERYTHROCYTE [DISTWIDTH] IN BLOOD BY AUTOMATED COUNT: 16 % (ref 10–15)
GFR SERPL CREATININE-BSD FRML MDRD: >90 ML/MIN/1.73M2
GLUCOSE BLD-MCNC: 102 MG/DL (ref 70–125)
GLUCOSE BLDC GLUCOMTR-MCNC: 119 MG/DL (ref 70–99)
HCT VFR BLD AUTO: 34.6 % (ref 40–53)
HGB BLD-MCNC: 11 G/DL (ref 13.3–17.7)
MAGNESIUM SERPL-MCNC: 1.8 MG/DL (ref 1.8–2.6)
MCH RBC QN AUTO: 29.5 PG (ref 26.5–33)
MCHC RBC AUTO-ENTMCNC: 31.8 G/DL (ref 31.5–36.5)
MCV RBC AUTO: 93 FL (ref 78–100)
PHOSPHATE SERPL-MCNC: 3.2 MG/DL (ref 2.5–4.5)
PLATELET # BLD AUTO: 306 10E3/UL (ref 150–450)
POTASSIUM BLD-SCNC: 4.3 MMOL/L (ref 3.5–5)
PROT SERPL-MCNC: 7.1 G/DL (ref 6–8)
RBC # BLD AUTO: 3.73 10E6/UL (ref 4.4–5.9)
SARS-COV-2 RNA RESP QL NAA+PROBE: NEGATIVE
SODIUM SERPL-SCNC: 138 MMOL/L (ref 136–145)
WBC # BLD AUTO: 8.5 10E3/UL (ref 4–11)

## 2021-12-29 PROCEDURE — 85027 COMPLETE CBC AUTOMATED: CPT | Performed by: STUDENT IN AN ORGANIZED HEALTH CARE EDUCATION/TRAINING PROGRAM

## 2021-12-29 PROCEDURE — 120N000001 HC R&B MED SURG/OB

## 2021-12-29 PROCEDURE — 250N000011 HC RX IP 250 OP 636

## 2021-12-29 PROCEDURE — 87635 SARS-COV-2 COVID-19 AMP PRB: CPT | Performed by: STUDENT IN AN ORGANIZED HEALTH CARE EDUCATION/TRAINING PROGRAM

## 2021-12-29 PROCEDURE — 250N000013 HC RX MED GY IP 250 OP 250 PS 637: Performed by: NURSE PRACTITIONER

## 2021-12-29 PROCEDURE — 250N000013 HC RX MED GY IP 250 OP 250 PS 637: Performed by: STUDENT IN AN ORGANIZED HEALTH CARE EDUCATION/TRAINING PROGRAM

## 2021-12-29 PROCEDURE — 250N000011 HC RX IP 250 OP 636: Performed by: STUDENT IN AN ORGANIZED HEALTH CARE EDUCATION/TRAINING PROGRAM

## 2021-12-29 PROCEDURE — 99231 SBSQ HOSP IP/OBS SF/LOW 25: CPT | Mod: GC | Performed by: STUDENT IN AN ORGANIZED HEALTH CARE EDUCATION/TRAINING PROGRAM

## 2021-12-29 PROCEDURE — 250N000009 HC RX 250: Performed by: FAMILY MEDICINE

## 2021-12-29 PROCEDURE — 99024 POSTOP FOLLOW-UP VISIT: CPT | Performed by: NURSE PRACTITIONER

## 2021-12-29 PROCEDURE — 83735 ASSAY OF MAGNESIUM: CPT | Performed by: STUDENT IN AN ORGANIZED HEALTH CARE EDUCATION/TRAINING PROGRAM

## 2021-12-29 PROCEDURE — C9113 INJ PANTOPRAZOLE SODIUM, VIA: HCPCS | Performed by: STUDENT IN AN ORGANIZED HEALTH CARE EDUCATION/TRAINING PROGRAM

## 2021-12-29 PROCEDURE — 84100 ASSAY OF PHOSPHORUS: CPT | Performed by: STUDENT IN AN ORGANIZED HEALTH CARE EDUCATION/TRAINING PROGRAM

## 2021-12-29 PROCEDURE — 80053 COMPREHEN METABOLIC PANEL: CPT | Performed by: STUDENT IN AN ORGANIZED HEALTH CARE EDUCATION/TRAINING PROGRAM

## 2021-12-29 RX ADMIN — GABAPENTIN 200 MG: 100 CAPSULE ORAL at 18:28

## 2021-12-29 RX ADMIN — PANTOPRAZOLE SODIUM 40 MG: 40 INJECTION, POWDER, FOR SOLUTION INTRAVENOUS at 08:43

## 2021-12-29 RX ADMIN — POTASSIUM CHLORIDE: 2 INJECTION, SOLUTION, CONCENTRATE INTRAVENOUS at 19:36

## 2021-12-29 RX ADMIN — ENOXAPARIN SODIUM 40 MG: 40 INJECTION SUBCUTANEOUS at 08:43

## 2021-12-29 RX ADMIN — GABAPENTIN 900 MG: 300 CAPSULE ORAL at 22:40

## 2021-12-29 RX ADMIN — GABAPENTIN 200 MG: 100 CAPSULE ORAL at 08:43

## 2021-12-29 RX ADMIN — CEFTRIAXONE SODIUM 2 G: 2 INJECTION, POWDER, FOR SOLUTION INTRAMUSCULAR; INTRAVENOUS at 18:29

## 2021-12-29 RX ADMIN — VENLAFAXINE HYDROCHLORIDE 75 MG: 75 CAPSULE, EXTENDED RELEASE ORAL at 08:43

## 2021-12-29 RX ADMIN — FLUCONAZOLE 200 MG: 2 INJECTION, SOLUTION INTRAVENOUS at 19:33

## 2021-12-29 RX ADMIN — LEVOTHYROXINE SODIUM 25 MCG: 0.03 TABLET ORAL at 06:50

## 2021-12-29 RX ADMIN — I.V. FAT EMULSION 250 ML: 20 EMULSION INTRAVENOUS at 19:45

## 2021-12-29 RX ADMIN — QUETIAPINE FUMARATE 200 MG: 100 TABLET, FILM COATED ORAL at 22:41

## 2021-12-29 ASSESSMENT — ACTIVITIES OF DAILY LIVING (ADL)
ADLS_ACUITY_SCORE: 11
ADLS_ACUITY_SCORE: 10
ADLS_ACUITY_SCORE: 9
ADLS_ACUITY_SCORE: 10
ADLS_ACUITY_SCORE: 10
ADLS_ACUITY_SCORE: 9
ADLS_ACUITY_SCORE: 9
ADLS_ACUITY_SCORE: 11
ADLS_ACUITY_SCORE: 11
ADLS_ACUITY_SCORE: 9
ADLS_ACUITY_SCORE: 10
ADLS_ACUITY_SCORE: 9
ADLS_ACUITY_SCORE: 10
ADLS_ACUITY_SCORE: 10
ADLS_ACUITY_SCORE: 9
ADLS_ACUITY_SCORE: 9

## 2021-12-29 ASSESSMENT — MIFFLIN-ST. JEOR: SCORE: 1491.14

## 2021-12-29 NOTE — PLAN OF CARE
"  Problem: Pain (Surgery Nonspecified)  Goal: Acceptable Pain Control  Outcome: Improving  Intervention: Prevent or Manage Pain  Recent Flowsheet Documentation  Taken 12/28/2021 2229 by Katarina Hung RN  Pain Management Interventions: medication (see MAR)  Taken 12/28/2021 1743 by Katarina Hung, RN  Diversional Activities: television  Taken 12/28/2021 1737 by Katarina Hung, RN  Pain Management Interventions: medication (see MAR)   Pt continues to complain of generalized abdominal pain. Pt requests 1 percocet for pain rated at a \"7\". Pt does not like to rate pain. Pt becomes angry and does not like to re-rate pain after administration. Pt gives vague answers but states it \"helps\". Cont to monitor.       Problem: Impaired Wound Healing  Goal: Optimal Wound Healing  Outcome: Improving  Intervention: Promote Wound Healing  Recent Flowsheet Documentation  Taken 12/28/2021 2229 by Katarina Hung, RN  Pain Management Interventions: medication (see MAR)  Taken 12/28/2021 1737 by Katarina Hung RN  Pain Management Interventions: medication (see MAR)   Managed emptying of pouches and MARISA drain and monitored output. Administered TPN and lipids to ensure protein intake and hydration.   "

## 2021-12-29 NOTE — PROGRESS NOTES
CLINICAL NUTRITION SERVICES -BRIEF TPN NOTE     Nutrition Prescription    RECOMMENDATIONS FOR MDs/PROVIDERS TO ORDER:  None     Malnutrition Status:    Severe    Recommendations already ordered by Registered Dietitian (RD):  Continue same TPN today: D 400  @ 70 ml/hr plus 250 ml  Of 20% lipids 5 times per week over 12 hrs     Future/Additional Recommendations:  Follow labs/liver enzymes and need for possible cyclic regimen        P.O INTAKE  Diet: Clear liquids  480 ml fluid. Has not ordered from the kitchen since 12/27 breakfaast    ANTHROPOMETRICS  Most recent weight: 75.6 kg (166 lb 11.2 oz) 12/28- stable from week prior    GI CONCERNS  Fistulas x 2  Ongoing abdominal pain  normoactive BS all quadrants  1 BM yesterday  Fistula x 2 OP 1150  Ml yesterday, increased- bile OP    LABS  Reviewed:   Alk Phos trending up- abd US with sludge but no acute kat    CURRENT NUTRITION DIAGNOSIS  Malnutrition related to s/p surgical intervention of SBO with formation of enterocutaneous fistula as evidenced by 1.7% weight loss in 1 week, severe subcutaneous fat loss and moderate muscle loss.      INTERVENTIONS  Implementation  Continue same TPN today    Goals  Maintain weight -progressing    Monitoring/Evaluation  Progress toward goals will be monitored and evaluated per protocol: diet order, TPN intake, weight, labs, nutrition-focused physical findings

## 2021-12-29 NOTE — PROGRESS NOTES
Patient said pain the pain on his abdomen is tolerable.  When assessed he denies pain.  Pouched on abdomen are intact and draining-was emptied. Patient is on TPN ans tolerating well. Patient had a temp of 99.8. He denies any chills at this time. Continue to monitor.

## 2021-12-29 NOTE — PROGRESS NOTES
Primary Medicine Progress Note    Assessment/Plan  Active Problems:    Abdominal pain, generalized      Chema Dia is a 67 y/o M with past medical history of splenectomy, appendectomy, HTN, and substance abuse, with recent admission to hospital 11/30-12/21/2021 for SBO with perforation s/p surgical repair. Discharged home but presents as he could not manage at home and is now requesting to be placed in TCU vs LTC. Continues on TPN with surgery and ID following while awaiting LTC placement.       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 Required subsequent washout on 12/3 with aggressive IV antibiotic regimen and MARISA drain placement. On 12/8 found to have continued enhancing abscess with drain placed 12/9 and removed on 12/16. Antibiotics stopped on 12/20. Plan for close follow up with surgery and TPN at discharge, with plans to have help from his brother in law. Then returned on 12/22 given unable to care for himself even with brother in law's assistance; requesting LTC/TCU placement.  -PT/OT ordered, appreciate recs on placement  -Searching for TCU/LTACH placement with care management  -Pain management: Percocet 1-2 tablets q6h PRN, gabapentin   -Consulted surgery to follow while here  -Consulted ID              -Now on ceftriaxone/fluconazole  -Consult pharmacy and RD for TPN management no changes today.  -WOC    Alk phos elevation  Continues to climb day by day.  Rest of LFTs okay.  White count normal.  Had been just monitoring, though Chema spiked a fever overnight into 12/27 and looked mildly ill that morning.  Endorsing RUQ TTP on exam.  RUQ US showing sludge but no signs of acute kat.  -Continue to monitor     Depression  Insomnia  Hx of this. Reports symptoms for past few  days= low motivation, little interest in doing things, poor sleep, no appetite. Is on buspar, mirtazapine, and effexor, though doesn't really think medicines are as helpful as talk therapy is. Wanted to speak to social work and . States no plan to harm himself. Reports he just needs to leave the hospital.  -Consult psych for med management and plan for talk therapy while hospitalized  -Discontinued buspar and mirtazapine per psych  -Increased venlafaxine and gabapentin per psych  -Seroquel for sleep     Moderate malnutrition  Per RD eval  -TPN per RD and pharmacy     Normocytic anemia  Stable at 10.8, MCV of 93.       Chronic Conditions:  Hypothyroidism- resume PTA levothyroxine  Chronic pain- Hold mexolicam 7.5mg daily, resume PTA gabapentin  BPH-hold home Flomax given he states it does not help    Subjective:   Feeling okay this morning.  Mood seems to be declining. Little motivation to get out of bed. Encouraged him to take some walks even if he doesn't feel like, for a change of scenery. Working on dispo options.    Objective    Vital signs in last 24 hours Temp:  [97.7  F (36.5  C)-99.8  F (37.7  C)] 99.8  F (37.7  C)  Pulse:  [83-98] 94  Resp:  [18-20] 18  BP: ()/(70-78) 108/77  SpO2:  [91 %-93 %] 91 %       Weight:   Vitals:    12/21/21 2223 12/22/21 1242 12/28/21 1035   Weight: 78.5 kg (173 lb) 75.5 kg (166 lb 6.4 oz) 75.6 kg (166 lb 11.2 oz)       Intake/Output last 3 shift I/O last 3 completed shifts:  In: 1077 [P.O.:480; I.V.:265]  Out: 1950 [Urine:800; Drains:600; Other:550]    Intake/Output this shift:I/O this shift:  In: 1016.5 [P.O.:110]  Out: 200 [Drains:100; Other:100]    PHYSICAL EXAM  GENERAL APPEARANCE: Cooperative; appears stated age  LUNGS: No respiratory distress  HEART: Brisk cap refill  ABDOMEN: Ostomy bag draining stool.  Lap sites C/D/I.  Moderate concentrated RUQ TTP.  No guarding or rebound.  EXTREMITIES: Grossly normal without deformity, no edema  SKIN: no rashes, skin  warm  NEURO: Oriented to time and place    Pertinent Labs and Pertinent Radiology   Lab Results: personally reviewed.     Recent Labs   Lab 12/29/21  0657   WBC 8.5   HGB 11.0*   HCT 34.6*          Recent Labs   Lab 12/29/21  0657      CO2 24   BUN 18   ALBUMIN 2.1*   ALKPHOS 178*   ALT 28   AST 29       Radiology Results:   Results for orders placed or performed during the hospital encounter of 12/21/21   CT Abdomen Pelvis w Contrast    Impression    IMPRESSION:   1.  Improved prerectal fluid collection.  2.  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  3.  Possible right abdominal anastomotic dehiscence with a small amount free fluid and air in the mesentery.  4.  Report called to floor nurse Susy at 4:25 PM       Precepted patient with Dr. Willy Mcmahon MD - PGY2  [facility]  P: [phone]      Parts of this note were created with the assistance of voice recognition software.  The note was reviewed for accuracy.  However, errors in spelling, etc may have resulted.

## 2021-12-29 NOTE — PROGRESS NOTES
ASSESSMENT:  1. Abdominal pain, generalized        Gurdeep Dia is a 66 year old male status post explore lap lysis of adhesions with resultant 3 days later perforation reoperation oversew of defect and then 5 days later perforation again and at this time point he has fistulas above and below to his incision. Alk phos continues to be elevated, but no hyperbilirubinemia, transaminitis, leukocytosis or RUQ tenderness. US not significant for signs of cholelithiasis, choledocholithiasis or cholecystitis. Increased alk phos likely nutritional or bowel related.    PLAN:  Continue ostomy bags to control fistula drainage  Clear liquids ok  Continue to look for TCU placement    SUBJECTIVE:   He is depressed but feeling ok. Pain is controlled and he has been tolerating clears. No BM for a couple days. No significant events overnight      Patient Vitals for the past 24 hrs:   BP Temp Temp src Pulse Resp SpO2 Weight   12/29/21 0753 108/77 99.8  F (37.7  C) Oral 94 18 91 % --   12/28/21 2320 98/70 99.1  F (37.3  C) Oral 95 18 92 % --   12/28/21 1554 99/73 98.4  F (36.9  C) Oral 98 20 93 % --   12/28/21 1144 113/78 97.7  F (36.5  C) Temporal 83 20 92 % --   12/28/21 1035 -- -- -- -- -- -- 75.6 kg (166 lb 11.2 oz)         PHYSICAL EXAM:  GEN: No acute distress, comfortable  LUNGS: CTA bilaterally  CV:RRR  ABD:soft, tender near fistulas, no peritoneal signs; upper ostomy bag with thick green output, lower ostomy back with thin brownish, green fluid; combined 1150 fistula output    12/28 0700 - 12/29 0659  In: 1077 [P.O.:480; I.V.:265]  Out: 1950 [Urine:800; Drains:600]    No results displayed because visit has over 200 results.             Jane Agosto, APRN CNP

## 2021-12-29 NOTE — PHARMACY-CONSULT NOTE
"Pharmacy Note: Parenteral Nutrition (PN) Management    Pharmacist consulted to dose PN for Gurdeep Dia, a 66 year old male by Dr. Mcmahon.    Subjective:    The patient was started on PN in the hospital on 12/5/2021    Indication for PN therapy: bpwel resection, plan to continue TPN until fistula heals    Inadequate nutrition existing for > 7 days.     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    Social History     Tobacco Use     Smoking status: Current Every Day Smoker     Smokeless tobacco: Never Used   Substance Use Topics     Alcohol use: Not Currently     Comment: Clean for 5 years     Drug use: Not Currently     Objective:    Ht Readings from Last 1 Encounters:   12/22/21 1.702 m (5' 7\")     Wt Readings from Last 1 Encounters:   12/28/21 75.6 kg (166 lb 11.2 oz)       Body mass index is 26.11 kg/m .    Patient Vitals for the past 96 hrs:   Weight   12/28/21 1035 75.6 kg (166 lb 11.2 oz)       Labs:  Last 3 days:  Recent Labs     12/27/21  0648 12/28/21  0546 12/29/21  0657    137 138   POTASSIUM 4.9 4.4 4.3   CHLORIDE 105 106 103   CO2 26 24 24   BUN 19 20 18   CR 0.84 0.80 0.77   VIJAYA 8.8 8.7 8.7   MAG 2.1  --  1.8   PHOS 3.1  --  3.2   PROTTOTAL 7.0 7.3 7.1   ALBUMIN 2.1* 2.1* 2.1*   PREALB 20  --   --    TRIG 169*  --   --    HGB 10.6* 10.8* 11.0*   HCT 33.6* 33.9* 34.6*    348 306   BILITOTAL 1.1* 0.8 0.8   AST 28 27 29   ALT 28 31 28   ALKPHOS 144* 155* 178*   INR 1.19*  --   --        Glucose (past 48 hours):   Recent Labs     12/27/21  1253 12/27/21  1740 12/28/21  0546 12/29/21  0657   * 142* 105 102       Intake/Output (last 24 hours): I/O last 3 completed shifts:  In: 1077 [P.O.:480; I.V.:265]  Out: 1950 [Urine:800; Drains:600; Other:550]    Estimated CrCl: Estimated Creatinine Clearance: 99.5 mL/min (based on SCr of 0.77 mg/dL).    Assessment:    Continue patient on PN therapy as a continuous central therapy.     Given the patient's current " condition/oral intake, PN is still indicated.    Lab results reviewed: 12/29. No changes to formula today.    Plan:  1. Rate of PN: 70 mL/hr. Maintenance IV fluid rate will be adjusted appropriately to meet the total maximum IV fluids rate as ordered by provider.  2. Formula:     Amino Acids 115 grams    Dextrose 400 grams    Sodium 110 mEq/day    Potassium 75 mEq/day    Calcium 8 mEq/day    Magnesium 14 mEq/day    Phosphorus 32 mMol/day    Chloride: Acetate Ratio 1:1    Standard Multivitamins w/Vitamin K    Trace Elements  3. Fat Emulsion: 20%, 250 mL IV 5 times weekly.  4. Check labs per PN protocol.  5. Pharmacist will continue to follow the patient's lab results, clinical status and blood glucose results and make adjustments as appropriate.    Thank you for the consult.  Savanna Braun RPH  12/29/2021 10:08 AM

## 2021-12-29 NOTE — PROGRESS NOTES
CM faxed full referral to [facility] [phone]. Michelle in admissions from [facility] also requested information on TPN. CM paged hospitalist to for details and will call Michelle back with information. Neither facility has accepted pt at this point.   CM continue to follow.    CINDA Corona  12/29/2021  10:55 AM    Michelle called and stated they do not have any facilities that can accept pt with TPN at this time.     CINDA Corona  12/29/2021  2:33 PM

## 2021-12-29 NOTE — PROVIDER NOTIFICATION
"Patient request to be seen by his doctor. Patient said he has not been feeling good the passed few days. He is very emotional as he said, \"can you have my doctor come and see me\". Dr Aguilera notified.    "

## 2021-12-29 NOTE — PLAN OF CARE
Problem: Infection (Surgery Nonspecified)  Goal: Absence of Infection Signs and Symptoms  Outcome: Improving   Pt had low grade temp (99.1); declined treatment.       Abdominal fistulas are draining bile colored o/p in to the ostomy appliances.  The lower fistula is putting out more than the superior fistula.       Problem: Pain (Surgery Nonspecified)  Goal: Acceptable Pain Control  Outcome: Improving  Intervention: Prevent or Manage Pain  Recent Flowsheet Documentation  Taken 12/26/2021 0012 by Cortney Virgen RN  Pain Management Interventions:    rest    declines   Received percocet for generalized body aches.     PICC line is infusing,  TPN infusing at 70 hr, lipids at 20 hr; pt tolerating a clear diet. Able to draw blood from the PICC line.

## 2021-12-29 NOTE — PROGRESS NOTES
Care Management Follow Up    Length of Stay (days): 6    Expected Discharge Date: 12/29/2021     Concerns to be Addressed: discharge planning       Patient plan of care discussed at interdisciplinary rounds: Yes    Anticipated Discharge Disposition: Transitional Care     Anticipated Discharge Services: Other (see comment) (therapy services )    Anticipated Discharge DME: None    Patient/family educated on Medicare website which has current facility and service quality ratings: yes    Education Provided on the Discharge Plan:  Yes    Patient/Family in Agreement with the Plan: yes    Referrals Placed by CM/SW: Post Acute Facilities    Private pay costs discussed: Not applicable    Additional Information: AZALIA spoke with Cecilio Martinez regarding pending referrals to [facility].  She requested that SW please fax pt facesheet to her at following number: [phone] (done).  AZALIA briefly checked in with pt regarding TCU referral status.  Pt wanted to make sure that we had sent to [facility].  AZALIA informed pt that yes we had sent there and referral still pending.  Pt would like phone number for this facility so that he can call them and advocate for himself for possible acceptance - SW to call pt back with phone number for this facility.  Pt also stated that he was interested in [facility] - SW informed him that referral pending and facility is reviewing.  Pt also mentioned [facility] - AZALIA informed pt she would need to check to see if referral had been sent there.  If not, we would send referral.  AZALIA asked RN CM partner Celine YOO to follow up regarding TCU referrals on pt (see Celine's note).  Transportation TBD.  Needs PAS.      SAM García, CINDA 12/28/21 6:37 PM

## 2021-12-30 ENCOUNTER — APPOINTMENT (OUTPATIENT)
Dept: PHYSICAL THERAPY | Facility: HOSPITAL | Age: 66
DRG: 393 | End: 2021-12-30
Payer: MEDICARE

## 2021-12-30 ENCOUNTER — APPOINTMENT (OUTPATIENT)
Dept: OCCUPATIONAL THERAPY | Facility: HOSPITAL | Age: 66
DRG: 393 | End: 2021-12-30
Payer: MEDICARE

## 2021-12-30 LAB
ALBUMIN SERPL-MCNC: 2.2 G/DL (ref 3.5–5)
ALP SERPL-CCNC: 218 U/L (ref 45–120)
ALT SERPL W P-5'-P-CCNC: 31 U/L (ref 0–45)
ANION GAP SERPL CALCULATED.3IONS-SCNC: 8 MMOL/L (ref 5–18)
AST SERPL W P-5'-P-CCNC: 27 U/L (ref 0–40)
BILIRUB SERPL-MCNC: 0.8 MG/DL (ref 0–1)
BUN SERPL-MCNC: 18 MG/DL (ref 8–22)
C REACTIVE PROTEIN LHE: 1.8 MG/DL (ref 0–0.8)
CALCIUM SERPL-MCNC: 9.1 MG/DL (ref 8.5–10.5)
CHLORIDE BLD-SCNC: 106 MMOL/L (ref 98–107)
CO2 SERPL-SCNC: 23 MMOL/L (ref 22–31)
CREAT SERPL-MCNC: 0.78 MG/DL (ref 0.7–1.3)
ERYTHROCYTE [DISTWIDTH] IN BLOOD BY AUTOMATED COUNT: 16.2 % (ref 10–15)
GFR SERPL CREATININE-BSD FRML MDRD: >90 ML/MIN/1.73M2
GLUCOSE BLD-MCNC: 109 MG/DL (ref 70–125)
GLUCOSE BLDC GLUCOMTR-MCNC: 102 MG/DL (ref 70–99)
HCT VFR BLD AUTO: 37.2 % (ref 40–53)
HGB BLD-MCNC: 11.9 G/DL (ref 13.3–17.7)
MCH RBC QN AUTO: 30.1 PG (ref 26.5–33)
MCHC RBC AUTO-ENTMCNC: 32 G/DL (ref 31.5–36.5)
MCV RBC AUTO: 94 FL (ref 78–100)
PLATELET # BLD AUTO: 378 10E3/UL (ref 150–450)
POTASSIUM BLD-SCNC: 4.7 MMOL/L (ref 3.5–5)
PROT SERPL-MCNC: 7.6 G/DL (ref 6–8)
RBC # BLD AUTO: 3.96 10E6/UL (ref 4.4–5.9)
SODIUM SERPL-SCNC: 137 MMOL/L (ref 136–145)
WBC # BLD AUTO: 9.4 10E3/UL (ref 4–11)

## 2021-12-30 PROCEDURE — 250N000011 HC RX IP 250 OP 636: Performed by: STUDENT IN AN ORGANIZED HEALTH CARE EDUCATION/TRAINING PROGRAM

## 2021-12-30 PROCEDURE — 250N000011 HC RX IP 250 OP 636

## 2021-12-30 PROCEDURE — C9113 INJ PANTOPRAZOLE SODIUM, VIA: HCPCS | Performed by: STUDENT IN AN ORGANIZED HEALTH CARE EDUCATION/TRAINING PROGRAM

## 2021-12-30 PROCEDURE — 999N000197 HC STATISTIC WOC PT EDUCATION, 0-15 MIN

## 2021-12-30 PROCEDURE — 250N000013 HC RX MED GY IP 250 OP 250 PS 637: Performed by: STUDENT IN AN ORGANIZED HEALTH CARE EDUCATION/TRAINING PROGRAM

## 2021-12-30 PROCEDURE — 99232 SBSQ HOSP IP/OBS MODERATE 35: CPT | Performed by: INTERNAL MEDICINE

## 2021-12-30 PROCEDURE — 99231 SBSQ HOSP IP/OBS SF/LOW 25: CPT | Mod: GC | Performed by: STUDENT IN AN ORGANIZED HEALTH CARE EDUCATION/TRAINING PROGRAM

## 2021-12-30 PROCEDURE — 99024 POSTOP FOLLOW-UP VISIT: CPT | Performed by: SPECIALIST

## 2021-12-30 PROCEDURE — 80053 COMPREHEN METABOLIC PANEL: CPT | Performed by: STUDENT IN AN ORGANIZED HEALTH CARE EDUCATION/TRAINING PROGRAM

## 2021-12-30 PROCEDURE — 85027 COMPLETE CBC AUTOMATED: CPT | Performed by: STUDENT IN AN ORGANIZED HEALTH CARE EDUCATION/TRAINING PROGRAM

## 2021-12-30 PROCEDURE — 36415 COLL VENOUS BLD VENIPUNCTURE: CPT | Performed by: STUDENT IN AN ORGANIZED HEALTH CARE EDUCATION/TRAINING PROGRAM

## 2021-12-30 PROCEDURE — 97110 THERAPEUTIC EXERCISES: CPT | Mod: GO

## 2021-12-30 PROCEDURE — 97530 THERAPEUTIC ACTIVITIES: CPT | Mod: GP

## 2021-12-30 PROCEDURE — 250N000013 HC RX MED GY IP 250 OP 250 PS 637: Performed by: NURSE PRACTITIONER

## 2021-12-30 PROCEDURE — 97116 GAIT TRAINING THERAPY: CPT | Mod: GP

## 2021-12-30 PROCEDURE — 86141 C-REACTIVE PROTEIN HS: CPT | Performed by: INTERNAL MEDICINE

## 2021-12-30 PROCEDURE — 250N000009 HC RX 250: Performed by: FAMILY MEDICINE

## 2021-12-30 PROCEDURE — 120N000001 HC R&B MED SURG/OB

## 2021-12-30 RX ORDER — DEXTROSE MONOHYDRATE 100 MG/ML
INJECTION, SOLUTION INTRAVENOUS CONTINUOUS PRN
Status: DISCONTINUED | OUTPATIENT
Start: 2021-12-30 | End: 2022-02-14 | Stop reason: HOSPADM

## 2021-12-30 RX ADMIN — PANTOPRAZOLE SODIUM 40 MG: 40 INJECTION, POWDER, FOR SOLUTION INTRAVENOUS at 08:24

## 2021-12-30 RX ADMIN — I.V. FAT EMULSION 250 ML: 20 EMULSION INTRAVENOUS at 20:13

## 2021-12-30 RX ADMIN — GABAPENTIN 200 MG: 100 CAPSULE ORAL at 08:22

## 2021-12-30 RX ADMIN — CEFTRIAXONE SODIUM 2 G: 2 INJECTION, POWDER, FOR SOLUTION INTRAMUSCULAR; INTRAVENOUS at 16:53

## 2021-12-30 RX ADMIN — QUETIAPINE FUMARATE 200 MG: 100 TABLET, FILM COATED ORAL at 22:33

## 2021-12-30 RX ADMIN — GABAPENTIN 200 MG: 100 CAPSULE ORAL at 16:59

## 2021-12-30 RX ADMIN — VENLAFAXINE HYDROCHLORIDE 75 MG: 75 CAPSULE, EXTENDED RELEASE ORAL at 08:23

## 2021-12-30 RX ADMIN — FLUCONAZOLE 200 MG: 2 INJECTION, SOLUTION INTRAVENOUS at 20:17

## 2021-12-30 RX ADMIN — LEVOTHYROXINE SODIUM 25 MCG: 0.03 TABLET ORAL at 06:07

## 2021-12-30 RX ADMIN — POTASSIUM CHLORIDE: 2 INJECTION, SOLUTION, CONCENTRATE INTRAVENOUS at 20:10

## 2021-12-30 RX ADMIN — GABAPENTIN 900 MG: 300 CAPSULE ORAL at 22:34

## 2021-12-30 RX ADMIN — ENOXAPARIN SODIUM 40 MG: 40 INJECTION SUBCUTANEOUS at 08:28

## 2021-12-30 RX ADMIN — OXYCODONE HYDROCHLORIDE AND ACETAMINOPHEN 1 TABLET: 5; 325 TABLET ORAL at 22:34

## 2021-12-30 ASSESSMENT — ACTIVITIES OF DAILY LIVING (ADL)
ADLS_ACUITY_SCORE: 12
ADLS_ACUITY_SCORE: 13
ADLS_ACUITY_SCORE: 13
ADLS_ACUITY_SCORE: 11
ADLS_ACUITY_SCORE: 12
ADLS_ACUITY_SCORE: 12
ADLS_ACUITY_SCORE: 14
ADLS_ACUITY_SCORE: 13
ADLS_ACUITY_SCORE: 12
ADLS_ACUITY_SCORE: 13
ADLS_ACUITY_SCORE: 12
ADLS_ACUITY_SCORE: 12
ADLS_ACUITY_SCORE: 14
ADLS_ACUITY_SCORE: 13
ADLS_ACUITY_SCORE: 14
ADLS_ACUITY_SCORE: 14

## 2021-12-30 NOTE — PROGRESS NOTES
[facility]    Infectious Disease Progress Note    Date of Service (when I saw the patient): 12/30/2021     Assessment & Plan   See id note from 12/27    1. History of splenectomy and substance abuse, recent admission with small bowel obstruction- improving  2. Status post exploratory laparotomy, small bowel resection/repair of enterotomy, extensive lysis of adhesion on 11/30/2021  3. Postoperative small bowel perforation, status post exploratory laparotomy on 12/3/2021.  4. Intra-abdominal abscess status post drain placement on 12/9/2021.  E. coli in cultures.  Treated with meropenem, IV vancomycin and transition to p.o. cefdinir plus Flagyl on 12/15/2021  5. Abscessogram on 12/16 with resolved abscess, drain removed on 12/16/2021  6. Antibiotics discontinued on 12/20/2021, discharged from the hospital on 12/20/2021 and readmitted due to worsening pain and drainage from incision, cellulitis, EC fistula  7.  E coli and Candida parapsilosis in wound culture from 12/22/2021  8. Leukocytosis improved  9. On TPN  10. PCN allergy- hives    Recommendations:    On ceftriaxone and fluconazole.  On fluconazole to 200 mg.  Consider PO abx upon discharge when cleared by surgery  Trend CRP    ID will not follow daily      Kelly Hanna MD  [facility]  [phone]        Interval History     Sleepy  Did not disturb much    Physical Exam   Temp: 97.8  F (36.6  C) Temp src: Oral BP: 111/78 Pulse: 94   Resp: 18 SpO2: 93 % O2 Device: None (Room air)    Vitals:    12/22/21 1242 12/28/21 1035 12/29/21 2147   Weight: 75.5 kg (166 lb 6.4 oz) 75.6 kg (166 lb 11.2 oz) 75.8 kg (167 lb)     Vital Signs with Ranges  Temp:  [97.8  F (36.6  C)-98.8  F (37.1  C)] 97.8  F (36.6  C)  Pulse:  [91-94] 94  Resp:  [18-20] 18  BP: (111-129)/(78-86) 111/78  SpO2:  [93 %-95 %] 93 %      Constitutional: No apparent distress  Lungs: normal breathing pattern  Cardiovascular:  No JVD  elevation  Abdomen: drainage from EV fistula.  Ostomy bags on abdominal wall  Skin: no rash  Other:somnolent    Medications     dextrose Stopped (12/26/21 1198)     parenteral nutrition - ADULT compounded formula 70 mL/hr at 12/29/21 1936       cefTRIAXone  2 g Intravenous Q24H     enoxaparin ANTICOAGULANT  40 mg Subcutaneous Daily     fluconazole  200 mg Intravenous Q24H     gabapentin  200 mg Oral BID     gabapentin  900 mg Oral At Bedtime     levothyroxine  25 mcg Oral Daily     lipids  250 mL Intravenous Once per day on Mon Tue Wed Thu Fri     pantoprazole (PROTONIX) IV  40 mg Intravenous Daily with breakfast     QUEtiapine  200 mg Oral At Bedtime     sodium chloride (PF)  3 mL Intracatheter Q8H     venlafaxine  75 mg Oral Daily       Data   All microbiology laboratory data reviewed.  Recent Labs   Lab Test 12/30/21  0634 12/29/21  0657 12/28/21  0546   WBC 9.4 8.5 8.5   HGB 11.9* 11.0* 10.8*   HCT 37.2* 34.6* 33.9*   MCV 94 93 93    306 348     Recent Labs   Lab Test 12/30/21  0634 12/29/21  0657 12/28/21  0546   CR 0.78 0.77 0.80     MICRO: reviewed      12/22/2021 1326 12/25/2021 0703 Wound Aerobic Bacterial Culture Routine [[id number]]    (Abnormal)   Wound from Abdomen    Final result Component Value   Culture Culture in progress    3+ Escherichia coli Abnormal     3+ Escherichia coli Abnormal     2+ Candida parapsilosis complex Abnormal          Susceptibility     Escherichia coli (1) Escherichia coli (2)     DENA DENA     Ampicillin >=32.0 ug/mL Resistant >=32.0 ug/mL Resistant     Ampicillin/ Sulbactam >=32.0 ug/mL Resistant >=32.0 ug/mL Resistant     Cefepime <=1.0 ug/mL Susceptible <=1.0 ug/mL Susceptible     Ceftazidime <=1.0 ug/mL Susceptible 2.0 ug/mL Susceptible     Ceftriaxone <=1.0 ug/mL Susceptible <=1.0 ug/mL Susceptible     Ciprofloxacin >=4.0 ug/mL Resistant >=4.0 ug/mL Resistant     Gentamicin <=1.0 ug/mL Susceptible <=1.0 ug/mL Susceptible     Levofloxacin >=8.0 ug/mL Resistant >=8.0  ug/mL Resistant     Meropenem <=0.25 ug/mL Susceptible <=0.25 ug/mL Susceptible     Piperacillin/Tazobactam >=128.0 ug/mL Resistant >=128.0 ug/mL Resistant     Tobramycin <=1.0 ug/mL Susceptible <=1.0 ug/mL Susceptible     Trimethoprim/Sulfamethoxazole >16/304 ug/mL Resistant >16/304 ug/mL Resistant                           12/22/2021 1648 12/23/2021 2017 Blood Culture Peripheral Blood [[id number]]   Peripheral Blood    Preliminary result Component Value   Culture No growth after 1 day P              12/22/2021 1648 12/23/2021 2017 Blood Culture Peripheral Blood [[id number]]   Peripheral Blood    Preliminary result Component Value   Culture No growth after 1 day P              12/22/2021 1326 12/24/2021 1412 Wound Aerobic Bacterial Culture Routine [[id number]]   (Abnormal)   Wound from Abdomen    Preliminary result Component Value   Culture Culture in progress P    3+ Gram negative bacilli Abnormal  P    3+ Gram negative bacilli Abnormal  P    2+ Candida parapsilosis complex Abnormal  P              12/22/2021 0018 12/22/2021 0048 Asymptomatic COVID-19 Virus (Coronavirus) by PCR Nasopharyngeal [[id number]]    Swab from Nasopharyngeal    Final result Component Value   SARS CoV2 PCR Negative   NEGATIVE: SARS-CoV-2 (COVID-19) RNA not detected, presumed negative.           12/17/2021 1615 12/17/2021 1844 Asymptomatic COVID-19 Virus (Coronavirus) by PCR Nasopharyngeal [[id number]]    Swab from Nasopharyngeal    Final result Component Value   SARS CoV2 PCR Negative   NEGATIVE: SARS-CoV-2 (COVID-19) RNA not detected, presumed negative.           12/09/2021 1204 12/12/2021 0831 Body fluid, unsp Aerobic Bacterial Culture Routine [[id number]]    (Abnormal)   Body fluid, unsp from Pelvis    Final result Component Value   Culture 2+ Escherichia coli Abnormal          Susceptibility     Escherichia coli     DENA     Ampicillin >=32.0 ug/mL Resistant     Ampicillin/ Sulbactam 16.0 ug/mL Intermediate     Cefepime <=1.0  ug/mL Susceptible     Ceftazidime <=1.0 ug/mL Susceptible     Ceftriaxone <=1.0 ug/mL Susceptible     Ciprofloxacin >=4.0 ug/mL Resistant     Gentamicin <=1.0 ug/mL Susceptible     Levofloxacin >=8.0 ug/mL Resistant     Meropenem <=0.25 ug/mL Susceptible     Piperacillin/Tazobactam <=4.0 ug/mL Susceptible     Tobramycin <=1.0 ug/mL Susceptible     Trimethoprim/Sulfamethoxazole >16/304 ug/mL Resistant                   12/09/2021 1203 12/16/2021 0801 Anaerobic culture [[id number]]   Body fluid, unsp from Pelvis    Final result Component Value   Culture No anaerobic organisms isolated              12/08/2021 1507 12/08/2021 1607 Asymptomatic COVID-19 Virus (Coronavirus) by PCR Nasopharyngeal [[id number]]    Swab from Nasopharyngeal    Final result Component Value   SARS CoV2 PCR Negative   NEGATIVE: SARS-CoV-2 (COVID-19) RNA not detected, presumed negative.           12/06/2021 2140 12/12/2021 1031 Blood Culture Line, venous [[id number]]   Blood from Line, venous    Final result Component Value   Culture No Growth              12/05/2021 1645 12/10/2021 2031 Blood Culture Peripheral Blood [[id number]]   Peripheral Blood    Final result Component Value   Culture No Growth              12/05/2021 1645 12/10/2021 2031 Blood Culture Peripheral Blood [[id number]]    Peripheral Blood    Final result Component Value   Culture No Growth              12/05/2021 1638 12/06/2021 2126 Urine Culture [[id number]]   Urine, Roblero Catheter    Final result Component Value   Culture No Growth              11/30/2021 1153 11/30/2021 1241 Asymptomatic COVID-19 Virus (Coronavirus) by PCR Nasopharyngeal [[id number]]    Swab from Nasopharyngeal    Final result Component Value   SARS CoV2 PCR Negative   NEGATIVE: SARS-CoV-2 (COVID-19) RNA not detected, presumed negative.                 RADIOLOGY:    CT Abd Peritoneum Abscess Drain w Cath Place    Result Date: 12/9/2021  EXAM: 1. PERCUTANEOUS DRAIN PLACEMENT PELVIC ABSCESS 2. CT  GUIDANCE 3. CONSCIOUS SEDATION LOCATION: [facility] DATE/TIME: 2021 9:50 AM INDICATION: s/p two surgeries with elevating wbc, ct with pelvic abscess. TECHNIQUE: Dose reduction techniques were used. PROCEDURE: Informed consent obtained. Site marked. Prior images reviewed. Required items made available. Patient identity confirmed verbally and with arm band. Patient reevaluated immediately before administering sedation. Universal protocol was followed. Time out performed. The site was prepped and draped in sterile fashion. 10 mL of 1% lidocaine was infused into the local soft tissues. Using standard technique and under direct CT guidance, a 10 Jordanian drainage catheter was inserted into the fluid collection.  SPECIMEN: 30 mL of brownish thin fluid was aspirated and sent to lab for cultures and Gram stain. BLOOD LOSS: Minimal. The patient tolerated the procedure well. No immediate complications. SEDATION: Versed 2.0 mg. Fentanyl 100 mcg. The procedure was performed with administration intravenous conscious sedation with appropriate preoperative, intraoperative, and postoperative evaluation. 30 minutes of supervised face to face conscious sedation time was provided by a radiology nurse under my direct supervision.     IMPRESSION: 1.  Successful CT-guided drain placement into pelvic abscess. 30 mL brownish thin fluid removed and sent for cultures. Reference CPT Codes: 10123, 31359, 47788    Echocardiogram Complete    Result Date: 12/10/2021  [id number]^CHRISTOPHER^FLOWER^[place] [address]  Name: ISABELA DUNN MRN: [id number] : 1955 Study Date: 12/10/2021 01:14 PM Age: 66 yrs Gender: Male Patient Location: [facility] Reason For Study: [place] Ordering Physician: FLOWER WHITE Performed By: ALEX  BSA: 1.8 m2 Height: 65 in Weight: 155 lb HR: 84 ______________________________________________________________________________  Procedure Complete Echo Adult. Adequate quality two-dimensional was performed and interpreted. ______________________________________________________________________________ Interpretation Summary  The left ventricle is normal in size with borderline concentric left ventricular hypertrophy. Left ventricular function is normal.The ejection fraction is 60-65%. Normal right ventricle size and systolic function. No significant valve disease is identified.  There is no comparison study available. ______________________________________________________________________________ I      WMSI = 1.00     % Normal = 100  X - Cannot   0 -                      (2) - Mildly 2 -          Segments  Size Interpret    Hyperkinetic 1 - Normal  Hypokinetic  Hypokinetic  1-2     small                                                    7 -          3-5    moderate 3 - Akinetic 4 -          5 -         6 - Akinetic Dyskinetic   6-14    large              Dyskinetic   Aneurysmal  w/scar       w/scar       15-16   diffuse  Left Ventricle The left ventricle is normal in size. Left ventricular function is normal.The ejection fraction is 60-65%. There is borderline concentric left ventricular hypertrophy. Left ventricular diastolic function is normal. No regional wall motion abnormalities noted.  Right Ventricle Normal right ventricle size and systolic function.  Atria Normal left atrial size. Right atrial size is normal. There is no color Doppler evidence of an atrial shunt.  Mitral Valve Mitral valve leaflets appear normal. There is no evidence of mitral stenosis or clinically significant mitral regurgitation.  Tricuspid Valve Tricuspid valve leaflets appear normal. There is no evidence of tricuspid stenosis or clinically significant tricuspid regurgitation. Right ventricle systolic pressure estimate normal. The right ventricular systolic pressure is approximated at 18.7 mmHg plus the right atrial pressure.  Aortic Valve The aortic valve is  trileaflet. Aortic valve leaflets appear normal. There is no evidence of aortic stenosis or clinically significant aortic regurgitation. There is trace aortic regurgitation.  Pulmonic Valve The pulmonic valve is not well seen, but is grossly normal. This degree of valvular regurgitation is within normal limits. There is trace pulmonic valvular regurgitation.  Vessels The aorta root is normal. The ascending aorta is Mildly dilated. IVC diameter <2.1 cm collapsing >50% with sniff suggests a normal RA pressure of 3 mmHg.  Pericardium There is no pericardial effusion.  Rhythm Sinus rhythm was noted.  ______________________________________________________________________________ MMode/2D Measurements & Calculations IVSd: 1.3 cm LVIDd: 5.0 cm LVIDs: 3.2 cm LVPWd: 0.86 cm  FS: 36.6 % LV mass(C)d: 201.1 grams LV mass(C)dI: 113.3 grams/m2 Ao root diam: 3.9 cm LA dimension: 4.1 cm asc Aorta Diam: 3.8 cm LA/Ao: 1.0 LVOT diam: 2.4 cm LVOT area: 4.6 cm2 LA Volume Indexed (AL/bp): 29.6 ml/m2 RWT: 0.34  Doppler Measurements & Calculations MV E max gary: 80.4 cm/sec MV A max gary: 62.9 cm/sec MV E/A: 1.3  MV dec slope: 292.8 cm/sec2 MV dec time: 0.27 sec Ao V2 max: 178.1 cm/sec Ao max P.0 mmHg Ao V2 mean: 130.5 cm/sec Ao mean P.9 mmHg Ao V2 VTI: 29.4 cm SUMAN(I,D): 3.2 cm2 SUMAN(V,D): 3.1 cm2 LV V1 max P.5 mmHg LV V1 max: 117.6 cm/sec LV V1 VTI: 20.2 cm SV(LVOT): 93.4 ml SI(LVOT): 52.6 ml/m2 PA acc time: 0.12 sec TR max gary: 216.1 cm/sec TR max P.7 mmHg AV Gary Ratio (DI): 0.66 SUMAN Index (cm2/m2): 1.8 E/E' av.3 Lateral E/e': 7.9 Medial E/e': 16.6  ______________________________________________________________________________ Report approved by: Hany Currie 12/10/2021 02:23 PM       XR Chest Port 1 View    Result Date: 2021  EXAM: XR CHEST PORT 1 VIEW LOCATION: [facility] DATE/TIME: 2021 1:39 PM INDICATION: Worsening hypoxia, evaluate for pneumonia, pulm edema COMPARISON:  Chest x-ray 12/03/2021     IMPRESSION: Enteric suction tube tip and side-port within the upper gastric lumen. Satisfactory right PICC line position with the tip near the cavoatrial junction. Persistent low lung volumes. No definite pneumothorax. Mildly improved bibasilar pulmonary  opacities favored to reflect atelectasis. Persistent interstitial coarsening. Suspected trace right-sided pleural fluid. Stable heart size and central vascular prominence.    XR Chest Port 1 View    Result Date: 12/3/2021  EXAM: XR CHEST PORT 1 VIEW LOCATION: [facility] DATE/TIME: 12/3/2021 8:31 PM INDICATION: RN placed PICC - verify tip placement COMPARISON: None.     IMPRESSION: Right PICC line present with its tip likely just into the right atrium. Suggest withdrawing the PICC line 2 cm to place the tip in the low SVC. NG tube in good position in the stomach. Mild cardiomegaly. Low lung volumes with mild patchy mixed interstitial and airspace infiltrates    IR Abscess Tube Check    Result Date: 12/16/2021  LOCATION: [facility] DATE: 12/16/2021 PROCEDURE: ABSCESS TUBE CHECK INTERVENTIONAL RADIOLOGIST: Demario Porter MD INDICATION: Pelvic abscess. Status post jugular drain placement on 12/9/2021. Markedly decreased outputs. CT from today demonstrating near complete resolution of the abscess cavity.. CONSENT: The procedure, risks and benefits of an abscessogram were discussed with the patient  in detail. All questions were answered. Informed consent was given to proceed with the procedure. MODERATE SEDATION: None. CONTRAST: Omnipaque 350: 2 mL. ANTIBIOTICS: None. ADDITIONAL MEDICATIONS: None. FLUOROSCOPIC TIME: 0.1 minutes RADIATION DOSE: Air Kerma: 26 mGy COMPLICATIONS: No immediate complications. PROCEDURE:  images were obtained. The existing drainage catheter was injected with contrast, and an image obtained. After reviewing the images, the catheter was removed without difficulty.  FINDINGS: Contracted cavity. Almost immediate pericatheter leakage.     IMPRESSION: 1.  Resolved pelvic fluid collection. Transgluteal drain removed.     CT Abdomen Pelvis w Contrast    Result Date: 12/22/2021  EXAM: CT ABDOMEN PELVIS W CONTRAST LOCATION: [facility] DATE/TIME: 12/22/2021 2:58 PM INDICATION: Exploratory laparotomy with adhesion lysis and repair of small bowel enterotomy 11/30/2021. Abdominal abscess drain placement 12/09/2021. COMPARISON: CT 12/16/2021, 12/08/2021 TECHNIQUE: CT scan of the abdomen and pelvis was performed following injection of IV contrast. Multiplanar reformats were obtained. Dose reduction techniques were used. CONTRAST: isovue 370 100ml FINDINGS: LOWER CHEST: Right basilar consolidation could represent atelectasis or pneumonia, similar to previous. HEPATOBILIARY: Stable hepatic cysts. Stable prominent gallbladder without definite wall thickening. PANCREAS: Normal. SPLEEN: Splenectomy with several left upper quadrant postsplenectomy implants. ADRENAL GLANDS: Normal. KIDNEYS/BLADDER: Several right renal cysts which do not require further imaging. Left renal 9 and 4 mm nonobstructive stones. Several tiny left renal cysts. No further imaging recommended. BOWEL: A loop of small bowel appears to communicate with the left anterior abdominal wall (series 3, image 105). The abdominal wall contains air. 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 There is a right lateral abdominal staple line suggesting an ilio transverse colostomy. The right lower quadrant mesentery inferior to this contains a small amount of nonlocalized fluid and air. Also, the surgical staple line appears scattered suggesting  anastomotic breakdown. The prerectal drainage catheter has been removed. Small residual 16 x 12 mm fluid collection (image 143). LYMPH NODES: Several small mesenteric nodes are likely reactive. VASCULATURE: Patent mesenteric vessels. PELVIC ORGANS: Normal.  MUSCULOSKELETAL: Severe degenerative change at the L4-L5 level with grade 2 anterolisthesis.     IMPRESSION: 1.  Improved prerectal fluid collection. 2.  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 3.  Possible right abdominal anastomotic dehiscence with a small amount free fluid and air in the mesentery. 4.  Report called to floor nurse Susy at 4:25 PM    CT Abdomen Pelvis w Contrast    Addendum Date: 12/16/2021    Discussed with Dr. Richardson by Dr. Fahrner over telephone at 11:41 AM.    Result Date: 12/16/2021  EXAM: CT ABDOMEN PELVIS W CONTRAST LOCATION: [facility] DATE/TIME: 12/16/2021 3:00 AM INDICATION: Intrabdominal abscess COMPARISON: CT 12/8/2021 and 12/9/2021 TECHNIQUE: CT scan of the abdomen and pelvis was performed following injection of IV contrast. Multiplanar reformats were obtained. Dose reduction techniques were used. CONTRAST: IsoVue 370 100mL FINDINGS: LOWER CHEST: Normal. HEPATOBILIARY: Normal. PANCREAS: Normal. SPLEEN: Splenules are present. ADRENAL GLANDS: A 10 mm area of fullness in the left adrenal gland has not changed. KIDNEYS/BLADDER: Again seen are nonobstructing left renal calculi. There are simple cysts in the right kidney which do not require follow-up. Smaller renal lesions are too small to characterize. BOWEL: There is surgical change in the right lower quadrant. There is distal small bowel wall thickening in this region. The small bowel wall is not fully visualized, and there is a small amount of extraluminal fluid and air in this region (series 3 images 93 and 96). There is new free air in the right lower quadrant mesentery (series 3 image 105). LYMPH NODES: Normal. VASCULATURE: Atherosclerotic disease. PELVIC ORGANS: Normal. OTHER: A fluid collection adjacent to the rectum is 1.9 x 1.5 x 1.5 cm (approximately 2.2 mL). Previously the collection was 4.4 x 3.6 x 4.4 cm. A pigtail catheter terminates within this collection.  MUSCULOSKELETAL: Surgical change along the ventral abdominal and pelvic wall. There is a 20.8 x 3.2 x 1.2 cm fluid and air collection in the midline ventral abdominal and pelvic wall just deep to the staple line. Right lower quadrant and left midabdomen approach Larry drains are present.     IMPRESSION: 1.  The pelvic fluid collection is significantly smaller. A pigtail catheter is in place. 2.  Increased free air in the mesentery in the right lower quadrant, suggesting suture line dehiscence. There is also a suspected distal small bowel defect with adjacent free air and fluid. 3.  New fluid collection in the subcutaneous tissues of the ventral abdominal and pelvic wall, just deep to the staple line. Recommend drainage.     CT Abdomen Pelvis w Contrast    Result Date: 12/8/2021  EXAM: CT ABDOMEN PELVIS W CONTRAST LOCATION: [facility] DATE/TIME: 12/8/2021 1:40 PM INDICATION: Abdominal abscess/infection suspected, status post laparotomy with repair of small bowel perforation, recent small bowel resection COMPARISON: CT 11/30/2021 TECHNIQUE: CT scan of the abdomen and pelvis was performed following injection of IV contrast. Multiplanar reformats were obtained. Dose reduction techniques were used. CONTRAST: 100 mL Isovue-370 FINDINGS: LOWER CHEST: Mild bilateral lower lobar dependent consolidation and trace right-sided pleural effusion. HEPATOBILIARY: Prominent gallbladder likely reflecting recent fasting. Stable mild biliary ductal dilatation. No obstructing mass lesion or radiodense stone. Stable small low-density hepatic lesions, likely cysts. PANCREAS: Normal. SPLEEN: Surgically absent. ADRENAL GLANDS: Normal. KIDNEYS/BLADDER: Stable nonobstructing lower left renal stones with a dominant stone measuring 4 mm. No hydronephrosis. Unremarkable urinary bladder. BOWEL: Enteric suction tube with the tip located within the mid gastric lumen. Mild to moderately distended segments of mid small bowel  with associated air-fluid levels. No discrete transition is identified. Right lower quadrant anastomotic changes. Small amount of nonloculated fluid and extraluminal air within the mid abdomen for example image 50 series 400.2 and image 97 series 3. Questionable adjacent small bowel defect image 50 series 102. Small amount of nonloculated fluid is also noted within the right lower quadrant. Newly visualized rim-enhancing pelvic fluid collection measuring 4.4 x 3.6 x 4.4 cm. Trace ascites. Midline laparotomy incision with a small amount of fluid and air within the wound. Surgical drain tip is located at the upper aspect of this wound. Additional surgical drain with the tip located within the left lower quadrant. Third surgical drain with the tip located in the lower anterior abdomen. Diffuse mesenteric edema. LYMPH NODES: Normal. VASCULATURE: Mild atherosclerosis. PELVIC ORGANS: Normal. MUSCULOSKELETAL: Bilateral L4 pars defects with grade 1 anterolisthesis at L4-L5. Associated severe discogenic degenerative changes.     IMPRESSION: 1.  Newly visualized 4.6 cm rim-enhancing fluid collection within the pelvis. 2.  Small ill-defined irregular pocket of fluid and extraluminal air within the mid abdomen as described above. It is uncertain whether this is related to sequelae of recent surgery or bowel leakage. Recommend short interval follow-up CT with positive enteric contrast. 3.  Mild to moderate mid small bowel distention. This is favored to reflect an ileus rather than obstruction. 4.  Trace ascites and diffuse mesenteric edema. 5.  Mild bilateral lower lobar consolidation and trace right-sided pleural effusion. [Critical Result: Pulmonary fluid collection and extraluminal loculated air-fluid within the mid abdomen as detailed above.] Finding was identified on 12/8/2021 2:10 PM. Dr. Richardson was contacted by me on 12/8/2021 2:50 PM and verbalized understanding of the critical result.     CT Abdomen Pelvis w  Contrast    Result Date: 11/30/2021  EXAM: CT ABDOMEN PELVIS W CONTRAST LOCATION: [facility] DATE/TIME: 11/30/2021 10:48 AM INDICATION: Abdominal distension COMPARISON: None. TECHNIQUE: CT scan of the abdomen and pelvis was performed following injection of IV contrast. Multiplanar reformats were obtained. Dose reduction techniques were used. CONTRAST: IsoVue 370 100mL FINDINGS: LOWER CHEST: Bibasilar atelectasis. HEPATOBILIARY: Scattered small water density foci in the liver consistent with cysts. No radiopaque gallstone. No suspicious hepatic lesions. PANCREAS: Coarse calcification in the pancreatic tail and body may represent sequelae of chronic pancreatitis no acute pancreatitis or abnormal pancreatic mass. SPLEEN: Not visualized consistent with prior splenectomy. ADRENAL GLANDS: Normal. KIDNEYS/BLADDER: No hydronephrosis or masses. No bladder stone or mass. BOWEL: Anastomotic staples in the colon in the right mid abdomen. There is normal caliber duodenum and proximal jejunum with dilated loops of distal jejunum and ileum with some fecal i-STAT ileal contents and a transition in the right mid to lower abdomen. Findings are consistent with mechanical small bowel obstruction and this may represent a closed loop obstruction such as internal hernia or multiple adhesions given the lack of dilatation of the proximal small bowel. No pneumatosis intestinalis,  free intraperitoneal air or abscess. LYMPH NODES: No lymphadenopathy. VASCULATURE: Mild aortoiliac calcification without aneurysm. PELVIC ORGANS: Mild prostatic enlargement. MUSCULOSKELETAL: Disc space narrowing at L4-L5 with disc degeneration and bilateral spondylolysis at L4 with grade 2 spondylolisthesis of L4 on L5. No acute fracture.     IMPRESSION: 1.  Mechanical small bowel obstruction with dilated distal jejunum and ileum with caliber changes in the right lower quadrant and in the left midabdomen this may be secondary to adhesions  "or internal hernia and has the appearance of a closed loop obstruction. No pneumatosis intestinalis, free intraperitoneal air or abscess. 2.  Results were called to Dr. Sanford the time of dictation. NOTE: ABNORMAL REPORT THE DICTATION ABOVE DESCRIBES AN ABNORMALITY FOR WHICH FOLLOW-UP IS NEEDED.     POC US Guidance Needle Placement    Result Date: 11/30/2021  Ultrasound was performed as guidance to an anesthesia procedure.  Click \"PACS images\" hyperlink below to view any stored images.  For specific procedure details, view procedure note authored by anesthesia.    XR Video Swallow with SLP or OT    Result Date: 11/24/2021  EXAM: XR VIDEO SWALLOW WITH SLP OR OT LOCATION: [facility] DATE/TIME: 11/24/2021 9:18 AM INDICATION: Difficulty swallowing. COMPARISON: Same day esophagram.; CT for lung cancer screening 03/09/2020 TECHNIQUE: Routine swallow study with speech pathology using multiple barium thicknesses. FINDINGS: FLUOROSCOPIC TIME: 0.2 minutes NUMBER OF IMAGES: 2 videofluoroscopic imaging series Swallow study with Speech Pathology using multiple barium thicknesses. Patient is edentulous. Trials of thin consistency and barium coated cracker were performed. No delay in initiation of the oral phase. Normal epiglottic inversion. No penetration or aspiration observed. Moderate to severe cervical spondylosis with anterior wedging, disc space narrowing and osteophytosis of C4, C5, and C6. Straightening and reversal of normal thoracic lordosis.     IMPRESSION: No penetration or aspiration. Please see separately dictated report from speech pathology for additional description, analysis, and management recommendations.     XR Esophagram    Result Date: 11/24/2021  EXAM: XR ESOPHAGRAM LOCATION: [facility] DATE/TIME: 11/24/2021 9:18 AM INDICATION: 67 yo M with dysphagia/dyspnea - cough. Feels food stuck in throat with swallow. Occ. chocking. Needs to swallow lot of liquids. " "Constant runny nose & nasal congestion. COMPARISON: Same day swallow study; CT of the chest without contrast 03/09/2020 TECHNIQUE: Routine. FINDINGS: FLUOROSCOPIC TIME: 1.2 minutes NUMBER OF IMAGES: 4 videofluoroscopic imaging series and additional 41 images. ESOPHAGUS: Patulous esophagus with diffusely abnormal peristalsis. Large number of tertiary contractions present with delayed esophageal emptying. No esophageal stricture, diverticula or mass. No hiatal hernia. There is a large amount of retained contrast within the esophagus after consumption of contrast in the RPO position in transitioning to the supine position. This retained contrast moves to and fro within the esophagus. During the transition from RPO to supine position the patient felt a \"tickle\" in the back of his throat prompting coughing, while not observed likely relates to reflux of contrast from the upper esophagus into the pharynx. No gastroesophageal reflux elicited in the recumbent position with Valsalva maneuver.     IMPRESSION: Moderately severe esophageal dysmotility (presbyesophagus).    "

## 2021-12-30 NOTE — PROGRESS NOTES
Primary Medicine Progress Note    Assessment/Plan  Active Problems:    Abdominal pain, generalized      Chema Dia is a 65 y/o M with past medical history of splenectomy, appendectomy, HTN, and substance abuse, with recent admission to hospital 11/30-12/21/2021 for SBO with perforation s/p surgical repair. Discharged home but presents as he could not manage at home and is now requesting to be placed in TCU vs LTC. Continues on TPN with surgery and ID following while awaiting LTC placement.       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 Required subsequent washout on 12/3 with aggressive IV antibiotic regimen and MARISA drain placement. On 12/8 found to have continued enhancing abscess with drain placed 12/9 and removed on 12/16. Antibiotics stopped on 12/20. Plan for close follow up with surgery and TPN at discharge, with plans to have help from his brother in law. Then returned on 12/22 given unable to care for himself even with brother in law's assistance; requesting LTC/TCU placement.  -PT/OT ordered, appreciate recs on placement  -Searching for TCU/LTACH placement with care management   -planning for discharge to [facility] apartment on Monday, so long as LTCs continue to deny him  -Pain management: Percocet 1-2 tablets q6h PRN, gabapentin   -Consulted surgery to follow while here  -Consulted ID              -Now on ceftriaxone/fluconazole. Per ID may consider oral abx on discharge, though unclear on duration  -Consult pharmacy and RD for TPN management - will transition to cyclic TPN 12/30  -WOC    Alk phos elevation  Continues to climb day by day.  Rest of LFTs okay.  White count normal.  Had been just monitoring, though Chema spiked a fever overnight into 12/27 and looked mildly  ill that morning.  Endorsing RUQ TTP on exam.  RUQ US showing sludge but no signs of acute kat.  -Continue to monitor  -Moving to cyclic TPN     Depression  Insomnia  Hx of this. Reports symptoms for past few days= low motivation, little interest in doing things, poor sleep, no appetite. Is on buspar, mirtazapine, and effexor, though doesn't really think medicines are as helpful as talk therapy is. Wanted to speak to social work and . States no plan to harm himself. Reports he just needs to leave the hospital.  -Consult psych for med management and plan for talk therapy while hospitalized  -Discontinued buspar and mirtazapine per psych  -Increased venlafaxine and gabapentin per psych  -Seroquel for sleep     Moderate malnutrition  Per RD eval  -TPN per RD and pharmacy     Normocytic anemia  Stable at 10.8, MCV of 93.       Chronic Conditions:  Hypothyroidism- resume PTA levothyroxine  Chronic pain- Hold mexolicam 7.5mg daily, resume PTA gabapentin  BPH-hold home Flomax given he states it does not help    Subjective:   Feeling better mood-wise this morning. Spoke w/  Shelia, who [facility]. Can't do intakes tomorrow so looking toward discharge Monday. Chema is okay with that plan.    Objective    Vital signs in last 24 hours Temp:  [97.8  F (36.6  C)-98.8  F (37.1  C)] 97.8  F (36.6  C)  Pulse:  [91-94] 94  Resp:  [18-20] 18  BP: (111-129)/(78-86) 111/78  SpO2:  [93 %-95 %] 93 %       Weight:   Vitals:    12/22/21 1242 12/28/21 1035 12/29/21 2147   Weight: 75.5 kg (166 lb 6.4 oz) 75.6 kg (166 lb 11.2 oz) 75.8 kg (167 lb)       Intake/Output last 3 shift I/O last 3 completed shifts:  In: 1071.5 [P.O.:165]  Out: 1090 [Urine:200; Drains:250; Other:640]    Intake/Output this shift:No intake/output data recorded.    PHYSICAL EXAM  GENERAL APPEARANCE: Cooperative; appears stated age  LUNGS: No respiratory distress  HEART: Brisk cap  refill  ABDOMEN: Ostomy bag draining stool.  Lap sites C/D/I.  Moderate concentrated RUQ TTP.  No guarding or rebound.  EXTREMITIES: Grossly normal without deformity, no edema  SKIN: no rashes, skin warm  NEURO: Oriented to time and place    Pertinent Labs and Pertinent Radiology   Lab Results: personally reviewed.     Recent Labs   Lab 12/30/21  0634   WBC 9.4   HGB 11.9*   HCT 37.2*          Recent Labs   Lab 12/30/21  0634      CO2 23   BUN 18   ALBUMIN 2.2*   ALKPHOS 218*   ALT 31   AST 27       Radiology Results:   Results for orders placed or performed during the hospital encounter of 12/21/21   CT Abdomen Pelvis w Contrast    Impression    IMPRESSION:   1.  Improved prerectal fluid collection.  2.  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  3.  Possible right abdominal anastomotic dehiscence with a small amount free fluid and air in the mesentery.  4.  Report called to floor nurse Susy at 4:25 PM       Precepted patient with Dr. Willy Mcmahon MD - PGY2  [facility]  P: [phone]      Parts of this note were created with the assistance of voice recognition software.  The note was reviewed for accuracy.  However, errors in spelling, etc may have resulted.

## 2021-12-30 NOTE — PROGRESS NOTES
[facility] ([address])    At this point Gurdeep Dia does not have sufficient clinical complexity to meet criteria for LTACH given cyclic TPN, fistula to ostomy bag, ambulation in halls with SBA, anticipation of abxs being changed to oral on discharge, no O2 needs.         Shannon Spears RN  LTACH Referral Specialist    [facility]    [address] [address]  [email or web address]    [email or web address]  Office: [phone]  Fax: [phone]     CONFIDENTIAL Protected under [place] Statute  145.61 et seq

## 2021-12-30 NOTE — PROGRESS NOTES
"Surgery    Vital signs:  Temp: 97.8  F (36.6  C) Temp src: Oral BP: 111/78 Pulse: 94   Resp: 18 SpO2: 93 % O2 Device: None (Room air)   Height: 170.2 cm (5' 7\") Weight: 75.8 kg (167 lb)  Estimated body mass index is 26.16 kg/m  as calculated from the following:    Height as of this encounter: 1.702 m (5' 7\").    Weight as of this encounter: 75.8 kg (167 lb).      abd draining into bags, soft, nt,nd  Ext warm    tpn     Results for orders placed or performed during the hospital encounter of 12/21/21 (from the past 24 hour(s))   Glucose by meter   Result Value Ref Range    GLUCOSE BY METER POCT 119 (H) 70 - 99 mg/dL   Comprehensive metabolic panel   Result Value Ref Range    Sodium 137 136 - 145 mmol/L    Potassium 4.7 3.5 - 5.0 mmol/L    Chloride 106 98 - 107 mmol/L    Carbon Dioxide (CO2) 23 22 - 31 mmol/L    Anion Gap 8 5 - 18 mmol/L    Urea Nitrogen 18 8 - 22 mg/dL    Creatinine 0.78 0.70 - 1.30 mg/dL    Calcium 9.1 8.5 - 10.5 mg/dL    Glucose 109 70 - 125 mg/dL    Alkaline Phosphatase 218 (H) 45 - 120 U/L    AST 27 0 - 40 U/L    ALT 31 0 - 45 U/L    Protein Total 7.6 6.0 - 8.0 g/dL    Albumin 2.2 (L) 3.5 - 5.0 g/dL    Bilirubin Total 0.8 0.0 - 1.0 mg/dL    GFR Estimate >90 >60 mL/min/1.73m2   CBC with platelets   Result Value Ref Range    WBC Count 9.4 4.0 - 11.0 10e3/uL    RBC Count 3.96 (L) 4.40 - 5.90 10e6/uL    Hemoglobin 11.9 (L) 13.3 - 17.7 g/dL    Hematocrit 37.2 (L) 40.0 - 53.0 %    MCV 94 78 - 100 fL    MCH 30.1 26.5 - 33.0 pg    MCHC 32.0 31.5 - 36.5 g/dL    RDW 16.2 (H) 10.0 - 15.0 %    Platelet Count 378 150 - 450 10e3/uL   CRP inflammation   Result Value Ref Range    CRP 1.8 (H) 0.0-<0.8 mg/dL     A/P:    Small bowel fistulas  Stable  tpn  Following        Mekhi Richardson MD  General Surgery [phone]  Vascular Surgery [phone]          "

## 2021-12-30 NOTE — PROGRESS NOTES
"Care Management Follow Up    Length of Stay (days): 8    Expected Discharge Date: 12/31/2021     Concerns to be Addressed: Alteration in nutrition requiring TPN (expense for TCU might be a barrier). IV Rocephin. Discharge planning (TCU recommended).   Patient plan of care discussed at interdisciplinary rounds: Yes   Follow up from rounds/notes:   Per ID notes, \"Consider PO abx upon discharge when cleared by surgery.\"    Anticipated Discharge Disposition: Home with home care.      Anticipated Discharge Services: Other (see comment) (therapy services, home infusion services)  Anticipated Discharge DME: None    Patient/family educated on Medicare website which has current facility and service quality ratings: yes  Education Provided on the Discharge Plan:  Per team  Patient/Family in Agreement with the Plan: yes    Referrals Placed by CM/SW: Post Acute Facilities; [facility];   Private pay costs discussed: Not applicable    Additional Information:  Patient lives in home with brother-in-law where he was independent, receives TPN [facility].  However, therapy is recommending transitional care (TCU) at this time. Referrals have been sent. Patient agrees to have [facility] review for possible admissions.   11:28 AM: [facility] has declined patient due to complex social history. Reviewed with MD and patient who states that he might have a bed at [facility] in [place]. This would be an apartment at [facility] in [place] ([phone]) which patient states is furnished. He states that they will not be able to take until next week. Otherwise, patient is hoping for [facility]. Writer left a message for admissions there as well.   12:30 PM: Spoke with Hayden at [facility] who states that they are holding a spot for him and can accept before 1 PM on Monday or Wednesday of next week. They do provide 3 meals a day there as well.  Patient will need to be completely " independent with all activities of daily living before going there. Patient is aware of this. Referral made to [facility] for benefit check.   1:24 PM:  Received an update from PT who notes patient is moving well enough for a home discharge at this point.   2:24 PM:  [facility] response [facility].      [place] [facility] [facility] (would need [place] authorization. Following) (Declined, does not meet criteria)    [facility] (Declined, does not meet criteria)    [facility] (Declined)    [facility] (Declined)    [facility] [place] [facility] (Declined)    [facility] (would need to be independent)    [facility]    Celine Presley RN

## 2021-12-30 NOTE — PROGRESS NOTES
Pouches on midline incision replaced due to leaking. While changing the  pouch  writer observed that incision line has a few loose staples.  The skin around the open area are redden and sensitive. Patient feels that he is not getting better as he feels weak and poor appetite. Therapeutic presence and emotional support offered and patient and patient felt better after he was seen by his doctor. Continue to monitor.

## 2021-12-30 NOTE — PROGRESS NOTES
"WOC stoma assessment EC fistula     Allergies:  Penicillins    Height:  Height: 170.2 cm (5' 7\")    Weight:   Weight: 75.5 kg (166 lb 6.4 oz)    Past Medical History:  Past Medical History:   Diagnosis Date     Benign prostatic hyperplasia with weak urinary stream      Chronic bilateral low back pain without sciatica      Chronic neck pain      RAVI (generalized anxiety disorder)      History of hepatitis C     treated and cured     History of substance abuse (H)      Hypertension goal BP (blood pressure) < 140/90      Moderate major depression (H)        Labs:  Recent Labs   Lab Test 12/23/21  0715 12/22/21  1648 12/22/21  0003 12/08/21  0535 12/07/21  0358   CRP  --   --   --   --  26.6*   HGB 10.9*  --  12.0*   < > 10.9*   ALBUMIN  --   --  2.4*   < > 2.3*   CULTURE  --  No growth after 12 hours  No growth after 12 hours  --    < >  --     < > = values in this interval not displayed.       Mars:  Mars Score: 17    INTAKE  EC fistula s/p 2 abdominal surgeries    Patient reports that pouches were changed yesterday by RN. Pouches currently nicely intact with no leaks.     Old drain spot in RLQ has some serous drainage noted- covered with dry gauze.     See below for previous assessment. Brown liquid output noted in both pouches.    ASSESSMENT  Abdominal EC fistula    Long abdominal incision that is mostly approximated with staples.     Upper portion of incision possibly has fistulous tract as well given green output from the incision- this area measures 1.5x0.5cm - staples intact here still. Bright erythema surrounding with significant maceration     Distal portion of incision is open wound where staples were removed during previous admission. Wound measures 2x1cm - did not assess depth. Large green/brown output from this wound. periwound skin has bright erythema, superficial denudement, and maceration.     Skin was crusted with stoma powder and Cavilon no-sting skin barrier. Barrier rings placed around each " fistula site and each was pouched with Jaylin #[id number].         TREATMENT/EQUIPMENT  See above    Supplies: Pouch Jaylin #[id number] -Flat 1 piece cut to fit fecal pouch, Paste Jaylin #[id number] -Adapt Ring and Powder Jaylin #[id number] - Adapt    Instructions Given: WOC Role    Nursing care provided was coordinated care with RN and patient.    Discussed plan of care with nurse and patient. Patient will also need home care for fistula management at discharge.    Outcomes and treatment recommendations are to To contain output, To be knowledgable with pouch change/emptying before discharge and To be independant with pouch change/emptying before discharge    Actions taken by WOC RN: 2 minutes of education.    Planned Follow Up: Weekly.    Plan for next visit: Reassess stoma/peristomal skin

## 2021-12-30 NOTE — PLAN OF CARE
Problem: Anxiety  Goal: Anxiety Reduction or Resolution  Outcome: Improving    Pt was quite anxious in regards to his progression.  Per pt; he feels that he is getting worse due to continuing to be on TPN/lipids and the fistulas still draining. He also stated that he felt lonely and confined to the room. Writer used therapeutic listening, and encouraged pt to increase activity outside of the room and to reach out to family. Therapeutic communication was effective; pt relaxed and went to sleep.     Problem: Infection (Surgery Nonspecified)  Goal: Absence of Infection Signs and Symptoms  12/30/2021 0113 by Cortney Virgen, RN  Outcome: Improving  Pt has been afebrile.       Problem: Impaired Wound Healing  Goal: Optimal Wound Healing  12/30/2021 0113 by Cortney Virgen, RN  Outcome: Improving  RONAN the fistulas- covered with c/d/I ostomy appliances.  Upper fistula drainage is bile colored. The lower fistula has dark brown, with a small amount of  Blood present; not clots however.

## 2021-12-30 NOTE — PROGRESS NOTES
CLINICAL NUTRITION SERVICES -BRIEF TPN NOTE     Nutrition Prescription    RECOMMENDATIONS FOR MDs/PROVIDERS TO ORDER:  Encourage fluids during the day.  Changing TPN to nocturnal.    Malnutrition Status:    Severe    Recommendations already ordered by Registered Dietitian (RD):  For liver/pt rest changing formula and cyclin gm Dextrose, 115 gm AA @ 75 mL/hr x 1 hour, 150 mL/hr x 10 hours, and 75 mL/hr x 1 hour, off.  250 mL 20% lipids x 12 hours daily.    Provides: 1650 mL + 250 mL lipid, 2184 kcal/day, 115 gm protein, 360 gm cho    Future/Additional Recommendations:  Follow labs/liver enzymes - may need to change lipid to smof, or decrease back down to 5 days weekly.     Pt sleeping - did not wake.     P.O INTAKE  Diet: Clear liquids  165 ml fluid. Has not ordered from the kitchen since  breakfast    CURRENT TPN: D 400  @ 70 ml/hr plus 250 ml  Of 20% lipids 5 times per week over 12 hrs    ANTHROPOMETRICS  Most recent weight: 167 lb () - stable x 1 week  75.6 kg (166 lb 11.2 oz)     GI CONCERNS  Per chart,  Fistulas x 2  1 BM 21  Fistula x 2  mL   Upper fistula bile colored drainage, lower fistula dark brown, no blood clots, small amount of blood  200 ml urine      LABS  Reviewed:   Alk Phos trending up (218) - abd US with sludge but no acute kat    CURRENT NUTRITION DIAGNOSIS  Malnutrition related to s/p surgical intervention of SBO with formation of enterocutaneous fistula as evidenced by 1.7% weight loss in 1 week, severe subcutaneous fat loss and moderate muscle loss.      INTERVENTIONS  Implementation  Changing TPN formula and cycling, 360 gm Dextrose, 115 gm AA at 75 mL x 1 hour, 150 mL x 10 hours, and 75 mL x 1 hr and off.  250 mL 20% lipid daily x 12 hours.    Decreasing dextrose and cycling for liver rest.  Increasing lipid to daily to meet needs - will watch tolerance - perhaps switch to SMOF lipid or decrease back down to 5 days weekly, as  needed.    Goals  Maintain weight -progressing    Monitoring/Evaluation  Progress toward goals will be monitored and evaluated per protocol: diet order, TPN intake, weight, labs, nutrition-focused physical findings

## 2021-12-31 ENCOUNTER — APPOINTMENT (OUTPATIENT)
Dept: CT IMAGING | Facility: HOSPITAL | Age: 66
DRG: 393 | End: 2021-12-31
Attending: INTERNAL MEDICINE
Payer: MEDICARE

## 2021-12-31 ENCOUNTER — APPOINTMENT (OUTPATIENT)
Dept: OCCUPATIONAL THERAPY | Facility: HOSPITAL | Age: 66
DRG: 393 | End: 2021-12-31
Payer: MEDICARE

## 2021-12-31 LAB
ALBUMIN SERPL-MCNC: 2.2 G/DL (ref 3.5–5)
ALP SERPL-CCNC: 224 U/L (ref 45–120)
ALT SERPL W P-5'-P-CCNC: 35 U/L (ref 0–45)
ANION GAP SERPL CALCULATED.3IONS-SCNC: 8 MMOL/L (ref 5–18)
AST SERPL W P-5'-P-CCNC: 27 U/L (ref 0–40)
BILIRUB SERPL-MCNC: 0.8 MG/DL (ref 0–1)
BUN SERPL-MCNC: 26 MG/DL (ref 8–22)
CALCIUM SERPL-MCNC: 8.4 MG/DL (ref 8.5–10.5)
CHLORIDE BLD-SCNC: 108 MMOL/L (ref 98–107)
CO2 SERPL-SCNC: 22 MMOL/L (ref 22–31)
CREAT SERPL-MCNC: 0.75 MG/DL (ref 0.7–1.3)
ERYTHROCYTE [DISTWIDTH] IN BLOOD BY AUTOMATED COUNT: 16.3 % (ref 10–15)
GFR SERPL CREATININE-BSD FRML MDRD: >90 ML/MIN/1.73M2
GLUCOSE BLD-MCNC: 124 MG/DL (ref 70–125)
GLUCOSE BLDC GLUCOMTR-MCNC: 100 MG/DL (ref 70–99)
GLUCOSE BLDC GLUCOMTR-MCNC: 124 MG/DL (ref 70–99)
GLUCOSE BLDC GLUCOMTR-MCNC: 149 MG/DL (ref 70–99)
HCT VFR BLD AUTO: 36.1 % (ref 40–53)
HGB BLD-MCNC: 11.3 G/DL (ref 13.3–17.7)
MAGNESIUM SERPL-MCNC: 1.9 MG/DL (ref 1.8–2.6)
MCH RBC QN AUTO: 29.4 PG (ref 26.5–33)
MCHC RBC AUTO-ENTMCNC: 31.3 G/DL (ref 31.5–36.5)
MCV RBC AUTO: 94 FL (ref 78–100)
PHOSPHATE SERPL-MCNC: 3.4 MG/DL (ref 2.5–4.5)
PLATELET # BLD AUTO: 357 10E3/UL (ref 150–450)
POTASSIUM BLD-SCNC: 4.6 MMOL/L (ref 3.5–5)
PROT SERPL-MCNC: 7.3 G/DL (ref 6–8)
RBC # BLD AUTO: 3.84 10E6/UL (ref 4.4–5.9)
SODIUM SERPL-SCNC: 138 MMOL/L (ref 136–145)
WBC # BLD AUTO: 10.8 10E3/UL (ref 4–11)

## 2021-12-31 PROCEDURE — 250N000011 HC RX IP 250 OP 636

## 2021-12-31 PROCEDURE — 80053 COMPREHEN METABOLIC PANEL: CPT | Performed by: STUDENT IN AN ORGANIZED HEALTH CARE EDUCATION/TRAINING PROGRAM

## 2021-12-31 PROCEDURE — 250N000011 HC RX IP 250 OP 636: Performed by: FAMILY MEDICINE

## 2021-12-31 PROCEDURE — 97535 SELF CARE MNGMENT TRAINING: CPT | Mod: GO

## 2021-12-31 PROCEDURE — 120N000001 HC R&B MED SURG/OB

## 2021-12-31 PROCEDURE — 85014 HEMATOCRIT: CPT | Performed by: STUDENT IN AN ORGANIZED HEALTH CARE EDUCATION/TRAINING PROGRAM

## 2021-12-31 PROCEDURE — 250N000011 HC RX IP 250 OP 636: Performed by: STUDENT IN AN ORGANIZED HEALTH CARE EDUCATION/TRAINING PROGRAM

## 2021-12-31 PROCEDURE — 99233 SBSQ HOSP IP/OBS HIGH 50: CPT | Performed by: INTERNAL MEDICINE

## 2021-12-31 PROCEDURE — 250N000013 HC RX MED GY IP 250 OP 250 PS 637: Performed by: INTERNAL MEDICINE

## 2021-12-31 PROCEDURE — 97129 THER IVNTJ 1ST 15 MIN: CPT | Mod: GO

## 2021-12-31 PROCEDURE — 99207 PR NO CHARGE LOS: CPT | Performed by: NURSE PRACTITIONER

## 2021-12-31 PROCEDURE — C9113 INJ PANTOPRAZOLE SODIUM, VIA: HCPCS | Performed by: STUDENT IN AN ORGANIZED HEALTH CARE EDUCATION/TRAINING PROGRAM

## 2021-12-31 PROCEDURE — 84100 ASSAY OF PHOSPHORUS: CPT | Performed by: STUDENT IN AN ORGANIZED HEALTH CARE EDUCATION/TRAINING PROGRAM

## 2021-12-31 PROCEDURE — 250N000013 HC RX MED GY IP 250 OP 250 PS 637: Performed by: NURSE PRACTITIONER

## 2021-12-31 PROCEDURE — 83735 ASSAY OF MAGNESIUM: CPT | Performed by: STUDENT IN AN ORGANIZED HEALTH CARE EDUCATION/TRAINING PROGRAM

## 2021-12-31 PROCEDURE — 250N000009 HC RX 250: Performed by: FAMILY MEDICINE

## 2021-12-31 PROCEDURE — 250N000013 HC RX MED GY IP 250 OP 250 PS 637: Performed by: STUDENT IN AN ORGANIZED HEALTH CARE EDUCATION/TRAINING PROGRAM

## 2021-12-31 PROCEDURE — 36415 COLL VENOUS BLD VENIPUNCTURE: CPT | Performed by: STUDENT IN AN ORGANIZED HEALTH CARE EDUCATION/TRAINING PROGRAM

## 2021-12-31 PROCEDURE — 74177 CT ABD & PELVIS W/CONTRAST: CPT

## 2021-12-31 PROCEDURE — 99231 SBSQ HOSP IP/OBS SF/LOW 25: CPT | Mod: GC | Performed by: STUDENT IN AN ORGANIZED HEALTH CARE EDUCATION/TRAINING PROGRAM

## 2021-12-31 RX ORDER — METRONIDAZOLE 500 MG/1
500 TABLET ORAL 2 TIMES DAILY
Status: DISCONTINUED | OUTPATIENT
Start: 2021-12-31 | End: 2022-01-06

## 2021-12-31 RX ORDER — IOPAMIDOL 755 MG/ML
100 INJECTION, SOLUTION INTRAVASCULAR ONCE
Status: COMPLETED | OUTPATIENT
Start: 2021-12-31 | End: 2021-12-31

## 2021-12-31 RX ADMIN — GABAPENTIN 200 MG: 100 CAPSULE ORAL at 17:47

## 2021-12-31 RX ADMIN — LEVOTHYROXINE SODIUM 25 MCG: 0.03 TABLET ORAL at 05:39

## 2021-12-31 RX ADMIN — QUETIAPINE FUMARATE 200 MG: 100 TABLET, FILM COATED ORAL at 22:38

## 2021-12-31 RX ADMIN — VENLAFAXINE HYDROCHLORIDE 75 MG: 75 CAPSULE, EXTENDED RELEASE ORAL at 08:31

## 2021-12-31 RX ADMIN — PANTOPRAZOLE SODIUM 40 MG: 40 INJECTION, POWDER, FOR SOLUTION INTRAVENOUS at 08:36

## 2021-12-31 RX ADMIN — ENOXAPARIN SODIUM 40 MG: 40 INJECTION SUBCUTANEOUS at 08:37

## 2021-12-31 RX ADMIN — METRONIDAZOLE 500 MG: 500 TABLET ORAL at 20:06

## 2021-12-31 RX ADMIN — METRONIDAZOLE 500 MG: 500 TABLET ORAL at 12:38

## 2021-12-31 RX ADMIN — GABAPENTIN 900 MG: 300 CAPSULE ORAL at 22:39

## 2021-12-31 RX ADMIN — FLUCONAZOLE 200 MG: 2 INJECTION, SOLUTION INTRAVENOUS at 19:51

## 2021-12-31 RX ADMIN — I.V. FAT EMULSION 250 ML: 20 EMULSION INTRAVENOUS at 20:13

## 2021-12-31 RX ADMIN — CEFTRIAXONE SODIUM 2 G: 2 INJECTION, POWDER, FOR SOLUTION INTRAMUSCULAR; INTRAVENOUS at 17:41

## 2021-12-31 RX ADMIN — IOPAMIDOL 100 ML: 755 INJECTION, SOLUTION INTRAVENOUS at 11:56

## 2021-12-31 RX ADMIN — POTASSIUM CHLORIDE: 2 INJECTION, SOLUTION, CONCENTRATE INTRAVENOUS at 20:07

## 2021-12-31 RX ADMIN — GABAPENTIN 200 MG: 100 CAPSULE ORAL at 08:31

## 2021-12-31 ASSESSMENT — ACTIVITIES OF DAILY LIVING (ADL)
ADLS_ACUITY_SCORE: 11
ADLS_ACUITY_SCORE: 13
ADLS_ACUITY_SCORE: 11
ADLS_ACUITY_SCORE: 13
ADLS_ACUITY_SCORE: 11
ADLS_ACUITY_SCORE: 11
ADLS_ACUITY_SCORE: 13
ADLS_ACUITY_SCORE: 11
ADLS_ACUITY_SCORE: 13
ADLS_ACUITY_SCORE: 11
ADLS_ACUITY_SCORE: 11
ADLS_ACUITY_SCORE: 13
ADLS_ACUITY_SCORE: 11

## 2021-12-31 NOTE — PHARMACY-CONSULT NOTE
"Pharmacy Note: Parenteral Nutrition (PN) Management    Pharmacist consulted to dose PN for Gurdeep Dia, a 66 year old male by Dr. Mcmahon.    Subjective:    The patient was started on PN in the hospital on 12/5/2021.    Indication for PN therapy: bowel resection, plan to continue TPN until fistula heals    Inadequate nutrition existing for > 7 days.     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    Social History     Tobacco Use     Smoking status: Current Every Day Smoker     Smokeless tobacco: Never Used   Substance Use Topics     Alcohol use: Not Currently     Comment: Clean for 5 years     Drug use: Not Currently     Objective:    Ht Readings from Last 1 Encounters:   12/22/21 1.702 m (5' 7\")     Wt Readings from Last 1 Encounters:   12/29/21 75.8 kg (167 lb)       Body mass index is 26.16 kg/m .    Patient Vitals for the past 96 hrs:   Weight   12/29/21 2147 75.8 kg (167 lb)   12/28/21 1035 75.6 kg (166 lb 11.2 oz)       Labs:  Last 3 days:  Recent Labs     12/29/21  0657 12/30/21  0634 12/31/21  0708    137 138   POTASSIUM 4.3 4.7 4.6   CHLORIDE 103 106 108*   CO2 24 23 22   BUN 18 18 26*   CR 0.77 0.78 0.75   VIJAYA 8.7 9.1 8.4*   MAG 1.8  --  1.9   PHOS 3.2  --  3.4   PROTTOTAL 7.1 7.6 7.3   ALBUMIN 2.1* 2.2* 2.2*   HGB 11.0* 11.9* 11.3*   HCT 34.6* 37.2* 36.1*    378 357   BILITOTAL 0.8 0.8 0.8   AST 29 27 27   ALT 28 31 35   ALKPHOS 178* 218* 224*       Glucose (past 48 hours):   Recent Labs     12/29/21  1742 12/30/21  0634 12/30/21  1815 12/31/21  0056 12/31/21  0539 12/31/21  0708   * 109 102* 149* 124* 124       Intake/Output (last 24 hours): I/O last 3 completed shifts:  In: -   Out: 1250 [Urine:450; Drains:800]    Estimated CrCl: Estimated Creatinine Clearance: 102.5 mL/min (based on SCr of 0.75 mg/dL).    Assessment:    Continue patient on PN therapy as a cyclic central therapy. PN therapy was changed to cyclic by RD on 12/30 with a decrease in dextrose, " and lipids were changed to daily.    Given the patient's current condition/oral intake, PN is still indicated.    Lab results reviewed: 12/30 and 12/31. No changes to formula today.    Plan:  1. Rate of PN: 75 mL/hr for 1 hour, then 150 mL/hr for 10 hours, then 75 mL/hr for 1 hour. Maintenance IV fluid rate will be adjusted appropriately to meet the total maximum IV fluids rate as ordered by provider.  2. Formula:     Amino Acids 115 grams    Dextrose 360 grams    Sodium 110 mEq/day    Potassium 75 mEq/day    Calcium 8 mEq/day    Magnesium 14 mEq/day    Phosphorus 32 mMol/day    Chloride: Acetate Ratio 1:1    Standard Multivitamins w/Vitamin K    Trace Elements  3. Fat Emulsion: 20%, 250 mL IV daily  4. Check PN labs per protocol.  5. Pharmacist will continue to follow the patient's lab results, clinical status and blood glucose results and make adjustments as appropriate.    Thank you for the consult.  Savanna Braun RPH  12/31/2021 8:12 AM

## 2021-12-31 NOTE — PLAN OF CARE
Problem: Adult Inpatient Plan of Care  Goal: Plan of Care Review  Outcome: Improving   Pt started on cyclic TPN tonight. Tolerating clears. 1 PRN percocet given at bedtime. Pt SBA/I.

## 2021-12-31 NOTE — PLAN OF CARE
Problem: Pain (Surgery Nonspecified)  Goal: Acceptable Pain Control  Outcome: Improving     Problem: Anxiety  Goal: Anxiety Reduction or Resolution  Outcome: Improving  Pt denies pain, on cyclic TPN, tolerating clears. Last . Independent in room, call light within reach.

## 2021-12-31 NOTE — PROGRESS NOTES
[facility]    Infectious Disease Progress Note    Date of Service (when I saw the patient): 12/31/2021     Assessment & Plan   See id note from 12/27    1. History of splenectomy and substance abuse, recent admission with small bowel obstruction- improving  2. Status post exploratory laparotomy, small bowel resection/repair of enterotomy, extensive lysis of adhesion on 11/30/2021  3. Postoperative small bowel perforation, status post exploratory laparotomy on 12/3/2021.  4. Intra-abdominal abscess status post drain placement on 12/9/2021.  E. coli in cultures.  Treated with meropenem, IV vancomycin and transition to p.o. cefdinir plus Flagyl on 12/15/2021  5. Abscessogram on 12/16 with resolved abscess, drain removed on 12/16/2021  6. Antibiotics discontinued on 12/20/2021, discharged from the hospital on 12/20/2021 and readmitted due to worsening pain and drainage from incision, cellulitis, EC fistula  7.  E coli and Candida parapsilosis in wound culture from 12/22/2021  8. Leukocytosis improved  9. On TPN  10. PCN allergy- hives    leaking from drain site    Recommendations:  followup CT abdomen, if fluid collection, drain and leave in  Add metronidazole  On ceftriaxone and fluconazole.    Consider PO abx upon discharge when cleared by surgery  Trend CRP>>much better  Discussed w medical team      Kelly Hanna MD  [facility]  [phone]        Interval History   leaking from drain site  Denies any pain  On TPN    Physical Exam   Temp: 98.7  F (37.1  C) Temp src: Oral BP: 108/72 Pulse: 99   Resp: 16 SpO2: 92 % O2 Device: None (Room air)    Vitals:    12/22/21 1242 12/28/21 1035 12/29/21 2147   Weight: 75.5 kg (166 lb 6.4 oz) 75.6 kg (166 lb 11.2 oz) 75.8 kg (167 lb)     Vital Signs with Ranges  Temp:  [97.5  F (36.4  C)-98.7  F (37.1  C)] 98.7  F (37.1  C)  Pulse:  [96-99] 99  Resp:  [12-16] 16  BP: (103-121)/(72-82) 108/72  SpO2:  [92 %-93 %] 92  %      Constitutional: No apparent distress  Lungs: normal breathing pattern  Cardiovascular:  No JVD elevation  Abdomen: drainage drain site. Ostomy bags on abdominal wall  Skin: no rash  Neuro alert oriented    Medications     dextrose       parenteral nutrition - ADULT compounded formula CYCLE       parenteral nutrition - ADULT compounded formula CYCLE 75 mL/hr at 12/31/21 0710       cefTRIAXone  2 g Intravenous Q24H     enoxaparin ANTICOAGULANT  40 mg Subcutaneous Daily     fluconazole  200 mg Intravenous Q24H     gabapentin  200 mg Oral BID     gabapentin  900 mg Oral At Bedtime     levothyroxine  25 mcg Oral Daily     lipids  250 mL Intravenous Q24H     pantoprazole (PROTONIX) IV  40 mg Intravenous Daily with breakfast     QUEtiapine  200 mg Oral At Bedtime     sodium chloride (PF)  3 mL Intracatheter Q8H     venlafaxine  75 mg Oral Daily       Data   All microbiology laboratory data reviewed.  Recent Labs   Lab Test 12/31/21  0708 12/30/21  0634 12/29/21  0657   WBC 10.8 9.4 8.5   HGB 11.3* 11.9* 11.0*   HCT 36.1* 37.2* 34.6*   MCV 94 94 93    378 306     Recent Labs   Lab Test 12/31/21  0708 12/30/21  0634 12/29/21  0657   CR 0.75 0.78 0.77     MICRO: reviewed      12/22/2021 1326 12/25/2021 0703 Wound Aerobic Bacterial Culture Routine [[id number]]    (Abnormal)   Wound from Abdomen    Final result Component Value   Culture Culture in progress    3+ Escherichia coli Abnormal     3+ Escherichia coli Abnormal     2+ Candida parapsilosis complex Abnormal          Susceptibility     Escherichia coli (1) Escherichia coli (2)     DENA DENA     Ampicillin >=32.0 ug/mL Resistant >=32.0 ug/mL Resistant     Ampicillin/ Sulbactam >=32.0 ug/mL Resistant >=32.0 ug/mL Resistant     Cefepime <=1.0 ug/mL Susceptible <=1.0 ug/mL Susceptible     Ceftazidime <=1.0 ug/mL Susceptible 2.0 ug/mL Susceptible     Ceftriaxone <=1.0 ug/mL Susceptible <=1.0 ug/mL Susceptible     Ciprofloxacin >=4.0 ug/mL Resistant >=4.0 ug/mL  Resistant     Gentamicin <=1.0 ug/mL Susceptible <=1.0 ug/mL Susceptible     Levofloxacin >=8.0 ug/mL Resistant >=8.0 ug/mL Resistant     Meropenem <=0.25 ug/mL Susceptible <=0.25 ug/mL Susceptible     Piperacillin/Tazobactam >=128.0 ug/mL Resistant >=128.0 ug/mL Resistant     Tobramycin <=1.0 ug/mL Susceptible <=1.0 ug/mL Susceptible     Trimethoprim/Sulfamethoxazole >16/304 ug/mL Resistant >16/304 ug/mL Resistant                           12/22/2021 1648 12/23/2021 2017 Blood Culture Peripheral Blood [[id number]]   Peripheral Blood    Preliminary result Component Value   Culture No growth after 1 day P              12/22/2021 1648 12/23/2021 2017 Blood Culture Peripheral Blood [[id number]]   Peripheral Blood    Preliminary result Component Value   Culture No growth after 1 day P              12/22/2021 1326 12/24/2021 1412 Wound Aerobic Bacterial Culture Routine [[id number]]   (Abnormal)   Wound from Abdomen    Preliminary result Component Value   Culture Culture in progress P    3+ Gram negative bacilli Abnormal  P    3+ Gram negative bacilli Abnormal  P    2+ Candida parapsilosis complex Abnormal  P              12/22/2021 0018 12/22/2021 0048 Asymptomatic COVID-19 Virus (Coronavirus) by PCR Nasopharyngeal [[id number]]    Swab from Nasopharyngeal    Final result Component Value   SARS CoV2 PCR Negative   NEGATIVE: SARS-CoV-2 (COVID-19) RNA not detected, presumed negative.           12/17/2021 1615 12/17/2021 1844 Asymptomatic COVID-19 Virus (Coronavirus) by PCR Nasopharyngeal [[id number]]    Swab from Nasopharyngeal    Final result Component Value   SARS CoV2 PCR Negative   NEGATIVE: SARS-CoV-2 (COVID-19) RNA not detected, presumed negative.           12/09/2021 1204 12/12/2021 0831 Body fluid, unsp Aerobic Bacterial Culture Routine [[id number]]    (Abnormal)   Body fluid, unsp from Pelvis    Final result Component Value   Culture 2+ Escherichia coli Abnormal          Susceptibility     Escherichia  coli     DENA     Ampicillin >=32.0 ug/mL Resistant     Ampicillin/ Sulbactam 16.0 ug/mL Intermediate     Cefepime <=1.0 ug/mL Susceptible     Ceftazidime <=1.0 ug/mL Susceptible     Ceftriaxone <=1.0 ug/mL Susceptible     Ciprofloxacin >=4.0 ug/mL Resistant     Gentamicin <=1.0 ug/mL Susceptible     Levofloxacin >=8.0 ug/mL Resistant     Meropenem <=0.25 ug/mL Susceptible     Piperacillin/Tazobactam <=4.0 ug/mL Susceptible     Tobramycin <=1.0 ug/mL Susceptible     Trimethoprim/Sulfamethoxazole >16/304 ug/mL Resistant                   12/09/2021 1203 12/16/2021 0801 Anaerobic culture [[id number]]   Body fluid, unsp from Pelvis    Final result Component Value   Culture No anaerobic organisms isolated              12/08/2021 1507 12/08/2021 1607 Asymptomatic COVID-19 Virus (Coronavirus) by PCR Nasopharyngeal [[id number]]    Swab from Nasopharyngeal    Final result Component Value   SARS CoV2 PCR Negative   NEGATIVE: SARS-CoV-2 (COVID-19) RNA not detected, presumed negative.           12/06/2021 2140 12/12/2021 1031 Blood Culture Line, venous [[id number]]   Blood from Line, venous    Final result Component Value   Culture No Growth              12/05/2021 1645 12/10/2021 2031 Blood Culture Peripheral Blood [[id number]]   Peripheral Blood    Final result Component Value   Culture No Growth              12/05/2021 1645 12/10/2021 2031 Blood Culture Peripheral Blood [[id number]]    Peripheral Blood    Final result Component Value   Culture No Growth              12/05/2021 1638 12/06/2021 2126 Urine Culture [[id number]]   Urine, Roblero Catheter    Final result Component Value   Culture No Growth              11/30/2021 1153 11/30/2021 1241 Asymptomatic COVID-19 Virus (Coronavirus) by PCR Nasopharyngeal [[id number]]    Swab from Nasopharyngeal    Final result Component Value   SARS CoV2 PCR Negative   NEGATIVE: SARS-CoV-2 (COVID-19) RNA not detected, presumed negative.                 RADIOLOGY:    CT Abd  Peritoneum Abscess Drain w Cath Place    Result Date: 2021  EXAM: 1. PERCUTANEOUS DRAIN PLACEMENT PELVIC ABSCESS 2. CT GUIDANCE 3. CONSCIOUS SEDATION LOCATION: [facility] DATE/TIME: 2021 9:50 AM INDICATION: s/p two surgeries with elevating wbc, ct with pelvic abscess. TECHNIQUE: Dose reduction techniques were used. PROCEDURE: Informed consent obtained. Site marked. Prior images reviewed. Required items made available. Patient identity confirmed verbally and with arm band. Patient reevaluated immediately before administering sedation. Universal protocol was followed. Time out performed. The site was prepped and draped in sterile fashion. 10 mL of 1% lidocaine was infused into the local soft tissues. Using standard technique and under direct CT guidance, a 10 Korean drainage catheter was inserted into the fluid collection.  SPECIMEN: 30 mL of brownish thin fluid was aspirated and sent to lab for cultures and Gram stain. BLOOD LOSS: Minimal. The patient tolerated the procedure well. No immediate complications. SEDATION: Versed 2.0 mg. Fentanyl 100 mcg. The procedure was performed with administration intravenous conscious sedation with appropriate preoperative, intraoperative, and postoperative evaluation. 30 minutes of supervised face to face conscious sedation time was provided by a radiology nurse under my direct supervision.     IMPRESSION: 1.  Successful CT-guided drain placement into pelvic abscess. 30 mL brownish thin fluid removed and sent for cultures. Reference CPT Codes: 21088, 96736, 61282    Echocardiogram Complete    Result Date: 12/10/2021  [id number]^CHRISTOPHER^FLOWER^[place] [address]  Name: ISABELA DUNN MRN: [id number] : 1955 Study Date: 12/10/2021 01:14 PM Age: 66 yrs Gender: Male Patient Location: [facility] Reason For Study: [place] Ordering Physician: FLOWER WHITE Performed By: ALEX  BSA:  1.8 m2 Height: 65 in Weight: 155 lb HR: 84 ______________________________________________________________________________ Procedure Complete Echo Adult. Adequate quality two-dimensional was performed and interpreted. ______________________________________________________________________________ Interpretation Summary  The left ventricle is normal in size with borderline concentric left ventricular hypertrophy. Left ventricular function is normal.The ejection fraction is 60-65%. Normal right ventricle size and systolic function. No significant valve disease is identified.  There is no comparison study available. ______________________________________________________________________________ I      WMSI = 1.00     % Normal = 100  X - Cannot   0 -                      (2) - Mildly 2 -          Segments  Size Interpret    Hyperkinetic 1 - Normal  Hypokinetic  Hypokinetic  1-2     small                                                    7 -          3-5    moderate 3 - Akinetic 4 -          5 -         6 - Akinetic Dyskinetic   6-14    large              Dyskinetic   Aneurysmal  w/scar       w/scar       15-16   diffuse  Left Ventricle The left ventricle is normal in size. Left ventricular function is normal.The ejection fraction is 60-65%. There is borderline concentric left ventricular hypertrophy. Left ventricular diastolic function is normal. No regional wall motion abnormalities noted.  Right Ventricle Normal right ventricle size and systolic function.  Atria Normal left atrial size. Right atrial size is normal. There is no color Doppler evidence of an atrial shunt.  Mitral Valve Mitral valve leaflets appear normal. There is no evidence of mitral stenosis or clinically significant mitral regurgitation.  Tricuspid Valve Tricuspid valve leaflets appear normal. There is no evidence of tricuspid stenosis or clinically significant tricuspid regurgitation. Right ventricle systolic pressure estimate normal. The right  ventricular systolic pressure is approximated at 18.7 mmHg plus the right atrial pressure.  Aortic Valve The aortic valve is trileaflet. Aortic valve leaflets appear normal. There is no evidence of aortic stenosis or clinically significant aortic regurgitation. There is trace aortic regurgitation.  Pulmonic Valve The pulmonic valve is not well seen, but is grossly normal. This degree of valvular regurgitation is within normal limits. There is trace pulmonic valvular regurgitation.  Vessels The aorta root is normal. The ascending aorta is Mildly dilated. IVC diameter <2.1 cm collapsing >50% with sniff suggests a normal RA pressure of 3 mmHg.  Pericardium There is no pericardial effusion.  Rhythm Sinus rhythm was noted.  ______________________________________________________________________________ MMode/2D Measurements & Calculations IVSd: 1.3 cm LVIDd: 5.0 cm LVIDs: 3.2 cm LVPWd: 0.86 cm  FS: 36.6 % LV mass(C)d: 201.1 grams LV mass(C)dI: 113.3 grams/m2 Ao root diam: 3.9 cm LA dimension: 4.1 cm asc Aorta Diam: 3.8 cm LA/Ao: 1.0 LVOT diam: 2.4 cm LVOT area: 4.6 cm2 LA Volume Indexed (AL/bp): 29.6 ml/m2 RWT: 0.34  Doppler Measurements & Calculations MV E max gary: 80.4 cm/sec MV A max gary: 62.9 cm/sec MV E/A: 1.3  MV dec slope: 292.8 cm/sec2 MV dec time: 0.27 sec Ao V2 max: 178.1 cm/sec Ao max P.0 mmHg Ao V2 mean: 130.5 cm/sec Ao mean P.9 mmHg Ao V2 VTI: 29.4 cm SUMAN(I,D): 3.2 cm2 SUMAN(V,D): 3.1 cm2 LV V1 max P.5 mmHg LV V1 max: 117.6 cm/sec LV V1 VTI: 20.2 cm SV(LVOT): 93.4 ml SI(LVOT): 52.6 ml/m2 PA acc time: 0.12 sec TR max gary: 216.1 cm/sec TR max P.7 mmHg AV Gary Ratio (DI): 0.66 SUMAN Index (cm2/m2): 1.8 E/E' av.3 Lateral E/e': 7.9 Medial E/e': 16.6  ______________________________________________________________________________ Report approved by: Hany Currie 12/10/2021 02:23 PM       XR Chest Port 1 View    Result Date: 2021  EXAM: XR CHEST PORT 1 VIEW LOCATION: [facility]  [facility] DATE/TIME: 12/6/2021 1:39 PM INDICATION: Worsening hypoxia, evaluate for pneumonia, pulm edema COMPARISON: Chest x-ray 12/03/2021     IMPRESSION: Enteric suction tube tip and side-port within the upper gastric lumen. Satisfactory right PICC line position with the tip near the cavoatrial junction. Persistent low lung volumes. No definite pneumothorax. Mildly improved bibasilar pulmonary  opacities favored to reflect atelectasis. Persistent interstitial coarsening. Suspected trace right-sided pleural fluid. Stable heart size and central vascular prominence.    XR Chest Port 1 View    Result Date: 12/3/2021  EXAM: XR CHEST PORT 1 VIEW LOCATION: [facility] DATE/TIME: 12/3/2021 8:31 PM INDICATION: RN placed PICC - verify tip placement COMPARISON: None.     IMPRESSION: Right PICC line present with its tip likely just into the right atrium. Suggest withdrawing the PICC line 2 cm to place the tip in the low SVC. NG tube in good position in the stomach. Mild cardiomegaly. Low lung volumes with mild patchy mixed interstitial and airspace infiltrates    IR Abscess Tube Check    Result Date: 12/16/2021  LOCATION: [facility] DATE: 12/16/2021 PROCEDURE: ABSCESS TUBE CHECK INTERVENTIONAL RADIOLOGIST: Demario Porter MD INDICATION: Pelvic abscess. Status post jugular drain placement on 12/9/2021. Markedly decreased outputs. CT from today demonstrating near complete resolution of the abscess cavity.. CONSENT: The procedure, risks and benefits of an abscessogram were discussed with the patient  in detail. All questions were answered. Informed consent was given to proceed with the procedure. MODERATE SEDATION: None. CONTRAST: Omnipaque 350: 2 mL. ANTIBIOTICS: None. ADDITIONAL MEDICATIONS: None. FLUOROSCOPIC TIME: 0.1 minutes RADIATION DOSE: Air Kerma: 26 mGy COMPLICATIONS: No immediate complications. PROCEDURE:  images were obtained. The existing drainage catheter  was injected with contrast, and an image obtained. After reviewing the images, the catheter was removed without difficulty. FINDINGS: Contracted cavity. Almost immediate pericatheter leakage.     IMPRESSION: 1.  Resolved pelvic fluid collection. Transgluteal drain removed.     CT Abdomen Pelvis w Contrast    Result Date: 12/22/2021  EXAM: CT ABDOMEN PELVIS W CONTRAST LOCATION: [facility] DATE/TIME: 12/22/2021 2:58 PM INDICATION: Exploratory laparotomy with adhesion lysis and repair of small bowel enterotomy 11/30/2021. Abdominal abscess drain placement 12/09/2021. COMPARISON: CT 12/16/2021, 12/08/2021 TECHNIQUE: CT scan of the abdomen and pelvis was performed following injection of IV contrast. Multiplanar reformats were obtained. Dose reduction techniques were used. CONTRAST: isovue 370 100ml FINDINGS: LOWER CHEST: Right basilar consolidation could represent atelectasis or pneumonia, similar to previous. HEPATOBILIARY: Stable hepatic cysts. Stable prominent gallbladder without definite wall thickening. PANCREAS: Normal. SPLEEN: Splenectomy with several left upper quadrant postsplenectomy implants. ADRENAL GLANDS: Normal. KIDNEYS/BLADDER: Several right renal cysts which do not require further imaging. Left renal 9 and 4 mm nonobstructive stones. Several tiny left renal cysts. No further imaging recommended. BOWEL: A loop of small bowel appears to communicate with the left anterior abdominal wall (series 3, image 105). The abdominal wall contains air. 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 There is a right lateral abdominal staple line suggesting an ilio transverse colostomy. The right lower quadrant mesentery inferior to this contains a small amount of nonlocalized fluid and air. Also, the surgical staple line appears scattered suggesting  anastomotic breakdown. The prerectal drainage catheter has been removed. Small residual 16 x 12 mm fluid collection (image 143). LYMPH  NODES: Several small mesenteric nodes are likely reactive. VASCULATURE: Patent mesenteric vessels. PELVIC ORGANS: Normal. MUSCULOSKELETAL: Severe degenerative change at the L4-L5 level with grade 2 anterolisthesis.     IMPRESSION: 1.  Improved prerectal fluid collection. 2.  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 3.  Possible right abdominal anastomotic dehiscence with a small amount free fluid and air in the mesentery. 4.  Report called to floor nurse Susy at 4:25 PM    CT Abdomen Pelvis w Contrast    Addendum Date: 12/16/2021    Discussed with Dr. Richardson by Dr. Fahrner over telephone at 11:41 AM.    Result Date: 12/16/2021  EXAM: CT ABDOMEN PELVIS W CONTRAST LOCATION: [facility] DATE/TIME: 12/16/2021 3:00 AM INDICATION: Intrabdominal abscess COMPARISON: CT 12/8/2021 and 12/9/2021 TECHNIQUE: CT scan of the abdomen and pelvis was performed following injection of IV contrast. Multiplanar reformats were obtained. Dose reduction techniques were used. CONTRAST: IsoVue 370 100mL FINDINGS: LOWER CHEST: Normal. HEPATOBILIARY: Normal. PANCREAS: Normal. SPLEEN: Splenules are present. ADRENAL GLANDS: A 10 mm area of fullness in the left adrenal gland has not changed. KIDNEYS/BLADDER: Again seen are nonobstructing left renal calculi. There are simple cysts in the right kidney which do not require follow-up. Smaller renal lesions are too small to characterize. BOWEL: There is surgical change in the right lower quadrant. There is distal small bowel wall thickening in this region. The small bowel wall is not fully visualized, and there is a small amount of extraluminal fluid and air in this region (series 3 images 93 and 96). There is new free air in the right lower quadrant mesentery (series 3 image 105). LYMPH NODES: Normal. VASCULATURE: Atherosclerotic disease. PELVIC ORGANS: Normal. OTHER: A fluid collection adjacent to the rectum is 1.9 x 1.5 x 1.5 cm (approximately  2.2 mL). Previously the collection was 4.4 x 3.6 x 4.4 cm. A pigtail catheter terminates within this collection. MUSCULOSKELETAL: Surgical change along the ventral abdominal and pelvic wall. There is a 20.8 x 3.2 x 1.2 cm fluid and air collection in the midline ventral abdominal and pelvic wall just deep to the staple line. Right lower quadrant and left midabdomen approach Larry drains are present.     IMPRESSION: 1.  The pelvic fluid collection is significantly smaller. A pigtail catheter is in place. 2.  Increased free air in the mesentery in the right lower quadrant, suggesting suture line dehiscence. There is also a suspected distal small bowel defect with adjacent free air and fluid. 3.  New fluid collection in the subcutaneous tissues of the ventral abdominal and pelvic wall, just deep to the staple line. Recommend drainage.     CT Abdomen Pelvis w Contrast    Result Date: 12/8/2021  EXAM: CT ABDOMEN PELVIS W CONTRAST LOCATION: [facility] DATE/TIME: 12/8/2021 1:40 PM INDICATION: Abdominal abscess/infection suspected, status post laparotomy with repair of small bowel perforation, recent small bowel resection COMPARISON: CT 11/30/2021 TECHNIQUE: CT scan of the abdomen and pelvis was performed following injection of IV contrast. Multiplanar reformats were obtained. Dose reduction techniques were used. CONTRAST: 100 mL Isovue-370 FINDINGS: LOWER CHEST: Mild bilateral lower lobar dependent consolidation and trace right-sided pleural effusion. HEPATOBILIARY: Prominent gallbladder likely reflecting recent fasting. Stable mild biliary ductal dilatation. No obstructing mass lesion or radiodense stone. Stable small low-density hepatic lesions, likely cysts. PANCREAS: Normal. SPLEEN: Surgically absent. ADRENAL GLANDS: Normal. KIDNEYS/BLADDER: Stable nonobstructing lower left renal stones with a dominant stone measuring 4 mm. No hydronephrosis. Unremarkable urinary bladder. BOWEL: Enteric suction  tube with the tip located within the mid gastric lumen. Mild to moderately distended segments of mid small bowel with associated air-fluid levels. No discrete transition is identified. Right lower quadrant anastomotic changes. Small amount of nonloculated fluid and extraluminal air within the mid abdomen for example image 50 series 400.2 and image 97 series 3. Questionable adjacent small bowel defect image 50 series 102. Small amount of nonloculated fluid is also noted within the right lower quadrant. Newly visualized rim-enhancing pelvic fluid collection measuring 4.4 x 3.6 x 4.4 cm. Trace ascites. Midline laparotomy incision with a small amount of fluid and air within the wound. Surgical drain tip is located at the upper aspect of this wound. Additional surgical drain with the tip located within the left lower quadrant. Third surgical drain with the tip located in the lower anterior abdomen. Diffuse mesenteric edema. LYMPH NODES: Normal. VASCULATURE: Mild atherosclerosis. PELVIC ORGANS: Normal. MUSCULOSKELETAL: Bilateral L4 pars defects with grade 1 anterolisthesis at L4-L5. Associated severe discogenic degenerative changes.     IMPRESSION: 1.  Newly visualized 4.6 cm rim-enhancing fluid collection within the pelvis. 2.  Small ill-defined irregular pocket of fluid and extraluminal air within the mid abdomen as described above. It is uncertain whether this is related to sequelae of recent surgery or bowel leakage. Recommend short interval follow-up CT with positive enteric contrast. 3.  Mild to moderate mid small bowel distention. This is favored to reflect an ileus rather than obstruction. 4.  Trace ascites and diffuse mesenteric edema. 5.  Mild bilateral lower lobar consolidation and trace right-sided pleural effusion. [Critical Result: Pulmonary fluid collection and extraluminal loculated air-fluid within the mid abdomen as detailed above.] Finding was identified on 12/8/2021 2:10 PM. Dr. Richardson was contacted  by me on 12/8/2021 2:50 PM and verbalized understanding of the critical result.     CT Abdomen Pelvis w Contrast    Result Date: 11/30/2021  EXAM: CT ABDOMEN PELVIS W CONTRAST LOCATION: [facility] DATE/TIME: 11/30/2021 10:48 AM INDICATION: Abdominal distension COMPARISON: None. TECHNIQUE: CT scan of the abdomen and pelvis was performed following injection of IV contrast. Multiplanar reformats were obtained. Dose reduction techniques were used. CONTRAST: IsoVue 370 100mL FINDINGS: LOWER CHEST: Bibasilar atelectasis. HEPATOBILIARY: Scattered small water density foci in the liver consistent with cysts. No radiopaque gallstone. No suspicious hepatic lesions. PANCREAS: Coarse calcification in the pancreatic tail and body may represent sequelae of chronic pancreatitis no acute pancreatitis or abnormal pancreatic mass. SPLEEN: Not visualized consistent with prior splenectomy. ADRENAL GLANDS: Normal. KIDNEYS/BLADDER: No hydronephrosis or masses. No bladder stone or mass. BOWEL: Anastomotic staples in the colon in the right mid abdomen. There is normal caliber duodenum and proximal jejunum with dilated loops of distal jejunum and ileum with some fecal i-STAT ileal contents and a transition in the right mid to lower abdomen. Findings are consistent with mechanical small bowel obstruction and this may represent a closed loop obstruction such as internal hernia or multiple adhesions given the lack of dilatation of the proximal small bowel. No pneumatosis intestinalis,  free intraperitoneal air or abscess. LYMPH NODES: No lymphadenopathy. VASCULATURE: Mild aortoiliac calcification without aneurysm. PELVIC ORGANS: Mild prostatic enlargement. MUSCULOSKELETAL: Disc space narrowing at L4-L5 with disc degeneration and bilateral spondylolysis at L4 with grade 2 spondylolisthesis of L4 on L5. No acute fracture.     IMPRESSION: 1.  Mechanical small bowel obstruction with dilated distal jejunum and ileum with  "caliber changes in the right lower quadrant and in the left midabdomen this may be secondary to adhesions or internal hernia and has the appearance of a closed loop obstruction. No pneumatosis intestinalis, free intraperitoneal air or abscess. 2.  Results were called to Dr. Sanford the time of dictation. NOTE: ABNORMAL REPORT THE DICTATION ABOVE DESCRIBES AN ABNORMALITY FOR WHICH FOLLOW-UP IS NEEDED.     POC US Guidance Needle Placement    Result Date: 11/30/2021  Ultrasound was performed as guidance to an anesthesia procedure.  Click \"PACS images\" hyperlink below to view any stored images.  For specific procedure details, view procedure note authored by anesthesia.    XR Video Swallow with SLP or OT    Result Date: 11/24/2021  EXAM: XR VIDEO SWALLOW WITH SLP OR OT LOCATION: [facility] DATE/TIME: 11/24/2021 9:18 AM INDICATION: Difficulty swallowing. COMPARISON: Same day esophagram.; CT for lung cancer screening 03/09/2020 TECHNIQUE: Routine swallow study with speech pathology using multiple barium thicknesses. FINDINGS: FLUOROSCOPIC TIME: 0.2 minutes NUMBER OF IMAGES: 2 videofluoroscopic imaging series Swallow study with Speech Pathology using multiple barium thicknesses. Patient is edentulous. Trials of thin consistency and barium coated cracker were performed. No delay in initiation of the oral phase. Normal epiglottic inversion. No penetration or aspiration observed. Moderate to severe cervical spondylosis with anterior wedging, disc space narrowing and osteophytosis of C4, C5, and C6. Straightening and reversal of normal thoracic lordosis.     IMPRESSION: No penetration or aspiration. Please see separately dictated report from speech pathology for additional description, analysis, and management recommendations.     XR Esophagram    Result Date: 11/24/2021  EXAM: XR ESOPHAGRAM LOCATION: [facility] DATE/TIME: 11/24/2021 9:18 AM INDICATION: 65 yo M with " "dysphagia/dyspnea - cough. Feels food stuck in throat with swallow. Occ. chocking. Needs to swallow lot of liquids. Constant runny nose & nasal congestion. COMPARISON: Same day swallow study; CT of the chest without contrast 03/09/2020 TECHNIQUE: Routine. FINDINGS: FLUOROSCOPIC TIME: 1.2 minutes NUMBER OF IMAGES: 4 videofluoroscopic imaging series and additional 41 images. ESOPHAGUS: Patulous esophagus with diffusely abnormal peristalsis. Large number of tertiary contractions present with delayed esophageal emptying. No esophageal stricture, diverticula or mass. No hiatal hernia. There is a large amount of retained contrast within the esophagus after consumption of contrast in the RPO position in transitioning to the supine position. This retained contrast moves to and fro within the esophagus. During the transition from RPO to supine position the patient felt a \"tickle\" in the back of his throat prompting coughing, while not observed likely relates to reflux of contrast from the upper esophagus into the pharynx. No gastroesophageal reflux elicited in the recumbent position with Valsalva maneuver.     IMPRESSION: Moderately severe esophageal dysmotility (presbyesophagus).    "

## 2021-12-31 NOTE — PLAN OF CARE
Problem: Adult Inpatient Plan of Care  Goal: Absence of Hospital-Acquired Illness or Injury  Intervention: Identify and Manage Fall Risk  Recent Flowsheet Documentation  Taken 12/30/2021 0743 by Laurel Ruiz, RN  Safety Promotion/Fall Prevention: room near nurse's station  Intervention: Prevent Skin Injury  Recent Flowsheet Documentation  Taken 12/30/2021 0743 by Laurel Ruiz, RN  Body Position: position changed independently  Intervention: Prevent Infection  Recent Flowsheet Documentation  Taken 12/30/2021 0743 by Laurel Ruiz, RN  Infection Prevention: hand hygiene promoted     Problem: Anxiety  Goal: Anxiety Reduction or Resolution  Outcome: No Change     Problem: Depression  Goal: Improved Mood  Outcome: No Change   Pt voiced feelings of anxiety and depression.  He states he feels like he is dying, and that he won't eat for a very long time.  Pt participated in PT/OT, ambulated in hallway.  Pt will be starting on cyclic TPN; BG q 6 hrs for 72 hours, then daily.  BG was 102.  PICC line doesn't aspirate, lab will draw him.

## 2021-12-31 NOTE — PROGRESS NOTES
Writer was informed by CARY that pt might discharge to [facility] on Monday, 1/3/21.    Pt discharged to his brother-in-law's home on 12/21/21 with [facility]HI on home TPN.  It was determined at the home nurse visit on the elliott of 12/21 that pt needed a willing and able caregiver to help him.  Pt was unable to manage his home TPN and fistula.  It does not appear [facility] has any help available for its residents.  Availability of a refrigerator for the TPN to be stored in might also be a barrier.  Update given to Camelia Page CM.    Luz Maria Steiner, RN, BSN  [facility]iaison  [phone] (Mon through Fri, 8:00 am-5:00 pm)  [phone] (Office)

## 2021-12-31 NOTE — PROGRESS NOTES
General Surgery Progress Note/Chart Check:    Gurdeep Dia is a 66 year old male status post explore lap lysis of adhesions with resultant 3 days later perforation reoperation oversew of defect and then 5 days later perforation again and at this time point he has fistulas above and below to his incision.    Output from the fistulas has been slowing down slightly and has been well contained per the RN reports. TPN is running and patient is tolerating some sips of clears as well.     Attempted to see patient multiple times today, but he was not in his room whenever I stopped.    Plan going forward would be to continue TPN until EC fistula output is minimal and we can increase oral caloric intake, this could take a couple months.     Continue to look for placement for patient as he has already demonstrated that he is not capable of managing the fistulas, pain and the TPN at home. We will continue to follow the patient.    Jane Agosto, CNP  [phone]  [facility] General and Bariatric Surgery

## 2021-12-31 NOTE — PROGRESS NOTES
Primary Medicine Progress Note    Assessment/Plan  Active Problems:    Abdominal pain, generalized      Chema Dia is a 67 y/o M with past medical history of splenectomy, appendectomy, HTN, and substance abuse, with recent admission to hospital 11/30-12/21/2021 for SBO with perforation s/p surgical repair. Discharged home but presents as he could not manage at home and is now requesting to be placed in TCU vs LTC. Continues on TPN with surgery and ID following while awaiting LTC placement.       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 Required subsequent washout on 12/3 with aggressive IV antibiotic regimen and MARISA drain placement. On 12/8 found to have continued enhancing abscess with drain placed 12/9 and removed on 12/16. Antibiotics stopped on 12/20. Plan for close follow up with surgery and TPN at discharge, with plans to have help from his brother in law. Then returned on 12/22 given unable to care for himself even with brother in law's assistance; requesting LTC/TCU placement.  -PT/OT ordered, appreciate recs on placement  -Searching for TCU/LTACH placement with care management   -planning for discharge to [facility] apartment on Monday 1/3, so long as LTCs continue to deny him  -Pain management: Percocet 1-2 tablets q6h PRN, gabapentin   -Consulted surgery to follow while here  -Consulted ID              -Adding flagyl and switching to oral omnicef/diflucan 12/31; re-imaging belly to ensure no re-accumulation of fluid collection.  -Consult pharmacy and RD for TPN management - will transition to cyclic TPN 12/30  -WOC    Alk phos elevation  Continues to climb day by day.  Rest of LFTs okay.  White count normal.  Had been just monitoring, though Chema spiked a fever overnight  into 12/27 and looked mildly ill that morning.  Endorsing RUQ TTP on exam.  RUQ US showing sludge but no signs of acute kat.  -Continue to monitor  -Moving to cyclic TPN     Depression  Insomnia  Hx of this. Reports symptoms for past few days= low motivation, little interest in doing things, poor sleep, no appetite. Is on buspar, mirtazapine, and effexor, though doesn't really think medicines are as helpful as talk therapy is. Wanted to speak to social work and . States no plan to harm himself. Reports he just needs to leave the hospital.  -Consult psych for med management and plan for talk therapy while hospitalized  -Discontinued buspar and mirtazapine per psych  -Increased venlafaxine and gabapentin per psych  -Seroquel for sleep     Moderate malnutrition  Per RD eval  -TPN per RD and pharmacy     Normocytic anemia  Stable at 10.8, MCV of 93.       Chronic Conditions:  Hypothyroidism- resume PTA levothyroxine  Chronic pain- Hold mexolicam 7.5mg daily, resume PTA gabapentin  BPH-hold home Flomax given he states it does not help    Subjective:   Feeling stable mood-wise this morning. Planning for discharge to [facility] on Monday unless somewhere else accepts him in the meantime.    Objective    Vital signs in last 24 hours Temp:  [97.5  F (36.4  C)-98.7  F (37.1  C)] 98.7  F (37.1  C)  Pulse:  [96-99] 99  Resp:  [12-16] 16  BP: (103-121)/(72-82) 108/72  SpO2:  [92 %-93 %] 92 %       Weight:   Vitals:    12/22/21 1242 12/28/21 1035 12/29/21 2147   Weight: 75.5 kg (166 lb 6.4 oz) 75.6 kg (166 lb 11.2 oz) 75.8 kg (167 lb)       Intake/Output last 3 shift I/O last 3 completed shifts:  In: -   Out: 1250 [Urine:450; Drains:800]    Intake/Output this shift:I/O this shift:  In: -   Out: 200 [Urine:200]    PHYSICAL EXAM  GENERAL APPEARANCE: Cooperative; appears stated age  LUNGS: No respiratory distress  HEART: Brisk cap refill  ABDOMEN: Ostomy bag draining stool.  Lap sites C/D/I.  Moderate concentrated RUQ TTP.   No guarding or rebound.  EXTREMITIES: Grossly normal without deformity, no edema  SKIN: no rashes, skin warm  NEURO: Oriented to time and place    Pertinent Labs and Pertinent Radiology   Lab Results: personally reviewed.     Recent Labs   Lab 12/31/21  0708   WBC 10.8   HGB 11.3*   HCT 36.1*          Recent Labs   Lab 12/31/21  0708      CO2 22   BUN 26*   ALBUMIN 2.2*   ALKPHOS 224*   ALT 35   AST 27       Radiology Results:   Results for orders placed or performed during the hospital encounter of 12/21/21   CT Abdomen Pelvis w Contrast    Impression    IMPRESSION:   1.  Improved prerectal fluid collection.  2.  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  3.  Possible right abdominal anastomotic dehiscence with a small amount free fluid and air in the mesentery.  4.  Report called to floor nurse Susy at 4:25 PM       Precepted patient with Dr. Willy Mcmahon MD - PGY2  [facility]  P: [phone]      Parts of this note were created with the assistance of voice recognition software.  The note was reviewed for accuracy.  However, errors in spelling, etc may have resulted.

## 2021-12-31 NOTE — PROGRESS NOTES
"CLINICAL NUTRITION SERVICES - REASSESSMENT NOTE     Nutrition Prescription    RECOMMENDATIONS FOR MDs/PROVIDERS TO ORDER:  None     Malnutrition Status:    Severe  In the context of acute illness    Recommendations already ordered by Registered Dietitian (RD):  Continue TPN as ordered    Future/Additional Recommendations:  Consider Change to smof lipid, decrease in lipid if Alk phos does not improve     EVALUATION OF THE PROGRESS TOWARD GOALS   Diet: Clear liquids  Nutrition Support: TPN: cyclin gm Dextrose, 115 gm AA @ 75 mL/hr x 1 hour, 150 mL/hr x 10 hours, and 75 mL/hr x 1 hour, off.  250 mL 20% lipids x 12 hours daily.     Provides: 1650 mL + 250 mL lipid, 2184 kcal/day, 115 gm protein, 360 gm cho    Intake: Ordered tea last night. Otherwise has not ordered from     NEW FINDINGS  Plan to discharge to [facility] Monday     ANTHROPOMETRICS  Height: 170.2 cm (5' 7\")  Most Recent Weight: 75.7 kg (167 lb) , stable x 1 week  75.5 kg (166 lb 6.4 oz)    IBW: 67.3 kg  BMI: Overweight BMI 25-29.9  Weight History:   Wt Readings from Last 10 Encounters:   21 75.8 kg (167 lb)   21 76.8 kg (169 lb 6.4 oz)   20 79.4 kg (175 lb)   19 81.2 kg (179 lb)   1.9% weight loss x 5 days  4.9% weight loss x 1 month    ASSESSED NUTRITION NEEDS PER APPROVED PRACTICE GUIDELINES:     Dosing Weight 75.5 kg (166 lb 6.4 oz)   Estimated Energy Needs: 5595-9026 kcals ([place] Jeor)  Justification: Stress factor 1.4-2.0 for fistula  Estimated Protein Needs: 113 grams protein (1.5 g pro/Kg)  Justification: wound healing  Estimated Fluid Needs: 7710-3210  mL (25-30 mL/kg) or less pending Na levels  Justification: maintenance    PHYSICAL FINDINGS  See malnutrition section below.    GI CONCERNS  EC fistula x 2 OP: 800 ml, decreasing  normoactive BS all quadrants  Loose BM x 1 yesterday  Fistula x 2 OP 1175  Ml yesterday, increased    LABS  Reviewed:   Alk phos continues to rise-224. TPN changed to cycle " yesterday but will not have been off until today. Changes in LFT will not reflect improvement with change in TPN until tomorrow    MEDICATIONS  Reviewed: iv abx,  diflucan, levothyroxine, pantoprazole    CURRENT NUTRITION DIAGNOSIS  Malnutrition related to s/p surgical intervention of SBO with formation of enterocutaneous fistula as evidenced by 1.7% weight loss in 1 week, severe subcutaneous fat loss and moderate muscle loss.      INTERVENTIONS  Implementation  Continue same TPN    Goals  Maintain weight -progressing    Monitoring/Evaluation  Progress toward goals will be monitored and evaluated per protocol: diet order, TPN intake, weight, labs, nutrition-focused physical findings

## 2021-12-31 NOTE — PROGRESS NOTES
Care Management Follow Up    Length of Stay (days): 9    Expected Discharge Date:  Pending     Concerns to be Addressed: appropriate discharge plan      Patient plan of care discussed at interdisciplinary rounds: Yes    Anticipated Discharge Disposition: Transitional Care     Anticipated Discharge Services: TCU    Anticipated Discharge DME: None       Referrals Placed by CM/SW: Post Acute Facilities       Additional Information:  Patient is status post explore lap lysis of adhesions with resultant 3 days later perforation reoperation oversew of defect and then 5 days later perforation again and at this time point he has fistulas above and below to his incision. Surgery and ID following.    IV antibiotics: possible switch to oral at discharge if cleared by Surgery.    TPN: NPO with sips. Per surgery, plan going forward would be to continue TPN until EC fistula output is minimal and we can increase oral caloric intake, this could take a couple months. TPN 12 hrs at night.    Fistula: Patient has two draining fistula with Jaylin Pouches. Patient is independent with emptying pouch but is not independent with replacing pouches. Ideally pouches to be changed weekly but has been needing more than weekly changes due to leaks. Patient will need to demonstrate ability to trouble shoot leaks and change pouches if he discharges to home environment. Will request WOC and nurse to educate and work with patient on wound cares. WOC states patient has hard time staying focused.       SLUM score 24 mild cognitive impairment.    Ideally patient to discharge to TCU. Difficult placement. Three referrals sent to [facility] and escalated to Care Management ManagerÁngela.     [facility] following. Patient will need caregiver who is willing to help with TPN management in-order for [facility] to accept. Patient has a pump and TPN in black bag in closet, pump belongs to [facility].   Patient has not demonstrated independence with care.     [facility] [place] [phone] states they are holding a spot for patient and can accept before 1 PM on Monday or Wednesday of next week. They do provide 3 meals a day there as well.  Patient will need to be completely independent with all activities of daily living before going there.      Met with patient today to review discharge planning.  His stated goal is to go to TCU. Reviewed three facility referrals that have been sent today. He states he doesn't care how far away they are. Instructed him on learning how to manage wounds over the weekend.     Final discharge plan pending above resolution of above issues.           Shahnaz Page RN

## 2022-01-01 LAB
ALBUMIN SERPL-MCNC: 2.1 G/DL (ref 3.5–5)
ALP SERPL-CCNC: 215 U/L (ref 45–120)
ALT SERPL W P-5'-P-CCNC: 36 U/L (ref 0–45)
ANION GAP SERPL CALCULATED.3IONS-SCNC: 9 MMOL/L (ref 5–18)
AST SERPL W P-5'-P-CCNC: 28 U/L (ref 0–40)
BILIRUB SERPL-MCNC: 0.7 MG/DL (ref 0–1)
BUN SERPL-MCNC: 26 MG/DL (ref 8–22)
CALCIUM SERPL-MCNC: 8.5 MG/DL (ref 8.5–10.5)
CHLORIDE BLD-SCNC: 106 MMOL/L (ref 98–107)
CO2 SERPL-SCNC: 23 MMOL/L (ref 22–31)
CREAT SERPL-MCNC: 0.75 MG/DL (ref 0.7–1.3)
ERYTHROCYTE [DISTWIDTH] IN BLOOD BY AUTOMATED COUNT: 16.2 % (ref 10–15)
GFR SERPL CREATININE-BSD FRML MDRD: >90 ML/MIN/1.73M2
GLUCOSE BLD-MCNC: 132 MG/DL (ref 70–125)
GLUCOSE BLDC GLUCOMTR-MCNC: 101 MG/DL (ref 70–99)
GLUCOSE BLDC GLUCOMTR-MCNC: 123 MG/DL (ref 70–99)
GLUCOSE BLDC GLUCOMTR-MCNC: 153 MG/DL (ref 70–99)
GLUCOSE BLDC GLUCOMTR-MCNC: 175 MG/DL (ref 70–99)
HCT VFR BLD AUTO: 35.5 % (ref 40–53)
HGB BLD-MCNC: 11.4 G/DL (ref 13.3–17.7)
MCH RBC QN AUTO: 29.5 PG (ref 26.5–33)
MCHC RBC AUTO-ENTMCNC: 32.1 G/DL (ref 31.5–36.5)
MCV RBC AUTO: 92 FL (ref 78–100)
PLATELET # BLD AUTO: 335 10E3/UL (ref 150–450)
POTASSIUM BLD-SCNC: 4.5 MMOL/L (ref 3.5–5)
PROT SERPL-MCNC: 7.3 G/DL (ref 6–8)
RBC # BLD AUTO: 3.86 10E6/UL (ref 4.4–5.9)
SODIUM SERPL-SCNC: 138 MMOL/L (ref 136–145)
WBC # BLD AUTO: 10.5 10E3/UL (ref 4–11)

## 2022-01-01 PROCEDURE — 99024 POSTOP FOLLOW-UP VISIT: CPT | Performed by: PHYSICIAN ASSISTANT

## 2022-01-01 PROCEDURE — C9113 INJ PANTOPRAZOLE SODIUM, VIA: HCPCS | Performed by: STUDENT IN AN ORGANIZED HEALTH CARE EDUCATION/TRAINING PROGRAM

## 2022-01-01 PROCEDURE — 36415 COLL VENOUS BLD VENIPUNCTURE: CPT | Performed by: STUDENT IN AN ORGANIZED HEALTH CARE EDUCATION/TRAINING PROGRAM

## 2022-01-01 PROCEDURE — 250N000013 HC RX MED GY IP 250 OP 250 PS 637: Performed by: INTERNAL MEDICINE

## 2022-01-01 PROCEDURE — 250N000009 HC RX 250: Performed by: FAMILY MEDICINE

## 2022-01-01 PROCEDURE — 250N000011 HC RX IP 250 OP 636: Performed by: STUDENT IN AN ORGANIZED HEALTH CARE EDUCATION/TRAINING PROGRAM

## 2022-01-01 PROCEDURE — 99231 SBSQ HOSP IP/OBS SF/LOW 25: CPT | Mod: GC | Performed by: STUDENT IN AN ORGANIZED HEALTH CARE EDUCATION/TRAINING PROGRAM

## 2022-01-01 PROCEDURE — 99233 SBSQ HOSP IP/OBS HIGH 50: CPT | Performed by: INTERNAL MEDICINE

## 2022-01-01 PROCEDURE — 250N000013 HC RX MED GY IP 250 OP 250 PS 637: Performed by: NURSE PRACTITIONER

## 2022-01-01 PROCEDURE — 80053 COMPREHEN METABOLIC PANEL: CPT | Performed by: STUDENT IN AN ORGANIZED HEALTH CARE EDUCATION/TRAINING PROGRAM

## 2022-01-01 PROCEDURE — 120N000001 HC R&B MED SURG/OB

## 2022-01-01 PROCEDURE — 85027 COMPLETE CBC AUTOMATED: CPT | Performed by: STUDENT IN AN ORGANIZED HEALTH CARE EDUCATION/TRAINING PROGRAM

## 2022-01-01 PROCEDURE — 250N000013 HC RX MED GY IP 250 OP 250 PS 637: Performed by: STUDENT IN AN ORGANIZED HEALTH CARE EDUCATION/TRAINING PROGRAM

## 2022-01-01 RX ORDER — FLUCONAZOLE 100 MG/1
200 TABLET ORAL DAILY
Status: COMPLETED | OUTPATIENT
Start: 2022-01-01 | End: 2022-01-10

## 2022-01-01 RX ORDER — CEFDINIR 300 MG/1
300 CAPSULE ORAL EVERY 12 HOURS SCHEDULED
Status: DISCONTINUED | OUTPATIENT
Start: 2022-01-01 | End: 2022-01-06

## 2022-01-01 RX ADMIN — ENOXAPARIN SODIUM 40 MG: 40 INJECTION SUBCUTANEOUS at 08:23

## 2022-01-01 RX ADMIN — METRONIDAZOLE 500 MG: 500 TABLET ORAL at 20:14

## 2022-01-01 RX ADMIN — METRONIDAZOLE 500 MG: 500 TABLET ORAL at 08:24

## 2022-01-01 RX ADMIN — QUETIAPINE FUMARATE 200 MG: 100 TABLET, FILM COATED ORAL at 20:13

## 2022-01-01 RX ADMIN — CEFDINIR 300 MG: 300 CAPSULE ORAL at 17:05

## 2022-01-01 RX ADMIN — FLUCONAZOLE 200 MG: 100 TABLET ORAL at 20:14

## 2022-01-01 RX ADMIN — PANTOPRAZOLE SODIUM 40 MG: 40 INJECTION, POWDER, FOR SOLUTION INTRAVENOUS at 08:23

## 2022-01-01 RX ADMIN — GABAPENTIN 900 MG: 300 CAPSULE ORAL at 20:14

## 2022-01-01 RX ADMIN — VENLAFAXINE HYDROCHLORIDE 75 MG: 75 CAPSULE, EXTENDED RELEASE ORAL at 08:24

## 2022-01-01 RX ADMIN — I.V. FAT EMULSION 250 ML: 20 EMULSION INTRAVENOUS at 20:15

## 2022-01-01 RX ADMIN — GABAPENTIN 200 MG: 100 CAPSULE ORAL at 17:05

## 2022-01-01 RX ADMIN — LEVOTHYROXINE SODIUM 25 MCG: 0.03 TABLET ORAL at 06:35

## 2022-01-01 RX ADMIN — POTASSIUM CHLORIDE: 2 INJECTION, SOLUTION, CONCENTRATE INTRAVENOUS at 20:08

## 2022-01-01 RX ADMIN — GABAPENTIN 200 MG: 100 CAPSULE ORAL at 08:23

## 2022-01-01 ASSESSMENT — ACTIVITIES OF DAILY LIVING (ADL)
ADLS_ACUITY_SCORE: 9
ADLS_ACUITY_SCORE: 13
ADLS_ACUITY_SCORE: 9
ADLS_ACUITY_SCORE: 13
ADLS_ACUITY_SCORE: 13
ADLS_ACUITY_SCORE: 9
ADLS_ACUITY_SCORE: 9
ADLS_ACUITY_SCORE: 13
ADLS_ACUITY_SCORE: 13
ADLS_ACUITY_SCORE: 9
ADLS_ACUITY_SCORE: 13
ADLS_ACUITY_SCORE: 9
ADLS_ACUITY_SCORE: 13
ADLS_ACUITY_SCORE: 11

## 2022-01-01 ASSESSMENT — MIFFLIN-ST. JEOR: SCORE: 1507.47

## 2022-01-01 NOTE — PLAN OF CARE
Problem: Impaired Wound Healing  Goal: Optimal Wound Healing  Outcome: No Change  Intervention: Promote Wound Healing  Recent Flowsheet Documentation  Taken 1/1/2022 0815 by Noemi Rosas, RN  Activity Management: activity adjusted per tolerance   Ostomy drainage bags to fistulas intact, changed on night shift.  Upper bag draining small amount of green, bile like drainage.  Lower bag draining thin brown drainage.      Problem: Pain (Surgery Nonspecified)  Goal: Acceptable Pain Control  Outcome: No Change   Denies pain.      Problem: Adult Inpatient Plan of Care  Goal: Readiness for Transition of Care  Outcome: No Change   Plan is for patient to go to TCU when a bed is available.    Noemi Rosas, RN

## 2022-01-01 NOTE — PLAN OF CARE
"  Problem: Adult Inpatient Plan of Care  Goal: Absence of Hospital-Acquired Illness or Injury  Intervention: Identify and Manage Fall Risk  Recent Flowsheet Documentation  Taken 12/31/2021 1647 by Laurel Ruiz RN  Safety Promotion/Fall Prevention:   room near nurse's station   safety round/check completed  Taken 12/31/2021 0738 by Laurel Ruiz, RN  Safety Promotion/Fall Prevention:   room near nurse's station   safety round/check completed     Problem: Infection (Surgery Nonspecified)  Goal: Absence of Infection Signs and Symptoms  Outcome: No Change     Problem: Pain (Surgery Nonspecified)  Goal: Acceptable Pain Control  Outcome: Improving     Problem: Anxiety  Goal: Anxiety Reduction or Resolution  Outcome: No Change     Problem: Depression  Goal: Improved Mood  Outcome: No Change   Pt appears to be tolerating the cyclic TPN.  Pt states he is feeling more \"hopeless\", concerns that he really dying, or that his belly is infected again.  Changed dressing over area of leaking where previous drain had been in incision.  Pt did go down for abdominal CT today.  Pt states he is \"craving cigarettes\".  Pt did work with PT/OT, not motivated to exercise on his own.  Pt took a few sips of liquids throughout the day, says he worried about trying to eat again, fearful the same problems will reoccur.  No prn's given for pain, appears to be controlled with the scheduled gabapentin.  "

## 2022-01-01 NOTE — PROGRESS NOTES
Primary Medicine Progress Note    Assessment/Plan  Active Problems:    Abdominal pain, generalized    Chema South is a 67 y/o M with past medical history of splenectomy, appendectomy, HTN, and substance abuse, with recent admission to hospital 11/30-12/21/2021 for SBO with perforation s/p surgical repair. Discharged home but presents as he could not manage at home and is now requesting to be placed in TCU vs LTC. Continues on TPN with surgery and ID following while awaiting LTC placement.     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 Required subsequent washout on 12/3 with aggressive IV antibiotic regimen and MARISA drain placement. On 12/8 found to have continued enhancing abscess with drain placed 12/9 and removed on 12/16. Antibiotics stopped on 12/20. Plan for close follow up with surgery and TPN at discharge, with plans to have help from his brother in law. Then returned on 12/22 given unable to care for himself even with brother in law's assistance; requesting LTC/TCU placement. Re-imaged abdominal CT 12/31: no significant change and no new drainable abscess or other findings.   -PT/OT ordered, appreciate recs on placement  -Searching for TCU/LTACH placement with care management   -planning for discharge to [facility] apartment on Monday 1/3, so long as LTCs continue to deny him.  Possible barriers: no help available for residents (on TPN) and refrigerator for TPN     -3 referrals sent 1/1  -Pain management: Percocet 1-2 tablets q6h PRN, gabapentin   -Consulted surgery to follow while here  -Consulted ID, noted:  E coli and Candida parapsilosis in wound culture from 12/22/2021   -Leukocytosis stable in 8-11 range.               -Adding flagyl to ceftriaxone/fluconazole as of  12/31   -Considering oral abx, defer to ID    -Consult pharmacy and RD for TPN management - will transition to cyclic TPN 12/30  -WOC    Alk phos elevation  Continues to climb day by day.  Rest of LFTs okay.  White count normal.  Had been just monitoring, though Chema spiked a fever overnight into 12/27 and looked mildly ill that morning.  Endorsing RUQ TTP on exam.  RUQ US showing sludge but no signs of acute kat.  -Continue to monitor  -Moving to cyclic TPN     Depression  Insomnia  Hx of this. Reports symptoms for past few days= low motivation, little interest in doing things, poor sleep, no appetite. Is on buspar, mirtazapine, and effexor, though doesn't really think medicines are as helpful as talk therapy is. Wanted to speak to social work and . States no plan to harm himself. Reports he just needs to leave the hospital.  -Consult psych for med management and plan for talk therapy while hospitalized  -Discontinued buspar and mirtazapine per psych  -Increased venlafaxine and gabapentin per psych  -Seroquel for sleep     Moderate malnutrition  Per RD eval  -TPN per RD and pharmacy     Normocytic anemia  Stable at 10.8, MCV of 93.       Chronic Conditions:  Hypothyroidism- resume PTA levothyroxine  Chronic pain- Hold mexolicam 7.5mg daily, resume PTA gabapentin  BPH-hold home Flomax given he states it does not help    Subjective:   No acute events overnight. Did vomit small amount. Nursing notes reviewed.     Resting when I walk into the room. Patient notes the episode of vomiting. No hematemesis. Denies abdominal pain. No ongoing nausea, vomiting. Ostomy bag draining stool.  Mood is stable.     3 referrals for LTC sent yesterday.     Objective    Vital signs in last 24 hours Temp:  [97.6  F (36.4  C)-98.5  F (36.9  C)] 97.6  F (36.4  C)  Pulse:  [] 80  Resp:  [18] 18  BP: (105-118)/(66-81) 108/71  SpO2:  [90 %-95 %] 93 %       Weight:   Vitals:    12/22/21 1242 12/28/21 1035 12/29/21 2147   Weight:  75.5 kg (166 lb 6.4 oz) 75.6 kg (166 lb 11.2 oz) 75.8 kg (167 lb)       Intake/Output last 3 shift I/O last 3 completed shifts:  In: 260 [P.O.:260]  Out: 1375 [Urine:500; Drains:625; Other:250]    Intake/Output this shift:I/O this shift:  In: -   Out: 125 [Drains:125]    PHYSICAL EXAM  GENERAL APPEARANCE: Cooperative; appears stated age.   LUNGS: breathing comfortably.   HEART: Brisk cap refill  ABDOMEN: Ostomy bag draining stool.  Lap sites C/D/I.  No overt TTP.  No guarding or rebound.  EXTREMITIES: Grossly normal without deformity, no edema  SKIN: no rashes, skin warm  NEURO: Oriented to time and place    Pertinent Labs and Pertinent Radiology   Lab Results: personally reviewed.     Recent Labs   Lab 01/01/22  0743   WBC 10.5   HGB 11.4*   HCT 35.5*          Recent Labs   Lab 01/01/22  0743      CO2 23   BUN 26*   ALBUMIN 2.1*   ALKPHOS 215*   ALT 36   AST 28       Radiology Results:   Results for orders placed or performed during the hospital encounter of 12/21/21   CT Abdomen Pelvis w Contrast    Impression    IMPRESSION:   1.  Improved prerectal fluid collection.  2.  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  3.  Possible right abdominal anastomotic dehiscence with a small amount free fluid and air in the mesentery.  4.  Report called to floor nurse Susy at 4:25 PM       Precepted patient with Dr. Giuliana Cornejo,    [facility] Resident   Pager#[phone]    Parts of this note were created with the assistance of voice recognition software.  The note was reviewed for accuracy.  However, errors in spelling, etc may have resulted.

## 2022-01-01 NOTE — PROGRESS NOTES
Nutrition Therapy  Parenteral Nutrition follow-up       RECOMMENDATIONS FOR MDs/PROVIDERS TO ORDER:  None      Malnutrition Status:    Severe  In the context of acute illness    Future/Additional Recommendations:  Follow labs/liver enzymes and adjust TPN per pt needs.    Dietitian to Pharmacy  Continue same TPN.         Current Nutrition Intake:  Diet: Clear liquids  Ordered one meal :  Diet Pepsi and lemonade.  Per I/Os 260 ml po fluid in.    Nutrition Support: TPN: cyclin gm Dextrose, 115 gm AA @ 75 mL/hr x 1 hour, 150 mL/hr x 10 hours, and 75 mL/hr x 1 hour, off.  250 mL 20% lipids x 12 hours daily.     Provides: 1650 mL + 250 mL lipid, 2184 kcal/day, 115 gm protein, 360 gm cho, 50 g lipid/daily.  GIR 3.29 mg CHO/kg/min  TPN changed to cyclic 21.  TPN meets estimated nutrition needs.    Weight Trends  Admission wt: 166 lb 6.4 oz 21  Current wt: 167 lb 21  Stable wt x 1 wk.    GI:  LBM   Fistula/Drain x2 output 875 mls    Labs:   Na 138  K 4.5  ALT/AST wnl  Alk phos 215, down from 224 yesterday.  Bili total 0.7   21  Labs reviewed by dietitian    Medications:   Reviewed by dietitian     CURRENT NUTRITION DIAGNOSIS  Malnutrition related to s/p surgical intervention of SBO with formation of enterocutaneous fistula as evidenced by 1.7% weight loss in 1 week, severe subcutaneous fat loss and moderate muscle loss.    Goals   Maintain weight -progressing  New-Tolerate TPN    INTERVENTIONS  Noted per surgery note of :  Plan going forward would be to continue TPN until EC fistula output is minimal and we can increase oral caloric intake, this could take a couple months.    Implementation  Parenteral Nutrition/IV Fluids - continue same.      Monitoring/Evaluation  Progress toward goals will be monitored and evaluated per protocol.

## 2022-01-01 NOTE — PROGRESS NOTES
[facility]    Infectious Disease Progress Note    Date of Service (when I saw the patient): 01/01/2022     Assessment & Plan   See id note from 12/27    1. History of splenectomy and substance abuse, recent admission with small bowel obstruction- improving  2. Status post exploratory laparotomy, small bowel resection/repair of enterotomy, extensive lysis of adhesion on 11/30/2021  3. Postoperative small bowel perforation, status post exploratory laparotomy on 12/3/2021.  4. Intra-abdominal abscess status post drain placement on 12/9/2021.  E. coli in cultures.  Treated with meropenem, IV vancomycin and transition to p.o. cefdinir plus Flagyl on 12/15/2021  5. Abscessogram on 12/16 with resolved abscess, drain removed on 12/16/2021  6. Antibiotics discontinued on 12/20/2021, discharged from the hospital on 12/20/2021 and readmitted due to worsening pain and drainage from incision, cellulitis, EC fistula  7.  E coli and Candida parapsilosis in wound culture from 12/22/2021  8. Leukocytosis improved  9. On TPN  10. PCN allergy- hives    leaking from drain site    Recommendations:  CT no collections  Discontinue iv abx  Po omnicef metro and fluconazole x 10 days  followup with primary and surgery  Watch for any relapse- fevers pain etc  Discussed with pt  Discussed with medicine team    Will sign off, please call with questions      eKlly Hanna MD  [facility]  [phone]        Interval History   leaking from drain site  Denies any pain  On TPN    Physical Exam   Temp: 97.6  F (36.4  C) Temp src: Oral BP: 108/71 Pulse: 80   Resp: 18 SpO2: 93 % O2 Device: None (Room air)    Vitals:    12/22/21 1242 12/28/21 1035 12/29/21 2147   Weight: 75.5 kg (166 lb 6.4 oz) 75.6 kg (166 lb 11.2 oz) 75.8 kg (167 lb)     Vital Signs with Ranges  Temp:  [97.6  F (36.4  C)-98.5  F (36.9  C)] 97.6  F (36.4  C)  Pulse:  [] 80  Resp:  [18] 18  BP: (105-118)/(66-81)  108/71  SpO2:  [90 %-95 %] 93 %      Constitutional: No apparent distress  Lungs: normal breathing pattern  Cardiovascular:  No JVD elevation  Abdomen: drainage drain site. Ostomy bags on abdominal wall, 2 fistulas  Skin: no rash  Neuro alert oriented    Medications     dextrose       parenteral nutrition - ADULT compounded formula CYCLE       parenteral nutrition - ADULT compounded formula CYCLE Stopped (01/01/22 0815)       cefTRIAXone  2 g Intravenous Q24H     enoxaparin ANTICOAGULANT  40 mg Subcutaneous Daily     fluconazole  200 mg Intravenous Q24H     gabapentin  200 mg Oral BID     gabapentin  900 mg Oral At Bedtime     levothyroxine  25 mcg Oral Daily     lipids  250 mL Intravenous Q24H     metroNIDAZOLE  500 mg Oral BID     pantoprazole (PROTONIX) IV  40 mg Intravenous Daily with breakfast     QUEtiapine  200 mg Oral At Bedtime     sodium chloride (PF)  3 mL Intracatheter Q8H     venlafaxine  75 mg Oral Daily       Data   All microbiology laboratory data reviewed.  Recent Labs   Lab Test 01/01/22  0743 12/31/21  0708 12/30/21  0634   WBC 10.5 10.8 9.4   HGB 11.4* 11.3* 11.9*   HCT 35.5* 36.1* 37.2*   MCV 92 94 94    357 378     Recent Labs   Lab Test 01/01/22  0743 12/31/21  0708 12/30/21  0634   CR 0.75 0.75 0.78     MICRO: reviewed      12/22/2021 1326 12/25/2021 0703 Wound Aerobic Bacterial Culture Routine [[id number]]    (Abnormal)   Wound from Abdomen    Final result Component Value   Culture Culture in progress    3+ Escherichia coli Abnormal     3+ Escherichia coli Abnormal     2+ Candida parapsilosis complex Abnormal          Susceptibility     Escherichia coli (1) Escherichia coli (2)     DENA DENA     Ampicillin >=32.0 ug/mL Resistant >=32.0 ug/mL Resistant     Ampicillin/ Sulbactam >=32.0 ug/mL Resistant >=32.0 ug/mL Resistant     Cefepime <=1.0 ug/mL Susceptible <=1.0 ug/mL Susceptible     Ceftazidime <=1.0 ug/mL Susceptible 2.0 ug/mL Susceptible     Ceftriaxone <=1.0 ug/mL Susceptible  <=1.0 ug/mL Susceptible     Ciprofloxacin >=4.0 ug/mL Resistant >=4.0 ug/mL Resistant     Gentamicin <=1.0 ug/mL Susceptible <=1.0 ug/mL Susceptible     Levofloxacin >=8.0 ug/mL Resistant >=8.0 ug/mL Resistant     Meropenem <=0.25 ug/mL Susceptible <=0.25 ug/mL Susceptible     Piperacillin/Tazobactam >=128.0 ug/mL Resistant >=128.0 ug/mL Resistant     Tobramycin <=1.0 ug/mL Susceptible <=1.0 ug/mL Susceptible     Trimethoprim/Sulfamethoxazole >16/304 ug/mL Resistant >16/304 ug/mL Resistant                           12/22/2021 1648 12/23/2021 2017 Blood Culture Peripheral Blood [[id number]]   Peripheral Blood    Preliminary result Component Value   Culture No growth after 1 day P              12/22/2021 1648 12/23/2021 2017 Blood Culture Peripheral Blood [[id number]]   Peripheral Blood    Preliminary result Component Value   Culture No growth after 1 day P              12/22/2021 1326 12/24/2021 1412 Wound Aerobic Bacterial Culture Routine [[id number]]   (Abnormal)   Wound from Abdomen    Preliminary result Component Value   Culture Culture in progress P    3+ Gram negative bacilli Abnormal  P    3+ Gram negative bacilli Abnormal  P    2+ Candida parapsilosis complex Abnormal  P              12/22/2021 0018 12/22/2021 0048 Asymptomatic COVID-19 Virus (Coronavirus) by PCR Nasopharyngeal [[id number]]    Swab from Nasopharyngeal    Final result Component Value   SARS CoV2 PCR Negative   NEGATIVE: SARS-CoV-2 (COVID-19) RNA not detected, presumed negative.           12/17/2021 1615 12/17/2021 1844 Asymptomatic COVID-19 Virus (Coronavirus) by PCR Nasopharyngeal [[id number]]    Swab from Nasopharyngeal    Final result Component Value   SARS CoV2 PCR Negative   NEGATIVE: SARS-CoV-2 (COVID-19) RNA not detected, presumed negative.           12/09/2021 1204 12/12/2021 0831 Body fluid, unsp Aerobic Bacterial Culture Routine [[id number]]    (Abnormal)   Body fluid, unsp from Pelvis    Final result Component Value    Culture 2+ Escherichia coli Abnormal          Susceptibility     Escherichia coli     DENA     Ampicillin >=32.0 ug/mL Resistant     Ampicillin/ Sulbactam 16.0 ug/mL Intermediate     Cefepime <=1.0 ug/mL Susceptible     Ceftazidime <=1.0 ug/mL Susceptible     Ceftriaxone <=1.0 ug/mL Susceptible     Ciprofloxacin >=4.0 ug/mL Resistant     Gentamicin <=1.0 ug/mL Susceptible     Levofloxacin >=8.0 ug/mL Resistant     Meropenem <=0.25 ug/mL Susceptible     Piperacillin/Tazobactam <=4.0 ug/mL Susceptible     Tobramycin <=1.0 ug/mL Susceptible     Trimethoprim/Sulfamethoxazole >16/304 ug/mL Resistant                   12/09/2021 1203 12/16/2021 0801 Anaerobic culture [[id number]]   Body fluid, unsp from Pelvis    Final result Component Value   Culture No anaerobic organisms isolated              12/08/2021 1507 12/08/2021 1607 Asymptomatic COVID-19 Virus (Coronavirus) by PCR Nasopharyngeal [[id number]]    Swab from Nasopharyngeal    Final result Component Value   SARS CoV2 PCR Negative   NEGATIVE: SARS-CoV-2 (COVID-19) RNA not detected, presumed negative.           12/06/2021 2140 12/12/2021 1031 Blood Culture Line, venous [[id number]]   Blood from Line, venous    Final result Component Value   Culture No Growth              12/05/2021 1645 12/10/2021 2031 Blood Culture Peripheral Blood [[id number]]   Peripheral Blood    Final result Component Value   Culture No Growth              12/05/2021 1645 12/10/2021 2031 Blood Culture Peripheral Blood [[id number]]    Peripheral Blood    Final result Component Value   Culture No Growth              12/05/2021 1638 12/06/2021 2126 Urine Culture [[id number]]   Urine, Roblero Catheter    Final result Component Value   Culture No Growth              11/30/2021 1153 11/30/2021 1241 Asymptomatic COVID-19 Virus (Coronavirus) by PCR Nasopharyngeal [[id number]]    Swab from Nasopharyngeal    Final result Component Value   SARS CoV2 PCR Negative   NEGATIVE: SARS-CoV-2  (COVID-19) RNA not detected, presumed negative.                 RADIOLOGY:    CT Abd Peritoneum Abscess Drain w Cath Place    Result Date: 2021  EXAM: 1. PERCUTANEOUS DRAIN PLACEMENT PELVIC ABSCESS 2. CT GUIDANCE 3. CONSCIOUS SEDATION LOCATION: [facility] DATE/TIME: 2021 9:50 AM INDICATION: s/p two surgeries with elevating wbc, ct with pelvic abscess. TECHNIQUE: Dose reduction techniques were used. PROCEDURE: Informed consent obtained. Site marked. Prior images reviewed. Required items made available. Patient identity confirmed verbally and with arm band. Patient reevaluated immediately before administering sedation. Universal protocol was followed. Time out performed. The site was prepped and draped in sterile fashion. 10 mL of 1% lidocaine was infused into the local soft tissues. Using standard technique and under direct CT guidance, a 10 Icelandic drainage catheter was inserted into the fluid collection.  SPECIMEN: 30 mL of brownish thin fluid was aspirated and sent to lab for cultures and Gram stain. BLOOD LOSS: Minimal. The patient tolerated the procedure well. No immediate complications. SEDATION: Versed 2.0 mg. Fentanyl 100 mcg. The procedure was performed with administration intravenous conscious sedation with appropriate preoperative, intraoperative, and postoperative evaluation. 30 minutes of supervised face to face conscious sedation time was provided by a radiology nurse under my direct supervision.     IMPRESSION: 1.  Successful CT-guided drain placement into pelvic abscess. 30 mL brownish thin fluid removed and sent for cultures. Reference CPT Codes: 58890, 80207, 68178    Echocardiogram Complete    Result Date: 12/10/2021  [id number]^CHRISTOPHER^[place] [address]  Name: ISABELA DUNN MRN: [id number] : 1955 Study Date: 12/10/2021 01:14 PM Age: 66 yrs Gender: Male Patient Location: [facility]  Reason For Study: [place] Ordering Physician: FLOWER WHITE Performed By: ALEX  BSA: 1.8 m2 Height: 65 in Weight: 155 lb HR: 84 ______________________________________________________________________________ Procedure Complete Echo Adult. Adequate quality two-dimensional was performed and interpreted. ______________________________________________________________________________ Interpretation Summary  The left ventricle is normal in size with borderline concentric left ventricular hypertrophy. Left ventricular function is normal.The ejection fraction is 60-65%. Normal right ventricle size and systolic function. No significant valve disease is identified.  There is no comparison study available. ______________________________________________________________________________ I      WMSI = 1.00     % Normal = 100  X - Cannot   0 -                      (2) - Mildly 2 -          Segments  Size Interpret    Hyperkinetic 1 - Normal  Hypokinetic  Hypokinetic  1-2     small                                                    7 -          3-5    moderate 3 - Akinetic 4 -          5 -         6 - Akinetic Dyskinetic   6-14    large              Dyskinetic   Aneurysmal  w/scar       w/scar       15-16   diffuse  Left Ventricle The left ventricle is normal in size. Left ventricular function is normal.The ejection fraction is 60-65%. There is borderline concentric left ventricular hypertrophy. Left ventricular diastolic function is normal. No regional wall motion abnormalities noted.  Right Ventricle Normal right ventricle size and systolic function.  Atria Normal left atrial size. Right atrial size is normal. There is no color Doppler evidence of an atrial shunt.  Mitral Valve Mitral valve leaflets appear normal. There is no evidence of mitral stenosis or clinically significant mitral regurgitation.  Tricuspid Valve Tricuspid valve leaflets appear normal. There is no evidence of tricuspid stenosis or clinically significant  tricuspid regurgitation. Right ventricle systolic pressure estimate normal. The right ventricular systolic pressure is approximated at 18.7 mmHg plus the right atrial pressure.  Aortic Valve The aortic valve is trileaflet. Aortic valve leaflets appear normal. There is no evidence of aortic stenosis or clinically significant aortic regurgitation. There is trace aortic regurgitation.  Pulmonic Valve The pulmonic valve is not well seen, but is grossly normal. This degree of valvular regurgitation is within normal limits. There is trace pulmonic valvular regurgitation.  Vessels The aorta root is normal. The ascending aorta is Mildly dilated. IVC diameter <2.1 cm collapsing >50% with sniff suggests a normal RA pressure of 3 mmHg.  Pericardium There is no pericardial effusion.  Rhythm Sinus rhythm was noted.  ______________________________________________________________________________ MMode/2D Measurements & Calculations IVSd: 1.3 cm LVIDd: 5.0 cm LVIDs: 3.2 cm LVPWd: 0.86 cm  FS: 36.6 % LV mass(C)d: 201.1 grams LV mass(C)dI: 113.3 grams/m2 Ao root diam: 3.9 cm LA dimension: 4.1 cm asc Aorta Diam: 3.8 cm LA/Ao: 1.0 LVOT diam: 2.4 cm LVOT area: 4.6 cm2 LA Volume Indexed (AL/bp): 29.6 ml/m2 RWT: 0.34  Doppler Measurements & Calculations MV E max gary: 80.4 cm/sec MV A max gary: 62.9 cm/sec MV E/A: 1.3  MV dec slope: 292.8 cm/sec2 MV dec time: 0.27 sec Ao V2 max: 178.1 cm/sec Ao max P.0 mmHg Ao V2 mean: 130.5 cm/sec Ao mean P.9 mmHg Ao V2 VTI: 29.4 cm SUMAN(I,D): 3.2 cm2 SUMAN(V,D): 3.1 cm2 LV V1 max P.5 mmHg LV V1 max: 117.6 cm/sec LV V1 VTI: 20.2 cm SV(LVOT): 93.4 ml SI(LVOT): 52.6 ml/m2 PA acc time: 0.12 sec TR max gary: 216.1 cm/sec TR max P.7 mmHg AV Gary Ratio (DI): 0.66 SUMAN Index (cm2/m2): 1.8 E/E' av.3 Lateral E/e': 7.9 Medial E/e': 16.6  ______________________________________________________________________________ Report approved by: Hany Currie 12/10/2021 02:23 PM       XR Chest Port 1  View    Result Date: 12/6/2021  EXAM: XR CHEST PORT 1 VIEW LOCATION: [facility] DATE/TIME: 12/6/2021 1:39 PM INDICATION: Worsening hypoxia, evaluate for pneumonia, pulm edema COMPARISON: Chest x-ray 12/03/2021     IMPRESSION: Enteric suction tube tip and side-port within the upper gastric lumen. Satisfactory right PICC line position with the tip near the cavoatrial junction. Persistent low lung volumes. No definite pneumothorax. Mildly improved bibasilar pulmonary  opacities favored to reflect atelectasis. Persistent interstitial coarsening. Suspected trace right-sided pleural fluid. Stable heart size and central vascular prominence.    XR Chest Port 1 View    Result Date: 12/3/2021  EXAM: XR CHEST PORT 1 VIEW LOCATION: [facility] DATE/TIME: 12/3/2021 8:31 PM INDICATION: RN placed PICC - verify tip placement COMPARISON: None.     IMPRESSION: Right PICC line present with its tip likely just into the right atrium. Suggest withdrawing the PICC line 2 cm to place the tip in the low SVC. NG tube in good position in the stomach. Mild cardiomegaly. Low lung volumes with mild patchy mixed interstitial and airspace infiltrates    IR Abscess Tube Check    Result Date: 12/16/2021  LOCATION: [facility] DATE: 12/16/2021 PROCEDURE: ABSCESS TUBE CHECK INTERVENTIONAL RADIOLOGIST: Demario Porter MD INDICATION: Pelvic abscess. Status post jugular drain placement on 12/9/2021. Markedly decreased outputs. CT from today demonstrating near complete resolution of the abscess cavity.. CONSENT: The procedure, risks and benefits of an abscessogram were discussed with the patient  in detail. All questions were answered. Informed consent was given to proceed with the procedure. MODERATE SEDATION: None. CONTRAST: Omnipaque 350: 2 mL. ANTIBIOTICS: None. ADDITIONAL MEDICATIONS: None. FLUOROSCOPIC TIME: 0.1 minutes RADIATION DOSE: Air Kerma: 26 mGy COMPLICATIONS: No  immediate complications. PROCEDURE:  images were obtained. The existing drainage catheter was injected with contrast, and an image obtained. After reviewing the images, the catheter was removed without difficulty. FINDINGS: Contracted cavity. Almost immediate pericatheter leakage.     IMPRESSION: 1.  Resolved pelvic fluid collection. Transgluteal drain removed.     CT Abdomen Pelvis w Contrast    Result Date: 12/22/2021  EXAM: CT ABDOMEN PELVIS W CONTRAST LOCATION: [facility] DATE/TIME: 12/22/2021 2:58 PM INDICATION: Exploratory laparotomy with adhesion lysis and repair of small bowel enterotomy 11/30/2021. Abdominal abscess drain placement 12/09/2021. COMPARISON: CT 12/16/2021, 12/08/2021 TECHNIQUE: CT scan of the abdomen and pelvis was performed following injection of IV contrast. Multiplanar reformats were obtained. Dose reduction techniques were used. CONTRAST: isovue 370 100ml FINDINGS: LOWER CHEST: Right basilar consolidation could represent atelectasis or pneumonia, similar to previous. HEPATOBILIARY: Stable hepatic cysts. Stable prominent gallbladder without definite wall thickening. PANCREAS: Normal. SPLEEN: Splenectomy with several left upper quadrant postsplenectomy implants. ADRENAL GLANDS: Normal. KIDNEYS/BLADDER: Several right renal cysts which do not require further imaging. Left renal 9 and 4 mm nonobstructive stones. Several tiny left renal cysts. No further imaging recommended. BOWEL: A loop of small bowel appears to communicate with the left anterior abdominal wall (series 3, image 105). The abdominal wall contains air. 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 There is a right lateral abdominal staple line suggesting an ilio transverse colostomy. The right lower quadrant mesentery inferior to this contains a small amount of nonlocalized fluid and air. Also, the surgical staple line appears scattered suggesting  anastomotic breakdown. The prerectal  drainage catheter has been removed. Small residual 16 x 12 mm fluid collection (image 143). LYMPH NODES: Several small mesenteric nodes are likely reactive. VASCULATURE: Patent mesenteric vessels. PELVIC ORGANS: Normal. MUSCULOSKELETAL: Severe degenerative change at the L4-L5 level with grade 2 anterolisthesis.     IMPRESSION: 1.  Improved prerectal fluid collection. 2.  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 3.  Possible right abdominal anastomotic dehiscence with a small amount free fluid and air in the mesentery. 4.  Report called to floor nurse Susy at 4:25 PM    CT Abdomen Pelvis w Contrast    Addendum Date: 12/16/2021    Discussed with Dr. Richardson by Dr. Fahrner over telephone at 11:41 AM.    Result Date: 12/16/2021  EXAM: CT ABDOMEN PELVIS W CONTRAST LOCATION: [facility] DATE/TIME: 12/16/2021 3:00 AM INDICATION: Intrabdominal abscess COMPARISON: CT 12/8/2021 and 12/9/2021 TECHNIQUE: CT scan of the abdomen and pelvis was performed following injection of IV contrast. Multiplanar reformats were obtained. Dose reduction techniques were used. CONTRAST: IsoVue 370 100mL FINDINGS: LOWER CHEST: Normal. HEPATOBILIARY: Normal. PANCREAS: Normal. SPLEEN: Splenules are present. ADRENAL GLANDS: A 10 mm area of fullness in the left adrenal gland has not changed. KIDNEYS/BLADDER: Again seen are nonobstructing left renal calculi. There are simple cysts in the right kidney which do not require follow-up. Smaller renal lesions are too small to characterize. BOWEL: There is surgical change in the right lower quadrant. There is distal small bowel wall thickening in this region. The small bowel wall is not fully visualized, and there is a small amount of extraluminal fluid and air in this region (series 3 images 93 and 96). There is new free air in the right lower quadrant mesentery (series 3 image 105). LYMPH NODES: Normal. VASCULATURE: Atherosclerotic disease. PELVIC  ORGANS: Normal. OTHER: A fluid collection adjacent to the rectum is 1.9 x 1.5 x 1.5 cm (approximately 2.2 mL). Previously the collection was 4.4 x 3.6 x 4.4 cm. A pigtail catheter terminates within this collection. MUSCULOSKELETAL: Surgical change along the ventral abdominal and pelvic wall. There is a 20.8 x 3.2 x 1.2 cm fluid and air collection in the midline ventral abdominal and pelvic wall just deep to the staple line. Right lower quadrant and left midabdomen approach Larry drains are present.     IMPRESSION: 1.  The pelvic fluid collection is significantly smaller. A pigtail catheter is in place. 2.  Increased free air in the mesentery in the right lower quadrant, suggesting suture line dehiscence. There is also a suspected distal small bowel defect with adjacent free air and fluid. 3.  New fluid collection in the subcutaneous tissues of the ventral abdominal and pelvic wall, just deep to the staple line. Recommend drainage.     CT Abdomen Pelvis w Contrast    Result Date: 12/8/2021  EXAM: CT ABDOMEN PELVIS W CONTRAST LOCATION: [facility] DATE/TIME: 12/8/2021 1:40 PM INDICATION: Abdominal abscess/infection suspected, status post laparotomy with repair of small bowel perforation, recent small bowel resection COMPARISON: CT 11/30/2021 TECHNIQUE: CT scan of the abdomen and pelvis was performed following injection of IV contrast. Multiplanar reformats were obtained. Dose reduction techniques were used. CONTRAST: 100 mL Isovue-370 FINDINGS: LOWER CHEST: Mild bilateral lower lobar dependent consolidation and trace right-sided pleural effusion. HEPATOBILIARY: Prominent gallbladder likely reflecting recent fasting. Stable mild biliary ductal dilatation. No obstructing mass lesion or radiodense stone. Stable small low-density hepatic lesions, likely cysts. PANCREAS: Normal. SPLEEN: Surgically absent. ADRENAL GLANDS: Normal. KIDNEYS/BLADDER: Stable nonobstructing lower left renal stones with a  dominant stone measuring 4 mm. No hydronephrosis. Unremarkable urinary bladder. BOWEL: Enteric suction tube with the tip located within the mid gastric lumen. Mild to moderately distended segments of mid small bowel with associated air-fluid levels. No discrete transition is identified. Right lower quadrant anastomotic changes. Small amount of nonloculated fluid and extraluminal air within the mid abdomen for example image 50 series 400.2 and image 97 series 3. Questionable adjacent small bowel defect image 50 series 102. Small amount of nonloculated fluid is also noted within the right lower quadrant. Newly visualized rim-enhancing pelvic fluid collection measuring 4.4 x 3.6 x 4.4 cm. Trace ascites. Midline laparotomy incision with a small amount of fluid and air within the wound. Surgical drain tip is located at the upper aspect of this wound. Additional surgical drain with the tip located within the left lower quadrant. Third surgical drain with the tip located in the lower anterior abdomen. Diffuse mesenteric edema. LYMPH NODES: Normal. VASCULATURE: Mild atherosclerosis. PELVIC ORGANS: Normal. MUSCULOSKELETAL: Bilateral L4 pars defects with grade 1 anterolisthesis at L4-L5. Associated severe discogenic degenerative changes.     IMPRESSION: 1.  Newly visualized 4.6 cm rim-enhancing fluid collection within the pelvis. 2.  Small ill-defined irregular pocket of fluid and extraluminal air within the mid abdomen as described above. It is uncertain whether this is related to sequelae of recent surgery or bowel leakage. Recommend short interval follow-up CT with positive enteric contrast. 3.  Mild to moderate mid small bowel distention. This is favored to reflect an ileus rather than obstruction. 4.  Trace ascites and diffuse mesenteric edema. 5.  Mild bilateral lower lobar consolidation and trace right-sided pleural effusion. [Critical Result: Pulmonary fluid collection and extraluminal loculated air-fluid within the  mid abdomen as detailed above.] Finding was identified on 12/8/2021 2:10 PM. Dr. Richardson was contacted by me on 12/8/2021 2:50 PM and verbalized understanding of the critical result.     CT Abdomen Pelvis w Contrast    Result Date: 11/30/2021  EXAM: CT ABDOMEN PELVIS W CONTRAST LOCATION: [facility] DATE/TIME: 11/30/2021 10:48 AM INDICATION: Abdominal distension COMPARISON: None. TECHNIQUE: CT scan of the abdomen and pelvis was performed following injection of IV contrast. Multiplanar reformats were obtained. Dose reduction techniques were used. CONTRAST: IsoVue 370 100mL FINDINGS: LOWER CHEST: Bibasilar atelectasis. HEPATOBILIARY: Scattered small water density foci in the liver consistent with cysts. No radiopaque gallstone. No suspicious hepatic lesions. PANCREAS: Coarse calcification in the pancreatic tail and body may represent sequelae of chronic pancreatitis no acute pancreatitis or abnormal pancreatic mass. SPLEEN: Not visualized consistent with prior splenectomy. ADRENAL GLANDS: Normal. KIDNEYS/BLADDER: No hydronephrosis or masses. No bladder stone or mass. BOWEL: Anastomotic staples in the colon in the right mid abdomen. There is normal caliber duodenum and proximal jejunum with dilated loops of distal jejunum and ileum with some fecal i-STAT ileal contents and a transition in the right mid to lower abdomen. Findings are consistent with mechanical small bowel obstruction and this may represent a closed loop obstruction such as internal hernia or multiple adhesions given the lack of dilatation of the proximal small bowel. No pneumatosis intestinalis,  free intraperitoneal air or abscess. LYMPH NODES: No lymphadenopathy. VASCULATURE: Mild aortoiliac calcification without aneurysm. PELVIC ORGANS: Mild prostatic enlargement. MUSCULOSKELETAL: Disc space narrowing at L4-L5 with disc degeneration and bilateral spondylolysis at L4 with grade 2 spondylolisthesis of L4 on L5. No acute  "fracture.     IMPRESSION: 1.  Mechanical small bowel obstruction with dilated distal jejunum and ileum with caliber changes in the right lower quadrant and in the left midabdomen this may be secondary to adhesions or internal hernia and has the appearance of a closed loop obstruction. No pneumatosis intestinalis, free intraperitoneal air or abscess. 2.  Results were called to Dr. Sanford the time of dictation. NOTE: ABNORMAL REPORT THE DICTATION ABOVE DESCRIBES AN ABNORMALITY FOR WHICH FOLLOW-UP IS NEEDED.     POC US Guidance Needle Placement    Result Date: 11/30/2021  Ultrasound was performed as guidance to an anesthesia procedure.  Click \"PACS images\" hyperlink below to view any stored images.  For specific procedure details, view procedure note authored by anesthesia.    XR Video Swallow with SLP or OT    Result Date: 11/24/2021  EXAM: XR VIDEO SWALLOW WITH SLP OR OT LOCATION: [facility] DATE/TIME: 11/24/2021 9:18 AM INDICATION: Difficulty swallowing. COMPARISON: Same day esophagram.; CT for lung cancer screening 03/09/2020 TECHNIQUE: Routine swallow study with speech pathology using multiple barium thicknesses. FINDINGS: FLUOROSCOPIC TIME: 0.2 minutes NUMBER OF IMAGES: 2 videofluoroscopic imaging series Swallow study with Speech Pathology using multiple barium thicknesses. Patient is edentulous. Trials of thin consistency and barium coated cracker were performed. No delay in initiation of the oral phase. Normal epiglottic inversion. No penetration or aspiration observed. Moderate to severe cervical spondylosis with anterior wedging, disc space narrowing and osteophytosis of C4, C5, and C6. Straightening and reversal of normal thoracic lordosis.     IMPRESSION: No penetration or aspiration. Please see separately dictated report from speech pathology for additional description, analysis, and management recommendations.     XR Esophagram    Result Date: 11/24/2021  EXAM: XR ESOPHAGRAM " "LOCATION: [facility] DATE/TIME: 11/24/2021 9:18 AM INDICATION: 67 yo M with dysphagia/dyspnea - cough. Feels food stuck in throat with swallow. Occ. chocking. Needs to swallow lot of liquids. Constant runny nose & nasal congestion. COMPARISON: Same day swallow study; CT of the chest without contrast 03/09/2020 TECHNIQUE: Routine. FINDINGS: FLUOROSCOPIC TIME: 1.2 minutes NUMBER OF IMAGES: 4 videofluoroscopic imaging series and additional 41 images. ESOPHAGUS: Patulous esophagus with diffusely abnormal peristalsis. Large number of tertiary contractions present with delayed esophageal emptying. No esophageal stricture, diverticula or mass. No hiatal hernia. There is a large amount of retained contrast within the esophagus after consumption of contrast in the RPO position in transitioning to the supine position. This retained contrast moves to and fro within the esophagus. During the transition from RPO to supine position the patient felt a \"tickle\" in the back of his throat prompting coughing, while not observed likely relates to reflux of contrast from the upper esophagus into the pharynx. No gastroesophageal reflux elicited in the recumbent position with Valsalva maneuver.     IMPRESSION: Moderately severe esophageal dysmotility (presbyesophagus).    "

## 2022-01-01 NOTE — PROGRESS NOTES
ASSESSMENT:  1. Abdominal pain, generalized        Gurdeep Dia is a 66 year old male status post explore lap lysis of adhesions with resultant 3 days later perforation reoperation oversew of defect and then 5 days later perforation again and at this time point he has fistulas above and below to his incision.     PLAN:  Continue ostomy bags to control fistula drainage  Clear liquids ok  Continue to look for TCU placement    SUBJECTIVE:   He is depressed but feeling ok. Pain is controlled and he has been tolerating clears. Wants to get out of here.      Patient Vitals for the past 24 hrs:   BP Temp Temp src Pulse Resp SpO2   01/01/22 0706 108/71 97.6  F (36.4  C) Oral 80 18 93 %   12/31/21 2327 105/66 98.3  F (36.8  C) Oral 105 18 91 %   12/31/21 1941 118/77 98.3  F (36.8  C) Oral 89 18 90 %   12/31/21 1554 109/81 98.5  F (36.9  C) Oral 93 18 95 %         PHYSICAL EXAM:  GEN: No acute distress, comfortable  LUNGS: CTA bilaterally  CV:RRR  ABD:soft, tender near fistulas, no peritoneal signs; upper ostomy bag with thick green output, lower ostomy back with thin brownish, green fluid;    12/31 0700 - 01/01 0659  In: 260 [P.O.:260]  Out: 1375 [Urine:500; Drains:625]    No results displayed because visit has over 200 results.             Shaggy Cao PA-C

## 2022-01-01 NOTE — PLAN OF CARE
[id number] Shift Summary     A&Ox4. VSS. Denies pain. Double lumen PICC line flushes well. Cyclic TPN with lipids infusing per order. X2 ostomy sites remain intact. Dressing to previous drain site remains CDI. Both ostomy sites removed & changed. Tolerating clears. Up with SBA. Incision site intact.     Elizabeth Munoz, RN    Problem: Adult Inpatient Plan of Care  Goal: Plan of Care Review  Outcome: No Change     Problem: Impaired Wound Healing  Goal: Optimal Wound Healing  Outcome: No Change  Intervention: Promote Wound Healing  Recent Flowsheet Documentation  Taken 12/31/2021 1950 by Elizabeth Munoz, RN  Activity Management: activity adjusted per tolerance     Problem: Infection (Surgery Nonspecified)  Goal: Absence of Infection Signs and Symptoms  Outcome: No Change

## 2022-01-01 NOTE — PHARMACY-CONSULT NOTE
"Pharmacy Note: Parenteral Nutrition (PN) Management    Pharmacist consulted to dose PN for Gurdeep Dia, a 66 year old male by Dr. Mcmahon.    Subjective:    The patient was started on PN in the hospital on 12/5/2021.    Indication for PN therapy: bowel resection, plan to continue TPN until fistula heals    Inadequate nutrition existing for > 7 days.     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    Social History     Tobacco Use     Smoking status: Current Every Day Smoker     Smokeless tobacco: Never Used   Substance Use Topics     Alcohol use: Not Currently     Comment: Clean for 5 years     Drug use: Not Currently     Objective:    Ht Readings from Last 1 Encounters:   12/22/21 1.702 m (5' 7\")     Wt Readings from Last 1 Encounters:   12/29/21 75.8 kg (167 lb)       Body mass index is 26.16 kg/m .    Patient Vitals for the past 96 hrs:   Weight   12/29/21 2147 75.8 kg (167 lb)       Labs:  Last 3 days:  Recent Labs     12/29/21  0657 12/30/21  0634 12/31/21  0708    137 138   POTASSIUM 4.3 4.7 4.6   CHLORIDE 103 106 108*   CO2 24 23 22   BUN 18 18 26*   CR 0.77 0.78 0.75   VIJAYA 8.7 9.1 8.4*   MAG 1.8  --  1.9   PHOS 3.2  --  3.4   PROTTOTAL 7.1 7.6 7.3   ALBUMIN 2.1* 2.2* 2.2*   HGB 11.0* 11.9* 11.3*   HCT 34.6* 37.2* 36.1*    378 357   BILITOTAL 0.8 0.8 0.8   AST 29 27 27   ALT 28 31 35   ALKPHOS 178* 218* 224*       Glucose (past 48 hours):   Recent Labs     12/29/21  1742 12/30/21  0634 12/30/21  1815 12/31/21  0056 12/31/21  0539 12/31/21  0708   * 109 102* 149* 124* 124       Intake/Output (last 24 hours): I/O last 3 completed shifts:  In: 260 [P.O.:260]  Out: 1375 [Urine:500; Drains:625; Other:250]    Estimated CrCl: Estimated Creatinine Clearance: 102.5 mL/min (based on SCr of 0.75 mg/dL).    Assessment:    Continue patient on PN therapy as a cyclic central therapy. PN therapy was changed to cyclic by RD on 12/30 with a decrease in dextrose, and lipids were " changed to daily.    Given the patient's current condition/oral intake, PN is still indicated.    Lab results reviewed: 1/1. No changes to formula today.    Plan:  1. Rate of PN: 75 mL/hr for 1 hour, then 150 mL/hr for 10 hours, then 75 mL/hr for 1 hour. Maintenance IV fluid rate will be adjusted appropriately to meet the total maximum IV fluids rate as ordered by provider.  2. Formula:     Amino Acids 115 grams    Dextrose 360 grams    Sodium 110 mEq/day    Potassium 75 mEq/day    Calcium 8 mEq/day    Magnesium 14 mEq/day    Phosphorus 32 mMol/day    Chloride: Acetate Ratio 1:1    Standard Multivitamins w/Vitamin K    Trace Elements  3. Fat Emulsion: 20%, 250 mL IV daily  4. Check PN labs per protocol.  5. Pharmacist will continue to follow the patient's lab results, clinical status and blood glucose results and make adjustments as appropriate.    Thank you for the consult.  Taryn Pinzon, PharmD   01/01/2022 12:47 PM

## 2022-01-02 ENCOUNTER — APPOINTMENT (OUTPATIENT)
Dept: PHYSICAL THERAPY | Facility: HOSPITAL | Age: 67
DRG: 393 | End: 2022-01-02
Payer: MEDICARE

## 2022-01-02 LAB
ALBUMIN SERPL-MCNC: 2.2 G/DL (ref 3.5–5)
ALP SERPL-CCNC: 224 U/L (ref 45–120)
ALT SERPL W P-5'-P-CCNC: 36 U/L (ref 0–45)
ANION GAP SERPL CALCULATED.3IONS-SCNC: 7 MMOL/L (ref 5–18)
AST SERPL W P-5'-P-CCNC: 28 U/L (ref 0–40)
BILIRUB SERPL-MCNC: 0.8 MG/DL (ref 0–1)
BUN SERPL-MCNC: 26 MG/DL (ref 8–22)
CALCIUM SERPL-MCNC: 8.7 MG/DL (ref 8.5–10.5)
CHLORIDE BLD-SCNC: 108 MMOL/L (ref 98–107)
CO2 SERPL-SCNC: 23 MMOL/L (ref 22–31)
CREAT SERPL-MCNC: 0.79 MG/DL (ref 0.7–1.3)
ERYTHROCYTE [DISTWIDTH] IN BLOOD BY AUTOMATED COUNT: 16.3 % (ref 10–15)
GFR SERPL CREATININE-BSD FRML MDRD: >90 ML/MIN/1.73M2
GLUCOSE BLD-MCNC: 168 MG/DL (ref 70–125)
GLUCOSE BLDC GLUCOMTR-MCNC: 100 MG/DL (ref 70–99)
GLUCOSE BLDC GLUCOMTR-MCNC: 113 MG/DL (ref 70–99)
GLUCOSE BLDC GLUCOMTR-MCNC: 177 MG/DL (ref 70–99)
HCT VFR BLD AUTO: 35.9 % (ref 40–53)
HGB BLD-MCNC: 11.5 G/DL (ref 13.3–17.7)
MCH RBC QN AUTO: 29.9 PG (ref 26.5–33)
MCHC RBC AUTO-ENTMCNC: 32 G/DL (ref 31.5–36.5)
MCV RBC AUTO: 93 FL (ref 78–100)
PLATELET # BLD AUTO: 345 10E3/UL (ref 150–450)
POTASSIUM BLD-SCNC: 4 MMOL/L (ref 3.5–5)
PROT SERPL-MCNC: 7.1 G/DL (ref 6–8)
RBC # BLD AUTO: 3.85 10E6/UL (ref 4.4–5.9)
SODIUM SERPL-SCNC: 138 MMOL/L (ref 136–145)
WBC # BLD AUTO: 11.5 10E3/UL (ref 4–11)

## 2022-01-02 PROCEDURE — 250N000013 HC RX MED GY IP 250 OP 250 PS 637: Performed by: NURSE PRACTITIONER

## 2022-01-02 PROCEDURE — C9113 INJ PANTOPRAZOLE SODIUM, VIA: HCPCS | Performed by: STUDENT IN AN ORGANIZED HEALTH CARE EDUCATION/TRAINING PROGRAM

## 2022-01-02 PROCEDURE — 250N000013 HC RX MED GY IP 250 OP 250 PS 637: Performed by: STUDENT IN AN ORGANIZED HEALTH CARE EDUCATION/TRAINING PROGRAM

## 2022-01-02 PROCEDURE — 250N000011 HC RX IP 250 OP 636: Performed by: STUDENT IN AN ORGANIZED HEALTH CARE EDUCATION/TRAINING PROGRAM

## 2022-01-02 PROCEDURE — 36415 COLL VENOUS BLD VENIPUNCTURE: CPT | Performed by: STUDENT IN AN ORGANIZED HEALTH CARE EDUCATION/TRAINING PROGRAM

## 2022-01-02 PROCEDURE — 250N000009 HC RX 250: Performed by: FAMILY MEDICINE

## 2022-01-02 PROCEDURE — 80053 COMPREHEN METABOLIC PANEL: CPT | Performed by: STUDENT IN AN ORGANIZED HEALTH CARE EDUCATION/TRAINING PROGRAM

## 2022-01-02 PROCEDURE — 99024 POSTOP FOLLOW-UP VISIT: CPT | Performed by: PHYSICIAN ASSISTANT

## 2022-01-02 PROCEDURE — 97110 THERAPEUTIC EXERCISES: CPT | Mod: GP

## 2022-01-02 PROCEDURE — 250N000013 HC RX MED GY IP 250 OP 250 PS 637: Performed by: INTERNAL MEDICINE

## 2022-01-02 PROCEDURE — 97116 GAIT TRAINING THERAPY: CPT | Mod: GP

## 2022-01-02 PROCEDURE — 85027 COMPLETE CBC AUTOMATED: CPT | Performed by: STUDENT IN AN ORGANIZED HEALTH CARE EDUCATION/TRAINING PROGRAM

## 2022-01-02 PROCEDURE — 99231 SBSQ HOSP IP/OBS SF/LOW 25: CPT | Mod: GC | Performed by: STUDENT IN AN ORGANIZED HEALTH CARE EDUCATION/TRAINING PROGRAM

## 2022-01-02 PROCEDURE — 120N000001 HC R&B MED SURG/OB

## 2022-01-02 RX ORDER — ACETAMINOPHEN 325 MG/1
650 TABLET ORAL EVERY 4 HOURS PRN
Status: DISCONTINUED | OUTPATIENT
Start: 2022-01-02 | End: 2022-02-14 | Stop reason: HOSPADM

## 2022-01-02 RX ORDER — HYDROXYZINE HCL 10 MG/5 ML
25 SOLUTION, ORAL ORAL AT BEDTIME
Status: DISCONTINUED | OUTPATIENT
Start: 2022-01-02 | End: 2022-01-11

## 2022-01-02 RX ORDER — PANTOPRAZOLE SODIUM 40 MG/1
40 TABLET, DELAYED RELEASE ORAL
Status: DISCONTINUED | OUTPATIENT
Start: 2022-01-03 | End: 2022-02-14 | Stop reason: HOSPADM

## 2022-01-02 RX ADMIN — QUETIAPINE FUMARATE 200 MG: 100 TABLET, FILM COATED ORAL at 21:18

## 2022-01-02 RX ADMIN — GABAPENTIN 200 MG: 100 CAPSULE ORAL at 08:16

## 2022-01-02 RX ADMIN — HYDROXYZINE HYDROCHLORIDE 25 MG: 10 SOLUTION ORAL at 21:20

## 2022-01-02 RX ADMIN — I.V. FAT EMULSION 250 ML: 20 EMULSION INTRAVENOUS at 21:23

## 2022-01-02 RX ADMIN — GABAPENTIN 900 MG: 300 CAPSULE ORAL at 21:19

## 2022-01-02 RX ADMIN — FLUCONAZOLE 200 MG: 100 TABLET ORAL at 21:17

## 2022-01-02 RX ADMIN — PANTOPRAZOLE SODIUM 40 MG: 40 INJECTION, POWDER, FOR SOLUTION INTRAVENOUS at 08:15

## 2022-01-02 RX ADMIN — GABAPENTIN 200 MG: 100 CAPSULE ORAL at 16:51

## 2022-01-02 RX ADMIN — CEFDINIR 300 MG: 300 CAPSULE ORAL at 08:15

## 2022-01-02 RX ADMIN — LEVOTHYROXINE SODIUM 25 MCG: 0.03 TABLET ORAL at 06:51

## 2022-01-02 RX ADMIN — ENOXAPARIN SODIUM 40 MG: 40 INJECTION SUBCUTANEOUS at 08:16

## 2022-01-02 RX ADMIN — VENLAFAXINE HYDROCHLORIDE 75 MG: 75 CAPSULE, EXTENDED RELEASE ORAL at 08:15

## 2022-01-02 RX ADMIN — METRONIDAZOLE 500 MG: 500 TABLET ORAL at 08:15

## 2022-01-02 RX ADMIN — CALCIUM GLUCONATE: 98 INJECTION, SOLUTION INTRAVENOUS at 21:20

## 2022-01-02 RX ADMIN — METRONIDAZOLE 500 MG: 500 TABLET ORAL at 21:18

## 2022-01-02 RX ADMIN — CEFDINIR 300 MG: 300 CAPSULE ORAL at 21:18

## 2022-01-02 ASSESSMENT — ACTIVITIES OF DAILY LIVING (ADL)
ADLS_ACUITY_SCORE: 11
ADLS_ACUITY_SCORE: 13
ADLS_ACUITY_SCORE: 13
ADLS_ACUITY_SCORE: 11
ADLS_ACUITY_SCORE: 11
ADLS_ACUITY_SCORE: 13
ADLS_ACUITY_SCORE: 13
ADLS_ACUITY_SCORE: 11
ADLS_ACUITY_SCORE: 11
ADLS_ACUITY_SCORE: 13
ADLS_ACUITY_SCORE: 11
ADLS_ACUITY_SCORE: 13
ADLS_ACUITY_SCORE: 11
ADLS_ACUITY_SCORE: 11
ADLS_ACUITY_SCORE: 13
ADLS_ACUITY_SCORE: 11
ADLS_ACUITY_SCORE: 11
ADLS_ACUITY_SCORE: 13
ADLS_ACUITY_SCORE: 11
ADLS_ACUITY_SCORE: 13
ADLS_ACUITY_SCORE: 11
ADLS_ACUITY_SCORE: 11

## 2022-01-02 NOTE — PROGRESS NOTES
Nutrition Therapy  Parenteral Nutrition follow-up       RECOMMENDATIONS FOR MDs/PROVIDERS TO ORDER:  None      Malnutrition Status:    Severe  In the context of acute illness    Future/Additional Recommendations:  Follow labs/liver enzymes and adjust TPN per pt needs.    Dietitian to Pharmacy  Continue same AA/DEX cyclic infusion.  Adjust lipid to 250 ml 20% lipid 5 days/week.         Current Nutrition Intake:  Diet: Clear liquids   Ordered only tea x2 meals yesterday.  Per I/Os 180 ml po fluid in.    Nutrition Support: TPN: cyclin gm Dextrose, 115 gm AA @ 75 mL/hr x 1 hour, 150 mL/hr x 10 hours, and 75 mL/hr x 1 hour, off.  250 mL 20% lipids x 12 hours daily.     Provides: 1650 mL + 250 mL lipid, 2184 kcal/day, 115 gm protein, 360 gm cho, 50 g lipid/daily.  GIR 3.29 mg CHO/kg/min  TPN changed to cyclic 21.  TPN meets estimated nutrition needs.    Weight Trends  Admission wt: 166 lb 6.4 oz 21  Current wt: 170 lb 9.6 oz 22  Wt gradually trending up.    GI:  LBM   Fistula/Drain x2 [id number] mls    Labs:   Na 138  K 4  ALT/AST WNL  Alk phos 224, up from 215 yesterday.  Bili total 0.8   21  fsbg 101-177  Labs reviewed by dietitian    Medications:   Omnicef, flagyl, diflucan, levothyroxine  Reviewed by dietitian     CURRENT NUTRITION DIAGNOSIS  Malnutrition related to s/p surgical intervention of SBO with formation of enterocutaneous fistula as evidenced by 1.7% weight loss in 1 week, severe subcutaneous fat loss and moderate muscle loss.    Goals   Maintain weight -met  Tolerate TPN-met    INTERVENTIONS  Noted per surgery note of :  Plan going forward would be to continue TPN until EC fistula output is minimal and we can increase oral caloric intake, this could take a couple months.    Implementation  Parenteral Nutrition/IV Fluids - adjust lipid to 5 days/wk otherwise same formula.  Decreasing lipid d/t elevated Alk phos.  This to now provide avg 2041 calories/day, avg  35.7 g lipid/day      Monitoring/Evaluation  Progress toward goals will be monitored and evaluated per protocol.

## 2022-01-02 NOTE — PLAN OF CARE
Problem: Pain (Surgery Nonspecified)  Goal: Acceptable Pain Control  Outcome: Improving   Denies pain.    Problem: Depression  Goal: Improved Mood  Outcome: Improving   Pt pleasant and cooperative.    -PO abx.  -Cyclic TPN/Lipids.  -Fistulas have large output, emptying multiple times per shift.  -Pt dry-heaved around 2200, cool cloth helped. Pt said same thing happened at the same time last night.   -Discharge to LTC/TCU.

## 2022-01-02 NOTE — PLAN OF CARE
Problem: Adult Inpatient Plan of Care  Goal: Optimal Comfort and Wellbeing  Outcome: No Change   Patient denies pain.  Patient tolerating sips of water today, declines any further clear liquids.      Problem: Adult Inpatient Plan of Care  Goal: Optimal Comfort and Wellbeing  Outcome: No Change   Patient ambulated in saleem with PT, tolerated well.      Problem: Adult Inpatient Plan of Care  Goal: Readiness for Transition of Care  Outcome: No Change   Plan is for patient to go to LTC at time of discharge.    Noemi Rosas RN

## 2022-01-02 NOTE — PHARMACY-CONSULT NOTE
"Pharmacy Note: Parenteral Nutrition (PN) Management    Pharmacist consulted to dose PN for Gurdeep Dia, a 66 year old male by Dr. Mcmahon.    Subjective:    The patient was started on PN in the hospital on 12/5/2021.    Indication for PN therapy: bowel resection, , plan to continue TPN until fistula heals.    Inadequate nutrition existing for > 7 days.     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      Social History     Tobacco Use     Smoking status: Current Every Day Smoker     Smokeless tobacco: Never Used   Substance Use Topics     Alcohol use: Not Currently     Comment: Clean for 5 years     Drug use: Not Currently     Objective:    Ht Readings from Last 1 Encounters:   12/22/21 1.702 m (5' 7\")     Wt Readings from Last 1 Encounters:   01/01/22 77.4 kg (170 lb 9.6 oz)       Body mass index is 26.72 kg/m .    Patient Vitals for the past 96 hrs:   Weight   01/01/22 2100 77.4 kg (170 lb 9.6 oz)   12/29/21 2147 75.8 kg (167 lb)       Labs:  Last 3 days:  Recent Labs     12/31/21  0708 01/01/22  0743 01/02/22  0730    138 138   POTASSIUM 4.6 4.5 4.0   CHLORIDE 108* 106 108*   CO2 22 23 23   BUN 26* 26* 26*   CR 0.75 0.75 0.79   VIJAYA 8.4* 8.5 8.7   MAG 1.9  --   --    PHOS 3.4  --   --    PROTTOTAL 7.3 7.3 7.1   ALBUMIN 2.2* 2.1* 2.2*   HGB 11.3* 11.4* 11.5*   HCT 36.1* 35.5* 35.9*    335 345   BILITOTAL 0.8 0.7 0.8   AST 27 28 28   ALT 35 36 36   ALKPHOS 224* 215* 224*       Glucose (past 48 hours):   Recent Labs     12/31/21  1721 01/01/22  0105 01/01/22  0630 01/01/22  0743 01/01/22  1204 01/01/22  1810 01/02/22  0024 01/02/22  0730 01/02/22  0826   * 175* 153* 132* 123* 101* 177* 168* 113*       Intake/Output (last 24 hours): I/O last 3 completed shifts:  In: 2078 [P.O.:180]  Out: 1300 [Urine:225; Drains:875; Other:200]    Estimated CrCl: Estimated Creatinine Clearance: 99.3 mL/min (based on SCr of 0.79 mg/dL).    Assessment:    Continue patient on PN therapy as a " cyclic central therapy.     Given the patient's current condition/oral intake, PN is still indicated.    Lab results reviewed: decreased Cl:Ace to 1:3    Plan:  1.  Rate of PN: 75 mL/hr for 1 hour, then 150 mL/hr for 10 hours, then 75 mL/hr for 1 hour. Maintenance IV fluid rate will be adjusted appropriately to meet the total maximum IV fluids rate as ordered by provider.  2.  Formula:    Amino Acids 115 grams   Dextrose 360 grams   Sodium 110 mEq/day   Potassium 75 mEq/day   Calcium 8 mEq/day   Magnesium 14 mEq/day   Phosphorus 32 mMol/day   Chloride: Acetate Ratio 1:3   Standard Multivitamins w/Vitamin K   Trace Elements  3.   Fat Emulsion: 20%, 250 mL IV 5 times weekly on Sunday, Monday, Tuesday, Thursday and Friday.  4.   Check PN labs per protocol.  5.   Pharmacist will continue to follow the patient's lab results, clinical status and blood glucose results and make adjustments as appropriate.    Thank you for the consult.  Sandra Lipscomb, PharmD.  1/2/2022 10:30 AM

## 2022-01-02 NOTE — PROGRESS NOTES
ASSESSMENT:  1. Abdominal pain, generalized        Gurdeep Dia is a 66 year old male status post explore lap lysis of adhesions with resultant 3 days later perforation reoperation oversew of defect and then 5 days later perforation again and at this time point he has fistulas above and below to his incision.     PLAN:  Continue ostomy bags to control fistula drainage  Clear liquids ok  Continue to look for TCU placement    SUBJECTIVE:   He is depressed but feeling ok. Pain is controlled and he has been tolerating clears. Wants to get out of here. Some nausea at night, passing gas and BM      Patient Vitals for the past 24 hrs:   BP Temp Temp src Pulse Resp SpO2 Weight   01/02/22 0800 119/80 98.2  F (36.8  C) Oral 92 17 93 % --   01/02/22 0020 133/79 98.5  F (36.9  C) Oral 105 18 91 % --   01/01/22 2100 -- -- -- -- -- -- 77.4 kg (170 lb 9.6 oz)   01/01/22 1601 117/86 98.4  F (36.9  C) Oral 93 18 93 % --         PHYSICAL EXAM:  GEN: No acute distress, comfortable  LUNGS: CTA bilaterally  CV:RRR  ABD:soft, tender near fistulas, no peritoneal signs; bilious appearing output of upper and lower fistulas     01/01 0700 - 01/02 0659  In: 2078 [P.O.:180]  Out: 1300 [Urine:225; Drains:875]    No results displayed because visit has over 200 results.             Shaggy Cao PA-C

## 2022-01-02 NOTE — PROGRESS NOTES
Primary Medicine Progress Note    Assessment/Plan  Active Problems:    Abdominal pain, generalized    Chema South is a 65 y/o M with past medical history of splenectomy, appendectomy, HTN, and substance abuse, with recent admission to hospital 11/30-12/21/2021 for SBO with perforation s/p surgical repair. Discharged home but presents as he could not manage at home and is now requesting to be placed in TCU vs LTC. Continues on TPN with surgery and ID following while awaiting LTC placement.     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 Required subsequent washout on 12/3 with aggressive IV antibiotic regimen and MARISA drain placement. On 12/8 found to have continued enhancing abscess with drain placed 12/9 and removed on 12/16. Antibiotics stopped on 12/20. Plan for close follow up with surgery and TPN at discharge, with plans to have help from his brother in law. Then returned on 12/22 given unable to care for himself even with brother in law's assistance; requesting LTC/TCU placement. Re-imaged abdominal CT 12/31: no significant change and no new drainable abscess or other findings.   -PT/OT ordered, appreciate recs on placement  -Searching for TCU/LTACH placement with care management   -planning for discharge to [facility] apartment on Monday 1/3, so long as LTCs continue to deny him.  Possible barriers: no help available for residents (on TPN) and refrigerator for TPN     -3 referrals sent 1/1  -Pain management: Percocet 1-2 tablets q6h PRN, gabapentin   -Consulted surgery to follow while here  -Consulted ID, noted:  E coli and Candida parapsilosis in wound culture from 12/22/2021   -Leukocytosis stable in 8-11 range.               -Adding flagyl to ceftriaxone/fluconazole as of  12/31   -Considering oral abx, defer to ID    -Consult pharmacy and RD for TPN management - transitioned to cyclic TPN 12/30  -WOC    Alk phos elevation  Continues to climb day by day.  Rest of LFTs okay.  White count normal.  Had been just monitoring, though Chema spiked a fever overnight into 12/27 and looked mildly ill that morning.  Endorsing RUQ TTP on exam.  RUQ US showing sludge but no signs of acute kat.  -Continue to monitor  -cyclic TPN     Depression  Insomnia  Hx of this. Reports symptoms for past few days= low motivation, little interest in doing things, poor sleep, no appetite. Is on buspar, mirtazapine, and effexor, though doesn't really think medicines are as helpful as talk therapy is. Wanted to speak to social work and . States no plan to harm himself. Reports he just needs to leave the hospital.  -Consult psych for med management and plan for talk therapy while hospitalized  -Discontinued buspar and mirtazapine per psych  -Increased venlafaxine and gabapentin per psych  -Seroquel for sleep  -Will schedule vistaril at bedtime and see if this can improve dry heaves that seem to be recurrent.     Moderate malnutrition  Per RD eval  -TPN per RD and pharmacy     Normocytic anemia  Stable at 11.5, MCV of 93.       Chronic Conditions:  Hypothyroidism- resume PTA levothyroxine  Chronic pain- Hold mexolicam 7.5mg daily, resume PTA gabapentin  BPH-hold home Flomax given he states it does not help    Subjective:   Napping when I entered the room, no concerns today, other than dry heaving that has happened the past two days. Cause unclear. Pt afebrile, no abdominal pain, no hematemesis. Ostomy bags with good output. Question whether TPN adjustment is the cause.     Objective    Vital signs in last 24 hours Temp:  [98.2  F (36.8  C)-98.5  F (36.9  C)] 98.2  F (36.8  C)  Pulse:  [] 92  Resp:  [17-18] 17  BP: (117-133)/(79-86) 119/80  SpO2:  [91 %-93 %] 93 %       Weight:   Vitals:    12/28/21 1035  12/29/21 2147 01/01/22 2100   Weight: 75.6 kg (166 lb 11.2 oz) 75.8 kg (167 lb) 77.4 kg (170 lb 9.6 oz)       Intake/Output last 3 shift I/O last 3 completed shifts:  In: 2078 [P.O.:180]  Out: 1300 [Urine:225; Drains:875; Other:200]    Intake/Output this shift:I/O this shift:  In: -   Out: 210 [Drains:10; Other:200]    PHYSICAL EXAM  GENERAL APPEARANCE: Cooperative; appears stated age.   LUNGS: breathing comfortably.   HEART: Brisk cap refill  ABDOMEN: Ostomy bag draining stool.  Lap sites C/D/I.  No overt TTP.  No guarding or rebound.  EXTREMITIES: Grossly normal without deformity, no edema  SKIN: no rashes, skin warm  NEURO: Oriented to time and place    Pertinent Labs and Pertinent Radiology   Lab Results: personally reviewed.     Recent Labs   Lab 01/02/22  0730   WBC 11.5*   HGB 11.5*   HCT 35.9*          Recent Labs   Lab 01/02/22  0730      CO2 23   BUN 26*   ALBUMIN 2.2*   ALKPHOS 224*   ALT 36   AST 28       Radiology Results:   Results for orders placed or performed during the hospital encounter of 12/21/21   CT Abdomen Pelvis w Contrast    Impression    IMPRESSION:   1.  Improved prerectal fluid collection.  2.  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  3.  Possible right abdominal anastomotic dehiscence with a small amount free fluid and air in the mesentery.  4.  Report called to floor nurse Susy at 4:25 PM       Precepted patient with Dr. Giuliana Sow,   [facility] Medicine Resident PGY-2  Pager: [phone]      Parts of this note were created with the assistance of voice recognition software.  The note was reviewed for accuracy.  However, errors in spelling, etc may have resulted.

## 2022-01-02 NOTE — PLAN OF CARE
[id number] Shift Summary    A&Ox4. Denies pain. VSS. X2 ostomies intact, midline incision intact. Dry dressing to L side remains CDI. Cyclic TPN/lipids infusing. Awaiting LTC placement.    Elizabeth Munoz RN      Problem: Adult Inpatient Plan of Care  Goal: Plan of Care Review  Outcome: No Change     Problem: Infection (Surgery Nonspecified)  Goal: Absence of Infection Signs and Symptoms  Outcome: No Change

## 2022-01-03 ENCOUNTER — APPOINTMENT (OUTPATIENT)
Dept: OCCUPATIONAL THERAPY | Facility: HOSPITAL | Age: 67
DRG: 393 | End: 2022-01-03
Payer: MEDICARE

## 2022-01-03 ENCOUNTER — APPOINTMENT (OUTPATIENT)
Dept: PHYSICAL THERAPY | Facility: HOSPITAL | Age: 67
DRG: 393 | End: 2022-01-03
Payer: MEDICARE

## 2022-01-03 LAB
ALBUMIN SERPL-MCNC: 2.3 G/DL (ref 3.5–5)
ALP SERPL-CCNC: 216 U/L (ref 45–120)
ALT SERPL W P-5'-P-CCNC: 39 U/L (ref 0–45)
ANION GAP SERPL CALCULATED.3IONS-SCNC: 9 MMOL/L (ref 5–18)
AST SERPL W P-5'-P-CCNC: 37 U/L (ref 0–40)
BILIRUB SERPL-MCNC: 0.8 MG/DL (ref 0–1)
BUN SERPL-MCNC: 25 MG/DL (ref 8–22)
CALCIUM SERPL-MCNC: 8.7 MG/DL (ref 8.5–10.5)
CHLORIDE BLD-SCNC: 104 MMOL/L (ref 98–107)
CO2 SERPL-SCNC: 24 MMOL/L (ref 22–31)
CREAT SERPL-MCNC: 0.79 MG/DL (ref 0.7–1.3)
ERYTHROCYTE [DISTWIDTH] IN BLOOD BY AUTOMATED COUNT: 16.7 % (ref 10–15)
GFR SERPL CREATININE-BSD FRML MDRD: >90 ML/MIN/1.73M2
GLUCOSE BLD-MCNC: 180 MG/DL (ref 70–125)
GLUCOSE BLDC GLUCOMTR-MCNC: 107 MG/DL (ref 70–99)
GLUCOSE BLDC GLUCOMTR-MCNC: 116 MG/DL (ref 70–99)
GLUCOSE BLDC GLUCOMTR-MCNC: 171 MG/DL (ref 70–99)
GLUCOSE BLDC GLUCOMTR-MCNC: 194 MG/DL (ref 70–99)
HCT VFR BLD AUTO: 35 % (ref 40–53)
HGB BLD-MCNC: 11.3 G/DL (ref 13.3–17.7)
INR PPP: 1.05 (ref 0.9–1.15)
MAGNESIUM SERPL-MCNC: 1.9 MG/DL (ref 1.8–2.6)
MCH RBC QN AUTO: 29.7 PG (ref 26.5–33)
MCHC RBC AUTO-ENTMCNC: 32.3 G/DL (ref 31.5–36.5)
MCV RBC AUTO: 92 FL (ref 78–100)
PHOSPHATE SERPL-MCNC: 3.3 MG/DL (ref 2.5–4.5)
PLATELET # BLD AUTO: 376 10E3/UL (ref 150–450)
POTASSIUM BLD-SCNC: 4.3 MMOL/L (ref 3.5–5)
PREALB SERPL IA-MCNC: 24 MG/DL (ref 19–38)
PROT SERPL-MCNC: 7.3 G/DL (ref 6–8)
RBC # BLD AUTO: 3.8 10E6/UL (ref 4.4–5.9)
SODIUM SERPL-SCNC: 137 MMOL/L (ref 136–145)
TRIGL SERPL-MCNC: 255 MG/DL
WBC # BLD AUTO: 11.1 10E3/UL (ref 4–11)

## 2022-01-03 PROCEDURE — 250N000013 HC RX MED GY IP 250 OP 250 PS 637: Performed by: NURSE PRACTITIONER

## 2022-01-03 PROCEDURE — 84134 ASSAY OF PREALBUMIN: CPT | Performed by: STUDENT IN AN ORGANIZED HEALTH CARE EDUCATION/TRAINING PROGRAM

## 2022-01-03 PROCEDURE — 80053 COMPREHEN METABOLIC PANEL: CPT | Performed by: STUDENT IN AN ORGANIZED HEALTH CARE EDUCATION/TRAINING PROGRAM

## 2022-01-03 PROCEDURE — 84478 ASSAY OF TRIGLYCERIDES: CPT | Performed by: STUDENT IN AN ORGANIZED HEALTH CARE EDUCATION/TRAINING PROGRAM

## 2022-01-03 PROCEDURE — 120N000001 HC R&B MED SURG/OB

## 2022-01-03 PROCEDURE — 97110 THERAPEUTIC EXERCISES: CPT | Mod: GP

## 2022-01-03 PROCEDURE — 99024 POSTOP FOLLOW-UP VISIT: CPT | Performed by: SPECIALIST

## 2022-01-03 PROCEDURE — 250N000013 HC RX MED GY IP 250 OP 250 PS 637: Performed by: STUDENT IN AN ORGANIZED HEALTH CARE EDUCATION/TRAINING PROGRAM

## 2022-01-03 PROCEDURE — 85027 COMPLETE CBC AUTOMATED: CPT | Performed by: STUDENT IN AN ORGANIZED HEALTH CARE EDUCATION/TRAINING PROGRAM

## 2022-01-03 PROCEDURE — 99231 SBSQ HOSP IP/OBS SF/LOW 25: CPT | Mod: GC | Performed by: STUDENT IN AN ORGANIZED HEALTH CARE EDUCATION/TRAINING PROGRAM

## 2022-01-03 PROCEDURE — 250N000011 HC RX IP 250 OP 636: Performed by: STUDENT IN AN ORGANIZED HEALTH CARE EDUCATION/TRAINING PROGRAM

## 2022-01-03 PROCEDURE — 82040 ASSAY OF SERUM ALBUMIN: CPT | Performed by: STUDENT IN AN ORGANIZED HEALTH CARE EDUCATION/TRAINING PROGRAM

## 2022-01-03 PROCEDURE — 85610 PROTHROMBIN TIME: CPT | Performed by: STUDENT IN AN ORGANIZED HEALTH CARE EDUCATION/TRAINING PROGRAM

## 2022-01-03 PROCEDURE — 83735 ASSAY OF MAGNESIUM: CPT | Performed by: STUDENT IN AN ORGANIZED HEALTH CARE EDUCATION/TRAINING PROGRAM

## 2022-01-03 PROCEDURE — 250N000013 HC RX MED GY IP 250 OP 250 PS 637: Performed by: INTERNAL MEDICINE

## 2022-01-03 PROCEDURE — 97110 THERAPEUTIC EXERCISES: CPT | Mod: GO

## 2022-01-03 PROCEDURE — 36415 COLL VENOUS BLD VENIPUNCTURE: CPT | Performed by: STUDENT IN AN ORGANIZED HEALTH CARE EDUCATION/TRAINING PROGRAM

## 2022-01-03 PROCEDURE — 84100 ASSAY OF PHOSPHORUS: CPT | Performed by: STUDENT IN AN ORGANIZED HEALTH CARE EDUCATION/TRAINING PROGRAM

## 2022-01-03 PROCEDURE — 250N000009 HC RX 250: Performed by: FAMILY MEDICINE

## 2022-01-03 PROCEDURE — 97116 GAIT TRAINING THERAPY: CPT | Mod: GP

## 2022-01-03 RX ADMIN — LEVOTHYROXINE SODIUM 25 MCG: 0.03 TABLET ORAL at 06:14

## 2022-01-03 RX ADMIN — CALCIUM GLUCONATE: 98 INJECTION, SOLUTION INTRAVENOUS at 20:29

## 2022-01-03 RX ADMIN — GABAPENTIN 200 MG: 100 CAPSULE ORAL at 16:23

## 2022-01-03 RX ADMIN — CEFDINIR 300 MG: 300 CAPSULE ORAL at 20:39

## 2022-01-03 RX ADMIN — QUETIAPINE FUMARATE 200 MG: 100 TABLET, FILM COATED ORAL at 20:40

## 2022-01-03 RX ADMIN — FLUCONAZOLE 200 MG: 100 TABLET ORAL at 20:38

## 2022-01-03 RX ADMIN — PANTOPRAZOLE SODIUM 40 MG: 40 TABLET, DELAYED RELEASE ORAL at 07:30

## 2022-01-03 RX ADMIN — VENLAFAXINE HYDROCHLORIDE 75 MG: 75 CAPSULE, EXTENDED RELEASE ORAL at 08:25

## 2022-01-03 RX ADMIN — CEFDINIR 300 MG: 300 CAPSULE ORAL at 08:25

## 2022-01-03 RX ADMIN — GABAPENTIN 900 MG: 300 CAPSULE ORAL at 20:40

## 2022-01-03 RX ADMIN — ENOXAPARIN SODIUM 40 MG: 40 INJECTION SUBCUTANEOUS at 08:24

## 2022-01-03 RX ADMIN — I.V. FAT EMULSION 250 ML: 20 EMULSION INTRAVENOUS at 20:34

## 2022-01-03 RX ADMIN — METRONIDAZOLE 500 MG: 500 TABLET ORAL at 08:24

## 2022-01-03 RX ADMIN — GABAPENTIN 200 MG: 100 CAPSULE ORAL at 08:25

## 2022-01-03 RX ADMIN — HYDROXYZINE HYDROCHLORIDE 25 MG: 10 SOLUTION ORAL at 20:34

## 2022-01-03 RX ADMIN — METRONIDAZOLE 500 MG: 500 TABLET ORAL at 20:39

## 2022-01-03 ASSESSMENT — ACTIVITIES OF DAILY LIVING (ADL)
ADLS_ACUITY_SCORE: 11

## 2022-01-03 NOTE — PROGRESS NOTES
[facility] Progress Note    Assessment/Plan  Active Problems:    Abdominal pain, generalized    Patient remains hospitalized due to placement, TPN.    Chema Dia is a 67 y/o M with past medical history of splenectomy, appendectomy, HTN, and substance abuse, with recent admission to hospital 11/30-12/21/2021 for SBO with perforation s/p surgical repair. Discharged home but presents as he could not manage at home and is now requesting to be placed in TCU vs LTC. Continues on TPN with surgery and ID following while awaiting LTC placement.     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 Required subsequent washout on 12/3 with aggressive IV antibiotic regimen and MARISA drain placement. On 12/8 found to have continued enhancing abscess with drain placed 12/9 and removed on 12/16. Antibiotics stopped on 12/20. Plan for close follow up with surgery and TPN at discharge, with plans to have help from his brother in law. Then returned on 12/22 given unable to care for himself even with brother in law's assistance; requesting LTC/TCU placement. Re-imaged abdominal CT 12/31: no significant change and no new drainable abscess or other findings.   -PT/OT ordered, appreciate recs on placement.  -Searching for TCU/LTC placement with care management.              -Planning for discharge to [facility] on Monday 1/3, so long as LTCs continue to deny him.            Possible barriers: no help available for residents (on TPN) and refrigerator for TPN.               -3 referrals sent 1/1.  -Pain management: Percocet 1-2 tablets q6h PRN, gabapentin.  -Consulted surgery to follow while here.  -Consulted ID, noted:  E coli and Candida parapsilosis in wound culture from  12/22/2021.  -Has stable mild leukocytosis around 11.  -ID final recommendations: Oral cefdinir, metronidazole, fluconazole x10 days.  Follow-up with primary and surgery.  Watch for relapse (fevers, pain, etc.).  -Consult pharmacy and RD for TPN management - transitioned to cyclic TPN 12/30.  -WOC.  -Additional daily lab checks not indicated at this time unless he develops new symptoms.     Alk phos elevation, now stable  Continues to climb day by day though now stable in the low 200s.  Rest of LFTs okay.  Stable leukocytosis around 11.  Had been just monitoring, though Chema spiked a fever overnight into 12/27 and looked mildly ill that morning.  Endorsing RUQ TTP on exam.  RUQ US showing sludge but no signs of acute kat.  -cyclic TPN.  Additional daily lab checks outside of regularly scheduled TPN checks not indicated at this time unless he develops new symptoms.     Depression  Insomnia  Hx of this. Reports symptoms for past few days= low motivation, little interest in doing things, poor sleep, no appetite. Is on buspar, mirtazapine, and effexor, though doesn't really think medicines are as helpful as talk therapy is. Wanted to speak to social work and . States no plan to harm himself. Reports he just needs to leave the hospital.  -Consult psych for med management and plan for talk therapy while hospitalized.  -Discontinued buspar and mirtazapine per psych.  -Increased venlafaxine and gabapentin per psych.  -Seroquel for sleep.  -Will schedule vistaril at bedtime and see if this can improve dry heaves that seem to be recurrent.     Moderate malnutrition  Per RD eval.  -TPN per RD and pharmacy.     Normocytic anemia  Stable at 11.5, MCV of 93.     Chronic Conditions:  Hypothyroidism- PTA levothyroxine.  Chronic pain- Hold mexolicam 7.5mg daily, resume PTA gabapentin.  BPH-hold home Flomax given he states it does not help.    FEN: TPN.  DVT Prophylaxis: Lovenox.  Code: Full code.    Disposition/Advanced  Care Planning  Discharge Planning discussed with patient and care team.  Anticipated discharge date: Multiple days.  Pending placement.  Disposition: LTC.    Subjective  Patient is in good spirits today.  Hoping to discharge as soon as possible.  States he is not sure why it is so difficult to find placement for him and that he is very irritated about the felony.  States that he has never even had a domestic so it should not be a big deal.    Objective    Vital signs in last 24 hours Temp:  [98  F (36.7  C)-99.1  F (37.3  C)] 98.2  F (36.8  C)  Pulse:  [84-96] 84  Resp:  [18] 18  BP: (104-114)/(77-83) 104/77  SpO2:  [92 %-95 %] 93 %   Weight: [unfilled]    Intake/Output last 3 shift I/O last 3 completed shifts:  In: -   Out: 820 [Drains:470; Other:350]    Intake/Output this shift:I/O this shift:  In: -   Out: 275 [Drains:75; Stool:200]    Physical Exam  General appearance: alert, appears stated age, cooperative and no distress.  Head: Normocephalic, without obvious abnormality, atraumatic.  Eyes: conjunctivae/corneas clear.  Throat: MMM.  Neck: supple, symmetrical, trachea midline.  Lungs: no increased respiratory effort.  Heart: no murmurs.  Abdomen: non-distended.  Extremities: extremities normal, atraumatic, no cyanosis or edema.  Skin: Skin color, texture, turgor normal. No rashes or lesions.  Neurologic: Alert and oriented x3, normal strength and tone.  Psychiatric: mood and affect appropriate.    Pertinent Labs and Pertinent Radiology   Lab Results: personally reviewed.     Radiology Results: Personally reviewed image/s and impression/s    Precepted patient with Dr. Lucas Aguilera.    Johnathan Lees MD  [facility], PGY-3

## 2022-01-03 NOTE — PROGRESS NOTES
Spoke to Hayden at [facility], which is a [facility] facility. Says they have accepted patient, but he needs to be totally independent with cares as staff are not able to assist.     Message sent to Luz Maria Steiner to touch base. Notes indicate patient is not able to manage his own TPN at this point. Verified with Luz Maria that patient is not able to manage his own TPN. Will continue looking for TCU

## 2022-01-03 NOTE — PHARMACY-CONSULT NOTE
"Pharmacy Note: Parenteral Nutrition (PN) Management    Pharmacist consulted to dose PN for Gurdeep Dia, a 66 year old male by Dr. Mcmahon.    Subjective:    The patient was started on PN in the hospital on 12/5/2021.    Indication for PN therapy: bowel resection, plan to continue TPN until fistula heals.    Inadequate nutrition existing for > 7 days.     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      Social History     Tobacco Use     Smoking status: Current Every Day Smoker     Smokeless tobacco: Never Used   Substance Use Topics     Alcohol use: Not Currently     Comment: Clean for 5 years     Drug use: Not Currently     Objective:    Ht Readings from Last 1 Encounters:   12/22/21 1.702 m (5' 7\")     Wt Readings from Last 1 Encounters:   01/01/22 77.4 kg (170 lb 9.6 oz)       Body mass index is 26.72 kg/m .    Patient Vitals for the past 96 hrs:   Weight   01/01/22 2100 77.4 kg (170 lb 9.6 oz)       Labs:  Last 3 days:  Recent Labs     01/01/22  0743 01/02/22  0730 01/03/22  0645    138 137   POTASSIUM 4.5 4.0 4.3   CHLORIDE 106 108* 104   CO2 23 23 24   BUN 26* 26* 25*   CR 0.75 0.79 0.79   VIJAYA 8.5 8.7 8.7   MAG  --   --  1.9   PHOS  --   --  3.3   PROTTOTAL 7.3 7.1 7.3   ALBUMIN 2.1* 2.2* 2.3*   PREALB  --   --  24   TRIG  --   --  255*   HGB 11.4* 11.5* 11.3*   HCT 35.5* 35.9* 35.0*    345 376   BILITOTAL 0.7 0.8 0.8   AST 28 28 37   ALT 36 36 39   ALKPHOS 215* 224* 216*   INR  --   --  1.05       Glucose (past 48 hours):   Recent Labs     01/01/22  1204 01/01/22  1810 01/02/22  0024 01/02/22  0730 01/02/22  0826 01/02/22  2009 01/03/22  0634 01/03/22  0645 01/03/22  0723   * 101* 177* 168* 113* 100* 194* 180* 171*       Intake/Output (last 24 hours): I/O last 3 completed shifts:  In: -   Out: 820 [Drains:470; Other:350]    Estimated CrCl: Estimated Creatinine Clearance: 99.3 mL/min (based on SCr of 0.79 mg/dL).    Assessment:    Continue patient on PN therapy as " a cyclic central therapy.     Given the patient's current condition/oral intake, PN is still indicated.    Lab results reviewed:     Plan:  1. Rate of PN: 75 mL/hr initially, then 150 mL/hr for 10 hours, then 75 mL/hr for 1 hour. Maintenance IV fluid rate will be adjusted appropriately to meet the total maximum IV fluids rate as ordered by provider.  2. Formula:     Amino Acids 130 grams    Dextrose 300 grams    Sodium 110 mEq/day    Potassium 75 mEq/day    Calcium 8 mEq/day    Magnesium 14 mEq/day    Phosphorus 32 mMol/day    Chloride: Acetate Ratio 1:3    Standard Multivitamins w/Vitamin K    Trace Elements    3. Fat Emulsion: 20%, 250 mL IV 5 times weekly on Sunday, Monday, Wednesday, Thursday, Saturday.   4. Check labs per protocol.  5. Pharmacist will continue to follow the patient's lab results, clinical status and blood glucose results and make adjustments as appropriate.    Thank you for the consult.  Sandra Lipscomb, PharmD.  1/3/2022 10:47 AM

## 2022-01-03 NOTE — PROGRESS NOTES
"Surgery    Best he has felt in a long time    /77 (BP Location: Left arm)   Pulse 84   Temp 98.2  F (36.8  C) (Oral)   Resp 18   Ht 1.702 m (5' 7\")   Wt 77.4 kg (170 lb 9.6 oz)   SpO2 93%   BMI 26.72 kg/m      ABD soft, ostomies with bilous output  Ext warm    Results for orders placed or performed during the hospital encounter of 12/21/21 (from the past 24 hour(s))   Glucose by meter   Result Value Ref Range    GLUCOSE BY METER POCT 100 (H) 70 - 99 mg/dL   Glucose by meter   Result Value Ref Range    GLUCOSE BY METER POCT 194 (H) 70 - 99 mg/dL   Magnesium   Result Value Ref Range    Magnesium 1.9 1.8 - 2.6 mg/dL   Phosphorus   Result Value Ref Range    Phosphorus 3.3 2.5 - 4.5 mg/dL   INR   Result Value Ref Range    INR 1.05 0.90 - 1.15   Triglycerides   Result Value Ref Range    Triglycerides 255 (H) <=149 mg/dL   Comprehensive metabolic panel   Result Value Ref Range    Sodium 137 136 - 145 mmol/L    Potassium 4.3 3.5 - 5.0 mmol/L    Chloride 104 98 - 107 mmol/L    Carbon Dioxide (CO2) 24 22 - 31 mmol/L    Anion Gap 9 5 - 18 mmol/L    Urea Nitrogen 25 (H) 8 - 22 mg/dL    Creatinine 0.79 0.70 - 1.30 mg/dL    Calcium 8.7 8.5 - 10.5 mg/dL    Glucose 180 (H) 70 - 125 mg/dL    Alkaline Phosphatase 216 (H) 45 - 120 U/L    AST 37 0 - 40 U/L    ALT 39 0 - 45 U/L    Protein Total 7.3 6.0 - 8.0 g/dL    Albumin 2.3 (L) 3.5 - 5.0 g/dL    Bilirubin Total 0.8 0.0 - 1.0 mg/dL    GFR Estimate >90 >60 mL/min/1.73m2   CBC with platelets   Result Value Ref Range    WBC Count 11.1 (H) 4.0 - 11.0 10e3/uL    RBC Count 3.80 (L) 4.40 - 5.90 10e6/uL    Hemoglobin 11.3 (L) 13.3 - 17.7 g/dL    Hematocrit 35.0 (L) 40.0 - 53.0 %    MCV 92 78 - 100 fL    MCH 29.7 26.5 - 33.0 pg    MCHC 32.3 31.5 - 36.5 g/dL    RDW 16.7 (H) 10.0 - 15.0 %    Platelet Count 376 150 - 450 10e3/uL   Glucose by meter   Result Value Ref Range    GLUCOSE BY METER POCT 171 (H) 70 - 99 mg/dL     A/P: small bowel fistula  Doing well otherwise  tpn  Wound " cares  Awaiting some type of placement        Mekhi Richardson MD  General Surgery [phone]  Vascular Surgery [phone]

## 2022-01-03 NOTE — PROGRESS NOTES
Nutrition Therapy  Parenteral Nutrition follow-up       RECOMMENDATIONS FOR MDs/PROVIDERS TO ORDER:  None      Malnutrition Status:    Severe  In the context of acute illness    Future/Additional Recommendations:  Follow labs/liver enzymes and adjust TPN per pt needs.    Dietitian to Pharmacy  Decrease dextrose d/t increasing triglycerides to 300 g (53% of kcal), increase AA to 130 g (27% of kcal) + lipids 5 days a week = 1897 kcal/day         Current Nutrition Intake:  Diet: Clear liquids   Sips of water only    Nutrition Support: TPN: cyclin gm Dextrose, 115 gm AA @ 75 mL/hr x 1 hour, 150 mL/hr x 10 hours, and 75 mL/hr x 1 hour, off.  250 mL 20% lipids 5 days a week x 12 hours daily.     Provides: 1650 mL + 250 mL lipid, 2031 kcal/day, 115 gm protein, 360 gm cho, 40 g lipid/daily average.  GIR 3.29 mg CHO/kg/min  TPN changed to cyclic 21.  TPN meets estimated nutrition needs.    GI:  2 BM yesterday  Fistula/Drain x2 output 470 ml, bilious, decreased    Labs:   ALT/AST WNL  Alk phos 216,improved  , increased  fsbg 100-180 mg/dl past 24 hours, in good control  Labs reviewed by dietitian    Medications:   Iv abx oral abx, levothyroxine, protonix  Reviewed by dietitian     INTERVENTIONS  Noted per surgery note of :  Plan going forward would be to continue TPN until EC fistula output is minimal and we can increase oral caloric intake, this could take a couple months.    Implementation  Decrease dextrose d/t increasing triglycerides to 300 g (53% of kcal), increase AA to 130 g (25% of kcal) + lipids 5 days a week = 1897 kcal/day      Monitoring/Evaluation  Progress toward goals will be monitored and evaluated per protocol.

## 2022-01-03 NOTE — PLAN OF CARE
Problem: Depression  Goal: Improved Mood  Outcome: Improving     Problem: Pain (Surgery Nonspecified)  Goal: Acceptable Pain Control  Outcome: Improving  Intervention: Prevent or Manage Pain  Recent Flowsheet Documentation  Taken 1/3/2022 0828 by Roge Kendall, RN  Diversional Activities: television   Pt reported minor back pain but declined PRN analgesics. Pt  mg/dL and 116 mg/dL. Pt tolerated oral antibiotics well. Pt has exhibited poor appetite; declining breakfast. Pt ordered two jellos and a drink for lunch. PICC dressing to be changed this PM shift. Cares clustered to promote rest.   Roge Kendall, RN  1/3/2022  3:19 PM

## 2022-01-03 NOTE — PLAN OF CARE
Problem: Pain (Surgery Nonspecified)  Goal: Acceptable Pain Control  Outcome: Improving     Problem: Anxiety  Goal: Anxiety Reduction or Resolution  Outcome: Improving       Pt denies pain.    LSC.  BS active. Ostomies X2 patent. Output decreasing. No dry heaving tonight.

## 2022-01-04 ENCOUNTER — APPOINTMENT (OUTPATIENT)
Dept: PHYSICAL THERAPY | Facility: HOSPITAL | Age: 67
DRG: 393 | End: 2022-01-04
Payer: MEDICARE

## 2022-01-04 LAB
GLUCOSE BLDC GLUCOMTR-MCNC: 145 MG/DL (ref 70–99)
GLUCOSE BLDC GLUCOMTR-MCNC: 148 MG/DL (ref 70–99)

## 2022-01-04 PROCEDURE — 99024 POSTOP FOLLOW-UP VISIT: CPT | Performed by: SPECIALIST

## 2022-01-04 PROCEDURE — 250N000013 HC RX MED GY IP 250 OP 250 PS 637: Performed by: NURSE PRACTITIONER

## 2022-01-04 PROCEDURE — 97116 GAIT TRAINING THERAPY: CPT | Mod: GP

## 2022-01-04 PROCEDURE — 120N000001 HC R&B MED SURG/OB

## 2022-01-04 PROCEDURE — 99231 SBSQ HOSP IP/OBS SF/LOW 25: CPT | Mod: GC | Performed by: STUDENT IN AN ORGANIZED HEALTH CARE EDUCATION/TRAINING PROGRAM

## 2022-01-04 PROCEDURE — 250N000013 HC RX MED GY IP 250 OP 250 PS 637: Performed by: STUDENT IN AN ORGANIZED HEALTH CARE EDUCATION/TRAINING PROGRAM

## 2022-01-04 PROCEDURE — 250N000013 HC RX MED GY IP 250 OP 250 PS 637: Performed by: INTERNAL MEDICINE

## 2022-01-04 PROCEDURE — 250N000011 HC RX IP 250 OP 636: Performed by: STUDENT IN AN ORGANIZED HEALTH CARE EDUCATION/TRAINING PROGRAM

## 2022-01-04 PROCEDURE — 250N000009 HC RX 250: Performed by: FAMILY MEDICINE

## 2022-01-04 RX ADMIN — METRONIDAZOLE 500 MG: 500 TABLET ORAL at 08:56

## 2022-01-04 RX ADMIN — HYDROXYZINE HYDROCHLORIDE 25 MG: 10 SOLUTION ORAL at 20:38

## 2022-01-04 RX ADMIN — VENLAFAXINE HYDROCHLORIDE 75 MG: 75 CAPSULE, EXTENDED RELEASE ORAL at 08:56

## 2022-01-04 RX ADMIN — GABAPENTIN 200 MG: 100 CAPSULE ORAL at 08:56

## 2022-01-04 RX ADMIN — FLUCONAZOLE 200 MG: 100 TABLET ORAL at 18:00

## 2022-01-04 RX ADMIN — CEFDINIR 300 MG: 300 CAPSULE ORAL at 08:58

## 2022-01-04 RX ADMIN — CALCIUM GLUCONATE: 98 INJECTION, SOLUTION INTRAVENOUS at 20:29

## 2022-01-04 RX ADMIN — LEVOTHYROXINE SODIUM 25 MCG: 0.03 TABLET ORAL at 06:06

## 2022-01-04 RX ADMIN — PANTOPRAZOLE SODIUM 40 MG: 40 TABLET, DELAYED RELEASE ORAL at 06:59

## 2022-01-04 RX ADMIN — CEFDINIR 300 MG: 300 CAPSULE ORAL at 20:39

## 2022-01-04 RX ADMIN — QUETIAPINE FUMARATE 200 MG: 100 TABLET, FILM COATED ORAL at 22:54

## 2022-01-04 RX ADMIN — ENOXAPARIN SODIUM 40 MG: 40 INJECTION SUBCUTANEOUS at 08:58

## 2022-01-04 RX ADMIN — GABAPENTIN 900 MG: 300 CAPSULE ORAL at 22:53

## 2022-01-04 RX ADMIN — ONDANSETRON 4 MG: 2 INJECTION INTRAMUSCULAR; INTRAVENOUS at 22:33

## 2022-01-04 RX ADMIN — METRONIDAZOLE 500 MG: 500 TABLET ORAL at 20:39

## 2022-01-04 RX ADMIN — GABAPENTIN 200 MG: 100 CAPSULE ORAL at 17:44

## 2022-01-04 ASSESSMENT — ACTIVITIES OF DAILY LIVING (ADL)
ADLS_ACUITY_SCORE: 11

## 2022-01-04 NOTE — PLAN OF CARE
Problem: Impaired Wound Healing  Goal: Optimal Wound Healing  Outcome: No Change  Intervention: Promote Wound Healing     Problem: Nausea and Vomiting  Goal: Fluid and Electrolyte Balance  Outcome: No Change     Problem: Pain (Surgery Nonspecified)  Goal: Acceptable Pain Control  Outcome: No Change     Pt slept well. Denied pain. Both EC fistula pouches intact- green/brown bile output out of both. Bloodsugar 145. TPN and lipids infusing. Pt dry heaving from 2300 to 0030-- pt says he doesn't feel vistaril is helping, says it often starts after TPN begins each night. Cool compress to head helpful. Reports nausea with dry heaves, no emesis. Surgery following. Waiting for placement.

## 2022-01-04 NOTE — PROGRESS NOTES
[facility] Progress Note    Assessment/Plan  Active Problems:    Abdominal pain, generalized    Patient remains hospitalized due to placement, TPN.    Chema Dia is a 65 y/o M with past medical history of splenectomy, appendectomy, HTN, and substance abuse, with recent admission to hospital 11/30-12/21/2021 for SBO with perforation s/p surgical repair. Discharged home but presents as he could not manage at home and is now requesting to be placed in TCU vs LTC. Continues on TPN with surgery and ID following while awaiting LTC placement.     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 Required subsequent washout on 12/3 with aggressive IV antibiotic regimen and MARISA drain placement. On 12/8 found to have continued enhancing abscess with drain placed 12/9 and removed on 12/16. Antibiotics stopped on 12/20. Plan for close follow up with surgery and TPN at discharge, with plans to have help from his brother in law. Then returned on 12/22 given unable to care for himself even with brother in law's assistance; requesting LTC/TCU placement. Re-imaged abdominal CT 12/31: no significant change and no new drainable abscess or other findings.   -PT/OT ordered, appreciate recs on placement.  -Searching for TCU/LTC placement with care management.  -Pain management: Percocet 1-2 tablets q6h PRN, gabapentin.  -Consulted surgery to follow while here.  -Consulted ID, noted:  E coli and Candida parapsilosis in wound culture from 12/22/2021.  -Has stable mild leukocytosis around 11.  -ID final recommendations: Oral cefdinir, metronidazole, fluconazole x10 days.  Follow-up with primary and surgery.  Watch for relapse (fevers, pain, etc.).  -Consult pharmacy and RD for TPN management -  transitioned to cyclic TPN 12/30.  -WOC.  -Additional daily lab checks not indicated at this time unless he develops new symptoms.     Alk phos elevation, now stable  Continues to climb day by day though now stable in the low 200s.  Rest of LFTs okay.  Stable leukocytosis around 11.  Had been just monitoring, though Chema spiked a fever overnight into 12/27 and looked mildly ill that morning.  Endorsing RUQ TTP on exam.  RUQ US showing sludge but no signs of acute kat.  -cyclic TPN.  Additional daily lab checks outside of regularly scheduled TPN checks not indicated at this time unless he develops new symptoms.     Depression  Insomnia  Hx of this. Reports symptoms for past few days= low motivation, little interest in doing things, poor sleep, no appetite. Is on buspar, mirtazapine, and effexor, though doesn't really think medicines are as helpful as talk therapy is. Wanted to speak to social work and . States no plan to harm himself. Reports he just needs to leave the hospital.  -Consult psych for med management and plan for talk therapy while hospitalized.  -Discontinued buspar and mirtazapine per psych.  -Increased venlafaxine and gabapentin per psych.  -Seroquel for sleep.  -Will schedule vistaril at bedtime and see if this can improve dry heaves that seem to be recurrent.     Moderate malnutrition  Per RD eval.  -TPN per RD and pharmacy.     Normocytic anemia  Stable at 11.5, MCV of 93.     Chronic Conditions:  Hypothyroidism- PTA levothyroxine.  Chronic pain- Hold mexolicam 7.5mg daily, resume PTA gabapentin.  BPH-hold home Flomax given he states it does not help.    FEN: TPN.  DVT Prophylaxis: Lovenox.  Code: Full code.    Disposition/Advanced Care Planning  Discharge Planning discussed with patient and care team.  Anticipated discharge date: Multiple days.  Pending placement.  Disposition: LTC.    Subjective  Patient continues to hope for discharge.  No new complaints aside from some dry heaving with  TPN at night.    Objective    Vital signs in last 24 hours Temp:  [98.2  F (36.8  C)-99.3  F (37.4  C)] 98.7  F (37.1  C)  Pulse:  [] 101  Resp:  [18-24] 20  BP: (116-140)/(73-95) 116/73  SpO2:  [91 %-93 %] 91 %   Weight: [unfilled]    Intake/Output last 3 shift I/O last 3 completed shifts:  In: 1564 [I.V.:3]  Out: 1110 [Drains:910; Stool:200]    Intake/Output this shift:I/O this shift:  In: 333.5   Out: 125 [Drains:125]    Physical Exam  General appearance: alert, appears stated age, cooperative and no distress.  Head: Normocephalic, without obvious abnormality, atraumatic.  Eyes: conjunctivae/corneas clear.  Throat: MMM.  Neck: supple, symmetrical, trachea midline.  Lungs: no increased respiratory effort.  Heart: no murmurs.  Abdomen: non-distended.  Extremities: extremities normal, atraumatic, no cyanosis or edema.  Skin: Skin color, texture, turgor normal. No rashes or lesions.  Neurologic: Alert and oriented x3, normal strength and tone.  Psychiatric: mood and affect appropriate.    Pertinent Labs and Pertinent Radiology   Lab Results: personally reviewed.     Radiology Results: Personally reviewed image/s and impression/s    Precepted patient with Dr. Lucas Aguilera.    Johnathan Lees MD  [facility], PGY-3

## 2022-01-04 NOTE — PROGRESS NOTES
"Surgery  Dry heaves every night after tpn starts    /73 (BP Location: Left arm)   Pulse 101   Temp 98.7  F (37.1  C) (Oral)   Resp 20   Ht 1.702 m (5' 7\")   Wt 77.4 kg (170 lb 9.6 oz)   SpO2 91%   BMI 26.72 kg/m      abd soft nontender ostomy bags in place  Ext warm    Results for orders placed or performed during the hospital encounter of 12/21/21 (from the past 24 hour(s))   Glucose by meter   Result Value Ref Range    GLUCOSE BY METER POCT 116 (H) 70 - 99 mg/dL   Glucose by meter   Result Value Ref Range    GLUCOSE BY METER POCT 107 (H) 70 - 99 mg/dL   Glucose by meter   Result Value Ref Range    GLUCOSE BY METER POCT 145 (H) 70 - 99 mg/dL   Glucose by meter   Result Value Ref Range    GLUCOSE BY METER POCT 148 (H) 70 - 99 mg/dL     A/P; small bowel fistulas  Stable   tpn  Await shirley Richardson MD  General Surgery [phone]  Vascular Surgery [phone]          "

## 2022-01-04 NOTE — PLAN OF CARE
"  Problem: Nausea and Vomiting  Goal: Fluid and Electrolyte Balance  Outcome: No Change   Patient called with episode of nausea and dry heaves at 2200, states, \"I don't want to be alone when I feel this way.\"  Cool compress applied,  patient stated relief and nausea passed.      Problem: Adult Inpatient Plan of Care  Goal: Readiness for Transition of Care  Outcome: No Change   Plan is for patient to go to TCU when a bed is available.    Noemi Rosas RN       "

## 2022-01-04 NOTE — PROGRESS NOTES
Nutrition Therapy  Parenteral Nutrition follow-up       RECOMMENDATIONS FOR MDs/PROVIDERS TO ORDER:  None      Malnutrition Status:    Severe  In the context of acute illness    Future/Additional Recommendations:  Follow labs/liver enzymes and adjust TPN per pt needs.  Monitor intake tolerance and amount to assess need to adjust TPN for needs.  Consider SMOF lipids or decrease in lipids pending intake, rise in LFT and potential cause of dry heaves    Dietitian to Pharmacy  No change in TPN today         Current Nutrition Intake:  Diet: Full Liquids - advanced today per MD   Intake: Pt ordered clear liquids at lunch and supper last night. Ordered milk and ice cream at breakfast today    Nutrition Support: TPN: cyclin gm Dextrose, 130 gm AA @ 75 mL/hr x 1 hour, 150 mL/hr x 10 hours, and 75 mL/hr x 1 hour, off.  250 mL 20% lipids 5 days a week x 12 hours daily.     Provides: 1650 mL + 250 mL lipid, 1897 kcal/day, 130 gm protein, 300 gm cho, 40 g lipid/daily average.  GIR 5.8 mg CHO/kg/min  TPN changed to cyclic 21.  TPN meets estimated nutrition needs.    GI:  1 BM yesterday  Fistula/Drain x2 output 910 ml, increased    Dry heaves x 2 nights in a row at start of TPN. Lasted 1.5 hours last night. Pt tolerated cycle TPN first 2 nights with no issues.     Labs: -no new -from 1/3  ALT/AST WNL  Alk phos 216,improved  , increased  fsbg 116-171mg/dl past 24 hours, in good control  Labs reviewed    INTERVENTIONS  Noted per surgery note of :  Plan going forward would be to continue TPN until EC fistula output is minimal and we can increase oral caloric intake, this could take a couple months.    Implementation  No change in TPN   Tonight will be first night of break from lipids with decrease made in lipids 1/2 for rising LFTs- this could help r/o if it is the cause of nausea    Monitoring/Evaluation  Progress toward goals will be monitored and evaluated per protocol.

## 2022-01-04 NOTE — PLAN OF CARE
Problem: Pain (Surgery Nonspecified)  Goal: Acceptable Pain Control  Outcome: Improving  Patient has had no complaints of pain related to his abdominal incisions for the shift.      Problem: Infection (Surgery Nonspecified)  Goal: Absence of Infection Signs and Symptoms  Outcome: Improving   No signs of infection were noted during the shift.  Problem: Nausea and Vomiting  Goal: Fluid and Electrolyte Balance  Outcome: Improving   No complaints of nausea for the shift. Patient was cautious to reintroduce food into his diet but was tolerating milk and some ice cream to eat.  patient did experience a fit of nausea where he was dry heaving but did not vomit. Patient finds comfort in someone staying with him during these fits.

## 2022-01-05 LAB
ANION GAP SERPL CALCULATED.3IONS-SCNC: 9 MMOL/L (ref 5–18)
BUN SERPL-MCNC: 30 MG/DL (ref 8–22)
CALCIUM SERPL-MCNC: 9.2 MG/DL (ref 8.5–10.5)
CHLORIDE BLD-SCNC: 103 MMOL/L (ref 98–107)
CO2 SERPL-SCNC: 25 MMOL/L (ref 22–31)
CREAT SERPL-MCNC: 0.79 MG/DL (ref 0.7–1.3)
GFR SERPL CREATININE-BSD FRML MDRD: >90 ML/MIN/1.73M2
GLUCOSE BLD-MCNC: 166 MG/DL (ref 70–125)
GLUCOSE BLDC GLUCOMTR-MCNC: 154 MG/DL (ref 70–99)
MAGNESIUM SERPL-MCNC: 1.9 MG/DL (ref 1.8–2.6)
PHOSPHATE SERPL-MCNC: 3.3 MG/DL (ref 2.5–4.5)
POTASSIUM BLD-SCNC: 4.4 MMOL/L (ref 3.5–5)
SARS-COV-2 RNA RESP QL NAA+PROBE: NEGATIVE
SODIUM SERPL-SCNC: 137 MMOL/L (ref 136–145)

## 2022-01-05 PROCEDURE — 250N000013 HC RX MED GY IP 250 OP 250 PS 637: Performed by: STUDENT IN AN ORGANIZED HEALTH CARE EDUCATION/TRAINING PROGRAM

## 2022-01-05 PROCEDURE — 250N000013 HC RX MED GY IP 250 OP 250 PS 637: Performed by: NURSE PRACTITIONER

## 2022-01-05 PROCEDURE — 250N000013 HC RX MED GY IP 250 OP 250 PS 637: Performed by: INTERNAL MEDICINE

## 2022-01-05 PROCEDURE — 99024 POSTOP FOLLOW-UP VISIT: CPT | Performed by: PHYSICIAN ASSISTANT

## 2022-01-05 PROCEDURE — 80048 BASIC METABOLIC PNL TOTAL CA: CPT | Performed by: STUDENT IN AN ORGANIZED HEALTH CARE EDUCATION/TRAINING PROGRAM

## 2022-01-05 PROCEDURE — 250N000011 HC RX IP 250 OP 636: Performed by: STUDENT IN AN ORGANIZED HEALTH CARE EDUCATION/TRAINING PROGRAM

## 2022-01-05 PROCEDURE — 250N000009 HC RX 250: Performed by: FAMILY MEDICINE

## 2022-01-05 PROCEDURE — 84100 ASSAY OF PHOSPHORUS: CPT | Performed by: STUDENT IN AN ORGANIZED HEALTH CARE EDUCATION/TRAINING PROGRAM

## 2022-01-05 PROCEDURE — 87635 SARS-COV-2 COVID-19 AMP PRB: CPT | Performed by: STUDENT IN AN ORGANIZED HEALTH CARE EDUCATION/TRAINING PROGRAM

## 2022-01-05 PROCEDURE — 99231 SBSQ HOSP IP/OBS SF/LOW 25: CPT | Mod: GC | Performed by: STUDENT IN AN ORGANIZED HEALTH CARE EDUCATION/TRAINING PROGRAM

## 2022-01-05 PROCEDURE — 83735 ASSAY OF MAGNESIUM: CPT | Performed by: STUDENT IN AN ORGANIZED HEALTH CARE EDUCATION/TRAINING PROGRAM

## 2022-01-05 PROCEDURE — 36415 COLL VENOUS BLD VENIPUNCTURE: CPT | Performed by: STUDENT IN AN ORGANIZED HEALTH CARE EDUCATION/TRAINING PROGRAM

## 2022-01-05 PROCEDURE — 120N000001 HC R&B MED SURG/OB

## 2022-01-05 PROCEDURE — G0463 HOSPITAL OUTPT CLINIC VISIT: HCPCS

## 2022-01-05 RX ADMIN — CEFDINIR 300 MG: 300 CAPSULE ORAL at 08:35

## 2022-01-05 RX ADMIN — HYDROXYZINE HYDROCHLORIDE 25 MG: 10 SOLUTION ORAL at 20:15

## 2022-01-05 RX ADMIN — GABAPENTIN 900 MG: 300 CAPSULE ORAL at 22:31

## 2022-01-05 RX ADMIN — METRONIDAZOLE 500 MG: 500 TABLET ORAL at 08:34

## 2022-01-05 RX ADMIN — METRONIDAZOLE 500 MG: 500 TABLET ORAL at 21:29

## 2022-01-05 RX ADMIN — LEVOTHYROXINE SODIUM 25 MCG: 0.03 TABLET ORAL at 06:17

## 2022-01-05 RX ADMIN — GABAPENTIN 200 MG: 100 CAPSULE ORAL at 18:02

## 2022-01-05 RX ADMIN — ENOXAPARIN SODIUM 40 MG: 40 INJECTION SUBCUTANEOUS at 08:35

## 2022-01-05 RX ADMIN — CEFDINIR 300 MG: 300 CAPSULE ORAL at 20:10

## 2022-01-05 RX ADMIN — QUETIAPINE FUMARATE 200 MG: 100 TABLET, FILM COATED ORAL at 22:31

## 2022-01-05 RX ADMIN — PANTOPRAZOLE SODIUM 40 MG: 40 TABLET, DELAYED RELEASE ORAL at 06:59

## 2022-01-05 RX ADMIN — FLUCONAZOLE 200 MG: 100 TABLET ORAL at 20:09

## 2022-01-05 RX ADMIN — CALCIUM GLUCONATE: 98 INJECTION, SOLUTION INTRAVENOUS at 20:11

## 2022-01-05 RX ADMIN — VENLAFAXINE HYDROCHLORIDE 75 MG: 75 CAPSULE, EXTENDED RELEASE ORAL at 08:34

## 2022-01-05 RX ADMIN — GABAPENTIN 200 MG: 100 CAPSULE ORAL at 08:33

## 2022-01-05 ASSESSMENT — ACTIVITIES OF DAILY LIVING (ADL)
ADLS_ACUITY_SCORE: 7
ADLS_ACUITY_SCORE: 11
ADLS_ACUITY_SCORE: 7
ADLS_ACUITY_SCORE: 7
ADLS_ACUITY_SCORE: 11
ADLS_ACUITY_SCORE: 11
ADLS_ACUITY_SCORE: 7
ADLS_ACUITY_SCORE: 9
ADLS_ACUITY_SCORE: 7
ADLS_ACUITY_SCORE: 9
ADLS_ACUITY_SCORE: 11
ADLS_ACUITY_SCORE: 7
ADLS_ACUITY_SCORE: 7
ADLS_ACUITY_SCORE: 11
ADLS_ACUITY_SCORE: 11
ADLS_ACUITY_SCORE: 7
ADLS_ACUITY_SCORE: 11
ADLS_ACUITY_SCORE: 7

## 2022-01-05 NOTE — PROGRESS NOTES
[facility] Progress Note    Assessment/Plan  Active Problems:    Abdominal pain, generalized    Patient remains hospitalized due to placement, TPN.    Chema Dia is a 65 y/o M with past medical history of splenectomy, appendectomy, HTN, and substance abuse, with recent admission to hospital 11/30-12/21/2021 for SBO with perforation s/p surgical repair. Discharged home but presents as he could not manage at home and is now requesting to be placed in TCU vs LTC. Continues on TPN with surgery and ID following while awaiting LTC placement.     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 Required subsequent washout on 12/3 with aggressive IV antibiotic regimen and MARISA drain placement. On 12/8 found to have continued enhancing abscess with drain placed 12/9 and removed on 12/16. Antibiotics stopped on 12/20. Plan for close follow up with surgery and TPN at discharge, with plans to have help from his brother in law. Then returned on 12/22 given unable to care for himself even with brother in law's assistance; requesting LTC/TCU placement. Re-imaged abdominal CT 12/31: no significant change and no new drainable abscess or other findings.  Has been fairly nauseated with TPN.  -PT/OT ordered, appreciate recs on placement.  -Searching for TCU/LTC placement with care management.  -Pain management: Percocet 1-2 tablets q6h PRN, gabapentin.  -Consulted surgery to follow while here.  -Consulted ID, noted:  E coli and Candida parapsilosis in wound culture from 12/22/2021.  -Has stable mild leukocytosis around 11.  -ID final recommendations: Oral cefdinir, metronidazole, fluconazole x10 days.  Follow-up with primary and surgery.  Watch for relapse (fevers, pain, etc.).  -Consult  pharmacy and RD for TPN management - transitioned to cyclic TPN 12/30.  -WOC.  -Additional daily lab checks not indicated at this time unless he develops new symptoms.  -Nausea control with TPN.     Alk phos elevation, now stable  Continues to climb day by day though now stable in the low 200s.  Rest of LFTs okay.  Stable leukocytosis around 11.  Had been just monitoring, though Chema spiked a fever overnight into 12/27 and looked mildly ill that morning.  Endorsing RUQ TTP on exam.  RUQ US showing sludge but no signs of acute kat.  -cyclic TPN.  Additional daily lab checks outside of regularly scheduled TPN checks not indicated at this time unless he develops new symptoms.     Depression  Insomnia  Hx of this. Reports symptoms for past few days= low motivation, little interest in doing things, poor sleep, no appetite. Is on buspar, mirtazapine, and effexor, though doesn't really think medicines are as helpful as talk therapy is. Wanted to speak to social work and . States no plan to harm himself. Reports he just needs to leave the hospital.  -Consult psych for med management and plan for talk therapy while hospitalized.  -Discontinued buspar and mirtazapine per psych.  -Increased venlafaxine and gabapentin per psych.  -Seroquel for sleep.  -Will schedule vistaril at bedtime and see if this can improve dry heaves that seem to be recurrent.     Moderate malnutrition  Per RD eval.  -TPN per RD and pharmacy.     Normocytic anemia  Stable at 11.5, MCV of 93.     Chronic Conditions:  Hypothyroidism- PTA levothyroxine.  Chronic pain- Hold mexolicam 7.5mg daily, resume PTA gabapentin.  BPH-hold home Flomax given he states it does not help.    FEN: TPN.  DVT Prophylaxis: Lovenox.  Code: Full code.    Disposition/Advanced Care Planning  Discharge Planning discussed with patient and care team.  Anticipated discharge date: Multiple days.  Pending placement.  Disposition: LTC.    Subjective  No events overnight.   Continue to work on discharge planning.  Staples out, stoma looking improved per WOC.  Having some nausea with TPN.    Objective    Vital signs in last 24 hours Temp:  [98.4  F (36.9  C)-99.6  F (37.6  C)] 98.9  F (37.2  C)  Pulse:  [] 107  Resp:  [18-20] 18  BP: (103-127)/(70-86) 103/70  SpO2:  [90 %-100 %] 92 %   Weight: [unfilled]    Intake/Output last 3 shift I/O last 3 completed shifts:  In: 1221.5 [P.O.:885; I.V.:3]  Out: 700 [Drains:700]    Intake/Output this shift:I/O this shift:  In: 1971   Out: -     Physical Exam  General appearance: alert, appears stated age, cooperative and no distress.  Head: Normocephalic, without obvious abnormality, atraumatic.  Eyes: conjunctivae/corneas clear.  Throat: MMM.  Neck: supple, symmetrical, trachea midline.  Lungs: no increased respiratory effort.  Heart: no murmurs.  Abdomen: non-distended.  Extremities: extremities normal, atraumatic, no cyanosis or edema.  Skin: Skin color, texture, turgor normal. No rashes or lesions.  Neurologic: Alert and oriented x3, normal strength and tone.  Psychiatric: mood and affect appropriate.    Pertinent Labs and Pertinent Radiology   Lab Results: personally reviewed.     Radiology Results: Personally reviewed image/s and impression/s    Precepted patient with Dr. Aye Willoughby.    Johnathan Lees MD  [facility], PGY-3

## 2022-01-05 NOTE — PLAN OF CARE
Problem: Nausea and Vomiting  Goal: Fluid and Electrolyte Balance  1/4/2022 2327 by Fernanda Aleman, RN  Outcome: No Change  1/4/2022 2156 by Fernanda Aleman, RN  Outcome: No Change   Pt still had another episode of N/V. Cold compress applied and prn Zofran given with little effects.

## 2022-01-05 NOTE — PROGRESS NOTES
Nutrition Therapy  Parenteral Nutrition follow-up       RECOMMENDATIONS FOR MDs/PROVIDERS TO ORDER:  None      Dietitian to Pharmacy  Decrease lipids to 3 x a week + TPN provides 1754 kcal, 24 g fat daily average  This meets 90% of estimated kcal needs    Malnutrition Status:    Severe  In the context of acute illness    Future/Additional Recommendations:  Follow labs/liver enzymes and adjust TPN per pt needs.  Monitor intake tolerance and amount to assess need to adjust TPN for needs.  Consider SMOF lipids or decrease in lipids pending intake, rise in LFT            Current Nutrition Intake:  Diet: Full Liquids - advanced   Intake:  intake and tolerating diet, 50% of breakfast and lunch. Ordering milk, cream of wheat, ice cream, yogurt and pudding  Pt reports only taking bites and small amounts of full liquids d/t nausea, c/o food tasting bad and cautious not to impair fistula healing. He is unsure if he is able to take in more p.o that he is. He strongly desires canned fruit, bananas and watermelon.     Nutrition Support: TPN: cyclin gm Dextrose, 130 gm AA @ 75 mL/hr x 1 hour, 150 mL/hr x 10 hours, and 75 mL/hr x 1 hour, off.  250 mL 20% lipids 5 days a week x 12 hours daily.     Provides: 1650 mL + 250 mL lipid, 1897 kcal/day, 130 gm protein, 300 gm cho, 40 g lipid/daily average.  GIR 5.8 mg CHO/kg/min  TPN changed to cyclic 21.  TPN meets estimated nutrition needs.    GI:  Last BM 1/3  Fistula/Drain x2 output 700 ml, increased    Dry heaves/nausea x 3 nights in a row at start of TPN and now intermittent past 24 hours.   Pt tolerated cycle TPN first 2 nights with no issues.   Possible altered taste    Labs:   No new  fsbg 107-171mg/dl past 24 hours, in good control  Labs reviewed    INTERVENTIONS  Noted per surgery note of :  Plan going forward would be to continue TPN until EC fistula output is minimal and we can increase oral caloric intake, this could take a couple months.    TPN/Lipids NOT  associated with ongoing intermittent dry heaves/nausea    Implementation  Decrease lipids as pt is taking in some full liquid fats daily    Monitoring/Evaluation  Progress toward goals will be monitored and evaluated per protocol.

## 2022-01-05 NOTE — PLAN OF CARE
Problem: Adult Inpatient Plan of Care  Goal: Absence of Hospital-Acquired Illness or Injury  Intervention: Prevent Skin Injury  Recent Flowsheet Documentation  Taken 1/5/2022 0755 by Aquiles Mendes RN  Body Position: position changed independently   Patient had been slow to start for the day, but by the end of the shift he got up to be cleaned in the shower and was using the incentive spirometer. Patient had stated not feeling well for most of the shift and just requested to be left alone. As the day went on he proceeded to perk up and was accepting of the changes implemented by the nurses.  Problem: Impaired Wound Healing  Goal: Optimal Wound Healing  Outcome: Improving   Patient had his abdominal bags changed by the [facility] nurse. The [facility] nurse had stated that his ostomy was looking much better than before stating that the size of the opening had decreased. Site was cleaned with water and new bags were applied.  Problem: Depression  Goal: Improved Mood  Outcome: Improving   Patient seemed to be feeling down earlier in the shift, but perked up and agreed to shower and was enjoying conversing with the nurses.

## 2022-01-05 NOTE — PLAN OF CARE
Problem: Adult Inpatient Plan of Care  Goal: Optimal Comfort and Wellbeing  Outcome: Improving     Problem: Nausea and Vomiting  Goal: Fluid and Electrolyte Balance  Outcome: Improving     Problem: Pain (Surgery Nonspecified)  Goal: Acceptable Pain Control  Outcome: Improving     Pt slept well. Denies pain. Denies nausea- had no dry heaves overnight, but did have them evening shift, resolved by 2300. TPN infusing, first night without lipids. EC fistula pouches intact, draining green, bile output. Waiting for placement. Tolerating full liquid diet. Independent in room with bedside commode and urinal. Calls appropriately.

## 2022-01-05 NOTE — PROGRESS NOTES
"Care Management Follow Up    Length of Stay (days): 14    Expected Discharge Date: 01/06/2022     Concerns to be Addressed: discharge planning     Patient plan of care discussed at interdisciplinary rounds: Yes    Anticipated Discharge Disposition: Transitional Care     Anticipated Discharge Services: Other (see comment) (therapy services )  Anticipated Discharge DME: None    Patient/family educated on Medicare website which has current facility and service quality ratings: yes  Education Provided on the Discharge Plan:  Per treatment team   Patient/Family in Agreement with the Plan: yes    Referrals Placed by CM/SW: Post Acute Facilities  Private pay costs discussed: Not applicable    Additional Information:      SW reviewed chart and discussed updates with Resident doctor.    SW left voice mails for pending TCU referrals to follow up for placement.   [facility] due to Pt history.    3:10 PM  SWCM and RNCM met with Pt to discuss discharge planning. CM expressed to Pt that as of now, there are not TCU's that are able to accept Pt due to criminal record and TPN.  Pt somewhat expresses understanding of this, however is hopeful that there would be a TCU that can accept.  When asked if Pt would feel comfortable getting more education from treatment team to be able to manage independently, Pt expresses that he would be able to learn and be independent.  Pt states he does not have any friends or family he can stay with.  CM brought up disposition of [facility], which is a board and lodge facility.  Pt prefers not to go to [facility] and describes it as a \"half-way house\". Pt states, \"there has to be a care facility that can take me.\"  Pt states he is willing to anywhere to be able to get to a TCU.     Consulting with CM supervisor.       3:40 PM  Referrals faxed to [facility] and [facility].      EDIE Pierce        "

## 2022-01-05 NOTE — PHARMACY-CONSULT NOTE
"Pharmacy Note: Parenteral Nutrition (PN) Management    Pharmacist consulted to dose PN for Gurdeep Dia, a 66 year old male by Dr. Mcmahon.    Subjective:    The patient was started on PN in the hospital on 12/5/2021.    Indication for PN therapy: bowel resection    Inadequate nutrition existing for > 7 days.     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      Social History     Tobacco Use     Smoking status: Current Every Day Smoker     Smokeless tobacco: Never Used   Substance Use Topics     Alcohol use: Not Currently     Comment: Clean for 5 years     Drug use: Not Currently     Objective:    Ht Readings from Last 1 Encounters:   12/22/21 1.702 m (5' 7\")     Wt Readings from Last 1 Encounters:   01/01/22 77.4 kg (170 lb 9.6 oz)       Body mass index is 26.72 kg/m .    Patient Vitals for the past 96 hrs:   Weight   01/01/22 2100 77.4 kg (170 lb 9.6 oz)       Labs:  Last 3 days:  Recent Labs     01/03/22  0645 01/05/22  0802    137   POTASSIUM 4.3 4.4   CHLORIDE 104 103   CO2 24 25   BUN 25* 30*   CR 0.79 0.79   VIJAYA 8.7 9.2   MAG 1.9 1.9   PHOS 3.3 3.3   PROTTOTAL 7.3  --    ALBUMIN 2.3*  --    PREALB 24  --    TRIG 255*  --    HGB 11.3*  --    HCT 35.0*  --      --    BILITOTAL 0.8  --    AST 37  --    ALT 39  --    ALKPHOS 216*  --    INR 1.05  --        Glucose (past 48 hours):   Recent Labs     01/03/22  1720 01/04/22  0007 01/04/22  0608 01/05/22  0802 01/05/22  0838   * 145* 148* 166* 154*       Intake/Output (last 24 hours): I/O last 3 completed shifts:  In: 1221.5 [P.O.:885; I.V.:3]  Out: 700 [Drains:700]    Estimated CrCl: Estimated Creatinine Clearance: 99.3 mL/min (based on SCr of 0.79 mg/dL).    Assessment:    Continue patient on PN therapy as a cyclic central therapy.     Given the patient's current condition/oral intake, PN is still indicated.    Lab results reviewed: Mag, K, Na & Phos    Plan:  1. Rate of PN: 75 mL/hr initially, then 150 mL/hr for 10 " hours, then 75 mL/hr for 1 hour. Maintenance IV fluid rate will be adjusted appropriately to meet the total maximum IV fluids rate as ordered by provider.  2. Formula:     Amino Acids 130 grams    Dextrose 300 grams    Sodium 110 mEq/day    Potassium 75 mEq/day    Calcium 8 mEq/day    Magnesium 14 mEq/day    Phosphorus 32 mMol/day    Chloride: Acetate Ratio 1:3    Standard Multivitamins w/Vitamin K    Trace Elements    1. Fat Emulsion: 20%, 250 mL IV 3 times weekly on M Th Sa  2. Check labs tomorrow.  3. Pharmacist will continue to follow the patient's lab results, clinical status and blood glucose results and make adjustments as appropriate.    Thank you for the consult.    Dianelys Ireland, PharmD  January 5, 2022

## 2022-01-05 NOTE — PROGRESS NOTES
"WOC stoma assessment EC fistula with photograph    Allergies:  Penicillins    Height:  Height: 170.2 cm (5' 7\")    Weight:   Weight: 75.5 kg (166 lb 6.4 oz)    Past Medical History:  Past Medical History:   Diagnosis Date     Benign prostatic hyperplasia with weak urinary stream      Chronic bilateral low back pain without sciatica      Chronic neck pain      RAVI (generalized anxiety disorder)      History of hepatitis C     treated and cured     History of substance abuse (H)      Hypertension goal BP (blood pressure) < 140/90      Moderate major depression (H)        Labs:  Recent Labs   Lab Test 12/23/21  0715 12/22/21  1648 12/22/21  0003 12/08/21  0535 12/07/21  0358   CRP  --   --   --   --  26.6*   HGB 10.9*  --  12.0*   < > 10.9*   ALBUMIN  --   --  2.4*   < > 2.3*   CULTURE  --  No growth after 12 hours  No growth after 12 hours  --    < >  --     < > = values in this interval not displayed.       Mars:  Mars Score: 17    INTAKE  EC fistula s/p 2 abdominal surgeries      ASSESSMENT  Abdominal EC fistula    Long abdominal incision that is mostly approximated. All staples were removed today per general surgery PA verbal order.    Upper portion of incision possibly has fistulous tract - measures 0.3x0.3cm. moderate green output.    Distal portion of incision is open wound. . Wound measures 1.5x1cm - did not assess depth. Large green/brown output from this wound. periwound skin erythema is much improved from previous assessment.    Skin was crusted with stoma powder and Cavilon no-sting skin barrier. Barrier rings placed around each fistula site and each was pouched with Jaylin #[id number].     1/5/22:        12/23/21           TREATMENT/EQUIPMENT  See above    Supplies: Pouch Jaylin #[id number] -Flat 1 piece cut to fit fecal pouch, Paste [place]field #[id number] -Adapt Ring and Powder Warfield #[id number] - Adapt    Instructions Given: WOC Role. Patient kept eyes closed with washcloth over the for the entire " assessment.    Nursing care provided was coordinated care with RN and patient.     Discussed plan of care with nurse and patient.    Outcomes and treatment recommendations are to To contain output, To be knowledgable with pouch change/emptying before discharge and To be independant with pouch change/emptying before discharge    Actions taken by WOC RN: 5 minutes of education.    Planned Follow Up: Weekly.    Plan for next visit: Reassess stoma/peristomal skin

## 2022-01-05 NOTE — PROGRESS NOTES
General Surgery Progress Note:    Hospital Day # 14    ASSESSMENT:   1. Abdominal pain, generalized        Gurdeep Dia is a 66 year old male with readmission for abdominal pain after discharge history ofs/p exploratory laparotomy with small bowel resection, lysis of adhesions and repair of enterotomy with significant inflamed small bowel with areas of thinned mucosa on 11/30/2021  S/p exploratory laparotomy 12/3/2021 for small bowel perforation from ulcer  S/p IR placement of drain into pelvic abscess 12/9/2021. Enterocutaneous fistula formation  Enterocutaneous fistula with fecal contents getting trapped under adhesive    PLAN:   -Continue TPN  -Continue to work with nausea with antinausea medications  -Continue current diet and no change  -We will have wound care nurses evaluate patient to see if there is any issues and how to better contain wounds.  -Awaiting placement which is of difficult situation with him and hopefully we can find somewhere that will be able to manage him and then in a TCU situation.      SUBJECTIVE:   Gurdeep Dia is nauseous right now.  Currently he states that he does not know what causes his nausea.  In the past it has been associated with his TPN start and liquids which she denies at this time.  No increase in abdominal pain.  No significant changes.    Patient Vitals for the past 24 hrs:   BP Temp Temp src Pulse Resp SpO2   01/05/22 0747 104/71 99.3  F (37.4  C) Oral 110 19 90 %   01/04/22 2313 127/83 99.6  F (37.6  C) Oral 106 20 91 %   01/04/22 1938 118/86 98.8  F (37.1  C) Oral 90 20 91 %   01/04/22 1524 112/81 98.4  F (36.9  C) Oral 90 -- 100 %       Physical Exam:  General: NAD, pleasant is a little uncomfortable and has some nausea    ABD: Soft no changes.  Wound still has 2 ostomy bags controlling fistula.  Superior and inferior old wound.  Can see staples in between.  There is unfortunately stool liquid that is staining underneath the adhesive portion.  EXT:no CCE    No  results displayed because visit has over 200 results.   Recent Results (from the past 24 hour(s))   Magnesium    Collection Time: 01/05/22  8:02 AM   Result Value Ref Range    Magnesium 1.9 1.8 - 2.6 mg/dL   Phosphorus    Collection Time: 01/05/22  8:02 AM   Result Value Ref Range    Phosphorus 3.3 2.5 - 4.5 mg/dL   Glucose by meter    Collection Time: 01/05/22  8:38 AM   Result Value Ref Range    GLUCOSE BY METER POCT 154 (H) 70 - 99 mg/dL             Demetrius Vogel PA-C

## 2022-01-05 NOTE — PLAN OF CARE
Problem: Nausea and Vomiting  Goal: Fluid and Electrolyte Balance  Outcome: No Change   Pt had another episode of nausea and vomiting tonight, while TPN was going on. Cold wash cloths was given and Pt felt relieved.

## 2022-01-06 ENCOUNTER — APPOINTMENT (OUTPATIENT)
Dept: CT IMAGING | Facility: HOSPITAL | Age: 67
DRG: 393 | End: 2022-01-06
Payer: MEDICARE

## 2022-01-06 ENCOUNTER — APPOINTMENT (OUTPATIENT)
Dept: RADIOLOGY | Facility: HOSPITAL | Age: 67
DRG: 393 | End: 2022-01-06
Attending: PHYSICIAN ASSISTANT
Payer: MEDICARE

## 2022-01-06 LAB
ALBUMIN SERPL-MCNC: 2.1 G/DL (ref 3.5–5)
ALBUMIN UR-MCNC: 30 MG/DL
ALP SERPL-CCNC: 162 U/L (ref 45–120)
ALT SERPL W P-5'-P-CCNC: 32 U/L (ref 0–45)
ANION GAP SERPL CALCULATED.3IONS-SCNC: 10 MMOL/L (ref 5–18)
APPEARANCE UR: CLEAR
AST SERPL W P-5'-P-CCNC: 26 U/L (ref 0–40)
BILIRUB SERPL-MCNC: 1.4 MG/DL (ref 0–1)
BILIRUB UR QL STRIP: NEGATIVE
BUN SERPL-MCNC: 29 MG/DL (ref 8–22)
CALCIUM SERPL-MCNC: 8.2 MG/DL (ref 8.5–10.5)
CHLORIDE BLD-SCNC: 100 MMOL/L (ref 98–107)
CO2 SERPL-SCNC: 28 MMOL/L (ref 22–31)
COLOR UR AUTO: YELLOW
CREAT SERPL-MCNC: 0.79 MG/DL (ref 0.7–1.3)
ERYTHROCYTE [DISTWIDTH] IN BLOOD BY AUTOMATED COUNT: 16.9 % (ref 10–15)
FLUAV RNA SPEC QL NAA+PROBE: NEGATIVE
FLUBV RNA RESP QL NAA+PROBE: NEGATIVE
GFR SERPL CREATININE-BSD FRML MDRD: >90 ML/MIN/1.73M2
GLUCOSE BLD-MCNC: 159 MG/DL (ref 70–125)
GLUCOSE BLDC GLUCOMTR-MCNC: 143 MG/DL (ref 70–99)
GLUCOSE BLDC GLUCOMTR-MCNC: 150 MG/DL (ref 70–99)
GLUCOSE UR STRIP-MCNC: NEGATIVE MG/DL
HCT VFR BLD AUTO: 34.4 % (ref 40–53)
HGB BLD-MCNC: 10.7 G/DL (ref 13.3–17.7)
HGB UR QL STRIP: ABNORMAL
HOLD SPECIMEN: NORMAL
KETONES UR STRIP-MCNC: NEGATIVE MG/DL
LEUKOCYTE ESTERASE UR QL STRIP: NEGATIVE
MCH RBC QN AUTO: 28.9 PG (ref 26.5–33)
MCHC RBC AUTO-ENTMCNC: 31.1 G/DL (ref 31.5–36.5)
MCV RBC AUTO: 93 FL (ref 78–100)
MUCOUS THREADS #/AREA URNS LPF: PRESENT /LPF
NITRATE UR QL: NEGATIVE
PH UR STRIP: 5.5 [PH] (ref 5–7)
PLATELET # BLD AUTO: 408 10E3/UL (ref 150–450)
PLATELET # BLD AUTO: 408 10E3/UL (ref 150–450)
POTASSIUM BLD-SCNC: 4.8 MMOL/L (ref 3.5–5)
PROT SERPL-MCNC: 6.3 G/DL (ref 6–8)
RADIOLOGIST FLAGS: ABNORMAL
RBC # BLD AUTO: 3.7 10E6/UL (ref 4.4–5.9)
RBC URINE: 8 /HPF
SARS-COV-2 RNA RESP QL NAA+PROBE: NEGATIVE
SODIUM SERPL-SCNC: 138 MMOL/L (ref 136–145)
SP GR UR STRIP: 1.03 (ref 1–1.03)
SQUAMOUS EPITHELIAL: <1 /HPF
UROBILINOGEN UR STRIP-MCNC: <2 MG/DL
WBC # BLD AUTO: 14.5 10E3/UL (ref 4–11)
WBC URINE: 3 /HPF

## 2022-01-06 PROCEDURE — 81001 URINALYSIS AUTO W/SCOPE: CPT | Performed by: STUDENT IN AN ORGANIZED HEALTH CARE EDUCATION/TRAINING PROGRAM

## 2022-01-06 PROCEDURE — 250N000009 HC RX 250: Performed by: FAMILY MEDICINE

## 2022-01-06 PROCEDURE — 250N000013 HC RX MED GY IP 250 OP 250 PS 637: Performed by: STUDENT IN AN ORGANIZED HEALTH CARE EDUCATION/TRAINING PROGRAM

## 2022-01-06 PROCEDURE — 82947 ASSAY GLUCOSE BLOOD QUANT: CPT | Performed by: PHYSICIAN ASSISTANT

## 2022-01-06 PROCEDURE — 99024 POSTOP FOLLOW-UP VISIT: CPT | Performed by: PHYSICIAN ASSISTANT

## 2022-01-06 PROCEDURE — 99232 SBSQ HOSP IP/OBS MODERATE 35: CPT | Mod: GC | Performed by: STUDENT IN AN ORGANIZED HEALTH CARE EDUCATION/TRAINING PROGRAM

## 2022-01-06 PROCEDURE — 36415 COLL VENOUS BLD VENIPUNCTURE: CPT | Performed by: STUDENT IN AN ORGANIZED HEALTH CARE EDUCATION/TRAINING PROGRAM

## 2022-01-06 PROCEDURE — 85049 AUTOMATED PLATELET COUNT: CPT | Performed by: STUDENT IN AN ORGANIZED HEALTH CARE EDUCATION/TRAINING PROGRAM

## 2022-01-06 PROCEDURE — 120N000001 HC R&B MED SURG/OB

## 2022-01-06 PROCEDURE — 71045 X-RAY EXAM CHEST 1 VIEW: CPT

## 2022-01-06 PROCEDURE — 85027 COMPLETE CBC AUTOMATED: CPT | Performed by: PHYSICIAN ASSISTANT

## 2022-01-06 PROCEDURE — 87636 SARSCOV2 & INF A&B AMP PRB: CPT | Performed by: STUDENT IN AN ORGANIZED HEALTH CARE EDUCATION/TRAINING PROGRAM

## 2022-01-06 PROCEDURE — 250N000013 HC RX MED GY IP 250 OP 250 PS 637: Performed by: NURSE PRACTITIONER

## 2022-01-06 PROCEDURE — 250N000011 HC RX IP 250 OP 636: Performed by: STUDENT IN AN ORGANIZED HEALTH CARE EDUCATION/TRAINING PROGRAM

## 2022-01-06 PROCEDURE — 250N000013 HC RX MED GY IP 250 OP 250 PS 637: Performed by: INTERNAL MEDICINE

## 2022-01-06 PROCEDURE — 84155 ASSAY OF PROTEIN SERUM: CPT | Performed by: PHYSICIAN ASSISTANT

## 2022-01-06 PROCEDURE — 87040 BLOOD CULTURE FOR BACTERIA: CPT | Performed by: STUDENT IN AN ORGANIZED HEALTH CARE EDUCATION/TRAINING PROGRAM

## 2022-01-06 PROCEDURE — 250N000011 HC RX IP 250 OP 636: Performed by: FAMILY MEDICINE

## 2022-01-06 PROCEDURE — 74177 CT ABD & PELVIS W/CONTRAST: CPT

## 2022-01-06 RX ORDER — CEFTRIAXONE 1 G/1
1 INJECTION, POWDER, FOR SOLUTION INTRAMUSCULAR; INTRAVENOUS EVERY 24 HOURS
Status: DISCONTINUED | OUTPATIENT
Start: 2022-01-06 | End: 2022-01-09

## 2022-01-06 RX ORDER — IOPAMIDOL 755 MG/ML
100 INJECTION, SOLUTION INTRAVASCULAR ONCE
Status: COMPLETED | OUTPATIENT
Start: 2022-01-06 | End: 2022-01-06

## 2022-01-06 RX ADMIN — OXYCODONE HYDROCHLORIDE AND ACETAMINOPHEN 2 TABLET: 5; 325 TABLET ORAL at 20:07

## 2022-01-06 RX ADMIN — METRONIDAZOLE 500 MG: 500 INJECTION, SOLUTION INTRAVENOUS at 15:52

## 2022-01-06 RX ADMIN — GABAPENTIN 200 MG: 100 CAPSULE ORAL at 17:27

## 2022-01-06 RX ADMIN — CEFDINIR 300 MG: 300 CAPSULE ORAL at 09:03

## 2022-01-06 RX ADMIN — GABAPENTIN 200 MG: 100 CAPSULE ORAL at 08:58

## 2022-01-06 RX ADMIN — METRONIDAZOLE 500 MG: 500 INJECTION, SOLUTION INTRAVENOUS at 22:35

## 2022-01-06 RX ADMIN — CEFTRIAXONE SODIUM 1 G: 1 INJECTION, POWDER, FOR SOLUTION INTRAMUSCULAR; INTRAVENOUS at 14:45

## 2022-01-06 RX ADMIN — ENOXAPARIN SODIUM 80 MG: 80 INJECTION SUBCUTANEOUS at 20:06

## 2022-01-06 RX ADMIN — ENOXAPARIN SODIUM 40 MG: 40 INJECTION SUBCUTANEOUS at 14:45

## 2022-01-06 RX ADMIN — PANTOPRAZOLE SODIUM 40 MG: 40 TABLET, DELAYED RELEASE ORAL at 08:56

## 2022-01-06 RX ADMIN — LEVOTHYROXINE SODIUM 25 MCG: 0.03 TABLET ORAL at 06:20

## 2022-01-06 RX ADMIN — I.V. FAT EMULSION 250 ML: 20 EMULSION INTRAVENOUS at 19:49

## 2022-01-06 RX ADMIN — GABAPENTIN 900 MG: 300 CAPSULE ORAL at 22:30

## 2022-01-06 RX ADMIN — IOPAMIDOL 100 ML: 755 INJECTION, SOLUTION INTRAVENOUS at 12:34

## 2022-01-06 RX ADMIN — FLUCONAZOLE 200 MG: 100 TABLET ORAL at 20:08

## 2022-01-06 RX ADMIN — METRONIDAZOLE 500 MG: 500 TABLET ORAL at 08:57

## 2022-01-06 RX ADMIN — QUETIAPINE FUMARATE 200 MG: 100 TABLET, FILM COATED ORAL at 22:29

## 2022-01-06 RX ADMIN — ENOXAPARIN SODIUM 40 MG: 40 INJECTION SUBCUTANEOUS at 08:58

## 2022-01-06 RX ADMIN — CALCIUM GLUCONATE: 98 INJECTION, SOLUTION INTRAVENOUS at 19:47

## 2022-01-06 RX ADMIN — HYDROXYZINE HYDROCHLORIDE 25 MG: 10 SOLUTION ORAL at 20:08

## 2022-01-06 RX ADMIN — VENLAFAXINE HYDROCHLORIDE 75 MG: 75 CAPSULE, EXTENDED RELEASE ORAL at 09:03

## 2022-01-06 RX ADMIN — ONDANSETRON 4 MG: 2 INJECTION INTRAMUSCULAR; INTRAVENOUS at 18:49

## 2022-01-06 ASSESSMENT — ACTIVITIES OF DAILY LIVING (ADL)
ADLS_ACUITY_SCORE: 7

## 2022-01-06 ASSESSMENT — MIFFLIN-ST. JEOR: SCORE: 1477.08

## 2022-01-06 NOTE — PLAN OF CARE
Pt denies any pain. Pt did have an episode of feeling nauseous but nothing really came out. Pt also delayed his night time medication to allow his drew settle down. Tolerating TPN and full liquids diet.

## 2022-01-06 NOTE — PROGRESS NOTES
[facility] Progress Note    Assessment/Plan  Active Problems:    Abdominal pain, generalized    Patient remains hospitalized due to placement, TPN.    Chema Dia is a 65 y/o M with past medical history of splenectomy, appendectomy, HTN, and substance abuse, with recent admission to hospital 11/30-12/21/2021 for SBO with perforation s/p surgical repair. Discharged home but presents as he could not manage at home and is now requesting to be placed in TCU vs LTC. Continues on TPN with surgery and ID following while awaiting LTC placement.    RLL PE  SMV thrombosis  New supplemental oxygen requirement  Acute RLL PE found on CT abdomen/pelvis as well as SMV thrombosis.  This explains his new supplemental oxygen requirement.  Most likely provoked in the setting of recent surgery/hospitalization.  -Lovenox 1mg/kg twice daily.    Nausea  New supplemental oxygen requirement  RLQ pain  Malaise  Patient has been having nausea with TPN but developed a new supplemental oxygen requirement 1/6 in addition to fatigue and general malaise.  Denies shortness of breath or new abdominal pain, no chest pain.  Elevated temperature but not quite febrile.  Retested for COVID-19 which was negative.  CXR shows bibasilar haziness which could be atelectasis but is concerning for hospital-acquired pneumonia.  He is having some new RLQ abdominal pain with palpation so we checked CT given his recent surgical history and found RLL PE and SMV thrombosis as above.  Remainder of findings stable from prior.  -Repeat blood cultures.  -CT abdomen and pelvis.  Results as above.  -UA.  -Hold on antibiotics for now pending results of these tests but low threshold to start.  -Switching antibiotics over to IV ceftriaxone daily, IV metronidazole 500 mg every 8 hours.     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 Required subsequent washout on 12/3 with aggressive IV antibiotic regimen and MARISA drain placement. On 12/8 found to have continued enhancing abscess with drain placed 12/9 and removed on 12/16. Antibiotics stopped on 12/20. Plan for close follow up with surgery and TPN at discharge, with plans to have help from his brother in law. Then returned on 12/22 given unable to care for himself even with brother in law's assistance; requesting LTC/TCU placement. Re-imaged abdominal CT 12/31: no significant change and no new drainable abscess or other findings.  Has been fairly nauseated with TPN.  -PT/OT ordered, appreciate recs on placement.  -Searching for TCU/LTC placement with care management.  -Pain management: Percocet 1-2 tablets q6h PRN, gabapentin.  -Consulted surgery to follow while here.  -Consulted ID, noted:  E coli and Candida parapsilosis in wound culture from 12/22/2021.  -Has stable mild leukocytosis around 11.  -ID final recommendations: Oral cefdinir, metronidazole, fluconazole x10 days.  Follow-up with primary and surgery.  Switched back to IV antibiotics as above given new symptoms.  -Consult pharmacy and RD for TPN management - transitioned to cyclic TPN 12/30.  -WOC.  -Additional daily lab checks not indicated at this time unless he develops new symptoms.  -Nausea control with TPN.     Alk phos elevation, now stable  Continues to climb day by day though now stable in the low 200s.  Rest of LFTs okay.  Stable leukocytosis around 11.  Had been just monitoring, though Chema spiked a fever overnight into 12/27 and looked mildly ill that morning.  Endorsing RUQ TTP on exam.  RUQ US showing sludge but no signs of acute kat.  -cyclic TPN.  Additional daily lab checks outside of regularly scheduled TPN checks not indicated at this time unless he develops new  symptoms.     Depression  Insomnia  Hx of this. Reports symptoms for past few days= low motivation, little interest in doing things, poor sleep, no appetite. Is on buspar, mirtazapine, and effexor, though doesn't really think medicines are as helpful as talk therapy is. Wanted to speak to social work and . States no plan to harm himself. Reports he just needs to leave the hospital.  -Consult psych for med management and plan for talk therapy while hospitalized.  -Discontinued buspar and mirtazapine per psych.  -Increased venlafaxine and gabapentin per psych.  -Seroquel for sleep.  -Will schedule vistaril at bedtime and see if this can improve dry heaves that seem to be recurrent.     Moderate malnutrition  Per RD eval.  -TPN per RD and pharmacy.     Normocytic anemia  Stable at 11.5, MCV of 93.     Chronic Conditions:  Hypothyroidism- PTA levothyroxine.  Chronic pain- Hold mexolicam 7.5mg daily, resume PTA gabapentin.  BPH-hold home Flomax given he states it does not help.    FEN: TPN.  Regular diet as tolerated.  DVT Prophylaxis: Lovenox.  Code: Full code.    Disposition/Advanced Care Planning  Discharge Planning discussed with patient and care team.  Anticipated discharge date: Multiple days.  Pending placement.  Disposition: LTC.    Subjective  Patient again feeling worse today.  Having nausea with TPN.  Also generally feeling fatigued and with a little bit of malaise.  Now with a new supplemental oxygen requirement.  No worsening pain that he can describe.    Objective    Vital signs in last 24 hours Temp:  [98.9  F (37.2  C)-100.1  F (37.8  C)] 100.1  F (37.8  C)  Pulse:  [89-98] 98  Resp:  [18-20] 18  BP: (107-119)/(76-78) 111/76  SpO2:  [88 %-93 %] 88 %   Weight: [unfilled]    Intake/Output last 3 shift I/O last 3 completed shifts:  In: 2214 [P.O.:240; I.V.:3]  Out: 950 [Urine:300; Drains:650]    Intake/Output this shift:I/O this shift:  In: -   Out: 325 [Drains:325]    Physical Exam  General  appearance: alert, appears stated age, cooperative and no distress.  Head: Normocephalic, without obvious abnormality, atraumatic.  NC in place.  Eyes: conjunctivae/corneas clear.  Throat: MMM.  Neck: supple, symmetrical, trachea midline.  Lungs: no increased respiratory effort.  NC in place.  Heart: Good capillary refill.  Abdomen: non-distended, soft, has pain in RLQ with palpation including guarding.  Extremities: extremities normal, atraumatic, no cyanosis or edema.  Skin: Skin color, texture, turgor normal. No rashes or lesions.  Neurologic: Alert and oriented x3, normal strength and tone.  Psychiatric: mood and affect appropriate.    Pertinent Labs and Pertinent Radiology   Lab Results: personally reviewed.     Radiology Results: Personally reviewed image/s and impression/s    Precepted patient with Dr. Aye Willoughby.    Johnathan Lees MD  [facility], PGY-3

## 2022-01-06 NOTE — PROGRESS NOTES
CLINICAL NUTRITION SERVICES - REASSESSMENT NOTE      RECOMMENDATIONS FOR MD/PROVIDER TO ORDER:   None     Recommendations Ordered by Registered Dietitian (RD):   Continue TPN as ordered     Future/Additional Recommendations:   Adjust TPN pending PO intake/weights     Malnutrition:   Severe in the context of acute illness (please carry forward if malnutrition diagnosed)         EVALUATION OF PROGRESS TOWARD GOALS   Diet:  Full Liquid    Nutrition Support:  TPN: D300/ @ 75 mL/hr x 1 hour, 150 mL/hr x 10 hours, and 75 mL/hr x 1 hour w/ 250mL 20% lipids 3x/week.    Intake/Tolerance:  Patient only had sips of juice yesterday per nursing notes. TPN providing 1754kcals, 300g CHO, 130g protein, 21g fat, 1650ml fluid daily.      ASSESSED NUTRITION NEEDS:  Dosing Weight:  77.4 kg (170 lb 9.6 oz)     Estimated Energy Needs: 0309-0842 kcals ([place] Jeor)  Justification: Stress factor 1.4-2.0 for fistula  Estimated Protein Needs: 116 grams protein (1.5 g pro/Kg)  Justification: wound healing  Estimated Fluid Needs: 4569-4708  mL (25-30 mL/kg) or less pending Na levels  Justification: maintenance    NEW FINDINGS:   01/01/22 2100 77.4 kg (170 lb 9.6 oz)   12/29/21 2147 75.8 kg (167 lb)   12/28/21 1035 75.6 kg (166 lb 11.2 oz)     No new weights.    Net fluid down 3.3L (7.3lbs) since 12/23/21 indicating dry wt gain    Labs: UN 29, total bili 1.4, alk Phos 162, hgb 10.7, hematocrit 34.4, rbc 3.70    Meds: Omnicef (nausea), diflucan (nausea/vomiting), synthroid/levothroid, flagyl (nausea), protonix, zofran    GI: nausea, LBM 1/5/22    Previous Goals:   Maintain weight -met  Tolerate TPN-met  Evaluation: Met    Previous Nutrition Diagnosis:   Malnutrition related to s/p surgical intervention of SBO with formation of enterocutaneous fistula as evidenced by 1.7% weight loss in 1 week, severe subcutaneous fat loss and moderate muscle loss.  Evaluation: Improving      MALNUTRITION  % Weight Loss:  1-2% in 1 week (moderate  malnutrition)  % Intake:  No decreased intake noted  Subcutaneous Fat Loss:  Orbital region severe depletion and Upper arm region mild to moderate depletion  Muscle Loss:  Temporal region moderate depletion, Clavicle bone region severe depletion, Dorsal hand region mild to moderate depletion and Patellar region moderate depletion  Fluid Retention:  None noted    Malnutrition Diagnosis: Severe malnutrition  In Context of:  Acute illness or injury    CURRENT NUTRITION DIAGNOSIS  Inadequate oral intake related to SBO with formation of enterocutaneous fistula as evidenced by nausea, only taking sips of juice daily.    INTERVENTIONS  Recommendations / Nutrition Prescription  Continue TPN as ordered    Implementation  PN Composition and PN Schedule    Goals  No significant dry weight loss   Meet estimated nutrition needs  Wound healing    MONITORING AND EVALUATION:  Progress towards goals will be monitored and evaluated per protocol and Practice Guidelines, Diet Order, Food intake, Liquid meal replacement or supplement, Parenteral Nutrition intake, Weight and Biochemical data

## 2022-01-06 NOTE — PLAN OF CARE
Problem: Impaired Wound Healing  Goal: Optimal Wound Healing  Outcome: Improving  Intervention: Promote Wound Healing  Recent Flowsheet Documentation  Taken 1/5/2022 4915 by Jessica Farris RN  Activity Management: activity adjusted per tolerance     Problem: Pain (Surgery Nonspecified)  Goal: Acceptable Pain Control  Outcome: Improving     Problem: Nausea and Vomiting  Goal: Fluid and Electrolyte Balance  Outcome: Improving     Patient alert and oriented.  Denies pain.  Reports on going nausea and states that zofran doesn't work.  Declines trying anything else.  Fistulas had 50cc out and 75cc out, green-altagracia brown liquid.  Patient had 2 small stools overnight.  Vitals are stable.  Afebrile.

## 2022-01-06 NOTE — PLAN OF CARE
Problem: Nausea and Vomiting  Goal: Fluid and Electrolyte Balance  Outcome: No Change   Patient has been experiencing general malaise but has not had complaints of N&V.   Problem: Impaired Wound Healing  Goal: Optimal Wound Healing  1/6/2022 1356 by Aquiles Mendes RN  Outcome: Declining  Patient had a CT scan performed and it was found that there is dehiscence of his surgical wound in the RLQ.    Problem: Infection (Surgery Nonspecified)  Goal: Absence of Infection Signs and Symptoms  1/6/2022 1356 by Aquiles Mendes RN  Outcome: Declining     Patients WBC count has been increasing and has had a low grade temp the past couple days with complaints of general malaise. It is suspected that a new infection may be present. There are also signs that the patient may be developing pneumonia.

## 2022-01-06 NOTE — PHARMACY-CONSULT NOTE
"Pharmacy Note: Parenteral Nutrition (PN) Management    Pharmacist consulted to dose PN for Gurdeep Dia, a 66 year old male by Dr. Mcmahon.    Subjective:    The patient was started on PN in the hospital on 12/2/2021.    Indication for PN therapy: bowel resection, plan to continue TPN until fistula heals.    Inadequate nutrition existing for > 7 days.     Enteral nutrition contraindicated due to: enterocutaneous fistula..      Social History     Tobacco Use     Smoking status: Current Every Day Smoker     Smokeless tobacco: Never Used   Substance Use Topics     Alcohol use: Not Currently     Comment: Clean for 5 years     Drug use: Not Currently     Objective:    Ht Readings from Last 1 Encounters:   12/22/21 1.702 m (5' 7\")     Wt Readings from Last 1 Encounters:   01/01/22 77.4 kg (170 lb 9.6 oz)       Body mass index is 26.72 kg/m .    No data found.    Labs:  Last 3 days:  Recent Labs     01/05/22  0802 01/06/22  0629    138   POTASSIUM 4.4 4.8   CHLORIDE 103 100   CO2 25 28   BUN 30* 29*   CR 0.79 0.79   VIJAYA 9.2 8.2*   MAG 1.9  --    PHOS 3.3  --    PROTTOTAL  --  6.3   ALBUMIN  --  2.1*   HGB  --  10.7*   HCT  --  34.4*   PLT  --  408  408   BILITOTAL  --  1.4*   AST  --  26   ALT  --  32   ALKPHOS  --  162*       Glucose (past 48 hours):   Recent Labs     01/05/22  0802 01/05/22  0838 01/06/22  0629 01/06/22  0819   * 154* 159* 143*       Intake/Output (last 24 hours): I/O last 3 completed shifts:  In: 2214 [P.O.:240; I.V.:3]  Out: 950 [Urine:300; Drains:650]    Estimated CrCl: Estimated Creatinine Clearance: 99.3 mL/min (based on SCr of 0.79 mg/dL).    Assessment:    Continue patient on PN therapy as a cyclic central therapy.     Given the patient's current condition/oral intake, PN is still indicated.    Plan:    1. Rate of PN: 75 mL/hr initially, then 150 mL/hr for 10 hours, then 75 mL/hr for 1 hour. Maintenance IV fluid rate will be adjusted appropriately to meet the total maximum IV " fluids rate as ordered by provider.    2. Formula:     Amino Acids 130 grams    Dextrose 300 grams    Sodium 110 mEq/day    Potassium 75 mEq/day    Calcium 8 mEq/day    Magnesium 14 mEq/day    Phosphorus 32 mMol/day    Chloride: Acetate Ratio 1:3    Standard Multivitamins w/Vitamin K    Trace Elements     3. Fat Emulsion: 20%, 250 mL IV 3 times weekly on M Th Sa    4.  Check labs per protocol.    5.  Pharmacist will continue to follow the patient's lab results, clinical  status and blood glucose results and make adjustments as appropriate.    Thank you for the consult.  Sandra Lipscomb, PharmD.  1/6/2022 11:38 AM

## 2022-01-07 LAB
ANION GAP SERPL CALCULATED.3IONS-SCNC: 8 MMOL/L (ref 5–18)
BUN SERPL-MCNC: 24 MG/DL (ref 8–22)
CALCIUM SERPL-MCNC: 8.2 MG/DL (ref 8.5–10.5)
CHLORIDE BLD-SCNC: 99 MMOL/L (ref 98–107)
CO2 SERPL-SCNC: 29 MMOL/L (ref 22–31)
CREAT SERPL-MCNC: 0.81 MG/DL (ref 0.7–1.3)
ERYTHROCYTE [DISTWIDTH] IN BLOOD BY AUTOMATED COUNT: 16.9 % (ref 10–15)
GFR SERPL CREATININE-BSD FRML MDRD: >90 ML/MIN/1.73M2
GLUCOSE BLD-MCNC: 179 MG/DL (ref 70–125)
GLUCOSE BLDC GLUCOMTR-MCNC: 112 MG/DL (ref 70–99)
GLUCOSE BLDC GLUCOMTR-MCNC: 149 MG/DL (ref 70–99)
HCT VFR BLD AUTO: 32.4 % (ref 40–53)
HGB BLD-MCNC: 10.3 G/DL (ref 13.3–17.7)
MAGNESIUM SERPL-MCNC: 1.9 MG/DL (ref 1.8–2.6)
MCH RBC QN AUTO: 29.8 PG (ref 26.5–33)
MCHC RBC AUTO-ENTMCNC: 31.8 G/DL (ref 31.5–36.5)
MCV RBC AUTO: 94 FL (ref 78–100)
PHOSPHATE SERPL-MCNC: 2.6 MG/DL (ref 2.5–4.5)
PLATELET # BLD AUTO: 441 10E3/UL (ref 150–450)
POTASSIUM BLD-SCNC: 4.3 MMOL/L (ref 3.5–5)
RBC # BLD AUTO: 3.46 10E6/UL (ref 4.4–5.9)
SODIUM SERPL-SCNC: 136 MMOL/L (ref 136–145)
WBC # BLD AUTO: 15 10E3/UL (ref 4–11)

## 2022-01-07 PROCEDURE — 250N000013 HC RX MED GY IP 250 OP 250 PS 637: Performed by: NURSE PRACTITIONER

## 2022-01-07 PROCEDURE — 250N000013 HC RX MED GY IP 250 OP 250 PS 637: Performed by: STUDENT IN AN ORGANIZED HEALTH CARE EDUCATION/TRAINING PROGRAM

## 2022-01-07 PROCEDURE — 99232 SBSQ HOSP IP/OBS MODERATE 35: CPT | Mod: GC | Performed by: STUDENT IN AN ORGANIZED HEALTH CARE EDUCATION/TRAINING PROGRAM

## 2022-01-07 PROCEDURE — 250N000009 HC RX 250: Performed by: FAMILY MEDICINE

## 2022-01-07 PROCEDURE — 84100 ASSAY OF PHOSPHORUS: CPT | Performed by: STUDENT IN AN ORGANIZED HEALTH CARE EDUCATION/TRAINING PROGRAM

## 2022-01-07 PROCEDURE — 250N000011 HC RX IP 250 OP 636: Performed by: STUDENT IN AN ORGANIZED HEALTH CARE EDUCATION/TRAINING PROGRAM

## 2022-01-07 PROCEDURE — 85048 AUTOMATED LEUKOCYTE COUNT: CPT | Performed by: STUDENT IN AN ORGANIZED HEALTH CARE EDUCATION/TRAINING PROGRAM

## 2022-01-07 PROCEDURE — 99024 POSTOP FOLLOW-UP VISIT: CPT | Performed by: PHYSICIAN ASSISTANT

## 2022-01-07 PROCEDURE — 80048 BASIC METABOLIC PNL TOTAL CA: CPT | Performed by: STUDENT IN AN ORGANIZED HEALTH CARE EDUCATION/TRAINING PROGRAM

## 2022-01-07 PROCEDURE — 83735 ASSAY OF MAGNESIUM: CPT | Performed by: STUDENT IN AN ORGANIZED HEALTH CARE EDUCATION/TRAINING PROGRAM

## 2022-01-07 PROCEDURE — 36415 COLL VENOUS BLD VENIPUNCTURE: CPT | Performed by: STUDENT IN AN ORGANIZED HEALTH CARE EDUCATION/TRAINING PROGRAM

## 2022-01-07 PROCEDURE — 120N000001 HC R&B MED SURG/OB

## 2022-01-07 RX ADMIN — FLUCONAZOLE 200 MG: 100 TABLET ORAL at 20:03

## 2022-01-07 RX ADMIN — CEFTRIAXONE SODIUM 1 G: 1 INJECTION, POWDER, FOR SOLUTION INTRAMUSCULAR; INTRAVENOUS at 13:04

## 2022-01-07 RX ADMIN — ONDANSETRON 4 MG: 2 INJECTION INTRAMUSCULAR; INTRAVENOUS at 22:18

## 2022-01-07 RX ADMIN — LEVOTHYROXINE SODIUM 25 MCG: 0.03 TABLET ORAL at 06:09

## 2022-01-07 RX ADMIN — QUETIAPINE FUMARATE 200 MG: 100 TABLET, FILM COATED ORAL at 22:18

## 2022-01-07 RX ADMIN — ONDANSETRON 4 MG: 2 INJECTION INTRAMUSCULAR; INTRAVENOUS at 09:46

## 2022-01-07 RX ADMIN — GABAPENTIN 200 MG: 100 CAPSULE ORAL at 17:15

## 2022-01-07 RX ADMIN — METRONIDAZOLE 500 MG: 500 INJECTION, SOLUTION INTRAVENOUS at 17:15

## 2022-01-07 RX ADMIN — OXYCODONE HYDROCHLORIDE AND ACETAMINOPHEN 2 TABLET: 5; 325 TABLET ORAL at 09:16

## 2022-01-07 RX ADMIN — ENOXAPARIN SODIUM 80 MG: 80 INJECTION SUBCUTANEOUS at 21:13

## 2022-01-07 RX ADMIN — CALCIUM GLUCONATE: 98 INJECTION, SOLUTION INTRAVENOUS at 20:04

## 2022-01-07 RX ADMIN — ONDANSETRON 4 MG: 2 INJECTION INTRAMUSCULAR; INTRAVENOUS at 02:41

## 2022-01-07 RX ADMIN — PANTOPRAZOLE SODIUM 40 MG: 40 TABLET, DELAYED RELEASE ORAL at 09:16

## 2022-01-07 RX ADMIN — OXYCODONE HYDROCHLORIDE AND ACETAMINOPHEN 2 TABLET: 5; 325 TABLET ORAL at 22:18

## 2022-01-07 RX ADMIN — GABAPENTIN 200 MG: 100 CAPSULE ORAL at 09:16

## 2022-01-07 RX ADMIN — METRONIDAZOLE 500 MG: 500 INJECTION, SOLUTION INTRAVENOUS at 06:07

## 2022-01-07 RX ADMIN — VENLAFAXINE HYDROCHLORIDE 75 MG: 75 CAPSULE, EXTENDED RELEASE ORAL at 09:16

## 2022-01-07 RX ADMIN — HYDROXYZINE HYDROCHLORIDE 25 MG: 10 SOLUTION ORAL at 21:14

## 2022-01-07 RX ADMIN — ENOXAPARIN SODIUM 80 MG: 80 INJECTION SUBCUTANEOUS at 09:17

## 2022-01-07 RX ADMIN — GABAPENTIN 900 MG: 300 CAPSULE ORAL at 22:18

## 2022-01-07 RX ADMIN — OXYCODONE HYDROCHLORIDE AND ACETAMINOPHEN 2 TABLET: 5; 325 TABLET ORAL at 15:05

## 2022-01-07 ASSESSMENT — ACTIVITIES OF DAILY LIVING (ADL)
ADLS_ACUITY_SCORE: 11
ADLS_ACUITY_SCORE: 9
ADLS_ACUITY_SCORE: 11
ADLS_ACUITY_SCORE: 9
ADLS_ACUITY_SCORE: 11
ADLS_ACUITY_SCORE: 7
ADLS_ACUITY_SCORE: 9
ADLS_ACUITY_SCORE: 11
ADLS_ACUITY_SCORE: 11
ADLS_ACUITY_SCORE: 9
ADLS_ACUITY_SCORE: 9
ADLS_ACUITY_SCORE: 7
ADLS_ACUITY_SCORE: 7
ADLS_ACUITY_SCORE: 9
ADLS_ACUITY_SCORE: 11
ADLS_ACUITY_SCORE: 7
ADLS_ACUITY_SCORE: 11

## 2022-01-07 NOTE — PROGRESS NOTES
General Surgery Progress Note:    Hospital Day # 16    ASSESSMENT:   1. Abdominal pain, generalized        Gurdeep Dia is a 66 year old male with readmission for abdominal pain after discharge history ofs/p exploratory laparotomy with small bowel resection, lysis of adhesions and repair of enterotomy with significant inflamed small bowel with areas of thinned mucosa on 11/30/2021  S/p exploratory laparotomy 12/3/2021 for small bowel perforation from ulcer  S/p IR placement of drain into pelvic abscess 12/9/2021. 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  See if he can associate his nausea with either drinking, TPN, or if is just random.  I also asked him to see if the Zofran is effective as he feels like he cannot tell and is not sure as he is getting multiple medications.  -Continue anticoagulants for treatment of PE and superior mesenteric thrombus right lower quadrant  -Continue TPN  -Appreciate HMS and wound care nurse input and cares      SUBJECTIVE:   Gurdeep Dia is is having a little bit more pain and he states that it is at the right lower quadrant.  His nausea continues to be an issue but at this time he cannot really express if it is continuous or if medications are helpful or if anything triggers it.  He also feels like he is just waking up and not sure how exactly feeling but if pressure feels pain right lower quadrant.    Patient Vitals for the past 24 hrs:   BP Temp Temp src Pulse Resp SpO2 Weight   01/07/22 0717 126/69 98.9  F (37.2  C) Oral 99 18 90 % --   01/06/22 2300 118/80 99.7  F (37.6  C) Oral 98 18 92 % --   01/06/22 1555 112/83 98.5  F (36.9  C) Oral 93 18 90 % 74.3 kg  (163 lb 14.4 oz)       Physical Exam:  General: NAD, pleasant  ABD: Soft tenderness right lower quadrant without rebound.  Bilious output in both ostomy bags from enterocutaneous fistulas  EXT:no CCE    No results displayed because visit has over 200 results.   Recent Results (from the past 24 hour(s))   Blood Culture Peripheral Blood    Collection Time: 01/06/22 11:07 AM    Specimen: Peripheral Blood   Result Value Ref Range    Culture No growth after 12 hours    Blood Culture Hand, Right    Collection Time: 01/06/22 11:17 AM    Specimen: Hand, Right; Blood   Result Value Ref Range    Culture No growth after 12 hours    Symptomatic; Yes; 1/5/2022 Influenza A/B & SARS-CoV2 (COVID-19) Virus PCR Multiplex Nose    Collection Time: 01/06/22 11:19 AM    Specimen: Nose; Swab   Result Value Ref Range    Influenza A PCR Negative Negative    Influenza B PCR Negative Negative    SARS CoV2 PCR Negative Negative   UA reflex to Microscopic and Culture    Collection Time: 01/06/22 12:19 PM    Specimen: Urine, Midstream   Result Value Ref Range    Color Urine Yellow Colorless, Straw, Light Yellow, Yellow    Appearance Urine Clear Clear    Glucose Urine Negative Negative mg/dL    Bilirubin Urine Negative Negative    Ketones Urine Negative Negative mg/dL    Specific Gravity Urine 1.032 (H) 1.001 - 1.030    Blood Urine 0.2 mg/dL (A) Negative    pH Urine 5.5 5.0 - 7.0    Protein Albumin Urine 30  (A) Negative mg/dL    Urobilinogen Urine <2.0 <2.0 mg/dL    Nitrite Urine Negative Negative    Leukocyte Esterase Urine Negative Negative    Mucus Urine Present (A) None Seen /LPF    RBC Urine 8 (H) <=2 /HPF    WBC Urine 3 <=5 /HPF    Squamous Epithelials Urine <1 <=1 /HPF   CT Abdomen Pelvis w Contrast    Collection Time: 01/06/22 12:35 PM   Result Value Ref Range    Radiologist flags (AA)      Pulmonary emboli and additional findings in impressions 2 through 4   Glucose by meter    Collection Time: 01/06/22 11:53 PM   Result Value Ref Range     GLUCOSE BY METER POCT 150 (H) 70 - 99 mg/dL   Magnesium    Collection Time: 01/07/22  7:06 AM   Result Value Ref Range    Magnesium 1.9 1.8 - 2.6 mg/dL   Phosphorus    Collection Time: 01/07/22  7:06 AM   Result Value Ref Range    Phosphorus 2.6 2.5 - 4.5 mg/dL   Basic metabolic panel    Collection Time: 01/07/22  7:06 AM   Result Value Ref Range    Sodium 136 136 - 145 mmol/L    Potassium 4.3 3.5 - 5.0 mmol/L    Chloride 99 98 - 107 mmol/L    Carbon Dioxide (CO2) 29 22 - 31 mmol/L    Anion Gap 8 5 - 18 mmol/L    Urea Nitrogen 24 (H) 8 - 22 mg/dL    Creatinine 0.81 0.70 - 1.30 mg/dL    Calcium 8.2 (L) 8.5 - 10.5 mg/dL    Glucose 179 (H) 70 - 125 mg/dL    GFR Estimate >90 >60 mL/min/1.73m2   CBC with platelets    Collection Time: 01/07/22  7:06 AM   Result Value Ref Range    WBC Count 15.0 (H) 4.0 - 11.0 10e3/uL    RBC Count 3.46 (L) 4.40 - 5.90 10e6/uL    Hemoglobin 10.3 (L) 13.3 - 17.7 g/dL    Hematocrit 32.4 (L) 40.0 - 53.0 %    MCV 94 78 - 100 fL    MCH 29.8 26.5 - 33.0 pg    MCHC 31.8 31.5 - 36.5 g/dL    RDW 16.9 (H) 10.0 - 15.0 %    Platelet Count 441 150 - 450 10e3/uL   Glucose by meter    Collection Time: 01/07/22  8:18 AM   Result Value Ref Range    GLUCOSE BY METER POCT 149 (H) 70 - 99 mg/dL             Demetirus Vogel PA-C

## 2022-01-07 NOTE — PROGRESS NOTES
Nutrition Therapy  Parenteral Nutrition follow-up       RECOMMENDATIONS FOR MDs/PROVIDERS TO ORDER:  None      Dietitian to Pharmacy  Continue TPN with no change    Malnutrition Status:    Severe  In the context of acute illness    Future/Additional Recommendations:  Follow labs/liver enzymes and adjust TPN per pt needs.  Adjust TPN pending p.o intake, needs       NEW FINDINGS  New increasing O2 needs - 2 L d/t RLL PE, SMV thrombosis    Current Nutrition Intake:  Diet: Regular diet - advanced     Intake:  Poor. 0 to bites of meals d/t ongoing nausea and now worsening illness  Ate banana and bites of eggs at supper last night.   Did order eggs, milk and juice for breakfast today    Nutrition Support: TPN: cyclin gm Dextrose, 130 gm AA @ 75 mL/hr x 1 hour, 150 mL/hr x 10 hours, and 75 mL/hr x 1 hour, off.  250 mL 20% lipids 3 days a week x 12 hours daily.     Provides: 1650 mL + 250 mL lipid, 1754 kcal/day, 130 gm protein, 300 gm cho, 24 g lipid/daily average.  GIR 5.8 mg CHO/kg/min  TPN changed to cyclic 21.  TPN meets 90% of estimated kcal and 100% estimated protein needs    ANTHROPOMETRICS  Admission wt: 166 lb 6.4 oz 21  Most recent weight: 74.3 kg (163 lb 14.4 oz)  down 7 lb from 5 days prior. Down 3 lb from admit    GI:  1 BM yesterday  Fistula/Drain x2 output 450 ml, decreased    Dry heaves/nausea daily. Zofran effective.   Pt tolerated cycle TPN first 2 nights with no issues.   Possible altered taste    Labs:   BUN 24, improved  LFT improved  fsbg 143-179mg/dl past 24 hours, in good control  Labs reviewed    INTERVENTIONS  Noted per surgery note of :  Plan going forward would be to continue TPN until EC fistula output is minimal and we can increase oral caloric intake, this could take a couple months.    Implementation  Continue current nutrition rx    Monitoring/Evaluation  Progress toward goals will be monitored and evaluated per protocol.

## 2022-01-07 NOTE — PHARMACY-CONSULT NOTE
"Pharmacy Note: Parenteral Nutrition (PN) Management    Pharmacist consulted to dose PN for Gurdeep Dia, a 66 year old male by Dr. Mcmahon.     Subjective:     The patient was started on PN in the hospital on 12/2/2021.     Indication for PN therapy: bowel resection, plan to continue TPN until fistula heals.     Inadequate nutrition existing for > 7 days.      Enteral nutrition contraindicated due to: enterocutaneous fistula..    Social History     Tobacco Use     Smoking status: Current Every Day Smoker     Smokeless tobacco: Never Used   Substance Use Topics     Alcohol use: Not Currently     Comment: Clean for 5 years     Drug use: Not Currently     Objective:    Ht Readings from Last 1 Encounters:   12/22/21 1.702 m (5' 7\")     Wt Readings from Last 1 Encounters:   01/06/22 74.3 kg (163 lb 14.4 oz)       Body mass index is 25.67 kg/m .    Patient Vitals for the past 96 hrs:   Weight   01/06/22 1555 74.3 kg (163 lb 14.4 oz)       Labs:  Last 3 days:  Recent Labs     01/05/22  0802 01/06/22  0629 01/07/22  0706    138 136   POTASSIUM 4.4 4.8 4.3   CHLORIDE 103 100 99   CO2 25 28 29   BUN 30* 29* 24*   CR 0.79 0.79 0.81   VIJAYA 9.2 8.2* 8.2*   MAG 1.9  --  1.9   PHOS 3.3  --  2.6   PROTTOTAL  --  6.3  --    ALBUMIN  --  2.1*  --    HGB  --  10.7* 10.3*   HCT  --  34.4* 32.4*   PLT  --  408  408 441   BILITOTAL  --  1.4*  --    AST  --  26  --    ALT  --  32  --    ALKPHOS  --  162*  --        Glucose (past 48 hours):   Recent Labs     01/06/22  0629 01/06/22  0819 01/06/22  2353 01/07/22  0706 01/07/22  0818   * 143* 150* 179* 149*       Intake/Output (last 24 hours): I/O last 3 completed shifts:  In: 110 [P.O.:110]  Out: 850 [Urine:400; Drains:450]    Estimated CrCl: Estimated Creatinine Clearance: 93 mL/min (based on SCr of 0.81 mg/dL).    Assessment:    Continue patient on PN therapy as a cyclic central therapy.     Given the patient's current condition/oral intake, PN is still indicated.    Lab " results reviewed: within normal limits    Plan:     1. Rate of PN: 75 mL/hr initially, then 150 mL/hr for 10 hours, then 75 mL/hr for 1 hour. Maintenance IV fluid rate will be adjusted appropriately to meet the total maximum IV fluids rate as ordered by provider.     2. Formula:     Amino Acids 130 grams    Dextrose 300 grams    Sodium 110 mEq/day    Potassium 75 mEq/day    Calcium 8 mEq/day    Magnesium 14 mEq/day    Phosphorus 32 mMol/day    Chloride: Acetate Ratio 1:3    Standard Multivitamins w/Vitamin K    Trace Elements     3.         Fat Emulsion: 20%, 250 mL IV 3 times weekly on M Th Sa     4.         Check labs per protocol.     5.         Pharmacist will continue to follow the patient's lab results, clinical   status and blood glucose results and make adjustments as appropriate.   Thank you for the consult.  Darlyn Tillman [facility]  1/7/2022 9:03 AM

## 2022-01-07 NOTE — PROGRESS NOTES
CM sent more TCU referrals out to [facility] facilities, to expand search. CARY will continue to follow.     CINDA Corona  1/7/2022  10:33 AM

## 2022-01-07 NOTE — PROGRESS NOTES
[facility] Progress Note    Assessment/Plan  Active Problems:    Abdominal pain, generalized    Patient remains hospitalized due to placement, TPN.    Chema Dia is a 67 y/o M with past medical history of splenectomy, appendectomy, HTN, and substance abuse, with recent admission to hospital 11/30-12/21/2021 for SBO with perforation s/p surgical repair. Discharged home but presents as he could not manage at home and is now requesting to be placed in TCU vs LTC. Continues on TPN with surgery and ID following while awaiting LTC placement.    RLL PE  SMV thrombosis  New supplemental oxygen requirement  Acute RLL PE found on CT abdomen/pelvis as well as SMV thrombosis.  This explains his new supplemental oxygen requirement.  Most likely provoked in the setting of recent surgery/hospitalization.  -Lovenox 1mg/kg twice daily.    Nausea  New supplemental oxygen requirement  RLQ pain  Malaise  Patient has been having nausea with TPN but developed a new supplemental oxygen requirement 1/6 in addition to fatigue and general malaise.  Denies shortness of breath or new abdominal pain, no chest pain.  Elevated temperature but not quite febrile.  Retested for COVID-19 which was negative.  CXR shows bibasilar haziness which could be atelectasis but is concerning for hospital-acquired pneumonia.  He is having some new RLQ abdominal pain with palpation so we checked CT given his recent surgical history and found RLL PE and SMV thrombosis as above.  Remainder of findings stable from prior.  -Repeat blood cultures.  -CT abdomen and pelvis.  Results as above.  -UA.  -Hold on antibiotics for now pending results of these tests but low threshold to start.  -Switching antibiotics over to IV ceftriaxone daily, IV metronidazole x10 days since orals were started.  Continuing fluconazole for the total of 10 days recommended by ID as well.     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 Required subsequent washout on 12/3 with aggressive IV antibiotic regimen and MARISA drain placement. On 12/8 found to have continued enhancing abscess with drain placed 12/9 and removed on 12/16. Antibiotics stopped on 12/20. Plan for close follow up with surgery and TPN at discharge, with plans to have help from his brother in law. Then returned on 12/22 given unable to care for himself even with brother in law's assistance; requesting LTC/TCU placement. Re-imaged abdominal CT 12/31: no significant change and no new drainable abscess or other findings.  Has been fairly nauseated with TPN.  -PT/OT ordered, appreciate recs on placement.  -Searching for TCU/LTC placement with care management.  -Pain management: Percocet 1-2 tablets q6h PRN, gabapentin.  -Consulted surgery to follow while here.  -Consulted ID, noted:  E coli and Candida parapsilosis in wound culture from 12/22/2021.  -Has stable mild leukocytosis around 11.  -ID final recommendations: Oral cefdinir, metronidazole, fluconazole x10 days.  Follow-up with primary and surgery.  Switched back to IV antibiotics as above given new symptoms.  -Consult pharmacy and RD for TPN management - transitioned to cyclic TPN 12/30.  -WOC.  -Additional daily lab checks not indicated at this time unless he develops new symptoms.  -Nausea control with TPN.     Alk phos elevation, now stable  Continues to climb day by day though now stable in the low 200s.  Rest of LFTs okay.  Stable leukocytosis around 11.  Had been just monitoring, though Chema spiked a fever overnight into 12/27 and looked mildly ill that morning.  Endorsing RUQ TTP on exam.  RUQ US showing sludge but no signs of acute kat.  -cyclic TPN.  Additional daily lab checks outside of regularly scheduled TPN checks not  indicated at this time unless he develops new symptoms.     Depression  Insomnia  Hx of this. Reports symptoms for past few days= low motivation, little interest in doing things, poor sleep, no appetite. Is on buspar, mirtazapine, and effexor, though doesn't really think medicines are as helpful as talk therapy is. Wanted to speak to social work and . States no plan to harm himself. Reports he just needs to leave the hospital.  -Consult psych for med management and plan for talk therapy while hospitalized.  -Discontinued buspar and mirtazapine per psych.  -Increased venlafaxine and gabapentin per psych.  -Seroquel for sleep.  -Will schedule vistaril at bedtime and see if this can improve dry heaves that seem to be recurrent.     Moderate malnutrition  Per RD eval.  -TPN per RD and pharmacy.     Normocytic anemia  Stable at 11.5, MCV of 93.     Chronic Conditions:  Hypothyroidism- PTA levothyroxine.  Chronic pain- Hold mexolicam 7.5mg daily, resume PTA gabapentin.  BPH-hold home Flomax given he states it does not help.    FEN: TPN.  Regular diet as tolerated.  DVT Prophylaxis: Lovenox.  Code: Full code.    Disposition/Advanced Care Planning  Discharge Planning discussed with patient and care team.  Anticipated discharge date: Multiple days.  Pending placement.  Disposition: LTC.    Subjective  Patient feeling okay today.  Still having some nausea but no shortness of breath.    Objective    Vital signs in last 24 hours Temp:  [98  F (36.7  C)-99.7  F (37.6  C)] 98.4  F (36.9  C)  Pulse:  [87-99] 88  Resp:  [18] 18  BP: ()/(65-80) 124/74  SpO2:  [90 %-92 %] 90 %   Weight: [unfilled]    Intake/Output last 3 shift I/O last 3 completed shifts:  In: 1884 [P.O.:230]  Out: 200 [Drains:200]    Intake/Output this shift:No intake/output data recorded.    Physical Exam  General appearance: alert, appears stated age, cooperative and no distress.  Head: Normocephalic, without obvious abnormality, atraumatic.  NC in  place.  Eyes: conjunctivae/corneas clear.  Throat: MMM.  Neck: supple, symmetrical, trachea midline.  Lungs: no increased respiratory effort.  NC in place.  Heart: Good capillary refill.  Abdomen: non-distended, soft, appropriately tender.  Extremities: extremities normal, atraumatic, no cyanosis or edema.  Skin: Skin color, texture, turgor normal. No rashes or lesions.  Neurologic: Alert and oriented x3, normal strength and tone.  Psychiatric: mood and affect appropriate.    Pertinent Labs and Pertinent Radiology   Lab Results: personally reviewed.     Radiology Results: Personally reviewed image/s and impression/s    Precepted patient with Dr. Lelia Harry.    Johnathan Lees MD  [facility], PGY-3

## 2022-01-07 NOTE — PLAN OF CARE
Problem: Infection (Surgery Nonspecified)  Goal: Absence of Infection Signs and Symptoms  Outcome: No Change     Afebrile this shift. Enc CDB and IS. Refusing to get OOB to ambulate with RN or PT.       Problem: Nausea and Vomiting  Goal: Fluid and Electrolyte Balance  Outcome: No Change  Intervention: Prevent and Manage Nausea and Vomiting  Recent Flowsheet Documentation  Taken 1/7/2022 1000 by Cris Ellsworth RN  Nausea/Vomiting Interventions: antiemetic  Oral Care: patient refused intervention     Significant pain this AM after taking in scrambled eggs and whole milk. See by Dr Richardson and diet changed back to clear liquid. Pt pain improved 45 min after pain medication given.

## 2022-01-07 NOTE — PLAN OF CARE
Problem: Pain (Surgery Nonspecified)  Goal: Acceptable Pain Control  Outcome: Improving   Denies pain.    Problem: Nausea and Vomiting  Goal: Fluid and Electrolyte Balance  Outcome: Improving  Intervention: Prevent and Manage Nausea and Vomiting  Recent Flowsheet Documentation  Taken 1/7/2022 0245 by Gretel Swan RN  Nausea/Vomiting Interventions: antiemetic  Patient c/o nausea and was noted dry-heaving. PRN Zofran given and effective.      Problem: Infection (Surgery Nonspecified)  Goal: Absence of Infection Signs and Symptoms  Outcome: Improving   Low grade temp of 99.7 noted this shift.

## 2022-01-07 NOTE — PLAN OF CARE
Problem: Adult Inpatient Plan of Care  Goal: Plan of Care Review  Outcome: No Change   Pt went down for CT.  Radiologist spoke with MD.  Pt now on Lovenox and IV abx.    Cyclic TF is running along with lipids.  Percocet given for abdominal pain.  Covid/Influenza A and B all negative.

## 2022-01-08 ENCOUNTER — APPOINTMENT (OUTPATIENT)
Dept: CT IMAGING | Facility: HOSPITAL | Age: 67
DRG: 393 | End: 2022-01-08
Payer: MEDICARE

## 2022-01-08 ENCOUNTER — APPOINTMENT (OUTPATIENT)
Dept: PHYSICAL THERAPY | Facility: HOSPITAL | Age: 67
DRG: 393 | End: 2022-01-08
Payer: MEDICARE

## 2022-01-08 ENCOUNTER — APPOINTMENT (OUTPATIENT)
Dept: RADIOLOGY | Facility: HOSPITAL | Age: 67
DRG: 393 | End: 2022-01-08
Payer: MEDICARE

## 2022-01-08 LAB
ALBUMIN SERPL-MCNC: 2 G/DL (ref 3.5–5)
ALP SERPL-CCNC: 129 U/L (ref 45–120)
ALT SERPL W P-5'-P-CCNC: 21 U/L (ref 0–45)
ANION GAP SERPL CALCULATED.3IONS-SCNC: 6 MMOL/L (ref 5–18)
ANION GAP SERPL CALCULATED.3IONS-SCNC: 9 MMOL/L (ref 5–18)
AST SERPL W P-5'-P-CCNC: 22 U/L (ref 0–40)
BASE EXCESS BLDV CALC-SCNC: 6.6 MMOL/L
BILIRUB SERPL-MCNC: 1.5 MG/DL (ref 0–1)
BUN SERPL-MCNC: 28 MG/DL (ref 8–22)
BUN SERPL-MCNC: 29 MG/DL (ref 8–22)
CALCIUM SERPL-MCNC: 8.4 MG/DL (ref 8.5–10.5)
CALCIUM SERPL-MCNC: 8.8 MG/DL (ref 8.5–10.5)
CHLORIDE BLD-SCNC: 97 MMOL/L (ref 98–107)
CHLORIDE BLD-SCNC: 98 MMOL/L (ref 98–107)
CO2 SERPL-SCNC: 26 MMOL/L (ref 22–31)
CO2 SERPL-SCNC: 31 MMOL/L (ref 22–31)
CREAT SERPL-MCNC: 0.81 MG/DL (ref 0.7–1.3)
CREAT SERPL-MCNC: 0.97 MG/DL (ref 0.7–1.3)
ERYTHROCYTE [DISTWIDTH] IN BLOOD BY AUTOMATED COUNT: 16.7 % (ref 10–15)
ERYTHROCYTE [DISTWIDTH] IN BLOOD BY AUTOMATED COUNT: 16.9 % (ref 10–15)
GFR SERPL CREATININE-BSD FRML MDRD: 86 ML/MIN/1.73M2
GFR SERPL CREATININE-BSD FRML MDRD: >90 ML/MIN/1.73M2
GLUCOSE BLD-MCNC: 100 MG/DL (ref 70–125)
GLUCOSE BLD-MCNC: 159 MG/DL (ref 70–125)
GLUCOSE BLDC GLUCOMTR-MCNC: 115 MG/DL (ref 70–99)
GLUCOSE BLDC GLUCOMTR-MCNC: 119 MG/DL (ref 70–99)
GLUCOSE BLDC GLUCOMTR-MCNC: 132 MG/DL (ref 70–99)
GLUCOSE BLDC GLUCOMTR-MCNC: 134 MG/DL (ref 70–99)
GLUCOSE BLDC GLUCOMTR-MCNC: 144 MG/DL (ref 70–99)
GLUCOSE BLDC GLUCOMTR-MCNC: 155 MG/DL (ref 70–99)
GLUCOSE BLDC GLUCOMTR-MCNC: 184 MG/DL (ref 70–99)
GLUCOSE BLDC GLUCOMTR-MCNC: 88 MG/DL (ref 70–99)
HCO3 BLDV-SCNC: 30 MMOL/L (ref 24–30)
HCT VFR BLD AUTO: 32.9 % (ref 40–53)
HCT VFR BLD AUTO: 32.9 % (ref 40–53)
HGB BLD-MCNC: 10.2 G/DL (ref 13.3–17.7)
HGB BLD-MCNC: 10.4 G/DL (ref 13.3–17.7)
LACTATE SERPL-SCNC: 1 MMOL/L (ref 0.7–2)
LACTATE SERPL-SCNC: 1.9 MMOL/L (ref 0.7–2)
MAGNESIUM SERPL-MCNC: 1.7 MG/DL (ref 1.8–2.6)
MCH RBC QN AUTO: 29.2 PG (ref 26.5–33)
MCH RBC QN AUTO: 29.5 PG (ref 26.5–33)
MCHC RBC AUTO-ENTMCNC: 31 G/DL (ref 31.5–36.5)
MCHC RBC AUTO-ENTMCNC: 31.6 G/DL (ref 31.5–36.5)
MCV RBC AUTO: 93 FL (ref 78–100)
MCV RBC AUTO: 94 FL (ref 78–100)
OXYHGB MFR BLDV: 93.2 % (ref 70–75)
PCO2 BLDV: 38 MM HG (ref 35–50)
PH BLDV: 7.5 [PH] (ref 7.35–7.45)
PHOSPHATE SERPL-MCNC: 2.8 MG/DL (ref 2.5–4.5)
PLATELET # BLD AUTO: 427 10E3/UL (ref 150–450)
PLATELET # BLD AUTO: 431 10E3/UL (ref 150–450)
PO2 BLDV: 78 MM HG (ref 25–47)
POTASSIUM BLD-SCNC: 4.4 MMOL/L (ref 3.5–5)
POTASSIUM BLD-SCNC: 4.8 MMOL/L (ref 3.5–5)
PROT SERPL-MCNC: 6.6 G/DL (ref 6–8)
RBC # BLD AUTO: 3.49 10E6/UL (ref 4.4–5.9)
RBC # BLD AUTO: 3.53 10E6/UL (ref 4.4–5.9)
SAO2 % BLDV: 95.4 % (ref 70–75)
SODIUM SERPL-SCNC: 132 MMOL/L (ref 136–145)
SODIUM SERPL-SCNC: 135 MMOL/L (ref 136–145)
TSH SERPL DL<=0.005 MIU/L-ACNC: 1.86 UIU/ML (ref 0.3–5)
WBC # BLD AUTO: 13.8 10E3/UL (ref 4–11)
WBC # BLD AUTO: 16.4 10E3/UL (ref 4–11)

## 2022-01-08 PROCEDURE — 250N000011 HC RX IP 250 OP 636: Performed by: FAMILY MEDICINE

## 2022-01-08 PROCEDURE — 250N000011 HC RX IP 250 OP 636: Performed by: STUDENT IN AN ORGANIZED HEALTH CARE EDUCATION/TRAINING PROGRAM

## 2022-01-08 PROCEDURE — 250N000013 HC RX MED GY IP 250 OP 250 PS 637: Performed by: NURSE PRACTITIONER

## 2022-01-08 PROCEDURE — 80053 COMPREHEN METABOLIC PANEL: CPT | Performed by: STUDENT IN AN ORGANIZED HEALTH CARE EDUCATION/TRAINING PROGRAM

## 2022-01-08 PROCEDURE — 84443 ASSAY THYROID STIM HORMONE: CPT | Performed by: STUDENT IN AN ORGANIZED HEALTH CARE EDUCATION/TRAINING PROGRAM

## 2022-01-08 PROCEDURE — 83605 ASSAY OF LACTIC ACID: CPT | Performed by: FAMILY MEDICINE

## 2022-01-08 PROCEDURE — 83735 ASSAY OF MAGNESIUM: CPT

## 2022-01-08 PROCEDURE — 71045 X-RAY EXAM CHEST 1 VIEW: CPT

## 2022-01-08 PROCEDURE — 85027 COMPLETE CBC AUTOMATED: CPT | Performed by: STUDENT IN AN ORGANIZED HEALTH CARE EDUCATION/TRAINING PROGRAM

## 2022-01-08 PROCEDURE — 250N000013 HC RX MED GY IP 250 OP 250 PS 637: Performed by: STUDENT IN AN ORGANIZED HEALTH CARE EDUCATION/TRAINING PROGRAM

## 2022-01-08 PROCEDURE — 82805 BLOOD GASES W/O2 SATURATION: CPT | Performed by: STUDENT IN AN ORGANIZED HEALTH CARE EDUCATION/TRAINING PROGRAM

## 2022-01-08 PROCEDURE — 93010 ELECTROCARDIOGRAM REPORT: CPT | Performed by: INTERNAL MEDICINE

## 2022-01-08 PROCEDURE — 99232 SBSQ HOSP IP/OBS MODERATE 35: CPT | Mod: GC | Performed by: STUDENT IN AN ORGANIZED HEALTH CARE EDUCATION/TRAINING PROGRAM

## 2022-01-08 PROCEDURE — 36415 COLL VENOUS BLD VENIPUNCTURE: CPT | Performed by: STUDENT IN AN ORGANIZED HEALTH CARE EDUCATION/TRAINING PROGRAM

## 2022-01-08 PROCEDURE — 250N000009 HC RX 250: Performed by: FAMILY MEDICINE

## 2022-01-08 PROCEDURE — 83605 ASSAY OF LACTIC ACID: CPT | Performed by: STUDENT IN AN ORGANIZED HEALTH CARE EDUCATION/TRAINING PROGRAM

## 2022-01-08 PROCEDURE — 84100 ASSAY OF PHOSPHORUS: CPT

## 2022-01-08 PROCEDURE — 258N000003 HC RX IP 258 OP 636

## 2022-01-08 PROCEDURE — 36415 COLL VENOUS BLD VENIPUNCTURE: CPT | Performed by: FAMILY MEDICINE

## 2022-01-08 PROCEDURE — 250N000009 HC RX 250: Performed by: STUDENT IN AN ORGANIZED HEALTH CARE EDUCATION/TRAINING PROGRAM

## 2022-01-08 PROCEDURE — 71275 CT ANGIOGRAPHY CHEST: CPT

## 2022-01-08 PROCEDURE — 258N000003 HC RX IP 258 OP 636: Performed by: STUDENT IN AN ORGANIZED HEALTH CARE EDUCATION/TRAINING PROGRAM

## 2022-01-08 PROCEDURE — 120N000001 HC R&B MED SURG/OB

## 2022-01-08 PROCEDURE — 74177 CT ABD & PELVIS W/CONTRAST: CPT

## 2022-01-08 PROCEDURE — 93005 ELECTROCARDIOGRAM TRACING: CPT

## 2022-01-08 PROCEDURE — 97116 GAIT TRAINING THERAPY: CPT | Mod: GP

## 2022-01-08 PROCEDURE — 87040 BLOOD CULTURE FOR BACTERIA: CPT | Performed by: STUDENT IN AN ORGANIZED HEALTH CARE EDUCATION/TRAINING PROGRAM

## 2022-01-08 RX ORDER — VANCOMYCIN HYDROCHLORIDE 1 G/200ML
1000 INJECTION, SOLUTION INTRAVENOUS EVERY 12 HOURS
Status: DISCONTINUED | OUTPATIENT
Start: 2022-01-09 | End: 2022-01-12

## 2022-01-08 RX ORDER — METOPROLOL TARTRATE 1 MG/ML
5 INJECTION, SOLUTION INTRAVENOUS EVERY 5 MIN PRN
Status: DISCONTINUED | OUTPATIENT
Start: 2022-01-08 | End: 2022-01-23

## 2022-01-08 RX ORDER — PHENYLEPHRINE HCL IN 0.9% NACL 50MG/250ML
.5-1.25 PLASTIC BAG, INJECTION (ML) INTRAVENOUS CONTINUOUS
Status: DISCONTINUED | OUTPATIENT
Start: 2022-01-09 | End: 2022-01-09

## 2022-01-08 RX ORDER — METOPROLOL TARTRATE 1 MG/ML
5 INJECTION, SOLUTION INTRAVENOUS EVERY 5 MIN PRN
Status: DISCONTINUED | OUTPATIENT
Start: 2022-01-08 | End: 2022-01-08

## 2022-01-08 RX ORDER — METOPROLOL TARTRATE 1 MG/ML
INJECTION, SOLUTION INTRAVENOUS
Status: COMPLETED
Start: 2022-01-08 | End: 2022-01-09

## 2022-01-08 RX ORDER — IOPAMIDOL 755 MG/ML
100 INJECTION, SOLUTION INTRAVASCULAR ONCE
Status: COMPLETED | OUTPATIENT
Start: 2022-01-08 | End: 2022-01-08

## 2022-01-08 RX ADMIN — PANTOPRAZOLE SODIUM 40 MG: 40 TABLET, DELAYED RELEASE ORAL at 06:45

## 2022-01-08 RX ADMIN — HYDROXYZINE HYDROCHLORIDE 25 MG: 10 SOLUTION ORAL at 22:21

## 2022-01-08 RX ADMIN — ENOXAPARIN SODIUM 80 MG: 80 INJECTION SUBCUTANEOUS at 08:26

## 2022-01-08 RX ADMIN — CEFTRIAXONE SODIUM 1 G: 1 INJECTION, POWDER, FOR SOLUTION INTRAMUSCULAR; INTRAVENOUS at 13:23

## 2022-01-08 RX ADMIN — POTASSIUM CHLORIDE: 2 INJECTION, SOLUTION, CONCENTRATE INTRAVENOUS at 22:55

## 2022-01-08 RX ADMIN — GABAPENTIN 200 MG: 100 CAPSULE ORAL at 16:26

## 2022-01-08 RX ADMIN — IOPAMIDOL 100 ML: 755 INJECTION, SOLUTION INTRAVENOUS at 21:53

## 2022-01-08 RX ADMIN — ENOXAPARIN SODIUM 80 MG: 80 INJECTION SUBCUTANEOUS at 22:21

## 2022-01-08 RX ADMIN — SODIUM CHLORIDE 500 ML: 9 INJECTION, SOLUTION INTRAVENOUS at 22:54

## 2022-01-08 RX ADMIN — METRONIDAZOLE 500 MG: 500 INJECTION, SOLUTION INTRAVENOUS at 06:08

## 2022-01-08 RX ADMIN — GABAPENTIN 900 MG: 300 CAPSULE ORAL at 22:20

## 2022-01-08 RX ADMIN — METOPROLOL TARTRATE 5 MG: 5 INJECTION INTRAVENOUS at 20:04

## 2022-01-08 RX ADMIN — METRONIDAZOLE 500 MG: 500 INJECTION, SOLUTION INTRAVENOUS at 17:26

## 2022-01-08 RX ADMIN — VENLAFAXINE HYDROCHLORIDE 75 MG: 75 CAPSULE, EXTENDED RELEASE ORAL at 08:26

## 2022-01-08 RX ADMIN — SODIUM CHLORIDE 1000 ML: 9 INJECTION, SOLUTION INTRAVENOUS at 20:04

## 2022-01-08 RX ADMIN — FLUCONAZOLE 200 MG: 100 TABLET ORAL at 18:38

## 2022-01-08 RX ADMIN — GABAPENTIN 200 MG: 100 CAPSULE ORAL at 08:26

## 2022-01-08 RX ADMIN — SODIUM CHLORIDE, POTASSIUM CHLORIDE, SODIUM LACTATE AND CALCIUM CHLORIDE 500 ML: 600; 310; 30; 20 INJECTION, SOLUTION INTRAVENOUS at 23:24

## 2022-01-08 RX ADMIN — METOPROLOL TARTRATE 5 MG: 5 INJECTION INTRAVENOUS at 20:14

## 2022-01-08 RX ADMIN — OXYCODONE HYDROCHLORIDE AND ACETAMINOPHEN 1 TABLET: 5; 325 TABLET ORAL at 08:26

## 2022-01-08 RX ADMIN — CEFEPIME HYDROCHLORIDE 2 G: 2 INJECTION, POWDER, FOR SOLUTION INTRAVENOUS at 22:20

## 2022-01-08 RX ADMIN — OXYCODONE HYDROCHLORIDE AND ACETAMINOPHEN 2 TABLET: 5; 325 TABLET ORAL at 14:43

## 2022-01-08 RX ADMIN — QUETIAPINE FUMARATE 200 MG: 100 TABLET, FILM COATED ORAL at 22:20

## 2022-01-08 RX ADMIN — ONDANSETRON 4 MG: 2 INJECTION INTRAMUSCULAR; INTRAVENOUS at 08:26

## 2022-01-08 RX ADMIN — I.V. FAT EMULSION 250 ML: 20 EMULSION INTRAVENOUS at 23:21

## 2022-01-08 RX ADMIN — LEVOTHYROXINE SODIUM 25 MCG: 0.03 TABLET ORAL at 06:08

## 2022-01-08 ASSESSMENT — ACTIVITIES OF DAILY LIVING (ADL)
ADLS_ACUITY_SCORE: 11

## 2022-01-08 NOTE — PROGRESS NOTES
[facility] Progress Note    Assessment/Plan  Active Problems:    Abdominal pain, generalized    Patient remains hospitalized due to placement, TPN.    Chema Dia is a 65 y/o M with past medical history of splenectomy, appendectomy, HTN, and substance abuse, with recent admission to hospital 11/30-12/21/2021 for SBO with perforation s/p surgical repair. Discharged home but presents as he could not manage at home and is now requesting to be placed in TCU vs LTC. Continues on TPN with surgery and ID following while awaiting LTC placement.    RLL PE  SMV thrombosis  New supplemental oxygen requirement  Acute RLL PE found on CT abdomen/pelvis as well as SMV thrombosis.  This explains his new supplemental oxygen requirement.  Most likely provoked in the setting of recent surgery/hospitalization.  -Lovenox 1mg/kg twice daily.    Nausea  New supplemental oxygen requirement  RLQ pain  Malaise  Patient has been having nausea with TPN but developed a new supplemental oxygen requirement 1/6 in addition to fatigue and general malaise.  Denies shortness of breath or new abdominal pain, no chest pain.  Elevated temperature but not quite febrile.  Retested for COVID-19 which was negative.  CXR shows bibasilar haziness which could be atelectasis but is concerning for hospital-acquired pneumonia.  He is having some new RLQ abdominal pain with palpation so we checked CT given his recent surgical history and found RLL PE and SMV thrombosis as above.  Remainder of findings stable from prior.  -Repeated blood cultures.  NGTD.  -Switched antibiotics over to IV ceftriaxone daily, IV metronidazole x10 days since orals were started.  Continuing fluconazole for the total of 10 days recommended by ID as well.     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 Required subsequent washout on 12/3 with aggressive IV antibiotic regimen and MARISA drain placement. On 12/8 found to have continued enhancing abscess with drain placed 12/9 and removed on 12/16. Antibiotics stopped on 12/20. Plan for close follow up with surgery and TPN at discharge, with plans to have help from his brother in law. Then returned on 12/22 given unable to care for himself even with brother in law's assistance; requesting LTC/TCU placement. Re-imaged abdominal CT 12/31: no significant change and no new drainable abscess or other findings.  Has been fairly nauseated with TPN.  -PT/OT.  Searching for TCU/LTC placement.  -Consulted surgery to follow while here.  Clear liquid diet for now.  Surgery is the only service that can advance this and will not do so for some time.  -Consulted ID, noted:  E coli and Candida parapsilosis in wound culture from 12/22/2021.  ID final recommendations for antibiotics as above and follow-up with primary and surgery.  -Consulted pharmacy and RD for TPN management - transitioned to cyclic TPN 12/30.  -WOC.  -Nausea, pain control.     Alk phos elevation, now stable  Continues to climb day by day though now stable in the low 200s.  Rest of LFTs okay.  Stable leukocytosis around 11.  Had been just monitoring, though Chema spiked a fever overnight into 12/27 and looked mildly ill that morning.  Endorsing RUQ TTP on exam.  RUQ US showing sludge but no signs of acute kat.  -cyclic TPN.  Additional daily lab checks outside of regularly scheduled TPN checks not indicated at this time unless he develops new symptoms.     Depression  Insomnia  Hx of this. Reports symptoms of low motivation, little interest in doing things, poor sleep, no appetite. Is on buspar, mirtazapine, and effexor at home, though doesn't really think medicines are as helpful as talk therapy is. Wanted to speak to social work and .  States no plan to harm himself. Reports he just needs to leave the hospital.  -Consult psych for med management and plan for talk therapy while hospitalized.  -Discontinued buspar and mirtazapine per psych.  -Increased venlafaxine and gabapentin per psych.  -Seroquel for sleep.  -Vistaril at bedtime.     Moderate malnutrition  Per RD eval.  -TPN per RD and pharmacy.     Normocytic anemia  Stable at 11.5, MCV of 93.     Chronic Conditions:  Hypothyroidism- PTA levothyroxine.  Chronic pain- Hold mexolicam 7.5mg daily, resume PTA gabapentin.  BPH-hold home Flomax given he states it does not help.    FEN: TPN.  Clear liquid diet as tolerated.  DVT Prophylaxis: Lovenox.  Code: Full code.    Disposition/Advanced Care Planning  Discharge Planning discussed with patient and care team.  Anticipated discharge date: Multiple days.  Pending placement.  Disposition: LTC.    Subjective  Patient feeling better today than he has the past couple of days.  Back on clear liquids, which seem to be helping.    Objective    Vital signs in last 24 hours Temp:  [98.4  F (36.9  C)-99.5  F (37.5  C)] 98.7  F (37.1  C)  Pulse:  [] 113  Resp:  [18] 18  BP: (102-124)/(62-74) 102/62  SpO2:  [90 %-92 %] 92 %   Weight: [unfilled]    Intake/Output last 3 shift I/O last 3 completed shifts:  In: 3246 [P.O.:180]  Out: 825 [Urine:400; Drains:425]    Intake/Output this shift:I/O this shift:  In: -   Out: 150 [Drains:150]    Physical Exam  General appearance: alert, appears stated age, cooperative and no distress.  Head: Normocephalic, without obvious abnormality, atraumatic.  NC in place.  Eyes: conjunctivae/corneas clear.  Throat: MMM.  Neck: supple, symmetrical, trachea midline.  Lungs: no increased respiratory effort.  NC in place.  Heart: Good capillary refill.  Abdomen: non-distended, soft, appropriately tender.  Extremities: extremities normal, atraumatic, no cyanosis or edema.  Skin: Skin color, texture, turgor normal. No rashes or  lesions.  Neurologic: Alert and oriented x3, normal strength and tone.  Psychiatric: mood and affect appropriate.    Pertinent Labs and Pertinent Radiology   Lab Results: personally reviewed.     Radiology Results: Personally reviewed image/s and impression/s    Precepted patient with Dr. Lucas Aguilera.    Johnathan Lees MD  [facility], PGY-3

## 2022-01-08 NOTE — PLAN OF CARE
Pt tolerating clear liquid diet well. C/O mild abdominal pain rating it a 3/10 at the beginning of the shift and reported that it was manageable. At 2200 pt reported increase in pain, managed with prn Oxycodone.    Pt also C/O nausea at 2200, prn Zofran given. Will continue to monitor.    Pt used beside commode twice. Drains intact and patent, mostly gas.    Pt reports that his breathing is much better and did not need O2 supplement. He maintained O2 above 90% RA during this shift.      Vishnu Earl, RN

## 2022-01-08 NOTE — PLAN OF CARE
Problem: Pain (Surgery Nonspecified)  Goal: Acceptable Pain Control  Outcome: Improving     Problem: Nausea and Vomiting  Goal: Fluid and Electrolyte Balance  Outcome: Improving   Pt denies nausea, pain is manageable. TPN infusing, call light within reach and independent in room.

## 2022-01-08 NOTE — PROGRESS NOTES
"ASSESSMENT:  1. Abdominal pain, generalized        Gurdeep Dia is a 66 year old male status post explore lap lysis of adhesions with resultant 3 days later perforation reoperation oversew of defect and then 5 days later perforation again and at this time  he has fistulas above and below to his incision that are being managed with ostomy bag containment and TPN diet (clear liquids ok).     PLAN:  Clear liquids; surgery is the only service that can advance the patient's diet and this will not happen for some time yet      SUBJECTIVE:   He is doing better. He reports that he had food and ice cream the other day and had horrible nausea and distension. We discussed that he is not to eat/drink anything that he can't see through unless he is told directly from the surgery team that it is ok. Discussed again with him the need to let the fistulas close down before increasing his intake. He understands and does not want that nausea or pain back again, so states that he will make sure he \"follows the rules\".       Patient Vitals for the past 24 hrs:   BP Temp Temp src Pulse Resp SpO2   01/08/22 0826 102/62 98.7  F (37.1  C) Oral 113 18 92 %   01/08/22 0745 -- 99.5  F (37.5  C) Oral -- 18 --   01/07/22 2317 106/72 99.2  F (37.3  C) Oral 99 18 92 %   01/07/22 1539 124/74 98.4  F (36.9  C) Oral 88 18 90 %         PHYSICAL EXAM:  GEN: No acute distress, comfortable  LUNGS: CTA bilaterally  CV:RRR  ABD:soft, nontender, midline incision with ostomy bags in place without signs of leakage; right lower quadrant drain site with redness but no fluctuance, hyperthermia, or tenderness  Drains: ostomy bags with gas and succus noted   Output by Drain (mL) 01/06/22 0700 - 01/06/22 1459 01/06/22 1500 - 01/06/22 2259 01/06/22 2300 - 01/07/22 0659 01/07/22 0700 - 01/07/22 1459 01/07/22 1500 - 01/07/22 2259 01/07/22 2300 - 01/08/22 0659 01/08/22 0700 - 01/08/22 1336   Open Drain Medial;Superior Abdomen vessel loop drain 250 100  50 50 100 " 100   Open Drain Distal;Medial Abdomen mucous pouch 75 25  25 150 50 50      EXT:No cyanosis, edema or obvious abnormalities    01/07 0700 - 01/08 0659  In: 3246 [P.O.:180]  Out: 825 [Urine:400; Drains:425]    No results displayed because visit has over 200 results.             THOMAS Givens CNP

## 2022-01-08 NOTE — PROGRESS NOTES
Nutrition Therapy  Parenteral Nutrition follow-up       RECOMMENDATIONS FOR MDs/PROVIDERS TO ORDER:  None      Dietitian to Pharmacy  Continue TPN with no change    Malnutrition Status:    Severe  In the context of acute illness    Future/Additional Recommendations:  Follow labs/liver enzymes and adjust TPN per pt needs.  Adjust TPN pending p.o intake, needs       NEW FINDINGS  Per nursing -   Pain manageable last night, no nausea  Breathing better did not need O2 supplement yesterday evening shift    Current Nutrition Intake:  Diet: Clears  due to pain  Diet was advanced to regular     Intake:  180 mL clear liquids last 24 hours    Nutrition Support: TPN: cyclin gm Dextrose, 130 gm AA @ 75 mL/hr x 1 hour, 150 mL/hr x 10 hours, and 75 mL/hr x 1 hour, off.  250 mL 20% lipids 3 days a week x 12 hours daily.     Provides: 1650 mL + 250 mL lipid, 1754 kcal/day, 130 gm protein, 300 gm cho, 24 g lipid/daily average.  GIR 5.8 mg CHO/kg/min  TPN changed to cyclic 21.  TPN meets 90% of estimated kcal and 100% estimated protein needs    ANTHROPOMETRICS  Admission wt: 166 lb 6.4 oz 21  Most recent weight: 74.3 kg (163 lb 14.4 oz)  down 7 lb from 5 days prior. Down 3 lb from admit    GI:  Last stool  1 BM   Fistula/Drain x2 output 425 ml, decreased      Abdominal tenderness, intermittent nausea treated with antiemetic    Labs:   BUN 25  fsbg 112 -155 mg/dl past 24 hours, in good control  Labs reviewed    INTERVENTIONS  Noted per surgery note of :  Plan going forward would be to continue TPN until EC fistula output is minimal and we can increase oral caloric intake, this could take a couple months.    Implementation  Continue current nutrition rx    Monitoring/Evaluation  Progress toward goals will be monitored and evaluated per protocol.

## 2022-01-08 NOTE — PHARMACY-CONSULT NOTE
"Pharmacy Note: Parenteral Nutrition (PN) Management    Pharmacist consulted to dose PN for Gurdeep Dia, a 66 year old male by Dr. Mcmahon.     Subjective:     The patient was started on PN in the hospital on 12/2/2021.     Indication for PN therapy: bowel resection, plan to continue TPN until fistula heals.     Inadequate nutrition existing for > 7 days.      Enteral nutrition contraindicated due to: enterocutaneous fistula..    Social History     Tobacco Use     Smoking status: Current Every Day Smoker     Smokeless tobacco: Never Used   Substance Use Topics     Alcohol use: Not Currently     Comment: Clean for 5 years     Drug use: Not Currently     Objective:    Ht Readings from Last 1 Encounters:   12/22/21 1.702 m (5' 7\")     Wt Readings from Last 1 Encounters:   01/06/22 74.3 kg (163 lb 14.4 oz)       Body mass index is 25.67 kg/m .    Patient Vitals for the past 96 hrs:   Weight   01/06/22 1555 74.3 kg (163 lb 14.4 oz)       Labs:  Last 3 days:  Recent Labs     01/06/22  0629 01/07/22  0706 01/08/22  0656    136 135*   POTASSIUM 4.8 4.3 4.4   CHLORIDE 100 99 98   CO2 28 29 31   BUN 29* 24* 29*   CR 0.79 0.81 0.81   VIJAYA 8.2* 8.2* 8.4*   MAG  --  1.9  --    PHOS  --  2.6  --    PROTTOTAL 6.3  --   --    ALBUMIN 2.1*  --   --    HGB 10.7* 10.3* 10.2*   HCT 34.4* 32.4* 32.9*     408 441 427   BILITOTAL 1.4*  --   --    AST 26  --   --    ALT 32  --   --    ALKPHOS 162*  --   --        Glucose (past 48 hours):   Recent Labs     01/06/22  2353 01/07/22  0706 01/07/22  0818 01/07/22  1728 01/08/22  0001 01/08/22  0644 01/08/22  0656 01/08/22  0752   * 179* 149* 112* 184* 155* 159* 144*       Intake/Output (last 24 hours): I/O last 3 completed shifts:  In: 3246 [P.O.:180]  Out: 825 [Urine:400; Drains:425]    Estimated CrCl: Estimated Creatinine Clearance: 93 mL/min (based on SCr of 0.81 mg/dL).    Assessment:    Continue patient on PN therapy as a cyclic central therapy.     Given the " patient's current condition/oral intake, PN is still indicated.    Lab results reviewed:   Na decreased to 135 mmol/L (will increase sodium in TPN today)  Chloride trending down to 98 and carbon dioxide trending up to 31 (will adjust Cl:Ac ratio in TPN)    Plan:  1. Rate of PN: 75 mL/hr initially, then 150 mL/hr for 10 hours, then 75 mL/hr for 1 hour. Maintenance IV fluid rate will be adjusted appropriately to meet the total maximum IV fluids rate as ordered by provider.     2. Formula:     Amino Acids 130 grams    Dextrose 300 grams    Sodium 130 mEq/day    Potassium 75 mEq/day    Calcium 8 mEq/day    Magnesium 14 mEq/day    Phosphorus 32 mMol/day    Chloride: Acetate Ratio 1:1    Standard Multivitamins w/Vitamin K    Trace Elements     3.         Fat Emulsion: 20%, 250 mL IV 3 times weekly on M Th Sa     4.         Check labs per protocol.     5.         Pharmacist will continue to follow the patient's lab results, clinical   status and blood glucose results and make adjustments as appropriate.    Thank you for the consult.  Darlyn Tillman RP  1/8/2022 10:02 AM

## 2022-01-09 ENCOUNTER — APPOINTMENT (OUTPATIENT)
Dept: PHYSICAL THERAPY | Facility: HOSPITAL | Age: 67
DRG: 393 | End: 2022-01-09
Payer: MEDICARE

## 2022-01-09 LAB
ALBUMIN SERPL-MCNC: 1.6 G/DL (ref 3.5–5)
ALP SERPL-CCNC: 99 U/L (ref 45–120)
ALT SERPL W P-5'-P-CCNC: 16 U/L (ref 0–45)
ANION GAP SERPL CALCULATED.3IONS-SCNC: 4 MMOL/L (ref 5–18)
AST SERPL W P-5'-P-CCNC: 18 U/L (ref 0–40)
ATRIAL RATE - MUSE: 173 BPM
ATRIAL RATE - MUSE: 96 BPM
BASO STIPL BLD QL SMEAR: PRESENT
BASOPHILS # BLD MANUAL: 0 10E3/UL (ref 0–0.2)
BASOPHILS NFR BLD MANUAL: 0 %
BILIRUB SERPL-MCNC: 0.9 MG/DL (ref 0–1)
BUN SERPL-MCNC: 24 MG/DL (ref 8–22)
C REACTIVE PROTEIN LHE: 20.4 MG/DL (ref 0–0.8)
CALCIUM SERPL-MCNC: 7.7 MG/DL (ref 8.5–10.5)
CHLORIDE BLD-SCNC: 101 MMOL/L (ref 98–107)
CO2 SERPL-SCNC: 27 MMOL/L (ref 22–31)
CREAT SERPL-MCNC: 0.79 MG/DL (ref 0.7–1.3)
DIASTOLIC BLOOD PRESSURE - MUSE: NORMAL MMHG
DIASTOLIC BLOOD PRESSURE - MUSE: NORMAL MMHG
EOSINOPHIL # BLD MANUAL: 0.2 10E3/UL (ref 0–0.7)
EOSINOPHIL NFR BLD MANUAL: 2 %
ERYTHROCYTE [DISTWIDTH] IN BLOOD BY AUTOMATED COUNT: 16.9 % (ref 10–15)
GFR SERPL CREATININE-BSD FRML MDRD: >90 ML/MIN/1.73M2
GLUCOSE BLD-MCNC: 162 MG/DL (ref 70–125)
HCT VFR BLD AUTO: 27 % (ref 40–53)
HGB BLD-MCNC: 8.4 G/DL (ref 13.3–17.7)
INTERPRETATION ECG - MUSE: NORMAL
INTERPRETATION ECG - MUSE: NORMAL
LYMPHOCYTES # BLD MANUAL: 3.9 10E3/UL (ref 0.8–5.3)
LYMPHOCYTES NFR BLD MANUAL: 39 %
MAGNESIUM SERPL-MCNC: 2.4 MG/DL (ref 1.8–2.6)
MCH RBC QN AUTO: 29.1 PG (ref 26.5–33)
MCHC RBC AUTO-ENTMCNC: 31.1 G/DL (ref 31.5–36.5)
MCV RBC AUTO: 93 FL (ref 78–100)
MONOCYTES # BLD MANUAL: 0.6 10E3/UL (ref 0–1.3)
MONOCYTES NFR BLD MANUAL: 6 %
NEUTROPHILS # BLD MANUAL: 5.4 10E3/UL (ref 1.6–8.3)
NEUTROPHILS NFR BLD MANUAL: 53 %
P AXIS - MUSE: 26 DEGREES
P AXIS - MUSE: 99 DEGREES
PLAT MORPH BLD: ABNORMAL
PLATELET # BLD AUTO: 400 10E3/UL (ref 150–450)
POLYCHROMASIA BLD QL SMEAR: SLIGHT
POTASSIUM BLD-SCNC: 4 MMOL/L (ref 3.5–5)
PR INTERVAL - MUSE: 162 MS
PR INTERVAL - MUSE: 94 MS
PROCALCITONIN SERPL-MCNC: 0.48 NG/ML (ref 0–0.49)
PROT SERPL-MCNC: 5.6 G/DL (ref 6–8)
QRS DURATION - MUSE: 94 MS
QRS DURATION - MUSE: 98 MS
QT - MUSE: 240 MS
QT - MUSE: 356 MS
QTC - MUSE: 407 MS
QTC - MUSE: 449 MS
R AXIS - MUSE: -10 DEGREES
R AXIS - MUSE: -36 DEGREES
RBC # BLD AUTO: 2.89 10E6/UL (ref 4.4–5.9)
RBC MORPH BLD: ABNORMAL
SODIUM SERPL-SCNC: 132 MMOL/L (ref 136–145)
SYSTOLIC BLOOD PRESSURE - MUSE: NORMAL MMHG
SYSTOLIC BLOOD PRESSURE - MUSE: NORMAL MMHG
T AXIS - MUSE: -38 DEGREES
T AXIS - MUSE: 1 DEGREES
TROPONIN I SERPL-MCNC: 0.02 NG/ML (ref 0–0.29)
VENTRICULAR RATE- MUSE: 173 BPM
VENTRICULAR RATE- MUSE: 96 BPM
WBC # BLD AUTO: 10.1 10E3/UL (ref 4–11)

## 2022-01-09 PROCEDURE — 250N000013 HC RX MED GY IP 250 OP 250 PS 637: Performed by: INTERNAL MEDICINE

## 2022-01-09 PROCEDURE — 99222 1ST HOSP IP/OBS MODERATE 55: CPT | Performed by: INTERNAL MEDICINE

## 2022-01-09 PROCEDURE — 3E033XZ INTRODUCTION OF VASOPRESSOR INTO PERIPHERAL VEIN, PERCUTANEOUS APPROACH: ICD-10-PCS | Performed by: INTERNAL MEDICINE

## 2022-01-09 PROCEDURE — 250N000013 HC RX MED GY IP 250 OP 250 PS 637: Performed by: NURSE PRACTITIONER

## 2022-01-09 PROCEDURE — 99207 PR NO CHARGE CURBSIDE PS: CPT | Performed by: INTERNAL MEDICINE

## 2022-01-09 PROCEDURE — 258N000003 HC RX IP 258 OP 636

## 2022-01-09 PROCEDURE — 250N000013 HC RX MED GY IP 250 OP 250 PS 637: Performed by: STUDENT IN AN ORGANIZED HEALTH CARE EDUCATION/TRAINING PROGRAM

## 2022-01-09 PROCEDURE — 97530 THERAPEUTIC ACTIVITIES: CPT | Mod: GP

## 2022-01-09 PROCEDURE — 99291 CRITICAL CARE FIRST HOUR: CPT | Performed by: INTERNAL MEDICINE

## 2022-01-09 PROCEDURE — 93005 ELECTROCARDIOGRAM TRACING: CPT | Performed by: INTERNAL MEDICINE

## 2022-01-09 PROCEDURE — 250N000009 HC RX 250

## 2022-01-09 PROCEDURE — 250N000011 HC RX IP 250 OP 636

## 2022-01-09 PROCEDURE — 83735 ASSAY OF MAGNESIUM: CPT | Performed by: INTERNAL MEDICINE

## 2022-01-09 PROCEDURE — 86140 C-REACTIVE PROTEIN: CPT | Performed by: INTERNAL MEDICINE

## 2022-01-09 PROCEDURE — 250N000009 HC RX 250: Performed by: FAMILY MEDICINE

## 2022-01-09 PROCEDURE — 97110 THERAPEUTIC EXERCISES: CPT | Mod: GP

## 2022-01-09 PROCEDURE — 99232 SBSQ HOSP IP/OBS MODERATE 35: CPT | Mod: GC | Performed by: STUDENT IN AN ORGANIZED HEALTH CARE EDUCATION/TRAINING PROGRAM

## 2022-01-09 PROCEDURE — 84484 ASSAY OF TROPONIN QUANT: CPT | Performed by: INTERNAL MEDICINE

## 2022-01-09 PROCEDURE — 250N000011 HC RX IP 250 OP 636: Performed by: STUDENT IN AN ORGANIZED HEALTH CARE EDUCATION/TRAINING PROGRAM

## 2022-01-09 PROCEDURE — 210N000001 HC R&B IMCU HEART CARE

## 2022-01-09 PROCEDURE — 258N000003 HC RX IP 258 OP 636: Performed by: STUDENT IN AN ORGANIZED HEALTH CARE EDUCATION/TRAINING PROGRAM

## 2022-01-09 PROCEDURE — 250N000011 HC RX IP 250 OP 636: Performed by: INTERNAL MEDICINE

## 2022-01-09 PROCEDURE — 250N000009 HC RX 250: Performed by: STUDENT IN AN ORGANIZED HEALTH CARE EDUCATION/TRAINING PROGRAM

## 2022-01-09 PROCEDURE — 93010 ELECTROCARDIOGRAM REPORT: CPT | Performed by: INTERNAL MEDICINE

## 2022-01-09 PROCEDURE — 85027 COMPLETE CBC AUTOMATED: CPT | Performed by: INTERNAL MEDICINE

## 2022-01-09 PROCEDURE — 80053 COMPREHEN METABOLIC PANEL: CPT | Performed by: INTERNAL MEDICINE

## 2022-01-09 PROCEDURE — 36415 COLL VENOUS BLD VENIPUNCTURE: CPT | Performed by: INTERNAL MEDICINE

## 2022-01-09 PROCEDURE — 99024 POSTOP FOLLOW-UP VISIT: CPT | Performed by: NURSE PRACTITIONER

## 2022-01-09 PROCEDURE — 84145 PROCALCITONIN (PCT): CPT | Performed by: INTERNAL MEDICINE

## 2022-01-09 PROCEDURE — 93005 ELECTROCARDIOGRAM TRACING: CPT

## 2022-01-09 RX ORDER — ACETAMINOPHEN 650 MG/1
650 SUPPOSITORY RECTAL EVERY 4 HOURS PRN
Status: DISCONTINUED | OUTPATIENT
Start: 2022-01-09 | End: 2022-02-14 | Stop reason: HOSPADM

## 2022-01-09 RX ORDER — AMIODARONE HYDROCHLORIDE 200 MG/1
200 TABLET ORAL 3 TIMES DAILY
Status: COMPLETED | OUTPATIENT
Start: 2022-01-09 | End: 2022-01-15

## 2022-01-09 RX ORDER — ADENOSINE 3 MG/ML
6 INJECTION, SOLUTION INTRAVENOUS ONCE
Status: COMPLETED | OUTPATIENT
Start: 2022-01-09 | End: 2022-01-09

## 2022-01-09 RX ORDER — MAGNESIUM SULFATE HEPTAHYDRATE 40 MG/ML
2 INJECTION, SOLUTION INTRAVENOUS ONCE
Status: COMPLETED | OUTPATIENT
Start: 2022-01-09 | End: 2022-01-09

## 2022-01-09 RX ORDER — DILTIAZEM HYDROCHLORIDE 5 MG/ML
10 INJECTION INTRAVENOUS ONCE
Status: COMPLETED | OUTPATIENT
Start: 2022-01-09 | End: 2022-01-09

## 2022-01-09 RX ORDER — DILTIAZEM HYDROCHLORIDE 5 MG/ML
INJECTION INTRAVENOUS
Status: COMPLETED
Start: 2022-01-09 | End: 2022-01-09

## 2022-01-09 RX ORDER — PHENYLEPHRINE HCL IN 0.9% NACL 50MG/250ML
.1-6 PLASTIC BAG, INJECTION (ML) INTRAVENOUS CONTINUOUS
Status: DISCONTINUED | OUTPATIENT
Start: 2022-01-09 | End: 2022-01-09

## 2022-01-09 RX ADMIN — CEFEPIME HYDROCHLORIDE 2 G: 2 INJECTION, POWDER, FOR SOLUTION INTRAVENOUS at 14:22

## 2022-01-09 RX ADMIN — GABAPENTIN 900 MG: 300 CAPSULE ORAL at 22:36

## 2022-01-09 RX ADMIN — AMIODARONE HYDROCHLORIDE 200 MG: 200 TABLET ORAL at 14:22

## 2022-01-09 RX ADMIN — CEFEPIME HYDROCHLORIDE 2 G: 2 INJECTION, POWDER, FOR SOLUTION INTRAVENOUS at 05:24

## 2022-01-09 RX ADMIN — GABAPENTIN 200 MG: 100 CAPSULE ORAL at 16:49

## 2022-01-09 RX ADMIN — DILTIAZEM HYDROCHLORIDE 10 MG: 5 INJECTION INTRAVENOUS at 01:26

## 2022-01-09 RX ADMIN — QUETIAPINE FUMARATE 200 MG: 100 TABLET, FILM COATED ORAL at 22:33

## 2022-01-09 RX ADMIN — PANTOPRAZOLE SODIUM 40 MG: 40 TABLET, DELAYED RELEASE ORAL at 08:12

## 2022-01-09 RX ADMIN — VENLAFAXINE HYDROCHLORIDE 75 MG: 75 CAPSULE, EXTENDED RELEASE ORAL at 10:19

## 2022-01-09 RX ADMIN — ADENOSINE 6 MG: 3 INJECTION INTRAVENOUS at 01:15

## 2022-01-09 RX ADMIN — VANCOMYCIN HYDROCHLORIDE 1000 MG: 1 INJECTION, SOLUTION INTRAVENOUS at 10:57

## 2022-01-09 RX ADMIN — CEFEPIME HYDROCHLORIDE 2 G: 2 INJECTION, POWDER, FOR SOLUTION INTRAVENOUS at 21:06

## 2022-01-09 RX ADMIN — FLUCONAZOLE 200 MG: 100 TABLET ORAL at 19:31

## 2022-01-09 RX ADMIN — VANCOMYCIN HYDROCHLORIDE 1000 MG: 1 INJECTION, SOLUTION INTRAVENOUS at 22:49

## 2022-01-09 RX ADMIN — Medication 0.5 MCG/KG/MIN: at 00:03

## 2022-01-09 RX ADMIN — LEVOTHYROXINE SODIUM 25 MCG: 0.03 TABLET ORAL at 06:18

## 2022-01-09 RX ADMIN — AMIODARONE HYDROCHLORIDE 150 MG: 1.5 INJECTION, SOLUTION INTRAVENOUS at 01:21

## 2022-01-09 RX ADMIN — AMIODARONE HYDROCHLORIDE 1 MG/MIN: 50 INJECTION, SOLUTION INTRAVENOUS at 00:00

## 2022-01-09 RX ADMIN — AMIODARONE HYDROCHLORIDE 200 MG: 200 TABLET ORAL at 10:19

## 2022-01-09 RX ADMIN — OXYCODONE HYDROCHLORIDE AND ACETAMINOPHEN 1 TABLET: 5; 325 TABLET ORAL at 22:38

## 2022-01-09 RX ADMIN — MAGNESIUM SULFATE HEPTAHYDRATE 2 G: 40 INJECTION, SOLUTION INTRAVENOUS at 03:00

## 2022-01-09 RX ADMIN — ACETAMINOPHEN 650 MG: 325 TABLET ORAL at 22:38

## 2022-01-09 RX ADMIN — OXYCODONE HYDROCHLORIDE AND ACETAMINOPHEN 2 TABLET: 5; 325 TABLET ORAL at 15:48

## 2022-01-09 RX ADMIN — OXYCODONE HYDROCHLORIDE AND ACETAMINOPHEN 2 TABLET: 5; 325 TABLET ORAL at 08:12

## 2022-01-09 RX ADMIN — METOPROLOL TARTRATE 5 MG: 5 INJECTION INTRAVENOUS at 00:52

## 2022-01-09 RX ADMIN — VANCOMYCIN HYDROCHLORIDE 1500 MG: 5 INJECTION, POWDER, LYOPHILIZED, FOR SOLUTION INTRAVENOUS at 02:06

## 2022-01-09 RX ADMIN — POTASSIUM CHLORIDE: 2 INJECTION, SOLUTION, CONCENTRATE INTRAVENOUS at 20:14

## 2022-01-09 RX ADMIN — METOPROLOL TARTRATE 5 MG: 5 INJECTION INTRAVENOUS at 00:27

## 2022-01-09 RX ADMIN — ENOXAPARIN SODIUM 80 MG: 80 INJECTION SUBCUTANEOUS at 10:19

## 2022-01-09 RX ADMIN — GABAPENTIN 200 MG: 100 CAPSULE ORAL at 08:12

## 2022-01-09 RX ADMIN — AMIODARONE HYDROCHLORIDE 200 MG: 200 TABLET ORAL at 21:06

## 2022-01-09 RX ADMIN — QUETIAPINE 100 MG: 25 TABLET ORAL at 02:50

## 2022-01-09 RX ADMIN — ACETAMINOPHEN 650 MG: 650 SUPPOSITORY RECTAL at 01:50

## 2022-01-09 RX ADMIN — ENOXAPARIN SODIUM 80 MG: 80 INJECTION SUBCUTANEOUS at 21:05

## 2022-01-09 ASSESSMENT — ACTIVITIES OF DAILY LIVING (ADL)
ADLS_ACUITY_SCORE: 13
ADLS_ACUITY_SCORE: 11
ADLS_ACUITY_SCORE: 13
ADLS_ACUITY_SCORE: 11
ADLS_ACUITY_SCORE: 13
ADLS_ACUITY_SCORE: 11
ADLS_ACUITY_SCORE: 13
ADLS_ACUITY_SCORE: 13
ADLS_ACUITY_SCORE: 11
ADLS_ACUITY_SCORE: 13
ADLS_ACUITY_SCORE: 11
ADLS_ACUITY_SCORE: 13
ADLS_ACUITY_SCORE: 13

## 2022-01-09 ASSESSMENT — MIFFLIN-ST. JEOR: SCORE: 1533.63

## 2022-01-09 NOTE — CONSULTS
CARDIOLOGY CONSULTATION    Thank you, Dr. Retana, for asking the [facility] team to see Gurdeep OLIVA South in consultation to evaluate atrial flutter.      Assessment:   Paroxysmal atrial flutter with rapid ventricular response converted after amiodarone  Recent pulmonary embolism on Lovenox  Enteric fistula     Plan:   Transfer from the unit to the floor  Continue oral amiodarone  Favor transition from Lovenox to apixaban 5 mg twice daily     Current History:   66-year-old admitted with abdominal pain on 11/30. Diagnosed with SBO, underwent exploratory laparotomy and bowel resection on 11/30/21, postoperative small bowel perforation s/p exploratory laparotomy on 12/3/21, development of intra-abdominal abscess s/p drain placement on 12/9/21. . Completed Abx on 12/20. On TPN.   Patient was discharged on 12/22 and readmitted the same day due to worsening abdominal pain, increase drainage from abdominal incision, cellulitis, EC fistula, wound cultures (+) E coli and candida parapsilosis. On Ceftriaxone and fluconazole.   Patient had increase O2 requirements, CTA done on 1/6/22 showed RLL pulmonary emboli and SMV thrombosis. Starred on lovenox therapeutic dose.  He has been on TPN.  Yesterday patient developed a sustained tachycardia which was ultimately diagnosed as atrial flutter.  He was initially given IV fluids and IV metoprolol 5 mg x 2 doses.  Blood pressure gradually decreased and he was transferred to the ICU and given phenylephrine.  IV amiodarone was started.  Patient converted back to sinus rhythm about 5 AM.  Phenylephrine has been discontinued.  Chema was unaware of any palpitations rapid heart beating.  He denies chest pain but admitted to some shortness of breath.  He has no prior history of heart disease.  He has had a history of hypertension.    Past Medical History:     Past Medical History:   Diagnosis Date     Benign prostatic hyperplasia with weak urinary stream      Chronic bilateral  low back pain without sciatica      Chronic neck pain      RAVI (generalized anxiety disorder)      History of hepatitis C     treated and cured     History of substance abuse (H)      Hypertension goal BP (blood pressure) < 140/90      Moderate major depression (H)        Past Surgical History:     Past Surgical History:   Procedure Laterality Date     LAPAROSCOPY DIAGNOSTIC (GYN) N/A 11/30/2021    Procedure: LAPAROSCOPY;  Surgeon: Mekhi Richardson MD;  Location: [facility] OR     LAPAROTOMY EXPLORATORY N/A 12/3/2021    Procedure: EXPLORATORY LAPAROTOMY, WITH WASHOUT, REPAIR OF SMALL BOWEL PERFORATION.;  Surgeon: Mekhi Richardson MD;  Location: [facility] [place] OR     LAPAROTOMY, LYSIS ADHESIONS, COMBINED N/A 11/30/2021    Procedure: REPAIR ENTEROTOMY, EXTENSIVE LYSIS OF ADHESION;  Surgeon: Mekhi Richardson MD;  Location: [facility] [place] OR     PICC DOUBLE LUMEN PLACEMENT  12/3/2021          RESECT SMALL BOWEL WITHOUT OSTOMY N/A 11/30/2021    Procedure: COVERTED TO OPEN, EXPLORATORY LAPAROTOMY, SMALL BOWEL RESECTION,;  Surgeon: Mekhi Richardson MD;  Location: [facility] [place] OR     SPLENECTOMY  ~1995     ZZ APPENDECTOMY  ~1996       Family History:   No family history on file.  Family history not pertinent to Chief Complaint or presenting problem  No significant family history of heart disease  Social History:    reports that he has been smoking. He has never used smokeless tobacco. He reports previous alcohol use. He reports previous drug use.    Meds:     Medications Prior to Admission   Medication Sig Dispense Refill Last Dose     busPIRone (BUSPAR) 15 MG tablet Take 1 tablet (15 mg) by mouth 2 times daily 60 tablet 0 12/21/2021 at am     gabapentin (NEURONTIN) 300 MG capsule Take 1 capsule (300 mg) by mouth 2 times daily Morning, dinnertime 60 capsule 0 12/21/2021 at 1400     gabapentin (NEURONTIN) 300 MG capsule Take 2 capsules (600 mg) by mouth At Bedtime 60 capsule 0 12/22/2021 at 0300 in ED      levothyroxine (SYNTHROID/LEVOTHROID) 25 MCG tablet Take 1 tablet (25 mcg) by mouth daily 30 tablet 0 2021 at 0600     lipids (INTRALIPID) 20 % infusion Inject 250 mLs into the vein every 24 hours 250 mL 11 2021 at pm     mirtazapine (REMERON) 30 MG tablet Take 1 tablet (30 mg) by mouth At Bedtime 30 tablet 0 2021 at 0300 in ED     [] oxyCODONE-acetaminophen (PERCOCET) 5-325 MG tablet Take 1 tablet by mouth every 6 hours as needed for pain 12 tablet 0 2021 at Midnight in ED     parenteral nutrition - PTA/DISCHARGE ORDER The TPN formula will print on the After Visit Summary Report. 1 each 0 2021 at pm     QUEtiapine (SEROQUEL) 100 MG tablet Take 100 mg by mouth nightly as needed   2021 at hs     tamsulosin (FLOMAX) 0.4 MG capsule Take 1 capsule (0.4 mg) by mouth daily 30 capsule 0 2021 at am     venlafaxine (EFFEXOR-XR) 37.5 MG 24 hr capsule Take 1 capsule (37.5 mg) by mouth daily 30 capsule 0 2021 at am     lidocaine (LMX4) 4 % external cream Apply topically every hour as needed for pain (with VAD insertion) 45 g 0      meloxicam (MOBIC) 7.5 MG tablet Take 1 tablet (7.5 mg) by mouth daily as needed for pain 7.5 tablet 3      omeprazole (PRILOSEC) 20 MG DR capsule Take 2 capsules (40 mg) by mouth daily 60 capsule 0      sodium chloride, PF, 0.9% PF flush Irrigate with 10 mLs as directed daily 100 mL 3      triamcinolone (NASACORT) 55 MCG/ACT nasal aerosol Spray 2 sprays into both nostrils daily 16.9 mL 0 not given during recent admission        Allergies:   Penicillins    Review of Systems:   A 12 point comprehensive review of systems was negative except as noted in the current history.  Abdominal pain  Objective:      Physical Exam  Temp:  [99.3  F (37.4  C)-101.3  F (38.5  C)] 99.3  F (37.4  C)  Pulse:  [] 97  Resp:  [15-39] 32  BP: ()/(53-73) 105/68  SpO2:  [85 %-96 %] 93 %   Wt Readings from Last 2 Encounters:   22 80 kg (176 lb 5.9 oz)    12/17/21 76.8 kg (169 lb 6.4 oz)        General Appearance:    slightly tachypneic, no acute distress   HEENT:  No scleral icterus; the mucous membranes were pink and moist.   Neck: No cervical bruits or thyromegaly.    Chest: The spine was straight. The chest was symmetric.   Lungs:   Respirations unlabored; the lungs have a few fine rales at the bases   Cardiovascular:    JVP 5 cm,  auscultation reveals normal first and second heart sounds with no murmurs, rubs, or gallops. Carotid, femoral, and posterior tibial pulses are intact.   Abdomen:  No organomegaly, masses, bruits, or tenderness. Bowels sounds are present, mid abdominal drain   Extremities: No cyanosis, clubbing, or edema.   Skin: No xanthelasma.   Neurologic: Mood and affect are appropriate.             Cardiographics  ECG: Atrial flutter with 2-1 AV conduction yesterday rate 170 bpm  ECG on 12/3/2021 showed atrial fibrillation with ventricular response of approximately 100 bpm.  Echocardiogram: 12/10/2021 showed ejection fraction of 60% with no valvular abnormalities    Imaging  Chest x-ray: Right middle lobe and left lower lobe infiltrates increased slightly    Lab Review TSH 1.86. troponin normal  Lab Results   Component Value Date     01/09/2022     01/22/2020    CO2 27 01/09/2022    CO2 28 01/22/2020    BUN 24 01/09/2022    BUN 14 01/22/2020     Lab Results   Component Value Date    WBC 10.1 01/09/2022    WBC 7.1 01/22/2020    HGB 8.4 01/09/2022    HGB 14.3 01/22/2020    HCT 27.0 01/09/2022    HCT 44.6 01/22/2020    MCV 93 01/09/2022    MCV 93 01/22/2020     01/09/2022     01/22/2020     Lab Results   Component Value Date    TROPONINI 0.02 01/09/2022     No results found for: BNP

## 2022-01-09 NOTE — PHARMACY-VANCOMYCIN DOSING SERVICE
Pharmacy Vancomycin Initial Note  Date of Service 2022  Patient's  1955  66 year old, male    Indication: [facility]    Current estimated CrCl = Estimated Creatinine Clearance: 77.7 mL/min (based on SCr of 0.97 mg/dL).    Creatinine for last 3 days  2022:  6:29 AM Creatinine 0.79 mg/dL  2022:  7:06 AM Creatinine 0.81 mg/dL  2022:  6:56 AM Creatinine 0.81 mg/dL;  8:01 PM Creatinine 0.97 mg/dL    Recent Vancomycin Level(s) for last 3 days  No results found for requested labs within last 72 hours.      Vancomycin IV Administrations (past 72 hours)      No vancomycin orders with administrations in past 72 hours.                Nephrotoxins and other renal medications (From now, onward)    Start     Dose/Rate Route Frequency Ordered Stop    22 2200  vancomycin (VANCOCIN) 1,500 mg in sodium chloride 0.9 % 250 mL intermittent infusion         1,500 mg  over 90 Minutes Intravenous ONCE 22 210            Contrast Orders - past 72 hours (72h ago, onward)            Start     Dose/Rate Route Frequency Ordered Stop    22 1300  iopamidol (ISOVUE-370) solution 100 mL         100 mL Intravenous ONCE 22 1234 22 1234          True [employer] Prediction of Planned Initial Vancomycin Regimen    Loading dose: 1500 mg at 22:00 2022.  Regimen: 1000 mg IV every 12 hours.  Start time: 21:23 on 2022  Exposure target: AUC24 (range)400-600 mg/L.hr   AUC24,ss: 615 mg/L.hr  Probability of AUC24 > 400: 89 %  Ctrough,ss: 20.2 mg/L  Probability of Ctrough,ss > 20: 51 %  Probability of nephrotoxicity (Lodise ALAN ): 18 %    AUC24,ss: 615 mg/L.hr is higher than goal of 400-600. Patient was previously on 1000 mg IV q12h this admission with an initial predicted AUC24,ss of 570. After trough level drawn (13.2), new predicted AUC24,ss was 500. Will plan to dose 1000 mg q12h again with careful monitoring of renal function as patient did have a small bump in Scr  today.        Plan:  1. Start vancomycin  1500 mg x 1 then 1000 mg IV q12h.   2. Vancomycin monitoring method: AUC  3. Vancomycin therapeutic monitoring goal: 400-600 mg*h/L  4. Pharmacy will check vancomycin levels as appropriate in 1-3 Days.    5. Serum creatinine levels will be ordered daily for the first week of therapy and at least twice weekly for subsequent weeks.      Shorty Lea [facility]

## 2022-01-09 NOTE — PROGRESS NOTES
[facility] Progress Note    Assessment/Plan  Active Problems:    Abdominal pain, generalized    Patient remains hospitalized due to placement, TPN.    Chema Dia is a 67 y/o M with past medical history of splenectomy, appendectomy, HTN, and substance abuse, with recent admission to hospital 11/30-12/21/2021 for SBO with perforation s/p surgical repair. Discharged home but presents as he could not manage at home and is now requesting to be placed in TCU vs LTC. Continues on TPN.  New RUL and RLL PE found and being treated, new HAP 1/9.    RUL and RLL PE  SMV thrombosis  New supplemental oxygen requirement  Acute RLL PE found on CT as well as SMV thrombosis.  Also has RUL PE.  No evidence of right heart strain.  Most likely provoked in the setting of recent surgery/hospitalization.  -Eliquis 5 mg twice daily.    Atrial flutter  Developed this overnight 1/8-1/9 and required amiodarone and brief ICU stay for pressors.  Cardiology consulted.  Patient now stabilized and sinus rhythm, back to the floor.  -Continue oral amiodarone.  -Eliquis as above.    Nausea  New supplemental oxygen requirement  RLQ pain  Malaise  HAP  Patient has been having nausea with TPN but developed a new supplemental oxygen requirement 1/6 in addition to fatigue and general malaise and was found to have RLL PE and SMV thrombosis.  CT abdomen stable from prior though has some evidence of new inflammation in the RLQ 1/9 concerning for fistula or new fluid collection.  Unfortunately, now has evidence of left-sided HAP as well overnight 1/9.  -Repeated blood cultures 1/8.  -Switched antibiotics back to cefepime, vancomycin.  Will finish the course of fluconazole previously recommended by ID.  Wound cultures positive for Candida parapsilosis and E. coli in his prior hospitalization.  -Reconsulting ID for antibiotic guidance as patient has a fairly complicated presentation.  -No new procedure indicated per surgery.     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 Required subsequent washout on 12/3 with antibiotics and MARISA drain placement. On 12/8 found to have continued enhancing abscess with drain placed 12/9 and removed on 12/16.  Was discharged, but returned soon thereafter on 12/22 as he is unable to care for himself even assistance from family so is requesting LTC/TCU placement.  Now receiving TPN and has ongoing abdominal pain, nausea, and evidence of possible dehiscence in one of his intra-abdominal surgical sites.  -PT/OT.  Searching for TCU/LTC placement.  -Consulted surgery to follow while here.  Clear liquid diet for now.  Surgery is the only service that can advance this and will not do so for some time.  No new procedure indicated at this time.  -Consulted pharmacy and RD for TPN management - transitioned to cyclic TPN 12/30.  -WOC.  -Nausea, pain control.  -Antibiotics switched to cefepime, vancomycin for HAP as above.  Will finish course of fluconazole previously recommended by ID.  Reconsulted ID for antibiotic guidance.     Alk phos elevation, resolved  Alk phos elevated, now back to normal.  Rest of LFTs okay.  Does have sludge in gallbladder but no evidence of cholecystitis on imaging.  -Cyclic TPN.  Additional daily lab checks outside of regularly scheduled TPN checks not indicated at this time unless he develops new symptoms.     Depression  Insomnia  Hx of this. Reports symptoms of low motivation, little interest in doing things, poor sleep, no appetite. Is on buspar, mirtazapine, and effexor at home, though doesn't really think medicines are as helpful as talk therapy is. Wanted to speak to social work and . States no plan to harm himself. Reports he just needs to leave the hospital.  -Consult  psych for med management and plan for talk therapy while hospitalized.  -Discontinued buspar and mirtazapine per psych.  -Increased venlafaxine and gabapentin per psych.  -Seroquel for sleep.  -Vistaril at bedtime.     Moderate malnutrition  Per RD eval.  -TPN per RD and pharmacy.     Normocytic anemia  Stable at 11.5, MCV of 93.     Chronic Conditions:  Hypothyroidism- PTA levothyroxine.  Chronic pain- Hold mexolicam 7.5mg daily, resume PTA gabapentin.  BPH-hold home Flomax given he states it does not help.    FEN: TPN.  Clear liquid diet as tolerated.  DVT Prophylaxis: Lovenox.  Code: Full code.    Disposition/Advanced Care Planning  Discharge Planning discussed with patient and care team.  Anticipated discharge date: Multiple days.  Pending placement.  Disposition: LTC.    Subjective  Patient feeling better today than he has the past couple of days.  Back on clear liquids, which seem to be helping.    Objective    Vital signs in last 24 hours Temp:  [97.8  F (36.6  C)-101.3  F (38.5  C)] (P) 97.8  F (36.6  C)  Pulse:  [] 88  Resp:  [15-39] (P) 20  BP: ()/(53-73) (P) 107/79  SpO2:  [85 %-96 %] 92 %   Weight: [unfilled]    Intake/Output last 3 shift I/O last 3 completed shifts:  In: 1986.9 [I.V.:903.9]  Out: 2060 [Urine:400; Drains:1660]    Intake/Output this shift:I/O this shift:  In: 1110.07 [I.V.:251.87]  Out: 750 [Urine:300; Drains:450]    Physical Exam  General appearance: alert, appears stated age, cooperative and no distress.  Head: Normocephalic, without obvious abnormality, atraumatic.  NC in place.  Eyes: conjunctivae/corneas clear.  Throat: MMM.  Neck: supple, symmetrical, trachea midline.  Lungs: no increased respiratory effort.  NC in place.  Heart: Good capillary refill.  Abdomen: non-distended, soft, appropriately tender.  Extremities: extremities normal, atraumatic, no cyanosis or edema.  Skin: Skin color, texture, turgor normal. No rashes or lesions.  Neurologic: Alert and oriented x3,  normal strength and tone.  Psychiatric: mood and affect appropriate.    Pertinent Labs and Pertinent Radiology   Lab Results: personally reviewed.     Radiology Results: Personally reviewed image/s and impression/s    Precepted patient with Dr. Lucas Aguilera.    Johnathan Lees MD  [facility], PGY-3

## 2022-01-09 NOTE — PROGRESS NOTES
"ASSESSMENT:  1. Abdominal pain, generalized        Gurdeep Dia is a 66 year old male status post explore lap lysis of adhesions with resultant 3 days later perforation reoperation oversew of defect and then 5 days later perforation again and at this time  he has fistulas above and below to his incision that are being managed with ostomy bag containment and TPN diet (clear liquids ok). Being treated by medicine for PE with lovenox and now with amiodarone for new onset aflutter by cardiology.     Unsure if he RLQ has a new fluid collection that needs draining or not, discussed with Dr. Marie who will touch base with Dr. Richardson tomorrow. Patient is not toxic and is feeling very well.  In regards to the previous drain site that is opened, we will leave this covered with gauze and tape right now.  Did not pack this in case there is any fistulization to the bowel that we are not seeing on imaging.  We will express this again tomorrow and watch closely for increased drainage    PLAN:  Clear liquids; surgery is the only service that can advance the patient's diet and this will not happen for some time yet  Monitor right lower quadrant wound for increased drainage.  For the time being, do not pack this wound or place foam tape over this wound.  This should be allowed to drain and if so may require frequent dressing changes.    SUBJECTIVE:   He is feeling well. He believes the pain in his abdomen and the distention that occurred after he was given a regular diet have improved. Patient has only had a small amount of clear liquid intake since that time. This morning some \"leakage\" was noted from his old drain site in the right lower abdomen.  This was covered with gauze and foam tape.  Patient denies fever, chills, shortness of breath, chest pain, palpitations, leg pain rashes or lesions      Patient Vitals for the past 24 hrs:   BP Temp Temp src Pulse Resp SpO2 Weight   01/09/22 0945 97/64 -- -- 93 20 90 % -- "   01/09/22 0930 103/62 -- -- 94 19 91 % --   01/09/22 0915 (!) 88/60 -- -- 94 23 91 % --   01/09/22 0900 101/59 -- -- 96 25 91 % --   01/09/22 0845 104/65 -- -- 96 30 93 % --   01/09/22 0830 101/62 -- -- 96 28 93 % --   01/09/22 0815 105/68 -- -- 97 (!) 32 93 % --   01/09/22 0800 101/68 99.3  F (37.4  C) Oral 96 28 93 % --   01/09/22 0745 108/71 -- -- 100 (!) 39 90 % --   01/09/22 0730 103/71 -- -- 91 30 94 % --   01/09/22 0715 98/68 -- -- 87 28 94 % --   01/09/22 0700 95/68 -- -- 83 25 94 % --   01/09/22 0645 90/67 -- -- 81 23 93 % --   01/09/22 0630 97/67 -- -- 82 20 94 % --   01/09/22 0615 99/66 -- -- 80 22 94 % --   01/09/22 0600 93/64 -- -- 80 19 95 % --   01/09/22 0545 (!) 87/59 -- -- 75 17 95 % --   01/09/22 0530 92/54 -- -- 76 17 95 % --   01/09/22 0515 92/64 -- -- 78 18 95 % --   01/09/22 0500 95/67 -- -- 78 15 95 % --   01/09/22 0445 (!) 89/59 -- -- 104 16 95 % --   01/09/22 0430 (!) 83/63 -- -- (!) 126 18 95 % --   01/09/22 0415 (!) 86/59 -- -- 106 23 94 % --   01/09/22 0400 101/57 99.4  F (37.4  C) Oral (!) 135 26 94 % --   01/09/22 0345 101/59 -- -- (!) 144 22 -- --   01/09/22 0330 (!) 86/65 -- -- (!) 145 24 96 % --   01/09/22 0315 91/68 -- -- (!) 149 21 93 % --   01/09/22 0300 (!) 89/59 -- -- (!) 153 30 96 % --   01/09/22 0245 (!) 80/59 -- -- (!) 152 21 94 % --   01/09/22 0230 90/61 -- -- (!) 143 22 95 % --   01/09/22 0215 (!) 89/57 -- -- (!) 139 22 92 % --   01/09/22 0200 (!) 89/65 -- -- (!) 137 26 (!) 88 % --   01/09/22 0145 91/64 -- -- 103 22 91 % --   01/09/22 0135 -- -- -- 104 24 93 % --   01/09/22 0130 (!) 88/61 -- -- (!) 142 20 93 % --   01/09/22 0115 94/53 -- -- (!) 160 22 94 % --   01/09/22 0100 91/66 -- -- (!) 161 25 94 % --   01/09/22 0056 -- -- -- -- -- -- 80 kg (176 lb 5.9 oz)   01/09/22 0046 93/58 100.1  F (37.8  C) Oral (!) 162 22 94 % --   01/08/22 2240 (!) 87/53 100.1  F (37.8  C) Oral (!) 165 20 91 % --   01/08/22 2138 91/70 100.4  F (38  C) Oral (!) 170 22 93 % --   01/08/22 2106 (!)  86/62 -- -- (!) 162 -- -- --   01/08/22 2049 94/73 -- -- (!) 164 -- -- --   01/08/22 2020 98/71 -- -- (!) 165 -- -- --   01/08/22 1950 -- -- -- -- -- 93 % --   01/08/22 1949 106/67 100.1  F (37.8  C) Oral (!) 177 20 (!) 88 % --   01/08/22 1900 -- -- -- (!) 165 -- -- --   01/08/22 1533 98/61 (!) 101.3  F (38.5  C) Oral (!) 173 19 (!) 85 % --         PHYSICAL EXAM:  GEN: No acute distress, comfortable  LUNGS: CTA bilaterally  CV:RRR, SR 90's  ABD:soft, nontender, midline incision with ostomy bags in place without signs of leakage; right lower quadrant drain site; right lower quadrant previous drain site has opened and there is dark purulent drainage on the dressing surrounding tissue is no longer erythemic and there is no induration or fluctuance; cotton swab inserted noticed tracking about 3 to 4 cm approximately 10 cc of thick tan to brown purulent fluid expressed; covered with gauze and tape  Drains: ostomy bags with gas and succus noted   Output by Drain (mL) 01/07/22 0700 - 01/07/22 1459 01/07/22 1500 - 01/07/22 2259 01/07/22 2300 - 01/08/22 0659 01/08/22 0700 - 01/08/22 1459 01/08/22 1500 - 01/08/22 2259 01/08/22 2300 - 01/09/22 0659 01/09/22 0700 - 01/09/22 1032   Open Drain Medial;Superior Abdomen vessel loop drain 50 50 100 100 150 350    Open Drain Distal;Medial Abdomen mucous pouch 25 150 50 50 100 910 50      EXT:No cyanosis, edema or obvious abnormalities    01/08 0700 - 01/09 0659  In: 1986.9 [I.V.:903.9]  Out: 2060 [Urine:400; Drains:1660]    No results displayed because visit has over 200 results.             THOMAS Givens CNP

## 2022-01-09 NOTE — SIGNIFICANT EVENT
"Significant Event Note    Time of event: 9:26 PM 2022    Description of event:  Paged by RN approximately around 7:15 PM that patient's heart rate was in the 160s for at least the past hour. Vital signs charted at 1530 do note heart rate of 173, MDs were not notified at that time. Charge RN was spoken to by Dr. Cornejo this evening about this. Blood pressure in the 110s systolic, patient asymptomatic. Febrile to 101.3F.     Briefly, patient is admitted for SBO with complicated abdominal history including surgeries, ileostomy. New pulmonary emboli found on CT on  (had new O2 requirement). Was awaiting placement.     Went to assess the patient. STAT EKG obtained which showed sinus tachycardia with a rate in the low 170s. 1L normal saline bolus ordered. STAT labs ordered - blood cultures, lactate, VBG, BMP, CBC. CXR ordered. Physical exam nonfocal - patient laying in bed, conversant, joking around, states he feels \"hot\". No abdominal pain, no chest pain, no difficulty breathing. RN had to place him on 6L O2 which was increased from earlier in the day. HR in the 160s, blood pressures in the 100s systolic.     Labs resulted - WBC 16.4, up from 13.8 earlier in the day. Lactate 1.9. CMP with sodium of 132 (down from 135), K 4.8, Mag 1.7, Phos 2.8. VBG 7.50/38/30.    1L fluid bolus infused and patient was given 2 doses of 5 mg IV metoprolol with no change in his heart rate. Blood pressure did drop to 90s systolic.     Case was discussed with faculty, Dr. Aguilera, and decision was made to obtain STAT CT chest/abdomen/pelvis looking for extension of his known pulmonary emboli (though on adequate treatment of lovenox 1 mg/kg BID) versus intra-abdominal pathology.    CT resulted around 10:30 PM showin.  There is again evidence of an anastomotic leak in the right abdomen. Increasing inflammation in the adjacent fat. Small amount of extraluminal gas.  2.  There is new dilatation of small bowel proximal to " the surgical anastomosis consistent with obstruction.  3.  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  4.  Right lower lobe pulmonary embolus and right basilar consolidation without significant change.  5.  Left renal stones. No ureteral stone or hydronephrosis.    Based on these results, general surgery was paged.     11:02 PM Addendum    Spoke with Dr. Marie of general surgery. He reviewed the CT reading and determined there was no need for urgent surgical intervention tonight. No fluid collection necessitating drainage. Small amount of increased inflammation and potential new SBO should not be causing new sepsis or this significant tachycardia. The free air is not new. He agreed with broadening antibiotics.     Spoke with Dr. Huertas of cardiology. As his heart rate has continued to be in the 160s and blood pressure is now dropping (latest 87/53), even after 1.5 L IV fluids, and 2 doses of 5 mg IV metoprolol, concern that potentially rhythm is not sinus tachycardia and could be atrial flutter. Dr. Huertas looking at EKG and will call back.     11:14 PM Addendum    Received call back from Dr. Huertas of cardiology. EKG is showing atrial flutter, not sinus tachycardia. He recommends starting amiodarone drip at 1 mg/hr for 6 hours and then 0.5 mg/hr for 6 hours. If blood pressure is dropping further, needs synchronized cardioversion at 200 J with anesthesia present. Called P1 and relayed this information, order for amiodarone drip placed.     12:15 AM Addendum    Spoke with Dr. Guerra of anesthesiology who recommends starting phenylephrine drip for blood pressure support. Saw the patient at the bedside with Dr. Guerra. Esmolol bolus given with transient improvement in heart rate. Phenylephrine and amiodarone drips started. Dr. Guerra recommended 2 more doses of 5 mg IV metoprolol with phenylephrine on board. Together with nursing staff, nursing supervisor, and ICU we decided it would be best to transfer the  patient to the ICU for further care given his low blood pressures, persistent atrial flutter, and potential need for synchronized cardioversion if he were to decompensate further. Dr. Cornejo discussed the case with Dr. Retana, intensivist and care was transferred. Dr. Aguilera, faculty, updated. Appreciate multi-specialty input and excellent nursing care in the care of this patient this evening.    Lata Bishop MD  [facility], PGY-1  Pager #: [phone]

## 2022-01-09 NOTE — PROGRESS NOTES
Updated Critical Care Progress Note:  Patient transferred overnight due to unstable aflutter/fib, requiring neosynephrine. Now off vasopressors, with controlled heart rate.     He notes chills, and feels that things are going poorly. Some LLQ abdominal pain.     Will transfer out of ICU as no longer on vasopressors. Please call with questions. Transfer orders placed.    Giovanni Villafuerte, DO

## 2022-01-09 NOTE — PLAN OF CARE
Problem: Nausea and Vomiting  Goal: Fluid and Electrolyte Balance  Outcome: Improving    Problem: Hypertension Acute  Goal: Blood Pressure Within Desired Range  Outcome: Improving     Problem: Pain (Surgery Nonspecified)  Goal: Acceptable Pain Control  Outcome: Improving    Pt rating abdominal pain 7-8/10. PRN Oxycodone-APAP given x's 2 with good relief. Pt did experience some nausea this morning. PRN Zofran given. No further complaints.   Pouches x's 2 changed at 1630.  Pt's BP soft, pulse elevated. House officer called, STAT EKG completed. See results.   TPN to start at 2000.  Q 6 hour BS checks.

## 2022-01-09 NOTE — PHARMACY-CONSULT NOTE
"Pharmacy Note: Parenteral Nutrition (PN) Management    Pharmacist consulted to dose PN for Gurdeep Dia, a 66 year old male by Dr. Mcmahon.    Subjective:    The patient was started on PN in the hospital on 12/2/2021.    Indication for PN therapy: bowel resection, plan to continue TPN until fistula heals    Inadequate nutrition existing for > 7 days.     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        Social History     Tobacco Use     Smoking status: Current Every Day Smoker     Smokeless tobacco: Never Used   Substance Use Topics     Alcohol use: Not Currently     Comment: Clean for 5 years     Drug use: Not Currently     Objective:    Ht Readings from Last 1 Encounters:   12/22/21 1.702 m (5' 7\")     Wt Readings from Last 1 Encounters:   01/09/22 80 kg (176 lb 5.9 oz)       Body mass index is 27.62 kg/m .    Patient Vitals for the past 96 hrs:   Weight   01/09/22 0056 80 kg (176 lb 5.9 oz)   01/06/22 1555 74.3 kg (163 lb 14.4 oz)       Labs:  Last 3 days:  Recent Labs     01/07/22  0706 01/08/22  0656 01/08/22 2001 01/08/22 2001 01/09/22  0513    135* 132*  --  132*   POTASSIUM 4.3 4.4 4.8  --  4.0   CHLORIDE 99 98 97*  --  101   CO2 29 31 26  --  27   BUN 24* 29* 28*  --  24*   CR 0.81 0.81 0.97  --  0.79   VIJAYA 8.2* 8.4* 8.8  --  7.7*   MAG 1.9  --  1.7*  --  2.4   PHOS 2.6  --  2.8  --   --    PROTTOTAL  --   --  6.6  --  5.6*   ALBUMIN  --   --  2.0*  --  1.6*   HGB 10.3* 10.2* 10.4*  --  8.4*   HCT 32.4* 32.9* 32.9*  --  27.0*    427 431  --  400   BILITOTAL  --   --  1.5*  --  0.9   AST  --   --  22  --  18   ALT  --   --  21  --  16   ALKPHOS  --   --  129*   < > 99    < > = values in this interval not displayed.       Glucose (past 48 hours):   Recent Labs     01/08/22  0644 01/08/22  0656 01/08/22  0752 01/08/22  1124 01/08/22  1250 01/08/22  1722 01/08/22 2001 01/08/22  2024 01/08/22  2341 01/09/22  0513   * 159* 144* 132* 119* 115* 100 88 134* 162* "       Intake/Output (last 24 hours): I/O last 3 completed shifts:  In: 1986.9 [I.V.:903.9]  Out: 2060 [Urine:400; Drains:1660]    Estimated CrCl: Estimated Creatinine Clearance: 92 mL/min (based on SCr of 0.79 mg/dL).    Assessment:    Continue patient on PN therapy as a cyclic central therapy.     Given the patient's current condition/oral intake, PN is still indicated.    Lab results reviewed: Na continues to trend down.  Additional volume with drips may have been given overnight, will not adjust in TPN today    Plan:  1. Rate of PN: 75 mL/hr initially,then 150 mL/hr x 10 hours, then 75ml/hr x 1 hour   2. Formula:     Amino Acids 130 grams    Dextrose 300 grams    Sodium 130 mEq/day    Potassium 75 mEq/day    Calcium 8 mEq/day    Magnesium 14 mEq/day    Phosphorus 32 mMol/day    Chloride: Acetate Ratio 1:1    Standard Multivitamins w/Vitamin K    Trace Elements      3. Fat Emulsion: 20%, 250 mL IV 3 times weekly on M Th Sa  4. Check per protocol   5. Pharmacist will continue to follow the patient's lab results, clinical status and blood glucose results and make adjustments as appropriate.    Thank you for the consult.  Mansi Smith RPH  1/9/2022 10:19 AM

## 2022-01-09 NOTE — PROGRESS NOTES
Nutrition Therapy  Parenteral Nutrition follow-up       RECOMMENDATIONS FOR MDs/PROVIDERS TO ORDER:  None      Dietitian to Pharmacy  Continue Cyclic TPN with no change    Malnutrition Status:    Severe  In the context of acute illness    Future/Additional Recommendations:  Follow labs/liver enzymes and adjust TPN per pt needs.  Adjust TPN pending p.o intake, needs       NEW FINDINGS  Transferred to ICU last night SVT, atrial flutter with RVR, became hypotensive  Converted to NSR HR 78 this morning    Per surgery note yesterday - Surgery is the only service that can advance the patient's diet and that will not happen for some time. (fistulas have to close down first)    Current Nutrition Intake:  Diet: Clears  due to pain  Diet was advanced to regular     Intake:  Nothing recorded yesterday    Nutrition Support: TPN: cyclin gm Dextrose, 130 gm AA @ 75 mL/hr x 1 hour, 150 mL/hr x 10 hours, and 75 mL/hr x 1 hour, off.  250 mL 20% lipids 3 days a week x 12 hours daily.     Provides: 1650 mL + 250 mL lipid, 1754 kcal/day, 130 gm protein, 300 gm cho, 24 g lipid/daily average.  GIR 5.8 mg CHO/kg/min  TPN changed to cyclic 21.  TPN meets 90% of estimated kcal and 100% estimated protein needs    ANTHROPOMETRICS  Admission wt: 166 lb 6.4 oz 21  Current weight (22) 176 lb  /6 163 lb  / 170 lb    GI:  Last stool  1 BM   Fistula/Drain x2 output 1660 mL (increased)      Abdominal tenderness, distended, hypoactive BS    Labs:   Na 132 L  BUN 24  LFT's wdl  Glucose 162 H  fsbg 88, 134  Labs reviewed    Medications:  Reviewed  IV abx, pantoprazole, levothyroxine  IV, cont amiodarone, laurel- synephrine stopped this am    INTERVENTIONS  Noted per surgery note of :  Plan going forward would be to continue TPN until EC fistula output is minimal and we can increase oral caloric intake, this could take a couple months.    Implementation  Continue current nutrition rx  Pharmacy adjusts electrolytes,  additives    Monitoring/Evaluation  Progress toward goals will be monitored and evaluated per protocol.

## 2022-01-09 NOTE — SIGNIFICANT EVENT
Received from P1 at 00:46 on presumed Atrial flutter with HR in the 160's with  ongoing Amiodarone at 1 mg and Silvino gtt at 1.1 mcg. Patient awake, anxious, cooperative, intermittent confusion, forgerful, disoriented with time. Adenosine 6 mg IV given. Confirmed Atrial flutter by Dr. Retana at the bedside. Amiodarone bolus given and  10 mg IV diltiazem. HR slowed down at 105 which only lasted for one hour then in the  130's. Patient restless all night, irritable, complaining about the bed, cables and  not wanting to be in the ICU. Gave PRN Seroquel, updated Lainey. Magnesium 2 gms IV given.Pateient Finally fall asleep by 04:00 and converted to NSR in the 70's by 05:00.

## 2022-01-09 NOTE — SIGNIFICANT EVENT
Upon start of my shift at 1900 patients HR was in the 160's/170's. House notified immediately. Read residents significant event note for the description of events to follow.
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
## 2022-01-10 ENCOUNTER — APPOINTMENT (OUTPATIENT)
Dept: PHYSICAL THERAPY | Facility: HOSPITAL | Age: 67
DRG: 393 | End: 2022-01-10
Payer: MEDICARE

## 2022-01-10 LAB
ANION GAP SERPL CALCULATED.3IONS-SCNC: 5 MMOL/L (ref 5–18)
BUN SERPL-MCNC: 22 MG/DL (ref 8–22)
CALCIUM SERPL-MCNC: 8 MG/DL (ref 8.5–10.5)
CHLORIDE BLD-SCNC: 106 MMOL/L (ref 98–107)
CO2 SERPL-SCNC: 26 MMOL/L (ref 22–31)
CREAT SERPL-MCNC: 0.76 MG/DL (ref 0.7–1.3)
ERYTHROCYTE [DISTWIDTH] IN BLOOD BY AUTOMATED COUNT: 16.7 % (ref 10–15)
GFR SERPL CREATININE-BSD FRML MDRD: >90 ML/MIN/1.73M2
GLUCOSE BLD-MCNC: 152 MG/DL (ref 70–125)
HCT VFR BLD AUTO: 27.2 % (ref 40–53)
HGB BLD-MCNC: 8.6 G/DL (ref 13.3–17.7)
INR PPP: 1.21 (ref 0.85–1.15)
LACTATE SERPL-SCNC: 1 MMOL/L (ref 0.7–2)
MAGNESIUM SERPL-MCNC: 2.2 MG/DL (ref 1.8–2.6)
MCH RBC QN AUTO: 29.6 PG (ref 26.5–33)
MCHC RBC AUTO-ENTMCNC: 31.6 G/DL (ref 31.5–36.5)
MCV RBC AUTO: 94 FL (ref 78–100)
PHOSPHATE SERPL-MCNC: 3 MG/DL (ref 2.5–4.5)
PLATELET # BLD AUTO: 413 10E3/UL (ref 150–450)
POTASSIUM BLD-SCNC: 4.2 MMOL/L (ref 3.5–5)
PREALB SERPL IA-MCNC: 8 MG/DL (ref 19–38)
RBC # BLD AUTO: 2.91 10E6/UL (ref 4.4–5.9)
SODIUM SERPL-SCNC: 137 MMOL/L (ref 136–145)
TRIGL SERPL-MCNC: 106 MG/DL
VANCOMYCIN SERPL-MCNC: 14.3 MG/L
WBC # BLD AUTO: 8.3 10E3/UL (ref 4–11)

## 2022-01-10 PROCEDURE — 36415 COLL VENOUS BLD VENIPUNCTURE: CPT | Performed by: FAMILY MEDICINE

## 2022-01-10 PROCEDURE — 250N000009 HC RX 250: Performed by: STUDENT IN AN ORGANIZED HEALTH CARE EDUCATION/TRAINING PROGRAM

## 2022-01-10 PROCEDURE — 250N000013 HC RX MED GY IP 250 OP 250 PS 637: Performed by: INTERNAL MEDICINE

## 2022-01-10 PROCEDURE — 99024 POSTOP FOLLOW-UP VISIT: CPT | Performed by: PHYSICIAN ASSISTANT

## 2022-01-10 PROCEDURE — 250N000009 HC RX 250: Performed by: INTERNAL MEDICINE

## 2022-01-10 PROCEDURE — 99232 SBSQ HOSP IP/OBS MODERATE 35: CPT | Mod: GC | Performed by: STUDENT IN AN ORGANIZED HEALTH CARE EDUCATION/TRAINING PROGRAM

## 2022-01-10 PROCEDURE — 83735 ASSAY OF MAGNESIUM: CPT | Performed by: STUDENT IN AN ORGANIZED HEALTH CARE EDUCATION/TRAINING PROGRAM

## 2022-01-10 PROCEDURE — 83605 ASSAY OF LACTIC ACID: CPT | Performed by: FAMILY MEDICINE

## 2022-01-10 PROCEDURE — 84134 ASSAY OF PREALBUMIN: CPT | Performed by: STUDENT IN AN ORGANIZED HEALTH CARE EDUCATION/TRAINING PROGRAM

## 2022-01-10 PROCEDURE — 36415 COLL VENOUS BLD VENIPUNCTURE: CPT | Performed by: STUDENT IN AN ORGANIZED HEALTH CARE EDUCATION/TRAINING PROGRAM

## 2022-01-10 PROCEDURE — 210N000001 HC R&B IMCU HEART CARE

## 2022-01-10 PROCEDURE — 84478 ASSAY OF TRIGLYCERIDES: CPT | Performed by: STUDENT IN AN ORGANIZED HEALTH CARE EDUCATION/TRAINING PROGRAM

## 2022-01-10 PROCEDURE — 250N000013 HC RX MED GY IP 250 OP 250 PS 637: Performed by: NURSE PRACTITIONER

## 2022-01-10 PROCEDURE — 250N000011 HC RX IP 250 OP 636: Performed by: INTERNAL MEDICINE

## 2022-01-10 PROCEDURE — 80048 BASIC METABOLIC PNL TOTAL CA: CPT | Performed by: STUDENT IN AN ORGANIZED HEALTH CARE EDUCATION/TRAINING PROGRAM

## 2022-01-10 PROCEDURE — 999N000044 HC STATISTIC CVC DRESSING CHANGE

## 2022-01-10 PROCEDURE — 250N000011 HC RX IP 250 OP 636: Performed by: STUDENT IN AN ORGANIZED HEALTH CARE EDUCATION/TRAINING PROGRAM

## 2022-01-10 PROCEDURE — 80202 ASSAY OF VANCOMYCIN: CPT | Performed by: STUDENT IN AN ORGANIZED HEALTH CARE EDUCATION/TRAINING PROGRAM

## 2022-01-10 PROCEDURE — 84100 ASSAY OF PHOSPHORUS: CPT | Performed by: STUDENT IN AN ORGANIZED HEALTH CARE EDUCATION/TRAINING PROGRAM

## 2022-01-10 PROCEDURE — 85018 HEMOGLOBIN: CPT | Performed by: STUDENT IN AN ORGANIZED HEALTH CARE EDUCATION/TRAINING PROGRAM

## 2022-01-10 PROCEDURE — 250N000013 HC RX MED GY IP 250 OP 250 PS 637: Performed by: STUDENT IN AN ORGANIZED HEALTH CARE EDUCATION/TRAINING PROGRAM

## 2022-01-10 PROCEDURE — 99232 SBSQ HOSP IP/OBS MODERATE 35: CPT | Performed by: INTERNAL MEDICINE

## 2022-01-10 PROCEDURE — 85610 PROTHROMBIN TIME: CPT | Performed by: STUDENT IN AN ORGANIZED HEALTH CARE EDUCATION/TRAINING PROGRAM

## 2022-01-10 PROCEDURE — 02WY33Z REVISION OF INFUSION DEVICE IN GREAT VESSEL, PERCUTANEOUS APPROACH: ICD-10-PCS | Performed by: FAMILY MEDICINE

## 2022-01-10 RX ADMIN — VENLAFAXINE HYDROCHLORIDE 75 MG: 75 CAPSULE, EXTENDED RELEASE ORAL at 09:02

## 2022-01-10 RX ADMIN — ENOXAPARIN SODIUM 80 MG: 80 INJECTION SUBCUTANEOUS at 23:53

## 2022-01-10 RX ADMIN — QUETIAPINE FUMARATE 200 MG: 100 TABLET, FILM COATED ORAL at 23:56

## 2022-01-10 RX ADMIN — GABAPENTIN 200 MG: 100 CAPSULE ORAL at 16:10

## 2022-01-10 RX ADMIN — ACETAMINOPHEN 650 MG: 325 TABLET ORAL at 18:14

## 2022-01-10 RX ADMIN — PANTOPRAZOLE SODIUM 40 MG: 40 TABLET, DELAYED RELEASE ORAL at 09:02

## 2022-01-10 RX ADMIN — AMIODARONE HYDROCHLORIDE 200 MG: 200 TABLET ORAL at 13:55

## 2022-01-10 RX ADMIN — GABAPENTIN 900 MG: 300 CAPSULE ORAL at 23:54

## 2022-01-10 RX ADMIN — AMIODARONE HYDROCHLORIDE 200 MG: 200 TABLET ORAL at 09:02

## 2022-01-10 RX ADMIN — CEFEPIME HYDROCHLORIDE 2 G: 2 INJECTION, POWDER, FOR SOLUTION INTRAVENOUS at 06:08

## 2022-01-10 RX ADMIN — POTASSIUM CHLORIDE: 2 INJECTION, SOLUTION, CONCENTRATE INTRAVENOUS at 20:04

## 2022-01-10 RX ADMIN — LEVOTHYROXINE SODIUM 25 MCG: 0.03 TABLET ORAL at 06:08

## 2022-01-10 RX ADMIN — OXYCODONE HYDROCHLORIDE AND ACETAMINOPHEN 2 TABLET: 5; 325 TABLET ORAL at 09:10

## 2022-01-10 RX ADMIN — OXYCODONE HYDROCHLORIDE AND ACETAMINOPHEN 2 TABLET: 5; 325 TABLET ORAL at 23:54

## 2022-01-10 RX ADMIN — CEFEPIME HYDROCHLORIDE 2 G: 2 INJECTION, POWDER, FOR SOLUTION INTRAVENOUS at 23:55

## 2022-01-10 RX ADMIN — I.V. FAT EMULSION 250 ML: 20 EMULSION INTRAVENOUS at 20:08

## 2022-01-10 RX ADMIN — FLUCONAZOLE 200 MG: 100 TABLET ORAL at 20:04

## 2022-01-10 RX ADMIN — ENOXAPARIN SODIUM 80 MG: 80 INJECTION SUBCUTANEOUS at 09:02

## 2022-01-10 RX ADMIN — GABAPENTIN 200 MG: 100 CAPSULE ORAL at 09:02

## 2022-01-10 RX ADMIN — VANCOMYCIN HYDROCHLORIDE 1000 MG: 1 INJECTION, SOLUTION INTRAVENOUS at 09:17

## 2022-01-10 RX ADMIN — Medication 1 MG: at 23:54

## 2022-01-10 RX ADMIN — CEFEPIME HYDROCHLORIDE 2 G: 2 INJECTION, POWDER, FOR SOLUTION INTRAVENOUS at 13:55

## 2022-01-10 RX ADMIN — AMIODARONE HYDROCHLORIDE 200 MG: 200 TABLET ORAL at 23:54

## 2022-01-10 RX ADMIN — QUETIAPINE 100 MG: 25 TABLET ORAL at 01:17

## 2022-01-10 ASSESSMENT — ACTIVITIES OF DAILY LIVING (ADL)
ADLS_ACUITY_SCORE: 11
ADLS_ACUITY_SCORE: 13
ADLS_ACUITY_SCORE: 11
ADLS_ACUITY_SCORE: 13
ADLS_ACUITY_SCORE: 11
ADLS_ACUITY_SCORE: 13
ADLS_ACUITY_SCORE: 13
ADLS_ACUITY_SCORE: 11
ADLS_ACUITY_SCORE: 13
ADLS_ACUITY_SCORE: 13
ADLS_ACUITY_SCORE: 11
ADLS_ACUITY_SCORE: 11
ADLS_ACUITY_SCORE: 13

## 2022-01-10 ASSESSMENT — MIFFLIN-ST. JEOR: SCORE: 1535.59

## 2022-01-10 NOTE — PROGRESS NOTES
Care Management Follow Up    Length of Stay (days): 19    Expected Discharge Date: 01/11/2022     Concerns to be Addressed: discharge planning     Patient plan of care discussed at interdisciplinary rounds: Yes    Anticipated Discharge Disposition: Pt needs to become independent at TPN and wound care so can go to [phone][facility]      Anticipated Discharge Services: Other (see comment) (therapy services )  Anticipated Discharge DME:     Patient/family educated on Medicare website which has current facility and service quality ratings: yes  Education Provided on the Discharge Plan:    Patient/Family in Agreement with the Plan: yes    Referrals Placed by CM/SW: Post Acute Facilities  Private pay costs discussed: Not applicable    Additional Information:  SW received information and will discontinue looking for TCU.   Pt still has spot at [facility]. 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 (see CM note from 12-31-21 )     ([facility]  [place],[facility],   [place]-paged -they are not take admissions until 1-11  [facility] from the [facility] they are unable to accept due to clinical need and history.)      SAM Huggins

## 2022-01-10 NOTE — PROGRESS NOTES
"CLINICAL NUTRITION SERVICES - REASSESSMENT NOTE     Nutrition Prescription    RECOMMENDATIONS FOR MDs/PROVIDERS TO ORDER:  None    Malnutrition Status:    Severe    Recommendations already ordered by Registered Dietitian (RD):  Cyclic TPN over 12 hrs: D 300  75 ml/hr x 1 hr, 150 ml/hr   X 10 hrs, 75 ml/hr x 1 hr and off.  250 ml of 20% lipids 3 times per  Week over 12 hrs    Future/Additional Recommendations:  Follow labs/liver enzymes and adjust TPN per needs     EVALUATION OF THE PROGRESS TOWARD GOALS   Diet: Clear liquid  Nutrition Support: TPN: D 300  1650 ml plus 250 ml of 20% lipids 3 times per week providin Calories, 130 g protein, 300 g CHO, 24 g fat/day (average).  TPN changed to cyclic on 2021  TPN meets 90% of estimated Calorie needs and 100% estimated protein needs.  Intake: 0-25% ( -)    Per surgery note-surgery is the only service that can advance the pt's diet and that will not happen for some time (fistulas have to close down first)     ANTHROPOMETRICS  Height: 170.2 cm (5' 7\")  Most Recent Weight: 80.2 kg (176 lb 12.8 oz)    Weight History:   163 lb   170 lb    PHYSICAL FINDINGS  See malnutrition section below.    GI CONCERNS  Abdomen distended/round  Abdominal tenderness  Nausea  Hypoactive BS  Last BM 2022  I/O: 3654/2860 ()    LABS  Reviewed: Na 137, K 4.2, Mg 2.2, phos 3, prealbumin 8, , Glu 152    Assessed nutrition needs per approved practice guidelines    Dosing weight 75.5 Kg  Estimated energy needs: 2972-2950 Kcals (mifflin St Jeor)  Justification Stress factor 1.4-2.0 for fistula  Estimated protein needs: 113 g protein (1.5 g/Kg)  Justification: sound healing  Estimated fluid needs: 1830-1160 ml/day (25-30 ml/Kg or less depending on Na levels)  Justification: maintenance    MEDICATIONS  Reviewed: cefepime, diflucan, levothyroxine, pantoprazole, vancocin    MALNUTRITION:  % Weight Loss:  1-2% in 1 week  % Intake:  No decreased intake " noted  Subcutaneous Fat Loss:  Orbital region severe depletion and Upper arm region mild to moderate depletion  Muscle Loss:  Temporal region moderate depletion, Clavicle bone region severe depletion, Dorsal hand region mild to moderate depletion and Patellar region moderate depletion  Fluid Retention:  None noted    Malnutrition Diagnosis: Severe malnutrition  In Context of:  Acute illness or injury    CURRENT NUTRITION DIAGNOSIS  Malnutrition related to surgical intervention of SBO with formation of enterocutaneous fistula as evidenced by 1.7% weight loss in 1 week, severe subcutaneous fat loss and moderate muscle to severe muscle loss    INTERVENTIONS  Implementation  Noted per surgery note of 12/31: Plan going forward would be to continue TPN until EC fistula output is minimal and we can increase oral Caloric intake, this could take a couple of months  Recommend continue same TPN regimen today    Goals  Meet nutritional needs-progressing  Tolerate TPN-met  Prevent significant weight loss-met    Monitoring/Evaluation  Progress toward goals will be monitored and evaluated per protocol.

## 2022-01-10 NOTE — PROGRESS NOTES
[facility]    Infectious Disease Progress Note    Date of Service (when I saw the patient): 01/10/2022     Assessment & Plan   See id note from 12/27    1. History of splenectomy and substance abuse, recent admission with small bowel obstruction- improving  2. Status post exploratory laparotomy, small bowel resection/repair of enterotomy, extensive lysis of adhesion on 11/30/2021  3. Postoperative small bowel perforation, status post exploratory laparotomy on 12/3/2021.  4. Intra-abdominal abscess status post drain placement on 12/9/2021.  E. coli in cultures.  Treated with meropenem, IV vancomycin and transition to p.o. cefdinir plus Flagyl on 12/15/2021  5. Abscessogram on 12/16 with resolved abscess, drain removed on 12/16/2021  6. Antibiotics discontinued on 12/20/2021, discharged from the hospital on 12/20/2021 and readmitted due to worsening pain and drainage from incision, cellulitis, EC fistula  7.  E coli and Candida parapsilosis in wound culture from 12/22/2021  8. Leukocytosis improved  9. On TPN  10. PCN allergy- hives      Recommendations:  On maximal abx, would give 72 hours total (into tomorrow) and taper down back to orals to end I think a reasonable day would be this Friday.  Not the easiest case to know with certainty if abx are helping at all, actually.     Gerard Campbell MD  [facility]  [phone]        Interval History   Asked to see again, last seen jan 1 where ten days oral omnicef, flagyl, fluc suggested.  Over weekend started back on cefepime and vanco after sxs including nausea, RLQ pain, new oxygen needs etc.  See [facility] note for additional ICU details.  Out of ICU now.       Physical Exam   Temp: 98.1  F (36.7  C) Temp src: Oral BP: 128/73 Pulse: 113   Resp: 20 SpO2: 90 % O2 Device: Nasal cannula Oxygen Delivery: 4 LPM  Vitals:    01/06/22 1555 01/09/22 0056 01/10/22 0655   Weight: 74.3 kg (163 lb 14.4 oz) 80 kg (176 lb 5.9 oz) 80.2  kg (176 lb 12.8 oz)     Vital Signs with Ranges  Temp:  [97.7  F (36.5  C)-102  F (38.9  C)] 98.1  F (36.7  C)  Pulse:  [] 113  Resp:  [18-24] 20  BP: ()/(62-79) 128/73  SpO2:  [87 %-94 %] 90 %      Constitutional: No apparent distress  Lungs: normal breathing pattern  Cardiovascular:  No JVD elevation  Abdomen: drainage drain site. Ostomy bags on abdominal wall, 2 fistulas  Skin: no rash  Neuro alert oriented    Medications     dextrose       parenteral nutrition - ADULT compounded formula CYCLE       parenteral nutrition - ADULT compounded formula CYCLE 75 mL/hr at 01/10/22 0703       amiodarone  200 mg Oral TID     ceFEPIme (MAXIPIME) IV  2 g Intravenous Q8H     enoxaparin ANTICOAGULANT  1 mg/kg Subcutaneous Q12H     fluconazole  200 mg Oral Daily     gabapentin  200 mg Oral BID     gabapentin  900 mg Oral At Bedtime     hydrOXYzine  25 mg Oral At Bedtime     levothyroxine  25 mcg Oral Daily     lipids  250 mL Intravenous Once per day on Mon Thu Sat     pantoprazole  40 mg Oral QAM AC     QUEtiapine  200 mg Oral At Bedtime     sodium chloride (PF)  3 mL Intracatheter Q8H     vancomycin  1,000 mg Intravenous Q12H     venlafaxine  75 mg Oral Daily       Data   All microbiology laboratory data reviewed.  Recent Labs   Lab Test 01/10/22  0532 01/09/22  0513 01/08/22 2001   WBC 8.3 10.1 16.4*   HGB 8.6* 8.4* 10.4*   HCT 27.2* 27.0* 32.9*   MCV 94 93 93    400 431     Recent Labs   Lab Test 01/10/22  0532 01/09/22  0513 01/08/22 2001   CR 0.76 0.79 0.97     MICRO: reviewed      12/22/2021 1326 12/25/2021 0703 Wound Aerobic Bacterial Culture Routine [[id number]]    (Abnormal)   Wound from Abdomen    Final result Component Value   Culture Culture in progress    3+ Escherichia coli Abnormal     3+ Escherichia coli Abnormal     2+ Candida parapsilosis complex Abnormal          Susceptibility     Escherichia coli (1) Escherichia coli (2)     DENA DENA     Ampicillin >=32.0 ug/mL Resistant >=32.0 ug/mL  Resistant     Ampicillin/ Sulbactam >=32.0 ug/mL Resistant >=32.0 ug/mL Resistant     Cefepime <=1.0 ug/mL Susceptible <=1.0 ug/mL Susceptible     Ceftazidime <=1.0 ug/mL Susceptible 2.0 ug/mL Susceptible     Ceftriaxone <=1.0 ug/mL Susceptible <=1.0 ug/mL Susceptible     Ciprofloxacin >=4.0 ug/mL Resistant >=4.0 ug/mL Resistant     Gentamicin <=1.0 ug/mL Susceptible <=1.0 ug/mL Susceptible     Levofloxacin >=8.0 ug/mL Resistant >=8.0 ug/mL Resistant     Meropenem <=0.25 ug/mL Susceptible <=0.25 ug/mL Susceptible     Piperacillin/Tazobactam >=128.0 ug/mL Resistant >=128.0 ug/mL Resistant     Tobramycin <=1.0 ug/mL Susceptible <=1.0 ug/mL Susceptible     Trimethoprim/Sulfamethoxazole >16/304 ug/mL Resistant >16/304 ug/mL Resistant                           12/22/2021 1648 12/23/2021 2017 Blood Culture Peripheral Blood [[id number]]   Peripheral Blood    Preliminary result Component Value   Culture No growth after 1 day P              12/22/2021 1648 12/23/2021 2017 Blood Culture Peripheral Blood [[id number]]   Peripheral Blood    Preliminary result Component Value   Culture No growth after 1 day P              12/22/2021 1326 12/24/2021 1412 Wound Aerobic Bacterial Culture Routine [[id number]]   (Abnormal)   Wound from Abdomen    Preliminary result Component Value   Culture Culture in progress P    3+ Gram negative bacilli Abnormal  P    3+ Gram negative bacilli Abnormal  P    2+ Candida parapsilosis complex Abnormal  P              12/22/2021 0018 12/22/2021 0048 Asymptomatic COVID-19 Virus (Coronavirus) by PCR Nasopharyngeal [[id number]]    Swab from Nasopharyngeal    Final result Component Value   SARS CoV2 PCR Negative   NEGATIVE: SARS-CoV-2 (COVID-19) RNA not detected, presumed negative.           12/17/2021 1615 12/17/2021 1844 Asymptomatic COVID-19 Virus (Coronavirus) by PCR Nasopharyngeal [[id number]]    Swab from Nasopharyngeal    Final result Component Value   SARS CoV2 PCR Negative   NEGATIVE:  SARS-CoV-2 (COVID-19) RNA not detected, presumed negative.           12/09/2021 1204 12/12/2021 0831 Body fluid, unsp Aerobic Bacterial Culture Routine [[id number]]    (Abnormal)   Body fluid, unsp from Pelvis    Final result Component Value   Culture 2+ Escherichia coli Abnormal          Susceptibility     Escherichia coli     DENA     Ampicillin >=32.0 ug/mL Resistant     Ampicillin/ Sulbactam 16.0 ug/mL Intermediate     Cefepime <=1.0 ug/mL Susceptible     Ceftazidime <=1.0 ug/mL Susceptible     Ceftriaxone <=1.0 ug/mL Susceptible     Ciprofloxacin >=4.0 ug/mL Resistant     Gentamicin <=1.0 ug/mL Susceptible     Levofloxacin >=8.0 ug/mL Resistant     Meropenem <=0.25 ug/mL Susceptible     Piperacillin/Tazobactam <=4.0 ug/mL Susceptible     Tobramycin <=1.0 ug/mL Susceptible     Trimethoprim/Sulfamethoxazole >16/304 ug/mL Resistant                   12/09/2021 1203 12/16/2021 0801 Anaerobic culture [[id number]]   Body fluid, unsp from Pelvis    Final result Component Value   Culture No anaerobic organisms isolated              12/08/2021 1507 12/08/2021 1607 Asymptomatic COVID-19 Virus (Coronavirus) by PCR Nasopharyngeal [[id number]]    Swab from Nasopharyngeal    Final result Component Value   SARS CoV2 PCR Negative   NEGATIVE: SARS-CoV-2 (COVID-19) RNA not detected, presumed negative.           12/06/2021 2140 12/12/2021 1031 Blood Culture Line, venous [[id number]]   Blood from Line, venous    Final result Component Value   Culture No Growth              12/05/2021 1645 12/10/2021 2031 Blood Culture Peripheral Blood [[id number]]   Peripheral Blood    Final result Component Value   Culture No Growth              12/05/2021 1645 12/10/2021 2031 Blood Culture Peripheral Blood [[id number]]    Peripheral Blood    Final result Component Value   Culture No Growth              12/05/2021 1638 12/06/2021 2126 Urine Culture [[id number]]   Urine, Roblero Catheter    Final result Component Value   Culture No  Growth              11/30/2021 1153 11/30/2021 1241 Asymptomatic COVID-19 Virus (Coronavirus) by PCR Nasopharyngeal [[id number]]    Swab from Nasopharyngeal    Final result Component Value   SARS CoV2 PCR Negative   NEGATIVE: SARS-CoV-2 (COVID-19) RNA not detected, presumed negative.                 RADIOLOGY:    CT Abd Peritoneum Abscess Drain w Cath Place    Result Date: 12/9/2021  EXAM: 1. PERCUTANEOUS DRAIN PLACEMENT PELVIC ABSCESS 2. CT GUIDANCE 3. CONSCIOUS SEDATION LOCATION: [facility] DATE/TIME: 12/9/2021 9:50 AM INDICATION: s/p two surgeries with elevating wbc, ct with pelvic abscess. TECHNIQUE: Dose reduction techniques were used. PROCEDURE: Informed consent obtained. Site marked. Prior images reviewed. Required items made available. Patient identity confirmed verbally and with arm band. Patient reevaluated immediately before administering sedation. Universal protocol was followed. Time out performed. The site was prepped and draped in sterile fashion. 10 mL of 1% lidocaine was infused into the local soft tissues. Using standard technique and under direct CT guidance, a 10 Wallisian drainage catheter was inserted into the fluid collection.  SPECIMEN: 30 mL of brownish thin fluid was aspirated and sent to lab for cultures and Gram stain. BLOOD LOSS: Minimal. The patient tolerated the procedure well. No immediate complications. SEDATION: Versed 2.0 mg. Fentanyl 100 mcg. The procedure was performed with administration intravenous conscious sedation with appropriate preoperative, intraoperative, and postoperative evaluation. 30 minutes of supervised face to face conscious sedation time was provided by a radiology nurse under my direct supervision.     IMPRESSION: 1.  Successful CT-guided drain placement into pelvic abscess. 30 mL brownish thin fluid removed and sent for cultures. Reference CPT Codes: 48993, 19389, 71074    Echocardiogram Complete    Result Date: 12/10/2021  [id number]  [id number]^CHRISTOPHER^[address] [address]  Name: ISABELA DUNN MRN: [id number] : 1955 Study Date: 12/10/2021 01:14 PM Age: 66 yrs Gender: Male Patient Location: [facility] Reason For Study: [place] Ordering Physician: FLOWER WHITE Performed By:   BSA: 1.8 m2 Height: 65 in Weight: 155 lb HR: 84 ______________________________________________________________________________ Procedure Complete Echo Adult. Adequate quality two-dimensional was performed and interpreted. ______________________________________________________________________________ Interpretation Summary  The left ventricle is normal in size with borderline concentric left ventricular hypertrophy. Left ventricular function is normal.The ejection fraction is 60-65%. Normal right ventricle size and systolic function. No significant valve disease is identified.  There is no comparison study available. ______________________________________________________________________________ I      WMSI = 1.00     % Normal = 100  X - Cannot   0 -                      (2) - Mildly 2 -          Segments  Size Interpret    Hyperkinetic 1 - Normal  Hypokinetic  Hypokinetic  1-2     small                                                    7 -          3-5    moderate 3 - Akinetic 4 -          5 -         6 - Akinetic Dyskinetic   6-14    large              Dyskinetic   Aneurysmal  w/scar       w/scar       15-16   diffuse  Left Ventricle The left ventricle is normal in size. Left ventricular function is normal.The ejection fraction is 60-65%. There is borderline concentric left ventricular hypertrophy. Left ventricular diastolic function is normal. No regional wall motion abnormalities noted.  Right Ventricle Normal right ventricle size and systolic function.  Atria Normal left atrial size. Right atrial size is normal. There is no color Doppler evidence of an atrial shunt.  Mitral Valve Mitral valve  leaflets appear normal. There is no evidence of mitral stenosis or clinically significant mitral regurgitation.  Tricuspid Valve Tricuspid valve leaflets appear normal. There is no evidence of tricuspid stenosis or clinically significant tricuspid regurgitation. Right ventricle systolic pressure estimate normal. The right ventricular systolic pressure is approximated at 18.7 mmHg plus the right atrial pressure.  Aortic Valve The aortic valve is trileaflet. Aortic valve leaflets appear normal. There is no evidence of aortic stenosis or clinically significant aortic regurgitation. There is trace aortic regurgitation.  Pulmonic Valve The pulmonic valve is not well seen, but is grossly normal. This degree of valvular regurgitation is within normal limits. There is trace pulmonic valvular regurgitation.  Vessels The aorta root is normal. The ascending aorta is Mildly dilated. IVC diameter <2.1 cm collapsing >50% with sniff suggests a normal RA pressure of 3 mmHg.  Pericardium There is no pericardial effusion.  Rhythm Sinus rhythm was noted.  ______________________________________________________________________________ MMode/2D Measurements & Calculations IVSd: 1.3 cm LVIDd: 5.0 cm LVIDs: 3.2 cm LVPWd: 0.86 cm  FS: 36.6 % LV mass(C)d: 201.1 grams LV mass(C)dI: 113.3 grams/m2 Ao root diam: 3.9 cm LA dimension: 4.1 cm asc Aorta Diam: 3.8 cm LA/Ao: 1.0 LVOT diam: 2.4 cm LVOT area: 4.6 cm2 LA Volume Indexed (AL/bp): 29.6 ml/m2 RWT: 0.34  Doppler Measurements & Calculations MV E max gary: 80.4 cm/sec MV A max gary: 62.9 cm/sec MV E/A: 1.3  MV dec slope: 292.8 cm/sec2 MV dec time: 0.27 sec Ao V2 max: 178.1 cm/sec Ao max P.0 mmHg Ao V2 mean: 130.5 cm/sec Ao mean P.9 mmHg Ao V2 VTI: 29.4 cm SUMAN(I,D): 3.2 cm2 SUMAN(V,D): 3.1 cm2 LV V1 max P.5 mmHg LV V1 max: 117.6 cm/sec LV V1 VTI: 20.2 cm SV(LVOT): 93.4 ml SI(LVOT): 52.6 ml/m2 PA acc time: 0.12 sec TR max gary: 216.1 cm/sec TR max P.7 mmHg AV Gary Ratio (DI): 0.66  SUMAN Index (cm2/m2): 1.8 E/E' av.3 Lateral E/e': 7.9 Medial E/e': 16.6  ______________________________________________________________________________ Report approved by: Hany Currie 12/10/2021 02:23 PM       XR Chest Port 1 View    Result Date: 2021  EXAM: XR CHEST PORT 1 VIEW LOCATION: [facility] DATE/TIME: 2021 1:39 PM INDICATION: Worsening hypoxia, evaluate for pneumonia, pulm edema COMPARISON: Chest x-ray 2021     IMPRESSION: Enteric suction tube tip and side-port within the upper gastric lumen. Satisfactory right PICC line position with the tip near the cavoatrial junction. Persistent low lung volumes. No definite pneumothorax. Mildly improved bibasilar pulmonary  opacities favored to reflect atelectasis. Persistent interstitial coarsening. Suspected trace right-sided pleural fluid. Stable heart size and central vascular prominence.    XR Chest Port 1 View    Result Date: 12/3/2021  EXAM: XR CHEST PORT 1 VIEW LOCATION: [facility] DATE/TIME: 12/3/2021 8:31 PM INDICATION: RN placed PICC - verify tip placement COMPARISON: None.     IMPRESSION: Right PICC line present with its tip likely just into the right atrium. Suggest withdrawing the PICC line 2 cm to place the tip in the low SVC. NG tube in good position in the stomach. Mild cardiomegaly. Low lung volumes with mild patchy mixed interstitial and airspace infiltrates    IR Abscess Tube Check    Result Date: 2021  LOCATION: [facility] DATE: 2021 PROCEDURE: ABSCESS TUBE CHECK INTERVENTIONAL RADIOLOGIST: Demario Porter MD INDICATION: Pelvic abscess. Status post jugular drain placement on 2021. Markedly decreased outputs. CT from today demonstrating near complete resolution of the abscess cavity.. CONSENT: The procedure, risks and benefits of an abscessogram were discussed with the patient  in detail. All questions were answered. Informed  consent was given to proceed with the procedure. MODERATE SEDATION: None. CONTRAST: Omnipaque 350: 2 mL. ANTIBIOTICS: None. ADDITIONAL MEDICATIONS: None. FLUOROSCOPIC TIME: 0.1 minutes RADIATION DOSE: Air Kerma: 26 mGy COMPLICATIONS: No immediate complications. PROCEDURE:  images were obtained. The existing drainage catheter was injected with contrast, and an image obtained. After reviewing the images, the catheter was removed without difficulty. FINDINGS: Contracted cavity. Almost immediate pericatheter leakage.     IMPRESSION: 1.  Resolved pelvic fluid collection. Transgluteal drain removed.     CT Abdomen Pelvis w Contrast    Result Date: 12/22/2021  EXAM: CT ABDOMEN PELVIS W CONTRAST LOCATION: [facility] DATE/TIME: 12/22/2021 2:58 PM INDICATION: Exploratory laparotomy with adhesion lysis and repair of small bowel enterotomy 11/30/2021. Abdominal abscess drain placement 12/09/2021. COMPARISON: CT 12/16/2021, 12/08/2021 TECHNIQUE: CT scan of the abdomen and pelvis was performed following injection of IV contrast. Multiplanar reformats were obtained. Dose reduction techniques were used. CONTRAST: isovue 370 100ml FINDINGS: LOWER CHEST: Right basilar consolidation could represent atelectasis or pneumonia, similar to previous. HEPATOBILIARY: Stable hepatic cysts. Stable prominent gallbladder without definite wall thickening. PANCREAS: Normal. SPLEEN: Splenectomy with several left upper quadrant postsplenectomy implants. ADRENAL GLANDS: Normal. KIDNEYS/BLADDER: Several right renal cysts which do not require further imaging. Left renal 9 and 4 mm nonobstructive stones. Several tiny left renal cysts. No further imaging recommended. BOWEL: A loop of small bowel appears to communicate with the left anterior abdominal wall (series 3, image 105). The abdominal wall contains air. 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 There is a right lateral abdominal staple line suggesting an  ilio transverse colostomy. The right lower quadrant mesentery inferior to this contains a small amount of nonlocalized fluid and air. Also, the surgical staple line appears scattered suggesting  anastomotic breakdown. The prerectal drainage catheter has been removed. Small residual 16 x 12 mm fluid collection (image 143). LYMPH NODES: Several small mesenteric nodes are likely reactive. VASCULATURE: Patent mesenteric vessels. PELVIC ORGANS: Normal. MUSCULOSKELETAL: Severe degenerative change at the L4-L5 level with grade 2 anterolisthesis.     IMPRESSION: 1.  Improved prerectal fluid collection. 2.  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 3.  Possible right abdominal anastomotic dehiscence with a small amount free fluid and air in the mesentery. 4.  Report called to floor nurse Susy at 4:25 PM    CT Abdomen Pelvis w Contrast    Addendum Date: 12/16/2021    Discussed with Dr. Richardson by Dr. Fahrner over telephone at 11:41 AM.    Result Date: 12/16/2021  EXAM: CT ABDOMEN PELVIS W CONTRAST LOCATION: [facility] DATE/TIME: 12/16/2021 3:00 AM INDICATION: Intrabdominal abscess COMPARISON: CT 12/8/2021 and 12/9/2021 TECHNIQUE: CT scan of the abdomen and pelvis was performed following injection of IV contrast. Multiplanar reformats were obtained. Dose reduction techniques were used. CONTRAST: IsoVue 370 100mL FINDINGS: LOWER CHEST: Normal. HEPATOBILIARY: Normal. PANCREAS: Normal. SPLEEN: Splenules are present. ADRENAL GLANDS: A 10 mm area of fullness in the left adrenal gland has not changed. KIDNEYS/BLADDER: Again seen are nonobstructing left renal calculi. There are simple cysts in the right kidney which do not require follow-up. Smaller renal lesions are too small to characterize. BOWEL: There is surgical change in the right lower quadrant. There is distal small bowel wall thickening in this region. The small bowel wall is not fully visualized, and there is a small  amount of extraluminal fluid and air in this region (series 3 images 93 and 96). There is new free air in the right lower quadrant mesentery (series 3 image 105). LYMPH NODES: Normal. VASCULATURE: Atherosclerotic disease. PELVIC ORGANS: Normal. OTHER: A fluid collection adjacent to the rectum is 1.9 x 1.5 x 1.5 cm (approximately 2.2 mL). Previously the collection was 4.4 x 3.6 x 4.4 cm. A pigtail catheter terminates within this collection. MUSCULOSKELETAL: Surgical change along the ventral abdominal and pelvic wall. There is a 20.8 x 3.2 x 1.2 cm fluid and air collection in the midline ventral abdominal and pelvic wall just deep to the staple line. Right lower quadrant and left midabdomen approach Larry drains are present.     IMPRESSION: 1.  The pelvic fluid collection is significantly smaller. A pigtail catheter is in place. 2.  Increased free air in the mesentery in the right lower quadrant, suggesting suture line dehiscence. There is also a suspected distal small bowel defect with adjacent free air and fluid. 3.  New fluid collection in the subcutaneous tissues of the ventral abdominal and pelvic wall, just deep to the staple line. Recommend drainage.     CT Abdomen Pelvis w Contrast    Result Date: 12/8/2021  EXAM: CT ABDOMEN PELVIS W CONTRAST LOCATION: [facility] DATE/TIME: 12/8/2021 1:40 PM INDICATION: Abdominal abscess/infection suspected, status post laparotomy with repair of small bowel perforation, recent small bowel resection COMPARISON: CT 11/30/2021 TECHNIQUE: CT scan of the abdomen and pelvis was performed following injection of IV contrast. Multiplanar reformats were obtained. Dose reduction techniques were used. CONTRAST: 100 mL Isovue-370 FINDINGS: LOWER CHEST: Mild bilateral lower lobar dependent consolidation and trace right-sided pleural effusion. HEPATOBILIARY: Prominent gallbladder likely reflecting recent fasting. Stable mild biliary ductal dilatation. No obstructing  mass lesion or radiodense stone. Stable small low-density hepatic lesions, likely cysts. PANCREAS: Normal. SPLEEN: Surgically absent. ADRENAL GLANDS: Normal. KIDNEYS/BLADDER: Stable nonobstructing lower left renal stones with a dominant stone measuring 4 mm. No hydronephrosis. Unremarkable urinary bladder. BOWEL: Enteric suction tube with the tip located within the mid gastric lumen. Mild to moderately distended segments of mid small bowel with associated air-fluid levels. No discrete transition is identified. Right lower quadrant anastomotic changes. Small amount of nonloculated fluid and extraluminal air within the mid abdomen for example image 50 series 400.2 and image 97 series 3. Questionable adjacent small bowel defect image 50 series 102. Small amount of nonloculated fluid is also noted within the right lower quadrant. Newly visualized rim-enhancing pelvic fluid collection measuring 4.4 x 3.6 x 4.4 cm. Trace ascites. Midline laparotomy incision with a small amount of fluid and air within the wound. Surgical drain tip is located at the upper aspect of this wound. Additional surgical drain with the tip located within the left lower quadrant. Third surgical drain with the tip located in the lower anterior abdomen. Diffuse mesenteric edema. LYMPH NODES: Normal. VASCULATURE: Mild atherosclerosis. PELVIC ORGANS: Normal. MUSCULOSKELETAL: Bilateral L4 pars defects with grade 1 anterolisthesis at L4-L5. Associated severe discogenic degenerative changes.     IMPRESSION: 1.  Newly visualized 4.6 cm rim-enhancing fluid collection within the pelvis. 2.  Small ill-defined irregular pocket of fluid and extraluminal air within the mid abdomen as described above. It is uncertain whether this is related to sequelae of recent surgery or bowel leakage. Recommend short interval follow-up CT with positive enteric contrast. 3.  Mild to moderate mid small bowel distention. This is favored to reflect an ileus rather than obstruction.  4.  Trace ascites and diffuse mesenteric edema. 5.  Mild bilateral lower lobar consolidation and trace right-sided pleural effusion. [Critical Result: Pulmonary fluid collection and extraluminal loculated air-fluid within the mid abdomen as detailed above.] Finding was identified on 12/8/2021 2:10 PM. Dr. Richardson was contacted by me on 12/8/2021 2:50 PM and verbalized understanding of the critical result.     CT Abdomen Pelvis w Contrast    Result Date: 11/30/2021  EXAM: CT ABDOMEN PELVIS W CONTRAST LOCATION: [facility] DATE/TIME: 11/30/2021 10:48 AM INDICATION: Abdominal distension COMPARISON: None. TECHNIQUE: CT scan of the abdomen and pelvis was performed following injection of IV contrast. Multiplanar reformats were obtained. Dose reduction techniques were used. CONTRAST: IsoVue 370 100mL FINDINGS: LOWER CHEST: Bibasilar atelectasis. HEPATOBILIARY: Scattered small water density foci in the liver consistent with cysts. No radiopaque gallstone. No suspicious hepatic lesions. PANCREAS: Coarse calcification in the pancreatic tail and body may represent sequelae of chronic pancreatitis no acute pancreatitis or abnormal pancreatic mass. SPLEEN: Not visualized consistent with prior splenectomy. ADRENAL GLANDS: Normal. KIDNEYS/BLADDER: No hydronephrosis or masses. No bladder stone or mass. BOWEL: Anastomotic staples in the colon in the right mid abdomen. There is normal caliber duodenum and proximal jejunum with dilated loops of distal jejunum and ileum with some fecal i-STAT ileal contents and a transition in the right mid to lower abdomen. Findings are consistent with mechanical small bowel obstruction and this may represent a closed loop obstruction such as internal hernia or multiple adhesions given the lack of dilatation of the proximal small bowel. No pneumatosis intestinalis,  free intraperitoneal air or abscess. LYMPH NODES: No lymphadenopathy. VASCULATURE: Mild aortoiliac  "calcification without aneurysm. PELVIC ORGANS: Mild prostatic enlargement. MUSCULOSKELETAL: Disc space narrowing at L4-L5 with disc degeneration and bilateral spondylolysis at L4 with grade 2 spondylolisthesis of L4 on L5. No acute fracture.     IMPRESSION: 1.  Mechanical small bowel obstruction with dilated distal jejunum and ileum with caliber changes in the right lower quadrant and in the left midabdomen this may be secondary to adhesions or internal hernia and has the appearance of a closed loop obstruction. No pneumatosis intestinalis, free intraperitoneal air or abscess. 2.  Results were called to Dr. Sanford the time of dictation. NOTE: ABNORMAL REPORT THE DICTATION ABOVE DESCRIBES AN ABNORMALITY FOR WHICH FOLLOW-UP IS NEEDED.     POC US Guidance Needle Placement    Result Date: 11/30/2021  Ultrasound was performed as guidance to an anesthesia procedure.  Click \"PACS images\" hyperlink below to view any stored images.  For specific procedure details, view procedure note authored by anesthesia.    XR Video Swallow with SLP or OT    Result Date: 11/24/2021  EXAM: XR VIDEO SWALLOW WITH SLP OR OT LOCATION: [facility] DATE/TIME: 11/24/2021 9:18 AM INDICATION: Difficulty swallowing. COMPARISON: Same day esophagram.; CT for lung cancer screening 03/09/2020 TECHNIQUE: Routine swallow study with speech pathology using multiple barium thicknesses. FINDINGS: FLUOROSCOPIC TIME: 0.2 minutes NUMBER OF IMAGES: 2 videofluoroscopic imaging series Swallow study with Speech Pathology using multiple barium thicknesses. Patient is edentulous. Trials of thin consistency and barium coated cracker were performed. No delay in initiation of the oral phase. Normal epiglottic inversion. No penetration or aspiration observed. Moderate to severe cervical spondylosis with anterior wedging, disc space narrowing and osteophytosis of C4, C5, and C6. Straightening and reversal of normal thoracic lordosis.     IMPRESSION: " "No penetration or aspiration. Please see separately dictated report from speech pathology for additional description, analysis, and management recommendations.     XR Esophagram    Result Date: 11/24/2021  EXAM: XR ESOPHAGRAM LOCATION: [facility] DATE/TIME: 11/24/2021 9:18 AM INDICATION: 65 yo M with dysphagia/dyspnea - cough. Feels food stuck in throat with swallow. Occ. chocking. Needs to swallow lot of liquids. Constant runny nose & nasal congestion. COMPARISON: Same day swallow study; CT of the chest without contrast 03/09/2020 TECHNIQUE: Routine. FINDINGS: FLUOROSCOPIC TIME: 1.2 minutes NUMBER OF IMAGES: 4 videofluoroscopic imaging series and additional 41 images. ESOPHAGUS: Patulous esophagus with diffusely abnormal peristalsis. Large number of tertiary contractions present with delayed esophageal emptying. No esophageal stricture, diverticula or mass. No hiatal hernia. There is a large amount of retained contrast within the esophagus after consumption of contrast in the RPO position in transitioning to the supine position. This retained contrast moves to and fro within the esophagus. During the transition from RPO to supine position the patient felt a \"tickle\" in the back of his throat prompting coughing, while not observed likely relates to reflux of contrast from the upper esophagus into the pharynx. No gastroesophageal reflux elicited in the recumbent position with Valsalva maneuver.     IMPRESSION: Moderately severe esophageal dysmotility (presbyesophagus).    "

## 2022-01-10 NOTE — PROGRESS NOTES
"Sepsis alarm activated: Pt trending elevated temp through HS.  Fever continues as temp now at 102.0 (oral).  HR trending ST,rate 110's.  MU=115/73.  However,  De-sat's to high 80's on RA (after taking off his NC, at 4 L).    Concern for septic shock.  House Resident notified.  Blood cultures taken approximately 24 hrs ago.  Awaiting results of Lactic Acid (per Sepsis Alarm).  Pt already given Tylenol by HS RN.    Addendum: Recheck of VS: T=99.0 (oral), P=109, BP=91/62, RR=20, sat's=92% @ 2 L NC.  Pt resting \"comfortably\".  Lactic Acid result pending.   Continue to monitor VS Q 30 minutes.  New order from MD: ID consult.   "

## 2022-01-10 NOTE — PHARMACY-CONSULT NOTE
"Pharmacy Note: Parenteral Nutrition (PN) Management    Pharmacist consulted to dose PN for Gurdeep Dia, a 66 year old male by Dr. Mcmahon.    Subjective:    The patient was started on PN in the hospital on 12/2/2021.    Indication for PN therapy: bowel resection, plan to continue TPN until fistula heals    Inadequate nutrition existing for > 7 days.     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      Social History     Tobacco Use     Smoking status: Current Every Day Smoker     Smokeless tobacco: Never Used   Substance Use Topics     Alcohol use: Not Currently     Comment: Clean for 5 years     Drug use: Not Currently     Objective:    Ht Readings from Last 1 Encounters:   12/22/21 1.702 m (5' 7\")     Wt Readings from Last 1 Encounters:   01/10/22 80.2 kg (176 lb 12.8 oz)       Body mass index is 27.69 kg/m .    Patient Vitals for the past 96 hrs:   Weight   01/10/22 0655 80.2 kg (176 lb 12.8 oz)   01/09/22 0056 80 kg (176 lb 5.9 oz)   01/06/22 1555 74.3 kg (163 lb 14.4 oz)       Labs:  Last 3 days:  Recent Labs     01/08/22  0656 01/08/22 2001 01/08/22 2001 01/09/22  0513 01/10/22  0532   * 132*  --  132* 137   POTASSIUM 4.4 4.8  --  4.0 4.2   CHLORIDE 98 97*  --  101 106   CO2 31 26  --  27 26   BUN 29* 28*  --  24* 22   CR 0.81 0.97  --  0.79 0.76   VIJAYA 8.4* 8.8  --  7.7* 8.0*   MAG  --  1.7*  --  2.4 2.2   PHOS  --  2.8  --   --  3.0   PROTTOTAL  --  6.6  --  5.6*  --    ALBUMIN  --  2.0*  --  1.6*  --    PREALB  --   --   --   --  8*   TRIG  --   --   --   --  106   HGB 10.2* 10.4*  --  8.4* 8.6*   HCT 32.9* 32.9*  --  27.0* 27.2*    431  --  400 413   BILITOTAL  --  1.5*  --  0.9  --    AST  --  22  --  18  --    ALT  --  21  --  16  --    ALKPHOS  --  129*   < > 99  --    INR  --   --   --   --  1.21*    < > = values in this interval not displayed.       Glucose (past 48 hours):   Recent Labs     01/08/22  1124 01/08/22  1250 01/08/22  1722 01/08/22 2001 " 01/08/22 2024 01/08/22  2341 01/09/22  0513 01/10/22  0532   * 119* 115* 100 88 134* 162* 152*       Intake/Output (last 24 hours): I/O last 3 completed shifts:  In: 1668.07 [P.O.:480; I.V.:254.87]  Out: 2750 [Urine:1250; Drains:1500]    Estimated CrCl: Estimated Creatinine Clearance: 95.7 mL/min (based on SCr of 0.76 mg/dL).    Assessment:    Continue patient on PN therapy as a cyclic central therapy.     Given the patient's current condition/oral intake, PN is still indicated.    Lab results reviewed: magnesium trending down, 2g IV bump given yesterday, will increase slightly in TPN.    Plan:  1. Rate of PN: 75 mL/hr x 1 hour, followed by 150 mL/hr x 10 hours, followed by 75ml/hr x 1 hour  2. Formula:     Amino Acids 130 grams    Dextrose 300 grams    Sodium 1330 mEq/day    Potassium 75 mEq/day    Calcium 8 mEq/day    Magnesium 16 mEq/day    Phosphorus 32 mMol/day    Chloride: Acetate Ratio 1:1    Standard Multivitamins w/Vitamin K    Trace Elements    3. Fat Emulsion: 20%, 250 mL IV 3 times weekly on Mon, Thur, Sat  4. Check BMP and magnesium and phos labs tomorrow.  5. Pharmacist will continue to follow the patient's lab results, clinical status and blood glucose results and make adjustments as appropriate.    Thank you for the consult.  Saritha Price RPH  1/10/2022 10:08 AM

## 2022-01-10 NOTE — PLAN OF CARE
"  Problem: Impaired Wound Healing  Goal: Optimal Wound Healing  Outcome: Declining  Intervention: Promote Wound Healing  Recent Flowsheet Documentation  Taken 1/10/2022 0421 by Kd Suero, RN  Activity Management: activity adjusted per tolerance  Taken 1/10/2022 0030 by Kd Suero, RN  Activity Management:   activity adjusted per tolerance   activity encouraged   ambulated in saleem   ambulated in room   up ad nicolasa   up in chair  Taken 1/10/2022 0010 by Kd Suero, RN  Activity Management: activity adjusted per tolerance     Problem: Infection (Surgery Nonspecified)  Goal: Absence of Infection Signs and Symptoms  Outcome: Declining     Problem: Hypertension Acute  Goal: Blood Pressure Within Desired Range  Outcome: Declining    See note regarding Sepsis Alarm activation.  SBP's remaining \"soft\" throughout NOC.  SBP trending to low of 84.  Consistently in upper 80's to low 90's.  No S&S of hypotension observed.  Dressing changed X1 as gauze saturated with green fluid oozing from possible fistula.      "

## 2022-01-10 NOTE — PROGRESS NOTES
CARDIOLOGY PROGRESS NOTE      Assessment/Plan:  1.  Paroxysmal atrial flutter-converted after amiodarone.  On chronic Lovenox given pulmonary embolus.  Would switch over to Eliquis and continue currently 5 mg p.o. twice daily.  In addition, would suggest pulling back central venous catheter may be 1 to 2 cm.  2.  Pulmonary embolism-transition Lovenox over to Eliquis.  3.  Enteric fistula-SBO on , underwent exploratory laparotomy, small bowel resection, repair of enterotomy, extensive lysis of adhesions. Required subsequent washout on 12/3 with antibiotics and MARISA drain placement. On  found to have continued enhancing abscess with drain placed  and removed on .    4.  Malnutrition-TPN per primary care team.    Plan:  1.  Continue amiodarone 3 times a day for a week then go down to 2 times a day for 2 weeks and then daily 200 mg a day.  2.  Agree with possibly pulling central venous catheter back 1 to 2 cm.  3.  Monitor telemetry 1 more day, if no recurrent atrial fibrillation can discontinue tomorrow.    Discharge Plannin. barrier to discharge is placement.  2.  Can follow with Dr. Rom Huertas for atrial fibrillation.     LOS: 19 days     Subjective:  Interval History:    66-year-old white gentleman being seen on 20th day of hospitalization.  He feels well other than some abdominal discomfort.  No chest pains, palpitations, shortness of breath, PND, orthopnea, syncope, dizziness or peripheral edema.    Medications    amiodarone  200 mg Oral TID     ceFEPIme (MAXIPIME) IV  2 g Intravenous Q8H     enoxaparin ANTICOAGULANT  1 mg/kg Subcutaneous Q12H     fluconazole  200 mg Oral Daily     gabapentin  200 mg Oral BID     gabapentin  900 mg Oral At Bedtime     hydrOXYzine  25 mg Oral At Bedtime     levothyroxine  25 mcg Oral Daily     lipids  250 mL Intravenous Once per day on Mon Thu Sat     pantoprazole  40 mg Oral QAM AC     QUEtiapine  200 mg Oral At Bedtime     sodium chloride (PF)  3 mL  "Intracatheter Q8H     vancomycin  1,000 mg Intravenous Q12H     venlafaxine  75 mg Oral Daily     Objective:   Vital signs in last 24 hours:  Temp:  [97.7  F (36.5  C)-102  F (38.9  C)] 98.1  F (36.7  C)  Pulse:  [] 113  Resp:  [18-24] 20  BP: ()/(62-79) 128/73  SpO2:  [87 %-94 %] 90 %    Physical Exam:  /73 (BP Location: Left arm)   Pulse 113   Temp 98.1  F (36.7  C) (Oral)   Resp 20   Ht 1.702 m (5' 7\")   Wt 80.2 kg (176 lb 12.8 oz)   SpO2 90%   BMI 27.69 kg/m      General Appearance:    Alert, cooperative, no distress, appears stated age   Head:    Normocephalic, without obvious abnormality, atraumatic   Throat:   Lips, mucosa, and tongue normal; teeth and gums normal   Neck:   Supple, symmetrical, trachea midline, no adenopathy;        thyroid:  No enlargement/tenderness/nodules; no carotid    bruit or JVD   Back:     Symmetric, no curvature, ROM normal, no CVA tenderness   Lungs:     Clear to auscultation bilaterally, respirations unlabored   Chest wall:    No tenderness or deformity   Heart:    Regular rate and rhythm, S1 and S2 normal, no murmur, rub   or gallop   Abdomen:     Soft, non-tender, bowel sounds diminished all four quadrants,     no masses, no organomegaly, postoperative   Extremities:   Normal, atraumatic, no cyanosis or edema   Pulses:   2+ and symmetric all extremities   Skin:   Skin color, texture, turgor normal, no rashes or lesions     Cardiographics:      ECG: Personally reviewed by myself shows sinus rhythm, leftward axis, possible old septal Q wave MI type pattern.    Echocardiogram:   The left ventricle is normal in size with borderline concentric left ventricular hypertrophy.  Left ventricular function is normal.The ejection fraction is 60-65%.  Normal right ventricle size and systolic function.  No significant valve disease is identified.   There is no comparison study available.      Lab Results:   Recent Labs   Lab 01/10/22  0532   WBC 8.3   HGB 8.6*   HCT " 27.2*        Recent Labs   Lab 01/10/22  0532      CO2 26   BUN 22   .       Lab Results   Component Value Date    TROPONINI 0.02 01/09/2022

## 2022-01-10 NOTE — PLAN OF CARE
Pt PICC line pulled back 1cm, PICC line now has 3cm out. PICC pulled back per MD request. Dressing changed. Dressing CDI.

## 2022-01-10 NOTE — PROGRESS NOTES
General Surgery Progress Note:    Hospital Day # 19    ASSESSMENT:   1. Abdominal pain, generalized         Gurdeep Dia is a 66 year old male with readmission for abdominal pain after discharge history ofs/p exploratory laparotomy with small bowel resection, lysis of adhesions and repair of enterotomy with significant inflamed small bowel with areas of thinned mucosa on 11/30/2021  S/p exploratory laparotomy 12/3/2021 for small bowel perforation from ulcer  S/p IR placement of drain into pelvic abscess 12/9/2021. 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    PLAN:   -Reviewed last CT with Dr. Richardson and no plans for drain or surgical intervention  -Continue TPN  -Stay with clear liquid diet  -Appreciate cardiology and [facility] cares      SUBJECTIVE:   Gurdeep Dia is frustrated and has some soreness in his abdomen.  Currently physical therapy in the room and he does not want to do physical therapy today.  Patient feels like something happens every 3 days.  Currently denying much pain.  No nausea.    Patient Vitals for the past 24 hrs:   BP Temp Temp src Pulse Resp SpO2 Weight   01/10/22 0722 128/73 98.1  F (36.7  C) Oral 113 20 90 % --   01/10/22 0655 -- -- -- -- -- -- 80.2 kg (176 lb 12.8 oz)   01/10/22 0443 99/67 -- -- 82 22 -- --   01/10/22 0431 103/69 97.7  F (36.5  C) Oral 81 20 94 % --   01/10/22 0035 -- -- -- -- 20 -- --   01/10/22 0010 91/62 99  F (37.2  C) Oral -- 20 -- --   01/09/22 2315 105/73 (!) 102  F (38.9  C) Oral 109 20 92 % --   01/09/22 2229 -- (!) 101.4  F (38.6  C) Oral -- -- -- --   01/09/22 1912 104/74 98.6  F (37  C) Oral 93 18 93 % --   01/09/22 1746 -- -- -- 92 -- 93 % --   01/09/22 1545 -- -- -- 93 -- 93 % --   01/09/22 1512 101/67 98.5  F (36.9  C) Oral 89 20 (!) 87 % --   01/09/22 1352 107/79 97.8  F (36.6   C) Oral 88 20 92 % --   01/09/22 1200 93/67 98.7  F (37.1  C) Oral 86 24 92 % --   01/09/22 1100 96/65 -- -- 85 20 92 % --       Physical Exam:  General: NAD, pleasant    ABD: Soft some mild tenderness right lower quadrant.  Draining old MARISA site on the right lower quadrant is covered with an ABD.  2 ostomy bags over fistulous mid abdomen.      No results displayed because visit has over 200 results.   Recent Results (from the past 24 hour(s))   Lactic Acid STAT    Collection Time: 01/10/22 12:45 AM   Result Value Ref Range    Lactic Acid 1.0 0.7 - 2.0 mmol/L   Magnesium    Collection Time: 01/10/22  5:32 AM   Result Value Ref Range    Magnesium 2.2 1.8 - 2.6 mg/dL   Phosphorus    Collection Time: 01/10/22  5:32 AM   Result Value Ref Range    Phosphorus 3.0 2.5 - 4.5 mg/dL   INR    Collection Time: 01/10/22  5:32 AM   Result Value Ref Range    INR 1.21 (H) 0.85 - 1.15   Prealbumin    Collection Time: 01/10/22  5:32 AM   Result Value Ref Range    Prealbumin 8 (L) 19 - 38 mg/dL   Triglycerides    Collection Time: 01/10/22  5:32 AM   Result Value Ref Range    Triglycerides 106 <=149 mg/dL   Basic metabolic panel    Collection Time: 01/10/22  5:32 AM   Result Value Ref Range    Sodium 137 136 - 145 mmol/L    Potassium 4.2 3.5 - 5.0 mmol/L    Chloride 106 98 - 107 mmol/L    Carbon Dioxide (CO2) 26 22 - 31 mmol/L    Anion Gap 5 5 - 18 mmol/L    Urea Nitrogen 22 8 - 22 mg/dL    Creatinine 0.76 0.70 - 1.30 mg/dL    Calcium 8.0 (L) 8.5 - 10.5 mg/dL    Glucose 152 (H) 70 - 125 mg/dL    GFR Estimate >90 >60 mL/min/1.73m2   CBC with platelets    Collection Time: 01/10/22  5:32 AM   Result Value Ref Range    WBC Count 8.3 4.0 - 11.0 10e3/uL    RBC Count 2.91 (L) 4.40 - 5.90 10e6/uL    Hemoglobin 8.6 (L) 13.3 - 17.7 g/dL    Hematocrit 27.2 (L) 40.0 - 53.0 %    MCV 94 78 - 100 fL    MCH 29.6 26.5 - 33.0 pg    MCHC 31.6 31.5 - 36.5 g/dL    RDW 16.7 (H) 10.0 - 15.0 %    Platelet Count 413 150 - 450 10e3/uL   Vancomycin level     Collection Time: 01/10/22  5:32 AM   Result Value Ref Range    Vancomycin 14.3   mg/L           Demetrius Vogel PA-C

## 2022-01-10 NOTE — PHARMACY-VANCOMYCIN DOSING SERVICE
Pharmacy Vancomycin Note  Date of Service January 10, 2022  Patient's  1955   66 year old, male    Indication: [facility]ated Pneumonia  Day of Therapy: 2  Current vancomycin regimen:  1000 mg IV q12h  Current vancomycin monitoring method: AUC  Current vancomycin therapeutic monitoring goal: 400-600 mg*h/L      Current estimated CrCl = Estimated Creatinine Clearance: 95.7 mL/min (based on SCr of 0.76 mg/dL).    Creatinine for last 3 days  2022:  6:56 AM Creatinine 0.81 mg/dL;  8:01 PM Creatinine 0.97 mg/dL  2022:  5:13 AM Creatinine 0.79 mg/dL  1/10/2022:  5:32 AM Creatinine 0.76 mg/dL    Recent Vancomycin Levels (past 3 days)  1/10/2022:  5:32 AM Vancomycin 14.3 mg/L    Vancomycin IV Administrations (past 72 hours)                   vancomycin (VANCOCIN) 1000 mg in dextrose 5% 200 mL PREMIX (mg) 1,000 mg New Bag 01/10/22 0917     1,000 mg New Bag 22 2249     1,000 mg New Bag  1057    vancomycin (VANCOCIN) 1,500 mg in sodium chloride 0.9 % 250 mL intermittent infusion (mg) 1,500 mg New Bag 22 0206                Nephrotoxins and other renal medications (From now, onward)    Start     Dose/Rate Route Frequency Ordered Stop    22 1000  vancomycin (VANCOCIN) 1000 mg in dextrose 5% 200 mL PREMIX         1,000 mg  200 mL/hr over 1 Hours Intravenous EVERY 12 HOURS 22 2130               Contrast Orders - past 72 hours (72h ago, onward)            Start     Dose/Rate Route Frequency Ordered Stop    22 2200  iopamidol (ISOVUE-370) solution 100 mL         100 mL Intravenous ONCE 22 2152 22 2153          Interpretation of levels and current regimen:  Vancomycin level is reflective of therapeutic level    Has serum creatinine changed greater than 50% in last 72 hours: No    Urine output:  diminshed urine output    Renal Function: Stable    Insight[employer] Prediction of Planned New Vancomycin Regimen    Regimen: 1000 mg IV every 12 hours.  Exposure target: AUC24  (range)400-600 mg/L.hr   AUC24,ss: 457 mg/L.hr  Probability of AUC24 > 400: 71 %  Ctrough,ss: 14.1 mg/L  Probability of Ctrough,ss > 20: 17 %  Probability of nephrotoxicity (Lodise ALAN 2009): 9 %      Plan:  1. Continue Current Dose  2. Vancomycin monitoring method: AUC  3. Vancomycin therapeutic monitoring goal: 400-600 mg*h/L  4. Pharmacy will check vancomycin levels as appropriate in 1-3 Days.  5. Serum creatinine levels will be ordered daily for the first week of therapy and at least twice weekly for subsequent weeks.    Saritha Price, [facility]

## 2022-01-10 NOTE — PROGRESS NOTES
[facility] Progress Note    Assessment/Plan  Active Problems:    Abdominal pain, generalized    Patient remains hospitalized due to placement, TPN.    Chema Dia is a 67 y/o M with past medical history of splenectomy, appendectomy, HTN, and substance abuse, with recent admission to hospital 11/30-12/21/2021 for SBO with perforation s/p surgical repair. Discharged home but presents as he could not manage at home and is now requesting to be placed in TCU vs LTC. Continues on TPN.  New RUL and RLL PE found and being treated, new HAP 1/9.    RUL and RLL PE  SMV thrombosis  New supplemental oxygen requirement  Acute RLL PE found on CT as well as SMV thrombosis.  Also has RUL PE.  No evidence of right heart strain.  Most likely provoked in the setting of recent surgery/hospitalization.  -Eliquis 5 mg twice daily.    Atrial flutter (resolved)  Developed this overnight 1/8-1/9 and required amiodarone and brief ICU stay for pressors.  Cardiology consulted.  Patient now stabilized and sinus rhythm, back to the floor.  We will pull back PICC line 1 cm as cardiology suspects the tip may be in the right atrium and that may have caused his episode of atrial flutter.  -Continue oral amiodarone.  -Eliquis as above.  -Pull back PICC line 1 cm.    Nausea  New supplemental oxygen requirement  RLQ pain  Malaise  HAP  Patient has been having nausea with TPN but developed a new supplemental oxygen requirement 1/6 in addition to fatigue and general malaise and was found to have RLL PE and SMV thrombosis.  CT abdomen stable from prior though has some evidence of new inflammation in the RLQ 1/9 concerning for fistula or new fluid collection.  Now has evidence of left-sided HAP overnight 1/9.  -Repeated blood cultures 1/8.  -Switched antibiotics back to cefepime, vancomycin.  Will finish the course of fluconazole previously recommended by ID.  Wound cultures positive for Candida parapsilosis and E. coli  12/22.  -Re-consulted ID for antibiotic guidance as patient has a fairly complicated presentation.  Further guidance will continue maximal antibiotics for another 72 hours and then transition back to orals, with final day of abx this Friday.  -No new procedure indicated per surgery.     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 Required subsequent washout on 12/3 with antibiotics and MARISA drain placement. On 12/8 found to have continued enhancing abscess with drain placed 12/9 and removed on 12/16.  Was discharged, but returned soon thereafter on 12/22 as he is unable to care for himself even assistance from family so is requesting LTC/TCU placement.  Now receiving TPN and has ongoing abdominal pain, nausea, and evidence of possible dehiscence in one of his intra-abdominal surgical sites.  -PT/OT.  Searching for TCU/LTC placement.  -Consulted surgery to follow while here.  Clear liquid diet.  Surgery is the only service that can advance this and will not do so for some time.  No new procedure indicated at this time.  -Consulted pharmacy and RD for TPN management - transitioned to cyclic TPN 12/30.  -WOC.  -Nausea, pain control.  -Antibiotics switched to cefepime, vancomycin for HAP as above.  Will finish course of fluconazole previously recommended by ID.  Reconsulted ID for antibiotic guidance as above.     Alk phos elevation, resolved  Alk phos elevated, now back to normal.  Rest of LFTs okay.  Does have sludge in gallbladder but no evidence of cholecystitis on imaging.  -Cyclic TPN.  Additional daily lab checks outside of regularly scheduled TPN checks not indicated at this time unless he develops new symptoms.     Depression  Insomnia  Hx of this. Reports symptoms of low motivation, little interest  in doing things, poor sleep, no appetite. Is on buspar, mirtazapine, and effexor at home, though doesn't really think medicines are as helpful as talk therapy is. Wanted to speak to social work and . States no plan to harm himself. Reports he just needs to leave the hospital.  -Consult psych for med management and plan for talk therapy while hospitalized.  -Discontinued buspar and mirtazapine per psych.  -Increased venlafaxine and gabapentin per psych.  -Seroquel for sleep.  Vistaril at bedtime.     Moderate malnutrition  Per RD eval.  -TPN per RD and pharmacy.     Normocytic anemia  Stable at 11.5, MCV of 93.     Chronic Conditions:  Hypothyroidism- PTA levothyroxine.  Chronic pain- Hold mexolicam 7.5mg daily, resume PTA gabapentin.  BPH-hold home Flomax given he states it does not help.    FEN: TPN.  Clear liquid diet as tolerated.  DVT Prophylaxis: Lovenox.  Code: Full code.    Disposition/Advanced Care Planning  Discharge Planning discussed with patient and care team.  Anticipated discharge date: Multiple days.  Pending placement.  Disposition: LTC.    Subjective  Patient feeling better today but frustrated with continuing to get sick.    Objective    Vital signs in last 24 hours Temp:  [97.7  F (36.5  C)-102  F (38.9  C)] 98.4  F (36.9  C)  Pulse:  [] 92  Resp:  [18-24] 18  BP: ()/(59-79) 95/59  SpO2:  [87 %-94 %] 90 %   Weight: [unfilled]    Intake/Output last 3 shift I/O last 3 completed shifts:  In: 1668.07 [P.O.:480; I.V.:254.87]  Out: 2750 [Urine:1250; Drains:1500]    Intake/Output this shift:I/O this shift:  In: 1420 [P.O.:220]  Out: 825 [Urine:600; Drains:225]    Physical Exam  General appearance: alert, appears stated age, cooperative and no distress.  Head: Normocephalic, without obvious abnormality, atraumatic.  NC in place.  Eyes: conjunctivae/corneas clear.  Throat: MMM.  Neck: supple, symmetrical, trachea midline.  Lungs: no increased respiratory effort.  NC in place.  Heart:  Good capillary refill.  Abdomen: non-distended, soft, appropriately tender.  Extremities: extremities normal, atraumatic, no cyanosis or edema.  Skin: Skin color, texture, turgor normal. No rashes or lesions.  Neurologic: Alert and oriented x3, normal strength and tone.  Psychiatric: mood and affect appropriate.    Pertinent Labs and Pertinent Radiology   Lab Results: personally reviewed.     Radiology Results: Personally reviewed image/s and impression/s    Precepted patient with Dr. Lucas Aguilera.    Johnathan Lees MD  [facility], PGY-3

## 2022-01-10 NOTE — PLAN OF CARE
Problem: Anxiety  Goal: Anxiety Reduction or Resolution  Outcome: No Change     Problem: Hypertension Acute  Goal: Blood Pressure Within Desired Range  Outcome: No Change     Problem: Dysrhythmia  Goal: Normalized Cardiac Rhythm  Outcome: No Change   Pt is alert and oriented x 4, forgetful at times to time and day/ Lung sounds diminished, on O2 at 4 LPM per nasal cannula. He keeps taking off his O2, O2 saturation ranges 90 to 94 % at 2 LPM but on RA he desaturates to 85 to 89 %. Reminded pt to keep it in place all the time. Pt was feeling cold around 2000 H, tried to check the temperature but he refused. At 2145 H, He was feeling so warm, his face was flushed, temp was checked and it was 101.4. HR was 110's, Sinus tachycardia. House officer informed. Tylenol given, one Percocet given for abdominal pain. Dressing changed x 2 in the fistula where it was draining liquid stool, greenish in color. TPN started at 2014 H. Pt is on 2 IV antibiotics. Will continue to monitor.

## 2022-01-11 ENCOUNTER — APPOINTMENT (OUTPATIENT)
Dept: OCCUPATIONAL THERAPY | Facility: HOSPITAL | Age: 67
DRG: 393 | End: 2022-01-11
Payer: MEDICARE

## 2022-01-11 LAB
ANION GAP SERPL CALCULATED.3IONS-SCNC: 5 MMOL/L (ref 5–18)
BACTERIA BLD CULT: NO GROWTH
BACTERIA BLD CULT: NO GROWTH
BUN SERPL-MCNC: 22 MG/DL (ref 8–22)
CALCIUM SERPL-MCNC: 7.8 MG/DL (ref 8.5–10.5)
CHLORIDE BLD-SCNC: 107 MMOL/L (ref 98–107)
CO2 SERPL-SCNC: 25 MMOL/L (ref 22–31)
CREAT SERPL-MCNC: 0.7 MG/DL (ref 0.7–1.3)
ERYTHROCYTE [DISTWIDTH] IN BLOOD BY AUTOMATED COUNT: 16.8 % (ref 10–15)
GFR SERPL CREATININE-BSD FRML MDRD: >90 ML/MIN/1.73M2
GLUCOSE BLD-MCNC: 149 MG/DL (ref 70–125)
HCT VFR BLD AUTO: 28.1 % (ref 40–53)
HGB BLD-MCNC: 8.8 G/DL (ref 13.3–17.7)
MAGNESIUM SERPL-MCNC: 2 MG/DL (ref 1.8–2.6)
MCH RBC QN AUTO: 29.1 PG (ref 26.5–33)
MCHC RBC AUTO-ENTMCNC: 31.3 G/DL (ref 31.5–36.5)
MCV RBC AUTO: 93 FL (ref 78–100)
PHOSPHATE SERPL-MCNC: 3.3 MG/DL (ref 2.5–4.5)
PLATELET # BLD AUTO: 440 10E3/UL (ref 150–450)
POTASSIUM BLD-SCNC: 3.9 MMOL/L (ref 3.5–5)
RBC # BLD AUTO: 3.02 10E6/UL (ref 4.4–5.9)
SODIUM SERPL-SCNC: 137 MMOL/L (ref 136–145)
WBC # BLD AUTO: 8.4 10E3/UL (ref 4–11)

## 2022-01-11 PROCEDURE — 250N000009 HC RX 250: Performed by: FAMILY MEDICINE

## 2022-01-11 PROCEDURE — 84100 ASSAY OF PHOSPHORUS: CPT | Performed by: STUDENT IN AN ORGANIZED HEALTH CARE EDUCATION/TRAINING PROGRAM

## 2022-01-11 PROCEDURE — 250N000013 HC RX MED GY IP 250 OP 250 PS 637: Performed by: INTERNAL MEDICINE

## 2022-01-11 PROCEDURE — 97535 SELF CARE MNGMENT TRAINING: CPT | Mod: GO

## 2022-01-11 PROCEDURE — 99232 SBSQ HOSP IP/OBS MODERATE 35: CPT | Performed by: INTERNAL MEDICINE

## 2022-01-11 PROCEDURE — 99232 SBSQ HOSP IP/OBS MODERATE 35: CPT | Mod: GC | Performed by: STUDENT IN AN ORGANIZED HEALTH CARE EDUCATION/TRAINING PROGRAM

## 2022-01-11 PROCEDURE — 250N000013 HC RX MED GY IP 250 OP 250 PS 637: Performed by: STUDENT IN AN ORGANIZED HEALTH CARE EDUCATION/TRAINING PROGRAM

## 2022-01-11 PROCEDURE — 250N000011 HC RX IP 250 OP 636: Performed by: INTERNAL MEDICINE

## 2022-01-11 PROCEDURE — 36415 COLL VENOUS BLD VENIPUNCTURE: CPT | Performed by: INTERNAL MEDICINE

## 2022-01-11 PROCEDURE — 99024 POSTOP FOLLOW-UP VISIT: CPT | Performed by: SPECIALIST

## 2022-01-11 PROCEDURE — 83735 ASSAY OF MAGNESIUM: CPT | Performed by: STUDENT IN AN ORGANIZED HEALTH CARE EDUCATION/TRAINING PROGRAM

## 2022-01-11 PROCEDURE — 85027 COMPLETE CBC AUTOMATED: CPT | Performed by: INTERNAL MEDICINE

## 2022-01-11 PROCEDURE — 80048 BASIC METABOLIC PNL TOTAL CA: CPT | Performed by: INTERNAL MEDICINE

## 2022-01-11 PROCEDURE — 210N000001 HC R&B IMCU HEART CARE

## 2022-01-11 RX ORDER — HYDROXYZINE HYDROCHLORIDE 25 MG/1
25 TABLET, FILM COATED ORAL AT BEDTIME
Status: DISCONTINUED | OUTPATIENT
Start: 2022-01-12 | End: 2022-02-14 | Stop reason: HOSPADM

## 2022-01-11 RX ADMIN — CEFEPIME HYDROCHLORIDE 2 G: 2 INJECTION, POWDER, FOR SOLUTION INTRAVENOUS at 20:49

## 2022-01-11 RX ADMIN — ENOXAPARIN SODIUM 80 MG: 80 INJECTION SUBCUTANEOUS at 08:33

## 2022-01-11 RX ADMIN — APIXABAN 10 MG: 5 TABLET, FILM COATED ORAL at 20:42

## 2022-01-11 RX ADMIN — GABAPENTIN 900 MG: 300 CAPSULE ORAL at 20:42

## 2022-01-11 RX ADMIN — VENLAFAXINE HYDROCHLORIDE 75 MG: 75 CAPSULE, EXTENDED RELEASE ORAL at 08:33

## 2022-01-11 RX ADMIN — GABAPENTIN 200 MG: 100 CAPSULE ORAL at 16:41

## 2022-01-11 RX ADMIN — LEVOTHYROXINE SODIUM 25 MCG: 0.03 TABLET ORAL at 06:14

## 2022-01-11 RX ADMIN — CEFEPIME HYDROCHLORIDE 2 G: 2 INJECTION, POWDER, FOR SOLUTION INTRAVENOUS at 14:45

## 2022-01-11 RX ADMIN — PANTOPRAZOLE SODIUM 40 MG: 40 TABLET, DELAYED RELEASE ORAL at 07:30

## 2022-01-11 RX ADMIN — AMIODARONE HYDROCHLORIDE 200 MG: 200 TABLET ORAL at 08:32

## 2022-01-11 RX ADMIN — OXYCODONE HYDROCHLORIDE AND ACETAMINOPHEN 2 TABLET: 5; 325 TABLET ORAL at 21:00

## 2022-01-11 RX ADMIN — QUETIAPINE FUMARATE 200 MG: 100 TABLET, FILM COATED ORAL at 20:42

## 2022-01-11 RX ADMIN — POTASSIUM CHLORIDE: 2 INJECTION, SOLUTION, CONCENTRATE INTRAVENOUS at 20:26

## 2022-01-11 RX ADMIN — AMIODARONE HYDROCHLORIDE 200 MG: 200 TABLET ORAL at 20:42

## 2022-01-11 RX ADMIN — CEFEPIME HYDROCHLORIDE 2 G: 2 INJECTION, POWDER, FOR SOLUTION INTRAVENOUS at 06:14

## 2022-01-11 RX ADMIN — VANCOMYCIN HYDROCHLORIDE 1000 MG: 1 INJECTION, SOLUTION INTRAVENOUS at 01:08

## 2022-01-11 RX ADMIN — VANCOMYCIN HYDROCHLORIDE 1000 MG: 1 INJECTION, SOLUTION INTRAVENOUS at 22:05

## 2022-01-11 RX ADMIN — OXYCODONE HYDROCHLORIDE AND ACETAMINOPHEN 2 TABLET: 5; 325 TABLET ORAL at 09:15

## 2022-01-11 RX ADMIN — HYDROXYZINE HYDROCHLORIDE 25 MG: 10 SOLUTION ORAL at 20:43

## 2022-01-11 RX ADMIN — GABAPENTIN 200 MG: 100 CAPSULE ORAL at 08:32

## 2022-01-11 RX ADMIN — AMIODARONE HYDROCHLORIDE 200 MG: 200 TABLET ORAL at 14:45

## 2022-01-11 RX ADMIN — VANCOMYCIN HYDROCHLORIDE 1000 MG: 1 INJECTION, SOLUTION INTRAVENOUS at 09:03

## 2022-01-11 ASSESSMENT — ACTIVITIES OF DAILY LIVING (ADL)
ADLS_ACUITY_SCORE: 11
ADLS_ACUITY_SCORE: 13
ADLS_ACUITY_SCORE: 11
ADLS_ACUITY_SCORE: 13
ADLS_ACUITY_SCORE: 11
ADLS_ACUITY_SCORE: 11
ADLS_ACUITY_SCORE: 13

## 2022-01-11 NOTE — PROGRESS NOTES
[facility] Progress Note    Assessment/Plan  Active Problems:    Abdominal pain, generalized    Patient remains hospitalized due to placement, TPN.    Chema Dia is a 67 y/o M with past medical history of splenectomy, appendectomy, HTN, and substance abuse, with recent admission to hospital 11/30-12/21/2021 for SBO with perforation s/p surgical repair. Discharged home but presents as he could not manage at home and is now requesting to be placed in TCU vs LTC. Continues on TPN.  New RUL and RLL PE found and being treated, new HAP 1/9.    RUL and RLL PE  SMV thrombosis  New supplemental oxygen requirement  Acute RLL PE found on CT as well as SMV thrombosis.  Also has RUL PE.  No evidence of right heart strain.  Most likely provoked in the setting of recent surgery/hospitalization.  -Eliquis 10 mg twice daily for 7 days, then 5 mg twice daily for PE treatment.    Atrial flutter (resolved)  Developed this overnight 1/8-1/9 and required amiodarone and brief ICU stay for pressors.  Cardiology consulted.  Patient now stabilized and sinus rhythm, back to the floor.  Pulled back PICC line 1 cm as cardiology suspects the tip may be in the right atrium and that may have caused his episode of atrial flutter.  -Continue oral amiodarone.  Plan to taper per cardiology: amiodarone 3 times a day for a week then go down to 2 times a day for 2 weeks and then daily 200 mg a day.  -Eliquis.    Nausea  New supplemental oxygen requirement  RLQ pain  Malaise  HAP  Patient has been having nausea with TPN but developed a new supplemental oxygen requirement 1/6 in addition to fatigue and general malaise and was found to have RLL PE and SMV thrombosis.  CT abdomen stable from prior though has some evidence of new inflammation in the RLQ 1/9 concerning for fistula or new fluid collection.  Now has evidence of left-sided HAP overnight 1/9.  -Repeated blood cultures 1/8.  -Switched antibiotics back to cefepime, vancomycin.   Will finish the course of fluconazole previously recommended by ID.  Wound cultures positive for Candida parapsilosis and E. coli 12/22.  -Re-consulted ID for antibiotic guidance as patient has a fairly complicated presentation.  Further guidance will continue maximal antibiotics for another 72 hours and then transition back to orals, with final day of abx this Friday.  -No new procedure indicated per surgery.     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 Required subsequent washout on 12/3 with antibiotics and MARISA drain placement. On 12/8 found to have continued enhancing abscess with drain placed 12/9 and removed on 12/16.  Was discharged, but returned soon thereafter on 12/22 as he is unable to care for himself even assistance from family so is requesting LTC/TCU placement.  Now receiving TPN and has ongoing abdominal pain, nausea, and evidence of possible dehiscence in one of his intra-abdominal surgical sites.  -PT/OT.  Searching for TCU/LTC placement.  -Consulted surgery to follow while here.  Clear liquid diet.  Surgery is the only service that can advance this and will not do so for some time.  No new procedure indicated at this time.  -Consulted pharmacy and RD for TPN management - transitioned to cyclic TPN 12/30.  -WOC.  -Nausea, pain control.  -Antibiotics switched to cefepime, vancomycin for HAP as above.  Will finish course of fluconazole previously recommended by ID.  Reconsulted ID for antibiotic guidance as above.     Alk phos elevation, resolved  Alk phos elevated, now back to normal.  Rest of LFTs okay.  Does have sludge in gallbladder but no evidence of cholecystitis on imaging.  -Cyclic TPN.  Additional daily lab checks outside of regularly scheduled TPN checks not indicated at  this time unless he develops new symptoms.     Depression  Insomnia  Hx of this. Reports symptoms of low motivation, little interest in doing things, poor sleep, no appetite. Is on buspar, mirtazapine, and effexor at home, though doesn't really think medicines are as helpful as talk therapy is. Wanted to speak to social work and . States no plan to harm himself. Reports he just needs to leave the hospital.  -Consult psych for med management and plan for talk therapy while hospitalized.  -Discontinued buspar and mirtazapine per psych.  -Increased venlafaxine and gabapentin per psych.  -Seroquel for sleep.  Vistaril at bedtime.     Moderate malnutrition  Per RD eval.  -TPN per RD and pharmacy.     Normocytic anemia  Stable at 11.5, MCV of 93.     Chronic Conditions:  Hypothyroidism- PTA levothyroxine.  Chronic pain- Hold mexolicam 7.5mg daily, resume PTA gabapentin.  BPH-hold home Flomax given he states it does not help.    FEN: TPN.  Clear liquid diet as tolerated.  DVT Prophylaxis: Lovenox.  Code: Full code.    Disposition/Advanced Care Planning  Discharge Planning discussed with patient and care team.  Anticipated discharge date: Multiple days.  Pending placement.  Disposition: LTC.    Subjective  Patient feeling good today but would like to get to a room on a different floor as he does not like being on the sick floor.    Objective    Vital signs in last 24 hours Temp:  [98  F (36.7  C)-99.6  F (37.6  C)] 98.3  F (36.8  C)  Pulse:  [] 93  Resp:  [18-22] 20  BP: ()/(58-85) 99/66  SpO2:  [91 %-94 %] 94 %   Weight: [unfilled]    Intake/Output last 3 shift I/O last 3 completed shifts:  In: 240 [P.O.:240]  Out: 1175 [Urine:300; Drains:875]    Intake/Output this shift:No intake/output data recorded.    Physical Exam  General appearance: alert, appears stated age, cooperative and no distress.  Head: Normocephalic, without obvious abnormality, atraumatic.  NC in place.  Eyes: conjunctivae/corneas  clear.  Throat: MMM.  Neck: supple, symmetrical, trachea midline.  Lungs: no increased respiratory effort.  NC in place.  Heart: Good capillary refill.  Abdomen: non-distended, soft, appropriately tender.  Extremities: extremities normal, atraumatic, no cyanosis or edema.  Skin: Skin color, texture, turgor normal. No rashes or lesions.  Neurologic: Alert and oriented x3, normal strength and tone.  Psychiatric: mood and affect appropriate.    Pertinent Labs and Pertinent Radiology   Lab Results: personally reviewed.     Radiology Results: Personally reviewed image/s and impression/s    Precepted patient with Dr. Lucas Aguilera.    Johnathan Lees MD  [facility], PGY-3

## 2022-01-11 NOTE — PLAN OF CARE
Problem: Pain (Surgery Nonspecified)  Goal: Acceptable Pain Control  Intervention: Prevent or Manage Pain  Recent Flowsheet Documentation  Taken 1/11/2022 0000 by TRENT ANDRADE  Pain Management Interventions: medication (see MAR)  Diversional Activities: television  Taken 1/10/2022 2000 by TRENT ANDRADE  Pain Management Interventions: medication (see MAR)  Diversional Activities: television  Taken 1/10/2022 1530 by TRENT ANDRADE  Pain Management Interventions: medication (see MAR)  Diversional Activities: television     Assumed care 1500 to 0730. A&O x 4. Stand by assist. Tele is NSR, HR 90's. C/O generalized abdominal pain, Tylenol given (see MAR). Up to bedside commode. TPN & Lipids infusing into PICC. Dressing changed over abdominal incision. Both fistula bags emptied. Call light within reach, able to make needs known. Bed alarm on for safety.

## 2022-01-11 NOTE — PROGRESS NOTES
CARDIOLOGY PROGRESS NOTE      Assessment/Plan:  1.  Paroxysmal atrial flutter-converted after amiodarone.  On chronic Lovenox given pulmonary embolus.  Would switch over to Eliquis 5 mg p.o. twice daily.  Plan is to taper amiodarone as below.   2.  Pulmonary embolism-transition Lovenox over to Eliquis.  3.  Enteric fistula-SBO on , underwent exploratory laparotomy, small bowel resection, repair of enterotomy, extensive lysis of adhesions. Required subsequent washout on 12/3 with antibiotics and MARISA drain placement. On  found to have continued enhancing abscess with drain placed  and removed on .    4.  Malnutrition-TPN and nutrition per primary care team.  5.  Hypothyroidism- TSH was normal at 1.86.    Plan:  1.  Continue amiodarone 3 times a day for a week then go down to 2 times a day for 2 weeks and then daily 200 mg a day.  2.  Monitor telemetry 1 more day, if no recurrent atrial fibrillation can discontinue tomorrow.  3.  Cardiology does not have much further inpatient to add, most likely will sign off tomorrow.    Discharge Plannin.  No significant cardiac barrier to discharge.    2.  Can follow with Dr. Rom Huertas for atrial fibrillation.     LOS: 19 days     Subjective:  Interval History:    66-year-old white gentleman being seen on 21st day of hospitalization.  He feels well other than some abdominal discomfort.  No chest pains, palpitations, shortness of breath, PND, orthopnea, syncope, dizziness or peripheral edema.    Medications    amiodarone  200 mg Oral TID     ceFEPIme (MAXIPIME) IV  2 g Intravenous Q8H     enoxaparin ANTICOAGULANT  1 mg/kg Subcutaneous Q12H     gabapentin  200 mg Oral BID     gabapentin  900 mg Oral At Bedtime     hydrOXYzine  25 mg Oral At Bedtime     levothyroxine  25 mcg Oral Daily     lipids  250 mL Intravenous Once per day on Mon Thu Sat     pantoprazole  40 mg Oral QAM AC     QUEtiapine  200 mg Oral At Bedtime     sodium chloride (PF)  3 mL  "Intracatheter Q8H     vancomycin  1,000 mg Intravenous Q12H     venlafaxine  75 mg Oral Daily     Objective:   Vital signs in last 24 hours:  Temp:  [98  F (36.7  C)-99.6  F (37.6  C)] 98.3  F (36.8  C)  Pulse:  [] 93  Resp:  [18-22] 20  BP: ()/(58-85) 99/66  SpO2:  [91 %-94 %] 94 %    Physical Exam:  BP 99/66 (BP Location: Left arm)   Pulse 93   Temp 98.3  F (36.8  C) (Oral)   Resp 20   Ht 1.702 m (5' 7\")   Wt 80.2 kg (176 lb 12.8 oz)   SpO2 94%   BMI 27.69 kg/m      General Appearance:    Alert, cooperative, no distress, appears stated age   Head:    Normocephalic, without obvious abnormality, atraumatic   Throat:   Lips, mucosa, and tongue normal; teeth and gums normal   Neck:   Supple, symmetrical, trachea midline, no adenopathy;        thyroid:  No enlargement/tenderness/nodules; no carotid    bruit or JVD   Back:     Symmetric, no curvature, ROM normal, no CVA tenderness   Lungs:     Clear to auscultation bilaterally, respirations unlabored   Chest wall:    No tenderness or deformity   Heart:    Regular rate and rhythm, S1 and S2 normal, no murmur, rub   or gallop   Abdomen:     Soft, non-tender, bowel sounds diminished all four quadrants,     no masses, no organomegaly, postoperative   Extremities:   Normal, atraumatic, no cyanosis or edema   Pulses:   2+ and symmetric all extremities   Skin:   Skin color, texture, turgor normal, no rashes or lesions     Cardiographics:      ECG: Personally reviewed by myself shows sinus rhythm, leftward axis, possible old septal Q wave MI type pattern.    Echocardiogram:   The left ventricle is normal in size with borderline concentric left ventricular hypertrophy.  Left ventricular function is normal.The ejection fraction is 60-65%.  Normal right ventricle size and systolic function.  No significant valve disease is identified.   There is no comparison study available.      Lab Results:   Recent Labs   Lab 01/11/22  0500   WBC 8.4   HGB 8.8*   HCT 28.1* "        Recent Labs   Lab 01/11/22  0500      CO2 25   BUN 22   .       Lab Results   Component Value Date    TROPONINI 0.02 01/09/2022

## 2022-01-11 NOTE — PROGRESS NOTES
General Surgery Progress Note:    Hospital Day # 20    ASSESSMENT:   1. Abdominal pain, generalized    small bowel fistula    Gurdeep Dia is a 66 year old male     PLAN:   Continue tpn  continue dressings  Increase activity      SUBJECTIVE:   Gurdeep Dia is doing as usual      Patient Vitals for the past 24 hrs:   BP Temp Temp src Pulse Resp SpO2   01/11/22 1131 99/66 98.3  F (36.8  C) Oral 93 20 94 %   01/11/22 1100 -- -- -- 96 -- --   01/11/22 0729 (!) 154/85 98.5  F (36.9  C) Oral 110 22 94 %   01/11/22 0351 92/58 98.2  F (36.8  C) Oral 82 20 93 %   01/11/22 0014 111/74 98  F (36.7  C) Oral 86 20 94 %   01/10/22 1916 109/81 99.6  F (37.6  C) Oral 109 22 92 %   01/10/22 1914 -- -- -- 114 -- --   01/10/22 1547 115/67 99.3  F (37.4  C) Oral 100 18 --   01/10/22 1530 -- -- -- -- -- 91 %       Physical Exam:  General: NAD, pleasant  CV:RRR  LUNGS:CTA bilaterally  ABD:  Soft, diffuse tenderness, drainage bags in place. Some also from right lower drain site.  EXT:no CCE    No results displayed because visit has over 200 results.           Mekhi Richardson MD

## 2022-01-11 NOTE — PROGRESS NOTES
"CLINICAL NUTRITION SERVICES - REASSESSMENT NOTE     Nutrition Prescription    RECOMMENDATIONS FOR MDs/PROVIDERS TO ORDER:  None    Malnutrition Status:    Severe    Recommendations already ordered by Registered Dietitian (RD):  Cyclic TPN: D 300  75 ml/hr x 1 hr, 150 ml/hr x 10 hrs  75 ml/hr x 1 hr and off.  250 ml of 20% lipids 3 times per week  Over 12 hrs    Future/Additional Recommendations:  Follow labs-liver enzymes and adjust TPN per needs     EVALUATION OF THE PROGRESS TOWARD GOALS   Diet: Clear liquid  Nutrition Support: TPN as above provides 1754 Calories, 130 g protein, 300 g CHO, 24 g fat/averaged.  TPN changes to cyclic on 12/30/2021.  TPN meets ~90% of estimated Calorie needs and 100% protein needs.  Intake: 25% breakfast on 1/10/2022    Per surgery note-surgery is the only service that can advance pt's diet and that will not happen for some time (fistulas have to close down first)     ANTHROPOMETRICS  Height: 170.2 cm (5' 7\")  Most Recent Weight:  (patient refused. \" I'm not going to stand\". So zero bed.)  80.2 Kg (176 lb 12.8 oz)  Weight History:   Wt Readings from Last 10 Encounters:   01/10/22 80.2 kg (176 lb 12.8 oz)   12/17/21 76.8 kg (169 lb 6.4 oz)   01/22/20 79.4 kg (175 lb)   12/24/19 81.2 kg (179 lb)     PHYSICAL FINDINGS  See malnutrition section below.    GI CONCERNS  Abdomen rounded/distended  Hypoactive BS  Nausea  Last BM 1/9/2022 (per pt)  Ostomy bag x 2 on abdominal incision    LABS  Reviewed: Na 137, K 3.9, mg 2, phos 3.3, Glu 149, prealbumin 8, , alk phos 99, ALT 16, AST 18    MEDICATIONS  Reviewed: maxipime, levothyroxine, pantoprazole,vancocin    MALNUTRITION:  % Weight Loss:  1-2% in 1 week  % Intake:  </= 50% for >/= 5 days (severe malnutrition)  Subcutaneous Fat Loss:  Orbital region severe depletion and Upper arm region mild to moderate depletion  Muscle Loss:  Temporal region moderate depletion, Clavicle bone region severe depletion, Dorsal hand region mild to " moderate depletion and Patellar region moderate depletion  Fluid Retention:  None noted    Malnutrition Diagnosis: Severe malnutrition  In Context of:  Acute illness or injury    CURRENT NUTRITION DIAGNOSIS  Malnutrition related to surgical intervention of SBO with formation of enterocutaneous fistulas as evidenced by 1.7% weight loss in 1 week, severe subcutaneous fats loss and moderate to severe muscle loss      INTERVENTIONS  Implementation  Noted per surgery note on 12/31: Plan going forward would be to continue TPN until EC fistula output is minimal so we can increase oral Caloric intake.  This could take a couple of months.  Recommend continuing the same cyclic TPN regimen today    Goals  Meet nutritional needs_progressing  Tolerate TPN-progressing (Glu elevated)  Prevent significant weight loss-met    Monitoring/Evaluation  Progress toward goals will be monitored and evaluated per protocol: TPN tolerance, po intake, diet advance, weight, labs

## 2022-01-11 NOTE — PHARMACY-CONSULT NOTE
"Pharmacy Note: Parenteral Nutrition (PN) Management    Pharmacist consulted to dose PN for Gurdeep Dia, a 66 year old male by Dr. Mcmahon.    Subjective:    The patient was started on PN in the hospital on 12/2/2021.    Indication for PN therapy: bowel resection, plan to continue TPN until fistula heals    Inadequate nutrition existing for > 7 days.     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      Social History     Tobacco Use     Smoking status: Current Every Day Smoker     Smokeless tobacco: Never Used   Substance Use Topics     Alcohol use: Not Currently     Comment: Clean for 5 years     Drug use: Not Currently     Objective:    Ht Readings from Last 1 Encounters:   12/22/21 1.702 m (5' 7\")     Wt Readings from Last 1 Encounters:   01/10/22 80.2 kg (176 lb 12.8 oz)       Body mass index is 27.69 kg/m .    Patient Vitals for the past 96 hrs:   Weight   01/10/22 0655 80.2 kg (176 lb 12.8 oz)   01/09/22 0056 80 kg (176 lb 5.9 oz)       Labs:  Last 3 days:  Recent Labs     01/08/22 2001 01/09/22  0513 01/10/22  0532 01/11/22  0500   * 132* 137 137   POTASSIUM 4.8 4.0 4.2 3.9   CHLORIDE 97* 101 106 107   CO2 26 27 26 25   BUN 28* 24* 22 22   CR 0.97 0.79 0.76 0.70   VIJAYA 8.8 7.7* 8.0* 7.8*   MAG 1.7* 2.4 2.2 2.0   PHOS 2.8  --  3.0 3.3   PROTTOTAL 6.6 5.6*  --   --    ALBUMIN 2.0* 1.6*  --   --    PREALB  --   --  8*  --    TRIG  --   --  106  --    HGB 10.4* 8.4* 8.6* 8.8*   HCT 32.9* 27.0* 27.2* 28.1*    400 413 440   BILITOTAL 1.5* 0.9  --   --    AST 22 18  --   --    ALT 21 16  --   --    ALKPHOS 129* 99  --   --    INR  --   --  1.21*  --        Glucose (past 48 hours):   Recent Labs     01/10/22  0532 01/11/22  0500   * 149*       Intake/Output (last 24 hours): I/O last 3 completed shifts:  In: 1420 [P.O.:220]  Out: 1450 [Urine:600; Drains:850]    Estimated CrCl: Estimated Creatinine Clearance: 103.9 mL/min (based on SCr of 0.7 " mg/dL).    Assessment:    Continue patient on PN therapy as a cyclic central therapy.     Given the patient's current condition/oral intake, PN is still indicated.    Lab results reviewed    Plan:    1. Rate of PN: 75ml/hr x 1 hour, followed by 150ml/hr x 10 hours, followed by 75ml/hr x 1 hour  2. Formula:     Amino Acids 130 grams    Dextrose 300 grams    Sodium 130 mEq/day    Potassium 75 mEq/day    Calcium 8 mEq/day    Magnesium 16 mEq/day    Phosphorus 32 mMol/day    Chloride: Acetate Ratio 1:1    Standard Multivitamins w/Vitamin K    Trace Elements  3. Fat Emulsion: 20%, 250 mL IV 3 times weekly on Mon, Thur, Sat  4. Check BMP and mag and phos labs tomorrow.  5. Pharmacist will continue to follow the patient's lab results, clinical status and blood glucose results and make adjustments as appropriate.    Thank you for the consult.  Saritha Price RPH  1/11/2022 7:58 AM

## 2022-01-11 NOTE — PLAN OF CARE
Problem: Impaired Wound Healing  Goal: Optimal Wound Healing  Outcome: No Change  Intervention: Promote Wound Healing  Recent Flowsheet Documentation  Taken 1/11/2022 0915 by Thuy Fishman, RN  Pain Management Interventions: medication (see MAR)   Still having a lot of output from abdominal sites.  Problem: Pain (Surgery Nonspecified)  Goal: Acceptable Pain Control  Outcome: Improving  Intervention: Prevent or Manage Pain  Recent Flowsheet Documentation  Taken 1/11/2022 0915 by Thuy Fishman, RN  Pain Management Interventions: medication (see MAR)   Patient not needing as much PRN pain medications as he did previously.  Problem: Dysrhythmia  Goal: Normalized Cardiac Rhythm  Outcome: Improving   Patient now in NSR no further A-Fib noted.

## 2022-01-12 ENCOUNTER — APPOINTMENT (OUTPATIENT)
Dept: OCCUPATIONAL THERAPY | Facility: HOSPITAL | Age: 67
DRG: 393 | End: 2022-01-12
Payer: MEDICARE

## 2022-01-12 ENCOUNTER — APPOINTMENT (OUTPATIENT)
Dept: PHYSICAL THERAPY | Facility: HOSPITAL | Age: 67
DRG: 393 | End: 2022-01-12
Payer: MEDICARE

## 2022-01-12 LAB
ANION GAP SERPL CALCULATED.3IONS-SCNC: 5 MMOL/L (ref 5–18)
BUN SERPL-MCNC: 19 MG/DL (ref 8–22)
CALCIUM SERPL-MCNC: 8.2 MG/DL (ref 8.5–10.5)
CHLORIDE BLD-SCNC: 106 MMOL/L (ref 98–107)
CO2 SERPL-SCNC: 28 MMOL/L (ref 22–31)
CREAT SERPL-MCNC: 0.69 MG/DL (ref 0.7–1.3)
GFR SERPL CREATININE-BSD FRML MDRD: >90 ML/MIN/1.73M2
GLUCOSE BLD-MCNC: 109 MG/DL (ref 70–125)
GLUCOSE BLDC GLUCOMTR-MCNC: 112 MG/DL (ref 70–99)
GLUCOSE BLDC GLUCOMTR-MCNC: 148 MG/DL (ref 70–99)
MAGNESIUM SERPL-MCNC: 2.1 MG/DL (ref 1.8–2.6)
PHOSPHATE SERPL-MCNC: 3.5 MG/DL (ref 2.5–4.5)
PLATELET # BLD AUTO: 436 10E3/UL (ref 150–450)
POTASSIUM BLD-SCNC: 4.2 MMOL/L (ref 3.5–5)
SODIUM SERPL-SCNC: 139 MMOL/L (ref 136–145)

## 2022-01-12 PROCEDURE — 250N000013 HC RX MED GY IP 250 OP 250 PS 637: Performed by: INTERNAL MEDICINE

## 2022-01-12 PROCEDURE — 250N000011 HC RX IP 250 OP 636: Performed by: INTERNAL MEDICINE

## 2022-01-12 PROCEDURE — 80048 BASIC METABOLIC PNL TOTAL CA: CPT | Performed by: STUDENT IN AN ORGANIZED HEALTH CARE EDUCATION/TRAINING PROGRAM

## 2022-01-12 PROCEDURE — 97116 GAIT TRAINING THERAPY: CPT | Mod: GP

## 2022-01-12 PROCEDURE — 99024 POSTOP FOLLOW-UP VISIT: CPT | Performed by: SPECIALIST

## 2022-01-12 PROCEDURE — 97110 THERAPEUTIC EXERCISES: CPT | Mod: GO

## 2022-01-12 PROCEDURE — 83735 ASSAY OF MAGNESIUM: CPT | Performed by: INTERNAL MEDICINE

## 2022-01-12 PROCEDURE — 210N000001 HC R&B IMCU HEART CARE

## 2022-01-12 PROCEDURE — 250N000013 HC RX MED GY IP 250 OP 250 PS 637: Performed by: STUDENT IN AN ORGANIZED HEALTH CARE EDUCATION/TRAINING PROGRAM

## 2022-01-12 PROCEDURE — 99232 SBSQ HOSP IP/OBS MODERATE 35: CPT | Mod: GC | Performed by: STUDENT IN AN ORGANIZED HEALTH CARE EDUCATION/TRAINING PROGRAM

## 2022-01-12 PROCEDURE — 999N000197 HC STATISTIC WOC PT EDUCATION, 0-15 MIN

## 2022-01-12 PROCEDURE — 99231 SBSQ HOSP IP/OBS SF/LOW 25: CPT | Performed by: INTERNAL MEDICINE

## 2022-01-12 PROCEDURE — 84100 ASSAY OF PHOSPHORUS: CPT | Performed by: INTERNAL MEDICINE

## 2022-01-12 PROCEDURE — 85049 AUTOMATED PLATELET COUNT: CPT | Performed by: INTERNAL MEDICINE

## 2022-01-12 PROCEDURE — 99233 SBSQ HOSP IP/OBS HIGH 50: CPT | Performed by: INTERNAL MEDICINE

## 2022-01-12 PROCEDURE — 250N000013 HC RX MED GY IP 250 OP 250 PS 637: Performed by: FAMILY MEDICINE

## 2022-01-12 PROCEDURE — 250N000009 HC RX 250: Performed by: FAMILY MEDICINE

## 2022-01-12 RX ORDER — DOXYCYCLINE 100 MG/1
100 CAPSULE ORAL EVERY 12 HOURS SCHEDULED
Status: COMPLETED | OUTPATIENT
Start: 2022-01-12 | End: 2022-01-14

## 2022-01-12 RX ORDER — CEFUROXIME AXETIL 500 MG/1
500 TABLET ORAL 2 TIMES DAILY WITH MEALS
Status: COMPLETED | OUTPATIENT
Start: 2022-01-12 | End: 2022-01-14

## 2022-01-12 RX ADMIN — VENLAFAXINE HYDROCHLORIDE 75 MG: 75 CAPSULE, EXTENDED RELEASE ORAL at 08:53

## 2022-01-12 RX ADMIN — AMIODARONE HYDROCHLORIDE 200 MG: 200 TABLET ORAL at 20:35

## 2022-01-12 RX ADMIN — POTASSIUM CHLORIDE: 2 INJECTION, SOLUTION, CONCENTRATE INTRAVENOUS at 20:36

## 2022-01-12 RX ADMIN — QUETIAPINE FUMARATE 200 MG: 100 TABLET, FILM COATED ORAL at 20:35

## 2022-01-12 RX ADMIN — OXYCODONE HYDROCHLORIDE AND ACETAMINOPHEN 2 TABLET: 5; 325 TABLET ORAL at 22:35

## 2022-01-12 RX ADMIN — DOXYCYCLINE 100 MG: 100 CAPSULE ORAL at 20:35

## 2022-01-12 RX ADMIN — CEFUROXIME AXETIL 500 MG: 500 TABLET ORAL at 16:17

## 2022-01-12 RX ADMIN — GABAPENTIN 200 MG: 100 CAPSULE ORAL at 16:17

## 2022-01-12 RX ADMIN — APIXABAN 10 MG: 5 TABLET, FILM COATED ORAL at 08:52

## 2022-01-12 RX ADMIN — LEVOTHYROXINE SODIUM 25 MCG: 0.03 TABLET ORAL at 06:31

## 2022-01-12 RX ADMIN — APIXABAN 10 MG: 5 TABLET, FILM COATED ORAL at 20:35

## 2022-01-12 RX ADMIN — HYDROXYZINE HYDROCHLORIDE 25 MG: 25 TABLET, FILM COATED ORAL at 20:35

## 2022-01-12 RX ADMIN — OXYCODONE HYDROCHLORIDE AND ACETAMINOPHEN 2 TABLET: 5; 325 TABLET ORAL at 16:17

## 2022-01-12 RX ADMIN — VANCOMYCIN HYDROCHLORIDE 1000 MG: 1 INJECTION, SOLUTION INTRAVENOUS at 10:24

## 2022-01-12 RX ADMIN — CEFEPIME HYDROCHLORIDE 2 G: 2 INJECTION, POWDER, FOR SOLUTION INTRAVENOUS at 06:31

## 2022-01-12 RX ADMIN — GABAPENTIN 200 MG: 100 CAPSULE ORAL at 08:53

## 2022-01-12 RX ADMIN — PANTOPRAZOLE SODIUM 40 MG: 40 TABLET, DELAYED RELEASE ORAL at 06:31

## 2022-01-12 RX ADMIN — GABAPENTIN 900 MG: 300 CAPSULE ORAL at 20:34

## 2022-01-12 RX ADMIN — AMIODARONE HYDROCHLORIDE 200 MG: 200 TABLET ORAL at 13:58

## 2022-01-12 RX ADMIN — AMIODARONE HYDROCHLORIDE 200 MG: 200 TABLET ORAL at 08:52

## 2022-01-12 ASSESSMENT — ACTIVITIES OF DAILY LIVING (ADL)
ADLS_ACUITY_SCORE: 13

## 2022-01-12 ASSESSMENT — MIFFLIN-ST. JEOR: SCORE: 1533.78

## 2022-01-12 NOTE — PLAN OF CARE
Problem: Risk for Delirium  Goal: Improved Sleep  Outcome: Improving     Problem: Depression  Goal: Improved Mood  Outcome: Improving     Problem: Pain (Surgery Nonspecified)  Goal: Acceptable Pain Control  Outcome: Improving   VSS overnight, Rhythm NSR w/ PACs.  Pt reported no pain overnight and tolerated TPN  well.  Patient slept well between cares. Dressing over open sight changed at the beginning  of shift and was clean dry and intact after that.

## 2022-01-12 NOTE — PROGRESS NOTES
"Spiritual Health Note    Spiritual Assessment:    Follow up visit with Chema, who shared about his ongoing hospitalization. He stated that there always seems to be setbacks and that he is feeling \"depressed.\"     He is hopeful of discharge to a TCU, but he states is criminal background is a barrier for some places.     Patient expressed appreciation for the visit. He has some friends that he calls throughout the day, which helps him pass the time.     Care Provided:    Empathic listening and presence     Helped patient in processing of emotions     Plan of Care: A  will continue to visit as able or per request by patient/family/staff.         David Lopez MDiv, BCC   "

## 2022-01-12 NOTE — PROGRESS NOTES
NUTRITION BRIEF NOTE :    Pharmacy/Nutrition to  manage TPN  See RD note 22 for full reassessment     RECOMMENDATIONS FOR MDs/PROVIDERS TO ORDER:      Recommendations already ordered by Registered Dietitian (RD):      Continue  TPN regimen- orders sent to pharmacy    New findings in last 24 hours:    Diet order: clear liquid    TPN:CENTRAL LINE IV, at  mL/hr, Administer over 12 Hours, CYCLE, Starting on 22 at 2000, For 24 hours  Infused volume on cycled TPN may differ slightly from ordered volume due to rounding in calculations. Start rate at 75 mL/hr for 1 hours. Increase rate to 150 mL/hr for 10 hours. Decrease rate to 75 mL/hr for 1 hours, then stop.   Cyclic TPN: D 300  75 ml/hr x 1 hr, 150 ml/hr x 10 hrs  75 ml/hr x 1 hr and off.  250 ml of 20% lipids 3 times per week  Over 12 hrs  TPN providin Calories, 130 g protein, 300 g CHO, 24 g fat/averaged.  TPN changes to cyclic on 2021.  TPN meets ~90% of estimated Calorie needs and 100% protein needs    Fluid: cumulative net I/O:+1304.5 ml    Last BM per nursin22l,N,hypo BS    Per nursing note pt had dry heaving last night @2212    I/O last 3 completed shifts:    In: 1884 [P.O.:480]    Out:  [Urine:500; Drains:1315]    Labs: reviewed including:    Electrolytes  Potassium (mmol/L)   Date Value   2022 4.2   2022 3.9   01/10/2022 4.2   2020 3.8     Phosphorus (mg/dL)   Date Value   2022 3.5   2022 3.3   01/10/2022 3.0   2022 2.8   2022 2.6        Blood Glucose  Glucose (mg/dL)   Date Value   2022 109   2022 149 (H)   01/10/2022 152 (H)   2022 162 (H)   2022 100   2020 100 (H)     GLUCOSE BY METER POCT (mg/dL)   Date Value   2022 148 (H)   2022 134 (H)   2022 88   2022 115 (H)     Hemoglobin A1C (%)   Date Value   2021 5.8 (H)        Inflammatory Markers  CRP (mg/dL)   Date Value   2022 20.4 (H)   2021 1.8 (H)  "  12/07/2021 26.6 (H)     WBC (10e9/L)   Date Value   01/22/2020 7.1     WBC Count (10e3/uL)   Date Value   01/11/2022 8.4   01/10/2022 8.3   01/09/2022 10.1     Albumin (g/dL)   Date Value   01/09/2022 1.6 (L)   01/08/2022 2.0 (L)   01/06/2022 2.1 (L)        Magnesium (mg/dL)   Date Value   01/12/2022 2.1   01/11/2022 2.0   01/10/2022 2.2     Sodium (mmol/L)   Date Value   01/12/2022 139   01/11/2022 137   01/10/2022 137   01/22/2020 140        Renal  Urea Nitrogen (mg/dL)   Date Value   01/12/2022 19   01/11/2022 22   01/10/2022 22   01/22/2020 14     Creatinine (mg/dL)   Date Value   01/12/2022 0.69 (L)   01/11/2022 0.70   01/10/2022 0.76   01/22/2020 0.94         Additional  Triglycerides (mg/dL)   Date Value   01/10/2022 106   01/03/2022 255 (H)   12/27/2021 169 (H)   01/22/2020 73     Ketones Urine (mg/dL)   Date Value   01/06/2022 Negative            amiodarone  200 mg Oral TID     apixaban ANTICOAGULANT  10 mg Oral BID    Followed by     [START ON 1/18/2022] apixaban ANTICOAGULANT  5 mg Oral BID     ceFEPIme (MAXIPIME) IV  2 g Intravenous Q8H     gabapentin  200 mg Oral BID     gabapentin  900 mg Oral At Bedtime     hydrOXYzine  25 mg Oral At Bedtime     levothyroxine  25 mcg Oral Daily     lipids  250 mL Intravenous Once per day on Mon Thu Sat     pantoprazole  40 mg Oral QAM AC     QUEtiapine  200 mg Oral At Bedtime     sodium chloride (PF)  3 mL Intracatheter Q8H     vancomycin  1,000 mg Intravenous Q12H     venlafaxine  75 mg Oral Daily          dextrose       parenteral nutrition - ADULT compounded formula CYCLE Stopped (01/12/22 0856)            ANTHROPOMETRICS  Height: 5' 7\"  Weight: 80 kg   Body mass index is 27.63 kg/m .  Weight Status:  Overweight BMI 25-29.9    Weight History:   Wt Readings from Last 10 Encounters:   01/12/22 80 kg (176 lb 6.4 oz)   12/17/21 76.8 kg (169 lb 6.4 oz)   01/22/20 79.4 kg (175 lb)   12/24/19 81.2 kg (179 lb)     01/10/22 0655 80.2 kg (176 lb 12.8 oz) -- RA   01/09/22 " 0056 80 kg (176 lb 5.9 oz) -- EP   01/06/22 1555 74.3 kg (163 lb 14.4 oz) -- CS   01/01/22 2100 77.4 kg (170 lb 9.6 oz) -- KL   12/29/21 2147 75.8 kg (167 lb) --        Interventions:    Continue cyclic TPN orders  And lipidsfor regimen outlined above  Collaboration and Referral of care:  Orders sent to pharmacy.

## 2022-01-12 NOTE — PLAN OF CARE
Problem: Impaired Wound Healing  Goal: Optimal Wound Healing  Intervention: Promote Wound Healing  Recent Flowsheet Documentation  Taken 1/11/2022 2000 by Ruth Hilliard, RN  Activity Management: activity adjusted per tolerance  Taken 1/11/2022 1600 by Ruth Hilliard, RN  Activity Management: activity adjusted per tolerance     Problem: Pain (Surgery Nonspecified)  Goal: Acceptable Pain Control  Outcome: Improving     Changed pts lower colostomy bag due to leaking.  Also had to change abd pad x 2 on right lower stomach due to stool coming out of hole in stomach.  Informed surgeon on call about the new drainage.  Pt intake not good.  TPN running.  Pt refused to wear oxygen and refused to wear oxygen probe.  Gave percocet at 2100.  Used bedside commode for void.  Pt told me at 2130 that he was going to start gagging in a hour.  Pt started dry heaving an hour later.  Pt had refused to take his seroquel and percocet at 2100 because he said it was too early.  I asked what the pt needed and he said to not be left alone because he gets scared.   The aide stayed with the pt until he calmed down.

## 2022-01-12 NOTE — PROGRESS NOTES
[facility] Progress Note    Assessment/Plan  Active Problems:    Abdominal pain, generalized    Patient remains hospitalized due to placement, TPN.    Chema Dia is a 67 y/o M with past medical history of splenectomy, appendectomy, HTN, and substance abuse, with recent admission to hospital 11/30-12/21/2021 for SBO with perforation s/p surgical repair. Discharged home but presents as he could not manage at home and is now requesting to be placed in TCU vs LTC. Continues on TPN.  New RUL and RLL PE found and being treated, new HAP 1/9.    RUL and RLL PE  SMV thrombosis  New supplemental oxygen requirement  Acute RLL PE found on CT as well as SMV thrombosis.  Also has RUL PE.  No evidence of right heart strain.  Most likely provoked in the setting of recent surgery/hospitalization.  -Eliquis 10 mg twice daily for 7 days, then 5 mg twice daily for PE treatment.    Atrial flutter (resolved)  Developed this overnight 1/8-1/9 and required amiodarone and brief ICU stay for pressors.  Cardiology consulted.  Patient now stabilized and sinus rhythm, back to the floor.  Pulled back PICC line 1 cm as cardiology suspects the tip may be in the right atrium and that may have caused his episode of atrial flutter.  -Continue oral amiodarone.  Plan to taper per cardiology: amiodarone 3 times a day for a week then go down to 2 times a day for 2 weeks and then daily 200 mg a day.  -Eliquis.  -Likely need to come off of telemetry today per cardiology.    Nausea  New supplemental oxygen requirement  RLQ pain  Malaise  HAP  Patient has been having nausea with TPN but developed a new supplemental oxygen requirement 1/6 in addition to fatigue and general malaise and was found to have RLL PE and SMV thrombosis.  CT abdomen stable from prior though has some evidence of new inflammation in the RLQ 1/9 concerning for fistula or new fluid collection.  Now has evidence of left-sided HAP overnight 1/9.  -Repeated blood  cultures 1/8.  -Switched antibiotics back to cefepime, vancomycin.  Finished fluconazole.  Wound cultures positive for Candida parapsilosis and E. coli 12/22.  -Re-consulted ID for antibiotic guidance as patient has a fairly complicated presentation.  Now transitioned back to oral antibiotics, final day this Friday.  -No new procedure indicated per surgery.     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 Required subsequent washout on 12/3 with antibiotics and MARISA drain placement. On 12/8 found to have continued enhancing abscess with drain placed 12/9 and removed on 12/16.  Was discharged, but returned soon thereafter on 12/22 as he is unable to care for himself even assistance from family so is requesting LTC/TCU placement.  Now receiving TPN and has ongoing abdominal pain, nausea, and evidence of possible dehiscence in one of his intra-abdominal surgical sites.  -PT/OT.  Searching for TCU/LTC placement.  -Consulted surgery to follow while here.  Clear liquid diet.  Surgery is the only service that can advance this and will not do so for some time.  No new procedure indicated at this time.  -Consulted pharmacy and RD for TPN management - transitioned to cyclic TPN 12/30.  -WOC.  -Nausea, pain control.  -Antibiotics switched to cefepime, vancomycin for HAP as above.  Will finish course of fluconazole previously recommended by ID.  Reconsulted ID for antibiotic guidance as above.     Alk phos elevation, resolved  Alk phos elevated, now back to normal.  Rest of LFTs okay.  Does have sludge in gallbladder but no evidence of cholecystitis on imaging.  -Cyclic TPN.  Additional daily lab checks outside of regularly scheduled TPN checks not indicated at this time unless he develops new  symptoms.     Depression  Insomnia  Hx of this. Reports symptoms of low motivation, little interest in doing things, poor sleep, no appetite. Is on buspar, mirtazapine, and effexor at home, though doesn't really think medicines are as helpful as talk therapy is. Wanted to speak to social work and . States no plan to harm himself. Reports he just needs to leave the hospital.  -Consult psych for med management and plan for talk therapy while hospitalized.  -Discontinued buspar and mirtazapine per psych.  -Increased venlafaxine and gabapentin per psych.  -Seroquel for sleep.  Vistaril at bedtime.     Moderate malnutrition  Per RD eval.  -TPN per RD and pharmacy.     Normocytic anemia  Stable at 11.5, MCV of 93.     Chronic Conditions:  Hypothyroidism- PTA levothyroxine.  Chronic pain- Hold mexolicam 7.5mg daily, resume PTA gabapentin.  BPH-hold home Flomax given he states it does not help.    FEN: TPN.  Clear liquid diet as tolerated.  DVT Prophylaxis: Treatment dose Eliquis.  Code: Full code.    Disposition/Advanced Care Planning  Discharge Planning discussed with patient and care team.  Anticipated discharge date: Multiple days.  Pending placement.  Disposition: LTC.    Subjective  Patient feeling somewhat depressed today because he continues to be stuck in the hospital.    Objective    Vital signs in last 24 hours Temp:  [98  F (36.7  C)-98.7  F (37.1  C)] 98.6  F (37  C)  Pulse:  [] 90  Resp:  [16-22] 18  BP: (101-121)/(64-82) 108/72  SpO2:  [87 %-94 %] 94 %   Weight: [unfilled]    Intake/Output last 3 shift I/O last 3 completed shifts:  In: 1884 [P.O.:480]  Out: 1815 [Urine:500; Drains:1315]    Intake/Output this shift:I/O this shift:  In: -   Out: 250 [Drains:250]    Physical Exam  General appearance: alert, appears stated age, cooperative and no distress.  Head: Normocephalic, without obvious abnormality, atraumatic.  NC in place.  Eyes: conjunctivae/corneas clear.  Throat: MMM.  Neck: supple,  symmetrical, trachea midline.  Lungs: no increased respiratory effort.  NC in place.  Heart: Good capillary refill.  Abdomen: non-distended, soft, appropriately tender.  Extremities: extremities normal, atraumatic, no cyanosis or edema.  Skin: Skin color, texture, turgor normal. No rashes or lesions.  Neurologic: Alert and oriented x3, normal strength and tone.  Psychiatric: mood and affect appropriate.    Pertinent Labs and Pertinent Radiology   Lab Results: personally reviewed.     Radiology Results: Personally reviewed image/s and impression/s    Precepted patient with Dr. Lucas Aguilera.    Johnathan Lees MD  [facility], PGY-3

## 2022-01-12 NOTE — PLAN OF CARE
Problem: Adult Inpatient Plan of Care  Goal: Readiness for Transition of Care  Outcome: Improving     IV antibiotics discontinued and patient started on oral antibiotics. Participated in therapy, ambulating in hallway. Intermittently requesting oxygen, saturations remain above 90% on room air, however appears to be moments of anxiety when requesting to use nasal cannula. Patient refusing PRN anxiety medication.

## 2022-01-12 NOTE — PROGRESS NOTES
"WO stoma assessment EC fistula with photograph    Pouches were just changed last night due to leaking again. Currently nicely intact with no leaks. Pouches need to be emptied when 1/3-1/2 full to help prevent leaking/weight of pouch pulling the adhesive off of the skin. See below for previous assessment        Allergies:  Penicillins    Height:  Height: 170.2 cm (5' 7\")    Weight:   Weight: 75.5 kg (166 lb 6.4 oz)    Past Medical History:  Past Medical History:   Diagnosis Date     Benign prostatic hyperplasia with weak urinary stream      Chronic bilateral low back pain without sciatica      Chronic neck pain      RAVI (generalized anxiety disorder)      History of hepatitis C     treated and cured     History of substance abuse (H)      Hypertension goal BP (blood pressure) < 140/90      Moderate major depression (H)        Labs:  Recent Labs   Lab Test 12/23/21  0715 12/22/21  1648 12/22/21  0003 12/08/21  0535 12/07/21  0358   CRP  --   --   --   --  26.6*   HGB 10.9*  --  12.0*   < > 10.9*   ALBUMIN  --   --  2.4*   < > 2.3*   CULTURE  --  No growth after 12 hours  No growth after 12 hours  --    < >  --     < > = values in this interval not displayed.       Mars:  Mars Score: 17    INTAKE  EC fistula s/p 2 abdominal surgeries      ASSESSMENT  Abdominal EC fistula    Long abdominal incision that is mostly approximated. All staples were removed today per general surgery PA verbal order.    Upper portion of incision possibly has fistulous tract - measures 0.3x0.3cm. moderate green output.    Distal portion of incision is open wound. . Wound measures 1.5x1cm - did not assess depth. Large green/brown output from this wound. periwound skin erythema is much improved from previous assessment.    Skin was crusted with stoma powder and Cavilon no-sting skin barrier. Barrier rings placed around each fistula site and each was pouched with Jaylin #[id number].     1/5/22:        12/23/21           TREATMENT/EQUIPMENT  See " above    Supplies: Pouch Jaylin #[id number] -Flat 1 piece cut to fit fecal pouch, Paste Jaylin #[id number] -Adapt Ring and Powder Jaylin #[id number] - [employer]    Instructions Given: WOC Role. Patient kept eyes closed with washcloth over the for the entire assessment.    Nursing care provided was coordinated care with RN and patient.     Discussed plan of care with nurse and patient.    Outcomes and treatment recommendations are to To contain output, To be knowledgable with pouch change/emptying before discharge and To be independant with pouch change/emptying before discharge    Actions taken by WOC RN: 5 minutes of education.    Planned Follow Up: Weekly.    Plan for next visit: Reassess stoma/peristomal skin

## 2022-01-12 NOTE — PROGRESS NOTES
"Surgery    Bored, depressed about situation  /72 (BP Location: Left arm)   Pulse 90   Temp 98.6  F (37  C) (Oral)   Resp 18   Ht 1.702 m (5' 7\")   Wt 80 kg (176 lb 6.4 oz)   SpO2 91%   BMI 27.63 kg/m    abd  Soft, some drainage from right lower prior drain sites. Midline bags in place.    Ext warm    Results for orders placed or performed during the hospital encounter of 12/21/21 (from the past 24 hour(s))   Glucose by meter   Result Value Ref Range    GLUCOSE BY METER POCT 148 (H) 70 - 99 mg/dL   Platelet count   Result Value Ref Range    Platelet Count 436 150 - 450 10e3/uL   Magnesium   Result Value Ref Range    Magnesium 2.1 1.8 - 2.6 mg/dL   Phosphorus   Result Value Ref Range    Phosphorus 3.5 2.5 - 4.5 mg/dL   Basic metabolic panel   Result Value Ref Range    Sodium 139 136 - 145 mmol/L    Potassium 4.2 3.5 - 5.0 mmol/L    Chloride 106 98 - 107 mmol/L    Carbon Dioxide (CO2) 28 22 - 31 mmol/L    Anion Gap 5 5 - 18 mmol/L    Urea Nitrogen 19 8 - 22 mg/dL    Creatinine 0.69 (L) 0.70 - 1.30 mg/dL    Calcium 8.2 (L) 8.5 - 10.5 mg/dL    Glucose 109 70 - 125 mg/dL    GFR Estimate >90 >60 mL/min/1.73m2     A/P: small bowel fistula after extensive lysis of adhesions    tpn  Cares   Appreciate consultant help        Mekhi Richardson MD  General Surgery [phone]  Vascular Surgery [phone]          "

## 2022-01-12 NOTE — PLAN OF CARE
Occupational Therapy Discharge Summary    Reason for therapy discharge:    All goals and outcomes met, no further needs identified.    Progress towards therapy goal(s). See goals on Care Plan in [facility] electronic health record for goal details.  Goals met    Therapy recommendation(s):    No further therapy is recommended.

## 2022-01-12 NOTE — PROGRESS NOTES
CARDIOLOGY PROGRESS NOTE      Assessment/Plan:  1.  Paroxysmal atrial flutter-converted after amiodarone.  On chronic Lovenox given pulmonary embolus.  On Eliquis, switch over to 5 mg p.o. twice daily when able.  Plan is to taper amiodarone as below.   2.  Pulmonary embolism-Eliquis.  3.  Enteric fistula-SBO on , underwent exploratory laparotomy, small bowel resection, repair of enterotomy, extensive lysis of adhesions. Required subsequent washout on 12/3 with antibiotics and MARISA drain placement. On  found to have continued enhancing abscess with drain placed  and removed on .    4.  Malnutrition-TPN and nutrition per primary care team.  5.  Hypothyroidism- TSH was normal at 1.86.    Plan:  1.  Continue amiodarone 3 times a day for a week then go down to 2 times a day for 2 weeks and then daily 200 mg a day.  2.  Could discontinue telemetry.    3.  Cardiology does not have any further inpatient work-up, will sign off, available as needed.      Discharge Plannin.  No significant cardiac barrier to discharge.    2.  Can follow with Dr. Rom Huertas for atrial fibrillation.     LOS: 19 days     Subjective:  Interval History:    66-year-old white gentleman being seen on 22nd day of hospitalization.  He feels well other than some abdominal discomfort.  Would like solid food.  No chest pains, palpitations, shortness of breath, PND, orthopnea, syncope, dizziness or peripheral edema.    Medications    amiodarone  200 mg Oral TID     apixaban ANTICOAGULANT  10 mg Oral BID    Followed by     [START ON 2022] apixaban ANTICOAGULANT  5 mg Oral BID     cefuroxime  500 mg Oral BID w/meals     doxycycline hyclate  100 mg Oral Q12H KATEY     gabapentin  200 mg Oral BID     gabapentin  900 mg Oral At Bedtime     hydrOXYzine  25 mg Oral At Bedtime     levothyroxine  25 mcg Oral Daily     lipids  250 mL Intravenous Once per day on Mon Thu Sat     pantoprazole  40 mg Oral QAM AC     QUEtiapine  200 mg Oral  "At Bedtime     sodium chloride (PF)  3 mL Intracatheter Q8H     venlafaxine  75 mg Oral Daily     Objective:   Vital signs in last 24 hours:  Temp:  [98  F (36.7  C)-98.7  F (37.1  C)] 98.6  F (37  C)  Pulse:  [] 90  Resp:  [16-22] 18  BP: (101-121)/(64-82) 108/72  SpO2:  [87 %-94 %] 91 %    Physical Exam:  /72 (BP Location: Left arm)   Pulse 90   Temp 98.6  F (37  C) (Oral)   Resp 18   Ht 1.702 m (5' 7\")   Wt 80 kg (176 lb 6.4 oz)   SpO2 91%   BMI 27.63 kg/m      General Appearance:    Alert, cooperative, no distress, appears stated age   Head:    Normocephalic, without obvious abnormality, atraumatic   Throat:   Lips, mucosa, and tongue normal; teeth and gums normal   Neck:   Supple, symmetrical, trachea midline, no adenopathy;        thyroid:  No enlargement/tenderness/nodules; no carotid    bruit or JVD   Back:     Symmetric, no curvature, ROM normal, no CVA tenderness   Lungs:     Clear to auscultation bilaterally, respirations unlabored   Chest wall:    No tenderness or deformity   Heart:    Regular rate and rhythm, S1 and S2 normal, no murmur, rub   or gallop   Abdomen:     Soft, non-tender, bowel sounds diminished all four quadrants,     no masses, no organomegaly, postoperative   Extremities:   Normal, atraumatic, no cyanosis or edema   Pulses:   2+ and symmetric all extremities   Skin:   Skin color, texture, turgor normal, no rashes or lesions     Cardiographics:      ECG: Personally reviewed by myself shows sinus rhythm, leftward axis, possible old septal Q wave MI type pattern.    Echocardiogram:   The left ventricle is normal in size with borderline concentric left ventricular hypertrophy.  Left ventricular function is normal.The ejection fraction is 60-65%.  Normal right ventricle size and systolic function.  No significant valve disease is identified.   There is no comparison study available.      Lab Results:   Recent Labs   Lab 01/12/22  0417 01/11/22  0500   WBC  --  8.4   HGB  " --  8.8*   HCT  --  28.1*    440     Recent Labs   Lab 01/12/22  0417      CO2 28   BUN 19   .       Lab Results   Component Value Date    TROPONINI 0.02 01/09/2022

## 2022-01-12 NOTE — PROGRESS NOTES
[facility]    Infectious Disease Progress Note    Date of Service (when I saw the patient): 01/12/2022     Assessment & Plan   See id note from 12/27    1. History of splenectomy and substance abuse, recent admission with small bowel obstruction- improving  2. Status post exploratory laparotomy, small bowel resection/repair of enterotomy, extensive lysis of adhesion on 11/30/2021  3. Postoperative small bowel perforation, status post exploratory laparotomy on 12/3/2021.  4. Intra-abdominal abscess status post drain placement on 12/9/2021.  E. coli in cultures.  Treated with meropenem, IV vancomycin and transition to p.o. cefdinir plus Flagyl on 12/15/2021  5. Abscessogram on 12/16 with resolved abscess, drain removed on 12/16/2021  6. Antibiotics discontinued on 12/20/2021, discharged from the hospital on 12/20/2021 and readmitted due to worsening pain and drainage from incision, cellulitis, EC fistula  7.  E coli and Candida parapsilosis in wound culture from 12/22/2021  8. Leukocytosis improved  9. On TPN  10. PCN allergy- hives      Recommendations:  Changed to ceftin and doxy today.  Give until Friday, then stop under assumption we have a controlled fistula at this point.     Will sign off thanks    Gerard Campbell MD  [facility]  [phone]        Interval History   Stable.  No issues with worsening sepsis, pain etc.  Is pretty beside himself about dietary restrictions, as was case Monday too.       Physical Exam   Temp: 98.6  F (37  C) Temp src: Oral BP: 108/72 Pulse: 90   Resp: 18 SpO2: 94 % O2 Device: Nasal cannula Oxygen Delivery: 4 LPM  Vitals:    01/09/22 0056 01/10/22 0655 01/12/22 0414   Weight: 80 kg (176 lb 5.9 oz) 80.2 kg (176 lb 12.8 oz) 80 kg (176 lb 6.4 oz)     Vital Signs with Ranges  Temp:  [98  F (36.7  C)-98.7  F (37.1  C)] 98.6  F (37  C)  Pulse:  [] 90  Resp:  [16-22] 18  BP: ()/(64-82) 108/72  SpO2:  [87 %-94 %] 94  %      Constitutional: No apparent distress  Lungs: normal breathing pattern  Cardiovascular:  No JVD elevation  Abdomen: drainage drain site. Ostomy bags on abdominal wall, 2 fistulas  Skin: no rash  Neuro alert oriented    Medications     dextrose       parenteral nutrition - ADULT compounded formula CYCLE       parenteral nutrition - ADULT compounded formula CYCLE Stopped (01/12/22 0856)       amiodarone  200 mg Oral TID     apixaban ANTICOAGULANT  10 mg Oral BID    Followed by     [START ON 1/18/2022] apixaban ANTICOAGULANT  5 mg Oral BID     cefuroxime  250 mg Oral Q12H KATEY     doxycycline hyclate  100 mg Oral Q12H KATEY     gabapentin  200 mg Oral BID     gabapentin  900 mg Oral At Bedtime     hydrOXYzine  25 mg Oral At Bedtime     levothyroxine  25 mcg Oral Daily     lipids  250 mL Intravenous Once per day on Mon Thu Sat     pantoprazole  40 mg Oral QAM AC     QUEtiapine  200 mg Oral At Bedtime     sodium chloride (PF)  3 mL Intracatheter Q8H     venlafaxine  75 mg Oral Daily       Data   All microbiology laboratory data reviewed.  Recent Labs   Lab Test 01/12/22 0417 01/11/22  0500 01/10/22  0532 01/09/22  0513   WBC  --  8.4 8.3 10.1   HGB  --  8.8* 8.6* 8.4*   HCT  --  28.1* 27.2* 27.0*   MCV  --  93 94 93    440 413 400     Recent Labs   Lab Test 01/12/22 0417 01/11/22  0500 01/10/22  0532   CR 0.69* 0.70 0.76     MICRO: reviewed      12/22/2021 1326 12/25/2021 0703 Wound Aerobic Bacterial Culture Routine [[id number]]    (Abnormal)   Wound from Abdomen    Final result Component Value   Culture Culture in progress    3+ Escherichia coli Abnormal     3+ Escherichia coli Abnormal     2+ Candida parapsilosis complex Abnormal          Susceptibility     Escherichia coli (1) Escherichia coli (2)     DENA DENA     Ampicillin >=32.0 ug/mL Resistant >=32.0 ug/mL Resistant     Ampicillin/ Sulbactam >=32.0 ug/mL Resistant >=32.0 ug/mL Resistant     Cefepime <=1.0 ug/mL Susceptible <=1.0 ug/mL Susceptible      Ceftazidime <=1.0 ug/mL Susceptible 2.0 ug/mL Susceptible     Ceftriaxone <=1.0 ug/mL Susceptible <=1.0 ug/mL Susceptible     Ciprofloxacin >=4.0 ug/mL Resistant >=4.0 ug/mL Resistant     Gentamicin <=1.0 ug/mL Susceptible <=1.0 ug/mL Susceptible     Levofloxacin >=8.0 ug/mL Resistant >=8.0 ug/mL Resistant     Meropenem <=0.25 ug/mL Susceptible <=0.25 ug/mL Susceptible     Piperacillin/Tazobactam >=128.0 ug/mL Resistant >=128.0 ug/mL Resistant     Tobramycin <=1.0 ug/mL Susceptible <=1.0 ug/mL Susceptible     Trimethoprim/Sulfamethoxazole >16/304 ug/mL Resistant >16/304 ug/mL Resistant                           12/22/2021 1648 12/23/2021 2017 Blood Culture Peripheral Blood [[id number]]   Peripheral Blood    Preliminary result Component Value   Culture No growth after 1 day P              12/22/2021 1648 12/23/2021 2017 Blood Culture Peripheral Blood [[id number]]   Peripheral Blood    Preliminary result Component Value   Culture No growth after 1 day P              12/22/2021 1326 12/24/2021 1412 Wound Aerobic Bacterial Culture Routine [[id number]]   (Abnormal)   Wound from Abdomen    Preliminary result Component Value   Culture Culture in progress P    3+ Gram negative bacilli Abnormal  P    3+ Gram negative bacilli Abnormal  P    2+ Candida parapsilosis complex Abnormal  P              12/22/2021 0018 12/22/2021 0048 Asymptomatic COVID-19 Virus (Coronavirus) by PCR Nasopharyngeal [[id number]]    Swab from Nasopharyngeal    Final result Component Value   SARS CoV2 PCR Negative   NEGATIVE: SARS-CoV-2 (COVID-19) RNA not detected, presumed negative.           12/17/2021 1615 12/17/2021 1844 Asymptomatic COVID-19 Virus (Coronavirus) by PCR Nasopharyngeal [[id number]]    Swab from Nasopharyngeal    Final result Component Value   SARS CoV2 PCR Negative   NEGATIVE: SARS-CoV-2 (COVID-19) RNA not detected, presumed negative.           12/09/2021 1204 12/12/2021 0831 Body fluid, unsp Aerobic Bacterial Culture  Routine [[id number]]    (Abnormal)   Body fluid, unsp from Pelvis    Final result Component Value   Culture 2+ Escherichia coli Abnormal          Susceptibility     Escherichia coli     DENA     Ampicillin >=32.0 ug/mL Resistant     Ampicillin/ Sulbactam 16.0 ug/mL Intermediate     Cefepime <=1.0 ug/mL Susceptible     Ceftazidime <=1.0 ug/mL Susceptible     Ceftriaxone <=1.0 ug/mL Susceptible     Ciprofloxacin >=4.0 ug/mL Resistant     Gentamicin <=1.0 ug/mL Susceptible     Levofloxacin >=8.0 ug/mL Resistant     Meropenem <=0.25 ug/mL Susceptible     Piperacillin/Tazobactam <=4.0 ug/mL Susceptible     Tobramycin <=1.0 ug/mL Susceptible     Trimethoprim/Sulfamethoxazole >16/304 ug/mL Resistant                   12/09/2021 1203 12/16/2021 0801 Anaerobic culture [[id number]]   Body fluid, unsp from Pelvis    Final result Component Value   Culture No anaerobic organisms isolated              12/08/2021 1507 12/08/2021 1607 Asymptomatic COVID-19 Virus (Coronavirus) by PCR Nasopharyngeal [[id number]]    Swab from Nasopharyngeal    Final result Component Value   SARS CoV2 PCR Negative   NEGATIVE: SARS-CoV-2 (COVID-19) RNA not detected, presumed negative.           12/06/2021 2140 12/12/2021 1031 Blood Culture Line, venous [[id number]]   Blood from Line, venous    Final result Component Value   Culture No Growth              12/05/2021 1645 12/10/2021 2031 Blood Culture Peripheral Blood [[id number]]   Peripheral Blood    Final result Component Value   Culture No Growth              12/05/2021 1645 12/10/2021 2031 Blood Culture Peripheral Blood [[id number]]    Peripheral Blood    Final result Component Value   Culture No Growth              12/05/2021 1638 12/06/2021 2126 Urine Culture [[id number]]   Urine, Roblero Catheter    Final result Component Value   Culture No Growth              11/30/2021 1153 11/30/2021 1241 Asymptomatic COVID-19 Virus (Coronavirus) by PCR Nasopharyngeal [[id number]]    Swab from  Nasopharyngeal    Final result Component Value   SARS CoV2 PCR Negative   NEGATIVE: SARS-CoV-2 (COVID-19) RNA not detected, presumed negative.                 RADIOLOGY:    CT Abd Peritoneum Abscess Drain w Cath Place    Result Date: 12/9/2021  EXAM: 1. PERCUTANEOUS DRAIN PLACEMENT PELVIC ABSCESS 2. CT GUIDANCE 3. CONSCIOUS SEDATION LOCATION: [facility] DATE/TIME: 12/9/2021 9:50 AM INDICATION: s/p two surgeries with elevating wbc, ct with pelvic abscess. TECHNIQUE: Dose reduction techniques were used. PROCEDURE: Informed consent obtained. Site marked. Prior images reviewed. Required items made available. Patient identity confirmed verbally and with arm band. Patient reevaluated immediately before administering sedation. Universal protocol was followed. Time out performed. The site was prepped and draped in sterile fashion. 10 mL of 1% lidocaine was infused into the local soft tissues. Using standard technique and under direct CT guidance, a 10 [place] drainage catheter was inserted into the fluid collection.  SPECIMEN: 30 mL of brownish thin fluid was aspirated and sent to lab for cultures and Gram stain. BLOOD LOSS: Minimal. The patient tolerated the procedure well. No immediate complications. SEDATION: Versed 2.0 mg. Fentanyl 100 mcg. The procedure was performed with administration intravenous conscious sedation with appropriate preoperative, intraoperative, and postoperative evaluation. 30 minutes of supervised face to face conscious sedation time was provided by a radiology nurse under my direct supervision.     IMPRESSION: 1.  Successful CT-guided drain placement into pelvic abscess. 30 mL brownish thin fluid removed and sent for cultures. Reference CPT Codes: 57067, 78655, 19183    Echocardiogram Complete    Result Date: 12/10/2021  [id number]^CHRISTOPHER^FLOWER^[place] [address]  Name: ISABELA DUNN MRN: [id number]  : 1955 Study Date: 12/10/2021 01:14 PM Age: 66 yrs Gender: Male Patient Location: [facility] Reason For Study: [place] Ordering Physician: FLOWER WHITE Performed By: ALEX  BSA: 1.8 m2 Height: 65 in Weight: 155 lb HR: 84 ______________________________________________________________________________ Procedure Complete Echo Adult. Adequate quality two-dimensional was performed and interpreted. ______________________________________________________________________________ Interpretation Summary  The left ventricle is normal in size with borderline concentric left ventricular hypertrophy. Left ventricular function is normal.The ejection fraction is 60-65%. Normal right ventricle size and systolic function. No significant valve disease is identified.  There is no comparison study available. ______________________________________________________________________________ I      WMSI = 1.00     % Normal = 100  X - Cannot   0 -                      (2) - Mildly 2 -          Segments  Size Interpret    Hyperkinetic 1 - Normal  Hypokinetic  Hypokinetic  1-2     small                                                    7 -          3-5    moderate 3 - Akinetic 4 -          5 -         6 - Akinetic Dyskinetic   6-14    large              Dyskinetic   Aneurysmal  w/scar       w/scar       15-16   diffuse  Left Ventricle The left ventricle is normal in size. Left ventricular function is normal.The ejection fraction is 60-65%. There is borderline concentric left ventricular hypertrophy. Left ventricular diastolic function is normal. No regional wall motion abnormalities noted.  Right Ventricle Normal right ventricle size and systolic function.  Atria Normal left atrial size. Right atrial size is normal. There is no color Doppler evidence of an atrial shunt.  Mitral Valve Mitral valve leaflets appear normal. There is no evidence of mitral stenosis or clinically significant mitral regurgitation.  Tricuspid Valve Tricuspid valve  leaflets appear normal. There is no evidence of tricuspid stenosis or clinically significant tricuspid regurgitation. Right ventricle systolic pressure estimate normal. The right ventricular systolic pressure is approximated at 18.7 mmHg plus the right atrial pressure.  Aortic Valve The aortic valve is trileaflet. Aortic valve leaflets appear normal. There is no evidence of aortic stenosis or clinically significant aortic regurgitation. There is trace aortic regurgitation.  Pulmonic Valve The pulmonic valve is not well seen, but is grossly normal. This degree of valvular regurgitation is within normal limits. There is trace pulmonic valvular regurgitation.  Vessels The aorta root is normal. The ascending aorta is Mildly dilated. IVC diameter <2.1 cm collapsing >50% with sniff suggests a normal RA pressure of 3 mmHg.  Pericardium There is no pericardial effusion.  Rhythm Sinus rhythm was noted.  ______________________________________________________________________________ MMode/2D Measurements & Calculations IVSd: 1.3 cm LVIDd: 5.0 cm LVIDs: 3.2 cm LVPWd: 0.86 cm  FS: 36.6 % LV mass(C)d: 201.1 grams LV mass(C)dI: 113.3 grams/m2 Ao root diam: 3.9 cm LA dimension: 4.1 cm asc Aorta Diam: 3.8 cm LA/Ao: 1.0 LVOT diam: 2.4 cm LVOT area: 4.6 cm2 LA Volume Indexed (AL/bp): 29.6 ml/m2 RWT: 0.34  Doppler Measurements & Calculations MV E max gary: 80.4 cm/sec MV A max gary: 62.9 cm/sec MV E/A: 1.3  MV dec slope: 292.8 cm/sec2 MV dec time: 0.27 sec Ao V2 max: 178.1 cm/sec Ao max P.0 mmHg Ao V2 mean: 130.5 cm/sec Ao mean P.9 mmHg Ao V2 VTI: 29.4 cm SUMAN(I,D): 3.2 cm2 SUMAN(V,D): 3.1 cm2 LV V1 max P.5 mmHg LV V1 max: 117.6 cm/sec LV V1 VTI: 20.2 cm SV(LVOT): 93.4 ml SI(LVOT): 52.6 ml/m2 PA acc time: 0.12 sec TR max gary: 216.1 cm/sec TR max P.7 mmHg AV Gary Ratio (DI): 0.66 SUMAN Index (cm2/m2): 1.8 E/E' av.3 Lateral E/e': 7.9 Medial E/e': 16.6   ______________________________________________________________________________ Report approved by: Hany Currie 12/10/2021 02:23 PM       XR Chest Port 1 View    Result Date: 12/6/2021  EXAM: XR CHEST PORT 1 VIEW LOCATION: [facility] DATE/TIME: 12/6/2021 1:39 PM INDICATION: Worsening hypoxia, evaluate for pneumonia, pulm edema COMPARISON: Chest x-ray 12/03/2021     IMPRESSION: Enteric suction tube tip and side-port within the upper gastric lumen. Satisfactory right PICC line position with the tip near the cavoatrial junction. Persistent low lung volumes. No definite pneumothorax. Mildly improved bibasilar pulmonary  opacities favored to reflect atelectasis. Persistent interstitial coarsening. Suspected trace right-sided pleural fluid. Stable heart size and central vascular prominence.    XR Chest Port 1 View    Result Date: 12/3/2021  EXAM: XR CHEST PORT 1 VIEW LOCATION: [facility] DATE/TIME: 12/3/2021 8:31 PM INDICATION: RN placed PICC - verify tip placement COMPARISON: None.     IMPRESSION: Right PICC line present with its tip likely just into the right atrium. Suggest withdrawing the PICC line 2 cm to place the tip in the low SVC. NG tube in good position in the stomach. Mild cardiomegaly. Low lung volumes with mild patchy mixed interstitial and airspace infiltrates    IR Abscess Tube Check    Result Date: 12/16/2021  LOCATION: [facility] DATE: 12/16/2021 PROCEDURE: ABSCESS TUBE CHECK INTERVENTIONAL RADIOLOGIST: Demario Porter MD INDICATION: Pelvic abscess. Status post jugular drain placement on 12/9/2021. Markedly decreased outputs. CT from today demonstrating near complete resolution of the abscess cavity.. CONSENT: The procedure, risks and benefits of an abscessogram were discussed with the patient  in detail. All questions were answered. Informed consent was given to proceed with the procedure. MODERATE SEDATION: None.  CONTRAST: Omnipaque 350: 2 mL. ANTIBIOTICS: None. ADDITIONAL MEDICATIONS: None. FLUOROSCOPIC TIME: 0.1 minutes RADIATION DOSE: Air Kerma: 26 mGy COMPLICATIONS: No immediate complications. PROCEDURE:  images were obtained. The existing drainage catheter was injected with contrast, and an image obtained. After reviewing the images, the catheter was removed without difficulty. FINDINGS: Contracted cavity. Almost immediate pericatheter leakage.     IMPRESSION: 1.  Resolved pelvic fluid collection. Transgluteal drain removed.     CT Abdomen Pelvis w Contrast    Result Date: 12/22/2021  EXAM: CT ABDOMEN PELVIS W CONTRAST LOCATION: [facility] DATE/TIME: 12/22/2021 2:58 PM INDICATION: Exploratory laparotomy with adhesion lysis and repair of small bowel enterotomy 11/30/2021. Abdominal abscess drain placement 12/09/2021. COMPARISON: CT 12/16/2021, 12/08/2021 TECHNIQUE: CT scan of the abdomen and pelvis was performed following injection of IV contrast. Multiplanar reformats were obtained. Dose reduction techniques were used. CONTRAST: isovue 370 100ml FINDINGS: LOWER CHEST: Right basilar consolidation could represent atelectasis or pneumonia, similar to previous. HEPATOBILIARY: Stable hepatic cysts. Stable prominent gallbladder without definite wall thickening. PANCREAS: Normal. SPLEEN: Splenectomy with several left upper quadrant postsplenectomy implants. ADRENAL GLANDS: Normal. KIDNEYS/BLADDER: Several right renal cysts which do not require further imaging. Left renal 9 and 4 mm nonobstructive stones. Several tiny left renal cysts. No further imaging recommended. BOWEL: A loop of small bowel appears to communicate with the left anterior abdominal wall (series 3, image 105). The abdominal wall contains air. 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 There is a right lateral abdominal staple line suggesting an ilio transverse colostomy. The right lower quadrant mesentery inferior to  this contains a small amount of nonlocalized fluid and air. Also, the surgical staple line appears scattered suggesting  anastomotic breakdown. The prerectal drainage catheter has been removed. Small residual 16 x 12 mm fluid collection (image 143). LYMPH NODES: Several small mesenteric nodes are likely reactive. VASCULATURE: Patent mesenteric vessels. PELVIC ORGANS: Normal. MUSCULOSKELETAL: Severe degenerative change at the L4-L5 level with grade 2 anterolisthesis.     IMPRESSION: 1.  Improved prerectal fluid collection. 2.  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 3.  Possible right abdominal anastomotic dehiscence with a small amount free fluid and air in the mesentery. 4.  Report called to floor nurse Susy at 4:25 PM    CT Abdomen Pelvis w Contrast    Addendum Date: 12/16/2021    Discussed with Dr. Richardson by Dr. Fahrner over telephone at 11:41 AM.    Result Date: 12/16/2021  EXAM: CT ABDOMEN PELVIS W CONTRAST LOCATION: [facility] DATE/TIME: 12/16/2021 3:00 AM INDICATION: Intrabdominal abscess COMPARISON: CT 12/8/2021 and 12/9/2021 TECHNIQUE: CT scan of the abdomen and pelvis was performed following injection of IV contrast. Multiplanar reformats were obtained. Dose reduction techniques were used. CONTRAST: IsoVue 370 100mL FINDINGS: LOWER CHEST: Normal. HEPATOBILIARY: Normal. PANCREAS: Normal. SPLEEN: Splenules are present. ADRENAL GLANDS: A 10 mm area of fullness in the left adrenal gland has not changed. KIDNEYS/BLADDER: Again seen are nonobstructing left renal calculi. There are simple cysts in the right kidney which do not require follow-up. Smaller renal lesions are too small to characterize. BOWEL: There is surgical change in the right lower quadrant. There is distal small bowel wall thickening in this region. The small bowel wall is not fully visualized, and there is a small amount of extraluminal fluid and air in this region (series 3 images 93 and  96). There is new free air in the right lower quadrant mesentery (series 3 image 105). LYMPH NODES: Normal. VASCULATURE: Atherosclerotic disease. PELVIC ORGANS: Normal. OTHER: A fluid collection adjacent to the rectum is 1.9 x 1.5 x 1.5 cm (approximately 2.2 mL). Previously the collection was 4.4 x 3.6 x 4.4 cm. A pigtail catheter terminates within this collection. MUSCULOSKELETAL: Surgical change along the ventral abdominal and pelvic wall. There is a 20.8 x 3.2 x 1.2 cm fluid and air collection in the midline ventral abdominal and pelvic wall just deep to the staple line. Right lower quadrant and left midabdomen approach Larry drains are present.     IMPRESSION: 1.  The pelvic fluid collection is significantly smaller. A pigtail catheter is in place. 2.  Increased free air in the mesentery in the right lower quadrant, suggesting suture line dehiscence. There is also a suspected distal small bowel defect with adjacent free air and fluid. 3.  New fluid collection in the subcutaneous tissues of the ventral abdominal and pelvic wall, just deep to the staple line. Recommend drainage.     CT Abdomen Pelvis w Contrast    Result Date: 12/8/2021  EXAM: CT ABDOMEN PELVIS W CONTRAST LOCATION: [facility] DATE/TIME: 12/8/2021 1:40 PM INDICATION: Abdominal abscess/infection suspected, status post laparotomy with repair of small bowel perforation, recent small bowel resection COMPARISON: CT 11/30/2021 TECHNIQUE: CT scan of the abdomen and pelvis was performed following injection of IV contrast. Multiplanar reformats were obtained. Dose reduction techniques were used. CONTRAST: 100 mL Isovue-370 FINDINGS: LOWER CHEST: Mild bilateral lower lobar dependent consolidation and trace right-sided pleural effusion. HEPATOBILIARY: Prominent gallbladder likely reflecting recent fasting. Stable mild biliary ductal dilatation. No obstructing mass lesion or radiodense stone. Stable small low-density hepatic lesions,  likely cysts. PANCREAS: Normal. SPLEEN: Surgically absent. ADRENAL GLANDS: Normal. KIDNEYS/BLADDER: Stable nonobstructing lower left renal stones with a dominant stone measuring 4 mm. No hydronephrosis. Unremarkable urinary bladder. BOWEL: Enteric suction tube with the tip located within the mid gastric lumen. Mild to moderately distended segments of mid small bowel with associated air-fluid levels. No discrete transition is identified. Right lower quadrant anastomotic changes. Small amount of nonloculated fluid and extraluminal air within the mid abdomen for example image 50 series 400.2 and image 97 series 3. Questionable adjacent small bowel defect image 50 series 102. Small amount of nonloculated fluid is also noted within the right lower quadrant. Newly visualized rim-enhancing pelvic fluid collection measuring 4.4 x 3.6 x 4.4 cm. Trace ascites. Midline laparotomy incision with a small amount of fluid and air within the wound. Surgical drain tip is located at the upper aspect of this wound. Additional surgical drain with the tip located within the left lower quadrant. Third surgical drain with the tip located in the lower anterior abdomen. Diffuse mesenteric edema. LYMPH NODES: Normal. VASCULATURE: Mild atherosclerosis. PELVIC ORGANS: Normal. MUSCULOSKELETAL: Bilateral L4 pars defects with grade 1 anterolisthesis at L4-L5. Associated severe discogenic degenerative changes.     IMPRESSION: 1.  Newly visualized 4.6 cm rim-enhancing fluid collection within the pelvis. 2.  Small ill-defined irregular pocket of fluid and extraluminal air within the mid abdomen as described above. It is uncertain whether this is related to sequelae of recent surgery or bowel leakage. Recommend short interval follow-up CT with positive enteric contrast. 3.  Mild to moderate mid small bowel distention. This is favored to reflect an ileus rather than obstruction. 4.  Trace ascites and diffuse mesenteric edema. 5.  Mild bilateral lower  lobar consolidation and trace right-sided pleural effusion. [Critical Result: Pulmonary fluid collection and extraluminal loculated air-fluid within the mid abdomen as detailed above.] Finding was identified on 12/8/2021 2:10 PM. Dr. Richardson was contacted by me on 12/8/2021 2:50 PM and verbalized understanding of the critical result.     CT Abdomen Pelvis w Contrast    Result Date: 11/30/2021  EXAM: CT ABDOMEN PELVIS W CONTRAST LOCATION: [facility] DATE/TIME: 11/30/2021 10:48 AM INDICATION: Abdominal distension COMPARISON: None. TECHNIQUE: CT scan of the abdomen and pelvis was performed following injection of IV contrast. Multiplanar reformats were obtained. Dose reduction techniques were used. CONTRAST: IsoVue 370 100mL FINDINGS: LOWER CHEST: Bibasilar atelectasis. HEPATOBILIARY: Scattered small water density foci in the liver consistent with cysts. No radiopaque gallstone. No suspicious hepatic lesions. PANCREAS: Coarse calcification in the pancreatic tail and body may represent sequelae of chronic pancreatitis no acute pancreatitis or abnormal pancreatic mass. SPLEEN: Not visualized consistent with prior splenectomy. ADRENAL GLANDS: Normal. KIDNEYS/BLADDER: No hydronephrosis or masses. No bladder stone or mass. BOWEL: Anastomotic staples in the colon in the right mid abdomen. There is normal caliber duodenum and proximal jejunum with dilated loops of distal jejunum and ileum with some fecal i-STAT ileal contents and a transition in the right mid to lower abdomen. Findings are consistent with mechanical small bowel obstruction and this may represent a closed loop obstruction such as internal hernia or multiple adhesions given the lack of dilatation of the proximal small bowel. No pneumatosis intestinalis,  free intraperitoneal air or abscess. LYMPH NODES: No lymphadenopathy. VASCULATURE: Mild aortoiliac calcification without aneurysm. PELVIC ORGANS: Mild prostatic enlargement.  "MUSCULOSKELETAL: Disc space narrowing at L4-L5 with disc degeneration and bilateral spondylolysis at L4 with grade 2 spondylolisthesis of L4 on L5. No acute fracture.     IMPRESSION: 1.  Mechanical small bowel obstruction with dilated distal jejunum and ileum with caliber changes in the right lower quadrant and in the left midabdomen this may be secondary to adhesions or internal hernia and has the appearance of a closed loop obstruction. No pneumatosis intestinalis, free intraperitoneal air or abscess. 2.  Results were called to Dr. Sanford the time of dictation. NOTE: ABNORMAL REPORT THE DICTATION ABOVE DESCRIBES AN ABNORMALITY FOR WHICH FOLLOW-UP IS NEEDED.     POC US Guidance Needle Placement    Result Date: 11/30/2021  Ultrasound was performed as guidance to an anesthesia procedure.  Click \"PACS images\" hyperlink below to view any stored images.  For specific procedure details, view procedure note authored by anesthesia.    XR Video Swallow with SLP or OT    Result Date: 11/24/2021  EXAM: XR VIDEO SWALLOW WITH SLP OR OT LOCATION: [facility] DATE/TIME: 11/24/2021 9:18 AM INDICATION: Difficulty swallowing. COMPARISON: Same day esophagram.; CT for lung cancer screening 03/09/2020 TECHNIQUE: Routine swallow study with speech pathology using multiple barium thicknesses. FINDINGS: FLUOROSCOPIC TIME: 0.2 minutes NUMBER OF IMAGES: 2 videofluoroscopic imaging series Swallow study with Speech Pathology using multiple barium thicknesses. Patient is edentulous. Trials of thin consistency and barium coated cracker were performed. No delay in initiation of the oral phase. Normal epiglottic inversion. No penetration or aspiration observed. Moderate to severe cervical spondylosis with anterior wedging, disc space narrowing and osteophytosis of C4, C5, and C6. Straightening and reversal of normal thoracic lordosis.     IMPRESSION: No penetration or aspiration. Please see separately dictated report from " "speech pathology for additional description, analysis, and management recommendations.     XR Esophagram    Result Date: 11/24/2021  EXAM: XR ESOPHAGRAM LOCATION: [facility] DATE/TIME: 11/24/2021 9:18 AM INDICATION: 65 yo M with dysphagia/dyspnea - cough. Feels food stuck in throat with swallow. Occ. chocking. Needs to swallow lot of liquids. Constant runny nose & nasal congestion. COMPARISON: Same day swallow study; CT of the chest without contrast 03/09/2020 TECHNIQUE: Routine. FINDINGS: FLUOROSCOPIC TIME: 1.2 minutes NUMBER OF IMAGES: 4 videofluoroscopic imaging series and additional 41 images. ESOPHAGUS: Patulous esophagus with diffusely abnormal peristalsis. Large number of tertiary contractions present with delayed esophageal emptying. No esophageal stricture, diverticula or mass. No hiatal hernia. There is a large amount of retained contrast within the esophagus after consumption of contrast in the RPO position in transitioning to the supine position. This retained contrast moves to and fro within the esophagus. During the transition from RPO to supine position the patient felt a \"tickle\" in the back of his throat prompting coughing, while not observed likely relates to reflux of contrast from the upper esophagus into the pharynx. No gastroesophageal reflux elicited in the recumbent position with Valsalva maneuver.     IMPRESSION: Moderately severe esophageal dysmotility (presbyesophagus).    "

## 2022-01-13 ENCOUNTER — APPOINTMENT (OUTPATIENT)
Dept: PHYSICAL THERAPY | Facility: HOSPITAL | Age: 67
DRG: 393 | End: 2022-01-13
Payer: MEDICARE

## 2022-01-13 LAB
ANION GAP SERPL CALCULATED.3IONS-SCNC: 8 MMOL/L (ref 5–18)
BACTERIA BLD CULT: NO GROWTH
BACTERIA BLD CULT: NO GROWTH
BUN SERPL-MCNC: 22 MG/DL (ref 8–22)
CALCIUM SERPL-MCNC: 8.2 MG/DL (ref 8.5–10.5)
CHLORIDE BLD-SCNC: 109 MMOL/L (ref 98–107)
CO2 SERPL-SCNC: 22 MMOL/L (ref 22–31)
CREAT SERPL-MCNC: 0.75 MG/DL (ref 0.7–1.3)
ERYTHROCYTE [DISTWIDTH] IN BLOOD BY AUTOMATED COUNT: 16.7 % (ref 10–15)
GFR SERPL CREATININE-BSD FRML MDRD: >90 ML/MIN/1.73M2
GLUCOSE BLD-MCNC: 136 MG/DL (ref 70–125)
GLUCOSE BLDC GLUCOMTR-MCNC: 104 MG/DL (ref 70–99)
GLUCOSE BLDC GLUCOMTR-MCNC: 131 MG/DL (ref 70–99)
GLUCOSE BLDC GLUCOMTR-MCNC: 137 MG/DL (ref 70–99)
GLUCOSE BLDC GLUCOMTR-MCNC: 158 MG/DL (ref 70–99)
GLUCOSE BLDC GLUCOMTR-MCNC: 98 MG/DL (ref 70–99)
HCT VFR BLD AUTO: 32.5 % (ref 40–53)
HGB BLD-MCNC: 10.4 G/DL (ref 13.3–17.7)
MCH RBC QN AUTO: 29.3 PG (ref 26.5–33)
MCHC RBC AUTO-ENTMCNC: 32 G/DL (ref 31.5–36.5)
MCV RBC AUTO: 92 FL (ref 78–100)
PLATELET # BLD AUTO: 549 10E3/UL (ref 150–450)
POTASSIUM BLD-SCNC: 4.3 MMOL/L (ref 3.5–5)
RBC # BLD AUTO: 3.55 10E6/UL (ref 4.4–5.9)
SARS-COV-2 RNA RESP QL NAA+PROBE: NEGATIVE
SODIUM SERPL-SCNC: 139 MMOL/L (ref 136–145)
WBC # BLD AUTO: 12 10E3/UL (ref 4–11)

## 2022-01-13 PROCEDURE — 250N000013 HC RX MED GY IP 250 OP 250 PS 637: Performed by: INTERNAL MEDICINE

## 2022-01-13 PROCEDURE — 99231 SBSQ HOSP IP/OBS SF/LOW 25: CPT | Mod: GC | Performed by: STUDENT IN AN ORGANIZED HEALTH CARE EDUCATION/TRAINING PROGRAM

## 2022-01-13 PROCEDURE — 97110 THERAPEUTIC EXERCISES: CPT | Mod: GP

## 2022-01-13 PROCEDURE — 36415 COLL VENOUS BLD VENIPUNCTURE: CPT | Performed by: FAMILY MEDICINE

## 2022-01-13 PROCEDURE — 93010 ELECTROCARDIOGRAM REPORT: CPT | Performed by: INTERNAL MEDICINE

## 2022-01-13 PROCEDURE — 250N000009 HC RX 250: Performed by: FAMILY MEDICINE

## 2022-01-13 PROCEDURE — 85027 COMPLETE CBC AUTOMATED: CPT | Performed by: FAMILY MEDICINE

## 2022-01-13 PROCEDURE — 80048 BASIC METABOLIC PNL TOTAL CA: CPT | Performed by: FAMILY MEDICINE

## 2022-01-13 PROCEDURE — 93005 ELECTROCARDIOGRAM TRACING: CPT

## 2022-01-13 PROCEDURE — 250N000011 HC RX IP 250 OP 636: Performed by: STUDENT IN AN ORGANIZED HEALTH CARE EDUCATION/TRAINING PROGRAM

## 2022-01-13 PROCEDURE — 250N000013 HC RX MED GY IP 250 OP 250 PS 637: Performed by: FAMILY MEDICINE

## 2022-01-13 PROCEDURE — 97116 GAIT TRAINING THERAPY: CPT | Mod: GP

## 2022-01-13 PROCEDURE — 250N000011 HC RX IP 250 OP 636: Performed by: INTERNAL MEDICINE

## 2022-01-13 PROCEDURE — 250N000011 HC RX IP 250 OP 636: Performed by: FAMILY MEDICINE

## 2022-01-13 PROCEDURE — 250N000013 HC RX MED GY IP 250 OP 250 PS 637: Performed by: STUDENT IN AN ORGANIZED HEALTH CARE EDUCATION/TRAINING PROGRAM

## 2022-01-13 PROCEDURE — 250N000009 HC RX 250: Performed by: INTERNAL MEDICINE

## 2022-01-13 PROCEDURE — 120N000004 HC R&B MS OVERFLOW

## 2022-01-13 PROCEDURE — 87635 SARS-COV-2 COVID-19 AMP PRB: CPT | Performed by: STUDENT IN AN ORGANIZED HEALTH CARE EDUCATION/TRAINING PROGRAM

## 2022-01-13 RX ADMIN — PANTOPRAZOLE SODIUM 40 MG: 40 TABLET, DELAYED RELEASE ORAL at 06:51

## 2022-01-13 RX ADMIN — VENLAFAXINE HYDROCHLORIDE 75 MG: 75 CAPSULE, EXTENDED RELEASE ORAL at 09:14

## 2022-01-13 RX ADMIN — OXYCODONE HYDROCHLORIDE AND ACETAMINOPHEN 2 TABLET: 5; 325 TABLET ORAL at 22:56

## 2022-01-13 RX ADMIN — APIXABAN 10 MG: 5 TABLET, FILM COATED ORAL at 22:56

## 2022-01-13 RX ADMIN — GABAPENTIN 200 MG: 100 CAPSULE ORAL at 16:03

## 2022-01-13 RX ADMIN — CEFUROXIME AXETIL 500 MG: 500 TABLET ORAL at 09:14

## 2022-01-13 RX ADMIN — QUETIAPINE FUMARATE 200 MG: 100 TABLET, FILM COATED ORAL at 22:59

## 2022-01-13 RX ADMIN — AMIODARONE HYDROCHLORIDE 200 MG: 200 TABLET ORAL at 22:57

## 2022-01-13 RX ADMIN — OXYCODONE HYDROCHLORIDE AND ACETAMINOPHEN 2 TABLET: 5; 325 TABLET ORAL at 08:59

## 2022-01-13 RX ADMIN — OXYCODONE HYDROCHLORIDE AND ACETAMINOPHEN 2 TABLET: 5; 325 TABLET ORAL at 16:02

## 2022-01-13 RX ADMIN — DOXYCYCLINE 100 MG: 100 CAPSULE ORAL at 19:41

## 2022-01-13 RX ADMIN — APIXABAN 10 MG: 5 TABLET, FILM COATED ORAL at 09:14

## 2022-01-13 RX ADMIN — ALTEPLASE 2 MG: 2.2 INJECTION, POWDER, LYOPHILIZED, FOR SOLUTION INTRAVENOUS at 12:24

## 2022-01-13 RX ADMIN — Medication 1 MG: at 22:56

## 2022-01-13 RX ADMIN — GABAPENTIN 900 MG: 300 CAPSULE ORAL at 22:57

## 2022-01-13 RX ADMIN — AMIODARONE HYDROCHLORIDE 200 MG: 200 TABLET ORAL at 09:14

## 2022-01-13 RX ADMIN — ONDANSETRON 4 MG: 2 INJECTION INTRAMUSCULAR; INTRAVENOUS at 09:15

## 2022-01-13 RX ADMIN — LEVOTHYROXINE SODIUM 25 MCG: 0.03 TABLET ORAL at 06:51

## 2022-01-13 RX ADMIN — I.V. FAT EMULSION 250 ML: 20 EMULSION INTRAVENOUS at 19:42

## 2022-01-13 RX ADMIN — DOXYCYCLINE 100 MG: 100 CAPSULE ORAL at 09:14

## 2022-01-13 RX ADMIN — CEFUROXIME AXETIL 500 MG: 500 TABLET ORAL at 16:03

## 2022-01-13 RX ADMIN — AMIODARONE HYDROCHLORIDE 200 MG: 200 TABLET ORAL at 14:19

## 2022-01-13 RX ADMIN — MAGNESIUM SULFATE HEPTAHYDRATE: 500 INJECTION, SOLUTION INTRAMUSCULAR; INTRAVENOUS at 19:42

## 2022-01-13 RX ADMIN — GABAPENTIN 200 MG: 100 CAPSULE ORAL at 09:14

## 2022-01-13 RX ADMIN — HYDROXYZINE HYDROCHLORIDE 25 MG: 25 TABLET, FILM COATED ORAL at 22:57

## 2022-01-13 ASSESSMENT — ACTIVITIES OF DAILY LIVING (ADL)
ADLS_ACUITY_SCORE: 13
ADLS_ACUITY_SCORE: 11
ADLS_ACUITY_SCORE: 13
ADLS_ACUITY_SCORE: 13
ADLS_ACUITY_SCORE: 11
ADLS_ACUITY_SCORE: 13
ADLS_ACUITY_SCORE: 11
ADLS_ACUITY_SCORE: 13
ADLS_ACUITY_SCORE: 11
ADLS_ACUITY_SCORE: 11
ADLS_ACUITY_SCORE: 13
ADLS_ACUITY_SCORE: 11
ADLS_ACUITY_SCORE: 13

## 2022-01-13 ASSESSMENT — MIFFLIN-ST. JEOR: SCORE: 1514.72

## 2022-01-13 NOTE — PROGRESS NOTES
CLINICAL NUTRITION SERVICES - REASSESSMENT NOTE      RECOMMENDATIONS FOR MD/PROVIDER TO ORDER:   None     Recommendations Ordered by Registered Dietitian (RD):   Continue TPN as ordered     Future/Additional Recommendations:   Adjust TPN pending PO intake/weights     Malnutrition:   Severe in the context of acute illness (please carry forward if malnutrition diagnosed)         EVALUATION OF PROGRESS TOWARD GOALS   Diet:  Clear Liquid    Nutrition Support:  TPN: D300/ @ 75 mL/hr x 1 hour, 150 mL/hr x 10 hours, and 75 mL/hr x 1 hour w/ 250mL 20% lipids 3x/week.    Intake/Tolerance:  Patient only had sips of juice yesterday per nursing notes. TPN providing 1754kcals, 300g CHO, 130g protein, 21g fat, 1650ml fluid daily.      ASSESSED NUTRITION NEEDS:  Dosing Weight:  78.1 kg (172 lb 3.2 oz)    Estimated Energy Needs: 0332-8475 kcals ([place] [employer] Jeor)  Justification: Stress factor 1.4-2.0 for fistula  Estimated Protein Needs: 117 grams protein (1.5 g pro/Kg)  Justification: wound healing  Estimated Fluid Needs: 7001-9129  mL (25-30 mL/kg) or less pending Na levels  Justification: maintenance    NEW FINDINGS:   01/13/22 0705 78.1 kg (172 lb 3.2 oz)   01/12/22 0414 80 kg (176 lb 6.4 oz)   01/10/22 0655 80.2 kg (176 lb 12.8 oz)   01/09/22 0056 80 kg (176 lb 5.9 oz)   01/06/22 1555 74.3 kg (163 lb 14.4 oz)   01/01/22 2100 77.4 kg (170 lb 9.6 oz)     Net fluid down 1.8L (4lbs) since 12/30/21 indicating dry wt gain    Labs: hgb 10.4, hematocrit 32.5, rbc 3.55    Meds: Ceftin, vibromycin, synthroid/levothroid, protonix, vancocin, zofran    GI: nausea, abdominal pain, LBM 1/11/22    Previous Goals:   Meet nutritional needs_progressing  Tolerate TPN-progressing (Glu elevated)  Prevent significant weight loss-met  Evaluation: Met    Previous Nutrition Diagnosis:   Malnutrition related to surgical intervention of SBO with formation of enterocutaneous fistulas as evidenced by 1.7% weight loss in 1 week, severe subcutaneous fats  loss and moderate to severe muscle loss    Evaluation: Improving      MALNUTRITION  % Weight Loss:  1-2% in 1 week (moderate malnutrition)  % Intake:  No decreased intake noted  Subcutaneous Fat Loss:  Orbital region severe depletion and Upper arm region mild to moderate depletion  Muscle Loss:  Temporal region moderate depletion, Clavicle bone region severe depletion, Dorsal hand region mild to moderate depletion and Patellar region moderate depletion  Fluid Retention:  None noted    Malnutrition Diagnosis: Severe malnutrition  In Context of:  Acute illness or injury    CURRENT NUTRITION DIAGNOSIS  Inadequate oral intake related to SBO with formation of enterocutaneous fistula as evidenced by nausea, and abdominal pain    INTERVENTIONS  Recommendations / Nutrition Prescription  Continue TPN as ordered    Implementation  PN Composition and PN Schedule    Goals  No significant dry weight loss   Meet estimated nutrition needs  Wound healing    MONITORING AND EVALUATION:  Progress towards goals will be monitored and evaluated per protocol and Practice Guidelines, Diet Order, Food intake, Liquid meal replacement or supplement, Parenteral Nutrition intake, Weight and Biochemical data

## 2022-01-13 NOTE — PHARMACY-CONSULT NOTE
"Pharmacy Note: Parenteral Nutrition (PN) Management    Pharmacist consulted to dose PN for Gurdeep Dia, a 66 year old male by Dr. Mcmahon.    Subjective:    The patient was started on PN in the hospital on 12/2/2021.    Indication for PN therapy: bowel resection, plan to continue TPN until fistula heals    Inadequate nutrition existing for > 7 days.     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      Social History     Tobacco Use     Smoking status: Current Every Day Smoker     Smokeless tobacco: Never Used   Substance Use Topics     Alcohol use: Not Currently     Comment: Clean for 5 years     Drug use: Not Currently     Objective:    Ht Readings from Last 1 Encounters:   12/22/21 1.702 m (5' 7\")     Wt Readings from Last 1 Encounters:   01/13/22 78.1 kg (172 lb 3.2 oz)       Body mass index is 26.97 kg/m .    Patient Vitals for the past 96 hrs:   Weight   01/13/22 0705 78.1 kg (172 lb 3.2 oz)   01/12/22 0414 80 kg (176 lb 6.4 oz)   01/10/22 0655 80.2 kg (176 lb 12.8 oz)       Labs:  Last 3 days:  Recent Labs     01/08/22 2001 01/09/22  0513 01/10/22  0532 01/11/22  0500   * 132* 137 137   POTASSIUM 4.8 4.0 4.2 3.9   CHLORIDE 97* 101 106 107   CO2 26 27 26 25   BUN 28* 24* 22 22   CR 0.97 0.79 0.76 0.70   VIJAYA 8.8 7.7* 8.0* 7.8*   MAG 1.7* 2.4 2.2 2.0   PHOS 2.8  --  3.0 3.3   PROTTOTAL 6.6 5.6*  --   --    ALBUMIN 2.0* 1.6*  --   --    PREALB  --   --  8*  --    TRIG  --   --  106  --    HGB 10.4* 8.4* 8.6* 8.8*   HCT 32.9* 27.0* 27.2* 28.1*    400 413 440   BILITOTAL 1.5* 0.9  --   --    AST 22 18  --   --    ALT 21 16  --   --    ALKPHOS 129* 99  --   --    INR  --   --  1.21*  --        Glucose (past 48 hours):   Recent Labs     01/10/22  0532 01/11/22  0500   * 149*       Intake/Output (last 24 hours): I/O last 3 completed shifts:  In: -   Out: 1825 [Urine:550; Drains:1275]    Estimated CrCl: Estimated Creatinine Clearance: 105.6 mL/min (based on SCr of 0.75 " mg/dL).    Assessment:    Continue patient on PN therapy as a cyclic central therapy.     Given the patient's current condition/oral intake, PN is still indicated.    Lab results reviewed.  Watch K - slow trend up  CO2 down so will increase from 1:1 to 1:2.  Ion Ca last seen 12/24/21 so recheck with am labs tomorrow.  1/10 TG good 106    Plan:    1. Rate of PN: 75ml/hr x 1 hour, followed by 150ml/hr x 10 hours, followed by 75ml/hr x 1 hour  2. Formula:     Amino Acids 130 grams    Dextrose 300 grams    Sodium 130 mEq/day    Potassium 75 mEq/day    Calcium 8 mEq/day    Magnesium 16 mEq/day    Phosphorus 32 mMol/day    Chloride: Acetate Ratio 1:2    Standard Multivitamins w/Vitamin K    Trace Elements  3. Fat Emulsion: 20%, 250 mL IV 3 times weekly on Mon, Thur, Sat  4. Check BMP, ion Ca, mag and phos labs tomorrow.   5. Pharmacist will continue to follow the patient's lab results, clinical status and blood glucose results and make adjustments as appropriate.    Thank you for the consult.  Zeynep Ruff, PharmD  1/13/22

## 2022-01-13 NOTE — PLAN OF CARE
Problem: Adult Inpatient Plan of Care  Goal: Optimal Comfort and Wellbeing  Outcome: Improving  Intervention: Provide Person-Centered Care  Recent Flowsheet Documentation  Taken 1/13/2022 0445 by Holly Chen, RN  Trust Relationship/Rapport: choices provided  Taken 1/13/2022 0040 by Holly Chen, RN  Trust Relationship/Rapport: choices provided     Problem: Pain (Surgery Nonspecified)  Goal: Acceptable Pain Control  Outcome: Improving     Problem: Risk for Delirium  Goal: Improved Sleep  Outcome: Improving     VSS overnight.  When emptying distal pouch during assessment, a scant to small amount of bright red blood was noted in the out put with the green/bile colored output.  Pt. Denied pain overnight and slept.  No blood return noted from PICC line.  Alert and oriented x 4.

## 2022-01-13 NOTE — PLAN OF CARE
Problem: Adult Inpatient Plan of Care  Goal: Plan of Care Review  Outcome: Improving     Problem: Impaired Wound Healing  Goal: Optimal Wound Healing  Outcome: Improving  Intervention: Promote Wound Healing  Recent Flowsheet Documentation  Taken 1/12/2022 2046 by Terrie Lin RN  Activity Management: up ad nicolasa  Taken 1/12/2022 1615 by Terrie Lin RN  Activity Management: up ad nicolasa     Problem: Infection (Surgery Nonspecified)  Goal: Absence of Infection Signs and Symptoms  Outcome: Improving     Problem: Pain (Surgery Nonspecified)  Goal: Acceptable Pain Control  Outcome: Improving     Problem: Anxiety  Goal: Anxiety Reduction or Resolution  Outcome: Improving     Problem: Depression  Goal: Improved Mood  Outcome: Improving     Problem: Adult Inpatient Plan of Care  Goal: Absence of Hospital-Acquired Illness or Injury  Intervention: Identify and Manage Fall Risk  Recent Flowsheet Documentation  Taken 1/12/2022 2046 by Terrie Lin RN  Safety Promotion/Fall Prevention:    activity supervised    safety round/check completed  Taken 1/12/2022 1615 by Terrie Lin RN  Safety Promotion/Fall Prevention:    activity supervised    safety round/check completed  Goal: Optimal Comfort and Wellbeing  Intervention: Provide Person-Centered Care  Recent Flowsheet Documentation  Taken 1/12/2022 2046 by Terrie Lin RN  Trust Relationship/Rapport: care explained  Taken 1/12/2022 1615 by Terrie Lin RN  Trust Relationship/Rapport: care explained   Patient is alert and able to let his needs be known. PRN percocet was given x1 for abdominal pain this shift, this was effective for him. SR and VSS. TPN cyclic running per orders. Fistula pouches were emptied frequently. Will continue to monitor.   Patient requested PRN percocet before bed. Pain 8/10

## 2022-01-13 NOTE — PROGRESS NOTES
[facility] Progress Note    Assessment/Plan  Active Problems:    Abdominal pain, generalized    Patient remains hospitalized due to placement, TPN.    Chema Dia is a 65 y/o M with past medical history of splenectomy, appendectomy, HTN, and substance abuse, with recent admission to hospital 11/30-12/21/2021 for SBO with perforation s/p surgical repair. Discharged home but presents as he could not manage at home and is now requesting to be placed in TCU vs LTC. Continues on TPN.  New RUL and RLL PE found and being treated, new HAP 1/9.    RUL and RLL PE  SMV thrombosis  New supplemental oxygen requirement  Acute RLL PE found on CT as well as SMV thrombosis.  Also has RUL PE.  No evidence of right heart strain.  Most likely provoked in the setting of recent surgery/hospitalization.  -Eliquis 10 mg twice daily for 7 days, then 5 mg twice daily for PE treatment.    Atrial flutter (resolved)  Developed this overnight 1/8-1/9 and required amiodarone and brief ICU stay for pressors.  Cardiology consulted.  Patient now stabilized and sinus rhythm, back to the floor.  Pulled back PICC line 1 cm as cardiology suspects the tip may be in the right atrium and that may have caused his episode of atrial flutter.  -Cardiology signed off.  Continue oral amiodarone.  Plan to taper per cardiology: amiodarone 3 times a day for a week then go down to 2 times a day for 2 weeks and then daily 200 mg a day.  -Eliquis.    Nausea  New supplemental oxygen requirement  RLQ pain  Malaise  HAP  Patient has been having nausea with TPN but developed a new supplemental oxygen requirement 1/6 in addition to fatigue and general malaise and was found to have RLL PE and SMV thrombosis.  CT abdomen stable from prior though has some evidence of new inflammation in the RLQ 1/9 concerning for fistula or new fluid collection.  Developed evidence of left-sided HAP overnight 1/9.  -Repeated blood cultures 1/8.  -Back on oral antibiotics.   Finished fluconazole.  Wound cultures positive for Candida parapsilosis and E. coli 12/22.  -Re-consulted ID for antibiotic guidance as patient has a fairly complicated presentation.  Now transitioned back to oral antibiotics, final day this Friday.  -No new procedure indicated per surgery.     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 Required subsequent washout on 12/3 with antibiotics and MARISA drain placement. On 12/8 found to have continued enhancing abscess with drain placed 12/9 and removed on 12/16.  Was discharged, but returned soon thereafter on 12/22 as he is unable to care for himself even assistance from family so is requesting LTC/TCU placement.  Now receiving TPN and has ongoing abdominal pain, nausea, and evidence of possible dehiscence in one of his intra-abdominal surgical sites.  -PT/OT.  Searching for TCU/LTC placement.  -Consulted surgery to follow while here.  Clear liquid diet.  Surgery is the only service that can advance this and will not do so for some time.  No new procedure indicated at this time.  -Consulted pharmacy and RD for TPN management - transitioned to cyclic TPN 12/30.  -WOC.  -Nausea, pain control.  -Antibiotics as above.  Finished course of fluconazole.     Alk phos elevation, resolved  Alk phos elevated, now back to normal.  Rest of LFTs okay.  Does have sludge in gallbladder but no evidence of cholecystitis on imaging.  -Cyclic TPN.  Additional daily lab checks outside of regularly scheduled TPN checks not indicated at this time unless he develops new symptoms.     Depression  Insomnia  Hx of this. Reports symptoms of low motivation, little interest in doing things, poor sleep, no appetite. Is on buspar, mirtazapine, and effexor at home, though doesn't really  think medicines are as helpful as talk therapy is. Wanted to speak to social work and . States no plan to harm himself. Reports he just needs to leave the hospital.  -Consult psych for med management and plan for talk therapy while hospitalized.  -Discontinued buspar and mirtazapine per psych.  -Increased venlafaxine and gabapentin per psych.  -Seroquel for sleep.  Vistaril at bedtime.     Moderate malnutrition  Per RD eval.  -TPN per RD and pharmacy.     Normocytic anemia  Stable at 11.5, MCV of 93.     Chronic Conditions:  Hypothyroidism- PTA levothyroxine.  Chronic pain- Hold mexolicam 7.5mg daily, resume PTA gabapentin.  BPH-hold home Flomax given he states it does not help.    FEN: TPN.  Clear liquid diet as tolerated.  DVT Prophylaxis: Treatment dose Eliquis.  Code: Full code.    Disposition/Advanced Care Planning  Discharge Planning discussed with patient and care team.  Anticipated discharge date: Multiple days.  Pending placement.  Disposition: LTC.    Subjective  Patient feeling much better today.  Was up and walking around a little bit yesterday and took a shower.    Objective    Vital signs in last 24 hours Temp:  [97.5  F (36.4  C)-98.8  F (37.1  C)] 97.9  F (36.6  C)  Pulse:  [] 92  Resp:  [20-22] 20  BP: (106-141)/(71-91) 111/76  SpO2:  [91 %-92 %] 91 %   Weight: [unfilled]    Intake/Output last 3 shift I/O last 3 completed shifts:  In: -   Out: 1825 [Urine:550; Drains:1275]    Intake/Output this shift:I/O this shift:  In: -   Out: 750 [Urine:400; Drains:350]    Physical Exam  General appearance: alert, appears stated age, cooperative and no distress.  Head: Normocephalic, without obvious abnormality, atraumatic.  Eyes: conjunctivae/corneas clear.  Throat: MMM.  Neck: supple, symmetrical, trachea midline.  Lungs: no increased respiratory effort.  Heart: Good capillary refill.  Abdomen: non-distended, soft, appropriately tender.  Extremities: extremities normal, atraumatic, no cyanosis  or edema.  Skin: Skin color, texture, turgor normal. No rashes or lesions.  Neurologic: Alert and oriented x3, normal strength and tone.  Psychiatric: mood and affect appropriate.    Pertinent Labs and Pertinent Radiology   Lab Results: personally reviewed.     Radiology Results: Personally reviewed image/s and impression/s    Precepted patient with Dr. Benson Rabago.    Johnathan Lees MD  [facility], PGY-3

## 2022-01-13 NOTE — PLAN OF CARE
Problem: Adult Inpatient Plan of Care  Goal: Optimal Comfort and Wellbeing  Outcome: Improving     Both of the PICC lumens not returning blood, TPA ordered, unable to even flush one of the lines now, it is completley occluded. However, was able to flush one of the two lines and get the TPA in there, it is now able to return blood. Small/scant blood noticed among the bile/green fistula drainage in the proximal fistula pouch. Overnight noticed some in the distal pouch at times.

## 2022-01-14 LAB
ATRIAL RATE - MUSE: 108 BPM
DIASTOLIC BLOOD PRESSURE - MUSE: NORMAL MMHG
GLUCOSE BLDC GLUCOMTR-MCNC: 101 MG/DL (ref 70–99)
GLUCOSE BLDC GLUCOMTR-MCNC: 114 MG/DL (ref 70–99)
GLUCOSE BLDC GLUCOMTR-MCNC: 119 MG/DL (ref 70–99)
GLUCOSE BLDC GLUCOMTR-MCNC: 135 MG/DL (ref 70–99)
INTERPRETATION ECG - MUSE: NORMAL
P AXIS - MUSE: 47 DEGREES
PR INTERVAL - MUSE: 152 MS
QRS DURATION - MUSE: 84 MS
QT - MUSE: 334 MS
QTC - MUSE: 447 MS
R AXIS - MUSE: -25 DEGREES
SYSTOLIC BLOOD PRESSURE - MUSE: NORMAL MMHG
T AXIS - MUSE: 22 DEGREES
VENTRICULAR RATE- MUSE: 108 BPM

## 2022-01-14 PROCEDURE — 250N000013 HC RX MED GY IP 250 OP 250 PS 637: Performed by: INTERNAL MEDICINE

## 2022-01-14 PROCEDURE — 250N000013 HC RX MED GY IP 250 OP 250 PS 637: Performed by: STUDENT IN AN ORGANIZED HEALTH CARE EDUCATION/TRAINING PROGRAM

## 2022-01-14 PROCEDURE — 250N000009 HC RX 250: Performed by: FAMILY MEDICINE

## 2022-01-14 PROCEDURE — 999N000033 HC STATISTIC CHRONIC PULMONARY DISEASE SPECIALIST

## 2022-01-14 PROCEDURE — 250N000013 HC RX MED GY IP 250 OP 250 PS 637: Performed by: FAMILY MEDICINE

## 2022-01-14 PROCEDURE — 99231 SBSQ HOSP IP/OBS SF/LOW 25: CPT | Mod: GC | Performed by: STUDENT IN AN ORGANIZED HEALTH CARE EDUCATION/TRAINING PROGRAM

## 2022-01-14 PROCEDURE — 99024 POSTOP FOLLOW-UP VISIT: CPT | Performed by: SPECIALIST

## 2022-01-14 PROCEDURE — 120N000004 HC R&B MS OVERFLOW

## 2022-01-14 PROCEDURE — 250N000011 HC RX IP 250 OP 636: Performed by: INTERNAL MEDICINE

## 2022-01-14 RX ORDER — AMIODARONE HYDROCHLORIDE 200 MG/1
200 TABLET ORAL 2 TIMES DAILY
Status: COMPLETED | OUTPATIENT
Start: 2022-01-16 | End: 2022-01-29

## 2022-01-14 RX ORDER — AMIODARONE HYDROCHLORIDE 200 MG/1
200 TABLET ORAL DAILY
Status: DISCONTINUED | OUTPATIENT
Start: 2022-01-30 | End: 2022-02-13

## 2022-01-14 RX ADMIN — AMIODARONE HYDROCHLORIDE 200 MG: 200 TABLET ORAL at 09:16

## 2022-01-14 RX ADMIN — APIXABAN 10 MG: 5 TABLET, FILM COATED ORAL at 20:40

## 2022-01-14 RX ADMIN — CEFUROXIME AXETIL 500 MG: 500 TABLET ORAL at 09:16

## 2022-01-14 RX ADMIN — AMIODARONE HYDROCHLORIDE 200 MG: 200 TABLET ORAL at 13:33

## 2022-01-14 RX ADMIN — QUETIAPINE FUMARATE 200 MG: 100 TABLET, FILM COATED ORAL at 23:02

## 2022-01-14 RX ADMIN — AMIODARONE HYDROCHLORIDE 200 MG: 200 TABLET ORAL at 20:40

## 2022-01-14 RX ADMIN — PANTOPRAZOLE SODIUM 40 MG: 40 TABLET, DELAYED RELEASE ORAL at 06:45

## 2022-01-14 RX ADMIN — APIXABAN 10 MG: 5 TABLET, FILM COATED ORAL at 09:17

## 2022-01-14 RX ADMIN — OXYCODONE HYDROCHLORIDE AND ACETAMINOPHEN 2 TABLET: 5; 325 TABLET ORAL at 09:21

## 2022-01-14 RX ADMIN — MAGNESIUM SULFATE HEPTAHYDRATE: 500 INJECTION, SOLUTION INTRAMUSCULAR; INTRAVENOUS at 20:19

## 2022-01-14 RX ADMIN — ONDANSETRON 4 MG: 2 INJECTION INTRAMUSCULAR; INTRAVENOUS at 22:24

## 2022-01-14 RX ADMIN — VENLAFAXINE HYDROCHLORIDE 75 MG: 75 CAPSULE, EXTENDED RELEASE ORAL at 09:16

## 2022-01-14 RX ADMIN — GABAPENTIN 900 MG: 300 CAPSULE ORAL at 23:03

## 2022-01-14 RX ADMIN — DOXYCYCLINE 100 MG: 100 CAPSULE ORAL at 09:16

## 2022-01-14 RX ADMIN — GABAPENTIN 200 MG: 100 CAPSULE ORAL at 09:17

## 2022-01-14 RX ADMIN — GABAPENTIN 200 MG: 100 CAPSULE ORAL at 17:11

## 2022-01-14 RX ADMIN — HYDROXYZINE HYDROCHLORIDE 25 MG: 25 TABLET, FILM COATED ORAL at 20:39

## 2022-01-14 RX ADMIN — OXYCODONE HYDROCHLORIDE AND ACETAMINOPHEN 2 TABLET: 5; 325 TABLET ORAL at 23:02

## 2022-01-14 RX ADMIN — LEVOTHYROXINE SODIUM 25 MCG: 0.03 TABLET ORAL at 06:24

## 2022-01-14 ASSESSMENT — ACTIVITIES OF DAILY LIVING (ADL)
ADLS_ACUITY_SCORE: 12
ADLS_ACUITY_SCORE: 18
ADLS_ACUITY_SCORE: 18
ADLS_ACUITY_SCORE: 11
ADLS_ACUITY_SCORE: 18
ADLS_ACUITY_SCORE: 11
ADLS_ACUITY_SCORE: 18
ADLS_ACUITY_SCORE: 12
ADLS_ACUITY_SCORE: 11
ADLS_ACUITY_SCORE: 18
ADLS_ACUITY_SCORE: 18
ADLS_ACUITY_SCORE: 11
ADLS_ACUITY_SCORE: 12
ADLS_ACUITY_SCORE: 11
ADLS_ACUITY_SCORE: 18

## 2022-01-14 ASSESSMENT — MIFFLIN-ST. JEOR: SCORE: 1508.37

## 2022-01-14 NOTE — PROGRESS NOTES
"CLINICAL NUTRITION SERVICES - REASSESSMENT NOTE     Nutrition Prescription    RECOMMENDATIONS FOR MDs/PROVIDERS TO ORDER:    Malnutrition Status:    Severe    Recommendations already ordered by Registered Dietitian (RD):  Continue cyclic TPN: D 300  @ 75 ml/hr x 1 hr, 150 ml/hr x 10 hrs  75 ml/hr x 1 hr and off.  250 ml of 20% lipids 3 times per week  Over 12 hrs    Future/Additional Recommendations:  Follow labs, weight, POC and adjust TPN as needed     EVALUATION OF THE PROGRESS TOWARD GOALS   Diet: Clear liquids  Nutrition Support: TPN as documented above: TPN provides 1754 Calories, 130 g protein, 300 g CHO, 21 g fat (averaged) and 1650 ml fluid/day  Intake: 0% 1/11/2022 breakfast, no appetite     ANTHROPOMETRICS  Height: 170.2 cm (5' 7\")  Most Recent Weight: 77.5 kg (170 lb 12.8 oz)    Weight History:   Wt Readings from Last 10 Encounters:   01/14/22 77.5 kg (170 lb 12.8 oz)   12/17/21 76.8 kg (169 lb 6.4 oz)   01/22/20 79.4 kg (175 lb)   12/24/19 81.2 kg (179 lb)     GI CONCERNS  Ostomy, fistula  Normoactive BS all quadrants  Last stool 1/11/2022 per nurse    LABS  Reviewed: Glu 136 (1/13/2022)    MEDICATIONS  Reviewed: ceftin, levothyroxine, pantoprazole    MALNUTRITION:  % Weight Loss:  1-2% weight loss in 1 week (moderate malnutrition)  % Intake:  No decreased intake noted  Subcutaneous Fat Loss:  Orbital region severe depletion and Upper arm region mild to moderate depletion  Muscle Loss:  Temporal region moderate depletion, Clavicle bone region severe depletion, Dorsal hand region mild to moderate depletion and Patellar region moderate depletion  Fluid Retention:  None noted    Malnutrition Diagnosis: Severe malnutrition  In Context of:  Acute illness or injury    CURRENT NUTRITION DIAGNOSIS  Inadequate oral intake related to SBO with formation of enterocutaneous fistula as evidenced by nausea and abdominal pain      INTERVENTIONS  Implementation  Continue same TPN/lipid regimen    Goals  Meet " estimated nutrition needs-progressing  Wound healing-progressing  No significant dry weight loss    Monitoring/Evaluation  Progress toward goals will be monitored and evaluated per protocol.

## 2022-01-14 NOTE — PROGRESS NOTES
Care Management Follow Up    Length of Stay (days): 23      Expected Discharge Date:  Pending     Concerns to be Addressed: appropriate discharge plan      Patient plan of care discussed at interdisciplinary rounds: Yes     Anticipated Discharge Disposition: Transitional Care     Anticipated Discharge Services: TCU     Anticipated Discharge DME: None        Referrals Placed by CM/SW: Post Acute Facilities        Additional Information:  Patient is status post explore lap lysis of adhesions with resultant 3 days later perforation reoperation oversew of defect and then 5 days later perforation again and at this time point he has fistulas above and below to his incision. Surgery and ID following.     IV antibiotics completed as of 1/14/22.     TPN: NPO with sips. Per surgery, plan going forward would be to continue TPN until EC fistula output is minimal and we can increase oral caloric intake, this could take a couple months. TPN 12 hrs at night. Able to have clear liquids as tolerated.      Fistula: Patient has two draining fistula with Anthony Pouches. Patient is independent with emptying pouch but is not independent with replacing pouches. Pouches to be changed weekly and as needed when leaking.  Patient will need to demonstrate ability to trouble shoot leaks and change pouches if he discharges to home environment.      [facility] following. Patient will need caregiver who is willing to help with TPN management in-order for [facility] to accept. Patient has a pump and TPN in black bag in closet, pump belongs to [facility].  Patient has not demonstrated independence with care.     SLUM score 24 mild cognitive impairment. Patient has difficulty with step by step processes for ostomy and TPN management.     [facility] [place] [phone] states they are holding a spot for patient. They do provide 3 meals a day there as well.  Patient will need to be independent with all ADLs  and IADLs.     PO:  Shelia Eulalio [phone].    Patient needs TCU placement. Unfortunately TCUs have been declining due to patient's history. TCU follow up calls made requesting call back for acceptance or denial.    Shahnaz Page RN

## 2022-01-14 NOTE — PROGRESS NOTES
[facility] Progress Note    Assessment/Plan  Active Problems:    Abdominal pain, generalized    Patient remains hospitalized due to placement, TPN.    Chema Dia is a 65 y/o M with past medical history of splenectomy, appendectomy, HTN, and substance abuse, with recent admission to hospital 11/30-12/21/2021 for SBO with perforation s/p surgical repair. Discharged home but presents as he could not manage at home and is now requesting to be placed in TCU vs LTC. Continues on TPN.  New RUL and RLL PE found and being treated, new HAP 1/9.    RUL and RLL PE  SMV thrombosis  New supplemental oxygen requirement  Acute RLL PE found on CT as well as SMV thrombosis.  Also has RUL PE.  No evidence of right heart strain.  Most likely provoked in the setting of recent surgery/hospitalization.  -Eliquis 10 mg twice daily for 7 days, then 5 mg twice daily for PE treatment.    Atrial flutter (resolved)  Developed this overnight 1/8-1/9 and required amiodarone and brief ICU stay for pressors.  Cardiology consulted.  Patient now stabilized and sinus rhythm, back to the floor.  Pulled back PICC line 1 cm as cardiology suspects the tip may be in the right atrium and that may have caused his episode of atrial flutter.  -Cardiology signed off.  Continue oral amiodarone.  Plan to taper per cardiology: amiodarone 3 times a day for a week then go down to 2 times a day for 2 weeks and then daily 200 mg a day.  -Eliquis.    Nausea  New supplemental oxygen requirement  RLQ pain  Malaise  HAP  Patient has been having nausea with TPN but developed a new supplemental oxygen requirement 1/6 in addition to fatigue and general malaise and was found to have RLL PE and SMV thrombosis.  CT abdomen stable from prior though has some evidence of new inflammation in the RLQ 1/9 concerning for fistula or new fluid collection.  Developed evidence of left-sided HAP overnight 1/9.  -Repeated blood cultures 1/8.  -Back on oral antibiotics.   Finished fluconazole.  Wound cultures positive for Candida parapsilosis and E. coli 12/22.  -Re-consulted ID for antibiotic guidance as patient has a fairly complicated presentation.  Transitioned back to oral antibiotics and finished the course 1/14.  -No new procedure indicated per surgery.     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 Required subsequent washout on 12/3 with antibiotics and MARISA drain placement. On 12/8 found to have continued enhancing abscess with drain placed 12/9 and removed on 12/16.  Was discharged, but returned soon thereafter on 12/22 as he is unable to care for himself even assistance from family so is requesting LTC/TCU placement.  Now receiving TPN and has ongoing abdominal pain, nausea, and evidence of possible dehiscence in one of his intra-abdominal surgical sites.  -PT/OT.  Searching for TCU/LTC placement.  -Consulted surgery to follow while here.  Clear liquid diet.  Surgery is the only service that can advance this and will not do so for some time.  No new procedure indicated at this time.  -Consulted pharmacy and RD for TPN management - transitioned to cyclic TPN 12/30.  -WOC.  -Nausea, pain control.  -Finished antibiotics/14.  Finished course of fluconazole as well.     Alk phos elevation, resolved  Alk phos elevated, now back to normal.  Rest of LFTs okay.  Does have sludge in gallbladder but no evidence of cholecystitis on imaging.  -Cyclic TPN.  Additional daily lab checks outside of regularly scheduled TPN checks not indicated at this time unless he develops new symptoms.     Depression  Insomnia  Hx of this. Reports symptoms of low motivation, little interest in doing things, poor sleep, no appetite. Is on buspar, mirtazapine, and effexor at home, though  doesn't really think medicines are as helpful as talk therapy is. Wanted to speak to social work and . States no plan to harm himself. Reports he just needs to leave the hospital.  -Consulted psych for med management and plan for talk therapy while hospitalized.  -Discontinued buspar and mirtazapine per psych.  -Increased venlafaxine and gabapentin per psych.  -Seroquel for sleep.  Vistaril at bedtime.     Moderate malnutrition  Per RD eval.  -TPN per RD and pharmacy.     Normocytic anemia  Stable at 11.5, MCV of 93.     Chronic Conditions:  Hypothyroidism- PTA levothyroxine.  Chronic pain- Hold mexolicam 7.5mg daily, resume PTA gabapentin.  BPH-hold home Flomax given he states it does not help.    FEN: TPN.  Clear liquid diet as tolerated.  DVT Prophylaxis: Treatment dose Eliquis.  Code: Full code.    Disposition/Advanced Care Planning  Discharge Planning discussed with patient and care team.  Anticipated discharge date: Multiple days.  Pending placement.  Disposition: LTC.    Subjective  No events overnight.  No acute distress.  Still feeling somewhat depressed about being here.    Objective    Vital signs in last 24 hours Temp:  [97.4  F (36.3  C)-99  F (37.2  C)] 99  F (37.2  C)  Pulse:  [89-93] 93  Resp:  [20-22] 20  BP: (104-146)/(73-94) 112/79  SpO2:  [88 %-91 %] 88 %   Weight: [unfilled]    Intake/Output last 3 shift I/O last 3 completed shifts:  In: -   Out: 1900 [Urine:400; Drains:1500]    Intake/Output this shift:No intake/output data recorded.    Physical Exam  General appearance: alert, appears stated age, cooperative and no distress.  Head: Normocephalic, without obvious abnormality, atraumatic.  Eyes: conjunctivae/corneas clear.  Throat: MMM.  Neck: supple, symmetrical, trachea midline.  Lungs: no increased respiratory effort.  Heart: Good capillary refill.  Abdomen: non-distended, soft, appropriately tender.  Extremities: extremities normal, atraumatic, no cyanosis or edema.  Skin: Skin  color, texture, turgor normal. No rashes or lesions.  Neurologic: Alert and oriented x3, normal strength and tone.  Psychiatric: mood and affect appropriate.    Pertinent Labs and Pertinent Radiology   Lab Results: personally reviewed.     Radiology Results: Personally reviewed image/s and impression/s    Precepted patient with Dr. Aye Willoughby.    Johnathan Lees MD  [facility] [place], [place] [facility], PGY-3

## 2022-01-14 NOTE — PROGRESS NOTES
"S: stable feeling ok today  O: /77 (BP Location: Left arm)   Pulse 90   Temp 98.5  F (36.9  C) (Oral)   Resp 18   Ht 1.702 m (5' 7\")   Wt 77.5 kg (170 lb 12.8 oz)   SpO2 (!) 88%   BMI 26.75 kg/m      ABD soft, drains in place ostomy on midline  Ext warm    Results for orders placed or performed during the hospital encounter of 12/21/21 (from the past 24 hour(s))   Asymptomatic COVID-19 Virus (Coronavirus) by PCR Nasopharyngeal    Specimen: Nasopharyngeal; Swab   Result Value Ref Range    SARS CoV2 PCR Negative Negative    Narrative    Testing was performed using the aaron  SARS-CoV-2 & Influenza A/B Assay on the aaron  Melinda  System.  This test should be ordered for the detection of SARS-COV-2 in individuals who meet SARS-CoV-2 clinical and/or epidemiological criteria. Test performance is unknown in asymptomatic patients.  This test is for in vitro diagnostic use under the FDA EUA for laboratories certified under CLIA to perform moderate and/or high complexity testing. This test has not been FDA cleared or approved.  A negative test does not rule out the presence of PCR inhibitors in the specimen or target RNA in concentration below the limit of detection for the assay. The possibility of a false negative should be considered if the patient's recent exposure or clinical presentation suggests COVID-19.  [facility] are certified under the Clinical Laboratory Improvement Amendments of 1988 (CLIA-88) as qualified to perform moderate and/or high complexity laboratory testing.   Glucose by meter   Result Value Ref Range    GLUCOSE BY METER POCT 104 (H) 70 - 99 mg/dL   Glucose by meter   Result Value Ref Range    GLUCOSE BY METER POCT 131 (H) 70 - 99 mg/dL   Glucose by meter   Result Value Ref Range    GLUCOSE BY METER POCT 135 (H) 70 - 99 mg/dL   Glucose by meter   Result Value Ref Range    GLUCOSE BY METER POCT 101 (H) 70 - 99 mg/dL   Glucose by meter   Result Value Ref Range    GLUCOSE BY " METER POCT 114 (H) 70 - 99 mg/dL       A/P: Small bowel fistula   Waiting for placement  tpn  Davida Richardson MD  General Surgery [phone]  Vascular Surgery [phone]

## 2022-01-14 NOTE — PHARMACY-CONSULT NOTE
"Pharmacy Note: Parenteral Nutrition (PN) Management    Pharmacist consulted to dose PN for Gurdeep Dia, a 66 year old male by Dr. Mcmahon.    Subjective:    The patient was started on PN in the hospital on 12/2/2021.    Indication for PN therapy: continue until fistula heals    Inadequate nutrition existing for > 7 days.     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      Social History     Tobacco Use     Smoking status: Current Every Day Smoker     Smokeless tobacco: Never Used   Substance Use Topics     Alcohol use: Not Currently     Comment: Clean for 5 years     Drug use: Not Currently     Objective:    Ht Readings from Last 1 Encounters:   12/22/21 1.702 m (5' 7\")     Wt Readings from Last 1 Encounters:   01/14/22 77.5 kg (170 lb 12.8 oz)       Body mass index is 26.75 kg/m .    Patient Vitals for the past 96 hrs:   Weight   01/14/22 0500 77.5 kg (170 lb 12.8 oz)   01/13/22 0705 78.1 kg (172 lb 3.2 oz)   01/12/22 0414 80 kg (176 lb 6.4 oz)       Labs:  Last 3 days:  Recent Labs     01/12/22  0417 01/13/22  0905    139   POTASSIUM 4.2 4.3   CHLORIDE 106 109*   CO2 28 22   BUN 19 22   CR 0.69* 0.75   VIJAYA 8.2* 8.2*   MAG 2.1  --    PHOS 3.5  --    HGB  --  10.4*   HCT  --  32.5*    549*       Glucose (past 48 hours):   Recent Labs     01/12/22  2144 01/13/22  0244 01/13/22  0832 01/13/22  0905 01/13/22  1231 01/13/22  1735 01/13/22  2123 01/14/22  0115 01/14/22  0649   * 137* 158* 136* 98 104* 131* 135* 101*       Intake/Output (last 24 hours): I/O last 3 completed shifts:  In: -   Out: 1900 [Urine:400; Drains:1500]    Estimated CrCl: Estimated Creatinine Clearance: 104.8 mL/min (based on SCr of 0.75 mg/dL).    Assessment:    Continue patient on PN therapy as a cyclic central therapy.     Given the patient's current condition/oral intake, PN is still indicated.    Lab results reviewed: Unable to draw labs this morning.      Plan:  1. Rate of PN: 75ml/hr x 1 hr, " followed by 150ml/hr x 10 hours, followed by 75ml/hr x 1 hour  2. Formula:     Amino Acids 130 grams    Dextrose 300 grams    Sodium 130 mEq/day    Potassium 75 mEq/day    Calcium 8 mEq/day    Magnesium 16 mEq/day    Phosphorus 32 mMol/day    Chloride: Acetate Ratio 1:2    Standard Multivitamins w/Vitamin K    Trace Elements    3. Fat Emulsion: 20%, 250 mL IV 3 times weekly on Mon, Thurs, Sat  4. Check ionized calcium, BMP and Mag, Phos labs tomorrow.  5. Pharmacist will continue to follow the patient's lab results, clinical status and blood glucose results and make adjustments as appropriate.    Thank you for the consult.  Mansi Smith RPH  1/14/2022 11:02 AM

## 2022-01-14 NOTE — PLAN OF CARE
Problem: Adult Inpatient Plan of Care  Goal: Optimal Comfort and Wellbeing  1/13/2022 2312 by Jared Kitchne RN  Outcome: Improving     Changed both ostomy/fistula pouches, they were leaking. Sealed nicely now. Continues to intermittent red drainage at times, difficult to tell between blood and red from juice and red popsicle that he has. PRN percocet for abdominal pain beneficial. TPN running.

## 2022-01-14 NOTE — PLAN OF CARE
Problem: Adult Inpatient Plan of Care  Goal: Optimal Comfort and Wellbeing  Outcome: Improving     Problem: Risk for Delirium  Goal: Improved Sleep  Outcome: Improving     VSS overnight.  PICC like had both lumens flush with purple cap with being slugglish . No blood return noted on either line.  Changed to lab collect.  Pt did not complain of pain overnight and slept between care.  Distal pouch still noted as having red drainage possible blood or due to consuming red food products.

## 2022-01-15 LAB
ANION GAP SERPL CALCULATED.3IONS-SCNC: 5 MMOL/L (ref 5–18)
BUN SERPL-MCNC: 25 MG/DL (ref 8–22)
CALCIUM SERPL-MCNC: 8.3 MG/DL (ref 8.5–10.5)
CALCIUM, IONIZED MEASURED: 1.16 MMOL/L (ref 1.11–1.3)
CHLORIDE BLD-SCNC: 107 MMOL/L (ref 98–107)
CO2 SERPL-SCNC: 27 MMOL/L (ref 22–31)
CREAT SERPL-MCNC: 0.66 MG/DL (ref 0.7–1.3)
ERYTHROCYTE [DISTWIDTH] IN BLOOD BY AUTOMATED COUNT: 16.7 % (ref 10–15)
GFR SERPL CREATININE-BSD FRML MDRD: >90 ML/MIN/1.73M2
GLUCOSE BLD-MCNC: 130 MG/DL (ref 70–125)
GLUCOSE BLDC GLUCOMTR-MCNC: 150 MG/DL (ref 70–99)
GLUCOSE BLDC GLUCOMTR-MCNC: 92 MG/DL (ref 70–99)
HCT VFR BLD AUTO: 31.1 % (ref 40–53)
HGB BLD-MCNC: 9.6 G/DL (ref 13.3–17.7)
ION CA PH 7.4: 1.2 MMOL/L (ref 1.11–1.3)
MAGNESIUM SERPL-MCNC: 2.1 MG/DL (ref 1.8–2.6)
MCH RBC QN AUTO: 28.9 PG (ref 26.5–33)
MCHC RBC AUTO-ENTMCNC: 30.9 G/DL (ref 31.5–36.5)
MCV RBC AUTO: 94 FL (ref 78–100)
PH: 7.46 (ref 7.35–7.45)
PHOSPHATE SERPL-MCNC: 3.8 MG/DL (ref 2.5–4.5)
PLATELET # BLD AUTO: 511 10E3/UL (ref 150–450)
POTASSIUM BLD-SCNC: 4.5 MMOL/L (ref 3.5–5)
RBC # BLD AUTO: 3.32 10E6/UL (ref 4.4–5.9)
SODIUM SERPL-SCNC: 139 MMOL/L (ref 136–145)
WBC # BLD AUTO: 9.9 10E3/UL (ref 4–11)

## 2022-01-15 PROCEDURE — 250N000013 HC RX MED GY IP 250 OP 250 PS 637: Performed by: STUDENT IN AN ORGANIZED HEALTH CARE EDUCATION/TRAINING PROGRAM

## 2022-01-15 PROCEDURE — 99024 POSTOP FOLLOW-UP VISIT: CPT | Performed by: PHYSICIAN ASSISTANT

## 2022-01-15 PROCEDURE — 83735 ASSAY OF MAGNESIUM: CPT | Performed by: FAMILY MEDICINE

## 2022-01-15 PROCEDURE — 250N000009 HC RX 250: Performed by: FAMILY MEDICINE

## 2022-01-15 PROCEDURE — 250N000013 HC RX MED GY IP 250 OP 250 PS 637: Performed by: INTERNAL MEDICINE

## 2022-01-15 PROCEDURE — 80048 BASIC METABOLIC PNL TOTAL CA: CPT | Performed by: STUDENT IN AN ORGANIZED HEALTH CARE EDUCATION/TRAINING PROGRAM

## 2022-01-15 PROCEDURE — 120N000004 HC R&B MS OVERFLOW

## 2022-01-15 PROCEDURE — 36415 COLL VENOUS BLD VENIPUNCTURE: CPT | Performed by: FAMILY MEDICINE

## 2022-01-15 PROCEDURE — 250N000013 HC RX MED GY IP 250 OP 250 PS 637: Performed by: FAMILY MEDICINE

## 2022-01-15 PROCEDURE — 84100 ASSAY OF PHOSPHORUS: CPT | Performed by: FAMILY MEDICINE

## 2022-01-15 PROCEDURE — 250N000011 HC RX IP 250 OP 636: Performed by: INTERNAL MEDICINE

## 2022-01-15 PROCEDURE — 99231 SBSQ HOSP IP/OBS SF/LOW 25: CPT | Mod: GC | Performed by: STUDENT IN AN ORGANIZED HEALTH CARE EDUCATION/TRAINING PROGRAM

## 2022-01-15 PROCEDURE — 82330 ASSAY OF CALCIUM: CPT | Performed by: FAMILY MEDICINE

## 2022-01-15 PROCEDURE — 250N000009 HC RX 250: Performed by: INTERNAL MEDICINE

## 2022-01-15 PROCEDURE — 85027 COMPLETE CBC AUTOMATED: CPT | Performed by: STUDENT IN AN ORGANIZED HEALTH CARE EDUCATION/TRAINING PROGRAM

## 2022-01-15 RX ADMIN — MAGNESIUM SULFATE HEPTAHYDRATE: 500 INJECTION, SOLUTION INTRAMUSCULAR; INTRAVENOUS at 20:01

## 2022-01-15 RX ADMIN — AMIODARONE HYDROCHLORIDE 200 MG: 200 TABLET ORAL at 10:13

## 2022-01-15 RX ADMIN — APIXABAN 10 MG: 5 TABLET, FILM COATED ORAL at 10:13

## 2022-01-15 RX ADMIN — AMIODARONE HYDROCHLORIDE 200 MG: 200 TABLET ORAL at 22:21

## 2022-01-15 RX ADMIN — LEVOTHYROXINE SODIUM 25 MCG: 0.03 TABLET ORAL at 10:13

## 2022-01-15 RX ADMIN — APIXABAN 10 MG: 5 TABLET, FILM COATED ORAL at 22:19

## 2022-01-15 RX ADMIN — OXYCODONE HYDROCHLORIDE AND ACETAMINOPHEN 2 TABLET: 5; 325 TABLET ORAL at 16:42

## 2022-01-15 RX ADMIN — OXYCODONE HYDROCHLORIDE AND ACETAMINOPHEN 2 TABLET: 5; 325 TABLET ORAL at 22:59

## 2022-01-15 RX ADMIN — GABAPENTIN 900 MG: 300 CAPSULE ORAL at 23:01

## 2022-01-15 RX ADMIN — GABAPENTIN 200 MG: 100 CAPSULE ORAL at 16:38

## 2022-01-15 RX ADMIN — QUETIAPINE FUMARATE 200 MG: 100 TABLET, FILM COATED ORAL at 23:00

## 2022-01-15 RX ADMIN — AMIODARONE HYDROCHLORIDE 200 MG: 200 TABLET ORAL at 13:41

## 2022-01-15 RX ADMIN — I.V. FAT EMULSION 250 ML: 20 EMULSION INTRAVENOUS at 20:01

## 2022-01-15 RX ADMIN — GABAPENTIN 200 MG: 100 CAPSULE ORAL at 10:14

## 2022-01-15 RX ADMIN — ONDANSETRON 4 MG: 2 INJECTION INTRAMUSCULAR; INTRAVENOUS at 22:26

## 2022-01-15 RX ADMIN — HYDROXYZINE HYDROCHLORIDE 25 MG: 25 TABLET, FILM COATED ORAL at 22:20

## 2022-01-15 RX ADMIN — PANTOPRAZOLE SODIUM 40 MG: 40 TABLET, DELAYED RELEASE ORAL at 10:13

## 2022-01-15 RX ADMIN — OXYCODONE HYDROCHLORIDE AND ACETAMINOPHEN 2 TABLET: 5; 325 TABLET ORAL at 10:19

## 2022-01-15 RX ADMIN — VENLAFAXINE HYDROCHLORIDE 75 MG: 75 CAPSULE, EXTENDED RELEASE ORAL at 10:13

## 2022-01-15 ASSESSMENT — ACTIVITIES OF DAILY LIVING (ADL)
ADLS_ACUITY_SCORE: 15
ADLS_ACUITY_SCORE: 18
ADLS_ACUITY_SCORE: 17
ADLS_ACUITY_SCORE: 17
ADLS_ACUITY_SCORE: 18
ADLS_ACUITY_SCORE: 17
ADLS_ACUITY_SCORE: 15
ADLS_ACUITY_SCORE: 15
ADLS_ACUITY_SCORE: 17
ADLS_ACUITY_SCORE: 15
ADLS_ACUITY_SCORE: 18
ADLS_ACUITY_SCORE: 17
ADLS_ACUITY_SCORE: 17
ADLS_ACUITY_SCORE: 15
ADLS_ACUITY_SCORE: 17
ADLS_ACUITY_SCORE: 17
ADLS_ACUITY_SCORE: 18
ADLS_ACUITY_SCORE: 17

## 2022-01-15 NOTE — PROGRESS NOTES
[facility] Progress Note    Assessment/Plan  Active Problems:    Abdominal pain, generalized    Patient remains hospitalized due to placement, TPN.    Chema Dia is a 67 y/o M with past medical history of splenectomy, appendectomy, HTN, and substance abuse, with recent admission to hospital 11/30-12/21/2021 for SBO with perforation s/p surgical repair. Discharged home but presents as he could not manage at home and is now requesting to be placed in TCU vs LTC. Continues on TPN.  New RUL and RLL PE found and being treated, new HAP 1/9.    RUL and RLL PE  SMV thrombosis  New supplemental oxygen requirement  Acute RLL PE found on CT as well as SMV thrombosis.  Also has RUL PE.  No evidence of right heart strain.  Most likely provoked in the setting of recent surgery/hospitalization.  -Eliquis 10 mg twice daily for 7 days, then 5 mg twice daily for PE treatment.    Atrial flutter (resolved)  Developed this overnight 1/8-1/9 and required amiodarone and brief ICU stay for pressors.  Cardiology consulted.  Patient now stabilized and sinus rhythm, back to the floor.  Pulled back PICC line 1 cm as cardiology suspects the tip may be in the right atrium and that may have caused his episode of atrial flutter.  -Cardiology signed off.  Continue oral amiodarone.  Plan to taper per cardiology: amiodarone 3 times a day for a week then go down to 2 times a day for 2 weeks and then daily 200 mg a day.  -Eliquis.    Nausea  New supplemental oxygen requirement  RLQ pain  Malaise  HAP  Patient has been having nausea with TPN but developed a new supplemental oxygen requirement 1/6 in addition to fatigue and general malaise and was found to have RLL PE and SMV thrombosis.  CT abdomen stable from prior though has some evidence of new inflammation in the RLQ 1/9 concerning for fistula or new fluid collection.  Developed evidence of left-sided HAP overnight 1/9.  -Repeated blood cultures 1/8.  -Back on oral antibiotics.   Finished fluconazole.  Wound cultures positive for Candida parapsilosis and E. coli 12/22.  -Re-consulted ID for antibiotic guidance as patient has a fairly complicated presentation.  Transitioned back to oral antibiotics and finished the course 1/14.  -No new procedure indicated per surgery.     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 Required subsequent washout on 12/3 with antibiotics and MARISA drain placement. On 12/8 found to have continued enhancing abscess with drain placed 12/9 and removed on 12/16.  Was discharged, but returned soon thereafter on 12/22 as he is unable to care for himself even assistance from family so is requesting LTC/TCU placement.  Now receiving TPN and has ongoing abdominal pain, nausea, and evidence of possible dehiscence in one of his intra-abdominal surgical sites.  -PT/OT.  Searching for TCU/LTC placement.  -Consulted surgery to follow while here.  Clear liquid diet.  Surgery is the only service that can advance this and will not do so for some time.  No new procedure indicated at this time.  -Consulted pharmacy and RD for TPN management - transitioned to cyclic TPN 12/30.  -WOC.  -Nausea, pain control.  -Finished antibiotics/14.  Finished course of fluconazole as well.     Alk phos elevation, resolved  Alk phos elevated, now back to normal.  Rest of LFTs okay.  Does have sludge in gallbladder but no evidence of cholecystitis on imaging.  -Cyclic TPN.  Additional daily lab checks outside of regularly scheduled TPN checks not indicated at this time unless he develops new symptoms.     Depression  Insomnia  Hx of this. Reports symptoms of low motivation, little interest in doing things, poor sleep, no appetite. Is on buspar, mirtazapine, and effexor at home, though  doesn't really think medicines are as helpful as talk therapy is. Wanted to speak to social work and . States no plan to harm himself. Reports he just needs to leave the hospital.  -Consulted psych for med management and plan for talk therapy while hospitalized.  -Discontinued buspar and mirtazapine per psych.  -Increased venlafaxine and gabapentin per psych.  -Seroquel for sleep.  Vistaril at bedtime.     Moderate malnutrition  Per RD eval.  -TPN per RD and pharmacy.     Normocytic anemia  Stable at 11.5, MCV of 93.     Chronic Conditions:  Hypothyroidism- PTA levothyroxine.  Chronic pain- Hold mexolicam 7.5mg daily, resume PTA gabapentin.  BPH-hold home Flomax given he states it does not help.    FEN: TPN.  Clear liquid diet as tolerated.  DVT Prophylaxis: Treatment dose Eliquis.  Code: Full code.    Disposition/Advanced Care Planning  Discharge Planning discussed with patient and care team.  Anticipated discharge date: Multiple days.  Pending placement.  Disposition: LTC.    Subjective  Pt feeling well, no acute concerns today or overnight. Resting in bed.     Objective    Vital signs in last 24 hours Temp:  [97.9  F (36.6  C)-98.5  F (36.9  C)] 98.5  F (36.9  C)  Pulse:  [87-99] 99  Resp:  [18-20] 20  BP: (100-121)/(65-84) 100/65  SpO2:  [90 %-92 %] 91 %   Weight: [unfilled]    Intake/Output last 3 shift I/O last 3 completed shifts:  In: 1794 [P.O.:350]  Out: 1000 [Drains:1000]    Intake/Output this shift:I/O this shift:  In: 360 [P.O.:360]  Out: 485 [Drains:485]    Physical Exam  General appearance: alert, appears stated age, cooperative and no distress.  Head: Normocephalic, without obvious abnormality, atraumatic.  Eyes: conjunctivae/corneas clear.  Throat: MMM.  Neck: supple, symmetrical, trachea midline.  Lungs: no increased respiratory effort.  Heart: Good capillary refill.  Abdomen: non-distended, soft, appropriately tender.  Extremities: extremities normal, atraumatic, no cyanosis or  edema.  Skin: Skin color, texture, turgor normal. No rashes or lesions.  Neurologic: Alert and oriented x3, normal strength and tone.  Psychiatric: mood and affect appropriate.    Pertinent Labs and Pertinent Radiology   Lab Results: personally reviewed.     Radiology Results: Personally reviewed image/s and impression/s    Precepted patient with MD Dez Willett,   [facility] Resident PGY-2  Pager: [phone]

## 2022-01-15 NOTE — PROGRESS NOTES
Attestation:  This patient has been seen and evaluated by me, Benson Rabago MD on 1/15/2022. I saw and discussed the case with the resident, Dr Dez Sow and the care team. I agree with the findings and plan in this note. I have reviewed today's vital signs, medications, and studies including labwork. Patient is a 66 year old male hospitalized for enterocutaneous fistula on TPN awaiting TCU placement.     Benson Rabago MD  1/15/2022  12:18 PM

## 2022-01-15 NOTE — PLAN OF CARE
Problem: Pain (Surgery Nonspecified)  Goal: Acceptable Pain Control  Outcome: Improving  Intervention: Prevent or Manage Pain  Recent Flowsheet Documentation  Taken 1/15/2022 1020 by Ruth Hilliard RN  Pain Management Interventions: medication (see MAR)    Pt did not want to take morning meds until 1015.  C/O abd pain 10/10 gave 2 percocet at 1015.  Output in ostomy bags 885cc green bile.  Urine 800cc.  Abd pad on right lower stomach just showed a smear of green bile. Pt only drank 1 applejuice and water.   Walked in the hallway with stand by assist.  Did very well.  No SOB.  91% RA  100/65.

## 2022-01-15 NOTE — PHARMACY-CONSULT NOTE
"Pharmacy Note: Parenteral Nutrition (PN) Management    Pharmacist consulted to dose PN for Gurdeep Dia, a 66 year old male by Dr. Mcmahon.    Subjective:      The patient was started on PN in the hospital on 12/2/21.    Indication for PN therapy: continue until fistula heals    Inadequate nutrition existing for > 7 days.     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      Social History     Tobacco Use     Smoking status: Current Every Day Smoker     Smokeless tobacco: Never Used     Tobacco comment: declined cessation discussion and resource packet -1/14/22   Substance Use Topics     Alcohol use: Not Currently     Comment: Clean for 5 years     Drug use: Not Currently     Objective:    Ht Readings from Last 1 Encounters:   12/22/21 1.702 m (5' 7\")     Wt Readings from Last 1 Encounters:   01/14/22 77.5 kg (170 lb 12.8 oz)       Body mass index is 26.75 kg/m .    Patient Vitals for the past 96 hrs:   Weight   01/14/22 0500 77.5 kg (170 lb 12.8 oz)   01/13/22 0705 78.1 kg (172 lb 3.2 oz)   01/12/22 0414 80 kg (176 lb 6.4 oz)       Labs:  Last 3 days:  Recent Labs     01/13/22  0905 01/15/22  0701    139   POTASSIUM 4.3 4.5   CHLORIDE 109* 107   CO2 22 27   BUN 22 25*   CR 0.75 0.66*   VIJAYA 8.2* 8.3*   [id number]  --  1.20   MAG  --  2.1   PHOS  --  3.8   HGB 10.4* 9.6*   HCT 32.5* 31.1*   * 511*       Glucose (past 48 hours):   Recent Labs     01/13/22  0905 01/13/22  1231 01/13/22  1735 01/13/22  2123 01/14/22  0115 01/14/22  0649 01/14/22  1218 01/14/22  1821 01/15/22  0113 01/15/22  0701   * 98 104* 131* 135* 101* 114* 119* 150* 130*       Intake/Output (last 24 hours): I/O last 3 completed shifts:  In: 1794 [P.O.:350]  Out: 1000 [Drains:1000]    Estimated CrCl: Estimated Creatinine Clearance: 119.1 mL/min (A) (based on SCr of 0.66 mg/dL (L)).    Assessment:    Continue patient on PN therapy as a cyclic central therapy.     Given the patient's current condition/oral " intake, PN is still indicated.    Lab results reviewed: no changes today      Plan:  1. Rate of PN: 75ml/hr x 1 hr, followed by 150ml/hr x 10 hours, followed by 75ml/hr x 1 hours  2. Formula:     Amino Acids 130 grams    Dextrose 300 grams    Sodium 130 mEq/day    Potassium 75 mEq/day    Calcium 8 mEq/day    Magnesium 16 mEq/day    Phosphorus 32 mMol/day    Chloride: Acetate Ratio 1:2    Standard Multivitamins w/Vitamin K    Trace Elements    3. Fat Emulsion: 20%, 250 mL IV 3 times weekly on Mon, Thurs, Sat  4. Check BMP as per MD orders tomorrow.  5. Pharmacist will continue to follow the patient's lab results, clinical status and blood glucose results and make adjustments as appropriate.    Thank you for the consult.  Mansi Smith RPH  1/15/2022 7:47 AM

## 2022-01-15 NOTE — PLAN OF CARE
Problem: Pain (Surgery Nonspecified)  Goal: Acceptable Pain Control  1/14/2022 1924 by Roxana Das, RN  Outcome: Improving  1/14/2022 1917 by Roxana Das, RN  Outcome: Improving   Denies pain early shift.  Problem: Nausea and Vomiting  Goal: Fluid and Electrolyte Balance  1/14/2022 1924 by Roxana Das, RN  Outcome: Improving  1/14/2022 1917 by Roxana Das, RN  Outcome: Improving   Denies nausea; no vomiting. K+ level last WNL on 1/13. Addendum: c/o abdomen discomfort latter shift, gets pain med. Tolerating clear liquids.Gets TPN  Problem: Hypertension Acute  Goal: Blood Pressure Within Desired Range  Outcome: Improving   BP WNL.

## 2022-01-15 NOTE — PLAN OF CARE
Problem: Pain (Surgery Nonspecified)  Goal: Acceptable Pain Control  Outcome: Improving  Intervention: Prevent or Manage Pain  Recent Flowsheet Documentation  Taken 1/15/2022 1642 by Roxana Das RN  Pain Management Interventions: medication (see MAR)  Patient asks for pain med early evening shift for c/o abdominal pain. Later noted sleeping. No moaning, no grimacing, no guarding noted.     Problem: Anxiety  Goal: Anxiety Reduction or Resolution  Outcome: Improving  Calm, cooperative, and quiet early shift.  Does not appear agitated or anxious.   Problem: Nausea and Vomiting  Goal: Fluid and Electrolyte Balance  Outcome: Improving  No c/o nausea or vomiting thus far in shift. K+ and Mg+ levels are WNL this a.m.. To get TPN and Lipids this evening.  Problem: Adult Inpatient Plan of Care  Goal: Absence of Hospital-Acquired Illness or Injury

## 2022-01-15 NOTE — PLAN OF CARE
Problem: Hypertension Acute  Goal: Blood Pressure Within Desired Range  1/15/2022 0507 by Vasquez Bay RN  Outcome: Improving  1/15/2022 0506 by Vasquez Bay RN  Outcome: No Change     Problem: Impaired Wound Healing  Goal: Optimal Wound Healing  Outcome: No Change  Intervention: Promote Wound Healing  Recent Flowsheet Documentation  Taken 1/15/2022 0030 by Vasquez Bay RN  Activity Management: activity adjusted per tolerance    Pt. Aox4. Lungs clear on RA. Dyspnea on exertion. No complaints of nausea or abdominal discomfort. Gets TPN. Slept most of the night.

## 2022-01-15 NOTE — PROGRESS NOTES
General Surgery Progress Note:    Hospital Day # 24    ASSESSMENT:   1. 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  Stay with clear liquid diet      SUBJECTIVE:   Gurdeep Dia is doing well and has no complaints.  Wants to sleep.    Patient Vitals for the past 24 hrs:   BP Temp Temp src Pulse Resp SpO2   01/15/22 1129 100/65 98.5  F (36.9  C) Oral 99 20 91 %   01/15/22 1012 109/78 -- -- 90 -- --   01/15/22 0742 101/67 98.1  F (36.7  C) Oral 90 18 92 %   01/15/22 0037 118/73 98  F (36.7  C) Oral 87 18 90 %   01/14/22 1910 120/75 97.9  F (36.6  C) Oral 88 18 91 %   01/14/22 1535 121/84 98.5  F (36.9  C) Oral 93 20 90 %       Physical Exam:  General: NAD, pleasant    ABD: No change soft and 2 ostomy bags are collecting brown liquid I did leave the bandage alone right lower quadrant and we will recheck another day      No results displayed because visit has over 200 results.           Demetrius Vogel PA-C

## 2022-01-16 ENCOUNTER — APPOINTMENT (OUTPATIENT)
Dept: PHYSICAL THERAPY | Facility: HOSPITAL | Age: 67
DRG: 393 | End: 2022-01-16
Payer: MEDICARE

## 2022-01-16 LAB
ANION GAP SERPL CALCULATED.3IONS-SCNC: 8 MMOL/L (ref 5–18)
BUN SERPL-MCNC: 27 MG/DL (ref 8–22)
CALCIUM SERPL-MCNC: 8.1 MG/DL (ref 8.5–10.5)
CHLORIDE BLD-SCNC: 104 MMOL/L (ref 98–107)
CO2 SERPL-SCNC: 24 MMOL/L (ref 22–31)
CREAT SERPL-MCNC: 0.74 MG/DL (ref 0.7–1.3)
ERYTHROCYTE [DISTWIDTH] IN BLOOD BY AUTOMATED COUNT: 16.4 % (ref 10–15)
GFR SERPL CREATININE-BSD FRML MDRD: >90 ML/MIN/1.73M2
GLUCOSE BLD-MCNC: 125 MG/DL (ref 70–125)
HCT VFR BLD AUTO: 30.8 % (ref 40–53)
HGB BLD-MCNC: 9.7 G/DL (ref 13.3–17.7)
MCH RBC QN AUTO: 29.3 PG (ref 26.5–33)
MCHC RBC AUTO-ENTMCNC: 31.5 G/DL (ref 31.5–36.5)
MCV RBC AUTO: 93 FL (ref 78–100)
PLATELET # BLD AUTO: 544 10E3/UL (ref 150–450)
POTASSIUM BLD-SCNC: 4.6 MMOL/L (ref 3.5–5)
RBC # BLD AUTO: 3.31 10E6/UL (ref 4.4–5.9)
SODIUM SERPL-SCNC: 136 MMOL/L (ref 136–145)
WBC # BLD AUTO: 8.6 10E3/UL (ref 4–11)

## 2022-01-16 PROCEDURE — 99024 POSTOP FOLLOW-UP VISIT: CPT | Performed by: PHYSICIAN ASSISTANT

## 2022-01-16 PROCEDURE — 99231 SBSQ HOSP IP/OBS SF/LOW 25: CPT | Mod: GC | Performed by: STUDENT IN AN ORGANIZED HEALTH CARE EDUCATION/TRAINING PROGRAM

## 2022-01-16 PROCEDURE — 82310 ASSAY OF CALCIUM: CPT | Performed by: STUDENT IN AN ORGANIZED HEALTH CARE EDUCATION/TRAINING PROGRAM

## 2022-01-16 PROCEDURE — 250N000013 HC RX MED GY IP 250 OP 250 PS 637: Performed by: INTERNAL MEDICINE

## 2022-01-16 PROCEDURE — 250N000009 HC RX 250: Performed by: FAMILY MEDICINE

## 2022-01-16 PROCEDURE — 97116 GAIT TRAINING THERAPY: CPT | Mod: GP

## 2022-01-16 PROCEDURE — 85027 COMPLETE CBC AUTOMATED: CPT | Performed by: STUDENT IN AN ORGANIZED HEALTH CARE EDUCATION/TRAINING PROGRAM

## 2022-01-16 PROCEDURE — 36415 COLL VENOUS BLD VENIPUNCTURE: CPT | Performed by: STUDENT IN AN ORGANIZED HEALTH CARE EDUCATION/TRAINING PROGRAM

## 2022-01-16 PROCEDURE — 250N000013 HC RX MED GY IP 250 OP 250 PS 637: Performed by: FAMILY MEDICINE

## 2022-01-16 PROCEDURE — 250N000013 HC RX MED GY IP 250 OP 250 PS 637: Performed by: STUDENT IN AN ORGANIZED HEALTH CARE EDUCATION/TRAINING PROGRAM

## 2022-01-16 PROCEDURE — 120N000004 HC R&B MS OVERFLOW

## 2022-01-16 PROCEDURE — 97110 THERAPEUTIC EXERCISES: CPT | Mod: GP

## 2022-01-16 RX ADMIN — AMIODARONE HYDROCHLORIDE 200 MG: 200 TABLET ORAL at 20:29

## 2022-01-16 RX ADMIN — MAGNESIUM SULFATE HEPTAHYDRATE: 500 INJECTION, SOLUTION INTRAMUSCULAR; INTRAVENOUS at 20:30

## 2022-01-16 RX ADMIN — APIXABAN 10 MG: 5 TABLET, FILM COATED ORAL at 20:28

## 2022-01-16 RX ADMIN — VENLAFAXINE HYDROCHLORIDE 75 MG: 75 CAPSULE, EXTENDED RELEASE ORAL at 08:26

## 2022-01-16 RX ADMIN — HYDROXYZINE HYDROCHLORIDE 25 MG: 25 TABLET, FILM COATED ORAL at 20:29

## 2022-01-16 RX ADMIN — GABAPENTIN 900 MG: 300 CAPSULE ORAL at 23:44

## 2022-01-16 RX ADMIN — OXYCODONE HYDROCHLORIDE AND ACETAMINOPHEN 2 TABLET: 5; 325 TABLET ORAL at 08:25

## 2022-01-16 RX ADMIN — APIXABAN 10 MG: 5 TABLET, FILM COATED ORAL at 08:26

## 2022-01-16 RX ADMIN — GABAPENTIN 200 MG: 100 CAPSULE ORAL at 09:29

## 2022-01-16 RX ADMIN — LEVOTHYROXINE SODIUM 25 MCG: 0.03 TABLET ORAL at 06:56

## 2022-01-16 RX ADMIN — AMIODARONE HYDROCHLORIDE 200 MG: 200 TABLET ORAL at 08:26

## 2022-01-16 RX ADMIN — GABAPENTIN 200 MG: 100 CAPSULE ORAL at 18:08

## 2022-01-16 RX ADMIN — OXYCODONE HYDROCHLORIDE AND ACETAMINOPHEN 2 TABLET: 5; 325 TABLET ORAL at 18:15

## 2022-01-16 RX ADMIN — QUETIAPINE FUMARATE 200 MG: 100 TABLET, FILM COATED ORAL at 23:43

## 2022-01-16 RX ADMIN — PANTOPRAZOLE SODIUM 40 MG: 40 TABLET, DELAYED RELEASE ORAL at 06:56

## 2022-01-16 ASSESSMENT — MIFFLIN-ST. JEOR: SCORE: 1502.48

## 2022-01-16 ASSESSMENT — ACTIVITIES OF DAILY LIVING (ADL)
ADLS_ACUITY_SCORE: 9
ADLS_ACUITY_SCORE: 9
ADLS_ACUITY_SCORE: 11
ADLS_ACUITY_SCORE: 15
ADLS_ACUITY_SCORE: 9
ADLS_ACUITY_SCORE: 9
ADLS_ACUITY_SCORE: 11
ADLS_ACUITY_SCORE: 9
ADLS_ACUITY_SCORE: 11
ADLS_ACUITY_SCORE: 9
ADLS_ACUITY_SCORE: 9
ADLS_ACUITY_SCORE: 11
ADLS_ACUITY_SCORE: 9
ADLS_ACUITY_SCORE: 15
ADLS_ACUITY_SCORE: 9
ADLS_ACUITY_SCORE: 15
ADLS_ACUITY_SCORE: 11
ADLS_ACUITY_SCORE: 9
ADLS_ACUITY_SCORE: 9
ADLS_ACUITY_SCORE: 11
ADLS_ACUITY_SCORE: 11
ADLS_ACUITY_SCORE: 9
ADLS_ACUITY_SCORE: 15
ADLS_ACUITY_SCORE: 11

## 2022-01-16 NOTE — PLAN OF CARE
Problem: Impaired Wound Healing  Goal: Optimal Wound Healing  Intervention: Promote Wound Healing  Recent Flowsheet Documentation  Taken 1/16/2022 1146 by Ruth Hilliard, RN  Activity Management: activity encouraged  Taken 1/16/2022 0900 by Ruth Hilliard, RN  Activity Management: activity encouraged  Taken 1/16/2022 0825 by Ruth Hilliard, RN  Pain Management Interventions: medication (see MAR)     Problem: Infection (Surgery Nonspecified)  Goal: Absence of Infection Signs and Symptoms  Outcome: Improving    C/o abd pain.  Gave percocet 0825 with good effect.  Ostomies intact.  Output 300cc.  Walked with PT.  Made independent.

## 2022-01-16 NOTE — PLAN OF CARE
Problem: Pain (Surgery Nonspecified)  Goal: Acceptable Pain Control  Outcome: No Change     Problem: Risk for Delirium  Goal: Improved Sleep  Outcome: Improving  Pt. Aox4. Lungs clear but diminished on RA. No c/o nausea. C/O of 7/10 abdominal pain from drinking fluids. Declined pain med. Ostomies bag changed and draining. Getting TPN and lipids. Tolerating clear liquids. Slept most of the night.

## 2022-01-16 NOTE — PROGRESS NOTES
Attestation:  This patient has been seen and evaluated by me, Benson Rabago MD on 1/16/2022.I saw and discussed the case with the resident, Dr Lizandro Martini and the care team. I agree with the findings and plan in this note. Patient is a 66 year old male hospitalized for enterocutaneous fistula on TPN. Appreciate surgery input. Pt up walking the hallways this am w/o complaints. Will continue clears. Pt restless to go to TCU but understanding of obstacles to placement.    Benson Rabago MD  1/16/2022  12:40 PM

## 2022-01-16 NOTE — PROGRESS NOTES
General Surgery Progress Note:    Hospital Day # 25    ASSESSMENT:   1. 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  Stay with clear liquid diet      SUBJECTIVE:   Gurdeep Dia is feeling well today.  He is getting ready for a walk.  He is   Recent Results (from the past 24 hour(s))   Glucose by meter    Collection Time: 01/15/22  6:33 PM   Result Value Ref Range    GLUCOSE BY METER POCT 92 70 - 99 mg/dL   CBC with platelets    Collection Time: 01/16/22  4:25 AM   Result Value Ref Range    WBC Count 8.6 4.0 - 11.0 10e3/uL    RBC Count 3.31 (L) 4.40 - 5.90 10e6/uL    Hemoglobin 9.7 (L) 13.3 - 17.7 g/dL    Hematocrit 30.8 (L) 40.0 - 53.0 %    MCV 93 78 - 100 fL    MCH 29.3 26.5 - 33.0 pg    MCHC 31.5 31.5 - 36.5 g/dL    RDW 16.4 (H) 10.0 - 15.0 %    Platelet Count 544 (H) 150 - 450 10e3/uL   Basic metabolic panel    Collection Time: 01/16/22  4:25 AM   Result Value Ref Range    Sodium 136 136 - 145 mmol/L    Potassium 4.6 3.5 - 5.0 mmol/L    Chloride 104 98 - 107 mmol/L    Carbon Dioxide (CO2) 24 22 - 31 mmol/L    Anion Gap 8 5 - 18 mmol/L    Urea Nitrogen 27 (H) 8 - 22 mg/dL    Creatinine 0.74 0.70 - 1.30 mg/dL    Calcium 8.1 (L) 8.5 - 10.5 mg/dL    Glucose 125 70 - 125 mg/dL    GFR Estimate >90 >60 mL/min/1.73m2     excited that his most inferior pouch has had a decrease in output.  No fever or chills.  No significant events overnight.    Patient Vitals for the past 24 hrs:   BP Temp Temp src Pulse Resp SpO2 Weight   01/16/22 1117 107/74 97.9  F (36.6  C) Oral 88 16 90 % --   01/16/22 0802 -- -- -- -- -- 92 % --   01/16/22 0758 113/76 98.6  F (37  C) Oral 94 16 (!) 87 % --   01/16/22 0642 -- -- -- -- -- -- 76.9 kg (169 lb 8 oz)   01/16/22 0420 104/70 98.3  F (36.8  C) Oral 87 16 93 % --   01/15/22 2355 101/75 98.5  F (36.9  C) Oral 102 18 92 % --   01/15/22 2218 (!) 126/94 -- -- 88 20 -- --    01/15/22 1531 109/70 97.5  F (36.4  C) Oral 78 20 90 % --       Physical Exam:  General: NAD, pleasant    ABD: Soft nontender and midline ostomy bag collecting liquid tan/brown clear stool and inferior ostomy collecting hazy stool appearing output and right lower previous MARISA draining more bilious appearing yellow clear drainage.  EXT:no CCE    No results displayed because visit has over 200 results.           BLADIMIR GodinezC

## 2022-01-16 NOTE — PLAN OF CARE
Physical Therapy Discharge Summary    Reason for therapy discharge:    All goals and outcomes met, no further needs identified.  No further expectations of functional progress.    Progress towards therapy goal(s). See goals on Care Plan [facility] electronic health record for goal details.  Goals partially met.  Barriers to achieving goals:   Not able to perform HEP Independently due to cognition.  But able to ambulate safely indepnendently.    Therapy recommendation(s):    Continue home exercise program.

## 2022-01-16 NOTE — PHARMACY-CONSULT NOTE
"Pharmacy Note: Parenteral Nutrition (PN) Management    Pharmacist consulted to dose PN for Gurdeep Dia, a 66 year old male by Dr. Mcmahon.    Subjective:    The patient was started on PN in the hospital on 12/2/21.    Indication for PN therapy: continue until fistula heals    Inadequate nutrition existing for > 7 days.     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      Social History     Tobacco Use     Smoking status: Current Every Day Smoker     Smokeless tobacco: Never Used     Tobacco comment: declined cessation discussion and resource packet -1/14/22   Substance Use Topics     Alcohol use: Not Currently     Comment: Clean for 5 years     Drug use: Not Currently     Objective:    Ht Readings from Last 1 Encounters:   12/22/21 1.702 m (5' 7\")     Wt Readings from Last 1 Encounters:   01/16/22 76.9 kg (169 lb 8 oz)       Body mass index is 26.55 kg/m .    Patient Vitals for the past 96 hrs:   Weight   01/16/22 0642 76.9 kg (169 lb 8 oz)   01/14/22 0500 77.5 kg (170 lb 12.8 oz)   01/13/22 0705 78.1 kg (172 lb 3.2 oz)       Labs:  Last 3 days:  Recent Labs     01/13/22  0905 01/15/22  0701 01/16/22  0425    139 136   POTASSIUM 4.3 4.5 4.6   CHLORIDE 109* 107 104   CO2 22 27 24   BUN 22 25* 27*   CR 0.75 0.66* 0.74   VIJAYA 8.2* 8.3* 8.1*   [id number]  --  1.20  --    MAG  --  2.1  --    PHOS  --  3.8  --    HGB 10.4* 9.6* 9.7*   HCT 32.5* 31.1* 30.8*   * 511* 544*       Glucose (past 48 hours):   Recent Labs     01/14/22  1218 01/14/22  1821 01/15/22  0113 01/15/22  0701 01/15/22  1833 01/16/22  0425   * 119* 150* 130* 92 125       Intake/Output (last 24 hours): I/O last 3 completed shifts:  In: 1381 [P.O.:1055]  Out: 2815 [Urine:1450; Drains:1365]    Estimated CrCl: Estimated Creatinine Clearance: 105.4 mL/min (based on SCr of 0.74 mg/dL).    Assessment:    Continue patient on PN therapy as a cyclic central therapy.     Given the patient's current condition/oral intake, " PN is still indicated.    Lab results reviewed: no changes to TPN today.  Labs have been stable, could consider adjusting labs to just once weekly unless MD sees a reason to continue standard acute care labs.    Plan:  1. Rate of PN:75ml/hr x 1 hour, followed by 150ml/hr x 10 hours, followed by 75ml/hr x 1 hour  2. Formula:     Amino Acids 130 grams    Dextrose 300 grams    Sodium 130 mEq/day    Potassium 75 mEq/day    Calcium 8 mEq/day    Magnesium 16 mEq/day    Phosphorus 32 mMol/day    Chloride: Acetate Ratio 1:2    Standard Multivitamins w/Vitamin K    Trace Elements    3. Fat Emulsion: 20%, 250 mL IV 3 times weekly on Mon, Thurs, Sat  4. Check TPN panel labs tomorrow.  5. Pharmacist will continue to follow the patient's lab results, clinical status and blood glucose results and make adjustments as appropriate.    Thank you for the consult.  Mansi Smith RPH  1/16/2022 7:03 AM

## 2022-01-16 NOTE — PROGRESS NOTES
[facility] Progress Note    Assessment/Plan  Active Problems:    Abdominal pain, generalized    Patient remains hospitalized due to placement, TPN.    Chema Dia is a 65 y/o M with past medical history of splenectomy, appendectomy, HTN, and substance abuse, with recent admission to hospital 11/30-12/21/2021 for SBO with perforation s/p surgical repair. Discharged home but presents as he could not manage at home and is now requesting to be placed in TCU vs LTC. Continues on TPN.  New RUL and RLL PE found and being treated, new HAP 1/9.    RUL and RLL PE  SMV thrombosis  New supplemental oxygen requirement  Acute RLL PE found on CT as well as SMV thrombosis.  Also has RUL PE.  No evidence of right heart strain.  Most likely provoked in the setting of recent surgery/hospitalization.  -Eliquis 10 mg twice daily for 7 days, then 5 mg twice daily for PE treatment.    Atrial flutter (resolved)  Developed this overnight 1/8-1/9 and required amiodarone and brief ICU stay for pressors.  Cardiology consulted.  Patient now stabilized and sinus rhythm, back to the floor.  Pulled back PICC line 1 cm as cardiology suspects the tip may be in the right atrium and that may have caused his episode of atrial flutter.  -Cardiology signed off.  Continue oral amiodarone.  Plan to taper per cardiology: amiodarone 3 times a day for a week then go down to 2 times a day for 2 weeks and then daily 200 mg a day.  -Eliquis.    Nausea  New supplemental oxygen requirement  RLQ pain  Malaise  HAP  Patient has been having nausea with TPN but developed a new supplemental oxygen requirement 1/6 in addition to fatigue and general malaise and was found to have RLL PE and SMV thrombosis.  CT abdomen stable from prior though has some evidence of new inflammation in the RLQ 1/9 concerning for fistula or new fluid collection.  Developed evidence of left-sided HAP overnight 1/9.  -Repeated blood cultures 1/8.  -Back on oral antibiotics.   Finished fluconazole.  Wound cultures positive for Candida parapsilosis and E. coli 12/22.  -Re-consulted ID for antibiotic guidance as patient has a fairly complicated presentation.  Transitioned back to oral antibiotics and finished the course 1/14.  -No new procedure indicated per surgery.     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 Required subsequent washout on 12/3 with antibiotics and MARISA drain placement. On 12/8 found to have continued enhancing abscess with drain placed 12/9 and removed on 12/16.  Was discharged, but returned soon thereafter on 12/22 as he is unable to care for himself even assistance from family so is requesting LTC/TCU placement.  Now receiving TPN and has ongoing abdominal pain, nausea, and evidence of possible dehiscence in one of his intra-abdominal surgical sites.  -PT/OT.  Searching for TCU/LTC placement.  -Consulted surgery to follow while here.  Clear liquid diet.  Surgery is the only service that can advance this and will not do so for some time.  No new procedure indicated at this time.  -Consulted pharmacy and RD for TPN management - transitioned to cyclic TPN 12/30.  -WOC.  -Nausea, pain control.  -Finished antibiotics/14.  Finished course of fluconazole as well.     Alk phos elevation, resolved  Alk phos elevated, now back to normal.  Rest of LFTs okay.  Does have sludge in gallbladder but no evidence of cholecystitis on imaging.  -Cyclic TPN.  Additional daily lab checks outside of regularly scheduled TPN checks not indicated at this time unless he develops new symptoms.     Depression  Insomnia  Hx of this. Reports symptoms of low motivation, little interest in doing things, poor sleep, no appetite. Is on buspar, mirtazapine, and effexor at home, though  doesn't really think medicines are as helpful as talk therapy is. Wanted to speak to social work and . States no plan to harm himself. Reports he just needs to leave the hospital.  -Consulted psych for med management and plan for talk therapy while hospitalized.  -Discontinued buspar and mirtazapine per psych.  -Increased venlafaxine and gabapentin per psych.  -Seroquel for sleep.  Vistaril at bedtime.     Moderate malnutrition  Per RD eval.  -TPN per RD and pharmacy.     Normocytic anemia  Stable at 11.5, MCV of 93.     Chronic Conditions:  Hypothyroidism- PTA levothyroxine.  Chronic pain- Hold mexolicam 7.5mg daily, resume PTA gabapentin.  BPH-hold home Flomax given he states it does not help.    FEN: TPN.  Clear liquid diet as tolerated.  DVT Prophylaxis: Treatment dose Eliquis.  Code: Full code.    Disposition/Advanced Care Planning  Discharge Planning discussed with patient and care team.  Anticipated discharge date: Multiple days.  Pending placement.  Disposition: LTC.    Subjective  Pt feeling well, no acute concerns today or overnight. 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 He will be watching football later today. Is getting frustrated he's having to stay here until placement.     Objective    Vital signs in last 24 hours Temp:  [97.5  F (36.4  C)-98.6  F (37  C)] 97.9  F (36.6  C)  Pulse:  [] 88  Resp:  [16-20] 16  BP: (101-126)/(70-94) 107/74  SpO2:  [87 %-93 %] 90 %   Weight: [unfilled]    Intake/Output last 3 shift I/O last 3 completed shifts:  In: 1381 [P.O.:1055]  Out: 2815 [Urine:1450; Drains:1365]    Intake/Output this shift:I/O this shift:  In: 480 [P.O.:480]  Out: 300 [Drains:300]    Physical Exam  General appearance: alert, appears stated age, cooperative and no distress.  Head: Normocephalic, without obvious abnormality, atraumatic.  Eyes: conjunctivae/corneas clear.  Throat: MMM.  Neck: supple, symmetrical, trachea midline.  Lungs: no increased respiratory  effort.  Heart: Good capillary refill.  Abdomen: non-distended, soft, appropriately tender.  Extremities: extremities normal, atraumatic, no cyanosis or edema.  Skin: Skin color, texture, turgor normal. No rashes or lesions.  Neurologic: Alert and oriented x3, normal strength and tone.  Psychiatric: mood and affect appropriate.    Pertinent Labs and Pertinent Radiology   Lab Results: personally reviewed.     Radiology Results: Personally reviewed image/s and impression/s    Precepted patient with MD Peter Willett DO, MBA  [facility], PGY-3  Pager # [phone]

## 2022-01-17 LAB
ANION GAP SERPL CALCULATED.3IONS-SCNC: 7 MMOL/L (ref 5–18)
BUN SERPL-MCNC: 27 MG/DL (ref 8–22)
CALCIUM SERPL-MCNC: 8.5 MG/DL (ref 8.5–10.5)
CHLORIDE BLD-SCNC: 103 MMOL/L (ref 98–107)
CO2 SERPL-SCNC: 26 MMOL/L (ref 22–31)
CREAT SERPL-MCNC: 0.79 MG/DL (ref 0.7–1.3)
ERYTHROCYTE [DISTWIDTH] IN BLOOD BY AUTOMATED COUNT: 16 % (ref 10–15)
GFR SERPL CREATININE-BSD FRML MDRD: >90 ML/MIN/1.73M2
GLUCOSE BLD-MCNC: 133 MG/DL (ref 70–125)
HCT VFR BLD AUTO: 33.1 % (ref 40–53)
HGB BLD-MCNC: 10.5 G/DL (ref 13.3–17.7)
INR PPP: 1.37 (ref 0.9–1.15)
MAGNESIUM SERPL-MCNC: 2.1 MG/DL (ref 1.8–2.6)
MCH RBC QN AUTO: 28.9 PG (ref 26.5–33)
MCHC RBC AUTO-ENTMCNC: 31.7 G/DL (ref 31.5–36.5)
MCV RBC AUTO: 91 FL (ref 78–100)
PHOSPHATE SERPL-MCNC: 3.3 MG/DL (ref 2.5–4.5)
PLATELET # BLD AUTO: 605 10E3/UL (ref 150–450)
POTASSIUM BLD-SCNC: 4.5 MMOL/L (ref 3.5–5)
PREALB SERPL IA-MCNC: 18 MG/DL (ref 19–38)
RBC # BLD AUTO: 3.63 10E6/UL (ref 4.4–5.9)
SODIUM SERPL-SCNC: 136 MMOL/L (ref 136–145)
TRIGL SERPL-MCNC: 177 MG/DL
WBC # BLD AUTO: 10.3 10E3/UL (ref 4–11)

## 2022-01-17 PROCEDURE — 250N000013 HC RX MED GY IP 250 OP 250 PS 637: Performed by: INTERNAL MEDICINE

## 2022-01-17 PROCEDURE — 120N000004 HC R&B MS OVERFLOW

## 2022-01-17 PROCEDURE — 250N000011 HC RX IP 250 OP 636: Performed by: INTERNAL MEDICINE

## 2022-01-17 PROCEDURE — 84134 ASSAY OF PREALBUMIN: CPT | Performed by: INTERNAL MEDICINE

## 2022-01-17 PROCEDURE — 84478 ASSAY OF TRIGLYCERIDES: CPT | Performed by: INTERNAL MEDICINE

## 2022-01-17 PROCEDURE — 99231 SBSQ HOSP IP/OBS SF/LOW 25: CPT | Mod: GC | Performed by: STUDENT IN AN ORGANIZED HEALTH CARE EDUCATION/TRAINING PROGRAM

## 2022-01-17 PROCEDURE — 36415 COLL VENOUS BLD VENIPUNCTURE: CPT | Performed by: STUDENT IN AN ORGANIZED HEALTH CARE EDUCATION/TRAINING PROGRAM

## 2022-01-17 PROCEDURE — 250N000013 HC RX MED GY IP 250 OP 250 PS 637: Performed by: STUDENT IN AN ORGANIZED HEALTH CARE EDUCATION/TRAINING PROGRAM

## 2022-01-17 PROCEDURE — 250N000013 HC RX MED GY IP 250 OP 250 PS 637: Performed by: FAMILY MEDICINE

## 2022-01-17 PROCEDURE — 85610 PROTHROMBIN TIME: CPT | Performed by: INTERNAL MEDICINE

## 2022-01-17 PROCEDURE — 85027 COMPLETE CBC AUTOMATED: CPT | Performed by: STUDENT IN AN ORGANIZED HEALTH CARE EDUCATION/TRAINING PROGRAM

## 2022-01-17 PROCEDURE — 83735 ASSAY OF MAGNESIUM: CPT | Performed by: INTERNAL MEDICINE

## 2022-01-17 PROCEDURE — 99024 POSTOP FOLLOW-UP VISIT: CPT | Performed by: SPECIALIST

## 2022-01-17 PROCEDURE — 250N000009 HC RX 250: Performed by: INTERNAL MEDICINE

## 2022-01-17 PROCEDURE — 80048 BASIC METABOLIC PNL TOTAL CA: CPT | Performed by: STUDENT IN AN ORGANIZED HEALTH CARE EDUCATION/TRAINING PROGRAM

## 2022-01-17 PROCEDURE — 84100 ASSAY OF PHOSPHORUS: CPT | Performed by: INTERNAL MEDICINE

## 2022-01-17 PROCEDURE — 250N000009 HC RX 250: Performed by: FAMILY MEDICINE

## 2022-01-17 RX ADMIN — APIXABAN 10 MG: 5 TABLET, FILM COATED ORAL at 09:50

## 2022-01-17 RX ADMIN — OXYCODONE HYDROCHLORIDE AND ACETAMINOPHEN 2 TABLET: 5; 325 TABLET ORAL at 09:54

## 2022-01-17 RX ADMIN — HYDROXYZINE HYDROCHLORIDE 25 MG: 25 TABLET, FILM COATED ORAL at 20:25

## 2022-01-17 RX ADMIN — LEVOTHYROXINE SODIUM 25 MCG: 0.03 TABLET ORAL at 09:50

## 2022-01-17 RX ADMIN — AMIODARONE HYDROCHLORIDE 200 MG: 200 TABLET ORAL at 20:26

## 2022-01-17 RX ADMIN — GABAPENTIN 200 MG: 100 CAPSULE ORAL at 16:54

## 2022-01-17 RX ADMIN — VENLAFAXINE HYDROCHLORIDE 75 MG: 75 CAPSULE, EXTENDED RELEASE ORAL at 09:50

## 2022-01-17 RX ADMIN — GABAPENTIN 900 MG: 300 CAPSULE ORAL at 22:44

## 2022-01-17 RX ADMIN — QUETIAPINE FUMARATE 200 MG: 100 TABLET, FILM COATED ORAL at 22:45

## 2022-01-17 RX ADMIN — MAGNESIUM SULFATE HEPTAHYDRATE: 500 INJECTION, SOLUTION INTRAMUSCULAR; INTRAVENOUS at 19:16

## 2022-01-17 RX ADMIN — I.V. FAT EMULSION 250 ML: 20 EMULSION INTRAVENOUS at 19:22

## 2022-01-17 RX ADMIN — PANTOPRAZOLE SODIUM 40 MG: 40 TABLET, DELAYED RELEASE ORAL at 09:50

## 2022-01-17 RX ADMIN — OXYCODONE HYDROCHLORIDE AND ACETAMINOPHEN 2 TABLET: 5; 325 TABLET ORAL at 22:45

## 2022-01-17 RX ADMIN — ONDANSETRON 4 MG: 4 TABLET, ORALLY DISINTEGRATING ORAL at 22:18

## 2022-01-17 RX ADMIN — AMIODARONE HYDROCHLORIDE 200 MG: 200 TABLET ORAL at 09:50

## 2022-01-17 RX ADMIN — APIXABAN 10 MG: 5 TABLET, FILM COATED ORAL at 20:25

## 2022-01-17 RX ADMIN — GABAPENTIN 200 MG: 100 CAPSULE ORAL at 09:50

## 2022-01-17 ASSESSMENT — ACTIVITIES OF DAILY LIVING (ADL)
ADLS_ACUITY_SCORE: 9
ADLS_ACUITY_SCORE: 7
ADLS_ACUITY_SCORE: 9
ADLS_ACUITY_SCORE: 7
ADLS_ACUITY_SCORE: 7
ADLS_ACUITY_SCORE: 9
ADLS_ACUITY_SCORE: 7
ADLS_ACUITY_SCORE: 9
ADLS_ACUITY_SCORE: 7
ADLS_ACUITY_SCORE: 7
ADLS_ACUITY_SCORE: 9
ADLS_ACUITY_SCORE: 7

## 2022-01-17 ASSESSMENT — MIFFLIN-ST. JEOR: SCORE: 1502.48

## 2022-01-17 NOTE — PHARMACY-CONSULT NOTE
"Pharmacy Note: Parenteral Nutrition (PN) Management    Pharmacist consulted to dose PN for Gurdeep Dia, a 66 year old male by Dr. Mcmahon.    Subjective:    The patient was started on PN in the hospital on 12/2/21.    Indication for PN therapy: Continue until fistula heals    Inadequate nutrition existing for > 7 days.     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      Social History     Tobacco Use     Smoking status: Current Every Day Smoker     Smokeless tobacco: Never Used     Tobacco comment: declined cessation discussion and resource packet -1/14/22   Substance Use Topics     Alcohol use: Not Currently     Comment: Clean for 5 years     Drug use: Not Currently     Objective:    Ht Readings from Last 1 Encounters:   12/22/21 1.702 m (5' 7\")     Wt Readings from Last 1 Encounters:   01/17/22 76.9 kg (169 lb 8 oz)       Body mass index is 26.55 kg/m .    Patient Vitals for the past 96 hrs:   Weight   01/17/22 0700 76.9 kg (169 lb 8 oz)   01/16/22 0642 76.9 kg (169 lb 8 oz)   01/14/22 0500 77.5 kg (170 lb 12.8 oz)       Labs:  Last 3 days:  Recent Labs     01/15/22  0701 01/16/22  0425 01/17/22  0623    136 136   POTASSIUM 4.5 4.6 4.5   CHLORIDE 107 104 103   CO2 27 24 26   BUN 25* 27* 27*   CR 0.66* 0.74 0.79   VIJAYA 8.3* 8.1* 8.5   [id number] 1.20  --   --    MAG 2.1  --  2.1   PHOS 3.8  --  3.3   PREALB  --   --  18*   TRIG  --   --  177*   HGB 9.6* 9.7* 10.5*   HCT 31.1* 30.8* 33.1*   * 544* 605*   INR  --   --  1.37*       Glucose (past 48 hours):   Recent Labs     01/15/22  1833 01/16/22  0425 01/17/22  0623   GLC 92 125 133*       Intake/Output (last 24 hours): I/O last 3 completed shifts:  In: 1056 [P.O.:720]  Out: 1025 [Urine:525; Drains:500]    Estimated CrCl: Estimated Creatinine Clearance: 98.7 mL/min (based on SCr of 0.79 mg/dL).    Assessment:    Continue patient on PN therapy as a cyclic central therapy.     Given the patient's current condition/oral intake, PN " is still indicated.    Lab results reviewed: no changes to TPN today    Plan:  1. Rate of PN: 75ml/hr x 1 hr, followed by 150ml/hr x 10 hrs, followed by 75ml/hr x 1 hr  2. Formula:     Amino Acids 130 grams    Dextrose 300 grams    Sodium 130 mEq/day    Potassium 75 mEq/day    Calcium 8 mEq/day    Magnesium 16 mEq/day    Phosphorus 32 mMol/day    Chloride: Acetate Ratio 1:2    Standard Multivitamins w/Vitamin K    Trace Elements    3. Fat Emulsion: 20%, 250 mL IV 3 times weekly on Mon, Thurs, Sat  4. Check BMP labs tomorrow.  5. Pharmacist will continue to follow the patient's lab results, clinical status and blood glucose results and make adjustments as appropriate.    Thank you for the consult.  Saritha Price RPH  1/17/2022 9:28 AM

## 2022-01-17 NOTE — PROGRESS NOTES
[facility] Progress Note    Assessment/Plan  Active Problems:    Abdominal pain, generalized    Patient remains hospitalized due to placement, TPN.    Chema Dia is a 65 y/o M with past medical history of splenectomy, appendectomy, HTN, and substance abuse, with recent admission to hospital 11/30-12/21/2021 for SBO with perforation s/p surgical repair. Discharged home but presents as he could not manage at home and is now requesting to be placed in TCU vs LTC. Continues on TPN.  New RUL and RLL PE found and being treated, new HAP 1/9.    RUL and RLL PE  SMV thrombosis  New supplemental oxygen requirement  Acute RLL PE found on CT as well as SMV thrombosis.  Also has RUL PE.  No evidence of right heart strain.  Most likely provoked in the setting of recent surgery/hospitalization.  -Eliquis 10 mg twice daily for 7 days, then 5 mg twice daily for PE treatment.    Atrial flutter (resolved)  Developed this overnight 1/8-1/9 and required amiodarone and brief ICU stay for pressors.  Cardiology consulted.  Patient now stabilized and sinus rhythm, back to the floor.  Pulled back PICC line 1 cm as cardiology suspects the tip may be in the right atrium and that may have caused his episode of atrial flutter.  -Cardiology signed off.  Continue oral amiodarone.  Plan to taper per cardiology: amiodarone 3 times a day for a week then go down to 2 times a day for 2 weeks and then daily 200 mg a day.  -Eliquis.    Nausea  New supplemental oxygen requirement  RLQ pain  Malaise  HAP  Wound cultures positive for Candida parapsilosis and E. coli 12/22.  Patient has been having nausea with TPN but developed a new supplemental oxygen requirement 1/6 in addition to fatigue and general malaise and was found to have RLL PE and SMV thrombosis.  CT abdomen overall stable from prior.  Developed evidence of left-sided HAP overnight 1/9, re-consulted ID for antibiotic guidance, transitioned back to oral antibiotics and  finished the course 1/14, ID signed off.  -No new procedure indicated per surgery.     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 Required subsequent washout on 12/3 with antibiotics and MARISA drain placement. On 12/8 found to have continued enhancing abscess with drain placed 12/9 and removed on 12/16.  Was discharged, but returned soon thereafter on 12/22 as he is unable to care for himself even assistance from family so is requesting LTC/TCU placement.  Now receiving TPN and has ongoing abdominal pain, nausea, and evidence of possible dehiscence in one of his intra-abdominal surgical sites.  -PT/OT.  Searching for TCU/LTC placement.  -Consulted surgery to follow while here.  Clear liquid diet.  Surgery is the only service that can advance this and will not do so for some time.  No new procedure indicated at this time.   -Consulted pharmacy and RD for TPN management - transitioned to cyclic TPN 12/30.  -WOC.  -Nausea, pain control.  -Finished antibiotics 1/14.  Finished course of fluconazole as well.     Alk phos elevation, resolved  Alk phos elevated, now back to normal.  Rest of LFTs okay.  Does have sludge in gallbladder but no evidence of cholecystitis on imaging.  -Cyclic TPN.  Additional daily lab checks outside of regularly scheduled TPN checks not indicated at this time unless he develops new symptoms.     Depression  Insomnia  Hx of this. Reports symptoms of low motivation, little interest in doing things, poor sleep, no appetite. Is on buspar, mirtazapine, and effexor at home, though doesn't really think medicines are as helpful as talk therapy is. Wanted to speak to social work and . States no plan to harm himself. Reports he just needs to leave the hospital.  -Consulted psych for  "med management and plan for talk therapy while hospitalized.  -Discontinued buspar and mirtazapine per psych.  -Increased venlafaxine and gabapentin per psych.  -Seroquel for sleep.  Vistaril at bedtime.     Moderate malnutrition  Per RD eval.  -TPN per RD and pharmacy.     Normocytic anemia  Stable at 11.5, MCV of 93.     Chronic Conditions:  Hypothyroidism- PTA levothyroxine.  Chronic pain- Hold mexolicam 7.5mg daily, resume PTA gabapentin.  BPH-hold home Flomax given he states it does not help.    FEN: TPN.  Clear liquid diet as tolerated.  DVT Prophylaxis: Treatment dose Eliquis.  Code: Full code.    Disposition/Advanced Care Planning  Discharge Planning discussed with patient and care team.  Anticipated discharge date: Multiple days.  Pending placement.  Disposition: LTC.    Subjective  Pt feeling a little bit \"icky\" this morning and did have a temperature of 100.3 but not a true fever.  No focal complaints.  Feeling somewhat sad that he is still here but intermittently bright.    Objective    Vital signs in last 24 hours Temp:  [98.1  F (36.7  C)-100.3  F (37.9  C)] 100.3  F (37.9  C)  Pulse:  [84-88] 84  Resp:  [20-22] 22  BP: (103-120)/(72-90) 112/72  SpO2:  [90 %-93 %] 90 %   Weight: [unfilled]    Intake/Output last 3 shift I/O last 3 completed shifts:  In: 1056 [P.O.:720]  Out: 1025 [Urine:525; Drains:500]    Intake/Output this shift:I/O this shift:  In: -   Out: 250 [Urine:250]    Physical Exam  General appearance: alert, appears stated age, cooperative and no distress.  Head: Normocephalic, without obvious abnormality, atraumatic.  Eyes: conjunctivae/corneas clear.  Throat: MMM.  Neck: supple, symmetrical, trachea midline.  Lungs: no increased respiratory effort.  Heart: Good capillary refill.  Abdomen: non-distended, soft, appropriately tender.  Extremities: extremities normal, atraumatic, no cyanosis or edema.  Skin: Skin color, texture, turgor normal. No rashes or lesions.  Neurologic: Alert and " oriented x3, normal strength and tone.  Psychiatric: mood and affect appropriate.    Pertinent Labs and Pertinent Radiology   Lab Results: personally reviewed.     Radiology Results: Personally reviewed image/s and impression/s    Precepted patient with Dr. Lucas Aguilera.    Johnathan Lees MD  [facility], PGY-3

## 2022-01-17 NOTE — PROGRESS NOTES
CM contacted [facility] Phone  [phone] asking if they could accommodate someone with TPN/ wound care. CM asked about having home care visit pt while in [facility].  stated they could potentially accept someone with higher medical needs, but it would need to go through their director. Also stated they are closed for another 10-15 days as they had a COVID outbreak.   CM to check in with higher grounds.    CINDA Corona  1/17/2022  10:41 AM

## 2022-01-17 NOTE — PROGRESS NOTES
"CLINICAL NUTRITION SERVICES - REASSESSMENT NOTE     Nutrition Prescription    RECOMMENDATIONS FOR MDs/PROVIDERS TO ORDER:    Malnutrition Status:    Severe    Recommendations already ordered by Registered Dietitian (RD):  Continue cyclic TPN: D 300  @ 75 ml/hr x 1 hr, 150 ml/hr x 10 hrs  75 ml/hr x 1 hr and off.  250 ml of 20% lipids 3 times per week  Over 12 hrs    Future/Additional Recommendations:  Follow labs, weight, POC and adjust TPN as needed     EVALUATION OF THE PROGRESS TOWARD GOALS   Diet: Clear liquids  Nutrition Support: TPN as documented above: TPN provides 1754 Calories, 130 g protein, 300 g CHO, 21 g fat (averaged) and 1650 ml fluid/day  Intake: 720 mL yesterday     Per nursing notes this am, pt tolerating clear liquids.  No c/o of nausea or pain during shift.    Per surgery note (22) Surgery is the only service that can advance the patient's diet and that will not happen for some time. (fistulas have to close down first)    ANTHROPOMETRICS  Height: 170.2 cm (5' 7\")  Admit weight: 173 lb (21), 166 lb (21)  Most Recent Weight: 76.9 kg (169 lb 8 oz)  77.5 kg (170 lb 12.8 oz)  (22)    GI  Ostomy, fistula  Normoactive BS all quadrants  Ileostomy output last 24 hrs 450 mL  Colostomy output 25 mL    LABS  Reviewed  Triglycerides 177   FSB, 133    MEDICATIONS  Reviewed:  levothyroxine, pantoprazole    MALNUTRITION: (1/10/22)  % Weight Loss:  1-2% weight loss in 1 week (moderate malnutrition)  % Intake:  No decreased intake noted  Subcutaneous Fat Loss:  Orbital region severe depletion and Upper arm region mild to moderate depletion  Muscle Loss:  Temporal region moderate depletion, Clavicle bone region severe depletion, Dorsal hand region mild to moderate depletion and Patellar region moderate depletion  Fluid Retention:  None noted    Malnutrition Diagnosis: Severe malnutrition  In Context of:  Acute illness or injury    CURRENT NUTRITION DIAGNOSIS  Inadequate oral intake " related to SBO with formation of enterocutaneous fistula as evidenced by nausea and abdominal pain      INTERVENTIONS  Implementation  Continue same TPN/lipid regimen    Goals  Meet estimated nutrition needs-progressing  Wound healing-progressing  No significant dry weight loss    Monitoring/Evaluation  Progress toward goals will be monitored and evaluated per protocol.

## 2022-01-17 NOTE — PROGRESS NOTES
Surgery  Looks great  Doing well  Find a placement         Mekhi Richardson MD  General Surgery [phone]  Vascular Surgery [phone]

## 2022-01-17 NOTE — PROGRESS NOTES
CARY reached out to dietician to see who could provide the teaching on TPN, so pt could discharge with home care for ostomy and wound care. Left message on vocera for dietician assigned to pt.     CINDA Corona  1/17/2022  1:44 PM      Hospitalist requested TPN teaching, in hopes pt would be independent with TPN. CARY contacted dietary who stated this is not something they do. RN stated home infusion does all the teachings. Home infusion stated she would come and do more teachings once pt can care for his fistulas.  independently, as if he is unable to do that, the teaching for TPN would not be beneficial. Updated treatment team sticky note to reflect this information.    CINDA Corona  1/17/2022  1:56 PM

## 2022-01-17 NOTE — PLAN OF CARE
Problem: Infection (Surgery Nonspecified)  Goal: Absence of Infection Signs and Symptoms  Outcome: Improving     Problem: Pain (Surgery Nonspecified)  Goal: Acceptable Pain Control  Outcome: Improving  Intervention: Prevent or Manage Pain  Recent Flowsheet Documentation  Taken 1/17/2022 1083 by Ruth Hilliard, RN  Pain Management Interventions: medication (see MAR)    C/O abd pain in am gave percocet with good effect.  Pt said he could do the ostomy bags himself, change them and empty them at home.  Pt did not want to demonstrate it at this time he wanted to nap.  He said he will do it later.

## 2022-01-17 NOTE — PLAN OF CARE
Problem: Depression  Goal: Improved Mood  Outcome: No Change     Problem: Nausea and Vomiting  Goal: Fluid and Electrolyte Balance  Outcome: No Change    Pt. is alert and oriented. Lungs clear on RA. Bundled care and pt. slept most of the night. Ostomies patent. Getting TPN and pt. Is tolerating clear liquids. No c/o of nausea or pain during shift. Pt. asked to take [id number] meds a little later. Day nurse notified. Pt. waiting for TCU placement.

## 2022-01-17 NOTE — PLAN OF CARE
Problem: Nausea and Vomiting  Goal: Fluid and Electrolyte Balance  Outcome: Improving   No c/o nausea or vomiting this evening shift.  Problem: Anxiety  Goal: Anxiety Reduction or Resolution  Outcome: Declining   Patient expresses frustration with having been in the hospital so very long.  Is able to put his mask on and walk in the saleem to get out of his room. Tolerates this well.   Problem: Depression  Goal: Improved Mood  Outcome: Declining   Patient with quiet, down mood but is cooperative.

## 2022-01-18 LAB
ANION GAP SERPL CALCULATED.3IONS-SCNC: 7 MMOL/L (ref 5–18)
BUN SERPL-MCNC: 29 MG/DL (ref 8–22)
CALCIUM SERPL-MCNC: 8.5 MG/DL (ref 8.5–10.5)
CHLORIDE BLD-SCNC: 105 MMOL/L (ref 98–107)
CO2 SERPL-SCNC: 26 MMOL/L (ref 22–31)
CREAT SERPL-MCNC: 0.74 MG/DL (ref 0.7–1.3)
ERYTHROCYTE [DISTWIDTH] IN BLOOD BY AUTOMATED COUNT: 16.1 % (ref 10–15)
ERYTHROCYTE [DISTWIDTH] IN BLOOD BY AUTOMATED COUNT: 18.7 % (ref 10–15)
GFR SERPL CREATININE-BSD FRML MDRD: >90 ML/MIN/1.73M2
GLUCOSE BLD-MCNC: 86 MG/DL (ref 70–125)
GLUCOSE BLDC GLUCOMTR-MCNC: 100 MG/DL (ref 70–99)
HCT VFR BLD AUTO: 32.8 % (ref 40–53)
HCT VFR BLD AUTO: 34 % (ref 40–53)
HGB BLD-MCNC: 10.5 G/DL (ref 13.3–17.7)
HGB BLD-MCNC: 9.2 G/DL (ref 13.3–17.7)
MCH RBC QN AUTO: 28.6 PG (ref 26.5–33)
MCH RBC QN AUTO: 33.8 PG (ref 26.5–33)
MCHC RBC AUTO-ENTMCNC: 28 G/DL (ref 31.5–36.5)
MCHC RBC AUTO-ENTMCNC: 30.9 G/DL (ref 31.5–36.5)
MCV RBC AUTO: 121 FL (ref 78–100)
MCV RBC AUTO: 93 FL (ref 78–100)
PLATELET # BLD AUTO: 469 10E3/UL (ref 150–450)
PLATELET # BLD AUTO: 576 10E3/UL (ref 150–450)
POTASSIUM BLD-SCNC: 4.7 MMOL/L (ref 3.5–5)
RBC # BLD AUTO: 2.72 10E6/UL (ref 4.4–5.9)
RBC # BLD AUTO: 3.67 10E6/UL (ref 4.4–5.9)
SODIUM SERPL-SCNC: 138 MMOL/L (ref 136–145)
WBC # BLD AUTO: 7.8 10E3/UL (ref 4–11)
WBC # BLD AUTO: 9.8 10E3/UL (ref 4–11)

## 2022-01-18 PROCEDURE — 250N000013 HC RX MED GY IP 250 OP 250 PS 637: Performed by: INTERNAL MEDICINE

## 2022-01-18 PROCEDURE — 250N000011 HC RX IP 250 OP 636: Performed by: INTERNAL MEDICINE

## 2022-01-18 PROCEDURE — 99024 POSTOP FOLLOW-UP VISIT: CPT | Performed by: NURSE PRACTITIONER

## 2022-01-18 PROCEDURE — 250N000009 HC RX 250: Performed by: FAMILY MEDICINE

## 2022-01-18 PROCEDURE — 250N000013 HC RX MED GY IP 250 OP 250 PS 637: Performed by: STUDENT IN AN ORGANIZED HEALTH CARE EDUCATION/TRAINING PROGRAM

## 2022-01-18 PROCEDURE — 36415 COLL VENOUS BLD VENIPUNCTURE: CPT | Performed by: FAMILY MEDICINE

## 2022-01-18 PROCEDURE — 99231 SBSQ HOSP IP/OBS SF/LOW 25: CPT | Mod: GC | Performed by: STUDENT IN AN ORGANIZED HEALTH CARE EDUCATION/TRAINING PROGRAM

## 2022-01-18 PROCEDURE — 80048 BASIC METABOLIC PNL TOTAL CA: CPT | Performed by: STUDENT IN AN ORGANIZED HEALTH CARE EDUCATION/TRAINING PROGRAM

## 2022-01-18 PROCEDURE — 85027 COMPLETE CBC AUTOMATED: CPT | Performed by: FAMILY MEDICINE

## 2022-01-18 PROCEDURE — 250N000013 HC RX MED GY IP 250 OP 250 PS 637: Performed by: FAMILY MEDICINE

## 2022-01-18 PROCEDURE — 120N000001 HC R&B MED SURG/OB

## 2022-01-18 PROCEDURE — 85027 COMPLETE CBC AUTOMATED: CPT | Performed by: STUDENT IN AN ORGANIZED HEALTH CARE EDUCATION/TRAINING PROGRAM

## 2022-01-18 RX ADMIN — GABAPENTIN 900 MG: 300 CAPSULE ORAL at 22:34

## 2022-01-18 RX ADMIN — OXYCODONE HYDROCHLORIDE AND ACETAMINOPHEN 2 TABLET: 5; 325 TABLET ORAL at 08:43

## 2022-01-18 RX ADMIN — HYDROXYZINE HYDROCHLORIDE 25 MG: 25 TABLET, FILM COATED ORAL at 20:49

## 2022-01-18 RX ADMIN — ONDANSETRON 4 MG: 4 TABLET, ORALLY DISINTEGRATING ORAL at 16:36

## 2022-01-18 RX ADMIN — GABAPENTIN 200 MG: 100 CAPSULE ORAL at 16:36

## 2022-01-18 RX ADMIN — OXYCODONE HYDROCHLORIDE AND ACETAMINOPHEN 2 TABLET: 5; 325 TABLET ORAL at 16:36

## 2022-01-18 RX ADMIN — AMIODARONE HYDROCHLORIDE 200 MG: 200 TABLET ORAL at 20:49

## 2022-01-18 RX ADMIN — QUETIAPINE FUMARATE 200 MG: 100 TABLET, FILM COATED ORAL at 22:34

## 2022-01-18 RX ADMIN — GABAPENTIN 200 MG: 100 CAPSULE ORAL at 08:43

## 2022-01-18 RX ADMIN — APIXABAN 10 MG: 5 TABLET, FILM COATED ORAL at 08:42

## 2022-01-18 RX ADMIN — APIXABAN 5 MG: 5 TABLET, FILM COATED ORAL at 20:49

## 2022-01-18 RX ADMIN — MAGNESIUM SULFATE HEPTAHYDRATE: 500 INJECTION, SOLUTION INTRAMUSCULAR; INTRAVENOUS at 20:23

## 2022-01-18 RX ADMIN — AMIODARONE HYDROCHLORIDE 200 MG: 200 TABLET ORAL at 08:42

## 2022-01-18 RX ADMIN — PANTOPRAZOLE SODIUM 40 MG: 40 TABLET, DELAYED RELEASE ORAL at 08:42

## 2022-01-18 RX ADMIN — OXYCODONE HYDROCHLORIDE AND ACETAMINOPHEN 2 TABLET: 5; 325 TABLET ORAL at 22:34

## 2022-01-18 RX ADMIN — VENLAFAXINE HYDROCHLORIDE 75 MG: 75 CAPSULE, EXTENDED RELEASE ORAL at 08:42

## 2022-01-18 RX ADMIN — LEVOTHYROXINE SODIUM 25 MCG: 0.03 TABLET ORAL at 06:40

## 2022-01-18 ASSESSMENT — ACTIVITIES OF DAILY LIVING (ADL)
ADLS_ACUITY_SCORE: 15
ADLS_ACUITY_SCORE: 17
ADLS_ACUITY_SCORE: 9
ADLS_ACUITY_SCORE: 9
ADLS_ACUITY_SCORE: 17
ADLS_ACUITY_SCORE: 15
ADLS_ACUITY_SCORE: 17
ADLS_ACUITY_SCORE: 17
ADLS_ACUITY_SCORE: 15
ADLS_ACUITY_SCORE: 17
ADLS_ACUITY_SCORE: 15
ADLS_ACUITY_SCORE: 17
ADLS_ACUITY_SCORE: 15
ADLS_ACUITY_SCORE: 17
ADLS_ACUITY_SCORE: 15
ADLS_ACUITY_SCORE: 15

## 2022-01-18 ASSESSMENT — MIFFLIN-ST. JEOR: SCORE: 1489.32

## 2022-01-18 NOTE — PROGRESS NOTES
"CLINICAL NUTRITION SERVICES - REASSESSMENT NOTE     Nutrition Prescription    RECOMMENDATIONS FOR MDs/PROVIDERS TO ORDER:    Malnutrition Status:    Severe    Recommendations already ordered by Registered Dietitian (RD):  Continue cyclic TPN: D 300  @ 75 ml/hr x 1 hr, 150 ml/hr x 10 hrs  75 ml/hr x 1 hr and off.  250 ml of 20% lipids 3 times per week  Over 12 hrs    Future/Additional Recommendations:  Follow labs, weight, POC and adjust TPN as needed     EVALUATION OF THE PROGRESS TOWARD GOALS   Diet: Clear liquids  Nutrition Support: TPN as documented above: TPN provides 1754 Calories, 130 g protein, 300 g CHO, 21 g fat (averaged) and 1650 ml fluid/day  Intake: 660 mL yesterday     Some nausea with tpn last night per nursing note- given oral zofran    Per surgery note (1/8/22) Surgery is the only service that can advance the patient's diet and that will not happen for some time. (fistulas have to close down first)    ANTHROPOMETRICS  Height: 170.2 cm (5' 7\")  Admit weight: 173 lb (12/21/21), 166 lb (12/22/21)  Most Recent Weight: 75.6 kg 166 lb (1/18/22) , down 3 lbs since yesterday - will watch  76.9 kg (169 lb 8 oz) (1/17/22)  77.5 kg (170 lb 12.8 oz)  (1/14/22)    GI  Ostomy, fistula  Normoactive BS all quadrants  Ileostomy output last 24 hrs 450 mL  Colostomy output 185 mL    LABS  Reviewed  [facility] now 3 times weekly - no new today  Triglycerides 177 1/17/22  FSBG: no new    MEDICATIONS  Reviewed:  levothyroxine, pantoprazole    MALNUTRITION: (1/10/22)  % Weight Loss:  1-2% weight loss in 1 week (moderate malnutrition)  % Intake:  No decreased intake noted  Subcutaneous Fat Loss:  Orbital region severe depletion and Upper arm region mild to moderate depletion  Muscle Loss:  Temporal region moderate depletion, Clavicle bone region severe depletion, Dorsal hand region mild to moderate depletion and Patellar region moderate depletion  Fluid Retention:  None noted    Malnutrition Diagnosis: Severe " malnutrition  In Context of:  Acute illness or injury    CURRENT NUTRITION DIAGNOSIS  Inadequate oral intake related to SBO with formation of enterocutaneous fistula as evidenced by nausea and abdominal pain      INTERVENTIONS  Implementation  Continue same TPN/lipid regimen    Goals  Meet estimated nutrition needs-progressing  Wound healing-progressing  No significant dry weight loss    Monitoring/Evaluation  Progress toward goals will be monitored and evaluated per protocol.

## 2022-01-18 NOTE — PLAN OF CARE
Problem: Adult Inpatient Plan of Care  Goal: Plan of Care Review  Outcome: No Change  Goal: Patient-Specific Goal (Individualized)  Outcome: No Change  Goal: Absence of Hospital-Acquired Illness or Injury  Outcome: No Change  Intervention: Identify and Manage Fall Risk  Recent Flowsheet Documentation  Taken 1/18/2022 0000 by Kd Suero RN  Safety Promotion/Fall Prevention:   safety round/check completed   room organization consistent   room near nurse's station   nonskid shoes/slippers when out of bed   room door open   mobility aid in reach   lighting adjusted   increase visualization of patient   increased rounding and observation   fall prevention program maintained   clutter free environment maintained  Intervention: Prevent Skin Injury  Recent Flowsheet Documentation  Taken 1/18/2022 0000 by Kd Suero RN  Body Position: position changed independently  Intervention: Prevent Infection  Recent Flowsheet Documentation  Taken 1/18/2022 0000 by Kd Suero RN  Infection Prevention: equipment surfaces disinfected  Goal: Optimal Comfort and Wellbeing  Outcome: No Change  Intervention: Provide Person-Centered Care  Recent Flowsheet Documentation  Taken 1/18/2022 0000 by Kd Suero RN  Trust Relationship/Rapport: care explained  Goal: Readiness for Transition of Care  Outcome: No Change    Remained in bed for duration of NOC shift.  However, intermittently got up to commode for relieving himself.  Remains in TPN and Lipids throughout the shift.  Wgt at 166.6 lbs.  Down approximately 1 Kg from 24 hours ago.  Continue to monitor I/O's and daily wgts.

## 2022-01-18 NOTE — PROGRESS NOTES
Pt c/o nausea. He had dry cough and had small emesis. Offered zofran for nausea. Refused to take IV zofran and wanted oral, which was given. He requested some of his bedtime medication to be given with pain medication close to 2300.

## 2022-01-18 NOTE — PLAN OF CARE
Problem: Pain (Surgery Nonspecified)  Goal: Acceptable Pain Control  Outcome: Improving     Problem: Nausea and Vomiting  Goal: Fluid and Electrolyte Balance  Outcome: Improving     Problem: Adult Inpatient Plan of Care  Goal: Readiness for Transition of Care  Outcome: Improving     Lower ostomy pouch changed today. Pt was able to verbalize all the steps to perform the pouch change. Pt needs to perform this independently and have it documented. Remains on clear liquid diet and TPN/lipids. Passing flatus, LBM yesterday. Ambulating independently in room and halls. PRN percocet x 1 for abdominal pain.

## 2022-01-18 NOTE — PHARMACY-CONSULT NOTE
"Pharmacy Note: Parenteral Nutrition (PN) Management    Pharmacist consulted to dose PN for Gurdeep Dia, a 66 year old male by Dr. Mcmahon.    Subjective:    The patient was started on PN in the hospital on 12/2/21.    Indication for PN therapy: Continue until fistula heals    Inadequate nutrition existing for > 7 days.     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      Social History     Tobacco Use     Smoking status: Current Every Day Smoker     Smokeless tobacco: Never Used     Tobacco comment: declined cessation discussion and resource packet -1/14/22   Substance Use Topics     Alcohol use: Not Currently     Comment: Clean for 5 years     Drug use: Not Currently     Objective:    Ht Readings from Last 1 Encounters:   12/22/21 1.702 m (5' 7\")     Wt Readings from Last 1 Encounters:   01/18/22 75.6 kg (166 lb 9.6 oz)       Body mass index is 26.09 kg/m .    Patient Vitals for the past 96 hrs:   Weight   01/18/22 0600 75.6 kg (166 lb 9.6 oz)   01/17/22 0700 76.9 kg (169 lb 8 oz)   01/16/22 0642 76.9 kg (169 lb 8 oz)       Labs:  Last 3 days:  Recent Labs     01/16/22  0425 01/17/22  0623 01/18/22  0544 01/18/22  0755    136  --  138   POTASSIUM 4.6 4.5  --  4.7   CHLORIDE 104 103  --  105   CO2 24 26  --  26   BUN 27* 27*  --  29*   CR 0.74 0.79  --  0.74   VIJAYA 8.1* 8.5  --  8.5   MAG  --  2.1  --   --    PHOS  --  3.3  --   --    PREALB  --  18*  --   --    TRIG  --  177*  --   --    HGB 9.7* 10.5* 9.2* 10.5*   HCT 30.8* 33.1* 32.8* 34.0*   * 605* 469* 576*   INR  --  1.37*  --   --        Glucose (past 48 hours):   Recent Labs     01/17/22  0623 01/18/22  0755   * 86       Intake/Output (last 24 hours): I/O last 3 completed shifts:  In: 1101 [P.O.:660]  Out: 1285 [Urine:650; Drains:635]    Estimated CrCl: Estimated Creatinine Clearance: 103.6 mL/min (based on SCr of 0.74 mg/dL).    Assessment:    Continue patient on PN therapy as a continuous central therapy. "     Given the patient's current condition/oral intake, PN is still indicated.    Lab results reviewed: no changes    Plan:  1. Rate of PN: 75ml/hr x 1 hour, followed by 150ml/hr x 10 hours, followed by 75ml/hr x 1 hour  2. Formula:     Amino Acids 130 grams    Dextrose 300 grams    Sodium 130 mEq/day    Potassium 75 mEq/day    Calcium 8 mEq/day    Magnesium 16 mEq/day    Phosphorus 32 mMol/day    Chloride: Acetate Ratio 1:2    Standard Multivitamins w/Vitamin K    Trace Elements    3. Fat Emulsion: 20%, 250 mL IV 3 times weekly on Mon, Thurs, Sat  4. Check BMP labs three times a week, Mag and phos ordered for tomorrow.  5. Pharmacist will continue to follow the patient's lab results, clinical status and blood glucose results and make adjustments as appropriate.    Thank you for the consult.  Saritha Price RPH  1/18/2022 9:46 AM

## 2022-01-18 NOTE — PLAN OF CARE
Problem: Adult Inpatient Plan of Care  Goal: Plan of Care Review  Outcome: Improving     Problem: Impaired Wound Healing  Goal: Optimal Wound Healing  Outcome: Improving  Intervention: Promote Wound Healing  Recent Flowsheet Documentation  Taken 1/17/2022 1641 by Danny Rubio RN  Pain Management Interventions:    declines    emotional support     Pt alert and oriented times 4. Vitals stable. Pt c/o abdomen pain which he rated 3/10 but declined intervention. Both ostomy pouch was changed with help of writer. Pt understands how to do and verbalized step by step instructions to the writer. He did helped writer while changing it. He verbalized that he feels like he can do as long as he is standing in front of mirror so he can visualize the bottom fistula site. TPN and lipid running at this time. PICC dressing changed.

## 2022-01-18 NOTE — PROGRESS NOTES
[facility] Progress Note    Assessment/Plan  Active Problems:    Abdominal pain, generalized    Patient remains hospitalized due to placement, TPN.    Chema Dia is a 67 y/o M with past medical history of splenectomy, appendectomy, HTN, and substance abuse, with recent admission to hospital 11/30-12/21/2021 for SBO with perforation s/p surgical repair. Discharged home but presents as he could not manage at home and is now requesting to be placed in TCU vs LTC. Continues on TPN.  New RUL and RLL PE found and being treated, new HAP 1/9.    RUL and RLL PE  SMV thrombosis  New supplemental oxygen requirement  Acute RLL PE found on CT as well as SMV thrombosis.  Also has RUL PE.  No evidence of right heart strain.  Most likely provoked in the setting of recent surgery/hospitalization.  -Eliquis 10 mg twice daily for 7 days, then 5 mg twice daily for PE treatment.    Atrial flutter (resolved)  Developed this overnight 1/8-1/9 and required amiodarone and brief ICU stay for pressors.  Cardiology consulted.  Patient stabilized and sinus rhythm, back to the floor.  Pulled back PICC line 1 cm as cardiology suspects the tip may be in the right atrium and that may have caused his episode of atrial flutter.  -Cardiology signed off.  Continue oral amiodarone.  Plan to taper per cardiology: amiodarone 3 times a day for a week then go down to 2 times a day for 2 weeks and then daily 200 mg a day.  -Eliquis.    Nausea  RLQ pain  Malaise  HAP  Wound cultures positive for Candida parapsilosis and E. coli 12/22.  Patient has been having nausea with TPN but developed a new supplemental oxygen requirement 1/6 in addition to fatigue and general malaise and was found to have RLL PE and SMV thrombosis.  CT abdomen overall stable from prior.  Developed evidence of left-sided HAP overnight 1/9, re-consulted ID for antibiotic guidance, transitioned back to oral antibiotics and finished the course 1/14, ID signed  off.     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 Required subsequent washout on 12/3 with antibiotics and MARISA drain placement. On 12/8 found to have continued enhancing abscess with drain placed 12/9 and removed on 12/16.  Was discharged, but returned soon thereafter on 12/22 as he is unable to care for himself even assistance from family so is requesting LTC/TCU placement.  Now receiving TPN and has ongoing abdominal pain, nausea, and evidence of possible dehiscence in one of his intra-abdominal surgical sites.  -PT/OT.  Searching for TCU/LTC placement.  -Consulted surgery to follow while here.  Clear liquid diet.  Surgery is the only service that can advance this and will not do so for some time.  No new procedure indicated at this time.   -Consulted pharmacy and RD for TPN management - transitioned to cyclic TPN 12/30.  We will work on TPN teaching for at home.  -[facility] teaching so he can care for his fistulas at home.  -Finished antibiotics 1/14.  Finished course of fluconazole as well.     Alk phos elevation, resolved  Alk phos elevated, now back to normal.  Rest of LFTs okay.  Does have sludge in gallbladder but no evidence of cholecystitis on imaging.  -Cyclic TPN.  Additional daily lab checks outside of regularly scheduled TPN checks not indicated at this time unless he develops new symptoms.     Depression  Insomnia  Hx of this. Reports symptoms of low motivation, little interest in doing things, poor sleep, no appetite. Is on buspar, mirtazapine, and effexor at home, though doesn't really think medicines are as helpful as talk therapy is. Wanted to speak to social work and . States no plan to harm himself. Reports he just needs to leave the hospital.  -Consulted psych for med  management and plan for talk therapy while hospitalized.  -Discontinued buspar and mirtazapine per psych.  -Increased venlafaxine and gabapentin per psych.  -Seroquel for sleep.  Vistaril at bedtime.     Moderate malnutrition  Per RD eval.  -TPN per RD and pharmacy.     Normocytic anemia  Stable at 11.5, MCV of 93.     Chronic Conditions:  Hypothyroidism- PTA levothyroxine.  Chronic pain- Hold mexolicam 7.5mg daily, resume PTA gabapentin.  BPH-hold home Flomax given he states it does not help.    FEN: TPN.  Clear liquid diet as tolerated.  DVT Prophylaxis: Treatment dose Eliquis.  Code: Full code.    Disposition/Advanced Care Planning  Discharge Planning discussed with patient and care team.  Anticipated discharge date: Multiple days.  Pending placement.  Disposition: LTC.    Subjective  Pt feeling good this morning because he feels like he is getting closer to discharge.  He feels confident that he can take care of his ostomy bags on his own and would like to learn how to do his TPN.  He will work on finding a place to go with his .    Objective    Vital signs in last 24 hours Temp:  [98.1  F (36.7  C)-98.6  F (37  C)] 98.4  F (36.9  C)  Pulse:  [85-98] 89  Resp:  [18-20] 18  BP: (103-126)/(73-87) 107/73  SpO2:  [90 %-91 %] 90 %   Weight: [unfilled]    Intake/Output last 3 shift I/O last 3 completed shifts:  In: 1101 [P.O.:660]  Out: 1285 [Urine:650; Drains:635]    Intake/Output this shift:I/O this shift:  In: 240 [P.O.:240]  Out: 100 [Drains:100]    Physical Exam  General appearance: alert, appears stated age, cooperative and no distress.  Head: Normocephalic, without obvious abnormality, atraumatic.  Eyes: conjunctivae/corneas clear.  Throat: MMM.  Neck: supple, symmetrical, trachea midline.  Lungs: no increased respiratory effort.  Heart: Good capillary refill.  Abdomen: non-distended, ostomy bags in place.  Extremities: extremities normal, atraumatic, no cyanosis or edema.  Skin: Skin color,  texture, turgor normal. No rashes or lesions.  Neurologic: Alert and oriented x3, normal strength and tone.  Psychiatric: mood and affect appropriate.    Pertinent Labs and Pertinent Radiology   Lab Results: personally reviewed.     Radiology Results: Personally reviewed image/s and impression/s    Precepted patient with Dr. Lucas Aguilera.    Johnathan Lees MD  [facility] [place], PGY-3

## 2022-01-18 NOTE — PROGRESS NOTES
Spoke to Luz Maria Steiner via teams to discuss patients discharge plan. Says once patient is independent with wound cares, she will come and attempt TPN teaching again. Says patient would need to demonstrate complete independence with TPN in order for them to accept patient back, since he doesn't have a care giver to assist and last time did not go well.     Page sent to [facility] nurse and message left in [facility] office requesting call back to discuss patients wound care. Awaiting call back     Spoke to Dr Lees. Says patients cognitive status is much improved and feels patient would be able to learn . Says focus is much better and patient agrees.     Message left for [facility] in [place] [phone] to see if they would still have an opening for patient. Also need to know if they have a fridge where TPN could be stored and if they would allow needles and syringes. Awaiting call back.     Vocera message left for bedside nurse Celine requesting call back to discuss patients ability to manage his wound care. Awaiting call back    12:04 PM  Spoke to Alea [facility] nurse. Says Celine [facility] nurse has been working with patient. she is not in today but will be here tomorrow. She will leave a message for Celine to call CM tomorrow      12:45 PM  Spoke to bedside nurse Celine. Says she changed pouches today but patient walked her through it. Asked Celine if going forward patient could be expected to physically demonstrate that he is able to do this. Celine said she will pass along.     2:06 PM  Spoke to Marilee at [facility]. Verified they have accepted patient. Due to staffing, they are not doing any more admissions until next week. Says they do have a fridge patient can use and he is allowed to have syringe and needles there. Says they have a community phone that the residents can use to make calls, but its difficult to receive calls. CM will talk to patient about getting a phone.     AZALIA benson met with patient and he verified his goal is to go to  [facility] upon discharge.     2:32 PM  AZALIA benson met with patient again to discuss phone. Patient says he wants to get a phone but hasn't been able to get one because he has been here. Says he has the money for a phone. Says he will ask his brother to get one for him. Understands he needs a phone so home care can contact him     2:39 PM  Received a message from Luz Maria Steiner with [facility]:    \"[2:37 PM] Luz Maria Steiner  I spoke with Errol. She said there is only one mini fridge in the building and it s in their office. It has several patient s meds (that need refrigeration) in there and they come down for their meds. They can t supply a fridge for one patient- she said there wouldn t be room for his bags of tpn. I asked if a patient could put a fridge In their room and she said no\"    3:04 PM  Patient updated on above. He signed LIZZETH to talk to  Shelia.      called Shelia  [phone]. Says there arent many options for men. Suggested calling the following places:    [facility]: [phone]  [facility]: [phone] (says this is a sober house but sometimes they take people who are just out of group home)  [facility]: [phone] (this is a work program so they likely wont accept if patient is unable to work)      "

## 2022-01-18 NOTE — PROGRESS NOTES
ASSESSMENT:  1. Abdominal pain, generalized        Gurdeep Dia is a 66 year old male status post explore lap lysis of adhesions with resultant 3 days later perforation reoperation oversew of defect and then 5 days later perforation again and at this time  he has fistulas above and below to his incision that are being managed with ostomy bag containment and TPN diet (clear liquids ok).  Fistulas continue to be controlled with ostomy bags in place.  He is beginning to heal down but is still not ready to increase his diet from a surgical standpoint    PLAN:  Clear liquid diet  No surgical reason for patient to be in the hospital at this time; aggressive efforts should be made to find patient placement  Monitor wounds and skin integrity; appreciate WOC  We will continue to follow    SUBJECTIVE:   He is doing okay.  He had some nausea with small emesis last night.  He is adamant that he has stopped days clear liquids and did not attempt anything solid he does feel little more bloated today but continues to have bowel movements and pass flatus.  He also continues to have copious amounts of gas out his proximal fistula opening.  No fever, chills, shortness of breath or chest pain.      Patient Vitals for the past 24 hrs:   BP Temp Temp src Pulse Resp SpO2 Weight   01/18/22 0805 107/73 98.4  F (36.9  C) Oral 89 18 90 % --   01/18/22 0600 -- -- -- -- -- -- 75.6 kg (166 lb 9.6 oz)   01/17/22 2317 126/87 98.1  F (36.7  C) Oral 98 20 90 % --   01/17/22 1632 103/80 98.6  F (37  C) Oral 85 19 91 % --         PHYSICAL EXAM:  GEN: No acute distress, comfortable  LUNGS: CTA bilaterally  CV:RRR  ABD: Soft, mild distention, midline incision with ostomy bags in place without signs of leakage there is some leakage from his old G-tube site today but this is minimal.  Some of this output did get underneath the fold and cause some redness near the lower ostomy bag  EXT:No cyanosis, edema or obvious abnormalities    01/17 0700 - 01/18  0659  In: 1101 [P.O.:660]  Out: 1285 [Urine:650; Drains:635]    No results displayed because visit has over 200 results.             THOMAS Givens CNP

## 2022-01-19 LAB
GLUCOSE BLDC GLUCOMTR-MCNC: 119 MG/DL (ref 70–99)
GLUCOSE BLDC GLUCOMTR-MCNC: 128 MG/DL (ref 70–99)
GLUCOSE BLDC GLUCOMTR-MCNC: 137 MG/DL (ref 70–99)
MAGNESIUM SERPL-MCNC: 2.1 MG/DL (ref 1.8–2.6)
PHOSPHATE SERPL-MCNC: 3.4 MG/DL (ref 2.5–4.5)

## 2022-01-19 PROCEDURE — 250N000013 HC RX MED GY IP 250 OP 250 PS 637: Performed by: FAMILY MEDICINE

## 2022-01-19 PROCEDURE — 120N000001 HC R&B MED SURG/OB

## 2022-01-19 PROCEDURE — 250N000013 HC RX MED GY IP 250 OP 250 PS 637: Performed by: STUDENT IN AN ORGANIZED HEALTH CARE EDUCATION/TRAINING PROGRAM

## 2022-01-19 PROCEDURE — 99024 POSTOP FOLLOW-UP VISIT: CPT | Performed by: SPECIALIST

## 2022-01-19 PROCEDURE — 84100 ASSAY OF PHOSPHORUS: CPT | Performed by: INTERNAL MEDICINE

## 2022-01-19 PROCEDURE — 250N000011 HC RX IP 250 OP 636: Performed by: INTERNAL MEDICINE

## 2022-01-19 PROCEDURE — 250N000009 HC RX 250: Performed by: FAMILY MEDICINE

## 2022-01-19 PROCEDURE — 83735 ASSAY OF MAGNESIUM: CPT | Performed by: INTERNAL MEDICINE

## 2022-01-19 PROCEDURE — 99231 SBSQ HOSP IP/OBS SF/LOW 25: CPT | Mod: GC | Performed by: STUDENT IN AN ORGANIZED HEALTH CARE EDUCATION/TRAINING PROGRAM

## 2022-01-19 PROCEDURE — 250N000013 HC RX MED GY IP 250 OP 250 PS 637: Performed by: INTERNAL MEDICINE

## 2022-01-19 PROCEDURE — G0463 HOSPITAL OUTPT CLINIC VISIT: HCPCS

## 2022-01-19 RX ORDER — OXYCODONE AND ACETAMINOPHEN 5; 325 MG/1; MG/1
1 TABLET ORAL EVERY 6 HOURS PRN
Status: DISCONTINUED | OUTPATIENT
Start: 2022-01-19 | End: 2022-02-14 | Stop reason: HOSPADM

## 2022-01-19 RX ADMIN — HYDROXYZINE HYDROCHLORIDE 25 MG: 25 TABLET, FILM COATED ORAL at 20:53

## 2022-01-19 RX ADMIN — LEVOTHYROXINE SODIUM 25 MCG: 0.03 TABLET ORAL at 05:58

## 2022-01-19 RX ADMIN — GABAPENTIN 200 MG: 100 CAPSULE ORAL at 08:22

## 2022-01-19 RX ADMIN — QUETIAPINE FUMARATE 200 MG: 100 TABLET, FILM COATED ORAL at 21:09

## 2022-01-19 RX ADMIN — VENLAFAXINE HYDROCHLORIDE 75 MG: 75 CAPSULE, EXTENDED RELEASE ORAL at 08:22

## 2022-01-19 RX ADMIN — MAGNESIUM SULFATE HEPTAHYDRATE: 500 INJECTION, SOLUTION INTRAMUSCULAR; INTRAVENOUS at 20:52

## 2022-01-19 RX ADMIN — ONDANSETRON 4 MG: 4 TABLET, ORALLY DISINTEGRATING ORAL at 07:32

## 2022-01-19 RX ADMIN — APIXABAN 5 MG: 5 TABLET, FILM COATED ORAL at 08:22

## 2022-01-19 RX ADMIN — AMIODARONE HYDROCHLORIDE 200 MG: 200 TABLET ORAL at 21:09

## 2022-01-19 RX ADMIN — OXYCODONE HYDROCHLORIDE AND ACETAMINOPHEN 1 TABLET: 5; 325 TABLET ORAL at 23:09

## 2022-01-19 RX ADMIN — GABAPENTIN 900 MG: 300 CAPSULE ORAL at 20:53

## 2022-01-19 RX ADMIN — PANTOPRAZOLE SODIUM 40 MG: 40 TABLET, DELAYED RELEASE ORAL at 07:32

## 2022-01-19 RX ADMIN — AMIODARONE HYDROCHLORIDE 200 MG: 200 TABLET ORAL at 08:22

## 2022-01-19 RX ADMIN — ONDANSETRON 4 MG: 2 INJECTION INTRAMUSCULAR; INTRAVENOUS at 20:53

## 2022-01-19 RX ADMIN — APIXABAN 5 MG: 5 TABLET, FILM COATED ORAL at 20:53

## 2022-01-19 ASSESSMENT — ACTIVITIES OF DAILY LIVING (ADL)
ADLS_ACUITY_SCORE: 15

## 2022-01-19 NOTE — PROGRESS NOTES
Surgery    Doing great  Lower fistula no output  Only small bowel sucus and gas from upper    continue tpn and cares.        Mekhi Richardson MD  General Surgery [phone]  Vascular Surgery [phone]

## 2022-01-19 NOTE — PROGRESS NOTES
[facility] Progress Note    Assessment/Plan  Active Problems:    Abdominal pain, generalized    Patient remains hospitalized due to placement, TPN.    Chema Dia is a 65 y/o M with past medical history of splenectomy, appendectomy, HTN, and substance abuse, with recent admission to hospital 11/30-12/21/2021 for SBO with perforation s/p surgical repair. Discharged home but presents as he could not manage at home and is now requesting to be placed in TCU vs LTC. Continues on TPN.  New RUL and RLL PE found and being treated, new HAP 1/9.    RUL and RLL PE  SMV thrombosis  New supplemental oxygen requirement  Acute RLL PE found on CT as well as SMV thrombosis.  Also has RUL PE.  No evidence of right heart strain.  Most likely provoked in the setting of recent surgery/hospitalization.  -Eliquis 5 mg twice daily for PE treatment.    Atrial flutter (resolved)  Developed this overnight 1/8-1/9 and required amiodarone and brief ICU stay for pressors.  Cardiology consulted.  Patient stabilized and sinus rhythm, back to the floor.  Pulled back PICC line 1 cm as cardiology suspects the tip may be in the right atrium and that may have caused his episode of atrial flutter.  No repeat episodes since that time.  -Cardiology signed off.  Continue oral amiodarone.  Plan to taper per cardiology: amiodarone 3 times a day for a week then go down to 2 times a day for 2 weeks and then daily 200 mg a day.  -Eliquis as above.    Nausea  RLQ pain  Malaise  HAP  Wound cultures positive for Candida parapsilosis and E. coli 12/22.  Patient has been having nausea with TPN but developed a new supplemental oxygen requirement 1/6 in addition to fatigue and general malaise and was found to have RLL PE and SMV thrombosis.  CT abdomen overall stable from prior.  Developed evidence of left-sided HAP overnight 1/9, re-consulted ID for antibiotic guidance, transitioned back to oral antibiotics and finished the course 1/14, ID signed  off.     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 Required subsequent washout on 12/3 with antibiotics and MARISA drain placement. On 12/8 found to have continued enhancing abscess with drain placed 12/9 and removed on 12/16.  Was discharged, but returned soon thereafter on 12/22 as he is unable to care for himself even assistance from family so is requesting LTC/TCU placement.  Now receiving TPN and has ongoing abdominal pain, nausea, and evidence of possible dehiscence in one of his intra-abdominal surgical sites.  -PT/OT.  Searching for TCU/LTC placement.  -Consulted surgery to follow while here.  Clear liquid diet.  Surgery is the only service that can advance this and will not do so for some time.  No new procedure indicated at this time.   -Consulted pharmacy and RD for TPN management - transitioned to cyclic TPN 12/30.  We will work on TPN teaching for at home.  -[facility] teaching so he can care for his fistulas at home.  -Finished antibiotics 1/14.  Finished course of fluconazole as well.     Alk phos elevation, resolved  Alk phos elevated, now back to normal.  Rest of LFTs okay.  Does have sludge in gallbladder but no evidence of cholecystitis on imaging.  -Cyclic TPN.  Additional daily lab checks outside of regularly scheduled TPN checks not indicated at this time unless he develops new symptoms.     Depression  Insomnia  Hx of this. Reports symptoms of low motivation, little interest in doing things, poor sleep, no appetite. Is on buspar, mirtazapine, and effexor at home, though doesn't really think medicines are as helpful as talk therapy is. Wanted to speak to social work and . States no plan to harm himself. Reports he just needs to leave the hospital.  -Consulted psych for med  management and plan for talk therapy while hospitalized.  -Discontinued buspar and mirtazapine per psych.  -Increased venlafaxine and gabapentin per psych.  -Seroquel for sleep.  Vistaril at bedtime.     Moderate malnutrition  Per RD eval.  -TPN per RD and pharmacy.     Normocytic anemia  Stable at 11.5, MCV of 93.     Chronic Conditions:  Hypothyroidism- PTA levothyroxine.  Chronic pain- Hold mexolicam 7.5mg daily, resume PTA gabapentin.  BPH-hold home Flomax given he states it does not help.    FEN: TPN.  Clear liquid diet as tolerated.  DVT Prophylaxis: Treatment dose Eliquis.  Code: Full code.    Disposition/Advanced Care Planning  Discharge Planning discussed with patient and care team.  Anticipated discharge date: Multiple days.  Pending placement.  Disposition: LTC.    Subjective  Pt feeling ok.  Trying to learn how to do his bag changes.    Objective    Vital signs in last 24 hours Temp:  [97.9  F (36.6  C)-98.8  F (37.1  C)] 97.9  F (36.6  C)  Pulse:  [] 86  Resp:  [16-20] 16  BP: (101-122)/(75-88) 111/81  SpO2:  [90 %-100 %] 90 %   Weight: [unfilled]    Intake/Output last 3 shift I/O last 3 completed shifts:  In: 480 [P.O.:480]  Out: 690 [Drains:690]    Intake/Output this shift:I/O this shift:  In: 1451   Out: -     Physical Exam  General appearance: alert, appears stated age, cooperative and no distress.  Head: Normocephalic, without obvious abnormality, atraumatic.  Eyes: conjunctivae/corneas clear.  Throat: MMM.  Neck: supple, symmetrical, trachea midline.  Lungs: no increased respiratory effort.  Heart: Good capillary refill.  Abdomen: non-distended, ostomy bags in place.  Extremities: extremities normal, atraumatic, no cyanosis or edema.  Skin: Skin color, texture, turgor normal. No rashes or lesions.  Neurologic: Alert and oriented x3, normal strength and tone.  Psychiatric: mood and affect appropriate.    Pertinent Labs and Pertinent Radiology   Lab Results: personally reviewed.     Radiology  Results: Personally reviewed image/s and impression/s    Precepted patient with Dr. Lucas Aguilera.    Johnathan Lees MD  [facility] [place] [facility], PGY-3

## 2022-01-19 NOTE — PHARMACY-CONSULT NOTE
"Pharmacy Note: Parenteral Nutrition (PN) Management    Pharmacist consulted to dose PN for Gurdeep Dia, a 66 year old male by Dr. Mcmahon.    Subjective:    The patient is a new PN start.    The patient was started on PN in the hospital on 12/2/21.    Indication for PN therapy: Continue until fistula heals     Inadequate nutrition existing for > 7 days.     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      Social History     Tobacco Use     Smoking status: Current Every Day Smoker     Smokeless tobacco: Never Used     Tobacco comment: declined cessation discussion and resource packet -1/14/22   Substance Use Topics     Alcohol use: Not Currently     Comment: Clean for 5 years     Drug use: Not Currently     Objective:    Ht Readings from Last 1 Encounters:   12/22/21 1.702 m (5' 7\")     Wt Readings from Last 1 Encounters:   01/18/22 75.6 kg (166 lb 9.6 oz)       Body mass index is 26.09 kg/m .    Patient Vitals for the past 96 hrs:   Weight   01/18/22 0600 75.6 kg (166 lb 9.6 oz)   01/17/22 0700 76.9 kg (169 lb 8 oz)   01/16/22 0642 76.9 kg (169 lb 8 oz)       Labs:  Last 3 days:  Recent Labs     01/17/22  0623 01/18/22  0544 01/18/22  0755 01/19/22  0637     --  138  --    POTASSIUM 4.5  --  4.7  --    CHLORIDE 103  --  105  --    CO2 26  --  26  --    BUN 27*  --  29*  --    CR 0.79  --  0.74  --    VIJAYA 8.5  --  8.5  --    MAG 2.1  --   --  2.1   PHOS 3.3  --   --  3.4   PREALB 18*  --   --   --    TRIG 177*  --   --   --    HGB 10.5* 9.2* 10.5*  --    HCT 33.1* 32.8* 34.0*  --    * 469* 576*  --    INR 1.37*  --   --   --        Glucose (past 48 hours):   Recent Labs     01/18/22  0755 01/18/22  1835 01/18/22  2357 01/19/22  0557   GLC 86 100* 119* 128*       Intake/Output (last 24 hours): I/O last 3 completed shifts:  In: 480 [P.O.:480]  Out: 690 [Drains:690]    Estimated CrCl: Estimated Creatinine Clearance: 103.6 mL/min (based on SCr of 0.74 " mg/dL).    Assessment:    Continue patient on PN therapy as a cyclic central therapy.     Given the patient's current condition/oral intake, PN is still indicated.    Lab results reviewed: no changes     Plan:    1. Rate of PN: 75ml/hr x 1 hour, followed by 150 ml/hr x 10 hours, followed by 75 ml/hr x 1 hour  2. Formula:     Amino Acids 130 grams    Dextrose 300 grams    Sodium 130 mEq/day    Potassium 75 mEq/day    Calcium 8 mEq/day    Magnesium 16 mEq/day    Phosphorus 32 mMol/day    Chloride: Acetate Ratio 1:2    Standard Multivitamins w/Vitamin K    Trace Elements  3. Fat Emulsion: 20%, 250 mL IV 3 times weekly on Mon, Thurs, Sat  4. Check BMP, P, Mg labs three times weekly.  5. Pharmacist will continue to follow the patient's lab results, clinical status and blood glucose results and make adjustments as appropriate.    Thank you for the consult.  Hansel Ryan [facility]  1/19/2022 10:19 AM

## 2022-01-19 NOTE — PROGRESS NOTES
Spoke to Celine ANTOINO nurse. Says she will be seeing patient today. Requested she update me after she sees patient to update how teaching went

## 2022-01-19 NOTE — PLAN OF CARE
Problem: Pain (Surgery Nonspecified)  Goal: Acceptable Pain Control  Outcome: No Change  Intervention: Prevent or Manage Pain  Recent Flowsheet Documentation  Taken 1/18/2022 1636 by Su Fuentes RN  Pain Management Interventions: medication (see MAR)     Problem: Nausea and Vomiting  Goal: Fluid and Electrolyte Balance  Outcome: No Change  Intervention: Prevent and Manage Nausea and Vomiting  Recent Flowsheet Documentation  Taken 1/18/2022 1636 by Su Fuentes RN  Nausea/Vomiting Interventions: antiemetic     Problem: Hypertension Acute  Goal: Blood Pressure Within Desired Range  Outcome: Improving   Pt is alert and oriented x 4, c/o abdominal pain, Percocet 2 tabs given with some relief. Pt also c/o nausea, Zofran 4 mg po which pt preferred given with relief. He is independent, he verbalized he will drain the pouches tonight and will change them tomorrow. Lung sounds clear, on RA, non tele, HR in the 90's. Will continue to monitor.

## 2022-01-19 NOTE — PROGRESS NOTES
NUTRITION BRIEF NOTE :    Pharmacy/Nutrition to start and manage TPN  See RD note 1/18/22 for full reassessment       RECOMMENDATIONS FOR MDs/PROVIDERS TO ORDER:      Recommendations already ordered by Registered Dietitian (RD):      Continue cyclic TPNregimen as ordered     New findings in last 24 hours:    Diet order: clear liquid    TPN:CENTRAL LINE IV, at  mL/hr, Administer over 12 Hours, CYCLE, Starting on Wed 1/19/22 at 2000, For 24 hours  Infused volume on cycled TPN may differ slightly from ordered volume due to rounding in calculations. Start rate at 75 mL/hr for 1 hours.  Increase rate to 150 mL/hr for 10 hours.  Decrease rate to 75 mL/hr for 1 hours, then stop.D 300  ,250 ml of 20% lipids 3 times per week  TPN provides 1754 Calories, 130 g protein, 300 g CHO, 21 g fat (averaged) and 1650 ml fluid/day    I/O last 3 completed shifts:    In: 480 [P.O.:480]    Out: 690 [Drains:690]    Labs: reviewed including    Electrolytes  Potassium (mmol/L)   Date Value   01/18/2022 4.7   01/17/2022 4.5   01/16/2022 4.6   01/22/2020 3.8     Phosphorus (mg/dL)   Date Value   01/19/2022 3.4   01/17/2022 3.3   01/15/2022 3.8   01/12/2022 3.5   01/11/2022 3.3        Blood Glucose  Glucose (mg/dL)   Date Value   01/18/2022 86   01/17/2022 133 (H)   01/16/2022 125   01/15/2022 130 (H)   01/13/2022 136 (H)   01/22/2020 100 (H)     GLUCOSE BY METER POCT (mg/dL)   Date Value   01/19/2022 128 (H)   01/18/2022 119 (H)   01/18/2022 100 (H)   01/15/2022 92   01/15/2022 150 (H)     Hemoglobin A1C (%)   Date Value   12/05/2021 5.8 (H)        Inflammatory Markers  CRP (mg/dL)   Date Value   01/09/2022 20.4 (H)   12/30/2021 1.8 (H)   12/07/2021 26.6 (H)     WBC (10e9/L)   Date Value   01/22/2020 7.1     WBC Count (10e3/uL)   Date Value   01/18/2022 9.8   01/18/2022 7.8   01/17/2022 10.3     Albumin (g/dL)   Date Value   01/09/2022 1.6 (L)   01/08/2022 2.0 (L)   01/06/2022 2.1 (L)        Magnesium (mg/dL)   Date Value  "  01/19/2022 2.1   01/17/2022 2.1   01/15/2022 2.1     Sodium (mmol/L)   Date Value   01/18/2022 138   01/17/2022 136   01/16/2022 136   01/22/2020 140        Renal  Urea Nitrogen (mg/dL)   Date Value   01/18/2022 29 (H)   01/17/2022 27 (H)   01/16/2022 27 (H)   01/22/2020 14     Creatinine (mg/dL)   Date Value   01/18/2022 0.74   01/17/2022 0.79   01/16/2022 0.74   01/22/2020 0.94         Additional  Triglycerides (mg/dL)   Date Value   01/17/2022 177 (H)   01/10/2022 106   01/03/2022 255 (H)   01/22/2020 73     Ketones Urine (mg/dL)   Date Value   01/06/2022 Negative            amiodarone  200 mg Oral BID    Followed by     [START ON 1/30/2022] amiodarone  200 mg Oral Daily     apixaban ANTICOAGULANT  5 mg Oral BID     gabapentin  200 mg Oral BID     gabapentin  900 mg Oral At Bedtime     hydrOXYzine  25 mg Oral At Bedtime     levothyroxine  25 mcg Oral Daily     lipids  250 mL Intravenous Once per day on Mon Thu Sat     pantoprazole  40 mg Oral QAM AC     QUEtiapine  200 mg Oral At Bedtime     sodium chloride (PF)  3 mL Intracatheter Q8H     venlafaxine  75 mg Oral Daily          dextrose       parenteral nutrition - ADULT compounded formula CYCLE       parenteral nutrition - ADULT compounded formula CYCLE 75 mL/hr at 01/19/22 0733            ANTHROPOMETRICS  Height: 5' 7\"  Weight: 75 kg   Body mass index is 26.09 kg/m .  Weight Status:  Overweight BMI 25-29.9    Weight History:   Wt Readings from Last 10 Encounters:   01/18/22 75.6 kg (166 lb 9.6 oz)   12/17/21 76.8 kg (169 lb 6.4 oz)   01/22/20 79.4 kg (175 lb)   12/24/19 81.2 kg (179 lb)     Last wt was down 3# from 1/17/22    Interventions:    Continue  TPN  orders for regimen outlined above  Collaboration and Referral of care: orders sent to pharmacy    "

## 2022-01-19 NOTE — PLAN OF CARE
VSS. Percocet for pain.  Colostomy pouches for drains. 50cc green liquid for each so far this shift. Incision wnl. Double lumen picc. TPN infusing @ 150cc. Rate to be turned to 75cc for 1hr at 0723.   Problem: Adult Inpatient Plan of Care  Goal: Plan of Care Review  Outcome: Improving  Goal: Patient-Specific Goal (Individualized)  Outcome: Improving  Goal: Absence of Hospital-Acquired Illness or Injury  Outcome: Improving  Goal: Optimal Comfort and Wellbeing  Outcome: Improving  Goal: Readiness for Transition of Care  Outcome: Improving     Problem: Impaired Wound Healing  Goal: Optimal Wound Healing  Outcome: Improving     Problem: Infection (Surgery Nonspecified)  Goal: Absence of Infection Signs and Symptoms  Outcome: Improving     Problem: Pain (Surgery Nonspecified)  Goal: Acceptable Pain Control  Outcome: Improving     Problem: Anxiety  Goal: Anxiety Reduction or Resolution  Outcome: Improving     Problem: Depression  Goal: Improved Mood  Outcome: Improving     Problem: Nausea and Vomiting  Goal: Fluid and Electrolyte Balance  Outcome: Improving     Problem: Hypertension Acute  Goal: Blood Pressure Within Desired Range  Outcome: Improving     Problem: Risk for Delirium  Goal: Optimal Coping  Outcome: Improving  Goal: Improved Behavioral Control  Outcome: Improving  Goal: Improved Attention and Thought Clarity  Outcome: Improving  Goal: Improved Sleep  Outcome: Improving     Problem: Dysrhythmia  Goal: Normalized Cardiac Rhythm  Outcome: Improving

## 2022-01-19 NOTE — PLAN OF CARE
Problem: Adult Inpatient Plan of Care  Goal: Plan of Care Review  Outcome: Improving     Problem: Impaired Wound Healing  Goal: Optimal Wound Healing  Outcome: Improving     Problem: Anxiety  Goal: Anxiety Reduction or Resolution  Outcome: Improving     Problem: Risk for Delirium  Goal: Optimal Coping  Outcome: Improving    1900.... Pt was transferred from P3, alert, oriented x4 and independent. He has 2 fistulas above and below abdominal incision and are being managed with ostomy bags, pt knows how to care for the bags. On cyclic TPN, at 2023, it was started at 75 ml/hr x1, currently it is infusing at 150ml/hr and is to continue to 10 hours. Will continue to monitor.

## 2022-01-19 NOTE — PROGRESS NOTES
"[facility] stoma assessment EC fistula       Allergies:  Penicillins    Height:  Height: 170.2 cm (5' 7\")    Weight:   Weight: 75.5 kg (166 lb 6.4 oz)    Past Medical History:  Past Medical History:   Diagnosis Date     Benign prostatic hyperplasia with weak urinary stream      Chronic bilateral low back pain without sciatica      Chronic neck pain      RAVI (generalized anxiety disorder)      History of hepatitis C     treated and cured     History of substance abuse (H)      Hypertension goal BP (blood pressure) < 140/90      Moderate major depression (H)        Labs:  Recent Labs   Lab Test 12/23/21  0715 12/22/21  1648 12/22/21  0003 12/08/21  0535 12/07/21  0358   CRP  --   --   --   --  26.6*   HGB 10.9*  --  12.0*   < > 10.9*   ALBUMIN  --   --  2.4*   < > 2.3*   CULTURE  --  No growth after 12 hours  No growth after 12 hours  --    < >  --     < > = values in this interval not displayed.       Mars:  Mars Score: 17    INTAKE  EC fistula s/p 2 abdominal surgeries      ASSESSMENT  Abdominal EC fistula    Fistula sites appear to be healing nicely    Upper portion of incision possibly has fistulous tract - measures 0.3x0.3cm. Brown output. This now has the majority of the drainage.     Distal portion of incision is open wound. Wound now measures approx 0.5x0.5cm. small purulent/mucousy type drainage (emptied approx 10mL from pouch)    Fistulas lay within a deep crease along the midline incision. Scar tissue and minor erythema present to skin from patient pulling at pouches for removal.          Pouch change:  [facility] made 2 visits to patient today. He turned writer away at first attempt. Attempted to turn writer away at the second attempt, then asked writer to come back after 15 min, finally agreed to do pouch changes. Was able to independently empty both pouches. Patient needed re-direction approx every 2-3 minutes as he would be working on one of the steps in the pouch changing process, then would start talking about " "random topics not related to pouch change. Would  front of the mirror, sit on on the bed, lay on the bed, and back and forth throughout. When writer would attempt to help when he asked, he would then scold writer and demand writer get out of the way. Patient then demanded that writer get the barrier ring warmed up by \"just put it down your bra to get it nice and warm.\" Writer told patient that was inappropriate and showed patient easier way to warm up barrier ring on the back of the hand. Patient very manipulative throughout. He did eventually get both pouches changed. It took 45 minutes with heavy verbal assist and re-direction for patient to get both pouches change.    Patient will not need the lower pouch for much longer as there is really minor output from this fistula.    Supplies: Pouch [employer] #[id number] -Flat 1 piece cut to fit fecal pouch and Paste [employer] #[id number] -Adapt Ring    Instructions Given: WOC Role. Patient performed pouch change- see above    Nursing care provided was coordinated care with RN and patient.     Discussed plan of care with nurse and patient, care management    Outcomes and treatment recommendations are to To contain output, To be knowledgable with pouch change/emptying before discharge and To be independant with pouch change/emptying before discharge    Actions taken by WOC RN: 15 minutes of education and WOC Discharge recommendations entered.    Planned Follow Up: Weekly.    Plan for next visit: Reassess stoma/peristomal skin  "

## 2022-01-20 LAB
GLUCOSE BLDC GLUCOMTR-MCNC: 107 MG/DL (ref 70–99)
GLUCOSE BLDC GLUCOMTR-MCNC: 124 MG/DL (ref 70–99)
GLUCOSE BLDC GLUCOMTR-MCNC: 139 MG/DL (ref 70–99)
GLUCOSE BLDC GLUCOMTR-MCNC: 159 MG/DL (ref 70–99)

## 2022-01-20 PROCEDURE — 250N000013 HC RX MED GY IP 250 OP 250 PS 637: Performed by: INTERNAL MEDICINE

## 2022-01-20 PROCEDURE — 250N000009 HC RX 250: Performed by: FAMILY MEDICINE

## 2022-01-20 PROCEDURE — 250N000011 HC RX IP 250 OP 636: Performed by: INTERNAL MEDICINE

## 2022-01-20 PROCEDURE — 99024 POSTOP FOLLOW-UP VISIT: CPT | Performed by: PHYSICIAN ASSISTANT

## 2022-01-20 PROCEDURE — 250N000009 HC RX 250: Performed by: INTERNAL MEDICINE

## 2022-01-20 PROCEDURE — 250N000013 HC RX MED GY IP 250 OP 250 PS 637: Performed by: FAMILY MEDICINE

## 2022-01-20 PROCEDURE — 120N000001 HC R&B MED SURG/OB

## 2022-01-20 PROCEDURE — 99231 SBSQ HOSP IP/OBS SF/LOW 25: CPT | Mod: GC | Performed by: FAMILY MEDICINE

## 2022-01-20 PROCEDURE — 250N000013 HC RX MED GY IP 250 OP 250 PS 637: Performed by: STUDENT IN AN ORGANIZED HEALTH CARE EDUCATION/TRAINING PROGRAM

## 2022-01-20 RX ADMIN — I.V. FAT EMULSION 250 ML: 20 EMULSION INTRAVENOUS at 21:01

## 2022-01-20 RX ADMIN — AMIODARONE HYDROCHLORIDE 200 MG: 200 TABLET ORAL at 20:52

## 2022-01-20 RX ADMIN — ONDANSETRON 4 MG: 2 INJECTION INTRAMUSCULAR; INTRAVENOUS at 23:12

## 2022-01-20 RX ADMIN — OXYCODONE HYDROCHLORIDE AND ACETAMINOPHEN 1 TABLET: 5; 325 TABLET ORAL at 17:31

## 2022-01-20 RX ADMIN — ONDANSETRON 4 MG: 4 TABLET, ORALLY DISINTEGRATING ORAL at 09:22

## 2022-01-20 RX ADMIN — AMIODARONE HYDROCHLORIDE 200 MG: 200 TABLET ORAL at 08:59

## 2022-01-20 RX ADMIN — GABAPENTIN 200 MG: 100 CAPSULE ORAL at 17:25

## 2022-01-20 RX ADMIN — VENLAFAXINE HYDROCHLORIDE 75 MG: 75 CAPSULE, EXTENDED RELEASE ORAL at 08:59

## 2022-01-20 RX ADMIN — OXYCODONE HYDROCHLORIDE AND ACETAMINOPHEN 1 TABLET: 5; 325 TABLET ORAL at 09:22

## 2022-01-20 RX ADMIN — LEVOTHYROXINE SODIUM 25 MCG: 0.03 TABLET ORAL at 06:38

## 2022-01-20 RX ADMIN — HYDROXYZINE HYDROCHLORIDE 25 MG: 25 TABLET, FILM COATED ORAL at 20:53

## 2022-01-20 RX ADMIN — APIXABAN 5 MG: 5 TABLET, FILM COATED ORAL at 20:53

## 2022-01-20 RX ADMIN — MAGNESIUM SULFATE HEPTAHYDRATE: 500 INJECTION, SOLUTION INTRAMUSCULAR; INTRAVENOUS at 20:55

## 2022-01-20 RX ADMIN — APIXABAN 5 MG: 5 TABLET, FILM COATED ORAL at 08:59

## 2022-01-20 RX ADMIN — PANTOPRAZOLE SODIUM 40 MG: 40 TABLET, DELAYED RELEASE ORAL at 06:38

## 2022-01-20 RX ADMIN — GABAPENTIN 200 MG: 100 CAPSULE ORAL at 08:59

## 2022-01-20 ASSESSMENT — ACTIVITIES OF DAILY LIVING (ADL)
ADLS_ACUITY_SCORE: 13
ADLS_ACUITY_SCORE: 13
ADLS_ACUITY_SCORE: 15
ADLS_ACUITY_SCORE: 13
ADLS_ACUITY_SCORE: 15
ADLS_ACUITY_SCORE: 13
ADLS_ACUITY_SCORE: 13
ADLS_ACUITY_SCORE: 15
ADLS_ACUITY_SCORE: 13
ADLS_ACUITY_SCORE: 15
ADLS_ACUITY_SCORE: 15
ADLS_ACUITY_SCORE: 13

## 2022-01-20 NOTE — PROGRESS NOTES
Spoke to parole office Shelia. Asked him to escalate patient's case because he was essentially released with no proper housing or plan in place as his condition changed while he was still in custody. 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  Shelia says he believes there are nursing homes that take patients who have criminal records and he has coworkers who have had clients go to nursing homes. He is going to find a list a send to me. Told him we have tried over 30 facilities.     Shelia says he contacted [facility] who have accepted patient but it's just temporary 1-2 months. Shelia said he thinks they have a nruse there. I called [facility]. Spoke to Umer, says the do not have a nurse and only have a mini fridge so TPN would not fit .     Shelia also said he spoke to Giovanni Montoya at [facility] who told him they DO have fridge space for patients TPN . I left a message for Giovanni requesting a call back to clarify     Spoke to Luz Maria Steiner, she plans to check in with patient today and attempt TPN teaching.     3:47 PM  Spoke to Giovnani at [facility], says they DO NOT have a large enough fridge for the TPN

## 2022-01-20 NOTE — PLAN OF CARE
Problem: Adult Inpatient Plan of Care  Goal: Optimal Comfort and Wellbeing  Outcome: Improving  Intervention: Provide Person-Centered Care  Recent Flowsheet Documentation  Taken 1/20/2022 0142 by Lluvia Medina RN  Trust Relationship/Rapport:   care explained   choices provided   emotional support provided   thoughts/feelings acknowledged  Taken 1/19/2022 2100 by Lluvia Medina RN  Trust Relationship/Rapport:   care explained   choices provided   emotional support provided   thoughts/feelings acknowledged     Problem: Pain (Surgery Nonspecified)  Goal: Acceptable Pain Control  Outcome: Improving     Problem: Nausea and Vomiting  Goal: Fluid and Electrolyte Balance  Outcome: Improving  Intervention: Prevent and Manage Nausea and Vomiting  Recent Flowsheet Documentation  Taken 1/20/2022 0142 by Lluvia Medina RN  Nausea/Vomiting Interventions:   antiemetic   cool cloth applied   nausea triggers minimized  Taken 1/19/2022 2100 by Lluvia Medina RN  Nausea/Vomiting Interventions:   antiemetic   cool cloth applied   nausea triggers minimized     Problem: Risk for Delirium  Goal: Improved Sleep  Outcome: Improving     PRN percocet given around HS for increased pain at fistula sites. Drainage pouches remained intact overnight. Fistula OP green-brownish. Cyclical PN running overnight, tolerating well. Intermittent nausea around HS. PRN IV zofran given. Seabands also provided for pt.

## 2022-01-20 NOTE — PLAN OF CARE
Problem: Anxiety  Goal: Anxiety Reduction or Resolution  Outcome: Improving     Problem: Depression  Goal: Improved Mood  Outcome: Improving     Problem: Nausea and Vomiting  Goal: Fluid and Electrolyte Balance  Outcome: Improving  Intervention: Prevent and Manage Nausea and Vomiting  Patient  nauseated dry heaving . Zofran given with good effect, no emesis.    15: 00 Fistula pouch leaking after Patient and Ostomy/Woc applied.  New  pouch applied with Patient .  Both fistula draining greenish  drainage.

## 2022-01-20 NOTE — PROGRESS NOTES
[facility] Progress Note    Assessment/Plan  Active Problems:    Abdominal pain, generalized    Patient remains hospitalized due to placement, TPN.    Chema Dia is a 67 y/o M with past medical history of splenectomy, appendectomy, HTN, and substance abuse, with recent admission to hospital 11/30-12/21/2021 for SBO with perforation s/p surgical repair. Discharged home but presents as he could not manage at home and is now requesting to be placed in TCU vs LTC. Continues on TPN.  New RUL and RLL PE found and being treated, new HAP 1/9.    RUL and RLL PE  SMV thrombosis  New supplemental oxygen requirement  Acute RLL PE found on CT as well as SMV thrombosis.  Also has RUL PE.  No evidence of right heart strain.  Most likely provoked in the setting of recent surgery/hospitalization.  -Eliquis 5 mg twice daily for PE treatment.    Atrial flutter (resolved)  Developed this overnight 1/8-1/9 and required amiodarone and brief ICU stay for pressors.  Cardiology consulted.  Patient stabilized and sinus rhythm, back to the floor.  Pulled back PICC line 1 cm as cardiology suspects the tip may be in the right atrium and that may have caused his episode of atrial flutter.  No repeat episodes since that time.  -Cardiology signed off.  Continue oral amiodarone.  Plan to taper per cardiology: amiodarone 3 times a day for a week then go down to 2 times a day for 2 weeks and then daily 200 mg a day.  -Eliquis as above.    Nausea  RLQ pain  Malaise  HAP  Wound cultures positive for Candida parapsilosis and E. coli 12/22.  Patient has been having nausea with TPN but developed a new supplemental oxygen requirement 1/6 in addition to fatigue and general malaise and was found to have RLL PE and SMV thrombosis.  CT abdomen overall stable from prior.  Developed evidence of left-sided HAP overnight 1/9, re-consulted ID for antibiotic guidance, transitioned back to oral antibiotics and finished the course 1/14, also  finished fluconazole, ID signed off.     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 Required subsequent washout on 12/3 with antibiotics and MARISA drain placement. On 12/8 found to have continued enhancing abscess with drain placed 12/9 and removed on 12/16.  Was discharged, but returned soon thereafter on 12/22 as he was unable to care for himself.  Now receiving TPN.  -PT/OT.  -Consulted surgery.  Clear liquid diet.  Surgery is the only service that can advance this and will not do so for some time.  No new procedure indicated at this time.   -Consulted pharmacy and RD for TPN management - transitioned to cyclic TPN 12/30.  We will work on TPN teaching for at home.  -WO teaching so he can care for his fistulas at home.     Alk phos elevation, resolved  Alk phos elevated, now back to normal.  Rest of LFTs okay.  Does have sludge in gallbladder but no evidence of cholecystitis on imaging.  -Cyclic TPN.  Additional daily lab checks outside of regularly scheduled TPN checks not indicated at this time unless he develops new symptoms.     Depression  Insomnia  Hx of this. Reports symptoms of low motivation, little interest in doing things, poor sleep, no appetite. Is on buspar, mirtazapine, and effexor at home, though doesn't really think medicines are as helpful as talk therapy is. Wanted to speak to social work and . States no plan to harm himself. Reports he just needs to leave the hospital.  -Consulted psych for med management and plan for talk therapy while hospitalized. Psych signed off.  -Discontinued buspar and mirtazapine per psych.  -Increased venlafaxine and gabapentin per psych.  -Seroquel for sleep.  Vistaril at bedtime.     Moderate malnutrition  Per RD eval. TPN as  above.     Normocytic anemia  Stable at 11.5, MCV of 93.     Chronic Conditions:  Hypothyroidism- PTA levothyroxine.  Chronic pain- Hold mexolicam 7.5mg daily, resume PTA gabapentin.  BPH-hold home Flomax given he states it does not help.    FEN: TPN.  Clear liquid diet as tolerated.  DVT Prophylaxis: Treatment dose Eliquis.  Code: Full code.    Disposition/Advanced Care Planning  Discharge Planning discussed with patient and care team.  Anticipated discharge date: Multiple days.  Disposition: TCU versus home.    Subjective  Pt feeling ok.  Trying to learn how to do his bag changes, making some progress as of yesterday.    Objective    Vital signs in last 24 hours Temp:  [97.8  F (36.6  C)-98.6  F (37  C)] 97.8  F (36.6  C)  Pulse:  [87-99] 87  Resp:  [16-20] 20  BP: (108-118)/(73-81) 108/73  SpO2:  [92 %-97 %] 92 %   Weight: [unfilled]    Intake/Output last 3 shift I/O last 3 completed shifts:  In: 2291 [P.O.:720; I.V.:40]  Out: 675 [Drains:675]    Intake/Output this shift:I/O this shift:  In: 130 [P.O.:120; I.V.:10]  Out: 50 [Drains:50]    Physical Exam  General appearance: alert, appears stated age, cooperative and no distress.  Head: Normocephalic, without obvious abnormality, atraumatic.  Eyes: conjunctivae/corneas clear.  Throat: MMM.  Neck: supple, symmetrical, trachea midline.  Lungs: no increased respiratory effort.  Heart: Good capillary refill.  Abdomen: non-distended, ostomy bags in place.  Extremities: extremities normal, atraumatic, no cyanosis or edema.  Skin: Skin color, texture, turgor normal. No rashes or lesions.  Neurologic: Alert and oriented x3, normal strength and tone.  Psychiatric: mood and affect appropriate.    Pertinent Labs and Pertinent Radiology   Lab Results: personally reviewed.     Radiology Results: Personally reviewed image/s and impression/s    Precepted patient with Dr. Ben Rosenstein.    Johnathan Lees MD  [facility], PGY-3

## 2022-01-20 NOTE — PROGRESS NOTES
ASSESSMENT:  1. Abdominal pain, generalized        Gurdeep Dia is a 66 year old male status post explore lap lysis of adhesions with resultant 3 days later perforation reoperation oversew of defect and then 5 days later perforation again and at this time  he has fistulas above and below to his incision that are being managed with ostomy bag containment and TPN diet (clear liquids ok).  Fistulas continue to be controlled with ostomy bags in place.  He is beginning to heal down but is still not ready to increase his diet from a surgical standpoint    PLAN:  Clear liquid diet  No surgical reason for patient to be in the hospital at this time; aggressive efforts should be made to find patient placement  Monitor wounds and skin integrity; appreciate [facility]  We will continue to follow    Shaggy Cao PA-C  Pager - [phone]  Phone - [phone]   General Surgery    SUBJECTIVE:   He is doing okay, no changes, wants to get out of here when able      Patient Vitals for the past 24 hrs:   BP Temp Temp src Pulse Resp SpO2   01/20/22 0729 108/73 97.8  F (36.6  C) Oral 87 20 92 %   01/19/22 2329 112/78 98.6  F (37  C) Temporal 99 18 93 %   01/19/22 1524 118/81 97.8  F (36.6  C) Oral 89 16 97 %         PHYSICAL EXAM:  GEN: No acute distress, comfortable  LUNGS: CTA bilaterally  CV:RRR  ABD: Soft, nondistended, midline incision with ostomy bags in place   EXT:No cyanosis, edema or obvious abnormalities    01/19 0700 - 01/20 0659  In: 2291 [P.O.:720; I.V.:40]  Out: 675 [Drains:675]    No results displayed because visit has over 200 results.             Shaggy Cao PA-C

## 2022-01-20 NOTE — PHARMACY-CONSULT NOTE
"Pharmacy Note: Parenteral Nutrition (PN) Management    Pharmacist consulted to dose PN for Gurdeep Dia, a 66 year old male by Dr. Mcmahon.    Subjective:    The patient is a new PN start.    The patient was started on PN in the hospital on 12/2/21.    Indication for PN therapy: Continue until fistula heals     Inadequate nutrition existing for > 7 days.     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      Social History     Tobacco Use     Smoking status: Current Every Day Smoker     Smokeless tobacco: Never Used     Tobacco comment: declined cessation discussion and resource packet -1/14/22   Substance Use Topics     Alcohol use: Not Currently     Comment: Clean for 5 years     Drug use: Not Currently     Objective:    Ht Readings from Last 1 Encounters:   12/22/21 1.702 m (5' 7\")     Wt Readings from Last 1 Encounters:   01/18/22 75.6 kg (166 lb 9.6 oz)       Body mass index is 26.09 kg/m .    Patient Vitals for the past 96 hrs:   Weight   01/18/22 0600 75.6 kg (166 lb 9.6 oz)   01/17/22 0700 76.9 kg (169 lb 8 oz)       Labs:  Last 3 days:  Recent Labs     01/17/22  0623 01/18/22  0544 01/18/22  0755 01/19/22  0637     --  138  --    POTASSIUM 4.5  --  4.7  --    CHLORIDE 103  --  105  --    CO2 26  --  26  --    BUN 27*  --  29*  --    CR 0.79  --  0.74  --    VIJAYA 8.5  --  8.5  --    MAG 2.1  --   --  2.1   PHOS 3.3  --   --  3.4   PREALB 18*  --   --   --    TRIG 177*  --   --   --    HGB 10.5* 9.2* 10.5*  --    HCT 33.1* 32.8* 34.0*  --    * 469* 576*  --    INR 1.37*  --   --   --        Glucose (past 48 hours):   Recent Labs     01/18/22  0755 01/18/22  1835 01/18/22  2357 01/19/22  0557   GLC 86 100* 119* 128*       Intake/Output (last 24 hours): I/O last 3 completed shifts:  In: 2291 [P.O.:720; I.V.:40]  Out: 675 [Drains:675]    Estimated CrCl: Estimated Creatinine Clearance: 103.6 mL/min (based on SCr of 0.74 mg/dL).    Assessment:    Continue patient on PN therapy " as a cyclic central therapy.     Given the patient's current condition/oral intake, PN is still indicated.    Lab results reviewed: no changes    Plan:    1. Rate of PN: 75ml/hr x 1 hour, followed by 150 ml/hr x 10 hours, followed by 75 ml/hr x 1 hour  2. Formula:     Amino Acids 130 grams    Dextrose 300 grams    Sodium 130 mEq/day    Potassium 75 mEq/day    Calcium 8 mEq/day    Magnesium 16 mEq/day    Phosphorus 32 mMol/day    Chloride: Acetate Ratio 1:2    Standard Multivitamins w/Vitamin K    Trace Elements  3. Fat Emulsion: 20%, 250 mL IV 3 times weekly on Mon, Thurs, Sat  4. Check BMP, P, Mg labs three times weekly. Labs will be drawn tomorrow with am draw.  5. Pharmacist will continue to follow the patient's lab results, clinical status and blood glucose results and make adjustments as appropriate.    Thank you for the consult.  Hansel Ryan [facility]  1/20/2022 8:07 AM

## 2022-01-20 NOTE — PROGRESS NOTES
"CLINICAL NUTRITION SERVICES - REASSESSMENT NOTE     Nutrition Prescription    RECOMMENDATIONS FOR MDs/PROVIDERS TO ORDER:    Malnutrition Status:    Severe    Recommendations already ordered by Registered Dietitian (RD):  Continue cyclic TPN: D 300  @ 75 ml/hr x 1 hr, 150 ml/hr x 10 hrs  75 ml/hr x 1 hr and off.  250 ml of 20% lipids 3 times per week  Over 12 hrs    Future/Additional Recommendations:  Follow TPN tolerance, labs, weight, Adjust TPN as needed     EVALUATION OF THE PROGRESS TOWARD GOALS   Diet: Clear Liquid  Nutrition Support: Cyclic TPN as above: TPN/lipids provide: 1754 Calories, 130 g protein, 300 g CHO, 21 g fat (averaged) and 1650 ml fluid/day  Intake: took 2 orange ice on 1/18/2022-po intake otherwise noted as fair per nursing notes.    Per surgery note (1/8/22) Surgery is the only service that can advance the patient's diet and that will not happen for some time (fistulas have to close down first)     NEW FINDINGS   Acute RLL PE found on CT as well as SMV thrombosis.  Also has RUL PE.  Eliquis 5 mg twice daily for PE treatment    ANTHROPOMETRICS  Height: 170.2 cm (5' 7\")  Most Recent Weight: 75.6 kg (166 lb 9.6 oz)    Weight History:   Wt Readings from Last 10 Encounters:   01/18/22 75.6 kg (166 lb 9.6 oz)   12/17/21 76.8 kg (169 lb 6.4 oz)   01/22/20 79.4 kg (175 lb)   12/24/19 81.2 kg (179 lb)       GI CONCERNS  Ostomy/fistula  + nausea  Abdominal tenderness  Hypoactive BS  I/O: 2291/550 (1/19)    LABS  Reviewed Mg 2.1, Phos 3.4 (1/19/2022)    MEDICATIONS  Reviewed: levothyroxine, pantoprazole    MALNUTRITION:  % Weight Loss:  1-2% weight loss in 1 week (moderate malnutrition)% Intake:  No decreased intake noted  Subcutaneous Fat Loss:  Orbital region severe depletion and Upper arm region mild to moderate depletion  Muscle Loss:  Temporal region moderate depletion, Clavicle bone region severe depletion, Dorsal hand region mild to moderate depletion and Patellar region moderate " depletion  Fluid Retention:  None noted    Malnutrition Diagnosis: Severe malnutrition  In Context of:  Acute illness or injury    CURRENT NUTRITION DIAGNOSIS  Inadequate oral intake related to SBO with formation of enterocutaneous fistula as evidenced by nausea and abdominal pain      INTERVENTIONS  Implementation  Continue same TPN regiment today    Goals  Meet estimated needs-progressing  Wound healing-progressing  No significant dry weight loss    Monitoring/Evaluation  Progress toward goals will be monitored and evaluated per protocol.

## 2022-01-21 PROBLEM — N32.2 VESICOCUTANEOUS FISTULA: Status: ACTIVE | Noted: 2022-01-21

## 2022-01-21 PROBLEM — F32.9 MAJOR DEPRESSION: Status: ACTIVE | Noted: 2019-12-24

## 2022-01-21 PROBLEM — K63.1 PERFORATION OF INTESTINE (H): Status: ACTIVE | Noted: 2021-12-21

## 2022-01-21 PROBLEM — I10 HYPERTENSION GOAL BP (BLOOD PRESSURE) < 140/90: Status: ACTIVE | Noted: 2019-12-24

## 2022-01-21 LAB
ANION GAP SERPL CALCULATED.3IONS-SCNC: 7 MMOL/L (ref 5–18)
BUN SERPL-MCNC: 29 MG/DL (ref 8–22)
CALCIUM SERPL-MCNC: 8.5 MG/DL (ref 8.5–10.5)
CHLORIDE BLD-SCNC: 104 MMOL/L (ref 98–107)
CO2 SERPL-SCNC: 29 MMOL/L (ref 22–31)
CREAT SERPL-MCNC: 0.78 MG/DL (ref 0.7–1.3)
ERYTHROCYTE [DISTWIDTH] IN BLOOD BY AUTOMATED COUNT: 16.1 % (ref 10–15)
GFR SERPL CREATININE-BSD FRML MDRD: >90 ML/MIN/1.73M2
GLUCOSE BLD-MCNC: 92 MG/DL (ref 70–125)
GLUCOSE BLDC GLUCOMTR-MCNC: 103 MG/DL (ref 70–99)
GLUCOSE BLDC GLUCOMTR-MCNC: 123 MG/DL (ref 70–99)
GLUCOSE BLDC GLUCOMTR-MCNC: 86 MG/DL (ref 70–99)
GLUCOSE BLDC GLUCOMTR-MCNC: 96 MG/DL (ref 70–99)
HCT VFR BLD AUTO: 33.1 % (ref 40–53)
HGB BLD-MCNC: 10.1 G/DL (ref 13.3–17.7)
MAGNESIUM SERPL-MCNC: 2.1 MG/DL (ref 1.8–2.6)
MCH RBC QN AUTO: 28.7 PG (ref 26.5–33)
MCHC RBC AUTO-ENTMCNC: 30.5 G/DL (ref 31.5–36.5)
MCV RBC AUTO: 94 FL (ref 78–100)
PHOSPHATE SERPL-MCNC: 3.3 MG/DL (ref 2.5–4.5)
PLATELET # BLD AUTO: 600 10E3/UL (ref 150–450)
POTASSIUM BLD-SCNC: 4.4 MMOL/L (ref 3.5–5)
RBC # BLD AUTO: 3.52 10E6/UL (ref 4.4–5.9)
SARS-COV-2 RNA RESP QL NAA+PROBE: POSITIVE
SODIUM SERPL-SCNC: 140 MMOL/L (ref 136–145)
WBC # BLD AUTO: 10.8 10E3/UL (ref 4–11)

## 2022-01-21 PROCEDURE — 99024 POSTOP FOLLOW-UP VISIT: CPT | Performed by: PHYSICIAN ASSISTANT

## 2022-01-21 PROCEDURE — 250N000009 HC RX 250: Performed by: FAMILY MEDICINE

## 2022-01-21 PROCEDURE — 87635 SARS-COV-2 COVID-19 AMP PRB: CPT | Performed by: STUDENT IN AN ORGANIZED HEALTH CARE EDUCATION/TRAINING PROGRAM

## 2022-01-21 PROCEDURE — 99231 SBSQ HOSP IP/OBS SF/LOW 25: CPT | Mod: GC | Performed by: STUDENT IN AN ORGANIZED HEALTH CARE EDUCATION/TRAINING PROGRAM

## 2022-01-21 PROCEDURE — 83735 ASSAY OF MAGNESIUM: CPT | Performed by: INTERNAL MEDICINE

## 2022-01-21 PROCEDURE — 85027 COMPLETE CBC AUTOMATED: CPT | Performed by: STUDENT IN AN ORGANIZED HEALTH CARE EDUCATION/TRAINING PROGRAM

## 2022-01-21 PROCEDURE — 250N000013 HC RX MED GY IP 250 OP 250 PS 637: Performed by: FAMILY MEDICINE

## 2022-01-21 PROCEDURE — 84100 ASSAY OF PHOSPHORUS: CPT | Performed by: INTERNAL MEDICINE

## 2022-01-21 PROCEDURE — 250N000013 HC RX MED GY IP 250 OP 250 PS 637: Performed by: INTERNAL MEDICINE

## 2022-01-21 PROCEDURE — 80048 BASIC METABOLIC PNL TOTAL CA: CPT | Performed by: STUDENT IN AN ORGANIZED HEALTH CARE EDUCATION/TRAINING PROGRAM

## 2022-01-21 PROCEDURE — 250N000013 HC RX MED GY IP 250 OP 250 PS 637: Performed by: STUDENT IN AN ORGANIZED HEALTH CARE EDUCATION/TRAINING PROGRAM

## 2022-01-21 PROCEDURE — 120N000001 HC R&B MED SURG/OB

## 2022-01-21 PROCEDURE — 250N000011 HC RX IP 250 OP 636: Performed by: INTERNAL MEDICINE

## 2022-01-21 RX ADMIN — LEVOTHYROXINE SODIUM 25 MCG: 0.03 TABLET ORAL at 07:12

## 2022-01-21 RX ADMIN — HYDROXYZINE HYDROCHLORIDE 25 MG: 25 TABLET, FILM COATED ORAL at 21:01

## 2022-01-21 RX ADMIN — MAGNESIUM SULFATE HEPTAHYDRATE: 500 INJECTION, SOLUTION INTRAMUSCULAR; INTRAVENOUS at 20:53

## 2022-01-21 RX ADMIN — AMIODARONE HYDROCHLORIDE 200 MG: 200 TABLET ORAL at 08:43

## 2022-01-21 RX ADMIN — VENLAFAXINE HYDROCHLORIDE 75 MG: 75 CAPSULE, EXTENDED RELEASE ORAL at 08:42

## 2022-01-21 RX ADMIN — OXYCODONE HYDROCHLORIDE AND ACETAMINOPHEN 1 TABLET: 5; 325 TABLET ORAL at 23:01

## 2022-01-21 RX ADMIN — GABAPENTIN 200 MG: 100 CAPSULE ORAL at 08:42

## 2022-01-21 RX ADMIN — QUETIAPINE FUMARATE 200 MG: 100 TABLET, FILM COATED ORAL at 00:02

## 2022-01-21 RX ADMIN — APIXABAN 5 MG: 5 TABLET, FILM COATED ORAL at 21:01

## 2022-01-21 RX ADMIN — ONDANSETRON 4 MG: 4 TABLET, ORALLY DISINTEGRATING ORAL at 22:45

## 2022-01-21 RX ADMIN — AMIODARONE HYDROCHLORIDE 200 MG: 200 TABLET ORAL at 21:01

## 2022-01-21 RX ADMIN — GABAPENTIN 900 MG: 300 CAPSULE ORAL at 23:01

## 2022-01-21 RX ADMIN — OXYCODONE HYDROCHLORIDE AND ACETAMINOPHEN 1 TABLET: 5; 325 TABLET ORAL at 10:55

## 2022-01-21 RX ADMIN — OXYCODONE HYDROCHLORIDE AND ACETAMINOPHEN 1 TABLET: 5; 325 TABLET ORAL at 00:02

## 2022-01-21 RX ADMIN — APIXABAN 5 MG: 5 TABLET, FILM COATED ORAL at 08:42

## 2022-01-21 RX ADMIN — QUETIAPINE FUMARATE 200 MG: 100 TABLET, FILM COATED ORAL at 23:01

## 2022-01-21 RX ADMIN — GABAPENTIN 900 MG: 300 CAPSULE ORAL at 00:01

## 2022-01-21 RX ADMIN — PANTOPRAZOLE SODIUM 40 MG: 40 TABLET, DELAYED RELEASE ORAL at 07:12

## 2022-01-21 RX ADMIN — GABAPENTIN 200 MG: 100 CAPSULE ORAL at 17:51

## 2022-01-21 ASSESSMENT — ACTIVITIES OF DAILY LIVING (ADL)
ADLS_ACUITY_SCORE: 13
ADLS_ACUITY_SCORE: 7
ADLS_ACUITY_SCORE: 13

## 2022-01-21 NOTE — PROGRESS NOTES
[facility] Progress Note    Assessment/Plan  Active Problems:    Abdominal pain, generalized    Patient remains hospitalized due to placement, TPN.    Chema Dia is a 65 y/o M with past medical history of splenectomy, appendectomy, HTN, and substance abuse, with recent admission to hospital 11/30-12/21/2021 for SBO with perforation s/p surgical repair. Discharged home but presents as he could not manage at home and is now requesting to be placed in TCU.  Continues on TPN.  New RUL and RLL PE found and being treated, new HAP 1/9 also resolved with abx.    RUL and RLL PE  SMV thrombosis  New supplemental oxygen requirement  Acute RLL PE found on CT as well as SMV thrombosis.  Also has RUL PE.  No evidence of right heart strain.  Most likely provoked in the setting of recent surgery/hospitalization.  -Eliquis 5 mg twice daily for PE treatment.    Atrial flutter (resolved)  Developed this overnight 1/8-1/9 and required amiodarone and brief ICU stay for pressors.  Cardiology consulted.  Patient stabilized and sinus rhythm, back to the floor.  Pulled back PICC line 1 cm as cardiology suspects the tip may be in the right atrium and that may have caused his episode of atrial flutter.  No repeat episodes since that time.  -Cardiology signed off.  Continue oral amiodarone.  Plan to taper per cardiology: amiodarone 3 times a day for a week then go down to 2 times a day for 2 weeks and then daily 200 mg a day.  -Eliquis as above.    Nausea  HAP, resolved  Wound cultures positive for Candida parapsilosis and E. coli 12/22.  Patient has been having nausea with TPN but developed a new supplemental oxygen requirement 1/6 in addition to fatigue and general malaise and was found to have RLL PE and SMV thrombosis.  CT abdomen overall stable from prior.  Developed evidence of left-sided HAP overnight 1/9, re-consulted ID for antibiotic guidance, transitioned back to oral antibiotics and finished the course 1/14,  also finished fluconazole, ID signed off.     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 Required subsequent washout on 12/3 with antibiotics and MARISA drain placement. On 12/8 found to have continued enhancing abscess with drain placed 12/9 and removed on 12/16.  Was discharged, but returned soon thereafter on 12/22 as he was unable to care for himself.  Now receiving TPN.  -PT/OT.  -Consulted surgery.  Clear liquid diet.  Surgery is the only service that can advance this and will not do so for some time.  No new procedure indicated at this time.   -Consulted pharmacy and RD for TPN management - transitioned to cyclic TPN 12/30.  We will work on TPN teaching for at home.  -[facility] teaching so he can care for his fistulas at home.     Alk phos elevation, resolved  Alk phos elevated, now back to normal.  Rest of LFTs okay.  Does have sludge in gallbladder but no evidence of cholecystitis on imaging.  -Cyclic TPN.  Additional daily lab checks outside of regularly scheduled TPN checks not indicated at this time unless he develops new symptoms.     Depression  Insomnia  Hx of this. Reports symptoms of low motivation, little interest in doing things, poor sleep, no appetite. Is on buspar, mirtazapine, and effexor at home, though doesn't really think medicines are as helpful as talk therapy is. Wanted to speak to social work and . States no plan to harm himself. Reports he just needs to leave the hospital.  -Consulted psych for med management and plan for talk therapy while hospitalized. Psych signed off.  -Discontinued buspar and mirtazapine per psych.  -Increased venlafaxine and gabapentin per psych.  -Seroquel for sleep.  Vistaril at bedtime.     Moderate malnutrition  Per RD eval. TPN as  above.     Normocytic anemia  Stable.     Chronic Conditions:  Hypothyroidism- PTA levothyroxine.  Chronic pain- Hold mexolicam 7.5mg daily, resume PTA gabapentin.  BPH-hold home Flomax given he states it does not help.    FEN: TPN.  Clear liquid diet as tolerated.  DVT Prophylaxis: Eliquis.  Code: Full code.    Disposition/Advanced Care Planning  Discharge Planning discussed with patient and care team.  Anticipated discharge date: Multiple days.  Disposition: TCU versus home.    Subjective  Pt sleeping peacefully, no events overnight.  Hopefully getting closer to discharge with ostomy teaching, TPN teaching, placement.    Objective    Vital signs in last 24 hours Temp:  [97.8  F (36.6  C)-98.6  F (37  C)] 98.3  F (36.8  C)  Pulse:  [84-93] 93  Resp:  [18-20] 18  BP: ()/(66-88) 138/88  SpO2:  [91 %-94 %] 94 %   Weight: [unfilled]    Intake/Output last 3 shift I/O last 3 completed shifts:  In: 250 [P.O.:240; I.V.:10]  Out: 550 [Drains:550]    Intake/Output this shift:No intake/output data recorded.    Physical Exam  General appearance: NAD.  Head: Normocephalic, without obvious abnormality, atraumatic.  Eyes: conjunctivae/corneas clear.  Throat: MMM.  Neck: supple, symmetrical, trachea midline.  Lungs: no increased respiratory effort.  Heart: Good capillary refill.  Abdomen: non-distended, ostomy bags in place.  Extremities: extremities normal, atraumatic, no cyanosis or edema.  Skin: Skin color, texture, turgor normal. No rashes or lesions.  Neurologic: Sleeping, normal tone.  Psychiatric: Sleeping.    Pertinent Labs and Pertinent Radiology   Lab Results: personally reviewed.     Radiology Results: Personally reviewed image/s and impression/s    Precepted patient with Dr. Dania Nickerson.    Johnathan Lees MD  [facility], PGY-3

## 2022-01-21 NOTE — PLAN OF CARE
[id number]  Problem: Pain (Surgery Nonspecified)  Goal: Acceptable Pain Control  Intervention: Prevent or Manage Pain  Percocet given X 2 for abdomen pain . Helpful per Patient.  Emptied his fistula pouch with assistance twice today.

## 2022-01-21 NOTE — PHARMACY-CONSULT NOTE
"Pharmacy Note: Parenteral Nutrition (PN) Management    Pharmacist consulted to dose PN for Gurdeep Dia, a 66 year old male by Dr. Mcmahon.    Subjective:    The patient is a new PN start.    The patient was started on PN in the hospital on 12/2/21.    Indication for PN therapy: continue until fistula heals    Inadequate nutrition existing for > 7 days.     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      Social History     Tobacco Use     Smoking status: Current Every Day Smoker     Smokeless tobacco: Never Used     Tobacco comment: declined cessation discussion and resource packet -1/14/22   Substance Use Topics     Alcohol use: Not Currently     Comment: Clean for 5 years     Drug use: Not Currently     Objective:    Ht Readings from Last 1 Encounters:   12/22/21 1.702 m (5' 7\")     Wt Readings from Last 1 Encounters:   01/18/22 75.6 kg (166 lb 9.6 oz)       Body mass index is 26.09 kg/m .    Patient Vitals for the past 96 hrs:   Weight   01/18/22 0600 75.6 kg (166 lb 9.6 oz)       Labs:  Last 3 days:  Recent Labs     01/19/22  0637 01/21/22  0720   NA  --  140   POTASSIUM  --  4.4   CHLORIDE  --  104   CO2  --  29   BUN  --  29*   CR  --  0.78   VIJAYA  --  8.5   MAG 2.1 2.1   PHOS 3.4 3.3   HGB  --  10.1*   HCT  --  33.1*   PLT  --  600*       Glucose (past 48 hours):   Recent Labs     01/19/22  1254 01/20/22  0309 01/20/22  0641 01/20/22  0838 01/20/22  1209 01/21/22  0716 01/21/22  0720 01/21/22  0814   * 159* 124* 139* 107* 96 92 123*       Intake/Output (last 24 hours): I/O last 3 completed shifts:  In: 250 [P.O.:240; I.V.:10]  Out: 550 [Drains:550]    Estimated CrCl: Estimated Creatinine Clearance: 98.3 mL/min (based on SCr of 0.78 mg/dL).    Assessment:    Continue patient on PN therapy as a cyclic central therapy.     Given the patient's current condition/oral intake, PN is still indicated.    Lab results reviewed: sodium trending up to 140 but still WNL    Plan:  1. Rate of " PN: 75 mL/hr for 1 hour, then 150 ml/hr x 10 hours, then 75 ml/hr x 1 hour. Maintenance IV fluid rate will be adjusted appropriately to meet the total maximum IV fluids rate as ordered by provider.  2. Formula:     Amino Acids 130 grams    Dextrose 300 grams    Sodium 130 mEq/day    Potassium 75 mEq/day    Calcium 8 mEq/day    Magnesium 16 mEq/day    Phosphorus 32 mMol/day    Chloride: Acetate Ratio 0.5:1    Standard Multivitamins w/Vitamin K    Trace Elements  3. Fat Emulsion: 20%, 250 mL IV 3 times weekly on Mon, Thurs, & Sat  4. Check BMP labs tomorrow.  5. Pharmacist will continue to follow the patient's lab results, clinical status and blood glucose results and make adjustments as appropriate.    Thank you for the consult.  Yoon Young RPH  1/21/2022 9:16 AM

## 2022-01-21 NOTE — PROGRESS NOTES
ASSESSMENT:  1. Abdominal pain, generalized        Gurdeep Dia is a 66 year old male status post explore lap lysis of adhesions with resultant 3 days later perforation reoperation oversew of defect and then 5 days later perforation again and at this time  he has fistulas above and below to his incision that are being managed with ostomy bag containment and TPN diet (clear liquids ok).  Fistulas continue to be controlled with ostomy bags in place.  He is beginning to heal down but is still not ready to increase his diet from a surgical standpoint    PLAN:  Clear liquid diet  Monitor wounds and skin integrity; appreciate WOC  We will continue to follow  Sounds like getting closer to placement    Shaggy Cao PA-C  Pager - [phone]  Phone - [phone]   General Surgery    SUBJECTIVE:   He is doing okay, no changes, wants to get out of here when able      Patient Vitals for the past 24 hrs:   BP Temp Temp src Pulse Resp SpO2   01/21/22 0729 138/88 98.3  F (36.8  C) Oral 93 18 94 %   01/20/22 2302 (!) 140/88 98  F (36.7  C) Oral 84 20 93 %   01/20/22 1955 109/78 98.6  F (37  C) Oral 89 20 91 %   01/20/22 1539 98/66 97.8  F (36.6  C) Oral 84 20 94 %         PHYSICAL EXAM:  GEN: No acute distress, comfortable  LUNGS: CTA bilaterally  CV:RRR  ABD: Soft, nondistended, midline incision with ostomy bags in place, succus in both bags  EXT:No cyanosis, edema or obvious abnormalities    01/20 0700 - 01/21 0659  In: 250 [P.O.:240; I.V.:10]  Out: 550 [Drains:550]    No results displayed because visit has over 200 results.             Shaggy Cao PA-C

## 2022-01-21 NOTE — PROGRESS NOTES
"CLINICAL NUTRITION SERVICES - REASSESSMENT NOTE     Nutrition Prescription    RECOMMENDATIONS FOR MDs/PROVIDERS TO ORDER:    Malnutrition Status:    Severe    Recommendations already ordered by Registered Dietitian (RD):  Continue cyclic TPN: D 300  @ 75 ml/hr x 1 hr, 150 ml/hr x 10 hrs  75 ml/hr x 1 hr and off.  250 ml of 20% lipids 3 times per week  Over 12 hrs    Future/Additional Recommendations:  Follow TPN tolerance, labs, weight, Adjust TPN as needed     EVALUATION OF THE PROGRESS TOWARD GOALS   Diet: Clear Liquid  Nutrition Support: Cyclic TPN as above: TPN/lipids provide: 1754 Calories, 130 g protein, 300 g CHO, 21 g fat (averaged) and 1650 ml fluid/day  Intake: took 2 orange ice on 1/18/2022-po intake otherwise noted as fair per nursing notes.    Per surgery note (1/8/22) Surgery is the only service that can advance the patient's diet and that will not happen for some time (fistulas have to close down first)     NEW FINDINGS   One episode of dry heaving last night.  Empties ostomy pouch mostly independently per nursing.    ANTHROPOMETRICS  Height: 170.2 cm (5' 7\")  Admit weight:   173 lb (12/21/21)   166 lb (12/22/21)  Most Recent Weight: 75.6 kg (166 lb 9.6 oz)  (1/18/22)  Weight History:   Wt Readings from Last 10 Encounters:   01/18/22 75.6 kg (166 lb 9.6 oz)   12/17/21 76.8 kg (169 lb 6.4 oz)   01/22/20 79.4 kg (175 lb)   12/24/19 81.2 kg (179 lb)       GI CONCERNS  Ostomy/fistula  + nausea  Abdominal tenderness  I/O not accurate - tpn not documented  Oral intake 240 mL last 24 hours  550 mL out ostomy/fistula    LABS  Electrolytes wdl  FSBG 96, 123    MEDICATIONS  Reviewed: levothyroxine, pantoprazole    MALNUTRITION:  % Weight Loss:  1-2% weight loss in 1 week (moderate malnutrition)% Intake:  No decreased intake noted  Subcutaneous Fat Loss:  Orbital region severe depletion and Upper arm region mild to moderate depletion  Muscle Loss:  Temporal region moderate depletion, Clavicle bone region " severe depletion, Dorsal hand region mild to moderate depletion and Patellar region moderate depletion  Fluid Retention:  None noted    Malnutrition Diagnosis: Severe malnutrition  In Context of:  Acute illness or injury    CURRENT NUTRITION DIAGNOSIS  Inadequate oral intake related to SBO with formation of enterocutaneous fistula as evidenced by nausea and abdominal pain      INTERVENTIONS  Implementation  Continue same TPN regiment today    Goals  Meet estimated needs-progressing  Wound healing-progressing  No significant dry weight loss    Monitoring/Evaluation  Progress toward goals will be monitored and evaluated per protocol.

## 2022-01-21 NOTE — PROGRESS NOTES
Care Management Follow Up    Length of Stay (days): 30    Expected Discharge Date: 01/22/2022     Concerns to be Addressed: discharge planning     Patient plan of care discussed at interdisciplinary rounds: Yes    Anticipated Discharge Disposition: Transitional Care     Anticipated Discharge Services: Other (see comment) (therapy services )  Anticipated Discharge DME: None    Patient/family educated on Medicare website which has current facility and service quality ratings: yes  Education Provided on the Discharge Plan:    Patient/Family in Agreement with the Plan: yes    Referrals Placed by CM/SW: Post Acute Facilities  Private pay costs discussed: Not applicable    Additional Information:  See below       Celine Rendon RN          Referral re-sent to [facility] per request of Shelia      8:48 AM  Spoke to Henna at [facility]. She requests new referral . She will review. She is checking if they can do TPN     12:53 PM  Updated patient     1:56 PM  Message left for [facility] to check status of referral. Awaiting call back. Updated  Shelia

## 2022-01-21 NOTE — PLAN OF CARE
Problem: Pain (Surgery Nonspecified)  Goal: Acceptable Pain Control  Outcome: Improving  Intervention: Prevent or Manage Pain  Recent Flowsheet Documentation  Taken 1/21/2022 0002 by Ayana Brandt, RN  Pain Management Interventions: medication (see MAR)  Taken 1/20/2022 2106 by Ayana Brandt, RN  Pain Management Interventions: (wants percocet at bedtime)   declines   emotional support     Problem: Nausea and Vomiting  Goal: Fluid and Electrolyte Balance  Outcome: Improving  Intervention: Prevent and Manage Nausea and Vomiting  Recent Flowsheet Documentation  Taken 1/21/2022 0002 by Ayana Brandt, RN  Nausea/Vomiting Interventions: antiemetic       Prn percocet for abdominal pain, sleeping after.     Had one episode of nausea and dry heaving. Prn iv zofran given with relief.     TPN running.     Emptying pouch mostly independent with some assistance.

## 2022-01-22 PROBLEM — U07.1 INFECTION DUE TO 2019 NOVEL CORONAVIRUS: Status: ACTIVE | Noted: 2022-01-22

## 2022-01-22 LAB
ANION GAP SERPL CALCULATED.3IONS-SCNC: 5 MMOL/L (ref 5–18)
BUN SERPL-MCNC: 28 MG/DL (ref 8–22)
CALCIUM SERPL-MCNC: 8.1 MG/DL (ref 8.5–10.5)
CHLORIDE BLD-SCNC: 105 MMOL/L (ref 98–107)
CO2 SERPL-SCNC: 29 MMOL/L (ref 22–31)
CREAT SERPL-MCNC: 0.77 MG/DL (ref 0.7–1.3)
GFR SERPL CREATININE-BSD FRML MDRD: >90 ML/MIN/1.73M2
GLUCOSE BLD-MCNC: 105 MG/DL (ref 70–125)
GLUCOSE BLDC GLUCOMTR-MCNC: 100 MG/DL (ref 70–99)
GLUCOSE BLDC GLUCOMTR-MCNC: 111 MG/DL (ref 70–99)
GLUCOSE BLDC GLUCOMTR-MCNC: 126 MG/DL (ref 70–99)
GLUCOSE BLDC GLUCOMTR-MCNC: 126 MG/DL (ref 70–99)
GLUCOSE BLDC GLUCOMTR-MCNC: 82 MG/DL (ref 70–99)
POTASSIUM BLD-SCNC: 4.2 MMOL/L (ref 3.5–5)
SODIUM SERPL-SCNC: 139 MMOL/L (ref 136–145)

## 2022-01-22 PROCEDURE — 99231 SBSQ HOSP IP/OBS SF/LOW 25: CPT | Mod: GC | Performed by: STUDENT IN AN ORGANIZED HEALTH CARE EDUCATION/TRAINING PROGRAM

## 2022-01-22 PROCEDURE — 250N000011 HC RX IP 250 OP 636: Performed by: INTERNAL MEDICINE

## 2022-01-22 PROCEDURE — 80048 BASIC METABOLIC PNL TOTAL CA: CPT | Performed by: FAMILY MEDICINE

## 2022-01-22 PROCEDURE — 120N000001 HC R&B MED SURG/OB

## 2022-01-22 PROCEDURE — 250N000013 HC RX MED GY IP 250 OP 250 PS 637: Performed by: INTERNAL MEDICINE

## 2022-01-22 PROCEDURE — 250N000013 HC RX MED GY IP 250 OP 250 PS 637: Performed by: STUDENT IN AN ORGANIZED HEALTH CARE EDUCATION/TRAINING PROGRAM

## 2022-01-22 PROCEDURE — 250N000013 HC RX MED GY IP 250 OP 250 PS 637: Performed by: FAMILY MEDICINE

## 2022-01-22 PROCEDURE — 250N000009 HC RX 250: Performed by: INTERNAL MEDICINE

## 2022-01-22 PROCEDURE — 250N000009 HC RX 250: Performed by: FAMILY MEDICINE

## 2022-01-22 RX ADMIN — APIXABAN 5 MG: 5 TABLET, FILM COATED ORAL at 20:25

## 2022-01-22 RX ADMIN — APIXABAN 5 MG: 5 TABLET, FILM COATED ORAL at 09:03

## 2022-01-22 RX ADMIN — GABAPENTIN 200 MG: 100 CAPSULE ORAL at 09:02

## 2022-01-22 RX ADMIN — HYDROXYZINE HYDROCHLORIDE 25 MG: 25 TABLET, FILM COATED ORAL at 20:25

## 2022-01-22 RX ADMIN — GABAPENTIN 200 MG: 100 CAPSULE ORAL at 18:21

## 2022-01-22 RX ADMIN — OXYCODONE HYDROCHLORIDE AND ACETAMINOPHEN 1 TABLET: 5; 325 TABLET ORAL at 18:44

## 2022-01-22 RX ADMIN — AMIODARONE HYDROCHLORIDE 200 MG: 200 TABLET ORAL at 09:01

## 2022-01-22 RX ADMIN — PANTOPRAZOLE SODIUM 40 MG: 40 TABLET, DELAYED RELEASE ORAL at 09:02

## 2022-01-22 RX ADMIN — AMIODARONE HYDROCHLORIDE 200 MG: 200 TABLET ORAL at 20:25

## 2022-01-22 RX ADMIN — ONDANSETRON 4 MG: 4 TABLET, ORALLY DISINTEGRATING ORAL at 20:25

## 2022-01-22 RX ADMIN — I.V. FAT EMULSION 250 ML: 20 EMULSION INTRAVENOUS at 20:25

## 2022-01-22 RX ADMIN — VENLAFAXINE HYDROCHLORIDE 75 MG: 75 CAPSULE, EXTENDED RELEASE ORAL at 09:01

## 2022-01-22 RX ADMIN — MAGNESIUM SULFATE HEPTAHYDRATE: 500 INJECTION, SOLUTION INTRAMUSCULAR; INTRAVENOUS at 20:29

## 2022-01-22 RX ADMIN — LEVOTHYROXINE SODIUM 25 MCG: 0.03 TABLET ORAL at 06:56

## 2022-01-22 ASSESSMENT — ACTIVITIES OF DAILY LIVING (ADL)
ADLS_ACUITY_SCORE: 7

## 2022-01-22 NOTE — PROGRESS NOTES
"CLINICAL NUTRITION SERVICES - REASSESSMENT NOTE     Nutrition Prescription    RECOMMENDATIONS FOR MDs/PROVIDERS TO ORDER:    Malnutrition Status:    Severe    Recommendations already ordered by Registered Dietitian (RD):  Continue cyclic TPN: D 300  @ 75 ml/hr x 1 hr, 150 ml/hr  X 10 hrs, 75 ml/hr x 1 hrs and off.  250 ml of 20% lipids 3 times  Per weeks over 12 hrs    Future/Additional Recommendations:  Follow TPN tolerance, labs, weight and adjust TPN as needed     EVALUATION OF THE PROGRESS TOWARD GOALS   Diet: Clear liquid  Nutrition Support: cyclic TPN as above: providing 1754 Calories, 130 g protein, 300 g CHO, 21 g fat (averaged) and 1650 ml fluid/day  Intake: 100% lunch on 1/20     NEW FINDINGS   COVID-19 test came back positive 1/21/2022    ANTHROPOMETRICS  Height: 170.2 cm (5' 7\")  Most Recent Weight: 75.6 kg (166 lb 9.6 oz)    BMI: Overweight BMI 25-29.9  Weight History:   Wt Readings from Last 10 Encounters:   01/18/22 75.6 kg (166 lb 9.6 oz)   12/17/21 76.8 kg (169 lb 6.4 oz)   01/22/20 79.4 kg (175 lb)   12/24/19 81.2 kg (179 lb)     GI CONCERNS  Ostomy/fistula  Abdominal tenderness  Stool moderate liquid green (1/22) per nurse  I/O appears to be inaccurate     LABS  Reviewed: Na 139, K 4.2, Glu 105    MEDICATIONS  Reviewed    Malnutrition Diagnosis: Severe malnutrition  In Context of:  Acute illness or injury    CURRENT NUTRITION DIAGNOSIS  Inadequate oral intake related to SBO with formation of enterocutaneous fistula as evidenced by nausea and abdominal pain      INTERVENTIONS  Implementation  Continue same TPN regimen today    Goals  Meet estimated nutrition needs-progressing  Wound healing-progressing  No significant dry weight loss    Monitoring/Evaluation  Progress toward goals will be monitored and evaluated per protocol.  "

## 2022-01-22 NOTE — PROGRESS NOTES
[facility] Progress Note    Assessment/Plan  Active Problems:    Hypertension goal BP (blood pressure) < 140/90    Moderate major depression (H)    Perforation of intestine (H)    Abdominal pain, generalized    Vesicocutaneous fistula    Patient remains hospitalized due to placement, TPN.    Chema Dia is a 65 y/o M with past medical history of splenectomy, appendectomy, HTN, and substance abuse, with recent admission to hospital 11/30-12/21/2021 for SBO with perforation s/p surgical repair. Discharged home but presents as he could not manage at home and is now requesting to be placed in TCU.  Continues on TPN.  New RUL and RLL PE found and being treated, new HAP 1/9 also resolved with abx. Incidentally COVID-19 positive 1/21/2022.    RUL and RLL PE  SMV thrombosis  New supplemental oxygen requirement  Acute RLL PE found on CT as well as SMV thrombosis.  Also has RUL PE.  No evidence of right heart strain.  Most likely provoked in the setting of recent surgery/hospitalization.  -Eliquis 5 mg twice daily for PE treatment.    Atrial flutter (resolved)  Developed this overnight 1/8-1/9 and required amiodarone and brief ICU stay for pressors.  Cardiology consulted.  Patient stabilized and sinus rhythm, back to the floor.  Pulled back PICC line 1 cm as cardiology suspects the tip may be in the right atrium and that may have caused his episode of atrial flutter.  No repeat episodes since that time.  -Cardiology signed off.  Continue oral amiodarone.  Plan to taper per cardiology: amiodarone 3 times a day for a week then go down to 2 times a day for 2 weeks and then daily 200 mg a day.  -Eliquis as above.    COVID-19  Incidentally found while inpatient. Very mildly symptomatic. Already anticoagulated on Eliquis. No additional specific COVID-19 cares at this time. No new supplemental oxygen requirement.    Nausea  HAP, resolved  Wound cultures positive for Candida parapsilosis and E. coli 12/22.   Patient has been having nausea with TPN but developed a new supplemental oxygen requirement 1/6 in addition to fatigue and general malaise and was found to have RLL PE and SMV thrombosis.  CT abdomen overall stable from prior.  Developed evidence of left-sided HAP overnight 1/9, re-consulted ID for antibiotic guidance, transitioned back to oral antibiotics and finished the course 1/14, also finished fluconazole, ID signed off.     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 Required subsequent washout on 12/3 with antibiotics and MARISA drain placement. On 12/8 found to have continued enhancing abscess with drain placed 12/9 and removed on 12/16.  Was discharged, but returned soon thereafter on 12/22 as he was unable to care for himself.  Now receiving TPN.  -PT/OT.  -Consulted surgery.  Clear liquid diet.  Surgery is the only service that can advance this and will not do so for some time.  No new procedure indicated at this time.   -Consulted pharmacy and RD for TPN management - transitioned to cyclic TPN 12/30.  We will work on TPN teaching for at home.  -[facility] teaching so he can care for his fistulas at home.     Alk phos elevation, resolved  Alk phos elevated, now back to normal.  Rest of LFTs okay.  Does have sludge in gallbladder but no evidence of cholecystitis on imaging.  -Cyclic TPN.  Additional daily lab checks outside of regularly scheduled TPN checks not indicated at this time unless he develops new symptoms.     Depression  Insomnia  Hx of this. Reports symptoms of low motivation, little interest in doing things, poor sleep, no appetite. Is on buspar, mirtazapine, and effexor at home, though doesn't really think medicines are as helpful as talk therapy is. Wanted to speak to social work and  . States no plan to harm himself. Reports he just needs to leave the hospital.  -Consulted psych for med management and plan for talk therapy while hospitalized. Psych signed off.  -Discontinued buspar and mirtazapine per psych.  -Increased venlafaxine and gabapentin per psych.  -Seroquel for sleep.  Vistaril at bedtime.     Moderate malnutrition  Per RD eval. TPN as above.     Normocytic anemia  Stable.     Chronic Conditions:  Hypothyroidism- PTA levothyroxine.  Chronic pain- Hold mexolicam 7.5mg daily, resume PTA gabapentin.  BPH-hold home Flomax given he states it does not help.    FEN: TPN.  Clear liquid diet as tolerated.  DVT Prophylaxis: Eliquis.  Code: Full code.    Disposition/Advanced Care Planning  Discharge Planning discussed with patient and care team.  Anticipated discharge date: Multiple days.  Disposition: TCU versus home.    Subjective  Pt sleeping peacefully, no events overnight. Incidentally COVID-19 positive.    Objective    Vital signs in last 24 hours Temp:  [98  F (36.7  C)-98.8  F (37.1  C)] 98.8  F (37.1  C)  Pulse:  [78-88] 88  Resp:  [16-20] 16  BP: (102-125)/(70-78) 118/78  SpO2:  [91 %-96 %] 91 %   Weight: [unfilled]    Intake/Output last 3 shift I/O last 3 completed shifts:  In: 8325.42 [P.O.:1110]  Out: 160 [Drains:160]    Intake/Output this shift:I/O this shift:  In: 480 [P.O.:480]  Out: 60 [Drains:60]    Physical Exam  General appearance: NAD.  Head: Normocephalic, without obvious abnormality, atraumatic.  Eyes: conjunctivae/corneas clear.  Throat: MMM.  Neck: supple, symmetrical, trachea midline.  Lungs: no increased respiratory effort.  Heart: Good capillary refill.  Abdomen: non-distended, ostomy bags in place.  Extremities: extremities normal, atraumatic, no cyanosis or edema.  Skin: Skin color, texture, turgor normal. No rashes or lesions.  Neurologic: Sleeping, normal tone.  Psychiatric: Sleeping.    Pertinent Labs and Pertinent Radiology   Lab Results: personally  reviewed.     Radiology Results: Personally reviewed image/s and impression/s    Precepted patient with Dr. Dania Nickerson.    Johnathan Lees MD  [facility], PGY-3

## 2022-01-22 NOTE — PLAN OF CARE
Problem: Adult Inpatient Plan of Care  Goal: Patient-Specific Goal (Individualized)  Outcome: Improving     Problem: Adult Inpatient Plan of Care  Goal: Optimal Comfort and Wellbeing  Outcome: Improving     Problem: Impaired Wound Healing  Goal: Optimal Wound Healing  Outcome: Improving    A/O x4. VSS. PRN percocet given for abdominal pain which was effective per pt. Pouch and surgical wound cares done. Covid-19 test today came back positive. Pt was upset about it.

## 2022-01-22 NOTE — PHARMACY-CONSULT NOTE
"Pharmacy Note: Parenteral Nutrition (PN) Management    Pharmacist consulted to dose PN for Gurdeep Dia, a 66 year old male by Dr. Mcmahon.    Subjective:    The patient is a new PN start.    The patient was started on PN in the hospital on 12/2/21.    Indication for PN therapy: continue until fistula heals    Inadequate nutrition existing for > 7 days.     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      Social History     Tobacco Use     Smoking status: Current Every Day Smoker     Smokeless tobacco: Never Used     Tobacco comment: declined cessation discussion and resource packet -1/14/22   Substance Use Topics     Alcohol use: Not Currently     Comment: Clean for 5 years     Drug use: Not Currently     Objective:    Ht Readings from Last 1 Encounters:   12/22/21 1.702 m (5' 7\")     Wt Readings from Last 1 Encounters:   01/18/22 75.6 kg (166 lb 9.6 oz)       Body mass index is 26.09 kg/m .    No data found.    Labs:  Last 3 days:  Recent Labs     01/19/22  0637 01/21/22  0720   NA  --  140   POTASSIUM  --  4.4   CHLORIDE  --  104   CO2  --  29   BUN  --  29*   CR  --  0.78   VIJAYA  --  8.5   MAG 2.1 2.1   PHOS 3.4 3.3   HGB  --  10.1*   HCT  --  33.1*   PLT  --  600*       Glucose (past 48 hours):   Recent Labs     01/19/22  1254 01/20/22  0309 01/20/22  0641 01/20/22  0838 01/20/22  1209 01/21/22  0716 01/21/22  0720 01/21/22  0814   * 159* 124* 139* 107* 96 92 123*       Intake/Output (last 24 hours): I/O last 3 completed shifts:  In: 8325.42 [P.O.:1110]  Out: 160 [Drains:160]    Estimated CrCl: Estimated Creatinine Clearance: 99.5 mL/min (based on SCr of 0.77 mg/dL).    Assessment:    Continue patient on PN therapy as a cyclic central therapy.     Given the patient's current condition/oral intake, PN is still indicated.    Lab results reviewed: WNL    Plan:  1. Rate of PN: 75 mL/hr for 1 hour, then 150 ml/hr x 10 hours, then 75 ml/hr x 1 hour. Maintenance IV fluid rate will be " adjusted appropriately to meet the total maximum IV fluids rate as ordered by provider.  2. Formula:     Amino Acids 130 grams    Dextrose 300 grams    Sodium 130 mEq/day    Potassium 75 mEq/day    Calcium 8 mEq/day    Magnesium 16 mEq/day    Phosphorus 32 mMol/day    Chloride: Acetate Ratio 0.5:1    Standard Multivitamins w/Vitamin K    Trace Elements  3. Fat Emulsion: 20%, 250 mL IV 3 times weekly on Mon, Thurs, & Sat  4. Check BMP labs tomorrow.  5. Pharmacist will continue to follow the patient's lab results, clinical status and blood glucose results and make adjustments as appropriate.    Thank you for the consult.  Lata Mccarthy, PharmD  1/22/2022 12:00 PM

## 2022-01-22 NOTE — PLAN OF CARE
Problem: Impaired Wound Healing  Goal: Optimal Wound Healing  Outcome: Improving  Intervention: Promote Wound Healing  Problem: Adult Inpatient Plan of Care  Goal: Absence of Hospital-Acquired Illness or Injury  Intervention: Identify and Manage Fall Risk  Recent Flowsheet Documentation  Safety Promotion/Fall Prevention:   assistive device/personal items within reach   mobility aid in reach   nonskid shoes/slippers when out of bed   fall prevention program maintained   clutter free environment maintained  Intervention: Prevent Infection  Recent Flowsheet Documentation  Infection Prevention:   hand hygiene promoted   rest/sleep promoted   single patient room provided   visitors restricted/screened   personal protective equipment utilized  Activity Management:   activity encouraged   up to bedside commode     Problem: Infection (Surgery Nonspecified)  Goal: Absence of Infection Signs and Symptoms  Outcome: Improving     Problem: Pain (Surgery Nonspecified)  Goal: Acceptable Pain Control  Outcome: Improving

## 2022-01-23 LAB
GLUCOSE BLDC GLUCOMTR-MCNC: 122 MG/DL (ref 70–99)
GLUCOSE BLDC GLUCOMTR-MCNC: 82 MG/DL (ref 70–99)

## 2022-01-23 PROCEDURE — 250N000009 HC RX 250: Performed by: FAMILY MEDICINE

## 2022-01-23 PROCEDURE — 250N000013 HC RX MED GY IP 250 OP 250 PS 637: Performed by: FAMILY MEDICINE

## 2022-01-23 PROCEDURE — 99207 PR NO CHARGE LOS: CPT | Performed by: PHYSICIAN ASSISTANT

## 2022-01-23 PROCEDURE — 250N000013 HC RX MED GY IP 250 OP 250 PS 637: Performed by: INTERNAL MEDICINE

## 2022-01-23 PROCEDURE — 120N000001 HC R&B MED SURG/OB

## 2022-01-23 PROCEDURE — 99231 SBSQ HOSP IP/OBS SF/LOW 25: CPT | Mod: GC | Performed by: STUDENT IN AN ORGANIZED HEALTH CARE EDUCATION/TRAINING PROGRAM

## 2022-01-23 PROCEDURE — 250N000013 HC RX MED GY IP 250 OP 250 PS 637: Performed by: STUDENT IN AN ORGANIZED HEALTH CARE EDUCATION/TRAINING PROGRAM

## 2022-01-23 RX ADMIN — MAGNESIUM SULFATE HEPTAHYDRATE: 500 INJECTION, SOLUTION INTRAMUSCULAR; INTRAVENOUS at 22:07

## 2022-01-23 RX ADMIN — GABAPENTIN 900 MG: 300 CAPSULE ORAL at 00:35

## 2022-01-23 RX ADMIN — QUETIAPINE FUMARATE 200 MG: 100 TABLET, FILM COATED ORAL at 00:35

## 2022-01-23 RX ADMIN — AMIODARONE HYDROCHLORIDE 200 MG: 200 TABLET ORAL at 08:05

## 2022-01-23 RX ADMIN — LEVOTHYROXINE SODIUM 25 MCG: 0.03 TABLET ORAL at 06:37

## 2022-01-23 RX ADMIN — PANTOPRAZOLE SODIUM 40 MG: 40 TABLET, DELAYED RELEASE ORAL at 06:41

## 2022-01-23 RX ADMIN — OXYCODONE HYDROCHLORIDE AND ACETAMINOPHEN 1 TABLET: 5; 325 TABLET ORAL at 06:37

## 2022-01-23 RX ADMIN — OXYCODONE HYDROCHLORIDE AND ACETAMINOPHEN 1 TABLET: 5; 325 TABLET ORAL at 00:36

## 2022-01-23 RX ADMIN — GABAPENTIN 900 MG: 300 CAPSULE ORAL at 22:07

## 2022-01-23 RX ADMIN — VENLAFAXINE HYDROCHLORIDE 75 MG: 75 CAPSULE, EXTENDED RELEASE ORAL at 08:06

## 2022-01-23 RX ADMIN — QUETIAPINE FUMARATE 200 MG: 100 TABLET, FILM COATED ORAL at 22:07

## 2022-01-23 RX ADMIN — GABAPENTIN 200 MG: 100 CAPSULE ORAL at 08:06

## 2022-01-23 RX ADMIN — AMIODARONE HYDROCHLORIDE 200 MG: 200 TABLET ORAL at 20:34

## 2022-01-23 RX ADMIN — GABAPENTIN 200 MG: 100 CAPSULE ORAL at 16:38

## 2022-01-23 RX ADMIN — HYDROXYZINE HYDROCHLORIDE 25 MG: 25 TABLET, FILM COATED ORAL at 20:34

## 2022-01-23 RX ADMIN — APIXABAN 5 MG: 5 TABLET, FILM COATED ORAL at 08:05

## 2022-01-23 RX ADMIN — APIXABAN 5 MG: 5 TABLET, FILM COATED ORAL at 20:34

## 2022-01-23 RX ADMIN — OXYCODONE HYDROCHLORIDE AND ACETAMINOPHEN 1 TABLET: 5; 325 TABLET ORAL at 22:07

## 2022-01-23 ASSESSMENT — ACTIVITIES OF DAILY LIVING (ADL)
ADLS_ACUITY_SCORE: 7

## 2022-01-23 NOTE — PLAN OF CARE
Problem: Adult Inpatient Plan of Care  Goal: Patient-Specific Goal (Individualized)  Outcome: Improving     Problem: Adult Inpatient Plan of Care  Goal: Optimal Comfort and Wellbeing  Outcome: Improving     Problem: Impaired Wound Healing  Goal: Optimal Wound Healing  Outcome: Improving     A/O x4. PRN percocet was given for abdominal pain which was effective per pt. Pt was able to change abdominal pouchs. Sites clean and intact.

## 2022-01-23 NOTE — PROGRESS NOTES
Surgery Chart Review Note    Patient not seen. COVID positive now. Fistula output continues to decrease. Waiting on placement at this point. Continue on clear liquids.    Shaggy Cao PA-C  Pager - [phone]  Phone - [phone]   General Surgery

## 2022-01-23 NOTE — PROGRESS NOTES
"CLINICAL NUTRITION SERVICES - REASSESSMENT NOTE     Nutrition Prescription    RECOMMENDATIONS FOR MDs/PROVIDERS TO ORDER:    Malnutrition Status:    Severe    Recommendations already ordered by Registered Dietitian (RD):  Continue cyclic TPN of D 300  @ 75 ml/hr x 1 hr, 150 ml/hr  X 10 hrs and 75 ml/hr x 1 hr and off.  250 ml of 20% lipids 3 times  Per week over 12 hrs to provide: 1754 Calories, 130 g protein,   300 g CHO, 21 g fat (averaged) and 1650 ml fluid/day    Future/Additional Recommendations:  Follow TPN tolerance, labs, weight and adjust TPN as needed     EVALUATION OF THE PROGRESS TOWARD GOALS   Diet: Clear liquid  Nutrition Support: cyclic TPN as charted above  Intake: 75% lunch 1/22 and only had 1 jello at dinner meal     NEW FINDINGS   COVID-19 test came back positive on 1/21/2022    Per surgery note (1/8/2022) Surgery is the only service that can advance the patient's diet and that will not happen for some time (fistulas have to close down first)    ANTHROPOMETRICS  Height: 170.2 cm (5' 7\")  Most Recent Weight: 75.6 kg (166 lb 9.6 oz)    Admit weight: 173 lb (12/21/21)  BMI: Overweight BMI 25-29.9  Weight History:   Wt Readings from Last 10 Encounters:   01/18/22 75.6 kg (166 lb 9.6 oz)   12/17/21 76.8 kg (169 lb 6.4 oz)   01/22/20 79.4 kg (175 lb)   12/24/19 81.2 kg (179 lb)     GI CONCERNS  Ostomy/fistula  Abdominal discomfort  Last BM 1/21  I/O: ? Incorrect-no output documented    LABS  Reviewed: Glu 122    MEDICATIONS  Reviewed    MALNUTRITION:  % Weight Loss:  1-2% weight loss in 1 week (moderate malnutrition)  % Intake:  No decreased intake noted  Subcutaneous Fat Loss:  Orbital region severe depletion and Upper arm region mild to moderate depletion  Muscle Loss:  Temporal region moderate depletion, Clavicle bone region severe depletion, Dorsal hand region mild to moderate depletion and Patellar region moderate depletion  Fluid Retention:  None noted    Malnutrition Diagnosis: Severe " malnutrition  In Context of:  Acute illness or injury    CURRENT NUTRITION DIAGNOSIS  Inadequate oral intake related to SBO with formation of enterocutaneous fistula as evidenced by nausea and abdominal pain      INTERVENTIONS  Implementation  Continue same TPN regimen today    Goals  Meet estimate nutrition needs-progressing  Wound healing-progressing  No significant dry weight loss    Monitoring/Evaluation  Progress toward goals will be monitored and evaluated per protocol.

## 2022-01-23 NOTE — PHARMACY-CONSULT NOTE
"Pharmacy Note: Parenteral Nutrition (PN) Management    Pharmacist consulted to dose PN for Gurdeep Dia, a 66 year old male by Dr. Mcmahon.    Subjective:    The patient is a new PN start.    The patient was started on PN in the hospital on 12/2/21.    Indication for PN therapy: continue until fistula heals    Inadequate nutrition existing for > 7 days.     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      Social History     Tobacco Use     Smoking status: Current Every Day Smoker     Smokeless tobacco: Never Used     Tobacco comment: declined cessation discussion and resource packet -1/14/22   Substance Use Topics     Alcohol use: Not Currently     Comment: Clean for 5 years     Drug use: Not Currently     Objective:    Ht Readings from Last 1 Encounters:   12/22/21 1.702 m (5' 7\")     Wt Readings from Last 1 Encounters:   01/18/22 75.6 kg (166 lb 9.6 oz)       Body mass index is 26.09 kg/m .    No data found.    Labs:  Last 3 days:  Recent Labs     01/19/22  0637 01/21/22  0720   NA  --  140   POTASSIUM  --  4.4   CHLORIDE  --  104   CO2  --  29   BUN  --  29*   CR  --  0.78   VIJAYA  --  8.5   MAG 2.1 2.1   PHOS 3.4 3.3   HGB  --  10.1*   HCT  --  33.1*   PLT  --  600*       Glucose (past 48 hours):   Recent Labs     01/19/22  1254 01/20/22  0309 01/20/22  0641 01/20/22  0838 01/20/22  1209 01/21/22  0716 01/21/22  0720 01/21/22  0814   * 159* 124* 139* 107* 96 92 123*       Intake/Output (last 24 hours): I/O last 3 completed shifts:  In: 5956.05 [P.O.:1600]  Out: 90 [Drains:90]    Estimated CrCl: Estimated Creatinine Clearance: 99.5 mL/min (based on SCr of 0.77 mg/dL).    Assessment:    Continue patient on PN therapy as a cyclic central therapy.     Given the patient's current condition/oral intake, PN is still indicated.    Lab results reviewed:   1/22: WNL    Plan:  1. Rate of PN: 75 mL/hr for 1 hour, then 150 ml/hr x 10 hours, then 75 ml/hr x 1 hour. Maintenance IV fluid rate will " be adjusted appropriately to meet the total maximum IV fluids rate as ordered by provider.  2. Formula:     Amino Acids 130 grams    Dextrose 300 grams    Sodium 130 mEq/day    Potassium 75 mEq/day    Calcium 8 mEq/day    Magnesium 16 mEq/day    Phosphorus 32 mMol/day    Chloride: Acetate Ratio 0.5:1    Standard Multivitamins w/Vitamin K    Trace Elements  3. Fat Emulsion: 20%, 250 mL IV 3 times weekly on Mon, Thurs, & Sat  4. Check BMP labs tomorrow.  5. Pharmacist will continue to follow the patient's lab results, clinical status and blood glucose results and make adjustments as appropriate.    Thank you for the consult.  Lata Mccarthy, PharmD  1/22/2022 10:50 AM

## 2022-01-23 NOTE — PROGRESS NOTES
[facility] Progress Note    Assessment/Plan  Active Problems:    Hypertension goal BP (blood pressure) < 140/90    Moderate major depression (H)    Perforation of intestine (H)    Abdominal pain, generalized    Vesicocutaneous fistula    Infection due to 2019 novel coronavirus    Patient remains hospitalized due to placement, TPN.    Chema Dia is a 65 y/o M with past medical history of splenectomy, appendectomy, HTN, and substance abuse, with recent admission to hospital 11/30-12/21/2021 for SBO with perforation s/p surgical repair. Discharged home but presents as he could not manage at home and is now requesting to be placed in TCU.  Continues on TPN.  New RUL and RLL PE found and being treated, new HAP 1/9 also resolved with abx. Incidentally COVID-19 positive 1/21/2022.    RUL and RLL PE  SMV thrombosis  New supplemental oxygen requirement  Acute RLL PE found on CT as well as SMV thrombosis.  Also has RUL PE.  No evidence of right heart strain.  Most likely provoked in the setting of recent surgery/hospitalization.  -Eliquis 5 mg twice daily for PE treatment for 3 months.    Atrial flutter (resolved)  Developed this overnight 1/8-1/9 and required amiodarone and brief ICU stay for pressors.  Cardiology consulted.  Patient stabilized and sinus rhythm, back to the floor.  Pulled back PICC line 1 cm as cardiology suspects the tip may be in the right atrium and that may have caused his episode of atrial flutter.  No repeat episodes since that time.  -Cardiology signed off.  Continue oral amiodarone.  Plan to taper per cardiology: amiodarone 3 times a day for a week then go down to 2 times a day for 2 weeks and then daily 200 mg a day.  -Eliquis as above.    COVID-19  Incidentally found while inpatient. Very mildly symptomatic. Already anticoagulated on Eliquis. No additional specific COVID-19 cares at this time. No new supplemental oxygen requirement.    Nausea  HAP, resolved  Wound cultures  positive for Candida parapsilosis and E. coli 12/22.  Patient has been having nausea with TPN but developed a new supplemental oxygen requirement 1/6 in addition to fatigue and general malaise and was found to have RLL PE and SMV thrombosis.  CT abdomen overall stable from prior.  Developed evidence of left-sided HAP overnight 1/9, re-consulted ID for antibiotic guidance, transitioned back to oral antibiotics and finished the course 1/14, also finished fluconazole, ID signed off.     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 Required subsequent washout on 12/3 with antibiotics and MARISA drain placement. On 12/8 found to have continued enhancing abscess with drain placed 12/9 and removed on 12/16.  Was discharged, but returned soon thereafter on 12/22 as he was unable to care for himself.  Now receiving TPN.  Working on learning cares and making progress.  Output decreasing.  -PT/OT.  -Consulted surgery.  Clear liquid diet.  Surgery is the only service that can advance this and will not do so for some time.  No new procedure indicated at this time.   -Consulted pharmacy and RD for TPN management - transitioned to cyclic TPN 12/30.  We will work on TPN teaching for at home.  -[facility] teaching so he can care for his fistulas at home.     Alk phos elevation, resolved  Alk phos elevated, now back to normal.  Rest of LFTs okay.  Does have sludge in gallbladder but no evidence of cholecystitis on imaging.  -Cyclic TPN.  Additional daily lab checks outside of regularly scheduled TPN checks not indicated at this time unless he develops new symptoms.     Depression  Insomnia  Hx of this. Reports symptoms of low motivation, little interest in doing things, poor sleep, no  appetite. Is on buspar, mirtazapine, and effexor at home, though doesn't really think medicines are as helpful as talk therapy is. Wanted to speak to social work and . States no plan to harm himself. Reports he just needs to leave the hospital.  -Consulted psych for med management and plan for talk therapy while hospitalized. Psych signed off.  -Discontinued buspar and mirtazapine per psych.  -Increased venlafaxine and gabapentin per psych.  -Seroquel for sleep.  Vistaril at bedtime.     Moderate malnutrition  Per RD eval. TPN as above.     Normocytic anemia  Stable.     Chronic Conditions:  Hypothyroidism- PTA levothyroxine.  Chronic pain- Hold mexolicam 7.5mg daily, resume PTA gabapentin.  BPH-hold home Flomax given he states it does not help.    FEN: TPN.  Clear liquid diet as tolerated.  DVT Prophylaxis: Eliquis.  Code: Full code.    Disposition/Advanced Care Planning  Discharge Planning discussed with patient and care team.  Anticipated discharge date: Multiple days.  Disposition: TCU versus home.    Subjective  Pt feeling a bit depressed about COVID-19 diagnosis but asymptomatic.  Output from fistulas decreasing.  NAEO.    Objective    Vital signs in last 24 hours Temp:  [97.5  F (36.4  C)-98.8  F (37.1  C)] 97.5  F (36.4  C)  Pulse:  [82-88] 86  Resp:  [16-18] 18  BP: (101-129)/(74-86) 101/74  SpO2:  [90 %-93 %] 90 %   Weight: [unfilled]    Intake/Output last 3 shift I/O last 3 completed shifts:  In: 5956.05 [P.O.:1600]  Out: 90 [Drains:90]    Intake/Output this shift:No intake/output data recorded.    Physical Exam  General appearance: NAD.  Head: Normocephalic, without obvious abnormality, atraumatic.  Eyes: conjunctivae/corneas clear.  Throat: MMM.  Neck: supple, symmetrical, trachea midline.  Lungs: no increased respiratory effort.  Heart: Good capillary refill.  Abdomen: non-distended, ostomy bags in place with decreased output.  Extremities: extremities normal, atraumatic, no cyanosis or  edema.  Skin: Skin color, texture, turgor normal. No rashes or lesions.  Neurologic: Sleeping, normal tone.  Psychiatric: Sleeping.    Pertinent Labs and Pertinent Radiology   Lab Results: personally reviewed.     Radiology Results: Personally reviewed image/s and impression/s    Precepted patient with Dr. Dania Nickerson.    Johnathan Lees MD  [facility], PGY-3

## 2022-01-24 LAB
ANION GAP SERPL CALCULATED.3IONS-SCNC: 7 MMOL/L (ref 5–18)
BUN SERPL-MCNC: 26 MG/DL (ref 8–22)
CALCIUM SERPL-MCNC: 8.5 MG/DL (ref 8.5–10.5)
CHLORIDE BLD-SCNC: 106 MMOL/L (ref 98–107)
CO2 SERPL-SCNC: 26 MMOL/L (ref 22–31)
CREAT SERPL-MCNC: 0.81 MG/DL (ref 0.7–1.3)
ERYTHROCYTE [DISTWIDTH] IN BLOOD BY AUTOMATED COUNT: 15.9 % (ref 10–15)
GFR SERPL CREATININE-BSD FRML MDRD: >90 ML/MIN/1.73M2
GLUCOSE BLD-MCNC: 102 MG/DL (ref 70–125)
GLUCOSE BLDC GLUCOMTR-MCNC: 82 MG/DL (ref 70–99)
HCT VFR BLD AUTO: 31.8 % (ref 40–53)
HGB BLD-MCNC: 9.8 G/DL (ref 13.3–17.7)
INR PPP: 1.3 (ref 0.85–1.15)
MAGNESIUM SERPL-MCNC: 2.1 MG/DL (ref 1.8–2.6)
MCH RBC QN AUTO: 28.7 PG (ref 26.5–33)
MCHC RBC AUTO-ENTMCNC: 30.8 G/DL (ref 31.5–36.5)
MCV RBC AUTO: 93 FL (ref 78–100)
PHOSPHATE SERPL-MCNC: 4.1 MG/DL (ref 2.5–4.5)
PLATELET # BLD AUTO: 570 10E3/UL (ref 150–450)
POTASSIUM BLD-SCNC: 4.3 MMOL/L (ref 3.5–5)
PREALB SERPL IA-MCNC: 17 MG/DL (ref 19–38)
RBC # BLD AUTO: 3.42 10E6/UL (ref 4.4–5.9)
SODIUM SERPL-SCNC: 139 MMOL/L (ref 136–145)
TRIGL SERPL-MCNC: 142 MG/DL
WBC # BLD AUTO: 9.2 10E3/UL (ref 4–11)

## 2022-01-24 PROCEDURE — 250N000009 HC RX 250: Performed by: FAMILY MEDICINE

## 2022-01-24 PROCEDURE — 85027 COMPLETE CBC AUTOMATED: CPT | Performed by: STUDENT IN AN ORGANIZED HEALTH CARE EDUCATION/TRAINING PROGRAM

## 2022-01-24 PROCEDURE — 99231 SBSQ HOSP IP/OBS SF/LOW 25: CPT | Mod: GC | Performed by: STUDENT IN AN ORGANIZED HEALTH CARE EDUCATION/TRAINING PROGRAM

## 2022-01-24 PROCEDURE — 250N000009 HC RX 250: Performed by: INTERNAL MEDICINE

## 2022-01-24 PROCEDURE — 84134 ASSAY OF PREALBUMIN: CPT | Performed by: INTERNAL MEDICINE

## 2022-01-24 PROCEDURE — 83735 ASSAY OF MAGNESIUM: CPT | Performed by: INTERNAL MEDICINE

## 2022-01-24 PROCEDURE — 250N000011 HC RX IP 250 OP 636: Performed by: STUDENT IN AN ORGANIZED HEALTH CARE EDUCATION/TRAINING PROGRAM

## 2022-01-24 PROCEDURE — 250N000013 HC RX MED GY IP 250 OP 250 PS 637: Performed by: INTERNAL MEDICINE

## 2022-01-24 PROCEDURE — 99024 POSTOP FOLLOW-UP VISIT: CPT | Performed by: SPECIALIST

## 2022-01-24 PROCEDURE — 250N000013 HC RX MED GY IP 250 OP 250 PS 637: Performed by: STUDENT IN AN ORGANIZED HEALTH CARE EDUCATION/TRAINING PROGRAM

## 2022-01-24 PROCEDURE — 84100 ASSAY OF PHOSPHORUS: CPT | Performed by: INTERNAL MEDICINE

## 2022-01-24 PROCEDURE — 120N000001 HC R&B MED SURG/OB

## 2022-01-24 PROCEDURE — 80048 BASIC METABOLIC PNL TOTAL CA: CPT | Performed by: STUDENT IN AN ORGANIZED HEALTH CARE EDUCATION/TRAINING PROGRAM

## 2022-01-24 PROCEDURE — 250N000013 HC RX MED GY IP 250 OP 250 PS 637: Performed by: FAMILY MEDICINE

## 2022-01-24 PROCEDURE — 84478 ASSAY OF TRIGLYCERIDES: CPT | Performed by: INTERNAL MEDICINE

## 2022-01-24 PROCEDURE — 85610 PROTHROMBIN TIME: CPT | Performed by: INTERNAL MEDICINE

## 2022-01-24 RX ADMIN — APIXABAN 5 MG: 5 TABLET, FILM COATED ORAL at 20:45

## 2022-01-24 RX ADMIN — LEVOTHYROXINE SODIUM 25 MCG: 0.03 TABLET ORAL at 05:26

## 2022-01-24 RX ADMIN — AMIODARONE HYDROCHLORIDE 200 MG: 200 TABLET ORAL at 20:45

## 2022-01-24 RX ADMIN — APIXABAN 5 MG: 5 TABLET, FILM COATED ORAL at 08:00

## 2022-01-24 RX ADMIN — ALTEPLASE 1 MG: 2.2 INJECTION, POWDER, LYOPHILIZED, FOR SOLUTION INTRAVENOUS at 13:43

## 2022-01-24 RX ADMIN — PANTOPRAZOLE SODIUM 40 MG: 40 TABLET, DELAYED RELEASE ORAL at 07:59

## 2022-01-24 RX ADMIN — HYDROXYZINE HYDROCHLORIDE 25 MG: 25 TABLET, FILM COATED ORAL at 20:45

## 2022-01-24 RX ADMIN — VENLAFAXINE HYDROCHLORIDE 75 MG: 75 CAPSULE, EXTENDED RELEASE ORAL at 08:00

## 2022-01-24 RX ADMIN — OXYCODONE HYDROCHLORIDE AND ACETAMINOPHEN 1 TABLET: 5; 325 TABLET ORAL at 17:09

## 2022-01-24 RX ADMIN — MAGNESIUM SULFATE HEPTAHYDRATE: 500 INJECTION, SOLUTION INTRAMUSCULAR; INTRAVENOUS at 20:20

## 2022-01-24 RX ADMIN — AMIODARONE HYDROCHLORIDE 200 MG: 200 TABLET ORAL at 08:00

## 2022-01-24 RX ADMIN — GABAPENTIN 200 MG: 100 CAPSULE ORAL at 08:00

## 2022-01-24 RX ADMIN — OXYCODONE HYDROCHLORIDE AND ACETAMINOPHEN 1 TABLET: 5; 325 TABLET ORAL at 10:34

## 2022-01-24 RX ADMIN — GABAPENTIN 200 MG: 100 CAPSULE ORAL at 16:59

## 2022-01-24 RX ADMIN — I.V. FAT EMULSION 250 ML: 20 EMULSION INTRAVENOUS at 20:22

## 2022-01-24 ASSESSMENT — ACTIVITIES OF DAILY LIVING (ADL)
ADLS_ACUITY_SCORE: 7

## 2022-01-24 NOTE — PROGRESS NOTES
Spoke to Henna at [facility]. Says they cannot do TPN.  Says they can do PPN. Also do not take covid +    3:27 PM  Updated parole office Shelia that [facility] declined due to TPN. He will look into other SNFs and get back to CM

## 2022-01-24 NOTE — PROGRESS NOTES
Care Management Follow Up    Length of Stay (days): 33    Expected Discharge Date: 01/27/2022     Concerns to be Addressed: COVID +, discharge planning .... still on TPN/Lipids, drains, clear liquids, ID, WOD and Surg following   Patient plan of care discussed at interdisciplinary rounds: No    Anticipated Discharge Disposition: Transitional Care     Anticipated Discharge Services: Other (see comment) (therapy services )  Anticipated Discharge DME: None    Patient/family educated on Medicare website which has current facility and service quality ratings: yes  Education Provided on the Discharge Plan:    Patient/Family in Agreement with the Plan: yes    Referrals Placed by CM/SW: Post Acute Facilities  Private pay costs discussed: Not applicable    Additional Information:  CM following for medical progression and will assist with discharge needs.  Still trying to find TCU that can take TPN, but pt. Now COVID +      Aye Olivier RN

## 2022-01-24 NOTE — PLAN OF CARE
Problem: Adult Inpatient Plan of Care  Goal: Patient-Specific Goal (Individualized)  Outcome: Improving     Problem: Adult Inpatient Plan of Care  Goal: Optimal Comfort and Wellbeing  Outcome: Improving    A/O x4. Denied any significant pain. States poor appetite. Low output from the pouches.

## 2022-01-24 NOTE — PROGRESS NOTES
"Surgery    covid positive  BP 92/57 (BP Location: Left arm, Patient Position: Supine)   Pulse 78   Temp 97.6  F (36.4  C) (Oral)   Resp 18   Ht 1.702 m (5' 7\")   Wt 75.6 kg (166 lb 9.6 oz)   SpO2 90%   BMI 26.09 kg/m    Abdomen soft, nt nd, minimal fluid out lower drain. , no air.   Ext warm    Results for orders placed or performed during the hospital encounter of 12/21/21 (from the past 24 hour(s))   Glucose by meter   Result Value Ref Range    GLUCOSE BY METER POCT 82 70 - 99 mg/dL   Magnesium   Result Value Ref Range    Magnesium 2.1 1.8 - 2.6 mg/dL   Phosphorus   Result Value Ref Range    Phosphorus 4.1 2.5 - 4.5 mg/dL   INR   Result Value Ref Range    INR 1.30 (H) 0.85 - 1.15   Prealbumin   Result Value Ref Range    Prealbumin 17 (L) 19 - 38 mg/dL   Triglycerides   Result Value Ref Range    Triglycerides 142 <=149 mg/dL   Basic metabolic panel   Result Value Ref Range    Sodium 139 136 - 145 mmol/L    Potassium 4.3 3.5 - 5.0 mmol/L    Chloride 106 98 - 107 mmol/L    Carbon Dioxide (CO2) 26 22 - 31 mmol/L    Anion Gap 7 5 - 18 mmol/L    Urea Nitrogen 26 (H) 8 - 22 mg/dL    Creatinine 0.81 0.70 - 1.30 mg/dL    Calcium 8.5 8.5 - 10.5 mg/dL    Glucose 102 70 - 125 mg/dL    GFR Estimate >90 >60 mL/min/1.73m2   CBC with platelets   Result Value Ref Range    WBC Count 9.2 4.0 - 11.0 10e3/uL    RBC Count 3.42 (L) 4.40 - 5.90 10e6/uL    Hemoglobin 9.8 (L) 13.3 - 17.7 g/dL    Hematocrit 31.8 (L) 40.0 - 53.0 %    MCV 93 78 - 100 fL    MCH 28.7 26.5 - 33.0 pg    MCHC 30.8 (L) 31.5 - 36.5 g/dL    RDW 15.9 (H) 10.0 - 15.0 %    Platelet Count 570 (H) 150 - 450 10e3/uL       Mekhi Richardson MD  General Surgery [phone]  Vascular Surgery [phone]          "

## 2022-01-24 NOTE — PHARMACY-CONSULT NOTE
"Pharmacy Note: Parenteral Nutrition (PN) Management    Pharmacist consulted to dose PN for Gurdeep Dia, a 66 year old male by Dr. Mcmahon.    Subjective:    The patient is a new PN start.    The patient was started on PN in the hospital on 12/2/21.    Indication for PN therapy: continue until fistula heals    Inadequate nutrition existing for > 7 days.     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    Social History     Tobacco Use     Smoking status: Current Every Day Smoker     Smokeless tobacco: Never Used     Tobacco comment: declined cessation discussion and resource packet -1/14/22   Substance Use Topics     Alcohol use: Not Currently     Comment: Clean for 5 years     Drug use: Not Currently     Objective:    Ht Readings from Last 1 Encounters:   12/22/21 1.702 m (5' 7\")     Wt Readings from Last 1 Encounters:   01/18/22 75.6 kg (166 lb 9.6 oz)       Body mass index is 26.09 kg/m .    No data found.    Labs:  Last 3 days:  Recent Labs     01/22/22  0701 01/24/22  0532    139   POTASSIUM 4.2 4.3   CHLORIDE 105 106   CO2 29 26   BUN 28* 26*   CR 0.77 0.81   VIJAYA 8.1* 8.5   MAG  --  2.1   PHOS  --  4.1   PREALB  --  17*   TRIG  --  142   HGB  --  9.8*   HCT  --  31.8*   PLT  --  570*   INR  --  1.30*       Glucose (past 48 hours):   Recent Labs     01/22/22  1203 01/22/22  1728 01/22/22  2126 01/23/22  0636 01/23/22  1636 01/24/22  0532   * 82 126* 122* 82 102       Intake/Output (last 24 hours): I/O last 3 completed shifts:  In: 240 [P.O.:240]  Out: 45 [Drains:45]    Estimated CrCl: Estimated Creatinine Clearance: 94.6 mL/min (based on SCr of 0.81 mg/dL).    Assessment:    Continue patient on PN therapy as a cyclic central therapy.     Given the patient's current condition/oral intake, PN is still indicated.    Lab results reviewed:   1/24 WNL    Plan:  1. Rate of PN: 75 mL/hr x 1 hour, then 150 ml/hr x 10 hours, then 75 ml/hr x 1 hour.    Maintenance IV fluid rate will " be adjusted appropriately to meet the total maximum IV fluids rate as ordered by provider.  2. Formula:     Amino Acids 130 grams    Dextrose 300 grams    Sodium 130 mEq/day    Potassium 75 mEq/day    Calcium 8 mEq/day    Magnesium 16 mEq/day    Phosphorus 32 mMol/day    Chloride: Acetate Ratio 0.5:1    Standard Multivitamins w/Vitamin K    Trace Elements  3. Fat Emulsion: 20%, 250 mL IV 3 times weekly on Mon, Thurs, & Sat  4. Check hyperal lytes labs on Wednesday.   5. Pharmacist will continue to follow the patient's lab results, clinical status and blood glucose results and make adjustments as appropriate.    Thank you for the consult.  Yoon Young [facility]  1/24/2022 10:26 AM

## 2022-01-24 NOTE — PLAN OF CARE
Problem: Adult Inpatient Plan of Care  Goal: Plan of Care Review  Outcome: Improving     Problem: Impaired Wound Healing  Goal: Optimal Wound Healing  Intervention: Promote Wound Healing  Recent Flowsheet Documentation  Taken 1/24/2022 0000 by Yamilex Jasso, RN  Activity Management: activity adjusted per tolerance  Taken 1/23/2022 2207 by Yamilex Jasso, RN  Pain Management Interventions: medication (see MAR)  Taken 1/23/2022 2100 by Yamilex Jasso, RN  Activity Management: activity adjusted per tolerance   Waiting for TCU placement, uneventful shift.

## 2022-01-24 NOTE — PROGRESS NOTES
"CLINICAL NUTRITION SERVICES - REASSESSMENT NOTE     Nutrition Prescription    RECOMMENDATIONS FOR MDs/PROVIDERS TO ORDER:    Malnutrition Status:    Severe    Recommendations already ordered by Registered Dietitian (RD):  Continue cyclic TPN of D 300  @ 75 ml/hr x 1 hr, 150 ml/hr  X 10 hrs and 75 ml/hr x 1 hr and off.  250 ml of 20% lipids 3 times  Per week over 12 hrs to provide: 1754 Calories, 130 g protein,   300 g CHO, 21 g fat (averaged) and 1650 ml fluid/day    Ensure Clear daily (suggest over ice if pt likes cold), flavor pt choice    Future/Additional Recommendations:  Follow TPN tolerance, labs, weight and adjust TPN as needed     EVALUATION OF THE PROGRESS TOWARD GOALS   Diet: Clear liquid  Nutrition Support: cyclic TPN as charted above  Intake: popsicle for lunch, did not eat dinner tray  240 mL intake recorded last 24 hrs    NEW FINDINGS   COVID-19 test came back positive on 1/21/2022    Per surgery note (1/8/2022) Surgery is the only service that can advance the patient's diet and that will not happen for some time (fistulas have to close down first)    ANTHROPOMETRICS  Height: 170.2 cm (5' 7\")  Most Recent Weight: 75.6 kg (166 lb 9.6 oz)    Admit weight: 173 lb (12/21/21)  BMI: Overweight BMI 25-29.9  Weight History:   Wt Readings from Last 10 Encounters:   01/18/22 75.6 kg (166 lb 9.6 oz)   12/17/21 76.8 kg (169 lb 6.4 oz)   01/22/20 79.4 kg (175 lb)   12/24/19 81.2 kg (179 lb)     GI CONCERNS  Ostomy/fistula  Abdominal tenderness  45 mL output osotomies - pt is urinating -volume not recorded    LABS  Reviewed    MEDICATIONS  Reviewed    MALNUTRITION:  % Weight Loss:  1-2% weight loss in 1 week (moderate malnutrition)  % Intake:  No decreased intake noted  Subcutaneous Fat Loss:  Orbital region severe depletion and Upper arm region mild to moderate depletion  Muscle Loss:  Temporal region moderate depletion, Clavicle bone region severe depletion, Dorsal hand region mild to moderate depletion and " Patellar region moderate depletion  Fluid Retention:  None noted    Malnutrition Diagnosis: Severe malnutrition  In Context of:  Acute illness or injury    CURRENT NUTRITION DIAGNOSIS  Inadequate oral intake related to SBO with formation of enterocutaneous fistula as evidenced by nausea and abdominal pain      INTERVENTIONS  Implementation  Continue same TPN regimen today  Ensure clear daily     Goals  Meet estimate nutrition needs-progressing  Wound healing-progressing  No significant dry weight loss - has weight ordered for MON, FRI - not recorded today yet    Monitoring/Evaluation  Progress toward goals will be monitored and evaluated per protocol.

## 2022-01-24 NOTE — PROGRESS NOTES
[facility] Progress Note    Assessment/Plan  Active Problems:    Hypertension goal BP (blood pressure) < 140/90    Moderate major depression (H)    Perforation of intestine (H)    Abdominal pain, generalized    Vesicocutaneous fistula    Infection due to 2019 novel coronavirus    Patient remains hospitalized due to placement, TPN.    Chema Dia is a 67 y/o M with past medical history of splenectomy, appendectomy, HTN, and substance abuse, with recent admission to hospital 11/30-12/21/2021 for SBO with perforation s/p surgical repair. Discharged home but presents as he could not manage at home and is now requesting to be placed in TCU.  Continues on TPN.  New RUL and RLL PE found and being treated, new HAP 1/9 also resolved with abx. Incidentally COVID-19 positive 1/21/2022.    RUL and RLL PE  SMV thrombosis  New supplemental oxygen requirement  Acute RLL PE found on CT as well as SMV thrombosis.  Also has RUL PE.  No evidence of right heart strain.  Most likely provoked in the setting of recent surgery/hospitalization.  -Eliquis 5 mg twice daily for PE treatment for 3 months.    Atrial flutter (resolved)  Developed this overnight 1/8-1/9 and required amiodarone and brief ICU stay for pressors.  Cardiology consulted.  Patient stabilized and sinus rhythm, back to the floor.  Pulled back PICC line 1 cm as cardiology suspects the tip may be in the right atrium and that may have caused his episode of atrial flutter.  No repeat episodes since that time.  -Cardiology signed off.  Continue oral amiodarone.  Plan to taper per cardiology: amiodarone 3 times a day for a week then go down to 2 times a day for 2 weeks and then daily 200 mg a day.  -Eliquis as above.    COVID-19  Incidentally found while inpatient. Very mildly symptomatic. Already anticoagulated on Eliquis. No additional specific COVID-19 cares at this time. No new supplemental oxygen requirement.    Nausea  HAP, resolved  Wound cultures  positive for Candida parapsilosis and E. coli 12/22.  Patient has been having nausea with TPN but developed a new supplemental oxygen requirement 1/6 in addition to fatigue and general malaise and was found to have RLL PE and SMV thrombosis.  CT abdomen overall stable from prior.  Developed evidence of left-sided HAP overnight 1/9, re-consulted ID for antibiotic guidance, transitioned back to oral antibiotics and finished the course 1/14, also finished fluconazole, ID signed off.     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 Required subsequent washout on 12/3 with antibiotics and MARISA drain placement. On 12/8 found to have continued enhancing abscess with drain placed 12/9 and removed on 12/16.  Was discharged, but returned soon thereafter on 12/22 as he was unable to care for himself.  Now receiving TPN.  Working on learning cares and making progress.  Output decreasing.  -PT/OT.  -Consulted surgery.  Clear liquid diet.  Surgery is the only service that can advance this and will not do so for some time.  No new procedure indicated at this time.   -Consulted pharmacy and RD for TPN management - transitioned to cyclic TPN 12/30.  We will work on TPN teaching for at home.  -[facility] teaching so he can care for his fistulas at home.     Alk phos elevation, resolved  Alk phos elevated, now back to normal.  Rest of LFTs okay.  Does have sludge in gallbladder but no evidence of cholecystitis on imaging.  -Cyclic TPN.  Additional daily lab checks outside of regularly scheduled TPN checks not indicated at this time unless he develops new symptoms.     Depression  Insomnia  Hx of this. Reports symptoms of low motivation, little interest in doing things, poor sleep, no  appetite. Is on buspar, mirtazapine, and effexor at home, though doesn't really think medicines are as helpful as talk therapy is. Wanted to speak to social work and . States no plan to harm himself. Reports he just needs to leave the hospital.  -Consulted psych for med management and plan for talk therapy while hospitalized. Psych signed off.  -Discontinued buspar and mirtazapine per psych.  -Increased venlafaxine and gabapentin per psych.  -Seroquel for sleep.  Vistaril at bedtime.     Moderate malnutrition  Per RD eval. TPN as above.     Normocytic anemia  Stable.     Chronic Conditions:  Hypothyroidism- PTA levothyroxine.  Chronic pain- Hold mexolicam 7.5mg daily, resume PTA gabapentin.  BPH-hold home Flomax given he states it does not help.    FEN: TPN.  Clear liquid diet as tolerated.  DVT Prophylaxis: Eliquis.  Code: Full code.    Disposition/Advanced Care Planning  Discharge Planning discussed with patient and care team.  Anticipated discharge date: Multiple days.  Disposition: TCU versus home.    Subjective  Pt still asymptomatic with Covid but feels a little bit depressed that we will keep him here longer.  Output from fistula bags still decreasing.  NAEO.    Objective    Vital signs in last 24 hours Temp:  [97.5  F (36.4  C)-98  F (36.7  C)] 97.6  F (36.4  C)  Pulse:  [71-81] 78  Resp:  [18] 18  BP: ()/(57-81) 92/57  SpO2:  [90 %-91 %] 90 %   Weight: [unfilled]    Intake/Output last 3 shift I/O last 3 completed shifts:  In: 240 [P.O.:240]  Out: 45 [Drains:45]    Intake/Output this shift:No intake/output data recorded.    Physical Exam  General appearance: NAD.  Head: Normocephalic, without obvious abnormality, atraumatic.  Eyes: conjunctivae/corneas clear.  Throat: MMM.  Neck: supple, symmetrical, trachea midline.  Lungs: no increased respiratory effort.  Heart: Good capillary refill.  Abdomen: non-distende.  Extremities: extremities normal, atraumatic, no cyanosis or edema.  Skin: Skin  color, texture, turgor normal. No rashes or lesions.  Neurologic: Sleeping, normal tone.  Psychiatric: Sleeping.    Pertinent Labs and Pertinent Radiology   Lab Results: personally reviewed.     Radiology Results: Personally reviewed image/s and impression/s    Precepted patient with Dr. Lelia Harry.    Johnathan Lees MD  [facility], PGY-3

## 2022-01-24 NOTE — PLAN OF CARE
Problem: Adult Inpatient Plan of Care  Goal: Plan of Care Review  Outcome: Improving  Flowsheets (Taken 1/24/2022 1403)  Plan of Care Reviewed With: patient  Progress: improving   TPN stopped this morning per orders. Minimal drainage from fistula drains x2. Patient up in the room independently.       Problem: Pain (Surgery Nonspecified)  Goal: Acceptable Pain Control  Outcome: Improving  Intervention: Prevent or Manage Pain  Recent Flowsheet Documentation  Taken 1/24/2022 1034 by Alisson Walker RN  Pain Management Interventions:   medication (see MAR)   emotional support   rest   Patient having aching abdomen pain 9/10 this morning. PRN percocet given 1 tablet and pain was zero after. Patient feeling back to normal. Will continue to monitor.     Alisson Walker RN 1/24/2022 2:06 PM

## 2022-01-25 PROCEDURE — 250N000013 HC RX MED GY IP 250 OP 250 PS 637: Performed by: INTERNAL MEDICINE

## 2022-01-25 PROCEDURE — 250N000013 HC RX MED GY IP 250 OP 250 PS 637: Performed by: STUDENT IN AN ORGANIZED HEALTH CARE EDUCATION/TRAINING PROGRAM

## 2022-01-25 PROCEDURE — 250N000009 HC RX 250: Performed by: FAMILY MEDICINE

## 2022-01-25 PROCEDURE — 99231 SBSQ HOSP IP/OBS SF/LOW 25: CPT | Mod: GC | Performed by: STUDENT IN AN ORGANIZED HEALTH CARE EDUCATION/TRAINING PROGRAM

## 2022-01-25 PROCEDURE — 250N000009 HC RX 250: Performed by: STUDENT IN AN ORGANIZED HEALTH CARE EDUCATION/TRAINING PROGRAM

## 2022-01-25 PROCEDURE — 120N000001 HC R&B MED SURG/OB

## 2022-01-25 PROCEDURE — 250N000013 HC RX MED GY IP 250 OP 250 PS 637: Performed by: FAMILY MEDICINE

## 2022-01-25 RX ADMIN — AMIODARONE HYDROCHLORIDE 200 MG: 200 TABLET ORAL at 22:51

## 2022-01-25 RX ADMIN — APIXABAN 5 MG: 5 TABLET, FILM COATED ORAL at 09:15

## 2022-01-25 RX ADMIN — OXYCODONE HYDROCHLORIDE AND ACETAMINOPHEN 1 TABLET: 5; 325 TABLET ORAL at 00:41

## 2022-01-25 RX ADMIN — QUETIAPINE FUMARATE 200 MG: 100 TABLET, FILM COATED ORAL at 00:40

## 2022-01-25 RX ADMIN — LEVOTHYROXINE SODIUM 25 MCG: 0.03 TABLET ORAL at 06:39

## 2022-01-25 RX ADMIN — AMIODARONE HYDROCHLORIDE 200 MG: 200 TABLET ORAL at 09:15

## 2022-01-25 RX ADMIN — MAGNESIUM SULFATE HEPTAHYDRATE: 500 INJECTION, SOLUTION INTRAMUSCULAR; INTRAVENOUS at 20:23

## 2022-01-25 RX ADMIN — VENLAFAXINE HYDROCHLORIDE 75 MG: 75 CAPSULE, EXTENDED RELEASE ORAL at 09:15

## 2022-01-25 RX ADMIN — GABAPENTIN 200 MG: 100 CAPSULE ORAL at 17:24

## 2022-01-25 RX ADMIN — GABAPENTIN 200 MG: 100 CAPSULE ORAL at 09:15

## 2022-01-25 RX ADMIN — GABAPENTIN 900 MG: 300 CAPSULE ORAL at 00:35

## 2022-01-25 RX ADMIN — HYDROXYZINE HYDROCHLORIDE 25 MG: 25 TABLET, FILM COATED ORAL at 20:22

## 2022-01-25 RX ADMIN — MAGNESIUM SULFATE HEPTAHYDRATE: 500 INJECTION, SOLUTION INTRAMUSCULAR; INTRAVENOUS at 20:33

## 2022-01-25 RX ADMIN — GABAPENTIN 900 MG: 300 CAPSULE ORAL at 22:52

## 2022-01-25 RX ADMIN — OXYCODONE HYDROCHLORIDE AND ACETAMINOPHEN 1 TABLET: 5; 325 TABLET ORAL at 22:50

## 2022-01-25 RX ADMIN — PANTOPRAZOLE SODIUM 40 MG: 40 TABLET, DELAYED RELEASE ORAL at 06:39

## 2022-01-25 RX ADMIN — APIXABAN 5 MG: 5 TABLET, FILM COATED ORAL at 20:22

## 2022-01-25 RX ADMIN — QUETIAPINE FUMARATE 200 MG: 100 TABLET, FILM COATED ORAL at 22:51

## 2022-01-25 ASSESSMENT — ACTIVITIES OF DAILY LIVING (ADL)
ADLS_ACUITY_SCORE: 7

## 2022-01-25 NOTE — PROGRESS NOTES
"CLINICAL NUTRITION SERVICES - REASSESSMENT NOTE     Nutrition Prescription    RECOMMENDATIONS FOR MDs/PROVIDERS TO ORDER:    Malnutrition Status:    Severe    Recommendations already ordered by Registered Dietitian (RD):  Continue cyclic TPN of D 300  @ 75 ml/hr x 1 hr, 150 ml/hr  X 10 hrs and 75 ml/hr x 1 hr and off.  250 ml of 20% lipids 3 times  Per week over 12 hrs to provide: 1754 Calories, 130 g protein,   300 g CHO, 21 g fat (averaged) and 1650 ml fluid/day      Future/Additional Recommendations:  Follow TPN tolerance, labs, weight and adjust TPN as needed     EVALUATION OF THE PROGRESS TOWARD GOALS   Diet: Clear liquid  Ensure Clear daily  Nutrition Support: cyclic TPN as charted above  Intake: poor, no taste    NEW FINDINGS   COVID-19 test came back positive on 1/21/2022    Per surgery note (1/8/2022) Surgery is the only service that can advance the patient's diet and that will not happen for some time (fistulas have to close down first)    ANTHROPOMETRICS  Height: 170.2 cm (5' 7\")  Most Recent Weight: 75.6 kg (166 lb 9.6 oz)  (1/18)  Admit weight: 173 lb (12/21/21)  BMI: Overweight BMI 25-29.9  Weight History:   Wt Readings from Last 10 Encounters:   01/18/22 75.6 kg (166 lb 9.6 oz)   12/17/21 76.8 kg (169 lb 6.4 oz)   01/22/20 79.4 kg (175 lb)   12/24/19 81.2 kg (179 lb)     GI CONCERNS  Ostomy/fistula  Abdominal tenderness  30 mL output osotomies    LABS  Reviewed  Electrolytes wdl  BUN 26 sl high  Triglycerides 142 wdl  FSBG 82    MEDICATIONS  Reviewed  Levothyroxine  pantoprazole    MALNUTRITION: (1/23/22)  % Weight Loss:  1-2% weight loss in 1 week (moderate malnutrition)  % Intake:  No decreased intake noted  Subcutaneous Fat Loss:  Orbital region severe depletion and Upper arm region mild to moderate depletion  Muscle Loss:  Temporal region moderate depletion, Clavicle bone region severe depletion, Dorsal hand region mild to moderate depletion and Patellar region moderate depletion  Fluid " Retention:  None noted    Malnutrition Diagnosis: Severe malnutrition  In Context of:  Acute illness or injury    CURRENT NUTRITION DIAGNOSIS  Inadequate oral intake related to SBO with formation of enterocutaneous fistula as evidenced by nausea and abdominal pain      INTERVENTIONS  Implementation  Continue same TPN regimen today and Ensure Clear    Goals  Meet estimate nutrition needs-progressing  Wound healing-progressing  No significant dry weight loss - has weight ordered for MON, FRI     Monitoring/Evaluation  Progress toward goals will be monitored and evaluated per protocol.

## 2022-01-25 NOTE — PLAN OF CARE
"NURSING NOTE  0700 - 1500      Problem: Adult Inpatient Plan of Care  Goal: Plan of Care Review  Outcome: No Change    - A&Ox4. Withdrawn, pleasant and cooperative with cares.  - No respiratory issues. On room air. VSS  - Abdominal pain 2/10. No PRN Percocet indicated/given.   - Scant tan liquid output per x2 ostomy bags - unmeasurable. Also have very    small amount of gas.   - Cyclic TPN stopped this am, per orders. Will continue with TPN tonight.      Double lumen PICC line intact, lumens with blood return and patent. Very poor    appetite, on clear liquids. \"Tired of the liquids.\"  - Independent in the room. No stool per report. Voiding.   - Pending TCU placement.      "

## 2022-01-25 NOTE — PHARMACY-CONSULT NOTE
"Pharmacy Note: Parenteral Nutrition (PN) Management    Pharmacist consulted to dose PN for Gurdeep Dia, a 66 year old male by Dr. Mcmahon.    Subjective:    The patient is a new PN start.     The patient was started on PN in the hospital on 12/2/21.     Indication for PN therapy: continue until fistula heals     Inadequate nutrition existing for > 7 days.      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     Social History            Tobacco Use     Smoking status: Current Every Day Smoker     Smokeless tobacco: Never Used     Tobacco comment: declined cessation discussion and resource packet -1/14/22   Substance Use Topics     Alcohol use: Not Currently       Comment: Clean for 5 years     Drug use: Not Currently         Objective:    Ht Readings from Last 1 Encounters:   12/22/21 1.702 m (5' 7\")     Wt Readings from Last 1 Encounters:   01/18/22 75.6 kg (166 lb 9.6 oz)       Body mass index is 26.09 kg/m .    No data found.    Labs:  Last 3 days:  Recent Labs     01/24/22  0532      POTASSIUM 4.3   CHLORIDE 106   CO2 26   BUN 26*   CR 0.81   VIJAYA 8.5   MAG 2.1   PHOS 4.1   PREALB 17*   TRIG 142   HGB 9.8*   HCT 31.8*   *   INR 1.30*       Glucose (past 48 hours):   Recent Labs     01/23/22  1636 01/24/22  0532 01/24/22  1820   GLC 82 102 82       Intake/Output (last 24 hours): I/O last 3 completed shifts:  In: 1650   Out: 1030 [Urine:1000; Drains:30]    Estimated CrCl: Estimated Creatinine Clearance: 94.6 mL/min (based on SCr of 0.81 mg/dL).    Assessment:    Continue patient on PN therapy as a cyclic central therapy.     Given the patient's current condition/oral intake, PN is still indicated.    Lab results:  No labs completed today    Plan:    Rate of PN: 75 mL/hr x 1 hour, then 150 ml/hr x 10 hours, then 75 ml/hr x 1 hour.    Maintenance IV fluid rate will be adjusted appropriately to meet the total maximum IV fluids rate as ordered by provider.  1. Formula:     Amino " Acids 130 grams    Dextrose 300 grams    Sodium 130 mEq/day    Potassium 75 mEq/day    Calcium 8 mEq/day    Magnesium 16 mEq/day    Phosphorus 32 mMol/day    Chloride: Acetate Ratio 1:2    Standard Multivitamins w/Vitamin K    Trace Elements  2. Fat Emulsion: 20%, 250 mL IV 3 times weekly on MON, THUR, and SAT  3. Check hyperal lytes labs tomorrow.  4. Pharmacist will continue to follow the patient's lab results, clinical status and blood glucose results and make adjustments as appropriate.    Thank you for the consult.  Gerard Nash RPH  1/25/2022 9:49 AM

## 2022-01-25 NOTE — PLAN OF CARE
Problem: Adult Inpatient Plan of Care  Goal: Optimal Comfort and Wellbeing  Outcome: Improving     Problem: Pain (Surgery Nonspecified)  Goal: Acceptable Pain Control  Outcome: Improving     Pt was comfortable in bed all night. TPN cycle running.  Drains in place 2x. Scant output.   Independent in the room.   Incidental Covid positive on 1/21. No symptoms. On room air.

## 2022-01-25 NOTE — PLAN OF CARE
"  Problem: Pain (Surgery Nonspecified)  Goal: Acceptable Pain Control  Intervention: Prevent or Manage Pain  Recent Flowsheet Documentation  Taken 1/24/2022 1709 by Chapis Bell, RN  Pain Management Interventions: medication (see MAR)     Problem: Impaired Wound Healing  Goal: Optimal Wound Healing  Intervention: Promote Wound Healing  Recent Flowsheet Documentation  Taken 1/24/2022 1709 by Chapis Bell, RN  Pain Management Interventions: medication (see MAR)  Taken 1/24/2022 1630 by Chapis Bell, RN  Activity Management: activity adjusted per tolerance    Reported abdominal discomfort rated at 7/10.  Oxycodone given once and Patient stated this did not help much.  Up in room and to bathroom independently.  Showered.     TPN/Lipids infusing.  Did not eat much of clear liquids. Stated \"it has no taste.\"    Drinking fluids.  Pouches intact and patent- scant amount,unmeasurable drops of yellow and green drainage from 1 and 2.        "

## 2022-01-25 NOTE — PROGRESS NOTES
[facility] Progress Note    Assessment/Plan  Active Problems:    Hypertension goal BP (blood pressure) < 140/90    Moderate major depression (H)    Perforation of intestine (H)    Abdominal pain, generalized    Vesicocutaneous fistula    Infection due to 2019 novel coronavirus    Patient remains hospitalized due to placement, TPN.    Chema Dia is a 65 y/o M with past medical history of splenectomy, appendectomy, HTN, and substance abuse, with recent admission to hospital 11/30-12/21/2021 for SBO with perforation s/p surgical repair. Discharged home but presents as he could not manage at home and is now requesting to be placed in TCU.  Continues on TPN.  New RUL and RLL PE found and being treated, new HAP 1/9 also resolved with abx. Incidentally COVID-19 positive 1/21/2022.    RUL and RLL PE  SMV thrombosis  Acute RLL PE found on CT as well as SMV thrombosis.  Also has RUL PE.  No evidence of right heart strain.  Most likely provoked in the setting of recent surgery/hospitalization.  -Eliquis 5 mg twice daily for PE treatment for 3 months.    Atrial flutter (resolved)  Developed this overnight 1/8-1/9 and required amiodarone and brief ICU stay for pressors.  Cardiology consulted.  Patient stabilized and sinus rhythm, back to the floor.  Pulled back PICC line 1 cm as cardiology suspects the tip may be in the right atrium and that may have caused his episode of atrial flutter.  No repeat episodes since that time.  -Cardiology signed off.  Continue oral amiodarone.  Plan to taper per cardiology: amiodarone 3 times a day for a week then go down to 2 times a day for 2 weeks and then daily 200 mg a day.  -Eliquis as above.    COVID-19  Incidentally found while inpatient 1/21.  Asymptomatic now.  Already anticoagulated on Eliquis.  No additional specific COVID-19 cares indicated at this time.  No new supplemental oxygen requirement.    HAP, resolved  Wound cultures positive for Candida parapsilosis and  E. coli 12/22.  Patient has been having nausea with TPN but developed a new supplemental oxygen requirement 1/6 in addition to fatigue and general malaise and was found to have RLL PE and SMV thrombosis.  CT abdomen overall stable from prior.  Developed evidence of left-sided HAP overnight 1/9, re-consulted ID for antibiotic guidance, transitioned back to oral antibiotics and finished the course 1/14, also finished fluconazole, ID signed off.     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  Required subsequent washout on 12/3 with antibiotics and MARISA drain placement.  On 12/8 found to have continued enhancing abscess with drain placed 12/9 and removed on 12/16.  Was discharged, but returned soon thereafter on 12/22.  Now receiving TPN.  Ostomy output decreasing.  -Consulted surgery.  Clear liquid diet.  Surgery is the only service that can advance this and will not do so for some time.  No new procedure indicated at this time.   -TPN per pharmacy and RD.  Working on TPN teaching for at home.  -[facility] teaching so he can care for his fistulas at home.  Making progress with these cares.     Alk phos elevation, resolved  Alk phos elevated, now back to normal.  Rest of LFTs okay.  Does have sludge in gallbladder but no evidence of cholecystitis on imaging.  -Cyclic TPN.  Additional daily lab checks outside of regularly scheduled TPN checks not indicated at this time unless he develops new symptoms.     Depression  Insomnia  Hx of this.  Reports symptoms of low motivation, little interest in doing things, poor sleep, no appetite.  Is on buspar, mirtazapine, and effexor at home, though doesn't really think medicines are as helpful as talk therapy is.  Wanted to speak to social work and .  States no plan to harm  himself.  Reports he just needs to leave the hospital.  -Psych recs:       -Discontinued buspar and mirtazapine per psych.       -Increased venlafaxine and gabapentin per psych.       -Seroquel for sleep.  Vistaril at bedtime.     Severe protein-calorie malnutrition  TPN as above.     Normocytic anemia  Stable.     Chronic Conditions:  Hypothyroidism- PTA levothyroxine.  Chronic pain- Hold mexolicam 7.5mg daily, resume PTA gabapentin.  BPH-hold home Flomax given he states it does not help.    FEN: TPN.  Clear liquid diet as tolerated.  DVT Prophylaxis: Eliquis.  Code: Full code.    Disposition/Advanced Care Planning  Discharge Planning discussed with patient and care team.  Anticipated discharge date: Multiple days.  Disposition: TCU versus home.    Subjective  Pt waking up from sleeping overnight and feeling a bit groggy.  Would like to talk more later.  Still asymptomatic from COVID-19.  Decreasing output in bags.  NAEO.    Objective    Vital signs in last 24 hours Temp:  [97.6  F (36.4  C)-98.7  F (37.1  C)] 98.4  F (36.9  C)  Pulse:  [79-82] 79  Resp:  [18] 18  BP: ()/(66-85) 138/84  SpO2:  [92 %-95 %] 92 %   Weight: [unfilled]    Intake/Output last 3 shift I/O last 3 completed shifts:  In: 1650   Out: 1030 [Urine:1000; Drains:30]    Intake/Output this shift:No intake/output data recorded.    Physical Exam  General appearance: NAD.  Head: Normocephalic, without obvious abnormality, atraumatic.  Eyes: conjunctivae/corneas clear.  Throat: MMM.  Neck: supple, symmetrical, trachea midline.  Lungs: no increased respiratory effort.  Heart: Good capillary refill.  Abdomen: non-distended.  Extremities: extremities normal, atraumatic, no cyanosis or edema.  Skin: Skin color, texture, turgor normal. No rashes or lesions.  Neurologic: A&Ox3, normal strength and normal tone.  Psychiatric: Mood and affect appropriate.    Pertinent Labs and Pertinent Radiology   Lab Results: personally reviewed.     Radiology Results:  Personally reviewed image/s and impression/s    Precepted patient with Dr. Elenita Nunez.    Johnathan Lees MD  [facility], PGY-3

## 2022-01-26 LAB
GLUCOSE BLDC GLUCOMTR-MCNC: 107 MG/DL (ref 70–99)
GLUCOSE BLDC GLUCOMTR-MCNC: 96 MG/DL (ref 70–99)
MAGNESIUM SERPL-MCNC: 2.1 MG/DL (ref 1.8–2.6)
PHOSPHATE SERPL-MCNC: 4.1 MG/DL (ref 2.5–4.5)

## 2022-01-26 PROCEDURE — 250N000013 HC RX MED GY IP 250 OP 250 PS 637: Performed by: INTERNAL MEDICINE

## 2022-01-26 PROCEDURE — 250N000013 HC RX MED GY IP 250 OP 250 PS 637: Performed by: STUDENT IN AN ORGANIZED HEALTH CARE EDUCATION/TRAINING PROGRAM

## 2022-01-26 PROCEDURE — 250N000009 HC RX 250: Performed by: STUDENT IN AN ORGANIZED HEALTH CARE EDUCATION/TRAINING PROGRAM

## 2022-01-26 PROCEDURE — 99024 POSTOP FOLLOW-UP VISIT: CPT | Performed by: PHYSICIAN ASSISTANT

## 2022-01-26 PROCEDURE — 250N000013 HC RX MED GY IP 250 OP 250 PS 637: Performed by: FAMILY MEDICINE

## 2022-01-26 PROCEDURE — 83735 ASSAY OF MAGNESIUM: CPT | Performed by: INTERNAL MEDICINE

## 2022-01-26 PROCEDURE — 120N000001 HC R&B MED SURG/OB

## 2022-01-26 PROCEDURE — G0463 HOSPITAL OUTPT CLINIC VISIT: HCPCS

## 2022-01-26 PROCEDURE — 99231 SBSQ HOSP IP/OBS SF/LOW 25: CPT | Mod: GC | Performed by: STUDENT IN AN ORGANIZED HEALTH CARE EDUCATION/TRAINING PROGRAM

## 2022-01-26 PROCEDURE — 84100 ASSAY OF PHOSPHORUS: CPT | Performed by: INTERNAL MEDICINE

## 2022-01-26 RX ADMIN — MAGNESIUM SULFATE HEPTAHYDRATE: 500 INJECTION, SOLUTION INTRAMUSCULAR; INTRAVENOUS at 20:45

## 2022-01-26 RX ADMIN — AMIODARONE HYDROCHLORIDE 200 MG: 200 TABLET ORAL at 20:43

## 2022-01-26 RX ADMIN — VENLAFAXINE HYDROCHLORIDE 75 MG: 75 CAPSULE, EXTENDED RELEASE ORAL at 08:49

## 2022-01-26 RX ADMIN — AMIODARONE HYDROCHLORIDE 200 MG: 200 TABLET ORAL at 08:50

## 2022-01-26 RX ADMIN — APIXABAN 5 MG: 5 TABLET, FILM COATED ORAL at 08:49

## 2022-01-26 RX ADMIN — OXYCODONE HYDROCHLORIDE AND ACETAMINOPHEN 1 TABLET: 5; 325 TABLET ORAL at 23:11

## 2022-01-26 RX ADMIN — GABAPENTIN 900 MG: 300 CAPSULE ORAL at 23:11

## 2022-01-26 RX ADMIN — APIXABAN 5 MG: 5 TABLET, FILM COATED ORAL at 20:43

## 2022-01-26 RX ADMIN — LEVOTHYROXINE SODIUM 25 MCG: 0.03 TABLET ORAL at 06:07

## 2022-01-26 RX ADMIN — GABAPENTIN 200 MG: 100 CAPSULE ORAL at 17:46

## 2022-01-26 RX ADMIN — HYDROXYZINE HYDROCHLORIDE 25 MG: 25 TABLET, FILM COATED ORAL at 20:43

## 2022-01-26 RX ADMIN — QUETIAPINE FUMARATE 200 MG: 100 TABLET, FILM COATED ORAL at 23:11

## 2022-01-26 RX ADMIN — PANTOPRAZOLE SODIUM 40 MG: 40 TABLET, DELAYED RELEASE ORAL at 08:49

## 2022-01-26 RX ADMIN — OXYCODONE HYDROCHLORIDE AND ACETAMINOPHEN 1 TABLET: 5; 325 TABLET ORAL at 11:32

## 2022-01-26 RX ADMIN — GABAPENTIN 200 MG: 100 CAPSULE ORAL at 08:49

## 2022-01-26 ASSESSMENT — ACTIVITIES OF DAILY LIVING (ADL)
ADLS_ACUITY_SCORE: 7

## 2022-01-26 NOTE — PROGRESS NOTES
"WOC stoma assessment EC fistula       Allergies:  Penicillins    Height:  Height: 170.2 cm (5' 7\")    Weight:   Weight: 75.5 kg (166 lb 6.4 oz)    Past Medical History:  Past Medical History:   Diagnosis Date     Benign prostatic hyperplasia with weak urinary stream      Chronic bilateral low back pain without sciatica      Chronic neck pain      RAVI (generalized anxiety disorder)      History of hepatitis C     treated and cured     History of substance abuse (H)      Hypertension goal BP (blood pressure) < 140/90      Moderate major depression (H)        Labs:  Recent Labs   Lab Test 12/23/21  0715 12/22/21  1648 12/22/21  0003 12/08/21  0535 12/07/21  0358   CRP  --   --   --   --  26.6*   HGB 10.9*  --  12.0*   < > 10.9*   ALBUMIN  --   --  2.4*   < > 2.3*   CULTURE  --  No growth after 12 hours  No growth after 12 hours  --    < >  --     < > = values in this interval not displayed.       Mars:  Mars Score: 17    INTAKE  EC fistula s/p 2 abdominal surgeries      ASSESSMENT  Abdominal EC fistula    Fistula sites appear to be healing nicely    Upper wound: 0.1x0.1cm- minor yellow drainage    Distal portion of incision is open wound. Wound now measures approx 0.5x0.5cm. scant serous/mucous drainage    Fistulas lay within a deep crease along the midline incision. Scar tissue and minor erythema present to skin from patient pulling at pouches for removal.     Fistula pouches left off at this time and starting just dry gauze and ABD.          Supplies: Pouch [employer] #[id number] -Flat 1 piece cut to fit fecal pouch and Paste [employer] #[id number] -Adapt Ring    Instructions Given: WOC Role.    Nursing care provided was dressing change    Discussed plan of care with nurse and patient, care management, general surgery PA    Outcomes and treatment recommendations are to To contain output and Other promote healing    Actions taken by WOC RN: 5 minutes of education and orders and discharge orders updated.    Planned Follow Up: " Weekly.    Plan for next visit: Reassess stoma/peristomal skin

## 2022-01-26 NOTE — PLAN OF CARE
Problem: Adult Inpatient Plan of Care  Goal: Absence of Hospital-Acquired Illness or Injury  Intervention: Identify and Manage Fall Risk  Recent Flowsheet Documentation  Taken 1/25/2022 1630 by Chapis Bell RN  Safety Promotion/Fall Prevention:    patient and family education    clutter free environment maintained    nonskid shoes/slippers when out of bed    safety round/check completed     Problem: Pain (Surgery Nonspecified)  Goal: Acceptable Pain Control  Outcome: Improving    Declined pain medication  until bedtime.  Requested pain med for abdominal pain on left.  Percocet given.  Nothing out of drain bag (ostomy bag).  Incision remains CDI.       Cyclic TPN infusing.  Poor appetite.  Had small amounts of clear liquids.  Stated no appetite.  Up in room independently.

## 2022-01-26 NOTE — PROGRESS NOTES
ASSESSMENT:  1. Abdominal pain, generalized        Gurdeep Dia is a 66 year old male status post explore lap lysis of adhesions with resultant 3 days later perforation reoperation oversew of defect and then 5 days later perforation again and at this time  he has fistulas above and below to his incision that are being managed with ostomy bag containment and TPN diet (clear liquids ok).  Fistulas continue to be controlled with ostomy bags in place.  He is beginning to heal down but is still not ready to increase his diet from a surgical standpoint    PLAN:  Clear liquid diet  Monitor wounds and skin integrity; appreciate WOC cares, ostomy bags off today and will see how things do with just ABDs  We will continue to follow      Shaggy Cao PA-C  Pager - [phone]  Phone - [phone]   General Surgery    SUBJECTIVE:   He is doing okay, no changes, fistulas with minimal output at this point      Patient Vitals for the past 24 hrs:   BP Temp Temp src Pulse Resp SpO2   01/26/22 0843 123/81 97.8  F (36.6  C) Oral 97 -- 98 %   01/26/22 0014 91/66 97.6  F (36.4  C) Oral 93 18 92 %   01/25/22 1911 107/73 97.6  F (36.4  C) Oral 84 18 91 %   01/25/22 1539 101/68 97.9  F (36.6  C) Oral 78 18 91 %         PHYSICAL EXAM:  GEN: No acute distress, comfortable  LUNGS: CTA bilaterally  CV:RRR  ABD: Soft, nondistended, midline incision intact, ostomy bags off and covered with ABDs and tape  EXT:No cyanosis, edema or obvious abnormalities    01/25 0700 - 01/26 0659  In: -   Out: 400 [Urine:400]    No results displayed because visit has over 200 results.             Shaggy Cao PA-C

## 2022-01-26 NOTE — PLAN OF CARE
Problem: Adult Inpatient Plan of Care  Goal: Optimal Comfort and Wellbeing  Outcome: Improving     Problem: Impaired Wound Healing  Goal: Optimal Wound Healing  Outcome: Improving     Patient comfortable all night. Ostomy drains in place, no output this shift.  Pain managed with PRN meds.  TPN running, no lipids.   A/O 4x. Waiting for placement.

## 2022-01-26 NOTE — PROGRESS NOTES
[facility] Progress Note    Assessment/Plan  Active Problems:    Hypertension goal BP (blood pressure) < 140/90    Moderate major depression (H)    Perforation of intestine (H)    Abdominal pain, generalized    Vesicocutaneous fistula    Infection due to 2019 novel coronavirus    Patient remains hospitalized due to placement, TPN.    Chema Dia is a 67 y/o M with past medical history of splenectomy, appendectomy, HTN, and substance abuse, with recent admission to hospital 11/30-12/21/2021 for SBO with perforation s/p surgical repair. Discharged home but presents as he could not manage at home and is now requesting to be placed in TCU.  Continues on TPN.  New RUL and RLL PE found and being treated, new HAP 1/9 also resolved with abx. Incidentally COVID-19 positive 1/21/2022.    RUL and RLL PE  SMV thrombosis  Acute RLL PE found on CT as well as SMV thrombosis.  Also has RUL PE.  No evidence of right heart strain.  Most likely provoked in the setting of recent surgery/hospitalization.  -Eliquis 5 mg twice daily for PE treatment for 3 months.    Atrial flutter (resolved)  Developed this overnight 1/8-1/9 and required amiodarone and brief ICU stay for pressors.  Cardiology consulted.  Patient stabilized and sinus rhythm, back to the floor.  Pulled back PICC line 1 cm as cardiology suspects the tip may be in the right atrium and that may have caused his episode of atrial flutter.  No repeat episodes since that time.  -Cardiology signed off.  Continue oral amiodarone.  Plan to taper per cardiology: amiodarone 3 times a day for a week then go down to 2 times a day for 2 weeks and then daily 200 mg a day.  -Eliquis as above.    COVID-19  Incidentally found while inpatient 1/21.  Asymptomatic now.  Already anticoagulated on Eliquis.  No additional specific COVID-19 cares indicated at this time.  No new supplemental oxygen requirement.    HAP, resolved  Wound cultures positive for Candida parapsilosis and  E. coli 12/22.  Patient has been having nausea with TPN but developed a new supplemental oxygen requirement 1/6 in addition to fatigue and general malaise and was found to have RLL PE and SMV thrombosis.  CT abdomen overall stable from prior.  Developed evidence of left-sided HAP overnight 1/9, re-consulted ID for antibiotic guidance, transitioned back to oral antibiotics and finished the course 1/14, also finished fluconazole, ID signed off.     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  Required subsequent washout on 12/3 with antibiotics and MARISA drain placement.  On 12/8 found to have continued enhancing abscess with drain placed 12/9 and removed on 12/16.  Was discharged, but returned soon thereafter on 12/22.  Now receiving TPN.  Ostomy output decreasing, ABDs instead of ostomy bags 1/26.  -Consulted surgery.  Clear liquid diet.  Surgery is the only service that can advance this and will not do so for some time.  No new procedure indicated at this time.   -TPN per pharmacy and RD.  Working on TPN teaching for at home.  -[facility] teaching so he can care for his fistulas at home.  Making progress with these cares.  ABDs today instead of ostomy bags.     Alk phos elevation, resolved  Alk phos elevated, now back to normal.  Rest of LFTs okay.  Does have sludge in gallbladder but no evidence of cholecystitis on imaging.  -Cyclic TPN.  Additional daily lab checks outside of regularly scheduled TPN checks not indicated at this time unless he develops new symptoms.     Depression  Insomnia  Hx of this.  Reports symptoms of low motivation, little interest in doing things, poor sleep, no appetite.  Is on buspar, mirtazapine, and effexor at home, though doesn't really think medicines are as helpful as talk therapy is.   Wanted to speak to social work and .  States no plan to harm himself.  Reports he just needs to leave the hospital.  -Psych recs:       -Discontinued buspar and mirtazapine per psych.       -Increased venlafaxine and gabapentin per psych.       -Seroquel for sleep.  Vistaril at bedtime.     Severe protein-calorie malnutrition  TPN as above.     Normocytic anemia  Stable.     Chronic Conditions:  Hypothyroidism- PTA levothyroxine.  Chronic pain- Hold mexolicam 7.5mg daily, resume PTA gabapentin.  BPH-hold home Flomax given he states it does not help.    FEN: TPN.  Clear liquid diet as tolerated.  DVT Prophylaxis: Eliquis.  Code: Full code.    Disposition/Advanced Care Planning  Discharge Planning discussed with patient and care team.  Anticipated discharge date: Multiple days.  Disposition: TCU versus home.    Subjective  NAEO.  Has ABDs instead of ostomy bags.  Feeling optimistic about this but still frustrated that he has not been able to find a place to go.    Objective    Vital signs in last 24 hours Temp:  [97.6  F (36.4  C)-97.9  F (36.6  C)] 97.8  F (36.6  C)  Pulse:  [78-97] 86  Resp:  [18] 18  BP: ()/(66-81) 119/80  SpO2:  [91 %-98 %] 93 %   Weight: [unfilled]    Intake/Output last 3 shift I/O last 3 completed shifts:  In: -   Out: 400 [Urine:400]    Intake/Output this shift:I/O this shift:  In: 1651   Out: 10 [Drains:10]    Physical Exam  General appearance: NAD.  Head: Normocephalic, without obvious abnormality, atraumatic.  Eyes: conjunctivae/corneas clear.  Throat: MMM.  Neck: supple, symmetrical, trachea midline.  Lungs: no increased respiratory effort.  Heart: Good capillary refill.  Abdomen: non-distended.  ABDs on fistula openings instead of ostomy bags.  Extremities: extremities normal, atraumatic, no cyanosis or edema.  Skin: Skin color, texture, turgor normal. No rashes or lesions.  Neurologic: A&Ox3, normal strength and normal tone.  Psychiatric: Mood and affect  appropriate.    Pertinent Labs and Pertinent Radiology   Lab Results: personally reviewed.     Radiology Results: Personally reviewed image/s and impression/s    Precepted patient with Dr. Elenita Nunez.    Johnathan Lees MD  [facility], PGY-3

## 2022-01-26 NOTE — PHARMACY-CONSULT NOTE
"Pharmacy Note: Parenteral Nutrition (PN) Management    Pharmacist consulted to dose PN for Gurdeep Dia, a 66 year old male by Dr. Mcmahon.    Subjective:      The patient was started on PN in the hospital on 12/2/21.    Indication for PN therapy: Continue until fistula heals    Inadequate nutrition existing for > 7 days.     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      Social History     Tobacco Use     Smoking status: Current Every Day Smoker     Smokeless tobacco: Never Used     Tobacco comment: declined cessation discussion and resource packet -1/14/22   Substance Use Topics     Alcohol use: Not Currently     Comment: Clean for 5 years     Drug use: Not Currently     Objective:    Ht Readings from Last 1 Encounters:   12/22/21 1.702 m (5' 7\")     Wt Readings from Last 1 Encounters:   01/18/22 75.6 kg (166 lb 9.6 oz)       Body mass index is 26.09 kg/m .    No data found.    Labs:  Last 3 days:  Recent Labs     01/24/22  0532 01/26/22  0604     --    POTASSIUM 4.3  --    CHLORIDE 106  --    CO2 26  --    BUN 26*  --    CR 0.81  --    VIJAYA 8.5  --    MAG 2.1 2.1   PHOS 4.1 4.1   PREALB 17*  --    TRIG 142  --    HGB 9.8*  --    HCT 31.8*  --    *  --    INR 1.30*  --        Glucose (past 48 hours):   Recent Labs     01/24/22  1820 01/26/22  0856   GLC 82 96       Intake/Output (last 24 hours): I/O last 3 completed shifts:  In: -   Out: 400 [Urine:400]    Estimated CrCl: Estimated Creatinine Clearance: 94.6 mL/min (based on SCr of 0.81 mg/dL).    Assessment:    Continue patient on PN therapy as a cyclic central therapy.     Given the patient's current condition/oral intake, PN is still indicated.    Lab results reviewed: Only phos and magnesium labs today    Plan:  1. Rate of PN: 75ml/hr x 1 hour, then 150ml/hr x 10 hours, then 75ml/hr x 1 hour.  2. Formula:     Amino Acids 130 grams    Dextrose 300 grams    Sodium 130 mEq/day    Potassium 75 mEq/day    Calcium 8 " mEq/day    Magnesium 16 mEq/day    Phosphorus 32 mMol/day    Chloride: Acetate Ratio 1:2    Standard Multivitamins w/Vitamin K    Trace Elements    3. Fat Emulsion: 20%, 250 mL IV 3 times weekly on Mon, Thur and Sat  4. Check BMP and Mag and phos labs tomorrow.  5. Pharmacist will continue to follow the patient's lab results, clinical status and blood glucose results and make adjustments as appropriate.    Thank you for the consult.  Saritha Price RPH  1/26/2022 11:45 AM

## 2022-01-26 NOTE — PROGRESS NOTES
NUTRITION BRIEF NOTE :    Pharmacy/Nutrition to start and manage TPN  See RD note 22 for full reassessment       RECOMMENDATIONS FOR MDs/PROVIDERS TO ORDER:      Recommendations already ordered by Registered Dietitian (RD):      Continue TPN  regimen as ordered     New findings in last 24 hours:    Diet order: Clear Liquid    RemainsTPN reliant:  CENTRAL LINE IV, at  mL/hr, Administer over 12 Hours, CYCLE, Starting on 22 at 2000, For 24 hours  Infused volume on cycled TPN may differ slightly from ordered volume due to rounding in calculations. Start rate at 75 mL/hr for 1 hours. Increase rate to 150 mL/hr for 10 hours. Decrease rate to 75 mL/hr for 1 hours, then stop.    cyclic TPN of D 300  @ 75 ml/hr x 1 hr, 150 ml/hr  X 10 hrs and 75 ml/hr x 1 hr and off.  250 ml of 20% lipids 3 times  Per week over 12 hrs to provide: 1754 Calories, 130 g protein,   300 g CHO, 21 g fat (averaged) and 1650 ml fluid/day       Per surgery note (2022) Surgery is the only service that can advance the patient's diet and that will not happen for some time (fistulas have to close down first)    COVID + 22    Last BM per nursin22 abdominal distention     I/O last 3 completed shifts:    In: -     Out: 400 [Urine:400]    Labs: reviewed including:    Electrolytes  Potassium (mmol/L)   Date Value   2022 4.3   2022 4.2   2022 4.4   2020 3.8     Phosphorus (mg/dL)   Date Value   2022 4.1   2022 4.1   2022 3.3   2022 3.4   2022 3.3        Blood Glucose  Glucose (mg/dL)   Date Value   2022 102   2022 105   2022 92   2022 86   2022 133 (H)   2020 100 (H)     GLUCOSE BY METER POCT (mg/dL)   Date Value   2022 82   2022 82   2022 122 (H)   2022 126 (H)   2022 82     Hemoglobin A1C (%)   Date Value   2021 5.8 (H)        Inflammatory Markers  CRP (mg/dL)   Date Value   2022 20.4  "(H)   12/30/2021 1.8 (H)   12/07/2021 26.6 (H)     WBC (10e9/L)   Date Value   01/22/2020 7.1     WBC Count (10e3/uL)   Date Value   01/24/2022 9.2   01/21/2022 10.8   01/18/2022 9.8     Albumin (g/dL)   Date Value   01/09/2022 1.6 (L)   01/08/2022 2.0 (L)   01/06/2022 2.1 (L)        Magnesium (mg/dL)   Date Value   01/26/2022 2.1   01/24/2022 2.1   01/21/2022 2.1     Sodium (mmol/L)   Date Value   01/24/2022 139   01/22/2022 139   01/21/2022 140   01/22/2020 140        Renal  Urea Nitrogen (mg/dL)   Date Value   01/24/2022 26 (H)   01/22/2022 28 (H)   01/21/2022 29 (H)   01/22/2020 14     Creatinine (mg/dL)   Date Value   01/24/2022 0.81   01/22/2022 0.77   01/21/2022 0.78   01/22/2020 0.94         Additional  Triglycerides (mg/dL)   Date Value   01/24/2022 142   01/17/2022 177 (H)   01/10/2022 106   01/22/2020 73     Ketones Urine (mg/dL)   Date Value   01/06/2022 Negative            amiodarone  200 mg Oral BID    Followed by     [START ON 1/30/2022] amiodarone  200 mg Oral Daily     apixaban ANTICOAGULANT  5 mg Oral BID     gabapentin  200 mg Oral BID     gabapentin  900 mg Oral At Bedtime     hydrOXYzine  25 mg Oral At Bedtime     levothyroxine  25 mcg Oral Daily     lipids  250 mL Intravenous Once per day on Mon Thu Sat     pantoprazole  40 mg Oral QAM AC     QUEtiapine  200 mg Oral At Bedtime     sodium chloride (PF)  10-40 mL Intracatheter Q7 Days     sodium chloride (PF)  3 mL Intracatheter Q8H     venlafaxine  75 mg Oral Daily          dextrose       parenteral nutrition - ADULT compounded formula CYCLE Stopped (01/26/22 0842)            ANTHROPOMETRICS  Height: 5' 7\"  Weight: 75 kg   Body mass index is 26.09 kg/m .  Weight Status:  Overweight BMI 25-29.9  Admit weight: 173 lb (12/21/21)  Weight History:   Wt Readings from Last 10 Encounters:   01/18/22 75.6 kg (166 lb 9.6 oz)   12/17/21 76.8 kg (169 lb 6.4 oz)   01/22/20 79.4 kg (175 lb)   12/24/19 81.2 kg (179 lb)     Severe malnutrition previously noted  " And criteria met on 1/23/22    ASSESSED NUTRITION NEEDS:  Dosing Weight:  76 kg  Updated 1/26/22     Estimated Energy Needs: 4078-4124 kcals ([place] [address] Jeor)  Justification: Stress factor 1.4-2.0 for fistula  Estimated Protein Needs: 115 + grams protein (1.5 + g pro/Kg)  Justification: wound healing  Estimated Fluid Needs: 1685-3642  mL (25-30 mL/kg)   Justification: maintenance    Interventions:    Continue Entered orders for regimen outlined above    Collaboration and Referral of care:  TPN orders sent to pharmacy

## 2022-01-26 NOTE — PLAN OF CARE
Patient's pain level controlled with prn percocet. Takes pain from 7 to 5 which is good per patient report.  On clear liquid diet-doesn't like clear so isn't drinking anything other than water and coffee. Patient spoke with  about it today and she was goiing to talk with surgery and see if we could try advancing diet. Also on cyclic TPN over night.  WOCN came by and removed the ostomy drain pouches and replaced with dry gauze and ABD pad dressing.  Waiting on discharge plans. Patient inquiring when he can retest for COVID as patient tested positive on 1/21/22 and has been symptom free for 5 days. MD also inquiring about that-may be dependent on facility.  Patient up independently in room.

## 2022-01-27 LAB
ANION GAP SERPL CALCULATED.3IONS-SCNC: 6 MMOL/L (ref 5–18)
BUN SERPL-MCNC: 29 MG/DL (ref 8–22)
CALCIUM SERPL-MCNC: 8.5 MG/DL (ref 8.5–10.5)
CHLORIDE BLD-SCNC: 107 MMOL/L (ref 98–107)
CO2 SERPL-SCNC: 26 MMOL/L (ref 22–31)
CREAT SERPL-MCNC: 0.81 MG/DL (ref 0.7–1.3)
ERYTHROCYTE [DISTWIDTH] IN BLOOD BY AUTOMATED COUNT: 15.7 % (ref 10–15)
ERYTHROCYTE [DISTWIDTH] IN BLOOD BY AUTOMATED COUNT: NORMAL %
GFR SERPL CREATININE-BSD FRML MDRD: >90 ML/MIN/1.73M2
GLUCOSE BLD-MCNC: 89 MG/DL (ref 70–125)
GLUCOSE BLD-MCNC: 89 MG/DL (ref 70–125)
GLUCOSE BLDC GLUCOMTR-MCNC: 108 MG/DL (ref 70–99)
GLUCOSE BLDC GLUCOMTR-MCNC: 113 MG/DL (ref 70–99)
GLUCOSE BLDC GLUCOMTR-MCNC: 87 MG/DL (ref 70–99)
HCT VFR BLD AUTO: 33.8 % (ref 40–53)
HCT VFR BLD AUTO: NORMAL %
HGB BLD-MCNC: 10.5 G/DL (ref 13.3–17.7)
HGB BLD-MCNC: NORMAL G/DL
MAGNESIUM SERPL-MCNC: 2 MG/DL (ref 1.8–2.6)
MCH RBC QN AUTO: 28.5 PG (ref 26.5–33)
MCH RBC QN AUTO: NORMAL PG
MCHC RBC AUTO-ENTMCNC: 31.1 G/DL (ref 31.5–36.5)
MCHC RBC AUTO-ENTMCNC: NORMAL G/DL
MCV RBC AUTO: 92 FL (ref 78–100)
MCV RBC AUTO: NORMAL FL
PLATELET # BLD AUTO: 612 10E3/UL (ref 150–450)
PLATELET # BLD AUTO: NORMAL 10*3/UL
POTASSIUM BLD-SCNC: 4.3 MMOL/L (ref 3.5–5)
RBC # BLD AUTO: 3.69 10E6/UL (ref 4.4–5.9)
RBC # BLD AUTO: NORMAL 10*6/UL
SODIUM SERPL-SCNC: 139 MMOL/L (ref 136–145)
WBC # BLD AUTO: 9.8 10E3/UL (ref 4–11)
WBC # BLD AUTO: NORMAL 10*3/UL

## 2022-01-27 PROCEDURE — 250N000013 HC RX MED GY IP 250 OP 250 PS 637: Performed by: INTERNAL MEDICINE

## 2022-01-27 PROCEDURE — 85027 COMPLETE CBC AUTOMATED: CPT | Performed by: STUDENT IN AN ORGANIZED HEALTH CARE EDUCATION/TRAINING PROGRAM

## 2022-01-27 PROCEDURE — 82310 ASSAY OF CALCIUM: CPT | Performed by: STUDENT IN AN ORGANIZED HEALTH CARE EDUCATION/TRAINING PROGRAM

## 2022-01-27 PROCEDURE — 250N000009 HC RX 250: Performed by: STUDENT IN AN ORGANIZED HEALTH CARE EDUCATION/TRAINING PROGRAM

## 2022-01-27 PROCEDURE — 99231 SBSQ HOSP IP/OBS SF/LOW 25: CPT | Mod: GC | Performed by: STUDENT IN AN ORGANIZED HEALTH CARE EDUCATION/TRAINING PROGRAM

## 2022-01-27 PROCEDURE — 82947 ASSAY GLUCOSE BLOOD QUANT: CPT | Performed by: STUDENT IN AN ORGANIZED HEALTH CARE EDUCATION/TRAINING PROGRAM

## 2022-01-27 PROCEDURE — 120N000001 HC R&B MED SURG/OB

## 2022-01-27 PROCEDURE — 83735 ASSAY OF MAGNESIUM: CPT | Performed by: STUDENT IN AN ORGANIZED HEALTH CARE EDUCATION/TRAINING PROGRAM

## 2022-01-27 PROCEDURE — 250N000009 HC RX 250: Performed by: INTERNAL MEDICINE

## 2022-01-27 PROCEDURE — 250N000013 HC RX MED GY IP 250 OP 250 PS 637: Performed by: STUDENT IN AN ORGANIZED HEALTH CARE EDUCATION/TRAINING PROGRAM

## 2022-01-27 PROCEDURE — 250N000013 HC RX MED GY IP 250 OP 250 PS 637: Performed by: FAMILY MEDICINE

## 2022-01-27 RX ORDER — DEXTROSE MONOHYDRATE 25 G/50ML
25-50 INJECTION, SOLUTION INTRAVENOUS
Status: DISCONTINUED | OUTPATIENT
Start: 2022-01-27 | End: 2022-02-14 | Stop reason: HOSPADM

## 2022-01-27 RX ORDER — NICOTINE POLACRILEX 4 MG
15-30 LOZENGE BUCCAL
Status: DISCONTINUED | OUTPATIENT
Start: 2022-01-27 | End: 2022-02-14 | Stop reason: HOSPADM

## 2022-01-27 RX ADMIN — QUETIAPINE FUMARATE 200 MG: 100 TABLET, FILM COATED ORAL at 22:46

## 2022-01-27 RX ADMIN — APIXABAN 5 MG: 5 TABLET, FILM COATED ORAL at 20:27

## 2022-01-27 RX ADMIN — OXYCODONE HYDROCHLORIDE AND ACETAMINOPHEN 1 TABLET: 5; 325 TABLET ORAL at 22:47

## 2022-01-27 RX ADMIN — OXYCODONE HYDROCHLORIDE AND ACETAMINOPHEN 1 TABLET: 5; 325 TABLET ORAL at 16:04

## 2022-01-27 RX ADMIN — PANTOPRAZOLE SODIUM 40 MG: 40 TABLET, DELAYED RELEASE ORAL at 08:15

## 2022-01-27 RX ADMIN — AMIODARONE HYDROCHLORIDE 200 MG: 200 TABLET ORAL at 20:26

## 2022-01-27 RX ADMIN — GABAPENTIN 200 MG: 100 CAPSULE ORAL at 16:04

## 2022-01-27 RX ADMIN — I.V. FAT EMULSION 250 ML: 20 EMULSION INTRAVENOUS at 20:31

## 2022-01-27 RX ADMIN — HYDROXYZINE HYDROCHLORIDE 25 MG: 25 TABLET, FILM COATED ORAL at 20:27

## 2022-01-27 RX ADMIN — GABAPENTIN 900 MG: 300 CAPSULE ORAL at 22:47

## 2022-01-27 RX ADMIN — OXYCODONE HYDROCHLORIDE AND ACETAMINOPHEN 1 TABLET: 5; 325 TABLET ORAL at 09:34

## 2022-01-27 RX ADMIN — LEVOTHYROXINE SODIUM 25 MCG: 0.03 TABLET ORAL at 06:08

## 2022-01-27 RX ADMIN — GABAPENTIN 200 MG: 100 CAPSULE ORAL at 09:24

## 2022-01-27 RX ADMIN — MAGNESIUM SULFATE HEPTAHYDRATE: 500 INJECTION, SOLUTION INTRAMUSCULAR; INTRAVENOUS at 20:31

## 2022-01-27 RX ADMIN — AMIODARONE HYDROCHLORIDE 200 MG: 200 TABLET ORAL at 09:24

## 2022-01-27 RX ADMIN — VENLAFAXINE HYDROCHLORIDE 75 MG: 75 CAPSULE, EXTENDED RELEASE ORAL at 09:24

## 2022-01-27 RX ADMIN — APIXABAN 5 MG: 5 TABLET, FILM COATED ORAL at 09:24

## 2022-01-27 ASSESSMENT — ACTIVITIES OF DAILY LIVING (ADL)
ADLS_ACUITY_SCORE: 7

## 2022-01-27 NOTE — PROGRESS NOTES
"CLINICAL NUTRITION SERVICES - REASSESSMENT NOTE     Nutrition Prescription    RECOMMENDATIONS FOR MDs/PROVIDERS TO ORDER:    Malnutrition Status:    Severe    Recommendations already ordered by Registered Dietitian (RD):  Continue cyclic TPN of D 300  @ 75 ml/jr x 1 hr, 150 ml/hr  X 10 hrs, 75 ml/hr x 1 hr and off.  250 ml of 20% lipids 3 times  Per week to provide 1754 Calories, 130 g protein, 300 g CHO  21 g fat (averaged) and 1650 ml fluid/day    Future/Additional Recommendations:  Follow TPN tolerance, labs, weight and adjust TPN as needed     EVALUATION OF THE PROGRESS TOWARD GOALS   Diet: Clear liquid  Nutrition Support: cyclic TPN as documented above  Intake: 0%     ANTHROPOMETRICS  Height: 170.2 cm (5' 7\")  Most Recent Weight: 75.6 kg (166 lb 9.6 oz)    Admit weight: 173 lb (12/21/21)  Weight History:   Wt Readings from Last 10 Encounters:   01/18/22 75.6 kg (166 lb 9.6 oz)   12/17/21 76.8 kg (169 lb 6.4 oz)   01/22/20 79.4 kg (175 lb)   12/24/19 81.2 kg (179 lb)       PHYSICAL FINDINGS  See malnutrition section below.    GI CONCERNS  Ostomy/fistula  BS all quadrants  Last BM 1/26/2022  Abdominal discomfort    LABS  Reviewed: Mg 2.1, phos 4.1    MEDICATIONS  Reviewed: levothyroxine, pantoprazole    Malnutrition Diagnosis: Severe malnutrition  In Context of:  Acute illness or injury    CURRENT NUTRITION DIAGNOSIS  Inadequate oral intake related to SBO with formation of enterocutaneous fistula as evidenced by nausea and abdominal pain      INTERVENTIONS  Implementation  Continue same TPN regimen at this time    Goals  Meet estimated needs-progressing  Wound healing-progressing  No significant dry weight loss-has weight ordered for Mon, Fri    Monitoring/Evaluation  Progress toward goals will be monitored and evaluated per protocol.  "

## 2022-01-27 NOTE — PROGRESS NOTES
Care Management Follow Up    Length of Stay (days): 36    Expected Discharge Date: 01/29/2022     Concerns to be Addressed: discharge planning, TPN, lipids   Patient plan of care discussed at interdisciplinary rounds: No    Anticipated Discharge Disposition: Transitional Care     Anticipated Discharge Services: Other (see comment) (therapy services )  Anticipated Discharge DME: None    Patient/family educated on Medicare website which has current facility and service quality ratings: yes  Education Provided on the Discharge Plan:    Patient/Family in Agreement with the Plan: yes    Referrals Placed by CM/SW: Post Acute Facilities  Private pay costs discussed: Not applicable    Additional Information:  COVID +. Continues on TPN, lipids. CL diet. CM to follow medical progression. Will assist with discharge needs. Continue to search for TCU that will accept TPN.      Isabell Cee RN

## 2022-01-27 NOTE — PROGRESS NOTES
[facility] Progress Note    Assessment/Plan  Active Problems:    Hypertension goal BP (blood pressure) < 140/90    Moderate major depression (H)    Perforation of intestine (H)    Abdominal pain, generalized    Vesicocutaneous fistula    Infection due to 2019 novel coronavirus    Patient remains hospitalized due to placement, TPN.    Chema Dia is a 67 y/o M with past medical history of splenectomy, appendectomy, HTN, and substance abuse, with recent admission to hospital 11/30-12/21/2021 for SBO with perforation s/p surgical repair. Discharged home but presents as he could not manage at home and is now requesting to be placed in TCU.  Continues on TPN.  New RUL and RLL PE found and being treated, new HAP 1/9 also resolved with abx. Incidentally COVID-19 positive 1/21/2022.    RUL and RLL PE  SMV thrombosis  Acute RLL PE found on CT as well as SMV thrombosis.  Also has RUL PE.  No evidence of right heart strain.  Most likely provoked in the setting of recent surgery/hospitalization.  -Eliquis 5 mg twice daily for PE treatment for 3 months.    Atrial flutter (resolved)  Developed this overnight 1/8-1/9 and required amiodarone and brief ICU stay for pressors.  Cardiology consulted.  Patient stabilized and sinus rhythm, back to the floor.  Pulled back PICC line 1 cm as cardiology suspects the tip may be in the right atrium and that may have caused his episode of atrial flutter.  No repeat episodes since that time.  -Cardiology signed off.  Continue oral amiodarone 200 mg BID, tapering to 200 mg daily 1/30.  -Eliquis as above.    COVID-19  Incidentally found while inpatient 1/21.  Asymptomatic now.  Already anticoagulated on Eliquis.  No additional specific COVID-19 cares indicated at this time.  No new supplemental oxygen requirement.  COVID-19 recovered as of 1/31.    HAP, resolved  Wound cultures positive for Candida parapsilosis and E. coli 12/22.  Patient has been having nausea with TPN but  developed a new supplemental oxygen requirement 1/6 in addition to fatigue and general malaise and was found to have RLL PE and SMV thrombosis.  CT abdomen overall stable from prior.  Developed evidence of left-sided HAP overnight 1/9, re-consulted ID for antibiotic guidance, transitioned back to oral antibiotics and finished the course 1/14, also finished fluconazole, ID signed off.     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  Required subsequent washout on 12/3 with antibiotics and MARISA drain placement.  On 12/8 found to have continued enhancing abscess with drain placed 12/9 and removed on 12/16.  Was discharged, but returned soon thereafter on 12/22.  Now receiving TPN.  Ostomy output decreasing, ABDs instead of ostomy bags 1/26.  -Consulted surgery.  Clear liquid diet.  Surgery is the only service that can advance this.  -TPN per pharmacy and RD.  TPN teaching for at home.  -[facility] teaching so he can care for his fistulas at home.  Making progress with these cares.  ABDs now instead of ostomy bags.     Alk phos elevation, resolved  Alk phos elevated, now back to normal.  Rest of LFTs okay.  Does have sludge in gallbladder but no evidence of cholecystitis on imaging.  -Cyclic TPN.  Additional daily lab checks outside of regularly scheduled TPN checks not indicated at this time unless he develops new symptoms.     Depression  Insomnia  Hx of this.  Reports symptoms of low motivation, little interest in doing things, poor sleep, no appetite.  Is on buspar, mirtazapine, and effexor at home, though doesn't really think medicines are as helpful as talk therapy is.  Wanted to speak to social work and .  States no plan to harm himself.  Reports he just needs to leave the hospital.  -Psych recs:        -Discontinued buspar and mirtazapine per psych.       -Increased venlafaxine and gabapentin per psych.       -Seroquel for sleep.  Vistaril at bedtime.     Severe protein-calorie malnutrition  TPN as above.     Normocytic anemia  Stable.     Chronic Conditions:  Hypothyroidism- PTA levothyroxine.  Chronic pain- Hold mexolicam 7.5mg daily, resume PTA gabapentin.  BPH-hold home Flomax given he states it does not help.    FEN: TPN.  Clear liquid diet as tolerated.  DVT Prophylaxis: Eliquis.  Code: Full code.    Disposition/Advanced Care Planning  Discharge Planning discussed with patient and care team.  Anticipated discharge date: Multiple days.  Disposition: TCU versus home.    Subjective  NAEO.  Has ABDs instead of ostomy bags.  Still running into TPN as barrier to discharge.  Consider brother in law's again if he is able to manage this?    Objective    Vital signs in last 24 hours Temp:  [97.6  F (36.4  C)-97.9  F (36.6  C)] 97.9  F (36.6  C)  Pulse:  [81-88] 82  Resp:  [18-20] 20  BP: (101-146)/(62-83) 146/80  SpO2:  [93 %-94 %] 94 %   Weight: [unfilled]    Intake/Output last 3 shift I/O last 3 completed shifts:  In: 1651   Out: 10 [Drains:10]    Intake/Output this shift:No intake/output data recorded.    Physical Exam  General appearance: NAD.  Head: Normocephalic, without obvious abnormality, atraumatic.  Eyes: conjunctivae/corneas clear.  Throat: MMM.  Neck: supple, symmetrical, trachea midline.  Lungs: no increased respiratory effort.  Heart: Good capillary refill.  Abdomen: non-distended.  ABDs on fistula openings instead of ostomy bags.  Extremities: extremities normal, atraumatic, no cyanosis or edema.  Skin: Skin color, texture, turgor normal. No rashes or lesions.  Neurologic: A&Ox3, normal strength and normal tone.  Psychiatric: Mood and affect appropriate.    Pertinent Labs and Pertinent Radiology   Lab Results: personally reviewed.     Radiology Results: Personally reviewed image/s and  impression/s    Precepted patient with Dr. Lucas Aguilera.    Johnathan Lees MD  [facility], PGY-3

## 2022-01-27 NOTE — PHARMACY-CONSULT NOTE
"Pharmacy Note: Parenteral Nutrition (PN) Management    Pharmacist consulted to dose PN for Gurdeep Dia, a 66 year old male by Dr. Mcmahon.    Subjective:    The patient is a new PN start.    The patient was started on PN in the hospital on 12/2/21 .    Indication for PN therapy: continue until fistula heals    Inadequate nutrition existing for > 7 days.     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      Social History     Tobacco Use     Smoking status: Current Every Day Smoker     Smokeless tobacco: Never Used     Tobacco comment: declined cessation discussion and resource packet -1/14/22   Substance Use Topics     Alcohol use: Not Currently     Comment: Clean for 5 years     Drug use: Not Currently     Objective:    Ht Readings from Last 1 Encounters:   12/22/21 1.702 m (5' 7\")     Wt Readings from Last 1 Encounters:   01/18/22 75.6 kg (166 lb 9.6 oz)       Body mass index is 26.09 kg/m .    No data found.    Labs:  Last 3 days:  Recent Labs     01/26/22  0604 01/27/22  0723   NA  --  139   POTASSIUM  --  4.3   CHLORIDE  --  107   CO2  --  26   BUN  --  29*   CR  --  0.81   VIJAYA  --  8.5   MAG 2.1 2.0   PHOS 4.1  --    HGB  --  10.5*   HCT  --  33.8*   PLT  --  612*       Glucose (past 48 hours):   Recent Labs     01/26/22  0856 01/26/22  1248 01/27/22  0723 01/27/22  0828   GLC 96 107* 89  89 113*       Intake/Output (last 24 hours): I/O last 3 completed shifts:  In: 1651   Out: 10 [Drains:10]    Estimated CrCl: Estimated Creatinine Clearance: 94.6 mL/min (based on SCr of 0.81 mg/dL).    Assessment:    Continue patient on PN therapy as a cyclic central therapy.     Given the patient's current condition/oral intake, PN is still indicated.    Lab results reviewed: labs with in normal range- no change to tpn formula    Plan:  1. Rate of PN: 75ml/hr x 1 hour, then 150ml/hr x 10 hours, then 75ml/hr x 1 hour.  1.   2. Formula:     Amino Acids 130 grams    Dextrose 300 grams    Sodium 130 " mEq/day    Potassium 75 mEq/day    Calcium 8 mEq/day    Magnesium 16 mEq/day    Phosphorus 32 mMol/day    Chloride: Acetate Ratio 1:2    Standard Multivitamins w/Vitamin K    Trace Elements  3. Fat Emulsion: 20%, 250 mL IV 3 times weekly on mon,thurs,sat  4. Check BMP labs tomorrow.  5. Pharmacist will continue to follow the patient's lab results, clinical status and blood glucose results and make adjustments as appropriate.    Thank you for the consult.  Roge Malone RPH  1/27/2022 10:45 AM

## 2022-01-27 NOTE — PLAN OF CARE
Problem: Impaired Wound Healing  Goal: Optimal Wound Healing  Outcome: No Change  Intervention: Promote Wound Healing  Recent Flowsheet Documentation  Taken 1/27/2022 0934 by Cindy Brizuela, RN  Pain Management Interventions: medication (see MAR)  Taken 1/27/2022 0930 by Cindy Brizuela, RN  Activity Management: activity adjusted per tolerance     Problem: Pain (Surgery Nonspecified)  Goal: Acceptable Pain Control  Outcome: No Change  Intervention: Prevent or Manage Pain  Recent Flowsheet Documentation  Taken 1/27/2022 0934 by Cindy Brizuela, RN  Pain Management Interventions: medication (see MAR)     Problem: Hypertension Acute  Goal: Blood Pressure Within Desired Range  Outcome: No Change       A/Ox4. VSS ex elevated BP. On RA. C/o 8/10 pain to abdomen, PRN percocet given x1. Up ind in room. Using BSC. Clear liquid diet.  and 108. Double lumen R PICC SL with good blood return. TPN completed this morning and stopped at 0919. Wound cares completed per order. Abdominal incision/fistula covered with dry gauze and ABD, new dressing applied, CDI. CHG bath completed. Patient involved and completed his CHG bathing and wound cares with RN in room. Nursing to continue to monitor.

## 2022-01-27 NOTE — PLAN OF CARE
Problem: Pain (Surgery Nonspecified)  Goal: Acceptable Pain Control  Outcome: Improving     Problem: Risk for Delirium  Goal: Improved Sleep  Outcome: Improving     Patient denies pain or discomfort this shift.  Abd wound dressing is intact with no drainage present. Patient stated he was very happy to get the ostomy bags removed. TPN running. Cares clustered for improved sleep.

## 2022-01-27 NOTE — PLAN OF CARE
Problem: Adult Inpatient Plan of Care  Goal: Plan of Care Review  Outcome: Improving   Pt is doing well, independent in room. Updated on POC.     Pt on a clear liquid diet, but has minimal appetite d/t not liking options.     KUSHAL JACQUES RN

## 2022-01-28 LAB
ANION GAP SERPL CALCULATED.3IONS-SCNC: 5 MMOL/L (ref 5–18)
BUN SERPL-MCNC: 27 MG/DL (ref 8–22)
CALCIUM SERPL-MCNC: 8.6 MG/DL (ref 8.5–10.5)
CHLORIDE BLD-SCNC: 105 MMOL/L (ref 98–107)
CO2 SERPL-SCNC: 27 MMOL/L (ref 22–31)
CREAT SERPL-MCNC: 0.83 MG/DL (ref 0.7–1.3)
GFR SERPL CREATININE-BSD FRML MDRD: >90 ML/MIN/1.73M2
GLUCOSE BLD-MCNC: 71 MG/DL (ref 70–125)
GLUCOSE BLDC GLUCOMTR-MCNC: 100 MG/DL (ref 70–99)
GLUCOSE BLDC GLUCOMTR-MCNC: 107 MG/DL (ref 70–99)
GLUCOSE BLDC GLUCOMTR-MCNC: 93 MG/DL (ref 70–99)
GLUCOSE BLDC GLUCOMTR-MCNC: 98 MG/DL (ref 70–99)
MAGNESIUM SERPL-MCNC: 2.1 MG/DL (ref 1.8–2.6)
PHOSPHATE SERPL-MCNC: 3.6 MG/DL (ref 2.5–4.5)
POTASSIUM BLD-SCNC: 4.5 MMOL/L (ref 3.5–5)
SODIUM SERPL-SCNC: 137 MMOL/L (ref 136–145)

## 2022-01-28 PROCEDURE — 99024 POSTOP FOLLOW-UP VISIT: CPT | Performed by: SPECIALIST

## 2022-01-28 PROCEDURE — 84100 ASSAY OF PHOSPHORUS: CPT | Performed by: INTERNAL MEDICINE

## 2022-01-28 PROCEDURE — 83735 ASSAY OF MAGNESIUM: CPT | Performed by: INTERNAL MEDICINE

## 2022-01-28 PROCEDURE — 80048 BASIC METABOLIC PNL TOTAL CA: CPT | Performed by: STUDENT IN AN ORGANIZED HEALTH CARE EDUCATION/TRAINING PROGRAM

## 2022-01-28 PROCEDURE — 120N000001 HC R&B MED SURG/OB

## 2022-01-28 PROCEDURE — 99231 SBSQ HOSP IP/OBS SF/LOW 25: CPT | Mod: GC | Performed by: STUDENT IN AN ORGANIZED HEALTH CARE EDUCATION/TRAINING PROGRAM

## 2022-01-28 PROCEDURE — 250N000013 HC RX MED GY IP 250 OP 250 PS 637: Performed by: FAMILY MEDICINE

## 2022-01-28 PROCEDURE — 250N000013 HC RX MED GY IP 250 OP 250 PS 637: Performed by: INTERNAL MEDICINE

## 2022-01-28 PROCEDURE — 250N000013 HC RX MED GY IP 250 OP 250 PS 637: Performed by: STUDENT IN AN ORGANIZED HEALTH CARE EDUCATION/TRAINING PROGRAM

## 2022-01-28 PROCEDURE — 250N000009 HC RX 250: Performed by: STUDENT IN AN ORGANIZED HEALTH CARE EDUCATION/TRAINING PROGRAM

## 2022-01-28 RX ADMIN — LEVOTHYROXINE SODIUM 25 MCG: 0.03 TABLET ORAL at 06:13

## 2022-01-28 RX ADMIN — APIXABAN 5 MG: 5 TABLET, FILM COATED ORAL at 09:26

## 2022-01-28 RX ADMIN — GABAPENTIN 900 MG: 300 CAPSULE ORAL at 22:21

## 2022-01-28 RX ADMIN — GABAPENTIN 200 MG: 100 CAPSULE ORAL at 18:03

## 2022-01-28 RX ADMIN — OXYCODONE HYDROCHLORIDE AND ACETAMINOPHEN 1 TABLET: 5; 325 TABLET ORAL at 22:22

## 2022-01-28 RX ADMIN — MAGNESIUM SULFATE HEPTAHYDRATE: 500 INJECTION, SOLUTION INTRAMUSCULAR; INTRAVENOUS at 20:23

## 2022-01-28 RX ADMIN — OXYCODONE HYDROCHLORIDE AND ACETAMINOPHEN 1 TABLET: 5; 325 TABLET ORAL at 09:34

## 2022-01-28 RX ADMIN — AMIODARONE HYDROCHLORIDE 200 MG: 200 TABLET ORAL at 09:26

## 2022-01-28 RX ADMIN — PANTOPRAZOLE SODIUM 40 MG: 40 TABLET, DELAYED RELEASE ORAL at 09:26

## 2022-01-28 RX ADMIN — GABAPENTIN 200 MG: 100 CAPSULE ORAL at 09:25

## 2022-01-28 RX ADMIN — VENLAFAXINE HYDROCHLORIDE 75 MG: 75 CAPSULE, EXTENDED RELEASE ORAL at 09:26

## 2022-01-28 RX ADMIN — QUETIAPINE FUMARATE 200 MG: 100 TABLET, FILM COATED ORAL at 22:21

## 2022-01-28 RX ADMIN — HYDROXYZINE HYDROCHLORIDE 25 MG: 25 TABLET, FILM COATED ORAL at 20:22

## 2022-01-28 RX ADMIN — APIXABAN 5 MG: 5 TABLET, FILM COATED ORAL at 20:22

## 2022-01-28 RX ADMIN — AMIODARONE HYDROCHLORIDE 200 MG: 200 TABLET ORAL at 22:21

## 2022-01-28 ASSESSMENT — ACTIVITIES OF DAILY LIVING (ADL)
ADLS_ACUITY_SCORE: 7

## 2022-01-28 ASSESSMENT — MIFFLIN-ST. JEOR: SCORE: 1494.31

## 2022-01-28 NOTE — PLAN OF CARE
Problem: Pain (Surgery Nonspecified)  Goal: Acceptable Pain Control  Outcome: Improving   Pt reports some abdominal pain, PRN percocet given with relief.     Pt stating he is going stir crazy and needs to get out of the hospital. Pt not referring to leaving AMA, just that he is sick of being here and is looking forward to discharging    KUSHAL JACQUES RN

## 2022-01-28 NOTE — PHARMACY-CONSULT NOTE
"Pharmacy Note: Parenteral Nutrition (PN) Management    Pharmacist consulted to dose PN for Gurdeep Dia, a 66 year old male by Dr. Mcmahon.    Subjective:    The patient is a new PN start.    The patient was started on PN in the hospital on 12/2/21.    Indication for PN therapy: continue until fistula heals    Inadequate nutrition existing for > 7 days.     Enteral nutrition contraindicated due to: enterocutaneous fistula..        Social History     Tobacco Use     Smoking status: Current Every Day Smoker     Smokeless tobacco: Never Used     Tobacco comment: declined cessation discussion and resource packet -1/14/22   Substance Use Topics     Alcohol use: Not Currently     Comment: Clean for 5 years     Drug use: Not Currently     Objective:    Ht Readings from Last 1 Encounters:   12/22/21 1.702 m (5' 7\")     Wt Readings from Last 1 Encounters:   01/18/22 75.6 kg (166 lb 9.6 oz)       Body mass index is 26.09 kg/m .    No data found.    Labs:  Last 3 days:  Recent Labs     01/26/22  0604 01/27/22  0723 01/28/22  0622   NA  --  139 137   POTASSIUM  --  4.3 4.5   CHLORIDE  --  107 105   CO2  --  26 27   BUN  --  29* 27*   CR  --  0.81 0.83   VIJAYA  --  8.5 8.6   MAG 2.1 2.0 2.1   PHOS 4.1  --  3.6   HGB  --  10.5*  --    HCT  --  33.8*  --    PLT  --  612*  --        Glucose (past 48 hours):   Recent Labs     01/26/22  1248 01/27/22  0723 01/27/22  0828 01/27/22  1241 01/27/22  1654 01/28/22  0622 01/28/22  0824   * 89  89 113* 108* 87 71 100*       Intake/Output (last 24 hours): I/O last 3 completed shifts:  In: 2234 [P.O.:580]  Out: -     Estimated CrCl: Estimated Creatinine Clearance: 92.3 mL/min (based on SCr of 0.83 mg/dL).    Assessment:    Continue patient on PN therapy as a cyclic central therapy.     Given the patient's current condition/oral intake, PN is still indicated.    Lab results reviewed: no change to tpn formula today    Plan:  1. Rate of PN: cyclic- 12 hrs:  75ml/hr x 1 hour, then " 150ml/hr x 10 hours, then 75ml/hr x 1 hour.  1.     Amino Acids 130 grams    Dextrose 300 grams    Sodium 130 mEq/day    Potassium 75 mEq/day    Calcium 8 mEq/day    Magnesium 16 mEq/day    Phosphorus 32 mMol/day    Chloride: Acetate Ratio 1:2    Standard Multivitamins w/Vitamin K    Trace Elements      2. Fat Emulsion: 20%, 250 mL IV 3 times weekly on mon,thurs,sat  3. Check no labs ordered for tomorrow(1/29/22) labs tomorrow.  4. Pharmacist will continue to follow the patient's lab results, clinical status and blood glucose results and make adjustments as appropriate.    Thank you for the consult.  Roge Malone RPH  1/28/2022 9:30 AM

## 2022-01-28 NOTE — PROGRESS NOTES
"CLINICAL NUTRITION SERVICES - REASSESSMENT NOTE     Nutrition Prescription    RECOMMENDATIONS FOR MDs/PROVIDERS TO ORDER:    Malnutrition Status:    Severe    Recommendations already ordered by Registered Dietitian (RD):  Continue cyclic TPN of D 300  @ 75 ml/jr x 1 hr, 150 ml/hr  X 10 hrs, 75 ml/hr x 1 hr and off.  250 ml of 20% lipids 3 times  Per week to provide 1754 Calories, 130 g protein, 300 g CHO  21 g fat (averaged) and 1650 ml fluid/day    Leaving a sticky note for nursing to weigh - no weight since 1/18, has M, Fri weights ordered    Future/Additional Recommendations:  Follow TPN tolerance, labs, weight and adjust TPN as needed     EVALUATION OF THE PROGRESS TOWARD GOALS   Diet: Clear liquid  Nutrition Support: cyclic TPN as documented above  Intake: 580 mL liquids yesterday    Per surgery note 1/26 fistulas beginning to heal down - not ready for diet advancement, osotomy bags off cover with ABD's and tape     ANTHROPOMETRICS  Height: 170.2 cm (5' 7\")  Most Recent Weight: 75.6 kg (166 lb 9.6 oz)  (1/18)  Admit weight: 173 lb (12/21/21)  Weight History:   Wt Readings from Last 10 Encounters:   01/18/22 75.6 kg (166 lb 9.6 oz)   12/17/21 76.8 kg (169 lb 6.4 oz)   01/22/20 79.4 kg (175 lb)   12/24/19 81.2 kg (179 lb)       PHYSICAL FINDINGS  See malnutrition section below.    GI CONCERNS  Ostomy/fistula  BS all quadrants  Last BM 1/26/2022  Abdominal discomfort    LABS  Reviewed: electrolytes wdl    MEDICATIONS  Reviewed: levothyroxine, pantoprazole    Malnutrition Diagnosis: Severe malnutrition  In Context of:  Acute illness or injury    CURRENT NUTRITION DIAGNOSIS  Inadequate oral intake related to SBO with formation of enterocutaneous fistula as evidenced by nausea and abdominal pain      INTERVENTIONS  Implementation  Continue same TPN regimen at this time  Need weight    Goals  Meet estimated needs-progressing  Wound healing-progressing  No significant dry weight loss-has weight ordered for Mon, " Fri    Monitoring/Evaluation  Progress toward goals will be monitored and evaluated per protocol.

## 2022-01-28 NOTE — PROGRESS NOTES
Surgery    Social visit    tpn minimal output from midline, ABD dressings      When output stops will check a gastrograffen sbft        Mekhi Richardson MD  General Surgery [phone]  Vascular Surgery [phone]

## 2022-01-28 NOTE — PROGRESS NOTES
[facility] Progress Note    Assessment/Plan  Active Problems:    Hypertension goal BP (blood pressure) < 140/90    Moderate major depression (H)    Perforation of intestine (H)    Abdominal pain, generalized    Vesicocutaneous fistula    Infection due to 2019 novel coronavirus    Patient remains hospitalized due to placement, TPN.    Chema Dia is a 67 y/o M with past medical history of splenectomy, appendectomy, HTN, and substance abuse, with recent admission to hospital 11/30-12/21/2021 for SBO with perforation s/p surgical repair. Discharged home but presents as he could not manage at home and is now requesting to be placed in TCU.  Continues on TPN.  New RUL and RLL PE found and being treated, new HAP 1/9 also resolved with abx. Incidentally COVID-19 positive 1/21/2022, asymptomatic.    RUL and RLL PE  SMV thrombosis  Acute RLL PE found on CT as well as SMV thrombosis.  Also has RUL PE.  No evidence of right heart strain.  Most likely provoked in the setting of recent surgery/hospitalization.  -Eliquis 5 mg twice daily for PE treatment for 3 months.    Atrial flutter (resolved)  Developed this overnight 1/8-1/9 and required amiodarone and brief ICU stay for pressors.  Cardiology consulted.  Patient stabilized and sinus rhythm, back to the floor.  Pulled back PICC line 1 cm as cardiology suspects the tip may be in the right atrium and that may have caused his episode of atrial flutter.  No repeat episodes since that time.  -Cardiology signed off.  Continue oral amiodarone 200 mg BID, tapering to 200 mg daily 1/30.  Would recommend follow-up with cardiology outpatient to determine how long he needs this.  -Eliquis as above.    COVID-19  Incidentally found while inpatient 1/21.  Asymptomatic.  Already anticoagulated on Eliquis.  No additional specific COVID-19 cares indicated at this time.  No new supplemental oxygen requirement.  COVID-19 recovered as of 1/31.    HAP, resolved  Wound cultures  positive for Candida parapsilosis and E. coli 12/22.  Patient has been having nausea with TPN but developed a new supplemental oxygen requirement 1/6 in addition to fatigue and general malaise and was found to have RLL PE and SMV thrombosis.  CT abdomen overall stable from prior.  Developed evidence of left-sided HAP overnight 1/9, re-consulted ID for antibiotic guidance, transitioned back to oral antibiotics and finished the course 1/14, also finished fluconazole, ID signed off.     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  Required subsequent washout on 12/3 with antibiotics and MARISA drain placement.  On 12/8 found to have continued enhancing abscess with drain placed 12/9 and removed on 12/16.  Was discharged, but returned soon thereafter on 12/22.  Now receiving TPN.  Ostomy output decreasing, ABDs instead of ostomy bags 1/26.  -Consulted surgery.  Clear liquid diet.  Surgery is the only service that can advance this.  -TPN per pharmacy and RD.  TPN teaching for at home.  -[employer] teaching so he can care for his fistulas at home.  Making progress with these cares.  ABDs now instead of ostomy bags.     Alk phos elevation, resolved  Alk phos elevated, now back to normal.  Rest of LFTs okay.  Does have sludge in gallbladder but no evidence of cholecystitis on imaging.  -Cyclic TPN.  Additional daily lab checks outside of regularly scheduled TPN checks not indicated at this time unless he develops new symptoms.     Depression  Insomnia  Hx of this.  Reports symptoms of low motivation, little interest in doing things, poor sleep, no appetite.  Is on buspar, mirtazapine, and effexor at home, though doesn't really think medicines are as helpful as talk therapy is.  Wanted to speak to social work and .  States no  plan to harm himself.  Reports he just needs to leave the hospital.  -Psych recs:       -Discontinued buspar and mirtazapine per psych.       -Increased venlafaxine and gabapentin per psych.       -Seroquel for sleep.  Vistaril at bedtime.     Severe protein-calorie malnutrition  TPN as above.     Normocytic anemia  Stable.     Chronic Conditions:  Hypothyroidism- PTA levothyroxine.  Chronic pain- Hold mexolicam 7.5mg daily, resume PTA gabapentin.  BPH-hold home Flomax given he states it does not help.    FEN: TPN.  Clear liquid diet as tolerated.  DVT Prophylaxis: Eliquis.  Code: Full code.    Disposition/Advanced Care Planning  Discharge Planning discussed with patient and care team.  Anticipated discharge date: Multiple days.  Disposition: TCU versus home.    Subjective  NAEO.  Has ABDs instead of ostomy bags, continuing with decreased output.    Objective    Vital signs in last 24 hours Temp:  [97.4  F (36.3  C)-98.3  F (36.8  C)] 97.6  F (36.4  C)  Pulse:  [82-92] 84  Resp:  [16-20] 16  BP: (105-110)/(73-82) 106/75  SpO2:  [92 %-93 %] 93 %   Weight: [unfilled]    Intake/Output last 3 shift I/O last 3 completed shifts:  In: 2234 [P.O.:580]  Out: -     Intake/Output this shift:No intake/output data recorded.    Physical Exam  General appearance: NAD.  Head: Normocephalic, without obvious abnormality, atraumatic.  Eyes: conjunctivae/corneas clear.  Throat: MMM.  Neck: supple, symmetrical, trachea midline.  Lungs: no increased respiratory effort.  Heart: Good capillary refill.  Abdomen: non-distended.  ABDs on fistula openings instead of ostomy bags.  Extremities: extremities normal, atraumatic, no cyanosis or edema.  Skin: Skin color, texture, turgor normal. No rashes or lesions.  Neurologic: A&Ox3, normal strength and normal tone.  Psychiatric: Mood and affect appropriate.    Pertinent Labs and Pertinent Radiology   Lab Results: personally reviewed.     Radiology Results: Personally reviewed image/s and  impression/s    Precepted patient with Dr. Elenita Nunez.    Johnathan Lees MD  [facility], PGY-3

## 2022-01-28 NOTE — PLAN OF CARE
Problem: Adult Inpatient Plan of Care  Goal: Readiness for Transition of Care  Outcome: Improving     Problem: Impaired Wound Healing  Goal: Optimal Wound Healing  Outcome: Improving  Intervention: Promote Wound Healing  Recent Flowsheet Documentation  Taken 1/28/2022 0030 by Shannan Lisa RN  Activity Management: activity adjusted per tolerance     Midline dressings are clean,dry, and intact. Patient does not complain of pain or discomfort. Requested to be woken as little as possible. TPN is currently running at 75mL/hr. PICC line lumens have blood return.

## 2022-01-28 NOTE — PLAN OF CARE
Problem: Adult Inpatient Plan of Care  Goal: Plan of Care Review  Outcome: Improving  Flowsheets (Taken 1/28/2022 1415)  Plan of Care Reviewed With: patient  Patient waiting facility placement that can take COVID positive and TPN.      Problem: Impaired Wound Healing  Goal: Optimal Wound Healing  Outcome: Improving  Intervention: Promote Wound Healing  Recent Flowsheet Documentation  Taken 1/28/2022 1300 by Lata Anderson RN  Pain Management Interventions: declines  Taken 1/28/2022 0934 by Lata Anderson RN  Pain Management Interventions: medication (see MAR)  Fistula sites improving.  Decreased drainage at sites.  Dressing completed early in the shift.       Problem: Pain (Surgery Nonspecified)  Goal: Acceptable Pain Control  Outcome: Improving  Intervention: Prevent or Manage Pain  Recent Flowsheet Documentation  Taken 1/28/2022 1300 by Lata Anderson RN  Pain Management Interventions: declines  Taken 1/28/2022 0934 by Lata Anderson RN  Pain Management Interventions: medication (see MAR)   Patient reporting intermittent cramping/pressure sensation.  1 percocet given to manage pain.  Patient reported improved pain level and denied need of further interventions.

## 2022-01-28 NOTE — PROGRESS NOTES
Still working on discharge placement. Pt. Will not be going to a TCU d/t his criminal hx. CM continues to work on discharge planning with pt.'s . Has found him a place to go, but cannot take him with the TPN/Lipids due to no space for keeping his TPN and supplies. Diet advanced to [facility] and having a SBFT to check on healing of fistula. May be able to advance diet more once SBFT completed. May end up being here until TPN is no longer needed. CM to continue to work on discharge planning.

## 2022-01-29 LAB
GLUCOSE BLDC GLUCOMTR-MCNC: 116 MG/DL (ref 70–99)
GLUCOSE BLDC GLUCOMTR-MCNC: 89 MG/DL (ref 70–99)
GLUCOSE BLDC GLUCOMTR-MCNC: 96 MG/DL (ref 70–99)

## 2022-01-29 PROCEDURE — 250N000013 HC RX MED GY IP 250 OP 250 PS 637: Performed by: STUDENT IN AN ORGANIZED HEALTH CARE EDUCATION/TRAINING PROGRAM

## 2022-01-29 PROCEDURE — 250N000009 HC RX 250: Performed by: STUDENT IN AN ORGANIZED HEALTH CARE EDUCATION/TRAINING PROGRAM

## 2022-01-29 PROCEDURE — 250N000013 HC RX MED GY IP 250 OP 250 PS 637: Performed by: INTERNAL MEDICINE

## 2022-01-29 PROCEDURE — 120N000001 HC R&B MED SURG/OB

## 2022-01-29 PROCEDURE — 99024 POSTOP FOLLOW-UP VISIT: CPT | Performed by: NURSE PRACTITIONER

## 2022-01-29 PROCEDURE — 250N000009 HC RX 250: Performed by: INTERNAL MEDICINE

## 2022-01-29 PROCEDURE — 250N000013 HC RX MED GY IP 250 OP 250 PS 637: Performed by: FAMILY MEDICINE

## 2022-01-29 PROCEDURE — 99231 SBSQ HOSP IP/OBS SF/LOW 25: CPT | Mod: GC | Performed by: STUDENT IN AN ORGANIZED HEALTH CARE EDUCATION/TRAINING PROGRAM

## 2022-01-29 RX ADMIN — GABAPENTIN 200 MG: 100 CAPSULE ORAL at 08:54

## 2022-01-29 RX ADMIN — QUETIAPINE FUMARATE 200 MG: 100 TABLET, FILM COATED ORAL at 22:44

## 2022-01-29 RX ADMIN — LEVOTHYROXINE SODIUM 25 MCG: 0.03 TABLET ORAL at 06:44

## 2022-01-29 RX ADMIN — VENLAFAXINE HYDROCHLORIDE 75 MG: 75 CAPSULE, EXTENDED RELEASE ORAL at 08:54

## 2022-01-29 RX ADMIN — GABAPENTIN 900 MG: 300 CAPSULE ORAL at 22:43

## 2022-01-29 RX ADMIN — APIXABAN 5 MG: 5 TABLET, FILM COATED ORAL at 08:54

## 2022-01-29 RX ADMIN — MAGNESIUM SULFATE HEPTAHYDRATE: 500 INJECTION, SOLUTION INTRAMUSCULAR; INTRAVENOUS at 20:37

## 2022-01-29 RX ADMIN — OXYCODONE HYDROCHLORIDE AND ACETAMINOPHEN 1 TABLET: 5; 325 TABLET ORAL at 17:20

## 2022-01-29 RX ADMIN — AMIODARONE HYDROCHLORIDE 200 MG: 200 TABLET ORAL at 08:54

## 2022-01-29 RX ADMIN — AMIODARONE HYDROCHLORIDE 200 MG: 200 TABLET ORAL at 20:38

## 2022-01-29 RX ADMIN — GABAPENTIN 200 MG: 100 CAPSULE ORAL at 17:13

## 2022-01-29 RX ADMIN — APIXABAN 5 MG: 5 TABLET, FILM COATED ORAL at 20:38

## 2022-01-29 RX ADMIN — HYDROXYZINE HYDROCHLORIDE 25 MG: 25 TABLET, FILM COATED ORAL at 20:38

## 2022-01-29 RX ADMIN — PANTOPRAZOLE SODIUM 40 MG: 40 TABLET, DELAYED RELEASE ORAL at 06:44

## 2022-01-29 RX ADMIN — OXYCODONE HYDROCHLORIDE AND ACETAMINOPHEN 1 TABLET: 5; 325 TABLET ORAL at 09:00

## 2022-01-29 RX ADMIN — I.V. FAT EMULSION 250 ML: 20 EMULSION INTRAVENOUS at 20:38

## 2022-01-29 ASSESSMENT — ACTIVITIES OF DAILY LIVING (ADL)
ADLS_ACUITY_SCORE: 7
ADLS_ACUITY_SCORE: 9
ADLS_ACUITY_SCORE: 7
ADLS_ACUITY_SCORE: 9
ADLS_ACUITY_SCORE: 9
ADLS_ACUITY_SCORE: 7
ADLS_ACUITY_SCORE: 9
ADLS_ACUITY_SCORE: 9
ADLS_ACUITY_SCORE: 7
ADLS_ACUITY_SCORE: 9
ADLS_ACUITY_SCORE: 7
ADLS_ACUITY_SCORE: 7
ADLS_ACUITY_SCORE: 9
ADLS_ACUITY_SCORE: 7
ADLS_ACUITY_SCORE: 9
ADLS_ACUITY_SCORE: 9
ADLS_ACUITY_SCORE: 7
ADLS_ACUITY_SCORE: 9

## 2022-01-29 NOTE — PHARMACY-CONSULT NOTE
"Pharmacy Note: Parenteral Nutrition (PN) Management    Pharmacist consulted to dose PN for Gurdeep Dia, a 66 year old male by Dr. Mcmahon.    Subjective:    The patient is a new PN start.    The patient was started on PN in the hospital on 12/2/21.    Indication for PN therapy: continue until fistula heals    Inadequate nutrition existing for > 7 days.     Enteral nutrition contraindicated due to: enterocutaneous fistula..        Social History     Tobacco Use     Smoking status: Current Every Day Smoker     Smokeless tobacco: Never Used     Tobacco comment: declined cessation discussion and resource packet -1/14/22   Substance Use Topics     Alcohol use: Not Currently     Comment: Clean for 5 years     Drug use: Not Currently     Objective:    Ht Readings from Last 1 Encounters:   01/28/22 1.656 m (5' 5.2\")     Wt Readings from Last 1 Encounters:   01/28/22 78.9 kg (174 lb)       Body mass index is 28.78 kg/m .    Patient Vitals for the past 96 hrs:   Weight   01/28/22 1153 78.9 kg (174 lb)       Labs:  Last 3 days:  Recent Labs     01/26/22  0604 01/27/22  0723 01/28/22  0622   NA  --  139 137   POTASSIUM  --  4.3 4.5   CHLORIDE  --  107 105   CO2  --  26 27   BUN  --  29* 27*   CR  --  0.81 0.83   VIJAYA  --  8.5 8.6   MAG 2.1 2.0 2.1   PHOS 4.1  --  3.6   HGB  --  10.5*  --    HCT  --  33.8*  --    PLT  --  612*  --        Glucose (past 48 hours):   Recent Labs     01/26/22  1248 01/27/22  0723 01/27/22  0828 01/27/22  1241 01/27/22  1654 01/28/22  0622 01/28/22  0824   * 89  89 113* 108* 87 71 100*       Intake/Output (last 24 hours): I/O last 3 completed shifts:  In: 1550 [P.O.:480]  Out: -     Estimated CrCl: Estimated Creatinine Clearance: 84 mL/min (based on SCr of 0.83 mg/dL).    Assessment:    Continue patient on PN therapy as a cyclic central therapy.     Given the patient's current condition/oral intake, PN is still indicated.    Lab results reviewed: no labs today, no change to tpn formula " today    Plan:  1. Rate of PN: cyclic- 12 hrs:  75ml/hr x 1 hour, then 150ml/hr x 10 hours, then 75ml/hr x 1 hour.  1.     Amino Acids 130 grams    Dextrose 300 grams    Sodium 130 mEq/day    Potassium 75 mEq/day    Calcium 8 mEq/day    Magnesium 16 mEq/day    Phosphorus 32 mMol/day    Chloride: Acetate Ratio 1:2    Standard Multivitamins w/Vitamin K    Trace Elements      2. Fat Emulsion: 20%, 250 mL IV 3 times weekly on mon,thurs,sat  3. Check hyperal lytes and ionized calcium labs tomorrow.  4. Pharmacist will continue to follow the patient's lab results, clinical status and blood glucose results and make adjustments as appropriate.    Thank you for the consult.  Roge Malone RPH  1/28/2022 9:29 AM

## 2022-01-29 NOTE — PROGRESS NOTES
[facility] Progress Note    Assessment/Plan  Active Problems:    Hypertension goal BP (blood pressure) < 140/90    Moderate major depression (H)    Perforation of intestine (H)    Abdominal pain, generalized    Vesicocutaneous fistula    Infection due to 2019 novel coronavirus    Patient remains hospitalized due to placement, TPN.    Chema Dia is a 67 y/o M with past medical history of splenectomy, appendectomy, HTN, and substance abuse, with recent admission to hospital 11/30-12/21/2021 for SBO with perforation s/p surgical repair. Discharged home but presents as he could not manage at home and is now requesting to be placed in TCU.  Continues on TPN.  New RUL and RLL PE found and being treated, new HAP 1/9 also resolved with abx. Incidentally COVID-19 positive 1/21/2022, asymptomatic.    RUL and RLL PE  SMV thrombosis  Acute RLL PE found on CT as well as SMV thrombosis.  Also has RUL PE.  No evidence of right heart strain.  Most likely provoked in the setting of recent surgery/hospitalization.  -Eliquis 5 mg twice daily for PE treatment for 3 months.    Atrial flutter (resolved)  Developed this overnight 1/8-1/9 and required amiodarone and brief ICU stay for pressors.  Cardiology consulted.  Patient stabilized and sinus rhythm, back to the floor.  Pulled back PICC line 1 cm as cardiology suspects the tip may be in the right atrium and that may have caused his episode of atrial flutter.  No repeat episodes since that time.  -Cardiology signed off.  Continue oral amiodarone 200 mg BID, tapering to 200 mg daily 1/30.  Would recommend follow-up with cardiology outpatient to determine how long he needs this.  -Eliquis as above.    COVID-19  Incidentally found while inpatient 1/21.  Asymptomatic.  Already anticoagulated on Eliquis.  No additional specific COVID-19 cares indicated at this time.  No new supplemental oxygen requirement.  COVID-19 recovered as of 1/31.    HAP, resolved  Wound cultures  positive for Candida parapsilosis and E. coli 12/22.  Patient has been having nausea with TPN but developed a new supplemental oxygen requirement 1/6 in addition to fatigue and general malaise and was found to have RLL PE and SMV thrombosis.  CT abdomen overall stable from prior.  Developed evidence of left-sided HAP overnight 1/9, re-consulted ID for antibiotic guidance, transitioned back to oral antibiotics and finished the course 1/14, also finished fluconazole, ID signed off.     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  Required subsequent washout on 12/3 with antibiotics and MARISA drain placement.  On 12/8 found to have continued enhancing abscess with drain placed 12/9 and removed on 12/16.  Was discharged, but returned soon thereafter on 12/22.  Now receiving TPN.  Ostomy output decreasing, ABDs instead of ostomy bags 1/26.  -Consulted surgery.  Clear liquid diet.  Surgery is the only service that can advance this.  -TPN per pharmacy and RD.  TPN teaching for at home.  -[facility] teaching so he can care for his fistulas at home.  Making progress with these cares.  ABDs now instead of ostomy bags.     Alk phos elevation, resolved  Alk phos elevated, now back to normal.  Rest of LFTs okay.  Does have sludge in gallbladder but no evidence of cholecystitis on imaging.  -Cyclic TPN.  Additional daily lab checks outside of regularly scheduled TPN checks not indicated at this time unless he develops new symptoms.     Depression  Insomnia  Hx of this.  Reports symptoms of low motivation, little interest in doing things, poor sleep, no appetite.  Is on buspar, mirtazapine, and effexor at home, though doesn't really think medicines are as helpful as talk therapy is.  Wanted to speak to social work and .  States no  plan to harm himself.  Reports he just needs to leave the hospital.  -Psych recs:       -Discontinued buspar and mirtazapine per psych.       -Increased venlafaxine and gabapentin per psych.       -Seroquel for sleep.  Vistaril at bedtime.     Severe protein-calorie malnutrition  TPN as above.     Normocytic anemia  Stable.     Chronic Conditions:  Hypothyroidism- PTA levothyroxine.  Chronic pain- Hold mexolicam 7.5mg daily, resume PTA gabapentin.  BPH-hold home Flomax given he states it does not help.    FEN: TPN.  Clear liquid diet as tolerated.  DVT Prophylaxis: Eliquis.  Code: Full code.    Disposition/Advanced Care Planning  Discharge Planning discussed with patient and care team.  Anticipated discharge date: Multiple days.  Disposition: TCU versus home.    Subjective  NAEO.  His lower fistula did not have any output at all when he changed his dressings today.    Objective    Vital signs in last 24 hours Temp:  [98  F (36.7  C)-98.5  F (36.9  C)] 98.5  F (36.9  C)  Pulse:  [82-87] 86  Resp:  [16-18] 18  BP: (101-117)/(68-85) 102/68  SpO2:  [92 %-98 %] 92 %   Weight: [unfilled]    Intake/Output last 3 shift I/O last 3 completed shifts:  In: 1550 [P.O.:480]  Out: -     Intake/Output this shift:I/O this shift:  In: 1656   Out: -     Physical Exam  General appearance: NAD.  Head: Normocephalic, without obvious abnormality, atraumatic.  Eyes: conjunctivae/corneas clear.  Throat: MMM.  Neck: supple, symmetrical, trachea midline.  Lungs: no increased respiratory effort.  Heart: Good capillary refill.  Abdomen: non-distended.  ABDs on fistula openings instead of ostomy bags.  Extremities: extremities normal, atraumatic, no cyanosis or edema.  Skin: Skin color, texture, turgor normal. No rashes or lesions.  Neurologic: A&Ox3, normal strength and normal tone.  Psychiatric: Mood and affect appropriate.    Pertinent Labs and Pertinent Radiology   Lab Results: personally reviewed.     Radiology Results: Personally reviewed  image/s and impression/s    Precepted patient with Dr. Elenita Nunez.    Johnathan Lees MD  [facility], PGY-3

## 2022-01-29 NOTE — PROGRESS NOTES
"CLINICAL NUTRITION SERVICES - REASSESSMENT NOTE     Nutrition Prescription    RECOMMENDATIONS FOR MDs/PROVIDERS TO ORDER:    Malnutrition Status:    Severe    Recommendations already ordered by Registered Dietitian (RD):  Continue cyclic TPN of D 300  @ 75 ml/jr x 1 hr, 150 ml/hr  X 10 hrs, 75 ml/hr x 1 hr and off.  250 ml of 20% lipids 3 times  Per week to provide 1754 Calories, 130 g protein, 300 g CHO  21 g fat (averaged) and 1650 ml fluid/day    Future/Additional Recommendations:  Follow TPN tolerance, labs, weight and adjust TPN as needed     EVALUATION OF THE PROGRESS TOWARD GOALS   Diet: Clear liquid  Nutrition Support: cyclic TPN custom formula as documented above  Intake: 480 mL liquids yesterday    Per surgery note 1/28-continue TPN.  Planning gastrograffen when fistula output stopped.     ANTHROPOMETRICS  Height: 165.6 cm (5' 5.2\")  Most Recent Weight: 78.9 kg (174 lb)  (1/28)  Admit weight: 173 lb (12/21/21)  Weight History:   Wt Readings from Last 10 Encounters:   01/28/22 78.9 kg (174 lb)   12/17/21 76.8 kg (169 lb 6.4 oz)   01/22/20 79.4 kg (175 lb)   12/24/19 81.2 kg (179 lb)       PHYSICAL FINDINGS  See malnutrition section below.    GI CONCERNS  Ostomy/fistula  BS all quadrants  Last BM 1/27/2022  Abdominal discomfort/pain    LABS  No new labs    MEDICATIONS  Reviewed: levothyroxine, pantoprazole    Malnutrition Diagnosis: Severe malnutrition  In Context of:  Acute illness or injury    CURRENT NUTRITION DIAGNOSIS  Inadequate oral intake related to SBO with formation of enterocutaneous fistula as evidenced by nausea and abdominal pain      INTERVENTIONS  Implementation  Continue same TPN regimen at this time      Goals  Meet estimated needs-met  Wound healing-progressing  No significant dry weight loss-met    Monitoring/Evaluation  Progress toward goals will be monitored and evaluated per protocol.  "

## 2022-01-29 NOTE — PROGRESS NOTES
"ASSESSMENT:  1. Abdominal pain, generalized        Gurdeep Dia is a 66 year old male status post explore lap lysis of adhesions with resultant 3 days later perforation reoperation oversew of defect and then 5 days later perforation again and at this time  he has fistulas above and below to his incision that are being managed with ostomy bag containment and TPN diet (clear liquids ok).  He developed VTE and was treated with heparin drip and has now tested positive for covid, but remains asymptomatic.     His EC fistulas are progressively closing and no longer need ostomy devices at this point. The distal fistula has sealed and once the proximal fistula stops having output, we will get a GG study to determine if we can start to advance his diet. This will hopefully occur in the next week.     PLAN:  Clear liquid diet  Monitor EC fistula output  Medical management per medicine staff    SUBJECTIVE:   He is feeling well. Excited that he doesn't have ostomy bags any more. Reports that he still has output from his \"upper opening\" when he gets up, coughs, or sneezes. No nausea, vomiting, fever, chills or cough      Patient Vitals for the past 24 hrs:   BP Temp Temp src Pulse Resp SpO2 Height Weight   01/29/22 0756 102/68 98.5  F (36.9  C) Oral 86 18 92 % -- --   01/29/22 0047 101/69 98.1  F (36.7  C) Oral 87 18 93 % -- --   01/28/22 1710 117/85 98  F (36.7  C) Oral 82 16 98 % -- --   01/28/22 1153 -- -- -- -- -- -- 1.656 m (5' 5.2\") 78.9 kg (174 lb)         PHYSICAL EXAM:  GEN: No acute distress, comfortable  LUNGS: CTA bilaterally  CV:RRR  ABD:soft, incisions healed, nontender; proximal EC fistula with succus drainage on the dressing; old drain site in RLQ and distal EC fistula site with no drainage noted  EXT:No cyanosis, edema or obvious abnormalities    01/28 0700 - 01/29 0659  In: 1550 [P.O.:480]  Out: -     No results displayed because visit has over 200 results.             Jane Agosto, THOMAS CNP    "

## 2022-01-29 NOTE — PLAN OF CARE
Problem: Impaired Wound Healing  Goal: Optimal Wound Healing  Intervention: Promote Wound Healing  Recent Flowsheet Documentation  Taken 1/28/2022 2144 by Kanwal Butts, RN  Pain Management Interventions: (wants pain med at 10pm) --     Problem: Pain (Surgery Nonspecified)  Goal: Acceptable Pain Control  Outcome: No Change  Intervention: Prevent or Manage Pain  Recent Flowsheet Documentation  Taken 1/28/2022 2144 by Kanwal Butts, RN  Pain Management Interventions: (wants pain med at 10pm) --     Problem: Anxiety  Goal: Anxiety Reduction or Resolution  Outcome: Improving   Pain had a shower, abdominal fistula dressing changed, this is the first time writer saw wound, do not know whether wound in improving or not, complained of abdominal wound pain, rated pain 8/10, wants pain medication at 2200, no anxiety noted, was med and care compliant, has been independent in room, blood sugars were 93 and 107.  ,

## 2022-01-29 NOTE — PLAN OF CARE
Problem: Adult Inpatient Plan of Care  Goal: Plan of Care Review  Outcome: Improving    Problem: Adult Inpatient Plan of Care  Goal: Optimal Comfort and Wellbeing  Outcome: Improving  Intervention: Provide Person-Centered Care  Recent Flowsheet Documentation  Taken 1/29/2022 0910 by Ayana Bernabe, RN  Trust Relationship/Rapport: care explained  Pain in abdomen at surgery site improved with oxycodone.    Problem: Impaired Wound Healing  Goal: Optimal Wound Healing  Outcome: Improving  Intervention: Promote Wound Healing  Recent Flowsheet Documentation  Taken 1/29/2022 0900 by Ayana Bernabe, RN  Activity Management:   activity encouraged   activity adjusted per tolerance  Dressing changed and moderate amount of brown green drainage came out of top wound prior to covering the wound.

## 2022-01-30 LAB
ANION GAP SERPL CALCULATED.3IONS-SCNC: 9 MMOL/L (ref 5–18)
BUN SERPL-MCNC: 29 MG/DL (ref 8–22)
CALCIUM SERPL-MCNC: 8.7 MG/DL (ref 8.5–10.5)
CALCIUM, IONIZED MEASURED: 1.14 MMOL/L (ref 1.11–1.3)
CHLORIDE BLD-SCNC: 104 MMOL/L (ref 98–107)
CO2 SERPL-SCNC: 22 MMOL/L (ref 22–31)
CREAT SERPL-MCNC: 0.83 MG/DL (ref 0.7–1.3)
GFR SERPL CREATININE-BSD FRML MDRD: >90 ML/MIN/1.73M2
GLUCOSE BLD-MCNC: 80 MG/DL (ref 70–125)
GLUCOSE BLDC GLUCOMTR-MCNC: 117 MG/DL (ref 70–99)
GLUCOSE BLDC GLUCOMTR-MCNC: 95 MG/DL (ref 70–99)
GLUCOSE BLDC GLUCOMTR-MCNC: 98 MG/DL (ref 70–99)
GLUCOSE BLDC GLUCOMTR-MCNC: 98 MG/DL (ref 70–99)
ION CA PH 7.4: 1.16 MMOL/L (ref 1.11–1.3)
MAGNESIUM SERPL-MCNC: 1.9 MG/DL (ref 1.8–2.6)
PH: 7.43 (ref 7.35–7.45)
PHOSPHATE SERPL-MCNC: 3.7 MG/DL (ref 2.5–4.5)
POTASSIUM BLD-SCNC: 4.7 MMOL/L (ref 3.5–5)
SODIUM SERPL-SCNC: 135 MMOL/L (ref 136–145)

## 2022-01-30 PROCEDURE — 99024 POSTOP FOLLOW-UP VISIT: CPT | Performed by: SURGERY

## 2022-01-30 PROCEDURE — 80048 BASIC METABOLIC PNL TOTAL CA: CPT | Performed by: STUDENT IN AN ORGANIZED HEALTH CARE EDUCATION/TRAINING PROGRAM

## 2022-01-30 PROCEDURE — 250N000009 HC RX 250: Performed by: STUDENT IN AN ORGANIZED HEALTH CARE EDUCATION/TRAINING PROGRAM

## 2022-01-30 PROCEDURE — 84100 ASSAY OF PHOSPHORUS: CPT | Performed by: STUDENT IN AN ORGANIZED HEALTH CARE EDUCATION/TRAINING PROGRAM

## 2022-01-30 PROCEDURE — 82330 ASSAY OF CALCIUM: CPT | Performed by: STUDENT IN AN ORGANIZED HEALTH CARE EDUCATION/TRAINING PROGRAM

## 2022-01-30 PROCEDURE — 99231 SBSQ HOSP IP/OBS SF/LOW 25: CPT | Mod: GC | Performed by: STUDENT IN AN ORGANIZED HEALTH CARE EDUCATION/TRAINING PROGRAM

## 2022-01-30 PROCEDURE — 250N000013 HC RX MED GY IP 250 OP 250 PS 637: Performed by: STUDENT IN AN ORGANIZED HEALTH CARE EDUCATION/TRAINING PROGRAM

## 2022-01-30 PROCEDURE — 250N000013 HC RX MED GY IP 250 OP 250 PS 637: Performed by: INTERNAL MEDICINE

## 2022-01-30 PROCEDURE — 120N000001 HC R&B MED SURG/OB

## 2022-01-30 PROCEDURE — 83735 ASSAY OF MAGNESIUM: CPT | Performed by: STUDENT IN AN ORGANIZED HEALTH CARE EDUCATION/TRAINING PROGRAM

## 2022-01-30 PROCEDURE — 250N000013 HC RX MED GY IP 250 OP 250 PS 637: Performed by: FAMILY MEDICINE

## 2022-01-30 RX ADMIN — LEVOTHYROXINE SODIUM 25 MCG: 0.03 TABLET ORAL at 06:12

## 2022-01-30 RX ADMIN — PANTOPRAZOLE SODIUM 40 MG: 40 TABLET, DELAYED RELEASE ORAL at 06:13

## 2022-01-30 RX ADMIN — OXYCODONE HYDROCHLORIDE AND ACETAMINOPHEN 1 TABLET: 5; 325 TABLET ORAL at 00:54

## 2022-01-30 RX ADMIN — APIXABAN 5 MG: 5 TABLET, FILM COATED ORAL at 20:54

## 2022-01-30 RX ADMIN — HYDROXYZINE HYDROCHLORIDE 25 MG: 25 TABLET, FILM COATED ORAL at 20:54

## 2022-01-30 RX ADMIN — APIXABAN 5 MG: 5 TABLET, FILM COATED ORAL at 09:26

## 2022-01-30 RX ADMIN — MAGNESIUM SULFATE HEPTAHYDRATE: 500 INJECTION, SOLUTION INTRAMUSCULAR; INTRAVENOUS at 20:54

## 2022-01-30 RX ADMIN — GABAPENTIN 900 MG: 300 CAPSULE ORAL at 22:47

## 2022-01-30 RX ADMIN — OXYCODONE HYDROCHLORIDE AND ACETAMINOPHEN 1 TABLET: 5; 325 TABLET ORAL at 09:30

## 2022-01-30 RX ADMIN — GABAPENTIN 200 MG: 100 CAPSULE ORAL at 09:26

## 2022-01-30 RX ADMIN — OXYCODONE HYDROCHLORIDE AND ACETAMINOPHEN 1 TABLET: 5; 325 TABLET ORAL at 22:48

## 2022-01-30 RX ADMIN — OXYCODONE HYDROCHLORIDE AND ACETAMINOPHEN 1 TABLET: 5; 325 TABLET ORAL at 16:32

## 2022-01-30 RX ADMIN — QUETIAPINE FUMARATE 200 MG: 100 TABLET, FILM COATED ORAL at 22:48

## 2022-01-30 RX ADMIN — VENLAFAXINE HYDROCHLORIDE 75 MG: 75 CAPSULE, EXTENDED RELEASE ORAL at 09:26

## 2022-01-30 RX ADMIN — GABAPENTIN 200 MG: 100 CAPSULE ORAL at 16:16

## 2022-01-30 RX ADMIN — AMIODARONE HYDROCHLORIDE 200 MG: 200 TABLET ORAL at 09:26

## 2022-01-30 ASSESSMENT — ACTIVITIES OF DAILY LIVING (ADL)
ADLS_ACUITY_SCORE: 9
ADLS_ACUITY_SCORE: 7
ADLS_ACUITY_SCORE: 9
ADLS_ACUITY_SCORE: 7
ADLS_ACUITY_SCORE: 9
ADLS_ACUITY_SCORE: 7

## 2022-01-30 NOTE — PLAN OF CARE
Problem: Impaired Wound Healing  Goal: Optimal Wound Healing  Outcome: No Change  Intervention: Promote Wound Healing  Recent Flowsheet Documentation  Taken 1/29/2022 1720 by Kanwal Butts RN  Pain Management Interventions: medication (see MAR)     Problem: Pain (Surgery Nonspecified)  Goal: Acceptable Pain Control  Outcome: No Change  Intervention: Prevent or Manage Pain  Recent Flowsheet Documentation  Taken 1/29/2022 1720 by Kanwal Butts, RN  Pain Management Interventions: medication (see MAR)     Abdominal wound dressing was changed, noted medium greenish discharge, patient directed care with dressing change, had percocet for wound pain 8/10, pain med had little effect, blood sugars were 89 and 116, has been independent in room, has been COVID asymptomatic.

## 2022-01-30 NOTE — PLAN OF CARE
Patient is coping well with his hospital stay. He is independent in his room and almost all of his cares. He does need some help with his wound dressing. His wound is draining but the skin around the wound looks healthy.   Problem: Adult Inpatient Plan of Care  Goal: Plan of Care Review  Outcome: Improving  Flowsheets (Taken 1/30/2022 1536)  Plan of Care Reviewed With: patient     Problem: Impaired Wound Healing  Goal: Optimal Wound Healing  Outcome: Improving  Intervention: Promote Wound Healing  Recent Flowsheet Documentation  Taken 1/30/2022 0930 by Vita Varner, RN  Pain Management Interventions: medication (see MAR)  Activity Management: activity adjusted per tolerance  Taken 1/30/2022 0928 by Vita Varner, RN  Pain Management Interventions: medication (see MAR)

## 2022-01-30 NOTE — PROGRESS NOTES
[facility] Progress Note    Assessment/Plan  Active Problems:    Hypertension goal BP (blood pressure) < 140/90    Moderate major depression (H)    Perforation of intestine (H)    Abdominal pain, generalized    Vesicocutaneous fistula    Infection due to 2019 novel coronavirus    Patient remains hospitalized due to placement, TPN.    Chema Dia is a 65 y/o M with past medical history of splenectomy, appendectomy, HTN, and substance abuse, with recent admission to hospital 11/30-12/21/2021 for SBO with perforation s/p surgical repair. Discharged home but readmitted as he could not manage at home and is now requesting to be placed in TCU.  Continues on TPN.  New RUL and RLL PE found and being treated, new HAP 1/9 also resolved with abx. Incidentally COVID-19 positive 1/21/2022, asymptomatic.    RUL and RLL PE  SMV thrombosis  Acute RLL PE found on CT as well as SMV thrombosis.  Also has RUL PE.  No evidence of right heart strain.  Most likely provoked in the setting of recent surgery/hospitalization.  -Eliquis 5 mg twice daily for PE treatment for 3 months.    Atrial flutter (resolved)  Developed this overnight 1/8-1/9 and required amiodarone and brief ICU stay for pressors.  Cardiology consulted.  Patient stabilized and sinus rhythm, back to the floor.  Pulled back PICC line 1 cm as cardiology suspects the tip may be in the right atrium and that may have caused his episode of atrial flutter.  No repeat episodes since that time.  -Cardiology signed off.  Continue oral amiodarone 200 mg BID, tapering to 200 mg daily 1/30.  Would recommend follow-up with cardiology outpatient to determine how long he needs this.  -Eliquis as above.    COVID-19  Incidentally found while inpatient 1/21.  Asymptomatic.  Already anticoagulated on Eliquis.  No additional specific COVID-19 cares indicated at this time.  No new supplemental oxygen requirement.  COVID-19 recovered as of 1/31.    HAP, resolved  Wound  cultures positive for Candida parapsilosis and E. coli 12/22.  Patient has been having nausea with TPN but developed a new supplemental oxygen requirement 1/6 in addition to fatigue and general malaise and was found to have RLL PE and SMV thrombosis.  CT abdomen overall stable from prior.  Developed evidence of left-sided HAP overnight 1/9, re-consulted ID for antibiotic guidance, transitioned back to oral antibiotics and finished the course 1/14, also finished fluconazole, ID signed off.     Recurrent abdominal pain s/p surgical intervention of SBO with perforation  Enterocutaneous fistula   SBO on 11/30, underwent exploratory laparotomy, small bowel resection, repair of enterotomy, extensive lysis of adhesions.  Required subsequent washout on 12/3 with antibiotics and MARISA drain placement.  On 12/8 found to have continued enhancing abscess with drain placed 12/9 and removed on 12/16.  Was discharged, but returned soon thereafter on 12/22.  Now receiving TPN.  Ostomy output decreasing, ABDs instead of ostomy bags 1/26.  -Consulted surgery.  Clear liquid diet.  Surgery is the only service that can advance this.  -TPN per pharmacy and RD.  TPN teaching for at home.  -WOC teaching so he can care for his fistulas at home.  Making progress with these cares.  ABDs now instead of ostomy bags.     Alk phos elevation, resolved  Alk phos elevated, now back to normal.  Rest of LFTs okay.  Does have sludge in gallbladder but no evidence of cholecystitis on imaging.  -Cyclic TPN.  Additional daily lab checks outside of regularly scheduled TPN checks not indicated at this time unless he develops new symptoms.     Depression  Insomnia  Hx of this.  Reports symptoms of low motivation, little interest in doing things, poor sleep, no appetite.  Is on buspar, mirtazapine, and effexor at home, though doesn't really think medicines are as helpful as talk therapy is.  Wanted to speak to social work and .  States no plan to harm  himself.  Reports he just needs to leave the hospital.  -Psych recs:       -Discontinued buspar and mirtazapine per psych.       -Increased venlafaxine and gabapentin per psych.       -Seroquel for sleep.  Vistaril at bedtime.     Severe protein-calorie malnutrition  TPN as above.     Normocytic anemia  Stable.     Chronic Conditions:  Hypothyroidism- PTA levothyroxine.  Chronic pain- Hold mexolicam 7.5mg daily, resume PTA gabapentin.  BPH-hold home Flomax given he states it does not help.    FEN: TPN.  Clear liquid diet as tolerated.  DVT Prophylaxis: Eliquis.  Code: Full code.    Disposition/Advanced Care Planning  Discharge Planning discussed with patient and care team.  Anticipated discharge date: Multiple days.  Disposition: TCU versus home.    Subjective  Reports feeling well today, he changed his abdominal dressings and states upper fistula still draining and lower fistula with less output.     Objective    Vital signs in last 24 hours Temp:  [97.9  F (36.6  C)-98.3  F (36.8  C)] 98.3  F (36.8  C)  Pulse:  [72-86] 86  Resp:  [16] 16  BP: ()/(60-84) 94/60  SpO2:  [90 %-97 %] 90 %   Weight: [unfilled]    Intake/Output last 3 shift I/O last 3 completed shifts:  In: 2016 [P.O.:360]  Out: -     Intake/Output this shift:I/O this shift:  In: 2149 [P.O.:240]  Out: -     Physical Exam  General appearance: NAD.  Head: Normocephalic, without obvious abnormality, atraumatic.  Eyes: conjunctivae/corneas clear.  Throat: MMM.  Neck: supple, symmetrical, trachea midline.  Lungs: no increased respiratory effort.  Heart: Good capillary refill.  Abdomen: non-distended.  ABDs on fistula openings instead of ostomy bags.  Extremities: extremities normal, atraumatic, no cyanosis or edema.  Skin: Skin color, texture, turgor normal. No rashes or lesions.  Neurologic: A&Ox3, normal strength and normal tone.  Psychiatric: Mood and affect appropriate.    Pertinent Labs and Pertinent Radiology   Lab Results: personally reviewed.      Radiology Results: Personally reviewed image/s and impression/s    Precepted patient with Dr. Elenita Nunez.    Heaven Zelaya MD  [facility], PGY-2

## 2022-01-30 NOTE — PROGRESS NOTES
"CLINICAL NUTRITION SERVICES - REASSESSMENT NOTE     Nutrition Prescription    RECOMMENDATIONS FOR MDs/PROVIDERS TO ORDER:    Malnutrition Status:    Severe    Recommendations already ordered by Registered Dietitian (RD):  Continue cyclic TPN of D 300  @ 75 ml/jr x 1 hr, 150 ml/hr  X 10 hrs, 75 ml/hr x 1 hr and off.  250 ml of 20% lipids 3 times  Per week to provide 1754 Calories, 130 g protein, 300 g CHO  21 g fat (averaged) and 1650 ml fluid/day    Future/Additional Recommendations:  Follow TPN tolerance, labs, weight and adjust TPN as needed     EVALUATION OF THE PROGRESS TOWARD GOALS   Diet: Clear liquid  Nutrition Support: cyclic TPN custom formula as documented above  Intake: 360 mL liquids yesterday  PO intake of Clear liquids barrett not meet nutrition needs.    Per surgery note 1/28-continue TPN.  Planning gastrograffen when fistula output stopped.     ANTHROPOMETRICS  Height: 165.6 cm (5' 5.2\")  Most Recent Weight: 78.9 kg (174 lb)  (1/28)  Admit weight: 173 lb (12/21/21)  Weight History:   Wt Readings from Last 10 Encounters:   01/28/22 78.9 kg (174 lb)   12/17/21 76.8 kg (169 lb 6.4 oz)   01/22/20 79.4 kg (175 lb)   12/24/19 81.2 kg (179 lb)       PHYSICAL FINDINGS  See malnutrition section below.    GI CONCERNS  Ostomy/fistula  BS all quadrants  Last BM 1/27/2022      LABS  Na 135  K 4.7  FSBG     MEDICATIONS  Reviewed: levothyroxine, pantoprazole    Malnutrition Diagnosis: Severe malnutrition  In Context of:  Acute illness or injury    CURRENT NUTRITION DIAGNOSIS  Inadequate oral intake related to SBO with formation of enterocutaneous fistula as evidenced by nausea and abdominal pain      INTERVENTIONS  Implementation  Continue same TPN regimen at this time      Goals  Meet estimated needs-met  Wound healing-progressing  No significant dry weight loss-met    Monitoring/Evaluation  Progress toward goals will be monitored and evaluated per protocol.  "

## 2022-01-30 NOTE — PLAN OF CARE
Problem: Adult Inpatient Plan of Care  Goal: Optimal Comfort and Wellbeing  Outcome: Improving     Problem: Impaired Wound Healing  Goal: Optimal Wound Healing  Outcome: Improving    Pt comfortable, received PRN percocet for pain. Independent in the room. TPN running on cycle. Abdominal dressing in place, changed at 0630. Green drainage noted.   Clear liquid diet.   A/O 4x, makes needs known.

## 2022-01-30 NOTE — PROGRESS NOTES
General Surgery Progress Note:    Hospital Day # 39    ASSESSMENT:   1. Abdominal pain, generalized      Gurdeep Dia is a 66 year old male status post explore lap lysis of adhesions with resultant 3 days later perforation reoperation oversew of defect and then 5 days later perforation again and at this time  he has fistulas above and below to his incision that are being managed with ostomy bag containment and TPN diet (clear liquids ok).  He developed VTE and was treated with heparin drip and has now tested positive for covid, but remains asymptomatic.    PLAN:   -The patient is having very good improvement of his EC fistulas.  At this time we will continue with clear liquid diet.  Continue to monitor the EC fistula output.  -Continue to follow with you.      SUBJECTIVE:   Gurdeep Dia was seen on a.m. rounds. Patient states he has no complaints today. He has been tolerating clear liquid diet. He notes very minimal drainage output from midline incisions.    Patient Vitals for the past 24 hrs:   BP Temp Temp src Pulse Resp SpO2   01/30/22 0859 94/60 98.3  F (36.8  C) Oral 86 16 90 %   01/30/22 0051 103/68 98.3  F (36.8  C) Oral 81 16 92 %   01/29/22 1657 112/84 97.9  F (36.6  C) Oral 72 16 97 %       Physical Exam:  General: NAD, pleasant  CV:RRR  LUNGS:CTA bilaterally  ABD: Surgical midline incision intact. He has some minimal drainage from the EC fistulas. Nontender to palpation.  EXT:no CCE    No results displayed because visit has over 200 results.           DO Dany Wise DO  General Surgeon  [facility]  Surgery [address]  [address]  [place] [address]?  Office: [phone]  Employed by - [facility]  Pager: [phone]  Cell: [phone]

## 2022-01-30 NOTE — PHARMACY-CONSULT NOTE
"Pharmacy Note: Parenteral Nutrition (PN) Management    Pharmacist consulted to dose PN for Gurdeep Dia, a 66 year old male by Dr. Mcmahon.    Subjective:    The patient is a new PN start.    The patient was started on PN in the hospital on 12/2/21.    Indication for PN therapy: continue until fistula heals    Inadequate nutrition existing for > 7 days.     Enteral nutrition contraindicated due to: enterocutaneous fistula...       Social History     Tobacco Use     Smoking status: Current Every Day Smoker     Smokeless tobacco: Never Used     Tobacco comment: declined cessation discussion and resource packet -1/14/22   Substance Use Topics     Alcohol use: Not Currently     Comment: Clean for 5 years     Drug use: Not Currently     Objective:    Ht Readings from Last 1 Encounters:   01/28/22 1.656 m (5' 5.2\")     Wt Readings from Last 1 Encounters:   01/28/22 78.9 kg (174 lb)       Body mass index is 28.78 kg/m .    Patient Vitals for the past 96 hrs:   Weight   01/28/22 1153 78.9 kg (174 lb)       Labs:  Last 3 days:  Recent Labs     01/28/22  0622 01/30/22  0625    135*   POTASSIUM 4.5 4.7   CHLORIDE 105 104   CO2 27 22   BUN 27* 29*   CR 0.83 0.83   VIJAYA 8.6 8.7   [id number]  --  1.16   MAG 2.1 1.9   PHOS 3.6 3.7       Glucose (past 48 hours):   Recent Labs     01/28/22  1232 01/28/22  1708 01/28/22  2124 01/29/22  1333 01/29/22  1654 01/29/22  2054 01/30/22  0625 01/30/22  0857   GLC 98 93 107* 96 89 116* 80 117*       Intake/Output (last 24 hours): I/O last 3 completed shifts:  In: 2016 [P.O.:360]  Out: -     Estimated CrCl: Estimated Creatinine Clearance: 84 mL/min (based on SCr of 0.83 mg/dL).    Assessment:    Continue patient on PN therapy as a cyclic central therapy.     Given the patient's current condition/oral intake, PN is still indicated.    Lab results reviewed: sodium = 135- will increase in tpn, mag= 1.9 will increase in tpn    Plan:  1. Rate of PN:  cyclic- 12 hrs:  75ml/hr x 1 hour, then " 150ml/hr x 10 hours, then 75ml/hr x 1 hour..  1. Formula:     Amino Acids 130 grams    Dextrose 300 grams    Sodium 140 mEq/day(increase)    Potassium 75 mEq/day    Calcium 8 mEq/day    Magnesium 20 mEq/day(increase)    Phosphorus 32 mMol/day    Chloride: Acetate Ratio 1:2    Standard Multivitamins w/Vitamin K    Trace Elements      2. Fat Emulsion: 20%, 250 mL IV 3 times weekly on mon,thurs,sat  3. Check hyperal lytes labs tomorrow.  4. Pharmacist will continue to follow the patient's lab results, clinical status and blood glucose results and make adjustments as appropriate.    Thank you for the consult.  Roge Malone [facility]  1/30/2022 9:55 AM

## 2022-01-31 LAB
ANION GAP SERPL CALCULATED.3IONS-SCNC: 5 MMOL/L (ref 5–18)
BUN SERPL-MCNC: 30 MG/DL (ref 8–22)
CALCIUM SERPL-MCNC: 8.5 MG/DL (ref 8.5–10.5)
CHLORIDE BLD-SCNC: 105 MMOL/L (ref 98–107)
CO2 SERPL-SCNC: 26 MMOL/L (ref 22–31)
CREAT SERPL-MCNC: 0.8 MG/DL (ref 0.7–1.3)
GFR SERPL CREATININE-BSD FRML MDRD: >90 ML/MIN/1.73M2
GLUCOSE BLD-MCNC: 79 MG/DL (ref 70–125)
GLUCOSE BLDC GLUCOMTR-MCNC: 109 MG/DL (ref 70–99)
GLUCOSE BLDC GLUCOMTR-MCNC: 117 MG/DL (ref 70–99)
GLUCOSE BLDC GLUCOMTR-MCNC: 118 MG/DL (ref 70–99)
GLUCOSE BLDC GLUCOMTR-MCNC: 91 MG/DL (ref 70–99)
GLUCOSE BLDC GLUCOMTR-MCNC: 93 MG/DL (ref 70–99)
INR PPP: 1.72 (ref 0.9–1.15)
MAGNESIUM SERPL-MCNC: 2 MG/DL (ref 1.8–2.6)
PHOSPHATE SERPL-MCNC: 3.5 MG/DL (ref 2.5–4.5)
POTASSIUM BLD-SCNC: 4.7 MMOL/L (ref 3.5–5)
PREALB SERPL IA-MCNC: 19 MG/DL (ref 19–38)
SODIUM SERPL-SCNC: 136 MMOL/L (ref 136–145)
TRIGL SERPL-MCNC: 92 MG/DL

## 2022-01-31 PROCEDURE — 250N000013 HC RX MED GY IP 250 OP 250 PS 637: Performed by: INTERNAL MEDICINE

## 2022-01-31 PROCEDURE — 250N000009 HC RX 250: Performed by: STUDENT IN AN ORGANIZED HEALTH CARE EDUCATION/TRAINING PROGRAM

## 2022-01-31 PROCEDURE — 99231 SBSQ HOSP IP/OBS SF/LOW 25: CPT | Mod: GC | Performed by: STUDENT IN AN ORGANIZED HEALTH CARE EDUCATION/TRAINING PROGRAM

## 2022-01-31 PROCEDURE — 85610 PROTHROMBIN TIME: CPT | Performed by: INTERNAL MEDICINE

## 2022-01-31 PROCEDURE — 250N000009 HC RX 250: Performed by: INTERNAL MEDICINE

## 2022-01-31 PROCEDURE — 250N000013 HC RX MED GY IP 250 OP 250 PS 637: Performed by: FAMILY MEDICINE

## 2022-01-31 PROCEDURE — 84100 ASSAY OF PHOSPHORUS: CPT | Performed by: INTERNAL MEDICINE

## 2022-01-31 PROCEDURE — 83735 ASSAY OF MAGNESIUM: CPT | Performed by: INTERNAL MEDICINE

## 2022-01-31 PROCEDURE — 99024 POSTOP FOLLOW-UP VISIT: CPT | Performed by: SPECIALIST

## 2022-01-31 PROCEDURE — 80048 BASIC METABOLIC PNL TOTAL CA: CPT | Performed by: STUDENT IN AN ORGANIZED HEALTH CARE EDUCATION/TRAINING PROGRAM

## 2022-01-31 PROCEDURE — 84134 ASSAY OF PREALBUMIN: CPT | Performed by: INTERNAL MEDICINE

## 2022-01-31 PROCEDURE — 120N000001 HC R&B MED SURG/OB

## 2022-01-31 PROCEDURE — 250N000013 HC RX MED GY IP 250 OP 250 PS 637: Performed by: STUDENT IN AN ORGANIZED HEALTH CARE EDUCATION/TRAINING PROGRAM

## 2022-01-31 PROCEDURE — 84478 ASSAY OF TRIGLYCERIDES: CPT | Performed by: INTERNAL MEDICINE

## 2022-01-31 RX ADMIN — GABAPENTIN 900 MG: 300 CAPSULE ORAL at 20:16

## 2022-01-31 RX ADMIN — APIXABAN 5 MG: 5 TABLET, FILM COATED ORAL at 08:57

## 2022-01-31 RX ADMIN — QUETIAPINE FUMARATE 200 MG: 100 TABLET, FILM COATED ORAL at 20:17

## 2022-01-31 RX ADMIN — AMIODARONE HYDROCHLORIDE 200 MG: 200 TABLET ORAL at 08:57

## 2022-01-31 RX ADMIN — OXYCODONE HYDROCHLORIDE AND ACETAMINOPHEN 1 TABLET: 5; 325 TABLET ORAL at 15:53

## 2022-01-31 RX ADMIN — HYDROXYZINE HYDROCHLORIDE 25 MG: 25 TABLET, FILM COATED ORAL at 20:17

## 2022-01-31 RX ADMIN — I.V. FAT EMULSION 250 ML: 20 EMULSION INTRAVENOUS at 20:14

## 2022-01-31 RX ADMIN — OXYCODONE HYDROCHLORIDE AND ACETAMINOPHEN 1 TABLET: 5; 325 TABLET ORAL at 22:07

## 2022-01-31 RX ADMIN — GABAPENTIN 200 MG: 100 CAPSULE ORAL at 17:08

## 2022-01-31 RX ADMIN — APIXABAN 5 MG: 5 TABLET, FILM COATED ORAL at 20:16

## 2022-01-31 RX ADMIN — GABAPENTIN 200 MG: 100 CAPSULE ORAL at 08:57

## 2022-01-31 RX ADMIN — OXYCODONE HYDROCHLORIDE AND ACETAMINOPHEN 1 TABLET: 5; 325 TABLET ORAL at 06:47

## 2022-01-31 RX ADMIN — PANTOPRAZOLE SODIUM 40 MG: 40 TABLET, DELAYED RELEASE ORAL at 08:57

## 2022-01-31 RX ADMIN — LEVOTHYROXINE SODIUM 25 MCG: 0.03 TABLET ORAL at 06:47

## 2022-01-31 RX ADMIN — VENLAFAXINE HYDROCHLORIDE 75 MG: 75 CAPSULE, EXTENDED RELEASE ORAL at 08:57

## 2022-01-31 RX ADMIN — MAGNESIUM SULFATE HEPTAHYDRATE: 500 INJECTION, SOLUTION INTRAMUSCULAR; INTRAVENOUS at 20:11

## 2022-01-31 ASSESSMENT — ACTIVITIES OF DAILY LIVING (ADL)
ADLS_ACUITY_SCORE: 7

## 2022-01-31 NOTE — PLAN OF CARE
Reported no respiratory symptoms, but course crackles audible posteriorly. Independent in room. Alert and oriented. Restrictions from covid lifted today. Patient independent with dressing changes. Copious amounts of dark green drainage from upper fistula when patient sits up or stands. Lower fistula minimal drainage. Patient put colostomy bag over upper fistula after shower. Redness and irritation around fistula from skin contact with drainage.

## 2022-01-31 NOTE — PROGRESS NOTES
"Surgery    Stable  /75 (BP Location: Right arm)   Pulse 86   Temp 98.1  F (36.7  C) (Oral)   Resp 16   Ht 1.656 m (5' 5.2\")   Wt 78.9 kg (174 lb)   SpO2 92%   BMI 28.78 kg/m    abd soft, nt nd    A/p small bowel fistula    Minimal output    Continue tpn        Mekhi Richardson MD  General Surgery [phone]  Vascular Surgery [phone]          "

## 2022-01-31 NOTE — PHARMACY-CONSULT NOTE
"Pharmacy Note: Parenteral Nutrition (PN) Management    Pharmacist consulted to dose PN for Gurdeep Dia, a 66 year old male by Dr. Mcmahon.    Subjective:    The patient is a new PN start.    The patient was started on PN in the hospital on 12/2/21.    Indication for PN therapy: continue until fistula heals    Inadequate nutrition existing for > 7 days.     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      Social History     Tobacco Use     Smoking status: Current Every Day Smoker     Smokeless tobacco: Never Used     Tobacco comment: declined cessation discussion and resource packet -1/14/22   Substance Use Topics     Alcohol use: Not Currently     Comment: Clean for 5 years     Drug use: Not Currently     Objective:    Ht Readings from Last 1 Encounters:   01/28/22 1.656 m (5' 5.2\")     Wt Readings from Last 1 Encounters:   01/28/22 78.9 kg (174 lb)       Body mass index is 28.78 kg/m .    Patient Vitals for the past 96 hrs:   Weight   01/28/22 1153 78.9 kg (174 lb)       Labs:  Last 3 days:  Recent Labs     01/30/22  0625 01/31/22  0700 01/31/22  0701 01/31/22  0751   *  --  136  --    POTASSIUM 4.7  --  4.7  --    CHLORIDE 104  --  105  --    CO2 22  --  26  --    BUN 29*  --  30*  --    CR 0.83  --  0.80  --    VIJAYA 8.7  --  8.5  --    [id number] 1.16  --   --   --    MAG 1.9  --  2.0  --    PHOS 3.7  --  3.5  --    PREALB  --  19  --   --    TRIG  --   --  92  --    INR  --   --   --  1.72*       Glucose (past 48 hours):   Recent Labs     01/29/22  1654 01/29/22  2054 01/30/22  0625 01/30/22  0857 01/30/22  1204 01/30/22  1622 01/30/22  2148 01/31/22  0106 01/31/22  0701 01/31/22  0838   GLC 89 116* 80 117* 98 95 98 118* 79 117*       Intake/Output (last 24 hours): I/O last 3 completed shifts:  In: 2389 [P.O.:480]  Out: 500 [Urine:500]    Estimated CrCl: Estimated Creatinine Clearance: 87.2 mL/min (based on SCr of 0.8 mg/dL).    Assessment:    Continue patient on PN therapy as a " cyclic central therapy.     Given the patient's current condition/oral intake, PN is still indicated.    Lab results reviewed: no changes to TPN formulary today.    Plan:  1. Rate of PN: cyclic- 12 hrs:  75ml/hr x 1 hour, then 150ml/hr x 10 hours, then 75ml/hr x 1 hour.      Formula:     Amino Acids 130 grams    Dextrose 300 grams    Sodium 140 mEq/day(increase)    Potassium 75 mEq/day    Calcium 8 mEq/day    Magnesium 20 mEq/day(increase)    Phosphorus 32 mMol/day    Chloride: Acetate Ratio 1:2    Standard Multivitamins w/Vitamin K    Trace Elements    2. Fat Emulsion: 20%, 250 mL IV 3 times weekly on mon,thurs,sat  3. No labs ordered for tomorrow.  4. Pharmacist will continue to follow the patient's lab results, clinical status and blood glucose results and make adjustments as appropriate.    Thank you for the consult.  Roge Malone RPH  1/31/2022 8:59 AM

## 2022-01-31 NOTE — PROGRESS NOTES
"CLINICAL NUTRITION SERVICES - REASSESSMENT NOTE     Nutrition Prescription    RECOMMENDATIONS FOR MDs/PROVIDERS TO ORDER:    Malnutrition Status:    Severe    Recommendations already ordered by Registered Dietitian (RD):  Continue cyclic TPN of D 300  @ 75 ml/hr x 1 hr, 150 ml/hr  X 10 hrs, 75 ml/hr x 1 hr and off.  250 ml of 20% lipids 3 times  Per week over 12 hours to provide: 1754 Calories, 130 g protein,  300 g CHO, 21 g fat (averaged) and 1650 ml fluid/day    Future/Additional Recommendations:  Follow TPN tolerance, labs, weight and adjust TPN as needed     EVALUATION OF THE PROGRESS TOWARD GOALS   Diet: Clear liquid  Nutrition Support: TPN as documented above  Intake: 0% (1/28)     Per surgery note 1/28-continue TPN.  Planning gastrograffen when  Fistula output stopped    ANTHROPOMETRICS  Height: 165.6 cm (5' 5.2\")  Most Recent Weight: 78.9 kg (174 lb)    Admit weight: 173 lb (12/21/21)  Weight History:   Wt Readings from Last 10 Encounters:   01/28/22 78.9 kg (174 lb)   12/17/21 76.8 kg (169 lb 6.4 oz)   01/22/20 79.4 kg (175 lb)   12/24/19 81.2 kg (179 lb)     PHYSICAL FINDINGS  See malnutrition section below.    GI CONCERNS  Ostomy/fistula  Abdominal tenderness/discomfort  Hypoactive BS all quadrants  Last BM 1/30/2022    LABS  Reviewed: Na 136, K 4.7, urea nitrogen 30, Cr 0.8, Mg 2, phos 3.5, TG 92, Glu 79    MEDICATIONS  Reviewed: levothyroxine, pantoprazole    Malnutrition Diagnosis: Severe malnutrition  In Context of:  Acute illness or injury    CURRENT NUTRITION DIAGNOSIS  Inadequate oral intake related to SBO with formation of enterocutaneous fistula as evidenced by nausea and abdominal pain      INTERVENTIONS  Implementation  Continue same TPN regimen    Goals  Meet estimated nutrition needs-met  Wound healing-progressing  No significant dry weight loss-met    Monitoring/Evaluation  Progress toward goals will be monitored and evaluated per protocol.  "

## 2022-01-31 NOTE — PLAN OF CARE
Problem: Impaired Wound Healing  Goal: Optimal Wound Healing  Outcome: Improving  Intervention: Promote Wound Healing  Flowsheets  Pain Management Interventions: medication (see MAR)  Activity Management: activity encouraged  Sleep/Rest Enhancement: awakenings minimized  Activity Management:   up ad nicolasa   ambulated to bathroom     Problem: Infection (Surgery Nonspecified)  Goal: Absence of Infection Signs and Symptoms  Outcome: Improving     Problem: Pain (Surgery Nonspecified)  Goal: Acceptable Pain Control  Outcome: Improving  Intervention: Prevent or Manage Pain  Pain Management Interventions: medication (see MAR)  Diversional Activities:   television   music  Pt still c/o pain. PRN Norco available. Drsg wounds now wet to dry dressings. Pt changing them himself with little supervision

## 2022-01-31 NOTE — PROGRESS NOTES
[facility] Progress Note    Assessment/Plan  Active Problems:    Hypertension goal BP (blood pressure) < 140/90    Moderate major depression (H)    Perforation of intestine (H)    Abdominal pain, generalized    Vesicocutaneous fistula    Infection due to 2019 novel coronavirus    Patient remains hospitalized due to placement, TPN.    Chema Dia is a 67 y/o M with past medical history of splenectomy, appendectomy, HTN, and substance abuse, with recent admission to hospital 11/30-12/21/2021 for SBO with perforation s/p surgical repair. Discharged home but readmitted as he could not manage at home and is now requesting to be placed in TCU.  Continues on TPN.  New RUL and RLL PE found and being treated, new HAP 1/9 also resolved with abx. Incidentally COVID-19 positive 1/21, asymptomatic, recovered 1/31.    RUL and RLL PE  SMV thrombosis  Acute RLL PE found on CT as well as SMV thrombosis.  Also has RUL PE.  No evidence of right heart strain.  Most likely provoked in the setting of recent surgery/hospitalization.  -Eliquis 5 mg twice daily for PE treatment for 3 months.    Atrial flutter (resolved)  Developed this overnight 1/8-1/9 and required amiodarone and brief ICU stay for pressors.  Cardiology consulted and initiated amiodarone.  Patient stabilized and back to sinus rhythm.  Pulled back PICC line 1 cm as cardiology suspects the tip may be in the right atrium and that may have caused his episode of atrial flutter.  No repeat episodes since that time.  -Cardiology signed off.  Continue oral amiodarone 200 mg daily.  Would recommend follow-up with cardiology outpatient to determine how long he needs this.  -Eliquis as above.    COVID-19 recovered  Incidentally COVID-19 positive while inpatient 1/21.  Asymptomatic.  COVID-19 recovered 1/31.    HAP, resolved  Wound cultures positive for Candida parapsilosis and E. coli 12/22.  Patient has been having nausea with TPN but developed a new supplemental  oxygen requirement 1/6 in addition to fatigue and general malaise and was found to have RLL PE and SMV thrombosis.  CT abdomen overall stable from prior.  Developed evidence of left-sided HAP overnight 1/9, re-consulted ID for antibiotic guidance, transitioned back to oral antibiotics and finished the course 1/14, also finished fluconazole, ID signed off.     Recurrent abdominal pain s/p surgical intervention of SBO with perforation  Enterocutaneous fistula   SBO on 11/30, underwent exploratory laparotomy, small bowel resection, repair of enterotomy, extensive lysis of adhesions.  Required subsequent washout on 12/3 with antibiotics and MARISA drain placement.  On 12/8 found to have continued enhancing abscess with drain placed 12/9 and removed on 12/16.  Was discharged, but returned soon thereafter on 12/22.  Now receiving TPN.  Fistula output decreasing, lower fistula no longer has any output, now with ABDs instead of ostomy bags.  -Consulted surgery.  Clear liquid diet.  Surgery is the only service that can advance this.  -TPN per pharmacy and RD.  TPN teaching for at home.  -[facility] teaching so he can care for his fistulas at home.  Making progress with these cares.  ABDs now instead of ostomy bags.     Alk phos elevation, resolved  Alk phos elevated, now back to normal.  Rest of LFTs okay.  Does have sludge in gallbladder but no evidence of cholecystitis on imaging.  -Cyclic TPN.  Additional daily lab checks outside of regularly scheduled TPN checks not indicated at this time unless he develops new symptoms.     Depression  Insomnia  Hx of this.  Reports symptoms of low motivation, little interest in doing things, poor sleep, no appetite.  Is on buspar, mirtazapine, and effexor at home, though doesn't really think medicines are as helpful as talk therapy is.  Wanted to speak to social work and .  States no plan to harm himself.  Reports he just needs to leave the hospital.  -Psych recs:       -Discontinued  buspar and mirtazapine per psych.       -Increased venlafaxine and gabapentin per psych.       -Seroquel for sleep.  Vistaril at bedtime.     Severe protein-calorie malnutrition  TPN as above.     Normocytic anemia  Stable.     Chronic Conditions:  Hypothyroidism- PTA levothyroxine.  Chronic pain- Hold mexolicam 7.5mg daily, resume PTA gabapentin.  BPH-hold home Flomax given he states it does not help.    FEN: TPN.  Clear liquid diet as tolerated.  DVT Prophylaxis: Eliquis.  Code: Full code.    Disposition/Advanced Care Planning  Discharge Planning discussed with patient and care team.  Anticipated discharge date: Multiple days.  Disposition: TCU versus home.    Subjective  Lower fistula still with no new output.  Upper fistula with some increased output in the last day.     Objective    Vital signs in last 24 hours Temp:  [97.9  F (36.6  C)-98.9  F (37.2  C)] 98.1  F (36.7  C)  Pulse:  [86-89] 86  Resp:  [16-17] 16  BP: (108-119)/(59-87) 108/75  SpO2:  [92 %-94 %] 92 %   Weight: [unfilled]    Intake/Output last 3 shift I/O last 3 completed shifts:  In: 2389 [P.O.:480]  Out: 500 [Urine:500]    Intake/Output this shift:No intake/output data recorded.    Physical Exam  General appearance: NAD.  Head: Normocephalic, without obvious abnormality, atraumatic.  Eyes: conjunctivae/corneas clear.  Throat: MMM.  Neck: supple, symmetrical, trachea midline.  Lungs: no increased respiratory effort.  Heart: Good capillary refill.  Abdomen: non-distended.  ABDs on fistula openings instead of ostomy bags.  Extremities: extremities normal, atraumatic, no cyanosis or edema.  Skin: Skin color, texture, turgor normal. No rashes or lesions.  Neurologic: A&Ox3, normal strength and normal tone.  Psychiatric: Mood and affect appropriate.    Pertinent Labs and Pertinent Radiology   Lab Results: personally reviewed.     Radiology Results: Personally reviewed image/s and impression/s    Precepted patient with Dr. Zavala  Willy.    Johnahtan Lees MD  [facility], PGY-3

## 2022-02-01 LAB
GLUCOSE BLDC GLUCOMTR-MCNC: 104 MG/DL (ref 70–99)
GLUCOSE BLDC GLUCOMTR-MCNC: 97 MG/DL (ref 70–99)
GLUCOSE BLDC GLUCOMTR-MCNC: 97 MG/DL (ref 70–99)
GLUCOSE BLDC GLUCOMTR-MCNC: 98 MG/DL (ref 70–99)

## 2022-02-01 PROCEDURE — 99231 SBSQ HOSP IP/OBS SF/LOW 25: CPT | Mod: GC | Performed by: STUDENT IN AN ORGANIZED HEALTH CARE EDUCATION/TRAINING PROGRAM

## 2022-02-01 PROCEDURE — 250N000013 HC RX MED GY IP 250 OP 250 PS 637: Performed by: FAMILY MEDICINE

## 2022-02-01 PROCEDURE — 250N000013 HC RX MED GY IP 250 OP 250 PS 637: Performed by: STUDENT IN AN ORGANIZED HEALTH CARE EDUCATION/TRAINING PROGRAM

## 2022-02-01 PROCEDURE — 250N000013 HC RX MED GY IP 250 OP 250 PS 637: Performed by: INTERNAL MEDICINE

## 2022-02-01 PROCEDURE — 250N000009 HC RX 250: Performed by: STUDENT IN AN ORGANIZED HEALTH CARE EDUCATION/TRAINING PROGRAM

## 2022-02-01 PROCEDURE — 120N000001 HC R&B MED SURG/OB

## 2022-02-01 RX ADMIN — QUETIAPINE 100 MG: 25 TABLET ORAL at 22:41

## 2022-02-01 RX ADMIN — LEVOTHYROXINE SODIUM 25 MCG: 0.03 TABLET ORAL at 06:11

## 2022-02-01 RX ADMIN — APIXABAN 5 MG: 5 TABLET, FILM COATED ORAL at 08:32

## 2022-02-01 RX ADMIN — OXYCODONE HYDROCHLORIDE AND ACETAMINOPHEN 1 TABLET: 5; 325 TABLET ORAL at 22:41

## 2022-02-01 RX ADMIN — GABAPENTIN 200 MG: 100 CAPSULE ORAL at 16:55

## 2022-02-01 RX ADMIN — HYDROXYZINE HYDROCHLORIDE 25 MG: 25 TABLET, FILM COATED ORAL at 21:30

## 2022-02-01 RX ADMIN — VENLAFAXINE HYDROCHLORIDE 75 MG: 75 CAPSULE, EXTENDED RELEASE ORAL at 08:31

## 2022-02-01 RX ADMIN — MAGNESIUM SULFATE HEPTAHYDRATE: 500 INJECTION, SOLUTION INTRAMUSCULAR; INTRAVENOUS at 20:34

## 2022-02-01 RX ADMIN — OXYCODONE HYDROCHLORIDE AND ACETAMINOPHEN 1 TABLET: 5; 325 TABLET ORAL at 08:32

## 2022-02-01 RX ADMIN — GABAPENTIN 200 MG: 100 CAPSULE ORAL at 08:31

## 2022-02-01 RX ADMIN — PANTOPRAZOLE SODIUM 40 MG: 40 TABLET, DELAYED RELEASE ORAL at 07:49

## 2022-02-01 RX ADMIN — APIXABAN 5 MG: 5 TABLET, FILM COATED ORAL at 21:30

## 2022-02-01 RX ADMIN — GABAPENTIN 900 MG: 300 CAPSULE ORAL at 22:40

## 2022-02-01 RX ADMIN — AMIODARONE HYDROCHLORIDE 200 MG: 200 TABLET ORAL at 08:31

## 2022-02-01 ASSESSMENT — ACTIVITIES OF DAILY LIVING (ADL)
ADLS_ACUITY_SCORE: 9
ADLS_ACUITY_SCORE: 7
ADLS_ACUITY_SCORE: 7
ADLS_ACUITY_SCORE: 9
ADLS_ACUITY_SCORE: 7
ADLS_ACUITY_SCORE: 9
ADLS_ACUITY_SCORE: 7
ADLS_ACUITY_SCORE: 9
ADLS_ACUITY_SCORE: 9
ADLS_ACUITY_SCORE: 7
ADLS_ACUITY_SCORE: 7
ADLS_ACUITY_SCORE: 9
ADLS_ACUITY_SCORE: 7
ADLS_ACUITY_SCORE: 9
ADLS_ACUITY_SCORE: 9
ADLS_ACUITY_SCORE: 7
ADLS_ACUITY_SCORE: 9
ADLS_ACUITY_SCORE: 9

## 2022-02-01 NOTE — PROGRESS NOTES
General Surgery Progress Note:    Hospital Day # 41    ASSESSMENT:   1. Abdominal pain, generalized        Gurdeep Dia is a 66 year old male EC fistulas  Increasing output reported    PLAN:   Continue monitoring output into colostomy bag on outer abdomen  Continue to keep wounds dry as possible.   TPN  Clears only  TCU when able        Patient Vitals for the past 24 hrs:   BP Temp Temp src Pulse Resp SpO2   02/01/22 1514 135/86 98.1  F (36.7  C) Oral 88 16 95 %   02/01/22 0729 117/76 98.8  F (37.1  C) Oral 98 16 90 %   01/31/22 2303 113/73 97.8  F (36.6  C) Oral 90 16 90 %       Chart check only   Patient stable  Report of increase output.       BLADIMIR GodinezC

## 2022-02-01 NOTE — PHARMACY-CONSULT NOTE
"Pharmacy Note: Parenteral Nutrition (PN) Management    Pharmacist consulted to dose PN for Gurdeep Dia, a 66 year old male by Dr. Mcmahon.    Subjective:    The patient is a new PN start.    The patient was started on PN in the hospital on 12/2/21.    Indication for PN therapy: continue until fistula heals    Inadequate nutrition existing for > 7 days.     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    Pertinent diseases and other special considerations none    Social History     Tobacco Use     Smoking status: Current Every Day Smoker     Smokeless tobacco: Never Used     Tobacco comment: declined cessation discussion and resource packet -1/14/22   Substance Use Topics     Alcohol use: Not Currently     Comment: Clean for 5 years     Drug use: Not Currently     Objective:    Ht Readings from Last 1 Encounters:   01/28/22 1.656 m (5' 5.2\")     Wt Readings from Last 1 Encounters:   01/28/22 78.9 kg (174 lb)       Body mass index is 28.78 kg/m .    Patient Vitals for the past 96 hrs:   Weight   01/28/22 1153 78.9 kg (174 lb)       Labs:  Last 3 days:  Recent Labs     01/30/22  0625 01/31/22  0700 01/31/22  0701 01/31/22  0751   *  --  136  --    POTASSIUM 4.7  --  4.7  --    CHLORIDE 104  --  105  --    CO2 22  --  26  --    BUN 29*  --  30*  --    CR 0.83  --  0.80  --    VIJAYA 8.7  --  8.5  --    XYWJEWO92 1.16  --   --   --    MAG 1.9  --  2.0  --    PHOS 3.7  --  3.5  --    PREALB  --  19  --   --    TRIG  --   --  92  --    INR  --   --   --  1.72*       Glucose (past 48 hours):   Recent Labs     01/30/22  1204 01/30/22  1622 01/30/22  2148 01/31/22  0106 01/31/22  0701 01/31/22  0838 01/31/22  1203 01/31/22  1659 01/31/22  2046 02/01/22  0759   GLC 98 95 98 118* 79 117* 91 93 109* 104*       Intake/Output (last 24 hours): I/O last 3 completed shifts:  In: 1858 [P.O.:240; I.V.:10]  Out: -     Estimated CrCl: Estimated Creatinine Clearance: 87.2 mL/min (based on SCr of 0.8 " mg/dL).    Assessment:    Continue patient on PN therapy as a cyclic central therapy.     Given the patient's current condition/oral intake, PN is still indicated.    Lab results reviewed: no changes to TPN formulary today    Plan:  1. Rate of PN: cyclic @ 55 mL/hr x1 hour then 110mL/hr x14 hours then 55mL/hr x1hr. Maintenance IV fluid rate will be adjusted appropriately to meet the total maximum IV fluids rate as ordered by provider.  2. Formula:     Amino Acids 130 grams    Dextrose 300 grams    Sodium 140 mEq/day    Potassium 75 mEq/day    Calcium 8 mEq/day    Magnesium 20 mEq/day    Phosphorus 32 mMol/day    Chloride: Acetate Ratio 1:2    Standard Multivitamins w/Vitamin K    Trace Elements  3. Fat Emulsion: 20%, 250 mL IV 2 times weekly on Monday & Thursday  4. Check hyperal lytes and BMP labs tomorrow.  5. Pharmacist will continue to follow the patient's lab results, clinical status and blood glucose results and make adjustments as appropriate.    Thank you for the consult.  Elenita Balderas  2/1/2022 11:38 AM

## 2022-02-01 NOTE — PLAN OF CARE
Problem: Adult Inpatient Plan of Care  Goal: Plan of Care Review  Outcome: Improving  Flowsheets (Taken 2/1/2022 5534)  Plan of Care Reviewed With: patient  Progress: improving     Problem: Impaired Wound Healing  Goal: Optimal Wound Healing  Outcome: Improving  Patient is currently being treated for abdominal pain.  Patient complains of no abdominal pain today.  Patient continues to receive TPN and lipids.  Patient will be difficult to place in TCU with his need for TPN and lipids.  Patient will receive a clear liquid diet in addition to his current regiment.  Patient is reporting good wound healing with the increased protein intake, now only needing a colostomy bag placed over upper right abdominal opening.

## 2022-02-01 NOTE — PROGRESS NOTES
CLINICAL NUTRITION SERVICES - BRIEF NOTE      RECOMMENDATIONS FOR MD/PROVIDER TO ORDER:   None     Recommendations Ordered by Registered Dietitian (RD):   Adjusted TPN per SBAR recommendations for ILE shortage    Increase TPN to D350/ @ 55ml/hr x 1 hr, 110ml/hr x 14 hours, 55ml/hr x 1 hr w/ 250ml 20% lipids 2x/week on Mondays and Thursdays    TPN to provide 1853kcals, 350g CHO, 130g protein, 14g fat, 1650ml fluid daily.      Future/Additional Recommendations:   Adjust TPN pending tolerance.     Consider increasing to max of 400g D if pt continues to lose weight     Malnutrition:   Severe in the context of acute illness (please carry forward if malnutrition diagnosed)         EVALUATION OF PROGRESS TOWARD GOALS   Diet:  Clear Liquid    Nutrition Support:  TPN: D300/ @ 75 mL/hr x 1 hour, 150 mL/hr x 10 hours, and 75 mL/hr x 1 hour w/ 250mL 20% lipids 3x/week.    Intake/Tolerance: TPN providing 1754kcals, 300g CHO, 130g protein, 21g fat, 1650ml fluid daily.      ASSESSED NUTRITION NEEDS:  Dosing Weight:  76 kg  Updated 1/26/22     Estimated Energy Needs: 3726-4785 kcals ([place] [employer] Jeor)  Justification: Stress factor 1.4-2.0 for fistula  Estimated Protein Needs: 115 + grams protein (1.5 + g pro/Kg)  Justification: wound healing  Estimated Fluid Needs: 2638-5366  mL (25-30 mL/kg)   Justification: maintenance    NEW FINDINGS:   01/28/22 1153 78.9 kg (174 lb)   01/18/22 0600 75.6 kg (166 lb 9.6 oz)     01/13/22 0705 78.1 kg (172 lb 3.2 oz)   01/12/22 0414 80 kg (176 lb 6.4 oz)   01/10/22 0655 80.2 kg (176 lb 12.8 oz)   01/09/22 0056 80 kg (176 lb 5.9 oz)   01/06/22 1555 74.3 kg (163 lb 14.4 oz)   01/01/22 2100 77.4 kg (170 lb 9.6 oz)     Net fluid up 26.7L (58.7lbs) since 1/18/22 indicating dry wt loss if accurate    Labs: UN 30    Meds: synthroid/levothroid, protonix    GI: nausea, abdominal pain, LBM 1/31/22

## 2022-02-01 NOTE — PROGRESS NOTES
[facility] Progress Note    Assessment/Plan  Active Problems:    Hypertension goal BP (blood pressure) < 140/90    Moderate major depression (H)    Perforation of intestine (H)    Abdominal pain, generalized    Vesicocutaneous fistula    Infection due to 2019 novel coronavirus    Chema Dia is a 67 y/o M with past medical history of splenectomy, appendectomy, HTN, and substance abuse, with recent admission to hospital 11/30-12/21/2021 for SBO with perforation s/p surgical repair. Discharged home but readmitted as he could not manage at home and is now requesting to be placed in TCU.  Continues on TPN.  New RUL and RLL PE found and being treated, new HAP 1/9 also resolved with abx. Incidentally COVID-19 positive 1/21, asymptomatic, recovered 1/31. Stable and medically ready for discharge.      RUL and RLL PE  SMV thrombosis  Acute RLL PE found on CT as well as SMV thrombosis.  Also has RUL PE.  No evidence of right heart strain.  Most likely provoked in the setting of recent surgery/hospitalization. No changes today.  -Eliquis 5 mg twice daily for PE treatment for 3 months.     Atrial flutter (resolved)  Developed this overnight 1/8-1/9 and required amiodarone and brief ICU stay for pressors.  Cardiology consulted and initiated amiodarone.  Patient stabilized and back to sinus rhythm.  Pulled back PICC line 1 cm as cardiology suspects the tip may be in the right atrium and that may have caused his episode of atrial flutter.  No repeat episodes since that time. No changes today.  -Cardiology signed off.  Continue oral amiodarone 200 mg daily.  Would recommend follow-up with cardiology outpatient to determine how long he needs this.  -Eliquis as above.     COVID-19 recovered  Incidentally COVID-19 positive while inpatient 1/21.  Asymptomatic.  COVID-19 recovered 1/31.     HAP, resolved  Wound cultures positive for Candida parapsilosis and E. coli 12/22.  Patient has been having nausea with TPN but  developed a new supplemental oxygen requirement 1/6 in addition to fatigue and general malaise and was found to have RLL PE and SMV thrombosis.  CT abdomen overall stable from prior.  Developed evidence of left-sided HAP overnight 1/9, re-consulted ID for antibiotic guidance, transitioned back to oral antibiotics and finished the course 1/14, also finished fluconazole, ID signed off.  -Stable, continue to monitor     Recurrent abdominal pain s/p surgical intervention of SBO with perforation  Enterocutaneous fistula   SBO on 11/30, underwent exploratory laparotomy, small bowel resection, repair of enterotomy, extensive lysis of adhesions.  Required subsequent washout on 12/3 with antibiotics and MARISA drain placement.  On 12/8 found to have continued enhancing abscess with drain placed 12/9 and removed on 12/16.  Was discharged, but returned soon thereafter on 12/22.  Now receiving TPN.  Fistula output decreasing, lower fistula had no output but now with increased output today, now with ABDs instead of ostomy bags.  -Consulted surgery.  Clear liquid diet.  Surgery is the only service that can advance this.  -TPN per pharmacy and RD.  TPN teaching for at home.  -[facility] teaching so he can care for his fistulas at home.  Making progress with these cares and will be able to perform independently.  ABDs now instead of ostomy bags.     Alk phos elevation, resolved  Alk phos elevated, now back to normal.  Rest of LFTs okay.  Does have sludge in gallbladder but no evidence of cholecystitis on imaging. No changes today.  -Cyclic TPN.  Additional daily lab checks outside of regularly scheduled TPN checks not indicated at this time unless he develops new symptoms.     Depression  Insomnia  Hx of this.  Reports symptoms of low motivation, little interest in doing things, poor sleep, no appetite.  Is on buspar, mirtazapine, and effexor at home, though doesn't really think medicines are as helpful as talk therapy is.  Wanted to speak to  social work and .  States no plan to harm himself.  Reports he just needs to leave the hospital. No changes today.   -Psych recs:       -Discontinued buspar and mirtazapine per psych.       -Increased venlafaxine and gabapentin per psych.       -Seroquel for sleep.  Vistaril at bedtime.     Severe protein-calorie malnutrition  TPN as above.     Normocytic anemia  Stable.     Chronic Conditions:  Hypothyroidism- PTA levothyroxine.  Chronic pain- Hold mexolicam 7.5mg daily, resume PTA gabapentin.  BPH-hold home Flomax given he states it does not help.     FEN: TPN.  Clear liquid diet as tolerated.  DVT Prophylaxis: Eliquis.  Code: Full code.     Disposition/Advanced Care Planning  Discharge Planning discussed with patient and care team.  Anticipated discharge date: Multiple days.  Disposition: TCU versus home.    Subjective  Patient states that he is fine but his fistula's with increased output suddenly as he came back from the bathroom. No pain.     Objective    Vital signs in last 24 hours Temp:  [97.8  F (36.6  C)-98.8  F (37.1  C)] 98.8  F (37.1  C)  Pulse:  [83-98] 98  Resp:  [16] 16  BP: (113-130)/(73-83) 117/76  SpO2:  [90 %-96 %] 90 %   174 lbs 0 oz    Intake/Output last 3 shift I/O last 3 completed shifts:  In: 1858 [P.O.:240; I.V.:10]  Out: -     Intake/Output this shift:No intake/output data recorded.    Physical Exam  General appearance: alert, appears stated age, cooperative and no distress.  Head: Normocephalic, without obvious abnormality, atraumatic.  Eyes: conjunctivae/corneas clear.  Throat: MMM.  Lungs: no increased respiratory effort.  Heart: Good capillary refill.  Abdomen: Fistula's with increased output, soiled bandages and gown with fecal matter.   Extremities: extremities normal  Skin: Skin color, texture, turgor normal. No rashes or lesions.  Neurologic: Alert and oriented x3, normal strength and tone.  Psychiatric: mood and affect appropriate.    Pertinent Labs and Pertinent  Radiology   Lab Results: personally reviewed.     Radiology Results: Personally reviewed image/s and impression/s    Precepted patient with Dr. Lucas Aguilera.    Noemi Long MD  [facility], PGY-3

## 2022-02-01 NOTE — PLAN OF CARE
Problem: Impaired Wound Healing  Goal: Optimal Wound Healing  Intervention: Promote Wound Healing  Recent Flowsheet Documentation  Taken 2/1/2022 0020 by Brenda Terry RN  Activity Management: activity adjusted per tolerance     Problem: Pain (Surgery Nonspecified)  Goal: Acceptable Pain Control  Outcome: Improving  Pt asleep most of the shift. Denied pain. Up ind in room.   On cyclic TPN and lipids per orders.   Pt has pouch on one of the fistula sites due to drainage.  Minimal output in bag. Pt self manages.   Uses bedside commode at night when tpn/lipids running.

## 2022-02-01 NOTE — PLAN OF CARE
Problem: Adult Inpatient Plan of Care  Goal: Optimal Comfort and Wellbeing  Outcome: Improving   Patient tolerating clear liquids. TPN cyclic and Lipids. BG- 93 & 109 this shift.  Independent in room and halls. Percocet 1600 and hs for abdominal discomfort.

## 2022-02-01 NOTE — PROGRESS NOTES
Care Management Follow Up    Length of Stay (days): 41    Expected Discharge Date: 02/04/2022     Concerns to be Addressed: discharge planning     Patient plan of care discussed at interdisciplinary rounds: Yes    Anticipated Discharge Disposition: Transitional Care     Anticipated Discharge Services: Other (see comment) (therapy services )  Anticipated Discharge DME: None    Patient/family educated on Medicare website which has current facility and service quality ratings: yes  Education Provided on the Discharge Plan:    Patient/Family in Agreement with the Plan: yes    Referrals Placed by CM/SW: Post Acute Facilities  Private pay costs discussed: Not applicable    Additional Information:  Patient continues on TPN. Fistulas continue to have discharge. SBFT when able. CM will continue to assess for discharge needs.      Isabell Cee RN

## 2022-02-02 LAB
ALBUMIN SERPL-MCNC: 2.1 G/DL (ref 3.5–5)
ALP SERPL-CCNC: 152 U/L (ref 45–120)
ALT SERPL W P-5'-P-CCNC: 14 U/L (ref 0–45)
ANION GAP SERPL CALCULATED.3IONS-SCNC: 7 MMOL/L (ref 5–18)
AST SERPL W P-5'-P-CCNC: 16 U/L (ref 0–40)
BILIRUB SERPL-MCNC: 0.6 MG/DL (ref 0–1)
BUN SERPL-MCNC: 25 MG/DL (ref 8–22)
CALCIUM SERPL-MCNC: 8.4 MG/DL (ref 8.5–10.5)
CHLORIDE BLD-SCNC: 104 MMOL/L (ref 98–107)
CO2 SERPL-SCNC: 26 MMOL/L (ref 22–31)
CREAT SERPL-MCNC: 0.82 MG/DL (ref 0.7–1.3)
GFR SERPL CREATININE-BSD FRML MDRD: >90 ML/MIN/1.73M2
GLUCOSE BLD-MCNC: 106 MG/DL (ref 70–125)
GLUCOSE BLDC GLUCOMTR-MCNC: 127 MG/DL (ref 70–99)
GLUCOSE BLDC GLUCOMTR-MCNC: 99 MG/DL (ref 70–99)
MAGNESIUM SERPL-MCNC: 2 MG/DL (ref 1.8–2.6)
PHOSPHATE SERPL-MCNC: 3.8 MG/DL (ref 2.5–4.5)
POTASSIUM BLD-SCNC: 4.2 MMOL/L (ref 3.5–5)
PROT SERPL-MCNC: 7.2 G/DL (ref 6–8)
SODIUM SERPL-SCNC: 137 MMOL/L (ref 136–145)

## 2022-02-02 PROCEDURE — 83735 ASSAY OF MAGNESIUM: CPT | Performed by: INTERNAL MEDICINE

## 2022-02-02 PROCEDURE — 82040 ASSAY OF SERUM ALBUMIN: CPT | Performed by: STUDENT IN AN ORGANIZED HEALTH CARE EDUCATION/TRAINING PROGRAM

## 2022-02-02 PROCEDURE — 120N000001 HC R&B MED SURG/OB

## 2022-02-02 PROCEDURE — 250N000013 HC RX MED GY IP 250 OP 250 PS 637: Performed by: INTERNAL MEDICINE

## 2022-02-02 PROCEDURE — 99024 POSTOP FOLLOW-UP VISIT: CPT | Performed by: PHYSICIAN ASSISTANT

## 2022-02-02 PROCEDURE — 250N000013 HC RX MED GY IP 250 OP 250 PS 637: Performed by: FAMILY MEDICINE

## 2022-02-02 PROCEDURE — 84100 ASSAY OF PHOSPHORUS: CPT | Performed by: INTERNAL MEDICINE

## 2022-02-02 PROCEDURE — 999N000197 HC STATISTIC WOC PT EDUCATION, 0-15 MIN

## 2022-02-02 PROCEDURE — 250N000013 HC RX MED GY IP 250 OP 250 PS 637: Performed by: STUDENT IN AN ORGANIZED HEALTH CARE EDUCATION/TRAINING PROGRAM

## 2022-02-02 PROCEDURE — 250N000009 HC RX 250: Performed by: FAMILY MEDICINE

## 2022-02-02 PROCEDURE — 99231 SBSQ HOSP IP/OBS SF/LOW 25: CPT | Mod: GC | Performed by: STUDENT IN AN ORGANIZED HEALTH CARE EDUCATION/TRAINING PROGRAM

## 2022-02-02 PROCEDURE — 80053 COMPREHEN METABOLIC PANEL: CPT | Performed by: STUDENT IN AN ORGANIZED HEALTH CARE EDUCATION/TRAINING PROGRAM

## 2022-02-02 RX ADMIN — GABAPENTIN 200 MG: 100 CAPSULE ORAL at 17:06

## 2022-02-02 RX ADMIN — APIXABAN 5 MG: 5 TABLET, FILM COATED ORAL at 09:13

## 2022-02-02 RX ADMIN — OXYCODONE HYDROCHLORIDE AND ACETAMINOPHEN 1 TABLET: 5; 325 TABLET ORAL at 12:46

## 2022-02-02 RX ADMIN — MAGNESIUM SULFATE HEPTAHYDRATE: 500 INJECTION, SOLUTION INTRAMUSCULAR; INTRAVENOUS at 20:59

## 2022-02-02 RX ADMIN — OXYCODONE HYDROCHLORIDE AND ACETAMINOPHEN 1 TABLET: 5; 325 TABLET ORAL at 22:59

## 2022-02-02 RX ADMIN — AMIODARONE HYDROCHLORIDE 200 MG: 200 TABLET ORAL at 09:13

## 2022-02-02 RX ADMIN — PANTOPRAZOLE SODIUM 40 MG: 40 TABLET, DELAYED RELEASE ORAL at 07:03

## 2022-02-02 RX ADMIN — GABAPENTIN 900 MG: 300 CAPSULE ORAL at 22:59

## 2022-02-02 RX ADMIN — HYDROXYZINE HYDROCHLORIDE 25 MG: 25 TABLET, FILM COATED ORAL at 22:59

## 2022-02-02 RX ADMIN — APIXABAN 5 MG: 5 TABLET, FILM COATED ORAL at 20:59

## 2022-02-02 RX ADMIN — LEVOTHYROXINE SODIUM 25 MCG: 0.03 TABLET ORAL at 07:03

## 2022-02-02 RX ADMIN — VENLAFAXINE HYDROCHLORIDE 75 MG: 75 CAPSULE, EXTENDED RELEASE ORAL at 09:13

## 2022-02-02 RX ADMIN — GABAPENTIN 200 MG: 100 CAPSULE ORAL at 09:13

## 2022-02-02 RX ADMIN — QUETIAPINE FUMARATE 200 MG: 100 TABLET, FILM COATED ORAL at 22:59

## 2022-02-02 ASSESSMENT — ACTIVITIES OF DAILY LIVING (ADL)
ADLS_ACUITY_SCORE: 9

## 2022-02-02 NOTE — PLAN OF CARE
Problem: Adult Inpatient Plan of Care  Goal: Optimal Comfort and Wellbeing  Outcome: Improving  Goal: Readiness for Transition of Care  Outcome: Improving     Pt denied pain. Pt denied nausea. PICC line dressing change done. Flushed well. BG 97. Fistula bag draining well, pt prefers to manage emptying. Other two dressing on abdomen clean dry and Intact.  Bowel sounds are active. Lung sounds clear. Abdomen has some redness from tape being removed repeatedly. VSS. Will monitor.

## 2022-02-02 NOTE — PROGRESS NOTES
CLINICAL NUTRITION SERVICES - REASSESSMENT NOTE     Nutrition Prescription    RECOMMENDATIONS FOR MDs/PROVIDERS TO ORDER:    Malnutrition Status:    Severe    Recommendations already ordered by Registered Dietitian (RD):  Go back to 12 hr TPN regimen (D 350 ) @ 75 ml/hr x 1 hr,  150 ml/hr x 10 hrs, 75 ml/hr x 1 hr and off.  Continue 250 ml of 205  Lipids 2 times per week.  Spoke with pharmacy about this    Future/Additional Recommendations:  Follow TPN tolerance, weight, labs, po intake     Goals  Tolerate TPN  Meet estimated nutrition needs-met  Wound healing-progressing  No significant dry weight loss    Monitoring/Evaluation  Progress toward goals will be monitored and evaluated per protocol.

## 2022-02-02 NOTE — PLAN OF CARE
Problem: Adult Inpatient Plan of Care  Goal: Plan of Care Review  Outcome: Improving     Problem: Pain (Surgery Nonspecified)  Goal: Acceptable Pain Control  Outcome: Improving    Patient denied pain. Slept during the night. Vital signs are stable. Abdominal fistula intact with gauze and pouch.   TPN still running. Standby assist.

## 2022-02-02 NOTE — PROGRESS NOTES
"WOC stoma assessment EC fistula - virtual:    Patient sleeping at time of visit, did not want to open eyes. Chart review completed, ostomy pouch was applied onto upper fistula site again due to saturation. Ok to continue with ostomy pouch on upper fistula (can do dry gauze/ABD again once output slows) and ABD on lower fistula site. See below for previous assessment. WOC will continue to follow peripherally and will check in every other week. Please page WOC if there are concerns sooner.      Allergies:  Penicillins    Height:  Height: 170.2 cm (5' 7\")    Weight:   Weight: 75.5 kg (166 lb 6.4 oz)    Past Medical History:  Past Medical History:   Diagnosis Date     Benign prostatic hyperplasia with weak urinary stream      Chronic bilateral low back pain without sciatica      Chronic neck pain      RAVI (generalized anxiety disorder)      History of hepatitis C     treated and cured     History of substance abuse (H)      Hypertension goal BP (blood pressure) < 140/90      Moderate major depression (H)        Labs:  Recent Labs   Lab Test 12/23/21  0715 12/22/21  1648 12/22/21  0003 12/08/21  0535 12/07/21  0358   CRP  --   --   --   --  26.6*   HGB 10.9*  --  12.0*   < > 10.9*   ALBUMIN  --   --  2.4*   < > 2.3*   CULTURE  --  No growth after 12 hours  No growth after 12 hours  --    < >  --     < > = values in this interval not displayed.       Mars:  Mars Score: 17    INTAKE  EC fistula s/p 2 abdominal surgeries    ASSESSMENT  Abdominal EC fistula    Fistula sites appear to be healing nicely    Upper wound: 0.1x0.1cm- minor yellow drainage    Distal portion of incision is open wound. Wound now measures approx 0.5x0.5cm. scant serous/mucous drainage    Fistulas lay within a deep crease along the midline incision. Scar tissue and minor erythema present to skin from patient pulling at pouches for removal.     Fistula pouches left off at this time and starting just dry gauze and ABD.          Supplies: Pouch " [employer] #[id number] -Flat 1 piece cut to fit fecal pouch and Paste [employer]lin #[id number] -Adapt Ring      Planned Follow Up: every other week.    Plan for next visit: Reassess stoma/peristomal skin

## 2022-02-02 NOTE — PHARMACY-CONSULT NOTE
"Pharmacy Note: Parenteral Nutrition (PN) Management    Pharmacist consulted to dose PN for Gurdeep Dia, a 66 year old male by Dr. Mcmahon.    Subjective:    The patient is a new PN start.    The patient was started on PN in the hospital on 12/2/21.    Indication for PN therapy: continue until fistula heals    Inadequate nutrition existing for > 7 days.     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    Pertinent diseases and other special considerations none    Social History     Tobacco Use     Smoking status: Current Every Day Smoker     Smokeless tobacco: Never Used     Tobacco comment: declined cessation discussion and resource packet -1/14/22   Substance Use Topics     Alcohol use: Not Currently     Comment: Clean for 5 years     Drug use: Not Currently     Objective:    Ht Readings from Last 1 Encounters:   01/28/22 1.656 m (5' 5.2\")     Wt Readings from Last 1 Encounters:   01/28/22 78.9 kg (174 lb)       Body mass index is 28.78 kg/m .    No data found.    Labs:  Last 3 days:  Recent Labs     01/31/22  0700 01/31/22  0701 01/31/22  0751 02/02/22  0708   NA  --  136  --  137   POTASSIUM  --  4.7  --  4.2   CHLORIDE  --  105  --  104   CO2  --  26  --  26   BUN  --  30*  --  25*   CR  --  0.80  --  0.82   VIJAYA  --  8.5  --  8.4*   MAG  --  2.0  --  2.0   PHOS  --  3.5  --  3.8   PROTTOTAL  --   --   --  7.2   ALBUMIN  --   --   --  2.1*   PREALB 19  --   --   --    TRIG  --  92  --   --    BILITOTAL  --   --   --  0.6   AST  --   --   --  16   ALT  --   --   --  14   ALKPHOS  --   --   --  152*   INR  --   --  1.72*  --        Glucose (past 48 hours):   Recent Labs     01/31/22  1203 01/31/22  1659 01/31/22  2046 02/01/22  0759 02/01/22  1201 02/01/22  1649 02/01/22  2103 02/02/22  0708   GLC 91 93 109* 104* 98 97 97 106     Intake/Output (last 24 hours): I/O last 3 completed shifts:  In: 260 [P.O.:240; I.V.:20]  Out: -     Estimated CrCl: Estimated Creatinine Clearance: 85.1 mL/min " (based on SCr of 0.82 mg/dL).    Assessment:    Continue patient on PN therapy as a cyclic central therapy.     Given the patient's current condition/oral intake, PN is still indicated.    Lab results reviewed: Change from 16 hours (increased from 12 hours 1/31/22) back to 12 hours, same volume, so rate increase as well.     Plan:  1. Rate of PN: cyclic @ 75 mL/hr x1 hour then 150mL/hr x10 hours then 75mL/hr x1hr. Maintenance IV fluid rate will be adjusted appropriately to meet the total maximum IV fluids rate as ordered by provider.  2. Formula:     Amino Acids 130 grams    Dextrose 350 grams    Sodium 140 mEq/day    Potassium 75 mEq/day    Calcium 8 mEq/day    Magnesium 20 mEq/day    Phosphorus 32 mMol/day    Chloride: Acetate Ratio 1:2    Standard Multivitamins w/Vitamin K    Trace Elements  3. Fat Emulsion: 20%, 250 mL IV 2 times weekly on Monday & Thursday  4. Check labs as indicated  5. Pharmacist will continue to follow the patient's lab results, clinical status and blood glucose results and make adjustments as appropriate.    Thank you for the consult.  Andree Jansen, PharmD, BCPS   02/02/22 9:30 AM

## 2022-02-02 NOTE — PROGRESS NOTES
"CLINICAL NUTRITION SERVICES - REASSESSMENT NOTE     Nutrition Prescription    RECOMMENDATIONS FOR MDs/PROVIDERS TO ORDER:    Malnutrition Status:    Severe    Recommendations already ordered by Registered Dietitian (RD):  Continue cyclic TPN: D 350  @ 55 ml/hr x 1 hr, 110 ml/hr x 14 hrs  55 ml/hr x 1 hr and stop.  250 ml of 20% lipids 2 times per week on  Mondays and Thursdays to provide: 1853 Calories, 350 g CHO  130 g protein, 14 g fat and 1650 ml fluid/day    Future/Additional Recommendations:  Adjust TPN pending tolerance (consider increasing dextrose to  400 g/day if pt continues to lose weight     EVALUATION OF THE PROGRESS TOWARD GOALS   Diet: Clear liquid  Nutrition Support: TPN as documented above  Intake: 100% breakfast 2/1, 0% lunch 1/28     NEW FINDINGS   Continue TPN and clear liquids, TCU when stable per surgery    ANTHROPOMETRICS  Height: 165.6 cm (5' 5.2\")  Most Recent Weight: 78.9 kg (174 lb)    Admit weight 173 lb (12/21/21)  Weight History:   Wt Readings from Last 10 Encounters:   01/28/22 78.9 kg (174 lb)   12/17/21 76.8 kg (169 lb 6.4 oz)   01/22/20 79.4 kg (175 lb)   12/24/19 81.2 kg (179 lb)     PHYSICAL FINDINGS  See malnutrition section below.    GI CONCERNS  Ostomy/fistula  Positive bowel sounds all quadrants  Nausea (2/1)  Last BM 1/31/2022 per nurse    LABS  Reviewed: Glu 106    MEDICATIONS  Reviewed: levothyroxine, pantoprazole    MALNUTRITION:  % Weight Loss:  1-2% weight loss in 1 week (moderate malnutrition)  % Intake:  No decreased intake noted  Subcutaneous Fat Loss:  Orbital region severe depletion and Upper arm region mild to moderate depletion  Muscle Loss:  Temporal region moderate depletion, Clavicle bone region severe depletion, Dorsal hand region mild to moderate depletion and Patellar region moderate depletion  Fluid Retention:  None noted    Malnutrition Diagnosis: Severe malnutrition  In Context of:  Acute illness or injury    CURRENT NUTRITION DIAGNOSIS  Inadequate " oral intake related to SBO with formation of enterocutaneous fistula as evidenced by nausea and abdominal pain      INTERVENTIONS  Implementation  Continue current cyclic TPN regimen    Goals  Meet estimated nutrition needs-met  Wound healing-progressing  No significant dry weight loss    Monitoring/Evaluation  Progress toward goals will be monitored and evaluated per protocol.

## 2022-02-02 NOTE — PROGRESS NOTES
General Surgery Progress Note:    Hospital Day # 42    ASSESSMENT:   1. Abdominal pain, generalized        Gurdeep Dia is a 66 year old male with EC fistulas, output has somewhat increased    PLAN:   Continue monitoring output into colostomy bag on outer abdomen  Continue to keep wounds dry as possible.   TPN  Clears only  TCU when able    Shaggy Cao PA-C  Pager - [phone]  Phone - [phone]   General Surgery        Subjective:  Doing fine, no complaints, put ostomy back on upper fistula due to soaking through dressings    Objective:  Gen: laying in bed, nad  Abd: soft, nontender, ostomy bag over superior fistula with succus output       Shaggy Cao PA-C

## 2022-02-02 NOTE — PLAN OF CARE
Problem: Adult Inpatient Plan of Care  Goal: Plan of Care Review  2/2/2022 1401 by Esvin Hahn RN  Outcome: No Change  Flowsheets (Taken 2/2/2022 1401)  Plan of Care Reviewed With: patient  Progress: no change  2/2/2022 1010 by Esvin Hahn, RN  Outcome: Improving  Flowsheets (Taken 2/2/2022 1010)  Plan of Care Reviewed With: patient  Progress: improving     Problem: Adult Inpatient Plan of Care  Goal: Patient-Specific Goal (Individualized)  2/2/2022 1401 by Esvin Hahn, RN  Outcome: No Change  2/2/2022 1010 by Esvin Hahn RN  Outcome: Improving  There were no clinical change in the patient and he reports not having any change in his pain level or comfort.  Patient has had a personal plan to take a shower today and have his bedding changed.  Patient was successful in showering and changing his own bedding.

## 2022-02-02 NOTE — PROGRESS NOTES
[facility] Progress Note    Assessment/Plan  Active Problems:    Hypertension goal BP (blood pressure) < 140/90    Moderate major depression (H)    Perforation of intestine (H)    Abdominal pain, generalized    Vesicocutaneous fistula    Infection due to 2019 novel coronavirus    Chema Dia is a 65 y/o M with past medical history of splenectomy, appendectomy, HTN, and substance abuse, with recent admission to hospital 11/30-12/21/2021 for SBO with perforation s/p surgical repair. Discharged home but readmitted as he could not manage at home and awaiting placement, parole office assisting with this and SW following.  Continues on TPN. New RUL and RLL PE found and being treated, new HAP 1/9 also resolved with abx. Incidentally COVID-19 positive 1/21, asymptomatic, recovered 1/31. Stable and medically ready for discharge.      Recurrent abdominal pain s/p surgical intervention of SBO with perforation  Enterocutaneous fistula   SBO on 11/30, underwent exploratory laparotomy, small bowel resection, repair of enterotomy, extensive lysis of adhesions.  Required subsequent washout on 12/3 with antibiotics and MARISA drain placement.  On 12/8 found to have continued enhancing abscess with drain placed 12/9 and removed on 12/16.  Was discharged, but returned soon thereafter on 12/22.  Now receiving TPN.  Fistula output decreasing but still present.  -Consulted surgery.  Clear liquid diet.  Surgery is the only service that can advance this.  -TPN per pharmacy and RD, will change run time back to 12 hours.  TPN teaching for at home.  -[facility] teaching so he can care for his fistulas at home.  Making progress with these cares and will be able to perform independently.  ABDs now instead of ostomy bags.    RUL and RLL PE  SMV thrombosis  Acute RLL PE found on CT as well as SMV thrombosis.  Also has RUL PE.  No evidence of right heart strain.  Most likely provoked in the setting of recent surgery/hospitalization. No  changes today.  -Eliquis 5 mg twice daily for PE treatment for 3 months.     Atrial flutter (resolved)  Developed this overnight 1/8-1/9 and required amiodarone and brief ICU stay for pressors.  Cardiology consulted and initiated amiodarone.  Patient stabilized and back to sinus rhythm.  Pulled back PICC line 1 cm as cardiology suspects the tip may be in the right atrium and that may have caused his episode of atrial flutter.  No repeat episodes since that time. No changes today.  -Cardiology signed off.  Continue oral amiodarone 200 mg daily.  Would recommend follow-up with cardiology outpatient to determine how long he needs this.  -Eliquis as above.     Alk phos elevation  Alk phos elevated to 152. Rest of LFTs okay.  Does have sludge in gallbladder but no evidence of cholecystitis on imaging. No changes today.  -Cyclic TPN.  Additional daily lab checks outside of regularly scheduled TPN checks not indicated at this time unless he develops new symptoms.     Depression  Insomnia  Hx of this.  Reports symptoms of low motivation, little interest in doing things, poor sleep, no appetite.  Is on buspar, mirtazapine, and effexor at home, though doesn't really think medicines are as helpful as talk therapy is.  Wanted to speak to social work and .  States no plan to harm himself.  Reports he just needs to leave the hospital. No changes today.   -Psych recs:       -Discontinued buspar and mirtazapine per psych.       -Increased venlafaxine and gabapentin per psych.       -Seroquel for sleep.  Vistaril at bedtime.     Severe protein-calorie malnutrition  TPN as above.     Normocytic anemia  Stable.     Chronic Conditions:  Hypothyroidism- PTA levothyroxine.  Chronic pain- Hold mexolicam 7.5mg daily, resume PTA gabapentin.  BPH-hold home Flomax given he states it does not help.     FEN: TPN.  Clear liquid diet as tolerated advance per surgery service.  DVT Prophylaxis: Eliquis.  Code: Full  code.     Disposition/Advanced Care Planning  Discharge Planning discussed with patient and care team.  Anticipated discharge date: Multiple days.  Disposition: TCU versus home.    Subjective  Patient denies fever, chills, chest pain, sob. He is distressed that his TPN has been running for so long. He states he normally has it running throughout the night but its still on. He would like it to go back to the 12 hour running time as patient would like to ambulate freely. Discussed with RN who confirms that it changed from 12 hours to 16 hours of total run time.    Objective    Vital signs in last 24 hours Temp:  [98.1  F (36.7  C)-99  F (37.2  C)] 98.4  F (36.9  C)  Pulse:  [77-88] 77  Resp:  [16-20] 20  BP: (100-135)/(64-86) 108/82  SpO2:  [93 %-95 %] 93 %   174 lbs 0 oz    Intake/Output last 3 shift I/O last 3 completed shifts:  In: 260 [P.O.:240; I.V.:20]  Out: -     Intake/Output this shift:I/O this shift:  In: -   Out: 1000 [Urine:1000]    Physical Exam  General appearance: alert, appears stated age, cooperative and no distress.  Head: Normocephalic, without obvious abnormality, atraumatic.  Eyes: conjunctivae/corneas clear.  Throat: MMM.  Lungs: clear to auscultation bilaterally, no increased respiratory effort.  Heart: regular rate and rhythm, no murmurs.  Abdomen: soft, non-tender, non-distended, ostomy bag in place with stool, fistula with bandage in place.  Extremities: extremities normal, atraumatic, no cyanosis or edema, radial and DP pulses palpable bilaterally.  Skin: Skin color, texture, turgor normal. No rashes or lesions.  Neurologic: Alert and oriented x3, normal strength and tone.  Psychiatric: mood and affect appropriate.    Pertinent Labs and Pertinent Radiology   Lab Results: personally reviewed.     Radiology Results: Personally reviewed image/s and impression/s    Precepted patient with Dr. Lucas Aguilera.    Noemi Long MD  [facility], PGY-3

## 2022-02-03 LAB
GLUCOSE BLDC GLUCOMTR-MCNC: 101 MG/DL (ref 70–99)
GLUCOSE BLDC GLUCOMTR-MCNC: 127 MG/DL (ref 70–99)
GLUCOSE BLDC GLUCOMTR-MCNC: 95 MG/DL (ref 70–99)
GLUCOSE BLDC GLUCOMTR-MCNC: 98 MG/DL (ref 70–99)

## 2022-02-03 PROCEDURE — 250N000013 HC RX MED GY IP 250 OP 250 PS 637: Performed by: INTERNAL MEDICINE

## 2022-02-03 PROCEDURE — 120N000001 HC R&B MED SURG/OB

## 2022-02-03 PROCEDURE — 250N000009 HC RX 250: Performed by: STUDENT IN AN ORGANIZED HEALTH CARE EDUCATION/TRAINING PROGRAM

## 2022-02-03 PROCEDURE — 99231 SBSQ HOSP IP/OBS SF/LOW 25: CPT | Mod: GC | Performed by: STUDENT IN AN ORGANIZED HEALTH CARE EDUCATION/TRAINING PROGRAM

## 2022-02-03 PROCEDURE — 250N000013 HC RX MED GY IP 250 OP 250 PS 637: Performed by: FAMILY MEDICINE

## 2022-02-03 PROCEDURE — 250N000013 HC RX MED GY IP 250 OP 250 PS 637: Performed by: STUDENT IN AN ORGANIZED HEALTH CARE EDUCATION/TRAINING PROGRAM

## 2022-02-03 PROCEDURE — 250N000009 HC RX 250: Performed by: FAMILY MEDICINE

## 2022-02-03 RX ADMIN — GABAPENTIN 900 MG: 300 CAPSULE ORAL at 20:38

## 2022-02-03 RX ADMIN — AMIODARONE HYDROCHLORIDE 200 MG: 200 TABLET ORAL at 08:04

## 2022-02-03 RX ADMIN — LEVOTHYROXINE SODIUM 25 MCG: 0.03 TABLET ORAL at 06:17

## 2022-02-03 RX ADMIN — MAGNESIUM SULFATE HEPTAHYDRATE: 500 INJECTION, SOLUTION INTRAMUSCULAR; INTRAVENOUS at 20:41

## 2022-02-03 RX ADMIN — OXYCODONE HYDROCHLORIDE AND ACETAMINOPHEN 1 TABLET: 5; 325 TABLET ORAL at 23:07

## 2022-02-03 RX ADMIN — VENLAFAXINE HYDROCHLORIDE 75 MG: 75 CAPSULE, EXTENDED RELEASE ORAL at 08:05

## 2022-02-03 RX ADMIN — HYDROXYZINE HYDROCHLORIDE 25 MG: 25 TABLET, FILM COATED ORAL at 20:38

## 2022-02-03 RX ADMIN — GABAPENTIN 200 MG: 100 CAPSULE ORAL at 17:16

## 2022-02-03 RX ADMIN — APIXABAN 5 MG: 5 TABLET, FILM COATED ORAL at 20:38

## 2022-02-03 RX ADMIN — I.V. FAT EMULSION 250 ML: 20 EMULSION INTRAVENOUS at 20:40

## 2022-02-03 RX ADMIN — GABAPENTIN 200 MG: 100 CAPSULE ORAL at 08:04

## 2022-02-03 RX ADMIN — OXYCODONE HYDROCHLORIDE AND ACETAMINOPHEN 1 TABLET: 5; 325 TABLET ORAL at 15:52

## 2022-02-03 RX ADMIN — QUETIAPINE FUMARATE 200 MG: 100 TABLET, FILM COATED ORAL at 20:38

## 2022-02-03 RX ADMIN — APIXABAN 5 MG: 5 TABLET, FILM COATED ORAL at 08:04

## 2022-02-03 RX ADMIN — PANTOPRAZOLE SODIUM 40 MG: 40 TABLET, DELAYED RELEASE ORAL at 08:05

## 2022-02-03 ASSESSMENT — ACTIVITIES OF DAILY LIVING (ADL)
ADLS_ACUITY_SCORE: 9

## 2022-02-03 ASSESSMENT — MIFFLIN-ST. JEOR
SCORE: 1466.19
SCORE: 1471.18

## 2022-02-03 NOTE — PROGRESS NOTES
"Care Management Follow Up    Length of Stay (days): 43    Expected Discharge Date: 02/04/2022     Concerns to be Addressed: discharge planning, TPN/Lipids, wound drainage    Patient plan of care discussed at interdisciplinary rounds: Yes    Anticipated Discharge Disposition: Possibly  [facility] [place] [phone] (pending ok if pt. Can bring his own small fridge in) vs return to [facility]???  Anticipated Discharge Services: Home infusion for TPN/Lipids  Anticipated Discharge DME: [facility]    Patient/family educated on Medicare website which has current facility and service quality ratings: yes  Education Provided on the Discharge Plan:    Patient/Family in Agreement with the Plan:    Referrals Placed by CM/SW:   Private pay costs discussed: Not applicable    Additional Information:  Spoke to pt. To update him on discharge planning and progress. We discussed with him that we would call his  Saravanan to see if pt. Was able to purchase his own small fridge and bring it with him to [facility], discharging back to his brother in laws now that he has had such improvments in his ability to do wound cares with use of Colostomy Bags as needed vs abd pads and ability to self admin TPN/Lipids or stay until can be weaned off TPN.   Called Saravanan LOPEZ who is going to call [facility] to see if pt. Is able to purchase and bring in his own fridge to hold TPN supplies. He will call me back tomorrow. Pt. Would be very willing to purchase own fridge to be able to discharge from hospital and get on with his healing. \" I have my credit care with me, just let me know\".      Aye Olivier RN      "

## 2022-02-03 NOTE — PROGRESS NOTES
"ASSESSMENT:  1. Abdominal pain, generalized        Gurdeep Dia is a 66 year old male status post explore lap lysis of adhesions with resultant 3 days later perforation reoperation oversew of defect and then 5 days later perforation again and at this time  he has 2 EC fistulas with the proximal one needing ostomy bag for containment of the leakage and the distal one only needing daily dressing changes with minimal drainage. He continues with a TPN diet with minimal oral intake for comfort (clear liquids ok).  He developed VTE and was treated with heparin drip. The patient also tested positive for covid on 1/22/22, but he remained asymptomatic is now considered \"recovered covid\".     Placement for the patient has been an ongoing issue, but this continues to be worked on by the care management team.    PLAN:  Continue TPN  Clear liquid diet  Monitor EC fistula output  Once patient fistulas close, he will need a gastrografin study prior to advancing diet    SUBJECTIVE:   He is feeling good. He only has to change his gauze dressing once a day on the lower fistula and is only emptying the ostomy bag on the upper fistula every 1-2 days. He continues to have bowel movements but does not note much flatus. He does have a good amount of gas that escapes through his upper fistula. He feels good and has no complaints today.      Patient Vitals for the past 24 hrs:   BP Temp Temp src Pulse Resp SpO2   02/03/22 0732 103/67 98  F (36.7  C) Oral 80 18 93 %   02/03/22 0014 96/66 97.7  F (36.5  C) Oral 82 18 90 %   02/02/22 1939 114/82 98.7  F (37.1  C) Oral 84 20 94 %         PHYSICAL EXAM:  GEN: No acute distress, comfortable  LUNGS: CTA bilaterally  CV:RRR  ABD: soft, not tender, not distended, liquid feculent material present in the ostomy bag, lower wound with some green liquid output on dressing  EXT:No cyanosis, edema or obvious abnormalities    02/02 0700 - 02/03 0659  In: -   Out: 1000 [Urine:1000]    No results " displayed because visit has over 200 results.             THOMAS Givens CNP

## 2022-02-03 NOTE — PLAN OF CARE
Problem: Adult Inpatient Plan of Care  Goal: Plan of Care Review  Outcome: Improving     Problem: Adult Inpatient Plan of Care  Goal: Patient-Specific Goal (Individualized)  Outcome: Improving  Patient is currently receiving TPN for nutrition with a clear liquid diet in conjunction with.  Patient is reporting that he would be willing to go to TCU if they could find one for him.  Patient has been told that his current felony conviction is the only roadblock to a TCU.

## 2022-02-03 NOTE — PLAN OF CARE
Problem: Pain (Surgery Nonspecified)  Goal: Acceptable Pain Control  Outcome: Improving     Problem: Impaired Wound Healing  Goal: Optimal Wound Healing  Outcome: Improving   Pouch intact to fistula. Pt is vigilant and aware to check pouch. Percocet given at 2259 for general discomfort. Relief pending.

## 2022-02-03 NOTE — PLAN OF CARE
Problem: Adult Inpatient Plan of Care  Goal: Optimal Comfort and Wellbeing  Outcome: No Change  Pt independent in the room. Denies any pain or discomfort overnight. Tolerating clears. Pt has been sleeping when rounded upon. Does not like to be bothered during the night. Uneventful shift. Plan to discharge to tcu when ready.   Jane Rubio RN

## 2022-02-03 NOTE — PHARMACY-CONSULT NOTE
"Pharmacy Note: Parenteral Nutrition (PN) Management    Pharmacist consulted to dose PN for Gurdeep Dia, a 66 year old male by Dr. Mcmahon.    Subjective:    The patient is a new PN start.    The patient was started on PN in the hospital on 12/2/21.    Indication for PN therapy: continue until fistula heals    Inadequate nutrition existing for > 7 days.     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    Pertinent diseases and other special considerations none    Social History     Tobacco Use     Smoking status: Current Every Day Smoker     Smokeless tobacco: Never Used     Tobacco comment: declined cessation discussion and resource packet -1/14/22   Substance Use Topics     Alcohol use: Not Currently     Comment: Clean for 5 years     Drug use: Not Currently     Objective:    Ht Readings from Last 1 Encounters:   01/28/22 1.656 m (5' 5.2\")     Wt Readings from Last 1 Encounters:   01/28/22 78.9 kg (174 lb)       Body mass index is 28.78 kg/m .    No data found.    Labs:  Last 3 days:  Recent Labs     01/31/22  0700 01/31/22  0701 01/31/22  0751 02/02/22  0708   NA  --  136  --  137   POTASSIUM  --  4.7  --  4.2   CHLORIDE  --  105  --  104   CO2  --  26  --  26   BUN  --  30*  --  25*   CR  --  0.80  --  0.82   VIJAYA  --  8.5  --  8.4*   MAG  --  2.0  --  2.0   PHOS  --  3.5  --  3.8   PROTTOTAL  --   --   --  7.2   ALBUMIN  --   --   --  2.1*   PREALB 19  --   --   --    TRIG  --  92  --   --    BILITOTAL  --   --   --  0.6   AST  --   --   --  16   ALT  --   --   --  14   ALKPHOS  --   --   --  152*   INR  --   --  1.72*  --        Glucose (past 48 hours):   Recent Labs     01/31/22  1203 01/31/22  1659 01/31/22  2046 02/01/22  0759 02/01/22  1201 02/01/22  1649 02/01/22  2103 02/02/22  0708   GLC 91 93 109* 104* 98 97 97 106     Intake/Output (last 24 hours): I/O last 3 completed shifts:  In: -   Out: 1000 [Urine:1000]    Estimated CrCl: Estimated Creatinine Clearance: 85.1 mL/min (based " on SCr of 0.82 mg/dL).    Assessment:    Continue patient on PN therapy as a cyclic central therapy.     Given the patient's current condition/oral intake, PN is still indicated.    Lab results reviewed:   2/3: No labs to review today; No changes per dietary    Plan:  1. Rate of PN: cyclic @ 75 mL/hr x1 hour then 150mL/hr x10 hours then 75mL/hr x1hr. Maintenance IV fluid rate will be adjusted appropriately to meet the total maximum IV fluids rate as ordered by provider.  2. Formula:     Amino Acids 130 grams    Dextrose 350 grams    Sodium 140 mEq/day    Potassium 75 mEq/day    Calcium 8 mEq/day    Magnesium 20 mEq/day    Phosphorus 32 mMol/day    Chloride: Acetate Ratio 1:2    Standard Multivitamins w/Vitamin K    Trace Elements  3. Fat Emulsion: 20%, 250 mL IV 2 times weekly on Monday & Thursday  4. Check labs as indicated  5. Pharmacist will continue to follow the patient's lab results, clinical status and blood glucose results and make adjustments as appropriate.    Thank you for the consult.  Aye Monsivais RPH February 3, 2022. 10:21 AM

## 2022-02-03 NOTE — PROGRESS NOTES
"CLINICAL NUTRITION SERVICES - REASSESSMENT NOTE     Nutrition Prescription    RECOMMENDATIONS FOR MDs/PROVIDERS TO ORDER:    Malnutrition Status:    Severe    Recommendations already ordered by Registered Dietitian (RD):  Continue cyclic TPN over 12 hrs: D 350  @ 75 ml/hr x 1 hr  150 ml/hr x 10 hrs, 75 ml/hr x 1 hr and off.  250 ml of 20% lipids  2 times per week on Mon and Thurs. TPN/lipids provide: 1853 Calories  350 g CHO, 130 g protein, 14 g fat and 1650 ml fluid/day    Future/Additional Recommendations:  Adjust TPN pending tolerance and needs  Consider increasing dextrose to 400 g/day if continues to lose wt     EVALUATION OF THE PROGRESS TOWARD GOALS   Diet: Clear liquids  Nutrition Support: Cyclic TPN as documented above  Intake: 100% breakfast on 2/1, 0% lunch on 1/28/2022     NEW FINDINGS   Plan is to discharge to TCU when ready    ANTHROPOMETRICS  Height: 165.6 cm (5' 5.2\")  Most Recent Weight: 78.9 kg (174 lb)    Admit weight: 173 lb (12/21/21)  Weight History:   Wt Readings from Last 10 Encounters:   01/28/22 78.9 kg (174 lb)   12/17/21 76.8 kg (169 lb 6.4 oz)   01/22/20 79.4 kg (175 lb)   12/24/19 81.2 kg (179 lb)     PHYSICAL FINDINGS  See malnutrition section below.  Ostomy/fistula    GI CONCERNS  Abdomen distended  Audible BS all quadrants  Last BM 1/31/2022 per nurse  Intermittent nausea    LABS  Reviewed: (2/2 labs): Na 137, K 4.2, Mg 2, phos 3.8, Alb 2.1, alk phos 152, Glu by meter poct 127 (2/3)    Estimated nutrition needs:  Dosing weight 76 Kg (updated 1/26/2022)    Estimated energy needs: 9124-0844 Matt/day ([place] Jeor)  Justification: stress factor 1.4-2.0 for fistula  Estimated protein needs:115+ g/day (1.5+ g/Kg)  Justification: wound healing  Estimated fluid needs: 1872-9080 ml/day (25-30 ml/Kg)  Justification: maintenance    MEDICATIONS  Reviewed: levothyroxine, pantoprazole    Malnutrition Diagnosis: Severe malnutrition  In Context of:  Acute illness or injury    CURRENT " NUTRITION DIAGNOSIS  Inadequate oral intake related to SBO with formation of enterocutaneous fistula as evidenced by nausea and abdominal pain      INTERVENTIONS  Implementation  Continue same cyclic TPN regimen over 12 hrs    Goals  Meet estimated needs-met  Wound healing-progressing  No significant dry weight loss    Monitoring/Evaluation  Progress toward goals will be monitored and evaluated per protocol.

## 2022-02-03 NOTE — PROGRESS NOTES
[facility] Progress Note    Assessment/Plan  Active Problems:    Hypertension goal BP (blood pressure) < 140/90    Moderate major depression (H)    Perforation of intestine (H)    Abdominal pain, generalized    Vesicocutaneous fistula    Infection due to 2019 novel coronavirus    Chema Dia is a 65 y/o M with past medical history of splenectomy, appendectomy, HTN, and substance abuse, with recent admission to hospital 11/30-12/21/2021 for SBO with perforation s/p surgical repair. Discharged home but readmitted as he could not manage at home and awaiting placement, parole office assisting with this and SW following.  Continues on TPN. New RUL and RLL PE found and being treated, new HAP 1/9 also resolved with abx. Incidentally COVID-19 positive 1/21, asymptomatic, recovered 1/31. Stable and medically ready for discharge.      Recurrent abdominal pain s/p surgical intervention of SBO with perforation  Enterocutaneous fistula   SBO on 11/30, underwent exploratory laparotomy, small bowel resection, repair of enterotomy, extensive lysis of adhesions.  Required subsequent washout on 12/3 with antibiotics and MARISA drain placement.  On 12/8 found to have continued enhancing abscess with drain placed 12/9 and removed on 12/16.  Was discharged, but returned soon thereafter on 12/22.  Now receiving TPN.  Fistula output decreasing but still present.  -Consulted surgery.  Clear liquid diet.  Surgery is the only service that can advance this.  -TPN per pharmacy and RD. Run time now at 12 hours. TPN teaching for at home.  -[facility] teaching so he can care for his fistulas at home.  Making progress with these cares and will be able to perform independently. Output seems to be decreasing. Was using ABDs for drainage but switched back to ostomy bags for ease    RUL and RLL PE  SMV thrombosis  Acute RLL PE found on CT as well as SMV thrombosis.  Also has RUL PE.  No evidence of right heart strain.  Most likely provoked in  the setting of recent surgery/hospitalization. No changes today.  -Eliquis 5 mg twice daily for PE treatment for 3 months.     Atrial flutter (resolved)  Developed this overnight 1/8-1/9 and required amiodarone and brief ICU stay for pressors.  Cardiology consulted and initiated amiodarone.  Patient stabilized and back to sinus rhythm.  Pulled back PICC line 1 cm as cardiology suspects the tip may be in the right atrium and that may have caused his episode of atrial flutter.  No repeat episodes since that time. No changes today.  -Cardiology signed off.  Continue oral amiodarone 200 mg daily.  Would recommend follow-up with cardiology outpatient to determine how long he needs this.  -Eliquis as above.     Alk phos elevation  Alk phos elevated to 152. Rest of LFTs okay.  Does have sludge in gallbladder but no evidence of cholecystitis on imaging. No changes today.  -Cyclic TPN.  Additional daily lab checks outside of regularly scheduled TPN checks not indicated at this time unless he develops new symptoms.     Depression  Insomnia  Hx of this.  Reports symptoms of low motivation, little interest in doing things, poor sleep, no appetite.  Is on buspar, mirtazapine, and effexor at home, though doesn't really think medicines are as helpful as talk therapy is.  Wanted to speak to social work and .  States no plan to harm himself.  Reports he just needs to leave the hospital. No changes today.   -Psych recs:       -Discontinued buspar and mirtazapine per psych.       -Increased venlafaxine and gabapentin per psych.       -Seroquel for sleep.  Vistaril at bedtime.     Severe protein-calorie malnutrition  TPN as above.     Normocytic anemia  Stable.     Chronic Conditions:  Hypothyroidism- PTA levothyroxine.  Chronic pain- Hold mexolicam 7.5mg daily, resume PTA gabapentin.  BPH-hold home Flomax given he states it does not help.     FEN: TPN.  Clear liquid diet as tolerated advance per surgery service.  DVT  "Prophylaxis: Eliquis.  Code: Full code.     Disposition/Advanced Care Planning  Discharge Planning discussed with patient and care team.  Anticipated discharge date: Multiple days.  Disposition: TCU versus with home w/ brother in law    Subjective  NAEON  Doing well this AM  No complaints  TPN run switched to 12 hours. Tolerating this well.  Had been using ABD pads for drainage of abdominal wounds but switched back to ostomy bags overnight for ease of use. Drainage seems to be decreasing per pt.    Objective    Vital signs in last 24 hours Temp:  [97.7  F (36.5  C)-98.7  F (37.1  C)] 98  F (36.7  C)  Pulse:  [80-84] 80  Resp:  [18-20] 18  BP: ()/(66-82) 103/67  SpO2:  [90 %-94 %] 93 %   174 lbs 0 oz    Intake/Output last 3 shift I/O last 3 completed shifts:  In: -   Out: 1000 [Urine:1000]    Intake/Output this shift:No intake/output data recorded.    Physical Exam  General appearance: alert, appears stated age, cooperative and no distress.  Head: Normocephalic, without obvious abnormality, atraumatic.  Eyes: conjunctivae/corneas clear.  Throat: MMM.  Lungs: clear to auscultation bilaterally, no increased respiratory effort.  Heart: regular rate and rhythm, no murmurs.  Abdomen: soft, non-tender, non-distended, ostomy bag in place with thin liquid stool, fistula with bandage in place.  Extremities: extremities normal, atraumatic, no cyanosis or edema, radial and DP pulses palpable bilaterally.  Skin: Skin color, texture, turgor normal. No rashes or lesions.  Neurologic: Alert and oriented x3, normal strength and tone.  Psychiatric: mood and affect appropriate.    Pertinent Labs and Pertinent Radiology   Lab Results: personally reviewed.     Radiology Results: Personally reviewed image/s and impression/s    Precepted patient with Dr. Aye Willoughby.    Dhara \"Pratibha\" MD [facility]  [facility] Resident  P: [phone]      "

## 2022-02-04 LAB
GLUCOSE BLDC GLUCOMTR-MCNC: 101 MG/DL (ref 70–99)
GLUCOSE BLDC GLUCOMTR-MCNC: 112 MG/DL (ref 70–99)
GLUCOSE BLDC GLUCOMTR-MCNC: 113 MG/DL (ref 70–99)
GLUCOSE BLDC GLUCOMTR-MCNC: 90 MG/DL (ref 70–99)
GLUCOSE BLDC GLUCOMTR-MCNC: 94 MG/DL (ref 70–99)
MAGNESIUM SERPL-MCNC: 1.9 MG/DL (ref 1.8–2.6)
PHOSPHATE SERPL-MCNC: 3.7 MG/DL (ref 2.5–4.5)

## 2022-02-04 PROCEDURE — 250N000013 HC RX MED GY IP 250 OP 250 PS 637: Performed by: INTERNAL MEDICINE

## 2022-02-04 PROCEDURE — 99231 SBSQ HOSP IP/OBS SF/LOW 25: CPT | Mod: GC | Performed by: STUDENT IN AN ORGANIZED HEALTH CARE EDUCATION/TRAINING PROGRAM

## 2022-02-04 PROCEDURE — 83735 ASSAY OF MAGNESIUM: CPT | Performed by: INTERNAL MEDICINE

## 2022-02-04 PROCEDURE — 84100 ASSAY OF PHOSPHORUS: CPT | Performed by: INTERNAL MEDICINE

## 2022-02-04 PROCEDURE — 250N000013 HC RX MED GY IP 250 OP 250 PS 637: Performed by: FAMILY MEDICINE

## 2022-02-04 PROCEDURE — 250N000009 HC RX 250: Performed by: FAMILY MEDICINE

## 2022-02-04 PROCEDURE — 99024 POSTOP FOLLOW-UP VISIT: CPT | Performed by: PHYSICIAN ASSISTANT

## 2022-02-04 PROCEDURE — 250N000013 HC RX MED GY IP 250 OP 250 PS 637: Performed by: STUDENT IN AN ORGANIZED HEALTH CARE EDUCATION/TRAINING PROGRAM

## 2022-02-04 PROCEDURE — 120N000001 HC R&B MED SURG/OB

## 2022-02-04 RX ADMIN — HYDROXYZINE HYDROCHLORIDE 25 MG: 25 TABLET, FILM COATED ORAL at 21:53

## 2022-02-04 RX ADMIN — APIXABAN 5 MG: 5 TABLET, FILM COATED ORAL at 08:55

## 2022-02-04 RX ADMIN — VENLAFAXINE HYDROCHLORIDE 75 MG: 75 CAPSULE, EXTENDED RELEASE ORAL at 08:55

## 2022-02-04 RX ADMIN — GABAPENTIN 200 MG: 100 CAPSULE ORAL at 16:19

## 2022-02-04 RX ADMIN — OXYCODONE HYDROCHLORIDE AND ACETAMINOPHEN 1 TABLET: 5; 325 TABLET ORAL at 21:53

## 2022-02-04 RX ADMIN — OXYCODONE HYDROCHLORIDE AND ACETAMINOPHEN 1 TABLET: 5; 325 TABLET ORAL at 08:55

## 2022-02-04 RX ADMIN — QUETIAPINE FUMARATE 200 MG: 100 TABLET, FILM COATED ORAL at 21:52

## 2022-02-04 RX ADMIN — LEVOTHYROXINE SODIUM 25 MCG: 0.03 TABLET ORAL at 05:39

## 2022-02-04 RX ADMIN — ACETAMINOPHEN 650 MG: 325 TABLET ORAL at 11:59

## 2022-02-04 RX ADMIN — GABAPENTIN 200 MG: 100 CAPSULE ORAL at 08:56

## 2022-02-04 RX ADMIN — APIXABAN 5 MG: 5 TABLET, FILM COATED ORAL at 21:52

## 2022-02-04 RX ADMIN — MAGNESIUM SULFATE HEPTAHYDRATE: 500 INJECTION, SOLUTION INTRAMUSCULAR; INTRAVENOUS at 19:59

## 2022-02-04 RX ADMIN — AMIODARONE HYDROCHLORIDE 200 MG: 200 TABLET ORAL at 09:01

## 2022-02-04 RX ADMIN — OXYCODONE HYDROCHLORIDE AND ACETAMINOPHEN 1 TABLET: 5; 325 TABLET ORAL at 16:19

## 2022-02-04 RX ADMIN — PANTOPRAZOLE SODIUM 40 MG: 40 TABLET, DELAYED RELEASE ORAL at 08:55

## 2022-02-04 RX ADMIN — GABAPENTIN 900 MG: 300 CAPSULE ORAL at 21:53

## 2022-02-04 ASSESSMENT — ACTIVITIES OF DAILY LIVING (ADL)
ADLS_ACUITY_SCORE: 7
ADLS_ACUITY_SCORE: 9
ADLS_ACUITY_SCORE: 7
ADLS_ACUITY_SCORE: 9
ADLS_ACUITY_SCORE: 7
ADLS_ACUITY_SCORE: 9
ADLS_ACUITY_SCORE: 7
ADLS_ACUITY_SCORE: 9
ADLS_ACUITY_SCORE: 7
ADLS_ACUITY_SCORE: 7
ADLS_ACUITY_SCORE: 9

## 2022-02-04 ASSESSMENT — MIFFLIN-ST. JEOR: SCORE: 1474.35

## 2022-02-04 NOTE — PROGRESS NOTES
[facility] Progress Note    Assessment/Plan  Active Problems:    Hypertension goal BP (blood pressure) < 140/90    Moderate major depression (H)    Perforation of intestine (H)    Abdominal pain, generalized    Vesicocutaneous fistula    Infection due to 2019 novel coronavirus    Chema Dia is a 65 y/o M with past medical history of splenectomy, appendectomy, HTN, and substance abuse, with recent admission to hospital 11/30-12/21/2021 for SBO with perforation s/p surgical repair. Discharged home but readmitted as he could not manage at home and awaiting placement, parole office assisting with this and SW following.  Continues on TPN. New RUL and RLL PE found and being treated, new HAP 1/9 also resolved with abx. Incidentally COVID-19 positive 1/21, asymptomatic, recovered 1/31. Stable and medically ready for discharge.      Recurrent abdominal pain s/p surgical intervention of SBO with perforation  Enterocutaneous fistula   11/30 SBO -> x lap, SBR, enterotomy repair, extensive SINDY.   12/3 washout, MARISA drain, abx. Drain removed 12/16. Discharged home but readmitted 12/22 as he could not care for himself at home. EC fistulas still present but output decreasing  - Surgery consulted. Diet per surgery. CLD. Only Surgery can change diet.  -TPN per pharmacy and RD. Run time now at 12 hours. TPN teaching for at home.  -WOC for home fistula management.       RUL and RLL PE  SMV thrombosis  Acute RLL/RUL PE found on CT as well as SMV thrombosis. No evidence of right heart strain.  Most likely provoked in the setting of recent surgery/hospitalization.    -Eliquis 5 mg BID x 3 months.     Atrial flutter (resolved)  Developed this overnight 1/8-1/9 and required amiodarone and brief ICU stay for pressors. Cardiology consulted and initiated amiodarone.  Patient stabilized and back to sinus rhythm. No repeat episodes since that time.    - Amiodarone 200 mg daily.  Would recommend follow-up with cardiology  outpatient to determine how long he needs this.  -Eliquis as above.     Severe protein-calorie malnutrition  TPN as above.     Chronic Conditions:  Hypothyroidism- PTA levothyroxine.  Chronic pain- Hold mexolicam 7.5mg daily, resume PTA gabapentin.  BPH-hold home Flomax given he states it does not help.  Depression/Insomnia - Venlafaxine and gabapentin per psych. Seroquel for sleep. Atarax at bedtime.     FEN: TPN.  Clear liquid diet as tolerated advance per surgery service.  DVT Prophylaxis: Eliquis.  Code: Full code.     Disposition/Advanced Care Planning  Discharge Planning discussed with patient and care team.  Anticipated discharge date: Multiple days.  Disposition: Medically stable for discharge for     Subjective  NAEON  Getting ready for shower. Ambulating around room and in hallways. No complaints today. Using ostomy bags for fluid collection due to ease of collection/emptying. Appreciative of care in hospital    Objective    Vital signs in last 24 hours Temp:  [98.1  F (36.7  C)-98.5  F (36.9  C)] 98.3  F (36.8  C)  Pulse:  [77-83] 82  Resp:  [16-18] 16  BP: (102-130)/(71-84) 102/71  SpO2:  [94 %-95 %] 95 %   167 lbs 12.8 oz    Intake/Output last 3 shift No intake/output data recorded.    Intake/Output this shift:I/O this shift:  In: 1914.94   Out: -     Physical Exam  General appearance: alert, appears stated age, cooperative and no distress.  Head: Normocephalic, without obvious abnormality, atraumatic.  Eyes: conjunctivae/corneas clear.  Throat: MMM.  Lungs: clear to auscultation bilaterally, no increased respiratory effort.  Heart: regular rate and rhythm, no murmurs.  Abdomen: soft, non-tender, non-distended, ostomy bag in place with thin liquid stool  Extremities: extremities normal, atraumatic, no cyanosis or edema, radial and DP pulses palpable bilaterally.  Skin: Skin color, texture, turgor normal. No rashes or lesions.  Neurologic: Alert and oriented x3, normal strength and tone.  Psychiatric:  "mood and affect appropriate.    Pertinent Labs and Pertinent Radiology   Lab Results: personally reviewed.     Radiology Results: Personally reviewed image/s and impression/s    Precepted patient with Dr. Stanley Giordano III.    Dhara \"Pratibha\" MD Rom  [facility] Resident  P: [phone]      "

## 2022-02-04 NOTE — PROGRESS NOTES
Care Management Follow Up    Length of Stay (days): 44    Expected Discharge Date: 02/07/2022   Concerns to be Addressed: discharge planning     Patient plan of care discussed at interdisciplinary rounds: No    Anticipated Discharge Disposition: Transitional Care     Anticipated Discharge Services: Other (see comment) (therapy services )  Anticipated Discharge DME: None    Patient/family educated on Medicare website which has current facility and service quality ratings: yes  Education Provided on the Discharge Plan:    Patient/Family in Agreement with the Plan: yes    Referrals Placed by CM/SW: Post Acute Facilities  Private pay costs discussed: Not applicable    Additional Information:  Patient still refusing to ask brother in law if he can stay with him after discharge.  Shelia says he will look for another group home for patient, as current pending group home cannot accept a private refrigerator from outside for patient's TPN. Updated patient about this. CM will continue to monitor medical progression and discharge needs.       Isabell Cee RN

## 2022-02-04 NOTE — PROGRESS NOTES
"ASSESSMENT:  1. Abdominal pain, generalized        Gurdeep Dia is a 66 year old male status post explore lap lysis of adhesions with resultant 3 days later perforation reoperation oversew of defect and then 5 days later perforation again and at this time  he has 2 EC fistulas with the proximal one needing ostomy bag for containment of the leakage and the distal one only needing daily dressing changes with minimal drainage. He continues with a TPN diet with minimal oral intake for comfort (clear liquids ok).  He developed VTE and was treated with heparin drip. The patient also tested positive for covid on 1/22/22, but he remained asymptomatic is now considered \"recovered covid\".     Placement for the patient has been an ongoing issue, but this continues to be worked on by the care management team.    PLAN:  Continue TPN  Clear liquid diet  Monitor EC fistula output  Once patient fistulas close, he will need a gastrografin study prior to advancing diet    SUBJECTIVE:   He is feeling good. Ostomy bags over both EC fistulas because he feels it is easier to manage      Patient Vitals for the past 24 hrs:   BP Temp Temp src Pulse Resp SpO2 Weight   02/04/22 0753 102/71 98.3  F (36.8  C) Oral 82 16 95 % --   02/03/22 2321 130/84 98.1  F (36.7  C) Oral 77 16 95 % --   02/03/22 2050 -- -- -- -- -- -- 76.1 kg (167 lb 12.8 oz)   02/03/22 1513 118/81 98.5  F (36.9  C) Oral 83 18 94 % --         PHYSICAL EXAM:  GEN: No acute distress, comfortable  LUNGS: CTA bilaterally  CV:RRR  ABD: soft, not tender, not distended, liquid feculent material present in superior and inferior ostomy bags  EXT:No cyanosis, edema or obvious abnormalities    No intake/output data recorded.    No results displayed because visit has over 200 results.             Shaggy Cao PA-C    "

## 2022-02-04 NOTE — PROGRESS NOTES
"CLINICAL NUTRITION SERVICES - REASSESSMENT NOTE     Nutrition Prescription    RECOMMENDATIONS FOR MDs/PROVIDERS TO ORDER:    Malnutrition Status:    severe    Recommendations already ordered by Registered Dietitian (RD):  Continue cyclic TPN over 12 hrs: D 350  @ 75 ml/hr x 1 hr  150 ml/hr x 10 hrs, 75 ml/hr x 1 hr and off.  250 ml of 20% lipids  2 times per week on Monday and Thursday.  TPN/lipids provide:  1853 Calories, 350 g CHO, 130 g protein, 14 g fat and 1650 ml  Fluid/day    Future/Additional Recommendations:  Adjust TPN pending tolerance and nutritional needs  Consider increasing dextrose to 400 g/day if continues to lose wt     EVALUATION OF THE PROGRESS TOWARD GOALS   Diet: Clear liquid  Nutrition Support: Cyclic TPN as documented above  Intake: !00% lunch on 2/3, 100% breakfast on 2/1     NEW FINDINGS   Once fistula closes, he will need a gastrografin study prior to advancing diet    ANTHROPOMETRICS  Height: 165.6 cm (5' 5.2\")  Most Recent Weight: 76.1 kg (167 lb 12.8 oz)  Admit weight: 173 lb (12/21/21)    Weight History:   Wt Readings from Last 10 Encounters:   02/03/22 76.1 kg (167 lb 12.8 oz)   12/17/21 76.8 kg (169 lb 6.4 oz)   01/22/20 79.4 kg (175 lb)   12/24/19 81.2 kg (179 lb)     PHYSICAL FINDINGS  See malnutrition section below.  Ostomy/fistulas    GI CONCERNS  Abdomen rounded  Normoactive BS in all quadrants  Last BM 2/3 (loose)    LABS  Reviewed: Mg 1.9, phos 3.7, Glu 106 (2/2)    MEDICATIONS  Reviewed levothyroxine, pantoprazole,     Malnutrition Diagnosis: Severe malnutrition  In Context of:  Acute illness or injury    CURRENT NUTRITION DIAGNOSIS  Inadequate oral intake related to SBO with formation of enterocutaneous fistula as evidenced by nausea and abdominal pain      INTERVENTIONS  Implementation  Continue same TPN regimen today    Goals  Meet estimated nutrition needs-met  Wound healing-progressing  No significant dry weight loss    Monitoring/Evaluation  Progress toward goals " will be monitored and evaluated per protocol.

## 2022-02-04 NOTE — PLAN OF CARE
Problem: Adult Inpatient Plan of Care  Goal: Optimal Comfort and Wellbeing  Outcome: Improving  Intervention: Provide Person-Centered Care  Recent Flowsheet Documentation  Taken 2/4/2022 0909 by Ayana Bernabe RN  Trust Relationship/Rapport: care explained   PT c/o abdominal pain that happens whenever he eats or drinks anything.  Percocet given at beginning of shift but had a cup of coffee and asked for more pain medicine following drinking that.  Was able to give 650 mg of tylenol and then rechecked on pt and he was sleeping.    Problem: Adult Inpatient Plan of Care  Goal: Plan of Care Review  Outcome: Improving   Pt independent with all cares.  Able to ask for help when needed.  Changes and empties ostomy pouches independently.  Drainage seems to be decreasing.

## 2022-02-04 NOTE — PLAN OF CARE
Problem: Adult Inpatient Plan of Care  Goal: Optimal Comfort and Wellbeing  Outcome: Improving     Problem: Impaired Wound Healing  Goal: Optimal Wound Healing  Outcome: Improving  Intervention: Promote Wound Healing    Pt had quiet night.  Received percocet at hs for c/o abdominal and right knee pain, with good relief.  Ostomy bags intact over abdomen - upper ostomy bag with some gas and small amount thin brown fluid out.  TPN and lipids infused overnight.  Pt up independently.

## 2022-02-04 NOTE — PHARMACY-CONSULT NOTE
"Pharmacy Note: Parenteral Nutrition (PN) Management    Pharmacist consulted to dose PN for Gurdeep Dia, a 66 year old male by Dr. Mcmahon.    Subjective:      The patient is a new PN start.     The patient was started on PN in the hospital on 12/2/21.     Indication for PN therapy: continue until fistula heals     Inadequate nutrition existing for > 7 days.      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     Pertinent diseases and other special considerations none    Social History     Tobacco Use     Smoking status: Current Every Day Smoker     Smokeless tobacco: Never Used     Tobacco comment: declined cessation discussion and resource packet -1/14/22   Substance Use Topics     Alcohol use: Not Currently     Comment: Clean for 5 years     Drug use: Not Currently     Objective:    Ht Readings from Last 1 Encounters:   01/28/22 1.656 m (5' 5.2\")     Wt Readings from Last 1 Encounters:   02/03/22 76.1 kg (167 lb 12.8 oz)       Body mass index is 27.75 kg/m .    Patient Vitals for the past 96 hrs:   Weight   02/03/22 2050 76.1 kg (167 lb 12.8 oz)       Labs:  Last 3 days:  Recent Labs     02/02/22  0708 02/04/22  0541     --    POTASSIUM 4.2  --    CHLORIDE 104  --    CO2 26  --    BUN 25*  --    CR 0.82  --    VIJAYA 8.4*  --    MAG 2.0 1.9   PHOS 3.8 3.7   PROTTOTAL 7.2  --    ALBUMIN 2.1*  --    BILITOTAL 0.6  --    AST 16  --    ALT 14  --    ALKPHOS 152*  --        Glucose (past 48 hours):   Recent Labs     02/02/22  1626 02/02/22  2144 02/03/22  0807 02/03/22  1130 02/03/22  1715 02/03/22  2044 02/04/22  0806   GLC 99 127* 127* 98 101* 95 101*       Intake/Output (last 24 hours): No intake/output data recorded.    Estimated CrCl: Estimated Creatinine Clearance: 83.6 mL/min (based on SCr of 0.82 mg/dL).    Assessment:    Continue patient on PN therapy as a cyclic central therapy.     Given the patient's current condition/oral intake, PN is still indicated.    Lab results reviewed: " 2/4, phos and mag stable, no changes per dietary     Plan:    1. Rate of PN: Rate of PN: cyclic @ 75 mL/hr x1 hour then 150mL/hr x10 hours then 75mL/hr x1hr. Maintenance IV fluid rate will be adjusted appropriately to meet the total maximum IV fluids rate as ordered by provider.  2. Formula:     Amino Acids 130 grams    Dextrose 350 grams    Sodium 140 mEq/day    Potassium 75 mEq/day    Calcium 8 mEq/day    Magnesium 20 mEq/day    Phosphorus 32 mMol/day    Chloride: Acetate Ratio 1:2    Standard Multivitamins w/Vitamin K    Trace Elements  3. Fat Emulsion: 20%, 250 mL IV 2 times weekly on Monday & Thursday  4. Check labs as indicated.  5. Pharmacist will continue to follow the patient's lab results, clinical status and blood glucose results and make adjustments as appropriate.    Thank you for the consult.  CATIA KING RPH  2/4/2022 8:58 AM

## 2022-02-05 LAB
ANION GAP SERPL CALCULATED.3IONS-SCNC: 7 MMOL/L (ref 5–18)
BUN SERPL-MCNC: 28 MG/DL (ref 8–22)
CALCIUM SERPL-MCNC: 8.3 MG/DL (ref 8.5–10.5)
CHLORIDE BLD-SCNC: 103 MMOL/L (ref 98–107)
CO2 SERPL-SCNC: 27 MMOL/L (ref 22–31)
CREAT SERPL-MCNC: 0.86 MG/DL (ref 0.7–1.3)
GFR SERPL CREATININE-BSD FRML MDRD: >90 ML/MIN/1.73M2
GLUCOSE BLD-MCNC: 108 MG/DL (ref 70–125)
GLUCOSE BLDC GLUCOMTR-MCNC: 105 MG/DL (ref 70–99)
GLUCOSE BLDC GLUCOMTR-MCNC: 109 MG/DL (ref 70–99)
GLUCOSE BLDC GLUCOMTR-MCNC: 95 MG/DL (ref 70–99)
GLUCOSE BLDC GLUCOMTR-MCNC: 97 MG/DL (ref 70–99)
POTASSIUM BLD-SCNC: 4.2 MMOL/L (ref 3.5–5)
SODIUM SERPL-SCNC: 137 MMOL/L (ref 136–145)

## 2022-02-05 PROCEDURE — 99024 POSTOP FOLLOW-UP VISIT: CPT | Performed by: PHYSICIAN ASSISTANT

## 2022-02-05 PROCEDURE — 250N000013 HC RX MED GY IP 250 OP 250 PS 637: Performed by: STUDENT IN AN ORGANIZED HEALTH CARE EDUCATION/TRAINING PROGRAM

## 2022-02-05 PROCEDURE — 250N000013 HC RX MED GY IP 250 OP 250 PS 637: Performed by: INTERNAL MEDICINE

## 2022-02-05 PROCEDURE — 250N000013 HC RX MED GY IP 250 OP 250 PS 637: Performed by: FAMILY MEDICINE

## 2022-02-05 PROCEDURE — 250N000009 HC RX 250: Performed by: FAMILY MEDICINE

## 2022-02-05 PROCEDURE — 99231 SBSQ HOSP IP/OBS SF/LOW 25: CPT | Mod: GC | Performed by: STUDENT IN AN ORGANIZED HEALTH CARE EDUCATION/TRAINING PROGRAM

## 2022-02-05 PROCEDURE — 80048 BASIC METABOLIC PNL TOTAL CA: CPT | Performed by: STUDENT IN AN ORGANIZED HEALTH CARE EDUCATION/TRAINING PROGRAM

## 2022-02-05 PROCEDURE — 120N000001 HC R&B MED SURG/OB

## 2022-02-05 RX ADMIN — QUETIAPINE FUMARATE 200 MG: 100 TABLET, FILM COATED ORAL at 21:33

## 2022-02-05 RX ADMIN — PANTOPRAZOLE SODIUM 40 MG: 40 TABLET, DELAYED RELEASE ORAL at 09:22

## 2022-02-05 RX ADMIN — GABAPENTIN 200 MG: 100 CAPSULE ORAL at 16:06

## 2022-02-05 RX ADMIN — AMIODARONE HYDROCHLORIDE 200 MG: 200 TABLET ORAL at 09:21

## 2022-02-05 RX ADMIN — APIXABAN 5 MG: 5 TABLET, FILM COATED ORAL at 21:33

## 2022-02-05 RX ADMIN — OXYCODONE HYDROCHLORIDE AND ACETAMINOPHEN 1 TABLET: 5; 325 TABLET ORAL at 21:33

## 2022-02-05 RX ADMIN — MAGNESIUM SULFATE HEPTAHYDRATE: 500 INJECTION, SOLUTION INTRAMUSCULAR; INTRAVENOUS at 20:33

## 2022-02-05 RX ADMIN — GABAPENTIN 200 MG: 100 CAPSULE ORAL at 09:22

## 2022-02-05 RX ADMIN — HYDROXYZINE HYDROCHLORIDE 25 MG: 25 TABLET, FILM COATED ORAL at 21:33

## 2022-02-05 RX ADMIN — OXYCODONE HYDROCHLORIDE AND ACETAMINOPHEN 1 TABLET: 5; 325 TABLET ORAL at 13:06

## 2022-02-05 RX ADMIN — APIXABAN 5 MG: 5 TABLET, FILM COATED ORAL at 09:21

## 2022-02-05 RX ADMIN — LEVOTHYROXINE SODIUM 25 MCG: 0.03 TABLET ORAL at 06:04

## 2022-02-05 RX ADMIN — GABAPENTIN 900 MG: 300 CAPSULE ORAL at 21:33

## 2022-02-05 RX ADMIN — VENLAFAXINE HYDROCHLORIDE 75 MG: 75 CAPSULE, EXTENDED RELEASE ORAL at 09:21

## 2022-02-05 ASSESSMENT — ACTIVITIES OF DAILY LIVING (ADL)
ADLS_ACUITY_SCORE: 7

## 2022-02-05 NOTE — PROGRESS NOTES
[facility] Progress Note    Assessment/Plan  Active Problems:    Hypertension goal BP (blood pressure) < 140/90    Moderate major depression (H)    Perforation of intestine (H)    Abdominal pain, generalized    Vesicocutaneous fistula    Infection due to 2019 novel coronavirus    Chema Dia is a 65 y/o M with past medical history of splenectomy, appendectomy, HTN, and substance abuse, with recent admission to hospital 11/30-12/21/2021 for SBO with perforation s/p surgical repair. Discharged home but readmitted as he could not manage at home and awaiting placement, parole office assisting with this and SW following.  Continues on TPN. New RUL and RLL PE found and being treated, new HAP 1/9 also resolved with abx. Incidentally COVID-19 positive 1/21, asymptomatic, recovered 1/31. Stable and medically ready for discharge.      Recurrent abdominal pain s/p surgical intervention of SBO with perforation  Enterocutaneous fistula   11/30 SBO -> x lap, SBR, enterotomy repair, extensive SINDY.   12/3 washout, MARISA drain, abx. Drain removed 12/16. Discharged home but readmitted 12/22 as he could not care for himself at home. EC fistulas still present but output decreasing  - Surgery consulted. Diet per surgery. CLD. Only Surgery can change diet.  -TPN per pharmacy and RD. Run time now at 12 hours. TPN teaching for at home.  -WOC for home fistula management.       RUL and RLL PE  SMV thrombosis  Acute RLL/RUL PE found on CT as well as SMV thrombosis. No evidence of right heart strain.  Most likely provoked in the setting of recent surgery/hospitalization.    -Eliquis 5 mg BID x 3 months.     Atrial flutter (resolved)  Developed this overnight 1/8-1/9 and required amiodarone and brief ICU stay for pressors. Cardiology consulted and initiated amiodarone.  Patient stabilized and back to sinus rhythm. No repeat episodes since that time.    - Amiodarone 200 mg daily.  Would recommend follow-up with cardiology  outpatient to determine how long he needs this.  -Eliquis as above.     Severe protein-calorie malnutrition  TPN as above.     Chronic Conditions:  Hypothyroidism- PTA levothyroxine.  Chronic pain- Hold mexolicam 7.5mg daily, resume PTA gabapentin.  BPH-hold home Flomax given he states it does not help.  Depression/Insomnia - Venlafaxine and gabapentin per psych. Seroquel for sleep. Atarax at bedtime.     FEN: TPN.  Clear liquid diet as tolerated advance per surgery service.  DVT Prophylaxis: Eliquis.  Code: Full code.     Disposition/Advanced Care Planning  Discharge Planning discussed with patient and care team.  Anticipated discharge date: Multiple days.  Disposition: Medically stable for discharge     Subjective  NAEON  Sleeping comfortably this AM  Offers no complaints    Objective    Vital signs in last 24 hours Temp:  [97.6  F (36.4  C)-98.3  F (36.8  C)] 98.1  F (36.7  C)  Pulse:  [76-88] 84  Resp:  [16-18] 16  BP: (118-123)/(60-88) 118/67  SpO2:  [90 %-93 %] 91 %   169 lbs 9.6 oz    Intake/Output last 3 shift I/O last 3 completed shifts:  In: 4069.94 [P.O.:720]  Out: 0     Intake/Output this shift:No intake/output data recorded.    Physical Exam  General appearance: sleeping but arousable, appears stated age, cooperative and no distress.  Head: Normocephalic, without obvious abnormality, atraumatic.  Eyes: conjunctivae/corneas clear.  Throat: MMM.  Lungs: clear to auscultation bilaterally, no increased respiratory effort.  Heart: regular rate and rhythm, no murmurs.  Abdomen: soft, non-tender, non-distended, ostomy bag in place with thin liquid stool  Extremities: extremities normal, atraumatic, no cyanosis or edema, radial and DP pulses palpable bilaterally.  Skin: Skin color, texture, turgor normal. No rashes or lesions.  Neurologic: Alert and oriented x3, normal strength and tone.  Psychiatric: mood and affect appropriate.    Pertinent Labs and Pertinent Radiology   Lab Results: personally reviewed.  "    Radiology Results: Personally reviewed image/s and impression/s    Precepted patient with Dr. Stanley Giordano III.    Dhara \"Pratibha\" MD Rom  [facility] Resident  P: [phone]      "

## 2022-02-05 NOTE — PLAN OF CARE
Problem: Pain (Surgery Nonspecified)  Goal: Acceptable Pain Control  Outcome: No Change  Intervention: Prevent or Manage Pain  Recent Flowsheet Documentation  Taken 2022 by Shorty Lovelace, RN  Pain Management Interventions: medication (see MAR)  Taken 2022 1933 by Shorty Lovelace, RN  Diversional Activities: television     Problem: Anxiety  Goal: Anxiety Reduction or Resolution  Outcome: Improving     Pt c/o 7/10 abdominal pain at fistula sight along w/ chronic R knee pain which decreased w/ use of PRN Percocet. Aox4. Calm and pleasant towards writer and other staff. Ambulated independently in room, Steady gait noted. Continues on TPN through R Basillic double lumen PICC. PICC remained patent and dressing remained CDI. VSS. Ostomy appliance to both fistula sites remained CDI. Pt empties out fistulas, light brown watery drainage present. LLL< RML and RLL w/ noted fine crackles. Pt instructed on breathing exercises/ coughing and deep breathing. RM at bedtime. Inferior Ostomy pouch began to leak near bottom. Appliance changed, Barrier ring utilized. New ostomy is CDI.

## 2022-02-05 NOTE — PROGRESS NOTES
CLINICAL NUTRITION SERVICES - REASSESSMENT NOTE     Nutrition Prescription    RECOMMENDATIONS FOR MDs/PROVIDERS TO ORDER:    Malnutrition Status:    severe    Recommendations already ordered by Registered Dietitian (RD):  Continue cyclic TPN over 12 hrs: D 350  @ 75 ml/hr x 1 hr  150 ml/hr x 10 hrs, 75 ml/hr x 1 hr and off.  250 ml of 20% lipids  2 times per week on Monday and Thursday.  TPN/lipids provide:  1853 Calories, 350 g CHO, 130 g protein, 14 g fat and 1650 ml  Fluid/day    Future/Additional Recommendations:  Adjust TPN pending tolerance and nutritional needs

## 2022-02-05 NOTE — PROGRESS NOTES
"General Surgery Progress Note:    Hospital Day # 45    ASSESSMENT:   1. Abdominal pain, generalized      Gurdeep Dia is a 66 year old male status post explore lap lysis of adhesions with resultant 3 days later perforation reoperation oversew of defect and then 5 days later perforation again and at this time  he has 2 EC fistulas with the proximal one needing ostomy bag for containment of the leakage and the distal one only needing daily dressing changes with minimal drainage. He continues with a TPN diet with minimal oral intake for comfort (clear liquids ok).  He developed VTE and was treated with heparin drip. The patient also tested positive for covid on 1/22/22, but he remained asymptomatic is now considered \"recovered covid\".      PLAN:   -Stay current course  -Continue to monitor output of EC fistulas  -Stay with clear liquid diet and continue TPN  -Placement for patient continues to be ongoing issue      SUBJECTIVE:   Gurdeep Dia is the same.  He has no question concerns.  No significant events overnight.  Again reviewing output there is no numbers placed for output    Patient Vitals for the past 24 hrs:   BP Temp Temp src Pulse Resp SpO2 Weight   02/05/22 0746 118/67 98.1  F (36.7  C) Oral 84 16 91 % --   02/05/22 0053 120/60 98.3  F (36.8  C) Oral 88 17 90 % --   02/04/22 1557 123/88 97.6  F (36.4  C) Oral 76 18 93 % --   02/04/22 1227 -- -- -- -- -- -- 76.9 kg (169 lb 9.6 oz)       Physical Exam:  General: NAD, pleasant    ABD: Flat nontender and ostomy bags over EC fistulas with bilious output      No results displayed because visit has over 200 results.           Demetrius Vogel PA-C  "

## 2022-02-05 NOTE — PHARMACY-CONSULT NOTE
"Pharmacy Note: Parenteral Nutrition (PN) Management    Pharmacist consulted to dose PN for Gurdeep Dia, a 66 year old male by Dr. Mcmahon.    Subjective:      The patient is a new PN start.     The patient was started on PN in the hospital on 12/2/21.     Indication for PN therapy: continue until fistula heals     Inadequate nutrition existing for > 7 days.      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     Pertinent diseases and other special considerations none    Social History     Tobacco Use     Smoking status: Current Every Day Smoker     Smokeless tobacco: Never Used     Tobacco comment: declined cessation discussion and resource packet -1/14/22   Substance Use Topics     Alcohol use: Not Currently     Comment: Clean for 5 years     Drug use: Not Currently     Objective:    Ht Readings from Last 1 Encounters:   01/28/22 1.656 m (5' 5.2\")     Wt Readings from Last 1 Encounters:   02/04/22 76.9 kg (169 lb 9.6 oz)       Body mass index is 28.05 kg/m .    Patient Vitals for the past 96 hrs:   Weight   02/04/22 1227 76.9 kg (169 lb 9.6 oz)   02/03/22 2050 76.1 kg (167 lb 12.8 oz)   02/03/22 1513 76.6 kg (168 lb 14.4 oz)     Labs:  Last 3 days:  Recent Labs     02/04/22  0541 02/05/22  0605   NA  --  137   POTASSIUM  --  4.2   CHLORIDE  --  103   CO2  --  27   BUN  --  28*   CR  --  0.86   VIJAYA  --  8.3*   MAG 1.9  --    PHOS 3.7  --        Glucose (past 48 hours):   Recent Labs     02/03/22  1130 02/03/22  1715 02/03/22  2044 02/04/22  0806 02/04/22  1154 02/04/22  1720 02/04/22  1803 02/04/22  2201 02/05/22  0605 02/05/22  0807   GLC 98 101* 95 101* 113* 90 94 112* 108 97           Intake/Output (last 24 hours): I/O last 3 completed shifts:  In: 4069.94 [P.O.:720]  Out: 0     Estimated CrCl: Estimated Creatinine Clearance: 80.2 mL/min (based on SCr of 0.86 mg/dL).    Assessment:    Continue patient on PN therapy as a cyclic central therapy.     Given the patient's current " condition/oral intake, PN is still indicated.    Lab results reviewed: 2/5 -labs stable and no changes per dietary    Plan:    1. Rate of PN: Rate of PN: cyclic @ 75 mL/hr x1 hour then 150mL/hr x10 hours then 75mL/hr x1hr. Maintenance IV fluid rate will be adjusted appropriately to meet the total maximum IV fluids rate as ordered by provider.  2. Formula:     Amino Acids 130 grams    Dextrose 350 grams    Sodium 140 mEq/day    Potassium 75 mEq/day    Calcium 8 mEq/day    Magnesium 20 mEq/day    Phosphorus 32 mMol/day    Chloride: Acetate Ratio 1:2    Standard Multivitamins w/Vitamin K    Trace Elements  3. Fat Emulsion: 20%, 250 mL IV 2 times weekly on Monday & Thursday  4. Check labs as indicated.  5. Pharmacist will continue to follow the patient's lab results, clinical status and blood glucose results and make adjustments as appropriate.    Thank you for the consult.  Nithya Mares, PharmD 2/5/2022 9:56 AM

## 2022-02-06 LAB
GLUCOSE BLDC GLUCOMTR-MCNC: 114 MG/DL (ref 70–99)
GLUCOSE BLDC GLUCOMTR-MCNC: 114 MG/DL (ref 70–99)
GLUCOSE BLDC GLUCOMTR-MCNC: 128 MG/DL (ref 70–99)
GLUCOSE BLDC GLUCOMTR-MCNC: 93 MG/DL (ref 70–99)

## 2022-02-06 PROCEDURE — 250N000009 HC RX 250: Performed by: FAMILY MEDICINE

## 2022-02-06 PROCEDURE — 250N000013 HC RX MED GY IP 250 OP 250 PS 637: Performed by: STUDENT IN AN ORGANIZED HEALTH CARE EDUCATION/TRAINING PROGRAM

## 2022-02-06 PROCEDURE — 250N000013 HC RX MED GY IP 250 OP 250 PS 637: Performed by: INTERNAL MEDICINE

## 2022-02-06 PROCEDURE — 99024 POSTOP FOLLOW-UP VISIT: CPT | Performed by: SURGERY

## 2022-02-06 PROCEDURE — 99231 SBSQ HOSP IP/OBS SF/LOW 25: CPT | Mod: GC | Performed by: STUDENT IN AN ORGANIZED HEALTH CARE EDUCATION/TRAINING PROGRAM

## 2022-02-06 PROCEDURE — 250N000013 HC RX MED GY IP 250 OP 250 PS 637: Performed by: FAMILY MEDICINE

## 2022-02-06 PROCEDURE — 120N000001 HC R&B MED SURG/OB

## 2022-02-06 RX ADMIN — PANTOPRAZOLE SODIUM 40 MG: 40 TABLET, DELAYED RELEASE ORAL at 08:38

## 2022-02-06 RX ADMIN — APIXABAN 5 MG: 5 TABLET, FILM COATED ORAL at 08:39

## 2022-02-06 RX ADMIN — OXYCODONE HYDROCHLORIDE AND ACETAMINOPHEN 1 TABLET: 5; 325 TABLET ORAL at 22:33

## 2022-02-06 RX ADMIN — AMIODARONE HYDROCHLORIDE 200 MG: 200 TABLET ORAL at 08:38

## 2022-02-06 RX ADMIN — VENLAFAXINE HYDROCHLORIDE 75 MG: 75 CAPSULE, EXTENDED RELEASE ORAL at 08:39

## 2022-02-06 RX ADMIN — LEVOTHYROXINE SODIUM 25 MCG: 0.03 TABLET ORAL at 08:38

## 2022-02-06 RX ADMIN — GABAPENTIN 900 MG: 300 CAPSULE ORAL at 22:32

## 2022-02-06 RX ADMIN — GABAPENTIN 200 MG: 100 CAPSULE ORAL at 18:25

## 2022-02-06 RX ADMIN — HYDROXYZINE HYDROCHLORIDE 25 MG: 25 TABLET, FILM COATED ORAL at 22:33

## 2022-02-06 RX ADMIN — OXYCODONE HYDROCHLORIDE AND ACETAMINOPHEN 1 TABLET: 5; 325 TABLET ORAL at 12:07

## 2022-02-06 RX ADMIN — APIXABAN 5 MG: 5 TABLET, FILM COATED ORAL at 22:33

## 2022-02-06 RX ADMIN — QUETIAPINE FUMARATE 200 MG: 100 TABLET, FILM COATED ORAL at 22:32

## 2022-02-06 RX ADMIN — GABAPENTIN 200 MG: 100 CAPSULE ORAL at 08:39

## 2022-02-06 RX ADMIN — MAGNESIUM SULFATE HEPTAHYDRATE: 500 INJECTION, SOLUTION INTRAMUSCULAR; INTRAVENOUS at 20:37

## 2022-02-06 ASSESSMENT — MIFFLIN-ST. JEOR: SCORE: 1471.18

## 2022-02-06 ASSESSMENT — ACTIVITIES OF DAILY LIVING (ADL)
ADLS_ACUITY_SCORE: 7

## 2022-02-06 NOTE — PLAN OF CARE
Problem: Depression  Goal: Improved Mood  Outcome: Improving     Problem: Hypertension Acute  Goal: Blood Pressure Within Desired Range  Outcome: Improving     Problem: Risk for Delirium  Goal: Optimal Coping  2/6/2022 0342 by Shorty Lovelace, RN  Outcome: Improving  2/5/2022 2331 by Shorty Lovelace, RN  Outcome: Improving     Pt slept comfortably after PRN dose of Percocet on evening shift. Aox4. Calm and pleasant towards writer and other staff. Independent w/ transfers Continues on Cyclic TPN through R [facility] double lumen PICC. PICC remained patent and dressing remained CDI. VSS. Ostomy appliance to both fistula sites remained CDI. Pt empties out fistulas/ ostomy appliance, light brown watery drainage present. Pt empties into designated graduated cylinder and staff measures.  Slept comfortably throughout the shift.

## 2022-02-06 NOTE — PROGRESS NOTES
Gurdeep Dia continues to have fistula output.  He states the inferior 1 which was closing up is now reopened and pointing liquids out.  He is rather frustrated.  He denies any pain.  He does admit that he is drinking coffee as well as other clear liquids        Vitals:    02/05/22 1523 02/05/22 2318 02/06/22 0724 02/06/22 1215   BP: 106/75 95/62 111/76    BP Location: Right arm Left arm Right arm    Pulse: 93 84 84    Resp: 20 18 20    Temp: 98.2  F (36.8  C) 98  F (36.7  C) 98.1  F (36.7  C)    TempSrc: Oral Oral Oral    SpO2: 98% 91% 95%    Weight:    76.6 kg (168 lb 14.4 oz)   Height:           PHYSICAL EXAM:  GEN: No acute distress, comfortable  ABD: Soft, 2 separate fistulas with ostomy bags in place the inferior fistula with a wide bore mouth and brown-colored liquid  EXT:No cyanosis, edema or obvious abnormalities        No results for input(s): WBC, HGB, HCT, PLT in the last 168 hours.    Recent Labs   Lab 02/05/22  0605 02/02/22  0708    137   CO2 27 26   BUN 28* 25*   ALBUMIN  --  2.1*   ALKPHOS  --  152*   ALT  --  14   AST  --  16         A/P:  Gurdeep Dia is s/p small bowel resection with fistula development  -I had an extensive discussion with Chema regarding his fistulas.  I explained to him that in order to afford him the best chance of healing he would have to be completely n.p.o. in order to decrease intestinal secretions which are likely contributing to the persistent fistula.  Sometimes these do take several months.  He is on TPN as well.  -After extensive discussion he will do his best to remain completely n.p.o. and is agreeable to TPN for the time being until the fistula is closed.  Hopefully decreasing his oral intake will give him the best chance of complete closure.  I did explain to him that sometimes even in the best case scenarios the fistulas will remain open.  He understands this and is willing to proceed as planned.    Akbar Cazares, DO  FACS  [phone]  [facility]  Department of Surgery

## 2022-02-06 NOTE — PLAN OF CARE
Problem: Depression  Goal: Improved Mood  Outcome: Improving     Problem: Risk for Delirium  Goal: Optimal Coping  Outcome: Improving     Pt c/o 8/10 abdominal pain at fistula sight along w/ chronic R knee pain which decreased w/ use of PRN Percocet. Aox4. Calm and pleasant towards writer and other staff. Ambulated independently in room and in saleem Steady gait noted. Continues on Cyclic TPN through R Basillic double lumen PICC. PICC remained patent and dressing remained CDI. VSS. Ostomy appliance to both fistula sites remained CDI. Pt empties out fistulas, light brown watery drainage present Lower fistula output 25cc.  RM at bedtime.

## 2022-02-06 NOTE — PLAN OF CARE
Problem: Nausea and Vomiting  Goal: Fluid and Electrolyte Balance  Outcome: No Change   Pt tolerated clear liquids, taking small amounts. Upper fistula had 25cc of stool in ostomy bag, lower fistula had 350cc of stool in ostomy bag. The appliances have stayed intact and in place this shift.   Cyclic TPN stopped at 0830, PICC line patent. Blood sugars were 114.

## 2022-02-06 NOTE — PROGRESS NOTES
[facility] Progress Note    Assessment/Plan  Active Problems:    Hypertension goal BP (blood pressure) < 140/90    Moderate major depression (H)    Perforation of intestine (H)    Abdominal pain, generalized    Vesicocutaneous fistula    Infection due to 2019 novel coronavirus    Chema Dia is a 65 y/o M with past medical history of splenectomy, appendectomy, HTN, and substance abuse, with recent admission to hospital 11/30-12/21/2021 for SBO with perforation s/p surgical repair. Discharged home but readmitted as he could not manage at home and awaiting placement, parole office assisting with this and SW following.  Continues on TPN. New RUL and RLL PE found and being treated, new HAP 1/9 also resolved with abx. Incidentally COVID-19 positive 1/21, asymptomatic, recovered 1/31. Stable and medically ready for discharge.      Recurrent abdominal pain s/p surgical intervention of SBO with perforation  Enterocutaneous fistula   11/30 SBO -> x lap, SBR, enterotomy repair, extensive SINDY.   12/3 washout, MARISA drain, abx. Drain removed 12/16. Discharged home but readmitted 12/22 as he could not care for himself at home. EC fistulas still present but output decreasing  -Surgery consulted. Diet per surgery. CLD. Only Surgery can change diet.  -TPN per pharmacy and RD. Run time now at 12 hours. TPN teaching for at home.  -WOC for home fistula management.       RUL and RLL PE  SMV thrombosis  Acute RLL/RUL PE found on CT as well as SMV thrombosis. No evidence of right heart strain.  Most likely provoked in the setting of recent surgery/hospitalization.    -Eliquis 5 mg BID x 3 months.     Atrial flutter (resolved)  Developed this overnight 1/8-1/9 and required amiodarone and brief ICU stay for pressors. Cardiology consulted and initiated amiodarone.  Patient stabilized and back to sinus rhythm. No repeat episodes since that time.  -Amiodarone 200 mg daily.  Would recommend follow-up with cardiology outpatient  to determine how long he needs this.  -Eliquis as above.     Severe protein-calorie malnutrition  TPN as above.     Chronic Conditions:  Hypothyroidism- PTA levothyroxine.  Chronic pain- Hold mexolicam 7.5mg daily, resume PTA gabapentin.  BPH-hold home Flomax given he states it does not help.  Depression/Insomnia - Venlafaxine and gabapentin per psych. Seroquel for sleep. Atarax at bedtime.     FEN: TPN.  Clear liquid diet as tolerated advance per surgery service.  DVT Prophylaxis: Eliquis.  Code: Full code.     Disposition/Advanced Care Planning  Discharge Planning discussed with patient and care team.  Anticipated discharge date: Multiple days.  Disposition: Medically stable for discharge.    Subjective  NAEON, sleeping.    Objective    Vital signs in last 24 hours Temp:  [98  F (36.7  C)-98.2  F (36.8  C)] 98.1  F (36.7  C)  Pulse:  [84-93] 84  Resp:  [18-20] 20  BP: ()/(62-76) 111/76  SpO2:  [91 %-98 %] 95 %   169 lbs 9.6 oz    Intake/Output last 3 shift I/O last 3 completed shifts:  In: 480 [P.O.:480]  Out: 650 [Urine:650]    Intake/Output this shift:I/O this shift:  In: 1808 [P.O.:120]  Out: 525 [Urine:525]    Physical Exam  General appearance: sleeping, NAD.  Head: Normocephalic, without obvious abnormality, atraumatic.  Throat: MMM.  Lungs: no increased respiratory effort.  Heart: Appropriately perfused.  Abdomen: ostomy bag in place, nondistended.  Extremities: No gross deformities.  Skin: Skin color, texture, turgor normal. No rashes or lesions.  Neurologic: Sleeping.  Psychiatric: Sleeping.    Pertinent Labs and Pertinent Radiology   Lab Results: personally reviewed.     Radiology Results: Personally reviewed image/s and impression/s    Precepted patient with Dr. Lucas Aguilera.    Johntahan Lees MD  [facility], PGY-3

## 2022-02-06 NOTE — PHARMACY-CONSULT NOTE
"Pharmacy Note: Parenteral Nutrition (PN) Management    Pharmacist consulted to dose PN for Gurdeep Dia, a 66 year old male by Dr. Mcmahon.    Subjective:      The patient is a new PN start.     The patient was started on PN in the hospital on 12/2/21.     Indication for PN therapy: continue until fistula heals     Inadequate nutrition existing for > 7 days.      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     Pertinent diseases and other special considerations none    Social History     Tobacco Use     Smoking status: Current Every Day Smoker     Smokeless tobacco: Never Used     Tobacco comment: declined cessation discussion and resource packet -1/14/22   Substance Use Topics     Alcohol use: Not Currently     Comment: Clean for 5 years     Drug use: Not Currently     Objective:    Ht Readings from Last 1 Encounters:   01/28/22 1.656 m (5' 5.2\")     Wt Readings from Last 1 Encounters:   02/04/22 76.9 kg (169 lb 9.6 oz)       Body mass index is 28.05 kg/m .    Patient Vitals for the past 96 hrs:   Weight   02/04/22 1227 76.9 kg (169 lb 9.6 oz)   02/03/22 2050 76.1 kg (167 lb 12.8 oz)   02/03/22 1513 76.6 kg (168 lb 14.4 oz)     Labs:  Last 3 days:  Recent Labs     02/04/22  0541 02/05/22  0605   NA  --  137   POTASSIUM  --  4.2   CHLORIDE  --  103   CO2  --  27   BUN  --  28*   CR  --  0.86   VIJAYA  --  8.3*   MAG 1.9  --    PHOS 3.7  --        Glucose (past 48 hours):   Recent Labs     02/04/22  1154 02/04/22  1720 02/04/22  1803 02/04/22  2201 02/05/22  0605 02/05/22  0807 02/05/22  1204 02/05/22  1650 02/05/22  2054 02/06/22  0821   * 90 94 112* 108 97 95 105* 109* 114*     Intake/Output (last 24 hours): I/O last 3 completed shifts:  In: 480 [P.O.:480]  Out: 650 [Urine:650]    Estimated CrCl: Estimated Creatinine Clearance: 80.2 mL/min (based on SCr of 0.86 mg/dL).    Assessment:    Continue patient on PN therapy as a cyclic central therapy.     Given the patient's current " condition/oral intake, PN is still indicated.    Lab results reviewed: labs stable and no changes per dietary    Plan:    1. Rate of PN: Rate of PN: cyclic @ 75 mL/hr x1 hour then 150mL/hr x10 hours then 75mL/hr x1hr. Maintenance IV fluid rate will be adjusted appropriately to meet the total maximum IV fluids rate as ordered by provider.  2. Formula:     Amino Acids 130 grams    Dextrose 350 grams    Sodium 140 mEq/day    Potassium 75 mEq/day    Calcium 8 mEq/day    Magnesium 20 mEq/day    Phosphorus 32 mMol/day    Chloride: Acetate Ratio 1:2    Standard Multivitamins w/Vitamin K    Trace Elements  3. Fat Emulsion: 20%, 250 mL IV 2 times weekly on Monday & Thursday  4. Check labs as indicated.  5. Pharmacist will continue to follow the patient's lab results, clinical status and blood glucose results and make adjustments as appropriate.    Thank you for the consult.  Nithya Mares, PharmD 2/6/2022 10:49 AM

## 2022-02-06 NOTE — PROGRESS NOTES
CLINICAL NUTRITION SERVICES - REASSESSMENT NOTE     Nutrition Prescription    RECOMMENDATIONS FOR MDs/PROVIDERS TO ORDER:    Malnutrition Status:    severe    Recommendations already ordered by Registered Dietitian (RD):  Continue cyclic TPN over 12 hrs: D 350  @ 75 ml/hr x 1 hr  150 ml/hr x 10 hrs, 75 ml/hr x 1 hr and off.  250 ml of 20% lipids  2 times per week on Monday and Thursday.  TPN/lipids provide:  1853 Calories, 350 g CHO, 130 g protein, 14 g fat and 1650 ml  Fluid/day    Meets estimated protein and calorie needs    Future/Additional Recommendations:  Adjust TPN pending tolerance and nutritional needs       Diet: Clear liquids, 480 mL last 24 hrs  Last weight 169 lb 2/4, stable  FSB sl elevated, overall good bg control

## 2022-02-07 LAB
GLUCOSE BLDC GLUCOMTR-MCNC: 106 MG/DL (ref 70–99)
GLUCOSE BLDC GLUCOMTR-MCNC: 128 MG/DL (ref 70–99)
GLUCOSE BLDC GLUCOMTR-MCNC: 99 MG/DL (ref 70–99)
INR PPP: 1.37 (ref 0.85–1.15)
MAGNESIUM SERPL-MCNC: 2.2 MG/DL (ref 1.8–2.6)
PHOSPHATE SERPL-MCNC: 3.9 MG/DL (ref 2.5–4.5)
PREALB SERPL IA-MCNC: 16 MG/DL (ref 19–38)
TRIGL SERPL-MCNC: 86 MG/DL

## 2022-02-07 PROCEDURE — 99024 POSTOP FOLLOW-UP VISIT: CPT | Performed by: SPECIALIST

## 2022-02-07 PROCEDURE — 85610 PROTHROMBIN TIME: CPT | Performed by: INTERNAL MEDICINE

## 2022-02-07 PROCEDURE — 250N000009 HC RX 250: Performed by: FAMILY MEDICINE

## 2022-02-07 PROCEDURE — 84134 ASSAY OF PREALBUMIN: CPT | Performed by: INTERNAL MEDICINE

## 2022-02-07 PROCEDURE — 84478 ASSAY OF TRIGLYCERIDES: CPT | Performed by: INTERNAL MEDICINE

## 2022-02-07 PROCEDURE — 250N000013 HC RX MED GY IP 250 OP 250 PS 637: Performed by: STUDENT IN AN ORGANIZED HEALTH CARE EDUCATION/TRAINING PROGRAM

## 2022-02-07 PROCEDURE — 84100 ASSAY OF PHOSPHORUS: CPT | Performed by: INTERNAL MEDICINE

## 2022-02-07 PROCEDURE — 83735 ASSAY OF MAGNESIUM: CPT | Performed by: INTERNAL MEDICINE

## 2022-02-07 PROCEDURE — 250N000009 HC RX 250: Performed by: STUDENT IN AN ORGANIZED HEALTH CARE EDUCATION/TRAINING PROGRAM

## 2022-02-07 PROCEDURE — 99231 SBSQ HOSP IP/OBS SF/LOW 25: CPT | Mod: GC | Performed by: STUDENT IN AN ORGANIZED HEALTH CARE EDUCATION/TRAINING PROGRAM

## 2022-02-07 PROCEDURE — 250N000013 HC RX MED GY IP 250 OP 250 PS 637: Performed by: FAMILY MEDICINE

## 2022-02-07 PROCEDURE — 250N000013 HC RX MED GY IP 250 OP 250 PS 637: Performed by: INTERNAL MEDICINE

## 2022-02-07 PROCEDURE — 120N000001 HC R&B MED SURG/OB

## 2022-02-07 RX ADMIN — GABAPENTIN 200 MG: 100 CAPSULE ORAL at 16:18

## 2022-02-07 RX ADMIN — GABAPENTIN 200 MG: 100 CAPSULE ORAL at 08:31

## 2022-02-07 RX ADMIN — OXYCODONE HYDROCHLORIDE AND ACETAMINOPHEN 1 TABLET: 5; 325 TABLET ORAL at 22:41

## 2022-02-07 RX ADMIN — VENLAFAXINE HYDROCHLORIDE 75 MG: 75 CAPSULE, EXTENDED RELEASE ORAL at 08:31

## 2022-02-07 RX ADMIN — HYDROXYZINE HYDROCHLORIDE 25 MG: 25 TABLET, FILM COATED ORAL at 22:42

## 2022-02-07 RX ADMIN — AMIODARONE HYDROCHLORIDE 200 MG: 200 TABLET ORAL at 08:32

## 2022-02-07 RX ADMIN — GABAPENTIN 900 MG: 300 CAPSULE ORAL at 22:41

## 2022-02-07 RX ADMIN — I.V. FAT EMULSION 250 ML: 20 EMULSION INTRAVENOUS at 21:38

## 2022-02-07 RX ADMIN — QUETIAPINE FUMARATE 200 MG: 100 TABLET, FILM COATED ORAL at 22:41

## 2022-02-07 RX ADMIN — MAGNESIUM SULFATE HEPTAHYDRATE: 500 INJECTION, SOLUTION INTRAMUSCULAR; INTRAVENOUS at 20:52

## 2022-02-07 RX ADMIN — OXYCODONE HYDROCHLORIDE AND ACETAMINOPHEN 1 TABLET: 5; 325 TABLET ORAL at 12:31

## 2022-02-07 RX ADMIN — PANTOPRAZOLE SODIUM 40 MG: 40 TABLET, DELAYED RELEASE ORAL at 08:32

## 2022-02-07 RX ADMIN — APIXABAN 5 MG: 5 TABLET, FILM COATED ORAL at 08:31

## 2022-02-07 RX ADMIN — LEVOTHYROXINE SODIUM 25 MCG: 0.03 TABLET ORAL at 08:32

## 2022-02-07 RX ADMIN — APIXABAN 5 MG: 5 TABLET, FILM COATED ORAL at 22:42

## 2022-02-07 ASSESSMENT — ACTIVITIES OF DAILY LIVING (ADL)
ADLS_ACUITY_SCORE: 7

## 2022-02-07 ASSESSMENT — MIFFLIN-ST. JEOR: SCORE: 1465.73

## 2022-02-07 NOTE — PROGRESS NOTES
"CLINICAL NUTRITION SERVICES - REASSESSMENT NOTE     Nutrition Prescription    RECOMMENDATIONS FOR MDs/PROVIDERS TO ORDER:    Malnutrition Status:    Severe    Recommendations already ordered by Registered Dietitian (RD):  Continue cyclic TPN over 12 hrs: D 350  @ 75 ml/hr x 1 hr  150 ml/hr x 10 hrs, 75 ml/hr x 1 hr and off.  250 ml of 20% lipids  2 times per week on Monday and Thursday.  TPN/lipids provide  1853 Calories, 350 g CHO, 130 g protein, 14 g fat and 1650 ml  Fluid/day    Future/Additional Recommendations:  Adjust TPN pending tolerance and nutritional needs     EVALUATION OF THE PROGRESS TOWARD GOALS   Diet: Clear liquid plus ensure clear daily with meal (lunch)  Nutrition Support: Cyclic TPN as documented above  Intake: 2/5-75% dinner. 2/6 0% to sips: surgery note reviewed about pt being NPO: \" After extensive discussion he will do his best to remain completely NPO and is agreeable to TPN for the time being until the fistula is closed.  Hopefully decreasing his oral intake will give him the best chance of complete closure\"     ANTHROPOMETRICS  Height: 165.6 cm (5' 5.2\")  Most Recent Weight: 76.6 kg (168 lb 14.4 oz)    Admit weight: 173 lb (12/21/21)  Weight History:   Wt Readings from Last 10 Encounters:   02/06/22 76.6 kg (168 lb 14.4 oz)   12/17/21 76.8 kg (169 lb 6.4 oz)   01/22/20 79.4 kg (175 lb)   12/24/19 81.2 kg (179 lb)     PHYSICAL FINDINGS  See malnutrition section below.  Ostomy/fistulas    GI CONCERNS  Midline abdominal fistulas x 2  Normoactive BS all quadrants    LABS  Reviewed: Glu 128, Mg 2.2, phos 3.9, prealbumin 16, TG 86    MEDICATIONS  Reviewed: levothyroxine, pantoprazole    Malnutrition Diagnosis: Severe malnutrition  In Context of:  Acute illness or injury    CURRENT NUTRITION DIAGNOSIS  Inadequate oral intake related to SBO with formation of enterocutaneous fistula  as evidenced by nausea and abdominal pain      INTERVENTIONS  Implementation  Parenteral Nutrition/IV Fluids - " Continue same regimen    Goals  Meet estimated needs-met  Wound healing-progressing  No significant dry weight loss-progressing    Monitoring/Evaluation  Progress toward goals will be monitored and evaluated per protocol.

## 2022-02-07 NOTE — PLAN OF CARE
Problem: Impaired Wound Healing  Goal: Optimal Wound Healing  Outcome: No Change  Intervention: Promote Wound Healing  Recent Flowsheet Documentation  Taken 2/7/2022 0801 by Shama Christiansen RN  Pain Management Interventions:   declines   emotional support  Activity Management: activity encouraged   Pt is taking sips of water, avoiding any clear liquids or supplements due to increased drainage from fistula sites. Pt has had 100 ml of brown stool from lower fistula site this shift. PICC line patent, remains on cyclic TPN. Medicated with Percocet for abdominal pain with relief.

## 2022-02-07 NOTE — PLAN OF CARE
Problem: Impaired Wound Healing  Goal: Optimal Wound Healing  Outcome: No Change     Problem: Pain (Surgery Nonspecified)  Goal: Acceptable Pain Control  Outcome: No Change     Pt slept comfortably this shift. Aox4. Independent w/ transfers. Continues on Cyclic TPN through R Basillic double lumen PICC. PICC remained patent and dressing remained CDI. VSS. Ostomy appliance to both fistula sites remained CDI. Pt empties out fistulas/ ostomy appliance, light brown watery drainage present. Pt empties into designated graduated cylinder and staff measures.

## 2022-02-07 NOTE — PHARMACY-CONSULT NOTE
"Pharmacy Note: Parenteral Nutrition (PN) Management    Pharmacist consulted to dose PN for Gurdeep Dia, a 66 year old male by Dr. Mcmahon.    Subjective:    The patient is a new PN start.    The patient was started on PN in the hospital on 12/2/21.    Indication for PN therapy: continue until fistula heals    Inadequate nutrition existing for > 7 days.     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    Pertinent diseases and other special considerations none    Social History     Tobacco Use     Smoking status: Current Every Day Smoker     Smokeless tobacco: Never Used     Tobacco comment: declined cessation discussion and resource packet -1/14/22   Substance Use Topics     Alcohol use: Not Currently     Comment: Clean for 5 years     Drug use: Not Currently     Objective:    Ht Readings from Last 1 Encounters:   01/28/22 1.656 m (5' 5.2\")     Wt Readings from Last 1 Encounters:   02/06/22 76.6 kg (168 lb 14.4 oz)       Body mass index is 27.93 kg/m .    Patient Vitals for the past 96 hrs:   Weight   02/06/22 1215 76.6 kg (168 lb 14.4 oz)   02/04/22 1227 76.9 kg (169 lb 9.6 oz)   02/03/22 2050 76.1 kg (167 lb 12.8 oz)   02/03/22 1513 76.6 kg (168 lb 14.4 oz)       Labs:  Last 3 days:  Recent Labs     02/05/22  0605 02/07/22  0606     --    POTASSIUM 4.2  --    CHLORIDE 103  --    CO2 27  --    BUN 28*  --    CR 0.86  --    VIJAYA 8.3*  --    MAG  --  2.2   PHOS  --  3.9   PREALB  --  16*   TRIG  --  86   INR  --  1.37*       Glucose (past 48 hours):   Recent Labs     02/05/22  1204 02/05/22  1650 02/05/22  2054 02/06/22  0821 02/06/22  1200 02/06/22  1708 02/06/22  2104 02/07/22  0739   GLC 95 105* 109* 114* 114* 93 128* 128*       Intake/Output (last 24 hours): I/O last 3 completed shifts:  In: 2980.5 [P.O.:780]  Out: 750 [Urine:525; Stool:225]    Estimated CrCl: Estimated Creatinine Clearance: 79.9 mL/min (based on SCr of 0.86 mg/dL).    Assessment:    Continue patient on PN therapy " as a cyclic central therapy.     Given the patient's current condition/oral intake, PN is still indicated.    Lab results reviewed: WNL and stable, no changes per dietitian     Plan:  1. Rate of PN: cyclic @ 75 mL/hr x1hr, then 150 mL/hr x10 hrs, then 75 mL/hr x1hr. Maintenance IV fluid rate will be adjusted appropriately to meet the total maximum IV fluids rate as ordered by provider.  2. Formula:     Amino Acids 130 grams    Dextrose 350 grams    Sodium 140 mEq/day    Potassium 75 mEq/day    Calcium 8 mEq/day    Magnesium 20 mEq/day    Phosphorus 32 mMol/day    Chloride: Acetate Ratio 1:2    Standard Multivitamins w/Vitamin K    Trace Elements  3. Fat Emulsion: 20%, 250 mL IV 2times weekly on Monday and Thursday  4. Check BMP labs tomorrow.  5. Pharmacist will continue to follow the patient's lab results, clinical status and blood glucose results and make adjustments as appropriate.    Thank you for the consult.  Elenita Balderas  2/7/2022 8:23 AM

## 2022-02-07 NOTE — PROGRESS NOTES
Care Management Follow Up    Length of Stay (days): 47    Expected Discharge Date: 02/08/2022     Concerns to be Addressed: discharge planning     Patient plan of care discussed at interdisciplinary rounds: Yes    Anticipated Discharge Disposition: Transitional Care     Anticipated Discharge Services: Other (see comment) (therapy services )  Anticipated Discharge DME: None    Patient/family educated on Medicare website which has current facility and service quality ratings: yes  Education Provided on the Discharge Plan:    Patient/Family in Agreement with the Plan: yes    Referrals Placed by CM/SW: Post Acute Facilities  Private pay costs discussed: Not applicable    Additional Information:  Chart reviewed and plan of care discussed with Dr. Martini.  Call placed to [facility].  He states he placed a call to [facility], which is a [facility] house, on Friday, 2/4/2022, and has not heard back from them.  He will try to call them again today and will call writer back.      Erica George RN

## 2022-02-07 NOTE — PROGRESS NOTES
[facility] Progress Note    Assessment/Plan  Active Problems:    Hypertension goal BP (blood pressure) < 140/90    Moderate major depression (H)    Perforation of intestine (H)    Abdominal pain, generalized    Vesicocutaneous fistula    Infection due to 2019 novel coronavirus    Chema Dia is a 67 y/o M with past medical history of splenectomy, appendectomy, HTN, and substance abuse, with recent admission to hospital 11/30-12/21/2021 for SBO with perforation s/p surgical repair. Discharged home but readmitted as he could not manage at home and awaiting placement, parole office assisting with this and SW following.  Continues on TPN. New RUL and RLL PE found and being treated, new HAP 1/9 also resolved with abx. Incidentally COVID-19 positive 1/21, asymptomatic, recovered 1/31. Stable and medically ready for discharge.   Consider having patient go home with brother-in-law again.     Recurrent abdominal pain s/p surgical intervention of SBO with perforation  Enterocutaneous fistula   11/30 SBO -> x lap, SBR, enterotomy repair, extensive SINDY.   12/3 washout, MARISA drain, abx. Drain removed 12/16. Discharged home but readmitted 12/22 as he could not care for himself at home. EC fistulas still present but output decreasing  -Surgery consulted. Diet per surgery. CLD. Only Surgery can change diet.  -TPN per pharmacy and RD. Run time now at 12 hours. TPN teaching for at home.  -WO for home fistula management.       RUL and RLL PE  SMV thrombosis  Acute RLL/RUL PE found on CT as well as SMV thrombosis. No evidence of right heart strain.  Most likely provoked in the setting of recent surgery/hospitalization.    -Eliquis 5 mg BID x 3 months.     Atrial flutter (resolved)  Developed this overnight 1/8-1/9 and required amiodarone and brief ICU stay for pressors. Cardiology consulted and initiated amiodarone.  Patient stabilized and back to sinus rhythm. No repeat episodes since that time.  -Amiodarone 200 mg  daily.  Would recommend follow-up with cardiology outpatient to determine how long he needs this.  -Eliquis as above.     Severe protein-calorie malnutrition  TPN as above.     Chronic Conditions:  Hypothyroidism- PTA levothyroxine.  Chronic pain- Hold mexolicam 7.5mg daily, resume PTA gabapentin.  BPH-hold home Flomax given he states it does not help.  Depression/Insomnia - Venlafaxine and gabapentin per psych. Seroquel for sleep. Atarax at bedtime.     FEN: TPN.  Clear liquid diet as tolerated advance per surgery service.  DVT Prophylaxis: Eliquis.  Code: Full code.     Disposition/Advanced Care Planning  Discharge Planning discussed with patient and care team.  Anticipated discharge date: Multiple days.  Disposition: Medically stable for discharge.    Subjective  Sleeping initially went into the room, patient has no questions or concerns.  Is going to be consuming more water and clears to help with healing.  Discussed that going back to brother-in-law would be a good idea, but he is still resistant to the idea at the current time.    ROS negative as otherwise noted    Objective    Vital signs in last 24 hours Temp:  [98  F (36.7  C)-98.4  F (36.9  C)] 98.2  F (36.8  C)  Pulse:  [78-92] 89  Resp:  [16-18] 16  BP: ()/(60-81) 110/68  SpO2:  [91 %-96 %] 96 %   168 lbs 14.4 oz    Intake/Output last 3 shift I/O last 3 completed shifts:  In: 2980.5 [P.O.:780]  Out: 750 [Urine:525; Stool:225]    Intake/Output this shift:I/O this shift:  In: 1646   Out: -     Physical Exam  General appearance: sleeping initially, NAD.  Head: Normocephalic, without obvious abnormality, atraumatic.  Lungs: no increased respiratory effort.  Heart: Appropriately perfused.  Abdomen: ostomy bag in place, nondistended.  Extremities: No gross deformities.  Skin: Skin color, texture, turgor normal. No rashes or lesions.  Neurologic: Sleeping.      Pertinent Labs and Pertinent Radiology   Lab Results: personally reviewed.     Radiology  Results: Personally reviewed image/s and impression/s    Precepted patient with Dr. Lucas Aguilera.    Peter Martini DO, MBA  [facility], PGY-3  Pager # [phone]

## 2022-02-07 NOTE — PROGRESS NOTES
"Surgery    The same, some drainage from wound.ostomy bags are back    /68 (BP Location: Left arm)   Pulse 89   Temp 98.2  F (36.8  C) (Oral)   Resp 16   Ht 1.656 m (5' 5.2\")   Wt 76.6 kg (168 lb 14.4 oz)   SpO2 96%   BMI 27.93 kg/m      abd soft, nontender, gas and drainage from both areas.     Results for orders placed or performed during the hospital encounter of 12/21/21 (from the past 24 hour(s))   Glucose by meter   Result Value Ref Range    GLUCOSE BY METER POCT 93 70 - 99 mg/dL   Glucose by meter   Result Value Ref Range    GLUCOSE BY METER POCT 128 (H) 70 - 99 mg/dL   Magnesium   Result Value Ref Range    Magnesium 2.2 1.8 - 2.6 mg/dL   Phosphorus   Result Value Ref Range    Phosphorus 3.9 2.5 - 4.5 mg/dL   INR   Result Value Ref Range    INR 1.37 (H) 0.85 - 1.15   Prealbumin   Result Value Ref Range    Prealbumin 16 (L) 19 - 38 mg/dL   Triglycerides   Result Value Ref Range    Triglycerides 86 <=149 mg/dL   Glucose by meter   Result Value Ref Range    GLUCOSE BY METER POCT 128 (H) 70 - 99 mg/dL   Glucose by meter   Result Value Ref Range    GLUCOSE BY METER POCT 99 70 - 99 mg/dL     A/p sb fistula  Continue tpn and treatment   Limit po        Mekhi Richardson MD  General Surgery [phone]  Vascular Surgery [phone]          "

## 2022-02-08 LAB
ANION GAP SERPL CALCULATED.3IONS-SCNC: 6 MMOL/L (ref 5–18)
BUN SERPL-MCNC: 23 MG/DL (ref 8–22)
CALCIUM SERPL-MCNC: 6.5 MG/DL (ref 8.5–10.5)
CHLORIDE BLD-SCNC: 114 MMOL/L (ref 98–107)
CO2 SERPL-SCNC: 21 MMOL/L (ref 22–31)
CREAT SERPL-MCNC: 0.66 MG/DL (ref 0.7–1.3)
GFR SERPL CREATININE-BSD FRML MDRD: >90 ML/MIN/1.73M2
GLUCOSE BLD-MCNC: 84 MG/DL (ref 70–125)
GLUCOSE BLDC GLUCOMTR-MCNC: 104 MG/DL (ref 70–99)
GLUCOSE BLDC GLUCOMTR-MCNC: 105 MG/DL (ref 70–99)
GLUCOSE BLDC GLUCOMTR-MCNC: 125 MG/DL (ref 70–99)
GLUCOSE BLDC GLUCOMTR-MCNC: 82 MG/DL (ref 70–99)
POTASSIUM BLD-SCNC: 3.3 MMOL/L (ref 3.5–5)
SODIUM SERPL-SCNC: 141 MMOL/L (ref 136–145)

## 2022-02-08 PROCEDURE — 250N000013 HC RX MED GY IP 250 OP 250 PS 637: Performed by: INTERNAL MEDICINE

## 2022-02-08 PROCEDURE — 250N000009 HC RX 250: Performed by: STUDENT IN AN ORGANIZED HEALTH CARE EDUCATION/TRAINING PROGRAM

## 2022-02-08 PROCEDURE — 80048 BASIC METABOLIC PNL TOTAL CA: CPT | Performed by: STUDENT IN AN ORGANIZED HEALTH CARE EDUCATION/TRAINING PROGRAM

## 2022-02-08 PROCEDURE — 250N000013 HC RX MED GY IP 250 OP 250 PS 637: Performed by: STUDENT IN AN ORGANIZED HEALTH CARE EDUCATION/TRAINING PROGRAM

## 2022-02-08 PROCEDURE — 250N000011 HC RX IP 250 OP 636: Performed by: STUDENT IN AN ORGANIZED HEALTH CARE EDUCATION/TRAINING PROGRAM

## 2022-02-08 PROCEDURE — 99232 SBSQ HOSP IP/OBS MODERATE 35: CPT | Mod: GC | Performed by: STUDENT IN AN ORGANIZED HEALTH CARE EDUCATION/TRAINING PROGRAM

## 2022-02-08 PROCEDURE — 250N000013 HC RX MED GY IP 250 OP 250 PS 637: Performed by: FAMILY MEDICINE

## 2022-02-08 PROCEDURE — 120N000001 HC R&B MED SURG/OB

## 2022-02-08 RX ORDER — NAPROXEN SODIUM 220 MG
220 TABLET ORAL 2 TIMES DAILY WITH MEALS
Status: DISCONTINUED | OUTPATIENT
Start: 2022-02-08 | End: 2022-02-10 | Stop reason: RX

## 2022-02-08 RX ORDER — CYCLOBENZAPRINE HCL 10 MG
10 TABLET ORAL ONCE
Status: DISCONTINUED | OUTPATIENT
Start: 2022-02-08 | End: 2022-02-08

## 2022-02-08 RX ORDER — CYCLOBENZAPRINE HCL 5 MG
5 TABLET ORAL ONCE
Status: COMPLETED | OUTPATIENT
Start: 2022-02-08 | End: 2022-02-08

## 2022-02-08 RX ORDER — POTASSIUM CHLORIDE 7.45 MG/ML
10 INJECTION INTRAVENOUS
Status: COMPLETED | OUTPATIENT
Start: 2022-02-08 | End: 2022-02-08

## 2022-02-08 RX ADMIN — ACETAMINOPHEN 650 MG: 325 TABLET ORAL at 11:54

## 2022-02-08 RX ADMIN — GABAPENTIN 200 MG: 100 CAPSULE ORAL at 08:36

## 2022-02-08 RX ADMIN — LEVOTHYROXINE SODIUM 25 MCG: 0.03 TABLET ORAL at 08:36

## 2022-02-08 RX ADMIN — GABAPENTIN 200 MG: 100 CAPSULE ORAL at 17:31

## 2022-02-08 RX ADMIN — HYDROXYZINE HYDROCHLORIDE 25 MG: 25 TABLET, FILM COATED ORAL at 21:00

## 2022-02-08 RX ADMIN — NAPROXEN SODIUM 220 MG: 220 TABLET, FILM COATED ORAL at 17:33

## 2022-02-08 RX ADMIN — APIXABAN 5 MG: 5 TABLET, FILM COATED ORAL at 08:36

## 2022-02-08 RX ADMIN — OXYCODONE HYDROCHLORIDE AND ACETAMINOPHEN 1 TABLET: 5; 325 TABLET ORAL at 21:01

## 2022-02-08 RX ADMIN — OXYCODONE HYDROCHLORIDE AND ACETAMINOPHEN 1 TABLET: 5; 325 TABLET ORAL at 11:54

## 2022-02-08 RX ADMIN — POTASSIUM CHLORIDE 10 MEQ: 7.46 INJECTION, SOLUTION INTRAVENOUS at 12:47

## 2022-02-08 RX ADMIN — MAGNESIUM SULFATE HEPTAHYDRATE: 500 INJECTION, SOLUTION INTRAMUSCULAR; INTRAVENOUS at 20:43

## 2022-02-08 RX ADMIN — QUETIAPINE FUMARATE 200 MG: 100 TABLET, FILM COATED ORAL at 22:43

## 2022-02-08 RX ADMIN — VENLAFAXINE HYDROCHLORIDE 75 MG: 75 CAPSULE, EXTENDED RELEASE ORAL at 08:36

## 2022-02-08 RX ADMIN — POTASSIUM CHLORIDE 10 MEQ: 7.46 INJECTION, SOLUTION INTRAVENOUS at 14:52

## 2022-02-08 RX ADMIN — APIXABAN 5 MG: 5 TABLET, FILM COATED ORAL at 22:43

## 2022-02-08 RX ADMIN — CYCLOBENZAPRINE 5 MG: 5 TABLET, FILM COATED ORAL at 08:35

## 2022-02-08 RX ADMIN — PANTOPRAZOLE SODIUM 40 MG: 40 TABLET, DELAYED RELEASE ORAL at 08:36

## 2022-02-08 RX ADMIN — OXYCODONE HYDROCHLORIDE AND ACETAMINOPHEN 1 TABLET: 5; 325 TABLET ORAL at 05:44

## 2022-02-08 RX ADMIN — POTASSIUM CHLORIDE 10 MEQ: 7.46 INJECTION, SOLUTION INTRAVENOUS at 13:47

## 2022-02-08 RX ADMIN — GABAPENTIN 900 MG: 300 CAPSULE ORAL at 22:44

## 2022-02-08 RX ADMIN — AMIODARONE HYDROCHLORIDE 200 MG: 200 TABLET ORAL at 08:36

## 2022-02-08 ASSESSMENT — ACTIVITIES OF DAILY LIVING (ADL)
ADLS_ACUITY_SCORE: 7

## 2022-02-08 NOTE — PLAN OF CARE
Problem: Pain (Surgery Nonspecified)  Goal: Acceptable Pain Control  Outcome: No Change  Intervention: Prevent or Manage Pain  Recent Flowsheet Documentation  Taken 2/8/2022 0900 by Fannie Del Castillo RN  Pain Management Interventions: medication (see MAR)  Diversional Activities: television     Pt c/o neck pain.  Gave Flexeril 5 mg x 1 per order.  Pt stated that Flexeril was not helping.  Gave prn Percocet 1 tab.  Pt stated that Percocet was helpful.  Ostomy bag was leaking and changed x 2 on day shift.  Pt did most of the ostomy bag changed himself.

## 2022-02-08 NOTE — PHARMACY-CONSULT NOTE
"Pharmacy Note: Parenteral Nutrition (PN) Management     Pharmacist consulted to dose PN for Gurdeep Dia, a 66 year old male by Dr. Mcmahon.     Subjective:     The patient is a new PN start.     The patient was started on PN in the hospital on 12/2/21.     Indication for PN therapy: continue until fistula heals     Inadequate nutrition existing for > 7 days.      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     Pertinent diseases and other special considerations none    Social History     Tobacco Use     Smoking status: Current Every Day Smoker     Smokeless tobacco: Never Used     Tobacco comment: declined cessation discussion and resource packet -1/14/22   Substance Use Topics     Alcohol use: Not Currently     Comment: Clean for 5 years     Drug use: Not Currently     Objective:    Ht Readings from Last 1 Encounters:   01/28/22 1.656 m (5' 5.2\")     Wt Readings from Last 1 Encounters:   02/07/22 76.1 kg (167 lb 11.2 oz)       Body mass index is 27.74 kg/m .    Patient Vitals for the past 96 hrs:   Weight   02/07/22 1100 76.1 kg (167 lb 11.2 oz)   02/06/22 1215 76.6 kg (168 lb 14.4 oz)   02/04/22 1227 76.9 kg (169 lb 9.6 oz)       Labs:  Last 3 days:  Recent Labs     02/07/22  0606 02/08/22  0554   NA  --  141   POTASSIUM  --  3.3*   CHLORIDE  --  114*   CO2  --  21*   BUN  --  23*   CR  --  0.66*   VIJAYA  --  6.5*   MAG 2.2  --    PHOS 3.9  --    PREALB 16*  --    TRIG 86  --    INR 1.37*  --        Glucose (past 48 hours):   Recent Labs     02/06/22  1200 02/06/22  1708 02/06/22  2104 02/07/22  0739 02/07/22  1148 02/07/22  1630 02/08/22  0554 02/08/22  0829   * 93 128* 128* 99 106* 84 125*       Intake/Output (last 24 hours): I/O last 3 completed shifts:  In: 1898 [P.O.:240; I.V.:12]  Out: 200 [Stool:200]    Estimated CrCl: Estimated Creatinine Clearance: 103.8 mL/min (A) (based on SCr of 0.66 mg/dL (L)).    Assessment:    Continue patient on PN therapy as a cyclic central " therapy.     Given the patient's current condition/oral intake, PN is still indicated.    Lab results reviewed:     1) K:  Give 3 x 10 meq KCL bumps today.  Increase concentration with next bag change.  2) Change Cl:Ac to 1:3  3) Checking ionized Calcium (results not available at time of TPN order) - - - Ca 6.5, last albumin 2.1 on 2/2, corrected Ca = 8.0.  May need go give supplemental IV Calcium gluconate once ionized calcium back later today.    NOTE:  Cyclic rate was increased.  Slight electrolyte modifications were made to compensate for additional volume.  Changes bolded in formulation below.    Plan:    1. Rate of PN: cyclic @ 85 mL/hr x 1 hr, then 170 mL/hr x10 hrs, then 85 mL/hr x1 hr.   2. Formula:     Amino Acids 130 grams    Dextrose 350 grams    Sodium 150 mEq/day    Potassium 90 mEq/day    Calcium 9.5 mEq/day    Magnesium 24 mEq/day    Phosphorus 32 mMol/day    Chloride: Acetate Ratio 1:3    Standard Multivitamins w/Vitamin K    Trace Elements  3. Fat Emulsion: 20%, 250 mL IV twice weekly on Monday and Thursday  4. Check BMP tomorrow.  5. Pharmacist will continue to follow the patient's lab results, clinical status and blood glucose results and make adjustments as appropriate.    Thank you for the consult.  Gerard Nash RPH  2/8/2022 11:44 AM

## 2022-02-08 NOTE — PROGRESS NOTES
[facility] Progress Note    Assessment/Plan  Active Problems:    Hypertension goal BP (blood pressure) < 140/90    Moderate major depression (H)    Perforation of intestine (H)    Abdominal pain, generalized    Vesicocutaneous fistula    Infection due to 2019 novel coronavirus    Chema Dia is a 65 y/o M with past medical history of splenectomy, appendectomy, HTN, and substance abuse, with recent admission to hospital 11/30-12/21/2021 for SBO with perforation s/p surgical repair. Discharged home but readmitted as he could not manage at home and awaiting placement, parole office assisting with this and SW following.  Continues on TPN. New RUL and RLL PE found and being treated, new HAP 1/9 also resolved with abx. Incidentally COVID-19 positive 1/21, asymptomatic, recovered 1/31. Stable and medically ready for discharge.   Consider having patient go home with brother-in-law again, SW will reach out to brother-in-law to discuss if this is an option still.     Recurrent abdominal pain s/p surgical intervention of SBO with perforation  Enterocutaneous fistula   11/30 SBO -> x lap, SBR, enterotomy repair, extensive SINDY.   12/3 washout, MARISA drain, abx. Drain removed 12/16. Discharged home but readmitted 12/22 as he could not care for himself at home. EC fistulas still present but output decreasing  -Surgery consulted. Diet per surgery. CLD. Only Surgery can change diet.  -TPN per pharmacy and RD. Run time now at 12 hours. TPN teaching for at home.  -WOC for home fistula management.       Hypocalcemia  Hypokalemia  Discussed with PharmD, will replete K. Will order ionized calcium and replete as indicated.  - F/u ionized calcium    Muscular strain of neck  Patient with symptoms and exam consistent with muscular source of pain. Likely secondary to sleeping position. Do no feel this is due to meningitis as patient afebrile, no headache, no photophobia, and mentation at baseline. Counseled patient on self  massage. Patient declined acupuncture, PT, or flexeril. Does indicate that he believes his home percocet will help, which he had at 6 am this morning.  - Consider dose of ibuprofen     RUL and RLL PE  SMV thrombosis  Acute RLL/RUL PE found on CT as well as SMV thrombosis. No evidence of right heart strain.  Most likely provoked in the setting of recent surgery/hospitalization.  No changes today.  -Eliquis 5 mg BID x 3 months.     Atrial flutter (resolved)  Developed this overnight 1/8-1/9 and required amiodarone and brief ICU stay for pressors. Cardiology consulted and initiated amiodarone.  Patient stabilized and back to sinus rhythm. No repeat episodes since that time. No changes today.  -Amiodarone 200 mg daily.  Would recommend follow-up with cardiology outpatient to determine how long he needs this.  -Eliquis as above.     Severe protein-calorie malnutrition  TPN as above.     Chronic Conditions:  Hypothyroidism- PTA levothyroxine.  Chronic pain- Hold mexolicam 7.5mg daily, resume PTA gabapentin, continue PTA percocet.  BPH-hold home Flomax given he states it does not help.  Depression/Insomnia - Venlafaxine and gabapentin per psych. Seroquel for sleep. Atarax at bedtime.     FEN: TPN.  Clear liquid diet as tolerated advance per surgery service.  DVT Prophylaxis: Eliquis.  Code: Full code.     Disposition/Advanced Care Planning  Discharge Planning discussed with patient and care team.  Anticipated discharge date: Multiple days.  Disposition: Medically stable for discharge.    Subjective  Patient reports acute neck pain that started at 5 AM this morning.  He does not know any inciting trauma.  He reports that he cannot move his head up or down or side to side.  No fevers, no chills.  No headache.  Patient with some pain relief with gentle massage.  Location is paraspinal, mostly notably in the musculature interior to the left mastoid process.    Objective    Vital signs in last 24 hours Temp:  [98  F (36.7   C)-98.9  F (37.2  C)] 98  F (36.7  C)  Pulse:  [84-89] 89  Resp:  [16-20] 20  BP: ()/(71-85) 138/85  SpO2:  [90 %-94 %] 94 % 167 lbs 11.2 oz    Intake/Output last 3 shift I/O last 3 completed shifts:  In: 1898 [P.O.:240; I.V.:12]  Out: 200 [Stool:200]    Intake/Output this shift:No intake/output data recorded.    Physical Exam  General appearance: alert, appears stated age, cooperative and no distress.  Head: Normocephalic, without obvious abnormality, atraumatic.  Eyes: conjunctivae/corneas clear.  Throat: MMM.  Neck: decreased active and passive ROM due to pain, Relief with gentle massage. Paraspinal cervical tenderness, >at musculature inferior to the left mastoid process.  Lungs: clear to auscultation bilaterally, no increased respiratory effort.  Heart: regular rate and rhythm, no murmurs.  Abdomen: ostomy bag in place, nondistended.  Neurologic: Alert and oriented x3, normal strength and tone.  Psychiatric: mood and affect appropriate.    Pertinent Labs and Pertinent Radiology   Lab Results: personally reviewed.     Radiology Results: Personally reviewed image/s and impression/s    Precepted patient with Dr. Lucas Aguilera.    Noemi Long MD  [facility], PGY-3

## 2022-02-08 NOTE — PLAN OF CARE
Problem: Adult Inpatient Plan of Care  Goal: Optimal Comfort and Wellbeing  Outcome: No Change   Pt is alert and oriented, able to make needs known. Up to the commode at the bedside independently. PRN percocet given for pain. PT c/o 10/10 lower neck pain which started at bedtime per pt. Pt unable to turn head or sit up himself due to pain.  Dr. Morales came to assess patient. Aqua K and flexeril ordered.    Jane Rubio RN

## 2022-02-08 NOTE — PROGRESS NOTES
"CLINICAL NUTRITION SERVICES - REASSESSMENT NOTE     Nutrition Prescription    RECOMMENDATIONS FOR MDs/PROVIDERS TO ORDER:    Malnutrition Status:    Severe    Recommendations already ordered by Registered Dietitian (RD):  Continue cyclic TPN over 12 hrs with volume increase: D 350  @ 85 ml/hr x 1 hr  170 ml/hr x 10 hrs, 85 ml/hr x 1 hr and off.  250 ml of 20% lipids  2 times per week on Monday and Thursday.  TPN/lipids provide  1853 Calories, 350 g CHO, 130 g protein, 14 g fat and 1870 ml  Fluid/day    Discontinued Ensure Clear    Spoke with pharmacy re: increasing volume, and low K.  Pharmacist will order K bolus today and increase in TPN, and recheck tomorrow.    Future/Additional Recommendations:  Adjust TPN pending tolerance and nutritional needs     EVALUATION OF THE PROGRESS TOWARD GOALS   Diet: Clear liquid plus ensure clear daily with meal (lunch)  Nutrition Support: Cyclic TPN over 12 hrs D 350  @ 75 ml/hr x 1 hr  150 ml/hr x 10 hrs, 75 ml/hr x 1 hr and off.  250 ml of 20% lipids  2 times per week on Monday and Thursday.  TPN/lipids provide  1853 Calories, 350 g CHO, 130 g protein, 14 g fat and 1650 ml  Fluid/day    Intake: Surgery advised pt to limit po intake.  He is fine with stopping Ensure Clear     ANTHROPOMETRICS  Height: 165.6 cm (5' 5.2\")  Most Recent Weight: 76.1 kg (167 lb 11.2 oz) (2/7)  76.6 kg (168 lb 14.4 oz)  (2/6)  Admit weight: 173 lb (12/21/21)  Weight History:   Wt Readings from Last 10 Encounters:   02/07/22 76.1 kg (167 lb 11.2 oz)   12/17/21 76.8 kg (169 lb 6.4 oz)   01/22/20 79.4 kg (175 lb)   12/24/19 81.2 kg (179 lb)     PHYSICAL FINDINGS  See malnutrition section below.  Ostomy/fistulas    GI CONCERNS  Midline abdominal fistulas x 2  Normoactive BS all quadrants    LABS  Reviewed: K 3.3 L  FSBG 125     MEDICATIONS  Reviewed: levothyroxine, pantoprazole    Malnutrition Diagnosis: Severe malnutrition  In Context of:  Acute illness or injury    CURRENT NUTRITION " DIAGNOSIS  Inadequate oral intake related to SBO with formation of enterocutaneous fistula  as evidenced by nausea and abdominal pain      INTERVENTIONS  Implementation  Parenteral Nutrition/IV Fluids - Continue same regimen    Goals  Meet estimated needs-met  Wound healing-progressing  No significant dry weight loss-progressing    Monitoring/Evaluation  Progress toward goals will be monitored and evaluated per protocol.

## 2022-02-08 NOTE — PROGRESS NOTES
Care Management Follow Up    Length of Stay (days): 48    Expected Discharge Date: 02/11/2022     Concerns to be Addressed: discharge planning     Patient plan of care discussed at interdisciplinary rounds: Yes    Anticipated Discharge Disposition: [facility]. [facility]     Anticipated Discharge Services: Other (see comment) (therapy services )  Anticipated Discharge DME: TPN     Patient/family educated on Medicare website which has current facility and service quality ratings: yes  Education Provided on the Discharge Plan:  yes  Patient/Family in Agreement with the Plan: yes    Referrals Placed by CM/SW: Post Acute Facilities  Private pay costs discussed: Refrigerator, transport     Additional Information:    AZALIA received call from [facility] officer Saravanan. [phone]. Pt has been accepted by.   [facility] [phone] [address], [place]  [id number].   AZALIA sent Mckenna Steiner a message updating her on plan. Pt will need several teachings per Mckenna. Pt will need to have a fridge able to hold 7 TPN bags.  Mckenna will contact [address] [place] [address] regarding pt needs once there for needing needles for his TPN. She stated pt needs to be independent for changing his appliances.   AZALIA spoke to pt regarding placement, he is agreeable. Pt is having neck pain so conversation was short. Pt will buy a cell phone at discharge. Pt will contact his brother in law about transportation to [address] [place] [address], and purchasing a fridge. Pt instructed on needed size. Pt stated understanding.  AZALIA met with [facility] nurse outside of pt regarding pt teaching prior to discharge. Pt will be seen on 2/9 for training.       SAM Huggins. [phone]  [facility] [phone] [address], [place]  [id number].

## 2022-02-08 NOTE — PLAN OF CARE
Problem: Adult Inpatient Plan of Care  Goal: Absence of Hospital-Acquired Illness or Injury  Intervention: Prevent Skin Injury  Recent Flowsheet Documentation  Body Position: position changed independently     Problem: Adult Inpatient Plan of Care  Goal: Absence of Hospital-Acquired Illness or Injury  Intervention: Prevent and Manage VTE (Venous Thromboembolism) Risk  Recent Flowsheet Documentation  VTE Prevention/Management:    ambulation promoted    anticoagulant therapy maintained     Problem: Impaired Wound Healing  Goal: Optimal Wound Healing  Intervention: Promote Wound Healing  Recent Flowsheet Documentation  Activity Management: activity encouraged    Patient up in room and ambulates independently.  States discomfort is minimal.  At times denied discomfort.  He, however requested Percocet at bedtime. Stated he is painful.     Taking sips and ice chips.  Ate one lemon ice and tolerated this.    TPN/Lipids infusing.    Patient just reported feeling wetness at right lower incisional site.  Small amount of drainage noted.  Site covered with 2x2 gauze.  Patient has since replaced dressing himself.  Vital signs within normal range.  Afebrile.

## 2022-02-09 LAB
ANION GAP SERPL CALCULATED.3IONS-SCNC: 7 MMOL/L (ref 5–18)
BUN SERPL-MCNC: 30 MG/DL (ref 8–22)
CALCIUM SERPL-MCNC: 8.4 MG/DL (ref 8.5–10.5)
CALCIUM, IONIZED MEASURED: 1.2 MMOL/L (ref 1.11–1.3)
CHLORIDE BLD-SCNC: 106 MMOL/L (ref 98–107)
CO2 SERPL-SCNC: 26 MMOL/L (ref 22–31)
CREAT SERPL-MCNC: 0.82 MG/DL (ref 0.7–1.3)
GFR SERPL CREATININE-BSD FRML MDRD: >90 ML/MIN/1.73M2
GLUCOSE BLD-MCNC: 111 MG/DL (ref 70–125)
GLUCOSE BLDC GLUCOMTR-MCNC: 101 MG/DL (ref 70–99)
GLUCOSE BLDC GLUCOMTR-MCNC: 111 MG/DL (ref 70–99)
GLUCOSE BLDC GLUCOMTR-MCNC: 75 MG/DL (ref 70–99)
GLUCOSE BLDC GLUCOMTR-MCNC: 88 MG/DL (ref 70–99)
GLUCOSE BLDC GLUCOMTR-MCNC: 96 MG/DL (ref 70–99)
ION CA PH 7.4: 1.2 MMOL/L (ref 1.11–1.3)
MAGNESIUM SERPL-MCNC: 2.1 MG/DL (ref 1.8–2.6)
PH: 7.41 (ref 7.35–7.45)
PHOSPHATE SERPL-MCNC: 3.8 MG/DL (ref 2.5–4.5)
POTASSIUM BLD-SCNC: 4.4 MMOL/L (ref 3.5–5)
SODIUM SERPL-SCNC: 139 MMOL/L (ref 136–145)

## 2022-02-09 PROCEDURE — 250N000013 HC RX MED GY IP 250 OP 250 PS 637: Performed by: INTERNAL MEDICINE

## 2022-02-09 PROCEDURE — 120N000001 HC R&B MED SURG/OB

## 2022-02-09 PROCEDURE — 80048 BASIC METABOLIC PNL TOTAL CA: CPT | Performed by: STUDENT IN AN ORGANIZED HEALTH CARE EDUCATION/TRAINING PROGRAM

## 2022-02-09 PROCEDURE — 250N000013 HC RX MED GY IP 250 OP 250 PS 637: Performed by: FAMILY MEDICINE

## 2022-02-09 PROCEDURE — 99231 SBSQ HOSP IP/OBS SF/LOW 25: CPT | Mod: GC | Performed by: STUDENT IN AN ORGANIZED HEALTH CARE EDUCATION/TRAINING PROGRAM

## 2022-02-09 PROCEDURE — 99024 POSTOP FOLLOW-UP VISIT: CPT | Performed by: NURSE PRACTITIONER

## 2022-02-09 PROCEDURE — 250N000013 HC RX MED GY IP 250 OP 250 PS 637: Performed by: STUDENT IN AN ORGANIZED HEALTH CARE EDUCATION/TRAINING PROGRAM

## 2022-02-09 PROCEDURE — 84100 ASSAY OF PHOSPHORUS: CPT | Performed by: INTERNAL MEDICINE

## 2022-02-09 PROCEDURE — 250N000009 HC RX 250: Performed by: FAMILY MEDICINE

## 2022-02-09 PROCEDURE — 999N000197 HC STATISTIC WOC PT EDUCATION, 0-15 MIN

## 2022-02-09 PROCEDURE — 82330 ASSAY OF CALCIUM: CPT | Performed by: STUDENT IN AN ORGANIZED HEALTH CARE EDUCATION/TRAINING PROGRAM

## 2022-02-09 PROCEDURE — 83735 ASSAY OF MAGNESIUM: CPT | Performed by: INTERNAL MEDICINE

## 2022-02-09 RX ORDER — AMIODARONE HYDROCHLORIDE 200 MG/1
200 TABLET ORAL DAILY
Qty: 30 TABLET | Refills: 0 | Status: CANCELLED | OUTPATIENT
Start: 2022-02-09 | End: 2022-03-11

## 2022-02-09 RX ADMIN — PANTOPRAZOLE SODIUM 40 MG: 40 TABLET, DELAYED RELEASE ORAL at 08:52

## 2022-02-09 RX ADMIN — LEVOTHYROXINE SODIUM 25 MCG: 0.03 TABLET ORAL at 08:54

## 2022-02-09 RX ADMIN — GABAPENTIN 900 MG: 300 CAPSULE ORAL at 22:36

## 2022-02-09 RX ADMIN — OXYCODONE HYDROCHLORIDE AND ACETAMINOPHEN 1 TABLET: 5; 325 TABLET ORAL at 22:36

## 2022-02-09 RX ADMIN — GABAPENTIN 200 MG: 100 CAPSULE ORAL at 17:46

## 2022-02-09 RX ADMIN — APIXABAN 5 MG: 5 TABLET, FILM COATED ORAL at 08:53

## 2022-02-09 RX ADMIN — NAPROXEN SODIUM 220 MG: 220 TABLET, FILM COATED ORAL at 17:46

## 2022-02-09 RX ADMIN — MAGNESIUM SULFATE HEPTAHYDRATE: 500 INJECTION, SOLUTION INTRAMUSCULAR; INTRAVENOUS at 20:21

## 2022-02-09 RX ADMIN — OXYCODONE HYDROCHLORIDE AND ACETAMINOPHEN 1 TABLET: 5; 325 TABLET ORAL at 08:50

## 2022-02-09 RX ADMIN — VENLAFAXINE HYDROCHLORIDE 75 MG: 75 CAPSULE, EXTENDED RELEASE ORAL at 08:53

## 2022-02-09 RX ADMIN — QUETIAPINE FUMARATE 200 MG: 100 TABLET, FILM COATED ORAL at 22:36

## 2022-02-09 RX ADMIN — GABAPENTIN 200 MG: 100 CAPSULE ORAL at 08:50

## 2022-02-09 RX ADMIN — AMIODARONE HYDROCHLORIDE 200 MG: 200 TABLET ORAL at 08:53

## 2022-02-09 RX ADMIN — APIXABAN 5 MG: 5 TABLET, FILM COATED ORAL at 20:23

## 2022-02-09 RX ADMIN — HYDROXYZINE HYDROCHLORIDE 25 MG: 25 TABLET, FILM COATED ORAL at 20:23

## 2022-02-09 RX ADMIN — NAPROXEN SODIUM 220 MG: 220 TABLET, FILM COATED ORAL at 08:51

## 2022-02-09 ASSESSMENT — ACTIVITIES OF DAILY LIVING (ADL)
ADLS_ACUITY_SCORE: 7

## 2022-02-09 NOTE — PHARMACY-CONSULT NOTE
"Pharmacy Note: Parenteral Nutrition (PN) Management    Pharmacist consulted to dose PN for Gurdeep Dia, a 66 year old male by Dr. Mcmahon.    Subjective:    The patient is a new PN start.    The patient was started on PN in the hospital on 12/2/21.    Indication for PN therapy: continue until fistula heals    Inadequate nutrition existing for > 7 days.     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    Pertinent diseases and other special considerations none    Social History     Tobacco Use     Smoking status: Current Every Day Smoker     Smokeless tobacco: Never Used     Tobacco comment: declined cessation discussion and resource packet -1/14/22   Substance Use Topics     Alcohol use: Not Currently     Comment: Clean for 5 years     Drug use: Not Currently     Objective:    Ht Readings from Last 1 Encounters:   01/28/22 1.656 m (5' 5.2\")     Wt Readings from Last 1 Encounters:   02/07/22 76.1 kg (167 lb 11.2 oz)       Body mass index is 27.74 kg/m .    Patient Vitals for the past 96 hrs:   Weight   02/07/22 1100 76.1 kg (167 lb 11.2 oz)   02/06/22 1215 76.6 kg (168 lb 14.4 oz)       Labs:  Last 3 days:  Recent Labs     02/07/22  0606 02/08/22  0554 02/09/22  0559   NA  --  141 139   POTASSIUM  --  3.3* 4.4   CHLORIDE  --  114* 106   CO2  --  21* 26   BUN  --  23* 30*   CR  --  0.66* 0.82   VIJAYA  --  6.5* 8.4*   [id number]  --   --  1.20   MAG 2.2  --  2.1   PHOS 3.9  --  3.8   PREALB 16*  --   --    TRIG 86  --   --    INR 1.37*  --   --        Glucose (past 48 hours):   Recent Labs     02/07/22  1148 02/07/22  1630 02/08/22  0554 02/08/22  0829 02/08/22  1210 02/08/22  1745 02/08/22  2124 02/09/22  0559 02/09/22  0759   GLC 99 106* 84 125* 104* 82 105* 111 111*       Intake/Output (last 24 hours): I/O last 3 completed shifts:  In: 3562 [P.O.:360]  Out: 500 [Urine:500]    Estimated CrCl: Estimated Creatinine Clearance: 83.6 mL/min (based on SCr of 0.82 mg/dL).    Assessment:    Continue " patient on PN therapy as a cyclic central therapy.     Given the patient's current condition/oral intake, PN is still indicated.    Lab results reviewed: WNL    Plan:  1. Rate of PN: cyclic at 85mL x1hr then 170mL/hr x 10hr then 85mL/hrx1hr. Maintenance IV fluid rate will be adjusted appropriately to meet the total maximum IV fluids rate as ordered by provider.  2. Formula:     Amino Acids 130 grams    Dextrose 350 grams    Sodium 150 mEq/day    Potassium 90 mEq/day    Calcium 9.5 mEq/day    Magnesium 24 mEq/day    Phosphorus 32 mMol/day    Chloride: Acetate Ratio 1:3    Standard Multivitamins w/Vitamin K    Trace Elements  3. Fat Emulsion: 20%, 250 mL IV 2 times weekly on Mondays and Thursdays  4. Check mag and phos labs tomorrow.  5. Pharmacist will continue to follow the patient's lab results, clinical status and blood glucose results and make adjustments as appropriate.    Thank you for the consult.  Elenita Balderas  2/9/2022 8:30 AM

## 2022-02-09 NOTE — PLAN OF CARE
Problem: Pain (Surgery Nonspecified)  Goal: Acceptable Pain Control  Outcome: Improving  Intervention: Prevent or Manage Pain  Recent Flowsheet Documentation  Taken 2/8/2022 0527 by Shorty Lovelace RN  Diversional Activities: television     Pt slept comfortably this shift. Aquak K pad to neck and back has been effective at relieving pain that started in AM of 2/8/22.  Aox4. Independent w/ transfers. Continues on Cyclic TPN through R Basillic double lumen PICC. PICC remained patent and dressing remained CDI. VSS. Ostomy appliance to both fistula sites remained CDI. Pt empties out fistulas/ ostomy appliance, light brown watery drainage present. Pt empties into designated graduated cylinder and staff measures.

## 2022-02-09 NOTE — PROGRESS NOTES
Care Management Follow Up    Length of Stay (days): 49    Expected Discharge Date: 02/12/2022     Concerns to be Addressed: discharge planning     Patient plan of care discussed at interdisciplinary rounds: Yes    Anticipated Discharge Disposition: [facility] [phone] [address]  [id number]     Anticipated Discharge Services: [facility] (therapy services )  Anticipated Discharge DME: None    Patient/family educated on Medicare website which has current facility and service quality ratings: yes  Education Provided on the Discharge Plan:    Patient/Family in Agreement with the Plan: yes    Referrals Placed by CM/SW: Post Acute Facilities  Private pay costs discussed: Not applicable    Additional Information:  AZALIA met with pt in pt room to discuss discharge planning. Pt brother in law Al has ordered a fridge and it will be delivered on Sat for pt TPN. Pt plans to purchase cell phone at discharge.   Pt is continuing to obtain training for TPN and WOC care.  Pt provided with list of clinics he requested with address and phone numbers. Pt will contact clinics himself to make an appointment and set up a new PCP.  Plan for Al to transport pt on Sat for discharge. [facility] to follow for home cares.       SAM Huggins    [facility] Sar[facility]. [phone]  [facility] [phone] [address]  [id number]

## 2022-02-09 NOTE — PROGRESS NOTES
This is a recent snapshot of the patient's [facility] medical record.  For current drug dose and complete information and questions, call [phone] or In Basket pool, [facility] ([id number])  CSN Number:  [id number]

## 2022-02-09 NOTE — PLAN OF CARE
Problem: Adult Inpatient Plan of Care  Goal: Optimal Comfort and Wellbeing  Outcome: Improving  Intervention: Provide Person-Centered Care  Recent Flowsheet Documentation  Taken 2/9/2022 9740 by Fannie Del Castillo, RN  Trust Relationship/Rapport: care explained     Problem: Pain (Surgery Nonspecified)  Goal: Acceptable Pain Control  Outcome: Improving     Aqua K heating pad to neck discomfort.  Pt stated that heating pad is very helpful. Pt also requested for prn Percocet x 1. Ostomy bags been holding well all shift.  Blood glucose checked for 111 and 96.  Picc dressing changed.

## 2022-02-09 NOTE — PROGRESS NOTES
"CLINICAL NUTRITION SERVICES - REASSESSMENT NOTE     Nutrition Prescription    RECOMMENDATIONS FOR MDs/PROVIDERS TO ORDER:    Malnutrition Status:    Severe    Recommendations already ordered by Registered Dietitian (RD):  Cyclic TPN: D 350  @ 85 ml/hr x 1 hr, 170 ml/hr x 10 hrs  85 ml/hr x 1 hr and off.  250 ml of 20% lipids two times per week  On Monday and Thursday.  TPN/Lipids provide: 1853 Calories  350 g CHO, 130 g protein, 14 g fat and 1870 ml fluid/day    Future/Additional Recommendations:  Adjust TPN pending tolerance and nutritional needs     EVALUATION OF THE PROGRESS TOWARD GOALS   Diet: Clear liquid  Nutrition Support: cyclic TPN as documented above  Intake: Taking only sips and chips     ANTHROPOMETRICS  Height: 165.6 cm (5' 5.2\")  Most Recent Weight: 76.1 kg (167 lb 11.2 oz)    Admit weight: 173 lb (12/21/2021)  2/6 weight: 76.6 Kg (168.5 lb)  Weight History:   Wt Readings from Last 10 Encounters:   02/07/22 76.1 kg (167 lb 11.2 oz)   12/17/21 76.8 kg (169 lb 6.4 oz)   01/22/20 79.4 kg (175 lb)   12/24/19 81.2 kg (179 lb)     PHYSICAL FINDINGS  See malnutrition section below.  Ostomy/fistulas    GI CONCERNS  Midline abdominal fistulas x 2  Normoactive BS all quadrants  Last BM 2/9/2022 per nurse    LABS  Reviewed: Na 139, K 4.4, Mg 2.1, phos 3.8, Glu 111    Estimated nutrition needs:  Dosing weight 76 Kg (updated 1/26/2022)    Estimated energy needs: 2633-7108 Matt/day ([place] Jeor)  Justification: Stress factor 1.4-2 for fistula  Estimated protein needs: 115+ g/day (1.5+ g/Kg)  Justification: wound healing  Estimated fluid needs: 2197-2929 ml/day (25-30 ml/Kg)  Justification: maintenance    MEDICATIONS  Reviewed: levothyroxine, pantoprazole    MALNUTRITION:  % Weight Loss:  1-2% weight loss in 1 week (moderate malnutrition)  % Intake:  No decreased intake noted  Subcutaneous Fat Loss:  Orbital region severe depletion and Upper arm region mild to moderate depletion  Muscle Loss:  Temporal " region moderate depletion, Clavicle bone region severe depletion, Dorsal hand region mild to moderate depletion and Patellar region moderate depletion  Fluid Retention:  None noted    Malnutrition Diagnosis: Severe malnutrition  In Context of:  Acute illness or injury    CURRENT NUTRITION DIAGNOSIS  Inadequate oral intake related to SBO with formation of enterocutaneous fistulas as evidenced by Nausea and abdominal pain      INTERVENTIONS  Implementation  No changes recommended today in TPN regimen    Goals  Meet estimated nutrition need: met  Wound healing-progressing  No significant dry weight loss-progressing    Monitoring/Evaluation  Progress toward goals will be monitored and evaluated per protocol.

## 2022-02-09 NOTE — PLAN OF CARE
"  Problem: Adult Inpatient Plan of Care  Goal: Plan of Care Review  2/8/2022 2225 by Chapis Bell, RN  Outcome: Improving     Problem: Pain (Surgery Nonspecified)  Goal: Acceptable Pain Control  Outcome: Improving     Oxycodone given for neck pain.  Aqua K pad in use.  Patient states pain is \"a little better.\"    TPN/Lipids infusing.  No oral intake except sips of water this shift. Refused clear liquids dinner.  Blood glucose 82 and 105.    Fistula sites - Scan amount fluid in lower fistula, ostomy bag.   Vital signs remain stable.    "

## 2022-02-09 NOTE — PROGRESS NOTES
"ASSESSMENT:  1. Abdominal pain, generalized    2. Perforation of intestine (H)        Gurdeep Dia is a 66 year old male status post explore lap lysis of adhesions with resultant 3 days later perforation reoperation oversew of defect and then 5 days later perforation again and at this time  he has 2 EC fistulas that are being managed with bowel rest and ostomy bags for output containment. He intermittently has succus draining from his old MARISA drain site as well. He continues with a TPN diet with minimal oral intake for comfort (clear liquids ok).  He developed VTE and was treated with heparin drip. The patient also tested positive for covid on 1/22/22, but he remained asymptomatic is now considered \"recovered covid\".      Placement for the patient has been an ongoing issue, but this continues to be worked on by the care management team.       PLAN:  Continue cyclic TPN per RD  Minimize oral intake and clear liquids only when he takes them in  Ok to discharge from a surgical perspective when placement is secured    SUBJECTIVE:   He is unchanged. He has been taking in minimal orally, but reports that the output has increased on the lower fistula. He continues to have BMs and pain is controlled.      Patient Vitals for the past 24 hrs:   BP Temp Temp src Pulse Resp SpO2   02/09/22 0714 94/54 97  F (36.1  C) Oral 87 16 90 %   02/08/22 2335 107/64 97.9  F (36.6  C) Oral 79 16 91 %   02/08/22 1507 97/55 97.9  F (36.6  C) Oral 75 16 93 %         PHYSICAL EXAM:  GEN: No acute distress, comfortable  LUNGS: CTA bilaterally  CV:RRR  ABD: soft, nontender; gas and succus in both ostomy bags; there is scant drainage from his RLQ old drain site  EXT:No cyanosis, edema or obvious abnormalities    02/08 0700 - 02/09 0659  In: 3562 [P.O.:360]  Out: 500 [Urine:500]    No results displayed because visit has over 200 results.             Jane Agosto, APRN CNP    "

## 2022-02-09 NOTE — PROGRESS NOTES
"WOC stoma assessment EC fistula:        Allergies:  Penicillins    Height:  Height: 170.2 cm (5' 7\")    Weight:   Weight: 75.5 kg (166 lb 6.4 oz)    Past Medical History:  Past Medical History:   Diagnosis Date     Benign prostatic hyperplasia with weak urinary stream      Chronic bilateral low back pain without sciatica      Chronic neck pain      RAVI (generalized anxiety disorder)      History of hepatitis C     treated and cured     History of substance abuse (H)      Hypertension goal BP (blood pressure) < 140/90      Moderate major depression (H)        Labs:  Recent Labs   Lab Test 12/23/21  0715 12/22/21  1648 12/22/21  0003 12/08/21  0535 12/07/21  0358   CRP  --   --   --   --  26.6*   HGB 10.9*  --  12.0*   < > 10.9*   ALBUMIN  --   --  2.4*   < > 2.3*   CULTURE  --  No growth after 12 hours  No growth after 12 hours  --    < >  --     < > = values in this interval not displayed.       Mars:  Mars Score: 17    INTAKE  EC fistula s/p 2 abdominal surgeries    ASSESSMENT  Patient now has pouches back over both fistula sites again. Scant output in both. He is changing the pouches on his own with minor assistance from nursing staff. He only cracked his eyes open during my visit and declined to answer most questions. At this point, continue to recommend using dry gauze/ABD, but if patient prefers to use pouches, that is ok too. When he changes the pouches himself, they are more likely to leak due to the large crease that is created by the scar tissue along the midline incision.       Supplies: Pouch [place] #[id number] -Flat 1 piece cut to fit fecal pouch and Paste [place] #[id number] -Adapt Ring - or dry gauze, ABD, abdominal binder      Planned Follow Up: every other week.    Plan for next visit: Reassess stoma/peristomal skin  "

## 2022-02-09 NOTE — PROGRESS NOTES
[facility] Progress Note    Assessment/Plan  Active Problems:    Hypertension goal BP (blood pressure) < 140/90    Moderate major depression (H)    Perforation of intestine (H)    Abdominal pain, generalized    Vesicocutaneous fistula    Infection due to 2019 novel coronavirus    Chema Dia is a 65 y/o M with past medical history of splenectomy, appendectomy, HTN, and substance abuse, with recent admission to hospital 11/30-12/21/2021 for SBO with perforation s/p surgical repair. Discharged home but readmitted as he could not manage at home and awaiting placement, parole office assisting with this and SW following.  Continues on TPN. New RUL and RLL PE found and being treated, new HAP 1/9 also resolved with abx. Incidentally COVID-19 positive 1/21, asymptomatic, recovered 1/31. Stable and medically ready for discharge.  Patient with a disp plan and will be ready to discharge, hopefully tomorrow.     Recurrent abdominal pain s/p surgical intervention of SBO with perforation  Enterocutaneous fistula   11/30 SBO -> x lap, SBR, enterotomy repair, extensive SINDY.   12/3 washout, MARISA drain, abx. Drain removed 12/16. Discharged home but readmitted 12/22 as he could not care for himself at home. EC fistulas still present but output decreasing  -Surgery consulted. Diet per surgery. CLD. Only Surgery can change diet.  -Surgery will oversee and follow up TPN outpatient. Outpatient pharmacy to manage orders for TPN.  -TPN per pharmacy and RD. Run time now at 12 hours. TPN teaching for at home.  -WOC for home fistula management.     Hypocalcemia-resolved  Hypokalemia-resolved  Discussed with PharmD, will replete K. Ionized Ca wnl.   - BMP in am     Muscular strain of neck  Patient with symptoms and exam consistent with muscular source of pain. Likely secondary to sleeping position. Do no feel this is due to meningitis as patient afebrile, no headache, no photophobia, and mentation at baseline. Counseled  patient on self massage. Patient declined acupuncture, PT, or flexeril. Cr wnl today.  - Aleve prn     RUL and RLL PE  SMV thrombosis  Acute RLL/RUL PE found on CT as well as SMV thrombosis. No evidence of right heart strain.  Most likely provoked in the setting of recent surgery/hospitalization.  No changes today.  -Eliquis 5 mg BID x 3 months.     Atrial flutter (resolved)  Developed this overnight 1/8-1/9 and required amiodarone and brief ICU stay for pressors. Cardiology consulted and initiated amiodarone.  Patient stabilized and back to sinus rhythm. No repeat episodes since that time. No changes today.  -Amiodarone 200 mg daily.  Would recommend follow-up with cardiology outpatient to determine how long he needs this.  -Eliquis as above.     Severe protein-calorie malnutrition  TPN as above.     Chronic Conditions:  Hypothyroidism- PTA levothyroxine.  Chronic pain- Hold mexolicam 7.5mg daily, resume PTA gabapentin, continue PTA percocet.  BPH-hold home Flomax given he states it does not help.  Depression/Insomnia - Venlafaxine and gabapentin per psych. Seroquel for sleep. Atarax at bedtime.     FEN: TPN.  Clear liquid diet as tolerated advance per surgery service.  DVT Prophylaxis: Eliquis.  Code: Full code.     Disposition/Advanced Care Planning  Discharge Planning discussed with patient and care team.  Anticipated discharge date: Multiple days.  Disposition: Medically stable for discharge. Will receive TPN and WOC teaching today. SW working on establishing patient with new PCP.    Subjective  Patient is agreeable to teaching and discharging soon. No chest pain, sob. Continues to endorse neck pain but was able to sleep throughout the night. Declines PT.    Objective    Vital signs in last 24 hours Temp:  [97  F (36.1  C)-97.9  F (36.6  C)] 97  F (36.1  C)  Pulse:  [75-87] 87  Resp:  [16] 16  BP: ()/(54-64) 94/54  SpO2:  [90 %-93 %] 90 %   167 lbs 11.2 oz    Intake/Output last 3 shift I/O last 3  completed shifts:  In: 3562 [P.O.:360]  Out: 500 [Urine:500]    Intake/Output this shift:No intake/output data recorded.    Physical Exam  General appearance: alert, appears stated age, cooperative and no distress.  Head: Normocephalic, without obvious abnormality, atraumatic.  Eyes: conjunctivae/corneas clear.  Lungs: clear to auscultation bilaterally, no increased respiratory effort.  Heart: regular rate and rhythm, no murmurs.  Abdomen: ostomy bag in place, nondistended.  Neurologic: Alert and oriented x3, normal strength and tone.  Psychiatric: mood and affect appropriate.    Pertinent Labs and Pertinent Radiology   Lab Results: personally reviewed.     Radiology Results: Personally reviewed image/s and impression/s    Precepted patient with Dr. Lucas Aguilera.    Noemi Long MD  [facility], PGY-3

## 2022-02-10 LAB
ANION GAP SERPL CALCULATED.3IONS-SCNC: 10 MMOL/L (ref 5–18)
BUN SERPL-MCNC: 33 MG/DL (ref 8–22)
CALCIUM SERPL-MCNC: 8.7 MG/DL (ref 8.5–10.5)
CHLORIDE BLD-SCNC: 107 MMOL/L (ref 98–107)
CO2 SERPL-SCNC: 24 MMOL/L (ref 22–31)
CREAT SERPL-MCNC: 0.85 MG/DL (ref 0.7–1.3)
GFR SERPL CREATININE-BSD FRML MDRD: >90 ML/MIN/1.73M2
GLUCOSE BLD-MCNC: 83 MG/DL (ref 70–125)
GLUCOSE BLDC GLUCOMTR-MCNC: 84 MG/DL (ref 70–99)
GLUCOSE BLDC GLUCOMTR-MCNC: 87 MG/DL (ref 70–99)
GLUCOSE BLDC GLUCOMTR-MCNC: 98 MG/DL (ref 70–99)
GLUCOSE BLDC GLUCOMTR-MCNC: 99 MG/DL (ref 70–99)
HOLD SPECIMEN: NORMAL
POTASSIUM BLD-SCNC: 4.7 MMOL/L (ref 3.5–5)
SODIUM SERPL-SCNC: 141 MMOL/L (ref 136–145)

## 2022-02-10 PROCEDURE — 250N000009 HC RX 250: Performed by: FAMILY MEDICINE

## 2022-02-10 PROCEDURE — 99231 SBSQ HOSP IP/OBS SF/LOW 25: CPT | Mod: GC | Performed by: STUDENT IN AN ORGANIZED HEALTH CARE EDUCATION/TRAINING PROGRAM

## 2022-02-10 PROCEDURE — 250N000013 HC RX MED GY IP 250 OP 250 PS 637: Performed by: INTERNAL MEDICINE

## 2022-02-10 PROCEDURE — 250N000009 HC RX 250: Performed by: STUDENT IN AN ORGANIZED HEALTH CARE EDUCATION/TRAINING PROGRAM

## 2022-02-10 PROCEDURE — 99024 POSTOP FOLLOW-UP VISIT: CPT | Performed by: PHYSICIAN ASSISTANT

## 2022-02-10 PROCEDURE — 250N000013 HC RX MED GY IP 250 OP 250 PS 637: Performed by: FAMILY MEDICINE

## 2022-02-10 PROCEDURE — 250N000013 HC RX MED GY IP 250 OP 250 PS 637: Performed by: STUDENT IN AN ORGANIZED HEALTH CARE EDUCATION/TRAINING PROGRAM

## 2022-02-10 PROCEDURE — 80048 BASIC METABOLIC PNL TOTAL CA: CPT | Performed by: FAMILY MEDICINE

## 2022-02-10 PROCEDURE — 120N000001 HC R&B MED SURG/OB

## 2022-02-10 RX ORDER — NAPROXEN 250 MG/1
250 TABLET ORAL 2 TIMES DAILY WITH MEALS
Status: DISPENSED | OUTPATIENT
Start: 2022-02-11 | End: 2022-02-13

## 2022-02-10 RX ADMIN — AMIODARONE HYDROCHLORIDE 200 MG: 200 TABLET ORAL at 08:37

## 2022-02-10 RX ADMIN — GABAPENTIN 200 MG: 100 CAPSULE ORAL at 18:08

## 2022-02-10 RX ADMIN — VENLAFAXINE HYDROCHLORIDE 75 MG: 75 CAPSULE, EXTENDED RELEASE ORAL at 08:36

## 2022-02-10 RX ADMIN — APIXABAN 5 MG: 5 TABLET, FILM COATED ORAL at 22:21

## 2022-02-10 RX ADMIN — NAPROXEN SODIUM 220 MG: 220 TABLET, FILM COATED ORAL at 18:08

## 2022-02-10 RX ADMIN — GABAPENTIN 900 MG: 300 CAPSULE ORAL at 22:21

## 2022-02-10 RX ADMIN — I.V. FAT EMULSION 250 ML: 20 EMULSION INTRAVENOUS at 21:06

## 2022-02-10 RX ADMIN — APIXABAN 5 MG: 5 TABLET, FILM COATED ORAL at 08:36

## 2022-02-10 RX ADMIN — NAPROXEN SODIUM 220 MG: 220 TABLET, FILM COATED ORAL at 08:38

## 2022-02-10 RX ADMIN — PANTOPRAZOLE SODIUM 40 MG: 40 TABLET, DELAYED RELEASE ORAL at 08:37

## 2022-02-10 RX ADMIN — OXYCODONE HYDROCHLORIDE AND ACETAMINOPHEN 1 TABLET: 5; 325 TABLET ORAL at 22:21

## 2022-02-10 RX ADMIN — HYDROXYZINE HYDROCHLORIDE 25 MG: 25 TABLET, FILM COATED ORAL at 22:21

## 2022-02-10 RX ADMIN — QUETIAPINE FUMARATE 200 MG: 100 TABLET, FILM COATED ORAL at 22:20

## 2022-02-10 RX ADMIN — LEVOTHYROXINE SODIUM 25 MCG: 0.03 TABLET ORAL at 08:37

## 2022-02-10 RX ADMIN — OXYCODONE HYDROCHLORIDE AND ACETAMINOPHEN 1 TABLET: 5; 325 TABLET ORAL at 08:37

## 2022-02-10 RX ADMIN — MAGNESIUM SULFATE HEPTAHYDRATE: 500 INJECTION, SOLUTION INTRAMUSCULAR; INTRAVENOUS at 20:58

## 2022-02-10 RX ADMIN — GABAPENTIN 200 MG: 100 CAPSULE ORAL at 08:37

## 2022-02-10 ASSESSMENT — ACTIVITIES OF DAILY LIVING (ADL)
ADLS_ACUITY_SCORE: 7

## 2022-02-10 NOTE — PLAN OF CARE
Problem: Pain (Surgery Nonspecified)  Goal: Acceptable Pain Control  Outcome: Improving     Problem: Risk for Delirium  Goal: Improved Sleep  Outcome: Improving     Vitally stable, independent with cares, denies pain, requests not to be disturbed overnight, cares clustered.  No acute events.

## 2022-02-10 NOTE — PROGRESS NOTES
"ASSESSMENT:  1. Abdominal pain, generalized    2. Perforation of intestine (H)        Gurdeep Dia is a 66 year old male status post explore lap lysis of adhesions with resultant 3 days later perforation reoperation oversew of defect and then 5 days later perforation again and at this time  he has 2 EC fistulas that are being managed with bowel rest and ostomy bags for output containment. He intermittently has succus draining from his old MARISA drain site as well. He continues with a TPN diet with minimal oral intake for comfort (clear liquids ok).  He developed VTE and was treated with heparin drip. The patient also tested positive for covid on 1/22/22, but he remained asymptomatic is now considered \"recovered covid\".      Placement for the patient has been an ongoing issue, but this continues to be worked on by the care management team. Hopeful this will take place on 2/12       PLAN:  Continue cyclic TPN per RD  Minimize oral intake and clear liquids only when he takes them in  Ok to discharge from a surgical perspective when placement is secured    SUBJECTIVE:   He is unchanged. Managing ostomy bags for EC fistulas      Patient Vitals for the past 24 hrs:   BP Temp Temp src Pulse Resp SpO2   02/10/22 0820 98/67 98  F (36.7  C) Oral 88 20 94 %   02/09/22 2319 110/63 98.3  F (36.8  C) Oral 93 18 94 %   02/09/22 1631 105/76 98.4  F (36.9  C) Oral 72 18 91 %         PHYSICAL EXAM:  GEN: No acute distress, comfortable  LUNGS: CTA bilaterally  CV:RRR  ABD: soft, nontender; gas and succus in both ostomy bags  EXT:No cyanosis, edema or obvious abnormalities    02/09 0700 - 02/10 0659  In: 360 [P.O.:360]  Out: -     No results displayed because visit has over 200 results.             Shaggy Cao PA-C    "

## 2022-02-10 NOTE — PLAN OF CARE
Problem: Adult Inpatient Plan of Care  Goal: Plan of Care Review  Outcome: Improving     Problem: Pain (Surgery Nonspecified)  Goal: Acceptable Pain Control  Outcome: Improving     Problem: Anxiety  Goal: Anxiety Reduction or Resolution  Outcome: Improving    [id number].... Pt had a quiet shift, slept on/off for most part of the shift. Pt was given PRN Percocet 1 tab for generalized pain. TPN infusing at 170 ml/hr, initial rate was 85 ml/hr x 1 hour. Will continue to monitor.

## 2022-02-10 NOTE — PHARMACY-CONSULT NOTE
"Pharmacy Note: Parenteral Nutrition (PN) Management    Pharmacist consulted to dose PN for Gurdeep Dia, a 66 year old male by Dr. Mcmahon.    Subjective:    The patient is a new PN start.    The patient was started on PN in the hospital on 12/2/21.    Indication for PN therapy: continue until fistula heals    Inadequate nutrition existing for > 7 days.     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    Pertinent diseases and other special considerations none    Social History     Tobacco Use     Smoking status: Current Every Day Smoker     Smokeless tobacco: Never Used     Tobacco comment: declined cessation discussion and resource packet -1/14/22   Substance Use Topics     Alcohol use: Not Currently     Comment: Clean for 5 years     Drug use: Not Currently     Objective:    Ht Readings from Last 1 Encounters:   01/28/22 1.656 m (5' 5.2\")     Wt Readings from Last 1 Encounters:   02/07/22 76.1 kg (167 lb 11.2 oz)       Body mass index is 27.74 kg/m .    Patient Vitals for the past 96 hrs:   Weight   02/07/22 1100 76.1 kg (167 lb 11.2 oz)   02/06/22 1215 76.6 kg (168 lb 14.4 oz)       Labs:  Last 3 days:  Recent Labs     02/07/22  0606 02/08/22  0554 02/09/22  0559   NA  --  141 139   POTASSIUM  --  3.3* 4.4   CHLORIDE  --  114* 106   CO2  --  21* 26   BUN  --  23* 30*   CR  --  0.66* 0.82   VIJAYA  --  6.5* 8.4*   [id number]  --   --  1.20   MAG 2.2  --  2.1   PHOS 3.9  --  3.8   PREALB 16*  --   --    TRIG 86  --   --    INR 1.37*  --   --        Glucose (past 48 hours):   Recent Labs     02/07/22  1148 02/07/22  1630 02/08/22  0554 02/08/22  0829 02/08/22  1210 02/08/22  1745 02/08/22  2124 02/09/22  0559 02/09/22  0759   GLC 99 106* 84 125* 104* 82 105* 111 111*       Intake/Output (last 24 hours): I/O last 3 completed shifts:  In: 360 [P.O.:360]  Out: -     Estimated CrCl: Estimated Creatinine Clearance: 80.6 mL/min (based on SCr of 0.85 mg/dL).    Assessment:    Continue patient on PN " therapy as a cyclic central therapy.     Given the patient's current condition/oral intake, PN is still indicated.    Lab results reviewed: WNL    Plan:  1. Rate of PN: cyclic at 85mL x1hr then 170mL/hr x 10hr then 85mL/hrx1hr. Maintenance IV fluid rate will be adjusted appropriately to meet the total maximum IV fluids rate as ordered by provider.  2. Formula:     Amino Acids 130 grams    Dextrose 350 grams    Sodium 150 mEq/day    Potassium 80 mEq/day    Calcium 9.5 mEq/day    Magnesium 24 mEq/day    Phosphorus 32 mMol/day    Chloride: Acetate Ratio max acetate     Standard Multivitamins w/Vitamin K    Trace Elements  3. Fat Emulsion: 20%, 250 mL IV 2 times weekly on Mondays and Thursdays  4. Check BMP labs tomorrow.  5. Pharmacist will continue to follow the patient's lab results, clinical status and blood glucose results and make adjustments as appropriate.    Thank you for the consult.  Elenita Balderas  2/9/2022 11:07 AM

## 2022-02-10 NOTE — PROGRESS NOTES
"CLINICAL NUTRITION SERVICES - REASSESSMENT NOTE     Nutrition Prescription    RECOMMENDATIONS FOR MDs/PROVIDERS TO ORDER:    Malnutrition Status:    Severe    Recommendations already ordered by Registered Dietitian (RD):  Cyclic TPN: D 350  @ 85 ml/hr x 1 hr, 170 ml/hr x 10 hrs  85 ml/hr x 1 hr and off.  250 ml of 20% lipids two times per week  On Monday and Thursday: TPN/lipids provide: 1853 Calories  250 g CHO, 130 g protein, 14 g fat and 1870 ml fluid/day    Future/Additional Recommendations:  Adjust TPN pending tolerance and nutritional needs     EVALUATION OF THE PROGRESS TOWARD GOALS   Diet: Clear Liquid  Nutrition Support: TPN as documented above  Intake: 0%: sips and chips     ANTHROPOMETRICS  Height: 165.6 cm (5' 5.2\")  Most Recent Weight: 76.1 kg (167 lb 11.2 oz)    Admit weight: 173 lb (12/21/2021)  2/6 weight: 76.6 Kg (168.5 lb)  Weight History:   Wt Readings from Last 10 Encounters:   02/07/22 76.1 kg (167 lb 11.2 oz)   12/17/21 76.8 kg (169 lb 6.4 oz)   01/22/20 79.4 kg (175 lb)   12/24/19 81.2 kg (179 lb)     PHYSICAL FINDINGS  See malnutrition section below.  Ostomy/fistulas    GI CONCERNS  Midline abdominal fistula x 2  Normoactive BS all quadrants  Last BM 2/9/2022 per nurse    LABS  Reviewed    MEDICATIONS  Reviewed: levothyroxine, pantoprazole    Malnutrition Diagnosis: Severe malnutrition  In Context of:  Acute illness or injury    CURRENT NUTRITION DIAGNOSIS  Inadequate oral intake related to SBO with formation of enterocutaneous fistulas as evidenced by nausea and abdominal pain      INTERVENTIONS  Implementation  No changes recommended in TPN regimen today.      Goals  Meet estimated nutrition needs-met  Wound healing-progressing  No significant dry weight loss-progressing    Monitoring/Evaluation  Progress toward goals will be monitored and evaluated per protocol.  "

## 2022-02-10 NOTE — PROGRESS NOTES
[facility] Progress Note    Assessment/Plan  Active Problems:    Hypertension goal BP (blood pressure) < 140/90    Moderate major depression (H)    Perforation of intestine (H)    Abdominal pain, generalized    Vesicocutaneous fistula    Infection due to 2019 novel coronavirus    Chema Dia is a 67 y/o M with past medical history of splenectomy, appendectomy, HTN, and substance abuse, with recent admission to hospital 11/30-12/21/2021 for SBO with perforation s/p surgical repair. Discharged home but readmitted as he could not manage at home and awaiting placement, parole office assisting with this and SW following.  Continues on TPN. New RUL and RLL PE found and being treated, new HAP 1/9 also resolved with abx. Incidentally COVID-19 positive 1/21, asymptomatic, recovered 1/31. Stable and medically ready for discharge.  Patient with a dispo plan and will be ready to discharge, hopefully Saturday.     Recurrent abdominal pain s/p surgical intervention of SBO with perforation  Enterocutaneous fistula with persistent output  11/30 SBO -> x lap, SBR, enterotomy repair, extensive SINDY.   12/3 washout, MARISA drain, abx. Drain removed 12/16. Discharged home but readmitted 12/22 as he could not care for himself at home. EC fistulas still present but output decreasing.  -Surgery consulted. Diet per surgery. CLD. Only Surgery can change diet.  -Surgery will oversee and follow up TPN outpatient. Outpatient pharmacy to manage orders for TPN.  -TPN per pharmacy and RD. Run time now at 12 hours. TPN teaching for at home.  -WOC for home fistula management.     Hypocalcemia-resolved  Hypokalemia-resolved  Discussed with PharmD. Ionized Ca and K wnl.   - BMP in am     Muscular strain of neck  Patient with symptoms and exam consistent with muscular source of pain. Likely secondary to sleeping position. Do no feel this is due to meningitis as patient afebrile, no headache, no photophobia, and mentation at baseline.  Counseled patient on self massage. Patient declined acupuncture, PT, or flexeril. Cr wnl today.  - Heating pad prn  - Aleve prn     RUL and RLL PE  SMV thrombosis  Acute RLL/RUL PE found on CT as well as SMV thrombosis. No evidence of right heart strain.  Most likely provoked in the setting of recent surgery/hospitalization.  No changes today.  -Eliquis 5 mg BID x 3 months.     Atrial flutter (resolved)  Developed this overnight 1/8-1/9 and required amiodarone and brief ICU stay for pressors. Cardiology consulted and initiated amiodarone.  Patient stabilized and back to sinus rhythm. No repeat episodes since that time. No changes today.  -Amiodarone 200 mg daily.  Would recommend follow-up with cardiology outpatient to determine how long he needs this.  -Eliquis as above.     Severe protein-calorie malnutrition  TPN as above.     Chronic Conditions:  Hypothyroidism- PTA levothyroxine.  Chronic pain- Hold mexolicam 7.5mg daily, resume PTA gabapentin, continue PTA percocet.  BPH-hold home Flomax given he states it does not help.  Depression/Insomnia - Venlafaxine and gabapentin per psych. Seroquel for sleep. Atarax at bedtime.     FEN: TPN.  Clear liquid diet as tolerated advance per surgery service.  DVT Prophylaxis: Eliquis.  Code: Full code.     Disposition/Advanced Care Planning  Discharge Planning discussed with patient and care team.  Anticipated discharge date: 2-3 days pending TPN teaching  Disposition: Medically stable for discharge. Will receive TPN and WOC teaching today. SW working on establishing patient with new PCP.    Subjective  Patient states his brother in law bought a fridge and will be ready Saturday. He states TPN teaching is going well. No pain including chest pain or sob. Neck pain has improved.    Objective    Vital signs in last 24 hours Temp:  [98  F (36.7  C)-98.4  F (36.9  C)] 98  F (36.7  C)  Pulse:  [72-93] 88  Resp:  [18-20] 20  BP: ()/(63-76) 98/67  SpO2:  [91 %-94 %] 94 %   167  lbs 11.2 oz    Intake/Output last 3 shift I/O last 3 completed shifts:  In: 360 [P.O.:360]  Out: -     Intake/Output this shift:No intake/output data recorded.    Physical Exam  General appearance: alert, appears stated age, cooperative and no distress, sitting in chair.  Head: Normocephalic, without obvious abnormality, atraumatic.  Eyes: conjunctivae/corneas clear.  Lungs: clear to auscultation bilaterally, no increased respiratory effort.  Heart: regular rate and rhythm, no murmurs.  Abdomen: ostomy bag in place, nondistended.  Neurologic: Alert and oriented x3, normal strength and tone.  Psychiatric: mood and affect appropriate.    Pertinent Labs and Pertinent Radiology   Lab Results: personally reviewed.     Radiology Results: Personally reviewed image/s and impression/s    Precepted patient with Dr. Lucas Aguilera.    Noemi Long MD  [facility] Medicine Residency Program, PGY-3  [facility]

## 2022-02-10 NOTE — PROGRESS NOTES
Pt has obtained an appointment for a new primary physician. Feb 16, 2022    Dr Jared Apodaca  [facility]  [address]   [phone]    Pt stated he needs his insurance card or insurance number for [facility] clinic. SW contacted financial  to request.     Pt provided with a print out of insurance.    SAM Huggins    1:20 PM AZALIA met with Luz Maria Steiner pt doing well with teaching, she will return on 2/11 to complete teaching with pt. She anticipates he will do well and be able to discharge on 2/12/22    SAM Huggins

## 2022-02-11 LAB
ANION GAP SERPL CALCULATED.3IONS-SCNC: 8 MMOL/L (ref 5–18)
BUN SERPL-MCNC: 31 MG/DL (ref 8–22)
CALCIUM SERPL-MCNC: 8.5 MG/DL (ref 8.5–10.5)
CHLORIDE BLD-SCNC: 106 MMOL/L (ref 98–107)
CO2 SERPL-SCNC: 26 MMOL/L (ref 22–31)
CREAT SERPL-MCNC: 0.83 MG/DL (ref 0.7–1.3)
ERYTHROCYTE [DISTWIDTH] IN BLOOD BY AUTOMATED COUNT: 14.9 % (ref 10–15)
GFR SERPL CREATININE-BSD FRML MDRD: >90 ML/MIN/1.73M2
GLUCOSE BLD-MCNC: 109 MG/DL (ref 70–125)
GLUCOSE BLDC GLUCOMTR-MCNC: 72 MG/DL (ref 70–99)
HCT VFR BLD AUTO: 32.4 % (ref 40–53)
HGB BLD-MCNC: 10.1 G/DL (ref 13.3–17.7)
MAGNESIUM SERPL-MCNC: 2.1 MG/DL (ref 1.8–2.6)
MCH RBC QN AUTO: 27.5 PG (ref 26.5–33)
MCHC RBC AUTO-ENTMCNC: 31.2 G/DL (ref 31.5–36.5)
MCV RBC AUTO: 88 FL (ref 78–100)
PHOSPHATE SERPL-MCNC: 3.8 MG/DL (ref 2.5–4.5)
PLATELET # BLD AUTO: 683 10E3/UL (ref 150–450)
POTASSIUM BLD-SCNC: 4.2 MMOL/L (ref 3.5–5)
RBC # BLD AUTO: 3.67 10E6/UL (ref 4.4–5.9)
SODIUM SERPL-SCNC: 140 MMOL/L (ref 136–145)
WBC # BLD AUTO: 11.5 10E3/UL (ref 4–11)

## 2022-02-11 PROCEDURE — 250N000013 HC RX MED GY IP 250 OP 250 PS 637: Performed by: FAMILY MEDICINE

## 2022-02-11 PROCEDURE — 83735 ASSAY OF MAGNESIUM: CPT | Performed by: INTERNAL MEDICINE

## 2022-02-11 PROCEDURE — 80048 BASIC METABOLIC PNL TOTAL CA: CPT | Performed by: STUDENT IN AN ORGANIZED HEALTH CARE EDUCATION/TRAINING PROGRAM

## 2022-02-11 PROCEDURE — 250N000013 HC RX MED GY IP 250 OP 250 PS 637: Performed by: INTERNAL MEDICINE

## 2022-02-11 PROCEDURE — 250N000009 HC RX 250: Performed by: FAMILY MEDICINE

## 2022-02-11 PROCEDURE — 250N000013 HC RX MED GY IP 250 OP 250 PS 637: Performed by: STUDENT IN AN ORGANIZED HEALTH CARE EDUCATION/TRAINING PROGRAM

## 2022-02-11 PROCEDURE — 84100 ASSAY OF PHOSPHORUS: CPT | Performed by: INTERNAL MEDICINE

## 2022-02-11 PROCEDURE — 85027 COMPLETE CBC AUTOMATED: CPT | Performed by: STUDENT IN AN ORGANIZED HEALTH CARE EDUCATION/TRAINING PROGRAM

## 2022-02-11 PROCEDURE — 99231 SBSQ HOSP IP/OBS SF/LOW 25: CPT | Mod: GC | Performed by: STUDENT IN AN ORGANIZED HEALTH CARE EDUCATION/TRAINING PROGRAM

## 2022-02-11 PROCEDURE — 120N000001 HC R&B MED SURG/OB

## 2022-02-11 PROCEDURE — 99024 POSTOP FOLLOW-UP VISIT: CPT | Performed by: NURSE PRACTITIONER

## 2022-02-11 RX ADMIN — QUETIAPINE FUMARATE 200 MG: 100 TABLET, FILM COATED ORAL at 22:30

## 2022-02-11 RX ADMIN — MAGNESIUM SULFATE HEPTAHYDRATE: 500 INJECTION, SOLUTION INTRAMUSCULAR; INTRAVENOUS at 21:02

## 2022-02-11 RX ADMIN — GABAPENTIN 200 MG: 100 CAPSULE ORAL at 11:59

## 2022-02-11 RX ADMIN — OXYCODONE HYDROCHLORIDE AND ACETAMINOPHEN 1 TABLET: 5; 325 TABLET ORAL at 22:30

## 2022-02-11 RX ADMIN — OXYCODONE HYDROCHLORIDE AND ACETAMINOPHEN 1 TABLET: 5; 325 TABLET ORAL at 11:58

## 2022-02-11 RX ADMIN — AMIODARONE HYDROCHLORIDE 200 MG: 200 TABLET ORAL at 11:59

## 2022-02-11 RX ADMIN — APIXABAN 5 MG: 5 TABLET, FILM COATED ORAL at 11:58

## 2022-02-11 RX ADMIN — APIXABAN 5 MG: 5 TABLET, FILM COATED ORAL at 22:30

## 2022-02-11 RX ADMIN — HYDROXYZINE HYDROCHLORIDE 25 MG: 25 TABLET, FILM COATED ORAL at 22:30

## 2022-02-11 RX ADMIN — VENLAFAXINE HYDROCHLORIDE 75 MG: 75 CAPSULE, EXTENDED RELEASE ORAL at 11:58

## 2022-02-11 RX ADMIN — LEVOTHYROXINE SODIUM 25 MCG: 0.03 TABLET ORAL at 11:58

## 2022-02-11 RX ADMIN — PANTOPRAZOLE SODIUM 40 MG: 40 TABLET, DELAYED RELEASE ORAL at 12:05

## 2022-02-11 RX ADMIN — GABAPENTIN 900 MG: 300 CAPSULE ORAL at 22:30

## 2022-02-11 RX ADMIN — NAPROXEN 250 MG: 250 TABLET ORAL at 11:58

## 2022-02-11 ASSESSMENT — ACTIVITIES OF DAILY LIVING (ADL)
ADLS_ACUITY_SCORE: 7

## 2022-02-11 NOTE — PLAN OF CARE
Problem: Pain (Surgery Nonspecified)  Goal: Acceptable Pain Control  Outcome: No Change  Intervention: Prevent or Manage Pain  Recent Flowsheet Documentation  Taken 2/10/2022 2221 by Shorty Lovelace, RN  Pain Management Interventions:    medication (see MAR)    heat applied  Diversional Activities: television     Problem: Adult Inpatient Plan of Care  Goal: Optimal Comfort and Wellbeing  Outcome: Improving  Intervention: Provide Person-Centered Care  Recent Flowsheet Documentation  Taken 2/10/2022 2221 by Shorty Lovelace, RN  Trust Relationship/Rapport: care explained  Goal: Readiness for Transition of Care  Outcome: Improving     Problem: Impaired Wound Healing  Goal: Optimal Wound Healing  Outcome: Improving  Intervention: Promote Wound Healing  Recent Flowsheet Documentation  Taken 2/10/2022 2221 by Shorty Lovelace, RN  Pain Management Interventions:    medication (see MAR)    heat applied  Activity Management: activity adjusted per tolerance     Problem: Infection (Surgery Nonspecified)  Goal: Absence of Infection Signs and Symptoms  Outcome: Improving     Pt slept comfortably this shift. Aquak K pad to neck and back has been effective at relieving neck pain. Aox4. Independent w/ transfers. Continues on Cyclic TPN through R Basillic double lumen PICC. PICC remained patent and dressing remained CDI. VSS. Ostomy appliance to both fistula sites remained CDI. Pt empties out fistulas/ ostomy appliance, light brown watery drainage present. Pt empties into designated graduated cylinder and staff measures. Received PRN Percocet for abdominal and neck pain which was effective as Pt was sleeping upon return observation

## 2022-02-11 NOTE — PROGRESS NOTES
"CLINICAL NUTRITION SERVICES - REASSESSMENT NOTE     Nutrition Prescription    RECOMMENDATIONS FOR MDs/PROVIDERS TO ORDER:    Malnutrition Status:    Severe    Recommendations already ordered by Registered Dietitian (RD):  Cyclic TPN: D 350  @ 85 ml/hr x 1 hr, 170 ml/hr x 10 hrs  85 ml/hr x 1 hr and off.  250 ml of 20% lipids two times per week  On Monday and Thursday: TPN/lipids provide: 1853 Calories  250 g CHO, 130 g protein, 14 g fat and 1870 ml fluid/day    Future/Additional Recommendations:  Adjust TPN pending tolerance and nutritional needs, hydration as pt encouraged not to take in much orally     EVALUATION OF THE PROGRESS TOWARD GOALS   Diet: Clear Liquid  Nutrition Support: TPN as documented above  Intake:0% : sips and chips     ANTHROPOMETRICS  Height: 165.6 cm (5' 5.2\")  Most Recent Weight: 76.1 kg (167 lb 11.2 oz)  (2/7)  Admit weight: 173 lb (12/21/2021)  2/6 weight: 76.6 Kg (168.5 lb)  Weight History:   Wt Readings from Last 10 Encounters:   02/07/22 76.1 kg (167 lb 11.2 oz)   12/17/21 76.8 kg (169 lb 6.4 oz)   01/22/20 79.4 kg (175 lb)   12/24/19 81.2 kg (179 lb)     PHYSICAL FINDINGS  See malnutrition section below.  Ostomy/fistulas    GI CONCERNS  Midline abdominal fistula x 2  Normoactive BS all quadrants  Last BM 2/9/2022 per nurse    LABS  Reviewed  BUN 31 H    MEDICATIONS  Reviewed: levothyroxine, pantoprazole    Malnutrition Diagnosis: Severe malnutrition  In Context of:  Acute illness or injury    CURRENT NUTRITION DIAGNOSIS  Inadequate oral intake related to SBO with formation of enterocutaneous fistulas as evidenced by nausea and abdominal pain      INTERVENTIONS  Implementation  No changes recommended in TPN regimen today.      Goals  Meet estimated nutrition needs-met  Wound healing-progressing  No significant dry weight loss-progressing - last wt 2/7    Monitoring/Evaluation  Progress toward goals will be monitored and evaluated per protocol.  "

## 2022-02-11 NOTE — PHARMACY-CONSULT NOTE
"Pharmacy Note: Parenteral Nutrition (PN) Management    Pharmacist consulted to dose PN for Gurdeep Dia, a 66 year old male by Dr. Mcmahon.    Subjective:    The patient is a new PN start.    The patient was started on PN in the hospital on 12/2/21.    Indication for PN therapy: continue until fistula heals    Inadequate nutrition existing for > 7 days.     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    Pertinent diseases and other special considerations none    Social History     Tobacco Use     Smoking status: Current Every Day Smoker     Smokeless tobacco: Never Used     Tobacco comment: declined cessation discussion and resource packet -1/14/22   Substance Use Topics     Alcohol use: Not Currently     Comment: Clean for 5 years     Drug use: Not Currently     Objective:    Ht Readings from Last 1 Encounters:   01/28/22 1.656 m (5' 5.2\")     Wt Readings from Last 1 Encounters:   02/07/22 76.1 kg (167 lb 11.2 oz)       Body mass index is 27.74 kg/m .    Patient Vitals for the past 96 hrs:   Weight   02/07/22 1100 76.1 kg (167 lb 11.2 oz)       Labs:  Last 3 days:  Recent Labs     02/07/22  0606 02/08/22  0554 02/09/22  0559   NA  --  141 139   POTASSIUM  --  3.3* 4.4   CHLORIDE  --  114* 106   CO2  --  21* 26   BUN  --  23* 30*   CR  --  0.66* 0.82   VIJAYA  --  6.5* 8.4*   [id number]  --   --  1.20   MAG 2.2  --  2.1   PHOS 3.9  --  3.8   PREALB 16*  --   --    TRIG 86  --   --    INR 1.37*  --   --        Glucose (past 48 hours):   Recent Labs     02/07/22  1148 02/07/22  1630 02/08/22  0554 02/08/22  0829 02/08/22  1210 02/08/22  1745 02/08/22  2124 02/09/22  0559 02/09/22  0759   GLC 99 106* 84 125* 104* 82 105* 111 111*       Intake/Output (last 24 hours): No intake/output data recorded.    Estimated CrCl: Estimated Creatinine Clearance: 82.6 mL/min (based on SCr of 0.83 mg/dL).    Assessment:    Continue patient on PN therapy as a cyclic central therapy.     Given the patient's current " condition/oral intake, PN is still indicated.    Lab results reviewed: WNL    Plan:  1. Rate of PN: cyclic at 85mL x1hr then 170mL/hr x 10hr then 85mL/hrx1hr. Maintenance IV fluid rate will be adjusted appropriately to meet the total maximum IV fluids rate as ordered by provider.  2. Formula:     Amino Acids 130 grams    Dextrose 350 grams    Sodium 150 mEq/day    Potassium 80 mEq/day    Calcium 9.5 mEq/day    Magnesium 24 mEq/day    Phosphorus 32 mMol/day    Chloride: Acetate Ratio max acetate (d/t potassium chloride shortage; max acetate calculates at approx 1: 2.48 (preferred ratio 1:3))    Standard Multivitamins w/Vitamin K    Trace Elements  3. Fat Emulsion: 20%, 250 mL IV 2 times weekly on Mondays and Thursdays  4. Check BMP labs in 3 days.  5. Pharmacist will continue to follow the patient's lab results, clinical status and blood glucose results and make adjustments as appropriate.    Thank you for the consult.  Elenita Balderas  2/9/2022 8:36 AM

## 2022-02-11 NOTE — PROGRESS NOTES
"ASSESSMENT:  1. Abdominal pain, generalized    2. Perforation of intestine (H)        Gurdeep Dia is a 66 year old male status post explore lap lysis of adhesions with resultant 3 days later perforation reoperation oversew of defect and then 5 days later perforation again and at this time  he has 2 EC fistulas that are being managed with bowel rest and ostomy bags for output containment. He intermittently has succus draining from his old MARISA drain site as well. He continues with a TPN diet with minimal oral intake for comfort (clear liquids ok).  He developed VTE and was treated with heparin drip. The patient also tested positive for covid on 1/22/22, but he remained asymptomatic is now considered \"recovered covid\".     Removal of stone of unclear origin from upper EC fistula. This has the appearance of a large gallstone, but no stones were noted on his US in December. The stone appeared to be blocking the fistula output. There is also a new skin breakdown that connects to the lower EC fistula and erupts from the skin about 5-6 cm proximal to the original wound. This remains superficial and appears to be a subcutaneous connection between the two wounds. It does not appear to be a new EC fistula. Bleeding in the ostomy bags is likely due to inflammation of the obstruction of the upper fistula and the lower skin breakdown. No sign of aimee blood but we will monitor over the next 24 hours.     Placement for the patient has been an ongoing issue, but this continues to be worked on by the care management team.    PLAN:  Sips of water with meds and ice chips only today  Redress wounds with ostomy devices; bottom ostomy device should be cut to allow both lower wounds to drain    SUBJECTIVE:   He is anxious and crying. The patient states that he is seeing blood coming out of his fistulas and he \"just can't handle it\". He reports that he has been drinking more the past two days and that he has been having more abdominal " discomfort than normal. He denies nausea or vomiting. He also denies fever, chills, melena and hematochezia.      Patient Vitals for the past 24 hrs:   BP Temp Temp src Pulse Resp SpO2   02/11/22 0835 114/75 97.3  F (36.3  C) Oral 85 20 95 %   02/10/22 2300 115/67 98  F (36.7  C) Oral 85 18 95 %   02/10/22 1500 115/72 98.2  F (36.8  C) Oral 78 20 91 %         PHYSICAL EXAM:  GEN: No acute distress, comfortable  LUNGS: CTA bilaterally  CV:RRR  ABD: soft, increased tenderness near fistula openings there is blood tinged succus coming from the upper fistula and surrounding skin is erythemic without induration or hyperthermia; debris noted in the wound, probed with cotton swab and a hard stone was identified. This was removed from the wound and a large amount of succus was discharged from the wound behind it. No aimee blood noted coming from the wound after stone removal and release of succus; the lower fistula was then examined. After removal of the lower ostomy device it was noted that the blood was coming from an area of ecchymotic skin about 5-6 cm above fistula. Using a cotton swab, the skin was probed and the epidermis disintegrated and a large expression of seropurulent fluid was expressed revealing a wound that tunnels superficially distal to the inferior EC fistula wound.   EXT:No cyanosis, edema or obvious abnormalities    No intake/output data recorded.    No results displayed because visit has over 200 results.             THOMAS Givens CNP

## 2022-02-11 NOTE — PLAN OF CARE
Problem: Adult Inpatient Plan of Care  Goal: Absence of Hospital-Acquired Illness or Injury  Outcome: No Change  Intervention: Identify and Manage Fall Risk  Recent Flowsheet Documentation  Taken 2/11/2022 1500 by Laurel lCoud RN  Safety Promotion/Fall Prevention: clutter free environment maintained  Taken 2/11/2022 1000 by Laurel Cloud RN  Safety Promotion/Fall Prevention: clutter free environment maintained  Taken 2/11/2022 0910 by Laurel Cloud RN  Safety Promotion/Fall Prevention: clutter free environment maintained  Intervention: Prevent Skin Injury  Recent Flowsheet Documentation  Taken 2/11/2022 1500 by Laurel Cloud RN  Body Position: position changed independently  Taken 2/11/2022 1000 by Laurel Cloud RN  Body Position: position changed independently  Taken 2/11/2022 0910 by Laurel Cloud RN  Body Position: position changed independently  Intervention: Prevent and Manage VTE (Venous Thromboembolism) Risk  Recent Flowsheet Documentation  Taken 2/11/2022 1500 by Laurel Cloud RN  VTE Prevention/Management:   ambulation promoted   anticoagulant therapy maintained  Taken 2/11/2022 1000 by Laurel Cloud RN  VTE Prevention/Management:   ambulation promoted   anticoagulant therapy maintained  Taken 2/11/2022 0900 by Laurel Cloud RN  VTE Prevention/Management:   ambulation promoted   anticoagulant therapy maintained   Pt's fistula ostomy appliances were leaking, pt noted the top fistula draining more than the lower appliance.  Pt noted blood in the lower bag, MD updated, surgery CNP was able to help the patient with the concern, see note.  Fistula bags were changed.  Bloody drainage small amount coming through the distal bag, the upper bag put out 300 ml in the am and 275 ml in the afternoon.Laurel Cloud RN

## 2022-02-11 NOTE — PROGRESS NOTES
[facility] Progress Note    Assessment/Plan  Active Problems:    Hypertension goal BP (blood pressure) < 140/90    Moderate major depression (H)    Perforation of intestine (H)    Abdominal pain, generalized    Vesicocutaneous fistula    Infection due to 2019 novel coronavirus    Chema Dia is a 65 y/o M with past medical history of splenectomy, appendectomy, HTN, and substance abuse, with recent admission to hospital 11/30-12/21/2021 for SBO with perforation s/p surgical repair. Discharged home but readmitted as he could not manage at home and awaiting placement, parole office assisting with this and SW following.  Continues on TPN. New RUL and RLL PE found and being treated, new HAP 1/9 also resolved with abx. Incidentally COVID-19 positive 1/21, asymptomatic, recovered 1/31. Stable and medically ready for discharge.      Recurrent abdominal pain s/p surgical intervention of SBO with perforation  Enterocutaneous fistula with persistent output  11/30 SBO -> x lap, SBR, enterotomy repair, extensive SINDY.   12/3 washout, MARISA drain, abx. Drain removed 12/16. Discharged home but readmitted 12/22 as he could not care for himself at home. EC fistulas still present but output decreasing. Today now started to have bloody output from both ostomy site and fistula site. Gen surg TERE evaluated patient and removed clot per patient.  -Surgery consulted. Diet per surgery. CLD. Only Surgery can change diet. Awaiting recs for bloody output.  -Surgery will oversee and follow up TPN outpatient. Outpatient pharmacy to manage orders for TPN.  -TPN per pharmacy and RD. Run time now at 12 hours. TPN teaching for at home.  -WOC for home fistula management.  -CBC now     Hypocalcemia-resolved  Hypokalemia-resolved  Discussed with PharmD. Ionized Ca and K wnl.   - BMP in am     Muscular strain of neck-resolved  Patient with symptoms and exam consistent with muscular source of pain. Likely secondary to sleeping position.  Do no feel this is due to meningitis as patient afebrile, no headache, no photophobia, and mentation at baseline. Counseled patient on self massage. Patient declined acupuncture, PT, or flexeril. Cr wnl today.  - Heating pad prn    RUL and RLL PE  SMV thrombosis  Acute RLL/RUL PE found on CT as well as SMV thrombosis. No evidence of right heart strain.  Most likely provoked in the setting of recent surgery/hospitalization.  No changes today.  -Eliquis 5 mg BID x 3 months.     Atrial flutter (resolved)  Developed this overnight 1/8-1/9 and required amiodarone and brief ICU stay for pressors. Cardiology consulted and initiated amiodarone.  Patient stabilized and back to sinus rhythm. No repeat episodes since that time. No changes today.  -Amiodarone 200 mg daily.  Would recommend follow-up with cardiology outpatient to determine how long he needs this.  -Eliquis as above.     Severe protein-calorie malnutrition  TPN as above.     Chronic Conditions:  Hypothyroidism- PTA levothyroxine.  Chronic pain- Hold mexolicam 7.5mg daily, resume PTA gabapentin, continue PTA percocet.  BPH-hold home Flomax given he states it does not help.  Depression/Insomnia - Venlafaxine and gabapentin per psych. Seroquel for sleep. Atarax at bedtime.     FEN: TPN.  Clear liquid diet as tolerated advance per surgery service.  DVT Prophylaxis: Eliquis.  Code: Full code.     Disposition/Advanced Care Planning  Discharge Planning discussed with patient and care team.  Anticipated discharge date: TBD  Disposition: Medically stable for discharge. Will receive TPN and WOC teaching today. SW working on establishing patient with new PCP.    Subjective  Patient states that he started to have bloody output from his ostomy bags.  He also states that the output has increased.  He feels very overwhelmed with this and feels like he is constantly having to change his bags but wanted to keep them intact so that a physician will evaluate him.  He became tearful  at this time and is feeling very overwhelmed.    Objective    Vital signs in last 24 hours Temp:  [97.3  F (36.3  C)-98.2  F (36.8  C)] 97.3  F (36.3  C)  Pulse:  [78-85] 85  Resp:  [18-20] 20  BP: (114-115)/(67-75) 114/75  SpO2:  [91 %-95 %] 95 %   167 lbs 11.2 oz    Intake/Output last 3 shift No intake/output data recorded.    Intake/Output this shift:No intake/output data recorded.    Physical Exam  General appearance: alert, appears stated age, cooperative and mild distress and tearful.  Head: Normocephalic, without obvious abnormality, atraumatic.  Eyes: conjunctivae/corneas clear.  Lungs: clear to auscultation bilaterally, no increased respiratory effort.  Heart: regular rate and rhythm, no murmurs.  Abdomen: ostomy bag in place, nondistended, bloody stool in ostomy and fistula bag.  Neurologic: Alert and oriented x3, normal strength and tone.  Psychiatric: mood and affect appropriate.    Pertinent Labs and Pertinent Radiology   Lab Results: personally reviewed.     Radiology Results: Personally reviewed image/s and impression/s    Precepted patient with Dr. Aye Willoughby.    Noemi Long MD  [facility], PGY-3

## 2022-02-11 NOTE — PLAN OF CARE
Problem: Adult Inpatient Plan of Care  Goal: Patient-Specific Goal (Individualized)  Outcome: Improving     Problem: Adult Inpatient Plan of Care  Goal: Optimal Comfort and Wellbeing  Outcome: Improving     Problem: Adult Inpatient Plan of Care  Goal: Readiness for Transition of Care  Outcome: Improving    A/O x4. VSS. Scheduled Naproxan and prn Percocet were given for abdominal pain which were effective per pt. Pt able to take care of pouches independently. Independent with ADLs.

## 2022-02-12 LAB
GLUCOSE BLDC GLUCOMTR-MCNC: 118 MG/DL (ref 70–99)
GLUCOSE BLDC GLUCOMTR-MCNC: 121 MG/DL (ref 70–99)
GLUCOSE BLDC GLUCOMTR-MCNC: 129 MG/DL (ref 70–99)
GLUCOSE BLDC GLUCOMTR-MCNC: 89 MG/DL (ref 70–99)

## 2022-02-12 PROCEDURE — 250N000013 HC RX MED GY IP 250 OP 250 PS 637: Performed by: INTERNAL MEDICINE

## 2022-02-12 PROCEDURE — 250N000009 HC RX 250: Performed by: FAMILY MEDICINE

## 2022-02-12 PROCEDURE — 250N000013 HC RX MED GY IP 250 OP 250 PS 637: Performed by: FAMILY MEDICINE

## 2022-02-12 PROCEDURE — 99231 SBSQ HOSP IP/OBS SF/LOW 25: CPT | Mod: GC | Performed by: STUDENT IN AN ORGANIZED HEALTH CARE EDUCATION/TRAINING PROGRAM

## 2022-02-12 PROCEDURE — 99024 POSTOP FOLLOW-UP VISIT: CPT | Performed by: PHYSICIAN ASSISTANT

## 2022-02-12 PROCEDURE — 120N000001 HC R&B MED SURG/OB

## 2022-02-12 PROCEDURE — 250N000013 HC RX MED GY IP 250 OP 250 PS 637: Performed by: STUDENT IN AN ORGANIZED HEALTH CARE EDUCATION/TRAINING PROGRAM

## 2022-02-12 RX ADMIN — LEVOTHYROXINE SODIUM 25 MCG: 0.03 TABLET ORAL at 08:06

## 2022-02-12 RX ADMIN — VENLAFAXINE HYDROCHLORIDE 75 MG: 75 CAPSULE, EXTENDED RELEASE ORAL at 08:07

## 2022-02-12 RX ADMIN — PANTOPRAZOLE SODIUM 40 MG: 40 TABLET, DELAYED RELEASE ORAL at 08:06

## 2022-02-12 RX ADMIN — APIXABAN 5 MG: 5 TABLET, FILM COATED ORAL at 10:41

## 2022-02-12 RX ADMIN — AMIODARONE HYDROCHLORIDE 200 MG: 200 TABLET ORAL at 08:07

## 2022-02-12 RX ADMIN — GABAPENTIN 200 MG: 100 CAPSULE ORAL at 17:18

## 2022-02-12 RX ADMIN — HYDROXYZINE HYDROCHLORIDE 25 MG: 25 TABLET, FILM COATED ORAL at 22:24

## 2022-02-12 RX ADMIN — NAPROXEN 250 MG: 250 TABLET ORAL at 17:18

## 2022-02-12 RX ADMIN — NAPROXEN 250 MG: 250 TABLET ORAL at 08:07

## 2022-02-12 RX ADMIN — APIXABAN 5 MG: 5 TABLET, FILM COATED ORAL at 22:24

## 2022-02-12 RX ADMIN — GABAPENTIN 900 MG: 300 CAPSULE ORAL at 22:23

## 2022-02-12 RX ADMIN — QUETIAPINE FUMARATE 200 MG: 100 TABLET, FILM COATED ORAL at 22:29

## 2022-02-12 RX ADMIN — OXYCODONE HYDROCHLORIDE AND ACETAMINOPHEN 1 TABLET: 5; 325 TABLET ORAL at 22:24

## 2022-02-12 RX ADMIN — MAGNESIUM SULFATE HEPTAHYDRATE: 500 INJECTION, SOLUTION INTRAMUSCULAR; INTRAVENOUS at 20:33

## 2022-02-12 RX ADMIN — GABAPENTIN 200 MG: 100 CAPSULE ORAL at 08:07

## 2022-02-12 ASSESSMENT — ACTIVITIES OF DAILY LIVING (ADL)
ADLS_ACUITY_SCORE: 7

## 2022-02-12 NOTE — PROGRESS NOTES
CLINICAL NUTRITION SERVICES - REASSESSMENT NOTE     Nutrition Prescription    RECOMMENDATIONS FOR MDs/PROVIDERS TO ORDER:    Malnutrition Status:    Severe    Recommendations already ordered by Registered Dietitian (RD):  Cyclic TPN: D 350  @ 85 ml/hr x 1 hr, 170 ml/hr x 10 hrs  85 ml/hr x 1 hr and off.  250 ml of 20% lipids twice per week on  Monday and Thursday: TPN/lipids provide: 1853 Calories, 250 g CHO  130 g protein, 14 g fat and 1870 ml fluid/day    Future/Additional Recommendations:  Adjust TPN pendeng tolerance and nutritional needs/hydration  As pt encouraged not to take in much orally     EVALUATION OF THE PROGRESS TOWARD GOALS   Diet: Clear liquids  Nutrition Support: cyclic TPN as documented above  Intake: 0%    Labs: Glu 118     INTERVENTIONS  Implementation  No changes recommended today in TPN regimen    Goals  Meet estimated nutrition needs-met  Wound healing-progressing  No significant dry weight loss-progressing-last weight 2/7    Monitoring/Evaluation  Progress toward goals will be monitored and evaluated per protocol.

## 2022-02-12 NOTE — PLAN OF CARE
Taking sips of clears but overall poor appetite.  On cyclic TPN.  Denies pain.  Empties ostomy fistula bags independently.  Upper bag 400 ml.  Lower bag minimal amount.  Continue to monitor.

## 2022-02-12 NOTE — PROGRESS NOTES
[facility] Progress Note    Assessment/Plan  Active Problems:    Hypertension goal BP (blood pressure) < 140/90    Moderate major depression (H)    Perforation of intestine (H)    Abdominal pain, generalized    Vesicocutaneous fistula    Infection due to 2019 novel coronavirus    Chema Dia is a 67 y/o M with past medical history of splenectomy, appendectomy, HTN, and substance abuse, with recent admission to hospital 11/30-12/21/2021 for SBO with perforation s/p surgical repair. Discharged home but readmitted as he could not manage at home and awaiting placement, parole office assisting with this and SW following.  Continues on TPN. New RUL and RLL PE found and being treated, new HAP 1/9 also resolved with abx. Incidentally COVID-19 positive 1/21, asymptomatic, recovered 1/31. Stable and medically ready for discharge.      Recurrent abdominal pain s/p surgical intervention of SBO with perforation  Enterocutaneous fistula with persistent output  11/30 SBO -> x lap, SBR, enterotomy repair, extensive SINDY.   12/3 washout, MARISA drain, abx. Drain removed 12/16. Discharged home but readmitted 12/22 as he could not care for himself at home. EC fistulas still present but output decreasing. Had some bloody output from fistulas yesterday and a stone was removed from fistula from unknown origin by surgery NP, see note for further details. Today with decreased amount of bloody output, will likely resolve spontaneously, not a barrier to discharge. Hgb stable.   -Surgery consulted. Diet per surgery. CLD. Only Surgery can change diet.  -Surgery will oversee and follow up TPN outpatient. Outpatient pharmacy to manage orders for TPN.  -TPN per pharmacy and RD. Run time now at 12 hours. TPN teaching for at home.  -WOC for home fistula management.     RUL and RLL PE  SMV thrombosis  Acute RLL/RUL PE found on CT as well as SMV thrombosis. No evidence of right heart strain.  Most likely provoked in the setting of  "recent surgery/hospitalization.  No changes today.  -Eliquis 5 mg BID x 3 months.     Atrial flutter (resolved)  Developed this overnight 1/8-1/9 and required amiodarone and brief ICU stay for pressors. Cardiology consulted and initiated amiodarone.  Patient stabilized and back to sinus rhythm. No repeat episodes since that time. No changes today.  -Amiodarone 200 mg daily.  Would recommend follow-up with cardiology outpatient to determine how long he needs this.  -Eliquis as above.     Severe protein-calorie malnutrition  TPN as above.     Chronic Conditions:  Hypothyroidism- PTA levothyroxine.  Chronic pain- Hold mexolicam 7.5mg daily, resume PTA gabapentin, continue PTA percocet.  BPH-hold home Flomax given he states it does not help.  Depression/Insomnia - Venlafaxine and gabapentin per psych. Seroquel for sleep. Atarax at bedtime.     FEN: TPN.  Clear liquid diet as tolerated advance per surgery service.  DVT Prophylaxis: Eliquis.  Code: Full code.     Disposition/Advanced Care Planning  Discharge Planning discussed with patient and care team.  Anticipated discharge date: TBD  Disposition: Medically stable for discharge., plan for discharge on Monday     Subjective  Patient states that he is having less bloody output from his fistulas.  He states that he saw the stone that was removed from fistula by surgery NP and states that it was \"fucking gross \".  He states that he is ready for discharge on Monday.  All questions were answered.    Objective    Vital signs in last 24 hours Temp:  [97.8  F (36.6  C)-97.9  F (36.6  C)] 97.8  F (36.6  C)  Pulse:  [72-80] 80  Resp:  [18] 18  BP: (110-133)/(73-84) 110/73  SpO2:  [93 %-95 %] 95 %   167 lbs 11.2 oz    Intake/Output last 3 shift I/O last 3 completed shifts:  In: 120 [P.O.:120]  Out: -     Intake/Output this shift:I/O this shift:  In: 120 [P.O.:120]  Out: -     Physical Exam  General appearance: alert, appears stated age, cooperative and in NAD.  Head: " Normocephalic, without obvious abnormality, atraumatic.  Eyes: conjunctivae/corneas clear.  Lungs: clear to auscultation bilaterally, no increased respiratory effort.  Heart: regular rate and rhythm, no murmurs.  Abdomen: ostomy bags in place with some bloody output, nondistended. soft.  Neurologic: Alert and oriented x3, normal strength and tone.  Psychiatric: mood and affect appropriate.    Pertinent Labs and Pertinent Radiology   Lab Results: personally reviewed.     Radiology Results: Personally reviewed image/s and impression/s    Precepted patient with Dr. Aye Willoughby.    Noemi Long MD  [facility], PGY-3

## 2022-02-12 NOTE — PHARMACY-CONSULT NOTE
"Pharmacy Note: Parenteral Nutrition (PN) Management    Pharmacist consulted to dose PN for Gurdeep Dia, a 66 year old male by Dr. Mcmahon.    Subjective:    The patient is a new PN start.    The patient was started on PN in the hospital on 12/2/21.    Indication for PN therapy: continue until fistula heals    Inadequate nutrition existing for > 7 days.     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    Pertinent diseases and other special considerations none    Social History     Tobacco Use     Smoking status: Current Every Day Smoker     Smokeless tobacco: Never Used     Tobacco comment: declined cessation discussion and resource packet -1/14/22   Substance Use Topics     Alcohol use: Not Currently     Comment: Clean for 5 years     Drug use: Not Currently     Objective:    Ht Readings from Last 1 Encounters:   01/28/22 1.656 m (5' 5.2\")     Wt Readings from Last 1 Encounters:   02/07/22 76.1 kg (167 lb 11.2 oz)       Body mass index is 27.74 kg/m .    No data found.      Labs:  Last 3 days:  Recent Labs     02/10/22  0741 02/11/22  0643 02/11/22  1500    140  --    POTASSIUM 4.7 4.2  --    CHLORIDE 107 106  --    CO2 24 26  --    BUN 33* 31*  --    CR 0.85 0.83  --    VIJAYA 8.7 8.5  --    MAG  --  2.1  --    PHOS  --  3.8  --    HGB  --   --  10.1*   HCT  --   --  32.4*   PLT  --   --  683*       Glucose (past 48 hours):   Recent Labs     02/10/22  1151 02/10/22  1715 02/10/22  2057 02/11/22  0643 02/11/22 2031 02/12/22  0805   GLC 99 87 84 109 72 118*       Intake/Output (last 24 hours): I/O last 3 completed shifts:  In: 120 [P.O.:120]  Out: -     Estimated CrCl: Estimated Creatinine Clearance: 82.6 mL/min (based on SCr of 0.83 mg/dL).      Assessment:    Continue patient on PN therapy as a cyclic central therapy.     Given the patient's current condition/oral intake, PN is still indicated.    Lab results reviewed: WNL    Plan:  1. Rate of PN: cyclic at 85mL x1hr then 170mL/hr x " 10hr then 85mL/hrx1hr. Maintenance IV fluid rate will be adjusted appropriately to meet the total maximum IV fluids rate as ordered by provider.  2. Formula:     Amino Acids 130 grams    Dextrose 350 grams    Sodium 150 mEq/day    Potassium 80 mEq/day    Calcium 9.5 mEq/day    Magnesium 24 mEq/day    Phosphorus 32 mMol/day    Chloride: Acetate Ratio max acetate (d/t potassium chloride shortage; max acetate calculates at approx 1: 2.48 (preferred ratio 1:3))    Standard Multivitamins w/Vitamin K    Trace Elements  3. Fat Emulsion: 20%, 250 mL IV 2 times weekly on Mondays and Thursdays  4. Check BMP labs Monday  5. Pharmacist will continue to follow the patient's lab results, clinical status and blood glucose results and make adjustments as appropriate.    Thank you     Yamilet Huntley [facility] on 2/12/2022 at 9:39 AM

## 2022-02-12 NOTE — PROGRESS NOTES
"ASSESSMENT:  1. Abdominal pain, generalized    2. Perforation of intestine (H)        Gurdeep Dia is a 66 year old male status post explore lap lysis of adhesions with resultant 3 days later perforation reoperation oversew of defect and then 5 days later perforation again and at this time  he has 2 EC fistulas that are being managed with bowel rest and ostomy bags for output containment. He intermittently has succus draining from his old MARISA drain site as well. He continues with a TPN diet with minimal oral intake for comfort (clear liquids ok).  He developed VTE and was treated with heparin drip. The patient also tested positive for covid on 1/22/22, but he remained asymptomatic is now considered \"recovered covid\".     Removal of stone of unclear origin from upper EC fistula. This has the appearance of a large gallstone, but no stones were noted on his US in December. The stone appeared to be blocking the fistula output. There is also a new skin breakdown that connects to the lower EC fistula and erupts from the skin about 5-6 cm proximal to the original wound. This remains superficial and appears to be a subcutaneous connection between the two wounds. It does not appear to be a new EC fistula. Bleeding in the ostomy bags is likely due to inflammation of the obstruction of the upper fistula and the lower skin breakdown. Still no sign of aimee blood     Placement for the patient has been an ongoing issue, but this continues to be worked on by the care management team.    PLAN:  Continues with sips of clears  Redress wounds with ostomy devices; bottom ostomy device should be cut to allow both lower wounds to drain    SUBJECTIVE:   He is doing fine, more output since stone was removed yesterday      Patient Vitals for the past 24 hrs:   BP Temp Temp src Pulse Resp SpO2   02/12/22 0729 110/73 97.8  F (36.6  C) Oral 80 18 95 %   02/12/22 0021 117/84 97.9  F (36.6  C) Oral 78 18 93 %   02/11/22 1606 133/78 97.9  F " (36.6  C) Oral 72 18 93 %         PHYSICAL EXAM:  GEN: No acute distress, comfortable  LUNGS: CTA bilaterally  CV:RRR  ABD: soft, ostomy bags over superior and inferior fistulas with succus, lower fistula is more elongated now, but no overt signs of bleeding or infection  EXT:No cyanosis, edema or obvious abnormalities    02/11 0700 - 02/12 0659  In: 120 [P.O.:120]  Out: -     No results displayed because visit has over 200 results.             Shaggy Cao PA-C

## 2022-02-12 NOTE — PLAN OF CARE
Problem: Impaired Wound Healing  Goal: Optimal Wound Healing  Outcome: No Change  Intervention: Promote Wound Healing  Recent Flowsheet Documentation  Taken 2/12/2022 0100 by Shorty Lovelace, RN  Activity Management:   activity adjusted per tolerance   activity encouraged  Taken 2/11/2022 2100 by Shorty Lovelace, RN  Activity Management:   activity adjusted per tolerance   activity encouraged     Problem: Pain (Surgery Nonspecified)  Goal: Acceptable Pain Control  Outcome: No Change  Intervention: Prevent or Manage Pain  Recent Flowsheet Documentation  Taken 2/11/2022 2100 by Shorty Lovelace, RN  Diversional Activities: television     Problem: Anxiety  Goal: Anxiety Reduction or Resolution  Outcome: Improving     Problem: Depression  Goal: Improved Mood  Outcome: Improving     Pt slept comfortably this shift. Aquak K pad to neck and back has been effective at relieving neck stiffness/pain. Aox4. Independent w/ transfers. Continues on Cyclic TPN through R Basillic double lumen PICC. PICC remained patent and dressing remained CDI. VSS. Ostomy appliance to both fistula sites remained CDI. Pt empties out fistulas/ ostomy appliance, light brown watery drainage present to upper fistula, lower fistula w/ small amount of light sanguinous drainage. Received PRN Percocet for abdominal and neck pain which was effective as Pt was sleeping upon return observation

## 2022-02-13 LAB
GLUCOSE BLDC GLUCOMTR-MCNC: 100 MG/DL (ref 70–99)
GLUCOSE BLDC GLUCOMTR-MCNC: 110 MG/DL (ref 70–99)
GLUCOSE BLDC GLUCOMTR-MCNC: 90 MG/DL (ref 70–99)

## 2022-02-13 PROCEDURE — 250N000013 HC RX MED GY IP 250 OP 250 PS 637: Performed by: STUDENT IN AN ORGANIZED HEALTH CARE EDUCATION/TRAINING PROGRAM

## 2022-02-13 PROCEDURE — 250N000009 HC RX 250: Performed by: FAMILY MEDICINE

## 2022-02-13 PROCEDURE — 250N000013 HC RX MED GY IP 250 OP 250 PS 637: Performed by: FAMILY MEDICINE

## 2022-02-13 PROCEDURE — 120N000001 HC R&B MED SURG/OB

## 2022-02-13 PROCEDURE — 99231 SBSQ HOSP IP/OBS SF/LOW 25: CPT | Performed by: INTERNAL MEDICINE

## 2022-02-13 PROCEDURE — 250N000013 HC RX MED GY IP 250 OP 250 PS 637: Performed by: INTERNAL MEDICINE

## 2022-02-13 PROCEDURE — 99231 SBSQ HOSP IP/OBS SF/LOW 25: CPT | Mod: GC | Performed by: STUDENT IN AN ORGANIZED HEALTH CARE EDUCATION/TRAINING PROGRAM

## 2022-02-13 RX ADMIN — OXYCODONE HYDROCHLORIDE AND ACETAMINOPHEN 1 TABLET: 5; 325 TABLET ORAL at 22:53

## 2022-02-13 RX ADMIN — NAPROXEN 250 MG: 250 TABLET ORAL at 09:03

## 2022-02-13 RX ADMIN — QUETIAPINE FUMARATE 200 MG: 100 TABLET, FILM COATED ORAL at 22:52

## 2022-02-13 RX ADMIN — GABAPENTIN 900 MG: 300 CAPSULE ORAL at 22:52

## 2022-02-13 RX ADMIN — GABAPENTIN 200 MG: 100 CAPSULE ORAL at 16:58

## 2022-02-13 RX ADMIN — PANTOPRAZOLE SODIUM 40 MG: 40 TABLET, DELAYED RELEASE ORAL at 09:04

## 2022-02-13 RX ADMIN — VENLAFAXINE HYDROCHLORIDE 75 MG: 75 CAPSULE, EXTENDED RELEASE ORAL at 09:03

## 2022-02-13 RX ADMIN — LEVOTHYROXINE SODIUM 25 MCG: 0.03 TABLET ORAL at 09:03

## 2022-02-13 RX ADMIN — OXYCODONE HYDROCHLORIDE AND ACETAMINOPHEN 1 TABLET: 5; 325 TABLET ORAL at 09:04

## 2022-02-13 RX ADMIN — APIXABAN 5 MG: 5 TABLET, FILM COATED ORAL at 22:53

## 2022-02-13 RX ADMIN — AMIODARONE HYDROCHLORIDE 200 MG: 200 TABLET ORAL at 09:03

## 2022-02-13 RX ADMIN — HYDROXYZINE HYDROCHLORIDE 25 MG: 25 TABLET, FILM COATED ORAL at 22:53

## 2022-02-13 RX ADMIN — MAGNESIUM SULFATE HEPTAHYDRATE: 500 INJECTION, SOLUTION INTRAMUSCULAR; INTRAVENOUS at 20:45

## 2022-02-13 RX ADMIN — GABAPENTIN 200 MG: 100 CAPSULE ORAL at 09:03

## 2022-02-13 RX ADMIN — APIXABAN 5 MG: 5 TABLET, FILM COATED ORAL at 11:32

## 2022-02-13 ASSESSMENT — ACTIVITIES OF DAILY LIVING (ADL)
ADLS_ACUITY_SCORE: 7

## 2022-02-13 NOTE — PLAN OF CARE
Problem: Infection (Surgery Nonspecified)  Goal: Absence of Infection Signs and Symptoms  Outcome: Improving     Problem: Pain (Surgery Nonspecified)  Goal: Acceptable Pain Control  Outcome: Improving  Intervention: Prevent or Manage Pain  Recent Flowsheet Documentation  Taken 2/13/2022 0055 by Brittni Roth RN  Pain Management Interventions: (states it is tolerable) declines     Pt's vital signs were stable overnight. He stated that his pain in his abdomen was about a 6/10 which was tolerable so he did not need any PRN pain meds. His 2 abdominal fistula ostomy bags were intact and draining appropriately. The upper bag had a moderate amount of drainage and the lower one had a small amount of drainage. He was offered help with emptying the bags but he declined and said he could do it himself and leave it in the bathroom for staff to document. He has parenteral nutrition running at 170 ml/hr and it will be reduced at 0733 to 85 ml/hr for 1 hr then stopped. His PICC line is patent and flushed without difficulty. He is independent in his room. He is able to make needs known. Call light is within reach.

## 2022-02-13 NOTE — PHARMACY-CONSULT NOTE
"Pharmacy Note: Parenteral Nutrition (PN) Management    Pharmacist consulted to dose PN for Gurdeep Dia, a 66 year old male by Dr. Mcmahon.    Subjective:    The patient is a new PN start.    The patient was started on PN in the hospital on 12/2/21.    Indication for PN therapy: continue until fistula heals    Inadequate nutrition existing for > 7 days.     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    Pertinent diseases and other special considerations none    Social History     Tobacco Use     Smoking status: Current Every Day Smoker     Smokeless tobacco: Never Used     Tobacco comment: declined cessation discussion and resource packet -1/14/22   Substance Use Topics     Alcohol use: Not Currently     Comment: Clean for 5 years     Drug use: Not Currently     Objective:    Ht Readings from Last 1 Encounters:   01/28/22 1.656 m (5' 5.2\")     Wt Readings from Last 1 Encounters:   02/07/22 76.1 kg (167 lb 11.2 oz)       Body mass index is 27.74 kg/m .    No data found.      Labs:  Last 3 days:  Recent Labs     02/10/22  0741 02/11/22  0643 02/11/22  1500    140  --    POTASSIUM 4.7 4.2  --    CHLORIDE 107 106  --    CO2 24 26  --    BUN 33* 31*  --    CR 0.85 0.83  --    VIJAYA 8.7 8.5  --    MAG  --  2.1  --    PHOS  --  3.8  --    HGB  --   --  10.1*   HCT  --   --  32.4*   PLT  --   --  683*       Glucose (past 48 hours):   Recent Labs     02/10/22  1151 02/10/22  1715 02/10/22  2057 02/11/22  0643 02/11/22 2031 02/12/22  0805   GLC 99 87 84 109 72 118*       Intake/Output (last 24 hours): I/O last 3 completed shifts:  In: 600 [P.O.:600]  Out: -     Estimated CrCl: Estimated Creatinine Clearance: 82.6 mL/min (based on SCr of 0.83 mg/dL).      Assessment:    Continue patient on PN therapy as a cyclic central therapy.     Given the patient's current condition/oral intake, PN is still indicated.    Lab results reviewed: WNL    Plan:  1. Rate of PN: cyclic at 85mL x1hr then 170mL/hr x " 10hr then 85mL/hrx1hr. Maintenance IV fluid rate will be adjusted appropriately to meet the total maximum IV fluids rate as ordered by provider.  2. Formula:     Amino Acids 130 grams    Dextrose 350 grams    Sodium 150 mEq/day    Potassium 80 mEq/day    Calcium 9.5 mEq/day    Magnesium 24 mEq/day    Phosphorus 32 mMol/day    Chloride: Acetate Ratio max acetate (d/t potassium chloride shortage; max acetate calculates at approx 1: 2.48 (preferred ratio 1:3))    Standard Multivitamins w/Vitamin K    Trace Elements  3. Fat Emulsion: 20%, 250 mL IV 2 times weekly on Mondays and Thursdays  4. Check BMP, phos and mag labs Monday  5. Pharmacist will continue to follow the patient's lab results, clinical status and blood glucose results and make adjustments as appropriate.    Thank you     Yamilet Huntley [facility] on 2/12/2022 at 9:39 AM

## 2022-02-13 NOTE — PROGRESS NOTES
Assessment;  Active Problems:    Hypertension goal BP (blood pressure) < 140/90    Moderate major depression (H)    Perforation of intestine (H)    Abdominal pain, generalized    Vesicocutaneous fistula    Infection due to 2019 novel coronavirus     LOS: 53 days   Patient has been hospitalized for 2 months with lots of GI and multiple surgeries  On December 3, 2021 he had atrial fibrillation with rapid ventricular response does not really look like a atrial flutter  Has been on amiodarone since that time and the question is whether that could be stopped  Also history of pulmonary embolism and does take Eliquis  Echocardiogram December 10, 2021 was normal  Last EKG was January 13-that showed sinus rhythm  Did have positive SARS January 21, 2022    Plan  Would stop amiodarone watch for recurrence of atrial fibrillation  Continue anticoagulation since he has history of pulmonary embolism and paroxysmal atrial fibrillation    Subjective objective  Discussed with medical resident  Concern is whether he should stay on amiodarone  Medicine was started when he had atrial fibrillation during a perioperative phase and is maintained on the amiodarone; currently 200 mg daily  On February 11 2022  Electrolytes normal  Creatinine 0.83  Hemoglobin 10.1      No current outpatient medications on file.       Objective:   Vital signs in last 24 hours:  Temp:  [97.5  F (36.4  C)-97.7  F (36.5  C)] 97.5  F (36.4  C)  Pulse:  [75-81] 75  Resp:  [16] 16  BP: ()/(61-89) 92/61  SpO2:  [94 %-98 %] 94 %  Weight  Weight change:       Brijesh Bowden MD

## 2022-02-13 NOTE — PLAN OF CARE
Problem: Adult Inpatient Plan of Care  Goal: Absence of Hospital-Acquired Illness or Injury  Intervention: Identify and Manage Fall Risk  Recent Flowsheet Documentation  Taken 2/12/2022 1630 by Chapis Bell RN  Safety Promotion/Fall Prevention:    nonskid shoes/slippers when out of bed    clutter free environment maintained     Problem: Pain (Surgery Nonspecified)  Goal: Acceptable Pain Control  Outcome: Improving     Problem: Impaired Wound Healing  Goal: Optimal Wound Healing    Outcome: No Change  Intervention: Promote Wound Healing  Showered independently after ostomy bag leaked.  Both ostomy bags changed with assist.  Has remained intact since. Upper fistula drained 300 ccs. Light brown output.  Nothing out of lower.  Poor appetite. Took a couple bites of jello.  Drinking fluids, water.  TPN/Lipids infusing.   Requested Percocet at bedtime.

## 2022-02-13 NOTE — PROGRESS NOTES
[facility] Progress Note    Assessment/Plan  Active Problems:    Hypertension goal BP (blood pressure) < 140/90    Moderate major depression (H)    Perforation of intestine (H)    Abdominal pain, generalized    Vesicocutaneous fistula    Infection due to 2019 novel coronavirus    Chema Dia is a 65 y/o M with past medical history of splenectomy, appendectomy, HTN, and substance abuse, with recent admission to hospital 11/30-12/21/2021 for SBO with perforation s/p surgical repair. Discharged home but readmitted as he could not manage at home and awaiting placement, parole office assisting with this and SW following.  Continues on TPN. New RUL and RLL PE found and being treated, new HAP 1/9 also resolved with abx. Incidentally COVID-19 positive 1/21, asymptomatic, recovered 1/31. Stable and medically ready for discharge.      Recurrent abdominal pain s/p surgical intervention of SBO with perforation  Enterocutaneous fistula with persistent output  11/30 SBO -> x lap, SBR, enterotomy repair, extensive SINDY.   12/3 washout, MARISA drain, abx. Drain removed 12/16. Discharged home but readmitted 12/22 as he could not care for himself at home. EC fistulas still present but output decreasing. Had some bloody output from fistulas yesterday and a stone was removed from fistula from unknown origin by surgery NP, see note for further details. Today with decreased amount of bloody output, will likely resolve spontaneously, not a barrier to discharge. Hgb stable.   -Surgery consulted. Diet per surgery. CLD. Only Surgery can change diet.  -Surgery will oversee and follow up TPN outpatient. Outpatient pharmacy to manage orders for TPN.  -TPN per pharmacy and RD. Run time now at 12 hours. TPN teaching for at home.  -WOC for home fistula management.     RUL and RLL PE  SMV thrombosis  Acute RLL/RUL PE found on CT as well as SMV thrombosis. No evidence of right heart strain.  Most likely provoked in the setting of  recent surgery/hospitalization.  No changes today.  -Eliquis 5 mg BID x 3 months.     Atrial flutter (resolved)  Developed this overnight 1/8-1/9 and required amiodarone and brief ICU stay for pressors. Cardiology consulted and initiated amiodarone.  Patient stabilized and back to sinus rhythm. No repeat episodes since that time. Patient has been here for nearly 2 months, bp's soft,not recent events of atrial flutter/fib; discussed with Cardiology and will discontinue Amiodarone.  -Discontinue Amiodarone and monitor closely.  -Continue Eliquis as above.     Severe protein-calorie malnutrition  TPN as above.     Chronic Conditions:  Hypothyroidism- PTA levothyroxine.  Chronic pain- Hold mexolicam 7.5mg daily, resume PTA gabapentin, continue PTA percocet.  BPH-hold home Flomax given he states it does not help.  Depression/Insomnia - Venlafaxine and gabapentin per psych. Seroquel for sleep. Atarax at bedtime.     FEN: TPN.  Clear liquid diet as tolerated advance per surgery service.  DVT Prophylaxis: Eliquis.  Code: Full code.     Disposition/Advanced Care Planning  Discharge Planning discussed with patient and care team.  Anticipated discharge date: TBD  Disposition: Medically stable for discharge., plan for discharge on Monday    Subjective  Patient states that he is doing well today.  No pain.  He still having some ostomy output but minimally bloody today.  He is ready to discharge tomorrow.  All questions were answered.    Objective    Vital signs in last 24 hours Temp:  [97.5  F (36.4  C)-97.7  F (36.5  C)] 97.5  F (36.4  C)  Pulse:  [75-81] 75  Resp:  [16] 16  BP: ()/(61-89) 92/61  SpO2:  [94 %-98 %] 94 %   167 lbs 11.2 oz    Intake/Output last 3 shift I/O last 3 completed shifts:  In: 600 [P.O.:600]  Out: -     Intake/Output this shift:No intake/output data recorded.    Physical Exam  General appearance: alert, appears stated age, cooperative and in NAD.  Head: Normocephalic, without obvious abnormality,  atraumatic.  Eyes: conjunctivae/corneas clear.  Lungs: clear to auscultation bilaterally, no increased respiratory effort.  Heart: regular rate and rhythm, no murmurs.  Abdomen: ostomy bags in place with minimal bloody output, nondistended. soft.  Neurologic: Alert and oriented x3, normal strength and tone.  Psychiatric: mood and affect appropriate.    Pertinent Labs and Pertinent Radiology   Lab Results: personally reviewed.     Radiology Results: Personally reviewed image/s and impression/s    Precepted patient with Dr. Aye Willoughby.    Noemi Long MD  [facility], PGY-3

## 2022-02-13 NOTE — PROGRESS NOTES
"CLINICAL NUTRITION SERVICES - REASSESSMENT NOTE     Nutrition Prescription    RECOMMENDATIONS FOR MDs/PROVIDERS TO ORDER:    Malnutrition Status:    Severe    Recommendations already ordered by Registered Dietitian (RD):  Cyclic TPN: D 350  @ 85 ml/hr x 1 hr, 170 ml/hr x 10 hrs  85 ml/hr x 1 hr and off.  250 ml of 20% lipids twice per week on  Monday and Thursday. TPN/lipids provide: 1853 Calories, 130 g  Protein, 350 g CHO, 14 g fat and 1870 ml fluid    Future/Additional Recommendations:  Adjust TPN pending tolerance and nutritional needs/hydration  As pt encouraged not to take in much orally     EVALUATION OF THE PROGRESS TOWARD GOALS   Diet: Clear liquids  Nutrition Support: TPN as documented above  Intake:Taking sips and chips     ANTHROPOMETRICS  Height: 165.6 cm (5' 5.2\")  Most Recent Weight: 76.1 kg (167 lb 11.2 oz)    Weight History:   Wt Readings from Last 10 Encounters:   02/07/22 76.1 kg (167 lb 11.2 oz)   12/17/21 76.8 kg (169 lb 6.4 oz)   01/22/20 79.4 kg (175 lb)   12/24/19 81.2 kg (179 lb)     PHYSICAL FINDINGS  See malnutrition section below.  Ostomy/fistulas    GI CONCERNS  Midline abdominal fistula x 2 (ostomy pouches x 2 for fistulas)  Normoactive BS all quadrants  Last BM 2/9/2022 per nurse    LABS  Reviewed: no new labs    MEDICATIONS  Reviewed    Malnutrition Diagnosis: Severe malnutrition  In Context of:  Acute illness or injury    CURRENT NUTRITION DIAGNOSIS  Inadequate oral intake related to SBO with formation of enterocutaneous fistulas as evidenced by nausea and abdominal pain and diet of just sips of clear liquids      INTERVENTIONS  Implementation  No changes recommended in TPN regimen today    Goals  Meet estimated nutrition needs-met  Wound healing-progressing  No significant dry weight loss-progressing (last weight was 2/7/22)    Monitoring/Evaluation  Progress toward goals will be monitored and evaluated per protocol.  "

## 2022-02-14 ENCOUNTER — HOME INFUSION (PRE-WILLOW HOME INFUSION) (OUTPATIENT)
Dept: PHARMACY | Facility: CLINIC | Age: 67
End: 2022-02-14

## 2022-02-14 VITALS
BODY MASS INDEX: 27.99 KG/M2 | TEMPERATURE: 98.1 F | WEIGHT: 167.99 LBS | HEIGHT: 65 IN | OXYGEN SATURATION: 93 % | HEART RATE: 84 BPM | RESPIRATION RATE: 16 BRPM | DIASTOLIC BLOOD PRESSURE: 83 MMHG | SYSTOLIC BLOOD PRESSURE: 119 MMHG

## 2022-02-14 LAB
ANION GAP SERPL CALCULATED.3IONS-SCNC: 6 MMOL/L (ref 5–18)
BUN SERPL-MCNC: 37 MG/DL (ref 8–22)
CALCIUM SERPL-MCNC: 8.6 MG/DL (ref 8.5–10.5)
CHLORIDE BLD-SCNC: 106 MMOL/L (ref 98–107)
CO2 SERPL-SCNC: 27 MMOL/L (ref 22–31)
CREAT SERPL-MCNC: 0.92 MG/DL (ref 0.7–1.3)
GFR SERPL CREATININE-BSD FRML MDRD: >90 ML/MIN/1.73M2
GLUCOSE BLD-MCNC: 89 MG/DL (ref 70–125)
GLUCOSE BLDC GLUCOMTR-MCNC: 110 MG/DL (ref 70–99)
GLUCOSE BLDC GLUCOMTR-MCNC: 112 MG/DL (ref 70–99)
INR PPP: 1.36 (ref 0.9–1.15)
MAGNESIUM SERPL-MCNC: 2.2 MG/DL (ref 1.8–2.6)
PHOSPHATE SERPL-MCNC: 3.9 MG/DL (ref 2.5–4.5)
POTASSIUM BLD-SCNC: 4.5 MMOL/L (ref 3.5–5)
PREALB SERPL IA-MCNC: 19 MG/DL (ref 19–38)
SODIUM SERPL-SCNC: 139 MMOL/L (ref 136–145)
TRIGL SERPL-MCNC: 117 MG/DL

## 2022-02-14 PROCEDURE — 84478 ASSAY OF TRIGLYCERIDES: CPT | Performed by: INTERNAL MEDICINE

## 2022-02-14 PROCEDURE — 250N000013 HC RX MED GY IP 250 OP 250 PS 637: Performed by: INTERNAL MEDICINE

## 2022-02-14 PROCEDURE — 85610 PROTHROMBIN TIME: CPT | Performed by: INTERNAL MEDICINE

## 2022-02-14 PROCEDURE — 83735 ASSAY OF MAGNESIUM: CPT | Performed by: INTERNAL MEDICINE

## 2022-02-14 PROCEDURE — 99238 HOSP IP/OBS DSCHRG MGMT 30/<: CPT | Mod: GC | Performed by: STUDENT IN AN ORGANIZED HEALTH CARE EDUCATION/TRAINING PROGRAM

## 2022-02-14 PROCEDURE — 250N000013 HC RX MED GY IP 250 OP 250 PS 637: Performed by: STUDENT IN AN ORGANIZED HEALTH CARE EDUCATION/TRAINING PROGRAM

## 2022-02-14 PROCEDURE — 84134 ASSAY OF PREALBUMIN: CPT | Performed by: INTERNAL MEDICINE

## 2022-02-14 PROCEDURE — 999N000198 HC STATISTIC WOC PT EDUCATION, 16-30 MIN

## 2022-02-14 PROCEDURE — 80048 BASIC METABOLIC PNL TOTAL CA: CPT | Performed by: STUDENT IN AN ORGANIZED HEALTH CARE EDUCATION/TRAINING PROGRAM

## 2022-02-14 PROCEDURE — 84100 ASSAY OF PHOSPHORUS: CPT | Performed by: INTERNAL MEDICINE

## 2022-02-14 RX ORDER — VENLAFAXINE HYDROCHLORIDE 75 MG/1
75 CAPSULE, EXTENDED RELEASE ORAL DAILY
Qty: 30 CAPSULE | Refills: 1 | Status: SHIPPED | OUTPATIENT
Start: 2022-02-15

## 2022-02-14 RX ORDER — PANTOPRAZOLE SODIUM 40 MG/1
40 TABLET, DELAYED RELEASE ORAL
Qty: 30 TABLET | Refills: 0 | Status: SHIPPED | OUTPATIENT
Start: 2022-02-14 | End: 2022-03-16

## 2022-02-14 RX ORDER — GABAPENTIN 300 MG/1
900 CAPSULE ORAL AT BEDTIME
Qty: 90 CAPSULE | Refills: 0 | Status: SHIPPED | OUTPATIENT
Start: 2022-02-14 | End: 2022-03-16

## 2022-02-14 RX ORDER — ACETAMINOPHEN 325 MG/1
650 TABLET ORAL EVERY 4 HOURS PRN
Qty: 120 TABLET | Refills: 0 | Status: SHIPPED | OUTPATIENT
Start: 2022-02-14 | End: 2022-03-16

## 2022-02-14 RX ORDER — LEVOTHYROXINE SODIUM 25 UG/1
25 TABLET ORAL DAILY
Qty: 30 TABLET | Refills: 0 | Status: SHIPPED | OUTPATIENT
Start: 2022-02-14 | End: 2022-03-16

## 2022-02-14 RX ORDER — QUETIAPINE FUMARATE 200 MG/1
200 TABLET, FILM COATED ORAL AT BEDTIME
Qty: 30 TABLET | Refills: 0 | Status: SHIPPED | OUTPATIENT
Start: 2022-02-14 | End: 2022-03-21

## 2022-02-14 RX ORDER — OXYCODONE AND ACETAMINOPHEN 5; 325 MG/1; MG/1
1 TABLET ORAL EVERY 6 HOURS PRN
Qty: 12 TABLET | Refills: 0 | Status: SHIPPED | OUTPATIENT
Start: 2022-02-14

## 2022-02-14 RX ORDER — HYDROXYZINE HYDROCHLORIDE 25 MG/1
25 TABLET, FILM COATED ORAL AT BEDTIME
Qty: 30 TABLET | Refills: 0 | Status: SHIPPED | OUTPATIENT
Start: 2022-02-14 | End: 2022-03-16

## 2022-02-14 RX ORDER — GABAPENTIN 100 MG/1
200 CAPSULE ORAL 2 TIMES DAILY
Qty: 120 CAPSULE | Refills: 0 | Status: SHIPPED | OUTPATIENT
Start: 2022-02-14 | End: 2022-03-16

## 2022-02-14 RX ADMIN — VENLAFAXINE HYDROCHLORIDE 75 MG: 75 CAPSULE, EXTENDED RELEASE ORAL at 08:56

## 2022-02-14 RX ADMIN — GABAPENTIN 200 MG: 100 CAPSULE ORAL at 08:56

## 2022-02-14 RX ADMIN — OXYCODONE HYDROCHLORIDE AND ACETAMINOPHEN 1 TABLET: 5; 325 TABLET ORAL at 08:56

## 2022-02-14 RX ADMIN — APIXABAN 5 MG: 5 TABLET, FILM COATED ORAL at 11:12

## 2022-02-14 RX ADMIN — LEVOTHYROXINE SODIUM 25 MCG: 0.03 TABLET ORAL at 08:56

## 2022-02-14 RX ADMIN — PANTOPRAZOLE SODIUM 40 MG: 40 TABLET, DELAYED RELEASE ORAL at 07:08

## 2022-02-14 ASSESSMENT — ACTIVITIES OF DAILY LIVING (ADL)
ADLS_ACUITY_SCORE: 7

## 2022-02-14 ASSESSMENT — MIFFLIN-ST. JEOR: SCORE: 1467.05

## 2022-02-14 NOTE — PLAN OF CARE
Pt tolerating clear liquids. Reports abdominal pain, prn percocet given x 1, pt reports effective. Pt changed ostomy pouches himself. Home infusion team were here doing teaching.    Pt discharged to home at 1245, ambulated to front. Pt verbalized understanding of all discharge instructions. Medications and ostomy supplies sent with. Pt scheduled his follow-up appointment with a primary MD.

## 2022-02-14 NOTE — PHARMACY-CONSULT NOTE
"Pharmacy Note: Parenteral Nutrition (PN) Management    Pharmacist consulted to dose PN for Gurdeep Dia, a 66 year old male by Dr. Mcmahon.    Subjective:    The patient is a new PN start.     The patient was started on PN in the hospital on 12/2/21.     Indication for PN therapy: continue until fistula heals     Inadequate nutrition existing for > 7 days.      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     Pertinent diseases and other special considerations none    Social History     Tobacco Use     Smoking status: Current Every Day Smoker     Smokeless tobacco: Never Used     Tobacco comment: declined cessation discussion and resource packet -1/14/22   Substance Use Topics     Alcohol use: Not Currently     Comment: Clean for 5 years     Drug use: Not Currently     Objective:    Ht Readings from Last 1 Encounters:   01/28/22 1.656 m (5' 5.2\")     Wt Readings from Last 1 Encounters:   02/14/22 76.2 kg (167 lb 15.9 oz)       Body mass index is 27.78 kg/m .    Patient Vitals for the past 96 hrs:   Weight   02/14/22 0857 76.2 kg (167 lb 15.9 oz)       Labs:  Last 3 days:  Recent Labs     02/11/22  1500 02/14/22  0706   NA  --  139   POTASSIUM  --  4.5   CHLORIDE  --  106   CO2  --  27   BUN  --  37*   CR  --  0.92   VIJAYA  --  8.6   MAG  --  2.2   PHOS  --  3.9   PREALB  --  19   TRIG  --  117   HGB 10.1*  --    HCT 32.4*  --    *  --    INR  --  1.36*       Glucose (past 48 hours):   Recent Labs     02/12/22  1224 02/12/22  1657 02/12/22  2059 02/13/22  1155 02/13/22  1653 02/13/22  2151 02/14/22  0700 02/14/22  0706   GLC 89 129* 121* 100* 90 110* 112* 89       Intake/Output (last 24 hours): I/O last 3 completed shifts:  In: 85   Out: 900 [Other:900]    Estimated CrCl: Estimated Creatinine Clearance: 74.6 mL/min (based on SCr of 0.92 mg/dL).    Assessment:    Continue patient on PN therapy as a cyclic central therapy.     Given the patient's current condition/oral intake, PN is still " indicated.    Lab results reviewed: WNL    Plan:    1. Rate of PN: cyclic at 85mL x1hr then 170mL/hr x 10hr then 85mL/hrx1hr. Maintenance IV fluid rate will be adjusted appropriately to meet the total maximum IV fluids rate as ordered by provider.  2. Formula:     Amino Acids 130 grams    Dextrose 350 grams    Sodium 150 mEq/day    Potassium 80 mEq/day    Calcium 9.5 mEq/day    Magnesium 24 mEq/day    Phosphorus 32 mMol/day    Chloride: Acetate Ratio max acetate (d/t potassium chloride shortage; max acetate calculates at approx 1: 2.48 (preferred ratio 1:3))    Standard Multivitamins w/Vitamin K    Trace Elements  3. Fat Emulsion: 20%, 250 mL IV 2 times weekly on Mondays and Thursdays   4. Check BMP, phos, & mag labs in 3 days.  5. Pharmacist will continue to follow the patient's lab results, clinical status and blood glucose results and make adjustments as appropriate.    Thank you for the consult.  CATIA KING RPH  2/14/2022 9:31 AM

## 2022-02-14 NOTE — PROGRESS NOTES
Care Management Discharge Note    Discharge Date: 02/14/2022       Discharge Disposition: Home,Home Infusion    Discharge Services: Other (see comment) (therapy services )    Discharge DME: None    Discharge Transportation: family or friend will provide (Brother in law)    Private pay costs discussed: Not applicable    PAS Confirmation Code:  (Not needed)  Patient/family educated on Medicare website which has current facility and service quality ratings: yes    Education Provided on the Discharge Plan:  Yes, per team  Persons Notified of Discharge Plans: patient Luz Maria with home infusion   Patient/Family in Agreement with the Plan: yes    Handoff Referral Completed: Yes    Additional Information:  Plan to discharge to half way home with [facility] for TPN. Spoke with Luz Maria with [facility] who came out to provide last teach today 2/14. Luz Maria requesting order from [facility] nurse for ostomy change and supply information; was put in. Also, requesting signed TPN formula orders; spoke with pharmacist who is working on it. Per care manager partner patients brother has purchased a refrigerator for the patient and has in his vehicle. Per care manager note on 2/10 a primary care appointment was set up  For patient at [facility] ([address] [place] [address]   [phone]) with Dr. Jared Apodaca on February 16th.         Aye Schmitz RN

## 2022-02-14 NOTE — DISCHARGE INSTRUCTIONS
Midline incision/fistula sites: daily and prn saturation due to small amounts of draianage  Cleanse with tap water, pat dry  Cover each fistula site with dry gauze, then 1/2 ABD  Secure with tape  Change daily and as needed for saturation    If patient prefers to use pouches, then may use: 30 day supply   Pouch Jaylin #[id number] -Flat 1 piece cut to fit fecal pouch and adapt ring Jaylin #[id number]  Alcohol free Skin prep   The pouch should be changed every 7 days or sooner if leaking noted.

## 2022-02-14 NOTE — PROGRESS NOTES
"WOC stoma assessment EC fistula:        Allergies:  Penicillins    Height:  Height: 170.2 cm (5' 7\")    Weight:   Weight: 75.5 kg (166 lb 6.4 oz)    Past Medical History:  Past Medical History:   Diagnosis Date     Benign prostatic hyperplasia with weak urinary stream      Chronic bilateral low back pain without sciatica      Chronic neck pain      RAVI (generalized anxiety disorder)      History of hepatitis C     treated and cured     History of substance abuse (H)      Hypertension goal BP (blood pressure) < 140/90      Moderate major depression (H)        Labs:  Recent Labs   Lab Test 12/23/21  0715 12/22/21  1648 12/22/21  0003 12/08/21  0535 12/07/21  0358   CRP  --   --   --   --  26.6*   HGB 10.9*  --  12.0*   < > 10.9*   ALBUMIN  --   --  2.4*   < > 2.3*   CULTURE  --  No growth after 12 hours  No growth after 12 hours  --    < >  --     < > = values in this interval not displayed.         Call received requesting discharge orders and supplies to be ordered for patient - discharge later today.  Discharge orders updated and supply order placed for stores to deliver to patient.  See below for last [facility] assessment.    Mars:  Mars Score: 17    INTAKE  EC fistula s/p 2 abdominal surgeries    ASSESSMENT  Patient now has pouches back over both fistula sites again. Scant output in both. He is changing the pouches on his own with minor assistance from nursing staff. He only cracked his eyes open during my visit and declined to answer most questions. At this point, continue to recommend using dry gauze/ABD, but if patient prefers to use pouches, that is ok too. When he changes the pouches himself, they are more likely to leak due to the large crease that is created by the scar tissue along the midline incision.       Supplies: Pouch [employer] #[id number] -Flat 1 piece cut to fit fecal pouch and Paste [employer] #[id number] -Adapt Ring - or dry gauze, ABD, abdominal binder      Planned Follow Up: every other week.    Plan for " next visit: Reassess stoma/peristomal skin

## 2022-02-14 NOTE — PLAN OF CARE
Problem: Pain (Surgery Nonspecified)  Goal: Acceptable Pain Control  Intervention: Prevent or Manage Pain  Recent Flowsheet Documentation  Taken 2/13/2022 2253 by Ayana Brandt, RN  Pain Management Interventions: medication (see MAR)  Taken 2/13/2022 2043 by Ayana Brandt, RN  Pain Management Interventions: (wants pain meds later at bedtime)    declines    emotional support    Abdominal pain rated 7/10, refused interventions/prn meds until bedtime. Prn percocet at bedtime, sleeping after.   No issues overnight.      Problem: Impaired Wound Healing  Goal: Optimal Wound Healing  Intervention: Promote Wound Healing  Recent Flowsheet Documentation  Taken 2/13/2022 2253 by Ayana Brandt, RN  Pain Management Interventions: medication (see MAR)  Taken 2/13/2022 2043 by Ayana Brandt, RN  Pain Management Interventions: (wants pain meds later at bedtime)    declines    emotional support     Abdominal fistula x2 with ostomy pouch.   Upper fistula with more ourput, 200 ml.     Cyclic TPN running. PICC line intact with good blood return.     Independent in the room, walking in hallways. Emptying fistula pouch independently.

## 2022-02-14 NOTE — PLAN OF CARE
Problem: Adult Inpatient Plan of Care  Goal: Plan of Care Review  Outcome: Adequate for Discharge  Flowsheets (Taken 2/13/2022 1844)  Plan of Care Reviewed With: patient  Goal: Patient-Specific Goal (Individualized)  Outcome: Adequate for Discharge  Goal: Absence of Hospital-Acquired Illness or Injury  Outcome: Adequate for Discharge  Intervention: Identify and Manage Fall Risk  Recent Flowsheet Documentation  Taken 2/13/2022 1530 by Gaviota Rodrigues RN  Safety Promotion/Fall Prevention:   clutter free environment maintained   patient and family education  Taken 2/13/2022 0800 by Gaviota Rodrigues RN  Safety Promotion/Fall Prevention:   clutter free environment maintained   patient and family education  Intervention: Prevent Skin Injury  Recent Flowsheet Documentation  Taken 2/13/2022 1530 by Gaviota Rodrigues RN  Body Position: position changed independently  Taken 2/13/2022 0800 by Gaviota Rodrigues RN  Body Position: position changed independently  Goal: Optimal Comfort and Wellbeing  Outcome: Adequate for Discharge  Intervention: Provide Person-Centered Care  Recent Flowsheet Documentation  Taken 2/13/2022 1530 by Gaviota Rodrigues RN  Trust Relationship/Rapport:   care explained   questions answered  Taken 2/13/2022 0800 by Gaviota Rodrigues RN  Trust Relationship/Rapport:   care explained   questions answered  Goal: Readiness for Transition of Care  Outcome: Adequate for Discharge     Problem: Impaired Wound Healing  Goal: Optimal Wound Healing  Outcome: Adequate for Discharge  Intervention: Promote Wound Healing  Recent Flowsheet Documentation  Taken 2/13/2022 1530 by Gaviota Rodrigues RN  Activity Management: ambulated in saleem  Taken 2/13/2022 0800 by Gaviota Rodrigues RN  Activity Management: ambulated in saleem     Problem: Infection (Surgery Nonspecified)  Goal: Absence of Infection Signs and Symptoms  Outcome: Adequate for Discharge     Problem: Pain (Surgery Nonspecified)  Goal: Acceptable Pain Control  Outcome: Adequate for  Discharge     Problem: Anxiety  Goal: Anxiety Reduction or Resolution  Outcome: Adequate for Discharge     Problem: Depression  Goal: Improved Mood  Outcome: Adequate for Discharge     Problem: Hypertension Acute  Goal: Blood Pressure Within Desired Range  Outcome: Adequate for Discharge     Problem: Risk for Delirium  Goal: Optimal Coping  Outcome: Adequate for Discharge  Goal: Improved Behavioral Control  Outcome: Adequate for Discharge  Goal: Improved Attention and Thought Clarity  Outcome: Adequate for Discharge  Goal: Improved Sleep  Outcome: Adequate for Discharge     Problem: Dysrhythmia  Goal: Normalized Cardiac Rhythm  Outcome: Adequate for Discharge

## 2022-02-14 NOTE — DISCHARGE SUMMARY
[facility]  Discharge Summary - Medicine & Pediatrics       Date of Admission:  12/21/2021  Date of Discharge:  2/14/2022  Discharging Provider: Noemi Long MD.  Discharge Service: [facility].    Discharge Diagnoses   Hypertension goal BP (blood pressure) < 140/90    Moderate major depression (H)    Perforation of intestine (H)    Abdominal pain, generalized    Vesicocutaneous fistula    Infection due to 2019 novel coronavirus    Follow-ups Needed After Discharge   Follow-up Appointments     Follow-up and recommended labs and tests       Follow up with primary care provider, Physician No Ref-Primary, within 7   days for hospital follow- up.  The following labs/tests are recommended:   BMP.      Surgery to oversee TPN             Unresulted Labs Ordered in the Past 30 Days of this Admission     No orders found from 11/21/2021 to 12/22/2021.      These results will be followed up by PCP.    Discharge Disposition   Discharged to home  Condition at discharge: Stable    Hospital Course   Chema Dia is a 65 y/o M with past medical history of splenectomy, appendectomy, HTN, and substance abuse, with recent admission to hospital 11/30-12/21/2021 for SBO with perforation s/p surgical repair. Discharged home but readmitted as he could not manage at home. He now has persistent enterocutaneous fistula's with persistent output that require bowel rest and patient on TPN. New RUL and RLL PE found and being treated, new HAP 1/9 also resolved with abx. Incidentally COVID-19 positive 1/21, asymptomatic, recovered 1/31.      Recurrent abdominal pain s/p surgical intervention of SBO with perforation  Enterocutaneous fistula with persistent output  11/30 SBO -> x lap, SBR, enterotomy repair, extensive SINDY.   12/3 washout, MARISA drain, abx. Drain removed 12/16. Discharged home but readmitted 12/22 as he could not care for himself at home. EC fistulas still present but output decreasing. Had  some bloody output from fistulas last week and a stone was removed from fistula from unknown origin by surgery NP, with resolution of bloody output. Hgb stable throghout admission. Will discharge on TPN which General Surgery will oversee outpatient. He has a new PCP with [facility] and will follow up with them this week. He has a refrigerator for his TPN and completed TPN and WOC teaching successfully.  - Follow up with PCP with BMP  - Outpatient pharmacy to manage TPN  - Surgery to oversee TPN outpatient and only service that can advance diet.   - Home RN and infusion     RUL and RLL PE  SMV thrombosis  Acute RLL/RUL PE found on CT as well as SMV thrombosis. No evidence of right heart strain.  Most likely provoked in the setting of recent surgery/hospitalization.  -Eliquis 5 mg BID x 3 months. EOT 4/23/21.  -Follow up outpatient     Atrial flutter (resolved)  Developed this overnight 1/8-1/9 and required amiodarone and brief ICU stay for pressors. Cardiology consulted and initiated amiodarone.  Patient stabilized and back to sinus rhythm. No repeat episodes since that time. Patient has been here for nearly 2 months, bp's soft, no recent events of atrial flutter/fib; discussed with Cardiology and will discontinue Amiodarone.  -Continue Eliquis as above.     Severe protein-calorie malnutrition  TPN as above.     Chronic Conditions:  Hypothyroidism- Continue PTA levothyroxine.  Chronic pain- Discontinue meloxicam, resume PTA gabapentin, continue PTA percocet.  BPH-hold home Flomax given he states it does not help. Did not continue at discharge.  Depression/Insomnia - Continue Venlafaxine and gabapentin per psych. Seroquel for sleep. Atarax at bedtime.    Consultations This Hospital Stay   PHYSICAL THERAPY ADULT IP CONSULT  OCCUPATIONAL THERAPY ADULT IP CONSULT  WOUND OSTOMY CONTINENCE NURSE  IP CONSULT  PSYCHIATRY IP CONSULT  INFECTIOUS DISEASES IP CONSULT  SPIRITUAL HEALTH SERVICES IP CONSULT  SURGERY GENERAL IP  CONSULT  SOCIAL WORK IP CONSULT  PHARMACY IP CONSULT  NUTRITION SERVICES ADULT IP CONSULT  PHARMACY TO DOSE VANCO  PHARMACY/NUTRITION TO START AND MANAGE TPN  PHARMACY IP CONSULT  PHARMACY IP CONSULT  PHARMACY IP CONSULT  PHARMACY IP CONSULT  PHARMACY IP CONSULT  PHARMACY IP CONSULT  PHARMACY IP CONSULT  PHARMACY IP CONSULT  PHARMACY IP CONSULT  PHARMACY IP CONSULT  PHARMACY IP CONSULT  PHARMACY IP CONSULT  PHARMACY IP CONSULT  WOUND OSTOMY CONTINENCE NURSE  IP CONSULT  PHARMACY IP CONSULT  PHARMACY IP CONSULT  PHARMACY IP CONSULT  PHARMACY IP CONSULT  PHARMACY TO DOSE VANCO  PHARMACY IP CONSULT  INFECTIOUS DISEASES IP CONSULT  PHARMACY IP CONSULT  PHARMACY IP CONSULT  PHARMACY IP CONSULT  PHARMACY IP CONSULT  PHARMACY IP CONSULT  SPIRITUAL HEALTH SERVICES IP CONSULT  PHARMACY IP CONSULT  PHARMACY IP CONSULT  PHARMACY IP CONSULT  PHARMACY IP CONSULT  PHARMACY IP CONSULT  PHARMACY IP CONSULT  PHARMACY IP CONSULT  PHARMACY IP CONSULT  PHARMACY IP CONSULT  PHARMACY IP CONSULT  PHARMACY IP CONSULT  PHARMACY IP CONSULT  PHARMACY IP CONSULT  PHARMACY IP CONSULT  PHARMACY IP CONSULT  PHARMACY IP CONSULT  PHARMACY IP CONSULT  PHARMACY IP CONSULT  PHARMACY IP CONSULT  PHARMACY IP CONSULT  PHARMACY IP CONSULT  PHARMACY IP CONSULT  PHARMACY IP CONSULT  PHARMACY IP CONSULT  PHARMACY IP CONSULT  PHARMACY IP CONSULT  PHARMACY IP CONSULT  PHARMACY IP CONSULT  PHARMACY IP CONSULT  PHARMACY IP CONSULT  PHARMACY IP CONSULT  PHARMACY IP CONSULT    Code Status   Full Code       The patient was discussed with Dr. David Long MD  [facility] Residency Program, PGY-3  ______________________________________________________________________    Physical Exam   Vital Signs: Temp: 98.1  F (36.7  C) Temp src: Oral BP: 119/83 Pulse: 84   Resp: 16 SpO2: 93 % O2 Device: None (Room air)    Weight: 167 lbs 15.85 oz    General appearance: alert, appears stated age, cooperative and no distress, walking in hallway  without issue.  Head: Normocephalic, atraumatic.  Eyes: conjunctivae/corneas clear.  Lungs: clear to auscultation bilaterally, no increased respiratory effort.  Heart: regular rate and rhythm, no murmurs.  Abdomen: soft, non-tender; nondistended, no masses, no organomegaly.  Extremities: Abdomen: ostomy bags in place, nondistended. soft  Neurologic: Alert and oriented X 3, normal strength and tone.  Psychiatric: mood and affect appropriate.      Primary Care Physician   Physician No Ref-Primary    Discharge Orders      Home care nursing referral      Home infusion referral      Follow-up and recommended labs and tests     Follow up with primary care provider, Physician No Ref-Primary, within 7 days for hospital follow- up.  The following labs/tests are recommended: BMP.      Surgery to oversee TPN     Activity    Your activity upon discharge: activity as tolerated     Reason for your hospital stay    Enterocutaneous fistula with persistent output requiring bowel rest and TPN     MD face to face encounter    Documentation of Face to Face and Certification for Home Health Services    I certify that patient: Gurdeep Dia is under my care and that I, or a nurse practitioner or physician's assistant working with me, had a face-to-face encounter that meets the physician face-to-face encounter requirements with this patient on: 2/14/2022.    This encounter with the patient was in whole, or in part, for the following medical condition, which is the primary reason for home health care: Enterocutaneous fistula's requiring TPN.    I certify that, based on my findings, the following services are medically necessary home health services: Nursing.    My clinical findings support the need for the above services because: Nurse is needed: For complex aftercare of surgical procedures because the patient needs instruction and cannot perform care on their own due to: TPN. and To provide assessment and oversight required in the home  to assure adherence to the medical plan due to: TPN.    Further, I certify that my clinical findings support that this patient is homebound (i.e. absences from home require considerable and taxing effort and are for medical reasons or Faith services or infrequently or of short duration when for other reasons) because: Requires assistance of another person or specialized equipment to access medical services because patient: Is unable to operate assistive equipment on their own...    Based on the above findings. I certify that this patient is confined to the home and needs intermittent skilled nursing care, physical therapy and/or speech therapy.  The patient is under my care, and I have initiated the establishment of the plan of care.  This patient will be followed by a physician who will periodically review the plan of care.  Physician/Provider to provide follow up care: No Ref-Primary, Physician    Attending hospital physician (the Medicare certified [facility] provider): David Aguilera MD  Physician Signature: See electronic signature associated with these discharge orders.  Date: 2/14/2022     Diet    Follow this diet upon discharge: Regular       Significant Results and Procedures   Most Recent 3 CBC's:Recent Labs   Lab Test 02/11/22  1500 01/27/22  0723 01/24/22  0532   WBC 11.5* 9.8 9.2   HGB 10.1* 10.5* 9.8*   MCV 88 92 93   * 612* 570*     Most Recent 3 BMP's:Recent Labs   Lab Test 02/14/22  0706 02/14/22  0700 02/13/22  2151 02/11/22  2031 02/11/22  0643 02/10/22  0821 02/10/22  0741     --   --   --  140  --  141   POTASSIUM 4.5  --   --   --  4.2  --  4.7   CHLORIDE 106  --   --   --  106  --  107   CO2 27  --   --   --  26  --  24   BUN 37*  --   --   --  31*  --  33*   CR 0.92  --   --   --  0.83  --  0.85   ANIONGAP 6  --   --   --  8  --  10   VIJAYA 8.6  --   --   --  8.5  --  8.7   GLC 89 112* 110*   < > 109   < > 83    < > = values in this interval not displayed.     Most Recent 3  INR's:Recent Labs   Lab Test 02/14/22  0706 02/07/22  0606 01/31/22  0751   INR 1.36* 1.37* 1.72*   ,   Results for orders placed or performed during the hospital encounter of 12/21/21   CT Abdomen Pelvis w Contrast    Narrative    EXAM: CT ABDOMEN PELVIS W CONTRAST  LOCATION: [facility]  DATE/TIME: 12/22/2021 2:58 PM    INDICATION: Exploratory laparotomy with adhesion lysis and repair of small bowel enterotomy 11/30/2021. Abdominal abscess drain placement 12/09/2021.  COMPARISON: CT 12/16/2021, 12/08/2021  TECHNIQUE: CT scan of the abdomen and pelvis was performed following injection of IV contrast. Multiplanar reformats were obtained. Dose reduction techniques were used.  CONTRAST: isovue 370 100ml    FINDINGS:   LOWER CHEST: Right basilar consolidation could represent atelectasis or pneumonia, similar to previous.    HEPATOBILIARY: Stable hepatic cysts. Stable prominent gallbladder without definite wall thickening.    PANCREAS: Normal.    SPLEEN: Splenectomy with several left upper quadrant postsplenectomy implants.    ADRENAL GLANDS: Normal.    KIDNEYS/BLADDER: Several right renal cysts which do not require further imaging. Left renal 9 and 4 mm nonobstructive stones. Several tiny left renal cysts. No further imaging recommended.    BOWEL: A loop of small bowel appears to communicate with the left anterior abdominal wall (series 3, image 105). The abdominal wall contains air. 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    There is a right lateral abdominal staple line suggesting an ilio transverse colostomy. The right lower quadrant mesentery inferior to this contains a small amount of nonlocalized fluid and air. Also, the surgical staple line appears scattered suggesting   anastomotic breakdown.    The prerectal drainage catheter has been removed. Small residual 16 x 12 mm fluid collection (image 143).    LYMPH NODES: Several small mesenteric nodes are likely  reactive.    VASCULATURE: Patent mesenteric vessels.    PELVIC ORGANS: Normal.    MUSCULOSKELETAL: Severe degenerative change at the L4-L5 level with grade 2 anterolisthesis.      Impression    IMPRESSION:   1.  Improved prerectal fluid collection.  2.  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  3.  Possible right abdominal anastomotic dehiscence with a small amount free fluid and air in the mesentery.  4.  Report called to floor nurse Susy at 4:25 PM   US Abdomen Limited    Narrative    EXAM: US ABDOMEN LIMITED  LOCATION: [facility]  DATE/TIME: 12/27/2021 4:28 PM    INDICATION: R/o acute kat  COMPARISON: None.  TECHNIQUE: Limited abdominal ultrasound.    FINDINGS:    GALLBLADDER: Layering biliary sludge but no calcified stones. No wall thickening, or pericholecystic fluid. Negative sonographic Lake's sign.    BILE DUCTS: No biliary dilatation. The common duct measures 5 mm.    LIVER: Normal parenchyma with smooth contour. No focal mass.    RIGHT KIDNEY: No hydronephrosis. Benign, anechoic cyst measuring up to 18 mm, does not require additional workup or follow-up.    PANCREAS: The visualized portions are normal. Tail obscured by bowel gas.    No ascites.      Impression    IMPRESSION:    Echogenic biliary sludge but no well-formed stones or other sonographic findings to suggest acute gallbladder inflammation.       CT Abdomen Pelvis w Contrast    Narrative    EXAM: CT ABDOMEN PELVIS W CONTRAST  LOCATION: [facility]  DATE/TIME: 12/31/2021 11:31 AM    INDICATION: Abdominal abscess/infection suspected. Abdominal pain. Recent admission for SBO and surgical repair of perforation.  COMPARISON: 12/22/2021  TECHNIQUE: CT scan of the abdomen and pelvis was performed following injection of IV contrast. Multiplanar reformats were obtained. Dose reduction techniques were used.  CONTRAST: Isovue 370    100ml    FINDINGS:   LOWER CHEST: Stable  linear scarring.    HEPATOBILIARY: Tiny hepatic cysts unchanged.    PANCREAS: Normal.    SPLEEN: No change in presumed splenectomy.    ADRENAL GLANDS: Normal.    KIDNEYS/BLADDER: Left lower pole stones unchanged, no hydronephrosis. Exophytic benign 2 cm right renal cysts. No right-sided stones or hydronephrosis.    BOWEL: There is been some improvement in the inflammatory reaction involving right lower quadrant with decreased soft tissue stranding and decreasing extraluminal air bubbles. Though extraluminal air bubbles and scattered tiny pockets of fluid. The   largest apparent extraluminal air pocket on series 3 image 114 measures 2.7 x 1.1 cm but there is no associated fluid. No sizable fluid collection, nothing amenable to percutaneous drainage. Numerous surgical clips are seen in the region of soft tissue   stranding along right lower quadrant.    There is no bowel obstruction. There remains air present along the base of the midline incision and an endometrial cutaneous fistula is likely but this is unchanged.    LYMPH NODES: Normal.    VASCULATURE: Unremarkable.    PELVIC ORGANS: Normal.    MUSCULOSKELETAL: High-grade degenerative changes at L4-5 and spondylolisthesis unchanged.      Impression    IMPRESSION:   1.  Inflammatory soft tissue stranding, modest edema and tiny fluid pockets as well as extraluminal air persists within right lower quadrant at site of previous surgery though overall there has been mild improvement. There is no new collection or   drainable abscess.  2.  No significant change in the air collection seen along midline incision suggesting enterocutaneous fistula.   XR Chest Port 1 View    Narrative    EXAM: XR CHEST PORT 1 VIEW  LOCATION: [facility]  DATE/TIME: 1/6/2022 8:56 AM    INDICATION: Cough.  COMPARISON: 12/06/2021      Impression    IMPRESSION: Mild bibasilar interstitial opacities may represent atelectasis or infection. No pleural effusion. The cardiac  silhouette and pulmonary vasculature are normal. Stable right PICC.   CT Abdomen Pelvis w Contrast     Value    Radiologist flags (SHAMEKA)     Pulmonary emboli and additional findings in impressions 2 through 4    Narrative    EXAM: CT ABDOMEN PELVIS W CONTRAST  LOCATION: [facility]  DATE/TIME: 1/6/2022 12:33 PM    INDICATION: Increasing WBC, h/o surgery and fistula, concern for new infection.  RLQ tenderness.  COMPARISON: CT abdomen and pelvis 12/31/2021  TECHNIQUE: CT scan of the abdomen and pelvis was performed following injection of IV contrast. Multiplanar reformats were obtained. Dose reduction techniques were used.  CONTRAST: IsoVue 370 100mL    FINDINGS:   LOWER CHEST: Bibasilar scarring. New pulmonary emboli in the right lower lobe.    HEPATOBILIARY: No significant mass or bile duct dilatation. No calcified gallstones.     PANCREAS: No significant mass, duct dilatation, or inflammatory change.    SPLEEN: Splenectomy. Unchanged soft tissue nodules in the left upper quadrant likely representing splenosis.    ADRENAL GLANDS: Normal.    KIDNEYS/BLADDER: Unchanged left renal calculi. No hydronephrosis.    BOWEL: Ileocolic anastomosis in the right hemiabdomen.     There is dehiscence of an enteroenteric anastomosis in the right lower quadrant with scattered foci of extraluminal gas and ill-defined fluid and fat stranding. No drainable abscess. This appears similar to prior.    Unchanged enterocutaneous fistula with gas and fluid collection in the midline ventral abdominal wall communicating with the skin surface and a collection device in place. No bowel obstruction.    LYMPH NODES: Normal.    VASCULATURE: Nonocclusive thrombus in the superior mesenteric vein extending into a right lower quadrant mesenteric venous branch.    PELVIC ORGANS: Normal.    MUSCULOSKELETAL: No suspicious osseous lesions.      Impression    IMPRESSION:   1.  There is new acute pulmonary emboli in the right lower  lobe.    2.  There is thrombus in the superior mesenteric vein and a mesenteric venous branch in the right lower quadrant.    3.  There is again dehiscence of an enteroenteric anastomosis in the right lower quadrant with scattered extraluminal gas and fluid without drainable abscess.    4.  Unchanged enterocutaneous fistula.      [Critical Result: Pulmonary emboli] and additional findings in impressions 2 through 4    Finding was identified on 1/6/2022 12:43 PM.     Dr. Lees was contacted by me on 1/6/2022 1:12 PM and verbalized understanding of the critical result.    XR Chest Port 1 View    Narrative    EXAM: XR CHEST PORT 1 VIEW  LOCATION: [facility]  DATE/TIME: 1/8/2022 7:39 PM    INDICATION: Increased oxygen requirement.  COMPARISON: 01/06/2022.      Impression    IMPRESSION: No change in right upper extremity PICC line. Band-like opacity involving the right middle lobe and left lung base have slightly increased from prior study and representing either atelectasis or infectious process. No pleural effusion or   pneumothorax.   CT Chest Pulmonary Embolism w Contrast    Narrative    EXAM: CT CHEST PULMONARY EMBOLISM W CONTRAST  LOCATION: [facility]  DATE/TIME: 1/8/2022 9:52 PM    INDICATION: Right lower lobe pulmonary embolus. Increasing oxygen requirement. Tachycardia.  COMPARISON: None.  TECHNIQUE: CT chest pulmonary angiogram during arterial phase injection of IV contrast. Multiplanar reformats and MIP reconstructions were performed. Dose reduction techniques were used.   CONTRAST: Isovue 370 100 ml.    FINDINGS:  ANGIOGRAM CHEST: There are emboli in branches of the right lower lobe pulmonary artery and right upper lobe pulmonary artery. No left-sided pulmonary embolus or saddle embolus. Thoracic aorta is negative for dissection. No CT evidence of right heart   strain.    LUNGS AND PLEURA: Right lower lobe consolidation may be pulmonary infarct. Dependent  atelectasis bilaterally. Few peripheral bands of scar or atelectasis. No pneumothorax or pleural effusion.    MEDIASTINUM/AXILLAE: No lymph node enlargement.    CORONARY ARTERY CALCIFICATION: Mild.    UPPER ABDOMEN: Right abdominal inflammation and a midline enterocutaneous fistula. Refer to the accompanying abdomen CT for more detailed description.    MUSCULOSKELETAL: Normal.      Impression    IMPRESSION:  1.  Right upper lobe and right lower lobe pulmonary emboli. No right heart strain.  2.  Right basilar atelectasis or pulmonary infarct.   CT Abdomen Pelvis w Contrast    Narrative    EXAM: CT ABDOMEN PELVIS W CONTRAST  LOCATION: [facility]  DATE/TIME: 1/8/2022 10:00 PM    INDICATION: Abdominal pain, fever.  COMPARISON: 1/6/2022.  TECHNIQUE: CT scan of the abdomen and pelvis was performed following injection of IV contrast. Multiplanar reformats were obtained. Dose reduction techniques were used.  CONTRAST: Isovue 370 100 ml.    FINDINGS:   LOWER CHEST: There are again emboli in branches of the right lower lobe pulmonary artery. There is consolidation or pulmonary infiltrate at the right lung base. Dependent atelectasis bilaterally.    HEPATOBILIARY: Cyst in the liver.    PANCREAS: Normal.    SPLEEN: Previous splenectomy. Stable left upper quadrant splenules.    ADRENAL GLANDS: Normal.    KIDNEYS/BLADDER: 3 stones in lower pole of the left kidney measuring up to 5 mm. No ureteral stone or hydronephrosis. Renal cortical cysts bilaterally.    BOWEL: There is again evidence of anastomotic leak in the right lower quadrant with a small amount of extraluminal gas and increasing right abdominal inflammation. Small bowel loop proximal to the anastomosis is now moderately dilated. There is again   evidence of an enterocutaneous fistula in the midline with a small gas and fluid collection in the subcutaneous tissues measuring up to 3.3 cm.    LYMPH NODES: Normal.    VASCULATURE: There is again  nonocclusive thrombus in the superior mesenteric vein. Atherosclerotic calcification of the aorta and its branches. No aneurysm.    PELVIC ORGANS: Normal.    MUSCULOSKELETAL: Degenerative disease in the spine. Bilateral L4 pars interarticularis defects.      Impression    IMPRESSION:   1.  There is again evidence of an anastomotic leak in the right abdomen. Increasing inflammation in the adjacent fat. Small amount of extraluminal gas.  2.  There is new dilatation of small bowel proximal to the surgical anastomosis consistent with obstruction.  3.  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  4.  Right lower lobe pulmonary embolus and right basilar consolidation without significant change.  5.  Left renal stones. No ureteral stone or hydronephrosis.       Discharge Medications   Current Discharge Medication List      START taking these medications    Details   acetaminophen (TYLENOL) 325 MG tablet Take 2 tablets (650 mg) by mouth every 4 hours as needed for mild pain or fever  Qty: 120 tablet, Refills: 0    Associated Diagnoses: Abdominal pain, generalized; Perforation of intestine (H)      apixaban ANTICOAGULANT (ELIQUIS) 5 MG tablet Take 1 tablet (5 mg) by mouth 2 times daily  Qty: 136 tablet, Refills: 0    Associated Diagnoses: Pulmonary embolism without acute cor pulmonale, unspecified chronicity, unspecified pulmonary embolism type (H)      hydrOXYzine (ATARAX) 25 MG tablet Take 1 tablet (25 mg) by mouth At Bedtime  Qty: 30 tablet, Refills: 0    Associated Diagnoses: Moderate episode of recurrent major depressive disorder (H); RAVI (generalized anxiety disorder)      pantoprazole (PROTONIX) 40 MG EC tablet Take 1 tablet (40 mg) by mouth every morning (before breakfast)  Qty: 30 tablet, Refills: 0    Associated Diagnoses: Perforation of intestine (H)         CONTINUE these medications which have CHANGED    Details   !! gabapentin (NEURONTIN) 100 MG capsule Take 2 capsules (200 mg) by mouth 2 times daily  Qty: 120  capsule, Refills: 0    Associated Diagnoses: Chronic bilateral low back pain without sciatica      !! gabapentin (NEURONTIN) 300 MG capsule Take 3 capsules (900 mg) by mouth At Bedtime  Qty: 90 capsule, Refills: 0    Associated Diagnoses: Chronic bilateral low back pain without sciatica      levothyroxine (SYNTHROID/LEVOTHROID) 25 MCG tablet Take 1 tablet (25 mcg) by mouth daily  Qty: 30 tablet, Refills: 0    Associated Diagnoses: Hypothyroidism, unspecified type      lipids (INTRALIPID) 20 % infusion Inject 250 mLs into the vein in the morning, Mon and Thur  Qty: 50 mL, Refills: 4    Associated Diagnoses: Vesicocutaneous fistula      oxyCODONE-acetaminophen (PERCOCET) 5-325 MG tablet Take 1 tablet by mouth every 6 hours as needed for pain  Qty: 12 tablet, Refills: 0    Associated Diagnoses: Perforation of intestine (H)      parenteral nutrition - PTA/DISCHARGE ORDER The TPN formula will print on the After Visit Summary Report.  Qty: 1 each, Refills: 0    Associated Diagnoses: Vesicocutaneous fistula      QUEtiapine (SEROQUEL) 200 MG tablet Take 1 tablet (200 mg) by mouth At Bedtime  Qty: 30 tablet, Refills: 0    Associated Diagnoses: Moderate episode of recurrent major depressive disorder (H); RAVI (generalized anxiety disorder)      venlafaxine (EFFEXOR-XR) 75 MG 24 hr capsule Take 1 capsule (75 mg) by mouth daily  Qty: 30 capsule, Refills: 1    Associated Diagnoses: Moderate episode of recurrent major depressive disorder (H); RAVI (generalized anxiety disorder)       !! - Potential duplicate medications found. Please discuss with provider.      STOP taking these medications       busPIRone (BUSPAR) 15 MG tablet Comments:   Reason for Stopping:         lidocaine (LMX4) 4 % external cream Comments:   Reason for Stopping:         mirtazapine (REMERON) 30 MG tablet Comments:   Reason for Stopping:         omeprazole (PRILOSEC) 20 MG DR capsule Comments:   Reason for Stopping:         sodium chloride, PF, 0.9% PF flush  Comments:   Reason for Stopping:         tamsulosin (FLOMAX) 0.4 MG capsule Comments:   Reason for Stopping:         triamcinolone (NASACORT) 55 MCG/ACT nasal aerosol Comments:   Reason for Stopping:             Allergies   Allergies   Allergen Reactions     Penicillins Hives

## 2022-02-14 NOTE — PROGRESS NOTES
"CLINICAL NUTRITION SERVICES - REASSESSMENT NOTE     Nutrition Prescription    RECOMMENDATIONS FOR MDs/PROVIDERS TO ORDER:    Malnutrition Status:    Severe    Recommendations already ordered by Registered Dietitian (RD):  Cyclic TPN: D 350  @ 85 ml/hr x 1 hr, 170 ml/hr x 10 hrs,   85 ml/hr x 1 hr and off.  250 ml of 20% lipids twice per week on  Monday and Thursday.  TPN/lipids provide: 1853 Calories, 130 g  Protein, 350 g CHO, 14 g fat and 1870 ml fluid/day    Future/Additional Recommendations:  Adjust TPN pending tolerance and nutritional needs/hydration  As pt encouraged not to take in much orally     EVALUATION OF THE PROGRESS TOWARD GOALS   Diet: Clear liquids  Nutrition Support: TPN as documented above  Intake: Just taking sips and chips     ANTHROPOMETRICS  Height: 165.6 cm (5' 5.2\")  Most Recent Weight: 76.1 kg (167 lb 11.2 oz)    Weight History:   Wt Readings from Last 10 Encounters:   02/07/22 76.1 kg (167 lb 11.2 oz)   12/17/21 76.8 kg (169 lb 6.4 oz)   01/22/20 79.4 kg (175 lb)   12/24/19 81.2 kg (179 lb)     PHYSICAL FINDINGS  See malnutrition section below.  Ostomy/fistulas    GI CONCERNS  Midline abdominal fistula x 2 (ostomy pouches x 2 for fistulas)  Normoactive BS all quadrants  Abdominal discomfort    LABS  Reviewed: No new labs    MEDICATIONS  Reviewed: levothyroxine, pantoprazole    Malnutrition Diagnosis: Severe malnutrition  In Context of:  Acute illness or injury    CURRENT NUTRITION DIAGNOSIS  Inadequate oral intake related to SBO with formation of enterocutaneous fistulas as evidenced by Abdominal discomfort and need for clear liquid diet    INTERVENTIONS  Implementation  No changes recommended in TPN regimen today.  If pt does not discharge today recommend same TPN regimen    Goals  Meet nutritional needs-met  Wound healing-progressing  No significant dry weight loss-progressing (last weight was 2/7/22)    Monitoring/Evaluation  Progress toward goals will be monitored and evaluated " per protocol.

## 2022-02-15 ENCOUNTER — HOME INFUSION (PRE-WILLOW HOME INFUSION) (OUTPATIENT)
Dept: PHARMACY | Facility: CLINIC | Age: 67
End: 2022-02-15

## 2022-02-15 ENCOUNTER — PATIENT OUTREACH (OUTPATIENT)
Dept: CARE COORDINATION | Facility: CLINIC | Age: 67
End: 2022-02-15
Payer: COMMERCIAL

## 2022-02-15 DIAGNOSIS — Z71.89 OTHER SPECIFIED COUNSELING: ICD-10-CM

## 2022-02-15 NOTE — PROGRESS NOTES
Clinic Care Coordination Contact    Background: Care Coordination referral placed from [facility] discharge report for reason of patient meeting criteria for a TCM outreach call by [facility] team.    Assessment: Upon chart review, CCR Team member will cancel/close the referral for TCM outreach due to reason below:    Patient has discharged to a group home.    CHW called primary number and patient's brother in law answered. He stated that he drove patient to a group home and asked that his number only be used as an emergency contact. CHW removed brother in law's phone number from the phone numbers listed for the patient and maintained his number as an emergency contact. Patient currently does not have his own phone number.    Plan: Care Coordination referral for TCM outreach canceled.    Taryn Christiansen  Community Health Worker  [facility]  Ph: [phone]

## 2022-02-16 ENCOUNTER — LAB REQUISITION (OUTPATIENT)
Dept: LAB | Facility: CLINIC | Age: 67
End: 2022-02-16
Payer: COMMERCIAL

## 2022-02-16 ENCOUNTER — HOME INFUSION (PRE-WILLOW HOME INFUSION) (OUTPATIENT)
Dept: PHARMACY | Facility: CLINIC | Age: 67
End: 2022-02-16

## 2022-02-16 DIAGNOSIS — K56.609 UNSPECIFIED INTESTINAL OBSTRUCTION, UNSPECIFIED AS TO PARTIAL VERSUS COMPLETE OBSTRUCTION (H): ICD-10-CM

## 2022-02-16 LAB
ALBUMIN SERPL-MCNC: 2.8 G/DL (ref 3.4–5)
ALP SERPL-CCNC: 145 U/L (ref 40–150)
ALT SERPL W P-5'-P-CCNC: 22 U/L (ref 0–70)
ANION GAP SERPL CALCULATED.3IONS-SCNC: 4 MMOL/L (ref 3–14)
AST SERPL W P-5'-P-CCNC: 22 U/L (ref 0–45)
BASOPHILS # BLD AUTO: 0.1 10E3/UL (ref 0–0.2)
BASOPHILS NFR BLD AUTO: 1 %
BILIRUB DIRECT SERPL-MCNC: 0.2 MG/DL (ref 0–0.2)
BILIRUB SERPL-MCNC: 0.4 MG/DL (ref 0.2–1.3)
BILIRUB SERPL-MCNC: 0.4 MG/DL (ref 0.2–1.3)
BUN SERPL-MCNC: 42 MG/DL (ref 7–30)
CALCIUM SERPL-MCNC: 9.2 MG/DL (ref 8.5–10.1)
CHLORIDE BLD-SCNC: 108 MMOL/L (ref 94–109)
CO2 SERPL-SCNC: 27 MMOL/L (ref 20–32)
CREAT SERPL-MCNC: 1.12 MG/DL (ref 0.66–1.25)
EOSINOPHIL # BLD AUTO: 0.3 10E3/UL (ref 0–0.7)
EOSINOPHIL NFR BLD AUTO: 2 %
ERYTHROCYTE [DISTWIDTH] IN BLOOD BY AUTOMATED COUNT: 15.3 % (ref 10–15)
FASTING STATUS PATIENT QL REPORTED: NORMAL
GFR SERPL CREATININE-BSD FRML MDRD: 72 ML/MIN/1.73M2
GLUCOSE BLD-MCNC: 110 MG/DL (ref 70–99)
HCT VFR BLD AUTO: 32.8 % (ref 40–53)
HGB BLD-MCNC: 10.4 G/DL (ref 13.3–17.7)
HOLD SPECIMEN: NORMAL
IMM GRANULOCYTES # BLD: 0 10E3/UL
IMM GRANULOCYTES NFR BLD: 0 %
LYMPHOCYTES # BLD AUTO: 5.4 10E3/UL (ref 0.8–5.3)
LYMPHOCYTES NFR BLD AUTO: 44 %
MAGNESIUM SERPL-MCNC: 2.3 MG/DL (ref 1.6–2.3)
MCH RBC QN AUTO: 27.2 PG (ref 26.5–33)
MCHC RBC AUTO-ENTMCNC: 31.7 G/DL (ref 31.5–36.5)
MCV RBC AUTO: 86 FL (ref 78–100)
MONOCYTES # BLD AUTO: 1.2 10E3/UL (ref 0–1.3)
MONOCYTES NFR BLD AUTO: 10 %
NEUTROPHILS # BLD AUTO: 5.3 10E3/UL (ref 1.6–8.3)
NEUTROPHILS NFR BLD AUTO: 43 %
NRBC # BLD AUTO: 0 10E3/UL
NRBC BLD AUTO-RTO: 0 /100
PHOSPHATE SERPL-MCNC: 3.7 MG/DL (ref 2.5–4.5)
PLAT MORPH BLD: NORMAL
PLATELET # BLD AUTO: 664 10E3/UL (ref 150–450)
POTASSIUM BLD-SCNC: 4.1 MMOL/L (ref 3.4–5.3)
PROT SERPL-MCNC: 8.7 G/DL (ref 6.8–8.8)
RBC # BLD AUTO: 3.82 10E6/UL (ref 4.4–5.9)
RBC MORPH BLD: NORMAL
SODIUM SERPL-SCNC: 139 MMOL/L (ref 133–144)
TRIGL SERPL-MCNC: 115 MG/DL
WBC # BLD AUTO: 12.3 10E3/UL (ref 4–11)

## 2022-02-16 PROCEDURE — 82248 BILIRUBIN DIRECT: CPT | Performed by: SPECIALIST

## 2022-02-16 PROCEDURE — 85025 COMPLETE CBC W/AUTO DIFF WBC: CPT | Performed by: SPECIALIST

## 2022-02-16 PROCEDURE — 83735 ASSAY OF MAGNESIUM: CPT | Performed by: SPECIALIST

## 2022-02-16 PROCEDURE — 84478 ASSAY OF TRIGLYCERIDES: CPT | Performed by: SPECIALIST

## 2022-02-16 PROCEDURE — 84100 ASSAY OF PHOSPHORUS: CPT | Performed by: SPECIALIST

## 2022-02-16 PROCEDURE — 80053 COMPREHEN METABOLIC PANEL: CPT | Performed by: SPECIALIST

## 2022-02-17 ENCOUNTER — MEDICAL CORRESPONDENCE (OUTPATIENT)
Dept: HEALTH INFORMATION MANAGEMENT | Facility: CLINIC | Age: 67
End: 2022-02-17
Payer: COMMERCIAL

## 2022-02-17 ENCOUNTER — HOME INFUSION (PRE-WILLOW HOME INFUSION) (OUTPATIENT)
Dept: PHARMACY | Facility: CLINIC | Age: 67
End: 2022-02-17

## 2022-02-18 ENCOUNTER — HOME INFUSION (PRE-WILLOW HOME INFUSION) (OUTPATIENT)
Dept: PHARMACY | Facility: CLINIC | Age: 67
End: 2022-02-18

## 2022-02-19 ENCOUNTER — HOME INFUSION (PRE-WILLOW HOME INFUSION) (OUTPATIENT)
Dept: PHARMACY | Facility: CLINIC | Age: 67
End: 2022-02-19
Payer: COMMERCIAL

## 2022-02-20 ENCOUNTER — HOME INFUSION (PRE-WILLOW HOME INFUSION) (OUTPATIENT)
Dept: PHARMACY | Facility: CLINIC | Age: 67
End: 2022-02-20
Payer: COMMERCIAL

## 2022-02-21 ENCOUNTER — HOME INFUSION (PRE-WILLOW HOME INFUSION) (OUTPATIENT)
Dept: PHARMACY | Facility: CLINIC | Age: 67
End: 2022-02-21

## 2022-02-21 ASSESSMENT — ENCOUNTER SYMPTOMS: FEVER: 1

## 2022-02-22 ENCOUNTER — HOME INFUSION (PRE-WILLOW HOME INFUSION) (OUTPATIENT)
Dept: PHARMACY | Facility: CLINIC | Age: 67
End: 2022-02-22

## 2022-02-22 ENCOUNTER — LAB REQUISITION (OUTPATIENT)
Dept: LAB | Facility: CLINIC | Age: 67
End: 2022-02-22
Payer: COMMERCIAL

## 2022-02-22 DIAGNOSIS — K56.609 UNSPECIFIED INTESTINAL OBSTRUCTION, UNSPECIFIED AS TO PARTIAL VERSUS COMPLETE OBSTRUCTION (H): ICD-10-CM

## 2022-02-22 LAB
ALBUMIN SERPL-MCNC: 3 G/DL (ref 3.4–5)
ALP SERPL-CCNC: 145 U/L (ref 40–150)
ALT SERPL W P-5'-P-CCNC: 27 U/L (ref 0–70)
ANION GAP SERPL CALCULATED.3IONS-SCNC: 7 MMOL/L (ref 3–14)
AST SERPL W P-5'-P-CCNC: 25 U/L (ref 0–45)
BASOPHILS # BLD AUTO: 0.1 10E3/UL (ref 0–0.2)
BASOPHILS NFR BLD AUTO: 1 %
BILIRUB DIRECT SERPL-MCNC: 0.2 MG/DL (ref 0–0.2)
BILIRUB SERPL-MCNC: 0.6 MG/DL (ref 0.2–1.3)
BUN SERPL-MCNC: 34 MG/DL (ref 7–30)
CALCIUM SERPL-MCNC: 9.2 MG/DL (ref 8.5–10.1)
CHLORIDE BLD-SCNC: 103 MMOL/L (ref 94–109)
CO2 SERPL-SCNC: 28 MMOL/L (ref 20–32)
CREAT SERPL-MCNC: 1.14 MG/DL (ref 0.66–1.25)
EOSINOPHIL # BLD AUTO: 0.2 10E3/UL (ref 0–0.7)
EOSINOPHIL NFR BLD AUTO: 1 %
ERYTHROCYTE [DISTWIDTH] IN BLOOD BY AUTOMATED COUNT: 15.9 % (ref 10–15)
FASTING STATUS PATIENT QL REPORTED: NORMAL
GFR SERPL CREATININE-BSD FRML MDRD: 70 ML/MIN/1.73M2
GLUCOSE BLD-MCNC: 124 MG/DL (ref 70–99)
HCT VFR BLD AUTO: 33.9 % (ref 40–53)
HGB BLD-MCNC: 10.8 G/DL (ref 13.3–17.7)
IMM GRANULOCYTES # BLD: 0 10E3/UL
IMM GRANULOCYTES NFR BLD: 0 %
LYMPHOCYTES # BLD AUTO: 8.8 10E3/UL (ref 0.8–5.3)
LYMPHOCYTES NFR BLD AUTO: 56 %
MAGNESIUM SERPL-MCNC: 2.4 MG/DL (ref 1.6–2.3)
MCH RBC QN AUTO: 27.3 PG (ref 26.5–33)
MCHC RBC AUTO-ENTMCNC: 31.9 G/DL (ref 31.5–36.5)
MCV RBC AUTO: 86 FL (ref 78–100)
MONOCYTES # BLD AUTO: 1.1 10E3/UL (ref 0–1.3)
MONOCYTES NFR BLD AUTO: 7 %
NEUTROPHILS # BLD AUTO: 5.5 10E3/UL (ref 1.6–8.3)
NEUTROPHILS NFR BLD AUTO: 35 %
NRBC # BLD AUTO: 0 10E3/UL
NRBC BLD AUTO-RTO: 0 /100
PHOSPHATE SERPL-MCNC: 3 MG/DL (ref 2.5–4.5)
PLAT MORPH BLD: NORMAL
PLATELET # BLD AUTO: 608 10E3/UL (ref 150–450)
POTASSIUM BLD-SCNC: 3.4 MMOL/L (ref 3.4–5.3)
PROT SERPL-MCNC: 8.7 G/DL (ref 6.8–8.8)
RBC # BLD AUTO: 3.96 10E6/UL (ref 4.4–5.9)
RBC MORPH BLD: NORMAL
SODIUM SERPL-SCNC: 138 MMOL/L (ref 133–144)
TRIGL SERPL-MCNC: 133 MG/DL
WBC # BLD AUTO: 15.8 10E3/UL (ref 4–11)

## 2022-02-22 PROCEDURE — 85025 COMPLETE CBC W/AUTO DIFF WBC: CPT | Performed by: SPECIALIST

## 2022-02-22 PROCEDURE — 84478 ASSAY OF TRIGLYCERIDES: CPT | Performed by: SPECIALIST

## 2022-02-22 PROCEDURE — 84100 ASSAY OF PHOSPHORUS: CPT | Performed by: SPECIALIST

## 2022-02-22 PROCEDURE — 80053 COMPREHEN METABOLIC PANEL: CPT | Performed by: SPECIALIST

## 2022-02-22 PROCEDURE — 83735 ASSAY OF MAGNESIUM: CPT | Performed by: SPECIALIST

## 2022-02-22 PROCEDURE — 82248 BILIRUBIN DIRECT: CPT | Performed by: SPECIALIST

## 2022-02-23 ENCOUNTER — HOME INFUSION (PRE-WILLOW HOME INFUSION) (OUTPATIENT)
Dept: PHARMACY | Facility: CLINIC | Age: 67
End: 2022-02-23

## 2022-02-23 ENCOUNTER — TELEPHONE (OUTPATIENT)
Dept: SURGERY | Facility: CLINIC | Age: 67
End: 2022-02-23
Payer: COMMERCIAL

## 2022-02-23 NOTE — TELEPHONE ENCOUNTER
Patient had surgery with Dr. Richardson and is having issues with his ostomy can't get bags to seal.  Been in and out of [facility] and was told he needs to talk to Dr. Richardson.    Please call patient and advise.

## 2022-02-24 ENCOUNTER — HOME INFUSION (PRE-WILLOW HOME INFUSION) (OUTPATIENT)
Dept: PHARMACY | Facility: CLINIC | Age: 67
End: 2022-02-24

## 2022-02-24 ENCOUNTER — TELEPHONE (OUTPATIENT)
Dept: SURGERY | Facility: CLINIC | Age: 67
End: 2022-02-24
Payer: COMMERCIAL

## 2022-02-24 ENCOUNTER — APPOINTMENT (OUTPATIENT)
Dept: GENERAL RADIOLOGY | Facility: CLINIC | Age: 67
End: 2022-02-24
Attending: STUDENT IN AN ORGANIZED HEALTH CARE EDUCATION/TRAINING PROGRAM
Payer: COMMERCIAL

## 2022-02-24 ENCOUNTER — APPOINTMENT (OUTPATIENT)
Dept: CT IMAGING | Facility: CLINIC | Age: 67
End: 2022-02-24
Attending: STUDENT IN AN ORGANIZED HEALTH CARE EDUCATION/TRAINING PROGRAM
Payer: COMMERCIAL

## 2022-02-24 ENCOUNTER — HOSPITAL ENCOUNTER (OUTPATIENT)
Facility: CLINIC | Age: 67
Setting detail: OBSERVATION
Discharge: HOME HEALTH | End: 2022-02-28
Attending: STUDENT IN AN ORGANIZED HEALTH CARE EDUCATION/TRAINING PROGRAM | Admitting: STUDENT IN AN ORGANIZED HEALTH CARE EDUCATION/TRAINING PROGRAM
Payer: COMMERCIAL

## 2022-02-24 ENCOUNTER — TELEPHONE (OUTPATIENT)
Dept: VASCULAR SURGERY | Facility: CLINIC | Age: 67
End: 2022-02-24
Payer: COMMERCIAL

## 2022-02-24 DIAGNOSIS — K94.19 ALTERED BOWEL ELIMINATION DUE TO INTESTINAL OSTOMY (H): ICD-10-CM

## 2022-02-24 DIAGNOSIS — U07.1 LAB TEST POSITIVE FOR DETECTION OF COVID-19 VIRUS: ICD-10-CM

## 2022-02-24 DIAGNOSIS — Z93.9 HISTORY OF CREATION OF OSTOMY (H): ICD-10-CM

## 2022-02-24 DIAGNOSIS — L98.9 SKIN EROSION: ICD-10-CM

## 2022-02-24 DIAGNOSIS — R10.84 ABDOMINAL PAIN, GENERALIZED: ICD-10-CM

## 2022-02-24 DIAGNOSIS — N17.9 AKI (ACUTE KIDNEY INJURY) (H): Primary | ICD-10-CM

## 2022-02-24 LAB
ALBUMIN SERPL-MCNC: 3.4 G/DL (ref 3.4–5)
ALP SERPL-CCNC: 169 U/L (ref 40–150)
ALT SERPL W P-5'-P-CCNC: 44 U/L (ref 0–70)
ANION GAP SERPL CALCULATED.3IONS-SCNC: 8 MMOL/L (ref 3–14)
AST SERPL W P-5'-P-CCNC: 42 U/L (ref 0–45)
ATRIAL RATE - MUSE: 88 BPM
ATRIAL RATE - MUSE: 92 BPM
BASOPHILS # BLD AUTO: 0.1 10E3/UL (ref 0–0.2)
BASOPHILS NFR BLD AUTO: 1 %
BILIRUB DIRECT SERPL-MCNC: 0.3 MG/DL (ref 0–0.2)
BILIRUB SERPL-MCNC: 0.6 MG/DL (ref 0.2–1.3)
BUN SERPL-MCNC: 38 MG/DL (ref 7–30)
CALCIUM SERPL-MCNC: 9.7 MG/DL (ref 8.5–10.1)
CHLORIDE BLD-SCNC: 96 MMOL/L (ref 94–109)
CO2 SERPL-SCNC: 30 MMOL/L (ref 20–32)
CREAT SERPL-MCNC: 1.56 MG/DL (ref 0.66–1.25)
DIASTOLIC BLOOD PRESSURE - MUSE: NORMAL MMHG
DIASTOLIC BLOOD PRESSURE - MUSE: NORMAL MMHG
EOSINOPHIL # BLD AUTO: 0 10E3/UL (ref 0–0.7)
EOSINOPHIL NFR BLD AUTO: 0 %
ERYTHROCYTE [DISTWIDTH] IN BLOOD BY AUTOMATED COUNT: 16.3 % (ref 10–15)
GFR SERPL CREATININE-BSD FRML MDRD: 48 ML/MIN/1.73M2
GLUCOSE BLD-MCNC: 106 MG/DL (ref 70–99)
HCT VFR BLD AUTO: 37.5 % (ref 40–53)
HGB BLD-MCNC: 11.8 G/DL (ref 13.3–17.7)
IMM GRANULOCYTES # BLD: 0 10E3/UL
IMM GRANULOCYTES NFR BLD: 0 %
INTERPRETATION ECG - MUSE: NORMAL
INTERPRETATION ECG - MUSE: NORMAL
LACTATE SERPL-SCNC: 1.2 MMOL/L (ref 0.7–2)
LYMPHOCYTES # BLD AUTO: 5.1 10E3/UL (ref 0.8–5.3)
LYMPHOCYTES NFR BLD AUTO: 43 %
MAGNESIUM SERPL-MCNC: 2.3 MG/DL (ref 1.6–2.3)
MCH RBC QN AUTO: 27.2 PG (ref 26.5–33)
MCHC RBC AUTO-ENTMCNC: 31.5 G/DL (ref 31.5–36.5)
MCV RBC AUTO: 86 FL (ref 78–100)
MONOCYTES # BLD AUTO: 1 10E3/UL (ref 0–1.3)
MONOCYTES NFR BLD AUTO: 9 %
NEUTROPHILS # BLD AUTO: 5.7 10E3/UL (ref 1.6–8.3)
NEUTROPHILS NFR BLD AUTO: 47 %
NRBC # BLD AUTO: 0 10E3/UL
NRBC BLD AUTO-RTO: 0 /100
P AXIS - MUSE: 16 DEGREES
P AXIS - MUSE: 9 DEGREES
PHOSPHATE SERPL-MCNC: 4.5 MG/DL (ref 2.5–4.5)
PLAT MORPH BLD: NORMAL
PLATELET # BLD AUTO: 600 10E3/UL (ref 150–450)
POTASSIUM BLD-SCNC: 3.7 MMOL/L (ref 3.4–5.3)
PR INTERVAL - MUSE: 158 MS
PR INTERVAL - MUSE: 158 MS
PROT SERPL-MCNC: 9.6 G/DL (ref 6.8–8.8)
QRS DURATION - MUSE: 86 MS
QRS DURATION - MUSE: 94 MS
QT - MUSE: 374 MS
QT - MUSE: 394 MS
QTC - MUSE: 462 MS
QTC - MUSE: 476 MS
R AXIS - MUSE: -34 DEGREES
R AXIS - MUSE: -34 DEGREES
RBC # BLD AUTO: 4.34 10E6/UL (ref 4.4–5.9)
RBC MORPH BLD: NORMAL
SODIUM SERPL-SCNC: 134 MMOL/L (ref 133–144)
SYSTOLIC BLOOD PRESSURE - MUSE: NORMAL MMHG
SYSTOLIC BLOOD PRESSURE - MUSE: NORMAL MMHG
T AXIS - MUSE: -10 DEGREES
T AXIS - MUSE: -6 DEGREES
TRIGL SERPL-MCNC: 98 MG/DL
TROPONIN I SERPL HS-MCNC: 10 NG/L
VENTRICULAR RATE- MUSE: 88 BPM
VENTRICULAR RATE- MUSE: 92 BPM
WBC # BLD AUTO: 12 10E3/UL (ref 4–11)

## 2022-02-24 PROCEDURE — 82248 BILIRUBIN DIRECT: CPT | Performed by: STUDENT IN AN ORGANIZED HEALTH CARE EDUCATION/TRAINING PROGRAM

## 2022-02-24 PROCEDURE — 85025 COMPLETE CBC W/AUTO DIFF WBC: CPT | Performed by: STUDENT IN AN ORGANIZED HEALTH CARE EDUCATION/TRAINING PROGRAM

## 2022-02-24 PROCEDURE — 99285 EMERGENCY DEPT VISIT HI MDM: CPT | Mod: 25 | Performed by: STUDENT IN AN ORGANIZED HEALTH CARE EDUCATION/TRAINING PROGRAM

## 2022-02-24 PROCEDURE — 71045 X-RAY EXAM CHEST 1 VIEW: CPT | Mod: 26 | Performed by: RADIOLOGY

## 2022-02-24 PROCEDURE — 83605 ASSAY OF LACTIC ACID: CPT | Performed by: STUDENT IN AN ORGANIZED HEALTH CARE EDUCATION/TRAINING PROGRAM

## 2022-02-24 PROCEDURE — 74177 CT ABD & PELVIS W/CONTRAST: CPT | Mod: 26 | Performed by: RADIOLOGY

## 2022-02-24 PROCEDURE — 83735 ASSAY OF MAGNESIUM: CPT | Performed by: STUDENT IN AN ORGANIZED HEALTH CARE EDUCATION/TRAINING PROGRAM

## 2022-02-24 PROCEDURE — G0378 HOSPITAL OBSERVATION PER HR: HCPCS

## 2022-02-24 PROCEDURE — 84478 ASSAY OF TRIGLYCERIDES: CPT | Performed by: STUDENT IN AN ORGANIZED HEALTH CARE EDUCATION/TRAINING PROGRAM

## 2022-02-24 PROCEDURE — 80053 COMPREHEN METABOLIC PANEL: CPT | Performed by: STUDENT IN AN ORGANIZED HEALTH CARE EDUCATION/TRAINING PROGRAM

## 2022-02-24 PROCEDURE — 74177 CT ABD & PELVIS W/CONTRAST: CPT

## 2022-02-24 PROCEDURE — 93010 ELECTROCARDIOGRAM REPORT: CPT | Performed by: STUDENT IN AN ORGANIZED HEALTH CARE EDUCATION/TRAINING PROGRAM

## 2022-02-24 PROCEDURE — 250N000011 HC RX IP 250 OP 636: Performed by: STUDENT IN AN ORGANIZED HEALTH CARE EDUCATION/TRAINING PROGRAM

## 2022-02-24 PROCEDURE — 36415 COLL VENOUS BLD VENIPUNCTURE: CPT | Performed by: STUDENT IN AN ORGANIZED HEALTH CARE EDUCATION/TRAINING PROGRAM

## 2022-02-24 PROCEDURE — 71045 X-RAY EXAM CHEST 1 VIEW: CPT

## 2022-02-24 PROCEDURE — 96360 HYDRATION IV INFUSION INIT: CPT | Mod: 59 | Performed by: STUDENT IN AN ORGANIZED HEALTH CARE EDUCATION/TRAINING PROGRAM

## 2022-02-24 PROCEDURE — 93005 ELECTROCARDIOGRAM TRACING: CPT | Performed by: STUDENT IN AN ORGANIZED HEALTH CARE EDUCATION/TRAINING PROGRAM

## 2022-02-24 PROCEDURE — 84484 ASSAY OF TROPONIN QUANT: CPT | Performed by: STUDENT IN AN ORGANIZED HEALTH CARE EDUCATION/TRAINING PROGRAM

## 2022-02-24 PROCEDURE — 96361 HYDRATE IV INFUSION ADD-ON: CPT | Performed by: STUDENT IN AN ORGANIZED HEALTH CARE EDUCATION/TRAINING PROGRAM

## 2022-02-24 PROCEDURE — C9803 HOPD COVID-19 SPEC COLLECT: HCPCS | Performed by: STUDENT IN AN ORGANIZED HEALTH CARE EDUCATION/TRAINING PROGRAM

## 2022-02-24 PROCEDURE — 84134 ASSAY OF PREALBUMIN: CPT | Performed by: STUDENT IN AN ORGANIZED HEALTH CARE EDUCATION/TRAINING PROGRAM

## 2022-02-24 PROCEDURE — 84100 ASSAY OF PHOSPHORUS: CPT | Performed by: STUDENT IN AN ORGANIZED HEALTH CARE EDUCATION/TRAINING PROGRAM

## 2022-02-24 RX ORDER — IOPAMIDOL 755 MG/ML
103 INJECTION, SOLUTION INTRAVASCULAR ONCE
Status: COMPLETED | OUTPATIENT
Start: 2022-02-24 | End: 2022-02-24

## 2022-02-24 RX ADMIN — IOPAMIDOL 103 ML: 755 INJECTION, SOLUTION INTRAVENOUS at 20:00

## 2022-02-24 NOTE — ED NOTES
Pt presents regarding concerns about his ability to take care of his ostomy d/t irritation and skin breakdown. Pt tearful during assessment, reports he feels he is unable to properly care for his ostomy and wants to be admitted to the hospital. +n/v, but reports this is chronic.

## 2022-02-24 NOTE — TELEPHONE ENCOUNTER
Spoke to Aruna from  home infusion. She was seeing Chema for his TPN and says that he is having issues with his ostomy bag not sealing for the past 2 weeks. He had a exploratory laparotomy 11/30 by Dr. Richardson.  She is trying to get a ostomy nurse to his house today to help with with the bag. I spoke to Chema and he explains he is very frustrated that his ostomy pouch hasn't stuck and is leaking. He would like to see Dr. Richardson since he did not have a post op appointment. I explained that his ostomy needs to be managed by the ostomy clinic. Aruna is working on getting a ostomy care nurse out to his house today. Post op appointment scheduled with Dr. Richardson. Patient expressed understanding.       [facility]      Alea Johnson RN  [facility]  General Surgery  [address] [place] [id number]  [email or web address]   Office:[phone]  Employed by [employer],

## 2022-02-24 NOTE — TELEPHONE ENCOUNTER
Received a call from Chema's home ostomy nurse. She was able to get the ostomy on patient but says the seal will not stay. He has continuous liquid coming out that is irritating his skin. She recommends he go the hospital to be admitted until his pouch is able to stay on for 24 hours straight and get his skin healed. Patient would like to go to the [facility]. I told him I will let Dr. Richardson know and we can schedule a follow up appointment once this is taken care of. He expressed understanding.       [facility]      Alea Johnson RN  [facility]  General Surgery  [address] [place] [id number]  [email or web address]   Office:[phone]  Employed by [employer],

## 2022-02-24 NOTE — TELEPHONE ENCOUNTER
"Pt is established with Ostomy clinic in [facility]. Last visit was on 2/21 with Ostomy nurse and he has a scheduled follow up as well. Documentation below from [facility] nurse visit:    \"Patient counseled/instructed on all of the above recommendations and verbalized understanding of plan of care. The patient has the phone numbers for Wound-Ostomy Nursing for any questions/concerns.\"     Supplies:     - Jaylin soft convex pouch #[id number]   - [place] barrier ring #[id number]   - Jaylin ostomy powder #[id number]   -  no-sting #[id number]    I will reach out to the pt to see what further assistance he needs from our office.   "

## 2022-02-24 NOTE — ED PROVIDER NOTES
"    [place] EMERGENCY DEPARTMENT ([facility])  2/24/22   History     Chief Complaint   Patient presents with     Rash     Wound Check     Shortness of Breath     The history is provided by the patient and medical records.     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 The pouch has not been sticking to his ostomy site and he has had leakage in spite of Wound Ostomy Care Nurse consult. Patient is tearful and frustrated. He reports the wound began 2 weeks ago and has been worsening. He states that he has been going through 4 pairs of pants daily and had to sleep in a garbage bag last night as he is unable to keep his pouch stuck to his skin. He is still having normal output from his ostomy with no change.      \"Received a call from [facility] home ostomy nurse. She was able to get the ostomy on patient but says the seal will not stay. He has continuous liquid coming out that is irritating his skin. She recommends he go the hospital to be admitted until his pouch is able to stay on for 24 hours straight and get his skin healed. Patient would like to go to the [facility]. I told him I will let Dr. Richardson know and we can schedule a follow up appointment once this is taken care of. He expressed understanding.\"    Past Medical History  Past Medical History:   Diagnosis Date     Benign prostatic hyperplasia with weak urinary stream      Chronic bilateral low back pain without sciatica      Chronic neck pain      RAVI (generalized anxiety disorder)      History of hepatitis C     treated and cured     History of substance abuse (H)      Hypertension goal BP (blood pressure) < 140/90      Moderate major depression (H)  "     Past Surgical History:   Procedure Laterality Date     LAPAROSCOPY DIAGNOSTIC (GYN) N/A 11/30/2021    Procedure: LAPAROSCOPY;  Surgeon: Mekhi Richardson MD;  [place] [facility] [place]     LAPAROTOMY EXPLORATORY N/A 12/3/2021    Procedure: EXPLORATORY LAPAROTOMY, WITH WASHOUT, REPAIR OF SMALL BOWEL PERFORATION.;  Surgeon: Mekhi Richardson MD;  [place] [facility] [place]     LAPAROTOMY, LYSIS ADHESIONS, COMBINED N/A 11/30/2021    Procedure: REPAIR ENTEROTOMY, EXTENSIVE LYSIS OF ADHESION;  Surgeon: Mekhi Richardson MD;  [place] [facility] [place]     PICC DOUBLE LUMEN PLACEMENT  12/3/2021          RESECT SMALL BOWEL WITHOUT OSTOMY N/A 11/30/2021    Procedure: COVERTED TO OPEN, EXPLORATORY LAPAROTOMY, SMALL BOWEL RESECTION,;  Surgeon: Mekhi Richardson MD;  [place] [facility] [place]     SPLENECTOMY  ~1995     ZZC APPENDECTOMY  ~1996     acetaminophen (TYLENOL) 325 MG tablet  apixaban ANTICOAGULANT (ELIQUIS) 5 MG tablet  gabapentin (NEURONTIN) 100 MG capsule  gabapentin (NEURONTIN) 300 MG capsule  hydrOXYzine (ATARAX) 25 MG tablet  levothyroxine (SYNTHROID/LEVOTHROID) 25 MCG tablet  lipids (INTRALIPID) 20 % infusion  oxyCODONE-acetaminophen (PERCOCET) 5-325 MG tablet  pantoprazole (PROTONIX) 40 MG EC tablet  parenteral nutrition - PTA/DISCHARGE ORDER  QUEtiapine (SEROQUEL) 200 MG tablet  venlafaxine (EFFEXOR-XR) 75 MG 24 hr capsule      Allergies   Allergen Reactions     Penicillins Hives     Family History  Family History   Problem Relation Age of Onset     Lung Cancer Mother      Social History   Social History     Tobacco Use     Smoking status: Current Every Day Smoker     Smokeless tobacco: Never Used     Tobacco comment: declined cessation discussion and resource packet -1/14/22   Substance Use Topics     Alcohol use: Not Currently     Comment: Clean for 5 years     Drug use: Not Currently      Past medical history, past surgical history, medications, allergies, family history, and social history were  reviewed with the patient. No additional pertinent items.       Review of Systems   Gastrointestinal:        Ostomy pouch complication   All other systems reviewed and are negative.    A complete review of systems was performed with pertinent positives and negatives noted in the HPI, and all other systems negative.    Physical Exam   BP: (!) 131/100  Pulse: 99  Temp: 97.2  F (36.2  C)  Resp: 20  Weight: 76.2 kg (167 lb 15.9 oz)  SpO2: 100 %  Physical Exam  Vitals and nursing note reviewed.   Constitutional:       Appearance: He is not toxic-appearing.      Comments: Chronically ill-appearing white male, no respiratory distress   HENT:      Head: Normocephalic and atraumatic.      Right Ear: External ear normal.      Left Ear: External ear normal.      Nose: Nose normal.   Eyes:      Extraocular Movements: Extraocular movements intact.      Conjunctiva/sclera: Conjunctivae normal.   Cardiovascular:      Rate and Rhythm: Normal rate and regular rhythm.   Pulmonary:      Effort: Pulmonary effort is normal.      Breath sounds: Normal breath sounds.   Abdominal:      General: There is no distension.      Palpations: Abdomen is soft.      Tenderness: There is abdominal tenderness.      Comments: Ostomy in place, he has significant erythematous skin beneath the ostomy site with open skin wounds, he states there is normally only 1 open wound underneath his ostomy, recently he has developed 2 more.  Area is diffusely tender to palpation   Musculoskeletal:         General: No deformity or signs of injury.      Cervical back: Neck supple. No rigidity.   Skin:     General: Skin is warm.      Findings: No rash.   Neurological:      General: No focal deficit present.      Mental Status: He is alert. Mental status is at baseline.   Psychiatric:         Mood and Affect: Mood normal.         Behavior: Behavior normal.       ED Course      Procedures            EKG Interpretation:      Interpreted by Melanie Matute MD  Time  reviewed: 1820  Symptoms at time of EKG: None, SOB reported to triage   Rhythm: normal sinus   Rate: normal  Axis: LAD  Ectopy: none  Conduction: normal  ST Segments/ T Waves: No ST-T wave changes  Q Waves: none  Comparison to prior: Unchanged from 12/21/21 aside from axis    Clinical Impression: No STEMI, no clinically significant interval prolongation        The medical record was reviewed and interpreted.  Current labs reviewed and interpreted.  Current images reviewed and interpreted: known EC fistula and anastamotic leak.  EKG reviewed and interpreted: No acute ischemic changes.              Results for orders placed or performed during the hospital encounter of 02/24/22   XR Chest Port 1 View     Status: None    Narrative    XR CHEST PORT 1 VIEW on 2/24/2022 6:02 PM.    INDICATION: shortness of breath.    COMPARISON: CT dated 1/8/2022    FINDINGS:   Portable AP semiupright radiographs of the chest. Right upper  extremity PICC tip projects over the high right atrium. Trachea is  midline. Cardiac mediastinal silhouette is stable. Pulmonary  vasculature is relatively distinct. No appreciable pneumothorax. No  focal airspace opacity. Bibasilar streaky opacities.      Impression    IMPRESSION:   No focal airspace opacity. Bibasilar atelectasis.    I have personally reviewed the examination and initial interpretation  and I agree with the findings.    DIANE RUSSELL MD         SYSTEM ID:  [id number]   Abd/pelvis CT,  IV  contrast only TRAUMA / AAA     Status: None    Narrative    EXAMINATION: CT ABDOMEN PELVIS W CONTRAST  2/24/2022 8:06 PM      CLINICAL HISTORY: Abdominal abscess/infection suspected    COMPARISON: CT abdomen and pelvis with contrast 1/8/2022    PROCEDURE COMMENTS: CT of the abdomen was performed with Isovue-370  intravenous contrast. Coronal and sagittal reformatted images were  obtained.    FINDINGS:  Lower thorax:   Resolution of right lower lobe consolidation. Basilar atelectasis. No  pleural  effusions. Lower cardiac chambers are within normal limits.    Abdomen and pelvis:  Gallbladder is distended with some layering sludge suspected. The  biliary system, and pancreas are normal in appearance. The liver has a  hepatic cyst in the right lobe of the liver along with multiple small  technically indeterminate hypodensities. Status post splenectomy with  small left upper quadrant splenules. The adrenals are normal in  appearance. Exophytic cyst in the lower pole of the right kidney.  Additional bilateral too small to characterize low-density lesions  also likely cysts. No hydronephrosis. There are 3 nonobstructing left  lower pole renal calcifications measuring up to 6 mm.    Abdominal vasculature: Abdominal aortic and iliac calcifications.  Previously demonstrated nonocclusive thrombus in the superior  mesenteric vein is nonvisualized.    Patient is status post partial right colectomy. There is again  evidence of anastomotic leak in the right mid abdomen with a small  amount of extraluminal gas and rim-enhancing collection. This appears  more well circumscribed and rim-enhancing than prior study with  decreased amount of extraluminal gas in this region. Collection is  difficult to measure as there are several rim-enhancing sinus tracts  demonstrated. These abut more anteriorly and inferiorly located small  bowel for example series 3 image 196 with reactive wall thickening.  Anterior abdominal inflammation. Small bowel proximal to the  anastomosis is moderately dilated, and similar to prior. Anteriorly  located small bowel loops along the midline with again suspected  enterocutaneous fistula, series 3 image 160. The fluid along the open  midline incision has decreased from prior study.    Diverticulosis in the descending and sigmoid colon with no evidence of  acute diverticulitis.    There are some enlarged mesenteric nodes, likely reactive. Small  retroperitoneal nodes not enlarged by size criteria.  Small  fat-containing left inguinal hernia.    Bladder within normal limits. Prostate and seminal vesicles stable  with some coarse calcifications in the prostate.    Osseous structures:   Degenerative changes lumbar spine including L4-L5 pars defect and  anterolisthesis of L4 on L5.    Scarring in the subcutaneous fat in the right lower quadrant.      Impression    IMPRESSION:    1. Evidence of anastomotic leak in the right lower quadrant with rim  enhancing fluid and air containing irregular sinus tracts and  inflammatory changes around the anastomosis. This appears more well  organized than on prior CT. Some of the sinus tracts extend to abut  adjacent anterior and inferior small bowel loops with patient at risk  for developing enteric fistulas. No large drainable collection.  Demonstration of the midline enterocutaneous fistula with decreased  fluid along the open anterior incision.  2. Unchanged fluid distended loops of small bowel proximal to the  surgical anastomosis.  3. Interval resolution of superior mesenteric vein thrombus.  4. Nonobstructing left renal stones.    I have personally reviewed the examination and initial interpretation  and I agree with the findings.    DIANE RUSSELL MD         SYSTEM ID:  [id number]   Comprehensive metabolic panel     Status: Abnormal   Result Value Ref Range    Sodium 134 133 - 144 mmol/L    Potassium 3.7 3.4 - 5.3 mmol/L    Chloride 96 94 - 109 mmol/L    Carbon Dioxide (CO2) 30 20 - 32 mmol/L    Anion Gap 8 3 - 14 mmol/L    Urea Nitrogen 38 (H) 7 - 30 mg/dL    Creatinine 1.56 (H) 0.66 - 1.25 mg/dL    Calcium 9.7 8.5 - 10.1 mg/dL    Glucose 106 (H) 70 - 99 mg/dL    Alkaline Phosphatase 169 (H) 40 - 150 U/L    AST 42 0 - 45 U/L    ALT 44 0 - 70 U/L    Protein Total 9.6 (H) 6.8 - 8.8 g/dL    Albumin 3.4 3.4 - 5.0 g/dL    Bilirubin Total 0.6 0.2 - 1.3 mg/dL    GFR Estimate 48 (L) >60 mL/min/1.73m2   Lactic acid whole blood     Status: Normal   Result Value Ref Range     Lactic Acid 1.2 0.7 - 2.0 mmol/L   CBC with platelets and differential     Status: Abnormal   Result Value Ref Range    WBC Count 12.0 (H) 4.0 - 11.0 10e3/uL    RBC Count 4.34 (L) 4.40 - 5.90 10e6/uL    Hemoglobin 11.8 (L) 13.3 - 17.7 g/dL    Hematocrit 37.5 (L) 40.0 - 53.0 %    MCV 86 78 - 100 fL    MCH 27.2 26.5 - 33.0 pg    MCHC 31.5 31.5 - 36.5 g/dL    RDW 16.3 (H) 10.0 - 15.0 %    Platelet Count 600 (H) 150 - 450 10e3/uL    % Neutrophils 47 %    % Lymphocytes 43 %    % Monocytes 9 %    % Eosinophils 0 %    % Basophils 1 %    % Immature Granulocytes 0 %    NRBCs per 100 WBC 0 <1 /100    Absolute Neutrophils 5.7 1.6 - 8.3 10e3/uL    Absolute Lymphocytes 5.1 0.8 - 5.3 10e3/uL    Absolute Monocytes 1.0 0.0 - 1.3 10e3/uL    Absolute Eosinophils 0.0 0.0 - 0.7 10e3/uL    Absolute Basophils 0.1 0.0 - 0.2 10e3/uL    Absolute Immature Granulocytes 0.0 <=0.4 10e3/uL    Absolute NRBCs 0.0 10e3/uL   Troponin I     Status: Normal   Result Value Ref Range    Troponin I High Sensitivity 10 <79 ng/L   RBC and Platelet Morphology     Status: None   Result Value Ref Range    Platelet Assessment  Automated Count Confirmed. Platelet morphology is normal.     Automated Count Confirmed. Platelet morphology is normal.    RBC Morphology Confirmed RBC Indices    Comprehensive metabolic panel     Status: Abnormal   Result Value Ref Range    Sodium 135 133 - 144 mmol/L    Potassium 3.4 3.4 - 5.3 mmol/L    Chloride 94 94 - 109 mmol/L    Carbon Dioxide (CO2) 31 20 - 32 mmol/L    Anion Gap 10 3 - 14 mmol/L    Urea Nitrogen 38 (H) 7 - 30 mg/dL    Creatinine 1.64 (H) 0.66 - 1.25 mg/dL    Calcium 9.8 8.5 - 10.1 mg/dL    Glucose 96 70 - 99 mg/dL    Alkaline Phosphatase 173 (H) 40 - 150 U/L    AST 43 0 - 45 U/L    ALT 46 0 - 70 U/L    Protein Total 9.7 (H) 6.8 - 8.8 g/dL    Albumin 3.4 3.4 - 5.0 g/dL    Bilirubin Total 0.6 0.2 - 1.3 mg/dL    GFR Estimate 46 (L) >60 mL/min/1.73m2   Phosphorus     Status: Normal   Result Value Ref Range     Phosphorus 4.5 2.5 - 4.5 mg/dL   Magnesium     Status: Normal   Result Value Ref Range    Magnesium 2.3 1.6 - 2.3 mg/dL   Bilirubin direct     Status: Abnormal   Result Value Ref Range    Bilirubin Direct 0.3 (H) 0.0 - 0.2 mg/dL   Triglycerides     Status: Normal   Result Value Ref Range    Triglycerides 98 <150 mg/dL   Magnesium     Status: Normal   Result Value Ref Range    Magnesium 2.3 1.6 - 2.3 mg/dL   Asymptomatic COVID-19 Virus (Coronavirus) by PCR Nasopharyngeal     Status: Abnormal    Specimen: Nasopharyngeal; Swab   Result Value Ref Range    SARS CoV2 PCR Positive (A) Negative, Testing sent to reference lab. Results will be returned via unsolicited result    Narrative    Testing was performed using the [employer] Xpress SARS-CoV-2 Assay on the  [employer]. Additional information about  this Emergency Use Authorization (EUA) assay can be found via the Lab  Guide. This test should be ordered for the detection of SARS-CoV-2 in  individuals who meet SARS-CoV-2 clinical and/or epidemiological  criteria. Test performance is unknown in asymptomatic patients. This  test is for in vitro diagnostic use under the FDA EUA for  laboratories certified under CLIA to perform high complexity testing.  This test has not been FDA cleared or approved. A negative result  does not rule out the presence of PCR inhibitors in the specimen or  target RNA in concentration below the limit of detection for the  assay. The possibility of a false negative should be considered if  the patient's recent exposure or clinical presentation suggests  COVID-19. This test was validated by the [facility]. This laboratory is certified under  the Clinical Laboratory Improvement Amendments of 1988 (CLIA-88) as  qualified to perform high complexity laboratory testing.     Glucose by meter     Status: Normal   Result Value Ref Range    GLUCOSE BY METER POCT 84 70 - 99 mg/dL   EKG  12-lead, tracing only     Status: None   Result Value Ref Range    Systolic Blood Pressure  mmHg    Diastolic Blood Pressure  mmHg    Ventricular Rate 92 BPM    Atrial Rate 92 BPM    WA Interval 158 ms    QRS Duration 86 ms     ms    QTc 462 ms    P Axis 9 degrees    R AXIS -34 degrees    T Axis -10 degrees    Interpretation ECG       Sinus rhythm  Left axis deviation  Abnormal ECG  Unconfirmed report - interpretation of this ECG is computer generated - see medical record for final interpretation  Confirmed by - EMERGENCY ROOM, PHYSICIAN ([id number]),  IRENE SALAMANCA ([id number]) on 2/24/2022 10:39:07 PM     EKG 12 lead     Status: None   Result Value Ref Range    Systolic Blood Pressure  mmHg    Diastolic Blood Pressure  mmHg    Ventricular Rate 88 BPM    Atrial Rate 88 BPM    WA Interval 158 ms    QRS Duration 94 ms     ms    QTc 476 ms    P Axis 16 degrees    R AXIS -34 degrees    T Axis -6 degrees    Interpretation ECG       Sinus rhythm  Left axis deviation  Abnormal ECG  Unconfirmed report - interpretation of this ECG is computer generated - see medical record for final interpretation  Confirmed by - EMERGENCY ROOM, PHYSICIAN ([id number]),  IRENE SALAMANCA ([id number]) on 2/24/2022 10:39:20 PM     CBC with platelets differential     Status: Abnormal    Narrative    The following orders were created for panel order CBC with platelets differential.  Procedure                               Abnormality         Status                     ---------                               -----------         ------                     CBC with platelets and d...[[id number]]  Abnormal            Final result               RBC and Platelet Morphology[[id number]]                      Final result                 Please view results for these tests on the individual orders.     Medications   acetaminophen (TYLENOL) tablet 650 mg (has no administration in time range)   apixaban ANTICOAGULANT (ELIQUIS) tablet 5 mg (5 mg Oral Given  2/25/22 0750)   gabapentin (NEURONTIN) capsule 200 mg (200 mg Oral Given 2/25/22 0749)   gabapentin (NEURONTIN) capsule 900 mg (has no administration in time range)   hydrOXYzine (ATARAX) tablet 25 mg (has no administration in time range)   levothyroxine (SYNTHROID/LEVOTHROID) tablet 25 mcg (25 mcg Oral Given 2/25/22 0750)   oxyCODONE-acetaminophen (PERCOCET) 5-325 MG per tablet 1 tablet (has no administration in time range)   pantoprazole (PROTONIX) EC tablet 40 mg (40 mg Oral Given 2/25/22 0749)   QUEtiapine (SEROquel) tablet 200 mg (has no administration in time range)   venlafaxine (EFFEXOR-XR) 24 hr capsule 75 mg (75 mg Oral Given 2/25/22 0750)   lidocaine 1 % 0.1-1 mL (has no administration in time range)   lidocaine (LMX4) cream (has no administration in time range)   sodium chloride (PF) 0.9% PF flush 3 mL ( Intracatheter Canceled Entry 2/25/22 0512)   sodium chloride (PF) 0.9% PF flush 3 mL (has no administration in time range)   melatonin tablet 3 mg (has no administration in time range)   Patient is already receiving anticoagulation with heparin, enoxaparin (LOVENOX), warfarin (COUMADIN)  or other anticoagulant medication (has no administration in time range)   sodium chloride 0.9% infusion ( Intravenous New Bag 2/25/22 0400)   ondansetron (ZOFRAN-ODT) ODT tab 4 mg (has no administration in time range)     Or   ondansetron (ZOFRAN) injection 4 mg (has no administration in time range)   prochlorperazine (COMPAZINE) injection 5 mg (has no administration in time range)     Or   prochlorperazine (COMPAZINE) tablet 5 mg (has no administration in time range)     Or   prochlorperazine (COMPAZINE) suppository 12.5 mg (has no administration in time range)   dextrose 10% infusion (has no administration in time range)   parenteral nutrition - ADULT compounded formula CYCLE (has no administration in time range)   iopamidol (ISOVUE-370) solution 103 mL (103 mLs Intravenous Given 2/24/22 2000)   sodium chloride (PF)  0.9% PF flush 76 mL (76 mLs Intravenous Given 2/24/22 2000)   gabapentin (NEURONTIN) capsule 900 mg (900 mg Oral Given 2/25/22 0128)   QUEtiapine (SEROquel) tablet 200 mg (200 mg Oral Given 2/25/22 0144)   oxyCODONE-acetaminophen (PERCOCET) 5-325 MG per tablet 1 tablet (1 tablet Oral Given 2/25/22 0129)        Assessments & Plan (with Medical Decision Making)   MDM:    66 year old M with complex past medical history who presents for significantly increased ostomy output, skin changes.  Sent into the ED by his wound care nurse who told him he would be admitted to the hospital today.    Shortness of breath work-up unremarkable, labs with mild SARIKA, given IV fluids, he is TPN dependent but requesting clear liquid diet, will order and see if he tolerates.  CT of the abdomen pelvis with no significant changes.  Spoke with the ED opts provider, who feels the patient extends beyond the scope of care, recommends full inpatient admission.  General surgery consulted, nothing to do per them, okay with medicine admission.  Therefore admitted to medicine for rehydration of SARIKA, further wound care.    On reevaluation he is clinically unchanged.  I discussed all test results and the plan of care with patient, who is agreeable for hospital admission.    I have reviewed the nursing notes. I have reviewed the findings, diagnosis, plan and need for follow up with the patient.    New Prescriptions    No medications on file       Final diagnoses:   Abdominal pain, generalized   SARIKA (acute kidney injury) (H)   Altered bowel elimination due to intestinal ostomy (H)   I, Daphne Osman, am serving as a trained medical scribe to document services personally performed by Melanie Matute MD, based on the provider's statements to me.     IMelanie MD, was physically present and have reviewed and verified the accuracy of this note documented by Daphne Osman.      --  Melanie Matute MD  [facility] EMERGENCY  DEPARTMENT  2/24/2022

## 2022-02-24 NOTE — ED TRIAGE NOTES
Patient states he has been short of breath since last week. Patient also states he is having trouble with his ostomy bag sealing. Patient has redness and wound to his lower stomach. Patient was advised to come in and be admitted

## 2022-02-25 LAB
ALBUMIN SERPL-MCNC: 3.4 G/DL (ref 3.4–5)
ALP SERPL-CCNC: 173 U/L (ref 40–150)
ALT SERPL W P-5'-P-CCNC: 46 U/L (ref 0–70)
ANION GAP SERPL CALCULATED.3IONS-SCNC: 10 MMOL/L (ref 3–14)
AST SERPL W P-5'-P-CCNC: 43 U/L (ref 0–45)
BILIRUB SERPL-MCNC: 0.6 MG/DL (ref 0.2–1.3)
BUN SERPL-MCNC: 38 MG/DL (ref 7–30)
CALCIUM SERPL-MCNC: 9.8 MG/DL (ref 8.5–10.1)
CHLORIDE BLD-SCNC: 94 MMOL/L (ref 94–109)
CO2 SERPL-SCNC: 31 MMOL/L (ref 20–32)
CREAT SERPL-MCNC: 1.64 MG/DL (ref 0.66–1.25)
GFR SERPL CREATININE-BSD FRML MDRD: 46 ML/MIN/1.73M2
GLUCOSE BLD-MCNC: 96 MG/DL (ref 70–99)
GLUCOSE BLDC GLUCOMTR-MCNC: 77 MG/DL (ref 70–99)
GLUCOSE BLDC GLUCOMTR-MCNC: 84 MG/DL (ref 70–99)
GLUCOSE BLDC GLUCOMTR-MCNC: 85 MG/DL (ref 70–99)
MAGNESIUM SERPL-MCNC: 2.3 MG/DL (ref 1.6–2.3)
POTASSIUM BLD-SCNC: 3.4 MMOL/L (ref 3.4–5.3)
PREALB SERPL IA-MCNC: 21 MG/DL (ref 15–45)
PROT SERPL-MCNC: 9.7 G/DL (ref 6.8–8.8)
SARS-COV-2 RNA RESP QL NAA+PROBE: POSITIVE
SODIUM SERPL-SCNC: 135 MMOL/L (ref 133–144)

## 2022-02-25 PROCEDURE — 36415 COLL VENOUS BLD VENIPUNCTURE: CPT | Performed by: STUDENT IN AN ORGANIZED HEALTH CARE EDUCATION/TRAINING PROGRAM

## 2022-02-25 PROCEDURE — 99207 PR CDG-MDM COMPONENT: MEETS HIGH - UP CODED: CPT | Performed by: INTERNAL MEDICINE

## 2022-02-25 PROCEDURE — 99221 1ST HOSP IP/OBS SF/LOW 40: CPT | Performed by: SURGERY

## 2022-02-25 PROCEDURE — 84100 ASSAY OF PHOSPHORUS: CPT | Performed by: STUDENT IN AN ORGANIZED HEALTH CARE EDUCATION/TRAINING PROGRAM

## 2022-02-25 PROCEDURE — 82962 GLUCOSE BLOOD TEST: CPT

## 2022-02-25 PROCEDURE — 250N000013 HC RX MED GY IP 250 OP 250 PS 637: Performed by: STUDENT IN AN ORGANIZED HEALTH CARE EDUCATION/TRAINING PROGRAM

## 2022-02-25 PROCEDURE — 99220 PR INITIAL OBSERVATION CARE,LEVEL III: CPT | Performed by: INTERNAL MEDICINE

## 2022-02-25 PROCEDURE — 80053 COMPREHEN METABOLIC PANEL: CPT | Performed by: STUDENT IN AN ORGANIZED HEALTH CARE EDUCATION/TRAINING PROGRAM

## 2022-02-25 PROCEDURE — U0003 INFECTIOUS AGENT DETECTION BY NUCLEIC ACID (DNA OR RNA); SEVERE ACUTE RESPIRATORY SYNDROME CORONAVIRUS 2 (SARS-COV-2) (CORONAVIRUS DISEASE [COVID-19]), AMPLIFIED PROBE TECHNIQUE, MAKING USE OF HIGH THROUGHPUT TECHNOLOGIES AS DESCRIBED BY CMS-2020-01-R: HCPCS | Performed by: INTERNAL MEDICINE

## 2022-02-25 PROCEDURE — G0378 HOSPITAL OBSERVATION PER HR: HCPCS

## 2022-02-25 PROCEDURE — 250N000013 HC RX MED GY IP 250 OP 250 PS 637: Performed by: INTERNAL MEDICINE

## 2022-02-25 PROCEDURE — 96361 HYDRATE IV INFUSION ADD-ON: CPT

## 2022-02-25 PROCEDURE — 250N000009 HC RX 250: Performed by: STUDENT IN AN ORGANIZED HEALTH CARE EDUCATION/TRAINING PROGRAM

## 2022-02-25 PROCEDURE — G0463 HOSPITAL OUTPT CLINIC VISIT: HCPCS

## 2022-02-25 PROCEDURE — 258N000003 HC RX IP 258 OP 636: Performed by: INTERNAL MEDICINE

## 2022-02-25 PROCEDURE — 83735 ASSAY OF MAGNESIUM: CPT | Performed by: INTERNAL MEDICINE

## 2022-02-25 RX ORDER — ONDANSETRON 2 MG/ML
4 INJECTION INTRAMUSCULAR; INTRAVENOUS EVERY 6 HOURS PRN
Status: DISCONTINUED | OUTPATIENT
Start: 2022-02-25 | End: 2022-02-28 | Stop reason: HOSPADM

## 2022-02-25 RX ORDER — ACETAMINOPHEN 325 MG/1
650 TABLET ORAL EVERY 4 HOURS PRN
Status: DISCONTINUED | OUTPATIENT
Start: 2022-02-25 | End: 2022-02-28 | Stop reason: HOSPADM

## 2022-02-25 RX ORDER — OXYCODONE AND ACETAMINOPHEN 5; 325 MG/1; MG/1
1 TABLET ORAL EVERY 6 HOURS PRN
Status: DISCONTINUED | OUTPATIENT
Start: 2022-02-25 | End: 2022-02-28 | Stop reason: HOSPADM

## 2022-02-25 RX ORDER — MIRTAZAPINE 15 MG/1
TABLET, FILM COATED ORAL
COMMUNITY
Start: 2021-12-21 | End: 2022-02-25

## 2022-02-25 RX ORDER — PROCHLORPERAZINE 25 MG
12.5 SUPPOSITORY, RECTAL RECTAL EVERY 12 HOURS PRN
Status: DISCONTINUED | OUTPATIENT
Start: 2022-02-25 | End: 2022-02-28 | Stop reason: HOSPADM

## 2022-02-25 RX ORDER — GABAPENTIN 100 MG/1
200 CAPSULE ORAL 2 TIMES DAILY
Status: DISCONTINUED | OUTPATIENT
Start: 2022-02-25 | End: 2022-02-28 | Stop reason: HOSPADM

## 2022-02-25 RX ORDER — GABAPENTIN 300 MG/1
900 CAPSULE ORAL ONCE
Status: COMPLETED | OUTPATIENT
Start: 2022-02-25 | End: 2022-02-25

## 2022-02-25 RX ORDER — PROCHLORPERAZINE MALEATE 5 MG
5 TABLET ORAL EVERY 6 HOURS PRN
Status: DISCONTINUED | OUTPATIENT
Start: 2022-02-25 | End: 2022-02-28 | Stop reason: HOSPADM

## 2022-02-25 RX ORDER — VENLAFAXINE HYDROCHLORIDE 37.5 MG/1
75 CAPSULE, EXTENDED RELEASE ORAL DAILY
Status: DISCONTINUED | OUTPATIENT
Start: 2022-02-25 | End: 2022-02-28 | Stop reason: HOSPADM

## 2022-02-25 RX ORDER — QUETIAPINE FUMARATE 200 MG/1
200 TABLET, FILM COATED ORAL ONCE
Status: COMPLETED | OUTPATIENT
Start: 2022-02-25 | End: 2022-02-25

## 2022-02-25 RX ORDER — DEXTROSE MONOHYDRATE 100 MG/ML
INJECTION, SOLUTION INTRAVENOUS CONTINUOUS PRN
Status: DISCONTINUED | OUTPATIENT
Start: 2022-02-25 | End: 2022-02-28 | Stop reason: HOSPADM

## 2022-02-25 RX ORDER — ONDANSETRON 4 MG/1
4 TABLET, ORALLY DISINTEGRATING ORAL EVERY 6 HOURS PRN
Status: DISCONTINUED | OUTPATIENT
Start: 2022-02-25 | End: 2022-02-28 | Stop reason: HOSPADM

## 2022-02-25 RX ORDER — LIDOCAINE 40 MG/G
CREAM TOPICAL
Status: DISCONTINUED | OUTPATIENT
Start: 2022-02-25 | End: 2022-02-28 | Stop reason: HOSPADM

## 2022-02-25 RX ORDER — GABAPENTIN 300 MG/1
900 CAPSULE ORAL AT BEDTIME
Status: DISCONTINUED | OUTPATIENT
Start: 2022-02-25 | End: 2022-02-28 | Stop reason: HOSPADM

## 2022-02-25 RX ORDER — PANTOPRAZOLE SODIUM 40 MG/1
40 TABLET, DELAYED RELEASE ORAL
Status: DISCONTINUED | OUTPATIENT
Start: 2022-02-25 | End: 2022-02-28 | Stop reason: HOSPADM

## 2022-02-25 RX ORDER — LEVOTHYROXINE SODIUM 25 UG/1
25 TABLET ORAL DAILY
Status: DISCONTINUED | OUTPATIENT
Start: 2022-02-25 | End: 2022-02-28 | Stop reason: HOSPADM

## 2022-02-25 RX ORDER — LANOLIN ALCOHOL/MO/W.PET/CERES
3 CREAM (GRAM) TOPICAL
Status: DISCONTINUED | OUTPATIENT
Start: 2022-02-25 | End: 2022-02-28 | Stop reason: HOSPADM

## 2022-02-25 RX ORDER — SODIUM CHLORIDE 9 MG/ML
INJECTION, SOLUTION INTRAVENOUS CONTINUOUS
Status: DISCONTINUED | OUTPATIENT
Start: 2022-02-25 | End: 2022-02-26

## 2022-02-25 RX ORDER — HYDROXYZINE HYDROCHLORIDE 25 MG/1
25 TABLET, FILM COATED ORAL AT BEDTIME
Status: DISCONTINUED | OUTPATIENT
Start: 2022-02-25 | End: 2022-02-28 | Stop reason: HOSPADM

## 2022-02-25 RX ORDER — OXYCODONE AND ACETAMINOPHEN 5; 325 MG/1; MG/1
1 TABLET ORAL ONCE
Status: COMPLETED | OUTPATIENT
Start: 2022-02-25 | End: 2022-02-25

## 2022-02-25 RX ORDER — QUETIAPINE FUMARATE 50 MG/1
200 TABLET, FILM COATED ORAL AT BEDTIME
Status: DISCONTINUED | OUTPATIENT
Start: 2022-02-25 | End: 2022-02-28 | Stop reason: HOSPADM

## 2022-02-25 RX ADMIN — APIXABAN 5 MG: 5 TABLET, FILM COATED ORAL at 07:50

## 2022-02-25 RX ADMIN — QUETIAPINE FUMARATE 200 MG: 200 TABLET ORAL at 01:44

## 2022-02-25 RX ADMIN — SODIUM CHLORIDE: 900 INJECTION, SOLUTION INTRAVENOUS at 04:00

## 2022-02-25 RX ADMIN — OXYCODONE HYDROCHLORIDE AND ACETAMINOPHEN 1 TABLET: 5; 325 TABLET ORAL at 01:29

## 2022-02-25 RX ADMIN — SODIUM CHLORIDE: 900 INJECTION, SOLUTION INTRAVENOUS at 17:48

## 2022-02-25 RX ADMIN — LEVOTHYROXINE SODIUM 25 MCG: 0.03 TABLET ORAL at 07:50

## 2022-02-25 RX ADMIN — PANTOPRAZOLE SODIUM 40 MG: 40 TABLET, DELAYED RELEASE ORAL at 07:49

## 2022-02-25 RX ADMIN — VENLAFAXINE HYDROCHLORIDE 75 MG: 37.5 CAPSULE, EXTENDED RELEASE ORAL at 07:50

## 2022-02-25 RX ADMIN — HYDROXYZINE HYDROCHLORIDE 25 MG: 25 TABLET ORAL at 22:22

## 2022-02-25 RX ADMIN — MAGNESIUM SULFATE HEPTAHYDRATE: 500 INJECTION, SOLUTION INTRAMUSCULAR; INTRAVENOUS at 20:41

## 2022-02-25 RX ADMIN — GABAPENTIN 900 MG: 300 CAPSULE ORAL at 22:22

## 2022-02-25 RX ADMIN — APIXABAN 5 MG: 5 TABLET, FILM COATED ORAL at 20:41

## 2022-02-25 RX ADMIN — GABAPENTIN 200 MG: 100 CAPSULE ORAL at 13:39

## 2022-02-25 RX ADMIN — OXYCODONE HYDROCHLORIDE AND ACETAMINOPHEN 1 TABLET: 5; 325 TABLET ORAL at 17:10

## 2022-02-25 RX ADMIN — GABAPENTIN 900 MG: 300 CAPSULE ORAL at 01:28

## 2022-02-25 RX ADMIN — GABAPENTIN 200 MG: 100 CAPSULE ORAL at 07:49

## 2022-02-25 RX ADMIN — QUETIAPINE FUMARATE 200 MG: 50 TABLET ORAL at 22:23

## 2022-02-25 ASSESSMENT — ACTIVITIES OF DAILY LIVING (ADL)
ADLS_ACUITY_SCORE: 7

## 2022-02-25 NOTE — PROGRESS NOTES
"Admitted/transferred from: ED   2 RN full skin assessment completed by Kaylee Moses, RN and Susanna Hdz, RN.  Skin assessment finding: Ostomy pouch to mid abdomen with liquid stool output. Two fistula sites near umbilicus, superior site is moist, inferior site is dry. Right scab, pt states \"old drain site\" Scratches to BLE and scabs/cracks to fingers, pt states scratching legs due to itching from ostomy output and fingers cracked due to \"alcohol swabs and cleaning PICC\" Blanchable redness to coccyx.  Interventions/actions: Mepilex to coccyx. Encouraged weight shifting to prevent pressure injuries. Ostomy pouch monitored for leaking. WOC following M-F.     "

## 2022-02-25 NOTE — CONSULTS
WOC Nurse Inpatient [facility]   WO Nurse Inpatient Adult     Initial Assessment   Assessment of established  Fisutla Stoma complication(s) none   Mucocutaneous junction; N/A  Peristomal complication(s) Moisture-Associated Skin Damage (MASD) due to leakage, mechanical damage and fistula   Pouch wear time:less than 24 hours  Following today's visit:Patient /  is  able to demonstrate;       1. How to empty their pouch? yes      2. How to change their pouch?  yes      3. How to read and record intake and output correctly? yes    Objective data:  Patient history according to medical record: Gurdeep Dia is a 66 year old male admitted on 2/24/2022. 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     Current Diet/Nutrition: Orders Placed This Encounter      NPO for Medical/Clinical Reasons Except for: Ice Chips, Meds     TPN yes   No intake/output data recorded.  Labs:  Recent Labs   Lab 02/25/22  0145 02/24/22  1723   ALBUMIN 3.4 3.4   HGB  --  11.8*   WBC  --  12.0*        Physical Exam:  Current pouching system:Jaylin 1 piece with ring, surgical glue/stoma powder used to crust periwound  Reason for pouch change today: leakage  Peristomal skin: erythema and erosion of epidermis  Stoma output :brown, green and liquid   Abdominal  Assessment  soft , NG still in place? No  Surgical Site: N/A  Pain: Sharp  Is patient still on a PCA No    Interventions:  Patient's chart evaluated.  Focus of today's visit: evaluate leakage issue and verbal instruction    Participant of teaching session today patient   Orders: Written  Change made with ostomy management today: Yes  Patient/family: observing and active participation  Supplies:Ordered Ilex paste and at  "bedside    Plan:  Learning needs: evaluate leakage issue, verbal instruction  and discharge instructions  Preparation for discharge: No discharge preparations started  Recommend home care? yes    ~ Encourage patient participation with ostomy care, he is experienced but having major issues with leakage  ~ Empty pouch when 1/3 to 1/2 full,   ~ Notify WOC for ongoing ostomy pouch leakage,  ~ Document stoma output volume, color, consistency EVERY shift  ~ Supplies used    ~ Pouch: Clothier Flat FECAL ([id number])   ~ Accessories: 2\" Adapt Barrier Ring (587271), Powder (875875), No Sting (846453), and skin glue    TIPS to try over the weekend if leaks continue:  1. If current bag leaks help patient apply another and make sure to build up crevice under fistula with ring and use ostomy paste inside the bag. Also make sure to crust using no sting/powder then apply skin glue prior to pouch  2. If can't get an ostomy bag to stay in place please apply ilex paste to periwound (all open areas and red skin) and cover with vaseline gauze. Fluff kerlix over fistula and cover with diaper or towel- may need to use abdominal binder to help keep in place. May use suction so long as not directly on skin/fistula.    Discussed plan of care with Patient and Nurse  Nursing to notify the Provider(s) and re-consult the WOC Nurse if new ostomy concerns or discharge planned before next planned WOC visit.    WOC Nurse will return: daily M-F  Face to face time: 45 minutes    Nadeen Funez RN CWOCN              "

## 2022-02-25 NOTE — PROGRESS NOTES
This is a recent snapshot of the patient's [facility] medical record.  For current drug dose and complete information and questions, call [phone] or In Basket pool, fv home infusion ([id number])  CSN Number:  [id number]

## 2022-02-25 NOTE — PROGRESS NOTES
This is a recent snapshot of the patient's [facility] medical record.  For current drug dose and complete information and questions, call [phone] or In Basket pool, fv home infusion ([id number])  CSN Number:  [id number]

## 2022-02-25 NOTE — PROGRESS NOTES
"CLINICAL NUTRITION SERVICES - ASSESSMENT NOTE     Nutrition Prescription    Malnutrition Status:    Moderate malnutrition in the context of acute illness (based on recent physical assessment)    Recommendations already ordered by Registered Dietitian (RD):  Resume home TPN ->   Monday/Thursday - 12 hr cycle, 1870 ml with Dex 350 grams,  grams + 250 ml intralipid 20%   Tues/Wed/Fri/Sa/Sun - 12 hr cycle, 2120 ml with Dex 350 grams,  grams and no IV lipids  TPN includes standard MVI, trace elements.   Average nutritional intake from TPN = 1781 kcal (27 kcal/kg), 130 grams AA (2g/kg), 67% kcal from CHO, 4% kcal from fat -> d/t IV lipid shortage     With high fistula drainage, possibly zinc deficient.  Will check zinc level 2/26 AM.     Future/Additional Recommendations:  Monitor weight trends and EC fistula status.      REASON FOR ASSESSMENT  Gurdeep PJ Dia is a/an 66 year old male assessed by the dietitian for Pharmacy/Nutrition to Start and Manage PN    NUTRITION HISTORY  Patient was admitted with abdominal pain/mechanical SBO on 11/30 and required exploratory laparotomy / small bowel resection / repair of enterotomy / extensive lysis of adhesions, complicated by small bowel perforation requiring repeat ex lap/washout/repair on 12/3.  Currently has EC fistula draining into ostomy bag.  Management has been low calorie clears until drainage decreases and/or is more manageable and TPN \"for several months\".   Per ED note, 1 ECF is closed and another with decreasing output.     Admitted now with trouble sealing his ostomy bag.  He has been having to change his ostomy 4 times/day d/t constant stool drainage.     Patient has mostly been NPO, only takes some water and pop.      Home TPN formula:   Monday/Thursday - 1870 ml with Dex 350 grams,  grams + 250 ml intralipid 20%   Tues/Wed/Fri/Sa/Sun - 2120 ml with Dex 350 grams,  grams and no IV lipids  TPN includes standard MVI, trace elements.   Average " "nutritional intake from TPN = 1781 kcal (27 kcal/kg), 130 grams AA (2g/kg), 67% kcal from CHO, 4% kcal from fat -> d/t IV lipid shortage     CURRENT NUTRITION ORDERS  Diet: NPO    LABS  BUN 38/Cre 1.64 (elevated) - baseline Cre 0.9  Alk Phos 173 (elevated)  TG 98 (normal)    MEDICATIONS  Protonix    ANTHROPOMETRICS  Height: 5' 5\"  Most Recent Weight: 76.2 kg (167 lb 15.9 oz)    IBW: 61.8 kg  BMI: Overweight BMI 25-29.9  Weight History: Weight on admission 11/30 was 175#.  Down 7# (4%) in ~3 months.   Wt Readings from Last 15 Encounters:   02/25/22 76.2 kg (167 lb 15.9 oz)   02/14/22 76.2 kg (167 lb 15.9 oz)   12/17/21 76.8 kg (169 lb 6.4 oz)   01/22/20 79.4 kg (175 lb)   12/24/19 81.2 kg (179 lb)   Dosing Weight: 65 kg (adj wt based on IBW of 61.8 kg and actual wt of 76.2 kg)    ASSESSED NUTRITION NEEDS  Estimated Energy Needs: 0549-4560 kcals/day (25-30 kcals/kg )  Justification: Maintenance  Estimated Protein Needs:  grams protein/day (1.5 - 2 grams of pro/kg)  Justification: EC fistula/wound healing and losses via drainage  Estimated Fluid Needs: per MD pending fistula output, hydration status    MALNUTRITION  % Intake: Decreased intake does not meet criteria (TPN)  % Weight Loss: Weight loss does not meet criteria (4% in 3 months)  Subcutaneous Fat Loss: Deferring assessment at this time d/t covid positive  *Assessment on 2/2/22 at OSH revealed severe orbital loss and mild-moderate depletion in upper arm area  Muscle Loss: Deferring assessment at this time d/t covid positive  *Assessment on 2/2/22 at OSH revealed Temporal region moderate depletion, Clavicle bone region severe depletion, Dorsal hand region mild to moderate depletion and Patellar region moderate depletion  Fluid Accumulation/Edema: Unable to assess  Malnutrition Diagnosis: Moderate malnutrition in the context of acute illness (based on recent physical assessment)    NUTRITION DIAGNOSIS  Altered GI function related to EC fistula as evidenced " by patient reliance on TPN to meet nutritional needs.     INTERVENTIONS  Implementation  TPN resumption - reviewed with Pharmacist  Collaboration with other providers - Spoke with [facility] PharmD, obtained formula    Goals  Total avg nutritional intake to meet a minimum of 25 kcal/kg and 1.5 g PRO/kg daily (per dosing wt 65 kg).     Monitoring/Evaluation  Progress toward goals will be monitored and evaluated per protocol.    Yoon Marcus MS, RD, LD, CCTD  7A/Obs units, pager [phone]

## 2022-02-25 NOTE — CONSULTS
[facility] Surgery Consultation    Gurdeep Dia MRN# [id number]   Age: 66 year old YOB: 1955     Date of Admission:  2/24/2022    Date of Consult:   2/24/22    Reason for consult: Inability to pouch ostomy bag       Requesting service: ED; requesting provider: Dr. Matute                   Assessment and Plan:   Assessment:   66 year old male with PMH s/f hypothyroidism, depression, pain, h/o PE (on Apixaban), substance abuse,  and PSH s/f small bowel obstruction on 11/30/21 s/p ex lap, SBR, enterotomy repair, extensive SINDY by Dr. Richardson ([facility]) c/b anastomotic leak 12/3 washout, MARISA drain, abx-drain removed 12/16, who them went on to develop 2 ECF. ECF are being managed conservatively, NPO on TPN. ECF have been healing, 1 fistula is now closed, second fistula with decreasing output. Patient presents to ED due to inability to pouch stoma and skin irritation and pruritis due to leak. CT imaging was obtained by ED to evaluate for any new ECF or abscesses; imaging showed known previous anastomotic leak in the right lower quadrant with rim enhancing fluid and air containing irregular sinus tracts and inflammatory changes around the anastomosis- overall appearing more well organized than on prior CT on 1/8. No large drainable collection. Clinically, patient is VSS, abdomen is NT. Ostomy nurse was able to secure ostomy bag- no current leakage.          Plan:     - No acute surgical intervention  - Chronic ECF and known anastomotic leak- conservative management - continue NPO and ostomy cares  - Patient has an appointment with primary surgeon tomorrow and may follow-up with him  - Ostomy cares  - Okay for ICE chips for comfort  - Surgery will sign off    Discussed with chief Dr. Baumann and staff, MD Ernst Conte MBBS, MS  Surgery, PGY-2            Chief Complaint:       Inability to pouch stoma         History of Present Illness:     66 year old male with PMH  s/f hypothyroidism, depression, pain, h/o PE (on Apixaban), substance abuse,  and PSH s/f small bowel obstruction on 11/30/21 s/p ex lap, SBR, enterotomy repair, extensive SINDY by Dr. Richardson ([facility]) c/b anastomotic leak 12/3 washout, MARISA drain, abx-drain removed 12/16, who then went on to develop 2 ECFs. ECFs are being managed conservatively, NPO on TPN. ECF have been healing, 1 fistula is now closed, second fistula with decreasing output. Patient presents to ED due to inability to pouch stoma and skin irritation and pruritis due to leak. Has had 4-5 visits to ED to have pouch replaced. His pouch has not been sticking to his skin,resulting in leakage and skin irritation and pruritis. Denies, fevers, chills, sweats, increased ECF output, abdominal pain, diarhhea/constipation. Is passing gas. No dysuria. NO upper respiratory symptoms chest pain, SOB.     CT imaging was obtained by ED to evaluate for any new ECF or abscesses; imaging showed known previous anastomotic leak in the right lower quadrant with rim enhancing fluid and air containing irregular sinus tracts and inflammatory changes around the anastomosis- overall appearing more well organized than on prior CT on 1/8. No large drainable collection. Clinically, patient is VSS, abdomen is NT. Ostomy nurse was able to secure ostomy bag- no current leaks          Past Medical History:     Past Medical History:   Diagnosis Date     Benign prostatic hyperplasia with weak urinary stream      Chronic bilateral low back pain without sciatica      Chronic neck pain      RAVI (generalized anxiety disorder)      History of hepatitis C     treated and cured     History of substance abuse (H)      Hypertension goal BP (blood pressure) < 140/90      Moderate major depression (H)              Past Surgical History:     Past Surgical History:   Procedure Laterality Date     LAPAROSCOPY DIAGNOSTIC (GYN) N/A 11/30/2021    Procedure: LAPAROSCOPY;  Surgeon: Mekhi Richardson,  MD;  Location: [facility] [place]     LAPAROTOMY EXPLORATORY N/A 12/3/2021    Procedure: EXPLORATORY LAPAROTOMY, WITH WASHOUT, REPAIR OF SMALL BOWEL PERFORATION.;  Surgeon: Mekhi Richardson MD;  Location: [facility]     LAPAROTOMY, LYSIS ADHESIONS, COMBINED N/A 11/30/2021    Procedure: REPAIR ENTEROTOMY, EXTENSIVE LYSIS OF ADHESION;  Surgeon: Mekhi Richardson MD;  Location: [facility]     PICC DOUBLE LUMEN PLACEMENT  12/3/2021          RESECT SMALL BOWEL WITHOUT OSTOMY N/A 11/30/2021    Procedure: COVERTED TO OPEN, EXPLORATORY LAPAROTOMY, SMALL BOWEL RESECTION,;  Surgeon: Mekhi Richardson MD;  Location: [facility] OR     SPLENECTOMY  ~1995     Z APPENDECTOMY  ~1996             Social History:     Social History     Tobacco Use     Smoking status: Current Every Day Smoker     Smokeless tobacco: Never Used     Tobacco comment: declined cessation discussion and resource packet -1/14/22   Substance Use Topics     Alcohol use: Not Currently     Comment: Clean for 5 years             Family History:   No family history on file.             Allergies:     Allergies   Allergen Reactions     Penicillins Hives             Medications:     No current facility-administered medications for this encounter.     Current Outpatient Medications   Medication Sig     acetaminophen (TYLENOL) 325 MG tablet Take 2 tablets (650 mg) by mouth every 4 hours as needed for mild pain or fever     apixaban ANTICOAGULANT (ELIQUIS) 5 MG tablet Take 1 tablet (5 mg) by mouth 2 times daily     gabapentin (NEURONTIN) 100 MG capsule Take 2 capsules (200 mg) by mouth 2 times daily     gabapentin (NEURONTIN) 300 MG capsule Take 3 capsules (900 mg) by mouth At Bedtime     hydrOXYzine (ATARAX) 25 MG tablet Take 1 tablet (25 mg) by mouth At Bedtime     levothyroxine (SYNTHROID/LEVOTHROID) 25 MCG tablet Take 1 tablet (25 mcg) by mouth daily     lipids (INTRALIPID) 20 % infusion Inject 250 mLs into the vein in the morning, Mon and Thur      oxyCODONE-acetaminophen (PERCOCET) 5-325 MG tablet Take 1 tablet by mouth every 6 hours as needed for pain     pantoprazole (PROTONIX) 40 MG EC tablet Take 1 tablet (40 mg) by mouth every morning (before breakfast)     parenteral nutrition - PTA/DISCHARGE ORDER The TPN formula will print on the After Visit Summary Report.     QUEtiapine (SEROQUEL) 200 MG tablet Take 1 tablet (200 mg) by mouth At Bedtime     venlafaxine (EFFEXOR-XR) 75 MG 24 hr capsule Take 1 capsule (75 mg) by mouth daily               Review of Systems:   CV: NEGATIVE for chest pain, palpitations or peripheral edema  C: NEGATIVE for fever, chills, change in weight  E/M: NEGATIVE for ear, mouth and throat problems  R: NEGATIVE for significant cough or SOB          Physical Exam:   All vitals have been reviewed  Temp:  [97.2  F (36.2  C)] 97.2  F (36.2  C)  Pulse:  [99] 99  Resp:  [20] 20  BP: (131)/(100) 131/100  SpO2:  [100 %] 100 %  No intake or output data in the 24 hours ending 02/25/22 0029  Physical Exam:  Gen: awake, NAD, pruritic over LE  Pulm: ULB  CV: RRR  GI: Soft, ND, NT, Ostomy bag in place with semi-solid output. Mild skin erythema noted over incisions. NO evidence of cellulitis. Incisions c/d/i. Second ECF site has healed.   Ext: WWP          Data:   All laboratory data reviewed    Results:  BMP  Recent Labs   Lab 02/24/22  1723 02/22/22  1555    138   POTASSIUM 3.7 3.4   CHLORIDE 96 103   CO2 30 28   BUN 38* 34*   CR 1.56* 1.14   * 124*     CBC  Recent Labs   Lab 02/24/22  1723 02/22/22  1555   WBC 12.0* 15.8*   HGB 11.8* 10.8*   * 608*     LFT  Recent Labs   Lab 02/24/22  1723 02/22/22  1555   AST 42 25   ALT 44 27   ALKPHOS 169* 145   BILITOTAL 0.6 0.6   ALBUMIN 3.4 3.0*     Recent Labs   Lab 02/24/22  1723 02/22/22  1555   * 124*       Imaging:  XR Chest Port 1 View    Result Date: 2/24/2022  XR CHEST PORT 1 VIEW on 2/24/2022 6:02 PM. INDICATION: shortness of breath. COMPARISON: CT dated 1/8/2022  FINDINGS: Portable AP semiupright radiographs of the chest. Right upper extremity PICC tip projects over the high right atrium. Trachea is midline. Cardiac mediastinal silhouette is stable. Pulmonary vasculature is relatively distinct. No appreciable pneumothorax. No focal airspace opacity. Bibasilar streaky opacities.     IMPRESSION: No focal airspace opacity. Bibasilar atelectasis. I have personally reviewed the examination and initial interpretation and I agree with the findings. DIANE RUSSELL MD   SYSTEM ID:  [id number]    Abd/pelvis CT,  IV  contrast only TRAUMA / AAA    Result Date: 2/24/2022  EXAMINATION: CT ABDOMEN PELVIS W CONTRAST  2/24/2022 8:06 PM  CLINICAL HISTORY: Abdominal abscess/infection suspected COMPARISON: CT abdomen and pelvis with contrast 1/8/2022 PROCEDURE COMMENTS: CT of the abdomen was performed with Isovue-370 intravenous contrast. Coronal and sagittal reformatted images were obtained. FINDINGS: Lower thorax: Resolution of right lower lobe consolidation. Basilar atelectasis. No pleural effusions. Lower cardiac chambers are within normal limits. Abdomen and pelvis: Gallbladder is distended with some layering sludge suspected. The biliary system, and pancreas are normal in appearance. The liver has a hepatic cyst in the right lobe of the liver along with multiple small technically indeterminate hypodensities. Status post splenectomy with small left upper quadrant splenules. The adrenals are normal in appearance. Exophytic cyst in the lower pole of the right kidney. Additional bilateral too small to characterize low-density lesions also likely cysts. No hydronephrosis. There are 3 nonobstructing left lower pole renal calcifications measuring up to 6 mm. Abdominal vasculature: Abdominal aortic and iliac calcifications. 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 Patient is status post partial right colectomy. There is again evidence of  anastomotic leak in the right mid abdomen with a small amount of extraluminal gas and rim-enhancing collection. This appears more well circumscribed and rim-enhancing than prior study with decreased amount of extraluminal gas in this region. Collection is difficult to measure as there are several rim-enhancing sinus tracts demonstrated. These abut more anteriorly and inferiorly located small bowel for example series 3 image 196 with reactive wall thickening. Anterior abdominal inflammation. Small bowel proximal to the anastomosis is moderately dilated, and similar to prior. Anteriorly located small bowel loops along the midline with again suspected enterocutaneous fistula, series 3 image 160. The fluid along the open midline incision has decreased from prior study. Diverticulosis in the descending and sigmoid colon with no evidence of acute diverticulitis. There are some enlarged mesenteric nodes, likely reactive. Small retroperitoneal nodes not enlarged by size criteria. Small fat-containing left inguinal hernia. Bladder within normal limits. Prostate and seminal vesicles stable with some coarse calcifications in the prostate. Osseous structures: Degenerative changes lumbar spine including L4-L5 pars defect and anterolisthesis of L4 on L5. Scarring in the subcutaneous fat in the right lower quadrant.     IMPRESSION: 1. Evidence of anastomotic leak in the right lower quadrant with rim enhancing fluid and air containing irregular sinus tracts and inflammatory changes around the anastomosis. This appears more well organized than on prior CT. Some of the sinus tracts extend to abut adjacent anterior and inferior small bowel loops with patient at risk for developing enteric fistulas. No large drainable collection. Demonstration of the midline enterocutaneous fistula with decreased fluid along the open anterior incision. 2. Unchanged fluid distended loops of small bowel proximal to the surgical anastomosis. 3. Interval  resolution of superior mesenteric vein thrombus. 4. Nonobstructing left renal stones. I have personally reviewed the examination and initial interpretation and I agree with the findings. DIANE RUSSELL MD   SYSTEM ID:  [id number]        Ernst Obregon MD

## 2022-02-25 NOTE — UTILIZATION REVIEW
"[facility] Utilization Review  Admission Status; Secondary Review Determination     Admission Date: 2/24/2022  4:43 PM      Under the authority of the Utilization Management Committee, the utilization review process indicated a secondary review on the above patient.  The review outcome is based on review of the medical records, discussions with staff, and applying clinical experience noted on the date of the review.        (X) Observation Status Appropriate - This patient does not meet hospital inpatient criteria and is placed in observation status. If this patient's primary payer is Medicare and was admitted as an inpatient, Condition Code 44 should be used and patient status changed to \"observation\".   () Observation Status concurrent Review           RATIONALE FOR DETERMINATION   Gurdeep Dia is a 66 year old male with past medical history of SBO complicated by bowel perforation, enterocutaneous fistula, on TPN, presented with ostomy bag malfunction with skin erosion.  In addition, noted to have SARIKA of unclear etiology possibly prerenal.  Wound care is consulted and he is started on IV fluids and close monitoring. Based on severity of illness and intensity of service, patient does not meet criteria for inpatient admission.  No signs of infection, anastomotic leak, bowel obstruction or perforation.  Creatinine is rising but likely prerenal.  Observation cares are appropriate for supportive cares as noted above.  Recommendation is communicated to the primary team (Dr. Franklin).      The information on this document is developed by the utilization review team in order for the business office to ensure compliance.  This only denotes the appropriateness of proper admission status and does not reflect the quality of care rendered.              Sincerely,       Leonie Damon MD, MS  Physician Advisor  Utilization Review-[place]    Phone: [phone]   "

## 2022-02-25 NOTE — H&P
"[facility]    History and Physical - Hospitalist Service       Date of Admission:  2/24/2022    Assessment & Plan      Gurdeep Dia is a 66 year old male admitted on 2/24/2022. 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     Ostomy bag malfunction   Skin erosion   Hx of SBO c/b perforation, enterocutaneous fistula on Bowel rest  Evidence of Anastomotic leak with sinus tract on CT  Patient presented to ED due to difficulty of ostomy bag sticking to his skin and causing fecal drainage and skin irritation. His outpatient ostomy nurse has had difficulty managing it and recommended hospitalization until issue is resolved.   - Will consult Ostomy nurse   - Wound care  - May need to curbside Gen Surg regarding anastmotic leak      SARIKA  BL Cr around 0.9, has rising recent Cr. Unclear etiology but likely prerenal in the setting of TPN use.   - Will hydrate and repeat   - Monitor daily     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  - Cont Eliquis     Chronic pain   Insomnia   - Cont PTA Percocet   - Cont PTA Seroquel 200 at bedtime  - Cont Gabapentin 200/200/900    GERD  - Cont Protonix         Diet:   NPO (on Bowel rest)  DVT Prophylaxis: DOAC  Roblero Catheter: Not present  Central Lines: PRESENT     Cardiac Monitoring: None  Code Status:   Full Code     Clinically Significant Risk Factors Present on Admission              # Coagulation Defect: home medication list includes an anticoagulant medication    # Overweight: Estimated body mass index is 27.78 kg/m  as calculated from the following:    Height as of 1/28/22: 1.656 m (5' 5.2\").    Weight as of 2/14/22: 76.2 kg (167 " lb 15.9 oz).      Disposition Plan   Expected Discharge: 02/28/2022  Anticipated discharge location:  Awaiting care coordination huddle  Delays:           The patient's care was discussed with the Patient.    Deangelo Franklin MD  Hospitalist Service  [facility]  Securely message with the Vocera Web Console (learn more here)  Text page via [facility] Paging/Directory         ______________________________________________________________________    Chief Complaint     Ostomy malfunction     History is obtained from the patient    History of Present Illness   Gurdeep PJ Dia is a 66 year old male with hx of recent prolonged hospitalization (11/30-12/21/21) for SBO c/b perforation, enterocutaneous fistula with persistent output (s/p ostomy bag) on bowel rest and TPN, PE, SMV thrombosis, recent COVID (now recovered) who is being admitted for ostomy pouch malfunction and SARIKA.    Patient states he had been struggling with his enterocutaneous fistula with the ostomy bag not staying input. He had drainage of stool all the time, had to change 4 times a day and severely affecting his life. He states he had prior healed fistulas which did not cause of him this much trouble, however, this time he had to sleep in garbage bag due to the drainage. He feels like he is not able to care for it and today is the first time the jet remained attached as an ostomy specialist helped him. The Ostomy nurse recommended him to come to [facility]. He states he has been NPO since November, only taking some water and pop. Denies fever or chills, denies abdominal pain, nausea or vomiting. He noted skin irritation on his abdomen getting worse. In the ED he was afebrile and HDS. Labs largely unremarkable and abd CT showed fistula and anastomotic leak. He was subsequently admitted for further evaluation by Ostomy nurse.     Review of Systems    The 10 point Review of Systems is negative other than noted in the  HPI    Past Medical History    I have reviewed this patient's medical history and updated it with pertinent information if needed.   Past Medical History:   Diagnosis Date     Benign prostatic hyperplasia with weak urinary stream      Chronic bilateral low back pain without sciatica      Chronic neck pain      RAVI (generalized anxiety disorder)      History of hepatitis C     treated and cured     History of substance abuse (H)      Hypertension goal BP (blood pressure) < 140/90      Moderate major depression (H)        Past Surgical History   I have reviewed this patient's surgical history and updated it with pertinent information if needed.  Past Surgical History:   Procedure Laterality Date     LAPAROSCOPY DIAGNOSTIC (GYN) N/A 11/30/2021    Procedure: LAPAROSCOPY;  Surgeon: Mekhi Richardson MD;  Location: [facility] OR     LAPAROTOMY EXPLORATORY N/A 12/3/2021    Procedure: EXPLORATORY LAPAROTOMY, WITH WASHOUT, REPAIR OF SMALL BOWEL PERFORATION.;  Surgeon: Mkehi Richardson MD;  Location: [facility] OR     LAPAROTOMY, LYSIS ADHESIONS, COMBINED N/A 11/30/2021    Procedure: REPAIR ENTEROTOMY, EXTENSIVE LYSIS OF ADHESION;  Surgeon: Mekhi Richardson MD;  Location: [facility] OR     PICC DOUBLE LUMEN PLACEMENT  12/3/2021          RESECT SMALL BOWEL WITHOUT OSTOMY N/A 11/30/2021    Procedure: COVERTED TO OPEN, EXPLORATORY LAPAROTOMY, SMALL BOWEL RESECTION,;  Surgeon: Mekhi Richardson MD;  Location: [facility]     SPLENECTOMY  ~1995     Z APPENDECTOMY  ~1996       Social History   I have reviewed this patient's social history and updated it with pertinent information if needed.  Social History     Tobacco Use     Smoking status: Current Every Day Smoker     Smokeless tobacco: Never Used     Tobacco comment: declined cessation discussion and resource packet -1/14/22   Substance Use Topics     Alcohol use: Not Currently     Comment: Clean for 5 years     Drug use: Not Currently       Family History    I have reviewed this patient's family history and updated it with pertinent information if needed.  Family History   Problem Relation Age of Onset     Lung Cancer Mother        Prior to Admission Medications   Prior to Admission Medications   Prescriptions Last Dose Informant Patient Reported? Taking?   QUEtiapine (SEROQUEL) 200 MG tablet   No No   Sig: Take 1 tablet (200 mg) by mouth At Bedtime   acetaminophen (TYLENOL) 325 MG tablet   No No   Sig: Take 2 tablets (650 mg) by mouth every 4 hours as needed for mild pain or fever   apixaban ANTICOAGULANT (ELIQUIS) 5 MG tablet   No No   Sig: Take 1 tablet (5 mg) by mouth 2 times daily   gabapentin (NEURONTIN) 100 MG capsule   No No   Sig: Take 2 capsules (200 mg) by mouth 2 times daily   gabapentin (NEURONTIN) 300 MG capsule   No No   Sig: Take 3 capsules (900 mg) by mouth At Bedtime   hydrOXYzine (ATARAX) 25 MG tablet   No No   Sig: Take 1 tablet (25 mg) by mouth At Bedtime   levothyroxine (SYNTHROID/LEVOTHROID) 25 MCG tablet   No No   Sig: Take 1 tablet (25 mcg) by mouth daily   lipids (INTRALIPID) 20 % infusion   No No   Sig: Inject 250 mLs into the vein in the morning, Mon and Thur   oxyCODONE-acetaminophen (PERCOCET) 5-325 MG tablet   No No   Sig: Take 1 tablet by mouth every 6 hours as needed for pain   pantoprazole (PROTONIX) 40 MG EC tablet   No No   Sig: Take 1 tablet (40 mg) by mouth every morning (before breakfast)   parenteral nutrition - PTA/DISCHARGE ORDER   No No   Sig: The TPN formula will print on the After Visit Summary Report.   venlafaxine (EFFEXOR-XR) 75 MG 24 hr capsule   No No   Sig: Take 1 capsule (75 mg) by mouth daily      Facility-Administered Medications: None     Allergies   Allergies   Allergen Reactions     Penicillins Hives       Physical Exam   Vital Signs: Temp: 97.2  F (36.2  C) Temp src: Oral BP: (!) 131/100 Pulse: 99   Resp: 20 SpO2: 100 % O2 Device: None (Room air)    Weight: 0 lbs 0 oz    Constitutional: Awake, alert,  cooperative, no apparent distress.  Eyes: Conjunctiva and pupils examined and normal.  HEENT: Moist mucous membranes, normal dentition.  Respiratory: Clear to auscultation bilaterally  Cardiovascular: Regular rate and rhythm, normal S1 and S2, and no murmur noted.  GI: Midline ostomy poutch noted, diffuse erythematous abdominal skin appears more irritation than cellulitic, otherwise soft and NT  Skin: No rashes, no cyanosis, no edema.  Musculoskeletal: No joint swelling, erythema or tenderness.  Neurologic: Cranial nerves 2-12 intact, normal strength and sensation.  Psychiatric: Alert, oriented to person, place and time, no obvious anxiety or depression.      Data   Data reviewed today: I reviewed all medications, new labs and imaging results over the last 24 hours. I personally reviewed no images or EKG's today.    Recent Labs   Lab 02/25/22  0145 02/24/22  1723 02/22/22  1555   WBC  --  12.0* 15.8*   HGB  --  11.8* 10.8*   MCV  --  86 86   PLT  --  600* 608*    134 138   POTASSIUM 3.4 3.7 3.4   CHLORIDE 94 96 103   CO2 31 30 28   BUN 38* 38* 34*   CR 1.64* 1.56* 1.14   ANIONGAP 10 8 7   VIJAYA 9.8 9.7 9.2   GLC 96 106* 124*   ALBUMIN 3.4 3.4 3.0*   PROTTOTAL 9.7* 9.6* 8.7   BILITOTAL 0.6 0.6 0.6   ALKPHOS 173* 169* 145   ALT 46 44 27   AST 43 42 25     Recent Results (from the past 24 hour(s))   XR Chest Port 1 View    Narrative    XR CHEST PORT 1 VIEW on 2/24/2022 6:02 PM.    INDICATION: shortness of breath.    COMPARISON: CT dated 1/8/2022    FINDINGS:   Portable AP semiupright radiographs of the chest. Right upper  extremity PICC tip projects over the high right atrium. Trachea is  midline. Cardiac mediastinal silhouette is stable. Pulmonary  vasculature is relatively distinct. No appreciable pneumothorax. No  focal airspace opacity. Bibasilar streaky opacities.      Impression    IMPRESSION:   No focal airspace opacity. Bibasilar atelectasis.    I have personally reviewed the examination and initial  interpretation  and I agree with the findings.    DIANE RUSSELL MD         SYSTEM ID:  [id number]   Abd/pelvis CT,  IV  contrast only TRAUMA / AAA    Narrative    EXAMINATION: CT ABDOMEN PELVIS W CONTRAST  2/24/2022 8:06 PM      CLINICAL HISTORY: Abdominal abscess/infection suspected    COMPARISON: CT abdomen and pelvis with contrast 1/8/2022    PROCEDURE COMMENTS: CT of the abdomen was performed with Isovue-370  intravenous contrast. Coronal and sagittal reformatted images were  obtained.    FINDINGS:  Lower thorax:   Resolution of right lower lobe consolidation. Basilar atelectasis. No  pleural effusions. Lower cardiac chambers are within normal limits.    Abdomen and pelvis:  Gallbladder is distended with some layering sludge suspected. The  biliary system, and pancreas are normal in appearance. The liver has a  hepatic cyst in the right lobe of the liver along with multiple small  technically indeterminate hypodensities. Status post splenectomy with  small left upper quadrant splenules. The adrenals are normal in  appearance. Exophytic cyst in the lower pole of the right kidney.  Additional bilateral too small to characterize low-density lesions  also likely cysts. No hydronephrosis. There are 3 nonobstructing left  lower pole renal calcifications measuring up to 6 mm.    Abdominal vasculature: Abdominal aortic and iliac calcifications.  Previously demonstrated nonocclusive thrombus in the superior  mesenteric vein is nonvisualized.    Patient is status post partial right colectomy. There is again  evidence of anastomotic leak in the right mid abdomen with a small  amount of extraluminal gas and rim-enhancing collection. This appears  more well circumscribed and rim-enhancing than prior study with  decreased amount of extraluminal gas in this region. Collection is  difficult to measure as there are several rim-enhancing sinus tracts  demonstrated. These abut more anteriorly and inferiorly located small  bowel  for example series 3 image 196 with reactive wall thickening.  Anterior abdominal inflammation. Small bowel proximal to the  anastomosis is moderately dilated, and similar to prior. Anteriorly  located small bowel loops along the midline with again suspected  enterocutaneous fistula, series 3 image 160. The fluid along the open  midline incision has decreased from prior study.    Diverticulosis in the descending and sigmoid colon with no evidence of  acute diverticulitis.    There are some enlarged mesenteric nodes, likely reactive. Small  retroperitoneal nodes not enlarged by size criteria. Small  fat-containing left inguinal hernia.    Bladder within normal limits. Prostate and seminal vesicles stable  with some coarse calcifications in the prostate.    Osseous structures:   Degenerative changes lumbar spine including L4-L5 pars defect and  anterolisthesis of L4 on L5.    Scarring in the subcutaneous fat in the right lower quadrant.      Impression    IMPRESSION:    1. Evidence of anastomotic leak in the right lower quadrant with rim  enhancing fluid and air containing irregular sinus tracts and  inflammatory changes around the anastomosis. This appears more well  organized than on prior CT. Some of the sinus tracts extend to abut  adjacent anterior and inferior small bowel loops with patient at risk  for developing enteric fistulas. No large drainable collection.  Demonstration of the midline enterocutaneous fistula with decreased  fluid along the open anterior incision.  2. Unchanged fluid distended loops of small bowel proximal to the  surgical anastomosis.  3. Interval resolution of superior mesenteric vein thrombus.  4. Nonobstructing left renal stones.    I have personally reviewed the examination and initial interpretation  and I agree with the findings.    DIANE RUSSELL MD         SYSTEM ID:  [id number]

## 2022-02-26 LAB
ALBUMIN SERPL-MCNC: 2.6 G/DL (ref 3.4–5)
ALP SERPL-CCNC: 125 U/L (ref 40–150)
ALT SERPL W P-5'-P-CCNC: 34 U/L (ref 0–70)
ANION GAP SERPL CALCULATED.3IONS-SCNC: 9 MMOL/L (ref 3–14)
AST SERPL W P-5'-P-CCNC: 30 U/L (ref 0–45)
BILIRUB DIRECT SERPL-MCNC: 0.2 MG/DL (ref 0–0.2)
BILIRUB SERPL-MCNC: 0.5 MG/DL (ref 0.2–1.3)
BUN SERPL-MCNC: 45 MG/DL (ref 7–30)
CALCIUM SERPL-MCNC: 9.2 MG/DL (ref 8.5–10.1)
CHLORIDE BLD-SCNC: 96 MMOL/L (ref 94–109)
CO2 SERPL-SCNC: 32 MMOL/L (ref 20–32)
CREAT SERPL-MCNC: 1.05 MG/DL (ref 0.66–1.25)
ERYTHROCYTE [DISTWIDTH] IN BLOOD BY AUTOMATED COUNT: 16.3 % (ref 10–15)
GFR SERPL CREATININE-BSD FRML MDRD: 78 ML/MIN/1.73M2
GLUCOSE BLD-MCNC: 113 MG/DL (ref 70–99)
GLUCOSE BLDC GLUCOMTR-MCNC: 108 MG/DL (ref 70–99)
GLUCOSE BLDC GLUCOMTR-MCNC: 133 MG/DL (ref 70–99)
GLUCOSE BLDC GLUCOMTR-MCNC: 183 MG/DL (ref 70–99)
GLUCOSE BLDC GLUCOMTR-MCNC: 84 MG/DL (ref 70–99)
HCT VFR BLD AUTO: 33.5 % (ref 40–53)
HGB BLD-MCNC: 10.5 G/DL (ref 13.3–17.7)
INR PPP: 1.17 (ref 0.85–1.15)
MAGNESIUM SERPL-MCNC: 2.7 MG/DL (ref 1.6–2.3)
MCH RBC QN AUTO: 27.3 PG (ref 26.5–33)
MCHC RBC AUTO-ENTMCNC: 31.3 G/DL (ref 31.5–36.5)
MCV RBC AUTO: 87 FL (ref 78–100)
PHOSPHATE SERPL-MCNC: 2.8 MG/DL (ref 2.5–4.5)
PLATELET # BLD AUTO: 532 10E3/UL (ref 150–450)
POTASSIUM BLD-SCNC: 3.5 MMOL/L (ref 3.4–5.3)
PROT SERPL-MCNC: 7.7 G/DL (ref 6.8–8.8)
RBC # BLD AUTO: 3.84 10E6/UL (ref 4.4–5.9)
SODIUM SERPL-SCNC: 137 MMOL/L (ref 133–144)
WBC # BLD AUTO: 10.4 10E3/UL (ref 4–11)

## 2022-02-26 PROCEDURE — 85027 COMPLETE CBC AUTOMATED: CPT | Performed by: INTERNAL MEDICINE

## 2022-02-26 PROCEDURE — 250N000013 HC RX MED GY IP 250 OP 250 PS 637: Performed by: INTERNAL MEDICINE

## 2022-02-26 PROCEDURE — 99207 PR CDG-CODE CATEGORY CHANGED: CPT | Performed by: STUDENT IN AN ORGANIZED HEALTH CARE EDUCATION/TRAINING PROGRAM

## 2022-02-26 PROCEDURE — 250N000009 HC RX 250: Performed by: STUDENT IN AN ORGANIZED HEALTH CARE EDUCATION/TRAINING PROGRAM

## 2022-02-26 PROCEDURE — 83735 ASSAY OF MAGNESIUM: CPT | Performed by: STUDENT IN AN ORGANIZED HEALTH CARE EDUCATION/TRAINING PROGRAM

## 2022-02-26 PROCEDURE — 84630 ASSAY OF ZINC: CPT | Performed by: INTERNAL MEDICINE

## 2022-02-26 PROCEDURE — 80053 COMPREHEN METABOLIC PANEL: CPT | Performed by: STUDENT IN AN ORGANIZED HEALTH CARE EDUCATION/TRAINING PROGRAM

## 2022-02-26 PROCEDURE — 999N000007 HC SITE CHECK

## 2022-02-26 PROCEDURE — 82962 GLUCOSE BLOOD TEST: CPT

## 2022-02-26 PROCEDURE — 96361 HYDRATE IV INFUSION ADD-ON: CPT

## 2022-02-26 PROCEDURE — 99225 PR SUBSEQUENT OBSERVATION CARE,LEVEL II: CPT | Performed by: STUDENT IN AN ORGANIZED HEALTH CARE EDUCATION/TRAINING PROGRAM

## 2022-02-26 PROCEDURE — 36592 COLLECT BLOOD FROM PICC: CPT | Performed by: INTERNAL MEDICINE

## 2022-02-26 PROCEDURE — 84134 ASSAY OF PREALBUMIN: CPT | Performed by: STUDENT IN AN ORGANIZED HEALTH CARE EDUCATION/TRAINING PROGRAM

## 2022-02-26 PROCEDURE — 82248 BILIRUBIN DIRECT: CPT | Performed by: STUDENT IN AN ORGANIZED HEALTH CARE EDUCATION/TRAINING PROGRAM

## 2022-02-26 PROCEDURE — 84100 ASSAY OF PHOSPHORUS: CPT | Performed by: STUDENT IN AN ORGANIZED HEALTH CARE EDUCATION/TRAINING PROGRAM

## 2022-02-26 PROCEDURE — 258N000003 HC RX IP 258 OP 636: Performed by: STUDENT IN AN ORGANIZED HEALTH CARE EDUCATION/TRAINING PROGRAM

## 2022-02-26 PROCEDURE — 85610 PROTHROMBIN TIME: CPT | Performed by: STUDENT IN AN ORGANIZED HEALTH CARE EDUCATION/TRAINING PROGRAM

## 2022-02-26 PROCEDURE — G0378 HOSPITAL OBSERVATION PER HR: HCPCS

## 2022-02-26 RX ORDER — NALOXONE HYDROCHLORIDE 0.4 MG/ML
0.2 INJECTION, SOLUTION INTRAMUSCULAR; INTRAVENOUS; SUBCUTANEOUS
Status: DISCONTINUED | OUTPATIENT
Start: 2022-02-26 | End: 2022-02-28 | Stop reason: HOSPADM

## 2022-02-26 RX ORDER — SODIUM CHLORIDE 9 MG/ML
INJECTION, SOLUTION INTRAVENOUS CONTINUOUS
Status: ACTIVE | OUTPATIENT
Start: 2022-02-26 | End: 2022-02-26

## 2022-02-26 RX ORDER — NALOXONE HYDROCHLORIDE 0.4 MG/ML
0.4 INJECTION, SOLUTION INTRAMUSCULAR; INTRAVENOUS; SUBCUTANEOUS
Status: DISCONTINUED | OUTPATIENT
Start: 2022-02-26 | End: 2022-02-28 | Stop reason: HOSPADM

## 2022-02-26 RX ADMIN — LEVOTHYROXINE SODIUM 25 MCG: 0.03 TABLET ORAL at 08:28

## 2022-02-26 RX ADMIN — OXYCODONE HYDROCHLORIDE AND ACETAMINOPHEN 1 TABLET: 5; 325 TABLET ORAL at 08:26

## 2022-02-26 RX ADMIN — APIXABAN 5 MG: 5 TABLET, FILM COATED ORAL at 08:27

## 2022-02-26 RX ADMIN — GABAPENTIN 900 MG: 300 CAPSULE ORAL at 21:58

## 2022-02-26 RX ADMIN — SODIUM CHLORIDE: 9 INJECTION, SOLUTION INTRAVENOUS at 14:03

## 2022-02-26 RX ADMIN — VENLAFAXINE HYDROCHLORIDE 75 MG: 37.5 CAPSULE, EXTENDED RELEASE ORAL at 08:27

## 2022-02-26 RX ADMIN — GABAPENTIN 200 MG: 100 CAPSULE ORAL at 14:03

## 2022-02-26 RX ADMIN — HYDROXYZINE HYDROCHLORIDE 25 MG: 25 TABLET ORAL at 21:58

## 2022-02-26 RX ADMIN — PANTOPRAZOLE SODIUM 40 MG: 40 TABLET, DELAYED RELEASE ORAL at 08:28

## 2022-02-26 RX ADMIN — QUETIAPINE FUMARATE 200 MG: 50 TABLET ORAL at 21:58

## 2022-02-26 RX ADMIN — APIXABAN 5 MG: 5 TABLET, FILM COATED ORAL at 20:09

## 2022-02-26 RX ADMIN — OXYCODONE HYDROCHLORIDE AND ACETAMINOPHEN 1 TABLET: 5; 325 TABLET ORAL at 21:58

## 2022-02-26 RX ADMIN — MAGNESIUM SULFATE HEPTAHYDRATE: 500 INJECTION, SOLUTION INTRAMUSCULAR; INTRAVENOUS at 20:10

## 2022-02-26 RX ADMIN — GABAPENTIN 200 MG: 100 CAPSULE ORAL at 08:27

## 2022-02-26 NOTE — PROGRESS NOTES
"VS /80 (BP Location: Left arm)   Pulse 76   Temp 97.4  F (36.3  C) (Oral)   Resp 18   Wt 73.5 kg (162 lb 0.6 oz)   SpO2 93%   BMI 26.80 kg/m    Activity: IND  Neuros: A&O x4. Able to make needs known.  Cardiac: WDL.   Respiratory: WDL on RA. Denies SOB.   GI/: Voiding spontaneously. +BS, pt states last BM through rectum was 2 weeks ago. Ostomy to EC fistula with loose fecal output, pt self empties.  Diet: NPO ex meds/ice  Skin/Incisions/Drains: Ostomy pouch to mid abd fistula, two fistula sites inferior to pouch with small amount of drainage. Pt refused gauze to cover. Right scab, pt states \"old drain site\" Scratches to BLE and scabs/cracks to fingers, pt states scratching legs due to itching from ostomy output and fingers cracked due to \"alcohol swabs and cleaning PICC\" Blanchable redness to coccyx  Lines: (R) DL PICC infusing cycled TPN. IVMF held by provider.  Labs:   Pain/nausea: Denies pain and nausea.      "

## 2022-02-26 NOTE — PROGRESS NOTES
Brief Medicine Note:    Cross cover notified in regards to IVF and TPN. RN noting high volume of fluids overnight with rate of TPN/IVF.     - Okay to hold IVF while TPN running    Jaz Lal PA-C  Internal Medicine TERE Hospitalist  [facility]  Pager: [phone]

## 2022-02-26 NOTE — PLAN OF CARE
Vital signs:  Temp: 97.3  F (36.3  C) Temp src: Oral BP: 107/78 Pulse: 88   Resp: 18 SpO2: 95 % O2 Device: None (Room air)   Pt arrived to  from ED around 1700.   Activity: Up independently.   Neuros: A&O x4.   Cardiac: WDL.   Respiratory: WDL on RA. Denies SOB.  GI/: Voiding spontaneously. +BS, pt states last BM through rectum was 2 weeks ago. Ostomy to EC fistula with loose fecal output, pt self empties.  Diet: NPO ex meds/ice.   Skin: See prior skin assessment note.   Lines: Right dbl PICC infusing cycled TPN. IVMF held by Provider.  Labs: BG 84.  Pain/nausea: Percocet given. Denies nausea.  New changes this shift: ID called to clarify COVID positive result. Pt was positive on 1/21 and recovered on 2/1, ID states if pt asymptomatic, can be treated as recovered COVID. Pt is asymptomatic and on RA.

## 2022-02-26 NOTE — PROGRESS NOTES
[facility]    Medicine Progress Note - Hospitalist Service, GOLD TEAM 7    Date of Admission:  2/24/2022    Assessment & Plan               Gurdeep Dia is a 66 year old male admitted on 2/24/2022. 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) on bowel rest and TPN, PE, SMV thrombosis, recent COVID (now recovered) who is being admitted for ostomy pouch malfunction and SARIKA    Interval Changes:  -Stop IV fluids  -Continued ostomy education and cares. If skin erosion is improving and able to tolerate ostomy bag for 24 hours will be stable for discharge. Initial bag lasted less than 24 hours     Ostomy bag malfunction   Skin erosion   Hx of SBO c/b perforation, enterocutaneous fistula on Bowel rest  Evidence of Anastomotic leak with sinus tract on CT  Patient presented to ED due to difficulty of ostomy bag sticking to his skin and causing fecal drainage and skin irritation. His outpatient ostomy nurse has had difficulty managing it and recommended hospitalization until issue is resolved.   -Surgery evaluated on 2/25 with more organized appearance then prior CT on 1/2022. No surgical indication at this time and recommended conservative management and continued ostomy cares  Plan:  > consult Ostomy nurse   - Wound care    SARIKA - resolved - secondary to hypovolemia  BL Cr around 0.9, has rising recent Cr. Unclear etiology but likely prerenal in the setting of TPN use.     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  - Cont Eliquis     Chronic pain   Insomnia   - Cont PTA Percocet   - Cont PTA Seroquel 200 at bedtime  - Cont Gabapentin 200/200/900    GERD- Cont Protonix      Malnutrition:    - Level of malnutrition: Moderate          Diet: NPO for Medical/Clinical Reasons Except for: Ice Chips,  "Meds  parenteral nutrition - ADULT compounded formula CYCLE    DVT Prophylaxis: DOAC  Roblero Catheter: Not present  Central Lines: PRESENT  PICC Double Lumen 12/03/21 Right Basilic-Site Assessment: WDL  Cardiac Monitoring: None  Code Status: Full Code      Disposition Plan   Expected Discharge: 02/28/2022     Anticipated discharge location:  Awaiting care coordination huddle  Delays:            The patient's care was discussed with the Bedside Nurse, Care Coordinator/ and Patient.    Jarvis Jaimes MD  Hospitalist Service, [facility] TEAM [address] [place] [address]  Securely message with the Vocera Web Console (learn more here)  Text page via [facility] Paging/Directory   Please see signed in provider for up to date coverage information      Clinically Significant Risk Factors Present on Admission              # Coagulation Defect: home medication list includes an anticoagulant medication    # Overweight: Estimated body mass index is 26.8 kg/m  as calculated from the following:    Height as of 1/28/22: 1.656 m (5' 5.2\").    Weight as of this encounter: 73.5 kg (162 lb 0.6 oz).  # Moderate Malnutrition: based on nutrition assessment     ______________________________________________________________________    Interval History   This morning patient states his skin is looking better. He is starting to have some leakage around his ostomy site. He denies fever or chills. No chest pain. No shortness of breath. He was not aware he had an SARIKA yesterday. He is concerned that his skin will not be intact and his ostomy bag wont be able to stick.     Data reviewed today: I reviewed all medications, new labs and imaging results over the last 24 hours. I personally reviewed no images or EKG's today.    Physical Exam   Vital Signs: Temp: 97.4  F (36.3  C) Temp src: Oral BP: 119/80 Pulse: 76   Resp: 18 SpO2: 93 % O2 Device: None (Room air)    Weight: 162 lbs .61 oz  Constitutional: alert, " oriented, no acute distress  Eyes: no icterus  Respiratory: CTAB, no wheezing, no crackles   Cardiovascular: normal s1, s2, RRR,   GI: soft, old healed, surgical incision, ostomy in place over mid abdomen with brown liquid drainage, no peritoneal signs   Skin: right arm PICC line, no erythema or purulence  Musculoskeletal: no lower extremity edema  Neurologic: holding eye contact, following commands, moving all extremities     Data   Recent Labs   Lab 02/26/22  1227 02/26/22  1106 02/26/22  0837 02/26/22  0605 02/25/22  0746 02/25/22  0145 02/24/22  1723 02/22/22  1555   WBC  --  10.4  --   --   --   --  12.0* 15.8*   HGB  --  10.5*  --   --   --   --  11.8* 10.8*   MCV  --  87  --   --   --   --  86 86   PLT  --  532*  --   --   --   --  600* 608*   INR  --   --  1.17*  --   --   --   --   --    NA  --   --  137  --   --  135 134 138   POTASSIUM  --   --  3.5  --   --  3.4 3.7 3.4   CHLORIDE  --   --  96  --   --  94 96 103   CO2  --   --  32  --   --  31 30 28   BUN  --   --  45*  --   --  38* 38* 34*   CR  --   --  1.05  --   --  1.64* 1.56* 1.14   ANIONGAP  --   --  9  --   --  10 8 7   VIJAYA  --   --  9.2  --   --  9.8 9.7 9.2   *  --  113* 133*   < > 96 106* 124*   ALBUMIN  --   --  2.6*  --   --  3.4 3.4 3.0*   PROTTOTAL  --   --  7.7  --   --  9.7* 9.6* 8.7   BILITOTAL  --   --  0.5  --   --  0.6 0.6 0.6   ALKPHOS  --   --  125  --   --  173* 169* 145   ALT  --   --  34  --   --  46 44 27   AST  --   --  30  --   --  43 42 25    < > = values in this interval not displayed.

## 2022-02-26 NOTE — CONSULTS
Care Management Initial Consult    General Information  Assessment completed with: Patient,    Type of CM/SW Visit: Initial Assessment    Primary Care Provider verified and updated as needed: No (Appt scheduled to initiate)    Chema Dia MRN: [id number]   Date: 3/23/2022 Status: Scheduled   Time: 2:30 PM Length: 30   Visit Type: ED/HOSP FOLLOW UP [2891] Copay: $3.00   Provider: Becky Solorio MD         Readmission within the last 30 days: no previous admission in last 30 days      Reason for Consult: discharge planning  Advance Care Planning: Advance Care Planning Reviewed: other (see comments) (Pt not interested in ACP at this time)          Communication Assessment  Patient's communication style: spoken language (English or Bilingual)    Hearing Difficulty or Deaf: no   Wear Glasses or Blind: no    Cognitive  Cognitive/Neuro/Behavioral: WDL                      Living Environment:   People in home: other (see comments) (Pt states he lives at a half way house)     Current living Arrangements: other (see comments) (Half-way house)      Able to return to prior arrangements: yes  The pt states that he was not able to go to a TCU because he has a felony for theft.    Family/Social Support:  Care provided by: self, homecare agency  Provides care for: no one  Marital Status: Single  Other (specify) (Brother in law)        Description of Support System: Uninvolved    Support Assessment: Limited social contact and support  Pt states that he does not have a support system, however if he speaks to anyone/looks for support from anyone, it would be his brother in law.   Current Resources:   Patient receiving home care services: Yes  Skilled Home Care Services: Skilled Nursing  Community Resources: Home [facility]fusion-[facility] for TPN  Equipment currently used at home: colostomy/ostomy supplies-Pt would liek to be re-established with [facility].  Supplies currently used at home: Enteral Nutrition  & Supplies, Wound Care Supplies  Pt is unsure where he receives his ostomy supplies from.    Employment/Financial:  Employment Status: retired, unemployed        Financial Concerns: No concerns identified           Lifestyle & Psychosocial Needs:  Social Determinants of Health     Tobacco Use: High Risk     Smoking Tobacco Use: Current Every Day Smoker     Smokeless Tobacco Use: Never Used   Alcohol Use: Not on file   Financial Resource Strain: Not on file   Food Insecurity: Not on file   Transportation Needs: Not on file   Physical Activity: Not on file   Stress: Not on file   Social Connections: Not on file   Intimate Partner Violence: Not on file   Depression: Not on file   Housing Stability: Not on file       Functional Status:  Prior to admission patient needed assistance:      Yes, [facility]:TPN, wound care        Mental Health Status:          Chemical Dependency Status:                Values/Beliefs:  Spiritual, Cultural Beliefs, Sabianism Practices, Values that affect care:                 Additional Information:  RNCC role identified. The pt is on service with [facility] for TPN and nursing related to the TPN. The pt had home care for his ostomy, however this was discontinued for some reason, which is unknown to the pt. The pt would like to re-establish care with [facility], Phone : [phone], Fax: [phone]. The pt had one outpatient visit with [facility], but prefers home care, which is what was recommended by our [facility] nurse as well. Pt did not have a PCP so I scheduled an appt for him, please see above for information on that. Resumption orders for [facility]/nursing placed, WOC nursing orders placed. RNCC will continue to follow and assist with needs.   Kiara Argueta, RN, BSN, PHN  Weekend/Holiday RNCC Pager [phone]

## 2022-02-26 NOTE — PROVIDER NOTIFICATION
"Provider notified \"Pt has cycled TPN and IVMF, do you still want IVMF running at 100 mL/hr while TPN is running? Thanks! Kaylee [id number]\"  "

## 2022-02-27 LAB
ANION GAP SERPL CALCULATED.3IONS-SCNC: 4 MMOL/L (ref 3–14)
BUN SERPL-MCNC: 40 MG/DL (ref 7–30)
CALCIUM SERPL-MCNC: 8.8 MG/DL (ref 8.5–10.1)
CHLORIDE BLD-SCNC: 100 MMOL/L (ref 94–109)
CO2 SERPL-SCNC: 33 MMOL/L (ref 20–32)
CREAT SERPL-MCNC: 0.9 MG/DL (ref 0.66–1.25)
GFR SERPL CREATININE-BSD FRML MDRD: >90 ML/MIN/1.73M2
GLUCOSE BLD-MCNC: 99 MG/DL (ref 70–99)
GLUCOSE BLDC GLUCOMTR-MCNC: 96 MG/DL (ref 70–99)
GLUCOSE BLDC GLUCOMTR-MCNC: 97 MG/DL (ref 70–99)
HOLD SPECIMEN: NORMAL
MAGNESIUM SERPL-MCNC: 2.4 MG/DL (ref 1.6–2.3)
NT-PROBNP SERPL-MCNC: 66 PG/ML (ref 0–900)
PHOSPHATE SERPL-MCNC: 2.8 MG/DL (ref 2.5–4.5)
POTASSIUM BLD-SCNC: 3.4 MMOL/L (ref 3.4–5.3)
SODIUM SERPL-SCNC: 137 MMOL/L (ref 133–144)

## 2022-02-27 PROCEDURE — 83735 ASSAY OF MAGNESIUM: CPT | Performed by: STUDENT IN AN ORGANIZED HEALTH CARE EDUCATION/TRAINING PROGRAM

## 2022-02-27 PROCEDURE — 83880 ASSAY OF NATRIURETIC PEPTIDE: CPT | Performed by: STUDENT IN AN ORGANIZED HEALTH CARE EDUCATION/TRAINING PROGRAM

## 2022-02-27 PROCEDURE — 84100 ASSAY OF PHOSPHORUS: CPT | Performed by: STUDENT IN AN ORGANIZED HEALTH CARE EDUCATION/TRAINING PROGRAM

## 2022-02-27 PROCEDURE — 82962 GLUCOSE BLOOD TEST: CPT

## 2022-02-27 PROCEDURE — G0378 HOSPITAL OBSERVATION PER HR: HCPCS

## 2022-02-27 PROCEDURE — 99225 PR SUBSEQUENT OBSERVATION CARE,LEVEL II: CPT | Performed by: STUDENT IN AN ORGANIZED HEALTH CARE EDUCATION/TRAINING PROGRAM

## 2022-02-27 PROCEDURE — 250N000013 HC RX MED GY IP 250 OP 250 PS 637: Performed by: INTERNAL MEDICINE

## 2022-02-27 PROCEDURE — 250N000009 HC RX 250: Performed by: STUDENT IN AN ORGANIZED HEALTH CARE EDUCATION/TRAINING PROGRAM

## 2022-02-27 PROCEDURE — 82374 ASSAY BLOOD CARBON DIOXIDE: CPT | Performed by: INTERNAL MEDICINE

## 2022-02-27 PROCEDURE — 36592 COLLECT BLOOD FROM PICC: CPT | Performed by: INTERNAL MEDICINE

## 2022-02-27 PROCEDURE — 82310 ASSAY OF CALCIUM: CPT | Performed by: INTERNAL MEDICINE

## 2022-02-27 RX ORDER — ALBUTEROL SULFATE 90 UG/1
2 AEROSOL, METERED RESPIRATORY (INHALATION) EVERY 4 HOURS PRN
Status: DISCONTINUED | OUTPATIENT
Start: 2022-02-27 | End: 2022-02-28 | Stop reason: HOSPADM

## 2022-02-27 RX ADMIN — GABAPENTIN 900 MG: 300 CAPSULE ORAL at 21:11

## 2022-02-27 RX ADMIN — APIXABAN 5 MG: 5 TABLET, FILM COATED ORAL at 20:02

## 2022-02-27 RX ADMIN — LEVOTHYROXINE SODIUM 25 MCG: 0.03 TABLET ORAL at 08:30

## 2022-02-27 RX ADMIN — GABAPENTIN 200 MG: 100 CAPSULE ORAL at 14:30

## 2022-02-27 RX ADMIN — OXYCODONE HYDROCHLORIDE AND ACETAMINOPHEN 1 TABLET: 5; 325 TABLET ORAL at 21:11

## 2022-02-27 RX ADMIN — QUETIAPINE FUMARATE 200 MG: 50 TABLET ORAL at 21:11

## 2022-02-27 RX ADMIN — HYDROXYZINE HYDROCHLORIDE 25 MG: 25 TABLET ORAL at 21:11

## 2022-02-27 RX ADMIN — GABAPENTIN 200 MG: 100 CAPSULE ORAL at 08:30

## 2022-02-27 RX ADMIN — MAGNESIUM SULFATE HEPTAHYDRATE: 500 INJECTION, SOLUTION INTRAMUSCULAR; INTRAVENOUS at 20:02

## 2022-02-27 RX ADMIN — VENLAFAXINE HYDROCHLORIDE 75 MG: 37.5 CAPSULE, EXTENDED RELEASE ORAL at 08:30

## 2022-02-27 RX ADMIN — OXYCODONE HYDROCHLORIDE AND ACETAMINOPHEN 1 TABLET: 5; 325 TABLET ORAL at 08:29

## 2022-02-27 RX ADMIN — APIXABAN 5 MG: 5 TABLET, FILM COATED ORAL at 08:29

## 2022-02-27 RX ADMIN — PANTOPRAZOLE SODIUM 40 MG: 40 TABLET, DELAYED RELEASE ORAL at 08:30

## 2022-02-27 NOTE — PLAN OF CARE
Vital signs:  Temp: 97.8  F (36.6  C) Temp src: Oral BP: 106/70 Pulse: 76   Resp: 18 SpO2: 91 % O2 Device: None (Room air)     Weight: 72.1 kg (158 lb 14.4 oz)    Activity: Up ad nicolasa  Neuros: WDL, A&Ox4, calls appropriately.   Cardiac: WDL, VSS. Denies chest pain.   Respiratory: WDL, sating well on RA. Denies SOB.  GI/: Fistula to pouch. Changed x1. NO BM this shift. Voiding spontaneously in adequate amounts.   Diet: NPO ex meds.  Skin: Old fistula sites on abdomen. One leaking this shift, covered with gauze.  Lines: R DL PICC running cycled TPN and SL.   Pain/nausea: Denies.   Plan: Continue POC.

## 2022-02-27 NOTE — PLAN OF CARE
BP (!) 89/64 (BP Location: Left arm)   Pulse 83   Temp 97.3  F (36.3  C) (Oral)   Resp 16   Wt 72.1 kg (158 lb 14.4 oz)   SpO2 90%   BMI 26.28 kg/m        Status: Admitted for ostomy pouch malfunction and SARIKA.   Activity: Ind  Neuros: A&O x4. Call appropriately   Cardiac: WDL.   Respiratory: WDL on RA. Denies SOB.   GI/: Voiding spontaneously. +BS, LBM through rectum was 2 weeks ago. Ostomy to EC fistula with loose fecal output, pt. self empties  Diet: NPO ex meds/ice  Skin/Incisions/Drains: Ostomy pouch to mid abd fistula, there are two old fistula sites on abd. Scratches to BLE and scabs/cracks to fingers. Blanchable redness to coccyx  Lines: (R) DL PICC infusing cycled TPN. IVMF  Labs: review.  Pain/nausea: Denies pain and nausea.  New changes this shift: None

## 2022-02-27 NOTE — PLAN OF CARE
BP 94/66 (BP Location: Left arm)   Pulse 75   Temp 97.4  F (36.3  C) (Oral)   Resp 19   Wt 73.5 kg (162 lb 0.6 oz)   SpO2 93%   BMI 26.80 kg/m      Status: Admitted for ostomy pouch malfunction and SARIKA.   Activity: Ind  Neuros: A&O x4. Call appropriately   Cardiac: WDL.   Respiratory: WDL on RA. Denies SOB.   GI/: Voiding spontaneously. +BS, LBM through rectum was 2 weeks ago. Ostomy to EC fistula with loose fecal output, pt. self empties  Diet: NPO ex meds/ice  Skin/Incisions/Drains: Ostomy pouch to mid abd fistula, there are two old fistula sites on abd. Scratches to BLE and scabs/cracks to fingers. Blanchable redness to coccyx  Lines: (R) DL PICC infusing cycled TPN. IVMF  Labs: review.  Pain/nausea: Denies pain and nausea.  New changes this shift: Don't have ostomy care supplies.

## 2022-02-27 NOTE — PROGRESS NOTES
[facility]    Medicine Progress Note - Hospitalist Service, GOLD TEAM 7    Date of Admission:  2/24/2022    Assessment & Plan               Gurdeep Dia is a 66 year old male admitted on 2/24/2022. 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) on bowel rest and TPN, PE, SMV thrombosis, recent COVID (now recovered) who is being admitted for ostomy pouch malfunction and SARIKA    Interval Changes:  -Incentive spirometry to assist with atelectasis  -BNP to help assess for volume overload  -Wound care re assessment in AM  -Likely able to discharge in AM with home wound care, after working with WOC team    Ostomy bag malfunction   Skin erosion   Hx of SBO c/b perforation, enterocutaneous fistula on Bowel rest  Evidence of Anastomotic leak with sinus tract on CT  Patient presented to ED due to difficulty of ostomy bag sticking to his skin and causing fecal drainage and skin irritation. His outpatient ostomy nurse has had difficulty managing it and recommended hospitalization until issue is resolved.   -Surgery evaluated on 2/25 with more organized appearance then prior CT on 1/2022. No surgical indication at this time and recommended conservative management and continued ostomy cares  Plan:  > consult Ostomy nurse   - Wound care    SARIKA - resolved - secondary to hypovolemia  BL Cr around 0.9, has rising recent Cr. Unclear etiology but likely prerenal in the setting of TPN use.     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  - Cont Eliquis     Chronic pain   Insomnia   - Cont PTA Percocet   - Cont PTA Seroquel 200 at bedtime  - Cont Gabapentin 200/200/900    GERD- Cont Protonix      Malnutrition:    - Level of malnutrition: Moderate     Atelectasis - incentive spirometry     Diet: NPO for Medical/Clinical  "Reasons Except for: Ice Chips, Meds  parenteral nutrition - ADULT compounded formula CYCLE    DVT Prophylaxis: DOAC  Roblero Catheter: Not present  Central Lines: PRESENT  PICC Double Lumen 12/03/21 Right Basilic-Site Assessment: WDL  Cardiac Monitoring: None  Code Status: Full Code      Disposition Plan   Expected Discharge: 02/28/2022     Anticipated discharge location: other (comment) (Pt states he has medical transport via transportation +)    Delays:            The patient's care was discussed with the Bedside Nurse, Care Coordinator/ and Patient.    Jarvis Jaimes MD  Hospitalist Service, [address]  Securely message with the Vocera Web Console (learn more here)  Text page via [employer] Paging/Directory   Please see signed in provider for up to date coverage information      Clinically Significant Risk Factors Present on Admission              # Coagulation Defect: home medication list includes an anticoagulant medication    # Overweight: Estimated body mass index is 26.28 kg/m  as calculated from the following:    Height as of 1/28/22: 1.656 m (5' 5.2\").    Weight as of this encounter: 72.1 kg (158 lb 14.4 oz).  # Moderate Malnutrition: based on nutrition assessment     ______________________________________________________________________    Interval History   No acute events overnight. Patient has had difficulties keeping ostomy bag in place. He has had to replace it a few times today. He reports his skin is improving. No fever or chills. No chest pain. States he would like to discharge tomorrow after speaking with wound care nurse.     Data reviewed today: I reviewed all medications, new labs and imaging results over the last 24 hours. I personally reviewed no images or EKG's today.    Physical Exam   Vital Signs: Temp: 97.8  F (36.6  C) Temp src: Oral BP: 106/70 Pulse: 76   Resp: 18 SpO2: 91 % O2 Device: None (Room air)    Weight: 158 " lbs 14.4 oz  Constitutional: alert, oriented, no acute distress  Eyes: no icterus  Respiratory: crackles in posterior lungs bilaterally  Cardiovascular: normal s1, s2, RRR,   GI: soft, old healed, surgical incision, ostomy in place over mid abdomen with brown liquid drainage, no peritoneal signs   Skin: right arm PICC line, no erythema or purulence  Musculoskeletal: no lower extremity edema  Neurologic: holding eye contact, following commands, moving all extremities     Data   Recent Labs   Lab 02/26/22  1840 02/26/22  1227 02/26/22  1106 02/26/22  0837 02/25/22  0746 02/25/22  0145 02/24/22  1723 02/22/22  1555   WBC  --   --  10.4  --   --   --  12.0* 15.8*   HGB  --   --  10.5*  --   --   --  11.8* 10.8*   MCV  --   --  87  --   --   --  86 86   PLT  --   --  532*  --   --   --  600* 608*   INR  --   --   --  1.17*  --   --   --   --    NA  --   --   --  137  --  135 134 138   POTASSIUM  --   --   --  3.5  --  3.4 3.7 3.4   CHLORIDE  --   --   --  96  --  94 96 103   CO2  --   --   --  32  --  31 30 28   BUN  --   --   --  45*  --  38* 38* 34*   CR  --   --   --  1.05  --  1.64* 1.56* 1.14   ANIONGAP  --   --   --  9  --  10 8 7   VIJAYA  --   --   --  9.2  --  9.8 9.7 9.2   GLC 84 108*  --  113*   < > 96 106* 124*   ALBUMIN  --   --   --  2.6*  --  3.4 3.4 3.0*   PROTTOTAL  --   --   --  7.7  --  9.7* 9.6* 8.7   BILITOTAL  --   --   --  0.5  --  0.6 0.6 0.6   ALKPHOS  --   --   --  125  --  173* 169* 145   ALT  --   --   --  34  --  46 44 27   AST  --   --   --  30  --  43 42 25    < > = values in this interval not displayed.

## 2022-02-28 ENCOUNTER — HOME INFUSION (PRE-WILLOW HOME INFUSION) (OUTPATIENT)
Dept: PHARMACY | Facility: CLINIC | Age: 67
End: 2022-02-28

## 2022-02-28 VITALS
WEIGHT: 161.9 LBS | BODY MASS INDEX: 26.78 KG/M2 | TEMPERATURE: 96.5 F | RESPIRATION RATE: 16 BRPM | DIASTOLIC BLOOD PRESSURE: 73 MMHG | SYSTOLIC BLOOD PRESSURE: 107 MMHG | HEART RATE: 74 BPM | OXYGEN SATURATION: 95 %

## 2022-02-28 LAB
ALBUMIN SERPL-MCNC: 2.3 G/DL (ref 3.4–5)
ALP SERPL-CCNC: 108 U/L (ref 40–150)
ALT SERPL W P-5'-P-CCNC: 27 U/L (ref 0–70)
ANION GAP SERPL CALCULATED.3IONS-SCNC: 4 MMOL/L (ref 3–14)
AST SERPL W P-5'-P-CCNC: 21 U/L (ref 0–45)
BILIRUB SERPL-MCNC: 0.4 MG/DL (ref 0.2–1.3)
BUN SERPL-MCNC: 37 MG/DL (ref 7–30)
CALCIUM SERPL-MCNC: 9.1 MG/DL (ref 8.5–10.1)
CHLORIDE BLD-SCNC: 102 MMOL/L (ref 94–109)
CO2 SERPL-SCNC: 32 MMOL/L (ref 20–32)
CREAT SERPL-MCNC: 0.89 MG/DL (ref 0.66–1.25)
GFR SERPL CREATININE-BSD FRML MDRD: >90 ML/MIN/1.73M2
GLUCOSE BLD-MCNC: 121 MG/DL (ref 70–99)
GLUCOSE BLDC GLUCOMTR-MCNC: 108 MG/DL (ref 70–99)
GLUCOSE BLDC GLUCOMTR-MCNC: 119 MG/DL (ref 70–99)
HOLD SPECIMEN: NORMAL
INR PPP: 1.22 (ref 0.85–1.15)
MAGNESIUM SERPL-MCNC: 2.2 MG/DL (ref 1.6–2.3)
PHOSPHATE SERPL-MCNC: 3 MG/DL (ref 2.5–4.5)
PHOSPHATE SERPL-MCNC: 4.7 MG/DL (ref 2.5–4.5)
POTASSIUM BLD-SCNC: 3.4 MMOL/L (ref 3.4–5.3)
PREALB SERPL IA-MCNC: 18 MG/DL (ref 15–45)
PROT SERPL-MCNC: 7.3 G/DL (ref 6.8–8.8)
SODIUM SERPL-SCNC: 138 MMOL/L (ref 133–144)

## 2022-02-28 PROCEDURE — G0378 HOSPITAL OBSERVATION PER HR: HCPCS

## 2022-02-28 PROCEDURE — 250N000013 HC RX MED GY IP 250 OP 250 PS 637: Performed by: INTERNAL MEDICINE

## 2022-02-28 PROCEDURE — 85610 PROTHROMBIN TIME: CPT | Performed by: STUDENT IN AN ORGANIZED HEALTH CARE EDUCATION/TRAINING PROGRAM

## 2022-02-28 PROCEDURE — 82962 GLUCOSE BLOOD TEST: CPT

## 2022-02-28 PROCEDURE — 83735 ASSAY OF MAGNESIUM: CPT | Performed by: STUDENT IN AN ORGANIZED HEALTH CARE EDUCATION/TRAINING PROGRAM

## 2022-02-28 PROCEDURE — 36592 COLLECT BLOOD FROM PICC: CPT | Performed by: STUDENT IN AN ORGANIZED HEALTH CARE EDUCATION/TRAINING PROGRAM

## 2022-02-28 PROCEDURE — G0463 HOSPITAL OUTPT CLINIC VISIT: HCPCS

## 2022-02-28 PROCEDURE — 999N000007 HC SITE CHECK

## 2022-02-28 PROCEDURE — 84100 ASSAY OF PHOSPHORUS: CPT | Performed by: STUDENT IN AN ORGANIZED HEALTH CARE EDUCATION/TRAINING PROGRAM

## 2022-02-28 PROCEDURE — 80053 COMPREHEN METABOLIC PANEL: CPT | Performed by: STUDENT IN AN ORGANIZED HEALTH CARE EDUCATION/TRAINING PROGRAM

## 2022-02-28 PROCEDURE — 99217 PR OBSERVATION CARE DISCHARGE: CPT | Performed by: STUDENT IN AN ORGANIZED HEALTH CARE EDUCATION/TRAINING PROGRAM

## 2022-02-28 RX ORDER — ALBUTEROL SULFATE 90 UG/1
2 AEROSOL, METERED RESPIRATORY (INHALATION) EVERY 4 HOURS PRN
Qty: 18 G | Refills: 1 | Status: SHIPPED | OUTPATIENT
Start: 2022-02-28

## 2022-02-28 RX ADMIN — OXYCODONE HYDROCHLORIDE AND ACETAMINOPHEN 1 TABLET: 5; 325 TABLET ORAL at 08:28

## 2022-02-28 RX ADMIN — LEVOTHYROXINE SODIUM 25 MCG: 0.03 TABLET ORAL at 08:26

## 2022-02-28 RX ADMIN — GABAPENTIN 200 MG: 100 CAPSULE ORAL at 08:26

## 2022-02-28 RX ADMIN — PANTOPRAZOLE SODIUM 40 MG: 40 TABLET, DELAYED RELEASE ORAL at 08:26

## 2022-02-28 RX ADMIN — APIXABAN 5 MG: 5 TABLET, FILM COATED ORAL at 08:26

## 2022-02-28 RX ADMIN — VENLAFAXINE HYDROCHLORIDE 75 MG: 37.5 CAPSULE, EXTENDED RELEASE ORAL at 08:26

## 2022-02-28 NOTE — PROGRESS NOTES
Vitals: Temp: (!) 96.5  F (35.8  C) Temp src: Oral BP: 107/73 Pulse: 74   Resp: 16 SpO2: 95 % O2 Device: None (Room air)       Time: 6242-6190  Activity:  Independent with bed mobility, ambulation, personal cares.   Pain:  Reports pain to the skin around the ostomy pouch. Takes pain medication per MAR.   Neuro: A&O x4. Able to make needs known. Uses call light appropriately.   Cardiac: WDL  Respiratory:  WDL  GI/:  WDL. Voids spontaneously, not saving.   Diet: NPO, TPN.  Lines:  PICC line (R).   Incisions/Drains/Skin:  No acute skin alteration.   Lab:  Reviewed.   Electrolyte Replacements: N/A  New changes this shift: Discharge to home with ostomy care supplies. All personal belongings taken with at the time of discharge.

## 2022-02-28 NOTE — DISCHARGE SUMMARY
[facility]  Hospitalist Discharge Summary      Date of Admission:  2/24/2022  Date of Discharge:  2/28/2022  Discharging Provider: Jarvis Jaimes MD  Discharge Service: Hospitalist Service, GOLD TEAM 7    Discharge Diagnoses   SARIKA  Skin Erosion  Ostomy bag malfunction    Follow-ups Needed After Discharge   N/A    Unresulted Labs Ordered in the Past 30 Days of this Admission     Date and Time Order Name Status Description    2/26/2022  5:00 AM Zinc In process       These results will be followed by PCP    Discharge Disposition   Discharged to home  Condition at discharge: Stable      Hospital Course   Gurdeep Dia is a 66 year old male admitted on 2/24/2022. 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 Wound care team was consulted and assisted with ostomy use. SARIKA recovered with IV fluids. He was discharged home with home health and wound care    Consultations This Hospital Stay   SURGERY GENERAL ADULT IP CONSULT  WOUND OSTOMY CONTINENCE NURSE  IP CONSULT  PHARMACY/NUTRITION TO START AND MANAGE TPN  CARE MANAGEMENT / SOCIAL WORK IP CONSULT    Code Status   Prior    Time Spent on this Encounter   I, Jarvis Jaimes MD, personally saw the patient today and spent less than or equal to 30 minutes discharging this patient.       Jarvis Jaimes MD  [facility] [address] [address]  Phone: [phone]  ______________________________________________________________________    Physical Exam   Vital Signs: Temp: (!) 96.5  F (35.8  C) Temp src: Oral BP: 107/73 Pulse: 74   Resp: 16 SpO2: 95 % O2 Device: None (Room air)    Weight: 161 lbs 14.4 oz  Constitutional: alert, oriented, no  acute distress  Eyes: no icterus  ENT: supple  Respiratory: crackles in posterior lung fields, no wheezing  Cardiovascular: RRR  GI: soft, non-tender, non-distended, ostomy in place with brown green output  Musculoskeletal: no lower extremity edema, right arm PICC line  Neurologic: holding eye contact, following commands, moving all extremities        Primary Care Physician   Physician No Ref-Primary    Discharge Orders      Home Infusion Referral      Home Care Referral      Primary Care Referral      Reason for your hospital stay    Skin erosion  Ostomy complication  Acute kidney injury     Activity    Your activity upon discharge: activity as tolerated     Wound care and dressings    Instructions to care for your wound at home: Ostomy    Current pouching system:Hay 1 piece with paste, no sting/stoma powder used to crust periwound    If current bag leaks help patient apply another and make sure to build up crevice under fistula with ring and use ostomy paste inside the bag. Also make sure to crust using no sting/powder then apply skin glue prior to pouch    2. If can't get an ostomy bag to stay in place please apply ilex paste to periwound (all open areas and red skin) and cover with vaseline gauze. Fluff kerlix over fistula and cover with diaper or towel-     Diet    Follow this diet upon discharge: Orders Placed This Encounter      NPO for Medical/Clinical Reasons Except for: Ice Chips, Meds    TPN       Significant Results and Procedures   Most Recent 3 CBC's:Recent Labs   Lab Test 02/26/22  1106 02/24/22  1723 02/22/22  1555   WBC 10.4 12.0* 15.8*   HGB 10.5* 11.8* 10.8*   MCV 87 86 86   * 600* 608*     Most Recent 3 BMP's:Recent Labs   Lab Test 02/28/22  0715 02/28/22  0608 02/28/22  0042 02/27/22  0759 02/27/22  0750 02/26/22  1227 02/26/22  0837     --   --   --  137  --  137   POTASSIUM 3.4  --   --   --  3.4  --  3.5   CHLORIDE 102  --   --   --  100  --  96   CO2 32  --   --   --  33*   --  32   BUN 37*  --   --   --  40*  --  45*   CR 0.89  --   --   --  0.90  --  1.05   ANIONGAP 4  --   --   --  4  --  9   VIJAYA 9.1  --   --   --  8.8  --  9.2   * 119* 108*   < > 99   < > 113*    < > = values in this interval not displayed.     Most Recent 2 LFT's:Recent Labs   Lab Test 02/28/22  0715 02/26/22  0837   AST 21 30   ALT 27 34   ALKPHOS 108 125   BILITOTAL 0.4 0.5       Discharge Medications   Discharge Medication List as of 2/28/2022 12:00 PM      START taking these medications    Details   albuterol (PROAIR HFA/PROVENTIL HFA/VENTOLIN HFA) 108 (90 Base) MCG/ACT inhaler Inhale 2 puffs into the lungs every 4 hours as needed for wheezing or shortness of breath / dyspnea, Disp-18 g, R-1, E-PrescribePharmacy may dispense brand covered by insurance (Proair, or proventil or ventolin or generic albuterol inhaler)         CONTINUE these medications which have NOT CHANGED    Details   acetaminophen (TYLENOL) 325 MG tablet Take 2 tablets (650 mg) by mouth every 4 hours as needed for mild pain or fever, Disp-120 tablet, R-0, E-Prescribe      apixaban ANTICOAGULANT (ELIQUIS) 5 MG tablet Take 1 tablet (5 mg) by mouth 2 times daily, Disp-136 tablet, R-0, E-Prescribe      !! gabapentin (NEURONTIN) 100 MG capsule Take 2 capsules (200 mg) by mouth 2 times daily, Disp-120 capsule, R-0, E-Prescribe      !! gabapentin (NEURONTIN) 300 MG capsule Take 3 capsules (900 mg) by mouth At Bedtime, Disp-90 capsule, R-0, E-Prescribe      hydrOXYzine (ATARAX) 25 MG tablet Take 1 tablet (25 mg) by mouth At Bedtime, Disp-30 tablet, R-0, E-Prescribe      levothyroxine (SYNTHROID/LEVOTHROID) 25 MCG tablet Take 1 tablet (25 mcg) by mouth daily, Disp-30 tablet, R-0, E-Prescribe      lipids (INTRALIPID) 20 % infusion Inject 250 mLs into the vein in the morning, Mon and Thur, Disp-50 mL, R-4, E-Prescribe      oxyCODONE-acetaminophen (PERCOCET) 5-325 MG tablet Take 1 tablet by mouth every 6 hours as needed for pain, Disp-12 tablet,  R-0, E-Prescribe      pantoprazole (PROTONIX) 40 MG EC tablet Take 1 tablet (40 mg) by mouth every morning (before breakfast), Disp-30 tablet, R-0, E-Prescribe      parenteral nutrition - PTA/DISCHARGE ORDER The TPN formula will print on the After Visit Summary Report., Disp-1 each, R-0, Local Print      QUEtiapine (SEROQUEL) 200 MG tablet Take 1 tablet (200 mg) by mouth At Bedtime, Disp-30 tablet, R-0, E-Prescribe      venlafaxine (EFFEXOR-XR) 75 MG 24 hr capsule Take 1 capsule (75 mg) by mouth daily, Disp-30 capsule, R-1, E-Prescribe       !! - Potential duplicate medications found. Please discuss with provider.        Allergies   Allergies   Allergen Reactions     Penicillins Hives

## 2022-02-28 NOTE — DISCHARGE INSTRUCTIONS
~ Encourage patient participation with ostomy care, he is experienced but having major issues with leakage  ~ Empty pouch when 1/3 to 1/2 full,   ~ Notify WOC for ongoing ostomy pouch leakage,  ~ Supplies used    ~ Pouch: [place] FECAL 1 piece   ~ Accessories: Adapt Paste, Powder, No Sting     TIPS to try over the weekend if leaks continue:  1. If current bag leaks help patient apply another and make sure to build up crevice under fistula with ring and use ostomy paste inside the bag. Also make sure to crust using no sting/powder then apply skin glue prior to pouch  2. If can't get an ostomy bag to stay in place please apply ilex paste to periwound (all open areas and red skin) and cover with vaseline gauze. Fluff kerlix over fistula and cover with diaper or towel- may need to use abdominal binder to help keep in place. May use suction so long as not directly on skin/fistula.

## 2022-02-28 NOTE — CONSULTS
WO Nurse Inpatient [facility]   WO Nurse Inpatient Adult     Initial Assessment   Assessment of established  Fisutla Stoma complication(s) none   Mucocutaneous junction; N/A  Peristomal complication(s) Moisture-Associated Skin Damage (MASD) due to leakage, mechanical damage and fistula   Pouch wear time:less than 24 hours  Following today's visit:Patient /  is  able to demonstrate;       1. How to empty their pouch? yes      2. How to change their pouch?  yes      3. How to read and record intake and output correctly? yes    Objective data:  Patient history according to medical record: Gurdeep Dia is a 66 year old male admitted on 2/24/2022. 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     Current Diet/Nutrition: Orders Placed This Encounter      NPO for Medical/Clinical Reasons Except for: Ice Chips, Meds     TPN yes   I/O last 3 completed shifts:  In: -   Out: 470 [Urine:400; Stool:70]  Labs:    Recent Labs   Lab 02/28/22  0715 02/26/22  1106   ALBUMIN 2.3*  --    HGB  --  10.5*   INR 1.22*  --    WBC  --  10.4        Physical Exam:  Current pouching system:[place] 1 piece with paste, no sting/stoma powder used to crust periwound  Reason for pouch change today: pouch not changed today. Is still intact but left contact info with floor RN in case it leaks later today  Peristomal skin: erythema and erosion of epidermis  Stoma output :brown, green and liquid   Abdominal  Assessment  soft , NG still in place? No  Surgical Site: N/A  Pain: Sharp  Is patient still on a PCA No    Interventions:  Patient's chart evaluated.  Focus of today's visit: verbal instruction  and discharge instructions   Participant of teaching session today patient   Orders: Written  Change  made with ostomy management today: Yes  Patient/family: observing and active participation  Supplies:Ordered shital paste and rings and at bedside, sent patient home with 2 week supply of 1 piece drain able ostomy bags    Plan:  Learning needs: evaluate leakage issue, verbal instruction  and discharge instructions  Preparation for discharge: No discharge preparations started  Recommend home care? yes    ~ Encourage patient participation with ostomy care, he is experienced but having major issues with leakage  ~ Empty pouch when 1/3 to 1/2 full,   ~ Notify WOC for ongoing ostomy pouch leakage,  ~ Document stoma output volume, color, consistency EVERY shift  ~ Supplies used    ~ Pouch: [place] FECAL ([id number])   ~ Accessories: Adapt Paste, Powder ([id number]), No Sting ([id number])    TIPS to try over the weekend if leaks continue:  1. If current bag leaks help patient apply another and make sure to build up crevice under fistula with ring and use ostomy paste inside the bag. Also make sure to crust using no sting/powder then apply skin glue prior to pouch  2. If can't get an ostomy bag to stay in place please apply ilex paste to periwound (all open areas and red skin) and cover with vaseline gauze. Fluff kerlix over fistula and cover with diaper or towel- may need to use abdominal binder to help keep in place. May use suction so long as not directly on skin/fistula.    Discussed plan of care with Patient and Nurse  Nursing to notify the Provider(s) and re-consult the WOC Nurse if new ostomy concerns or discharge planned before next planned WOC visit.    WOC Nurse will return: daily M-F  Face to face time: 30 minutes    Nadeen Funez RN CWOCN

## 2022-02-28 NOTE — PROVIDER NOTIFICATION
Provider notified (name/pager): Juan Rendon MD  Time initial page sent: [phone]  Reason for notification: Patient is hypotensive ( BP 83/61). AOVSS. Patient is asymptomatic.  Recommendation/request given to provider:   Response from provider: Recheck in 1 hour and send the results.     Addendum  Rechecked VS: BP 91/70 (BP Location: Left arm)   Pulse 68   Temp 96.9  F (36.1  C) (Oral)   Resp 18   Wt 72.1 kg (158 lb 14.4 oz)   SpO2 95%   BMI 26.28 kg/m .    Provider okay with it. Continue to monitor.

## 2022-02-28 NOTE — PROGRESS NOTES
Care Management Follow Up    Length of Stay (days): 1    Expected Discharge Date: 02/28/2022     Concerns to be Addressed: discharge planning    Patient plan of care discussed at interdisciplinary rounds: Yes    Anticipated Discharge Disposition: Home with services     Anticipated Discharge Services: Home Infusion, Home Care  Anticipated Discharge DME: None    Patient/family educated on Medicare website which has current facility and service quality ratings: no  Education Provided on the Discharge Plan: yes    Patient/Family in Agreement with the Plan: yes    Referrals Placed by CM/SW: Home Infusion, Homecare  Private pay costs discussed: Not applicable    Additional Information:  Per MD team patient medically ready for discharge to home today.  He would like to re establish with [facility].  Received update from [facility] that [facility] had a nurse go out to do a consult and recommended he go to a TCU.  Due to many barriers including criminal history, TCU is not an option.  [facility] nurses will continue to provide home nursing visits at home for the patient.  They were assisting pt with ordering ostomy supplies.  TPN recipe faxed to [facility], they will deliver TPN supplies to the patients home.  RNCC will remain available if further needs arise.      [facility](TPN)  Phone: [phone]  Fax: [phone]         PETE Snow  Phone: [phone]  Pager: [phone]  To contact the weekend RNCC, Page: [phone]

## 2022-02-28 NOTE — PLAN OF CARE
/73 (BP Location: Left arm)   Pulse 74   Temp (!) 96.5  F (35.8  C) (Oral)   Resp 16   Wt 72.1 kg (158 lb 14.4 oz)   SpO2 95%   BMI 26.28 kg/m       Assumed care of patient from 2300 - 0700. Patient is alert and oriented x4. Reported pain was tolerable, no prn pain med utilized this shift. Hypotensive, provider aware. BP better this morning. Voiding spontaneously. Ostomy pouch intact (about 70 mL liquid output). Double lumen PICC on R arm, red lumen infusing TPN and purple lumen heparin locked. NPO except meds and ice chips. Continue with plan of care.

## 2022-02-28 NOTE — PLAN OF CARE
Goal Outcome Evaluation:    Plan of Care Reviewed With: patient   19:00-23:30    Pt. Is alert and oriented x4, calm and cooperative. Independent in transfers. Voids without difficulty. With ostomy pouch on mid abdomen for fistula. C/o pain on left leg posterior aspect rated 7/10, asked for warm pack and PRN percocet to help him sleep, effective. Double lumen PICC on R arm, red infusing to TPN purple heparin locked. NPO except meds and ice chips. TPN x 12 hours started, rate now is 193 mL/hr for 10 hours, then decreased to 96 mL/hr for the last hour. Continue POC.

## 2022-03-01 ENCOUNTER — PATIENT OUTREACH (OUTPATIENT)
Dept: CARE COORDINATION | Facility: CLINIC | Age: 67
End: 2022-03-01
Payer: MEDICARE

## 2022-03-01 ENCOUNTER — HOME INFUSION (PRE-WILLOW HOME INFUSION) (OUTPATIENT)
Dept: PHARMACY | Facility: CLINIC | Age: 67
End: 2022-03-01

## 2022-03-01 DIAGNOSIS — Z71.89 OTHER SPECIFIED COUNSELING: ICD-10-CM

## 2022-03-01 NOTE — PROGRESS NOTES
Clinic Care Coordination Contact    Background: Care Coordination referral placed from [facility] discharge report for reason of patient meeting criteria for a TCM outreach call by [facility] team.    Assessment: Upon chart review, CCRC Team member will cancel/close the referral for TCM outreach due to reason below:    Patient has discharged to a half way house.  Patient does not have a phone and number in for brother in law with no consent to communicate on file.    Plan: Care Coordination referral for TCM outreach canceled.    Vita Gilman MA  [facility]

## 2022-03-02 LAB — ZINC SERPL-MCNC: 51.6 UG/DL

## 2022-03-04 ENCOUNTER — LAB REQUISITION (OUTPATIENT)
Dept: LAB | Facility: CLINIC | Age: 67
End: 2022-03-04
Payer: MEDICARE

## 2022-03-04 DIAGNOSIS — K56.609 UNSPECIFIED INTESTINAL OBSTRUCTION, UNSPECIFIED AS TO PARTIAL VERSUS COMPLETE OBSTRUCTION (H): ICD-10-CM

## 2022-03-04 LAB
ALBUMIN SERPL-MCNC: 2.8 G/DL (ref 3.4–5)
ALP SERPL-CCNC: 712 U/L (ref 40–150)
ALT SERPL W P-5'-P-CCNC: 105 U/L (ref 0–70)
ANION GAP SERPL CALCULATED.3IONS-SCNC: 8 MMOL/L (ref 3–14)
AST SERPL W P-5'-P-CCNC: 67 U/L (ref 0–45)
BASOPHILS # BLD AUTO: 0.1 10E3/UL (ref 0–0.2)
BASOPHILS NFR BLD AUTO: 1 %
BILIRUB DIRECT SERPL-MCNC: 0.2 MG/DL (ref 0–0.2)
BILIRUB SERPL-MCNC: 0.5 MG/DL (ref 0.2–1.3)
BUN SERPL-MCNC: 35 MG/DL (ref 7–30)
CALCIUM SERPL-MCNC: 9.2 MG/DL (ref 8.5–10.1)
CHLORIDE BLD-SCNC: 100 MMOL/L (ref 94–109)
CO2 SERPL-SCNC: 28 MMOL/L (ref 20–32)
CREAT SERPL-MCNC: 1.05 MG/DL (ref 0.66–1.25)
EOSINOPHIL # BLD AUTO: 0.2 10E3/UL (ref 0–0.7)
EOSINOPHIL NFR BLD AUTO: 1 %
ERYTHROCYTE [DISTWIDTH] IN BLOOD BY AUTOMATED COUNT: 16.5 % (ref 10–15)
FASTING STATUS PATIENT QL REPORTED: NORMAL
GFR SERPL CREATININE-BSD FRML MDRD: 78 ML/MIN/1.73M2
GLUCOSE BLD-MCNC: 115 MG/DL (ref 70–99)
HCT VFR BLD AUTO: 34.2 % (ref 40–53)
HGB BLD-MCNC: 10.9 G/DL (ref 13.3–17.7)
HOLD SPECIMEN: NORMAL
IMM GRANULOCYTES # BLD: 0.1 10E3/UL
IMM GRANULOCYTES NFR BLD: 1 %
LYMPHOCYTES # BLD AUTO: 6.3 10E3/UL (ref 0.8–5.3)
LYMPHOCYTES NFR BLD AUTO: 49 %
MAGNESIUM SERPL-MCNC: 2.2 MG/DL (ref 1.6–2.3)
MCH RBC QN AUTO: 27 PG (ref 26.5–33)
MCHC RBC AUTO-ENTMCNC: 31.9 G/DL (ref 31.5–36.5)
MCV RBC AUTO: 85 FL (ref 78–100)
MONOCYTES # BLD AUTO: 1.6 10E3/UL (ref 0–1.3)
MONOCYTES NFR BLD AUTO: 12 %
NEUTROPHILS # BLD AUTO: 4.7 10E3/UL (ref 1.6–8.3)
NEUTROPHILS NFR BLD AUTO: 36 %
NRBC # BLD AUTO: 0 10E3/UL
NRBC BLD AUTO-RTO: 0 /100
PHOSPHATE SERPL-MCNC: 3 MG/DL (ref 2.5–4.5)
PLAT MORPH BLD: NORMAL
PLATELET # BLD AUTO: 590 10E3/UL (ref 150–450)
POTASSIUM BLD-SCNC: 3.8 MMOL/L (ref 3.4–5.3)
PROT SERPL-MCNC: 8.2 G/DL (ref 6.8–8.8)
RBC # BLD AUTO: 4.03 10E6/UL (ref 4.4–5.9)
RBC MORPH BLD: NORMAL
SODIUM SERPL-SCNC: 136 MMOL/L (ref 133–144)
TRIGL SERPL-MCNC: 87 MG/DL
WBC # BLD AUTO: 13 10E3/UL (ref 4–11)

## 2022-03-04 PROCEDURE — 84478 ASSAY OF TRIGLYCERIDES: CPT | Performed by: SPECIALIST

## 2022-03-04 PROCEDURE — 83735 ASSAY OF MAGNESIUM: CPT | Performed by: SPECIALIST

## 2022-03-04 PROCEDURE — 85025 COMPLETE CBC W/AUTO DIFF WBC: CPT | Performed by: SPECIALIST

## 2022-03-04 PROCEDURE — 80053 COMPREHEN METABOLIC PANEL: CPT | Performed by: SPECIALIST

## 2022-03-04 PROCEDURE — 84100 ASSAY OF PHOSPHORUS: CPT | Performed by: SPECIALIST

## 2022-03-04 PROCEDURE — 82248 BILIRUBIN DIRECT: CPT | Performed by: SPECIALIST

## 2022-03-05 ENCOUNTER — HOME INFUSION (PRE-WILLOW HOME INFUSION) (OUTPATIENT)
Dept: PHARMACY | Facility: CLINIC | Age: 67
End: 2022-03-05
Payer: MEDICARE

## 2022-03-07 ENCOUNTER — HOME INFUSION (PRE-WILLOW HOME INFUSION) (OUTPATIENT)
Dept: PHARMACY | Facility: CLINIC | Age: 67
End: 2022-03-07

## 2022-03-08 ENCOUNTER — LAB REQUISITION (OUTPATIENT)
Dept: LAB | Facility: CLINIC | Age: 67
End: 2022-03-08
Payer: MEDICARE

## 2022-03-08 ENCOUNTER — HOME INFUSION (PRE-WILLOW HOME INFUSION) (OUTPATIENT)
Dept: PHARMACY | Facility: CLINIC | Age: 67
End: 2022-03-08

## 2022-03-08 DIAGNOSIS — K56.609 UNSPECIFIED INTESTINAL OBSTRUCTION, UNSPECIFIED AS TO PARTIAL VERSUS COMPLETE OBSTRUCTION (H): ICD-10-CM

## 2022-03-08 LAB
ALBUMIN SERPL-MCNC: 2.7 G/DL (ref 3.4–5)
ALP SERPL-CCNC: 361 U/L (ref 40–150)
ALT SERPL W P-5'-P-CCNC: 40 U/L (ref 0–70)
ANION GAP SERPL CALCULATED.3IONS-SCNC: 8 MMOL/L (ref 3–14)
AST SERPL W P-5'-P-CCNC: 19 U/L (ref 0–45)
BASOPHILS # BLD AUTO: 0.1 10E3/UL (ref 0–0.2)
BASOPHILS NFR BLD AUTO: 1 %
BILIRUB DIRECT SERPL-MCNC: 0.2 MG/DL (ref 0–0.2)
BILIRUB SERPL-MCNC: 0.3 MG/DL (ref 0.2–1.3)
BUN SERPL-MCNC: 29 MG/DL (ref 7–30)
CALCIUM SERPL-MCNC: 9.3 MG/DL (ref 8.5–10.1)
CHLORIDE BLD-SCNC: 98 MMOL/L (ref 94–109)
CO2 SERPL-SCNC: 31 MMOL/L (ref 20–32)
CREAT SERPL-MCNC: 1.21 MG/DL (ref 0.66–1.25)
EOSINOPHIL # BLD AUTO: 0.1 10E3/UL (ref 0–0.7)
EOSINOPHIL NFR BLD AUTO: 1 %
ERYTHROCYTE [DISTWIDTH] IN BLOOD BY AUTOMATED COUNT: 16.7 % (ref 10–15)
FASTING STATUS PATIENT QL REPORTED: NORMAL
GFR SERPL CREATININE-BSD FRML MDRD: 66 ML/MIN/1.73M2
GLUCOSE BLD-MCNC: 116 MG/DL (ref 70–99)
HCT VFR BLD AUTO: 32.3 % (ref 40–53)
HGB BLD-MCNC: 10.4 G/DL (ref 13.3–17.7)
HOLD SPECIMEN: NORMAL
IMM GRANULOCYTES # BLD: 0 10E3/UL
IMM GRANULOCYTES NFR BLD: 0 %
LYMPHOCYTES # BLD AUTO: 4.6 10E3/UL (ref 0.8–5.3)
LYMPHOCYTES NFR BLD AUTO: 33 %
MAGNESIUM SERPL-MCNC: 2.3 MG/DL (ref 1.6–2.3)
MCH RBC QN AUTO: 26.8 PG (ref 26.5–33)
MCHC RBC AUTO-ENTMCNC: 32.2 G/DL (ref 31.5–36.5)
MCV RBC AUTO: 83 FL (ref 78–100)
MONOCYTES # BLD AUTO: 1.3 10E3/UL (ref 0–1.3)
MONOCYTES NFR BLD AUTO: 9 %
NEUTROPHILS # BLD AUTO: 7.8 10E3/UL (ref 1.6–8.3)
NEUTROPHILS NFR BLD AUTO: 56 %
NRBC # BLD AUTO: 0 10E3/UL
NRBC BLD AUTO-RTO: 0 /100
PHOSPHATE SERPL-MCNC: 3.3 MG/DL (ref 2.5–4.5)
PLATELET # BLD AUTO: 604 10E3/UL (ref 150–450)
POTASSIUM BLD-SCNC: 3.5 MMOL/L (ref 3.4–5.3)
PROT SERPL-MCNC: 8.3 G/DL (ref 6.8–8.8)
RBC # BLD AUTO: 3.88 10E6/UL (ref 4.4–5.9)
SODIUM SERPL-SCNC: 137 MMOL/L (ref 133–144)
TRIGL SERPL-MCNC: 73 MG/DL
WBC # BLD AUTO: 13.9 10E3/UL (ref 4–11)

## 2022-03-08 PROCEDURE — 83735 ASSAY OF MAGNESIUM: CPT | Performed by: SPECIALIST

## 2022-03-08 PROCEDURE — 85025 COMPLETE CBC W/AUTO DIFF WBC: CPT | Performed by: SPECIALIST

## 2022-03-08 PROCEDURE — 84478 ASSAY OF TRIGLYCERIDES: CPT | Performed by: SPECIALIST

## 2022-03-08 PROCEDURE — 84100 ASSAY OF PHOSPHORUS: CPT | Performed by: SPECIALIST

## 2022-03-08 PROCEDURE — 80053 COMPREHEN METABOLIC PANEL: CPT | Performed by: SPECIALIST

## 2022-03-08 PROCEDURE — 82248 BILIRUBIN DIRECT: CPT | Performed by: SPECIALIST

## 2022-03-08 NOTE — PROGRESS NOTES
This is a recent snapshot of the patient's [facility] medical record.  For current drug dose and complete information and questions, call [phone] or In Basket pool, fv home infusion ([id number])  CSN Number:  [id number]

## 2022-03-09 ENCOUNTER — HOME INFUSION (PRE-WILLOW HOME INFUSION) (OUTPATIENT)
Dept: PHARMACY | Facility: CLINIC | Age: 67
End: 2022-03-09

## 2022-03-09 ENCOUNTER — TELEPHONE (OUTPATIENT)
Dept: VASCULAR SURGERY | Facility: CLINIC | Age: 67
End: 2022-03-09
Payer: MEDICARE

## 2022-03-09 DIAGNOSIS — Z93.9 HISTORY OF CREATION OF OSTOMY (H): Primary | ICD-10-CM

## 2022-03-09 NOTE — TELEPHONE ENCOUNTER
Pt called yesterday and left a voicemail asking for assistance in ordering more ostomy supplies. His care has been fractured with multiple health care systems in regards to this. The pt had home care for his ostomy, however this was discontinued for some reason, which is unknown to the pt. The pt would like to re-establish care with [facility], Phone : [phone], Fax: [phone].     The pt had one outpatient visit with [facility], but prefers home care, which is what was recommended by [facility] nurse as well.    I did reach out to [facility] and left a detailed message asking for help in ordering Chema's supplies. Provided my direct phone number for them to call back with further information.     I have also placed an Ostomy supply order in our system that could be faxed to [facility] if needed.     Pt uses: Pouch: [place] FECAL (363922)               Accessories: [employer] Paste, Powder (482780), No Sting (129117)      However, I think it would be helpful for him to continue ostomy care and ordering of supplies with one clinic or company to minimize any confusion moving forward.       [facility]     Taryn Ponce  RN, BSN  [facility]  General Surgery  [facility]  [address] [place] [address]  [email or web address]   Office:[phone]  Employed by [employer],

## 2022-03-12 ENCOUNTER — HOME INFUSION (PRE-WILLOW HOME INFUSION) (OUTPATIENT)
Dept: PHARMACY | Facility: CLINIC | Age: 67
End: 2022-03-12
Payer: MEDICARE

## 2022-03-16 ENCOUNTER — HOME INFUSION (PRE-WILLOW HOME INFUSION) (OUTPATIENT)
Dept: PHARMACY | Facility: CLINIC | Age: 67
End: 2022-03-16
Payer: MEDICARE

## 2022-03-17 ENCOUNTER — HOME INFUSION (PRE-WILLOW HOME INFUSION) (OUTPATIENT)
Dept: PHARMACY | Facility: CLINIC | Age: 67
End: 2022-03-17
Payer: MEDICARE

## 2022-03-18 ENCOUNTER — HOME INFUSION (PRE-WILLOW HOME INFUSION) (OUTPATIENT)
Dept: PHARMACY | Facility: CLINIC | Age: 67
End: 2022-03-18
Payer: MEDICARE

## 2022-03-18 NOTE — PROGRESS NOTES
This is a recent snapshot of the patient's [facility] medical record.  For current drug dose and complete information and questions, call [phone] or In Basket pool, fv home infusion ([id number])  CSN Number:  [id number]

## 2022-03-18 NOTE — PROGRESS NOTES
This is a recent snapshot of the patient's [facility] medical record.  For current drug dose and complete information and questions, call [phone] or In General acute hospital  home infusion ([id number])  CSN Number:

## 2022-03-21 ENCOUNTER — OFFICE VISIT (OUTPATIENT)
Dept: SURGERY | Facility: CLINIC | Age: 67
End: 2022-03-21
Payer: MEDICARE

## 2022-03-21 ENCOUNTER — LAB REQUISITION (OUTPATIENT)
Dept: LAB | Facility: CLINIC | Age: 67
End: 2022-03-21
Payer: MEDICARE

## 2022-03-21 ENCOUNTER — HOME INFUSION (PRE-WILLOW HOME INFUSION) (OUTPATIENT)
Dept: PHARMACY | Facility: CLINIC | Age: 67
End: 2022-03-21

## 2022-03-21 DIAGNOSIS — F33.1 MODERATE EPISODE OF RECURRENT MAJOR DEPRESSIVE DISORDER (H): ICD-10-CM

## 2022-03-21 DIAGNOSIS — F41.1 GAD (GENERALIZED ANXIETY DISORDER): ICD-10-CM

## 2022-03-21 DIAGNOSIS — K56.609 UNSPECIFIED INTESTINAL OBSTRUCTION, UNSPECIFIED AS TO PARTIAL VERSUS COMPLETE OBSTRUCTION (H): ICD-10-CM

## 2022-03-21 DIAGNOSIS — K63.2 ENTEROCUTANEOUS FISTULA: Primary | ICD-10-CM

## 2022-03-21 LAB
ALBUMIN SERPL-MCNC: 2.6 G/DL (ref 3.4–5)
ALP SERPL-CCNC: 172 U/L (ref 40–150)
ALT SERPL W P-5'-P-CCNC: 32 U/L (ref 0–70)
ANION GAP SERPL CALCULATED.3IONS-SCNC: 8 MMOL/L (ref 3–14)
AST SERPL W P-5'-P-CCNC: 25 U/L (ref 0–45)
BASOPHILS # BLD MANUAL: 0 10E3/UL (ref 0–0.2)
BASOPHILS NFR BLD MANUAL: 0 %
BILIRUB DIRECT SERPL-MCNC: 0.1 MG/DL (ref 0–0.2)
BILIRUB SERPL-MCNC: 0.3 MG/DL (ref 0.2–1.3)
BILIRUB SERPL-MCNC: 0.3 MG/DL (ref 0.2–1.3)
BUN SERPL-MCNC: 29 MG/DL (ref 7–30)
CALCIUM SERPL-MCNC: 9.8 MG/DL (ref 8.5–10.1)
CHLORIDE BLD-SCNC: 94 MMOL/L (ref 94–109)
CO2 SERPL-SCNC: 31 MMOL/L (ref 20–32)
CREAT SERPL-MCNC: 1.11 MG/DL (ref 0.66–1.25)
EOSINOPHIL # BLD MANUAL: 0 10E3/UL (ref 0–0.7)
EOSINOPHIL NFR BLD MANUAL: 0 %
ERYTHROCYTE [DISTWIDTH] IN BLOOD BY AUTOMATED COUNT: 16.1 % (ref 10–15)
FASTING STATUS PATIENT QL REPORTED: NORMAL
GFR SERPL CREATININE-BSD FRML MDRD: 73 ML/MIN/1.73M2
GLUCOSE BLD-MCNC: 116 MG/DL (ref 70–99)
HCT VFR BLD AUTO: 34.4 % (ref 40–53)
HGB BLD-MCNC: 11 G/DL (ref 13.3–17.7)
HOLD SPECIMEN: NORMAL
LYMPHOCYTES # BLD MANUAL: 7 10E3/UL (ref 0.8–5.3)
LYMPHOCYTES NFR BLD MANUAL: 53 %
MAGNESIUM SERPL-MCNC: 2.5 MG/DL (ref 1.6–2.3)
MCH RBC QN AUTO: 25.9 PG (ref 26.5–33)
MCHC RBC AUTO-ENTMCNC: 32 G/DL (ref 31.5–36.5)
MCV RBC AUTO: 81 FL (ref 78–100)
MONOCYTES # BLD MANUAL: 0.8 10E3/UL (ref 0–1.3)
MONOCYTES NFR BLD MANUAL: 6 %
NEUTROPHILS # BLD MANUAL: 5.5 10E3/UL (ref 1.6–8.3)
NEUTROPHILS NFR BLD MANUAL: 41 %
PHOSPHATE SERPL-MCNC: 3 MG/DL (ref 2.5–4.5)
PLAT MORPH BLD: ABNORMAL
PLATELET # BLD AUTO: 639 10E3/UL (ref 150–450)
POTASSIUM BLD-SCNC: 3.3 MMOL/L (ref 3.4–5.3)
PROT SERPL-MCNC: 8.8 G/DL (ref 6.8–8.8)
RBC # BLD AUTO: 4.24 10E6/UL (ref 4.4–5.9)
RBC MORPH BLD: ABNORMAL
SODIUM SERPL-SCNC: 133 MMOL/L (ref 133–144)
TRIGL SERPL-MCNC: 92 MG/DL
WBC # BLD AUTO: 13.3 10E3/UL (ref 4–11)

## 2022-03-21 PROCEDURE — 82248 BILIRUBIN DIRECT: CPT | Performed by: SPECIALIST

## 2022-03-21 PROCEDURE — 83735 ASSAY OF MAGNESIUM: CPT | Performed by: SPECIALIST

## 2022-03-21 PROCEDURE — 84100 ASSAY OF PHOSPHORUS: CPT | Performed by: SPECIALIST

## 2022-03-21 PROCEDURE — 84478 ASSAY OF TRIGLYCERIDES: CPT | Performed by: SPECIALIST

## 2022-03-21 PROCEDURE — 85027 COMPLETE CBC AUTOMATED: CPT | Performed by: SPECIALIST

## 2022-03-21 PROCEDURE — 80053 COMPREHEN METABOLIC PANEL: CPT | Performed by: SPECIALIST

## 2022-03-21 PROCEDURE — 99212 OFFICE O/P EST SF 10 MIN: CPT | Performed by: SPECIALIST

## 2022-03-21 RX ORDER — QUETIAPINE FUMARATE 200 MG/1
200 TABLET, FILM COATED ORAL AT BEDTIME
Qty: 30 TABLET | Refills: 0 | Status: SHIPPED | OUTPATIENT
Start: 2022-03-21

## 2022-03-21 RX ORDER — TRAMADOL HYDROCHLORIDE 50 MG/1
50 TABLET ORAL EVERY 6 HOURS PRN
Qty: 10 TABLET | Refills: 0 | Status: SHIPPED | OUTPATIENT
Start: 2022-03-21 | End: 2022-03-24

## 2022-03-21 NOTE — PROGRESS NOTES
[facility] Surgery Follow up    HPI:    66 year old year old male who returns for a follow up.  He is here for follow-up he had a surgery exploratory laparotomy secondary to complete the bowel obstruction with ischemia back in late November he had gone back to the OR twice in December and eventually and ended up with a enterocutaneous fistula x2 is close down 1 of those now with TPN and conservative management secondary to a cemented in abdomen that he had with his first operation.  And at this time point he still on TPN but he is socially only on a clear liquid diet but he seems to be may be having a little bit more.  He is on his home home on his own with home nursing and ostomy care sounds like he last week is in the hospital with a line infection at [facility] where his line is PICC line replaced.  Says he is feeling well coming today and he still has some output out of his ostomy.      Allergies:Penicillins    Past Medical History:   Diagnosis Date     Benign prostatic hyperplasia with weak urinary stream      Chronic bilateral low back pain without sciatica      Chronic neck pain      RAVI (generalized anxiety disorder)      History of hepatitis C     treated and cured     History of substance abuse (H)      Hypertension goal BP (blood pressure) < 140/90      Moderate major depression (H)        Past Surgical History:   Procedure Laterality Date     LAPAROSCOPY DIAGNOSTIC (GYN) N/A 11/30/2021    Procedure: LAPAROSCOPY;  Surgeon: Mekhi Richardson MD;  [place] [facility] [place]     LAPAROTOMY EXPLORATORY N/A 12/3/2021    Procedure: EXPLORATORY LAPAROTOMY, WITH WASHOUT, REPAIR OF SMALL BOWEL PERFORATION.;  Surgeon: Mekhi Richardson MD;  [place] [facility] [place]     LAPAROTOMY, LYSIS ADHESIONS, COMBINED N/A 11/30/2021    Procedure: REPAIR ENTEROTOMY, EXTENSIVE LYSIS OF ADHESION;  Surgeon: Mekhi Richardson MD;  [place] [facility] [place]     PICC DOUBLE LUMEN PLACEMENT  12/3/2021          RESECT SMALL  BOWEL WITHOUT OSTOMY N/A 11/30/2021    Procedure: COVERTED TO OPEN, EXPLORATORY LAPAROTOMY, SMALL BOWEL RESECTION,;  Surgeon: Mekhi Richardson MD;  Location: [facility] OR     SPLENECTOMY  ~1995     [facility] APPENDECTOMY  ~1996       CURRENT MEDS:  Current Outpatient Medications   Medication     albuterol (PROAIR HFA/PROVENTIL HFA/VENTOLIN HFA) 108 (90 Base) MCG/ACT inhaler     apixaban ANTICOAGULANT (ELIQUIS) 5 MG tablet     lipids (INTRALIPID) 20 % infusion     oxyCODONE-acetaminophen (PERCOCET) 5-325 MG tablet     parenteral nutrition - PTA/DISCHARGE ORDER     QUEtiapine (SEROQUEL) 200 MG tablet     traMADol (ULTRAM) 50 MG tablet     venlafaxine (EFFEXOR-XR) 75 MG 24 hr capsule     gabapentin (NEURONTIN) 100 MG capsule     gabapentin (NEURONTIN) 300 MG capsule     levothyroxine (SYNTHROID/LEVOTHROID) 25 MCG tablet     No current facility-administered medications for this visit.       Family History   Problem Relation Age of Onset     Lung Cancer Mother         reports that he has been smoking. He has never used smokeless tobacco. He reports previous alcohol use. He reports previous drug use.    Review of Systems:  Negative except abdominal pain still has drainage from his upper fistula lower fistula is closed    OBJECTIVE:  There were no vitals filed for this visit.  There is no height or weight on file to calculate BMI.    Status: doing well    EXAM:  GENERAL: This is a well-developed 66 year old male who appears his stated age  HEAD: normocephalic  HEENT: Pupils equal and reactive bilaterally  CARDIAC: RRR without murmur  CHEST/LUNG:  Clear to auscultation  ABDOMEN: Soft, nontender, nondistended, no masses    NEUROLOGIC: Focally intact, nonfocal  VASCULAR: Pulses intact, symmetrical upper and lower extremities.        LABS:  Lab Results   Component Value Date    WBC 13.9 03/08/2022    WBC 7.1 01/22/2020    HGB 10.4 03/08/2022    HGB 14.3 01/22/2020    HCT 32.3 03/08/2022    HCT 44.6 01/22/2020    MCV 83  03/08/2022    MCV 93 01/22/2020     03/08/2022     01/22/2020     Lab Results   Component Value Date    ALT 40 03/08/2022    AST 19 03/08/2022    ALKPHOS 361 (H) 03/08/2022        Assessment/Plan:      Moderate episode of recurrent major depressive disorder (H)  RAVI (generalized anxiety disorder)  Enterocutaneous fistula     At this time point he continues TPN vomiting a Gastrografin small bowel follow-through next week was noted today but is not feeling great so we will wait till next week checkup P get how things look with his fistula his tracks.  He has a new PICC line which is replaced [place] last week  Told him and that he should try to stick to clear liquids he states that he is actually eating stuff like potato salad and peaches and stuff he says he spits them out but there is definitely particulate matter in the output from his fistula.    No follow-ups on file.     Mekhi Richardson MD ,MD  [facility] Department of Surgery

## 2022-03-21 NOTE — LETTER
3/21/2022         RE: Gurdeep Dia  [address]  [facility] [address]        Dear Colleague,    Thank you for referring your patient, Gurdeep Dia, to the [facility] [place]. Please see a copy of my visit note below.    [facility] Surgery Follow up    HPI:    66 year old year old male who returns for a follow up.  He is here for follow-up he had a surgery exploratory laparotomy secondary to complete the bowel obstruction with ischemia back in late November he had gone back to the OR twice in December and eventually and ended up with a enterocutaneous fistula x2 is close down 1 of those now with TPN and conservative management secondary to a cemented in abdomen that he had with his first operation.  And at this time point he still on TPN but he is socially only on a clear liquid diet but he seems to be may be having a little bit more.  He is on his home home on his own with home nursing and ostomy care sounds like he last week is in the hospital with a line infection at [facility] where his line is PICC line replaced.  Says he is feeling well coming today and he still has some output out of his ostomy.      Allergies:Penicillins    Past Medical History:   Diagnosis Date     Benign prostatic hyperplasia with weak urinary stream      Chronic bilateral low back pain without sciatica      Chronic neck pain      RAVI (generalized anxiety disorder)      History of hepatitis C     treated and cured     History of substance abuse (H)      Hypertension goal BP (blood pressure) < 140/90      Moderate major depression (H)        Past Surgical History:   Procedure Laterality Date     LAPAROSCOPY DIAGNOSTIC (GYN) N/A 11/30/2021    Procedure: LAPAROSCOPY;  Surgeon: Mekhi Richardson MD;  Location: [facility]     LAPAROTOMY EXPLORATORY N/A 12/3/2021    Procedure: EXPLORATORY LAPAROTOMY, WITH WASHOUT, REPAIR OF SMALL BOWEL PERFORATION.;  Surgeon: Dylan  Mekhi WONG MD;  [facility] [place]     LAPAROTOMY, LYSIS ADHESIONS, COMBINED N/A 11/30/2021    Procedure: REPAIR ENTEROTOMY, EXTENSIVE LYSIS OF ADHESION;  Surgeon: Mekhi Richardson MD;  Location: [facility]     PICC DOUBLE LUMEN PLACEMENT  12/3/2021          RESECT SMALL BOWEL WITHOUT OSTOMY N/A 11/30/2021    Procedure: COVERTED TO OPEN, EXPLORATORY LAPAROTOMY, SMALL BOWEL RESECTION,;  Surgeon: Mekhi Richardson MD;  Location: [facility]     SPLENECTOMY  ~1995     ZZC APPENDECTOMY  ~1996       CURRENT MEDS:  Current Outpatient Medications   Medication     albuterol (PROAIR HFA/PROVENTIL HFA/VENTOLIN HFA) 108 (90 Base) MCG/ACT inhaler     apixaban ANTICOAGULANT (ELIQUIS) 5 MG tablet     lipids (INTRALIPID) 20 % infusion     oxyCODONE-acetaminophen (PERCOCET) 5-325 MG tablet     parenteral nutrition - PTA/DISCHARGE ORDER     QUEtiapine (SEROQUEL) 200 MG tablet     traMADol (ULTRAM) 50 MG tablet     venlafaxine (EFFEXOR-XR) 75 MG 24 hr capsule     gabapentin (NEURONTIN) 100 MG capsule     gabapentin (NEURONTIN) 300 MG capsule     levothyroxine (SYNTHROID/LEVOTHROID) 25 MCG tablet     No current facility-administered medications for this visit.       Family History   Problem Relation Age of Onset     Lung Cancer Mother         reports that he has been smoking. He has never used smokeless tobacco. He reports previous alcohol use. He reports previous drug use.    Review of Systems:  Negative except abdominal pain still has drainage from his upper fistula lower fistula is closed    OBJECTIVE:  There were no vitals filed for this visit.  There is no height or weight on file to calculate BMI.    Status: doing well    EXAM:  GENERAL: This is a well-developed 66 year old male who appears his stated age  HEAD: normocephalic  HEENT: Pupils equal and reactive bilaterally  CARDIAC: RRR without murmur  CHEST/LUNG:  Clear to auscultation  ABDOMEN: Soft, nontender, nondistended, no masses    NEUROLOGIC: Focally  intact, nonfocal  VASCULAR: Pulses intact, symmetrical upper and lower extremities.        LABS:  Lab Results   Component Value Date    WBC 13.9 03/08/2022    WBC 7.1 01/22/2020    HGB 10.4 03/08/2022    HGB 14.3 01/22/2020    HCT 32.3 03/08/2022    HCT 44.6 01/22/2020    MCV 83 03/08/2022    MCV 93 01/22/2020     03/08/2022     01/22/2020     Lab Results   Component Value Date    ALT 40 03/08/2022    AST 19 03/08/2022    ALKPHOS 361 (H) 03/08/2022        Assessment/Plan:      Moderate episode of recurrent major depressive disorder (H)  RAVI (generalized anxiety disorder)  Enterocutaneous fistula     At this time point he continues TPN vomiting a Gastrografin small bowel follow-through next week was noted today but is not feeling great so we will wait till next week checkup P get how things look with his fistula his tracks.  He has a new PICC line which is replaced [place] last week  Told him and that he should try to stick to clear liquids he states that he is actually eating stuff like potato salad and peaches and stuff he says he spits them out but there is definitely particulate matter in the output from his fistula.    No follow-ups on file.     Mekhi Richardson MD ,MD  [facility] Department of Surgery      Again, thank you for allowing me to participate in the care of your patient.        Sincerely,        Mekhi Richardson MD

## 2022-03-21 NOTE — PATIENT INSTRUCTIONS
I, Gurdeep Dia, give verbal consent for a resident to be present in today's visit.      Eduardo Zimmerman CMA  [facility]  Surgery Clinic [facility]  Weight Management Clinic - [address] [address]  Office: [phone]  Fax: [phone]

## 2022-03-22 ENCOUNTER — HOME INFUSION (PRE-WILLOW HOME INFUSION) (OUTPATIENT)
Dept: PHARMACY | Facility: CLINIC | Age: 67
End: 2022-03-22

## 2022-03-25 NOTE — PROGRESS NOTES
This is a recent snapshot of the patient's [facility] medical record.  For current drug dose and complete information and questions, call [phone] or In Basket pool, fv home infusion ([id number])  CSN Number:  [id number]

## 2022-03-28 ENCOUNTER — LAB REQUISITION (OUTPATIENT)
Dept: LAB | Facility: CLINIC | Age: 67
End: 2022-03-28
Payer: MEDICARE

## 2022-03-28 ENCOUNTER — HOME INFUSION (PRE-WILLOW HOME INFUSION) (OUTPATIENT)
Dept: PHARMACY | Facility: CLINIC | Age: 67
End: 2022-03-28

## 2022-03-28 DIAGNOSIS — K56.609 UNSPECIFIED INTESTINAL OBSTRUCTION, UNSPECIFIED AS TO PARTIAL VERSUS COMPLETE OBSTRUCTION (H): ICD-10-CM

## 2022-03-28 LAB
ALBUMIN SERPL-MCNC: 2.4 G/DL (ref 3.4–5)
ALP SERPL-CCNC: 94 U/L (ref 40–150)
ALT SERPL W P-5'-P-CCNC: 12 U/L (ref 0–70)
ANION GAP SERPL CALCULATED.3IONS-SCNC: 6 MMOL/L (ref 3–14)
AST SERPL W P-5'-P-CCNC: 26 U/L (ref 0–45)
BASOPHILS # BLD AUTO: 0.1 10E3/UL (ref 0–0.2)
BASOPHILS NFR BLD AUTO: 1 %
BILIRUB DIRECT SERPL-MCNC: <0.1 MG/DL (ref 0–0.2)
BILIRUB SERPL-MCNC: 0.3 MG/DL (ref 0.2–1.3)
BUN SERPL-MCNC: 30 MG/DL (ref 7–30)
CALCIUM SERPL-MCNC: 8.9 MG/DL (ref 8.5–10.1)
CHLORIDE BLD-SCNC: 92 MMOL/L (ref 94–109)
CO2 SERPL-SCNC: 34 MMOL/L (ref 20–32)
CREAT SERPL-MCNC: 1.04 MG/DL (ref 0.66–1.25)
EOSINOPHIL # BLD AUTO: 0 10E3/UL (ref 0–0.7)
EOSINOPHIL NFR BLD AUTO: 0 %
ERYTHROCYTE [DISTWIDTH] IN BLOOD BY AUTOMATED COUNT: 16.7 % (ref 10–15)
FASTING STATUS PATIENT QL REPORTED: NORMAL
GFR SERPL CREATININE-BSD FRML MDRD: 79 ML/MIN/1.73M2
GLUCOSE BLD-MCNC: 107 MG/DL (ref 70–99)
HCT VFR BLD AUTO: 32.2 % (ref 40–53)
HGB BLD-MCNC: 10.4 G/DL (ref 13.3–17.7)
HOLD SPECIMEN: NORMAL
IMM GRANULOCYTES # BLD: 0 10E3/UL
IMM GRANULOCYTES NFR BLD: 0 %
LYMPHOCYTES # BLD AUTO: 5.1 10E3/UL (ref 0.8–5.3)
LYMPHOCYTES NFR BLD AUTO: 41 %
MAGNESIUM SERPL-MCNC: 2.5 MG/DL (ref 1.6–2.3)
MCH RBC QN AUTO: 26.1 PG (ref 26.5–33)
MCHC RBC AUTO-ENTMCNC: 32.3 G/DL (ref 31.5–36.5)
MCV RBC AUTO: 81 FL (ref 78–100)
MONOCYTES # BLD AUTO: 1.7 10E3/UL (ref 0–1.3)
MONOCYTES NFR BLD AUTO: 14 %
NEUTROPHILS # BLD AUTO: 5.5 10E3/UL (ref 1.6–8.3)
NEUTROPHILS NFR BLD AUTO: 44 %
NRBC # BLD AUTO: 0 10E3/UL
NRBC BLD AUTO-RTO: 0 /100
PHOSPHATE SERPL-MCNC: 2.4 MG/DL (ref 2.5–4.5)
PLAT MORPH BLD: NORMAL
PLATELET # BLD AUTO: 577 10E3/UL (ref 150–450)
POTASSIUM BLD-SCNC: 3.9 MMOL/L (ref 3.4–5.3)
PROT SERPL-MCNC: 8 G/DL (ref 6.8–8.8)
RBC # BLD AUTO: 3.99 10E6/UL (ref 4.4–5.9)
RBC MORPH BLD: NORMAL
SODIUM SERPL-SCNC: 132 MMOL/L (ref 133–144)
TRIGL SERPL-MCNC: 96 MG/DL
WBC # BLD AUTO: 12.4 10E3/UL (ref 4–11)

## 2022-03-28 PROCEDURE — 82248 BILIRUBIN DIRECT: CPT | Performed by: SPECIALIST

## 2022-03-28 PROCEDURE — 80053 COMPREHEN METABOLIC PANEL: CPT | Performed by: SPECIALIST

## 2022-03-28 PROCEDURE — 84100 ASSAY OF PHOSPHORUS: CPT | Performed by: SPECIALIST

## 2022-03-28 PROCEDURE — 84478 ASSAY OF TRIGLYCERIDES: CPT | Performed by: SPECIALIST

## 2022-03-28 PROCEDURE — 83735 ASSAY OF MAGNESIUM: CPT | Performed by: SPECIALIST

## 2022-03-28 PROCEDURE — 85025 COMPLETE CBC W/AUTO DIFF WBC: CPT | Performed by: SPECIALIST

## 2022-03-29 ENCOUNTER — HOME INFUSION (PRE-WILLOW HOME INFUSION) (OUTPATIENT)
Dept: PHARMACY | Facility: CLINIC | Age: 67
End: 2022-03-29

## 2022-03-31 ENCOUNTER — TELEPHONE (OUTPATIENT)
Dept: SURGERY | Facility: CLINIC | Age: 67
End: 2022-03-31

## 2022-03-31 DIAGNOSIS — Z93.9 HISTORY OF CREATION OF OSTOMY (H): Primary | ICD-10-CM

## 2022-03-31 NOTE — TELEPHONE ENCOUNTER
Order placed for ostomy supplies and faxed to [facility]. Patient notified.       [facility]      Alea Johnson RN  [facility]  General Surgery  [address]  [address] [place] [address]  [email or web address]   Office:[phone]  Employed by [employer],

## 2022-03-31 NOTE — TELEPHONE ENCOUNTER
----- Message from Taryn Ponce RN sent at 3/30/2022  2:09 PM CDT -----    ----- Message -----  From: Alea Alas RN  Sent: 3/30/2022   2:03 PM CDT  To: BALDOMERO Echeverria,    I am familiar with him only through his chart, unfortunately both the [facility] nurses that followed him when he was here at [facility] are not working at this time. It loos like the supplies you listed have our hospital order numbers rather than the numbers from the manufacturers. The pouch ( [place] 8531) and paste ([place] 7805) and barrier film (3M 0644) seem appropriate from the notes I have reviewed. If there are adhesive remover wipes he wants then his [facility] rep should be able to indicate a product, we don't use them here. I'm not sure what barrier sticks are- the no sting is a barrier film, but there is another brand of barrier called [place] that comes in a form that looks like a stick.  I do see in the notes that our [facility] nurses has suggested he use abd pads due to low output, but if he continues to prefer the pouch these supplies should work.  If he needs to be seen in the outpatient ostomy clinic he can give us a call, but we don't really deal with ordering supplies, just suggesting them, and we are low-staffed at this point so it may be a while until he could be seen.  I hope this helpsAlea  ----- Message -----  From: Taryn Ponce RN  Sent: 3/30/2022  10:38 AM CDT  To: Alea Johnson, RN, BALDOMERO Kern,    I was wondering if you could help with a mutual pt of ours. He has been cared for in a number of Health Care systems for WOC care and figuring out what supplies he actually needs has been a challenge for us to find and order.     The last order I placed to [employer] was for:    Pouch: [place] Flat FECAL ([id number])               Accessories: [employer] Paste, Powder (209029), No Sting (177888    The pt is now requesting barrier sticks? And adhesive wipes.     Would you  be able to either reach out to him to help order this, or let us know exactly what is needed and we can send it over to [facility]?    This would help us out greatly, as we are not familiar with all the ostomy supplies/brands/terminology!    Thank you!    -Taryn

## 2022-04-04 ENCOUNTER — LAB REQUISITION (OUTPATIENT)
Dept: LAB | Facility: CLINIC | Age: 67
End: 2022-04-04
Payer: MEDICARE

## 2022-04-04 ENCOUNTER — HOME INFUSION (PRE-WILLOW HOME INFUSION) (OUTPATIENT)
Dept: PHARMACY | Facility: CLINIC | Age: 67
End: 2022-04-04

## 2022-04-04 DIAGNOSIS — K56.609 UNSPECIFIED INTESTINAL OBSTRUCTION, UNSPECIFIED AS TO PARTIAL VERSUS COMPLETE OBSTRUCTION (H): ICD-10-CM

## 2022-04-04 LAB
ALBUMIN SERPL-MCNC: 2.3 G/DL (ref 3.4–5)
ALP SERPL-CCNC: 92 U/L (ref 40–150)
ALT SERPL W P-5'-P-CCNC: 16 U/L (ref 0–70)
ANION GAP SERPL CALCULATED.3IONS-SCNC: 4 MMOL/L (ref 3–14)
AST SERPL W P-5'-P-CCNC: 17 U/L (ref 0–45)
BASOPHILS # BLD AUTO: 0.1 10E3/UL (ref 0–0.2)
BASOPHILS NFR BLD AUTO: 1 %
BILIRUB DIRECT SERPL-MCNC: <0.1 MG/DL (ref 0–0.2)
BILIRUB SERPL-MCNC: 0.3 MG/DL (ref 0.2–1.3)
BILIRUB SERPL-MCNC: 0.3 MG/DL (ref 0.2–1.3)
BUN SERPL-MCNC: 27 MG/DL (ref 7–30)
CALCIUM SERPL-MCNC: 9.1 MG/DL (ref 8.5–10.1)
CHLORIDE BLD-SCNC: 96 MMOL/L (ref 94–109)
CO2 SERPL-SCNC: 32 MMOL/L (ref 20–32)
CREAT SERPL-MCNC: 1.04 MG/DL (ref 0.66–1.25)
EOSINOPHIL # BLD AUTO: 0.1 10E3/UL (ref 0–0.7)
EOSINOPHIL NFR BLD AUTO: 1 %
ERYTHROCYTE [DISTWIDTH] IN BLOOD BY AUTOMATED COUNT: 16.6 % (ref 10–15)
FASTING STATUS PATIENT QL REPORTED: NORMAL
FRAGMENTS BLD QL SMEAR: SLIGHT
GFR SERPL CREATININE-BSD FRML MDRD: 79 ML/MIN/1.73M2
GLUCOSE BLD-MCNC: 111 MG/DL (ref 70–99)
HCT VFR BLD AUTO: 32.5 % (ref 40–53)
HGB BLD-MCNC: 10.4 G/DL (ref 13.3–17.7)
HOLD SPECIMEN: NORMAL
IMM GRANULOCYTES # BLD: 0.1 10E3/UL
IMM GRANULOCYTES NFR BLD: 0 %
LYMPHOCYTES # BLD AUTO: 5.3 10E3/UL (ref 0.8–5.3)
LYMPHOCYTES NFR BLD AUTO: 44 %
MAGNESIUM SERPL-MCNC: 2.6 MG/DL (ref 1.6–2.3)
MCH RBC QN AUTO: 25.7 PG (ref 26.5–33)
MCHC RBC AUTO-ENTMCNC: 32 G/DL (ref 31.5–36.5)
MCV RBC AUTO: 80 FL (ref 78–100)
MONOCYTES # BLD AUTO: 1.2 10E3/UL (ref 0–1.3)
MONOCYTES NFR BLD AUTO: 10 %
NEUTROPHILS # BLD AUTO: 5.2 10E3/UL (ref 1.6–8.3)
NEUTROPHILS NFR BLD AUTO: 44 %
NRBC # BLD AUTO: 0 10E3/UL
NRBC BLD AUTO-RTO: 0 /100
PHOSPHATE SERPL-MCNC: 2.7 MG/DL (ref 2.5–4.5)
PLAT MORPH BLD: ABNORMAL
PLATELET # BLD AUTO: 591 10E3/UL (ref 150–450)
POLYCHROMASIA BLD QL SMEAR: SLIGHT
POTASSIUM BLD-SCNC: 4.1 MMOL/L (ref 3.4–5.3)
PROT SERPL-MCNC: 8.1 G/DL (ref 6.8–8.8)
RBC # BLD AUTO: 4.04 10E6/UL (ref 4.4–5.9)
RBC MORPH BLD: ABNORMAL
SODIUM SERPL-SCNC: 132 MMOL/L (ref 133–144)
TRIGL SERPL-MCNC: 82 MG/DL
WBC # BLD AUTO: 11.9 10E3/UL (ref 4–11)

## 2022-04-04 PROCEDURE — 84478 ASSAY OF TRIGLYCERIDES: CPT | Performed by: SPECIALIST

## 2022-04-04 PROCEDURE — 83735 ASSAY OF MAGNESIUM: CPT | Performed by: SPECIALIST

## 2022-04-04 PROCEDURE — 82248 BILIRUBIN DIRECT: CPT | Performed by: SPECIALIST

## 2022-04-04 PROCEDURE — 80053 COMPREHEN METABOLIC PANEL: CPT | Performed by: SPECIALIST

## 2022-04-04 PROCEDURE — 85025 COMPLETE CBC W/AUTO DIFF WBC: CPT | Performed by: SPECIALIST

## 2022-04-04 PROCEDURE — 84100 ASSAY OF PHOSPHORUS: CPT | Performed by: SPECIALIST

## 2022-04-05 ENCOUNTER — HOME INFUSION (PRE-WILLOW HOME INFUSION) (OUTPATIENT)
Dept: PHARMACY | Facility: CLINIC | Age: 67
End: 2022-04-05

## 2022-04-06 ENCOUNTER — TELEPHONE (OUTPATIENT)
Dept: SURGERY | Facility: CLINIC | Age: 67
End: 2022-04-06
Payer: MEDICARE

## 2022-04-06 DIAGNOSIS — F33.1 MODERATE EPISODE OF RECURRENT MAJOR DEPRESSIVE DISORDER (H): ICD-10-CM

## 2022-04-06 DIAGNOSIS — F41.1 GAD (GENERALIZED ANXIETY DISORDER): ICD-10-CM

## 2022-04-06 NOTE — TELEPHONE ENCOUNTER
Patient needs refills on  his medications,pantoprazole sodium 40 mg, Gabapentin 100mg, Venlafaxine HCL ER 5 mg, Gabapentin 300mg, Levothyroxine sodium 25 mg, Hydroxyzine HCL 25mg, Quetiapine FUMARATE 200mg, Also wants a 7 day RX of Tramadal.     Please call and advise.    Thanks!

## 2022-04-07 NOTE — TELEPHONE ENCOUNTER
Spoke to Chema. He is running low on his medications and does not have a primary physician. I told him I will work on finding him one and getting an appointment. I did get him scheduled at [facility] 4/19 to get established with a primary doctor.He is asking for refills of tramadol and Seroquel that Dr. Richardson had prescribed. Will check with him.

## 2022-04-07 NOTE — TELEPHONE ENCOUNTER
Gurdeep abarca says he needs to talk to someone today to get his refills.  He said he doesn't have a primary doctor to fill his meds.    Please call and advise.

## 2022-04-11 ENCOUNTER — HOME INFUSION (PRE-WILLOW HOME INFUSION) (OUTPATIENT)
Dept: PHARMACY | Facility: CLINIC | Age: 67
End: 2022-04-11

## 2022-04-11 ENCOUNTER — MEDICAL CORRESPONDENCE (OUTPATIENT)
Dept: HEALTH INFORMATION MANAGEMENT | Facility: CLINIC | Age: 67
End: 2022-04-11
Payer: MEDICARE

## 2022-04-14 DIAGNOSIS — K94.19 ALTERED BOWEL ELIMINATION DUE TO INTESTINAL OSTOMY (H): Primary | ICD-10-CM

## 2022-04-15 ENCOUNTER — HOME INFUSION (PRE-WILLOW HOME INFUSION) (OUTPATIENT)
Dept: PHARMACY | Facility: CLINIC | Age: 67
End: 2022-04-15
Payer: MEDICARE

## 2022-04-18 ENCOUNTER — LAB REQUISITION (OUTPATIENT)
Dept: LAB | Facility: CLINIC | Age: 67
End: 2022-04-18
Payer: MEDICARE

## 2022-04-18 ENCOUNTER — HOME INFUSION (PRE-WILLOW HOME INFUSION) (OUTPATIENT)
Dept: PHARMACY | Facility: CLINIC | Age: 67
End: 2022-04-18

## 2022-04-18 ENCOUNTER — MEDICAL CORRESPONDENCE (OUTPATIENT)
Dept: HEALTH INFORMATION MANAGEMENT | Facility: CLINIC | Age: 67
End: 2022-04-18

## 2022-04-18 DIAGNOSIS — K56.609 UNSPECIFIED INTESTINAL OBSTRUCTION, UNSPECIFIED AS TO PARTIAL VERSUS COMPLETE OBSTRUCTION (H): ICD-10-CM

## 2022-04-18 LAB
ALBUMIN SERPL-MCNC: 2.2 G/DL (ref 3.4–5)
ALP SERPL-CCNC: 139 U/L (ref 40–150)
ALT SERPL W P-5'-P-CCNC: 65 U/L (ref 0–70)
ANION GAP SERPL CALCULATED.3IONS-SCNC: 8 MMOL/L (ref 3–14)
AST SERPL W P-5'-P-CCNC: 43 U/L (ref 0–45)
BASOPHILS # BLD AUTO: 0 10E3/UL (ref 0–0.2)
BASOPHILS NFR BLD AUTO: 0 %
BILIRUB DIRECT SERPL-MCNC: <0.1 MG/DL (ref 0–0.2)
BILIRUB SERPL-MCNC: 0.3 MG/DL (ref 0.2–1.3)
BUN SERPL-MCNC: 25 MG/DL (ref 7–30)
CALCIUM SERPL-MCNC: 8.8 MG/DL (ref 8.5–10.1)
CHLORIDE BLD-SCNC: 99 MMOL/L (ref 94–109)
CO2 SERPL-SCNC: 25 MMOL/L (ref 20–32)
CREAT SERPL-MCNC: 1.13 MG/DL (ref 0.66–1.25)
EOSINOPHIL # BLD AUTO: 0 10E3/UL (ref 0–0.7)
EOSINOPHIL NFR BLD AUTO: 0 %
ERYTHROCYTE [DISTWIDTH] IN BLOOD BY AUTOMATED COUNT: 17.6 % (ref 10–15)
FASTING STATUS PATIENT QL REPORTED: NORMAL
GFR SERPL CREATININE-BSD FRML MDRD: 71 ML/MIN/1.73M2
GLUCOSE BLD-MCNC: 134 MG/DL (ref 70–99)
HCT VFR BLD AUTO: 29.5 % (ref 40–53)
HGB BLD-MCNC: 9.4 G/DL (ref 13.3–17.7)
HOLD SPECIMEN: NORMAL
IMM GRANULOCYTES # BLD: 0.1 10E3/UL
IMM GRANULOCYTES NFR BLD: 1 %
LYMPHOCYTES # BLD AUTO: 4 10E3/UL (ref 0.8–5.3)
LYMPHOCYTES NFR BLD AUTO: 33 %
MAGNESIUM SERPL-MCNC: 2.2 MG/DL (ref 1.6–2.3)
MCH RBC QN AUTO: 25.3 PG (ref 26.5–33)
MCHC RBC AUTO-ENTMCNC: 31.9 G/DL (ref 31.5–36.5)
MCV RBC AUTO: 80 FL (ref 78–100)
MONOCYTES # BLD AUTO: 1.4 10E3/UL (ref 0–1.3)
MONOCYTES NFR BLD AUTO: 11 %
NEUTROPHILS # BLD AUTO: 6.7 10E3/UL (ref 1.6–8.3)
NEUTROPHILS NFR BLD AUTO: 55 %
NRBC # BLD AUTO: 0 10E3/UL
NRBC BLD AUTO-RTO: 0 /100
PHOSPHATE SERPL-MCNC: 2.2 MG/DL (ref 2.5–4.5)
PLATELET # BLD AUTO: 643 10E3/UL (ref 150–450)
POTASSIUM BLD-SCNC: 4.7 MMOL/L (ref 3.4–5.3)
PROT SERPL-MCNC: 7.9 G/DL (ref 6.8–8.8)
RBC # BLD AUTO: 3.71 10E6/UL (ref 4.4–5.9)
SODIUM SERPL-SCNC: 132 MMOL/L (ref 133–144)
TRIGL SERPL-MCNC: 94 MG/DL
WBC # BLD AUTO: 12.1 10E3/UL (ref 4–11)

## 2022-04-18 PROCEDURE — 83735 ASSAY OF MAGNESIUM: CPT | Performed by: SPECIALIST

## 2022-04-18 PROCEDURE — 84100 ASSAY OF PHOSPHORUS: CPT | Performed by: SPECIALIST

## 2022-04-18 PROCEDURE — 84478 ASSAY OF TRIGLYCERIDES: CPT | Performed by: SPECIALIST

## 2022-04-18 PROCEDURE — 85025 COMPLETE CBC W/AUTO DIFF WBC: CPT | Performed by: SPECIALIST

## 2022-04-18 PROCEDURE — 82248 BILIRUBIN DIRECT: CPT | Performed by: SPECIALIST

## 2022-04-18 PROCEDURE — 80053 COMPREHEN METABOLIC PANEL: CPT | Performed by: SPECIALIST

## 2022-04-18 NOTE — PROGRESS NOTES
This is a recent snapshot of the patient's [facility] medical record.  For current drug dose and complete information and questions, call [phone] or In [facility] pool, fv home infusion ([id number])  CSN Number:  [id number]

## 2022-04-19 ENCOUNTER — TELEPHONE (OUTPATIENT)
Dept: SURGERY | Facility: CLINIC | Age: 67
End: 2022-04-19
Payer: MEDICARE

## 2022-04-19 ENCOUNTER — HOME INFUSION (PRE-WILLOW HOME INFUSION) (OUTPATIENT)
Dept: PHARMACY | Facility: CLINIC | Age: 67
End: 2022-04-19
Payer: MEDICARE

## 2022-04-19 NOTE — TELEPHONE ENCOUNTER
----- Message from Hanna Murray sent at 4/19/2022  8:41 AM CDT -----  Gurdeep called a few times this morning and is asking to speak to one of the nurses. He can be reached at [phone]. He did not specify the reason.    Thanks

## 2022-04-19 NOTE — PROGRESS NOTES
This is a recent snapshot of the patient's [facility] medical record.  For current drug dose and complete information and questions, call [phone] or In Basket pool, fv home infusion ([id number])  CSN Number:  [id number]

## 2022-04-19 NOTE — TELEPHONE ENCOUNTER
I attempted to reach Chema but he was not available. After reviewing his chart, it appears he was [place] [facility] [place] worsening of drainage from his multiple chronic enterocutaneous fistula. He has a worsening of his creatinine and a described increase in the output. CT scan is performed surgical consult is obtained.     Surgery recommended admission for observation, rehydration and documentation of his seemingly increased output however the patient declined.     Will discuss with pt further when he calls me back.

## 2022-04-20 ENCOUNTER — TELEPHONE (OUTPATIENT)
Dept: SURGERY | Facility: CLINIC | Age: 67
End: 2022-04-20
Payer: MEDICARE

## 2022-04-20 ENCOUNTER — HOME INFUSION (PRE-WILLOW HOME INFUSION) (OUTPATIENT)
Dept: PHARMACY | Facility: CLINIC | Age: 67
End: 2022-04-20
Payer: MEDICARE

## 2022-04-20 NOTE — PROGRESS NOTES
This is a recent snapshot of the patient's [facility] medical record.  For current drug dose and complete information and questions, call [phone] or In [facility] pool, fv home infusion ([id number])  CSN Number:  [id number]

## 2022-04-20 NOTE — TELEPHONE ENCOUNTER
Patient said he is in an emergency situation and needs to talk to Taryn or Alea.  You two are the only ones that can help him.    Thanks!

## 2022-04-20 NOTE — TELEPHONE ENCOUNTER
I spoke with Chema. He would like ostomy supplies ordered for him. He also wanted to share good news about him finding a group home with nursing staff which he is extremely happy about. I told him we would be happy to send more ostomy supplies through [facility]. Eventually, he can transition to receiving his supplies from his new group home.

## 2022-04-21 ENCOUNTER — HOME INFUSION (PRE-WILLOW HOME INFUSION) (OUTPATIENT)
Dept: PHARMACY | Facility: CLINIC | Age: 67
End: 2022-04-21
Payer: MEDICARE

## 2022-04-22 ENCOUNTER — HOME INFUSION (PRE-WILLOW HOME INFUSION) (OUTPATIENT)
Dept: PHARMACY | Facility: CLINIC | Age: 67
End: 2022-04-22
Payer: MEDICARE

## 2022-04-22 NOTE — PROGRESS NOTES
This is a recent snapshot of the patient's [facility] medical record.  For current drug dose and complete information and questions, call [phone] or In Basket pool, fv home infusion ([id number])  CSN Number:  [id number]

## 2022-04-22 NOTE — PROGRESS NOTES
This is a recent snapshot of the patient's [facility] medical record.  For current drug dose and complete information and questions, call [phone] or In Basket pool, fv home infusion ([id number])  CSN Number:  [id number]

## 2022-04-25 ENCOUNTER — LAB REQUISITION (OUTPATIENT)
Dept: LAB | Facility: CLINIC | Age: 67
End: 2022-04-25
Payer: MEDICARE

## 2022-04-25 ENCOUNTER — HOME INFUSION (PRE-WILLOW HOME INFUSION) (OUTPATIENT)
Dept: PHARMACY | Facility: CLINIC | Age: 67
End: 2022-04-25

## 2022-04-25 DIAGNOSIS — K56.609 UNSPECIFIED INTESTINAL OBSTRUCTION, UNSPECIFIED AS TO PARTIAL VERSUS COMPLETE OBSTRUCTION (H): ICD-10-CM

## 2022-04-25 LAB
ALBUMIN SERPL-MCNC: 2.4 G/DL (ref 3.4–5)
ALP SERPL-CCNC: 111 U/L (ref 40–150)
ALT SERPL W P-5'-P-CCNC: 21 U/L (ref 0–70)
ANION GAP SERPL CALCULATED.3IONS-SCNC: 8 MMOL/L (ref 3–14)
AST SERPL W P-5'-P-CCNC: 20 U/L (ref 0–45)
BASOPHILS # BLD AUTO: 0.1 10E3/UL (ref 0–0.2)
BASOPHILS NFR BLD AUTO: 0 %
BILIRUB DIRECT SERPL-MCNC: <0.1 MG/DL (ref 0–0.2)
BILIRUB SERPL-MCNC: 0.2 MG/DL (ref 0.2–1.3)
BUN SERPL-MCNC: 34 MG/DL (ref 7–30)
CALCIUM SERPL-MCNC: 8.9 MG/DL (ref 8.5–10.1)
CHLORIDE BLD-SCNC: 94 MMOL/L (ref 94–109)
CO2 SERPL-SCNC: 26 MMOL/L (ref 20–32)
CREAT SERPL-MCNC: 1.46 MG/DL (ref 0.66–1.25)
EOSINOPHIL # BLD AUTO: 0 10E3/UL (ref 0–0.7)
EOSINOPHIL NFR BLD AUTO: 0 %
ERYTHROCYTE [DISTWIDTH] IN BLOOD BY AUTOMATED COUNT: 17.7 % (ref 10–15)
FASTING STATUS PATIENT QL REPORTED: NORMAL
GFR SERPL CREATININE-BSD FRML MDRD: 52 ML/MIN/1.73M2
GLUCOSE BLD-MCNC: 120 MG/DL (ref 70–99)
HCT VFR BLD AUTO: 32.4 % (ref 40–53)
HGB BLD-MCNC: 10.2 G/DL (ref 13.3–17.7)
HOLD SPECIMEN: NORMAL
IMM GRANULOCYTES # BLD: 0.1 10E3/UL
IMM GRANULOCYTES NFR BLD: 0 %
LYMPHOCYTES # BLD AUTO: 4.8 10E3/UL (ref 0.8–5.3)
LYMPHOCYTES NFR BLD AUTO: 43 %
MAGNESIUM SERPL-MCNC: 2.2 MG/DL (ref 1.6–2.3)
MCH RBC QN AUTO: 24.7 PG (ref 26.5–33)
MCHC RBC AUTO-ENTMCNC: 31.5 G/DL (ref 31.5–36.5)
MCV RBC AUTO: 79 FL (ref 78–100)
MONOCYTES # BLD AUTO: 1 10E3/UL (ref 0–1.3)
MONOCYTES NFR BLD AUTO: 9 %
NEUTROPHILS # BLD AUTO: 5.2 10E3/UL (ref 1.6–8.3)
NEUTROPHILS NFR BLD AUTO: 48 %
NRBC # BLD AUTO: 0 10E3/UL
NRBC BLD AUTO-RTO: 0 /100
PHOSPHATE SERPL-MCNC: 3.7 MG/DL (ref 2.5–4.5)
PLATELET # BLD AUTO: 738 10E3/UL (ref 150–450)
POTASSIUM BLD-SCNC: 5 MMOL/L (ref 3.4–5.3)
PROT SERPL-MCNC: 8.4 G/DL (ref 6.8–8.8)
RBC # BLD AUTO: 4.13 10E6/UL (ref 4.4–5.9)
SODIUM SERPL-SCNC: 128 MMOL/L (ref 133–144)
TRIGL SERPL-MCNC: 94 MG/DL
WBC # BLD AUTO: 11.2 10E3/UL (ref 4–11)

## 2022-04-25 PROCEDURE — 80053 COMPREHEN METABOLIC PANEL: CPT | Performed by: SPECIALIST

## 2022-04-25 PROCEDURE — 84100 ASSAY OF PHOSPHORUS: CPT | Performed by: SPECIALIST

## 2022-04-25 PROCEDURE — 82248 BILIRUBIN DIRECT: CPT | Performed by: SPECIALIST

## 2022-04-25 PROCEDURE — 85025 COMPLETE CBC W/AUTO DIFF WBC: CPT | Performed by: SPECIALIST

## 2022-04-25 PROCEDURE — 84478 ASSAY OF TRIGLYCERIDES: CPT | Performed by: SPECIALIST

## 2022-04-25 PROCEDURE — 83735 ASSAY OF MAGNESIUM: CPT | Performed by: SPECIALIST

## 2022-04-25 NOTE — PROGRESS NOTES
This is a recent snapshot of the patient's [facility] medical record.  For current drug dose and complete information and questions, call [phone] or In Basket pool, fv home infusion ([id number])  CSN Number:  [id number]

## 2022-04-26 ENCOUNTER — HOME INFUSION (PRE-WILLOW HOME INFUSION) (OUTPATIENT)
Dept: PHARMACY | Facility: CLINIC | Age: 67
End: 2022-04-26
Payer: MEDICARE

## 2022-04-26 ENCOUNTER — TELEPHONE (OUTPATIENT)
Dept: SURGERY | Facility: CLINIC | Age: 67
End: 2022-04-26
Payer: MEDICARE

## 2022-04-27 ENCOUNTER — HOME INFUSION (PRE-WILLOW HOME INFUSION) (OUTPATIENT)
Dept: PHARMACY | Facility: CLINIC | Age: 67
End: 2022-04-27

## 2022-04-27 ENCOUNTER — TELEPHONE (OUTPATIENT)
Dept: SURGERY | Facility: CLINIC | Age: 67
End: 2022-04-27
Payer: MEDICARE

## 2022-04-27 NOTE — PROGRESS NOTES
This is a recent snapshot of the patient's [facility] medical record.  For current drug dose and complete information and questions, call [phone] or In Basket pool, fv home infusion ([id number])  CSN Number:  [id number]

## 2022-04-27 NOTE — TELEPHONE ENCOUNTER
Left message for patient to call back.       AF[facility]AN [facility] System[facility] Johnson RN  [facility]  General Surgery  [address] [place] [address]  [email or web address]   Office:[phone]  Employed by [facility] [employer],

## 2022-04-27 NOTE — TELEPHONE ENCOUNTER
Chema got his supplies yesterday, however, he did not get the wax rings for his bags.  He would like 2 boxes which would be 20 rings.  Would you please order the rings.    Please call and advise.      Thanks!

## 2022-04-28 ENCOUNTER — HOME INFUSION (PRE-WILLOW HOME INFUSION) (OUTPATIENT)
Dept: PHARMACY | Facility: CLINIC | Age: 67
End: 2022-04-28
Payer: MEDICARE

## 2022-04-28 NOTE — TELEPHONE ENCOUNTER
Order for:  - Club[employer] barrier ring #[id number] was called in and given verbally to [facility].

## 2022-04-28 NOTE — PROGRESS NOTES
This is a recent snapshot of the patient's [facility] medical record.  For current drug dose and complete information and questions, call [phone] or In Basket pool, fv home infusion ([id number])  CSN Number:  [id number]

## 2022-04-29 ENCOUNTER — HOME INFUSION (PRE-WILLOW HOME INFUSION) (OUTPATIENT)
Dept: PHARMACY | Facility: CLINIC | Age: 67
End: 2022-04-29

## 2022-05-02 ENCOUNTER — HOME INFUSION (PRE-WILLOW HOME INFUSION) (OUTPATIENT)
Dept: PHARMACY | Facility: CLINIC | Age: 67
End: 2022-05-02
Payer: MEDICARE

## 2022-05-02 ENCOUNTER — MEDICAL CORRESPONDENCE (OUTPATIENT)
Dept: HEALTH INFORMATION MANAGEMENT | Facility: CLINIC | Age: 67
End: 2022-05-02
Payer: MEDICARE

## 2022-05-03 NOTE — PROGRESS NOTES
This is a recent snapshot of the patient's [facility] medical record.  For current drug dose and complete information and questions, call [phone] or In Basket pool, fv home infusion ([id number])  CSN Number:  [id number]

## 2022-05-10 ENCOUNTER — HOME INFUSION (PRE-WILLOW HOME INFUSION) (OUTPATIENT)
Dept: PHARMACY | Facility: CLINIC | Age: 67
End: 2022-05-10

## 2022-05-13 ENCOUNTER — MEDICAL CORRESPONDENCE (OUTPATIENT)
Dept: HEALTH INFORMATION MANAGEMENT | Facility: CLINIC | Age: 67
End: 2022-05-13
Payer: MEDICARE

## 2022-05-18 NOTE — PROGRESS NOTES
This is a recent snapshot of the patient's [facility] medical record.  For current drug dose and complete information and questions, call [phone] or In Basket pool, fv home infusion ([id number])  CSN Number:  [id number]

## 2022-06-22 NOTE — PHARMACY-VANCOMYCIN DOSING SERVICE
Clavicle Fracture in Children   AMBULATORY CARE:   A clavicle fracture  is a crack or break in the clavicle (collarbone)  A clavicle fracture is the most common bone fracture in children  Common signs and symptoms include the following:   Pain at the clavicle or top of the shoulder, especially with shoulder movement    Trouble moving the shoulder or arm    Swelling or bruising    Weakness, numbness, or tingling in the shoulder and arm    Lump or bulge in the fractured area    Deformed clavicle, or clavicle that looks out of place    Shoulder slumps down and forward    Support of the arm with the other hand to decrease pain    Seek care immediately if:   Your child's shoulder, arm, hand, or fingers turn blue or white, or feel cold or numb  Your child's pain is worse, even after he or she takes pain medicine  Your child's sling feels tight, or he or she has increased swelling  Your child cannot move his or her fingers  Call your child's doctor if:   Your child's sling or wrap comes off or gets damaged  You have questions or concerns about your child's condition or care  Treatment  may not be needed  Most broken clavicles heal on their own  It is very important to keep your child's arm still to allow the clavicle to heal properly  Your child may need any of the following:  Acetaminophen  decreases pain and fever  It is available without a doctor's order  Ask how much to give your child and how often to give it  Follow directions  Read the labels of all other medicines your child uses to see if they also contain acetaminophen, or ask your child's doctor or pharmacist  Acetaminophen can cause liver damage if not taken correctly  NSAIDs , such as ibuprofen, help decrease swelling, pain, and fever  This medicine is available with or without a doctor's order  NSAIDs can cause stomach bleeding or kidney problems in certain people   If your child takes blood thinner medicine, always ask if NSAIDs are Pharmacy Vancomycin Note  Date of Service 2021  Patient's  1955   66 year old, male    Indication: Skin and Soft Tissue Infection  Day of Therapy: 3  Current vancomycin regimen:  1000 mg IV q12h  Current vancomycin monitoring method: AUC  Current vancomycin therapeutic monitoring goal: 400-600 mg*h/L    Insight[employer] Prediction of Current Vancomycin Regimen  Loading dose: 1500 mg at 15:00 2021.  Regimen: 1000 mg IV every 12 hours.  Start time: 14:39 on 2021  Exposure target: AUC24 (range)400-600 mg/L.hr   AUC24,ss: 570 mg/L.hr  Probability of AUC24 > 400: 84 %  Ctrough,ss: 18.4 mg/L  Probability of Ctrough,ss > 20: 43 %  Probability of nephrotoxicity (Lodise ALAN ): 15 %      Current estimated CrCl = Estimated Creatinine Clearance: 92.2 mL/min (based on SCr of 0.83 mg/dL).    Creatinine for last 3 days  2021: 12:03 AM Creatinine 0.89 mg/dL  2021:  7:15 AM Creatinine 0.81 mg/dL  2021:  7:17 AM Creatinine 0.83 mg/dL    Recent Vancomycin Levels (past 3 days)  2021:  4:33 PM Vancomycin 13.2 mg/L    Vancomycin IV Administrations (past 72 hours)                   vancomycin (VANCOCIN) 1000 mg in dextrose 5% 200 mL PREMIX (mg) 1,000 mg New Bag 21 1733     1,000 mg New Bag  0501     1,000 mg New Bag 21 1623     1,000 mg New Bag  0436    vancomycin (VANCOCIN) 1,500 mg in sodium chloride 0.9 % 250 mL intermittent infusion (mg) 1,500 mg New Bag 21 1720                Nephrotoxins and other renal medications (From now, onward)    Start     Dose/Rate Route Frequency Ordered Stop    21 0500  vancomycin (VANCOCIN) 1000 mg in dextrose 5% 200 mL PREMIX         1,000 mg  200 mL/hr over 1 Hours Intravenous EVERY 12 HOURS 21 1441               Contrast Orders - past 72 hours (72h ago, onward)            Start     Dose/Rate Route Frequency Ordered Stop    21 1500  iopamidol (ISOVUE-370) solution 100 mL         100 mL Intravenous ONCE 21  1458 12/22/21 1500          Interpretation of levels and current regimen:  Vancomycin level is reflective of -600    Has serum creatinine changed greater than 50% in last 72 hours: No    Urine output:  good urine output    Renal Function: Stable    InsightRX Prediction of Planned New Vancomycin Regimen  Loading dose: N/A  Regimen: 1000 mg IV every 12 hours.  Start time: 05:33 on 12/25/2021  Exposure target: AUC24 (range)400-600 mg/L.hr   AUC24,ss: 499 mg/L.hr  Probability of AUC24 > 400: 89 %  Ctrough,ss: 15.3 mg/L  Probability of Ctrough,ss > 20: 14 %  Probability of nephrotoxicity (Lodise ALAN 2009): 11 %      Plan:  1. Continue Current Dose  2. Vancomycin monitoring method: AUC  3. Vancomycin therapeutic monitoring goal: 400-600 mg*h/L  4. Pharmacy will check vancomycin levels as appropriate in 1-3 Days.  5. Serum creatinine levels will be ordered daily for the first week of therapy and at least twice weekly for subsequent weeks.    Shama Mei, [facility]   safe for him or her  Always read the medicine label and follow directions  Do not give these medicines to children under 10months of age without direction from your child's healthcare provider  A sling or brace  will be recommended to keep your child's clavicle from moving so it can heal  It will also help decrease pain  Surgery  may be needed to return the bones to their normal position  Pins, plates, and screws may be used to hold the bone together  Manage your child's clavicle fracture:   Rest  will help your child's clavicle heal  Limit your child's activity as directed  Your child should rest as much as possible and get plenty of sleep  Apply ice  on your child's clavicle for 15 to 20 minutes every hour or as directed  Use an ice pack, or put crushed ice in a plastic bag  Cover the bag with a towel before you apply it to the clavicle  Ice decreases swelling and pain  Physical therapy  may be recommended after your child's clavicle heals  A physical therapist teaches your child exercises to help improve movement and strength, and to decrease pain  Follow up with your child's doctor in 1 week or as directed: Your child may need to return for more x-rays to see how well his or her clavicle is healing  Write down your questions so you remember to ask them during your visits  © Copyright [employer] 2022 Information is for End User's use only and may not be sold, redistributed or otherwise used for commercial purposes  All illustrations and images included in CareNotes® are the copyrighted property of A D A M , Inc  or Varun Monteiro   The above information is an  only  It is not intended as medical advice for individual conditions or treatments  Talk to your doctor, nurse or pharmacist before following any medical regimen to see if it is safe and effective for you

## 2022-06-29 NOTE — TELEPHONE ENCOUNTER
"Requested Prescriptions   Pending Prescriptions Disp Refills     cloNIDine (CATAPRES) 0.2 MG tablet       Sig: Take 1 tablet (0.2 mg) by mouth 2 times daily         Last Written Prescription Date:  na  Last Fill Quantity: na,   # refills: na  Last Office Visit: na  Future Office visit:       Routing refill request to provider for review/approval because:  Medication is reported/historical      Central Acting Antiadrenergic Agents Failed - 1/10/2020  9:19 AM        Failed - Blood pressure under 140/90 in past 12 months     BP Readings from Last 3 Encounters:   12/24/19 (!) 141/90                 Failed - Normal serum creatinine on file within past 12 months     No lab results found.          Passed - Patient is 6 years of age or older        Passed - Recent (12 mo) or future (30 days) visit within the authorizing provider's specialty     Patient has had an office visit with the authorizing provider or a provider within the authorizing providers department within the previous 12 mos or has a future within next 30 days. See \"Patient Info\" tab in inbasket, or \"Choose Columns\" in Meds & Orders section of the refill encounter.              Passed - Medication is active on med list          " 

## 2022-07-22 ENCOUNTER — MEDICAL CORRESPONDENCE (OUTPATIENT)
Dept: SURGERY | Facility: CLINIC | Age: 67
End: 2022-07-22

## 2022-07-27 NOTE — PROGRESS NOTES
This is a recent snapshot of the patient's [facility] medical record.  For current drug dose and complete information and questions, call [phone] or In Basket pool, fv home infusion ([id number])  CSN Number:  [id number]

## 2022-07-27 NOTE — PROGRESS NOTES
This is a recent snapshot of the patient's [facility] medical record.  For current drug dose and complete information and questions, call [phone] or In Basket pool, fv home infusion ([id number])  CSN Number:  [id number]

## 2022-07-28 NOTE — PROGRESS NOTES
This is a recent snapshot of the patient's [facility] medical record.  For current drug dose and complete information and questions, call [phone] or In Basket pool, fv home infusion ([id number])  CSN Number:  [id number]

## 2022-07-29 NOTE — PROGRESS NOTES
This is a recent snapshot of the patient's [facility] medical record.  For current drug dose and complete information and questions, call [phone] or In Basket pool, fv home infusion ([id number])  CSN Number:  [id number]

## 2022-08-03 NOTE — PROGRESS NOTES
This is a recent snapshot of the patient's [facility] medical record.  For current drug dose and complete information and questions, call [phone] or In [facility] pool, fv home infusion ([id number])  CSN Number:  [id number]

## 2022-08-05 NOTE — PROGRESS NOTES
This is a recent snapshot of the patient's [facility] medical record.  For current drug dose and complete information and questions, call [phone] or In Basket pool, fv home infusion ([id number])  CSN Number:  [id number]

## 2022-08-10 NOTE — PROGRESS NOTES
This is a recent snapshot of the patient's [facility] medical record.  For current drug dose and complete information and questions, call [phone] or In [facility] pool, fv home infusion ([id number])  CSN Number:  [id number]

## 2022-08-25 NOTE — PROGRESS NOTES
This is a recent snapshot of the patient's [facility] medical record.  For current drug dose and complete information and questions, call [phone] or In Basket pool, fv home infusion ([id number])  CSN Number:  [id number]

## 2022-09-01 NOTE — PROGRESS NOTES
This is a recent snapshot of the patient's [facility] medical record.  For current drug dose and complete information and questions, call [phone] or In Basket pool, fv home infusion ([id number])  CSN Number:  [id number]

## 2022-09-01 NOTE — PROGRESS NOTES
This is a recent snapshot of the patient's [facility] medical record.  For current drug dose and complete information and questions, call [phone] or In Basket pool, fv home infusion ([id number])  CSN Number:  [id number]

## 2022-09-15 NOTE — PROGRESS NOTES
This is a recent snapshot of the patient's [facility] medical record.  For current drug dose and complete information and questions, call [phone] or In Basket pool, fv home infusion ([id number])  CSN Number:  [id number]

## 2022-09-27 NOTE — PROGRESS NOTES
"  Interventional Radiology - Progress Note  Inpatient - [facility]: Interventional Radiology   [phone]  2021    S:  Resting bed. Has complaints of worsening abdominal pain this afternoon and feeling \"achy.\" Denies fever/chills, SOB.     Culture:           E coli  Antibiotic:        Meropenem + vancomycin.   Flushing:         10mL NS Q8 hours     O:  /72 (BP Location: Left arm)   Pulse 85   Temp 97.8  F (36.6  C) (Temporal)   Resp 16   Ht 1.702 m (5' 7\")   Wt 81.7 kg (180 lb 1.6 oz)   SpO2 93%   BMI 28.21 kg/m    General:  Stable.  In no acute distress.    Neuro:  A&O x 3. Moves all extremities equally.  Resp:  Lungs clear to auscultation bilaterally.  Cardio:  S1S2 and reg, without murmur, clicks or rubs  Abdomen: Drain in place, insertion site c/d/i, drainage bulb holding suction, draining minimal amount of sanguinous drainage.       IMAGIN. PERCUTANEOUS DRAIN PLACEMENT PELVIC ABSCESS  2. CT GUIDANCE  3. CONSCIOUS SEDATION  LOCATION: [facility]  DATE/TIME: 2021 9:50 AM     INDICATION: s/p two surgeries with elevating wbc, ct with pelvic abscess.  TECHNIQUE: Dose reduction techniques were used.     PROCEDURE: Informed consent obtained. Site marked. Prior images reviewed. Required items made available. Patient identity confirmed verbally and with arm band. Patient reevaluated immediately before administering sedation. Universal protocol was   followed. Time out performed. The site was prepped and draped in sterile fashion. 10 mL of 1% lidocaine was infused into the local soft tissues. Using standard technique and under direct CT guidance, a 10 Tajik drainage catheter was inserted into the   fluid collection.       SPECIMEN: 30 mL of brownish thin fluid was aspirated and sent to lab for cultures and Gram stain.     BLOOD LOSS: Minimal.     The patient tolerated the procedure well. No immediate complications.     SEDATION: Versed 2.0 mg. Fentanyl " 100 mcg. The procedure was performed with administration intravenous conscious sedation with appropriate preoperative, intraoperative, and postoperative evaluation.     30 minutes of supervised face to face conscious sedation time was provided by a radiology nurse under my direct supervision.                                                                      IMPRESSION:  1.  Successful CT-guided drain placement into pelvic abscess. 30 mL brownish thin fluid removed and sent for cultures.      LABS:  CBC RESULTS: Recent Labs   Lab Test 12/14/21  0622   WBC 12.5*   RBC 3.48*   HGB 10.2*   HCT 31.3*   MCV 90   MCH 29.3   MCHC 32.6   RDW 14.2   *        Drain Outputs (in mL):  12/9 32   12/10 25   12/11 4   12/12 10   12/13 15       A:   65 yo male S/P exploratory laparotomy, small bowel resection, lysis of adhesions and repair of enterotomy with significant inflamed small bowel with areas of thinned mucosa on 11/30/2021 2/2 obstruction  - S/p exploratory laparotomy 12/3/2021 2/2 small bowel perforation  - CT 12/8 showing abscess  - 4th drain placed 12/9/2021- CT guided, per above  - Cultures, abx per above  - Surgical drain #3 fell out.   - Minimal OP from IR drain. Consider imaging to evaluate drain reposition/removal later this week.     P:    - Continue present drain cares. Continue drain to bulb drainage.  - Start flushing with 10mL NS Q8 hours   - Drain care education provided to patient. All questions answered.   - Drain discharge instructions previously entered into D/C navigator.  - Patient to flush drain with 10mL NS daily after discharge. NS flushes previously ordered in D/C navigator.   - Recommend follow up CT and abscessogram approximately 7-10 days from drain placement date.  This can be done inpt or outpt.  Perhaps sooner if there are changes in drain function or in patient's clinical course. Discussed plan for follow up with patient who expressed their understanding.   - IR will continue to  follow loosely. Please contact IR with drain related questions or concerns.    Total time spent on the date of the encounter is 25 minutes, including time spent counseling the patient, performing a medically appropriate evaluation, reviewing prior medical history, ordering medications and tests, documenting clinical information in the medical record, and communication of results.    Andree Vazquez, THOMAS CNP  Interventional Radiology  [phone]    E/M codes for reference only:  01146     Skin normal color for race, warm, dry and intact. No evidence of rash.

## 2022-10-06 NOTE — PROGRESS NOTES
This is a recent snapshot of the patient's [facility] medical record.  For current drug dose and complete information and questions, call [phone] or In Basket pool, fv home infusion ([id number])  CSN Number:  [id number]

## 2022-10-12 NOTE — PROGRESS NOTES
This is a recent snapshot of the patient's [facility] medical record.  For current drug dose and complete information and questions, call [phone] or In Basket pool, fv home infusion ([id number])  CSN Number:  [id number]

## 2022-10-28 NOTE — PROGRESS NOTES
This is a recent snapshot of the patient's [facility] medical record.  For current drug dose and complete information and questions, call [phone] or In Basket pool, fv home infusion ([id number])  CSN Number:  [id number]

## 2022-11-08 NOTE — PROGRESS NOTES
This is a recent snapshot of the patient's [facility] medical record.  For current drug dose and complete information and questions, call [phone] or In Basket pool, fv home infusion ([id number])  CSN Number:  [id number]

## 2022-11-22 NOTE — PROGRESS NOTES
This is a recent snapshot of the patient's [facility] medical record.  For current drug dose and complete information and questions, call [phone] or In Basket pool, fv home infusion ([id number])  CSN Number:  [id number]

## 2022-11-22 NOTE — PROGRESS NOTES
This is a recent snapshot of the patient's [facility] medical record.  For current drug dose and complete information and questions, call [phone] or In Basket pool, fv home infusion ([id number])  CSN Number:  [id number]

## 2022-11-23 NOTE — PROGRESS NOTES
This is a recent snapshot of the patient's [facility] medical record.  For current drug dose and complete information and questions, call [phone] or In Basket pool, fv home infusion ([id number])  CSN Number:  [id number]

## 2022-11-25 NOTE — PROGRESS NOTES
This is a recent snapshot of the patient's [facility] medical record.  For current drug dose and complete information and questions, call [phone] or In Basket pool, fv home infusion ([id number])  CSN Number:  [id number]

## 2022-12-02 NOTE — PROGRESS NOTES
This is a recent snapshot of the patient's [facility] medical record.  For current drug dose and complete information and questions, call [phone] or In Basket pool, fv home infusion ([id number])  CSN Number:  [id number]

## 2023-01-05 NOTE — PROGRESS NOTES
This is a recent snapshot of the patient's [facility] medical record.  For current drug dose and complete information and questions, call [phone] or In Basket pool, fv home infusion ([id number])  CSN Number:  [id number]

## 2023-01-09 NOTE — PHARMACY-CONSULT NOTE
"Pharmacy Note: Parenteral Nutrition (PN) Management    Pharmacist consulted to dose PN for Gurdeep Dia, a 66 year old male by Dr. Clinton.    Subjective:    The patient is a new PN start.     The patient was started on PN in the hospital on 12/5/21.     Indication for PN therapy: bowel resection     Inadequate nutrition anticipated for > 7 days.      Enteral nutrition contraindicated due to: bowel integrity is tenuous.     Pertinent diseases and other special considerations respiratory failure    Social History     Tobacco Use     Smoking status: Current Every Day Smoker     Smokeless tobacco: Never Used   Substance Use Topics     Alcohol use: Not Currently     Comment: Clean for 5 years     Drug use: Not Currently     Objective:    Ht Readings from Last 1 Encounters:   12/13/21 1.702 m (5' 7\")     Wt Readings from Last 1 Encounters:   12/13/21 81.7 kg (180 lb 1.6 oz)       Body mass index is 28.21 kg/m .    Patient Vitals for the past 96 hrs:   Weight   12/13/21 1006 81.7 kg (180 lb 1.6 oz)       Labs:  Last 3 days:  Recent Labs     12/06/21  0619 12/06/21  0620 12/06/21  0620 12/06/21  0947 12/06/21  1428 12/06/21  1921 12/07/21  0358 12/07/21  1128 12/07/21  1443 12/08/21  0535 12/08/21  0536   NA  --  146*  --   --   --   --  148*  --   --   --  149*   POTASSIUM  --  3.0*  --   --  3.6 5.8* 3.2* 5.7* 3.8  --  3.7   CHLORIDE  --  106  --   --   --   --  106  --   --   --  113*   CO2  --  31  --   --   --   --  31  --   --   --  29   BUN  --  17  --   --   --   --  20  --   --   --  22   CR  --  0.82  --   --   --   --  0.79  --   --   --  0.75   VIJAYA  --  8.4*  --   --   --   --  8.4*  --   --   --  8.3*   [id number]  --  1.12  --   --   --   --   --   --   --   --   --    MAG  --  2.1  --   --   --   --  2.2  --   --   --  2.1   PHOS  --  2.9  --   --   --   --  3.2  --   --   --  2.7   PROTTOTAL  --  5.9*  --   --   --   --  5.9*  --   --   --   --    ALBUMIN  --  2.3*  --   --   --   --  2.3*  --   --   --   " --    PREALB  --  6*  --   --   --   --   --   --   --   --   --    TRIG  --  217*  --   --   --   --   --   --   --   --   --    HGB  --   --   --  11.2*  --   --  10.9*  --   --  10.6*  --    HCT  --   --   --  34.2*  --   --  33.8*  --   --  32.8*  --    PLT  --   --   --  318  --   --  332  --   --  406  --    BILITOTAL  --  0.4  --   --   --   --  0.6  --   --   --   --    AST  --  25  --   --   --   --  42*  --   --   --   --    ALT  --  20  --   --   --   --  34  --   --   --   --    ALKPHOS  --  44*   < >  --   --   --  54  --   --   --   --    INR 1.11  --   --   --   --   --   --   --   --   --   --     < > = values in this interval not displayed.       Glucose (past 48 hours):   Recent Labs     12/06/21  1951 12/07/21  0157 12/07/21  0358 12/07/21  0816 12/07/21  1220 12/08/21  0216 12/08/21  0536   * 133* 134* 143* 121* 141* 121       Intake/Output (last 24 hours): I/O last 3 completed shifts:  In: 1376 [I.V.:220]  Out: 1595 [Urine:200; Emesis/NG output:200; Drains:1195]    Estimated CrCl: Estimated Creatinine Clearance: 97.7 mL/min (based on SCr of 0.75 mg/dL).    Assessment:    Continue patient on PN therapy as a continuous central therapy.     Given the patient's current condition/oral intake, PN is still indicated.    Lab results reviewed:   12/9: all electrolytes in goal range, no changes today  12/10: all electrolytes in goal range, no changes today  12/11: all electrolytes in goal range, no changes today  12/12: all electrolytes in goal range, no changes today  12/13: Na trending down, increase slightly in TPN today due to recent hypernatremia. Phos 2.4, monitor to see if need to increase in TPN tomorrow. All other labs wnl  12/14: Na 134, increased in TPN yesterday, may need to increase again tomorrow. Phos 2.7 - replaced outside of TPN yesterday      Plan:  1. Rate of PN: 85 mL/hr.  2. Formula:     Amino Acids 100 grams    Dextrose 310 grams    Sodium 50 mEq/day    Potassium 90  mEq/day    Calcium 8 mEq/day    Magnesium 14 mEq/day    Phosphorus 28 mMol/day    Chloride: Acetate Ratio 1:1    Standard Multivitamins w/Vitamin K    Trace Elements  3. Fat Emulsion: 20%, 250 mL IV three times weekly.  4. Check BMP tomorrow  5. Pharmacist will continue to follow the patient's lab results, clinical status and blood glucose results and make adjustments as appropriate.    Thank you for the consult.  Nolvia Kumar, PharmD   12/14/2021 09:08 AM   non-verbal indicators absent

## 2023-02-21 NOTE — PROGRESS NOTES
This is a recent snapshot of the patient's [facility] medical record.  For current drug dose and complete information and questions, call [phone] or In Basket pool, [facility]v home infusion ([id number])  CSN Number:  [id number]

## 2023-04-17 NOTE — PLAN OF CARE
Problem: Pain (Surgery Nonspecified)  Goal: Acceptable Pain Control  Outcome: No Change  Intervention: Prevent or Manage Pain  Recent Flowsheet Documentation  Taken 1/30/2022 1632 by Kanwal Butts RN  Pain Management Interventions: medication (see MAR)     Problem: Anxiety  Goal: Anxiety Reduction or Resolution  Outcome: Improving     Problem: Hypertension Acute  Goal: Blood Pressure Within Desired Range  Outcome: Improving   Patient complained of abdominal pain 7/10, had percocet prn, was helpful, no anxiety noted, pleasant and cooperative with cares, bp 126/63, other vitals stable, had a shower, did wound dressing himself, writer noted greenish drainage on the old dressing, blood sugars were within normal limits, has been independent in room.   by JESIKA Navas/SIDDHARTH instructions

## 2023-06-20 NOTE — PROGRESS NOTES
This is a recent snapshot of the patient's [facility] medical record.  For current drug dose and complete information and questions, call [phone] or In Basket pool, fv home infusion ([id number])  CSN Number:  [id number]

## 2024-02-26 NOTE — PROGRESS NOTES
[facility]      OUTPATIENT PHYSICAL THERAPY EVALUATION  PLAN OF TREATMENT FOR OUTPATIENT REHABILITATION  (COMPLETE FOR INITIAL CLAIMS ONLY)  Patient's Last Name, First Name, M.I.  YOB: 1955  SouthGurdeep OLIVA                        Provider's Name  [facility] Medical Record No.  [id number]                               Onset Date:  12/21/21   Start of Care Date:  (P) 12/22/21      Type:     _X_PT   ___OT   ___SLP Medical Diagnosis:  (P) abdominal pain, SBO/recent surgery                        PT Diagnosis:  (P) decreased activity tolerance   Visits from SOC:  1   _________________________________________________________________________________  Plan of Treatment/Functional Goals    Planned Interventions: (P) bed mobility training,transfer training,gait training,stair training,strengthening     Goals: See Physical Therapy Goals on Care Plan in [employer] electronic health record.    Therapy Frequency:    Predicted Duration of Therapy Intervention: (P) 5  _________________________________________________________________________________    I CERTIFY THE NEED FOR THESE SERVICES FURNISHED UNDER        THIS PLAN OF TREATMENT AND WHILE UNDER MY CARE     (Physician co-signature of this document indicates review and certification of the therapy plan).                Certification date from: (P) 12/22/21, Certification date to: (P) 12/29/21    Referring Physician: KAREY Araujo (P)            Initial Assessment        See Physical Therapy evaluation dated (P) 12/22/21 in Epic electronic health record.   Standing/Walking

## 2024-03-22 NOTE — PROGRESS NOTES
[facility] Progress Note    Assessment/Plan  Principal Problem:    SBO (small bowel obstruction) (H)  Active Problems:    Hypertension goal BP (blood pressure) < 140/90    Benign prostatic hyperplasia with weak urinary stream    RAVI (generalized anxiety disorder)    Moderate major depression (H)    History of substance abuse (H)    History of hepatitis C    Chronic neck pain    Chronic bilateral low back pain without sciatica    Gurdeep Dia is a 66 year old male with history of splenectomy, appendectomy, HTN, and substance abuse who presented with 2 days of right sided abdominal pain, found to have a bowel obstruction with closed loop. He is admitted for surgical repair of obstruction.      Right sided abdominal pain  Mechanical SBO with closed loop s/p exploratory laparotomy, small bowel resection, repair of enterotomy, and extensive lysis of adhesions POD 1 (11/30/21)  CT abdomen/pelvis on admission 11/30 significant for closed loop mechanical small bowel obstruction. Does have history of multiple abdominal surgeries previously.  Labs significant for WBC count 11.8, otherwise CMP largely unremarkable. Lactic acid 0.7, redraw due to hypotension post-op was 2.1, likely due to recent procedure. Pt vitally stable, and afebrile.  -Surgery consulted performed exploratory laparotomy started as laparoscopic but transitioned to open due to extensive adhesions)  -Surgery guiding diet and pain management  -NPO, adv diet per surgery  -IV fluids NS 125ml/hr  -IV levaquin, flagyl (day 2, started 11/30)  -Dilaudid PCA for pain  -Incentive spirometry to help prevent atelectasis   -Daily CBC and CMP  -Lactic acid mildly elevated, likely secondary to post surgical setting    HTN  PTA lisinopril, furosemide being held in the setting of acute surgical care.  Will monitor, even with pain BP not significantly elevated.  Will resume when hemodynamically stable.  Kidney function is stable at this time.   Received signed and completed form from Physician's Office.  Form mailed to patient with auth,Notified Patient via email, and Mailing team        Chronic Conditions:  Hypothyroidism- PTA levothyroxine  Depression/anxiety- PTA venlafaxine, mirtazepine, Buspar   Chronic pain- Hold mexolicam 7.5mg daily, resume PTA gabapentin when tolerating PO.  BPH- PTA flomax      Diet: NPO for Medical/Clinical Reasons Except for: Meds, Ice ChipsNPO  DVT Prophylaxis: Pneumatic Compression Devices  Roblero Catheter: PRESENT, indication: post surgial  Fluids: NS 125ml/hr  Central Lines: None  Code Status: Full Code    Subjective  Patient quite uncomfortable.  Nervous about possible SBO's in the future.  Concerned that he may not get better from this.  Discussed that he is at risk for future SBO's but it is unlikely to happen again while he is here.  Stressed that with appropriate supportive cares he should be able to get better.       Objective    Vital signs in last 24 hours Temp:  [98  F (36.7  C)-99  F (37.2  C)] 98  F (36.7  C)  Pulse:  [] 99  Resp:  [10-28] 18  BP: (107-148)/() 125/90  SpO2:  [92 %-97 %] 95 %       Intake/Output last 3 shift I/O last 3 completed shifts:  In: 3150 [I.V.:2900]  Out: 2290 [Urine:1050; Emesis/NG output:150; Drains:140; Other:750; Blood:200]    Intake/Output this shift:I/O this shift:  In: 1966.67 [I.V.:966.67; IV Piggyback:1000]  Out: -     Physical Exam  General appearance: uncomfortable appearing male, laying in bed, two officers at bedside   Head: Normocephalic, without obvious abnormality, atraumatic.  NG tube in place.    Throat: Slightly ry mucous membranes.  Lungs: clear to auscultation bilaterally  Heart: regular rate and rhythm, S1, S2 normal, no murmur, click, rub or gallop  Abdomen: Abdominal binding in place. Bandage over surgical site C//D/I, there was a little blood on the pad but it has not progressed beyond marked area, wound drains with appropriate output.    Extremities: extremities normal, atraumatic, no cyanosis or edema  Skin: Slightly pale, dry skin.  Neurologic: Alert and oriented X 3, normal strength and  tone  Psychiatric: Mildly anxious regarding status.      Pertinent Labs and Pertinent Radiology   Lab Results: personally reviewed.     Radiology Results: Personally reviewed image/s and impression/s    Precepted patient with Dr. Stanley Giordano III.    Roge Moses MD  [facility], PGY-3  Pager # [phone]

## 2024-07-24 NOTE — PLAN OF CARE
Problem: Depression  Goal: Improved Mood  Outcome: Improving   Pt very short with staff this morning and annoyed when asked to do things (ie. Take his meds, turn so writer can listen to lungs). Educated pt on why we are doing what we have to do. Clustering cares and allowing patient to rest between. Discussed POC with pt and answered any questions/concerns he has.     Two ostomy bags still in place with watery, brown output in each.     1430: Pt much more pleasant and  this afternoon. Pt ambulating in saleem independently. Began recording pt's output of his two fistulas. Inferior fistula had 125cc brown watery output and the superior fistula had 50cc brown watery output. Both documented in flowsheets. Pt drains bags independently and leaves output in two separate containers for staff to measure.    KUSHAL JACQUES RN   [Follow-Up: _____] : a [unfilled] follow-up visit

## 2024-11-15 PROCEDURE — 83935 ASSAY OF URINE OSMOLALITY: CPT

## 2024-11-17 ENCOUNTER — LAB REQUISITION (OUTPATIENT)
Dept: LAB | Facility: CLINIC | Age: 69
End: 2024-11-17

## 2024-11-17 LAB — OSMOLALITY UR: 429 MMOL/KG (ref 100–1200)

## (undated) DEVICE — DECANTER VIAL 2006S

## (undated) DEVICE — SPONGE LAP 18X18" X8435

## (undated) DEVICE — PREP DURAPREP 26ML APL 8630

## (undated) DEVICE — WARMER SCOPE DISPOSABLE DLW510

## (undated) DEVICE — PLATE GROUNDING ADULT W/CORD 9165L

## (undated) DEVICE — SUTURE MAXON 0 GS-26 GRN LOOPED GMM341L

## (undated) DEVICE — BINDER ABDOMINAL 3PANEL LG 45IN 08140345

## (undated) DEVICE — SU SILK 3-0 SH CR 8X18" C013D

## (undated) DEVICE — STPL SKIN 35W 6.9MM  PXW35

## (undated) DEVICE — SU SILK 2-0 SH CR 8X18" C012D

## (undated) DEVICE — DRAIN RND 3/16 15FR SIL 0070220

## (undated) DEVICE — SPONGE LAP 12X12" X8425

## (undated) DEVICE — STPL LINEAR 90 X 3.5MM TA9035S

## (undated) DEVICE — STPL RELOAD LINEAR CUT 55MM TCR55

## (undated) DEVICE — GLOVE BIOGEL PI SZ 7.0 40870

## (undated) DEVICE — DRSG DRAIN 4X4" 7086

## (undated) DEVICE — DRAPE OR LAPAROTOMY KC 89228*

## (undated) DEVICE — ENDO TROCAR SLEEVE KII Z-THREADED 05X100MM CTS02

## (undated) DEVICE — STPL LINEAR CUT 55MM TLC55

## (undated) DEVICE — GLOVE BIOGEL PI ORTHOPRO SZ 7.5 47675

## (undated) DEVICE — ESU PENCIL SMOKE EVAC W/ROCKER SWITCH 0703-047-000

## (undated) DEVICE — CUSTOM PACK GEN MAJOR SBA5BGMHEA

## (undated) DEVICE — TOWEL SURG 16INW X 26INL WHITE STRL XRAY DETECTABLE X8314W

## (undated) DEVICE — ESU LIGASURE ATLAS 20CM  LS1020

## (undated) DEVICE — GOWN IMPERVIOUS BREATHABLE SMART XLG 89045

## (undated) DEVICE — NEEDLE HYPO 22X1-1/2 SAFETY 305900

## (undated) DEVICE — GLOVE BIOGEL PI SZ 7.5 40875

## (undated) DEVICE — SOL NACL 0.9% IRRIG 1000ML BOTTLE 2F7124

## (undated) DEVICE — CUSTOM PACK LAP CHOLE SBA5BLCHEA

## (undated) DEVICE — COUNTER NEEDLE ADH & FOAM 20CT 9106

## (undated) DEVICE — VESSEL LOOP BLUE MAXI 30-723

## (undated) DEVICE — SUCTION MANIFOLD NEPTUNE 2 SYS 1 PORT 702-025-000

## (undated) DEVICE — DRSG PRIMAPORE 04X11 3/4"

## (undated) DEVICE — SUCTION TIP POOLE STERILE 35040

## (undated) DEVICE — SU MONOCRYL+ 4-0 18IN PS2 UND MCP496G

## (undated) DEVICE — DRAIN RESERVOIR 100ML JP 0070740

## (undated) DEVICE — SU SILK 2-0 FS-1 18" 685G

## (undated) DEVICE — ENDO TROCAR FIRST ENTRY KII FIOS Z-THRD 05X100MM CTF03

## (undated) DEVICE — TUBING SMOKE EVAC PNEUMOCLEAR HIGH FLOW 0620050250

## (undated) DEVICE — DRAIN BLAKE 19FR SIL 2231

## (undated) DEVICE — CATH FOLEY COUDE 12FR 5ML LATEX 0168L12

## (undated) DEVICE — DRSG GAUZE 4X4" 3033

## (undated) DEVICE — CATH TRAY FOLEY SURESTEP 16FR DRAIN BAG STATOCK A899916

## (undated) DEVICE — GLOVE SURG PI ULTRA TOUCH M SZ 7-1/2 LF

## (undated) DEVICE — SOL WATER IRRIG 1000ML BOTTLE 2F7114

## (undated) DEVICE — DRESSING MEPILEX BORDER POST-OP 4X12

## (undated) DEVICE — MITT PRE-OP 7 L X 5 1/2 W 5177M1

## (undated) DEVICE — BLADE CLIPPER PIVOT PURPLE DISP 9660

## (undated) RX ORDER — FENTANYL CITRATE 50 UG/ML
INJECTION, SOLUTION INTRAMUSCULAR; INTRAVENOUS
Status: DISPENSED
Start: 2021-12-09

## (undated) RX ORDER — BUPIVACAINE HYDROCHLORIDE 2.5 MG/ML
INJECTION, SOLUTION INFILTRATION; PERINEURAL
Status: DISPENSED
Start: 2021-11-30

## (undated) RX ORDER — BUPIVACAINE HYDROCHLORIDE 2.5 MG/ML
INJECTION, SOLUTION EPIDURAL; INFILTRATION; INTRACAUDAL
Status: DISPENSED
Start: 2021-12-03